# Patient Record
Sex: MALE | Race: WHITE
[De-identification: names, ages, dates, MRNs, and addresses within clinical notes are randomized per-mention and may not be internally consistent; named-entity substitution may affect disease eponyms.]

---

## 2017-08-12 NOTE — EKG
[facility]
                                    [address]
                                  [place]  [address]
_________________________________________________________________________________________
                                                                 Signed   
 
 
Atrial fibrillation
Right bundle branch block
Left anterior fascicular block
*** Bifascicular block ***
Inferior infarct (cited on or before 19-JAN-2017)
Abnormal ECG
When compared with ECG of 19-JAN-2017 15:13,
No significant change was found
Confirmed by BROOKS HOUSTON MD ([id number]) on 8/12/2017 5:13:23 PM
 
 
 
 
 
 
 
 
 
 
 
 
 
 
 
 
 
 
 
 
 
 
 
 
 
 
 
 
 
 
 
 
 
 
 
 
    Electronically Signed By: BROOKS HOUSTON MD  08/12/17 1713
_________________________________________________________________________________________
PATIENT NAME:     DARYL RAMIREZ                     
MEDICAL RECORD #: [id number]                     Electrocardiogram             
          ACCT #: [id number]  
DATE OF BIRTH:   [birth year]                                       
PHYSICIAN:   BROOKS HOUSTON MD                   REPORT #: [id number]
REPORT IS CONFIDENTIAL AND NOT TO BE RELEASED WITHOUT AUTHORIZATION

## 2018-08-09 NOTE — EKG
[facility]
                                    [address]
                                  [place]  [address]
_________________________________________________________________________________________
                                                                 Signed   
 
 
Atrial fibrillation
Right bundle branch block
Left anterior fascicular block
*** Bifascicular block ***
Abnormal ECG
When compared with ECG of 12-AUG-2017 10:34,
No significant change was found
Confirmed by KRISTA LANDAVERDE MD ([id number]) on 8/9/2018 12:40:13 PM
 
 
 
 
 
 
 
 
 
 
 
 
 
 
 
 
 
 
 
 
 
 
 
 
 
 
 
 
 
 
 
 
 
 
 
 
 
    Electronically Signed By: KRISTA LANDAVERDE MD  08/09/18 1240
_________________________________________________________________________________________
PATIENT NAME:     DARYL RAMIREZ                     
MEDICAL RECORD #: [id number]                     Electrocardiogram             
          ACCT #: [id number]  
DATE OF BIRTH:   [birth year]                                       
PHYSICIAN:   KRISTA LANDAVERDE MD           REPORT #: [id number]
REPORT IS CONFIDENTIAL AND NOT TO BE RELEASED WITHOUT AUTHORIZATION

## 2019-01-03 NOTE — XMS
PreManage Notification: DARYL RAMIREZ MRN:[id number]
 
Security Information
 
Security Events
No recent Security Events currently on file
 
 
 
CRITERIA MET
------------
- Group Notification
 
 
CARE PROVIDERS
-------------------------------------------------------------------------------------
SALVADOR MURGUIA     Hospitalist     07/03/2018-Current
 
PHONE: Unknown
-------------------------------------------------------------------------------------
Erwin Iniguez MD     Primary Care     Current
 
PHONE: [phone]
-------------------------------------------------------------------------------------
hector     Case or Care Manager     Current
 
PHONE: Unknown
-------------------------------------------------------------------------------------
 
Micheal has no Care Guidelines for this patient.
Care History
Medical/Surgical
07/03/2018    [facility]
 
      - Patient is currently established with [facility]. If patient is seen 
      in the ED during business hours. Please contact CHWs at [facility] at
      Ext [phone]. Care Recommendation: This patient has had 5 or more Emergency
      Department visits in the last 12 months.\T\nbsp; Patient requires education on
      the scope and purpose of the ED as an acute care provider not a Primary Care
      Provider and should not be utilized for chronic conditions.\T\nbsp; If patient
      returns to ED please contact Community Health Worker Ana Luisa at [phone].
      These are guidelines and the provider should exercise clinical judgment when
      providing care.
E.D. VISIT COUNT (12 MO.)
-------------------------------------------------------------------------------------
8 [facility]
-------------------------------------------------------------------------------------
TOTAL 8
-------------------------------------------------------------------------------------
NOTE: Visits indicate total known visits.
 
ED/UCC VISIT TRACKING (12 MO.)
-------------------------------------------------------------------------------------
01/03/2019 20:08
[facility] [place]
 
TYPE: Emergency
 
COMPLAINT:
 
- NOSEBLEED
-------------------------------------------------------------------------------------
11/20/2018 12:29
[facility] [place]
 
TYPE: Emergency
 
COMPLAINT:
- NOSE BLEED
 
DIAGNOSES:
- Allergy status to other drugs, medicaments and biological substances status
- Unspecified atrial fibrillation
- Hypothyroidism, unspecified
- Other long term (current) drug therapy
- Major depressive disorder, single episode, unspecified
- Age-related osteoporosis without current pathological fracture
- Long term (current) use of anticoagulants
- Epistaxis
- Radiographic dye allergy status
-------------------------------------------------------------------------------------
08/29/2018 05:32
[facility] [place]
 
TYPE: Emergency
 
COMPLAINT:
- NOSE BLEED,NON INJURY
 
DIAGNOSES:
- Chronic obstructive pulmonary disease, unspecified
- Other long term (current) drug therapy
- Hypothyroidism, unspecified
- Allergy status to other drugs, medicaments and biological substances status
- Epistaxis
- Unspecified atrial fibrillation
- Gastro-esophageal reflux disease without esophagitis
- Old myocardial infarction
- Chronic kidney disease, unspecified
- Long term (current) use of systemic steroids
-------------------------------------------------------------------------------------
08/13/2018 22:59
[facility] [place]
 
TYPE: Emergency
 
COMPLAINT:
- NOSE BLEED
 
DIAGNOSES:
- Unspecified atrial fibrillation
- Old myocardial infarction
- Long term (current) use of antibiotics
- Chronic kidney disease, unspecified
- Major depressive disorder, single episode, unspecified
- Allergy status to other drugs, medicaments and biological substances status
- Anemia, unspecified
- Adverse effect of anticoagulants, initial encounter
- Chronic obstructive pulmonary disease, unspecified
- Hypothyroidism, unspecified
 
- Other long term (current) drug therapy
- Gastro-esophageal reflux disease without esophagitis
- Personal history of nicotine dependence
- Epistaxis
-------------------------------------------------------------------------------------
08/08/2018 12:58
[facility] [place]
 
TYPE: Emergency
 
COMPLAINT:
- HEPATIC ENCEPHALOPATHY
 
DIAGNOSES:
- Unspecified atrial fibrillation
- Gastro-esophageal reflux disease without esophagitis
- Hypothyroidism, unspecified
- Chronic kidney disease, unspecified
- Hemoptysis
- Other long term (current) drug therapy
- Chronic obstructive pulmonary disease, unspecified
- Personal history of nicotine dependence
- Major depressive disorder, single episode, unspecified
- Old myocardial infarction
- Chronic sinusitis, unspecified
- Pneumonia, unspecified organism
- Acute ischemic heart disease, unspecified
- Long term (current) use of systemic steroids
- Headache
-------------------------------------------------------------------------------------
07/02/2018 13:58
[facility] [place] [place]
 
TYPE: Emergency
 
COMPLAINT:
- REMOVAL OF STITCHES
 
DIAGNOSES:
- Chronic obstructive pulmonary disease, unspecified
- Chronic kidney disease, unspecified
- Gastro-esophageal reflux disease without esophagitis
- Personal history of pneumonia (recurrent)
- Other long term (current) drug therapy
- Bitten by dog, subsequent encounter
- Major depressive disorder, single episode, unspecified
- Allergy status to other drugs, medicaments and biological substances status
- Open bite of right forearm, subsequent encounter
- Long term (current) use of antibiotics
- Personal history of nicotine dependence
-------------------------------------------------------------------------------------
06/25/2018 10:41
[facility] [place]
 
TYPE: Emergency
 
COMPLAINT:
- WOUND CHECK
 
DIAGNOSES:
 
- Personal history of nicotine dependence
- Other long term (current) drug therapy
- Allergy status to other drugs, medicaments and biological substances status
- Chronic obstructive pulmonary disease, unspecified
- Bitten by dog, subsequent encounter
- Open bite of right forearm, subsequent encounter
-------------------------------------------------------------------------------------
06/23/2018 16:24
[facility] [place] [place]
 
TYPE: Emergency
 
COMPLAINT:
- DOG BITE
 
DIAGNOSES:
- Personal history of nicotine dependence
- Open bite of right forearm, initial encounter
- Allergy status to other drugs, medicaments and biological substances status
- Major depressive disorder, single episode, unspecified
- Other long term (current) drug therapy
- Hypothyroidism, unspecified
- Bitten by dog, initial encounter
- Chronic obstructive pulmonary disease, unspecified
-------------------------------------------------------------------------------------
 
 
INPATIENT VISIT TRACKING (12 MO.)
-------------------------------------------------------------------------------------
08/08/2018 21:20
[facility] [place]
 
TYPE: General Medicine
 
DIAGNOSES:
- Precordial pain
- Thoracic aortic aneurysm, without rupture
- Dysphagia, unspecified
- Essential (primary) hypertension
- Atherosclerotic heart disease of native coronary artery without angina pectoris
- Acute ischemic heart disease
- Chronic atrial fibrillation
-------------------------------------------------------------------------------------
 
[email or web address]

## 2019-12-06 ENCOUNTER — HOSPITAL ENCOUNTER (EMERGENCY)
Dept: HOSPITAL 46 - ED | Age: 74
Discharge: HOME | End: 2019-12-06
Payer: MEDICARE

## 2019-12-06 VITALS — WEIGHT: 155.01 LBS | HEIGHT: 68 IN | BODY MASS INDEX: 23.49 KG/M2

## 2019-12-06 DIAGNOSIS — K21.9: ICD-10-CM

## 2019-12-06 DIAGNOSIS — D63.1: ICD-10-CM

## 2019-12-06 DIAGNOSIS — I48.91: ICD-10-CM

## 2019-12-06 DIAGNOSIS — Z79.01: ICD-10-CM

## 2019-12-06 DIAGNOSIS — F32.9: ICD-10-CM

## 2019-12-06 DIAGNOSIS — N18.9: ICD-10-CM

## 2019-12-06 DIAGNOSIS — J44.0: Primary | ICD-10-CM

## 2019-12-06 DIAGNOSIS — E78.2: ICD-10-CM

## 2019-12-06 DIAGNOSIS — Z79.51: ICD-10-CM

## 2019-12-06 DIAGNOSIS — J18.9: ICD-10-CM

## 2019-12-06 DIAGNOSIS — Z87.891: ICD-10-CM

## 2019-12-06 DIAGNOSIS — Z79.899: ICD-10-CM

## 2019-12-06 DIAGNOSIS — I25.10: ICD-10-CM

## 2019-12-06 DIAGNOSIS — G47.33: ICD-10-CM

## 2019-12-06 DIAGNOSIS — I25.2: ICD-10-CM

## 2019-12-06 DIAGNOSIS — E03.9: ICD-10-CM

## 2019-12-06 DIAGNOSIS — Z88.8: ICD-10-CM

## 2019-12-06 NOTE — XMS
Clinical Summary
  Created on: 2019
 
 Wyatt Shane
 External Reference #: [id number]
 : 43
 Sex: Male
 
 Demographics
 
 
+-----------------------+------------------------+
| Address               | [address]     |
|                       | [place]  [address]   |
+-----------------------+------------------------+
| Home Phone            | [phone]        |
+-----------------------+------------------------+
| Preferred Language    | Unknown                |
+-----------------------+------------------------+
| Marital Status        |                 |
+-----------------------+------------------------+
| Hindu Affiliation | LUT                    |
+-----------------------+------------------------+
| Race                  | White                  |
+-----------------------+------------------------+
| Ethnic Group          | Not  or  |
+-----------------------+------------------------+
 
 
 Author
 
 
+--------------+-------------------------+
| Author       | [facility] |
+--------------+-------------------------+
| Organization | [facility] |
+--------------+-------------------------+
| Address      | Unknown                 |
+--------------+-------------------------+
| Phone        | Unavailable             |
+--------------+-------------------------+
 
 
 
 Support
 
 
+---------------+--------------+---------+-----------------+
| Name          | Relationship | Address | Phone           |
+---------------+--------------+---------+-----------------+
| Opal Shane | ECON         | Unknown | [phone] |
+---------------+--------------+---------+-----------------+
 
 
 
 Care Team Providers
 
 
 
+-----------------------+------+-----------------+
| Care Team Member Name | Role | Phone           |
+-----------------------+------+-----------------+
| Erwin Iniguez MD   | PCP  | [phone] |
+-----------------------+------+-----------------+
 
 
 
 Source Comments
 [facility] is fully live on both [facility] and [facility]
 
 Allergies
 No Known Allergies
 
 Medications
 
 
+----------------------+----------------------+-----------+---------+------+------+-------+
| Medication           | Sig                  | Dispensed | Refills | Star | End  | Statu |
|                      |                      |           |         | t    | Date | s     |
|                      |                      |           |         | Date |      |       |
+----------------------+----------------------+-----------+---------+------+------+-------+
|   VICODIN ORAL       | None Entered         |           | 0       |      |      | Activ |
|                      |                      |           |         |      |      | e     |
+----------------------+----------------------+-----------+---------+------+------+-------+
|   LANSOPRAZOLE       | Take  by mouth.      |           | 0       |      |      | Activ |
| (PREVACID ORAL)      |                      |           |         |      |      | e     |
+----------------------+----------------------+-----------+---------+------+------+-------+
|   metoclopramide     | Take 5 mg by mouth   |           | 0       |      |      | Activ |
| (REGLAN) 5 mg Oral   | every six hours as   |           |         |      |      | e     |
| Tablet               | needed.              |           |         |      |      |       |
+----------------------+----------------------+-----------+---------+------+------+-------+
|   sertraline         | Take 50 mg by mouth  |           | 0       |      |      | Activ |
| (ZOLOFT) 50 mg Oral  | once daily.          |           |         |      |      | e     |
| Tablet               |                      |           |         |      |      |       |
+----------------------+----------------------+-----------+---------+------+------+-------+
|   tamsulosin         | Take 0.4 mg by mouth |           | 0       |      |      | Activ |
| (FLOMAX) 0.4 mg Oral |  once daily.         |           |         |      |      | e     |
|  Capsule, Sust.      |                      |           |         |      |      |       |
| Release 24 hr        |                      |           |         |      |      |       |
+----------------------+----------------------+-----------+---------+------+------+-------+
|   TESTOSTERONE IM    | Inject  into the     |           | 0       |      |      | Activ |
|                      | muscle (IM).         |           |         |      |      | e     |
+----------------------+----------------------+-----------+---------+------+------+-------+
|                      | Take  by mouth.      |           | 0       |      |      | Activ |
| Amlodipine-Atorvasta |                      |           |         |      |      | e     |
| tin (CADUET) 10-10   |                      |           |         |      |      |       |
| mg Oral Tablet       |                      |           |         |      |      |       |
+----------------------+----------------------+-----------+---------+------+------+-------+
|   ACYCLOVIR ORAL     | Take  by mouth as    |           | 0       | 06/3 |      | Activ |
|                      | needed.              |           |         | 0/20 |      | e     |
|                      |                      |           |         | 10   |      |       |
+----------------------+----------------------+-----------+---------+------+------+-------+
|   CYANOCOBALAMIN     | by Injection route   |           | 0       | 06/3 |      | Activ |
| (VITAMIN B-12 INJ)   | every thirty days.   |           |         | 0/20 |      | e     |
|                      |                      |           |         | 10   |      |       |
+----------------------+----------------------+-----------+---------+------+------+-------+
|   gabapentin 300 mg  | Take 300 mg by mouth |           | 0       |      |      | Activ |
| Oral Tablet          |  three times daily.  |           |         |      |      | e     |
 
+----------------------+----------------------+-----------+---------+------+------+-------+
|   LORATADINE         | Take  by mouth.      |           | 0       |      |      | Activ |
| (CLARITIN ORAL)      |                      |           |         |      |      | e     |
+----------------------+----------------------+-----------+---------+------+------+-------+
|   NAPROXEN           | Take  by mouth.      |           | 0       |      |      | Activ |
| SODIUM/P-EPHED HCL   |                      |           |         |      |      | e     |
| (SUDAFED 12 HR       |                      |           |         |      |      |       |
| SINUS-PAIN ORAL)     |                      |           |         |      |      |       |
+----------------------+----------------------+-----------+---------+------+------+-------+
|   atorvastatin       | Take 10 mg by mouth  |           | 0       |      |      | Activ |
| (LIPITOR) 10 mg Oral | once daily.          |           |         |      |      | e     |
|  Tablet              |                      |           |         |      |      |       |
+----------------------+----------------------+-----------+---------+------+------+-------+
|   omeprazole         | Take 40 mg by mouth  |           | 0       |      |      | Activ |
| (PRILOSEC) 40 mg     | once daily.          |           |         |      |      | e     |
| Oral Capsule,        |                      |           |         |      |      |       |
| Delayed              |                      |           |         |      |      |       |
| Release(E.C.)        |                      |           |         |      |      |       |
+----------------------+----------------------+-----------+---------+------+------+-------+
 
 
 
 Active Problems
 
 
+--------------------------------+------------+
| Problem                        | Noted Date |
+--------------------------------+------------+
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
+--------------------------------+------------+
| History of laryngeal cancer    | 2011 |
+--------------------------------+------------+
| Esophageal stenosis            | 2010 |
+--------------------------------+------------+
| Larynx edema                   | 2010 |
+--------------------------------+------------+
| Glottic stenosis               | 10/03/2007 |
+--------------------------------+------------+
| Pharyngeal dysphagia           | 10/03/2007 |
+--------------------------------+------------+
| Allergic rhinitis              | 10/03/2007 |
+--------------------------------+------------+
 
 
 
+---------------------+
|   Overview:   ICD10 |
+---------------------+
 
 
 
 Resolved Problems
 
 
+------------------+----------+-----------+
| Problem          | Noted    | Resolved  |
|                  | Date     | Date      |
+------------------+----------+-----------+
| Laryngeal cancer | 10/03/20 |  |
|                  | 07       |          |
 
+------------------+----------+-----------+
 
 
 
 Family History
 
 
+-------------------+----------+------+----------+
| Medical History   | Relation | Name | Comments |
+-------------------+----------+------+----------+
| Cancer            | Mother   |      | breast   |
+-------------------+----------+------+----------+
| Movement Disorder | Neg Hx   |      |          |
+-------------------+----------+------+----------+
| Thyroid           | Neg Hx   |      |          |
+-------------------+----------+------+----------+
| Tremor            | Neg Hx   |      |          |
+-------------------+----------+------+----------+
 
 
 
+----------+------+--------+----------+
| Relation | Name | Status | Comments |
+----------+------+--------+----------+
| Mother   |      |        |          |
+----------+------+--------+----------+
 
 
 
 Social History
 
 
+---------------+------------+-----------+--------+------------------+
| Tobacco Use   | Types      | Packs/Day | Years  | Date             |
|               |            |           | Used   |                  |
+---------------+------------+-----------+--------+------------------+
| Former Smoker | Cigarettes | 1         | 30     | Quit: 1996 |
+---------------+------------+-----------+--------+------------------+
 
 
 
+-------------+-------------+---------+----------+
| Alcohol Use | Drinks/Week | oz/Week | Comments |
+-------------+-------------+---------+----------+
| No          |             |         |          |
+-------------+-------------+---------+----------+
 
 
 
+------------------+---------------+
| Sex Assigned at  | Date Recorded |
| Birth            |               |
+------------------+---------------+
| Not on file      |               |
+------------------+---------------+
 
 
 
+----------------+-------------+-------------+
| Job Start Date | Occupation  | Industry    |
 
+----------------+-------------+-------------+
| Not on file    | Not on file | Not on file |
+----------------+-------------+-------------+
 
 
 
+----------------+--------------+------------+
| Travel History | Travel Start | Travel End |
+----------------+--------------+------------+
 
 
 
+-------------------------------------+
| No recent travel history available. |
+-------------------------------------+
 
 
 
 Last Filed Vital Signs
 
 
+-------------------+------------------+----------------------+----------+
| Vital Sign        | Reading          | Time Taken           | Comments |
+-------------------+------------------+----------------------+----------+
| Blood Pressure    | 135/77           | 2011 11:13 AM  |          |
|                   |                  | PDT                  |          |
+-------------------+------------------+----------------------+----------+
| Pulse             | 78               | 2011 11:13 AM  |          |
|                   |                  | PDT                  |          |
+-------------------+------------------+----------------------+----------+
| Temperature       | -                | -                    |          |
+-------------------+------------------+----------------------+----------+
| Respiratory Rate  | -                | -                    |          |
+-------------------+------------------+----------------------+----------+
| Oxygen Saturation | -                | -                    |          |
+-------------------+------------------+----------------------+----------+
| Inhaled Oxygen    | -                | -                    |          |
| Concentration     |                  |                      |          |
+-------------------+------------------+----------------------+----------+
| Weight            | 92.5 kg (204 lb) | 2011 11:13 AM  |          |
|                   |                  | PDT                  |          |
+-------------------+------------------+----------------------+----------+
| Height            | 172.7 cm (5' 8") | 2011 11:13 AM  |          |
|                   |                  | PDT                  |          |
+-------------------+------------------+----------------------+----------+
| Body Mass Index   | 31.02            | 2011 11:13 AM  |          |
|                   |                  | PDT                  |          |
+-------------------+------------------+----------------------+----------+
 
 
 
 Plan of Treatment
 
 
+----------------------+-----------+-----------+----------+
| Health Maintenance   | Due Date  | Last Done | Comments |
+----------------------+-----------+-----------+----------+
| Pneumococcal         |  |           |          |
| vaccination (1 of 2  | 9         |           |          |
| - PCV13)             |           |           |          |
 
+----------------------+-----------+-----------+----------+
| Influenza (Flu)      | 10/01/201 |           |          |
| vaccination (#1)     | 9         |           |          |
+----------------------+-----------+-----------+----------+
 
 
 
 Results
 Not on filefrom Last 3 Months
 
 Insurance
 
 
+------------------+--------+-------------+--------+-------------+------------+--------+
| Payer            | Benefi | Subscriber  | Effect | Phone       | Address    | Type   |
|                  | t Plan | ID          | daisy    |             |            |        |
|                  |  /     |             | Dates  |             |            |        |
|                  | Group  |             |        |             |            |        |
+------------------+--------+-------------+--------+-------------+------------+--------+
| MEDICARE         | MEDICA | xxxxxxxxxx  |  | [phone] |   [address]   | [address] |
|                  | [address] & |             | [id number] | [address]           | [id number]       | [address]     |
|                  |  [address]     |             | esent  |             | [place], [place]  |        |
|                  |        |             |        |             | [id number]      |        |
+------------------+--------+-------------+--------+-------------+------------+--------+
| COMMERCIAL GROUP | COMMER | xxxxxxxxx   | Effect |             |            | Indemn |
|                  | CIAL   |             | daisy    |             |            | ity    |
|                  | GROUP  |             | for    |             |            |        |
|                  |        |             | all    |             |            |        |
|                  |        |             | dates  |             |            |        |
+------------------+--------+-------------+--------+-------------+------------+--------+
 
 
 
+----------------+--------+-------------+--------+-------------+----------------------+
| Guarantor Name | Accoun | Relation to | Date   | Phone       | Billing Address      |
|                | t Type |  Patient    | of     |             |                      |
|                |        |             | Birth  |             |                      |
+----------------+--------+-------------+--------+-------------+----------------------+
| Wyatt Shane    | Person | Self        | / |             |   [address] |
|                | al/Fam |             | 1945   | [phone] |   [place]      |
|                | [place]    |             |        | [address] (Home)    | [id number]                |
+----------------+--------+-------------+--------+-------------+----------------------+
 
 
 
 Advance Directives
 
 
+-----------------+---------------+----------------+-------------+
| Type            | Date Recorded | Patient        | Explanation |
|                 |               | Representative |             |
+-----------------+---------------+----------------+-------------+
| Advance         |               |                |             |
| Directives and  |               |                |             |
| Living Will     |               |                |             |
+-----------------+---------------+----------------+-------------+
| Power of        |               |                |             |
|         |               |                |             |
+-----------------+---------------+----------------+-------------+

## 2019-12-06 NOTE — XMS
Encounter Summary
  Created on: 2019
 
 Shane Wyatttien BroussardJosue
 External Reference #: [id number]
 : 43
 Sex: Male
 
 Demographics
 
 
+-----------------------+---------------------------+
| Address               | [address]        |
|                       | [place]  [address] |
+-----------------------+---------------------------+
| Home Phone            | [phone]           |
+-----------------------+---------------------------+
| Preferred Language    | Unknown                   |
+-----------------------+---------------------------+
| Marital Status        |                    |
+-----------------------+---------------------------+
| Christianity Affiliation | [id number]                      |
+-----------------------+---------------------------+
| Race                  | Unknown                   |
+-----------------------+---------------------------+
| Ethnic Group          | Unknown                   |
+-----------------------+---------------------------+
 
 
 Author
 
 
+--------------+--------------------------------------------+
| Author       | [facility]  |
|              | and [place]                                |
+--------------+--------------------------------------------+
| Organization | [facility]  |
|              | and [place]                                |
+--------------+--------------------------------------------+
| Address      | Unknown                                    |
+--------------+--------------------------------------------+
| Phone        | Unavailable                                |
+--------------+--------------------------------------------+
 
 
 
 Support
 
 
+---------------+--------------+--------------------+-----------------+
| Name          | Relationship | Address            | Phone           |
+---------------+--------------+--------------------+-----------------+
| Opal Shane | ECON         | [address]     | [phone] |
|               |              | [place]   |                 |
|               |              | [id number]              |                 |
+---------------+--------------+--------------------+-----------------+
 
 
 
 
 Care Team Providers
 
 
+-----------------------+------+-----------------+
| Care Team Member Name | Role | Phone           |
+-----------------------+------+-----------------+
| Erwin Iniguez MD | PCP  | [phone] |
+-----------------------+------+-----------------+
 
 
 
 Reason for Visit
 
 
+---------+----------+
| Reason  | Comments |
+---------+----------+
| Consult |          |
+---------+----------+
 
 
 
 Encounter Details
 
 
+--------+---------+----------------------+----------------------+-------------------+
| Date   | Type    | Department           | Care Team            | Description       |
+--------+---------+----------------------+----------------------+-------------------+
| / | Office  |   [facility] [place]      |   Sohail Campbell PA  | JOANN (obstructive  |
| 2014   | Visit   | SLEEP DISORDER  [address]  |  [address]     | sleep apnea)      |
|        |         | [address]      | [place]      | (Primary Dx);     |
|        |         | [place] [address] | [address]  [phone]  | Cheyne-Gregory     |
|        |         |   [phone]       |  [phone] (Fax)  | respiration       |
+--------+---------+----------------------+----------------------+-------------------+
 
 
 
 Social History
 
 
+---------------+------------+-----------+--------+------------------+
| Tobacco Use   | Types      | Packs/Day | Years  | Date             |
|               |            |           | Used   |                  |
+---------------+------------+-----------+--------+------------------+
| Former Smoker | Cigarettes | 1.3       | 34     | Quit: 1996 |
+---------------+------------+-----------+--------+------------------+
 
 
 
+---------------------+---+---+---+
| Smokeless Tobacco:  |   |   |   |
| Never Used          |   |   |   |
+---------------------+---+---+---+
 
 
 
+-------------+-------------+---------+----------+
| Alcohol Use | Drinks/Week | oz/Week | Comments |
+-------------+-------------+---------+----------+
 
| No          |             |         |          |
+-------------+-------------+---------+----------+
 
 
 
+------------------+---------------+
| Sex Assigned at  | Date Recorded |
| Birth            |               |
+------------------+---------------+
| Not on file      |               |
+------------------+---------------+
 
 
 
+----------------+-------------+-------------+
| Job Start Date | Occupation  | Industry    |
+----------------+-------------+-------------+
| Not on file    | Not on file | Not on file |
+----------------+-------------+-------------+
 
 
 
+----------------+--------------+------------+
| Travel History | Travel Start | Travel End |
+----------------+--------------+------------+
 
 
 
+-------------------------------------+
| No recent travel history available. |
+-------------------------------------+
 documented as of this encounter
 
 Last Filed Vital Signs
 
 
+-------------------+----------------------+----------------------+----------+
| Vital Sign        | Reading              | Time Taken           | Comments |
+-------------------+----------------------+----------------------+----------+
| Blood Pressure    | 134/66               | 2014 10:00 AM  |          |
|                   |                      | PDT                  |          |
+-------------------+----------------------+----------------------+----------+
| Pulse             | 67                   | 2014 10:00 AM  |          |
|                   |                      | PDT                  |          |
+-------------------+----------------------+----------------------+----------+
| Temperature       | -                    | -                    |          |
+-------------------+----------------------+----------------------+----------+
| Respiratory Rate  | 16                   | 2014 10:00 AM  |          |
|                   |                      | PDT                  |          |
+-------------------+----------------------+----------------------+----------+
| Oxygen Saturation | 97%                  | 2014 10:00 AM  |          |
|                   |                      | PDT                  |          |
+-------------------+----------------------+----------------------+----------+
| Inhaled Oxygen    | -                    | -                    |          |
| Concentration     |                      |                      |          |
+-------------------+----------------------+----------------------+----------+
| Weight            | 82.2 kg (181 lb 3.2  | 2014 10:00 AM  |          |
|                   | oz)                  | PDT                  |          |
+-------------------+----------------------+----------------------+----------+
| Height            | 172.7 cm (5' 8")     | 2014 10:00 AM  |          |
 
|                   |                      | PDT                  |          |
+-------------------+----------------------+----------------------+----------+
| Body Mass Index   | 27.55                | 2014 10:00 AM  |          |
|                   |                      | PDT                  |          |
+-------------------+----------------------+----------------------+----------+
 documented in this encounter
 
 Progress Sohail Truong PA - 2014  9:55 AM PDTFormatting of this note might be different from 
the original.
 Subjective: 
  
 Patient ID: Wyatt Shane is a 68 y.o. male.
 
 HPI
 
 last office visit was:  2014
 date of polysomnography: 10/07/2013 
 AHI: 77.4
 RDI: 77.4
 O2%: 86% with 15.8 minutes below 88% 
 Machine type: Respironics ASV BiPAP with nasal mask (Aditi)
 obtained from:  [facility] in [place] 
 pressure: EPAP = 4cm;PSmin=0cm;PSmax=25cm;backup rate=auto
 Nights using BiPAP:    15/64
 % of nights >4 hours:  3.1% 
 average usage (all nights):  0:27  0:28
 average usage (nights used):  2:34  2:03
 AHI: 1.0
 
 Wyatt comes in for BiPAP compliance.  He continues to have problems with congestion.  He has
 tried increasing his humidity to the maximum of 5.0, but this has not been helpful.  At [facility]
s setting, he is only using a small amount of water, which often indicates that it is not wo
rking properly.  He continues to have dried blood in his nose in the morning when using his 
BiPAP.  He has not been using his BiPAP on most nights because of this.  He understands the 
severity of his apnea and the importance of treating it.  He did very well during the first 
few months with his BiPAP.  He and his wife noticed a significant improvement in his sleep q
uality and his daytime sleepiness and energy.  During this time, he was not having congestio
n problems and he was using more water each night.  He took his BiPAP to [place] to have the h
umidifier checked.  He says they made some adjustments to it, but did not replace it or seem
 to repair anything.  The function of the humidifier has not improved.  He and his wife are 
very frustrated.  He also said that his Respironics ASV was very quiet when he began using i
t, but it now makes much more noise.  He is not able to make any adjustments to his mask or 
the machine to improve the noise.
 
 I have discussed the download in detail.  This shows that his sleep apnea is controlled, wi
th an AHI of 1.0.  It also shows that his leaks are well controlled.  He has not met the com
pliance standard of wearing his BiPAP for >4 hours for 70% or more nights during at least a 
thirty day period.
 
 Review of Systems
 
 Objective: 
 Physical Exam
 
 Assessment: 
 
 Problem #1: OBSTRUCTIVE SLEEP APNEA (ICD 9-327.23)
 This is controlled with BiPAP, but he continues to struggle with wearing it for the duratio
n of the night because of problems with congestion.  His BiPAP is only using a small amount 
 
of water during the night, which is likely the cause of his dryness and congestion.  He has 
taken his current BiPAP to [place] to be repaired, but this has not improved.
 
 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 
 See above.
 
 Plan: 
 
 He is to continue with his BiPAP indefinitely.  I recommended that he take his BiPAP to [facility] in [place] to have it replaced with a ResMed S9 ASV BiPAP.  He may also benefit from 
seeing an ENT specialist.
 
 He will follow up with Dr. Perez.  Thirty minutes were spent face-to-face, with the majorit
y of time spent in counseling.
 
 Sohail Campbell PA-C
 
 cc: 
 Dr. Erwin Foote
 
 Electronically signed by ROWENA Greene at 2014  2:10 PM PDTdocumented in this enco
unter
 
 Plan of Treatment
 
 
+--------+---------+-------------+----------------------+-------------+
| Date   | Type    | Specialty   | Care Team            | Description |
+--------+---------+-------------+----------------------+-------------+
| / | Office  | Pulmonology |   [facility],          |             |
|    | Visit   |             | Jessica Cheung,   |             |
|        |         |             | MD [address] |             |
|        |         |             |   [place],   |             |
|        |         |             | [place] [address]             |             |
|        |         |             | [phone]         |             |
|        |         |             | [phone] (Fax)   |             |
+--------+---------+-------------+----------------------+-------------+
| 03/04/ | Office  | Cardiology  |   Eric Akins, |             |
|    | Visit   |             |  MD [address]   |             |
|        |         |             | [place]  |             |
|        |         |             | [address]  [phone]  |             |
|        |         |             |  [phone] (Fax)  |             |
+--------+---------+-------------+----------------------+-------------+
 documented as of this encounter
 
 Visit Diagnoses
 
 
+----------------------------------------------------------------------------------------+
| Diagnosis                                                                              |
+----------------------------------------------------------------------------------------+
|   JOANN (obstructive sleep apnea) - Primary  Obstructive sleep apnea (adult) (pediatric) |
+----------------------------------------------------------------------------------------+
|   Cheyne-Gregory respiration                                                            |
+----------------------------------------------------------------------------------------+
 documented in this encounter

## 2019-12-06 NOTE — XMS
Encounter Summary
  Created on: 2019
 
 Wyatt Shane
 External Reference #: [id number]
 : 43
 Sex: Male
 
 Demographics
 
 
+-----------------------+------------------------+
| Address               | [address]     |
|                       | [place]  [address]   |
+-----------------------+------------------------+
| Home Phone            | [phone]        |
+-----------------------+------------------------+
| Preferred Language    | Unknown                |
+-----------------------+------------------------+
| Marital Status        |                 |
+-----------------------+------------------------+
| [employer] Affiliation | LUT                    |
+-----------------------+------------------------+
| Race                  | White                  |
+-----------------------+------------------------+
| Ethnic Group          | Not  or  |
+-----------------------+------------------------+
 
 
 Author
 
 
+--------------+------------------------------+
| Author       | [facility] |
+--------------+------------------------------+
| Organization | [facility] |
+--------------+------------------------------+
| Address      | Unknown                      |
+--------------+------------------------------+
| Phone        | Unavailable                  |
+--------------+------------------------------+
 
 
 
 Support
 
 
+---------------+--------------+---------+-----------------+
| Name          | Relationship | Address | Phone           |
+---------------+--------------+---------+-----------------+
| Opal Shane | ECON         | Unknown | [phone] |
+---------------+--------------+---------+-----------------+
 
 
 
 Care Team Providers
 
 
 
+-----------------------+------+-----------------+
| Care Team Member Name | Role | Phone           |
+-----------------------+------+-----------------+
| Erwin Iniguez MD   | PCP  | [phone] |
+-----------------------+------+-----------------+
 
 
 
 Encounter Details
 
 
+--------+-------------+----------------------+----------------------+---------------------+
| Date   | Type        | Department           | Care Team            | Description         |
+--------+-------------+----------------------+----------------------+---------------------+
| 02/10/ | DELETED     |   Preoperative       |   Consult,           | ANESTHESIA/SEDATION |
|    | TRANSCRIPTI | Medicine Clinic at   | Anesthesia  [address] |                     |
|        | ON          | [facility] 4th Floor  [address]  |  [facility]    |                     |
|        |             | [address]          | Road  [place], [place]   |                     |
|        |             | Mailcode: [facility]       | [address]                |                     |
|        |             | [facility]    |                      |                     |
|        |             | and Healing,         |                      |                     |
|        |             | [address]th Floor |                      |                     |
|        |             |   [facility] [place]       |                      |                     |
|        |             | [address]           |                      |                     |
|        |             | [phone]         |                      |                     |
+--------+-------------+----------------------+----------------------+---------------------+
 
 
 
 Social History
 
 
+----------------+-------+-----------+--------+------+
| Tobacco Use    | Types | Packs/Day | Years  | Date |
|                |       |           | Used   |      |
+----------------+-------+-----------+--------+------+
| Never Assessed |       |           |        |      |
+----------------+-------+-----------+--------+------+
 
 
 
+------------------+---------------+
| Sex Assigned at  | Date Recorded |
| Birth            |               |
+------------------+---------------+
| Not on file      |               |
+------------------+---------------+
 
 
 
+----------------+-------------+-------------+
| Job Start Date | Occupation  | Industry    |
+----------------+-------------+-------------+
| Not on file    | Not on file | Not on file |
+----------------+-------------+-------------+
 
 
 
+----------------+--------------+------------+
| Travel History | Travel Start | Travel End |
 
+----------------+--------------+------------+
 
 
 
+-------------------------------------+
| No recent travel history available. |
+-------------------------------------+
 documented as of this encounter
 
 Plan of Treatment
 Not on filedocumented as of this encounter
 
 Procedures
 
 
+---------------------+--------+-------------+----------------------+----------------------+
| Procedure Name      | Priori | Date/Time   | Associated Diagnosis | Comments             |
|                     | ty     |             |                      |                      |
+---------------------+--------+-------------+----------------------+----------------------+
| ANESTHESIA/SEDATION |        | 02/10/2000  |                      |   Results for this   |
|                     |        | 11:24 AM    |                      | procedure are in the |
|                     |        | PST         |                      |  results section.    |
+---------------------+--------+-------------+----------------------+----------------------+
 documented in this encounter
 
 Visit Diagnoses
 Not on filedocumented in this encounter

## 2019-12-06 NOTE — XMS
Encounter Summary
  Created on: 2019
 
 Shane Wyatttien BroussardJosue
 External Reference #: [id number]
 : 43
 Sex: Male
 
 Demographics
 
 
+-----------------------+---------------------------+
| Address               | [address]        |
|                       | [place]  [address] |
+-----------------------+---------------------------+
| Home Phone            | [phone]           |
+-----------------------+---------------------------+
| Preferred Language    | Unknown                   |
+-----------------------+---------------------------+
| Marital Status        |                    |
+-----------------------+---------------------------+
| Synagogue Affiliation | 1028                      |
+-----------------------+---------------------------+
| Race                  | Unknown                   |
+-----------------------+---------------------------+
| Ethnic Group          | Unknown                   |
+-----------------------+---------------------------+
 
 
 Author
 
 
+--------------+--------------------------------------------+
| Author       | [facility]  |
|              | [facility] [place]                                |
+--------------+--------------------------------------------+
| Organization | [facility]  |
|              | [facility] [place]                                |
+--------------+--------------------------------------------+
| Address      | Unknown                                    |
+--------------+--------------------------------------------+
| Phone        | Unavailable                                |
+--------------+--------------------------------------------+
 
 
 
 Support
 
 
+---------------+--------------+--------------------+-----------------+
| Name          | Relationship | Address            | Phone           |
+---------------+--------------+--------------------+-----------------+
| Opal Shane | ECON         | [address]     | [phone] |
|               |              | [place]   |                 |
|               |              | [id number]              |                 |
+---------------+--------------+--------------------+-----------------+
 
 
 
 
 Care Team Providers
 
 
+------------------------+------+-----------------+
| Care Team Member Name  | Role | Phone           |
+------------------------+------+-----------------+
| Calvin Robertson MD | PCP  | [phone] |
+------------------------+------+-----------------+
 
 
 
 Reason for Referral
 Diagnostic/Screening (Routine)
 
+--------+--------+-----------+--------------+--------------+--------------+
| Status | Reason | Specialty | Diagnoses /  | Referred By  | Referred To  |
|        |        |           | Procedures   | Contact      | Contact      |
+--------+--------+-----------+--------------+--------------+--------------+
| Closed |        | Radiology |   Diagnoses  |   Steve Si,  |              |
|        |        |           |  Carotid     | DNP  [address]    |              |
|        |        |           | stenosis,    | [address]  |              |
|        |        |           | bilateral    |  [place] [address]       |              |
|        |        |           | Procedures   | [place] |              |
|        |        |           | VAS Carotid  |  [id number]       |              |
|        |        |           | Duplex       | Phone:       |              |
|        |        |           | Bilateral    | [phone] |              |
|        |        |           |              |   Fax:       |              |
|        |        |           |              | [phone] |              |
+--------+--------+-----------+--------------+--------------+--------------+
 
 
 Diagnostic/Screening (Routine)
 
+--------+--------+-----------+--------------+--------------+--------------+
| Status | Reason | Specialty | Diagnoses /  | Referred By  | Referred To  |
|        |        |           | Procedures   | Contact      | Contact      |
+--------+--------+-----------+--------------+--------------+--------------+
| Closed |        | Radiology |   Diagnoses  |   Neris Wilson,  |              |
|        |        |           |  Abdominal   | DNP  [address]    |              |
|        |        |           | aortic       | [address]  |              |
|        |        |           | aneurysm     |  [address]       |              |
|        |        |           | (AAA)        | [place] |              |
|        |        |           | without      |  [id number]       |              |
|        |        |           | rupture      | Phone:       |              |
|        |        |           | (HCC)        | [phone] |              |
|        |        |           | Procedures   |   Fax:       |              |
|        |        |           | VAS Aorta    | [phone] |              |
|        |        |           | Iliac Duplex |              |              |
|        |        |           |  Complete    |              |              |
+--------+--------+-----------+--------------+--------------+--------------+
 
 
 
 
 Reason for Visit
 
 
+-----------+----------+
| Reason    | Comments |
 
+-----------+----------+
| Follow-up |          |
+-----------+----------+
 
 
 
 Encounter Details
 
 
+--------+-----------+----------------------+----------------------+-------------+
| Date   | Type      | Department           | Care Team            | Description |
+--------+-----------+----------------------+----------------------+-------------+
| / | Telephone |   [facility]      |   Neris Wilson DNP      | Follow-up   |
| 2019   |           | VASCULAR SURGERY     | [address]     |             |
|        |           | [address] | [place] [facility] [place]  |             |
|        |           |  E  [place]     | [address]  [phone]  |             |
|        |           | [address]           |  [phone] (Fax)  |             |
|        |           | [phone]         |                      |             |
+--------+-----------+----------------------+----------------------+-------------+
 
 
 
 Social History
 
 
+---------------+------------+-----------+--------+------------------+
| Tobacco Use   | Types      | Packs/Day | Years  | Date             |
|               |            |           | Used   |                  |
+---------------+------------+-----------+--------+------------------+
| Former Smoker | Cigarettes | 1         | 35     | Quit: 1996 |
+---------------+------------+-----------+--------+------------------+
 
 
 
+---------------------+---+---+---+
| Smokeless Tobacco:  |   |   |   |
| Never Used          |   |   |   |
+---------------------+---+---+---+
 
 
 
+-------------+-------------+---------+----------+
| Alcohol Use | Drinks/Week | oz/Week | Comments |
+-------------+-------------+---------+----------+
| No          |             |         |          |
+-------------+-------------+---------+----------+
 
 
 
+------------------+---------------+
| Sex Assigned at  | Date Recorded |
| Birth            |               |
+------------------+---------------+
| Not on file      |               |
+------------------+---------------+
 
 
 
+----------------+-------------+-------------+
| Job Start Date | Occupation  | Industry    |
 
+----------------+-------------+-------------+
| Not on file    | Not on file | Not on file |
+----------------+-------------+-------------+
 
 
 
+----------------+--------------+------------+
| Travel History | Travel Start | Travel End |
+----------------+--------------+------------+
 
 
 
+-------------------------------------+
| No recent travel history available. |
+-------------------------------------+
 documented as of this encounter
 
 Plan of Treatment
 
 
+--------+---------+-------------+----------------------+-------------+
| Date   | Type    | Specialty   | Care Team            | Description |
+--------+---------+-------------+----------------------+-------------+
| / | Office  | Pulmonology |   Juan F,          |             |
|    | Visit   |             | Jessica Cheung,   |             |
|        |         |             | MD [address] |             |
|        |         |             |   [place]   |             |
|        |         |             | [place] [address]             |             |
|        |         |             | [phone]         |             |
|        |         |             | [phone] (Fax)   |             |
+--------+---------+-------------+----------------------+-------------+
| / | Office  | Cardiology  |   Eric Akins, |             |
|    | Visit   |             |  MD [address]   |             |
|        |         |             | [place]  |             |
|        |         |             | [address]  [phone]  |             |
|        |         |             |  [phone] (Fax)  |             |
+--------+---------+-------------+----------------------+-------------+
 documented as of this encounter
 
 Results
 VAS Carotid Duplex Bilateral (2019 11:15 AM PST)
 
+----------+
| Specimen |
+----------+
|          |
+----------+
 
 
 
+------------------------------------------------------------------------+---------------+
| Impressions                                                            | Performed At  |
+------------------------------------------------------------------------+---------------+
|   Extensive calcific plaque bilaterally without high-grade stenosis,   |   [facility] |
| similar to prior examination     In the left common carotid artery     |               |
| there is dense calcific plaque versus  an intimal flap which is not    |               |
| flow limiting.     The left vertebral artery is not visualized on      |               |
| today's examination.     Internal Carotid Artery Diagnostic Grades     |               |
|  Grade 1: Stenosis = 01-50% / PSV <125 cm/sec / EDV (cm/s)<40 cm/sec   |               |
| (mild plaque)  Grade 2: Stenosis = 01-50% / PSV <125 cm/sec / EDV      |               |
 
| (cm/s)<40 cm/sec  (moderate plaque)  Grade 3: Stenosis = 50-69% / PSV  |               |
| >125 cm/sec / EDV (cm/s)>40 cm/sec  Grade 4: Stenosis = 70-95% / PSV   |               |
| >230 cm/sec / EDV (cm/s)>100 cm/sec  Grade 5: Stenosis = 90-95% / PSV  |               |
| <125 cm/sec / EDV (cm/s)<40 cm/sec  Grade 6: Stenosis Occluded         |               |
| Society of Radiologists in Ultrasound (SRU) criteria applied for       |               |
| internal carotid stenosis determination.           Signed by: Sly  |               |
| Johnson PATEL  Sign Date/Time: 2019 1:36 PM                      |               |
+------------------------------------------------------------------------+---------------+
 
 
 
+------------------------------------------------------------------------+---------------+
| Narrative                                                              | Performed At  |
+------------------------------------------------------------------------+---------------+
|      CAROTID DUPLEX ULTRASOUND     CLINICAL INFORMATION:  Carotid      |   [facility] |
| artery stenosis.     COMPARISON:  US CAROTID DOPPLER BILATERAL         |               |
| (2018);     PROCEDURE:  Evaluation of the extracranial carotid    |               |
| and vertebral arteries with  production of real-time images            |               |
| integrating B-mode two-dimensional  vascular structure, Doppler        |               |
| spectral analysis and color-flow Doppler  imaging.     FINDINGS:  Peak |               |
|  systolic and diastolic velocities measured in the common and          |               |
| internal carotid arteries. All velocities recorded in cm/sec:          |               |
| Anterior Circulation:     Right  CCA: 65/10  ICA: 65/9  ECA: 78/9      |               |
| ICA/CCA: 1.0     Left  CCA: 86/15  ICA: 90 /7  ECA: 133/0  ICA/CCA:    |               |
| 1.0     Posterior Circulation:     Right:  Vertebral: 121/21 Antegrade |               |
|      Left:  Vertebral: Not visualized     Gray scale images: There is  |               |
| extensive calcified plaque with a possible  intimal flap of the left   |               |
| common carotid artery.                                                 |               |
+------------------------------------------------------------------------+---------------+
 
 
 
+-------------------------------------------------------------------------+
| Procedure Note                                                          |
+-------------------------------------------------------------------------+
|   Maurizio, Rad Results In - 2019  1:40 PM PST                         |
| CAROTID DUPLEX ULTRASOUND                                               |
|                                                                         |
| CLINICAL INFORMATION:                                                   |
| Carotid artery stenosis.                                                |
|                                                                         |
| COMPARISON:                                                             |
| US CAROTID DOPPLER BILATERAL (2018);                               |
|                                                                         |
| PROCEDURE:                                                              |
| Evaluation of the extracranial carotid and vertebral arteries with      |
| production of real-time images integrating B-mode two-dimensional       |
| vascular structure, Doppler spectral analysis and color-flow Doppler    |
| imaging.                                                                |
|                                                                         |
| FINDINGS:                                                               |
| Peak systolic and diastolic velocities measured in the common and       |
| internal carotid arteries. All velocities recorded in cm/sec:           |
|                                                                         |
| Anterior Circulation:                                                   |
|                                                                         |
| Right                                                                   |
| CCA: 65/10                                                              |
| ICA: 65/9                                                               |
| ECA: 78/9                                                               |
 
| ICA/CCA: 1.0                                                            |
|                                                                         |
| Left                                                                    |
| CCA: 86/15                                                              |
| ICA: 90 /7                                                              |
| ECA: 133/0                                                              |
| ICA/CCA: 1.0                                                            |
|                                                                         |
| Posterior Circulation:                                                  |
|                                                                         |
| Right:                                                                  |
| Vertebral: 121/21 Antegrade                                             |
|                                                                         |
| Left:                                                                   |
| Vertebral: Not visualized                                               |
|                                                                         |
| Gray scale images: There is extensive calcified plaque with a possible  |
| intimal flap of the left common carotid artery.                         |
|                                                                         |
| IMPRESSION:                                                             |
| Extensive calcific plaque bilaterally without high-grade stenosis,      |
| similar to prior examination                                            |
|                                                                         |
| In the left common carotid artery there is dense calcific plaque versus |
| an intimal flap which is not flow limiting.                             |
|                                                                         |
| The left vertebral artery is not visualized on today's examination.     |
|                                                                         |
| Internal Carotid Artery Diagnostic Grades                               |
|                                                                         |
| Grade 1: Stenosis = 01-50% / PSV <125 cm/sec / EDV (cm/s)<40 cm/sec     |
| (mild plaque)                                                           |
| Grade 2: Stenosis = 01-50% / PSV <125 cm/sec / EDV (cm/s)<40 cm/sec     |
| (moderate plaque)                                                       |
| Grade 3: Stenosis = 50-69% / PSV >125 cm/sec / EDV (cm/s)>40 cm/sec     |
| Grade 4: Stenosis = 70-95% / PSV >230 cm/sec / EDV (cm/s)>100 cm/sec    |
| Grade 5: Stenosis = 90-95% / PSV <125 cm/sec / EDV (cm/s)<40 cm/sec     |
| Grade 6: Stenosis Occluded                                              |
|                                                                         |
| Society of Radiologists in Ultrasound (SRU) criteria applied for        |
| internal carotid stenosis determination.                                |
|                                                                         |
| Signed by: JORGE Heart Matthew                                        |
| Sign Date/Time: 2019 1:36 PM                                      |
+-------------------------------------------------------------------------+
 
 
 
+---------------+---------+--------------------+--------------+
| Performing    | Address | City/State/Zipcode | Phone Number |
| Organization  |         |                    |              |
+---------------+---------+--------------------+--------------+
|   [facility] |         |                    |              |
+---------------+---------+--------------------+--------------+
 VAS Aorta Iliac Duplex Complete (2019 11:11 AM PST)
 
+----------+
| Specimen |
 
+----------+
|          |
+----------+
 
 
 
+------------------------------------------------------------------------+---------------+
| Impressions                                                            | Performed At  |
+------------------------------------------------------------------------+---------------+
|   4.9 cm infrarenal abdominal aortic aneurysm unchanged when compared  |   [facility] |
| to  the CT angiogram of 2018.           Signed by: JORGE Heart,  |               |
| Johnson  Sign Date/Time: 2019 1:07 PM                            |               |
+------------------------------------------------------------------------+---------------+
 
 
 
+------------------------------------------------------------------------+---------------+
| Narrative                                                              | Performed At  |
+------------------------------------------------------------------------+---------------+
|      ABDOMINAL AORTA DUPLEX SCAN     CLINICAL INFORMATION:  Abdominal  |   [facility] |
| aorta aneurysm surveillance.     COMPARISON:  CL US GUIDANCE VASCULAR  |               |
| ACCESS (2018); CL CORONARY ANGIO WITH  GRAFTS (2018); CTA      |               |
| ABDOMEN AND PELVIS W WO CONTRAST (2018);  ECHO CARDIAC ADULT      |               |
| COMPLETE (2018); XR SWALLOWING FUNCTION VIDEO  (2018); CT      |               |
| CHEST WO CONTRAST (2018); XR CHEST 2 VIEW  (2018); XR CHEST 2  |               |
| VIEW (2018); CT HEAD WO CONTRAST (2018);  NM MYOCARDIAL        |               |
| PERFUSION SPECT - STRESS ONLY (2018); US CAROTID  DOPPLER         |               |
| BILATERAL (2018); MRI CERVICAL SPINE WO CONTRAST  (2018);    |               |
|   PROCEDURE:  Evaluation of the aorta and iliac vessels.     FINDINGS: |               |
|   The study is technically difficult due to overlying bowel gas.       |               |
|   There  is an infrarenal abdominal aortic aneurysm  Proximal aorta:   |               |
| 2.8 cm AP x 2.7 cm transverse  Mid aorta: 3.4 cm AP x 3.3 cm           |               |
| transverse  Distal aorta: 4.9 cm AP x 4.7 cm transverse  Right common  |               |
| iliac artery: 1.3 cm AP x 1.4 cm transverse  Left common iliac artery: |               |
|  1.4 cm AP x 1.3 cm transverse                                         |               |
+------------------------------------------------------------------------+---------------+
 
 
 
+-------------------------------------------------------------------------+
| Procedure Note                                                          |
+-------------------------------------------------------------------------+
|   Maurizio, Rad Results In - 2019  1:10 PM PST                         |
| ABDOMINAL AORTA DUPLEX SCAN                                             |
|                                                                         |
| CLINICAL INFORMATION:                                                   |
| Abdominal aorta aneurysm surveillance.                                  |
|                                                                         |
| COMPARISON:                                                             |
| CL US GUIDANCE VASCULAR ACCESS (2018); CL CORONARY ANGIO WITH       |
| GRAFTS (2018); CTA ABDOMEN AND PELVIS W WO CONTRAST (2018);    |
| ECHO CARDIAC ADULT COMPLETE (2018); XR SWALLOWING FUNCTION VIDEO    |
| (2018); CT CHEST WO CONTRAST (2018); XR CHEST 2 VIEW            |
| (2018); XR CHEST 2 VIEW (2018); CT HEAD WO CONTRAST (2018); |
| NM MYOCARDIAL PERFUSION SPECT - STRESS ONLY (2018); US CAROTID     |
| DOPPLER BILATERAL (2018); MRI CERVICAL SPINE WO CONTRAST           |
| (2018);                                                            |
|                                                                         |
| PROCEDURE:                                                              |
| Evaluation of the aorta and iliac vessels.                              |
 
|                                                                         |
| FINDINGS:                                                               |
| The study is technically difficult due to overlying bowel gas.  There   |
| is an infrarenal abdominal aortic aneurysm                              |
| Proximal aorta: 2.8 cm AP x 2.7 cm transverse                           |
| Mid aorta: 3.4 cm AP x 3.3 cm transverse                                |
| Distal aorta: 4.9 cm AP x 4.7 cm transverse                             |
| Right common iliac artery: 1.3 cm AP x 1.4 cm transverse                |
| Left common iliac artery: 1.4 cm AP x 1.3 cm transverse                 |
|                                                                         |
|                                                                         |
| IMPRESSION:                                                             |
| 4.9 cm infrarenal abdominal aortic aneurysm unchanged when compared to  |
| the CT angiogram of 2018.                                          |
|                                                                         |
| Signed by: JORGE Heart Matthew                                        |
| Sign Date/Time: 2019 1:07 PM                                      |
+-------------------------------------------------------------------------+
 
 
 
+---------------+---------+--------------------+--------------+
| Performing    | Address | City/State[facility] | Phone Number |
| Organization  |         |                    |              |
+---------------+---------+--------------------+--------------+
|   [facility] |         |                    |              |
+---------------+---------+--------------------+--------------+
 documented in this encounter
 
 Visit Diagnoses
 
 
+----------------------------------------------------------------------------------+
| Diagnosis                                                                        |
+----------------------------------------------------------------------------------+
|   Abdominal aortic aneurysm (AAA) without rupture (HCC) - Primary                |
+----------------------------------------------------------------------------------+
|   Carotid stenosis, bilateral  Occlusion and stenosis of multiple and bilateral  |
| precerebral arteries without mention of cerebral infarction                      |
+----------------------------------------------------------------------------------+
 documented in this encounter

## 2019-12-06 NOTE — XMS
Encounter Summary
  Created on: 2019
 
 Wyatt Shane
 External Reference #: [id number]
 : 43
 Sex: Male
 
 Demographics
 
 
+-----------------------+------------------------+
| Address               | [address]     |
|                       | [place]  [address]   |
+-----------------------+------------------------+
| Home Phone            | [phone]        |
+-----------------------+------------------------+
| Preferred Language    | Unknown                |
+-----------------------+------------------------+
| Marital Status        |                 |
+-----------------------+------------------------+
| Bahai Affiliation | LUT                    |
+-----------------------+------------------------+
| Race                  | White                  |
+-----------------------+------------------------+
| Ethnic Group          | Not  or  |
+-----------------------+------------------------+
 
 
 Author
 
 
+--------------+------------------------------+
| Author       | [facility] |
+--------------+------------------------------+
| Organization | [facility] |
+--------------+------------------------------+
| Address      | Unknown                      |
+--------------+------------------------------+
| Phone        | Unavailable                  |
+--------------+------------------------------+
 
 
 
 Support
 
 
+---------------+--------------+---------+-----------------+
| Name          | Relationship | Address | Phone           |
+---------------+--------------+---------+-----------------+
| Opal Shane | ECON         | Unknown | [phone] |
+---------------+--------------+---------+-----------------+
 
 
 
 Care Team Providers
 
 
 
+-----------------------+------+-----------------+
| Care Team Member Name | Role | Phone           |
+-----------------------+------+-----------------+
| Erwin Iniguez MD   | PCP  | [phone] |
+-----------------------+------+-----------------+
 
 
 
 Encounter Details
 
 
+--------+-------------+----------------------+--------------+-------------+
| Date   | Type        | Department           | Care Team    | Description |
+--------+-------------+----------------------+--------------+-------------+
| 02/10/ | Documentati |   Anesthesiology     |   Unknown  . |             |
|    | on          | [address]  |              |             |
|        |             | [place]  [place],   |              |             |
|        |             | [place] [address]        |              |             |
+--------+-------------+----------------------+--------------+-------------+
 
 
 
 Social History
 
 
+----------------+-------+-----------+--------+------+
| Tobacco Use    | Types | Packs/Day | Years  | Date |
|                |       |           | Used   |      |
+----------------+-------+-----------+--------+------+
| Never Assessed |       |           |        |      |
+----------------+-------+-----------+--------+------+
 
 
 
+------------------+---------------+
| Sex Assigned at  | Date Recorded |
| Birth            |               |
+------------------+---------------+
| Not on file      |               |
+------------------+---------------+
 
 
 
+----------------+-------------+-------------+
| Job Start Date | Occupation  | Industry    |
+----------------+-------------+-------------+
| Not on file    | Not on file | Not on file |
+----------------+-------------+-------------+
 
 
 
+----------------+--------------+------------+
| Travel History | Travel Start | Travel End |
+----------------+--------------+------------+
 
 
 
+-------------------------------------+
| No recent travel history available. |
+-------------------------------------+
 
 documented as of this encounter
 
 Plan of Treatment
 Not on filedocumented as of this encounter
 
 Procedures
 
 
+---------------------+--------+-------------+----------------------+----------------------+
| Procedure Name      | Priori | Date/Time   | Associated Diagnosis | Comments             |
|                     | ty     |             |                      |                      |
+---------------------+--------+-------------+----------------------+----------------------+
| ANESTHESIA/SEDATION |        | 02/10/2000  |                      |   Results for this   |
|                     |        | 11:24 AM    |                      | procedure are in the |
|                     |        | PST         |                      |  results section.    |
+---------------------+--------+-------------+----------------------+----------------------+
 documented in this encounter
 
 Results
 ANESTHESIA/SEDATION (02/10/2000 11:24 AM PST)
 
+---------------------------------------+--------------+
| Narrative                             | Performed At |
+---------------------------------------+--------------+
|   Ordered by an unspecified provider. |              |
+---------------------------------------+--------------+
 
 
 
+------------------------------------------------------------------+
| Transcriptions                                                   |
+------------------------------------------------------------------+
|   02/10/2000 11:24 AM [facility]    Anesthesia PostOp Report            |
|                                                                  |
|  Patient: WYATT SHANE Med Rec: [id number] Sex M Bdate: 1945 |
|   Date/Time Data                                                 |
|   Entered Into [facility]                                               |
|  Anesth PostOp                                                   |
|   Surgery Date [id number] 02/10/00 11:24                           |
|   [id number] 10/28/99 11:31                                        |
|   Anesthesiologist FRED PENALOZA 02/10/00 11:24                 |
|   FRED MARIA 10/28/99 11:31                                  |
|   Resident Anesthesiolog AMANDA GAYTAN 10/28/99 11:31            |
|                                                                  |
+------------------------------------------------------------------+
 documented in this encounter
 
 Visit Diagnoses
 Not on filedocumented in this encounter

## 2019-12-06 NOTE — XMS
Encounter Summary
  Created on: 2019
 
 Wyatt Shane
 External Reference #: [id number]
 : 43
 Sex: Male
 
 Demographics
 
 
+-----------------------+------------------------+
| Address               | [address]     |
|                       | [place]  [address]   |
+-----------------------+------------------------+
| Home Phone            | [phone]        |
+-----------------------+------------------------+
| Preferred Language    | Unknown                |
+-----------------------+------------------------+
| Marital Status        |                 |
+-----------------------+------------------------+
| Holiness Affiliation | LUT                    |
+-----------------------+------------------------+
| Race                  | White                  |
+-----------------------+------------------------+
| Ethnic Group          | Not  or  |
+-----------------------+------------------------+
 
 
 Author
 
 
+--------------+------------------------------+
| Author       | [facility] |
+--------------+------------------------------+
| Organization | [facility] |
+--------------+------------------------------+
| Address      | Unknown                      |
+--------------+------------------------------+
| Phone        | Unavailable                  |
+--------------+------------------------------+
 
 
 
 Support
 
 
+---------------+--------------+---------+-----------------+
| Name          | Relationship | Address | Phone           |
+---------------+--------------+---------+-----------------+
| Opal Shane | ECON         | Unknown | [phone] |
+---------------+--------------+---------+-----------------+
 
 
 
 Care Team Providers
 
 
 
+-----------------------+------+-----------------+
| Care Team Member Name | Role | Phone           |
+-----------------------+------+-----------------+
| Erwin Iniguez MD   | PCP  | [phone] |
+-----------------------+------+-----------------+
 
 
 
 Encounter Details
 
 
+--------+-------------+-----------------+---------------------+---------------+
| Date   | Type        | Department      | Care Team           | Description   |
+--------+-------------+-----------------+---------------------+---------------+
| 07/15/ | Office      |   [facility]  |   Note, Outpatient  | Progress Note |
|    | Visit-Trans | MEDICINE        | Clinic              |               |
|        | cribed      |                 |                     |               |
+--------+-------------+-----------------+---------------------+---------------+
 
 
 
 Social History
 
 
+----------------+-------+-----------+--------+------+
| Tobacco Use    | Types | Packs/Day | Years  | Date |
|                |       |           | Used   |      |
+----------------+-------+-----------+--------+------+
| Never Assessed |       |           |        |      |
+----------------+-------+-----------+--------+------+
 
 
 
+------------------+---------------+
| Sex Assigned at  | Date Recorded |
| Birth            |               |
+------------------+---------------+
| Not on file      |               |
+------------------+---------------+
 
 
 
+----------------+-------------+-------------+
| Job Start Date | Occupation  | Industry    |
+----------------+-------------+-------------+
| Not on file    | Not on file | Not on file |
+----------------+-------------+-------------+
 
 
 
+----------------+--------------+------------+
| Travel History | Travel Start | Travel End |
+----------------+--------------+------------+
 
 
 
+-------------------------------------+
| No recent travel history available. |
+-------------------------------------+
 documented as of this encounter
 
 
 Progress Notes
 Interface, Transcription In - 2006  3:11 AM PSTCLINIC DATE:       07/15/1999
 
 [facility]
 
 Mr. Shane is seen back today for a preoperative discussion and repeat
 dilation.  He has a full understanding of the procedure from his previous
 experience, and we will proceed.  Consent was signed.
 
 Jimmy Esposito M.D.
 , Otolaryngology
 Head and Neck Surgery
 
 MARIA ALEJANDRA/odette
 D: 07/15/1999
 T: 1999Electronically signed by Interface, Transcription In at 2006  3:11 AM PS
Tdocumented in this encounter
 
 Plan of Treatment
 Not on filedocumented as of this encounter
 
 Visit Diagnoses
 Not on filedocumented in this encounter

## 2019-12-06 NOTE — XMS
Clinical Summary
  Created on: 2019
 
 Svitlana Wyatt Salas
 External Reference #: [id number]
 : 43
 Sex: Male
 
 Demographics
 
 
+-----------------------+---------------------------+
| Address               | [address]        |
|                       | [place]  [address] |
+-----------------------+---------------------------+
| Home Phone            | [phone]           |
+-----------------------+---------------------------+
| Preferred Language    | Unknown                   |
+-----------------------+---------------------------+
| Marital Status        |                    |
+-----------------------+---------------------------+
| Syn[employer] Affiliation | 1028                      |
+-----------------------+---------------------------+
| Race                  | Unknown                   |
+-----------------------+---------------------------+
| Ethnic Group          | Unknown                   |
+-----------------------+---------------------------+
 
 
 Author
 
 
+--------------+--------------------------------------------+
| Author       | [facility]  |
|              | [facility] [place]                                |
+--------------+--------------------------------------------+
| Organization | [facility]  |
|              | and [place]                                |
+--------------+--------------------------------------------+
| Address      | Unknown                                    |
+--------------+--------------------------------------------+
| Phone        | Unavailable                                |
+--------------+--------------------------------------------+
 
 
 
 Support
 
 
+---------------+--------------+--------------------+-----------------+
| Name          | Relationship | Address            | Phone           |
+---------------+--------------+--------------------+-----------------+
| Opal Shane | ECON         | [address]     | [phone] |
|               |              | [place]   |                 |
|               |              | [id number]              |                 |
+---------------+--------------+--------------------+-----------------+
 
 
 
 
 Care Team Providers
 
 
+------------------------+------+-----------------+
| Care Team Member Name  | Role | Phone           |
+------------------------+------+-----------------+
| Calvin Robertson MD | PCP  | [phone] |
+------------------------+------+-----------------+
 
 
 
 Allergies
 
 
+------------------+----------------------+----------+----------+----------------------+
| Active Allergy   | Reactions            | Severity | Noted    | Comments             |
|                  |                      |          | Date     |                      |
+------------------+----------------------+----------+----------+----------------------+
| Beta Adrenergic  | Anaphylaxis          | High     | 20 |                      |
| Blockers         |                      |          | 15       |                      |
+------------------+----------------------+----------+----------+----------------------+
| Diltiazem        | Other (See Comments) | Medium   | 20 |                      |
|                  |                      |          | 15       |                      |
+------------------+----------------------+----------+----------+----------------------+
| Diltiazem Hcl    |                      |          |          |                      |
+------------------+----------------------+----------+----------+----------------------+
| Gabapentin       | Other (See Comments) | Medium   | 20 |   CNS                |
|                  |                      |          | 15       |                      |
+------------------+----------------------+----------+----------+----------------------+
| Imipramine       | Other (See Comments) | Medium   | 20 |   Urinary retention  |
|                  |                      |          | 15       |                      |
+------------------+----------------------+----------+----------+----------------------+
| Lipitor          |                      |          |          |                      |
+------------------+----------------------+----------+----------+----------------------+
| Metoprolol       | Swelling             | Medium   | 20 |                      |
|                  |                      |          | 15       |                      |
+------------------+----------------------+----------+----------+----------------------+
| Propranolol      | Other (See Comments) | Medium   | 20 |   raynaud's          |
|                  |                      |          | 15       |                      |
+------------------+----------------------+----------+----------+----------------------+
| Propranolol Hcl  |                      |          |          |                      |
+------------------+----------------------+----------+----------+----------------------+
 
 
 
 Medications
 
 
+----------------------+----------------------+-----------+---------+------+------+-------+
| Medication           | Sig                  | Dispensed | Refills | Star | End  | Statu |
|                      |                      |           |         | t    | Date | s     |
|                      |                      |           |         | Date |      |       |
+----------------------+----------------------+-----------+---------+------+------+-------+
|   fluticasone        | one spray in each    |           | 0       | 09/1 |      | Activ |
| (FLONASE) 50         | nostril daily as     |           |         | 4/20 |      | e     |
| mcg/nasal spray      | needed               |           |         | 12   |      |       |
+----------------------+----------------------+-----------+---------+------+------+-------+
|   ferrous sulfate    | Take 325 mg by mouth |           | 0       | 09/1 |      | Activ |
| 325 mg tablet        |  3 times daily.      |           |         | 4/20 |      | e     |
 
|                      |                      |           |         | 12   |      |       |
+----------------------+----------------------+-----------+---------+------+------+-------+
|   levothyroxine      | Take 75 mcg by mouth |           | 0       | 09/1 |      | Activ |
| (LEVOTHROID) 75 MCG  |  Daily.              |           |         | 4/20 |      | e     |
| tablet               |                      |           |         | 12   |      |       |
+----------------------+----------------------+-----------+---------+------+------+-------+
|   omeprazole         | Take 20 mg by mouth  |           | 0       | 1 |      | Activ |
| (PRILOSEC) 20 mg     | 2 times daily.       |           |         |  |      | e     |
| capsule              |                      |           |         | 12   |      |       |
+----------------------+----------------------+-----------+---------+------+------+-------+
|   metoclopramide     | Take 10 mg by mouth  |           | 0       | /1 |      | Activ |
| (REGLAN) 10 mg       | 4 times daily as     |           |         | 420 |      | e     |
| tablet               | needed.              |           |         | 12   |      |       |
+----------------------+----------------------+-----------+---------+------+------+-------+
|   guaiFENesin        | Take 1,200 mg by     |           | 0       |      |      | Activ |
| (MUCINEX) 600 mg 12  | mouth 2 times daily. |           |         |      |      | e     |
| hr tablet            |                      |           |         |      |      |       |
+----------------------+----------------------+-----------+---------+------+------+-------+
|   furosemide (LASIX) | Take 40 mg by mouth  |           | 0       |      |      | Activ |
|  40 mg tablet        | Daily.               |           |         |      |      | e     |
+----------------------+----------------------+-----------+---------+------+------+-------+
|   folic acid 1 mg    | Take 1 mg by mouth   |           | 0       |      |      | Activ |
| tablet               | Daily.               |           |         |      |      | e     |
+----------------------+----------------------+-----------+---------+------+------+-------+
|   tamsulosin         | Take 0.4 mg by mouth |           | 0       | 09/1 |      | Activ |
| (FLOMAX) 0.4 mg CAPS |  2 times daily.      |           |         | 4/20 |      | e     |
|                      |                      |           |         | 12   |      |       |
+----------------------+----------------------+-----------+---------+------+------+-------+
|                      | Apply  topically 2   |           | 0       |      |      | Activ |
| nystatin-triamcinolo | times daily.         |           |         |      |      | e     |
| ne (MYCOLOG II)      |                      |           |         |      |      |       |
| cream                |                      |           |         |      |      |       |
+----------------------+----------------------+-----------+---------+------+------+-------+
|   potassium citrate  | Take 10 mEq by mouth |           | 0       |      |      | Activ |
| (UROCIT-K) 10 mEq SR |  2 times daily.      |           |         |      |      | e     |
|  tablet              |                      |           |         |      |      |       |
+----------------------+----------------------+-----------+---------+------+------+-------+
|   albuterol (PROAIR  | Inhale 2 puffs into  |           | 0       |      |      | Activ |
| HFA) 90 mcg/puff     | the lungs every 6    |           |         |      |      | e     |
| inhaler              | hours as needed for  |           |         |      |      |       |
|                      | Wheezing.            |           |         |      |      |       |
+----------------------+----------------------+-----------+---------+------+------+-------+
|   predniSONE         | Take 10 mg by mouth  |           | 0       |      |      | Activ |
| (DELTASONE) 5 mg     | Daily.               |           |         |      |      | e     |
| tablet               |                      |           |         |      |      |       |
+----------------------+----------------------+-----------+---------+------+------+-------+
|   acyclovir          | Apply  topically     |           | 0       |      |      | Activ |
| (ZOVIRAX) 5%         | every 3 hours.       |           |         |      |      | e     |
| ointment             |                      |           |         |      |      |       |
+----------------------+----------------------+-----------+---------+------+------+-------+
|                      | Inhale 1 puff into   |           | 0       |      |      | Activ |
| fluticasone-salmeter | the lungs Twice      |           |         |      |      | e     |
| ol (ADVAIR) 500-50   | Daily.               |           |         |      |      |       |
| mcg/puff diskus      |                      |           |         |      |      |       |
| inhaler              |                      |           |         |      |      |       |
+----------------------+----------------------+-----------+---------+------+------+-------+
|   digoxin (LANOXIN)  | Take 125 mcg by      |           | 0       |      |      | Activ |
| 250 mcg tablet       | mouth Daily. 1/2     |           |         |      |      | e     |
|                      | capsule daily        |           |         |      |      |       |
+----------------------+----------------------+-----------+---------+------+------+-------+
 
|   atorvaSTATin       | TAKE 1 TABLET BY     |           | 0       | 03/2 | 03/2 | Activ |
| (LIPITOR) 40 mg      | MOUTH NIGHTLY        |           |         |  |  | e     |
| tablet               |                      |           |         | 19   | 20   |       |
+----------------------+----------------------+-----------+---------+------+------+-------+
|   azaTHIOprine       | Take 150 mg by mouth |           | 0       |      |      | Activ |
| (IMURAN) 50 mg       |  daily.              |           |         |      |      | e     |
| tablet               |                      |           |         |      |      |       |
+----------------------+----------------------+-----------+---------+------+------+-------+
|                      | Apply  to eye as     |           | 0       |      |      | Activ |
| bacitracin-polymyxin | needed.              |           |         |      |      | e     |
|  b (POLYSPORIN)      |                      |           |         |      |      |       |
| ophthalmic ointment  |                      |           |         |      |      |       |
+----------------------+----------------------+-----------+---------+------+------+-------+
|   bismuth            | Take 262 mg by mouth |           | 0       |      |      | Activ |
| subsalicylate (PEPTO |  4 (four) times      |           |         |      |      | e     |
|  BISMOL) 262 mg      | daily before meals   |           |         |      |      |       |
| chewable tablet      | and nightly.         |           |         |      |      |       |
+----------------------+----------------------+-----------+---------+------+------+-------+
|   diclofenac         | Apply 2 g topically  |           | 0       |      |      | Activ |
| (VOLTAREN) 1% GEL    | 4 (four) times       |           |         |      |      | e     |
|                      | daily. PRN           |           |         |      |      |       |
+----------------------+----------------------+-----------+---------+------+------+-------+
|   famotidine         | Take 40 mg by mouth  |           | 0       |      |      | Activ |
| (PEPCID) 40 MG       | daily.               |           |         |      |      | e     |
| tablet               |                      |           |         |      |      |       |
+----------------------+----------------------+-----------+---------+------+------+-------+
|                      | Take 3 mLs by        |           | 0       | 08/1 |      | Activ |
| albuterol-ipratropiu | nebulization every 6 |           |         | 1/20 |      | e     |
| m 2.5-0.5 mg/3 mL    |  (six) hours as      |           |         | 18   |      |       |
| SOLN                 | needed.              |           |         |      |      |       |
+----------------------+----------------------+-----------+---------+------+------+-------+
|   levocetirizine     | Take 5 mg by mouth   |           | 0       |      |      | Activ |
| (XYZAL) 5 MG tablet  | as needed.           |           |         |      |      | e     |
+----------------------+----------------------+-----------+---------+------+------+-------+
|   mupirocin          | 1 g by Nasal route   |           | 0       |      |      | Activ |
| (BACTROBAN) 2%       | as needed. Use pea   |           |         |      |      | e     |
| ointment             | sized amount in each |           |         |      |      |       |
|                      |  nostril twice daily |           |         |      |      |       |
|                      |  for 5 days. After   |           |         |      |      |       |
|                      | applications, press  |           |         |      |      |       |
|                      | sides of nose        |           |         |      |      |       |
|                      | together, gently     |           |         |      |      |       |
|                      | massage for 1 min.   |           |         |      |      |       |
+----------------------+----------------------+-----------+---------+------+------+-------+
|   ofloxacin          |                      |           | 0       | 04/0 |      | Activ |
| (OCUFLOX) 0.3%       |                      |           |         | 9/20 |      | e     |
| ophthalmic solution  |                      |           |         | 18   |      |       |
+----------------------+----------------------+-----------+---------+------+------+-------+
|   trolamine          | Apply  topically as  |           | 0       |      |      | Activ |
| salicylate           | needed.              |           |         |      |      | e     |
| (ASPERCREME) 10%     |                      |           |         |      |      |       |
| cream                |                      |           |         |      |      |       |
+----------------------+----------------------+-----------+---------+------+------+-------+
|   acyclovir          | Take 400 mg by mouth |           | 0       |      |      | Activ |
| (ZOVIRAX) 400 MG     |  5 (five) times      |           |         |      |      | e     |
| tablet               | daily. PRN           |           |         |      |      |       |
+----------------------+----------------------+-----------+---------+------+------+-------+
|   albuterol (PROAIR  | Inhale  into the     |           | 0       |      |      | Activ |
| RESPICLICK) 90       | lungs.               |           |         |      |      | e     |
| mcg/puff inhaler     |                      |           |         |      |      |       |
 
+----------------------+----------------------+-----------+---------+------+------+-------+
|   sertraline         | Take 100 mg by mouth |           | 0       |      |      | Activ |
| (ZOLOFT) 100 mg      |  daily.              |           |         |      |      | e     |
| tablet               |                      |           |         |      |      |       |
+----------------------+----------------------+-----------+---------+------+------+-------+
|   losartan (COZAAR)  | Take 50 mg by mouth  |           | 0       |      |      | Activ |
| 50 mg tablet         | daily.               |           |         |      |      | e     |
+----------------------+----------------------+-----------+---------+------+------+-------+
|   cyanocobalamin     | Take 1,000 mcg by    |           | 0       |      |      | Activ |
| (VITAMIN B-12) 1000  | mouth daily.         |           |         |      |      | e     |
| MCG tablet           |                      |           |         |      |      |       |
+----------------------+----------------------+-----------+---------+------+------+-------+
|   cholecalciferol    | Take 1,000 Units by  |           | 0       |      |      | Activ |
| (CHOLECALCIFEROL)    | mouth daily.         |           |         |      |      | e     |
| 1000 units TABS      |                      |           |         |      |      |       |
+----------------------+----------------------+-----------+---------+------+------+-------+
|   XARELTO 10 MG      | TAKE ONE TABLET BY   |   90      | 2       | 10/ |      | Activ |
| tablet               | MOUTH EVERY DAY      | tablet    |         |  |      | e     |
|                      |                      |           |         | 19   |      |       |
+----------------------+----------------------+-----------+---------+------+------+-------+
 
 
 
 Active Problems
 
 
+---------------------------------------------------------------+------------+
| Problem                                                       | Noted Date |
+---------------------------------------------------------------+------------+
| Stable angina                                                 | 2018 |
+---------------------------------------------------------------+------------+
| COPD, moderate                                                | 2018 |
+---------------------------------------------------------------+------------+
| Personal history of tobacco use, presenting hazards to health | 2018 |
+---------------------------------------------------------------+------------+
| Rheumatoid arthritis                                          | 2018 |
+---------------------------------------------------------------+------------+
| Moderate protein-calorie malnutrition                         | 2018 |
+---------------------------------------------------------------+------------+
| Dysphagia                                                     | 2018 |
+---------------------------------------------------------------+------------+
| Severe protein-calorie malnutrition                           | 2018 |
+---------------------------------------------------------------+------------+
| Chronic bronchitis                                            | 2018 |
+---------------------------------------------------------------+------------+
| Chronic maxillary sinusitis                                   | 2018 |
+---------------------------------------------------------------+------------+
| Multifocal lung consolidation                                 | 2018 |
+---------------------------------------------------------------+------------+
| Peptic ulcer disease                                          | 2018 |
+---------------------------------------------------------------+------------+
| CARPAL TUNNEL SYNDROME                                        |            |
+---------------------------------------------------------------+------------+
| Essential hypertension                                        |            |
+---------------------------------------------------------------+------------+
| Chronic atrial fibrillation                                   |            |
+---------------------------------------------------------------+------------+
| Raynaud disease                                               |            |
+---------------------------------------------------------------+------------+
| Fibromyalgia                                                  |            |
 
+---------------------------------------------------------------+------------+
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
| stable angina pectoris                                        |            |
+---------------------------------------------------------------+------------+
| Aortic aneurysm                                               |            |
+---------------------------------------------------------------+------------+
| JOANN (obstructive sleep apnea)                                 |            |
+---------------------------------------------------------------+------------+
| Cancer of vocal cord                                          |            |
+---------------------------------------------------------------+------------+
 
 
 
+--------------------------------------------------+
|   Overview:   Treated with surgery and radiation |
+--------------------------------------------------+
 
 
 
+------------------------------------------------------+---+
| Parasomnia                                           |   |
+------------------------------------------------------+---+
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
+------------------------------------------------------+---+
 
 
 
 Resolved Problems
 
 
+---------------+--------+-----------+
| Problem       | Noted  | Resolved  |
|               | Date   | Date      |
+---------------+--------+-----------+
| Heart failure |        |  |
|               |        | 9         |
+---------------+--------+-----------+
 
 
 
 Encounters
 
 
+--------+-----------+------------------+----------------------+----------------------+
| Date   | Type      | Specialty        | Care Team            | Description          |
+--------+-----------+------------------+----------------------+----------------------+
| / | Office    | Vascular Surgery |   Neris Wilson DNP      | Abdominal aortic     |
|    | Visit     |                  |                      | aneurysm (AAA)       |
|        |           |                  |                      | without rupture      |
|        |           |                  |                      | (HCC) (Primary Dx);  |
|        |           |                  |                      | Carotid stenosis,    |
|        |           |                  |                      | bilateral            |
+--------+-----------+------------------+----------------------+----------------------+
| / | Hospital  | Radiology        |                      | Carotid stenosis,    |
|    | Encounter |                  |                      | bilateral            |
+--------+-----------+------------------+----------------------+----------------------+
| / | Hospital  | Radiology        |                      | Abdominal aortic     |
| 2019   | Encounter |                  |                      | aneurysm (AAA)       |
|        |           |                  |                      | without rupture      |
|        |           |                  |                      | (HCC)                |
 
+--------+-----------+------------------+----------------------+----------------------+
| / | Telephone | Vascular Surgery |   Neris Wilson DNP      | Follow-up            |
|    |           |                  |                      |                      |
+--------+-----------+------------------+----------------------+----------------------+
| 10/28/ | Telephone | Pulmonology      |   Juan F,          | Other (         |
|    |           |                  | Jessica Cheung,   | appointment)         |
|        |           |                  | MD                   |                      |
+--------+-----------+------------------+----------------------+----------------------+
| 10/16/ | Refill    | Cardiology       |   Eric Akins, | Medication Refill    |
|    |           |                  |  MD                  |                      |
+--------+-----------+------------------+----------------------+----------------------+
| / | Telephone | Vascular Surgery |   Neris Wilson DNP      | Follow-up            |
| 2019   |           |                  |                      |                      |
+--------+-----------+------------------+----------------------+----------------------+
 from Last 3 Months
 
 Family History
 
 
+---------------------+----------+------+----------------+
| Medical History     | Relation | Name | Comments       |
+---------------------+----------+------+----------------+
| High blood pressure | Father   |      |                |
+---------------------+----------+------+----------------+
| Hypertension        | Father   |      |                |
+---------------------+----------+------+----------------+
| Cancer              | Mother   |      | breast cancer  |
+---------------------+----------+------+----------------+
 
 
 
+----------+------+--------+----------+
| Relation | Name | Status | Comments |
+----------+------+--------+----------+
| Brother  |      | Alive  |          |
+----------+------+--------+----------+
| Daughter |      | Alive  |          |
+----------+------+--------+----------+
| Father   |      |        |          |
+----------+------+--------+----------+
| Mother   |      |        |          |
+----------+------+--------+----------+
| Son      |      | Alive  |          |
+----------+------+--------+----------+
 
 
 
 Social History
 
 
+---------------+------------+-----------+--------+------------------+
| Tobacco Use   | Types      | Packs/Day | Years  | Date             |
|               |            |           | Used   |                  |
+---------------+------------+-----------+--------+------------------+
| Former Smoker | Cigarettes | 1         | 35     | Quit: 1996 |
+---------------+------------+-----------+--------+------------------+
 
 
 
+---------------------+---+---+---+
 
| Smokeless Tobacco:  |   |   |   |
| Never Used          |   |   |   |
+---------------------+---+---+---+
 
 
 
+-------------+-------------+---------+----------+
| Alcohol Use | Drinks/Week | oz/Week | Comments |
+-------------+-------------+---------+----------+
| No          |             |         |          |
+-------------+-------------+---------+----------+
 
 
 
+------------------+---------------+
| Sex Assigned at  | Date Recorded |
| Birth            |               |
+------------------+---------------+
| Not on file      |               |
+------------------+---------------+
 
 
 
+----------------+-------------+-------------+
| Job Start Date | Occupation  | Industry    |
+----------------+-------------+-------------+
| Not on file    | Not on file | Not on file |
+----------------+-------------+-------------+
 
 
 
+----------------+--------------+------------+
| Travel History | Travel Start | Travel End |
+----------------+--------------+------------+
 
 
 
+-------------------------------------+
| No recent travel history available. |
+-------------------------------------+
 
 
 
 Last Filed Vital Signs
 
 
+-------------------+----------------------+----------------------+----------+
| Vital Sign        | Reading              | Time Taken           | Comments |
+-------------------+----------------------+----------------------+----------+
| Blood Pressure    | 171/73               | 2019 11:03 AM  |          |
|                   |                      | PST                  |          |
+-------------------+----------------------+----------------------+----------+
| Pulse             | 56                   | 2019 11:03 AM  |          |
|                   |                      | PST                  |          |
+-------------------+----------------------+----------------------+----------+
| Temperature       | 36.3   C (97.4   F)  | 2019  1:47 PM  |          |
|                   |                      | PDT                  |          |
+-------------------+----------------------+----------------------+----------+
| Respiratory Rate  | 14                   | 11/15/2018 10:48 AM  |          |
|                   |                      | PST                  |          |
 
+-------------------+----------------------+----------------------+----------+
| Oxygen Saturation | 98%                  | 2019 11:03 AM  |          |
|                   |                      | PST                  |          |
+-------------------+----------------------+----------------------+----------+
| Inhaled Oxygen    | -                    | -                    |          |
| Concentration     |                      |                      |          |
+-------------------+----------------------+----------------------+----------+
| Weight            | 72.9 kg (160 lb 11.2 | 2019  1:03 PM  |          |
|                   |  oz)                 | PDT                  |          |
+-------------------+----------------------+----------------------+----------+
| Height            | 172.7 cm (5' 8")     | 2019  1:03 PM  |          |
|                   |                      | PDT                  |          |
+-------------------+----------------------+----------------------+----------+
| Body Mass Index   | 24.43                | 2019  1:03 PM  |          |
|                   |                      | PDT                  |          |
+-------------------+----------------------+----------------------+----------+
 
 
 
 Plan of Treatment
 
 
+--------+---------+-------------+----------------------+-------------+
| Date   | Type    | Specialty   | Care Team            | Description |
+--------+---------+-------------+----------------------+-------------+
| / | Office  | Pulmonology |   Juan F,          |             |
|    | Visit   |             | Jessica Skye,   |             |
|        |         |             | MD  [address]  |             |
|        |         |             |   [place],   |             |
|        |         |             | [place] [address]             |             |
|        |         |             | [phone]         |             |
|        |         |             | [phone] (Fax)   |             |
+--------+---------+-------------+----------------------+-------------+
| / | Office  | Cardiology  |   Eric Akins, |             |
|    | Visit   |             |  MD  [address]   |             |
|        |         |             | [place]  |             |
|        |         |             | [id number]  [phone]  |             |
|        |         |             |  [phone] (Fax)  |             |
+--------+---------+-------------+----------------------+-------------+
 
 
 
+----------------------+-----------+---------------------------------+----------+
| Health Maintenance   | Due Date  | Last Done                       | Comments |
+----------------------+-----------+---------------------------------+----------+
| Vaccine:             |  |                                 |          |
| Pneumococcal 65+ (1  | 9         |                                 |          |
| of 2 - PCV13)        |           |                                 |          |
+----------------------+-----------+---------------------------------+----------+
| Colorectal Cancer    |  | 2008                      |          |
| Screening            | 8         |                                 |          |
| (Colonoscopy)        |           |                                 |          |
+----------------------+-----------+---------------------------------+----------+
| Vaccine: Zoster (2   |  | 2018                      |          |
| of 2)                | 8         |                                 |          |
+----------------------+-----------+---------------------------------+----------+
| Adult Annual         |  |                                 |          |
| Wellness Visit       | 9         |                                 |          |
+----------------------+-----------+---------------------------------+----------+
| Vaccine:             |  | 2019                      |          |
 
| Dtap/Tdap/Td (2 -    | 9         |                                 |          |
| Td)                  |           |                                 |          |
+----------------------+-----------+---------------------------------+----------+
| Vaccine: Influenza   | Completed | 2019, 2018,         |          |
|                      |           | 10/16/2017, Additional history  |          |
|                      |           | exists                          |          |
+----------------------+-----------+---------------------------------+----------+
| AAA Screening        | Completed | 2019, 11/15/2018,         |          |
|                      |           | 2018, Additional history  |          |
|                      |           | exists                          |          |
+----------------------+-----------+---------------------------------+----------+
 
 
 
 Procedures
 
 
+----------------------+--------+-------------+----------------------+----------------------
+
| Procedure Name       | Priori | Date/Time   | Associated Diagnosis | Comments             
|
|                      | ty     |             |                      |                      
|
+----------------------+--------+-------------+----------------------+----------------------
+
| VAS CAROTID DUPLEX   | Routin | 2019  |   Carotid stenosis,  |   Results for this   
|
| BILATERAL            | e      | 11:15 AM    | bilateral            | procedure are in the 
|
|                      |        | PST         |                      |  results section.    
|
+----------------------+--------+-------------+----------------------+----------------------
+
| VAS AORTA ILIAC      | Routin | 2019  |   Abdominal aortic   |   Results for this   
|
| DUPLEX COMPLETE      | e      | 11:11 AM    | aneurysm (AAA)       | procedure are in the 
|
|                      |        | PST         | without rupture      |  results section.    
|
|                      |        |             | (HCC)                |                      
|
+----------------------+--------+-------------+----------------------+----------------------
+
| LABS - EXTERNAL SCAN |        | 2019  |                      |   Results for this   
|
|                      |        | 12:00 AM    |                      | procedure are in the 
|
|                      |        | PDT         |                      |  results section.    
|
+----------------------+--------+-------------+----------------------+----------------------
+
 from Last 3 Months
 
 Results
 VAS Carotid Duplex Bilateral (2019 11:15 AM PST)
 
+----------+
| Specimen |
+----------+
|          |
 
+----------+
 
 
 
+------------------------------------------------------------------------+---------------+
| Impressions                                                            | Performed At  |
+------------------------------------------------------------------------+---------------+
|   Extensive calcific plaque bilaterally without high-grade stenosis,   |   [facility] |
| similar to prior examination     In the left common carotid artery     |               |
| there is dense calcific plaque versus  an intimal flap which is not    |               |
| flow limiting.     The left vertebral artery is not visualized on      |               |
| today's examination.     Internal Carotid Artery Diagnostic Grades     |               |
|  Grade 1: Stenosis = 01-50% / PSV <125 cm/sec / EDV (cm/s)<40 cm/sec   |               |
| (mild plaque)  Grade 2: Stenosis = 01-50% / PSV <125 cm/sec / EDV      |               |
| (cm/s)<40 cm/sec  (moderate plaque)  Grade 3: Stenosis = 50-69% / PSV  |               |
| >125 cm/sec / EDV (cm/s)>40 cm/sec  Grade 4: Stenosis = 70-95% / PSV   |               |
| >230 cm/sec / EDV (cm/s)>100 cm/sec  Grade 5: Stenosis = 90-95% / PSV  |               |
| <125 cm/sec / EDV (cm/s)<40 cm/sec  Grade 6: Stenosis Occluded         |               |
| Society of Radiologists in Ultrasound (SRU) criteria applied for       |               |
| internal carotid stenosis determination.           Signed by: Sly  |               |
| Johnson PATEL  Sign Date/Time: 2019 1:36 PM                      |               |
+------------------------------------------------------------------------+---------------+
 
 
 
+------------------------------------------------------------------------+---------------+
| Narrative                                                              | Performed At  |
+------------------------------------------------------------------------+---------------+
|      CAROTID DUPLEX ULTRASOUND     CLINICAL INFORMATION:  Carotid      |   [facility] |
| artery stenosis.     COMPARISON:  US CAROTID DOPPLER BILATERAL         |               |
| (2018);     PROCEDURE:  Evaluation of the extracranial carotid    |               |
| and vertebral arteries with  production of real-time images            |               |
| integrating B-mode two-dimensional  vascular structure, Doppler        |               |
| spectral analysis and color-flow Doppler  imaging.     FINDINGS:  Peak |               |
|  systolic and diastolic velocities measured in the common and          |               |
| internal carotid arteries. All velocities recorded in cm/sec:          |               |
| Anterior Circulation:     Right  CCA: 65/10  ICA: 65/9  ECA: 78/9      |               |
| ICA/CCA: 1.0     Left  CCA: 86/15  ICA: 90 /7  ECA: 133/0  ICA/CCA:    |               |
| 1.0     Posterior Circulation:     Right:  Vertebral: 121/21 Antegrade |               |
|      Left:  Vertebral: Not visualized     Gray scale images: There is  |               |
| extensive calcified plaque with a possible  intimal flap of the left   |               |
| common carotid artery.                                                 |               |
+------------------------------------------------------------------------+---------------+
 
 
 
+-------------------------------------------------------------------------+
| Procedure Note                                                          |
+-------------------------------------------------------------------------+
|   Maurizio, Rad Results In - 2019  1:40 PM PST                         |
| CAROTID DUPLEX ULTRASOUND                                               |
|                                                                         |
| CLINICAL INFORMATION:                                                   |
| Carotid artery stenosis.                                                |
|                                                                         |
| COMPARISON:                                                             |
| US CAROTID DOPPLER BILATERAL (2018);                               |
|                                                                         |
| PROCEDURE:                                                              |
| Evaluation of the extracranial carotid and vertebral arteries with      |
 
| production of real-time images integrating B-mode two-dimensional       |
| vascular structure, Doppler spectral analysis and color-flow Doppler    |
| imaging.                                                                |
|                                                                         |
| FINDINGS:                                                               |
| Peak systolic and diastolic velocities measured in the common and       |
| internal carotid arteries. All velocities recorded in cm/sec:           |
|                                                                         |
| Anterior Circulation:                                                   |
|                                                                         |
| Right                                                                   |
| CCA: 65/10                                                              |
| ICA: 65/9                                                               |
| ECA: 78/9                                                               |
| ICA/CCA: 1.0                                                            |
|                                                                         |
| Left                                                                    |
| CCA: 86/15                                                              |
| ICA: 90 /7                                                              |
| ECA: 133/0                                                              |
| ICA/CCA: 1.0                                                            |
|                                                                         |
| Posterior Circulation:                                                  |
|                                                                         |
| Right:                                                                  |
| Vertebral: 121/21 Antegrade                                             |
|                                                                         |
| Left:                                                                   |
| Vertebral: Not visualized                                               |
|                                                                         |
| Gray scale images: There is extensive calcified plaque with a possible  |
| intimal flap of the left common carotid artery.                         |
|                                                                         |
| IMPRESSION:                                                             |
| Extensive calcific plaque bilaterally without high-grade stenosis,      |
| similar to prior examination                                            |
|                                                                         |
| In the left common carotid artery there is dense calcific plaque versus |
| an intimal flap which is not flow limiting.                             |
|                                                                         |
| The left vertebral artery is not visualized on today's examination.     |
|                                                                         |
| Internal Carotid Artery Diagnostic Grades                               |
|                                                                         |
| Grade 1: Stenosis = 01-50% / PSV <125 cm/sec / EDV (cm/s)<40 cm/sec     |
| (mild plaque)                                                           |
| Grade 2: Stenosis = 01-50% / PSV <125 cm/sec / EDV (cm/s)<40 cm/sec     |
| (moderate plaque)                                                       |
| Grade 3: Stenosis = 50-69% / PSV >125 cm/sec / EDV (cm/s)>40 cm/sec     |
| Grade 4: Stenosis = 70-95% / PSV >230 cm/sec / EDV (cm/s)>100 cm/sec    |
| Grade 5: Stenosis = 90-95% / PSV <125 cm/sec / EDV (cm/s)<40 cm/sec     |
| Grade 6: Stenosis Occluded                                              |
|                                                                         |
| Society of Radiologists in Ultrasound (SRU) criteria applied for        |
| internal carotid stenosis determination.                                |
|                                                                         |
| Signed by: JORGE Heart Matthew                                        |
| Sign Date/Time: 2019 1:36 PM                                      |
 
+-------------------------------------------------------------------------+
 
 
 
+---------------+---------+--------------------+--------------+
| Performing    | Address | City/State/Zipcode | Phone Number |
| Organization  |         |                    |              |
+---------------+---------+--------------------+--------------+
|   [facility] |         |                    |              |
+---------------+---------+--------------------+--------------+
 VAS Aorta Iliac Duplex Complete (2019 11:11 AM PST)
 
+----------+
| Specimen |
+----------+
|          |
+----------+
 
 
 
+------------------------------------------------------------------------+---------------+
| Impressions                                                            | Performed At  |
+------------------------------------------------------------------------+---------------+
|   4.9 cm infrarenal abdominal aortic aneurysm unchanged when compared  |   [facility] |
| to  the CT angiogram of 2018.           Signed by: JORGE Heart,  |               |
| Johnson  Sign Date/Time: 2019 1:07 PM                            |               |
+------------------------------------------------------------------------+---------------+
 
 
 
+------------------------------------------------------------------------+---------------+
| Narrative                                                              | Performed At  |
+------------------------------------------------------------------------+---------------+
|      ABDOMINAL AORTA DUPLEX SCAN     CLINICAL INFORMATION:  Abdominal  |   [facility] |
| aorta aneurysm surveillance.     COMPARISON:  CL US GUIDANCE VASCULAR  |               |
| ACCESS (2018); CL CORONARY ANGIO WITH  GRAFTS (2018); CTA      |               |
| ABDOMEN AND PELVIS W WO CONTRAST (2018);  ECHO CARDIAC ADULT      |               |
| COMPLETE (2018); XR SWALLOWING FUNCTION VIDEO  (2018); CT      |               |
| CHEST WO CONTRAST (2018); XR CHEST 2 VIEW  (2018); XR CHEST 2  |               |
| VIEW (2018); CT HEAD WO CONTRAST (2018);  NM MYOCARDIAL        |               |
| PERFUSION SPECT - STRESS ONLY (2018); US CAROTID  DOPPLER         |               |
| BILATERAL (2018); MRI CERVICAL SPINE WO CONTRAST  (2018);    |               |
|   PROCEDURE:  Evaluation of the aorta and iliac vessels.     FINDINGS: |               |
|   The study is technically difficult due to overlying bowel gas.       |               |
|   There  is an infrarenal abdominal aortic aneurysm  Proximal aorta:   |               |
| 2.8 cm AP x 2.7 cm transverse  Mid aorta: 3.4 cm AP x 3.3 cm           |               |
| transverse  Distal aorta: 4.9 cm AP x 4.7 cm transverse  Right common  |               |
| iliac artery: 1.3 cm AP x 1.4 cm transverse  Left common iliac artery: |               |
|  1.4 cm AP x 1.3 cm transverse                                         |               |
+------------------------------------------------------------------------+---------------+
 
 
 
+-------------------------------------------------------------------------+
| Procedure Note                                                          |
+-------------------------------------------------------------------------+
|   Maurizio, Rad Results In - 2019  1:10 PM PST                         |
| ABDOMINAL AORTA DUPLEX SCAN                                             |
|                                                                         |
| CLINICAL INFORMATION:                                                   |
 
| Abdominal aorta aneurysm surveillance.                                  |
|                                                                         |
| COMPARISON:                                                             |
| CL US GUIDANCE VASCULAR ACCESS (2018); CL CORONARY ANGIO WITH       |
| GRAFTS (2018); CTA ABDOMEN AND PELVIS W WO CONTRAST (2018);    |
| ECHO CARDIAC ADULT COMPLETE (2018); XR SWALLOWING FUNCTION VIDEO    |
| (2018); CT CHEST WO CONTRAST (2018); XR CHEST 2 VIEW            |
| (2018); XR CHEST 2 VIEW (2018); CT HEAD WO CONTRAST (2018); |
| NM MYOCARDIAL PERFUSION SPECT - STRESS ONLY (2018); US CAROTID     |
| DOPPLER BILATERAL (2018); MRI CERVICAL SPINE WO CONTRAST           |
| (2018);                                                            |
|                                                                         |
| PROCEDURE:                                                              |
| Evaluation of the aorta and iliac vessels.                              |
|                                                                         |
| FINDINGS:                                                               |
| The study is technically difficult due to overlying bowel gas.  There   |
| is an infrarenal abdominal aortic aneurysm                              |
| Proximal aorta: 2.8 cm AP x 2.7 cm transverse                           |
| Mid aorta: 3.4 cm AP x 3.3 cm transverse                                |
| Distal aorta: 4.9 cm AP x 4.7 cm transverse                             |
| Right common iliac artery: 1.3 cm AP x 1.4 cm transverse                |
| Left common iliac artery: 1.4 cm AP x 1.3 cm transverse                 |
|                                                                         |
|                                                                         |
| IMPRESSION:                                                             |
| 4.9 cm infrarenal abdominal aortic aneurysm unchanged when compared to  |
| the CT angiogram of 2018.                                          |
|                                                                         |
| Signed by: JORGE Heart Matthew                                        |
| Sign Date/Time: 2019 1:07 PM                                      |
+-------------------------------------------------------------------------+
 
 
 
+---------------+---------+--------------------+--------------+
| Performing    | Address | City/State/Zipcode | Phone Number |
| Organization  |         |                    |              |
+---------------+---------+--------------------+--------------+
|   [facility] |         |                    |              |
+---------------+---------+--------------------+--------------+
 LABS - EXTERNAL SCAN (2019 12:00 AM PDT)
 
+------------------------------------------------------------------------+--------------+
| Narrative                                                              | Performed At |
+------------------------------------------------------------------------+--------------+
|   This result has an attachment that is not available.  Ordered by an  |              |
| unspecified provider.                                                  |              |
+------------------------------------------------------------------------+--------------+
 from Last 3 Months
 
 Insurance
 
 
+----------+--------+-------------+--------+-------------+---------+--------+
| Payer    | Benefi | Subscriber  | Effect | Phone       | Address | Type   |
|          | t Plan | ID          | daisy    |             |         |        |
|          |  /     |             | Dates  |             |         |        |
 
|          | Group  |             |        |             |         |        |
+----------+--------+-------------+--------+-------------+---------+--------+
| MEDICARE | MEDICA | [id number]  |  | [phone] |         | Medica |
|          | RE     |             | 009-Pr | 5           |         | re     |
|          | PART A |             | esent  |             |         |        |
|          |  AND B |             |        |             |         |        |
+----------+--------+-------------+--------+-------------+---------+--------+
| MEDICARE | MEDICA | [id number] |  | [phone] |         | Medica |
|          | RE     |             | 009-Pr | 5           |         | re     |
|          | PART A |             | esent  |             |         |        |
|          |  AND B |             |        |             |         |        |
+----------+--------+-------------+--------+-------------+---------+--------+
| AARP     | AARP   | [id number] | 20 | [phone] |         | Indemn |
|          | MDCR   |             | 12-Pre | 0           |         | ity    |
|          | SUPPL  |             | sent   |             |         |        |
+----------+--------+-------------+--------+-------------+---------+--------+
| AARP     | AARP   | [id number] | 20 | [phone] |         | Indemn |
|          | MDCR   |             | 19-Pre | 0           |         | ity    |
|          | SUPPL  |             | sent   |             |         |        |
+----------+--------+-------------+--------+-------------+---------+--------+
 
 
 
+-------------------+--------+-------------+--------+-------------+----------------------+
| Guarantor Name    | Accoun | Relation to | Date   | Phone       | Billing Address      |
|                   | t Type |  Patient    | of     |             |                      |
|                   |        |             | Birth  |             |                      |
+-------------------+--------+-------------+--------+-------------+----------------------+
| Wyatt Shane | Person | Self        | [birth year]             |   [address] |
|                   | al/Fam |             | 1945   | [phone] |   [place], [place]      |
|                   | [place]    |             |        | 5 (Home)    | [phone]           |
+-------------------+--------+-------------+--------+-------------+----------------------+
| Wyatt Shane | Person | Self        | [birth year]             |   [address] |
|                   | al/Fam |             | 1945   | [phone] |   [place]      |
|                   | [place]    |             |        | 5 (Home)    | [phone]           |
+-------------------+--------+-------------+--------+-------------+----------------------+
 
 
 
 Advance Directives
 
 
+-----------+-----------------+----------------+-------------+
| Type      | Date Recorded   | Patient        | Explanation |
|           |                 | Representative |             |
+-----------+-----------------+----------------+-------------+
| Power of  |                 |                |             |
|   |                 |                |             |
+-----------+-----------------+----------------+-------------+
| Advance   | 2015  8:35 |                |             |
| Directive |  AM             |                |             |
+-----------+-----------------+----------------+-------------+

## 2019-12-06 NOTE — XMS
Encounter Summary
  Created on: 2019
 
 Wyatt Shane
 External Reference #: [id number]
 : 43
 Sex: Male
 
 Demographics
 
 
+-----------------------+------------------------+
| Address               | [address]     |
|                       | [place]  [id number]   |
+-----------------------+------------------------+
| Home Phone            | [phone]        |
+-----------------------+------------------------+
| Preferred Language    | Unknown                |
+-----------------------+------------------------+
| Marital Status        |                 |
+-----------------------+------------------------+
| Mormonism Affiliation | LUT                    |
+-----------------------+------------------------+
| Race                  | White                  |
+-----------------------+------------------------+
| Ethnic Group          | Not  or  |
+-----------------------+------------------------+
 
 
 Author
 
 
+--------------+-------------+
| Organization | Unknown     |
+--------------+-------------+
| Address      | Unknown     |
+--------------+-------------+
| Phone        | Unavailable |
+--------------+-------------+
 
 
 
 Support
 
 
+---------------+--------------+---------+-----------------+
| Name          | Relationship | Address | Phone           |
+---------------+--------------+---------+-----------------+
| Opal Shane | ECON         | Unknown | [phone] |
+---------------+--------------+---------+-----------------+
 
 
 
 Care Team Providers
 
 
+-----------------------+------+-----------------+
| Care Team Member Name | Role | Phone           |
 
+-----------------------+------+-----------------+
| Erwin Iniguez MD   | PCP  | [phone] |
+-----------------------+------+-----------------+
 
 
 
 Encounter Details
 
 
+--------+-------------+------------+---------------------+------------------+
| Date   | Type        | Department | Care Team           | Description      |
+--------+-------------+------------+---------------------+------------------+
| / | Procedure - |            |   Record, Operation | Operative Report |
|    |             |            |                     |                  |
|        | Transcribed |            |                     |                  |
+--------+-------------+------------+---------------------+------------------+
 
 
 
 Social History
 
 
+----------------+-------+-----------+--------+------+
| Tobacco Use    | Types | Packs/Day | Years  | Date |
|                |       |           | Used   |      |
+----------------+-------+-----------+--------+------+
| Never Assessed |       |           |        |      |
+----------------+-------+-----------+--------+------+
 
 
 
+------------------+---------------+
| Sex Assigned at  | Date Recorded |
| Birth            |               |
+------------------+---------------+
| Not on file      |               |
+------------------+---------------+
 
 
 
+----------------+-------------+-------------+
| Job Start Date | Occupation  | Industry    |
+----------------+-------------+-------------+
| Not on file    | Not on file | Not on file |
+----------------+-------------+-------------+
 
 
 
+----------------+--------------+------------+
| Travel History | Travel Start | Travel End |
+----------------+--------------+------------+
 
 
 
+-------------------------------------+
| No recent travel history available. |
+-------------------------------------+
 documented as of this encounter
 
 Plan of Treatment
 
 Not on filedocumented as of this encounter
 
 Procedures
 
 
+------------------+--------+------------+----------------------+----------------------+
| Procedure Name   | Priori | Date/Time  | Associated Diagnosis | Comments             |
|                  | ty     |            |                      |                      |
+------------------+--------+------------+----------------------+----------------------+
| OPERATION RECORD |        | 1998 |                      |   Results for this   |
|                  |        |            |                      | procedure are in the |
|                  |        |            |                      |  results section.    |
+------------------+--------+------------+----------------------+----------------------+
 documented in this encounter
 
 Results
 OPERATION RECORD (1998)
 
+------------------------------------------------------------------------------------------+
| Transcriptions                                                                           |
+------------------------------------------------------------------------------------------+
|   Interface, Transcription In - 2007  5:11 AM PST                                  |
|    [facility] [facility]    |
| [place], [place] [address] [phone]                     [place]          |
| [facility] [place] [facility] HospitalOPERATION RECORDMed Rec No.:  [id number]           Date:           |
| 1998Name:   Darien Shane SURGEON:                 Jimmy Esposito M.D.      |
|                               , Otolaryngology                        |
|             Head and Neck SurgeryASSISTANTS:                        Tasia Christine,     |
| M.D.                                   Resident, Otolaryngology                          |
|           Head and Neck SurgeryPOSTOPERATIVE DIAGNOSIS(ES):        Carcinoma  in  situ   |
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
| fold.OPERATIONS PERFORMED:               1.    Direct laryngoscopy.                      |
|                2.    Biopsy of laryngeal mass.SPECIMEN(S) REMOVED:                       |
| Multiple  biopsies taken from the right                                    true vocal    |
| cord and false vocal fold.ANESTHESIA:                         General endotracheal       |
| anesthesia.COMPLICATIONS:                      None.ESTIMATED BLOOD LOSS:                |
| Minimal.INDICATIONS:                        This patient is a 52-year-old male witha     |
| history of hoarseness.  He has undergone  a  superficial  biopsy x two inthe past and    |
| laser treatment to the affected area.  The first biopsy showedsevere dysplasia.  The     |
| second biopsy showed carcinoma in situ.FINDINGS:                            There  is    |
| diffuse  erythema and edemaover the right true vocal cord and vocal  fold.   A small     |
| incision was madeusing the microlaryngeal scissors.  A  deeper  biopsy was then taken in |
|  thesubmucosal plane.PROCEDURE:                          The  patient  was  correctly    |
| identifiedand  brought to the operation where  general  anesthesia  was  induced         |
| viaendotracheal intubation without difficulty.   The  patient  was placed in asupine     |
| position.  The microlaryngoscope  was  inserted  and  the true vocalcords were exposed.  |
|  The patient was then placed in suspension.The area of concern was examined and showed   |
| diffuse erythema and edema overthe right true vocal cord and extending  out  to  the     |
| vocal fold.  There isalso a suspicious-looking area which appeared to be squamous cell   |
| carcinomaat  the lateral aspect of the cord.   Multiple  biopsies  were  taken.          |
| Anapproximately 2 cm incision was made  through  the  mucosa  to  obtain deeptissue      |
| specimens.   The  remainder   of   the   supraglottic   larynx  wasnormal-appearing.     |
| There were no lesions seen in the hypopharynx, vallecula,piriform sinuses.Next, the      |
| rigid esophagoscope was passed  to 30 cm.  Although the esophaguswas quite tight, there  |
| were no lesions  seen.   The  mucosa  was  normal inappearance.  The 0 degree rigid      |
| scope was used to examine the mucosa as theesophagoscope was removed.The patient         |
| tolerated the procedure well  .   There  were no intraoperativecomplications. Dr. Marquez  |
| Vaibhav, the attending  surgeon, was present for thekey portions of the procedure which    |
| were  direct laryngoscopy and biopsy ofthe lesion.Tasia Christine M.D.                  |
| Jimmy Esposito M.D.Resident, Otolaryngology           ,              |
 
| OtolaryngologyHead and Neck Surgery              Head and Neck SurgeryMM/maria antoniamD:           |
| 1998T:  1998 10:54 Acc:                                                      |
|using the microlaryngeal scissors.  A  deeper  biopsy was then taken in the               |
|submucosal plane.                                                                         |
|                                                                                          |
|PROCEDURE:                          The  patient  was  correctly identified               |
|and  brought to the operation where  general  anesthesia  was  induced  via               |
|endotracheal intubation without difficulty.   The  patient  was placed in a               |
|supine position.  The microlaryngoscope  was  inserted  and  the true vocal               |
|cords were exposed.  The patient was then placed in suspension.                           |
|                                                                                          |
|The area of concern was examined and showed diffuse erythema and edema over               |
|the right true vocal cord and extending  out  to  the vocal fold.  There is               |
|also a suspicious-looking area which appeared to be squamous cell carcinoma               |
|at  the lateral aspect of the cord.   Multiple  biopsies  were  taken.   An               |
|approximately 2 cm incision was made  through  the  mucosa  to  obtain deep               |
|tissue  specimens.   The  remainder   of   the   supraglottic   larynx  was               |
|normal-appearing. There were no lesions seen in the hypopharynx, vallecula,               |
|piriform sinuses.                                                                         |
|                                                                                          |
|Next, the rigid esophagoscope was passed  to 30 cm.  Although the esophagus               |
|was quite tight, there were no lesions  seen.   The  mucosa  was  normal in               |
|appearance.  The 0 degree rigid scope was used to examine the mucosa as the              |
|esophagoscope was removed.                                                                |
|                                                                                          |
|The patient tolerated the procedure well  .   There  were no intraoperative               |
|complications. Dr. Jimmy Esposito, the attending  surgeon, was present for the              |
|key portions of the procedure which were  direct laryngoscopy and biopsy of               |
|the lesion.                                                                               |
|                                                                                          |
|Tasia Christine M.D.              Jimmy Esposito M.D.                                   |
|Resident, Otolaryngology           , Otolaryngology                    |
|Head and Neck Surgery              Head and Neck Surgery                                  |
|                                                                                          |
|TERRIE/gloria                                                                                    |
|D: 1998                                                                             |
|T:  1998 10:54 A                                                                    |
|                                                                                          |
|cc:                                                                                      |
+------------------------------------------------------------------------------------------+
 documented in this encounter
 
 Visit Diagnoses
 Not on filedocumented in this encounter

## 2019-12-06 NOTE — XMS
Encounter Summary
  Created on: 2019
 
 Shane Wyatttien BroussardJosue
 External Reference #: [id number]
 : 43
 Sex: Male
 
 Demographics
 
 
+-----------------------+---------------------------+
| Address               | [address]        |
|                       | [place]  [address] |
+-----------------------+---------------------------+
| Home Phone            | [phone]           |
+-----------------------+---------------------------+
| Preferred Language    | Unknown                   |
+-----------------------+---------------------------+
| Marital Status        |                    |
+-----------------------+---------------------------+
| Jehovah's witness Affiliation | [id number]                      |
+-----------------------+---------------------------+
| Race                  | Unknown                   |
+-----------------------+---------------------------+
| Ethnic Group          | Unknown                   |
+-----------------------+---------------------------+
 
 
 Author
 
 
+--------------+--------------------------------------------+
| Author       | [facility]  |
|              | [facility] [place]                                |
+--------------+--------------------------------------------+
| Organization | [facility]  |
|              | [facility] [place]                                |
+--------------+--------------------------------------------+
| Address      | Unknown                                    |
+--------------+--------------------------------------------+
| Phone        | Unavailable                                |
+--------------+--------------------------------------------+
 
 
 
 Support
 
 
+---------------+--------------+--------------------+-----------------+
| Name          | Relationship | Address            | Phone           |
+---------------+--------------+--------------------+-----------------+
| Opal Shane | ECON         | [address]     | [phone] |
|               |              | [place]   |                 |
|               |              | [id number]              |                 |
+---------------+--------------+--------------------+-----------------+
 
 
 
 
 Care Team Providers
 
 
+-----------------------+------+-----------------+
| Care Team Member Name | Role | Phone           |
+-----------------------+------+-----------------+
| Erwin Iniguez MD | PCP  | [phone] |
+-----------------------+------+-----------------+
 
 
 
 Reason for Referral
 Diagnostic/Screening (Routine)
 
+--------+--------+-----------+--------------+--------------+---------------+
| Status | Reason | Specialty | Diagnoses /  | Referred By  | Referred To   |
|        |        |           | Procedures   | Contact      | Contact       |
+--------+--------+-----------+--------------+--------------+---------------+
| Closed |        | Radiology |   Diagnoses  |   Chris,     |   [facility]    |
|        |        |           |  Chronic     | Luis Carlos D    | [address]  |
|        |        |           | systolic     | MD Shanice  [address] |  [place], |
|        |        |           | heart        |  [facility] |  [place]           |
|        |        |           | failure      |  St  [place]   | [address]    |
|        |        |           | (HCC)        | [place]    | Phone:        |
|        |        |           | Unspecified  | [address]        | [phone]  |
|        |        |           | sleep apnea  | Phone:       |  Fax:         |
|        |        |           |  Procedures  | [phone] | [phone]  |
|        |        |           |  ECHO        |   Fax:       |               |
|        |        |           | Complete  RI | [phone] |               |
|        |        |           |  ECHO HEART  |              |               |
|        |        |           | XTHORACIC,CO |              |               |
|        |        |           | MPLETE W     |              |               |
|        |        |           | DOPPLER  RI  |              |               |
|        |        |           | ECHO HEART   |              |               |
|        |        |           | XTHORACIC,CO |              |               |
|        |        |           | MPLETE, W/O  |              |               |
|        |        |           | DOPPLER      |              |               |
+--------+--------+-----------+--------------+--------------+---------------+
 
 
 
 
 Reason for Visit
 Diagnostic/Screening (Routine)
 
+--------+--------+-----------+--------------+--------------+---------------+
| Status | Reason | Specialty | Diagnoses /  | Referred By  | Referred To   |
|        |        |           | Procedures   | Contact      | Contact       |
+--------+--------+-----------+--------------+--------------+---------------+
| Closed |        | Radiology |   Diagnoses  |   Chris,     |   [facility]    |
|        |        |           |  Chronic     | Luis Carlos D    | [address]  |
|        |        |           | systolic     | MD Shanice  [address] |  [place], |
|        |        |           | heart        |  [address] |  [place]           |
|        |        |           | failure      |  St  [place]   | [address]    |
|        |        |           | (HCC)        | [place]    | Phone:        |
|        |        |           | Unspecified  | [id number]        | [phone]  |
|        |        |           | sleep apnea  | Phone:       |  Fax:         |
|        |        |           |  Procedures  | [phone] | [phone]  |
|        |        |           |  ECHO        |   Fax:       |               |
 
|        |        |           | Complete  RI | [phone] |               |
|        |        |           |  ECHO HEART  |              |               |
|        |        |           | XTHORACIC,CO |              |               |
|        |        |           | MPLETE W     |              |               |
|        |        |           | DOPPLER  RI  |              |               |
|        |        |           | ECHO HEART   |              |               |
|        |        |           | XTHORACIC,CO |              |               |
|        |        |           | MPLETE, W/O  |              |               |
|        |        |           | DOPPLER      |              |               |
+--------+--------+-----------+--------------+--------------+---------------+
 
 
 
 
 Encounter Details
 
 
+--------+-----------+----------------------+----------------------+----------------------+
| Date   | Type      | Department           | Care Team            | Description          |
+--------+-----------+----------------------+----------------------+----------------------+
| / | Hospital  |   [facility] |   Luis Carlos Perez   | Central sleep apnea; |
| 2015   | Encounter |  [facility] E[facility]  [address] | MD [address]    |  Chronic systolic    |
|        |           |  [address]       | [address]  [place]     | heart failure (HCC)  |
|        |           | [place] [address] | [place] [address]      |                      |
|        |           |   [phone]       | [phone]         |                      |
|        |           |                      | [phone] (Fax)   |                      |
|        |           |                      | Meme Junior,  |                      |
|        |           |                      | Technologist         |                      |
+--------+-----------+----------------------+----------------------+----------------------+
 
 
 
 Social History
 
 
+---------------+------------+-----------+--------+------------------+
| Tobacco Use   | Types      | Packs/Day | Years  | Date             |
|               |            |           | Used   |                  |
+---------------+------------+-----------+--------+------------------+
| Former Smoker | Cigarettes | 1.3       | 35     | Quit: 1996 |
+---------------+------------+-----------+--------+------------------+
 
 
 
+---------------------+---+---+---+
| Smokeless Tobacco:  |   |   |   |
| Never Used          |   |   |   |
+---------------------+---+---+---+
 
 
 
+-------------+-------------+---------+----------+
| Alcohol Use | Drinks/Week | oz/Week | Comments |
+-------------+-------------+---------+----------+
| No          |             |         |          |
+-------------+-------------+---------+----------+
 
 
 
+------------------+---------------+
 
| Sex Assigned at  | Date Recorded |
| Birth            |               |
+------------------+---------------+
| Not on file      |               |
+------------------+---------------+
 
 
 
+----------------+-------------+-------------+
| Job Start Date | Occupation  | Industry    |
+----------------+-------------+-------------+
| Not on file    | Not on file | Not on file |
+----------------+-------------+-------------+
 
 
 
+----------------+--------------+------------+
| Travel History | Travel Start | Travel End |
+----------------+--------------+------------+
 
 
 
+-------------------------------------+
| No recent travel history available. |
+-------------------------------------+
 documented as of this encounter
 
 Medications at Time of Discharge
 
 
+----------------------+----------------------+-----------+---------+----------+-----------+
| Medication           | Sig                  | Dispensed | Refills | Start    | End Date  |
|                      |                      |           |         | Date     |           |
+----------------------+----------------------+-----------+---------+----------+-----------+
|   acyclovir          | Apply  topically     |           | 0       |          |           |
| (ZOVIRAX) 5%         | every 3 hours.       |           |         |          |           |
| ointment             |                      |           |         |          |           |
+----------------------+----------------------+-----------+---------+----------+-----------+
|   albuterol (PROAIR  | Inhale 2 puffs into  |           | 0       |          |           |
| HFA) 90 mcg/puff     | the lungs every 6    |           |         |          |           |
| inhaler              | hours as needed for  |           |         |          |           |
|                      | Wheezing.            |           |         |          |           |
+----------------------+----------------------+-----------+---------+----------+-----------+
|   ferrous sulfate    | Take 325 mg by mouth |           | 0       | 20 |           |
| 325 mg tablet        |  3 times daily.      |           |         | 12       |           |
+----------------------+----------------------+-----------+---------+----------+-----------+
|   fluticasone        | one spray in each    |           | 0       | 20 |           |
| (FLONASE) 50         | nostril daily as     |           |         | 12       |           |
| mcg/nasal spray      | needed               |           |         |          |           |
+----------------------+----------------------+-----------+---------+----------+-----------+
|   folic acid 1 mg    | Take 1 mg by mouth   |           | 0       |          |           |
| tablet               | Daily.               |           |         |          |           |
+----------------------+----------------------+-----------+---------+----------+-----------+
|   furosemide (LASIX) | Take 40 mg by mouth  |           | 0       |          |           |
|  40 mg tablet        | Daily.               |           |         |          |           |
+----------------------+----------------------+-----------+---------+----------+-----------+
|   guaiFENesin        | Take 1,200 mg by     |           | 0       |          |           |
| (MUCINEX) 600 mg 12  | mouth 2 times daily. |           |         |          |           |
| hr tablet            |                      |           |         |          |           |
+----------------------+----------------------+-----------+---------+----------+-----------+
 
|   levothyroxine      | Take 75 mcg by mouth |           | 0       | 20 |           |
| (LEVOTHROID) 75 MCG  |  Daily.              |           |         | 12       |           |
| tablet               |                      |           |         |          |           |
+----------------------+----------------------+-----------+---------+----------+-----------+
|   metoclopramide     | Take 10 mg by mouth  |           | 0       | 20 |           |
| (REGLAN) 10 mg       | 4 times daily as     |           |         | 12       |           |
| tablet               | needed.              |           |         |          |           |
+----------------------+----------------------+-----------+---------+----------+-----------+
|                      | Apply  topically 2   |           | 0       |          |           |
| nystatin-triamcinolo | times daily.         |           |         |          |           |
| ne (MYCOLOG II)      |                      |           |         |          |           |
| cream                |                      |           |         |          |           |
+----------------------+----------------------+-----------+---------+----------+-----------+
|   omeprazole         | Take 20 mg by mouth  |           | 0       | 20 |           |
| (PRILOSEC) 20 mg     | 2 times daily.       |           |         | 12       |           |
| capsule              |                      |           |         |          |           |
+----------------------+----------------------+-----------+---------+----------+-----------+
|   potassium citrate  | Take 10 mEq by mouth |           | 0       |          |           |
| (UROCIT-K) 10 mEq SR |  2 times daily.      |           |         |          |           |
|  tablet              |                      |           |         |          |           |
+----------------------+----------------------+-----------+---------+----------+-----------+
|   predniSONE         | Take 10 mg by mouth  |           | 0       |          |           |
| (DELTASONE) 5 mg     | Daily.               |           |         |          |           |
| tablet               |                      |           |         |          |           |
+----------------------+----------------------+-----------+---------+----------+-----------+
|   tamsulosin         | Take 0.4 mg by mouth |           | 0       | 20 |           |
| (FLOMAX) 0.4 mg CAPS |  2 times daily.      |           |         | 12       |           |
+----------------------+----------------------+-----------+---------+----------+-----------+
|   acyclovir          | Take 400 mg by mouth |           | 0       | 20 |  |
| (ZOVIRAX) 400 MG     |  3 times daily as    |           |         | 12       | 9         |
| tablet               | needed.              |           |         |          |           |
+----------------------+----------------------+-----------+---------+----------+-----------+
|   Cholecalciferol    | Take 2,000 Units by  |           | 0       | 20 |  |
| (VITAMIN D3) 2000    | mouth Daily.         |           |         | 12       | 9         |
| UNITS CAPS           |                      |           |         |          |           |
+----------------------+----------------------+-----------+---------+----------+-----------+
|   cyanocobalamin     | injected             |           | 0       | 20 |  |
| (VITAMIN B-12) 1,000 | intramuscularly once |           |         | 12       | 9         |
|  mcg/mL injection    |  a month             |           |         |          |           |
+----------------------+----------------------+-----------+---------+----------+-----------+
|   digoxin (LANOXIN)  | Take 250 mcg by      |           | 0       |          |  |
| 250 mcg tablet       | mouth Daily.      |           |         |          | 5         |
|                      | tablet daily         |           |         |          |           |
+----------------------+----------------------+-----------+---------+----------+-----------+
|   diltiazem          | Take 120 mg by mouth |           | 0       |          |  |
| (DILT-XR) 120 mg 24  |  Daily.              |           |         |          | 9         |
| hr capsule           |                      |           |         |          |           |
+----------------------+----------------------+-----------+---------+----------+-----------+
|   loratadine         | Take 10 mg by mouth  |           | 0       |          |  |
| (CLARITIN) 10 mg     | Daily.               |           |         |          | 9         |
| tablet               |                      |           |         |          |           |
+----------------------+----------------------+-----------+---------+----------+-----------+
|   losartan (COZAAR)  | Take 100 mg by mouth |           | 0       |          |  |
| 100 MG tablet        |  Daily. 1/2 daily    |           |         |          | 9         |
+----------------------+----------------------+-----------+---------+----------+-----------+
|   methotrexate 2.5   | Take 15 mg by mouth  |           | 0       |          |  |
| mg tablet            | Once a week.         |           |         |          | 9         |
+----------------------+----------------------+-----------+---------+----------+-----------+
|   rivaroxaban        | Take 20 mg by mouth  |           | 0       |          |  |
| (XARELTO) 20 mg      | Daily (with dinner). |           |         |          | 9         |
 
| tablet               |                      |           |         |          |           |
+----------------------+----------------------+-----------+---------+----------+-----------+
|   sertraline         | 2 tablets daily      |           | 0       | 20 |  |
| (ZOLOFT) 100 mg      |                      |           |         | 12       | 9         |
| tablet               |                      |           |         |          |           |
+----------------------+----------------------+-----------+---------+----------+-----------+
 documented as of this encounter
 
 Plan of Treatment
 
 
+--------+---------+-------------+----------------------+-------------+
| Date   | Type    | Specialty   | Care Team            | Description |
+--------+---------+-------------+----------------------+-------------+
| /  Office  | Pulmonology |   Juan F,          |             |
|    | Visit   |             | Jessicahan Cheung,   |             |
|        |         |             | MD  [address]  |             |
|        |         |             |   [place],   |             |
|        |         |             | [place] [address]             |             |
|        |         |             | [phone]         |             |
|        |         |             | [phone] (Fax)   |             |
+--------+---------+-------------+----------------------+-------------+
| / | Office  | Cardiology  |   Eric Akins, |             |
|    | Visit   |             |  MD  [address]   |             |
|        |         |             | [place]  |             |
|        |         |             | [address]  [phone]  |             |
|        |         |             |  [phone] (Fax)  |             |
+--------+---------+-------------+----------------------+-------------+
 documented as of this encounter
 
 Procedures
 
 
+----------------+--------+-------------+----------------------+----------------------+
| Procedure Name | Priori | Date/Time   | Associated Diagnosis | Comments             |
|                | ty     |             |                      |                      |
+----------------+--------+-------------+----------------------+----------------------+
| ECHO COMPLETE  | Routin | 2015  |   Central sleep      |   Results for this   |
|                | e      |  9:20 AM    | apnea  Chronic       | procedure are in the |
|                |        | PDT         | systolic heart       |  results section.    |
|                |        |             | failure (HCC)        |                      |
+----------------+--------+-------------+----------------------+----------------------+
| LVEF VALUE     | Routin | 2015  |                      |   Results for this   |
|                | e      |             |                      | procedure are in the |
|                |        |             |                      |  results section.    |
+----------------+--------+-------------+----------------------+----------------------+
 documented in this encounter
 
 Results
 ECHO Complete (2015  9:20 AM PDT)
 
+----------+
| Specimen |
+----------+
|          |
+----------+
 
 
 
+------------------------------------------------------------------------+-----------------+
 
| Narrative                                                              | Performed At    |
+------------------------------------------------------------------------+-----------------+
|   [facility]     ECHOCARDIOGRAM REPORT         |   [place]    |
| STUDY DATE:   2015        PATIENT NAME: Wyatt Shane  :   | [facility]        |
| 1945  MRN: [id number]  PCP: Erwin Iniguez MD               | [facility]  |
| CLINICAL HISTORY/DIAGNOSIS:   A-fib, heart failure, ef        A        | - IMAGING       |
| transthoracic echocardiogram with M-mode, pulsed-wave and color        |                 |
| Doppler   was performed with standard views obtained.   The technical  |                 |
| quality of this   examination is adequate.   The heart rhythm during   |                 |
| the echo is atrial   fibrillation.   The M-mode, two-dimensional,      |                 |
| color flow and spectral   Doppler data were reviewed and support the   |                 |
| following interpretation:     Interpretation:  Left Atrium: Left       |                 |
| atrial size is mildly dilated.  Left ventricle:   Left ventricular     |                 |
| size is normal with mild-to-moderate   concentric left ventricular     |                 |
| hypertrophy, and normal left ventricular   systolic function.   The    |                 |
| estimated ejection fraction is 60-65 %.     Undetermined diastolic     |                 |
| function.   Aortic root: Aortic root is normal.  Right Atrium:   Right |                 |
|  atrial sizes normal.  Right ventricle:   Right ventricular size is    |                 |
| normal with normal wall   thickness and normal right ventricular       |                 |
| systolic function.  Pericardium:   Pericardium is normal.  Pulmonary   |                 |
| artery:   Pulmonary artery is normal. mild pulmonary hypertension      |                 |
| with a peak systolic pressure of 35-40 mmHg.  Aortic valve: Aortic     |                 |
| valve is trileaflet with mild thickening and opens   adequately.       |                 |
|   There is a mild to moderate aortic valve insufficiency.  Mitral      |                 |
| valve:   Mitral valve is normal. mild mitral regurgitation.  Pulmonic  |                 |
| valve:   Pulmonic valve is normal.  Tricuspid valve:   Tricuspid valve |                 |
|  is normal. mild tricuspid valve   regurgitation.  Vena cava:   The    |                 |
| inferior vena cava is normal.   There is greater than 50%              |                 |
| inspiratory collapse of the IVC.        IMPRESSIONS:  1.   Mild left   |                 |
| atrial dilatation.  2.   Mild-to-moderate concentric left ventricular  |                 |
| hypertrophy.   No LVOT   obstruction noted.   Left ventricular         |                 |
| systolic dysfunction is preserved.     LVEF is 60-65%.  3.   Mildly    |                 |
| thickened trileaflet aortic valve with adequate opening.   There   is  |                 |
| a mild to moderate aortic valve insufficiency.  4.   Mild mitral valve |                 |
|  regurgitation.  5.   Mild tricuspid valve regurgitation.  6.   Mild   |                 |
| pulmonary hypertension with a peak systolic pressure of 35-40   mmHg.  |                 |
|  7.   Normal IVC with normal respiratory collapse.                     |                 |
| Measurements:  Height: 5'8''  Weight: 182lbs  Aortic root:   31 mm     |                 |
| Aortic cusp sep:   20 mm  LA:   2d 51 mm  IVS-diastole:   22 mm        |                 |
| IVS-systole:   19 mm  LVPW diastole:   14 mm  LVPW systole:   16 mm    |                 |
| LV diameter-diastole:   47 mm  LV diameter-systole:   29 mm            |                 |
| Fractional shortenin %  PFV aortic valve:    m/s  MPG mitral    |                 |
| valve:    mmHg  PFV TR jet:   2.84 m/s  RA/RV PP mmHg  LA       |                 |
| volume:   135 mL  LA index:   38 mL/m2  Mitral Inflow DT:    ms  IVRT: |                 |
|     ms  Valsalva:     PWDTI S wave:    cm/s  PWDTI E wave:    cm/s     |                 |
| PWDTI A wave:    cm/s  E/A Ratio:     E/E Ratio:                       |                 |
| Signed by:    Brittni Shoemaker MD [facility]      2015   9:20          |                 |
|   Sonographer:   Meme Junior, RDCS, RDMS, RT                       |                 |
+------------------------------------------------------------------------+-----------------+
 
 
 
+----------------------+---------------------+--------------------+----------------+
| Performing           | Address             | City/[facility] [place] [facility] | Phone Number   |
| Organization         |                     |                    |                |
+----------------------+---------------------+--------------------+----------------+
|   [facility]     |   [address] | [place]    |   [phone] |
| [facility]  |                     | [id number]              |                |
| - IMAGING            |                     |                    |                |
+----------------------+---------------------+--------------------+----------------+
 
 LVEF VALUE (2015)
 
+-------------+-------+-----------+------------+--------------+
| Component   | Value | Ref Range | Performed  | Pathologist  |
|             |       |           | At         | Signature    |
+-------------+-------+-----------+------------+--------------+
| LVEF-TTE    | 64    |           |            |              |
| TRANSTHORAC |       |           |            |              |
| IC ECHO     |       |           |            |              |
+-------------+-------+-----------+------------+--------------+
 documented in this encounter
 
 Visit Diagnoses
 
 
+------------------------------------------------------------------------+
| Diagnosis                                                              |
+------------------------------------------------------------------------+
|   Central sleep apnea  Unspecified sleep apnea                         |
+------------------------------------------------------------------------+
|   Chronic systolic heart failure (HCC)  Chronic systolic heart failure |
+------------------------------------------------------------------------+
 documented in this encounter

## 2019-12-06 NOTE — XMS
Encounter Summary
  Created on: 2019
 
 Shane Wyatttien BroussardJosue
 External Reference #: [id number]
 : 43
 Sex: Male
 
 Demographics
 
 
+-----------------------+---------------------------+
| Address               | [address]        |
|                       | [place]  [address] |
+-----------------------+---------------------------+
| Home Phone            | [phone]           |
+-----------------------+---------------------------+
| Preferred Language    | Unknown                   |
+-----------------------+---------------------------+
| Marital Status        |                    |
+-----------------------+---------------------------+
| Faith Affiliation | [id number]                      |
+-----------------------+---------------------------+
| Race                  | Unknown                   |
+-----------------------+---------------------------+
| Ethnic Group          | Unknown                   |
+-----------------------+---------------------------+
 
 
 Author
 
 
+--------------+--------------------------------------------+
| Author       | [facility]  |
|              | and [place]                                |
+--------------+--------------------------------------------+
| Organization | [facility]  |
|              | [facility] [place]                                |
+--------------+--------------------------------------------+
| Address      | Unknown                                    |
+--------------+--------------------------------------------+
| Phone        | Unavailable                                |
+--------------+--------------------------------------------+
 
 
 
 Support
 
 
+---------------+--------------+--------------------+-----------------+
| Name          | Relationship | Address            | Phone           |
+---------------+--------------+--------------------+-----------------+
| Opal Shane | ECON         | [address]     | [phone] |
|               |              | [place]   |                 |
|               |              | [address]              |                 |
+---------------+--------------+--------------------+-----------------+
 
 
 
 
 Care Team Providers
 
 
+-----------------------+------+-----------------+
| Care Team Member Name | Role | Phone           |
+-----------------------+------+-----------------+
| Erwin Iniguez MD | PCP  | [phone] |
+-----------------------+------+-----------------+
 
 
 
 Reason for Referral
 Evaluate & Treat (Routine)
 
+--------+--------------+-----------+--------------+--------------+---------------+
| Status | Reason       | Specialty | Diagnoses /  | Referred By  | Referred To   |
|        |              |           | Procedures   | Contact      | Contact       |
+--------+--------------+-----------+--------------+--------------+---------------+
| Closed |   Specialty  | Sleep     |   Diagnoses  |   Chris,     |   [facility]   |
|        | Services     | Medicine  |  [place]     | Luis Carlos [place]    | [place]  [address] |
|        | Required     |           | sleep apnea  | MD [address]ice  [address]  [address]       |
|        |              |           |  Procedures  |  [address] | [place],  |
|        |              |           |  [place] POLYSOM  |  St  [facility]   | [place] [address] |
|        |              |           | 6/>YRS SLEEP | [place], [place]    |   Phone:      |
|        |              |           |  4/> ADDL    | [address]        | [phone]  |
|        |              |           | [address]  | Phone:       |  Fax:         |
|        |              |           |  [address]       | [phone] | [phone]  |
|        |              |           |              |   Fax:       |               |
|        |              |           |              | [phone] |               |
+--------+--------------+-----------+--------------+--------------+---------------+
 
 
 Diagnostic/Screening (Routine)
 
+--------+--------+-----------+--------------+--------------+---------------+
| Status | Reason | Specialty | Diagnoses /  | Referred By  | Referred To   |
|        |        |           | Procedures   | Contact      | Contact       |
+--------+--------+-----------+--------------+--------------+---------------+
| Closed |        | Radiology |   Diagnoses  |   Chris,     |   [facility]    |
|        |        |           |  Chronic     | Luis Carlos D    | [address]  |
|        |        |           | systolic     | MD [address] |  [place], |
|        |        |           | heart        |  [address] |  [place]           |
|        |        |           | failure      |  St  [place]   | [address]    |
|        |        |           | (HCC)        | [place], [place]    | Phone:        |
|        |        |           | Unspecified  | [address]        | [phone]  |
|        |        |           | sleep apnea  | Phone:       |  Fax:         |
|        |        |           |  Procedures  | [phone] | [phone]  |
|        |        |           |  ECHO        |   Fax:       |               |
|        |        |           | Complete  [place] | [phone] |               |
|        |        |           |  ECHO HEART  |              |               |
|        |        |           | XTHORACIC,CO |              |               |
|        |        |           | MPLETE W     |              |               |
|        |        |           | DOPPLER  [place]  |              |               |
|        |        |           | ECHO HEART   |              |               |
|        |        |           | XTHORACIC,CO |              |               |
|        |        |           | MPLETE, W/O  |              |               |
|        |        |           | DOPPLER      |              |               |
+--------+--------+-----------+--------------+--------------+---------------+
 
 
 
 
 
 Reason for Visit
 
 
+--------+----------+
| Reason | Comments |
+--------+----------+
| Apnea  |          |
+--------+----------+
 
 
 
 Encounter Details
 
 
+--------+---------+----------------------+---------------------+----------------------+
| Date   | Type    | Department           | Care Team           | Description          |
+--------+---------+----------------------+---------------------+----------------------+
| / | Office  |   [facility]      |   Luis Carlos Perez  | Central sleep apnea  |
| 2015   | Visit   | SLEEP DISORDER  [address]  | MD [address]   | (Primary Dx);        |
|        |         | [address]  [place]      | [address]  [place]    | Chronic systolic     |
|        |         | [place] [address] | [place] [address]     | heart failure (HCC)  |
|        |         |   [phone]       | [phone]        |                      |
|        |         |                      | [phone] (Fax)  |                      |
+--------+---------+----------------------+---------------------+----------------------+
 
 
 
 Social History
 
 
+---------------+------------+-----------+--------+------------------+
| Tobacco Use   | Types      | Packs/Day | Years  | Date             |
|               |            |           | Used   |                  |
+---------------+------------+-----------+--------+------------------+
| Former Smoker | Cigarettes | 1.3       | 34     | Quit: 1996 |
+---------------+------------+-----------+--------+------------------+
 
 
 
+---------------------+---+---+---+
| Smokeless Tobacco:  |   |   |   |
| Never Used          |   |   |   |
+---------------------+---+---+---+
 
 
 
+-------------+-------------+---------+----------+
| Alcohol Use | Drinks/Week | oz/Week | Comments |
+-------------+-------------+---------+----------+
| No          |             |         |          |
+-------------+-------------+---------+----------+
 
 
 
+------------------+---------------+
| Sex Assigned at  | Date Recorded |
| Birth            |               |
 
+------------------+---------------+
| Not on file      |               |
+------------------+---------------+
 
 
 
+----------------+-------------+-------------+
| Job Start Date | Occupation  | Industry    |
+----------------+-------------+-------------+
| Not on file    | Not on file | Not on file |
+----------------+-------------+-------------+
 
 
 
+----------------+--------------+------------+
| Travel History | Travel Start | Travel End |
+----------------+--------------+------------+
 
 
 
+-------------------------------------+
| No recent travel history available. |
+-------------------------------------+
 documented as of this encounter
 
 Last Filed Vital Signs
 
 
+-------------------+----------------------+----------------------+----------+
| Vital Sign        | Reading              | Time Taken           | Comments |
+-------------------+----------------------+----------------------+----------+
| Blood Pressure    | 142/82               | 2015  8:17 AM  |          |
|                   |                      | PDT                  |          |
+-------------------+----------------------+----------------------+----------+
| Pulse             | 77                   | 2015  8:17 AM  |          |
|                   |                      | PDT                  |          |
+-------------------+----------------------+----------------------+----------+
| Temperature       | -                    | -                    |          |
+-------------------+----------------------+----------------------+----------+
| Respiratory Rate  | 16                   | 2015  8:17 AM  |          |
|                   |                      | PDT                  |          |
+-------------------+----------------------+----------------------+----------+
| Oxygen Saturation | 96%                  | 2015  8:17 AM  |          |
|                   |                      | PDT                  |          |
+-------------------+----------------------+----------------------+----------+
| Inhaled Oxygen    | -                    | -                    |          |
| Concentration     |                      |                      |          |
+-------------------+----------------------+----------------------+----------+
| Weight            | 82.6 kg (182 lb 3.2  | 2015  8:17 AM  |          |
|                   | oz)                  | PDT                  |          |
+-------------------+----------------------+----------------------+----------+
| Height            | -                    | -                    |          |
+-------------------+----------------------+----------------------+----------+
| Body Mass Index   | 27.7                 | 2014 10:00 AM  |          |
|                   |                      | PDT                  |          |
+-------------------+----------------------+----------------------+----------+
 documented in this encounter
 
 Progress Notes
 Luis Carlos Perez Jr., MD - 2015  9:36 AM PDTFormatting of this note might be differen
 
t from the original.
 I've called Wyatt back into the sleep center because of the recent preliminary SERVE-HF stud
y results. The SERVE-HF study compared Maximum Medical Treatment of Heart Failure vs Maximum
 Medical Treatment of Heart Failure + ASV-PAP for Central Sleep Apnea in patients with heart
 failure (EF <45%) and Central Sleep Apnea (more than 50% apneas were Central). Preliminary 
results: AHI  s were < 10 on ASV. No difference between control and treatment groups in All
-Cause Mortality. However, 7.5% of Control Group had sudden death and 10% of ASV group had s
udden death (33% difference    statistically significant). It is recommended that ASV-PAP n
ot be used in these patients. I've discussed this with him. 
 
 PSG performed on 10/7/2013 demonstrated an AHI of 56.2 (29 obstructive apneas, 10 mixed apn
eas, 380 central apneas (most Cheyne-Nichole) and 158 hypopneas with a miguel oxygen saturatio
n of 86%. PSG guided PAP titration was performed on 2013 using ASV-PAP where EPAP 4cm, 
PSmin0; PSmax25, backup rate auto was done resulting in an RDI of 1. PLMS were also present.
 He has a history of atrial fibrillation and at the time he also gave a past history of hear
t failure. He has been on ASV-PAP since then but recently hasn't been able to wear it for mo
re than about 2 hours a night because of sinus issues.
 
 Interestingly, his wife states that his breathing off of the ASV-PAP is much more "normal" 
than it was two years ago.
 
 He feels that he is sleeping well and doesn't feel too fatigued in the daytime. He has had 
sinus issues for the last couple of months but they are improving. He does have RA which is 
rather stable.
 
 Past Medical History:  has a past medical history of HBP (high blood pressure); Atrial fibr
illation (HCC); Heart failure (HCC); Hyperlipidemia; Raynaud disease; Fibromyalgia; ASCVD (a
rteriosclerotic cardiovascular disease); Aortic aneurysm (); JOANN (obstructive sleep apnea
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
es respiration.
  has past surgical history that includes Coronary artery bypass graft (); Esophagus mikhail
karolyn (); Knee arthroscopy (); hernia repair (); laryngectomy (); Kidney ston
e surgery; Tracheal surgery (); Carpal tunnel release (); and basal cell cancer rese
ction left year ().
 Allergies 
 Allergen Reactions 
   Diltiazem Hcl  
   Lipitor  
   Propranolol Hcl  
 
 Current Outpatient Prescriptions 
 Medication Sig Dispense Refill 
   acyclovir (ZOVIRAX) 400 MG tablet Take 400 mg by mouth 3 times daily as needed.   
   acyclovir (ZOVIRAX) 5% ointment Apply  topically every 3 hours.   
   albuterol (PROAIR HFA) 90 mcg/puff inhaler Inhale 2 puffs into the lungs every 6 hours 
as needed for Wheezing.   
   Cholecalciferol (VITAMIN D3) 2000 UNITS CAPS Take 2,000 Units by mouth Daily.   
   cyanocobalamin (VITAMIN B-12) 1,000 mcg/mL injection injected intramuscularly once a mo
nt   
   digoxin (LANOXIN) 250 mcg tablet Take 250 mcg by mouth Daily. 1/2 tablet daily   
   diltiazem (DILT-XR) 120 mg 24 hr capsule Take 120 mg by mouth Daily.   
   ferrous sulfate 325 mg tablet Take 325 mg by mouth 3 times daily.   
   fluticasone (FLONASE) 50 mcg/nasal spray one spray in each nostril daily as needed   
   folic acid 1 mg tablet Take 1 mg by mouth Daily.   
   furosemide (LASIX) 40 mg tablet Take 40 mg by mouth Daily.   
   guaiFENesin (MUCINEX) 600 mg 12 hr tablet Take 1,200 mg by mouth 2 times daily.   
   levothyroxine (LEVOTHROID) 75 MCG tablet Take 75 mcg by mouth Daily.   
   loratadine (CLARITIN) 10 mg tablet Take 10 mg by mouth Daily.   
   losartan (COZAAR) 100 MG tablet Take 100 mg by mouth Daily. 1/2 daily   
   methotrexate 2.5 mg tablet Take 15 mg by mouth Once a week.   
 
   metoclopramide (REGLAN) 10 mg tablet Take 10 mg by mouth 4 times daily as needed.   
   nystatin-triamcinolone (MYCOLOG II) cream Apply  topically 2 times daily.   
   omeprazole (PRILOSEC) 20 mg capsule Take 20 mg by mouth 2 times daily.   
   potassium citrate (UROCIT-K) 10 mEq SR tablet Take 10 mEq by mouth 2 times daily.   
   predniSONE (DELTASONE) 5 mg tablet Take 5 mg by mouth 2 times daily.   
   rivaroxaban (XARELTO) 20 mg tablet Take 20 mg by mouth Daily (with dinner).   
   sertraline (ZOLOFT) 100 mg tablet 2 tablets daily   
   tamsulosin (FLOMAX) 0.4 mg CAPS Take 0.4 mg by mouth 2 times daily.   
 
 No current facility-administered medications for this visit. 
 
 Past Surgical History 
 Procedure Laterality Date 
   Coronary artery bypass graft   
   4 vessel 
   Esophagus surgery   
   Emmett 
   Knee arthroscopy   
   right knee 
   Hernia repair  2006 
   Laryngectomy  1998 
   right vocal cord cancer 
   Kidney stone surgery   
   Tracheal surgery  2000 
   Reconstruction 
   Carpal tunnel release   
   bilateral 
   Basal cell cancer resection left year   
 
 PE: /82 | Pulse 77 | Resp 16 | Wt 82.645 kg (182 lb 3.2 oz) | SpO2 96%
  Gen: obese and  not in acute distress
  HEENT:Head: Normocephalic, no lesions, without obvious abnormality.
 Ears: Normal TM's bilaterally. Normal auditory canals and external ears. Non-tender.
 Nose: Normal external nose, mucus membranes and septum.
 Pharynx: Dental Hygiene adequate. Normal buccal mucosa. Mallampati 3.
 Neck / Thyroid: Supple, no masses, nodes, nodules or enlargement.
  Pulm:Rhonchi are heard bilaterally but clear with coughing.
  Card: Heart rate is irregularly irregular. S1, S2 were normal. I heard a 1/6 KIAN LLSB. I d
id not hear S3, S4 or diastolic murmurs
  GI: Normal BS
  : Not examined
  Rectal: Not Examined
  Ext: peripheral pulses normal, no pedal edema, no clubbing or cyanosis
  Skin:Ecchymosis over lower arms from trauma and sun damage to skin
  Neuro:Grossly normal
  Psych:age appropriate and casually dressedoriented to time, place and person, mood and aff
ect are within normal limits, pt is a good historian; no memory problems were noted
  Heme: No cervical LN
 
 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
 the SERVE-HF study with him. I am recommended that for the present that he stop using ASV-P
AP. We will repeat a diagnostic PSG off of PAP to reassess.
      History of Heart Failure: I can find no evidence of heart failure on exam today nor ca
n I find evidence of a recent quantitative determination of EF (no recent Echo) and he can't
 recall a recent Echo. I am suggesting that Echo be done. If the EF is less than 45% and he 
has on PSG CSA, then medical management is warranted for his heart but ASV-PAP is not curren
tly indicated.
 
 P: 2-D Echo
     PSG
 
     He will stop ASV-PAP
 
 Today, 30 minutes was spent face to face with the patient; the majority of time was spent c
ounseling regarding CSA and ASV-PAP.
 
 Electronically signed by Luis Carlos Perez Jr., MD at 2015  9:58 AM PDTdocumented in th
is encounter
 
 Plan of Treatment
 
 
+--------+---------+-------------+----------------------+-------------+
| Date   | Type    | Specialty   | Care Team            | Description |
+--------+---------+-------------+----------------------+-------------+
| / | Office  | Pulmonology |   Juan F,          |             |
|    | Visit   |             | Jessica Cheung,   |             |
|        |         |             | MD [address] |             |
|        |         |             |   [place]   |             |
|        |         |             | [place] [address]             |             |
|        |         |             | [phone]         |             |
|        |         |             | [phone] (Fax)   |             |
+--------+---------+-------------+----------------------+-------------+
| / | Office  | Cardiology  |   Eric Akins, |             |
|    | Visit   |             |  MD [address]   |             |
|        |         |             | [place]  |             |
|        |         |             | [address]  [phone]  |             |
|        |         |             |  [phone] (Fax)  |             |
+--------+---------+-------------+----------------------+-------------+
 
 
 
+----------------------+-------------+--------+----------------------+---------------------+
| Name                 | Type        | Priori | Associated Diagnoses | Order Schedule      |
|                      |             | ty     |                      |                     |
+----------------------+-------------+--------+----------------------+---------------------+
| Ambulatory Referral  | Outpatient  | Routin |   Central sleep      | Ordered: 2015 |
| to Sleep Studies     | Referral    | e      | apnea                |                     |
+----------------------+-------------+--------+----------------------+---------------------+
 documented as of this encounter
 
 Results
 ECHO Complete (2015  9:20 AM PDT)
 
+----------+
| Specimen |
+----------+
|          |
+----------+
 
 
 
+------------------------------------------------------------------------+-----------------+
| Narrative                                                              | Performed At    |
+------------------------------------------------------------------------+-----------------+
|   Located within [facility]     ECHOCARDIOGRAM REPORT         |   [place]    |
| STUDY DATE:   2015        PATIENT NAME: Wyatt Shane  :   | [facility]        |
| 1945  MRN: [id number]  PCP: Erwin Iniguez MD               | [facility]  |
| CLINICAL HISTORY/DIAGNOSIS:   A-fib, heart failure, ef        A        | - IMAGING       |
| transthoracic echocardiogram with M-mode, pulsed-wave and color        |                 |
| Doppler   was performed with standard views obtained.   The technical  |                 |
 
| quality of this   examination is adequate.   The heart rhythm during   |                 |
| the echo is atrial   fibrillation.   The M-mode, two-dimensional,      |                 |
| color flow and spectral   Doppler data were reviewed and support the   |                 |
| following interpretation:     Interpretation:  Left Atrium: Left       |                 |
| atrial size is mildly dilated.  Left ventricle:   Left ventricular     |                 |
| size is normal with mild-to-moderate   concentric left ventricular     |                 |
| hypertrophy, and normal left ventricular   systolic function.   The    |                 |
| estimated ejection fraction is 60-65 %.     Undetermined diastolic     |                 |
| function.   Aortic root: Aortic root is normal.  Right Atrium:   Right |                 |
|  atrial sizes normal.  Right ventricle:   Right ventricular size is    |                 |
| normal with normal wall   thickness and normal right ventricular       |                 |
| systolic function.  Pericardium:   Pericardium is normal.  Pulmonary   |                 |
| artery:   Pulmonary artery is normal. mild pulmonary hypertension      |                 |
| with a peak systolic pressure of 35-40 mmHg.  Aortic valve: Aortic     |                 |
| valve is trileaflet with mild thickening and opens   adequately.       |                 |
|   There is a mild to moderate aortic valve insufficiency.  Mitral      |                 |
| valve:   Mitral valve is normal. mild mitral regurgitation.  Pulmonic  |                 |
| valve:   Pulmonic valve is normal.  Tricuspid valve:   Tricuspid valve |                 |
|  is normal. mild tricuspid valve   regurgitation.  Vena cava:   The    |                 |
| inferior vena cava is normal.   There is greater than 50%              |                 |
| inspiratory collapse of the IVC.        IMPRESSIONS:  1.   Mild left   |                 |
| atrial dilatation.  2.   Mild-to-moderate concentric left ventricular  |                 |
| hypertrophy.   No LVOT   obstruction noted.   Left ventricular         |                 |
| systolic dysfunction is preserved.     LVEF is 60-65%.  3.   Mildly    |                 |
| thickened trileaflet aortic valve with adequate opening.   There   is  |                 |
| a mild to moderate aortic valve insufficiency.  4.   Mild mitral valve |                 |
|  regurgitation.  5.   Mild tricuspid valve regurgitation.  6.   Mild   |                 |
| pulmonary hypertension with a peak systolic pressure of 35-40   mmHg.  |                 |
|  7.   Normal IVC with normal respiratory collapse.                     |                 |
| Measurements:  Height: 5'8''  Weight: 182lbs  Aortic root:   31 mm     |                 |
| Aortic cusp sep:   20 mm  LA:   2d 51 mm  IVS-diastole:   22 mm        |                 |
| IVS-systole:   19 mm  LVPW diastole:   14 mm  LVPW systole:   16 mm    |                 |
| LV diameter-diastole:   47 mm  LV diameter-systole:   29 mm            |                 |
| Fractional shortenin %  PFV aortic valve:    m/s  MPG mitral    |                 |
| valve:    mmHg  PFV TR jet:   2.84 m/s  RA/RV PP mmHg  LA       |                 |
| volume:   135 mL  LA index:   38 mL/m2  Mitral Inflow DT:    ms  IVRT: |                 |
|     ms  Valsalva:     PWDTI S wave:    cm/s  PWDTI E wave:    cm/s     |                 |
| PWDTI A wave:    cm/s  E/A Ratio:     E/E Ratio:                       |                 |
| Signed by:    Brittni Shoemaker MD [facility]      2015   9:20          |                 |
|   Sonographer:   Meme Junior, ALMACS, RDMS, RT                       |                 |
+------------------------------------------------------------------------+-----------------+
 
 
 
+----------------------+---------------------+--------------------+----------------+
| Performing           | Address             | City/State/Zipcode | Phone Number   |
| Organization         |                     |                    |                |
+----------------------+---------------------+--------------------+----------------+
|   [facility]     |   [address] | [place], [place]    |   [phone] |
| [facility]  |                     | [address]              |                |
| - IMAGING            |                     |                    |                |
+----------------------+---------------------+--------------------+----------------+
 documented in this encounter
 
 Visit Diagnoses
 
 
+------------------------------------------------------------------------+
| Diagnosis                                                              |
+------------------------------------------------------------------------+
 
|   Central sleep apnea - Primary  Unspecified sleep apnea               |
+------------------------------------------------------------------------+
|   Chronic systolic heart failure (HCC)  Chronic systolic heart failure |
+------------------------------------------------------------------------+
 documented in this encounter

## 2019-12-06 NOTE — XMS
Encounter Summary
  Created on: 2019
 
 Wyatt Shane
 External Reference #: [id number]
 : 43
 Sex: Male
 
 Demographics
 
 
+-----------------------+------------------------+
| Address               | [address]     |
|                       | [place]  [address]   |
+-----------------------+------------------------+
| Home Phone            | [phone]        |
+-----------------------+------------------------+
| Preferred Language    | Unknown                |
+-----------------------+------------------------+
| Marital Status        |                 |
+-----------------------+------------------------+
| Church Affiliation | LUT                    |
+-----------------------+------------------------+
| Race                  | White                  |
+-----------------------+------------------------+
| Ethnic Group          | Not  or  |
+-----------------------+------------------------+
 
 
 Author
 
 
+--------------+-------------+
| Organization | Unknown     |
+--------------+-------------+
| Address      | Unknown     |
+--------------+-------------+
| Phone        | Unavailable |
+--------------+-------------+
 
 
 
 Support
 
 
+---------------+--------------+---------+-----------------+
| Name          | Relationship | Address | Phone           |
+---------------+--------------+---------+-----------------+
| Opal Shane | ECON         | Unknown | [phone] |
+---------------+--------------+---------+-----------------+
 
 
 
 Care Team Providers
 
 
+-----------------------+------+-----------------+
| Care Team Member Name | Role | Phone           |
 
+-----------------------+------+-----------------+
| rEwin Iniguez MD   | PCP  | [phone] |
+-----------------------+------+-----------------+
 
 
 
 Encounter Details
 
 
+--------+-------------+------------+------------------+-------------+
| Date   | Type        | Department | Care Team        | Description |
+--------+-------------+------------+------------------+-------------+
| / | Letter-[facility]org[facility] |            |   Letters, Other | Letters     |
| 2003   | scribed     |            |                  |             |
+--------+-------------+------------+------------------+-------------+
 
 
 
 Social History
 
 
+----------------+-------+-----------+--------+------+
| Tobacco Use    | Types | Packs/Day | Years  | Date |
|                |       |           | Used   |      |
+----------------+-------+-----------+--------+------+
| Never Assessed |       |           |        |      |
+----------------+-------+-----------+--------+------+
 
 
 
+------------------+---------------+
| Sex Assigned at  | Date Recorded |
| Birth            |               |
+------------------+---------------+
| Not on file      |               |
+------------------+---------------+
 
 
 
+----------------+-------------+-------------+
| Job Start Date | Occupation  | Industry    |
+----------------+-------------+-------------+
| Not on file    | Not on file | Not on file |
+----------------+-------------+-------------+
 
 
 
+----------------+--------------+------------+
| Travel History | Travel Start | Travel End |
+----------------+--------------+------------+
 
 
 
+-------------------------------------+
| No recent travel history available. |
+-------------------------------------+
 documented as of this encounter
 
 Progress Notes
 Interface, Transcription In - 2006  1:03 AM DANIA                                    [place]
 
[facility] [place] [address] [place], [place] [id number] [phone] or [phone]
 
 2003
 
 Erwin Iniguez M.D.
 [address]
 [place]  [address]
 
 RE:   WYATT SHANE
 MR #: [id number]
 
 Dear Dr. Iniguez:
 
 Just a brief note to let you know I saw  Mr. Shane  back in followup, now 4
 years out from hemilaryngectomy for recurrent  squamous  cell  carcinoma of
 the larynx.  The details of my interaction with the patient are outlined in
 my handwritten notes.  I am pleased to  report  that he is free of evidence
 of recurrent disease and is really functioning  at  a  very  high level.  I
 will see him back for 1 more year in  followup  and  then we would consider
 him cured.  I appreciate very much the  chance  to care for him.  Please do
 not hesitate to contact me if you have any questions.
 
 Yours sincerely,
 
 Jimmy Esposito M.D.
 
 [facility] / 
 [id number] / [id number] / [id number] /
 D: 2003
 T: 2003
 
 cc:
 
 Eddy Boone M.D.
 [address] [facility]
 [place], [place]  [id number] signed by Interface, Transcription In at 2006  1:0
3 AM PDTdocumented in this encounter
 
 Plan of Treatment
 Not on filedocumented as of this encounter
 
 Visit Diagnoses
 Not on filedocumented in this encounter

## 2019-12-06 NOTE — XMS
Encounter Summary
  Created on: 2019
 
 Shane Wyatttien BroussardJosue
 External Reference #: [id number]
 : 43
 Sex: Male
 
 Demographics
 
 
+-----------------------+---------------------------+
| Address               | [address]        |
|                       | [place]  [address] |
+-----------------------+---------------------------+
| Home Phone            | [phone]           |
+-----------------------+---------------------------+
| Preferred Language    | Unknown                   |
+-----------------------+---------------------------+
| Marital Status        |                    |
+-----------------------+---------------------------+
| Mormonism Affiliation | [id number]                      |
+-----------------------+---------------------------+
| Race                  | Unknown                   |
+-----------------------+---------------------------+
| Ethnic Group          | Unknown                   |
+-----------------------+---------------------------+
 
 
 Author
 
 
+--------------+--------------------------------------------+
| Author       | [facility]  |
|              | and [place]                                |
+--------------+--------------------------------------------+
| Organization | [facility]  |
|              | [facility] [place]                                |
+--------------+--------------------------------------------+
| Address      | Unknown                                    |
+--------------+--------------------------------------------+
| Phone        | Unavailable                                |
+--------------+--------------------------------------------+
 
 
 
 Support
 
 
+---------------+--------------+--------------------+-----------------+
| Name          | Relationship | Address            | Phone           |
+---------------+--------------+--------------------+-----------------+
| Opal Shane | ECON         | [address]     | [phone] |
|               |              | [place]   |                 |
|               |              | [address]              |                 |
+---------------+--------------+--------------------+-----------------+
 
 
 
 
 Care Team Providers
 
 
+-----------------------+------+-----------------+
| Care Team Member Name | Role | Phone           |
+-----------------------+------+-----------------+
| Erwin Iniguez MD | PCP  | [phone] |
+-----------------------+------+-----------------+
 
 
 
 Reason for Visit
 
 
+--------+----------+
| Reason | Comments |
+--------+----------+
| Other  |          |
+--------+----------+
 
 
 
 Encounter Details
 
 
+--------+-----------+----------------------+----------------------+-------------+
| Date   | Type      | Department           | Care Team            | Description |
+--------+-----------+----------------------+----------------------+-------------+
| / | Telephone |   [facility]          |   Jian Fabian MD   | Other       |
|    |           | NEUROSURGERY  [address]  | [address]       |             |
|        |           | [address]     | [place] [address]  |             |
|        |           | [place]      |  [phone]        |             |
|        |           | [address]           | [phone] (Fax)   |             |
|        |           | [phone]         |                      |             |
+--------+-----------+----------------------+----------------------+-------------+
 
 
 
 Social History
 
 
+---------------+------------+-----------+--------+------------------+
| Tobacco Use   | Types      | Packs/Day | Years  | Date             |
|               |            |           | Used   |                  |
+---------------+------------+-----------+--------+------------------+
| Former Smoker | Cigarettes | 1.3       | 34     | Quit: 1996 |
+---------------+------------+-----------+--------+------------------+
 
 
 
+---------------------+---+---+---+
| Smokeless Tobacco:  |   |   |   |
| Never Used          |   |   |   |
+---------------------+---+---+---+
 
 
 
+-------------+-------------+---------+----------+
| Alcohol Use | Drinks/Week | oz/Week | Comments |
 
+-------------+-------------+---------+----------+
| No          |             |         |          |
+-------------+-------------+---------+----------+
 
 
 
+------------------+---------------+
| Sex Assigned at  | Date Recorded |
| Birth            |               |
+------------------+---------------+
| Not on file      |               |
+------------------+---------------+
 
 
 
+----------------+-------------+-------------+
| Job Start Date | Occupation  | Industry    |
+----------------+-------------+-------------+
| Not on file    | Not on file | Not on file |
+----------------+-------------+-------------+
 
 
 
+----------------+--------------+------------+
| Travel History | Travel Start | Travel End |
+----------------+--------------+------------+
 
 
 
+-------------------------------------+
| No recent travel history available. |
+-------------------------------------+
 documented as of this encounter
 
 Plan of Treatment
 
 
+--------+---------+-------------+----------------------+-------------+
| Date   | Type    | Specialty   | Care Team            | Description |
+--------+---------+-------------+----------------------+-------------+
| / | Office  | Pulmonology |   Juan F,          |             |
| 2019   | Visit   |             | Jessica Cheung,   |             |
|        |         |             | MD [address] |             |
|        |         |             |   [place],   |             |
|        |         |             | [place] [address]             |             |
|        |         |             | [phone]         |             |
|        |         |             | [phone] (Fax)   |             |
+--------+---------+-------------+----------------------+-------------+
| / | Office  | Cardiology  |   Eric Akins, |             |
|    | Visit   |             |  MD [address]   |             |
|        |         |             | [place]  |             |
|        |         |             | [address]  [phone]  |             |
|        |         |             |  [phone] (Fax)  |             |
+--------+---------+-------------+----------------------+-------------+
 documented as of this encounter
 
 Visit Diagnoses
 Not on filedocumented in this encounter

## 2019-12-06 NOTE — XMS
Encounter Summary
  Created on: 2019
 
 Wyatt Shane
 External Reference #: [id number]
 : 43
 Sex: Male
 
 Demographics
 
 
+-----------------------+------------------------+
| Address               | [address]     |
|                       | [place]  [address]   |
+-----------------------+------------------------+
| Home Phone            | [phone]        |
+-----------------------+------------------------+
| Preferred Language    | Unknown                |
+-----------------------+------------------------+
| Marital Status        |                 |
+-----------------------+------------------------+
| Spiritism Affiliation | LUT                    |
+-----------------------+------------------------+
| Race                  | White                  |
+-----------------------+------------------------+
| Ethnic Group          | Not  or  |
+-----------------------+------------------------+
 
 
 Author
 
 
+--------------+------------------------------+
| Author       | [facility] |
+--------------+------------------------------+
| Organization | [facility] |
+--------------+------------------------------+
| Address      | Unknown                      |
+--------------+------------------------------+
| Phone        | Unavailable                  |
+--------------+------------------------------+
 
 
 
 Support
 
 
+---------------+--------------+---------+-----------------+
| Name          | Relationship | Address | Phone           |
+---------------+--------------+---------+-----------------+
| Opal Shane | ECON         | Unknown | [phone] |
+---------------+--------------+---------+-----------------+
 
 
 
 Care Team Providers
 
 
 
+-----------------------+------+-----------------+
| Care Team Member Name | Role | Phone           |
+-----------------------+------+-----------------+
| Erwin Iniguez MD   | PCP  | [phone] |
+-----------------------+------+-----------------+
 
 
 
 Encounter Details
 
 
+--------+-------------+-----------------+---------------------+---------------+
| Date   | Type        | Department      | Care Team           | Description   |
+--------+-------------+-----------------+---------------------+---------------+
| / | Office      |   [facility]  |   Note, Outpatient  | Progress Note |
| 2000   | Visit-Trans | MEDICINE        | Clinic              |               |
|        | cribed      |                 |                     |               |
+--------+-------------+-----------------+---------------------+---------------+
 
 
 
 Social History
 
 
+----------------+-------+-----------+--------+------+
| Tobacco Use    | Types | Packs/Day | Years  | Date |
|                |       |           | Used   |      |
+----------------+-------+-----------+--------+------+
| Never Assessed |       |           |        |      |
+----------------+-------+-----------+--------+------+
 
 
 
+------------------+---------------+
| Sex Assigned at  | Date Recorded |
| Birth            |               |
+------------------+---------------+
| Not on file      |               |
+------------------+---------------+
 
 
 
+----------------+-------------+-------------+
| Job Start Date | Occupation  | Industry    |
+----------------+-------------+-------------+
| Not on file    | Not on file | Not on file |
+----------------+-------------+-------------+
 
 
 
+----------------+--------------+------------+
| Travel History | Travel Start | Travel End |
+----------------+--------------+------------+
 
 
 
+-------------------------------------+
| No recent travel history available. |
+-------------------------------------+
 documented as of this encounter
 
 
 Progress Notes
 Interface, Transcription In - 2006  1:04 AM PSTCLINIC DATE:       2000
 
 OTOLARYNGOLOGY HEAD AND NECK SURGERY
 
 SUBJECTIVE:    Mr. Shane   is   seen    back    in   followup   from   his
 laryngotracheoplasty and true vocal  cord  carcinoma.   His  voice  remains
 hoarse but stable.  His airway is adequate  for  all but the most strenuous
 of activities.
 
 PHYSICAL EXAMINATION:  Today, there is no adenopathy in the neck.  Flexible
 fiberoptic laryngoscopy is done.  It  reveals  some  diminished mucosa over
 the area of previous granulation in  his  anterior  glottis,  but otherwise
 complete resolution of the edema of his  glottis and really very reasonable
 glottic  aperture  with  only mild restriction  of  his  right  vocal  cord
 mobility.
 
 ASSESSMENT:   Status  post  laryngotracheoplasty  and  laryngeal  carcinoma
 without evidence of disease.
 
 PLAN:  We will see him back in 2 months.
 
 Jimmy Esposito M.D.
 
 [facility] / 
 [id number] / [id number] / [id number] / [id number]
 D: 2000
 T: 2000
 
 [id number] signed by Interface, Transcription In at 2006  1:04 AM PSTdocume
nted in this encounter
 
 Plan of Treatment
 Not on filedocumented as of this encounter
 
 Visit Diagnoses
 Not on filedocumented in this encounter

## 2019-12-06 NOTE — XMS
Encounter Summary
  Created on: 2019
 
 Wyatt Shane
 External Reference #: [id number]
 : 43
 Sex: Male
 
 Demographics
 
 
+-----------------------+------------------------+
| Address               | [address]     |
|                       | [place]  [address]   |
+-----------------------+------------------------+
| Home Phone            | [phone]        |
+-----------------------+------------------------+
| Preferred Language    | Unknown                |
+-----------------------+------------------------+
| Marital Status        |                 |
+-----------------------+------------------------+
| Buddhist Affiliation | LUT                    |
+-----------------------+------------------------+
| Race                  | White                  |
+-----------------------+------------------------+
| Ethnic Group          | Not  or  |
+-----------------------+------------------------+
 
 
 Author
 
 
+--------------+-------------+
| Organization | Unknown     |
+--------------+-------------+
| Address      | Unknown     |
+--------------+-------------+
| Phone        | Unavailable |
+--------------+-------------+
 
 
 
 Support
 
 
+---------------+--------------+---------+-----------------+
| Name          | Relationship | Address | Phone           |
+---------------+--------------+---------+-----------------+
| Opal Shane | ECON         | Unknown | [phone] |
+---------------+--------------+---------+-----------------+
 
 
 
 Care Team Providers
 
 
+-----------------------+------+-----------------+
| Care Team Member Name | Role | Phone           |
 
+-----------------------+------+-----------------+
| Erwin Iniguez MD   | PCP  | [phone] |
+-----------------------+------+-----------------+
 
 
 
 Encounter Details
 
 
+--------+-------------+------------+---------------------+------------------+
| Date   | Type        | Department | Care Team           | Description      |
+--------+-------------+------------+---------------------+------------------+
| // | Procedure - |            |   Record, Operation | Operative Report |
|    |             |            |                     |                  |
|        | Transcribed |            |                     |                  |
+--------+-------------+------------+---------------------+------------------+
 
 
 
 Social History
 
 
+----------------+-------+-----------+--------+------+
| Tobacco Use    | Types | Packs/Day | Years  | Date |
|                |       |           | Used   |      |
+----------------+-------+-----------+--------+------+
| Never Assessed |       |           |        |      |
+----------------+-------+-----------+--------+------+
 
 
 
+------------------+---------------+
| Sex Assigned at  | Date Recorded |
| Birth            |               |
+------------------+---------------+
| Not on file      |               |
+------------------+---------------+
 
 
 
+----------------+-------------+-------------+
| Job Start Date | Occupation  | Industry    |
+----------------+-------------+-------------+
| Not on file    | Not on file | Not on file |
+----------------+-------------+-------------+
 
 
 
+----------------+--------------+------------+
| Travel History | Travel Start | Travel End |
+----------------+--------------+------------+
 
 
 
+-------------------------------------+
| No recent travel history available. |
+-------------------------------------+
 documented as of this encounter
 
 Plan of Treatment
 
 Not on filedocumented as of this encounter
 
 Procedures
 
 
+------------------+--------+------------+----------------------+----------------------+
| Procedure Name   | Priori | Date/Time  | Associated Diagnosis | Comments             |
|                  | ty     |            |                      |                      |
+------------------+--------+------------+----------------------+----------------------+
| OPERATION RECORD |        | 1999 |                      |   Results for this   |
|                  |        |            |                      | procedure are in the |
|                  |        |            |                      |  results section.    |
+------------------+--------+------------+----------------------+----------------------+
 documented in this encounter
 
 Results
 OPERATION RECORD (1999)
 
+------------------------------------------------------------------------------------------+
| Transcriptions                                                                           |
+------------------------------------------------------------------------------------------+
|   Interface, Transcription In - 2006  5:03 AM PST                                  |
|    [place] [address] [address]     |
| [place], [place] [address] [phone]  |
|                     [facility]ORDMed Rec No.:         |
| [id number]            Date: 2000Name: Darien Shane SURGEON:Jimmy Esposito, |
|  M.D.ASSISTANT(S):           Jian Pickard M.D.PREOPERATIVE DIAGNOSIS(ES):Laryngeal   |
| stenosis.POSTOPERATIVE DIAGNOSIS(ES):Laryngeal stenosis.OPERATIONS                       |
| PERFORMED:Laryngoscopy with laryngeal dilation.SPECIMEN(S)                               |
| REMOVED:None.ANESTHESIA:General.COMPLICATIONS:None.INDICATIONS:The patient is status     |
| post right vertical  hemilaryngectomy  for  laryngealcancer.  He underwent postoperative |
|   radiation  therapy  and  has developeddifficulty breathing secondary to laryngeal      |
| stenosis.FINDINGS:PROCEDURE:The patient was brought to the Operating  Room  and placed   |
| on the operatingroom table in a supine position.  After  adequate  IV  sedation was      |
| given adirect laryngoscopy was performed and the patient was placed in suspension.The    |
| patient  was then jet ventilated.   At  that  point  serial  laryngealdilation was       |
| performed.  The largest  dilator  was  32  Irish and this waseasily passed.  An         |
| endoscope was then passed subglottically and the patientwas seen to be widely patent.    |
| At this  point the patient was taken out ofsuspension and endotracheally intubated.      |
| The  patient  was then awakened,extubated, and brought to the Recovery Room in           |
| satisfactory condition.Dr. Jimmy Esposito was present for the entire procedure.Jian RODRIGUEZ.     |
| Adrian Pickard M.D.DMK:xt7D:  1999T:  1999#[id number]:        |
|Laryngeal stenosis.                                                                       |
|                                                                                          |
|OPERATIONS PERFORMED:                                                                     |
|Laryngoscopy with laryngeal dilation.                                                     |
|                                                                                          |
|SPECIMEN(S) REMOVED:                                                                      |
|None.                                                                                    |
|                                                                                          |
|ANESTHESIA:                                                                              |
|General.                                                                                 |
|                                                                                          |
|COMPLICATIONS:                                                                           |
|None.                                                                                    |
|                                                                                          |
|INDICATIONS:                                                                             |
|The patient is status post right vertical  hemilaryngectomy  for  laryngeal               |
|cancer.  He underwent postoperative  radiation  therapy  and  has developed              |
|difficulty breathing secondary to laryngeal stenosis.                                     |
 
|                                                                                          |
|FINDINGS:                                                                                |
|                                                                                          |
|PROCEDURE:                                                                               |
|The patient was brought to the Operating  Room  and placed on the operating               |
|room table in a supine position.  After  adequate  IV  sedation was given a               |
|direct laryngoscopy was performed and the patient was placed in suspension.               |
|The  patient  was then jet ventilated.   At  that  point  serial  laryngeal               |
|dilation was performed.  The largest  dilator  was  32  Irish and this was               |
|easily passed.  An endoscope was then passed subglottically and the patient               |
|was seen to be widely patent.   At this  point the patient was taken out of               |
|suspension and endotracheally intubated.   The  patient  was then awakened,               |
|extubated, and brought to the Recovery Room in satisfactory condition.                    |
|                                                                                          |
|Dr. Jimmy Esposito was present for the entire procedure.                                    |
|                                                                                          |
|Jian Pickard M.D.                                                                     |
|                                                                                          |
|Jimmy Esposito M.D.                                                                      |
|                                                                                          |
|[id number]                                                                                   |
|D:  1999                                                                            |
|T:  1999                                                                            |
|                                                                                          |
|#[id number]                                                                                    |
|                                                                                          |
|                                                                                          |
|[id number]                                                                                     |
|CC:                                                                                      |
+------------------------------------------------------------------------------------------+
 documented in this encounter
 
 Visit Diagnoses
 Not on filedocumented in this encounter

## 2019-12-06 NOTE — XMS
Encounter Summary
  Created on: 2019
 
 Shane Wyatttien BroussardJosue
 External Reference #: [id number]
 : 43
 Sex: Male
 
 Demographics
 
 
+-----------------------+---------------------------+
| Address               | [address]        |
|                       | [place]  [address] |
+-----------------------+---------------------------+
| Home Phone            | [phone]           |
+-----------------------+---------------------------+
| Preferred Language    | Unknown                   |
+-----------------------+---------------------------+
| Marital Status        |                    |
+-----------------------+---------------------------+
| [employer] Affiliation | [id number]                      |
+-----------------------+---------------------------+
| Race                  | Unknown                   |
+-----------------------+---------------------------+
| Ethnic Group          | Unknown                   |
+-----------------------+---------------------------+
 
 
 Author
 
 
+--------------+--------------------------------------------+
| Author       | [facility]  |
|              | and [place]                                |
+--------------+--------------------------------------------+
| Organization | [facility]  |
|              | and [place]                                |
+--------------+--------------------------------------------+
| Address      | Unknown                                    |
+--------------+--------------------------------------------+
| Phone        | Unavailable                                |
+--------------+--------------------------------------------+
 
 
 
 Support
 
 
+---------------+--------------+--------------------+-----------------+
| Name          | Relationship | Address            | Phone           |
+---------------+--------------+--------------------+-----------------+
| Opal Shane | ECON         | [address]     | [phone] |
|               |              | [place]   |                 |
|               |              | [id number]              |                 |
+---------------+--------------+--------------------+-----------------+
 
 
 
 
 Care Team Providers
 
 
+------------------------+------+-----------------+
| Care Team Member Name  | Role | Phone           |
+------------------------+------+-----------------+
| Calvin Robertson MD | PCP  | [phone] |
+------------------------+------+-----------------+
 
 
 
 Reason for Referral
 Diagnostic/Screening (Routine)
 
+--------+--------+-----------+--------------+--------------+--------------+
| Status | Reason | Specialty | Diagnoses /  | Referred By  | Referred To  |
|        |        |           | Procedures   | Contact      | Contact      |
+--------+--------+-----------+--------------+--------------+--------------+
| Closed |        | Radiology |   Diagnoses  |   Neris Wilson,  |              |
|        |        |           |  Carotid     | DNP  [address]    |              |
|        |        |           | stenosis,    | [facility]  |              |
|        |        |           | bilateral    |  [address]       |              |
|        |        |           | Procedures   | [place] |              |
|        |        |           | VAS Carotid  |  [id number]       |              |
|        |        |           | Duplex       | Phone:       |              |
|        |        |           | Bilateral    | [phone] |              |
|        |        |           |              |   Fax:       |              |
|        |        |           |              | [phone] |              |
+--------+--------+-----------+--------------+--------------+--------------+
 
 
 
 
 Encounter Details
 
 
+--------+-----------+---------------------+-----------+--------------------+
| Date   | Type      | Department          | Care Team | Description        |
+--------+-----------+---------------------+-----------+--------------------+
| / | Hospital  |   [facility]     |           | Carotid stenosis,  |
| 2019   | Encounter | VASCULAR SURGERY    |           | bilateral          |
|        |           | ULTRASOUND  [address]    |           |                    |
|        |           | [address]   |           |                    |
|        |           | [place]        |           |                    |
|        |           | [address]          |           |                    |
|        |           | [phone]        |           |                    |
+--------+-----------+---------------------+-----------+--------------------+
 
 
 
 Social History
 
 
+---------------+------------+-----------+--------+------------------+
| Tobacco Use   | Types      | Packs/Day | Years  | Date             |
|               |            |           | Used   |                  |
+---------------+------------+-----------+--------+------------------+
| Former Smoker | Cigarettes | 1         | 35     | Quit: 1996 |
+---------------+------------+-----------+--------+------------------+
 
 
 
 
+---------------------+---+---+---+
| Smokeless Tobacco:  |   |   |   |
| Never Used          |   |   |   |
+---------------------+---+---+---+
 
 
 
+-------------+-------------+---------+----------+
| Alcohol Use | Drinks/Week | oz/Week | Comments |
+-------------+-------------+---------+----------+
| No          |             |         |          |
+-------------+-------------+---------+----------+
 
 
 
+------------------+---------------+
| Sex Assigned at  | Date Recorded |
| Birth            |               |
+------------------+---------------+
| Not on file      |               |
+------------------+---------------+
 
 
 
+----------------+-------------+-------------+
| Job Start Date | Occupation  | Industry    |
+----------------+-------------+-------------+
| Not on file    | Not on file | Not on file |
+----------------+-------------+-------------+
 
 
 
+----------------+--------------+------------+
| Travel History | Travel Start | Travel End |
+----------------+--------------+------------+
 
 
 
+-------------------------------------+
| No recent travel history available. |
+-------------------------------------+
 documented as of this encounter
 
 Medications at Time of Discharge
 
 
+----------------------+----------------------+-----------+---------+----------+-----------+
| Medication           | Sig                  | Dispensed | Refills | Start    | End Date  |
|                      |                      |           |         | Date     |           |
+----------------------+----------------------+-----------+---------+----------+-----------+
|   acyclovir          | Take 400 mg by mouth |           | 0       |          |           |
| (ZOVIRAX) 400 MG     |  5 (five) times      |           |         |          |           |
| tablet               | daily. PRN           |           |         |          |           |
+----------------------+----------------------+-----------+---------+----------+-----------+
|   acyclovir          | Apply  topically     |           | 0       |          |           |
| (ZOVIRAX) 5%         | every 3 hours.       |           |         |          |           |
| ointment             |                      |           |         |          |           |
 
+----------------------+----------------------+-----------+---------+----------+-----------+
|   albuterol (PROAIR  | Inhale 2 puffs into  |           | 0       |          |           |
| HFA) 90 mcg/puff     | the lungs every 6    |           |         |          |           |
| inhaler              | hours as needed for  |           |         |          |           |
|                      | Wheezing.            |           |         |          |           |
+----------------------+----------------------+-----------+---------+----------+-----------+
|   albuterol (PROAIR  | Inhale  into the     |           | 0       |          |           |
| RESPICLICK) 90       | lungs.               |           |         |          |           |
| mcg/puff inhaler     |                      |           |         |          |           |
+----------------------+----------------------+-----------+---------+----------+-----------+
|                      | Take 3 mLs by        |           | 0       | 20 |           |
| albuterol-ipratropiu | nebulization every 6 |           |         | 18       |           |
| m 2.5-0.5 mg/3 mL    |  (six) hours as      |           |         |          |           |
| SOLN                 | needed.              |           |         |          |           |
+----------------------+----------------------+-----------+---------+----------+-----------+
|   atorvaSTATin       | TAKE 1 TABLET BY     |           | 0       | 20 |  |
| (LIPITOR) 40 mg      | MOUTH NIGHTLY        |           |         | 19       | 0         |
| tablet               |                      |           |         |          |           |
+----------------------+----------------------+-----------+---------+----------+-----------+
|   azaTHIOprine       | Take 150 mg by mouth |           | 0       |          |           |
| (IMURAN) 50 mg       |  daily.              |           |         |          |           |
| tablet               |                      |           |         |          |           |
+----------------------+----------------------+-----------+---------+----------+-----------+
|                      | Apply  to eye as     |           | 0       |          |           |
| bacitracin-polymyxin | needed.              |           |         |          |           |
|  b (POLYSPORIN)      |                      |           |         |          |           |
| ophthalmic ointment  |                      |           |         |          |           |
+----------------------+----------------------+-----------+---------+----------+-----------+
|   bismuth            | Take 262 mg by mouth |           | 0       |          |           |
| subsalicylate (PEPTO |  4 (four) times      |           |         |          |           |
|  BISMOL) 262 mg      | daily before meals   |           |         |          |           |
| chewable tablet      | and nightly.         |           |         |          |           |
+----------------------+----------------------+-----------+---------+----------+-----------+
|   cholecalciferol    | Take 1,000 Units by  |           | 0       |          |           |
| (CHOLECALCIFEROL)    | mouth daily.         |           |         |          |           |
| 1000 units TABS      |                      |           |         |          |           |
+----------------------+----------------------+-----------+---------+----------+-----------+
|   cyanocobalamin     | Take 1,000 mcg by    |           | 0       |          |           |
| (VITAMIN B-12) 1000  | mouth daily.         |           |         |          |           |
| MCG tablet           |                      |           |         |          |           |
+----------------------+----------------------+-----------+---------+----------+-----------+
|   diclofenac         | Apply 2 g topically  |           | 0       |          |           |
| (VOLTAREN) 1% GEL    | 4 (four) times       |           |         |          |           |
|                      | daily. PRN           |           |         |          |           |
+----------------------+----------------------+-----------+---------+----------+-----------+
|   digoxin (LANOXIN)  | Take 125 mcg by      |           | 0       |          |           |
| 250 mcg tablet       | mouth Daily. 1/2     |           |         |          |           |
|                      | capsule daily        |           |         |          |           |
+----------------------+----------------------+-----------+---------+----------+-----------+
|   famotidine         | Take 40 mg by mouth  |           | 0       |          |           |
| (PEPCID) 40 MG       | daily.               |           |         |          |           |
| tablet               |                      |           |         |          |           |
+----------------------+----------------------+-----------+---------+----------+-----------+
|   ferrous sulfate    | Take 325 mg by mouth |           | 0       | 20 |           |
| 325 mg tablet        |  3 times daily.      |           |         | 12       |           |
+----------------------+----------------------+-----------+---------+----------+-----------+
|   fluticasone        | one spray in each    |           | 0       | 20 |           |
| (FLONASE) 50         | nostril daily as     |           |         | 12       |           |
| mcg/nasal spray      | needed               |           |         |          |           |
+----------------------+----------------------+-----------+---------+----------+-----------+
 
|                      | Inhale 1 puff into   |           | 0       |          |           |
| fluticasone-salmeter | the lungs Twice      |           |         |          |           |
| ol (ADVAIR) 500-50   | Daily.               |           |         |          |           |
| mcg/puff diskus      |                      |           |         |          |           |
| inhaler              |                      |           |         |          |           |
+----------------------+----------------------+-----------+---------+----------+-----------+
|   folic acid 1 mg    | Take 1 mg by mouth   |           | 0       |          |           |
| tablet               | Daily.               |           |         |          |           |
+----------------------+----------------------+-----------+---------+----------+-----------+
|   furosemide (LASIX) | Take 40 mg by mouth  |           | 0       |          |           |
|  40 mg tablet        | Daily.               |           |         |          |           |
+----------------------+----------------------+-----------+---------+----------+-----------+
|   guaiFENesin        | Take 1,200 mg by     |           | 0       |          |           |
| (MUCINEX) 600 mg 12  | mouth 2 times daily. |           |         |          |           |
| hr tablet            |                      |           |         |          |           |
+----------------------+----------------------+-----------+---------+----------+-----------+
|   levocetirizine     | Take 5 mg by mouth   |           | 0       |          |           |
| (XYZAL) 5 MG tablet  | as needed.           |           |         |          |           |
+----------------------+----------------------+-----------+---------+----------+-----------+
|   levothyroxine      | Take 75 mcg by mouth |           | 0       | 20 |           |
| (LEVOTHROID) 75 MCG  |  Daily.              |           |         | 12       |           |
| tablet               |                      |           |         |          |           |
+----------------------+----------------------+-----------+---------+----------+-----------+
|   losartan (COZAAR)  | Take 50 mg by mouth  |           | 0       |          |           |
| 50 mg tablet         | daily.               |           |         |          |           |
+----------------------+----------------------+-----------+---------+----------+-----------+
|   metoclopramide     | Take 10 mg by mouth  |           | 0       | 20 |           |
| (REGLAN) 10 mg       | 4 times daily as     |           |         | 12       |           |
| tablet               | needed.              |           |         |          |           |
+----------------------+----------------------+-----------+---------+----------+-----------+
|   mupirocin          | 1 g by Nasal route   |           | 0       |          |           |
| (BACTROBAN) 2%       | as needed. Use pea   |           |         |          |           |
| ointment             | sized amount in each |           |         |          |           |
|                      |  nostril twice daily |           |         |          |           |
|                      |  for 5 days. After   |           |         |          |           |
|                      | applications, press  |           |         |          |           |
|                      | sides of nose        |           |         |          |           |
|                      | together, gently     |           |         |          |           |
|                      | massage for 1 min.   |           |         |          |           |
+----------------------+----------------------+-----------+---------+----------+-----------+
|                      | Apply  topically 2   |           | 0       |          |           |
| nystatin-triamcinolo | times daily.         |           |         |          |           |
| ne (MYCOLOG II)      |                      |           |         |          |           |
| cream                |                      |           |         |          |           |
+----------------------+----------------------+-----------+---------+----------+-----------+
|   ofloxacin          |                      |           | 0       | 20 |           |
| (OCUFLOX) 0.3%       |                      |           |         | 18       |           |
| ophthalmic solution  |                      |           |         |          |           |
+----------------------+----------------------+-----------+---------+----------+-----------+
|   omeprazole         | Take 20 mg by mouth  |           | 0       | 20 |           |
| (PRILOSEC) 20 mg     | 2 times daily.       |           |         | 12       |           |
| capsule              |                      |           |         |          |           |
+----------------------+----------------------+-----------+---------+----------+-----------+
|   potassium citrate  | Take 10 mEq by mouth |           | 0       |          |           |
| (UROCIT-K) 10 mEq SR |  2 times daily.      |           |         |          |           |
|  tablet              |                      |           |         |          |           |
+----------------------+----------------------+-----------+---------+----------+-----------+
|   predniSONE         | Take 10 mg by mouth  |           | 0       |          |           |
| (DELTASONE) 5 mg     | Daily.               |           |         |          |           |
| tablet               |                      |           |         |          |           |
 
+----------------------+----------------------+-----------+---------+----------+-----------+
|   sertraline         | Take 100 mg by mouth |           | 0       |          |           |
| (ZOLOFT) 100 mg      |  daily.              |           |         |          |           |
| tablet               |                      |           |         |          |           |
+----------------------+----------------------+-----------+---------+----------+-----------+
|   tamsulosin         | Take 0.4 mg by mouth |           | 0       | 20 |           |
| (FLOMAX) 0.4 mg CAPS |  2 times daily.      |           |         | 12       |           |
+----------------------+----------------------+-----------+---------+----------+-----------+
|   trolamine          | Apply  topically as  |           | 0       |          |           |
| salicylate           | needed.              |           |         |          |           |
| (ASPERCREME) 10%     |                      |           |         |          |           |
| cream                |                      |           |         |          |           |
+----------------------+----------------------+-----------+---------+----------+-----------+
|   XARELTO 10 MG      | TAKE ONE TABLET BY   |   90      | 2       | 10/17/20 |           |
| tablet               | MOUTH EVERY DAY      | tablet    |         | 19       |           |
+----------------------+----------------------+-----------+---------+----------+-----------+
 documented as of this encounter
 
 Plan of Treatment
 
 
+--------+---------+-------------+----------------------+-------------+
| Date   | Type    | Specialty   | Care Team            | Description |
+--------+---------+-------------+----------------------+-------------+
| / | Office  | Pulmonology |   Juan F,          |             |
|    | Visit   |             | Jessica Cheung,   |             |
|        |         |             | MD [address] |             |
|        |         |             |   [place]   |             |
|        |         |             | [place] [address]             |             |
|        |         |             | [phone]         |             |
|        |         |             | [phone] (Fax)   |             |
+--------+---------+-------------+----------------------+-------------+
| / | Office  | Cardiology  |   Eric Akins, |             |
|    | Visit   |             |  MD [address]   |             |
|        |         |             | [place]  |             |
|        |         |             | [address]  [phone]  |             |
|        |         |             |  [phone] (Fax)  |             |
+--------+---------+-------------+----------------------+-------------+
 documented as of this encounter
 
 Procedures
 
 
+---------------------+--------+-------------+----------------------+----------------------+
| Procedure Name      | Priori | Date/Time   | Associated Diagnosis | Comments             |
|                     | ty     |             |                      |                      |
+---------------------+--------+-------------+----------------------+----------------------+
| VAS CAROTID DUPLEX  | Routin | 2019  |   Carotid stenosis,  |   Results for this   |
| BILATERAL           | e      | 11:15 AM    | bilateral            | procedure are in the |
|                     |        | PST         |                      |  results section.    |
+---------------------+--------+-------------+----------------------+----------------------+
 documented in this encounter
 
 Results
 VAS Carotid Duplex Bilateral (2019 11:15 AM PST)
 
+----------+
| Specimen |
+----------+
|          |
 
+----------+
 
 
 
+------------------------------------------------------------------------+---------------+
| Impressions                                                            | Performed At  |
+------------------------------------------------------------------------+---------------+
|   Extensive calcific plaque bilaterally without high-grade stenosis,   |   [facility] |
| similar to prior examination     In the left common carotid artery     |               |
| there is dense calcific plaque versus  an intimal flap which is not    |               |
| flow limiting.     The left vertebral artery is not visualized on      |               |
| today's examination.     Internal Carotid Artery Diagnostic Grades     |               |
|  Grade 1: Stenosis = 01-50% / PSV <125 cm/sec / EDV (cm/s)<40 cm/sec   |               |
| (mild plaque)  Grade 2: Stenosis = 01-50% / PSV <125 cm/sec / EDV      |               |
| (cm/s)<40 cm/sec  (moderate plaque)  Grade 3: Stenosis = 50-69% / PSV  |               |
| >125 cm/sec / EDV (cm/s)>40 cm/sec  Grade 4: Stenosis = 70-95% / PSV   |               |
| >230 cm/sec / EDV (cm/s)>100 cm/sec  Grade 5: Stenosis = 90-95% / PSV  |               |
| <125 cm/sec / EDV (cm/s)<40 cm/sec  Grade 6: Stenosis Occluded         |               |
| Society of Radiologists in Ultrasound (SRU) criteria applied for       |               |
| internal carotid stenosis determination.           Signed by: Sly  |               |
| Johnson PATEL  Sign Date/Time: 2019 1:36 PM                      |               |
+------------------------------------------------------------------------+---------------+
 
 
 
+------------------------------------------------------------------------+---------------+
| Narrative                                                              | Performed At  |
+------------------------------------------------------------------------+---------------+
|      CAROTID DUPLEX ULTRASOUND     CLINICAL INFORMATION:  Carotid      |   [facility] |
| artery stenosis.     COMPARISON:  US CAROTID DOPPLER BILATERAL         |               |
| (2018);     PROCEDURE:  Evaluation of the extracranial carotid    |               |
| and vertebral arteries with  production of real-time images            |               |
| integrating B-mode two-dimensional  vascular structure, Doppler        |               |
| spectral analysis and color-flow Doppler  imaging.     FINDINGS:  Peak |               |
|  systolic and diastolic velocities measured in the common and          |               |
| internal carotid arteries. All velocities recorded in cm/sec:          |               |
| Anterior Circulation:     Right  CCA: 65/10  ICA: 65/9  ECA: 78/9      |               |
| ICA/CCA: 1.0     Left  CCA: 86/15  ICA: 90 /7  ECA: 133/0  ICA/CCA:    |               |
| 1.0     Posterior Circulation:     Right:  Vertebral: 121/21 Antegrade |               |
|      Left:  Vertebral: Not visualized     Gray scale images: There is  |               |
| extensive calcified plaque with a possible  intimal flap of the left   |               |
| common carotid artery.                                                 |               |
+------------------------------------------------------------------------+---------------+
 
 
 
+-------------------------------------------------------------------------+
| Procedure Note                                                          |
+-------------------------------------------------------------------------+
|   Maurizio, Rad Results In - 2019  1:40 PM PST                         |
| CAROTID DUPLEX ULTRASOUND                                               |
|                                                                         |
| CLINICAL INFORMATION:                                                   |
| Carotid artery stenosis.                                                |
|                                                                         |
| COMPARISON:                                                             |
| US CAROTID DOPPLER BILATERAL (2018);                               |
|                                                                         |
| PROCEDURE:                                                              |
| Evaluation of the extracranial carotid and vertebral arteries with      |
 
| production of real-time images integrating B-mode two-dimensional       |
| vascular structure, Doppler spectral analysis and color-flow Doppler    |
| imaging.                                                                |
|                                                                         |
| FINDINGS:                                                               |
| Peak systolic and diastolic velocities measured in the common and       |
| internal carotid arteries. All velocities recorded in cm/sec:           |
|                                                                         |
| Anterior Circulation:                                                   |
|                                                                         |
| Right                                                                   |
| CCA: 65/10                                                              |
| ICA: 65/9                                                               |
| ECA: 78/9                                                               |
| ICA/CCA: 1.0                                                            |
|                                                                         |
| Left                                                                    |
| CCA: 86/15                                                              |
| ICA: 90 /7                                                              |
| ECA: 133/0                                                              |
| ICA/CCA: 1.0                                                            |
|                                                                         |
| Posterior Circulation:                                                  |
|                                                                         |
| Right:                                                                  |
| Vertebral: 121/21 Antegrade                                             |
|                                                                         |
| Left:                                                                   |
| Vertebral: Not visualized                                               |
|                                                                         |
| Gray scale images: There is extensive calcified plaque with a possible  |
| intimal flap of the left common carotid artery.                         |
|                                                                         |
| IMPRESSION:                                                             |
| Extensive calcific plaque bilaterally without high-grade stenosis,      |
| similar to prior examination                                            |
|                                                                         |
| In the left common carotid artery there is dense calcific plaque versus |
| an intimal flap which is not flow limiting.                             |
|                                                                         |
| The left vertebral artery is not visualized on today's examination.     |
|                                                                         |
| Internal Carotid Artery Diagnostic Grades                               |
|                                                                         |
| Grade 1: Stenosis = 01-50% / PSV <125 cm/sec / EDV (cm/s)<40 cm/sec     |
| (mild plaque)                                                           |
| Grade 2: Stenosis = 01-50% / PSV <125 cm/sec / EDV (cm/s)<40 cm/sec     |
| (moderate plaque)                                                       |
| Grade 3: Stenosis = 50-69% / PSV >125 cm/sec / EDV (cm/s)>40 cm/sec     |
| Grade 4: Stenosis = 70-95% / PSV >230 cm/sec / EDV (cm/s)>100 cm/sec    |
| Grade 5: Stenosis = 90-95% / PSV <125 cm/sec / EDV (cm/s)<40 cm/sec     |
| Grade 6: Stenosis Occluded                                              |
|                                                                         |
| Society of Radiologists in Ultrasound (SRU) criteria applied for        |
| internal carotid stenosis determination.                                |
|                                                                         |
| Signed by: JORGE Heart Matthew                                        |
| Sign Date/Time: 2019 1:36 PM                                      |
 
+-------------------------------------------------------------------------+
 
 
 
+---------------+---------+--------------------+--------------+
| Performing    | Address | City/State/Zipcode | Phone Number |
| Organization  |         |                    |              |
+---------------+---------+--------------------+--------------+
|   [facility] |         |                    |              |
+---------------+---------+--------------------+--------------+
 documented in this encounter
 
 Visit Diagnoses
 
 
+----------------------------------------------------------------------------------+
| Diagnosis                                                                        |
+----------------------------------------------------------------------------------+
|   Carotid stenosis, bilateral  Occlusion and stenosis of multiple and bilateral  |
| precerebral arteries without mention of cerebral infarction                      |
+----------------------------------------------------------------------------------+
 documented in this encounter

## 2019-12-06 NOTE — XMS
Encounter Summary
  Created on: 2019
 
 Wyatt Shane
 External Reference #: [id number]
 : 43
 Sex: Male
 
 Demographics
 
 
+-----------------------+------------------------+
| Address               | [address]     |
|                       | [place]  [address]   |
+-----------------------+------------------------+
| Home Phone            | [phone]        |
+-----------------------+------------------------+
| Preferred Language    | Unknown                |
+-----------------------+------------------------+
| Marital Status        |                 |
+-----------------------+------------------------+
| Holiness Affiliation | LUT                    |
+-----------------------+------------------------+
| Race                  | White                  |
+-----------------------+------------------------+
| Ethnic Group          | Not  or  |
+-----------------------+------------------------+
 
 
 Author
 
 
+--------------+------------------------------+
| Author       | [facility] |
+--------------+------------------------------+
| Organization | [facility] |
+--------------+------------------------------+
| Address      | Unknown                      |
+--------------+------------------------------+
| Phone        | Unavailable                  |
+--------------+------------------------------+
 
 
 
 Support
 
 
+---------------+--------------+---------+-----------------+
| Name          | Relationship | Address | Phone           |
+---------------+--------------+---------+-----------------+
| Opal Shane | ECON         | Unknown | [phone] |
+---------------+--------------+---------+-----------------+
 
 
 
 Care Team Providers
 
 
 
+-----------------------+------+-----------------+
| Care Team Member Name | Role | Phone           |
+-----------------------+------+-----------------+
| Erwin Iniguez MD   | PCP  | [phone] |
+-----------------------+------+-----------------+
 
 
 
 Encounter Details
 
 
+--------+-------------+-----------------+---------------------+---------------+
| Date   | Type        | Department      | Care Team           | Description   |
+--------+-------------+-----------------+---------------------+---------------+
| / | Office      |   [facility]  |   Note, Outpatient  | Progress Note |
| 2000   | Visit-Trans | MEDICINE        | Clinic              |               |
|        | cribed      |                 |                     |               |
+--------+-------------+-----------------+---------------------+---------------+
 
 
 
 Social History
 
 
+----------------+-------+-----------+--------+------+
| Tobacco Use    | Types | Packs/Day | Years  | Date |
|                |       |           | Used   |      |
+----------------+-------+-----------+--------+------+
| Never Assessed |       |           |        |      |
+----------------+-------+-----------+--------+------+
 
 
 
+------------------+---------------+
| Sex Assigned at  | Date Recorded |
| Birth            |               |
+------------------+---------------+
| Not on file      |               |
+------------------+---------------+
 
 
 
+----------------+-------------+-------------+
| Job Start Date | Occupation  | Industry    |
+----------------+-------------+-------------+
| Not on file    | Not on file | Not on file |
+----------------+-------------+-------------+
 
 
 
+----------------+--------------+------------+
| Travel History | Travel Start | Travel End |
+----------------+--------------+------------+
 
 
 
+-------------------------------------+
| No recent travel history available. |
+-------------------------------------+
 documented as of this encounter
 
 
 Progress Notes
 Interface, Transcription In - 2006  1:06 AM PSTCLINIC DATE:       2000
 
 [facility] CLINIC
 
 SUBJECTIVE:  Mr. Shane is seen back today  because  of  concern  on his part
 that he has coughed out cartilage graft.   He had some significant coughing
 with chest pain and what sounds like a  little bit of hemoptysis associated
 with mild tracheitis.  Flexible fiberoptic  laryngoscopy  is done antegrade
 and retrograde through his tracheostomy.   He  has  mild  tracheitis.   His
 graft is intact, and his laryngeal airway  certainly  is much improved.  He
 is not wearing his tracheal plug most of the time.
 
 ASSESSMENT:  Status post laryngotracheal  cartilage  reconstruction,  doing
 well.
 
 PLAN:  We will see him back here as previously  scheduled.  In the interim,
 I have put him on two weeks of Augmentin.
 
 Jimmy Esposito M.D.
 
 MARIA ALEJANDRA / 
 [id number] / [id number] / [id number] / [id number]
 D: 2000
 T: 2000
 C: 2000 
 
 [id number] signed by Interface, Transcription In at 2006  1:06 AM PSTdocume
nted in this encounter
 
 Plan of Treatment
 Not on filedocumented as of this encounter
 
 Visit Diagnoses
 Not on filedocumented in this encounter

## 2019-12-06 NOTE — XMS
Encounter Summary
  Created on: 2019
 
 Shane Wyatttien BroussardJosue
 External Reference #: [id number]
 : 43
 Sex: Male
 
 Demographics
 
 
+-----------------------+---------------------------+
| Address               | [address]        |
|                       | [place]  [address] |
+-----------------------+---------------------------+
| Home Phone            | [phone]           |
+-----------------------+---------------------------+
| Preferred Language    | Unknown                   |
+-----------------------+---------------------------+
| Marital Status        |                    |
+-----------------------+---------------------------+
| Bahai Affiliation | [id number]                      |
+-----------------------+---------------------------+
| Race                  | Unknown                   |
+-----------------------+---------------------------+
| Ethnic Group          | Unknown                   |
+-----------------------+---------------------------+
 
 
 Author
 
 
+--------------+--------------------------------------------+
| Author       | [facility]  |
|              | [facility] [place]                                |
+--------------+--------------------------------------------+
| Organization | [facility]  |
|              | and [place]                                |
+--------------+--------------------------------------------+
| Address      | Unknown                                    |
+--------------+--------------------------------------------+
| Phone        | Unavailable                                |
+--------------+--------------------------------------------+
 
 
 
 Support
 
 
+---------------+--------------+--------------------+-----------------+
| Name          | Relationship | Address            | Phone           |
+---------------+--------------+--------------------+-----------------+
| Opal Shane | ECON         | [address]     | [phone] |
|               |              | [place]   |                 |
|               |              | [id number]              |                 |
+---------------+--------------+--------------------+-----------------+
 
 
 
 
 Care Team Providers
 
 
+-----------------------+------+-----------------+
| Care Team Member Name | Role | Phone           |
+-----------------------+------+-----------------+
| Erwin Iniguez MD | PCP  | [phone] |
+-----------------------+------+-----------------+
 
 
 
 Encounter Details
 
 
+--------+---------+----------------------+---------------------+----------------------+
| Date   | Type    | Department           | Care Team           | Description          |
+--------+---------+----------------------+---------------------+----------------------+
| 10/14/ | Office  |   [facility]      |   Luis Carlos Perez  | Central sleep apnea  |
|    | Visit   | SLEEP DISORDER  [address]  | MD [place]  [address]   | due to Cheyne-Nichole |
|        |         | [address]  [place]      | [address]  [place]    |  respiration         |
|        |         | [place] [address] | [place] [address]     | (Primary Dx); JOANN    |
|        |         |   [phone]       | [phone]        | (obstructive sleep   |
|        |         |                      | [phone] (Fax)  | apnea)               |
+--------+---------+----------------------+---------------------+----------------------+
 
 
 
 Social History
 
 
+---------------+------------+-----------+--------+------------------+
| Tobacco Use   | Types      | Packs/Day | Years  | Date             |
|               |            |           | Used   |                  |
+---------------+------------+-----------+--------+------------------+
| Former Smoker | Cigarettes | 1.3       | 34     | Quit: 1996 |
+---------------+------------+-----------+--------+------------------+
 
 
 
+---------------------+---+---+---+
| Smokeless Tobacco:  |   |   |   |
| Never Used          |   |   |   |
+---------------------+---+---+---+
 
 
 
+-------------+-------------+---------+----------+
| Alcohol Use | Drinks/Week | oz/Week | Comments |
+-------------+-------------+---------+----------+
| No          |             |         |          |
+-------------+-------------+---------+----------+
 
 
 
+------------------+---------------+
| Sex Assigned at  | Date Recorded |
| Birth            |               |
+------------------+---------------+
| Not on file      |               |
 
+------------------+---------------+
 
 
 
+----------------+-------------+-------------+
| Job Start Date | Occupation  | Industry    |
+----------------+-------------+-------------+
| Not on file    | Not on file | Not on file |
+----------------+-------------+-------------+
 
 
 
+----------------+--------------+------------+
| Travel History | Travel Start | Travel End |
+----------------+--------------+------------+
 
 
 
+-------------------------------------+
| No recent travel history available. |
+-------------------------------------+
 documented as of this encounter
 
 Last Filed Vital Signs
 
 
+-------------------+----------------------+----------------------+----------+
| Vital Sign        | Reading              | Time Taken           | Comments |
+-------------------+----------------------+----------------------+----------+
| Blood Pressure    | 132/62               | 10/14/2013  9:55 AM  |          |
|                   |                      | PDT                  |          |
+-------------------+----------------------+----------------------+----------+
| Pulse             | 52                   | 10/14/2013  9:55 AM  |          |
|                   |                      | PDT                  |          |
+-------------------+----------------------+----------------------+----------+
| Temperature       | -                    | -                    |          |
+-------------------+----------------------+----------------------+----------+
| Respiratory Rate  | 16                   | 10/14/2013  9:55 AM  |          |
|                   |                      | PDT                  |          |
+-------------------+----------------------+----------------------+----------+
| Oxygen Saturation | -                    | -                    |          |
+-------------------+----------------------+----------------------+----------+
| Inhaled Oxygen    | -                    | -                    |          |
| Concentration     |                      |                      |          |
+-------------------+----------------------+----------------------+----------+
| Weight            | 91.3 kg (201 lb 3.2  | 10/14/2013  9:55 AM  |          |
|                   | oz)                  | PDT                  |          |
+-------------------+----------------------+----------------------+----------+
| Height            | -                    | -                    |          |
+-------------------+----------------------+----------------------+----------+
| Body Mass Index   | 32.47                | 2013  1:28 PM  |          |
|                   |                      | PDT                  |          |
+-------------------+----------------------+----------------------+----------+
 documented in this encounter
 
 Patient Instructions
 Patient Instructions Luis Carlos Perez Jr., MD - 10/14/2013 11:11 AM PDTYou have a combinati
on of Obstructive Sleep Apnea and Central Sleep Apnea. We will bring you back into the sleep
 center for a BiPAP titration study.Electronically signed by Luis Carlos Perez Jr., MD at 10/1
2013 11:12 AM PDT
 
 documented in this encounter
 
 Progress Notes
 Luis Carlos Perez Jr., MD - 10/14/2013 11:03 AM James Shane underwent attended polysomnog
celeste on 10/7/2013 which revealed a latency to sleep onset of 5.5 minutes and a sleep effici
ency of 84.5%. The percentages of the various stages of sleep were Abnormal. There was no N3
 sleep and the amount of REM sleep was markedly reduced at 2.5%. The arousal index was 82.7 
which is high. The Respiratory Disturbance Index was 77.4; the Apnea-Hypopnea Index was 77.4
; and the Apnea-Index was 56.2. Most of the Apneas were Central Apneas of the Cheyne-Nichole 
type. The Respiratory Arousal Index was 65.5.  The miguel oxygen saturation recorded during s
leep was 86.0%. The patient spent 15.8 minutes with an oxygen saturation of less than 88%. T
here were 91 Periodic Limb Movements and the PLMS Arousal Index was 1.3 (which is normal). I
've reviewed these results with the patient and his wife.
 
 A: JOANN
     CSA of the Cheyne-Nichole type
 
 I've discussed this in detail with the patient and his wife - she has actually notice that 
his snoring has recently declined and the "quiet" apneas have dramatically increased. The on
ly thing that has changed has been the recent institution of prednisone (he is tapering off 
of this) and low dose weekly methotrexate. Usually when one sees CSA of the Cheyne-Nichole va
riety it is associated with heart disease. CSA of the "ataxic" type is often associated with
 significant to high doses of narcotics.
 
 I've discussed the use of BiPAP therapy (ASV-BiPAP in particular) for .
 
 P: PSG guided PAP titration - the titration will start with simple CPAP (which is sometimes
 effective even in ) but if centrals and Cheyne's Nichole persist, then we will switch to 
an ASV BiPAP titration protocol: EPAP will be raised only for clear cut obstructive apneas. 
PS will be set to 0-25cm. RR will be set to auto.
     F/u thereafter.
 
 Today, 15 minutes was spent face to face with the patient; the majority of time was spent harley dupree.
 
 CC: Dr. CARMINA Iniguez, Dr. Foote
 
 Electronically signed by Luis Carlos Perez Jr., MD at 10/14/2013 11:13 AM PDTdocumented in th
is encounter
 
 Plan of Treatment
 
 
+--------+---------+-------------+----------------------+-------------+
| Date   | Type    | Specialty   | Care Team            | Description |
+--------+---------+-------------+----------------------+-------------+
| / | Office  | Pulmonology |   Juan F,          |             |
|    | Visit   |             | Jessica Cheung,   |             |
|        |         |             | MD  [address]  |             |
|        |         |             |   [place],   |             |
|        |         |             | [place] [address]             |             |
|        |         |             | [phone]         |             |
|        |         |             | [phone] (Fax)   |             |
+--------+---------+-------------+----------------------+-------------+
| / | Office  | Cardiology  |   Eric Akins, |             |
|    | Visit   |             |  MD  [address]   |             |
|        |         |             | [place]  |             |
|        |         |             | [address]  [phone]  |             |
|        |         |             |  [phone] (Fax)  |             |
+--------+---------+-------------+----------------------+-------------+
 
 documented as of this encounter
 
 Visit Diagnoses
 
 
+----------------------------------------------------------------------------------+
| Diagnosis                                                                        |
+----------------------------------------------------------------------------------+
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
| respiration                                                                      |
+----------------------------------------------------------------------------------+
|   JOANN (obstructive sleep apnea)  Obstructive sleep apnea (adult) (pediatric)     |
+----------------------------------------------------------------------------------+
 documented in this encounter

## 2019-12-06 NOTE — XMS
Encounter Summary
  Created on: 2019
 
 Wyatt Shane
 External Reference #: [id number]
 : 43
 Sex: Male
 
 Demographics
 
 
+-----------------------+------------------------+
| Address               | [address]     |
|                       | [place]  [address]   |
+-----------------------+------------------------+
| Home Phone            | [phone]        |
+-----------------------+------------------------+
| Preferred Language    | Unknown                |
+-----------------------+------------------------+
| Marital Status        |                 |
+-----------------------+------------------------+
| Islam Affiliation | LUT                    |
+-----------------------+------------------------+
| Race                  | White                  |
+-----------------------+------------------------+
| Ethnic Group          | Not  or  |
+-----------------------+------------------------+
 
 
 Author
 
 
+--------------+------------------------------+
| Author       | [facility] |
+--------------+------------------------------+
| Organization | [facility] |
+--------------+------------------------------+
| Address      | Unknown                      |
+--------------+------------------------------+
| Phone        | Unavailable                  |
+--------------+------------------------------+
 
 
 
 Support
 
 
+---------------+--------------+---------+-----------------+
| Name          | Relationship | Address | Phone           |
+---------------+--------------+---------+-----------------+
| Opal Shane | ECON         | Unknown | [phone] |
+---------------+--------------+---------+-----------------+
 
 
 
 Care Team Providers
 
 
 
+-----------------------+------+-----------------+
| Care Team Member Name | Role | Phone           |
+-----------------------+------+-----------------+
| Erwin Iniguez MD   | PCP  | [phone] |
+-----------------------+------+-----------------+
 
 
 
 Encounter Details
 
 
+--------+-------------+-----------------+---------------------+---------------+
| Date   | Type        | Department      | Care Team           | Description   |
+--------+-------------+-----------------+---------------------+---------------+
| / | Office      |   [facility]  |   Note, Outpatient  | Progress Note |
|    | Visit-Trans | MEDICINE        | Clinic              |               |
|        | cribed      |                 |                     |               |
+--------+-------------+-----------------+---------------------+---------------+
 
 
 
 Social History
 
 
+----------------+-------+-----------+--------+------+
| Tobacco Use    | Types | Packs/Day | Years  | Date |
|                |       |           | Used   |      |
+----------------+-------+-----------+--------+------+
| Never Assessed |       |           |        |      |
+----------------+-------+-----------+--------+------+
 
 
 
+------------------+---------------+
| Sex Assigned at  | Date Recorded |
| Birth            |               |
+------------------+---------------+
| Not on file      |               |
+------------------+---------------+
 
 
 
+----------------+-------------+-------------+
| Job Start Date | Occupation  | Industry    |
+----------------+-------------+-------------+
| Not on file    | Not on file | Not on file |
+----------------+-------------+-------------+
 
 
 
+----------------+--------------+------------+
| Travel History | Travel Start | Travel End |
+----------------+--------------+------------+
 
 
 
+-------------------------------------+
| No recent travel history available. |
+-------------------------------------+
 documented as of this encounter
 
 
 Progress Notes
 Interface, Transcription In - 2006  5:02 AM PSTCLINIC DATE: 1999
 
 GENERAL SURGERY CLINIC
 
 PRIMARY PHYSICIAN: Dr. Erwin Iniguez.
 
 CHIEF COMPLAINT:  Gastroesophageal reflux disease.
 
 HISTORY OF PRESENT ILLNESS:  The staff physician for this patient's case
 was Dr. Ghassan Doll.  Mr. Wyatt Shane is a 54-year-old gentleman with a
 history of laryngeal stenosis, after undergoing a partial laryngectomy for
 squamous cell cancer in .  He had initial improvement of his laryngeal
 stenosis with dilation procedures, but he had recurrent stenosis.  During
 one of his follow-up visits with the Otolaryngology Clinic, the physician
 in that clinic noted that the patient was having significant reflux
 symptoms and possible aspirations.  A pH probe study was performed,
 revealing significant reflux distally in the esophagus.  He visits us now
 for an evaluation for possible surgical intervention and treatment of his
 gastroesophageal reflux disease.
 
 PAST MEDICAL HISTORY:  The patient's past medical history is significant
 for a myocardial infarction in 1996.  He underwent a quadruple
 bypass surgery in .  His past medical history is additionally
 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
 noted.  The patient also has the history of kidney stones, with a
 subsequently scarred ureter.
 
 PAST SURGICAL HISTORY:  The patient's previous surgical procedures include
 the coronary artery bypass graft procedure x four, the previously mentioned
 ear, nose, and throat surgeries, as well as radiation therapy for his vocal
 cord cancer.
 
 CURRENT MEDICATIONS:
 1.  Prilosec, 40 mg p.o.b.i.d.
 2.  Norvasc, 10 mg q. day.
 3.  Baby aspirin, one tablet q. day.
 4.  Lipitor, 10 mg p.o.q. day.
 
 ALLERGIES:  THE PATIENT'S CURRENT ALLERGIES INCLUDE BETA BLOCKERS, TO WHICH
 HE DEVELOPS RAYNAUD'S SYNDROME.  HE DENIES ANY PROBLEMS WITH BLEEDING.
 
 SOCIAL HISTORY/PERSONAL HEALTH HABITS:  The patient lives with his wife in
 [place], [place].
 
 REVIEW OF SYSTEMS:  On the review of systems, neurologically the patient
 denies any loss of consciousness or syncopal episodes.  With regard to the
 pulmonary system, the patient notes shortness of breath with dyspnea on
 exertion on approximately eight steps.  He denies any history of asthma.
 With regard to the cardiovascular review of systems, the patient denies
 chest pain or palpitations.  Regarding the genitourinary system, the
 patient denies dysuria.  With regard to the gastrointestinal system, the
 patient denies melena.
 
 OBJECTIVE:  VITAL SIGNS:  On the physical examination, the patient's blood
 pressure is 138/78, his heart rate is 88, and his respiratory rate is 18.
 GENERAL APPEARANCE:  In general, the patient is in no acute distress,
 although he does have raspy vocalization.  HEENT:  The head, eyes, ears,
 nose, and throat examination reveals that the pupils are equally round and
 
 reactive to light.  The extraocular movements are intact.  The teeth appear
 to be intact.  NECK:  The neck examination reveals that the patient has a
 healed neck incision.  LUNGS:  On examination of the lungs, the patient has
 a midline skin incision from his previous coronary artery bypass graft
 procedure.  This is well healed.  The lungs are otherwise clear to
 auscultation bilaterally.  CARDIOVASCULAR:  On the cardiovascular
 examination, the patient's heart has a regular rate and rhythm, with no
 murmurs, rubs, or gallops noted.  ABDOMEN:  The abdomen is obese,
 nondistended, and nontender.  It is soft, with no palpable masses.  The
 patient does have an umbilical hernia, which is easily reducible.
 BACK/EXTREMITIES:  The back and extremities examinations are within normal
 limits.  NEUROLOGICAL EXAMINATION:  MENTAL STATUS:  The patient appears to
 be alert and oriented x three, with a nonfocal neurological examination
 otherwise.
 
 ASSESSMENT:
 1.  Gastroesophageal reflux disease, unresponsive to medical therapy.
 2.  Laryngeal stenosis, status post partial laryngectomy.
 3.  Umbilical hernia.
 
 PLAN:  We will plan to have the patient undergo manometry studies with Dr. Dangleo Levi or one of his associates on July 15, 1999.  Depending upon
 the results of these studies, we will plan to proceed with a partial versus
 complete fundoplication procedure on 1999.  We will attempt to
 perform the fundoplication laparoscopically.  However, the patient is aware
 of the possibility of converting to an open procedure, if necessary.
 Additionally, we will plan to repair the umbilical hernia, possibly with
 mesh if we are able to do so.  Concurrently with these procedures, Dr. Jimmy Esposito of the Otolaryngology Clinic desires to evaluate the patient
 intraoperatively, with a possible laryngeal dilation procedure.  The
 patient understands the risks and benefits of the procedure, including but
 not exclusive to the risk of bleeding, infection, anesthesia including
 stroke, heart attack, or death, and the possibilities gas bloating, with
 esophageal or gastric perforation.  The patient and his wife signed the
 informed consent forms, and desire to proceed with surgery.
 
 Sohail Curiel M.D.
 
 Ghassan Doll M.D.
 
 VIRI/blane/marly
 D: 1999
 T: 1999
 
 cc:
 
 EDMUNDO Levi M.D., F.A.C.P., F.A.C.JORGE Whipple MD
 [address] [place] [address] [place]  [id number] signed by Interface, Transcription In at 2006  5:02
 AM PSTdocumented in this encounter
 
 Plan of Treatment
 Not on filedocumented as of this encounter
 
 Visit Diagnoses
 Not on filedocumented in this encounter

## 2019-12-06 NOTE — XMS
Encounter Summary
  Created on: 2019
 
 Shane Wyatttien BroussardJosue
 External Reference #: [id number]
 : 43
 Sex: Male
 
 Demographics
 
 
+-----------------------+---------------------------+
| Address               | [address]        |
|                       | [place]  [address] |
+-----------------------+---------------------------+
| Home Phone            | [phone]           |
+-----------------------+---------------------------+
| Preferred Language    | Unknown                   |
+-----------------------+---------------------------+
| Marital Status        |                    |
+-----------------------+---------------------------+
| Hoahaoism Affiliation | 1028                      |
+-----------------------+---------------------------+
| Race                  | Unknown                   |
+-----------------------+---------------------------+
| Ethnic Group          | Unknown                   |
+-----------------------+---------------------------+
 
 
 Author
 
 
+--------------+--------------------------------------------+
| Author       | [facility]  |
|              | and [place]                                |
+--------------+--------------------------------------------+
| Organization | [facility]  |
|              | and [place]                                |
+--------------+--------------------------------------------+
| Address      | Unknown                                    |
+--------------+--------------------------------------------+
| Phone        | Unavailable                                |
+--------------+--------------------------------------------+
 
 
 
 Support
 
 
+---------------+--------------+--------------------+-----------------+
| Name          | Relationship | Address            | Phone           |
+---------------+--------------+--------------------+-----------------+
| Opal Shane | ECON         | [address]     | [phone] |
|               |              | [place]   |                 |
|               |              | [address]              |                 |
+---------------+--------------+--------------------+-----------------+
 
 
 
 
 Care Team Providers
 
 
+------------------------+------+-----------------+
| Care Team Member Name  | Role | Phone           |
+------------------------+------+-----------------+
| Calvin Robertson MD | PCP  | [phone] |
+------------------------+------+-----------------+
 
 
 
 Reason for Visit
 
 
+-----------+----------+
| Reason    | Comments |
+-----------+----------+
| Follow-up |          |
+-----------+----------+
 
 
 
 Encounter Details
 
 
+--------+-----------+----------------------+----------------------+-------------+
| Date   | Type      | Department           | Care Team            | Description |
+--------+-----------+----------------------+----------------------+-------------+
| / | Telephone |   [facility]      |   Neris Wilson DNP      | Follow-up   |
| 2019   |           | VASCULAR SURGERY     | [address]     |             |
|        |           | [address] | [address]  [place], [place]  |             |
|        |           |  E  [place], [place]     | [address]  [phone]  |             |
|        |           | [address]           |  [phone] (Fax)  |             |
|        |           | [phone]         |                      |             |
+--------+-----------+----------------------+----------------------+-------------+
 
 
 
 Social History
 
 
+---------------+------------+-----------+--------+------------------+
| Tobacco Use   | Types      | Packs/Day | Years  | Date             |
|               |            |           | Used   |                  |
+---------------+------------+-----------+--------+------------------+
| Former Smoker | Cigarettes | 1         | 34     | Quit: 1996 |
+---------------+------------+-----------+--------+------------------+
 
 
 
+---------------------+---+---+---+
| Smokeless Tobacco:  |   |   |   |
| Never Used          |   |   |   |
+---------------------+---+---+---+
 
 
 
+-------------+-------------+---------+----------+
| Alcohol Use | Drinks/Week | oz/Week | Comments |
 
+-------------+-------------+---------+----------+
| No          |             |         |          |
+-------------+-------------+---------+----------+
 
 
 
+------------------+---------------+
| Sex Assigned at  | Date Recorded |
| Birth            |               |
+------------------+---------------+
| Not on file      |               |
+------------------+---------------+
 
 
 
+----------------+-------------+-------------+
| Job Start Date | Occupation  | Industry    |
+----------------+-------------+-------------+
| Not on file    | Not on file | Not on file |
+----------------+-------------+-------------+
 
 
 
+----------------+--------------+------------+
| Travel History | Travel Start | Travel End |
+----------------+--------------+------------+
 
 
 
+-------------------------------------+
| No recent travel history available. |
+-------------------------------------+
 documented as of this encounter
 
 Plan of Treatment
 
 
+--------+---------+-------------+----------------------+-------------+
| Date   | Type    | Specialty   | Care Team            | Description |
+--------+---------+-------------+----------------------+-------------+
| / | Office  | Pulmonology |   Juan F,          |             |
| 2019   | Visit   |             | Jessica Cheung,   |             |
|        |         |             | MD [address] |             |
|        |         |             |   [place],   |             |
|        |         |             | [place] [address]             |             |
|        |         |             | [phone]         |             |
|        |         |             | [phone] (Fax)   |             |
+--------+---------+-------------+----------------------+-------------+
| / | Office  | Cardiology  |   Eric Akins, |             |
| 2020   | Visit   |             |  MD [address]   |             |
|        |         |             | [place]  |             |
|        |         |             | [address]  [phone]  |             |
|        |         |             |  [phone] (Fax)  |             |
+--------+---------+-------------+----------------------+-------------+
 documented as of this encounter
 
 Visit Diagnoses
 Not on filedocumented in this encounter

## 2019-12-06 NOTE — XMS
Encounter Summary
  Created on: 2019
 
 Shane Wyatttien BroussardJosue
 External Reference #: [id number]
 : 43
 Sex: Male
 
 Demographics
 
 
+-----------------------+---------------------------+
| Address               | [address]        |
|                       | [place]  [address] |
+-----------------------+---------------------------+
| Home Phone            | [phone]           |
+-----------------------+---------------------------+
| Preferred Language    | Unknown                   |
+-----------------------+---------------------------+
| Marital Status        |                    |
+-----------------------+---------------------------+
| Yazidi Affiliation | 1028                      |
+-----------------------+---------------------------+
| Race                  | Unknown                   |
+-----------------------+---------------------------+
| Ethnic Group          | Unknown                   |
+-----------------------+---------------------------+
 
 
 Author
 
 
+--------------+--------------------------------------------+
| Author       | [facility]  |
|              | and [place]                                |
+--------------+--------------------------------------------+
| Organization | [facility]  |
|              | and [place]                                |
+--------------+--------------------------------------------+
| Address      | Unknown                                    |
+--------------+--------------------------------------------+
| Phone        | Unavailable                                |
+--------------+--------------------------------------------+
 
 
 
 Support
 
 
+---------------+--------------+--------------------+-----------------+
| Name          | Relationship | Address            | Phone           |
+---------------+--------------+--------------------+-----------------+
| Opal Shane | ECON         | [address]     | [phone] |
|               |              | [place]   |                 |
|               |              | [address]              |                 |
+---------------+--------------+--------------------+-----------------+
 
 
 
 
 Care Team Providers
 
 
+-----------------------+------+-----------------+
| Care Team Member Name | Role | Phone           |
+-----------------------+------+-----------------+
| Erwin Iniguez MD | PCP  | [phone] |
+-----------------------+------+-----------------+
 
 
 
 Encounter Details
 
 
+--------+-----------+----------------------+---------------------+-------------+
| Date   | Type      | Department           | Care Team           | Description |
+--------+-----------+----------------------+---------------------+-------------+
| 10/07/ | Hospital  |   [facility] |   Luis Carlos Perez  |             |
|    | Encounter |  MED CTR SLEEP       | MD Shanice  [address]   |             |
|        |           | [place]  [address] | [facility]    |             |
|        |           |   [place], [place]    | [place] [address]     |             |
|        |           | [address]           | [phone]        |             |
|        |           | [phone]         | [phone] (Fax)  |             |
+--------+-----------+----------------------+---------------------+-------------+
 
 
 
 Social History
 
 
+---------------+------------+-----------+--------+------------------+
| Tobacco Use   | Types      | Packs/Day | Years  | Date             |
|               |            |           | Used   |                  |
+---------------+------------+-----------+--------+------------------+
| Former Smoker | Cigarettes | 1.3       | 34     | Quit: 1996 |
+---------------+------------+-----------+--------+------------------+
 
 
 
+---------------------+---+---+---+
| Smokeless Tobacco:  |   |   |   |
| Never Used          |   |   |   |
+---------------------+---+---+---+
 
 
 
+-------------+-------------+---------+----------+
| Alcohol Use | Drinks/Week | oz/Week | Comments |
+-------------+-------------+---------+----------+
| No          |             |         |          |
+-------------+-------------+---------+----------+
 
 
 
+------------------+---------------+
| Sex Assigned at  | Date Recorded |
| Birth            |               |
+------------------+---------------+
| Not on file      |               |
 
+------------------+---------------+
 
 
 
+----------------+-------------+-------------+
| Job Start Date | Occupation  | Industry    |
+----------------+-------------+-------------+
| Not on file    | Not on file | Not on file |
+----------------+-------------+-------------+
 
 
 
+----------------+--------------+------------+
| Travel History | Travel Start | Travel End |
+----------------+--------------+------------+
 
 
 
+-------------------------------------+
| No recent travel history available. |
+-------------------------------------+
 documented as of this encounter
 
 Medications at Time of Discharge
 
 
+----------------------+----------------------+-----------+---------+----------+-----------+
| Medication           | Sig                  | Dispensed | Refills | Start    | End Date  |
|                      |                      |           |         | Date     |           |
+----------------------+----------------------+-----------+---------+----------+-----------+
|   ferrous sulfate    | Take 325 mg by mouth |           | 0       | 20 |           |
| 325 mg tablet        |  3 times daily.      |           |         | 12       |           |
+----------------------+----------------------+-----------+---------+----------+-----------+
|   fluticasone        | one spray in each    |           | 0       | 20 |           |
| (FLONASE) 50         | nostril daily as     |           |         | 12       |           |
| mcg/nasal spray      | needed               |           |         |          |           |
+----------------------+----------------------+-----------+---------+----------+-----------+
|   guaiFENesin        | Take 1,200 mg by     |           | 0       |          |           |
| (MUCINEX) 600 mg 12  | mouth 2 times daily. |           |         |          |           |
| hr tablet            |                      |           |         |          |           |
+----------------------+----------------------+-----------+---------+----------+-----------+
|   levothyroxine      | Take 75 mcg by mouth |           | 0       | 20 |           |
| (LEVOTHROID) 75 MCG  |  Daily.              |           |         | 12       |           |
| tablet               |                      |           |         |          |           |
+----------------------+----------------------+-----------+---------+----------+-----------+
|   metoclopramide     | Take 10 mg by mouth  |           | 0       | 20 |           |
| (REGLAN) 10 mg       | 4 times daily as     |           |         | 12       |           |
| tablet               | needed.              |           |         |          |           |
+----------------------+----------------------+-----------+---------+----------+-----------+
|   omeprazole         | Take 20 mg by mouth  |           | 0       | 20 |           |
| (PRILOSEC) 20 mg     | 2 times daily.       |           |         | 12       |           |
| capsule              |                      |           |         |          |           |
+----------------------+----------------------+-----------+---------+----------+-----------+
|   tamsulosin         | Take 0.4 mg by mouth |           | 0       | 20 |           |
| (FLOMAX) 0.4 mg CAPS |  2 times daily.      |           |         | 12       |           |
+----------------------+----------------------+-----------+---------+----------+-----------+
|   acyclovir          | Take 400 mg by mouth |           | 0       | 20 |  |
| (ZOVIRAX) 400 MG     |  3 times daily as    |           |         | 12       | 9         |
| tablet               | needed.              |           |         |          |           |
+----------------------+----------------------+-----------+---------+----------+-----------+
 
|   Cholecalciferol    | Take 2,000 Units by  |           | 0       | 20 |  |
| (VITAMIN D3) 2000    | mouth Daily.         |           |         | 12       | 9         |
| UNITS CAPS           |                      |           |         |          |           |
+----------------------+----------------------+-----------+---------+----------+-----------+
|   cyanocobalamin     | injected             |           | 0       | 20 |  |
| (VITAMIN B-12) 1,000 | intramuscularly once |           |         | 12       | 9         |
|  mcg/mL injection    |  a month             |           |         |          |           |
+----------------------+----------------------+-----------+---------+----------+-----------+
|   digoxin (LANOXIN)  | Take 250 mcg by      |           | 0       |          |  |
| 250 mcg tablet       | mouth Daily.      |           |         |          | 5         |
|                      | tablet daily         |           |         |          |           |
+----------------------+----------------------+-----------+---------+----------+-----------+
|   diltiazem          | Take 120 mg by mouth |           | 0       |          |  |
| (DILT-XR) 120 mg 24  |  Daily.              |           |         |          | 9         |
| hr capsule           |                      |           |         |          |           |
+----------------------+----------------------+-----------+---------+----------+-----------+
|                      | 2 to 1 tablet four |           | 0       | 20 | 10/07/201 |
| hydrocodone-acetamin |  times daily         |           |         | 12       | 4         |
| ophen (VICODIN ES)   |                      |           |         |          |           |
| 7.5-750 MG per       |                      |           |         |          |           |
| tablet               |                      |           |         |          |           |
+----------------------+----------------------+-----------+---------+----------+-----------+
|   losartan (COZAAR)  | Take 100 mg by mouth |           | 0       |          |  |
| 100 MG tablet        |  Daily.  daily    |           |         |          | 9         |
+----------------------+----------------------+-----------+---------+----------+-----------+
|   methotrexate 2.5   | Take 15 mg by mouth  |           | 0       |          |  |
| mg tablet            | Once a week.         |           |         |          | 9         |
+----------------------+----------------------+-----------+---------+----------+-----------+
|   potassium chloride |                      |           | 0       | 20 |  |
|  (MICRO-K) 10 mEq CR |                      |           |         | 13       | 3         |
|  capsule             |                      |           |         |          |           |
+----------------------+----------------------+-----------+---------+----------+-----------+
|   predniSONE         | Take 5 mg by mouth   |           | 0       |          |  |
| (DELTASONE) 5 mg     | Daily.               |           |         |          | 4         |
| tablet               |                      |           |         |          |           |
+----------------------+----------------------+-----------+---------+----------+-----------+
|   pregabalin         | Take 50 mg by mouth  |           | 0       |          |  |
| (LYRICA) 50 MG       | 3 times daily.       |           |         |          | 4         |
| capsule              |                      |           |         |          |           |
+----------------------+----------------------+-----------+---------+----------+-----------+
|   rivaroxaban        | Take 20 mg by mouth  |           | 0       |          |  |
| (XARELTO) 20 mg      | Daily (with dinner). |           |         |          | 9         |
| tablet               |                      |           |         |          |           |
+----------------------+----------------------+-----------+---------+----------+-----------+
|   sertraline         | 2 tablets daily      |           | 0       | 20 |  |
| (ZOLOFT) 100 mg      |                      |           |         | 12       | 9         |
| tablet               |                      |           |         |          |           |
+----------------------+----------------------+-----------+---------+----------+-----------+
|   tamsulosin         | Take 0.4 mg by mouth |           | 0       | 20 |  |
| (FLOMAX) 0.4 mg CAPS |  Daily.              |           |         | 12       | 3         |
+----------------------+----------------------+-----------+---------+----------+-----------+
|   testosterone       | 200 mg injected      |           | 0       | 20 | 10/07/201 |
| cypionate            | intramuscularly      |           |         | 12       | 4         |
| (DEPO-TESTOSTERONE)  | every 3 weeks        |           |         |          |           |
| 200 mg/mL injection  |                      |           |         |          |           |
+----------------------+----------------------+-----------+---------+----------+-----------+
 documented as of this encounter
 
 Plan of Treatment
 
 
 
+--------+---------+-------------+----------------------+-------------+
| Date   | Type    | Specialty   | Care Team            | Description |
+--------+---------+-------------+----------------------+-------------+
| / | Office  | Pulmonology |   Juan F,          |             |
|    | Visit   |             | Jessica Cheung,   |             |
|        |         |             | MD [address] |             |
|        |         |             |   [place]   |             |
|        |         |             | [place] [address]             |             |
|        |         |             | [phone]         |             |
|        |         |             | [phone] (Fax)   |             |
+--------+---------+-------------+----------------------+-------------+
| / | Office  | Cardiology  |   Eric Akins, |             |
|    | Visit   |             |  MD [address]   |             |
|        |         |             | [place]  |             |
|        |         |             | [address] [phone]  |             |
|        |         |             |  [phone] (Fax)  |             |
+--------+---------+-------------+----------------------+-------------+
 documented as of this encounter
 
 Procedures
 
 
+----------------------+--------+-------------+----------------------+----------+
| Procedure Name       | Priori | Date/Time   | Associated Diagnosis | Comments |
|                      | ty     |             |                      |          |
+----------------------+--------+-------------+----------------------+----------+
| DIAGNOSTIC REPORT -  |        | 10/07/2013  |                      |          |
| EXTERNAL SCAN        |        | 12:00 AM    |                      |          |
|                      |        | PDT         |                      |          |
+----------------------+--------+-------------+----------------------+----------+
 documented in this encounter
 
 Visit Diagnoses
 Not on filedocumented in this encounter

## 2019-12-06 NOTE — XMS
Encounter Summary
  Created on: 2019
 
 Shane Wyatttien BroussardJosue
 External Reference #: [id number]
 : 43
 Sex: Male
 
 Demographics
 
 
+-----------------------+---------------------------+
| Address               | [address]        |
|                       | [place]  [address] |
+-----------------------+---------------------------+
| Home Phone            | [phone]           |
+-----------------------+---------------------------+
| Preferred Language    | Unknown                   |
+-----------------------+---------------------------+
| Marital Status        |                    |
+-----------------------+---------------------------+
| Rastafarian Affiliation | [id number]                      |
+-----------------------+---------------------------+
| Race                  | Unknown                   |
+-----------------------+---------------------------+
| Ethnic Group          | Unknown                   |
+-----------------------+---------------------------+
 
 
 Author
 
 
+--------------+--------------------------------------------+
| Author       | [facility]  |
|              | and [place]                                |
+--------------+--------------------------------------------+
| Organization | [facility]  |
|              | and [place]                                |
+--------------+--------------------------------------------+
| Address      | Unknown                                    |
+--------------+--------------------------------------------+
| Phone        | Unavailable                                |
+--------------+--------------------------------------------+
 
 
 
 Support
 
 
+---------------+--------------+--------------------+-----------------+
| Name          | Relationship | Address            | Phone           |
+---------------+--------------+--------------------+-----------------+
| Opal Shane | ECON         | [address]     | [phone] |
|               |              | [place]   |                 |
|               |              | [id number]              |                 |
+---------------+--------------+--------------------+-----------------+
 
 
 
 
 Care Team Providers
 
 
+-----------------------+------+-----------------+
| Care Team Member Name | Role | Phone           |
+-----------------------+------+-----------------+
| Erwin Iniguez MD | PCP  | [phone] |
+-----------------------+------+-----------------+
 
 
 
 Encounter Details
 
 
+--------+-----------+----------------------+--------------------+-------------+
| Date   | Type      | Department           | Care Team          | Description |
+--------+-----------+----------------------+--------------------+-------------+
| 02/15/ | Hospital  |   [facility]     |   Conversion       |             |
| 2016   | Encounter | [facility] [address]  | Transaction,       |             |
|        |           | [address]  | Provider Unknown   |             |
|        |           |  [place], [place]        | [phone]       |             |
|        |           | [address]           | [phone] (Fax) |             |
|        |           | [phone]         |                    |             |
+--------+-----------+----------------------+--------------------+-------------+
 
 
 
 Social History
 
 
+---------------+------------+-----------+--------+------------------+
| Tobacco Use   | Types      | Packs/Day | Years  | Date             |
|               |            |           | Used   |                  |
+---------------+------------+-----------+--------+------------------+
| Former Smoker | Cigarettes | 1.3       | 34     | Quit: 1996 |
+---------------+------------+-----------+--------+------------------+
 
 
 
+---------------------+---+---+---+
| Smokeless Tobacco:  |   |   |   |
| Never Used          |   |   |   |
+---------------------+---+---+---+
 
 
 
+-------------+-------------+---------+----------+
| Alcohol Use | Drinks/Week | oz/Week | Comments |
+-------------+-------------+---------+----------+
| No          |             |         |          |
+-------------+-------------+---------+----------+
 
 
 
+------------------+---------------+
| Sex Assigned at  | Date Recorded |
| Birth            |               |
+------------------+---------------+
| Not on file      |               |
 
+------------------+---------------+
 
 
 
+----------------+-------------+-------------+
| Job Start Date | Occupation  | Industry    |
+----------------+-------------+-------------+
| Not on file    | Not on file | Not on file |
+----------------+-------------+-------------+
 
 
 
+----------------+--------------+------------+
| Travel History | Travel Start | Travel End |
+----------------+--------------+------------+
 
 
 
+-------------------------------------+
| No recent travel history available. |
+-------------------------------------+
 documented as of this encounter
 
 Medications at Time of Discharge
 
 
+----------------------+----------------------+-----------+---------+----------+-----------+
| Medication           | Sig                  | Dispensed | Refills | Start    | End Date  |
|                      |                      |           |         | Date     |           |
+----------------------+----------------------+-----------+---------+----------+-----------+
|   acyclovir          | Apply  topically     |           | 0       |          |           |
| (ZOVIRAX) 5%         | every 3 hours.       |           |         |          |           |
| ointment             |                      |           |         |          |           |
+----------------------+----------------------+-----------+---------+----------+-----------+
|   albuterol (PROAIR  | Inhale 2 puffs into  |           | 0       |          |           |
| HFA) 90 mcg/puff     | the lungs every 6    |           |         |          |           |
| inhaler              | hours as needed for  |           |         |          |           |
|                      | Wheezing.            |           |         |          |           |
+----------------------+----------------------+-----------+---------+----------+-----------+
|   digoxin (LANOXIN)  | Take 125 mcg by      |           | 0       |          |           |
| 250 mcg tablet       | mouth Daily.      |           |         |          |           |
|                      | capsule daily        |           |         |          |           |
+----------------------+----------------------+-----------+---------+----------+-----------+
|   ferrous sulfate    | Take 325 mg by mouth |           | 0       | 20 |           |
| 325 mg tablet        |  3 times daily.      |           |         | 12       |           |
+----------------------+----------------------+-----------+---------+----------+-----------+
|   fluticasone        | one spray in each    |           | 0       | 20 |           |
| (FLONASE) 50         | nostril daily as     |           |         | 12       |           |
| mcg/nasal spray      | needed               |           |         |          |           |
+----------------------+----------------------+-----------+---------+----------+-----------+
|                      | Inhale 1 puff into   |           | 0       |          |           |
| fluticasone-salmeter | the lungs Twice      |           |         |          |           |
| ol (ADVAIR) 500-50   | Daily.               |           |         |          |           |
| mcg/puff diskus      |                      |           |         |          |           |
| inhaler              |                      |           |         |          |           |
+----------------------+----------------------+-----------+---------+----------+-----------+
|   folic acid 1 mg    | Take 1 mg by mouth   |           | 0       |          |           |
| tablet               | Daily.               |           |         |          |           |
+----------------------+----------------------+-----------+---------+----------+-----------+
|   furosemide (LASIX) | Take 40 mg by mouth  |           | 0       |          |           |
 
|  40 mg tablet        | Daily.               |           |         |          |           |
+----------------------+----------------------+-----------+---------+----------+-----------+
|   guaiFENesin        | Take 1,200 mg by     |           | 0       |          |           |
| (MUCINEX) 600 mg 12  | mouth 2 times daily. |           |         |          |           |
| hr tablet            |                      |           |         |          |           |
+----------------------+----------------------+-----------+---------+----------+-----------+
|   levothyroxine      | Take 75 mcg by mouth |           | 0       | 20 |           |
| (LEVOTHROID) 75 MCG  |  Daily.              |           |         | 12       |           |
| tablet               |                      |           |         |          |           |
+----------------------+----------------------+-----------+---------+----------+-----------+
|   metoclopramide     | Take 10 mg by mouth  |           | 0       | 20 |           |
| (REGLAN) 10 mg       | 4 times daily as     |           |         | 12       |           |
| tablet               | needed.              |           |         |          |           |
+----------------------+----------------------+-----------+---------+----------+-----------+
|                      | Apply  topically 2   |           | 0       |          |           |
| nystatin-triamcinolo | times daily.         |           |         |          |           |
| ne (MYCOLOG II)      |                      |           |         |          |           |
| cream                |                      |           |         |          |           |
+----------------------+----------------------+-----------+---------+----------+-----------+
|   omeprazole         | Take 20 mg by mouth  |           | 0       | 20 |           |
| (PRILOSEC) 20 mg     | 2 times daily.       |           |         | 12       |           |
| capsule              |                      |           |         |          |           |
+----------------------+----------------------+-----------+---------+----------+-----------+
|   potassium citrate  | Take 10 mEq by mouth |           | 0       |          |           |
| (UROCIT-K) 10 mEq SR |  2 times daily.      |           |         |          |           |
|  tablet              |                      |           |         |          |           |
+----------------------+----------------------+-----------+---------+----------+-----------+
|   predniSONE         | Take 10 mg by mouth  |           | 0       |          |           |
| (DELTASONE) 5 mg     | Daily.               |           |         |          |           |
| tablet               |                      |           |         |          |           |
+----------------------+----------------------+-----------+---------+----------+-----------+
|   tamsulosin         | Take 0.4 mg by mouth |           | 0       | 20 |           |
| (FLOMAX) 0.4 mg CAPS |  2 times daily.      |           |         | 12       |           |
+----------------------+----------------------+-----------+---------+----------+-----------+
|   acyclovir          | Take 400 mg by mouth |           | 0       | 20 |  |
| (ZOVIRAX) 400 MG     |  3 times daily as    |           |         | 12       | 9         |
| tablet               | needed.              |           |         |          |           |
+----------------------+----------------------+-----------+---------+----------+-----------+
|   Cholecalciferol    | Take 2,000 Units by  |           | 0       | 20 |  |
| (VITAMIN D3) 2000    | mouth Daily.         |           |         | 12       | 9         |
| UNITS CAPS           |                      |           |         |          |           |
+----------------------+----------------------+-----------+---------+----------+-----------+
|   cyanocobalamin     | injected             |           | 0       | 20 |  |
| (VITAMIN B-12) 1,000 | intramuscularly once |           |         | 12       | 9         |
|  mcg/mL injection    |  a month             |           |         |          |           |
+----------------------+----------------------+-----------+---------+----------+-----------+
|   diltiazem          | Take 120 mg by mouth |           | 0       |          |  |
| (DILT-XR) 120 mg 24  |  Daily.              |           |         |          | 9         |
| hr capsule           |                      |           |         |          |           |
+----------------------+----------------------+-----------+---------+----------+-----------+
|   loratadine         | Take 10 mg by mouth  |           | 0       |          |  |
| (CLARITIN) 10 mg     | Daily.               |           |         |          | 9         |
| tablet               |                      |           |         |          |           |
+----------------------+----------------------+-----------+---------+----------+-----------+
|   losartan (COZAAR)  | Take 100 mg by mouth |           | 0       |          |  |
| 100 MG tablet        |  Daily. 1/2 daily    |           |         |          | 9         |
+----------------------+----------------------+-----------+---------+----------+-----------+
|   methotrexate 2.5   | Take 15 mg by mouth  |           | 0       |          |  |
| mg tablet            | Once a week.         |           |         |          | 9         |
+----------------------+----------------------+-----------+---------+----------+-----------+
 
|                      | Take 1 tablet by     |           | 0       |          |  |
| oxyCODONE-acetaminop | mouth every 4 hours  |           |         |          | 9         |
| hen (PERCOCET) 5-325 | as needed for Pain.  |           |         |          |           |
|  mg per tablet       |                      |           |         |          |           |
+----------------------+----------------------+-----------+---------+----------+-----------+
|   pseudoePHEDrine    | Take 30 mg by mouth  |           | 0       |          |  |
| (SUDOGEST) 30 mg     | every 4 hours as     |           |         |          | 9         |
| tablet               | needed for           |           |         |          |           |
|                      | Congestion.          |           |         |          |           |
+----------------------+----------------------+-----------+---------+----------+-----------+
|   rivaroxaban        | Take 20 mg by mouth  |           | 0       |          |  |
| (XARELTO) 20 mg      | Daily (with dinner). |           |         |          | 9         |
| tablet               |                      |           |         |          |           |
+----------------------+----------------------+-----------+---------+----------+-----------+
|   sertraline         | 2 tablets daily      |           | 0       | 20 |  |
| (ZOLOFT) 100 mg      |                      |           |         | 12       | 9         |
| tablet               |                      |           |         |          |           |
+----------------------+----------------------+-----------+---------+----------+-----------+
 documented as of this encounter
 
 Plan of Treatment
 
 
+--------+---------+-------------+----------------------+-------------+
| Date   | Type    | Specialty   | Care Team            | Description |
+--------+---------+-------------+----------------------+-------------+
| / | Office  | Pulmonology |   Juan F,          |             |
| 2019   | Visit   |             | Jessica Cheung,   |             |
|        |         |             | MD [address] |             |
|        |         |             |   [place],   |             |
|        |         |             | [place] [address]             |             |
|        |         |             | [phone]         |             |
|        |         |             | [phone] (Fax)   |             |
+--------+---------+-------------+----------------------+-------------+
| / | Office  | Cardiology  |   Eric Akins, |             |
| 2020   | Visit   |             |  MD [address]   |             |
|        |         |             | [place]  |             |
|        |         |             | [address]  [phone]  |             |
|        |         |             |  [phone] (Fax)  |             |
+--------+---------+-------------+----------------------+-------------+
 documented as of this encounter
 
 Visit Diagnoses
 Not on filedocumented in this encounter

## 2019-12-06 NOTE — XMS
Encounter Summary
  Created on: 2019
 
 Shane Wyatttien BroussardJosue
 External Reference #: [id number]
 : 43
 Sex: Male
 
 Demographics
 
 
+-----------------------+---------------------------+
| Address               | [address]        |
|                       | [place]  [address] |
+-----------------------+---------------------------+
| Home Phone            | [phone]           |
+-----------------------+---------------------------+
| Preferred Language    | Unknown                   |
+-----------------------+---------------------------+
| Marital Status        |                    |
+-----------------------+---------------------------+
| [employer] Affiliation | 1028                      |
+-----------------------+---------------------------+
| Race                  | Unknown                   |
+-----------------------+---------------------------+
| Ethnic Group          | Unknown                   |
+-----------------------+---------------------------+
 
 
 Author
 
 
+--------------+--------------------------------------------+
| Author       | [facility]  |
|              | [facility] [place]                                |
+--------------+--------------------------------------------+
| Organization | [facility]  |
|              | [facility] [place]                                |
+--------------+--------------------------------------------+
| Address      | Unknown                                    |
+--------------+--------------------------------------------+
| Phone        | Unavailable                                |
+--------------+--------------------------------------------+
 
 
 
 Support
 
 
+---------------+--------------+--------------------+-----------------+
| Name          | Relationship | Address            | Phone           |
+---------------+--------------+--------------------+-----------------+
| Opal Shane | ECON         | [address]     | [phone] |
|               |              | [place]   |                 |
|               |              | [address]              |                 |
+---------------+--------------+--------------------+-----------------+
 
 
 
 
 Care Team Providers
 
 
+-----------------------+------+-----------------+
| Care Team Member Name | Role | Phone           |
+-----------------------+------+-----------------+
| Erwin Iniguez MD | PCP  | [phone] |
+-----------------------+------+-----------------+
 
 
 
 Reason for Visit
 Evaluate & Treat (Routine)
 
+--------+--------------+-----------+--------------+--------------+---------------+
| Status | Reason       | Specialty | Diagnoses /  | Referred By  | Referred To   |
|        |              |           | Procedures   | Contact      | Contact       |
+--------+--------------+-----------+--------------+--------------+---------------+
| Closed |   Specialty  | Sleep     |   Diagnoses  |   Chris,     |   [facility]   |
|        | Services     | Medicine  |  [facility]     | Luis Carlos WALLACE    | [place]  [address] |
|        | Required     |           | sleep apnea  | MD [address]  [address]       |
|        |              |           |  Procedures  |  [address] [place] | [place],  |
|        |              |           |  [facility]  |  St  [facility]   | [place] [address] |
|        |              |           | 6/>YRS SLEEP | [place], [place]    |   Phone:      |
|        |              |           |  4/> ADDL    | [address]        | [phone]  |
|        |              |           | [address]  | Phone:       |  Fax:         |
|        |              |           |  [address]       | [phone] | [phone]  |
|        |              |           |              |   Fax:       |               |
|        |              |           |              | [phone] |               |
+--------+--------------+-----------+--------------+--------------+---------------+
 
 
 
 
 Encounter Details
 
 
+--------+-----------+----------------------+---------------------+----------------------+
| Date   | Type      | Department           | Care Team           | Description          |
+--------+-----------+----------------------+---------------------+----------------------+
| 06/15/ | Hospital  |   [facility] |   Luis Carlos Perez  | Central sleep apnea  |
| 2015   | Encounter |  [facility]       | MD [address]   | (Primary Dx);        |
|        |           | CENTER  [address] | [facility]    | Central sleep apnea  |
|        |           |   [place], [place]    | [place] [place] [address]     | due to Cheyne-Nichole |
|        |           | [address]           | [phone]        |  respiration; JOANN    |
|        |           | [phone]         | [phone] (Fax)  | (obstructive sleep   |
|        |           |                      |                     | apnea)               |
+--------+-----------+----------------------+---------------------+----------------------+
 
 
 
 Social History
 
 
+---------------+------------+-----------+--------+------------------+
| Tobacco Use   | Types      | Packs/Day | Years  | Date             |
|               |            |           | Used   |                  |
+---------------+------------+-----------+--------+------------------+
| Former Smoker | Cigarettes | 1.3       | 34     | Quit: 1996 |
 
+---------------+------------+-----------+--------+------------------+
 
 
 
+---------------------+---+---+---+
| Smokeless Tobacco:  |   |   |   |
| Never Used          |   |   |   |
+---------------------+---+---+---+
 
 
 
+-------------+-------------+---------+----------+
| Alcohol Use | Drinks/Week | oz/Week | Comments |
+-------------+-------------+---------+----------+
| No          |             |         |          |
+-------------+-------------+---------+----------+
 
 
 
+------------------+---------------+
| Sex Assigned at  | Date Recorded |
| Birth            |               |
+------------------+---------------+
| Not on file      |               |
+------------------+---------------+
 
 
 
+----------------+-------------+-------------+
| Job Start Date | Occupation  | Industry    |
+----------------+-------------+-------------+
| Not on file    | Not on file | Not on file |
+----------------+-------------+-------------+
 
 
 
+----------------+--------------+------------+
| Travel History | Travel Start | Travel End |
+----------------+--------------+------------+
 
 
 
+-------------------------------------+
| No recent travel history available. |
+-------------------------------------+
 documented as of this encounter
 
 Medications at Time of Discharge
 
 
+----------------------+----------------------+-----------+---------+----------+-----------+
| Medication           | Sig                  | Dispensed | Refills | Start    | End Date  |
|                      |                      |           |         | Date     |           |
+----------------------+----------------------+-----------+---------+----------+-----------+
|   acyclovir          | Apply  topically     |           | 0       |          |           |
| (ZOVIRAX) 5%         | every 3 hours.       |           |         |          |           |
| ointment             |                      |           |         |          |           |
+----------------------+----------------------+-----------+---------+----------+-----------+
|   albuterol (PROAIR  | Inhale 2 puffs into  |           | 0       |          |           |
| HFA) 90 mcg/puff     | the lungs every 6    |           |         |          |           |
 
| inhaler              | hours as needed for  |           |         |          |           |
|                      | Wheezing.            |           |         |          |           |
+----------------------+----------------------+-----------+---------+----------+-----------+
|   ferrous sulfate    | Take 325 mg by mouth |           | 0       | 20 |           |
| 325 mg tablet        |  3 times daily.      |           |         | 12       |           |
+----------------------+----------------------+-----------+---------+----------+-----------+
|   fluticasone        | one spray in each    |           | 0       | 20 |           |
| (FLONASE) 50         | nostril daily as     |           |         | 12       |           |
| mcg/nasal spray      | needed               |           |         |          |           |
+----------------------+----------------------+-----------+---------+----------+-----------+
|   folic acid 1 mg    | Take 1 mg by mouth   |           | 0       |          |           |
| tablet               | Daily.               |           |         |          |           |
+----------------------+----------------------+-----------+---------+----------+-----------+
|   furosemide (LASIX) | Take 40 mg by mouth  |           | 0       |          |           |
|  40 mg tablet        | Daily.               |           |         |          |           |
+----------------------+----------------------+-----------+---------+----------+-----------+
|   guaiFENesin        | Take 1,200 mg by     |           | 0       |          |           |
| (MUCINEX) 600 mg 12  | mouth 2 times daily. |           |         |          |           |
| hr tablet            |                      |           |         |          |           |
+----------------------+----------------------+-----------+---------+----------+-----------+
|   levothyroxine      | Take 75 mcg by mouth |           | 0       | 20 |           |
| (LEVOTHROID) 75 MCG  |  Daily.              |           |         | 12       |           |
| tablet               |                      |           |         |          |           |
+----------------------+----------------------+-----------+---------+----------+-----------+
|   metoclopramide     | Take 10 mg by mouth  |           | 0       | 20 |           |
| (REGLAN) 10 mg       | 4 times daily as     |           |         | 12       |           |
| tablet               | needed.              |           |         |          |           |
+----------------------+----------------------+-----------+---------+----------+-----------+
|                      | Apply  topically 2   |           | 0       |          |           |
| nystatin-triamcinolo | times daily.         |           |         |          |           |
| ne (MYCOLOG II)      |                      |           |         |          |           |
| cream                |                      |           |         |          |           |
+----------------------+----------------------+-----------+---------+----------+-----------+
|   omeprazole         | Take 20 mg by mouth  |           | 0       | 20 |           |
| (PRILOSEC) 20 mg     | 2 times daily.       |           |         | 12       |           |
| capsule              |                      |           |         |          |           |
+----------------------+----------------------+-----------+---------+----------+-----------+
|   potassium citrate  | Take 10 mEq by mouth |           | 0       |          |           |
| (UROCIT-K) 10 mEq SR |  2 times daily.      |           |         |          |           |
|  tablet              |                      |           |         |          |           |
+----------------------+----------------------+-----------+---------+----------+-----------+
|   predniSONE         | Take 10 mg by mouth  |           | 0       |          |           |
| (DELTASONE) 5 mg     | Daily.               |           |         |          |           |
| tablet               |                      |           |         |          |           |
+----------------------+----------------------+-----------+---------+----------+-----------+
|   tamsulosin         | Take 0.4 mg by mouth |           | 0       | 20 |           |
| (FLOMAX) 0.4 mg CAPS |  2 times daily.      |           |         | 12       |           |
+----------------------+----------------------+-----------+---------+----------+-----------+
|   acyclovir          | Take 400 mg by mouth |           | 0       | 20 |  |
| (ZOVIRAX) 400 MG     |  3 times daily as    |           |         | 12       | 9         |
| tablet               | needed.              |           |         |          |           |
+----------------------+----------------------+-----------+---------+----------+-----------+
|   Cholecalciferol    | Take 2,000 Units by  |           | 0       | 20 |  |
| (VITAMIN D3) 2000    | mouth Daily.         |           |         | 12       | 9         |
| UNITS CAPS           |                      |           |         |          |           |
+----------------------+----------------------+-----------+---------+----------+-----------+
|   cyanocobalamin     | injected             |           | 0       | 20 |  |
| (VITAMIN B-12) 1,000 | intramuscularly once |           |         | 12       | 9         |
|  mcg/mL injection    |  a month             |           |         |          |           |
+----------------------+----------------------+-----------+---------+----------+-----------+
 
|   digoxin (LANOXIN)  | Take 250 mcg by      |           | 0       |          |  |
| 250 mcg tablet       | mouth Daily.      |           |         |          | 5         |
|                      | tablet daily         |           |         |          |           |
+----------------------+----------------------+-----------+---------+----------+-----------+
|   diltiazem          | Take 120 mg by mouth |           | 0       |          |  |
| (DILT-XR) 120 mg 24  |  Daily.              |           |         |          | 9         |
| hr capsule           |                      |           |         |          |           |
+----------------------+----------------------+-----------+---------+----------+-----------+
|   loratadine         | Take 10 mg by mouth  |           | 0       |          |  |
| (CLARITIN) 10 mg     | Daily.               |           |         |          | 9         |
| tablet               |                      |           |         |          |           |
+----------------------+----------------------+-----------+---------+----------+-----------+
|   losartan (COZAAR)  | Take 100 mg by mouth |           | 0       |          |  |
| 100 MG tablet        |  Daily. 1/ daily    |           |         |          | 9         |
+----------------------+----------------------+-----------+---------+----------+-----------+
|   methotrexate 2.5   | Take 15 mg by mouth  |           | 0       |          |  |
| mg tablet            | Once a week.         |           |         |          | 9         |
+----------------------+----------------------+-----------+---------+----------+-----------+
|   rivaroxaban        | Take 20 mg by mouth  |           | 0       |          |  |
| (XARELTO) 20 mg      | Daily (with dinner). |           |         |          | 9         |
| tablet               |                      |           |         |          |           |
+----------------------+----------------------+-----------+---------+----------+-----------+
|   sertraline         | 2 tablets daily      |           | 0       | 20 |  |
| (ZOLOFT) 100 mg      |                      |           |         | 12       | 9         |
| tablet               |                      |           |         |          |           |
+----------------------+----------------------+-----------+---------+----------+-----------+
 documented as of this encounter
 
 Plan of Treatment
 
 
+--------+---------+-------------+----------------------+-------------+
| Date   | Type    | Specialty   | Care Team            | Description |
+--------+---------+-------------+----------------------+-------------+
| / | Office  | Pulmonology |   Juan F,          |             |
| 2019   | Visit   |             | Jessica Cheung,   |             |
|        |         |             | MD [address] |             |
|        |         |             |   [place],   |             |
|        |         |             | [place] [address]             |             |
|        |         |             | [phone]         |             |
|        |         |             | [phone] (Fax)   |             |
+--------+---------+-------------+----------------------+-------------+
| / | Office  | Cardiology  |   Eric Akins, |             |
|    | Visit   |             |  MD  [address]   |             |
|        |         |             | [place]  [place]  |             |
|        |         |             | [address]  [phone]  |             |
|        |         |             |  [phone] (Fax)  |             |
+--------+---------+-------------+----------------------+-------------+
 documented as of this encounter
 
 Procedures
 
 
+----------------------+--------+-------------+----------------------+----------+
| Procedure Name       | Priori | Date/Time   | Associated Diagnosis | Comments |
|                      | ty     |             |                      |          |
+----------------------+--------+-------------+----------------------+----------+
| DIAGNOSTIC REPORT -  |        | 06/15/2015  |                      |          |
| EXTERNAL SCAN        |        | 12:00 AM    |                      |          |
|                      |        | PDT         |                      |          |
 
+----------------------+--------+-------------+----------------------+----------+
 documented in this encounter
 
 Visit Diagnoses
 
 
+-----------------------------------------------------------------------------------+
| Diagnosis                                                                         |
+-----------------------------------------------------------------------------------+
|   Central sleep apnea - Primary  Unspecified sleep apnea                          |
+-----------------------------------------------------------------------------------+
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
+-----------------------------------------------------------------------------------+
|   JOANN (obstructive sleep apnea)  Obstructive sleep apnea (adult) (pediatric)      |
+-----------------------------------------------------------------------------------+
 documented in this encounter

## 2019-12-06 NOTE — XMS
Encounter Summary
  Created on: 2019
 
 Shane Wyatttien BroussardJosue
 External Reference #: [id number]
 : 43
 Sex: Male
 
 Demographics
 
 
+-----------------------+---------------------------+
| Address               | [address]        |
|                       | [place]  [address] |
+-----------------------+---------------------------+
| Home Phone            | [phone]           |
+-----------------------+---------------------------+
| Preferred Language    | Unknown                   |
+-----------------------+---------------------------+
| Marital Status        |                    |
+-----------------------+---------------------------+
| Holiness Affiliation | 1028                      |
+-----------------------+---------------------------+
| Race                  | Unknown                   |
+-----------------------+---------------------------+
| Ethnic Group          | Unknown                   |
+-----------------------+---------------------------+
 
 
 Author
 
 
+--------------+--------------------------------------------+
| Author       | [facility]  |
|              | and [place]                                |
+--------------+--------------------------------------------+
| Organization | [facility]  |
|              | and [place]                                |
+--------------+--------------------------------------------+
| Address      | Unknown                                    |
+--------------+--------------------------------------------+
| Phone        | Unavailable                                |
+--------------+--------------------------------------------+
 
 
 
 Support
 
 
+---------------+--------------+--------------------+-----------------+
| Name          | Relationship | Address            | Phone           |
+---------------+--------------+--------------------+-----------------+
| Opal Shane | ECON         | [address]     | [phone] |
|               |              | [place]   |                 |
|               |              | [address]              |                 |
+---------------+--------------+--------------------+-----------------+
 
 
 
 
 Care Team Providers
 
 
+-----------------------+------+-----------------+
| Care Team Member Name | Role | Phone           |
+-----------------------+------+-----------------+
| Erwin Iniguez MD | PCP  | [phone] |
+-----------------------+------+-----------------+
 
 
 
 Encounter Details
 
 
+--------+-----------+----------------------+----------------------+----------------------+
| Date   | Type      | Department           | Care Team            | Description          |
+--------+-----------+----------------------+----------------------+----------------------+
| / | Hospital  |   [facility]     |   Conversion         | Multifocal lung      |
| 2019   | Encounter | [facility] [address]  | Transaction,         | consolidation (HCC); |
|        |           | [address]  | Provider Unknown     |  Incidental          |
|        |           |  [place], [place]        | [phone]         | pulmonary nodule, >  |
|        |           | [address]           | [phone] (Fax)   | 3mm and < 8mm        |
|        |           | [phone]         | Jessica Rogel  |                      |
|        |           |                      | MD [place]  [address]    |                      |
|        |           |                      | [address]   |                      |
|        |           |                      | [place] [address]   |                      |
|        |           |                      | [phone]         |                      |
|        |           |                      | [phone] (Fax)   |                      |
+--------+-----------+----------------------+----------------------+----------------------+
 
 
 
 Social History
 
 
+---------------+------------+-----------+--------+------------------+
| Tobacco Use   | Types      | Packs/Day | Years  | Date             |
|               |            |           | Used   |                  |
+---------------+------------+-----------+--------+------------------+
| Former Smoker | Cigarettes | 1         | 35     | Quit: 1996 |
+---------------+------------+-----------+--------+------------------+
 
 
 
+---------------------+---+---+---+
| Smokeless Tobacco:  |   |   |   |
| Never Used          |   |   |   |
+---------------------+---+---+---+
 
 
 
+-------------+-------------+---------+----------+
| Alcohol Use | Drinks/Week | oz/Week | Comments |
+-------------+-------------+---------+----------+
| No          |             |         |          |
+-------------+-------------+---------+----------+
 
 
 
 
+------------------+---------------+
| Sex Assigned at  | Date Recorded |
| Birth            |               |
+------------------+---------------+
| Not on file      |               |
+------------------+---------------+
 
 
 
+----------------+-------------+-------------+
| Job Start Date | Occupation  | Industry    |
+----------------+-------------+-------------+
| Not on file    | Not on file | Not on file |
+----------------+-------------+-------------+
 
 
 
+----------------+--------------+------------+
| Travel History | Travel Start | Travel End |
+----------------+--------------+------------+
 
 
 
+-------------------------------------+
| No recent travel history available. |
+-------------------------------------+
 documented as of this encounter
 
 Medications at Time of Discharge
 
 
+----------------------+----------------------+-----------+---------+----------+-----------+
| Medication           | Sig                  | Dispensed | Refills | Start    | End Date  |
|                      |                      |           |         | Date     |           |
+----------------------+----------------------+-----------+---------+----------+-----------+
|   acyclovir          | Apply  topically     |           | 0       |          |           |
| (ZOVIRAX) 5%         | every 3 hours.       |           |         |          |           |
| ointment             |                      |           |         |          |           |
+----------------------+----------------------+-----------+---------+----------+-----------+
|   albuterol (PROAIR  | Inhale 2 puffs into  |           | 0       |          |           |
| HFA) 90 mcg/puff     | the lungs every 6    |           |         |          |           |
| inhaler              | hours as needed for  |           |         |          |           |
|                      | Wheezing.            |           |         |          |           |
+----------------------+----------------------+-----------+---------+----------+-----------+
|                      | Take 3 mLs by        |           | 0       | 20 |           |
| albuterol-ipratropiu | nebulization every 6 |           |         | 18       |           |
| m 2.5-0.5 mg/3 mL    |  (six) hours as      |           |         |          |           |
| SOLN                 | needed.              |           |         |          |           |
+----------------------+----------------------+-----------+---------+----------+-----------+
|   digoxin (LANOXIN)  | Take 125 mcg by      |           | 0       |          |           |
| 250 mcg tablet       | mouth Daily. 1/2     |           |         |          |           |
|                      | capsule daily        |           |         |          |           |
+----------------------+----------------------+-----------+---------+----------+-----------+
|   ferrous sulfate    | Take 325 mg by mouth |           | 0       | 20 |           |
| 325 mg tablet        |  3 times daily.      |           |         | 12       |           |
+----------------------+----------------------+-----------+---------+----------+-----------+
|   fluticasone        | one spray in each    |           | 0       | 20 |           |
| (FLONASE) 50         | nostril daily as     |           |         | 12       |           |
| mcg/nasal spray      | needed               |           |         |          |           |
+----------------------+----------------------+-----------+---------+----------+-----------+
 
|                      | Inhale 1 puff into   |           | 0       |          |           |
| fluticasone-salmeter | the lungs Twice      |           |         |          |           |
| ol (ADVAIR) 500-50   | Daily.               |           |         |          |           |
| mcg/puff diskus      |                      |           |         |          |           |
| inhaler              |                      |           |         |          |           |
+----------------------+----------------------+-----------+---------+----------+-----------+
|   folic acid 1 mg    | Take 1 mg by mouth   |           | 0       |          |           |
| tablet               | Daily.               |           |         |          |           |
+----------------------+----------------------+-----------+---------+----------+-----------+
|   furosemide (LASIX) | Take 40 mg by mouth  |           | 0       |          |           |
|  40 mg tablet        | Daily.               |           |         |          |           |
+----------------------+----------------------+-----------+---------+----------+-----------+
|   guaiFENesin        | Take 1,200 mg by     |           | 0       |          |           |
| (MUCINEX) 600 mg 12  | mouth 2 times daily. |           |         |          |           |
| hr tablet            |                      |           |         |          |           |
+----------------------+----------------------+-----------+---------+----------+-----------+
|   levothyroxine      | Take 75 mcg by mouth |           | 0       | 20 |           |
| (LEVOTHROID) 75 MCG  |  Daily.              |           |         | 12       |           |
| tablet               |                      |           |         |          |           |
+----------------------+----------------------+-----------+---------+----------+-----------+
|   metoclopramide     | Take 10 mg by mouth  |           | 0       | 20 |           |
| (REGLAN) 10 mg       | 4 times daily as     |           |         | 12       |           |
| tablet               | needed.              |           |         |          |           |
+----------------------+----------------------+-----------+---------+----------+-----------+
|                      | Apply  topically 2   |           | 0       |          |           |
| nystatin-triamcinolo | times daily.         |           |         |          |           |
| ne (MYCOLOG II)      |                      |           |         |          |           |
| cream                |                      |           |         |          |           |
+----------------------+----------------------+-----------+---------+----------+-----------+
|   ofloxacin          |                      |           | 0       | 20 |           |
| (OCUFLOX) 0.3%       |                      |           |         | 18       |           |
| ophthalmic solution  |                      |           |         |          |           |
+----------------------+----------------------+-----------+---------+----------+-----------+
|   omeprazole         | Take 20 mg by mouth  |           | 0       | 20 |           |
| (PRILOSEC) 20 mg     | 2 times daily.       |           |         | 12       |           |
| capsule              |                      |           |         |          |           |
+----------------------+----------------------+-----------+---------+----------+-----------+
|   potassium citrate  | Take 10 mEq by mouth |           | 0       |          |           |
| (UROCIT-K) 10 mEq SR |  2 times daily.      |           |         |          |           |
|  tablet              |                      |           |         |          |           |
+----------------------+----------------------+-----------+---------+----------+-----------+
|   predniSONE         | Take 10 mg by mouth  |           | 0       |          |           |
| (DELTASONE) 5 mg     | Daily.               |           |         |          |           |
| tablet               |                      |           |         |          |           |
+----------------------+----------------------+-----------+---------+----------+-----------+
|   tamsulosin         | Take 0.4 mg by mouth |           | 0       | 20 |           |
| (FLOMAX) 0.4 mg CAPS |  2 times daily.      |           |         | 12       |           |
+----------------------+----------------------+-----------+---------+----------+-----------+
|   acyclovir          | Take 400 mg by mouth |           | 0       | 20 |  |
| (ZOVIRAX) 400 MG     |  3 times daily as    |           |         | 12       | 9         |
| tablet               | needed.              |           |         |          |           |
+----------------------+----------------------+-----------+---------+----------+-----------+
|   Cholecalciferol    | Take 2,000 Units by  |           | 0       | 20 |  |
| (VITAMIN D3) 2000    | mouth Daily.         |           |         | 12       | 9         |
| UNITS CAPS           |                      |           |         |          |           |
+----------------------+----------------------+-----------+---------+----------+-----------+
|   cyanocobalamin     | injected             |           | 0       | 20 |  |
| (VITAMIN B-12) 1,000 | intramuscularly once |           |         | 12       | 9         |
|  mcg/mL injection    |  a month             |           |         |          |           |
+----------------------+----------------------+-----------+---------+----------+-----------+
 
|   diltiazem          | Take 120 mg by mouth |           | 0       |          |  |
| (DILT-XR) 120 mg 24  |  Daily.              |           |         |          | 9         |
| hr capsule           |                      |           |         |          |           |
+----------------------+----------------------+-----------+---------+----------+-----------+
|   loratadine         | Take 10 mg by mouth  |           | 0       |          |  |
| (CLARITIN) 10 mg     | Daily.               |           |         |          | 9         |
| tablet               |                      |           |         |          |           |
+----------------------+----------------------+-----------+---------+----------+-----------+
|   losartan (COZAAR)  | Take 100 mg by mouth |           | 0       |          |  |
| 100 MG tablet        |  Daily. 1/2 daily    |           |         |          | 9         |
+----------------------+----------------------+-----------+---------+----------+-----------+
|   methotrexate 2.5   | Take 15 mg by mouth  |           | 0       |          |  |
| mg tablet            | Once a week.         |           |         |          | 9         |
+----------------------+----------------------+-----------+---------+----------+-----------+
|                      | Take 1 tablet by     |           | 0       |          |  |
| oxyCODONE-acetaminop | mouth every 4 hours  |           |         |          | 9         |
| hen (PERCOCET) 5-325 | as needed for Pain.  |           |         |          |           |
|  mg per tablet       |                      |           |         |          |           |
+----------------------+----------------------+-----------+---------+----------+-----------+
|   predniSONE         | Take 4 tablets by    |           | 0       | 20 |  |
| (DELTASONE) 2.5 MG   | mouth daily. Takes   |           |         | 18       | 9         |
| tablet               | 7.5 mg daily         |           |         |          |           |
+----------------------+----------------------+-----------+---------+----------+-----------+
|   pseudoePHEDrine    | Take 30 mg by mouth  |           | 0       |          |  |
| (SUDOGEST) 30 mg     | every 4 hours as     |           |         |          | 9         |
| tablet               | needed for           |           |         |          |           |
|                      | Congestion.          |           |         |          |           |
+----------------------+----------------------+-----------+---------+----------+-----------+
|   rivaroxaban        | Take 1 tablet by     |           | 0       | 20 | 10/16/201 |
| (XARELTO) 10 mg      | mouth daily.         |           |         | 19       | 9         |
| tablet               |                      |           |         |          |           |
+----------------------+----------------------+-----------+---------+----------+-----------+
|   rivaroxaban        | Take 20 mg by mouth  |           | 0       |          |  |
| (XARELTO) 20 mg      | Daily (with dinner). |           |         |          | 9         |
| tablet               |                      |           |         |          |           |
+----------------------+----------------------+-----------+---------+----------+-----------+
|   sertraline         | 2 tablets daily      |           | 0       | 20 |  |
| (ZOLOFT) 100 mg      |                      |           |         | 12       | 9         |
| tablet               |                      |           |         |          |           |
+----------------------+----------------------+-----------+---------+----------+-----------+
 documented as of this encounter
 
 Plan of Treatment
 
 
+--------+---------+-------------+----------------------+-------------+
| Date   | Type    | Specialty   | Care Team            | Description |
+--------+---------+-------------+----------------------+-------------+
| / | Office  | Pulmonology |   Juan F,          |             |
|    | Visit   |             | Jessica Rodrigezse,   |             |
|        |         |             | MD  [address]  |             |
|        |         |             |   [place],   |             |
|        |         |             | [place] [address]             |             |
|        |         |             | [phone]         |             |
|        |         |             | [phone] (Fax)   |             |
+--------+---------+-------------+----------------------+-------------+
| / | Office  | Cardiology  |   Eric Akins, |             |
|    | Visit   |             |  MD  [address]   |             |
|        |         |             | [place]  |             |
|        |         |             | [address]  [phone]  |             |
 
|        |         |             |  [phone] (Fax)  |             |
+--------+---------+-------------+----------------------+-------------+
 documented as of this encounter
 
 Procedures
 
 
+----------------------+--------+-------------+----------------------+----------------------
+
| Procedure Name       | Priori | Date/Time   | Associated Diagnosis | Comments             
|
|                      | ty     |             |                      |                      
|
+----------------------+--------+-------------+----------------------+----------------------
+
| CT CHEST WO CONTRAST | Routin | 2019  |                      |   Results for this   
|
|                      | e      | 10:27 AM    |                      | procedure are in the 
|
|                      |        | PDT         |                      |  results section.    
|
+----------------------+--------+-------------+----------------------+----------------------
+
 documented in this encounter
 
 Results
 CT Chest wo Contrast (2019 10:27 AM PDT)
 
+----------+
| Specimen |
+----------+
|          |
+----------+
 
 
 
+------------------------------------------------------------------------+--------------+
| Impressions                                                            | Performed At |
+------------------------------------------------------------------------+--------------+
|   1. Stable right middle lobe nodule, as above.   This document        |              |
| stability  for 6 months.   Six-month follow-up chest CT recommended.   |              |
| 2. Other chronic findings, as above.  Signed by: Lee Esparza  Sign  |              |
| Date/Time: 2019 2:39 PM                                          |              |
+------------------------------------------------------------------------+--------------+
 
 
 
+------------------------------------------------------------------------+--------------+
| Narrative                                                              | Performed At |
+------------------------------------------------------------------------+--------------+
|   CT CHEST WITHOUT CONTRAST  CLINICAL INFORMATION:  Pulmonary nodule   |              |
| in the right middle lobe L in a patient who has high  risk for lung    |              |
| cancer.   Please are no comparison  COMPARISON:  CT CHEST WO CONTRAST  |              |
| (2018); CT CHEST WO CONTRAST (2018);  PROCEDURE:  Axial       |              |
| images through the chest. Multiplanar reconstructions.  At least one   |              |
| of the following CT dose optimization techniques were  used: Automated |              |
|  exposure control; Adjustment of mA and/or kV according  to patient    |              |
| size; Use of iterative reconstruction technique.  FINDINGS:  Lungs,    |              |
| Pleura and Airways: A right middle lobe parenchymal lung nodule  is    |              |
| seen measuring 13 x 6 mm, on image 90, series 3, previously and        |              |
 
| retrospectively measured at 13 x 5 mm, relatively unchanged.           |              |
| Centrilobular emphysematous disease is seen, somewhat diffuse greatest |              |
|   in the upper lobes, moderate in degree.  Mediastinum: Moderate       |              |
| cardiac enlargement.   Moderate to severe coronary  artery             |              |
| calcifications, with median sternotomy wires.   No thoracic  aortic    |              |
| aneurysm, with moderate thoracic aortic calcification and  proximal    |              |
| great branch vessel calcification.   Mild pulmonary arterial  trunk    |              |
| dilatation to 33 mm, suggesting underlying pulmonary arterial          |              |
| hypertension.  Lymph Nodes: No adenopathy.  Upper Abdomen: A small     |              |
| hiatal hernia is seen.  BODY WALL  Soft Tissues: The soft tissues of   |              |
| the chest wall are unremarkable.  Bones: No acute fracture or          |              |
| vertebral end plate destruction. No lytic  or blastic lesion.          |              |
+------------------------------------------------------------------------+--------------+
 
 
 
+----------------------------------------------------------------------------+
| Procedure Note                                                             |
+----------------------------------------------------------------------------+
|   Maurizio, Rad Conversion - 2019  4:22 PM PDT  CT CHEST WITHOUT CONTRAST |
| CLINICAL INFORMATION:                                                      |
| Pulmonary nodule in the right middle lobe L in a patient who has high      |
| risk for lung cancer.  Please are no comparison                            |
| COMPARISON:                                                                |
| CT CHEST WO CONTRAST (2018); CT CHEST WO CONTRAST (2018);         |
| PROCEDURE:                                                                 |
| Axial images through the chest. Multiplanar reconstructions.               |
| At least one of the following CT dose optimization techniques were         |
| used: Automated exposure control; Adjustment of mA and/or kV according     |
| to patient size; Use of iterative reconstruction technique.                |
| FINDINGS:                                                                  |
| Lungs, Pleura and Airways: A right middle lobe parenchymal lung nodule     |
| is seen measuring 13 x 6 mm, on image 90, series 3, previously and         |
| retrospectively measured at 13 x 5 mm, relatively unchanged.               |
| Centrilobular emphysematous disease is seen, somewhat diffuse greatest     |
| in the upper lobes, moderate in degree.                                    |
| Mediastinum: Moderate cardiac enlargement.  Moderate to severe coronary    |
| artery calcifications, with median sternotomy wires.  No thoracic          |
| aortic aneurysm, with moderate thoracic aortic calcification and           |
| proximal great branch vessel calcification.  Mild pulmonary arterial       |
| trunk dilatation to 33 mm, suggesting underlying pulmonary arterial        |
| hypertension.                                                              |
| Lymph Nodes: No adenopathy.                                                |
| Upper Abdomen: A small hiatal hernia is seen.                              |
| BODY WALL                                                                  |
| Soft Tissues: The soft tissues of the chest wall are unremarkable.         |
| Bones: No acute fracture or vertebral end plate destruction. No lytic      |
| or blastic lesion.                                                         |
| IMPRESSION:                                                                |
| 1. Stable right middle lobe nodule, as above.  This document stability     |
| for 6 months.  Six-month follow-up chest CT recommended.                   |
| 2. Other chronic findings, as above.                                       |
| Signed by: Lee Espazra                                                  |
| Sign Date/Time: 2019 2:39 PM                                         |
+----------------------------------------------------------------------------+
 documented in this encounter
 
 Visit Diagnoses
 
 
 
+------------------------------------------------------------------------------------------+
| Diagnosis                                                                                |
+------------------------------------------------------------------------------------------+
|   Multifocal lung consolidation (HCC)  Pneumococcal pneumonia (streptococcus pneumoniae  |
| pneumonia)                                                                               |
+------------------------------------------------------------------------------------------+
|   Incidental pulmonary nodule, > 3mm and < 8mm  Solitary pulmonary nodule                |
+------------------------------------------------------------------------------------------+
 documented in this encounter

## 2019-12-06 NOTE — XMS
Encounter Summary
  Created on: 2019
 
 Wyatt Shane
 External Reference #: [id number]
 : 43
 Sex: Male
 
 Demographics
 
 
+-----------------------+------------------------+
| Address               | [address]     |
|                       | [place]  [address]   |
+-----------------------+------------------------+
| Home Phone            | [phone]        |
+-----------------------+------------------------+
| Preferred Language    | Unknown                |
+-----------------------+------------------------+
| Marital Status        |                 |
+-----------------------+------------------------+
| Hoahaoism Affiliation | LUT                    |
+-----------------------+------------------------+
| Race                  | White                  |
+-----------------------+------------------------+
| Ethnic Group          | Not  or  |
+-----------------------+------------------------+
 
 
 Author
 
 
+--------------+-------------+
| Organization | Unknown     |
+--------------+-------------+
| Address      | Unknown     |
+--------------+-------------+
| Phone        | Unavailable |
+--------------+-------------+
 
 
 
 Support
 
 
+---------------+--------------+---------+-----------------+
| Name          | Relationship | Address | Phone           |
+---------------+--------------+---------+-----------------+
| Opal Shane | ECON         | Unknown | [phone] |
+---------------+--------------+---------+-----------------+
 
 
 
 Care Team Providers
 
 
+-----------------------+------+-----------------+
| Care Team Member Name | Role | Phone           |
 
+-----------------------+------+-----------------+
| Erwin Iniguez MD   | PCP  | [phone] |
+-----------------------+------+-----------------+
 
 
 
 Encounter Details
 
 
+--------+-------------+------------+--------------------+--------------------+
| Date   | Type        | Department | Care Team          | Description        |
+--------+-------------+------------+--------------------+--------------------+
| / | Procedure - |            |   Documentation,   | OP REPORT-TEACHING |
|    |             |            | Teaching Physician |                    |
|        | Transcribed |            |                    |                    |
+--------+-------------+------------+--------------------+--------------------+
 
 
 
 Social History
 
 
+----------------+-------+-----------+--------+------+
| Tobacco Use    | Types | Packs/Day | Years  | Date |
|                |       |           | Used   |      |
+----------------+-------+-----------+--------+------+
| Never Assessed |       |           |        |      |
+----------------+-------+-----------+--------+------+
 
 
 
+------------------+---------------+
| Sex Assigned at  | Date Recorded |
| Birth            |               |
+------------------+---------------+
| Not on file      |               |
+------------------+---------------+
 
 
 
+----------------+-------------+-------------+
| Job Start Date | Occupation  | Industry    |
+----------------+-------------+-------------+
| Not on file    | Not on file | Not on file |
+----------------+-------------+-------------+
 
 
 
+----------------+--------------+------------+
| Travel History | Travel Start | Travel End |
+----------------+--------------+------------+
 
 
 
+-------------------------------------+
| No recent travel history available. |
+-------------------------------------+
 documented as of this encounter
 
 Plan of Treatment
 
 Not on filedocumented as of this encounter
 
 Procedures
 
 
+--------------------+--------+------------+----------------------+----------+
| Procedure Name     | Priori | Date/Time  | Associated Diagnosis | Comments |
|                    | ty     |            |                      |          |
+--------------------+--------+------------+----------------------+----------+
| TEACHING PHYSICIAN |        | 1998 |                      |          |
+--------------------+--------+------------+----------------------+----------+
 documented in this encounter
 
 Visit Diagnoses
 Not on filedocumented in this encounter

## 2019-12-06 NOTE — XMS
Encounter Summary
  Created on: 2019
 
 Wyatt Shane
 External Reference #: [id number]
 : 43
 Sex: Male
 
 Demographics
 
 
+-----------------------+------------------------+
| Address               | [address]     |
|                       | [place]  [address]   |
+-----------------------+------------------------+
| Home Phone            | [phone]        |
+-----------------------+------------------------+
| Preferred Language    | Unknown                |
+-----------------------+------------------------+
| Marital Status        |                 |
+-----------------------+------------------------+
| Caodaism Affiliation | LUT                    |
+-----------------------+------------------------+
| Race                  | White                  |
+-----------------------+------------------------+
| Ethnic Group          | Not  or  |
+-----------------------+------------------------+
 
 
 Author
 
 
+--------------+------------------------------+
| Author       | [facility] |
+--------------+------------------------------+
| Organization | [facility] |
+--------------+------------------------------+
| Address      | Unknown                      |
+--------------+------------------------------+
| Phone        | Unavailable                  |
+--------------+------------------------------+
 
 
 
 Support
 
 
+---------------+--------------+---------+-----------------+
| Name          | Relationship | Address | Phone           |
+---------------+--------------+---------+-----------------+
| Opal Shane | ECON         | Unknown | [phone] |
+---------------+--------------+---------+-----------------+
 
 
 
 Care Team Providers
 
 
 
+-----------------------+------+-----------------+
| Care Team Member Name | Role | Phone           |
+-----------------------+------+-----------------+
| Erwin Iniguez MD   | PCP  | [phone] |
+-----------------------+------+-----------------+
 
 
 
 Encounter Details
 
 
+--------+----------+----------------------+--------------------+-------------+
| Date   | Type     | Department           | Care Team          | Description |
+--------+----------+----------------------+--------------------+-------------+
| / | Results  |   [facility]  [address]  |   [facility]   |             |
|    | Only     | [address]  | [phone]       |             |
|        |          |  [place], [place]        |                    |             |
|        |          | [address]           |                    |             |
|        |          | [phone]         |                    |             |
+--------+----------+----------------------+--------------------+-------------+
 
 
 
 Social History
 
 
+----------------+-------+-----------+--------+------+
| Tobacco Use    | Types | Packs/Day | Years  | Date |
|                |       |           | Used   |      |
+----------------+-------+-----------+--------+------+
| Never Assessed |       |           |        |      |
+----------------+-------+-----------+--------+------+
 
 
 
+------------------+---------------+
| Sex Assigned at  | Date Recorded |
| Birth            |               |
+------------------+---------------+
| Not on file      |               |
+------------------+---------------+
 
 
 
+----------------+-------------+-------------+
| Job Start Date | Occupation  | Industry    |
+----------------+-------------+-------------+
| Not on file    | Not on file | Not on file |
+----------------+-------------+-------------+
 
 
 
+----------------+--------------+------------+
| Travel History | Travel Start | Travel End |
+----------------+--------------+------------+
 
 
 
+-------------------------------------+
| No recent travel history available. |
 
+-------------------------------------+
 documented as of this encounter
 
 Plan of Treatment
 Not on filedocumented as of this encounter
 
 Procedures
 
 
+--------------------+--------+------------+----------------------+----------------------+
| Procedure Name     | Priori | Date/Time  | Associated Diagnosis | Comments             |
|                    | ty     |            |                      |                      |
+--------------------+--------+------------+----------------------+----------------------+
| SURGICAL PATHOLOGY | Routin | 1998 |                      |   Results for this   |
|                    | e      |            |                      | procedure are in the |
|                    |        |            |                      |  results section.    |
+--------------------+--------+------------+----------------------+----------------------+
 documented in this encounter
 
 Results
 SURGICAL PATHOLOGY (1998)
 
+-----------+--------------------------+-----------+-------------+--------------+
| Component | Value                    | Ref Range | Performed   | Pathologist  |
|           |                          |           | At          | Signature    |
+-----------+--------------------------+-----------+-------------+--------------+
| SURGICAL  | SOURCE OF SPECIMEN: SEE  |           | [facility]        |              |
| PATHOLOGY | RESULTS                  |           | DEPARTMENT  |              |
|           | Preliminary              |           | OF          |              |
|           | History:CLINICAL HISTORY |           | PATHOLOGY   |              |
|           |        Patient Age:   52 |           |             |              |
|           |  year old male.          |           |             |              |
|           | Patient History: Prior   |           |             |              |
|           | biopsy showed squamous   |           |             |              |
|           | cell carcinoma in        |           |             |              |
|           | situ,now for deeper      |           |             |              |
|           | biopsy.       GROSS      |           |             |              |
|           | DESCRIPTION              |           |             |              |
|           | Specimens received:   1  |           |             |              |
|           | in formalin.       #1    |           |             |              |
|           | RIGHT TRUE VOCAL CORD    |           |             |              |
|           | BIOPSY: The specimen     |           |             |              |
|           | consists of multiple     |           |             |              |
|           | softpale pink to dark    |           |             |              |
|           | purple tissues,          |           |             |              |
|           | measuring 0.8 x 0.7 x    |           |             |              |
|           | 0.5 cm inaggregate. The  |           |             |              |
|           | specimen is filtered.    |           |             |              |
|           |     CASSETTE INDEX:#1    |           |             |              |
|           |   RIGHT TRUE VOCAL CORD  |           |             |              |
|           | BIOPSY:one cassette, TS. |           |             |              |
|           |        All tissue        |           |             |              |
|           | sections taken are       |           |             |              |
|           | submitted for            |           |             |              |
|           | microscopic              |           |             |              |
|           | evaluation.JAVIK:KB:CM:tm   |           |             |              |
|           |       FINAL DIAGNOSIS#1  |           |             |              |
|           | RIGHT TRUE VOCAL CORD    |           |             |              |
|           | BIOPSY:   INVASIVE,      |           |             |              |
|           | MODERATELY               |           |             |              |
 
|           | DIFFERENTIATED SQUAMOUS  |           |             |              |
|           | CELL CARCINOMA           |           |             |              |
|           | Case reviewed by:   Enedina |           |             |              |
|           |  Brianna, Student         |           |             |              |
|           | FellowCase staffed by:   |           |             |              |
|           |   Bayron Pavon,        |           |             |              |
|           | MTABT:98:tg        |           |             |              |
|           | My electronic signature  |           |             |              |
|           | indicates that I have    |           |             |              |
|           | personally reviewed      |           |             |              |
|           | alldiagnostic slides,    |           |             |              |
|           | the gross and/or         |           |             |              |
|           | microscopic portion of   |           |             |              |
|           | thisreport and           |           |             |              |
|           | formulated the final     |           |             |              |
|           | diagnosis.               |           |             |              |
+-----------+--------------------------+-----------+-------------+--------------+
 
 
 
+----------+
| Specimen |
+----------+
| Other    |
+----------+
 
 
 
+----------------------+------------------------+--------------------+--------------+
| Performing           | Address                | City/State/Zipcode | Phone Number |
| Organization         |                        |                    |              |
+----------------------+------------------------+--------------------+--------------+
|   [facility] |   [address]  | [place] [address] |              |
|  PATHOLOGY           | [place]                |                    |              |
+----------------------+------------------------+--------------------+--------------+
 documented in this encounter
 
 Visit Diagnoses
 Not on filedocumented in this encounter

## 2019-12-06 NOTE — XMS
Encounter Summary
  Created on: 2019
 
 Shane Wyatttien BroussardJosue
 External Reference #: [id number]
 : 43
 Sex: Male
 
 Demographics
 
 
+-----------------------+---------------------------+
| Address               | [address]        |
|                       | [place]  [address] |
+-----------------------+---------------------------+
| Home Phone            | [phone]           |
+-----------------------+---------------------------+
| Preferred Language    | Unknown                   |
+-----------------------+---------------------------+
| Marital Status        |                    |
+-----------------------+---------------------------+
| Jain Affiliation | 1028                      |
+-----------------------+---------------------------+
| Race                  | Unknown                   |
+-----------------------+---------------------------+
| Ethnic Group          | Unknown                   |
+-----------------------+---------------------------+
 
 
 Author
 
 
+--------------+--------------------------------------------+
| Author       | [facility]  |
|              | and [place]                                |
+--------------+--------------------------------------------+
| Organization | [facility]  |
|              | and [place]                                |
+--------------+--------------------------------------------+
| Address      | Unknown                                    |
+--------------+--------------------------------------------+
| Phone        | Unavailable                                |
+--------------+--------------------------------------------+
 
 
 
 Support
 
 
+---------------+--------------+--------------------+-----------------+
| Name          | Relationship | Address            | Phone           |
+---------------+--------------+--------------------+-----------------+
| Opal Shane | ECON         | [address]     | [phone] |
|               |              | [place]   |                 |
|               |              | [address]              |                 |
+---------------+--------------+--------------------+-----------------+
 
 
 
 
 Care Team Providers
 
 
+------------------------+------+-----------------+
| Care Team Member Name  | Role | Phone           |
+------------------------+------+-----------------+
| Calvin Robertson MD | PCP  | [phone] |
+------------------------+------+-----------------+
 
 
 
 Reason for Visit
 
 
+--------+-------------------+
| Reason | Comments          |
+--------+-------------------+
| Other  |  appointment |
+--------+-------------------+
 
 
 
 Encounter Details
 
 
+--------+-----------+---------------------+----------------------+---------------+
| Date   | Type      | Department          | Care Team            | Description   |
+--------+-----------+---------------------+----------------------+---------------+
| 10/28/ | Telephone |   [facility]     |   Juan F,          | Other (  |
| 2019   |           | PULMONOLOGY  [address]   | Jessica Cheung,   | appointment)  |
|        |           | [address]   | MD  [address] |               |
|        |           | [place], [place]        |   [address]  [place],   |               |
|        |           | [address]          | [place] [address]             |               |
|        |           | [phone]        | [phone]         |               |
|        |           |                     | [phone] (Fax)   |               |
+--------+-----------+---------------------+----------------------+---------------+
 
 
 
 Social History
 
 
+---------------+------------+-----------+--------+------------------+
| Tobacco Use   | Types      | Packs/Day | Years  | Date             |
|               |            |           | Used   |                  |
+---------------+------------+-----------+--------+------------------+
| Former Smoker | Cigarettes | 1         | 35     | Quit: 1996 |
+---------------+------------+-----------+--------+------------------+
 
 
 
+---------------------+---+---+---+
| Smokeless Tobacco:  |   |   |   |
| Never Used          |   |   |   |
+---------------------+---+---+---+
 
 
 
+-------------+-------------+---------+----------+
 
| Alcohol Use | Drinks/Week | oz/Week | Comments |
+-------------+-------------+---------+----------+
| No          |             |         |          |
+-------------+-------------+---------+----------+
 
 
 
+------------------+---------------+
| Sex Assigned at  | Date Recorded |
| Birth            |               |
+------------------+---------------+
| Not on file      |               |
+------------------+---------------+
 
 
 
+----------------+-------------+-------------+
| Job Start Date | Occupation  | Industry    |
+----------------+-------------+-------------+
| Not on file    | Not on file | Not on file |
+----------------+-------------+-------------+
 
 
 
+----------------+--------------+------------+
| Travel History | Travel Start | Travel End |
+----------------+--------------+------------+
 
 
 
+-------------------------------------+
| No recent travel history available. |
+-------------------------------------+
 documented as of this encounter
 
 Plan of Treatment
 
 
+--------+---------+-------------+----------------------+-------------+
| Date   | Type    | Specialty   | Care Team            | Description |
+--------+---------+-------------+----------------------+-------------+
| / | Office  | Pulmonology |   Juan F,          |             |
| 2019   | Visit   |             | Jessica Cheung,   |             |
|        |         |             | MD  [address]  |             |
|        |         |             |   [place]   |             |
|        |         |             | [place] [address]             |             |
|        |         |             | [phone]         |             |
|        |         |             | [phone] (Fax)   |             |
+--------+---------+-------------+----------------------+-------------+
| / | Office  | Cardiology  |   Eric Akins, |             |
| 2020   | Visit   |             |  MD  [address]   |             |
|        |         |             | [place]  |             |
|        |         |             | [address]  [phone]  |             |
|        |         |             |  [phone] (Fax)  |             |
+--------+---------+-------------+----------------------+-------------+
 documented as of this encounter
 
 Visit Diagnoses
 Not on filedocumented in this encounter

## 2019-12-06 NOTE — XMS
Encounter Summary
  Created on: 2019
 
 Shane Wyatttien BroussardJosue
 External Reference #: [id number]
 : 43
 Sex: Male
 
 Demographics
 
 
+-----------------------+---------------------------+
| Address               | [address]        |
|                       | [place]  [address] |
+-----------------------+---------------------------+
| Home Phone            | [phone]           |
+-----------------------+---------------------------+
| Preferred Language    | Unknown                   |
+-----------------------+---------------------------+
| Marital Status        |                    |
+-----------------------+---------------------------+
| Congregation Affiliation | 1028                      |
+-----------------------+---------------------------+
| Race                  | Unknown                   |
+-----------------------+---------------------------+
| Ethnic Group          | Unknown                   |
+-----------------------+---------------------------+
 
 
 Author
 
 
+--------------+--------------------------------------------+
| Author       | Located within [facility]  |
|              | and [place]                                |
+--------------+--------------------------------------------+
| Organization | Located within [facility]  |
|              | and [place]                                |
+--------------+--------------------------------------------+
| Address      | Unknown                                    |
+--------------+--------------------------------------------+
| Phone        | Unavailable                                |
+--------------+--------------------------------------------+
 
 
 
 Support
 
 
+---------------+--------------+--------------------+-----------------+
| Name          | Relationship | Address            | Phone           |
+---------------+--------------+--------------------+-----------------+
| Opal Shane | ECON         | [address]     | [phone] |
|               |              | [place]   |                 |
|               |              | [id number]              |                 |
+---------------+--------------+--------------------+-----------------+
 
 
 
 
 Care Team Providers
 
 
+-----------------------+------+-----------------+
| Care Team Member Name | Role | Phone           |
+-----------------------+------+-----------------+
| Erwin Iniguez MD | PCP  | [phone] |
+-----------------------+------+-----------------+
 
 
 
 Encounter Details
 
 
+--------+-----------+---------------------+--------------------+-------------+
| Date   | Type      | Department          | Care Team          | Description |
+--------+-----------+---------------------+--------------------+-------------+
| / | Hospital  |   [facility]    |   Conversion       |             |
| 2015   | Encounter | [facility]     | Transaction,       |             |
|        |           | CLINIC  [address]   | Provider Unknown   |             |
|        |           | [place]  [place], [place]  |        |             |
|        |           | [address]          |  (Fax) |             |
|        |           | [phone]        |                    |             |
+--------+-----------+---------------------+--------------------+-------------+
 
 
 
 Social History
 
 
+---------------+------------+-----------+--------+------------------+
| Tobacco Use   | Types      | Packs/Day | Years  | Date             |
|               |            |           | Used   |                  |
+---------------+------------+-----------+--------+------------------+
| Former Smoker | Cigarettes | 1.3       | 34     | Quit: 1996 |
+---------------+------------+-----------+--------+------------------+
 
 
 
+---------------------+---+---+---+
| Smokeless Tobacco:  |   |   |   |
| Never Used          |   |   |   |
+---------------------+---+---+---+
 
 
 
+-------------+-------------+---------+----------+
| Alcohol Use | Drinks/Week | oz/Week | Comments |
+-------------+-------------+---------+----------+
| No          |             |         |          |
+-------------+-------------+---------+----------+
 
 
 
+------------------+---------------+
| Sex Assigned at  | Date Recorded |
| Birth            |               |
+------------------+---------------+
| Not on file      |               |
 
+------------------+---------------+
 
 
 
+----------------+-------------+-------------+
| Job Start Date | Occupation  | Industry    |
+----------------+-------------+-------------+
| Not on file    | Not on file | Not on file |
+----------------+-------------+-------------+
 
 
 
+----------------+--------------+------------+
| Travel History | Travel Start | Travel End |
+----------------+--------------+------------+
 
 
 
+-------------------------------------+
| No recent travel history available. |
+-------------------------------------+
 documented as of this encounter
 
 Medications at Time of Discharge
 
 
+----------------------+----------------------+-----------+---------+----------+-----------+
| Medication           | Sig                  | Dispensed | Refills | Start    | End Date  |
|                      |                      |           |         | Date     |           |
+----------------------+----------------------+-----------+---------+----------+-----------+
|   acyclovir          | Apply  topically     |           | 0       |          |           |
| (ZOVIRAX) 5%         | every 3 hours.       |           |         |          |           |
| ointment             |                      |           |         |          |           |
+----------------------+----------------------+-----------+---------+----------+-----------+
|   albuterol (PROAIR  | Inhale 2 puffs into  |           | 0       |          |           |
| HFA) 90 mcg/puff     | the lungs every 6    |           |         |          |           |
| inhaler              | hours as needed for  |           |         |          |           |
|                      | Wheezing.            |           |         |          |           |
+----------------------+----------------------+-----------+---------+----------+-----------+
|   ferrous sulfate    | Take 325 mg by mouth |           | 0       | 20 |           |
| 325 mg tablet        |  3 times daily.      |           |         | 12       |           |
+----------------------+----------------------+-----------+---------+----------+-----------+
|   fluticasone        | one spray in each    |           | 0       | 20 |           |
| (FLONASE) 50         | nostril daily as     |           |         | 12       |           |
| mcg/nasal spray      | needed               |           |         |          |           |
+----------------------+----------------------+-----------+---------+----------+-----------+
|                      | Inhale 1 puff into   |           | 0       |          |           |
| fluticasone-salmeter | the lungs Twice      |           |         |          |           |
| ol (ADVAIR) 500-50   | Daily.               |           |         |          |           |
| mcg/puff diskus      |                      |           |         |          |           |
| inhaler              |                      |           |         |          |           |
+----------------------+----------------------+-----------+---------+----------+-----------+
|   folic acid 1 mg    | Take 1 mg by mouth   |           | 0       |          |           |
| tablet               | Daily.               |           |         |          |           |
+----------------------+----------------------+-----------+---------+----------+-----------+
|   furosemide (LASIX) | Take 40 mg by mouth  |           | 0       |          |           |
|  40 mg tablet        | Daily.               |           |         |          |           |
+----------------------+----------------------+-----------+---------+----------+-----------+
|   guaiFENesin        | Take 1,200 mg by     |           | 0       |          |           |
| (MUCINEX) 600 mg 12  | mouth 2 times daily. |           |         |          |           |
 
| hr tablet            |                      |           |         |          |           |
+----------------------+----------------------+-----------+---------+----------+-----------+
|   levothyroxine      | Take 75 mcg by mouth |           | 0       | 20 |           |
| (LEVOTHROID) 75 MCG  |  Daily.              |           |         | 12       |           |
| tablet               |                      |           |         |          |           |
+----------------------+----------------------+-----------+---------+----------+-----------+
|   metoclopramide     | Take 10 mg by mouth  |           | 0       | 20 |           |
| (REGLAN) 10 mg       | 4 times daily as     |           |         | 12       |           |
| tablet               | needed.              |           |         |          |           |
+----------------------+----------------------+-----------+---------+----------+-----------+
|                      | Apply  topically 2   |           | 0       |          |           |
| nystatin-triamcinolo | times daily.         |           |         |          |           |
| ne (MYCOLOG II)      |                      |           |         |          |           |
| cream                |                      |           |         |          |           |
+----------------------+----------------------+-----------+---------+----------+-----------+
|   omeprazole         | Take 20 mg by mouth  |           | 0       | 20 |           |
| (PRILOSEC) 20 mg     | 2 times daily.       |           |         | 12       |           |
| capsule              |                      |           |         |          |           |
+----------------------+----------------------+-----------+---------+----------+-----------+
|   potassium citrate  | Take 10 mEq by mouth |           | 0       |          |           |
| (UROCIT-K) 10 mEq SR |  2 times daily.      |           |         |          |           |
|  tablet              |                      |           |         |          |           |
+----------------------+----------------------+-----------+---------+----------+-----------+
|   predniSONE         | Take 10 mg by mouth  |           | 0       |          |           |
| (DELTASONE) 5 mg     | Daily.               |           |         |          |           |
| tablet               |                      |           |         |          |           |
+----------------------+----------------------+-----------+---------+----------+-----------+
|   tamsulosin         | Take 0.4 mg by mouth |           | 0       | 20 |           |
| (FLOMAX) 0.4 mg CAPS |  2 times daily.      |           |         | 12       |           |
+----------------------+----------------------+-----------+---------+----------+-----------+
|   acyclovir          | Take 400 mg by mouth |           | 0       | 20 |  |
| (ZOVIRAX) 400 MG     |  3 times daily as    |           |         | 12       | 9         |
| tablet               | needed.              |           |         |          |           |
+----------------------+----------------------+-----------+---------+----------+-----------+
|   Cholecalciferol    | Take 2,000 Units by  |           | 0       | 20 |  |
| (VITAMIN D3) 2000    | mouth Daily.         |           |         | 12       | 9         |
| UNITS CAPS           |                      |           |         |          |           |
+----------------------+----------------------+-----------+---------+----------+-----------+
|   cyanocobalamin     | injected             |           | 0       | 20 |  |
| (VITAMIN B-12) 1,000 | intramuscularly once |           |         | 12       | 9         |
|  mcg/mL injection    |  a month             |           |         |          |           |
+----------------------+----------------------+-----------+---------+----------+-----------+
|   digoxin (LANOXIN)  | Take 250 mcg by      |           | 0       |          |  |
| 250 mcg tablet       | mouth Daily.      |           |         |          | 5         |
|                      | tablet daily         |           |         |          |           |
+----------------------+----------------------+-----------+---------+----------+-----------+
|   diltiazem          | Take 120 mg by mouth |           | 0       |          |  |
| (DILT-XR) 120 mg 24  |  Daily.              |           |         |          | 9         |
| hr capsule           |                      |           |         |          |           |
+----------------------+----------------------+-----------+---------+----------+-----------+
|   loratadine         | Take 10 mg by mouth  |           | 0       |          |  |
| (CLARITIN) 10 mg     | Daily.               |           |         |          | 9         |
| tablet               |                      |           |         |          |           |
+----------------------+----------------------+-----------+---------+----------+-----------+
|   losartan (COZAAR)  | Take 100 mg by mouth |           | 0       |          |  |
| 100 MG tablet        |  Daily. 1/2 daily    |           |         |          | 9         |
+----------------------+----------------------+-----------+---------+----------+-----------+
|   methotrexate 2.5   | Take 15 mg by mouth  |           | 0       |          |  |
| mg tablet            | Once a week.         |           |         |          | 9         |
+----------------------+----------------------+-----------+---------+----------+-----------+
 
|                      | Take 1 tablet by     |           | 0       |          |  |
| oxyCODONE-acetaminop | mouth every 4 hours  |           |         |          | 9         |
| hen (PERCOCET) 5-325 | as needed for Pain.  |           |         |          |           |
|  mg per tablet       |                      |           |         |          |           |
+----------------------+----------------------+-----------+---------+----------+-----------+
|   pseudoePHEDrine    | Take 30 mg by mouth  |           | 0       |          |  |
| (SUDOGEST) 30 mg     | every 4 hours as     |           |         |          | 9         |
| tablet               | needed for           |           |         |          |           |
|                      | Congestion.          |           |         |          |           |
+----------------------+----------------------+-----------+---------+----------+-----------+
|   rivaroxaban        | Take 20 mg by mouth  |           | 0       |          |  |
| (XARELTO) 20 mg      | Daily (with dinner). |           |         |          | 9         |
| tablet               |                      |           |         |          |           |
+----------------------+----------------------+-----------+---------+----------+-----------+
|   sertraline         | 2 tablets daily      |           | 0       | 20 |  |
| (ZOLOFT) 100 mg      |                      |           |         | 12       | 9         |
| tablet               |                      |           |         |          |           |
+----------------------+----------------------+-----------+---------+----------+-----------+
 documented as of this encounter
 
 Plan of Treatment
 
 
+--------+---------+-------------+----------------------+-------------+
| Date   | Type    | Specialty   | Care Team            | Description |
+--------+---------+-------------+----------------------+-------------+
| / | Office  | Pulmonology |   Juan F,          |             |
| 2019   | Visit   |             | Jessica Cheung,   |             |
|        |         |             | MD [address] |             |
|        |         |             |   [place],   |             |
|        |         |             | [place] [address]             |             |
|        |         |             | [phone]         |             |
|        |         |             | [phone] (Fax)   |             |
+--------+---------+-------------+----------------------+-------------+
| / | Office  | Cardiology  |   Eric Akins, |             |
| 2020   | Visit   |             |  MD  [address]   |             |
|        |         |             | [place], [place]  |             |
|        |         |             | [address]  [phone]  |             |
|        |         |             |  [phone] (Fax)  |             |
+--------+---------+-------------+----------------------+-------------+
 documented as of this encounter
 
 Procedures
 
 
+-------------------+--------+-------------+----------------------+----------------------+
| Procedure Name    | Priori | Date/Time   | Associated Diagnosis | Comments             |
|                   | ty     |             |                      |                      |
+-------------------+--------+-------------+----------------------+----------------------+
| EXTERNAL LAB: [facility] | Routin | 2015  |                      |   Results for this   |
|                   | e      | 12:02 PM    |                      | procedure are in the |
|                   |        | PDT         |                      |  results section.    |
+-------------------+--------+-------------+----------------------+----------------------+
 documented in this encounter
 
 Results
 External Lab: [facility] (2015 12:02 PM PDT)
 
+-------------+--------------------------+-----------------+------------+--------------+
| Component   | Value                    | Ref Range       | Performed  | Pathologist  |
 
|             |                          |                 | At         | Signature    |
+-------------+--------------------------+-----------------+------------+--------------+
| WBC         | 9.22Comment: Testing     | 3.80 - 11.00    | EXTERNAL   |              |
|             | performed at [facility], [address] | K/uL            | LAB        |              |
|             |  [address],        |                 |            |              |
|             | [place]  [address]     |                 |            |              |
+-------------+--------------------------+-----------------+------------+--------------+
| RED CELL    | 4.02 (L)Comment: Testing | 4.20 - 5.70     | EXTERNAL   |              |
| COUNT       |  performed at [facility], [address]  | M/uL            | LAB        |              |
|             | [address],       |                 |            |              |
|             | [place]  [address]     |                 |            |              |
+-------------+--------------------------+-----------------+------------+--------------+
| Hgb         | 12.2 (L)Comment: Testing | 13.2 - 17.0     | EXTERNAL   |              |
|             |  performed at [facility], [address]  | g/dL            | LAB        |              |
|             | [address],       |                 |            |              |
|             | [place]  [address]     |                 |            |              |
+-------------+--------------------------+-----------------+------------+--------------+
| Hematocrit, | 36.8 (L)Comment: Testing | 39.0 - 50.0 %   | EXTERNAL   |              |
|  POC        |  performed at [facility], [address]  |                 | LAB        |              |
|             | [address],       |                 |            |              |
|             | [place]  [address]     |                 |            |              |
+-------------+--------------------------+-----------------+------------+--------------+
| MCV         | 91.7Comment: Testing     | 80.0 - 100.0 fl | EXTERNAL   |              |
|             | performed at [facility], [address] |                 | LAB        |              |
|             |  [address],        |                 |            |              |
|             | [place]  [address]     |                 |            |              |
+-------------+--------------------------+-----------------+------------+--------------+
| MCH         | 30.4Comment: Testing     | 27.0 - 34.0 pg  | EXTERNAL   |              |
|             | performed at [facility], [address] |                 | LAB        |              |
|             |  [address],        |                 |            |              |
|             | [place]  [address]     |                 |            |              |
+-------------+--------------------------+-----------------+------------+--------------+
| MCHC        | 33.2Comment: Testing     | 32.0 - 35.5     | EXTERNAL   |              |
|             | performed at [facility], [address] | g/dL            | LAB        |              |
|             |  [address],        |                 |            |              |
|             | [place]  [address]     |                 |            |              |
+-------------+--------------------------+-----------------+------------+--------------+
| RDW-CV      | 58.6 (H)Comment: Testing | 37 - 53 fl      | EXTERNAL   |              |
|             |  performed at [facility], [address]  |                 | LAB        |              |
|             | [address],       |                 |            |              |
|             | [place]  [address]     |                 |            |              |
+-------------+--------------------------+-----------------+------------+--------------+
| Platelet    | 166Comment: Testing      | 150 - 400 K/uL  | EXTERNAL   |              |
| Count       | performed at [facility], [address] |                 | LAB        |              |
| Plasma      |  [address],        |                 |            |              |
|             | [place]  [address]     |                 |            |              |
+-------------+--------------------------+-----------------+------------+--------------+
| MPV         | 8.7Comment: Testing      | fl              | EXTERNAL   |              |
|             | performed at [facility], [address] |                 | LAB        |              |
|             |  [address],        |                 |            |              |
|             | [place]  [address]     |                 |            |              |
+-------------+--------------------------+-----------------+------------+--------------+
| Differentia | MANUALComment: Testing   |                 | EXTERNAL   |              |
| l Type      | performed at [facility], [address] |                 | LAB        |              |
|             |  [address],        |                 |            |              |
|             | [place]  [address]     |                 |            |              |
+-------------+--------------------------+-----------------+------------+--------------+
| Segmented   | 81Comment: Testing       | %               | EXTERNAL   |              |
| Neutrophils | performed at [facility], [address] |                 | LAB        |              |
|  Manual     |  [place],        |                 |            |              |
 
|             | [place]  [address]     |                 |            |              |
+-------------+--------------------------+-----------------+------------+--------------+
| % Bands     | 4Comment: Testing        | %               | EXTERNAL   |              |
|             | performed at [facility], [address] |                 | LAB        |              |
|             |  [address],        |                 |            |              |
|             | [place]  [address]     |                 |            |              |
+-------------+--------------------------+-----------------+------------+--------------+
| Lymphocytes | 8Comment: Testing        | %               | EXTERNAL   |              |
|  Manual     | performed at [facility], [address] |                 | LAB        |              |
|             |  [address],        |                 |            |              |
|             | [place]  [id number]     |                 |            |              |
+-------------+--------------------------+-----------------+------------+--------------+
| Monocytes   | 6Comment: Testing        | %               | EXTERNAL   |              |
| Manual      | performed at [facility] [address] |                 | LAB        |              |
|             |  [address],        |                 |            |              |
|             | [place]  [id number]     |                 |            |              |
+-------------+--------------------------+-----------------+------------+--------------+
| Eosinophils | 1Comment: Testing        | %               | EXTERNAL   |              |
|  Manual     | performed at [facility], [address] |                 | LAB        |              |
|             |  [address],        |                 |            |              |
|             | [place]  [id number]     |                 |            |              |
+-------------+--------------------------+-----------------+------------+--------------+
| Absolute    | 7.47 (H)Comment: Testing | 1.90 - 7.40     | EXTERNAL   |              |
| Neutrophils |  performed at [facility], [address]  | [address]/uL            | LAB        |              |
|             | [address],       |                 |            |              |
|             | [place]  [id number]     |                 |            |              |
+-------------+--------------------------+-----------------+------------+--------------+
| Bands       | 0.37 (H)Comment: Testing | 0.00 - 0.20     | EXTERNAL   |              |
| Manual      |  performed at [facility], [address]  | K/uL            | LAB        |              |
|             | [address],       |                 |            |              |
|             | [place]  [id number]     |                 |            |              |
+-------------+--------------------------+-----------------+------------+--------------+
| Absolute    | 0.74 (L)Comment: Testing | 1.00 - 3.90     | EXTERNAL   |              |
| Lymphocytes |  performed at [facility], [address]  | K/u[address]            | LAB        |              |
|             | [address],       |                 |            |              |
|             | [place]  [id number]     |                 |            |              |
+-------------+--------------------------+-----------------+------------+--------------+
| Absolute    | 0.55Comment: Testing     | 0.00 - 0.80     | EXTERNAL   |              |
| Monocytes   | performed at [facility], [address] | K/uL            | LAB        |              |
|             |  [address],        |                 |            |              |
|             | [place]  [address]     |                 |            |              |
+-------------+--------------------------+-----------------+------------+--------------+
| Absolute    | 0.09Comment: Testing     | 0.00 - 0.50     | EXTERNAL   |              |
| Eosinophils | performed at [facility], [address] | K/uL            | LAB        |              |
|             |  [address],        |                 |            |              |
|             | [place]  [id number]     |                 |            |              |
+-------------+--------------------------+-----------------+------------+--------------+
| RBC         | 2+Comment: ANISOTesting  |                 | EXTERNAL   |              |
| Morphology  | performed at [facility], [address] |                 | LAB        |              |
|             |  [address],        |                 |            |              |
|             | [place]   [id number]    |                 |            |              |
|             |                          |                 |            |              |
+-------------+--------------------------+-----------------+------------+--------------+
 
 
 
+-----------------+
| Specimen        |
+-----------------+
| Blood specimen  |
 
| (specimen)      |
+-----------------+
 
 
 
+----------------+---------+--------------------+--------------+
| Performing     | Address | City/State/Zipcode | Phone Number |
| Organization   |         |                    |              |
+----------------+---------+--------------------+--------------+
|   EXTERNAL LAB |         |                    |              |
+----------------+---------+--------------------+--------------+
 documented in this encounter
 
 Visit Diagnoses
 Not on filedocumented in this encounter

## 2019-12-06 NOTE — XMS
Encounter Summary
  Created on: 2019
 
 Wyatt Shane
 External Reference #: [id number]
 : 43
 Sex: Male
 
 Demographics
 
 
+-----------------------+------------------------+
| Address               | [address]     |
|                       | [place]  [address]   |
+-----------------------+------------------------+
| Home Phone            | [phone]        |
+-----------------------+------------------------+
| Preferred Language    | Unknown                |
+-----------------------+------------------------+
| Marital Status        |                 |
+-----------------------+------------------------+
| Catholic Affiliation | LUT                    |
+-----------------------+------------------------+
| Race                  | White                  |
+-----------------------+------------------------+
| Ethnic Group          | Not  or  |
+-----------------------+------------------------+
 
 
 Author
 
 
+--------------+-------------+
| Organization | Unknown     |
+--------------+-------------+
| Address      | Unknown     |
+--------------+-------------+
| Phone        | Unavailable |
+--------------+-------------+
 
 
 
 Support
 
 
+---------------+--------------+---------+-----------------+
| Name          | Relationship | Address | Phone           |
+---------------+--------------+---------+-----------------+
| Opal Shane | ECON         | Unknown | [phone] |
+---------------+--------------+---------+-----------------+
 
 
 
 Care Team Providers
 
 
+-----------------------+------+-----------------+
| Care Team Member Name | Role | Phone           |
 
+-----------------------+------+-----------------+
| Erwin Steel MD   | PCP  | [phone] |
+-----------------------+------+-----------------+
 
 
 
 Encounter Details
 
 
+--------+-------------+------------+---------------------+------------------+
| Date   | Type        | Department | Care Team           | Description      |
+--------+-------------+------------+---------------------+------------------+
| / | Procedure - |            |   Record, Operation | Operative Report |
|    |             |            |                     |                  |
|        | Transcribed |            |                     |                  |
+--------+-------------+------------+---------------------+------------------+
 
 
 
 Social History
 
 
+----------------+-------+-----------+--------+------+
| Tobacco Use    | Types | Packs/Day | Years  | Date |
|                |       |           | Used   |      |
+----------------+-------+-----------+--------+------+
| Never Assessed |       |           |        |      |
+----------------+-------+-----------+--------+------+
 
 
 
+------------------+---------------+
| Sex Assigned at  | Date Recorded |
| Birth            |               |
+------------------+---------------+
| Not on file      |               |
+------------------+---------------+
 
 
 
+----------------+-------------+-------------+
| Job Start Date | Occupation  | Industry    |
+----------------+-------------+-------------+
| Not on file    | Not on file | Not on file |
+----------------+-------------+-------------+
 
 
 
+----------------+--------------+------------+
| Travel History | Travel Start | Travel End |
+----------------+--------------+------------+
 
 
 
+-------------------------------------+
| No recent travel history available. |
+-------------------------------------+
 documented as of this encounter
 
 Plan of Treatment
 
 Not on filedocumented as of this encounter
 
 Procedures
 
 
+------------------+--------+------------+----------------------+----------------------+
| Procedure Name   | Priori | Date/Time  | Associated Diagnosis | Comments             |
|                  | ty     |            |                      |                      |
+------------------+--------+------------+----------------------+----------------------+
| OPERATION RECORD |        | 1999 |                      |   Results for this   |
|                  |        |            |                      | procedure are in the |
|                  |        |            |                      |  results section.    |
+------------------+--------+------------+----------------------+----------------------+
 documented in this encounter
 
 Results
 OPERATION RECORD (1999)
 
+------------------------------------------------------------------------------------------+
| Transcriptions                                                                           |
+------------------------------------------------------------------------------------------+
|   Interface, Transcription In - 2006  3:11 AM PST                                  |
|    [facility] [facility]    |
| [place], [place] [address] [phone]                     [place]          |
| [facility] CenterOPERATION RECORDMed Rec No.:  [id number]           Date:           |
| 1999Name:   Neris ShaneRONAN SURGEON:                 Ghassan Doll M.D.   |
|                                  Sohail Curiel M.D.ASSISTANTS:                          |
| Diana Givens M.D.POSTOPERATIVE DIAGNOSIS(ES):        1.  Gastroesophageal reflux    |
| disease.                                    2.  Umbilical hernia.OPERATIONS PERFORMED:   |
|              1.  Laparoscopic Nissen fundoplication.                                     |
|   2.    Open  umbilical  herniorrhaphy.SPECIMEN(S) REMOVED:                              |
| None.ANESTHESIA:                         General endotracheal tube anesthesia byDr.      |
| Hebert.ESTIMATED BLOOD LOSS:               Minimal.FLUIDS:                              |
| 2700 cc of Crystalloid.COMPLICATIONS:                      None.DRAINS:                  |
|             None.COUNTS:                             Correct.INDICATIONS:                |
|          Mr. Shane  is  a 53-year-old gentlemanwith a history of left true vocal        |
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  He has also had  |
| symptoms of gastroesophagealreflux  disease  for  several years.   Recently  he  has     |
| had  evidence  ofesophagitis and aspirations per his ENT  physician,   Dr. Esposito, and he |
|  wasreferred for possible laparoscopic Nissen fundoplication.After assessment by .     |
| Dangelo Levi   in the Gastroenterology laboratoryand assessment by us, we also         |
| recommend laparoscopic Nissen fundoplication.He understands the risks benefits of the    |
| procedures and agreed to proceed.Informed consent was obtained prior to the              |
| procedure.PROCEDURE:                          Prior  to  initiation to our procedure,the |
|  patient was seen and evaluated by  Dr. Esposito for laryngeal dilation andplacement of an  |
| endotracheal tube.   Please  see  his  dictation  for thosedetails.  After initiation of |
|  general  endotracheal  tube  anesthesia,  thepatient's abdomen was prepped and draped   |
| in the usual sterile fashion.A skin incision was made just to the left  of the patient's |
|  superior aspectof the umbilicus, after it was infiltrated  with  0.25% Marcaine.  A     |
| Veressneedle was inserted through the incision and the abdomen was insufflated to15 mmHg |
|  pressure without difficulty.   A  10  mm  trocar  was then insertedthrough the skin     |
| incision. The 50 degree  laparoscope was introduced.  Nexta second 10 mm trocar was      |
| introduced  on  the  right  side of the patient'sabdomen after infiltration with 0.25%   |
| Marcaine.   Three  5 mm trocars werethen introduced, one in the epigastric  area  and    |
| the  two  others  on thepatient's right side of the abdomen after injection with 0.25%   |
| Marcaine.The areolar tissue overlying the esophagus  between  the  liver and stomachwas  |
| then divided using Bovie electrocautery.   This  was continued over theesophagus itself. |
|   This allowed exposure to the right tato of the diaphragmas well as to the esophagus.   |
| A window  between  the  right  tato  and  theposterior esophagus was then developed      |
| using blunt dissection.Next, the short gastrics were taken down  along  the  superior    |
 
| third of thegreater curvature of the stomach using the Harmonic device.  Once the        |
| shortgastric vessels were completely taken  down,  we  then proceeded to dissectout  the |
|   left crura of the diaphragm  and  the  posterior  aspect  of  theesophagus, allowing a |
|  window retroesophageally to be formed.  Further bluntdissection retroesophageal was     |
| performed  so  that  the  two  crura  of thediaphragm were easily opposable.  The        |
| endostitch  device was then insertedand used to suture the two right and  left  crura of |
|  the diaphragm togetherusing two simple stitches.  In this fashion,  the  diaphragmatic  |
| hiatus wasreapproximated.Next, a #56 Bengali bougie was passed  by  the  anesthesiologist |
|  through theesophagus and into the stomach with direct  visualization  performed by      |
| us.The  gastroesophageal junction was straightened  in  order  to  allow  easypassage    |
| of   the  bougie.   Next,   the   gastric   fundus   was   pulledretroesophageally in    |
| order to perform  the  wrap.  The wrap was noted to beappropriately loose.  The          |
| endostitch   device  was then used and two simplestitches   were   used   for  the       |
| fundoplication.    The   stitches   werefull-thickness  through  the  stomach  and       |
| partial-thickness  through  theesophagus  and  then  again  full-thickness   through     |
| the  stomach.   Uponcompletion of the fundoplication, the wrap was noted to be           |
| adequately looseagain.  The wrap encompassed the 2 cm  above the gastroesophageal        |
| junction.The abdomen was then re-evaluated and  adequate  hemostasis  was noted.         |
| Thebougie was removed without difficulty.   Additionally  the nasogastric tubewas also   |
| removed without difficulty simultaneously.   The trocars were thenremoved individually   |
| under direct visualization  and  the  pneumoperitoneumwas completely emptied from the    |
| abdomen.We   then  focused  our  attention  on   the   umbilical   hernia.               |
| Aninfraumbilical semicircular incision  was  made  and  carried  down  to thefascial     |
| layer. The umbilicus was then   from the fascia, revealingan approximately 1 cm |
|  umbilical defect. This was reapproximated using threeinterrupted sutures with #0        |
| Ethibond suture.  The umbilicus was then tackeddown  to  the  fascia  with  #3-0 Vicryl  |
|  suture.  The  skin  incision  wasreapproximated in a running subcuticular  fashion      |
| using #4-0 Vicryl suture.The two 10 mm trocar sites had the fascia  reapproximated  with |
|  #0 Ethibondsuture.   The skin incisions were all  closed  in  a  running                |
| subcuticularfashion using #4-0 Vicryl suture.  Steri-Strips  and sterile dressings       |
| wereapplied.The patient tolerated the procedure well  ,  was extubated in the            |
| OperatingRoom  and  was  transported  to  the Postanesthesia  Care  Unit  in             |
| stablecondition.                                   Ghassan Doll M.D.               |
|                      Sohail Curiel M.D.LOREE/Radha: 2000T:  1999 12:21          |
| [id number]:   ERWIN STEEL MD      [address] [place] [address] [address]      [place] [id number]          |
| JORGE Sadler M.D., F.A.C.P., F.A.C.G.                    |
|greater curvature of the stomach using the Harmonic device.  Once the short               |
|gastric vessels were completely taken  down,  we  then proceeded to dissect               |
|out  the  left crura of the diaphragm  and  the  posterior  aspect  of  the               |
|esophagus, allowing a window retroesophageally to be formed.  Further blunt               |
|dissection retroesophageal was performed  so  that  the  two  crura  of the               |
|diaphragm were easily opposable.  The  endostitch  device was then inserted               |
|and used to suture the two right and  left  crura of the diaphragm together               |
|using two simple stitches.  In this fashion,  the  diaphragmatic hiatus was               |
|reapproximated.                                                                          |
|                                                                                          |
|Next, a #56 Bengali bougie was passed  by  the  anesthesiologist through the               |
|esophagus and into the stomach with direct  visualization  performed by us.               |
|The  gastroesophageal junction was straightened  in  order  to  allow  easy               |
|passage   of   the  bougie.   Next,   the   gastric   fundus   was   pulled               |
|retroesophageally in order to perform  the  wrap.  The wrap was noted to be               |
|appropriately loose.  The endostitch   device  was then used and two simple               |
|stitches   were   used   for  the  fundoplication.    The   stitches   were               |
|full-thickness  through  the  stomach  and  partial-thickness  through  the               |
|esophagus  and  then  again  full-thickness   through  the  stomach.   Upon               |
|completion of the fundoplication, the wrap was noted to be adequately loose               |
|again.  The wrap encompassed the 2 cm  above the gastroesophageal junction.              |
|                                                                                          |
|                                                                                          |
|The abdomen was then re-evaluated and  adequate  hemostasis  was noted. The               |
 
|bougie was removed without difficulty.   Additionally  the nasogastric tube               |
|was also removed without difficulty simultaneously.   The trocars were then               |
|removed individually under direct visualization  and  the  pneumoperitoneum               |
|was completely emptied from the abdomen.                                                  |
|                                                                                          |
|We   then  focused  our  attention  on   the   umbilical   hernia.       An               |
|infraumbilical semicircular incision  was  made  and  carried  down  to the               |
|fascial layer. The umbilicus was then   from the fascia, revealing               |
|an approximately 1 cm umbilical defect. This was reapproximated using three               |
|interrupted sutures with #0 Ethibond suture.  The umbilicus was then tacked               |
|down  to  the  fascia  with  #3-0 Vicryl  suture.  The  skin  incision  was               |
|reapproximated in a running subcuticular  fashion using #4-0 Vicryl suture.               |
|The two 10 mm trocar sites had the fascia  reapproximated  with #0 Ethibond               |
|suture.   The skin incisions were all  closed  in  a  running  subcuticular              |
|fashion using #4-0 Vicryl suture.  Steri-Strips  and sterile dressings were               |
|applied.                                                                                 |
|                                                                                          |
|The patient tolerated the procedure well  ,  was extubated in the Operating               |
|Room  and  was  transported  to  the Postanesthesia  Care  Unit  in  stable               |
|condition.                                                                               |
|                                                                                          |
|                                                                                          |
|                                   Ghassan Doll M.D.                                |
|                                                                                          |
|                                                                                          |
|                                   Sohail Curiel M.D.                                    |
|                                                                                          |
|DN/ljm                                                                                    |
|D: 1999                                                                             |
|T:  1999 12:21 P                                                                    |
|[id number]                                                                                    |
|                                                                                          |
|cc:   ERWIN STEEL MD                                                                 |
|      [address]                                                                   |
|      [place] [id number]                                                                  |
|                                                                                          |
|                                                                                          |
|      Jimmy Esposito M.D.                                                                |
|                                                                                          |
|                                                                                          |
|      M. Dangelo Levi M.D., F.A.C.P., F.A.C.G.                                         |
+------------------------------------------------------------------------------------------+
 documented in this encounter
 
 Visit Diagnoses
 Not on filedocumented in this encounter

## 2019-12-06 NOTE — XMS
Encounter Summary
  Created on: 2019
 
 Wyatt Shane
 External Reference #: [id number]
 : 43
 Sex: Male
 
 Demographics
 
 
+-----------------------+------------------------+
| Address               | [address]     |
|                       | [place]  [address]   |
+-----------------------+------------------------+
| Home Phone            | [phone]        |
+-----------------------+------------------------+
| Preferred Language    | Unknown                |
+-----------------------+------------------------+
| Marital Status        |                 |
+-----------------------+------------------------+
| Druze Affiliation | LUT                    |
+-----------------------+------------------------+
| Race                  | White                  |
+-----------------------+------------------------+
| Ethnic Group          | Not  or  |
+-----------------------+------------------------+
 
 
 Author
 
 
+--------------+------------------------------+
| Author       | [facility] |
+--------------+------------------------------+
| Organization | [facility] |
+--------------+------------------------------+
| Address      | Unknown                      |
+--------------+------------------------------+
| Phone        | Unavailable                  |
+--------------+------------------------------+
 
 
 
 Support
 
 
+---------------+--------------+---------+-----------------+
| Name          | Relationship | Address | Phone           |
+---------------+--------------+---------+-----------------+
| Opal Shane | ECON         | Unknown | [phone] |
+---------------+--------------+---------+-----------------+
 
 
 
 Care Team Providers
 
 
 
+-----------------------+------+-----------------+
| Care Team Member Name | Role | Phone           |
+-----------------------+------+-----------------+
| Erwin Iniguez MD   | PCP  | [phone] |
+-----------------------+------+-----------------+
 
 
 
 Encounter Details
 
 
+--------+-------------+-----------------+---------------------+---------------+
| Date   | Type        | Department      | Care Team           | Description   |
+--------+-------------+-----------------+---------------------+---------------+
| / | Office      |   [facility]  |   Note, Outpatient  | Progress Note |
|    | Visit-Trans | MEDICINE        | Clinic              |               |
|        | cribed      |                 |                     |               |
+--------+-------------+-----------------+---------------------+---------------+
 
 
 
 Social History
 
 
+----------------+-------+-----------+--------+------+
| Tobacco Use    | Types | Packs/Day | Years  | Date |
|                |       |           | Used   |      |
+----------------+-------+-----------+--------+------+
| Never Assessed |       |           |        |      |
+----------------+-------+-----------+--------+------+
 
 
 
+------------------+---------------+
| Sex Assigned at  | Date Recorded |
| Birth            |               |
+------------------+---------------+
| Not on file      |               |
+------------------+---------------+
 
 
 
+----------------+-------------+-------------+
| Job Start Date | Occupation  | Industry    |
+----------------+-------------+-------------+
| Not on file    | Not on file | Not on file |
+----------------+-------------+-------------+
 
 
 
+----------------+--------------+------------+
| Travel History | Travel Start | Travel End |
+----------------+--------------+------------+
 
 
 
+-------------------------------------+
| No recent travel history available. |
+-------------------------------------+
 documented as of this encounter
 
 
 Progress Notes
 Interface, Transcription In - 10/15/2006  1:06 AM PDTCLINIC DATE:       2001
 
 OTOLARYNGOLOGY CLINIC
 
 SUBJECTIVE:  Mr. Shane seen back today in followup.  He is really doing very
 well.   He  has had a little hoarseness  recently.   He  has  been  working
 fighting fires, and he has inhaled a  lot  of  smoke; but otherwise, has no
 specific complaints.  The rest of his questionnaire is negative.
 
 OBJECTIVE:  HEENT: Ears: normal bilaterally.   Nose:   Normal  to  anterior
 rhinoscopy.  Mouth:  No lesions are seen.  Flexible fibreoptic laryngoscopy
 of the nasopharynx, oropharynx, hypopharynx,  and  larynx  is done.  He has
 the  expected  scarring  and  his  anterior  commissure  from  his  partial
 laryngectomy, but a patent airway and  no  evidence  of  recurrence.  NECK:
 Negative to palpation.
 
 ASSESSMENT:   Squamous  cell carcinoma  of  the  larynx.   No  evidence  of
 disease.
 
 PLAN:  We will see him back in 6 months.  He will have a chest x-ray by his
 primary care physician in the interim.
 
 Jimmy Esposito M.D.
 
 [facility] / 
 [id number] / [id number] / [id number] /
 D: 2001
 T: 2001
 
 cc:
 
 Erwin Iniguez M.D.
 [address]
 [place]  [address].
 
 Eddy Boone M.D.
 [address] [facility]
 [place]  [address].
 
 [id number] signed by Interface, Transcription In at 10/15/2006  1:06 AM PDTdocume
nted in this encounter
 
 Plan of Treatment
 Not on filedocumented as of this encounter
 
 Visit Diagnoses
 Not on filedocumented in this encounter

## 2019-12-06 NOTE — XMS
Encounter Summary
  Created on: 2019
 
 Wyatt Shane
 External Reference #: [id number]
 : 43
 Sex: Male
 
 Demographics
 
 
+-----------------------+------------------------+
| Address               | [address]     |
|                       | [place]  [address]   |
+-----------------------+------------------------+
| Home Phone            | [phone]        |
+-----------------------+------------------------+
| Preferred Language    | Unknown                |
+-----------------------+------------------------+
| Marital Status        |                 |
+-----------------------+------------------------+
| Religion Affiliation | LUT                    |
+-----------------------+------------------------+
| Race                  | White                  |
+-----------------------+------------------------+
| Ethnic Group          | Not  or  |
+-----------------------+------------------------+
 
 
 Author
 
 
+--------------+-------------+
| Organization | Unknown     |
+--------------+-------------+
| Address      | Unknown     |
+--------------+-------------+
| Phone        | Unavailable |
+--------------+-------------+
 
 
 
 Support
 
 
+---------------+--------------+---------+-----------------+
| Name          | Relationship | Address | Phone           |
+---------------+--------------+---------+-----------------+
| Opal Shane | ECON         | Unknown | [phone] |
+---------------+--------------+---------+-----------------+
 
 
 
 Care Team Providers
 
 
+-----------------------+------+-----------------+
| Care Team Member Name | Role | Phone           |
 
+-----------------------+------+-----------------+
| Erwin Iniguez MD   | PCP  | [phone] |
+-----------------------+------+-----------------+
 
 
 
 Encounter Details
 
 
+--------+-------------+------------+--------------------+-------------+
| Date   | Type        | Department | Care Team          | Description |
+--------+-------------+------------+--------------------+-------------+
| / | Transcribed |            |   Dictation, Other | Transcribed |
| 1998   |             |            |                    |             |
+--------+-------------+------------+--------------------+-------------+
 
 
 
 Social History
 
 
+----------------+-------+-----------+--------+------+
| Tobacco Use    | Types | Packs/Day | Years  | Date |
|                |       |           | Used   |      |
+----------------+-------+-----------+--------+------+
| Never Assessed |       |           |        |      |
+----------------+-------+-----------+--------+------+
 
 
 
+------------------+---------------+
| Sex Assigned at  | Date Recorded |
| Birth            |               |
+------------------+---------------+
| Not on file      |               |
+------------------+---------------+
 
 
 
+----------------+-------------+-------------+
| Job Start Date | Occupation  | Industry    |
+----------------+-------------+-------------+
| Not on file    | Not on file | Not on file |
+----------------+-------------+-------------+
 
 
 
+----------------+--------------+------------+
| Travel History | Travel Start | Travel End |
+----------------+--------------+------------+
 
 
 
+-------------------------------------+
| No recent travel history available. |
+-------------------------------------+
 documented as of this encounter
 
 Progress Notes
 Interface, Transcription In - 2006  3:04 AM [facility]                                    [place]
 
[facility] and [place] [address] [place], [place] [address] [phone] or [phone]
 
 1998
 
 BE PAULA MD
 [address]  [place]  [address]
 
 RE:       Wyatt Shane
 MR#       [id number]
 
 Dear Doctor Paolo:
 
 As we discussed on the phone, I did see Wyatt Shane in consultation.  I am in
 the process of obtaining a CT scan and reviewing his slides to determine
 the degree of dysplasia carcinoma in situ that is present.  I believe that
 this patient has significant reflux in addition which is contributing and I
 placed him on Prilosec on a b.i.d. during the healing phase to see if this
 will help us understand the epithelial process.
 
 I will be in touch with you.  I appreciate the chance to see him.
 
 Sincerely,
 
 Jimmy Esposito M.D.
 ,
 Otolaryngology
 Head and Neck Surgery
 
 Nimesh
 D: 98
 T: 98Electronically signed by Interface, Transcription In at 2006  3:04 AM PSTd
ocumented in this encounter
 
 Plan of Treatment
 Not on filedocumented as of this encounter
 
 Visit Diagnoses
 Not on filedocumented in this encounter

## 2019-12-06 NOTE — XMS
Encounter Summary
  Created on: 2019
 
 Wyatt Shane
 External Reference #: [id number]
 : 43
 Sex: Male
 
 Demographics
 
 
+-----------------------+------------------------+
| Address               | [address]     |
|                       | [place]  [address]   |
+-----------------------+------------------------+
| Home Phone            | [phone]        |
+-----------------------+------------------------+
| Preferred Language    | Unknown                |
+-----------------------+------------------------+
| Marital Status        |                 |
+-----------------------+------------------------+
| Holiness Affiliation | LUT                    |
+-----------------------+------------------------+
| Race                  | White                  |
+-----------------------+------------------------+
| Ethnic Group          | Not  or  |
+-----------------------+------------------------+
 
 
 Author
 
 
+--------------+-------------+
| Organization | Unknown     |
+--------------+-------------+
| Address      | Unknown     |
+--------------+-------------+
| Phone        | Unavailable |
+--------------+-------------+
 
 
 
 Support
 
 
+---------------+--------------+---------+-----------------+
| Name          | Relationship | Address | Phone           |
+---------------+--------------+---------+-----------------+
| Opal Shane | ECON         | Unknown | [phone] |
+---------------+--------------+---------+-----------------+
 
 
 
 Care Team Providers
 
 
+-----------------------+------+-----------------+
| Care Team Member Name | Role | Phone           |
 
+-----------------------+------+-----------------+
| Erwin Iniguez MD   | PCP  | [phone] |
+-----------------------+------+-----------------+
 
 
 
 Encounter Details
 
 
+--------+-------------+------------+--------------------+-------------+
| Date   | Type        | Department | Care Team          | Description |
+--------+-------------+------------+--------------------+-------------+
| / | Transcribed |            |   Dictation, Other | Transcribed |
| 1999   |             |            |                    |             |
+--------+-------------+------------+--------------------+-------------+
 
 
 
 Social History
 
 
+----------------+-------+-----------+--------+------+
| Tobacco Use    | Types | Packs/Day | Years  | Date |
|                |       |           | Used   |      |
+----------------+-------+-----------+--------+------+
| Never Assessed |       |           |        |      |
+----------------+-------+-----------+--------+------+
 
 
 
+------------------+---------------+
| Sex Assigned at  | Date Recorded |
| Birth            |               |
+------------------+---------------+
| Not on file      |               |
+------------------+---------------+
 
 
 
+----------------+-------------+-------------+
| Job Start Date | Occupation  | Industry    |
+----------------+-------------+-------------+
| Not on file    | Not on file | Not on file |
+----------------+-------------+-------------+
 
 
 
+----------------+--------------+------------+
| Travel History | Travel Start | Travel End |
+----------------+--------------+------------+
 
 
 
+-------------------------------------+
| No recent travel history available. |
+-------------------------------------+
 documented as of this encounter
 
 Progress Notes
 Interface, Transcription In - 2006  3:11 AM [facility]                                    [place]
 
[facility] and [place] [address] [place], [place] [address] [phone] or [phone]
 
 1999
 
 FRED MORIN MD
 [facility] DEPT OTOLARYNGOLOGY
 [address] [facility]   [address]
 
 RE:   Wyatt Shane
 MR#:  [id number]
 
 Dear Vadim:
 
 As you know, Mr. Shane underwent a laparoscopic Nissen fundoplication on the
 .   His procedure was uneventful.   He  had  a  2  cm  floppy
 fundoplication calibrated over a 60-Fr bougie. Postoperatively, he did well
 in the Post Anesthesia Care Unit, had  no  problem  with his airway.  I was
 able  do discharge him , the ,  after  he  tolerated  a  standard
 Nissen diet.  I will see Wyatt back in  approximately  two weeks and let you
 know how he is doing at that time.  By  the  way, I also owe some thanks to
 Dr. Levi, who really bent over backwards  to  get  Mr. Shane's motility
 done so that we could continue as scheduled.   Thank  you,  again,  for the
 opportunity to help care for Mr. Shane.   I wish you the best on your August
 sojourn.
 
 Yours sincerely,
 
 Ghassan Doll M.D.
 
 BCS:x17
 D:  1999
 T:  1999
 
 cc:
 
 EDMUNDO Levi M.D., F.A.C.P., F.A.CRAJENDRA
 , Medicine
 [facility]
 
 [id number] signed by Interface, Transcription In at 2006  3:11 AM PSTdocume
nted in this encounter
 
 Plan of Treatment
 Not on filedocumented as of this encounter
 
 Visit Diagnoses
 Not on filedocumented in this encounter

## 2019-12-06 NOTE — XMS
Encounter Summary
  Created on: 2019
 
 Wyatt Shane
 External Reference #: [id number]
 : 43
 Sex: Male
 
 Demographics
 
 
+-----------------------+------------------------+
| Address               | [address]     |
|                       | [place]  [address]   |
+-----------------------+------------------------+
| Home Phone            | [phone]        |
+-----------------------+------------------------+
| Preferred Language    | Unknown                |
+-----------------------+------------------------+
| Marital Status        |                 |
+-----------------------+------------------------+
| Worship Affiliation | LUT                    |
+-----------------------+------------------------+
| Race                  | White                  |
+-----------------------+------------------------+
| Ethnic Group          | Not  or  |
+-----------------------+------------------------+
 
 
 Author
 
 
+--------------+------------------------------+
| Author       | [facility] |
+--------------+------------------------------+
| Organization | [facility] |
+--------------+------------------------------+
| Address      | Unknown                      |
+--------------+------------------------------+
| Phone        | Unavailable                  |
+--------------+------------------------------+
 
 
 
 Support
 
 
+---------------+--------------+---------+-----------------+
| Name          | Relationship | Address | Phone           |
+---------------+--------------+---------+-----------------+
| Opal Shane | ECON         | Unknown | [phone] |
+---------------+--------------+---------+-----------------+
 
 
 
 Care Team Providers
 
 
 
+-----------------------+------+-----------------+
| Care Team Member Name | Role | Phone           |
+-----------------------+------+-----------------+
| Erwin Iniguez MD   | PCP  | [phone] |
+-----------------------+------+-----------------+
 
 
 
 Encounter Details
 
 
+--------+-------------+-----------------+---------------------+---------------+
| Date   | Type        | Department      | Care Team           | Description   |
+--------+-------------+-----------------+---------------------+---------------+
| 10/20/ | Office      |   [facility]  |   Note, Outpatient  | Progress Note |
| 2000   | Visit-Trans | MEDICINE        | Clinic              |               |
|        | cribed      |                 |                     |               |
+--------+-------------+-----------------+---------------------+---------------+
 
 
 
 Social History
 
 
+----------------+-------+-----------+--------+------+
| Tobacco Use    | Types | Packs/Day | Years  | Date |
|                |       |           | Used   |      |
+----------------+-------+-----------+--------+------+
| Never Assessed |       |           |        |      |
+----------------+-------+-----------+--------+------+
 
 
 
+------------------+---------------+
| Sex Assigned at  | Date Recorded |
| Birth            |               |
+------------------+---------------+
| Not on file      |               |
+------------------+---------------+
 
 
 
+----------------+-------------+-------------+
| Job Start Date | Occupation  | Industry    |
+----------------+-------------+-------------+
| Not on file    | Not on file | Not on file |
+----------------+-------------+-------------+
 
 
 
+----------------+--------------+------------+
| Travel History | Travel Start | Travel End |
+----------------+--------------+------------+
 
 
 
+-------------------------------------+
| No recent travel history available. |
+-------------------------------------+
 documented as of this encounter
 
 
 Progress Notes
 Interface, Transcription In - 2006  1:09 AM PSTCLINIC DATE:       10/20/2000
 
 OTOLARYNGOLOGY HEAD AND NECK SURGERY
 
 SUBJECTIVE:   Mr. Shane  is seen back  in  followup  with  respect  to  his
 laryngotracheal reconstruction as well  as  his laryngeal carcinoma.  He is
 basically asymptomatic from a voice standpoint right now and has completely
 normal exercise tolerance.  His voice is a little lower in pitch today, but
 otherwise, he has no complaints.
 
 PHYSICAL  EXAMINATION:   Flexible fiberoptic  laryngoscopy  is  done.   His
 subglottic and glottic airways look  good.   He  still  has a little bit of
 residual scar tissue anteriorly which  is  maturing;  otherwise,  he  has a
 fairly normal configuration.
 
 ASSESSMENT:  Status post normal tracheoplasty, doing well.
 
 PLAN:  I will see him back here in approximately  2  months  for his 2-year
 checkup.  He will be seen at the same time for videostroboscopy.
 
 Jimmy Esposito M.D.
 
 [facility] / 
 [id number] / [id number] / [id number] /
 D: 10/22/2000
 T: 10/21/2000
 
 cc:
 
 Eddy Boone M.D.
 [address] [facility] [place]
 [place]  [address]
 
 Erwin Iniguez M.D.
 [address] [facility]
 [place]  [address]
 
 [id number] signed by Interface, Transcription In at 2006  1:09 AM PSTdocume
nted in this encounter
 
 Plan of Treatment
 Not on filedocumented as of this encounter
 
 Visit Diagnoses
 Not on filedocumented in this encounter

## 2019-12-06 NOTE — XMS
Encounter Summary
  Created on: 2019
 
 Wyatt Shane
 External Reference #: [id number]
 : 43
 Sex: Male
 
 Demographics
 
 
+-----------------------+------------------------+
| Address               | [address]     |
|                       | [place]  [address]   |
+-----------------------+------------------------+
| Home Phone            | [phone]        |
+-----------------------+------------------------+
| Preferred Language    | Unknown                |
+-----------------------+------------------------+
| Marital Status        |                 |
+-----------------------+------------------------+
| Baptism Affiliation | LUT                    |
+-----------------------+------------------------+
| Race                  | White                  |
+-----------------------+------------------------+
| Ethnic Group          | Not  or  |
+-----------------------+------------------------+
 
 
 Author
 
 
+--------------+-------------+
| Organization | Unknown     |
+--------------+-------------+
| Address      | Unknown     |
+--------------+-------------+
| Phone        | Unavailable |
+--------------+-------------+
 
 
 
 Support
 
 
+---------------+--------------+---------+-----------------+
| Name          | Relationship | Address | Phone           |
+---------------+--------------+---------+-----------------+
| Opal Shane | ECON         | Unknown | [phone] |
+---------------+--------------+---------+-----------------+
 
 
 
 Care Team Providers
 
 
+-----------------------+------+-----------------+
| Care Team Member Name | Role | Phone           |
 
+-----------------------+------+-----------------+
| Erwin Iniguez MD   | PCP  | [phone] |
+-----------------------+------+-----------------+
 
 
 
 Encounter Details
 
 
+--------+-------------+------------+---------------------+------------------+
| Date   | Type        | Department | Care Team           | Description      |
+--------+-------------+------------+---------------------+------------------+
| / | Procedure - |            |   Record, Operation | Operative Report |
| 2000   |             |            |                     |                  |
|        | Transcribed |            |                     |                  |
+--------+-------------+------------+---------------------+------------------+
 
 
 
 Social History
 
 
+----------------+-------+-----------+--------+------+
| Tobacco Use    | Types | Packs/Day | Years  | Date |
|                |       |           | Used   |      |
+----------------+-------+-----------+--------+------+
| Never Assessed |       |           |        |      |
+----------------+-------+-----------+--------+------+
 
 
 
+------------------+---------------+
| Sex Assigned at  | Date Recorded |
| Birth            |               |
+------------------+---------------+
| Not on file      |               |
+------------------+---------------+
 
 
 
+----------------+-------------+-------------+
| Job Start Date | Occupation  | Industry    |
+----------------+-------------+-------------+
| Not on file    | Not on file | Not on file |
+----------------+-------------+-------------+
 
 
 
+----------------+--------------+------------+
| Travel History | Travel Start | Travel End |
+----------------+--------------+------------+
 
 
 
+-------------------------------------+
| No recent travel history available. |
+-------------------------------------+
 documented as of this encounter
 
 Plan of Treatment
 
 Not on filedocumented as of this encounter
 
 Procedures
 
 
+------------------+--------+------------+----------------------+----------------------+
| Procedure Name   | Priori | Date/Time  | Associated Diagnosis | Comments             |
|                  | ty     |            |                      |                      |
+------------------+--------+------------+----------------------+----------------------+
| OPERATION RECORD |        | 2000 |                      |   Results for this   |
|                  |        |            |                      | procedure are in the |
|                  |        |            |                      |  results section.    |
+------------------+--------+------------+----------------------+----------------------+
 documented in this encounter
 
 Results
 OPERATION RECORD (2000)
 
+------------------------------------------------------------------------------------------+
| Transcriptions                                                                           |
+------------------------------------------------------------------------------------------+
|   Interface, Transcription In - 2006  1:10 AM PST                                  |
|    [place] [address]     |
| [place], [place] [address] [phone]  |
|                     [facility]         |
| [id number]            Date: 2000Name: Darien Shane SURGEON:                |
|                Jimmy Esposito M.D.ASSISTANTS:                                     Kadie |
|  JORGE Clark,                 |
| M.D.PREOPERATIVE DIAGNOSIS(ES):Laryngeal stenosis.POSTOPERATIVE DIAGNOSIS(ES):Laryngeal  |
| stenosis.OPERATIONS PERFORMED:1.    Laryngotracheoplasty with costal cartilage graft.2.  |
|    Tracheotomy.SPECIMEN(S) REMOVED:None dictated.INDICATIONS:The patient is a            |
| 54-year-old gentleman  who  has a history of squamous cellcarcinoma of the larynx.  He   |
| has had a  vertical hemilaryngectomy.  He alsohas bad reflux laryngitis.  He developed   |
| a  stenosis  of  his  larynx as aresult.  He has had multiple dilations  with no         |
| resolution of his symptoms.He is taken to the Operating Room for the above               |
| procedures.FINDINGS:Autologous rib graft was harvested.  He had a posterior as well as   |
| anteriorgraft placed.  Tracheostomy tube 14 size was used for the                        |
| tracheostomy.PROCEDURE:After the patient was correctly identified  he  was  taken to the |
|  OperatingRoom  and  placed  in  the  supine  position.    General   anesthesia          |
| wasadministered and the patient intubated.   The  table  was then turned.  Theprior neck |
|  incision was injected with  1  percent  lidocaine with 1:100,000epinephrine.  The       |
| patient was then prepped  and draped in the usual sterilefashion.The  skin  incision     |
| was  made  and the  subplatysmal  skin  flap  elevatedsuperiorly and inferiorly.  The    |
| strap muscles were identified and they weredivided in the midline.  The midline  thyroid |
|   nodule  was identified.  Thestrap muscles were fairly scarred.   These  were  taken    |
| off in the midlinelaterally on both sides.  The cricoid  cartilage  was identified and   |
| split.The Metzenbaum scissors were used to enter  the larynx.  The cartilage cutsand     |
| mucosa cuts were then made under  direct  vision.   This  was made in amidline fashion.  |
|  The thyroid cartilage  was  then  retracted  laterally onboth sides.  The posterior     |
| wall of the  larynx was then well-visualized andinjected  with  1  percent  lidocaine    |
| with  1:100,000  epinephrine.   Theposterior larynx was sectioned sharply  through  the  |
| mucosa in the midline.The cricoid cartilage was cut in the midline posteriorly.The       |
| tracheotomy  was then performed.    The  midline  strap  incision  wascontinued          |
| inferiorly.   The  anterior   face   of  the  trachea  was  wellidentified. This was     |
| retracted superiorly.   Tracheotomy was performed justunder approximately the third      |
| trachea ring.  The 2-0 silk stitch was placedthrough this inferior ring.  The            |
| endotracheal  tube  was then placed in thetrachea and sewed down.The rib cartilage was   |
| then harvested.   An incision was made over the thirdrib.  The electrocautery was used   |
| to  dissect  the  soft  tissue and muscleuntil the rib was identified.  The periosteal   |
| elevator was used to elevatein a supraperiosteal plane over the rib.   The rib           |
 
| guillotine was then usedto section the rib proximally and distally and the rib removed.  |
|  Hemostasiswas assured.  This wound was then closed  in  layers using a 3-0 Vicryl       |
| forthe deep tissue and 4-0 subcuticular stitch for the skin.The posterior graft was then |
|  carved  out  of  the  rib  cartilage.  A smallledge was used on both sides.  The        |
| perichondrium  was  placed  to  face  intoward the mucosal edge.  It was placed  between |
|  the posterior edges of thecricoid.  It fit well.  The anterior rib graft was then       |
| carved in a taperedfashion.  Again the mucosal edge was  faced  inward and ledges were   |
| made onboth sides.  It was seated into the larynx  and  three  2-0 Prolene on eachside   |
| were placed and the graft parachuted  down  a  tied.  Before the graftwas tied down, a   |
| tracheostomy tube was  inserted  through  the  tracheotomysite and the T-portion brought |
|  out through a separate skin incision.The strap muscles were then closed along  the      |
| midline.  The platysmal layerwas then closed.  The skin was closed  with  5-0 nylon.     |
| The wound was thendressed with a fluff and a Marlon wrap.   The  patient was taken to the |
|  PostAnesthesia Care Unit after he was awake and in good condition.COUNTS:The final      |
| count was correct.ATTENDING SURGEON'S ATTESTATION:Pursuant to Federal Medicare           |
| guidelines,  Dr. Jimmy Esposito was present forall key portions of the case.Kadie      |
| JORGE Clark M.D.CY:xt4D:  2000T:  [id number]:  |
|The Metzenbaum scissors were used to enter  the larynx.  The cartilage cuts               |
|and mucosa cuts were then made under  direct  vision.   This  was made in a               |
|midline fashion.  The thyroid cartilage  was  then  retracted  laterally on               |
|both sides.  The posterior wall of the  larynx was then well-visualized and               |
|injected  with  1  percent  lidocaine   with  1:100,000  epinephrine.   The               |
|posterior larynx was sectioned sharply  through  the mucosa in the midline.               |
|The cricoid cartilage was cut in the midline posteriorly.                                 |
|                                                                                          |
|The  tracheotomy  was then performed.    The  midline  strap  incision  was               |
|continued  inferiorly.   The  anterior   face   of  the  trachea  was  well               |
|identified. This was retracted superiorly.   Tracheotomy was performed just              |
|under approximately the third trachea ring.  The 2-0 silk stitch was placed               |
|through this inferior ring.  The endotracheal  tube  was then placed in the               |
|trachea and sewed down.                                                                   |
|                                                                                          |
|The rib cartilage was then harvested.   An incision was made over the third               |
|rib.  The electrocautery was used to  dissect  the  soft  tissue and muscle              |
|until the rib was identified.  The periosteal  elevator was used to elevate               |
|in a supraperiosteal plane over the rib.   The rib guillotine was then used               |
|to section the rib proximally and distally and the rib removed.  Hemostasis               |
|was assured.  This wound was then closed  in  layers using a 3-0 Vicryl for               |
|the deep tissue and 4-0 subcuticular stitch for the skin.                                 |
|                                                                                          |
|The posterior graft was then carved  out  of  the  rib  cartilage.  A small               |
|ledge was used on both sides.  The perichondrium  was  placed  to  face  in               |
|toward the mucosal edge.  It was placed  between the posterior edges of the               |
|cricoid.  It fit well.  The anterior rib graft was then carved in a tapered              |
|fashion.  Again the mucosal edge was  faced  inward and ledges were made on              |
|both sides.  It was seated into the larynx  and  three  2-0 Prolene on each               |
|side were placed and the graft parachuted  down  a  tied.  Before the graft               |
|was tied down, a tracheostomy tube was  inserted  through  the  tracheotomy               |
|site and the T-portion brought out through a separate skin incision.                      |
|                                                                                          |
|The strap muscles were then closed along  the midline.  The platysmal layer               |
|was then closed.  The skin was closed  with  5-0 nylon.  The wound was then               |
|dressed with a fluff and a Marlon wrap.   The  patient was taken to the Post               |
|Anesthesia Care Unit after he was awake and in good condition.                            |
|                                                                                          |
|COUNTS:                                                                                  |
|The final count was correct.                                                              |
|                                                                                          |
|ATTENDING SURGEON'S ATTESTATION:                                                          |
|Pursuant to Federal Medicare guidelines,  Dr. Jimmy Esposito was present for               |
 
|all key portions of the case.                                                             |
|                                                                                          |
|Kadie Clark M.D.                 Jimmy Esposito M.D.                                     |
|                                                                                          |
|CY:xt4                                                                                    |
|D:  2000                                                                            |
|T:  2000                                                                            |
|                                                                                          |
|[id number]                                                                                    |
|                                                                                          |
|CC:                                                                                      |
+------------------------------------------------------------------------------------------+
 documented in this encounter
 
 Visit Diagnoses
 Not on filedocumented in this encounter

## 2019-12-06 NOTE — XMS
Encounter Summary
  Created on: 2019
 
 Wyatt Shane
 External Reference #: [id number]
 : 43
 Sex: Male
 
 Demographics
 
 
+-----------------------+------------------------+
| Address               | [address]     |
|                       | [place]  [address]   |
+-----------------------+------------------------+
| Home Phone            | [phone]        |
+-----------------------+------------------------+
| Preferred Language    | Unknown                |
+-----------------------+------------------------+
| Marital Status        |                 |
+-----------------------+------------------------+
| Holiness Affiliation | LUT                    |
+-----------------------+------------------------+
| Race                  | White                  |
+-----------------------+------------------------+
| Ethnic Group          | Not  or  |
+-----------------------+------------------------+
 
 
 Author
 
 
+--------------+------------------------------+
| Author       | [facility] |
+--------------+------------------------------+
| Organization | [facility] |
+--------------+------------------------------+
| Address      | Unknown                      |
+--------------+------------------------------+
| Phone        | Unavailable                  |
+--------------+------------------------------+
 
 
 
 Support
 
 
+---------------+--------------+---------+-----------------+
| Name          | Relationship | Address | Phone           |
+---------------+--------------+---------+-----------------+
| Opal Shane | ECON         | Unknown | [phone] |
+---------------+--------------+---------+-----------------+
 
 
 
 Care Team Providers
 
 
 
+-----------------------+------+-----------------+
| Care Team Member Name | Role | Phone           |
+-----------------------+------+-----------------+
| Erwin Iniguez MD   | PCP  | [phone] |
+-----------------------+------+-----------------+
 
 
 
 Encounter Details
 
 
+--------+----------+----------------------+-----------+-------------+
| Date   | Type     | Department           | Care Team | Description |
+--------+----------+----------------------+-----------+-------------+
| / | Results  |   Registration  [id number] |           |             |
|    | Only     |  [address] |           |             |
|        |          |  [place]  Mailcode: [id number] |           |             |
|        |          |   [place], [place]       |           |             |
|        |          | [address]           |           |             |
|        |          | [phone]         |           |             |
+--------+----------+----------------------+-----------+-------------+
 
 
 
 Social History
 
 
+----------------+-------+-----------+--------+------+
| Tobacco Use    | Types | Packs/Day | Years  | Date |
|                |       |           | Used   |      |
+----------------+-------+-----------+--------+------+
| Never Assessed |       |           |        |      |
+----------------+-------+-----------+--------+------+
 
 
 
+------------------+---------------+
| Sex Assigned at  | Date Recorded |
| Birth            |               |
+------------------+---------------+
| Not on file      |               |
+------------------+---------------+
 
 
 
+----------------+-------------+-------------+
| Job Start Date | Occupation  | Industry    |
+----------------+-------------+-------------+
| Not on file    | Not on file | Not on file |
+----------------+-------------+-------------+
 
 
 
+----------------+--------------+------------+
| Travel History | Travel Start | Travel End |
+----------------+--------------+------------+
 
 
 
+-------------------------------------+
 
| No recent travel history available. |
+-------------------------------------+
 documented as of this encounter
 
 Plan of Treatment
 Not on filedocumented as of this encounter
 
 Procedures
 
 
+----------------+--------+-------------+----------------------+----------------------+
| Procedure Name | Priori | Date/Time   | Associated Diagnosis | Comments             |
|                | ty     |             |                      |                      |
+----------------+--------+-------------+----------------------+----------------------+
| CBC TESTS 2    | Routin | 1999  |                      |   Results for this   |
|                | e      |  3:15 PM    |                      | procedure are in the |
|                |        | PDT         |                      |  results section.    |
+----------------+--------+-------------+----------------------+----------------------+
 documented in this encounter
 
 Results
 CBC TESTS 2 (1999  3:15 PM PDT)
 
+-------------+------------------+-----------+------------+--------------+
| Component   | Value            | Ref Range | Performed  | Pathologist  |
|             |                  |           | At         | Signature    |
+-------------+------------------+-----------+------------+--------------+
| WHITE CELL  |         6.2      | K/[facility] MM   |            |              |
| COUNT       |                  |           |            |              |
+-------------+------------------+-----------+------------+--------------+
| RED CELL    |         4.22 (L) | M/[facility] MM   |            |              |
| COUNT       |                  |           |            |              |
+-------------+------------------+-----------+------------+--------------+
| HEMOGLOBIN  |        13.1 (L)  | GM/DL     |            |              |
+-------------+------------------+-----------+------------+--------------+
| HEMATOCRIT  |        38.2 (L)  | %         |            |              |
+-------------+------------------+-----------+------------+--------------+
| MCV         |        90.6      | FL        |            |              |
+-------------+------------------+-----------+------------+--------------+
| MCH         |        31.       | PG        |            |              |
+-------------+------------------+-----------+------------+--------------+
| MCHC        |        34.2      | GM/DL     |            |              |
+-------------+------------------+-----------+------------+--------------+
| RDW         |        12.1      | %         |            |              |
+-------------+------------------+-----------+------------+--------------+
| PLATELET    |       247.       | K/CU MM   |            |              |
| COUNT       |                  |           |            |              |
+-------------+------------------+-----------+------------+--------------+
| MPV         |         9.       | FL        |            |              |
+-------------+------------------+-----------+------------+--------------+
 
 
 
+----------+
| Specimen |
+----------+
|          |
+----------+
 
 
 
 
+----------------------+------------------------+--------------------+--------------+
| Performing           | Address                | City/State/Zipcode | Phone Number |
| Organization         |                        |                    |              |
+----------------------+------------------------+--------------------+--------------+
|   [facility] |   [address]  | [place] [address] |              |
|  PATHOLOGY           | [address]                |                    |              |
+----------------------+------------------------+--------------------+--------------+
 documented in this encounter
 
 Visit Diagnoses
 Not on filedocumented in this encounter

## 2019-12-06 NOTE — XMS
Encounter Summary
  Created on: 2019
 
 Wyatt Shane
 External Reference #: [id number]
 : 43
 Sex: Male
 
 Demographics
 
 
+-----------------------+------------------------+
| Address               | [address]     |
|                       | [place]  [address]   |
+-----------------------+------------------------+
| Home Phone            | [phone]        |
+-----------------------+------------------------+
| Preferred Language    | Unknown                |
+-----------------------+------------------------+
| Marital Status        |                 |
+-----------------------+------------------------+
| Tenriism Affiliation | LUT                    |
+-----------------------+------------------------+
| Race                  | White                  |
+-----------------------+------------------------+
| Ethnic Group          | Not  or  |
+-----------------------+------------------------+
 
 
 Author
 
 
+--------------+------------------------------+
| Author       | [facility] |
+--------------+------------------------------+
| Organization | [facility] |
+--------------+------------------------------+
| Address      | Unknown                      |
+--------------+------------------------------+
| Phone        | Unavailable                  |
+--------------+------------------------------+
 
 
 
 Support
 
 
+---------------+--------------+---------+-----------------+
| Name          | Relationship | Address | Phone           |
+---------------+--------------+---------+-----------------+
| Opal Shane | ECON         | Unknown | [phone] |
+---------------+--------------+---------+-----------------+
 
 
 
 Care Team Providers
 
 
 
+-----------------------+------+-----------------+
| Care Team Member Name | Role | Phone           |
+-----------------------+------+-----------------+
| Erwin Iniguez MD   | PCP  | [phone] |
+-----------------------+------+-----------------+
 
 
 
 Encounter Details
 
 
+--------+-------------+-----------------+---------------------+---------------+
| Date   | Type        | Department      | Care Team           | Description   |
+--------+-------------+-----------------+---------------------+---------------+
| / | Office      |   [facility]  |   Note, Outpatient  | Progress Note |
|    | Visit-Trans | MEDICINE        | Clinic              |               |
|        | cribed      |                 |                     |               |
+--------+-------------+-----------------+---------------------+---------------+
 
 
 
 Social History
 
 
+----------------+-------+-----------+--------+------+
| Tobacco Use    | Types | Packs/Day | Years  | Date |
|                |       |           | Used   |      |
+----------------+-------+-----------+--------+------+
| Never Assessed |       |           |        |      |
+----------------+-------+-----------+--------+------+
 
 
 
+------------------+---------------+
| Sex Assigned at  | Date Recorded |
| Birth            |               |
+------------------+---------------+
| Not on file      |               |
+------------------+---------------+
 
 
 
+----------------+-------------+-------------+
| Job Start Date | Occupation  | Industry    |
+----------------+-------------+-------------+
| Not on file    | Not on file | Not on file |
+----------------+-------------+-------------+
 
 
 
+----------------+--------------+------------+
| Travel History | Travel Start | Travel End |
+----------------+--------------+------------+
 
 
 
+-------------------------------------+
| No recent travel history available. |
+-------------------------------------+
 documented as of this encounter
 
 
 Progress Notes
 Interface, Transcription In - 12/15/2006  3:03 AM PSTCLINIC DATE:       1999
 
 [facility]
 
 SUBJECTIVE:   Mr. Shane  is  a 53-year-old  gentleman,  status  post  right
 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
 status  post  radiation  and  has  presented  recently  for  dilatation  of
 subglottic stenosis.  He is two weeks  status  post  dilatation, up to a 32
 [place]amese dilator two weeks ago. Concomitantly,  he  underwent  laparotomy and
 Nissen fundoplication for severe reflux  disease.  He was seen today by Dr. Gonzalez in the clinic and was noted to have persistent difficulty clearing
 his throat with the sensation of the need to cough.  He has had no problems
 with dyspnea but does describe some nighttime episodes of laryngospasm.  He
 is seen here today for evaluation of these symptoms.
 
 He is scheduled for a modified barium  swallow  tomorrow  to  evaluate  the
 patency of Nissen fundoplication.  He is no longer on Prilosec.
 
 OBJECTIVE:  On exam, he is a well-nourished-appearing  gentleman, nontoxic,
 and in no obvious distress.  Eye exam:  Extraocular  movements  are intact.
 Pupils  are  equal,  round  and  reactive   to   light  and  accommodation.
 Examination  of  the nose:  Septum is  midline,  no  nasal  lesions  noted.
 Examination of the mouth:  Benign.   Posterior  oropharynx  is  clear.   On
 palpation  of  the  neck,  there is no  adenopathy,  well  healed  incision
 Examination of the ears bilaterally  is  normal  and  no  fluid.   Tympanic
 membranes are clear.  Neurologic exam  is  within  normal  limits.  Cranial
 nerves II through XII intact.
 
 PROCEDURE:  Nasopharyngoscopy is performed  after  the administration of 4%
 lidocaine  with phenylephrine.  The nasopharynx,  oropharynx,  hypopharynx,
 and larynx were visualized.  There was  scarring  at  the right side of the
 larynx   consistent   with  hemilaryngectomy.    There   were   significant
 supraglottic swelling.  The vocal cords were visualized and were patent and
 appeared to move normally.  We are unable  to  visualize  beyond  the vocal
 cords.   There  were significant erythema  at  the  supraglottis  as  well.
 Remainder of the exam was unremarkable.
 
 IMPRESSION:   Status post dilatation of subglottic stenosis with persistent
 symptoms of need to clear his throat.
 
 PLAN:  He is scheduled to get a barium  study tomorrow as per Dr. Gonzalez.
 At this point, we plan to start him on  a  Medrol Dosepak.  He is scheduled
 to return to clinic to be evaluated by  Dr. Esposito on 1999, at
 which time we will reevaluate the erythema and edema in his glottis.
 
 Guido Marlow M.D.
 
 GERARDO / [id number] / [id number] / [id number]
 
 [id number] signed by Interface, Transcription In at 12/15/2006  3:03 AM PSTInterf
ace, Transcription In - 2006  5:02 AM PSTCLINIC DATE:       1999
 
                               SURGERY CLINIC
 
 Mr. Shane is a 55-year-old male, who comes  in two weeks status post Nissen,
 with complaints of continued phlegm in  the  back of his throat.  He has no
 nausea and vomiting and no symptoms  of  reflux,  per  se, but he does have
 continued symptoms of phlegm in the  back  of  his  throat.  These were the
 
 same symptoms that he had prior to his  Nissen.   He  states that they were
 slightly better immediately postoperatively,  but  they have been gradually
 getting worse since then.
 
 PHYSICAL EXAM:  His heart is regular.  His lungs are clear.  His abdomen is
 soft and nontender.  The incisions are well healed.
 
 ASSESSMENT/PLAN:  Patient with laparoscopic  Nissen  and laryngeal dilation
 by Dr. Esposito, who comes in with continued  complaints  of  phlegm  and thin
 clear-to-white material building up in  the back of his throat.  There is a
 question of whether this may be due  to  his  Nissen  that  has  failed  or
 pulmonary secretions.  We will have  him  see  the  ear,  nose,  and throat
 doctors today, and the phone call was  made.   He  was  sent  down to their
 office  to  be seen.  We will also order  a  barium  swallow  for  tomorrow
 morning to evaluate his Nissen wrap  and  make  sure  it has not disrupted,
 although his symptoms do not necessarily indicate that it has.
 
 The patient was seen and examined with Dr. Doll.
 
 Portillo Smith M.D.
 
 Ghassan Doll M.D.
 
 TAM/bernie
 D: 1999
 T: 1999
 D:  1999
 T:  1999 11:13 P
 C: 1999  reese
 [id number]:
 
 Jimmy Esposito MD
 ENT
 PV 01Electronically signed by Interface, Transcription In at 2006  5:02 AM PSTdocumen
rosario in this encounter
 
 Plan of Treatment
 Not on filedocumented as of this encounter
 
 Visit Diagnoses
 Not on filedocumented in this encounter

## 2019-12-06 NOTE — XMS
Encounter Summary
  Created on: 2019
 
 Wyatt Shane
 External Reference #: [id number]
 : 43
 Sex: Male
 
 Demographics
 
 
+-----------------------+------------------------+
| Address               | [address]     |
|                       | [place]  [address]   |
+-----------------------+------------------------+
| Home Phone            | [phone]        |
+-----------------------+------------------------+
| Preferred Language    | Unknown                |
+-----------------------+------------------------+
| Marital Status        |                 |
+-----------------------+------------------------+
| Scientology Affiliation | LUT                    |
+-----------------------+------------------------+
| Race                  | White                  |
+-----------------------+------------------------+
| Ethnic Group          | Not  or  |
+-----------------------+------------------------+
 
 
 Author
 
 
+--------------+------------------------------+
| Author       | [facility] |
+--------------+------------------------------+
| Organization | [facility] |
+--------------+------------------------------+
| Address      | Unknown                      |
+--------------+------------------------------+
| Phone        | Unavailable                  |
+--------------+------------------------------+
 
 
 
 Support
 
 
+---------------+--------------+---------+-----------------+
| Name          | Relationship | Address | Phone           |
+---------------+--------------+---------+-----------------+
| Opal Shane | ECON         | Unknown | [phone] |
+---------------+--------------+---------+-----------------+
 
 
 
 Care Team Providers
 
 
 
+-----------------------+------+-----------------+
| Care Team Member Name | Role | Phone           |
+-----------------------+------+-----------------+
| Erwin Iniguez MD   | PCP  | [phone] |
+-----------------------+------+-----------------+
 
 
 
 Encounter Details
 
 
+--------+-------------+-----------------+---------------------+---------------+
| Date   | Type        | Department      | Care Team           | Description   |
+--------+-------------+-----------------+---------------------+---------------+
| / | Office      |   [facility]  |   Note, Outpatient  | Progress Note |
|    | Visit-Trans | MEDICINE        | Clinic              |               |
|        | cribed      |                 |                     |               |
+--------+-------------+-----------------+---------------------+---------------+
 
 
 
 Social History
 
 
+----------------+-------+-----------+--------+------+
| Tobacco Use    | Types | Packs/Day | Years  | Date |
|                |       |           | Used   |      |
+----------------+-------+-----------+--------+------+
| Never Assessed |       |           |        |      |
+----------------+-------+-----------+--------+------+
 
 
 
+------------------+---------------+
| Sex Assigned at  | Date Recorded |
| Birth            |               |
+------------------+---------------+
| Not on file      |               |
+------------------+---------------+
 
 
 
+----------------+-------------+-------------+
| Job Start Date | Occupation  | Industry    |
+----------------+-------------+-------------+
| Not on file    | Not on file | Not on file |
+----------------+-------------+-------------+
 
 
 
+----------------+--------------+------------+
| Travel History | Travel Start | Travel End |
+----------------+--------------+------------+
 
 
 
+-------------------------------------+
| No recent travel history available. |
+-------------------------------------+
 documented as of this encounter
 
 
 Progress Notes
 Interface, Transcription In - 12/15/2006  3:03 AM PSTCLINIC DATE:       1999
 
 [facility]
 
 SUBJECTIVE:   Mr. Shane  is  a 53-year-old  gentleman,  status  post  right
 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
 status  post  radiation  and  has  presented  recently  for  dilatation  of
 subglottic stenosis.  He is two weeks  status  post  dilatation, up to a 32
 Algerian dilator two weeks ago. Concomitantly,  he  underwent  laparotomy and
 Nissen fundoplication for severe reflux  disease.  He was seen today by Dr. Gonzalez in the clinic and was noted to have persistent difficulty clearing
 his throat with the sensation of the need to cough.  He has had no problems
 with dyspnea but does describe some nighttime episodes of laryngospasm.  He
 is seen here today for evaluation of these symptoms.
 
 He is scheduled for a modified barium  swallow  tomorrow  to  evaluate  the
 patency of Nissen fundoplication.  He is no longer on Prilosec.
 
 OBJECTIVE:  On exam, he is a well-nourished-appearing  gentleman, nontoxic,
 and in no obvious distress.  Eye exam:  Extraocular  movements  are intact.
 Pupils  are  equal,  round  and  reactive   to   light  and  accommodation.
 Examination  of  the nose:  Septum is  midline,  no  nasal  lesions  noted.
 Examination of the mouth:  Benign.   Posterior  oropharynx  is  clear.   On
 palpation  of  the  neck,  there is no  adenopathy,  well  healed  incision
 Examination of the ears bilaterally  is  normal  and  no  fluid.   Tympanic
 membranes are clear.  Neurologic exam  is  within  normal  limits.  Cranial
 nerves II through XII intact.
 
 PROCEDURE:  Nasopharyngoscopy is performed  after  the administration of 4%
 lidocaine  with phenylephrine.  The nasopharynx,  oropharynx,  hypopharynx,
 and larynx were visualized.  There was  scarring  at  the right side of the
 larynx   consistent   with  hemilaryngectomy.    There   were   significant
 supraglottic swelling.  The vocal cords were visualized and were patent and
 appeared to move normally.  We are unable  to  visualize  beyond  the vocal
 cords.   There  were significant erythema  at  the  supraglottis  as  well.
 Remainder of the exam was unremarkable.
 
 IMPRESSION:   Status post dilatation of subglottic stenosis with persistent
 symptoms of need to clear his throat.
 
 PLAN:  He is scheduled to get a barium  study tomorrow as per Dr. Gonzalez.
 At this point, we plan to start him on  a  Medrol Dosepak.  He is scheduled
 to return to clinic to be evaluated by  Dr. Esposito on 1999, at
 which time we will reevaluate the erythema and edema in his glottis.
 
 Guido Marlow M.D.
 
 GERARDO / [id number] / [id number] / [id number]
 
 [id number] signed by Interface, Transcription In at 12/15/2006  3:03 AM PSTInterf
ace, Transcription In - 2006  5:02 AM PSTCLINIC DATE:       1999
 
                               SURGERY CLINIC
 
 Mr. Shane is a 55-year-old male, who comes  in two weeks status post Nissen,
 with complaints of continued phlegm in  the  back of his throat.  He has no
 nausea and vomiting and no symptoms  of  reflux,  per  se, but he does have
 continued symptoms of phlegm in the  back  of  his  throat.  These were the
 
 same symptoms that he had prior to his  Nissen.   He  states that they were
 slightly better immediately postoperatively,  but  they have been gradually
 getting worse since then.
 
 PHYSICAL EXAM:  His heart is regular.  His lungs are clear.  His abdomen is
 soft and nontender.  The incisions are well healed.
 
 ASSESSMENT/PLAN:  Patient with laparoscopic  Nissen  and laryngeal dilation
 by Dr. Esposito, who comes in with continued  complaints  of  phlegm  and thin
 clear-to-white material building up in  the back of his throat.  There is a
 question of whether this may be due  to  his  Nissen  that  has  failed  or
 pulmonary secretions.  We will have  him  see  the  ear,  nose,  and throat
 doctors today, and the phone call was  made.   He  was  sent  down to their
 office  to  be seen.  We will also order  a  barium  swallow  for  tomorrow
 morning to evaluate his Nissen wrap  and  make  sure  it has not disrupted,
 although his symptoms do not necessarily indicate that it has.
 
 The patient was seen and examined with Dr. Doll.
 
 Portillo Smith M.D.
 
 Ghassan Doll M.D.
 
 TAM/bernie
 D: 1999
 T: 1999
 D:  1999
 T:  1999 11:13 P
 C: 1999  reese
 [id number]:
 
 Jimmy Esposito MD
 ENT
 PV 01Electronically signed by Interface, Transcription In at 2006  5:02 AM PSTdocumen
rosario in this encounter
 
 Plan of Treatment
 Not on filedocumented as of this encounter
 
 Visit Diagnoses
 Not on filedocumented in this encounter

## 2019-12-06 NOTE — XMS
Encounter Summary
  Created on: 2019
 
 Shane Wyatttien BroussardJosue
 External Reference #: [id number]
 : 43
 Sex: Male
 
 Demographics
 
 
+-----------------------+---------------------------+
| Address               | [address]        |
|                       | [place]  [address] |
+-----------------------+---------------------------+
| Home Phone            | [phone]           |
+-----------------------+---------------------------+
| Preferred Language    | Unknown                   |
+-----------------------+---------------------------+
| Marital Status        |                    |
+-----------------------+---------------------------+
| Jewish Affiliation | [id number]                      |
+-----------------------+---------------------------+
| Race                  | Unknown                   |
+-----------------------+---------------------------+
| Ethnic Group          | Unknown                   |
+-----------------------+---------------------------+
 
 
 Author
 
 
+--------------+--------------------------------------------+
| Author       | [facility]  |
|              | [facility] [place]                                |
+--------------+--------------------------------------------+
| Organization | [facility]  |
|              | [facility] [place]                                |
+--------------+--------------------------------------------+
| Address      | Unknown                                    |
+--------------+--------------------------------------------+
| Phone        | Unavailable                                |
+--------------+--------------------------------------------+
 
 
 
 Support
 
 
+---------------+--------------+--------------------+-----------------+
| Name          | Relationship | Address            | Phone           |
+---------------+--------------+--------------------+-----------------+
| Opal Shane | ECON         | [address]     | [phone] |
|               |              | [place]   |                 |
|               |              | [id number]              |                 |
+---------------+--------------+--------------------+-----------------+
 
 
 
 
 Care Team Providers
 
 
+-----------------------+------+-------------+
| Care Team Member Name | Role | Phone       |
+-----------------------+------+-------------+
 PCP  | Unavailable |
+-----------------------+------+-------------+
 
 
 
 Encounter Details
 
 
+--------+-----------+----------------------+-----------+-------------+
| Date   | Type      | Department           | Care Team | Description |
+--------+-----------+----------------------+-----------+-------------+
| / | Hospital  |   [facility] |           |             |
|  - | Encounter |  [facility]      |           |             |
|        |           | [address] [address] |           |             |
| / |           |   [place]    |           |             |
|    |           | [address]           |           |             |
|        |           | [phone]         |           |             |
+--------+-----------+----------------------+-----------+-------------+
 
 
 
 Social History
 
 
+----------------+-------+-----------+--------+------+
| Tobacco Use    | Types | Packs/Day | Years  | Date |
|                |       |           | Used   |      |
+----------------+-------+-----------+--------+------+
| Never Assessed |       |           |        |      |
+----------------+-------+-----------+--------+------+
 
 
 
+------------------+---------------+
| Sex Assigned at  | Date Recorded |
| Birth            |               |
+------------------+---------------+
| Not on file      |               |
+------------------+---------------+
 
 
 
+----------------+-------------+-------------+
| Job Start Date | Occupation  | Industry    |
+----------------+-------------+-------------+
| Not on file    | Not on file | Not on file |
+----------------+-------------+-------------+
 
 
 
+----------------+--------------+------------+
| Travel History | Travel Start | Travel End |
+----------------+--------------+------------+
 
 
 
 
+-------------------------------------+
| No recent travel history available. |
+-------------------------------------+
 documented as of this encounter
 
 Plan of Treatment
 
 
+--------+---------+-------------+----------------------+-------------+
| Date   | Type    | Specialty   | Care Team            | Description |
+--------+---------+-------------+----------------------+-------------+
| / | Office  | Pulmonology |   Juan F,          |             |
| 2019   | Visit   |             | Jessica Cheung,   |             |
|        |         |             | MD [address] |             |
|        |         |             |   [place],   |             |
|        |         |             | [place] [address]             |             |
|        |         |             | [phone]         |             |
|        |         |             | [phone] (Fax)   |             |
+--------+---------+-------------+----------------------+-------------+
| / | Office  | Cardiology  |   Eric Akins, |             |
| 2020   | Visit   |             |  MD [address]   |             |
|        |         |             | [place]  |             |
|        |         |             | [id number] [phone]  |             |
|        |         |             |  [phone] (Fax)  |             |
+--------+---------+-------------+----------------------+-------------+
 documented as of this encounter
 
 Visit Diagnoses
 Not on filedocumented in this encounter

## 2019-12-06 NOTE — XMS
Encounter Summary
  Created on: 2019
 
 Wyatt Shane
 External Reference #: [id number]
 : 43
 Sex: Male
 
 Demographics
 
 
+-----------------------+------------------------+
| Address               | [address]     |
|                       | [place]  [address]   |
+-----------------------+------------------------+
| Home Phone            | [phone]        |
+-----------------------+------------------------+
| Preferred Language    | Unknown                |
+-----------------------+------------------------+
| Marital Status        |                 |
+-----------------------+------------------------+
| Christian Affiliation | LUT                    |
+-----------------------+------------------------+
| Race                  | White                  |
+-----------------------+------------------------+
| Ethnic Group          | Not  or  |
+-----------------------+------------------------+
 
 
 Author
 
 
+--------------+------------------------------+
| Author       | [facility] |
+--------------+------------------------------+
| Organization | [facility] |
+--------------+------------------------------+
| Address      | Unknown                      |
+--------------+------------------------------+
| Phone        | Unavailable                  |
+--------------+------------------------------+
 
 
 
 Support
 
 
+---------------+--------------+---------+-----------------+
| Name          | Relationship | Address | Phone           |
+---------------+--------------+---------+-----------------+
| Opal Shane | ECON         | Unknown | [phone] |
+---------------+--------------+---------+-----------------+
 
 
 
 Care Team Providers
 
 
 
+-----------------------+------+-------------+
| Care Team Member Name | Role | Phone       |
+-----------------------+------+-------------+
 PCP  | Unavailable |
+-----------------------+------+-------------+
 
 
 
 Encounter Details
 
 
+--------+----------+----------------------+---------------------+-------------+
| Date   | Type     | Department           | Care Team           | Description |
+--------+----------+----------------------+---------------------+-------------+
| / | Results  |   General Surgery    |   Ghassan Doll,  |             |
|    | Only     | [address]  | MD  [address]     |             |
|        |          | [place]  Mailcode:   | [address]     |             |
|        |          | [id number]  [address]   | [place], [place]        |             |
|        |          | [address]     | [address]          |             |
|        |          | [place], [place]         | [phone]        |             |
|        |          | [address]           | [phone] (Fax)  |             |
|        |          | [phone]         |                     |             |
+--------+----------+----------------------+---------------------+-------------+
 
 
 
 Social History
 
 
+----------------+-------+-----------+--------+------+
| Tobacco Use    | Types | Packs/Day | Years  | Date |
|                |       |           | Used   |      |
+----------------+-------+-----------+--------+------+
| Never Assessed |       |           |        |      |
+----------------+-------+-----------+--------+------+
 
 
 
+------------------+---------------+
| Sex Assigned at  | Date Recorded |
| Birth            |               |
+------------------+---------------+
| Not on file      |               |
+------------------+---------------+
 
 
 
+----------------+-------------+-------------+
| Job Start Date | Occupation  | Industry    |
+----------------+-------------+-------------+
| Not on file    | Not on file | Not on file |
+----------------+-------------+-------------+
 
 
 
+----------------+--------------+------------+
| Travel History | Travel Start | Travel End |
+----------------+--------------+------------+
 
 
 
 
+-------------------------------------+
| No recent travel history available. |
+-------------------------------------+
 documented as of this encounter
 
 Plan of Treatment
 Not on filedocumented as of this encounter
 
 Procedures
 
 
+----------------+--------+-------------+----------------------+----------------------+
| Procedure Name | Priori | Date/Time   | Associated Diagnosis | Comments             |
|                | ty     |             |                      |                      |
+----------------+--------+-------------+----------------------+----------------------+
| ESOPHAGRAM     | Urgent | 1999  |                      |   Results for this   |
|                |        | 10:33 AM    |                      | procedure are in the |
|                |        | PDT         |                      |  results section.    |
+----------------+--------+-------------+----------------------+----------------------+
 documented in this encounter
 
 Results
 ESOPHAGRAM (1999 10:33 AM PDT)
 
+-------------+--------------------------+-----------+------------+--------------+
| Component   | Value                    | Ref Range | Performed  | Pathologist  |
|             |                          |           | At         | Signature    |
+-------------+--------------------------+-----------+------------+--------------+
| ESOPHAGUS,  | Radiologist 1: NONI,   |           |            |              |
| W/BARIUM    | CARMINA HARDY              |           |            |              |
|             | M.D.ESOPHAGRAM:          |           |            |              |
|             |   99.    Dictated  |           |            |              |
|             | 99 FINDINGS:   The |           |            |              |
|             |  esophageal motility was |           |            |              |
|             |  normal.   The distal    |           |            |              |
|             | most 5 cmof the          |           |            |              |
|             | esophagus is             |           |            |              |
|             | persistently narrowed.   |           |            |              |
|             |   The narrowing is       |           |            |              |
|             | smoothwith no mucosal    |           |            |              |
|             | abnormality.   The       |           |            |              |
|             | maximum diameter of the  |           |            |              |
|             | narrowingis              |           |            |              |
|             | approximately 5 mm.   No |           |            |              |
|             |  other esophageal        |           |            |              |
|             | abnormality was seen.    |           |            |              |
|             |   Theright vocal cord    |           |            |              |
|             | appeared to be           |           |            |              |
|             | paralyzed. IMPRESSION:   |           |            |              |
|             | The persistent narrowing |           |            |              |
|             |  of the distal esophagus |           |            |              |
|             |  is thought to be dueto  |           |            |              |
|             | the recent Nissen        |           |            |              |
|             | fundoplication.   It is  |           |            |              |
|             | thought to be            |           |            |              |
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
|             |  paralysis of the right  |           |            |              |
|             | vocal cord.   The        |           |            |              |
|             | examinationis otherwise  |           |            |              |
 
|             | negative.   END OF       |           |            |              |
|             | IMPRESSION:              |           |            |              |
+-------------+--------------------------+-----------+------------+--------------+
 
 
 
+----------+
| Specimen |
+----------+
|          |
+----------+
 
 
 
+----------------------+---------+--------------------+--------------+
| Performing           | Address | City/State/Zipcode | Phone Number |
| Organization         |         |                    |              |
+----------------------+---------+--------------------+--------------+
|   [facility] DEPARTMENT OF |         |                    |              |
|  RADIOLOGY           |         |                    |              |
+----------------------+---------+--------------------+--------------+
 documented in this encounter
 
 Visit Diagnoses
 Not on filedocumented in this encounter

## 2019-12-06 NOTE — XMS
Encounter Summary
  Created on: 2019
 
 Wyatt Shane
 External Reference #: [id number]
 : 43
 Sex: Male
 
 Demographics
 
 
+-----------------------+------------------------+
| Address               | [address]     |
|                       | [place]  [address]   |
+-----------------------+------------------------+
| Home Phone            | [phone]        |
+-----------------------+------------------------+
| Preferred Language    | Unknown                |
+-----------------------+------------------------+
| Marital Status        |                 |
+-----------------------+------------------------+
| Episcopalian Affiliation | LUT                    |
+-----------------------+------------------------+
| Race                  | White                  |
+-----------------------+------------------------+
| Ethnic Group          | Not  or  |
+-----------------------+------------------------+
 
 
 Author
 
 
+--------------+------------------------------+
| Author       | [facility] |
+--------------+------------------------------+
| Organization | [facility] |
+--------------+------------------------------+
| Address      | Unknown                      |
+--------------+------------------------------+
| Phone        | Unavailable                  |
+--------------+------------------------------+
 
 
 
 Support
 
 
+---------------+--------------+---------+-----------------+
| Name          | Relationship | Address | Phone           |
+---------------+--------------+---------+-----------------+
| Opal Shane | ECON         | Unknown | [phone] |
+---------------+--------------+---------+-----------------+
 
 
 
 Care Team Providers
 
 
 
+-----------------------+------+-----------------+
| Care Team Member Name | Role | Phone           |
+-----------------------+------+-----------------+
| Erwin Steel MD   | PCP  | [phone] |
+-----------------------+------+-----------------+
 
 
 
 Encounter Details
 
 
+--------+-------------+-----------------+---------------------+---------------+
| Date   | Type        | Department      | Care Team           | Description   |
+--------+-------------+-----------------+---------------------+---------------+
| / | Office      |   [facility]  |   Note, Outpatient  | Progress Note |
|    | Visit-Trans | MEDICINE        | Clinic              |               |
|        | cribed      |                 |                     |               |
+--------+-------------+-----------------+---------------------+---------------+
 
 
 
 Social History
 
 
+----------------+-------+-----------+--------+------+
| Tobacco Use    | Types | Packs/Day | Years  | Date |
|                |       |           | Used   |      |
+----------------+-------+-----------+--------+------+
| Never Assessed |       |           |        |      |
+----------------+-------+-----------+--------+------+
 
 
 
+------------------+---------------+
| Sex Assigned at  | Date Recorded |
| Birth            |               |
+------------------+---------------+
| Not on file      |               |
+------------------+---------------+
 
 
 
+----------------+-------------+-------------+
| Job Start Date | Occupation  | Industry    |
+----------------+-------------+-------------+
| Not on file    | Not on file | Not on file |
+----------------+-------------+-------------+
 
 
 
+----------------+--------------+------------+
| Travel History | Travel Start | Travel End |
+----------------+--------------+------------+
 
 
 
+-------------------------------------+
| No recent travel history available. |
+-------------------------------------+
 documented as of this encounter
 
 
 Progress Notes
 Interface, Transcription In - 2006  1:03 AM PSTCLINIC DATE:       1999
 
 OTOLARYNGOLOGY CLINIC
 
 SUBJECTIVE:  Mr. Shane is seen back today  in  follow up with respect to his
 glottic  carcinoma  and  subglottic stenosis.   He  has  not  yet  made  an
 appointment with Dr. Doll and that is going to be facilitated today.  I
 saw the patient with my senior Resident,  Dr. Larry Means, and details of my
 interaction with the patient are outlined  in his note.  In brief, however,
 although  better  than he was prior  to  his  dilation,  he  has  had  some
 regression again and is once again having  mild  difficulty  with breathing
 with exertion.
 
 OBJECTIVE:  Flexible fiberoptic laryngoscopy  shows a significant amount of
 glottic   edema  and  subglottic  edema   consistent   with   his   ongoing
 symptomatology of reflux.  I have told  that although we probably will need
 to re-dilate this, we are going to have  to  deal  with  the  reflux before
 anything improves and he understands that.
 
 ASSESSMENT AND PLAN:  I will see him  back  in  conjunction  with his visit
 with Dr. Doll.  I will dilate him before that if he has trouble.
 
 Jimmy Esposito M.D.
 
 MARIA ALEJANDRA/kalli
 D: 1999
 T: 1999
 
 cc:
 
 Ghassan Doll M.D.
 [facility]
 
 BE PAULA MD
 [address]
 [place]  [id number]
 
 ERWIN STEEL MD
 [address] [address]
 [place]  [id number] signed by Interface, Transcription In at 2006  1:03
 AM PSTInterface, Transcription In - 2006  1:03 AM PSTCLINIC DATE:       1999
 
 OTOLARYNGOLOGY CLINIC
 
 SUBJECTIVE:   The  patient  returns  to  the  clinic  today  regarding  his
 laryngeal  stenosis  and reflux issues.   He  is  status  post  a  vertical
 hemilaryngectomy several months ago.   He has had difficulty with laryngeal
 stenosis and stridor on exertion.  He underwent dilation one month ago.  He
 reports  that  he  initially had fairly  good  improvement  but  has  since
 returned to almost baseline.  He also  underwent  a  pH  probe  study  that
 showed significant gastroesophageal reflux including reflux at the proximal
 probe.  He has been unable to see Dr. Doll  because of work conflicts.
 In the meantime, he continues on his Prilosec 20 mg po twice daily.
 
 OBJECTIVE:  Flexible nasopharyngoscopy reveals prominent inflammation about
 the supraglottics and glottis with collapse  of the glottis and scarring on
 the right consistent with his hemilaryngectomy.   Overall,  the stenosis is
 slightly better than prior to dilation but still significantly narrowed.
 
 
 ASSESSMENT:   Transient  improvement   in   laryngeal  airway  status  post
 dilation, now returned almost to baseline.   The patient will be seeing Dr. Doll regarding a Nissen procedure  some time soon.  We would repeat his
 dilation at the time of the Nissen.
 
 PLAN:   The patient will telephone and  set  up  an  appointment  with  Dr. Doll.  We will coordinate the dilation at the same time as the Nissen.
 
 The patient was interviewed and examined  by  Dr. Jimmy Esposito  in  clinic
 today.
 
 Larry Means M.D.
 
 Jimmy Esposito M.D.
 
 JOHNNY/kalli
 D: 1999
 T: 1999Electronically signed by Interface, Transcription In at 2006  1:03 AM PS
Tdocumented in this encounter
 
 Plan of Treatment
 Not on filedocumented as of this encounter
 
 Visit Diagnoses
 Not on filedocumented in this encounter

## 2019-12-06 NOTE — XMS
Encounter Summary
  Created on: 2019
 
 Shane Wyatttien BroussardJosue
 External Reference #: [id number]
 : 43
 Sex: Male
 
 Demographics
 
 
+-----------------------+---------------------------+
| Address               | [address]        |
|                       | [place]  [address] |
+-----------------------+---------------------------+
| Home Phone            | [phone]           |
+-----------------------+---------------------------+
| Preferred Language    | Unknown                   |
+-----------------------+---------------------------+
| Marital Status        |                    |
+-----------------------+---------------------------+
| Hoahaoism Affiliation | [id number]                      |
+-----------------------+---------------------------+
| Race                  | Unknown                   |
+-----------------------+---------------------------+
| Ethnic Group          | Unknown                   |
+-----------------------+---------------------------+
 
 
 Author
 
 
+--------------+--------------------------------------------+
| Author       | [facility]  |
|              | and [place]                                |
+--------------+--------------------------------------------+
| Organization | [facility]  |
|              | and [place]                                |
+--------------+--------------------------------------------+
| Address      | Unknown                                    |
+--------------+--------------------------------------------+
| Phone        | Unavailable                                |
+--------------+--------------------------------------------+
 
 
 
 Support
 
 
+---------------+--------------+--------------------+-----------------+
| Name          | Relationship | Address            | Phone           |
+---------------+--------------+--------------------+-----------------+
| Opal Shane | ECON         | [address]     | [phone] |
|               |              | [place]   |                 |
|               |              | [address]              |                 |
+---------------+--------------+--------------------+-----------------+
 
 
 
 
 Care Team Providers
 
 
+-----------------------+------+-----------------+
| Care Team Member Name | Role | Phone           |
+-----------------------+------+-----------------+
| Erwin Iniguez MD | PCP  | [phone] |
+-----------------------+------+-----------------+
 
 
 
 Reason for Visit
 
 
+--------+----------+
| Reason | Comments |
+--------+----------+
| Apnea  |          |
+--------+----------+
 
 
 
 Encounter Details
 
 
+--------+---------+----------------------+----------------------+-------------------+
| Date   | Type    | Department           | Care Team            | Description       |
+--------+---------+----------------------+----------------------+-------------------+
| / | Office  |   [facility]      |   Sohail Campbell PA  | JOANN (obstructive  |
|    | Visit   | SLEEP DISORDER  [address]  |  [address]     | sleep apnea)      |
|        |         | [address]      | [facility] [place]      | (Primary Dx);     |
|        |         | [place] [address] | [address]  [phone]  | Cheyne-Gregory     |
|        |         |   [phone]       |  [phone] (Fax)  | respiration       |
+--------+---------+----------------------+----------------------+-------------------+
 
 
 
 Social History
 
 
+---------------+------------+-----------+--------+------------------+
| Tobacco Use   | Types      | Packs/Day | Years  | Date             |
|               |            |           | Used   |                  |
+---------------+------------+-----------+--------+------------------+
| Former Smoker | Cigarettes | 1.3       | 34     | Quit: 1996 |
+---------------+------------+-----------+--------+------------------+
 
 
 
+---------------------+---+---+---+
| Smokeless Tobacco:  |   |   |   |
| Never Used          |   |   |   |
+---------------------+---+---+---+
 
 
 
+-------------+-------------+---------+----------+
| Alcohol Use | Drinks/Week | oz/Week | Comments |
+-------------+-------------+---------+----------+
 
| No          |             |         |          |
+-------------+-------------+---------+----------+
 
 
 
+------------------+---------------+
| Sex Assigned at  | Date Recorded |
| Birth            |               |
+------------------+---------------+
| Not on file      |               |
+------------------+---------------+
 
 
 
+----------------+-------------+-------------+
| Job Start Date | Occupation  | Industry    |
+----------------+-------------+-------------+
| Not on file    | Not on file | Not on file |
+----------------+-------------+-------------+
 
 
 
+----------------+--------------+------------+
| Travel History | Travel Start | Travel End |
+----------------+--------------+------------+
 
 
 
+-------------------------------------+
| No recent travel history available. |
+-------------------------------------+
 documented as of this encounter
 
 Last Filed Vital Signs
 
 
+-------------------+----------------------+----------------------+----------+
| Vital Sign        | Reading              | Time Taken           | Comments |
+-------------------+----------------------+----------------------+----------+
| Blood Pressure    | 110/62               | 2013 10:04 AM  |          |
|                   |                      | PST                  |          |
+-------------------+----------------------+----------------------+----------+
| Pulse             | 70                   | 2013 10:04 AM  |          |
|                   |                      | PST                  |          |
+-------------------+----------------------+----------------------+----------+
| Temperature       | -                    | -                    |          |
+-------------------+----------------------+----------------------+----------+
| Respiratory Rate  | 16                   | 2013 10:04 AM  |          |
|                   |                      | PST                  |          |
+-------------------+----------------------+----------------------+----------+
| Oxygen Saturation | -                    | -                    |          |
+-------------------+----------------------+----------------------+----------+
| Inhaled Oxygen    | -                    | -                    |          |
| Concentration     |                      |                      |          |
+-------------------+----------------------+----------------------+----------+
| Weight            | 87.1 kg (192 lb 1.6  | 2013 10:04 AM  |          |
|                   | oz)                  | PST                  |          |
+-------------------+----------------------+----------------------+----------+
| Height            | -                    | -                    |          |
+-------------------+----------------------+----------------------+----------+
 
| Body Mass Index   | 31.01                | 2013  1:28 PM  |          |
|                   |                      | PDT                  |          |
+-------------------+----------------------+----------------------+----------+
 documented in this encounter
 
 Progress Notes
 Sohail Campbell PA - 2013 10:08 AM PSTFormatting of this note might be different from 
the original.
 Subjective: 
  
 Patient ID: Wyatt Shane is a 67 y.o. male.
 
 HPI
 
 last office visit was:  2013
 date of polysomnography: 10/07/2013 
 AHI: 77.4
 RDI: 77.4
 O2%: 86% with 15.8 minutes below 88% 
 Machine type: Respironics ASV BiPAP with nasal mask (Aditi)
 obtained from:  [facility]
 pressure: EPAP = 4cm;PSmin=0cm;PSmax=25cm;backup rate=auto
 Nights using BiPAP: 
 average usage (all nights): 4:57
 average usage (nights used): 4:57
 AHI: 10.6
 
 Wyatt comes in for BiPAP compliance.  He has used his BiPAP each of the first eight nights. 
 He did very well during the first four nights, but has struggled with a few of the nights s
ramon with getting into a comfortable rhythm with the BiPAP.  He has started the night doing 
fine and is able to fall asleep without difficult, but is waking in the early morning and st
ruggling with his breathing.  He says that he feels like he is working against the BiPAP and
 that it is trying to make him breathe too fast.  This is a common problem for many people t
hat are trying to adjust to the IPAP and EPAP.  It is often difficult to get into a rhythm w
ith the change of the pressures, especially when someone is focusing on their breathing.  As
 he continues using his BiPAP, he will get more comfortable with it.
 
 I have discussed the download in detail.  This shows that his sleep apnea is controlled, wi
th an AHI of 10.1.  This will likely improve as he continues to use the BiPAP.  It also show
s that his leaks are well controlled. 
 
 Review of Systems
 
 Objective: 
 Physical Exam
 
 Assessment: 
 
 Problem #1: OBSTRUCTIVE SLEEP APNEA (ICD 9-327.23)
 This is controlled with BiPAP.  He is doing well with his compliance, but has struggled a l
ittle with getting into a breathing rhythm with the BiPAP pressures.  He is also not sure if
 his current mask is the correct mask for him.
 
 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 
 This is controlled with ASV BiPAP.
 
 Plan: 
 
 He is to continue with his BiPAP indefinitely.  I recommended that he wear his BiPAP while 
watching television to help desensitize him to the pressures.  This should allow him to dereck
 
the more naturally and possibly help him forget about the BiPAP.  I also recommended that he
 go to [facility] to try a different mask.
 
 I will follow up again in 1 month, sooner prn.  Thirty minutes were spent face-to-face, wit
h the majority of time spent in counseling.
 
 Sohail Campbell PA-C
 
 cc: 
 Dr. Erwin Foote
 
 Electronically signed by ROWENA Greene at 2013  1:01 PM PSTdocumented in this enco
unter
 
 Plan of Treatment
 
 
+--------+---------+-------------+----------------------+-------------+
| Date   | Type    | Specialty   | Care Team            | Description |
+--------+---------+-------------+----------------------+-------------+
| / | Office  | Pulmonology |   Juan F,          |             |
|    | Visit   |             | Jessica Cheung,   |             |
|        |         |             | MD [address] |             |
|        |         |             |   [place]   |             |
|        |         |             | [place] [address]             |             |
|        |         |             | [phone]         |             |
|        |         |             | [phone] (Fax)   |             |
+--------+---------+-------------+----------------------+-------------+
| / | Office  | Cardiology  |   Eric Akins, |             |
|    | Visit   |             |  MD [address]   |             |
|        |         |             | [place]  |             |
|        |         |             | [id number] [phone]  |             |
|        |         |             |  [phone] (Fax)  |             |
+--------+---------+-------------+----------------------+-------------+
 documented as of this encounter
 
 Visit Diagnoses
 
 
+----------------------------------------------------------------------------------------+
| Diagnosis                                                                              |
+----------------------------------------------------------------------------------------+
|   JOANN (obstructive sleep apnea) - Primary  Obstructive sleep apnea (adult) (pediatric) |
+----------------------------------------------------------------------------------------+
|   Cheyne-Gregory respiration                                                            |
+----------------------------------------------------------------------------------------+
 documented in this encounter

## 2019-12-06 NOTE — XMS
Encounter Summary
  Created on: 2019
 
 Wyatt Shane
 External Reference #: [id number]
 : 43
 Sex: Male
 
 Demographics
 
 
+-----------------------+------------------------+
| Address               | [address]     |
|                       | [place]  [address]   |
+-----------------------+------------------------+
| Home Phone            | [phone]        |
+-----------------------+------------------------+
| Preferred Language    | Unknown                |
+-----------------------+------------------------+
| Marital Status        |                 |
+-----------------------+------------------------+
| Latter-day Affiliation | LUT                    |
+-----------------------+------------------------+
| Race                  | White                  |
+-----------------------+------------------------+
| Ethnic Group          | Not  or  |
+-----------------------+------------------------+
 
 
 Author
 
 
+--------------+------------------------------+
| Author       | [facility] |
+--------------+------------------------------+
| Organization | [facility] |
+--------------+------------------------------+
| Address      | Unknown                      |
+--------------+------------------------------+
| Phone        | Unavailable                  |
+--------------+------------------------------+
 
 
 
 Support
 
 
+---------------+--------------+---------+-----------------+
| Name          | Relationship | Address | Phone           |
+---------------+--------------+---------+-----------------+
| Opal Shane | ECON         | Unknown | [phone] |
+---------------+--------------+---------+-----------------+
 
 
 
 Care Team Providers
 
 
 
+-----------------------+------+-----------------+
| Care Team Member Name | Role | Phone           |
+-----------------------+------+-----------------+
| Erwin Iniguez MD   | PCP  | [phone] |
+-----------------------+------+-----------------+
 
 
 
 Encounter Details
 
 
+--------+-------------+----------------------+--------------------+-------------+
| Date   | Type        | Department           | Care Team          | Description |
+--------+-------------+----------------------+--------------------+-------------+
| / | Documentati |   Health Information |   Other, Faculty   |             |
| 2018   | on          |  Services  [address]   | [phone]       |             |
|        |             | [address]  |                    |             |
|        |             |  Mailcode: [id number]     |                    |             |
|        |             | [facility]  |                    |             |
|        |             | [place], [place]  |                    |             |
|        |             | [address]           |                    |             |
|        |             | [phone]         |                    |             |
+--------+-------------+----------------------+--------------------+-------------+
 
 
 
 Social History
 
 
+---------------+------------+-----------+--------+------------------+
| Tobacco Use   | Types      | Packs/Day | Years  | Date             |
|               |            |           | Used   |                  |
+---------------+------------+-----------+--------+------------------+
| Former Smoker | Cigarettes | 1         | 30     | Quit: 1996 |
+---------------+------------+-----------+--------+------------------+
 
 
 
+-------------+-------------+---------+----------+
| Alcohol Use | Drinks/Week | oz/Week | Comments |
+-------------+-------------+---------+----------+
| No          |             |         |          |
+-------------+-------------+---------+----------+
 
 
 
+------------------+---------------+
| Sex Assigned at  | Date Recorded |
| Birth            |               |
+------------------+---------------+
| Not on file      |               |
+------------------+---------------+
 
 
 
+----------------+-------------+-------------+
| Job Start Date | Occupation  | Industry    |
+----------------+-------------+-------------+
| Not on file    | Not on file | Not on file |
+----------------+-------------+-------------+
 
 
 
 
+----------------+--------------+------------+
| Travel History | Travel Start | Travel End |
+----------------+--------------+------------+
 
 
 
+-------------------------------------+
| No recent travel history available. |
+-------------------------------------+
 documented as of this encounter
 
 Plan of Treatment
 Not on filedocumented as of this encounter
 
 Visit Diagnoses
 Not on filedocumented in this encounter

## 2019-12-06 NOTE — XMS
Encounter Summary
  Created on: 2019
 
 Shane Wyatttien BroussardJosue
 External Reference #: [id number]
 : 43
 Sex: Male
 
 Demographics
 
 
+-----------------------+---------------------------+
| Address               | [address]        |
|                       | [place]  [address] |
+-----------------------+---------------------------+
| Home Phone            | [phone]           |
+-----------------------+---------------------------+
| Preferred Language    | Unknown                   |
+-----------------------+---------------------------+
| Marital Status        |                    |
+-----------------------+---------------------------+
| Gnosticist Affiliation | 1028                      |
+-----------------------+---------------------------+
| Race                  | Unknown                   |
+-----------------------+---------------------------+
| Ethnic Group          | Unknown                   |
+-----------------------+---------------------------+
 
 
 Author
 
 
+--------------+--------------------------------------------+
| Author       | [facility]  |
|              | and [place]                                |
+--------------+--------------------------------------------+
| Organization | [facility]  |
|              | and [place]                                |
+--------------+--------------------------------------------+
| Address      | Unknown                                    |
+--------------+--------------------------------------------+
| Phone        | Unavailable                                |
+--------------+--------------------------------------------+
 
 
 
 Support
 
 
+---------------+--------------+--------------------+-----------------+
| Name          | Relationship | Address            | Phone           |
+---------------+--------------+--------------------+-----------------+
| Opal Shane | ECON         | [address]     | [phone] |
|               |              | [place]   |                 |
|               |              | [address]              |                 |
+---------------+--------------+--------------------+-----------------+
 
 
 
 
 Care Team Providers
 
 
+------------------------+------+-----------------+
| Care Team Member Name  | Role | Phone           |
+------------------------+------+-----------------+
| Calvin Robertson MD | PCP  | [phone] |
+------------------------+------+-----------------+
 
 
 
 Reason for Visit
 
 
+--------+-------------------+
| Reason | Comments          |
+--------+-------------------+
| Other  |  appointment |
+--------+-------------------+
 
 
 
 Encounter Details
 
 
+--------+-----------+---------------------+----------------------+---------------+
| Date   | Type      | Department          | Care Team            | Description   |
+--------+-----------+---------------------+----------------------+---------------+
| 10/28/ | Telephone |   [facility]     |   Juan F,          | Other (  |
| 2019   |           | PULMONOLOGY  [address]   | Jessica Cheung,   | appointment)  |
|        |           | [address] [place] [address]   | MD  [address] |               |
|        |           | [place], [place]        |   [place]  [place],   |               |
|        |           | [address]          | [place] [address]             |               |
|        |           | [phone]        | [phone]         |               |
|        |           |                     | [phone] (Fax)   |               |
+--------+-----------+---------------------+----------------------+---------------+
 
 
 
 Social History
 
 
+---------------+------------+-----------+--------+------------------+
| Tobacco Use   | Types      | Packs/Day | Years  | Date             |
|               |            |           | Used   |                  |
+---------------+------------+-----------+--------+------------------+
| Former Smoker | Cigarettes | 1         | 34     | Quit: 1996 |
+---------------+------------+-----------+--------+------------------+
 
 
 
+---------------------+---+---+---+
| Smokeless Tobacco:  |   |   |   |
| Never Used          |   |   |   |
+---------------------+---+---+---+
 
 
 
+-------------+-------------+---------+----------+
 
| Alcohol Use | Drinks/Week | oz/Week | Comments |
+-------------+-------------+---------+----------+
| No          |             |         |          |
+-------------+-------------+---------+----------+
 
 
 
+------------------+---------------+
| Sex Assigned at  | Date Recorded |
| Birth            |               |
+------------------+---------------+
| Not on file      |               |
+------------------+---------------+
 
 
 
+----------------+-------------+-------------+
| Job Start Date | Occupation  | Industry    |
+----------------+-------------+-------------+
| Not on file    | Not on file | Not on file |
+----------------+-------------+-------------+
 
 
 
+----------------+--------------+------------+
| Travel History | Travel Start | Travel End |
+----------------+--------------+------------+
 
 
 
+-------------------------------------+
| No recent travel history available. |
+-------------------------------------+
 documented as of this encounter
 
 Plan of Treatment
 
 
+--------+---------+-------------+----------------------+-------------+
| Date   | Type    | Specialty   | Care Team            | Description |
+--------+---------+-------------+----------------------+-------------+
| / | Office  | Pulmonology |   Juan F,          |             |
| 2019   | Visit   |             | Jessica Cheung,   |             |
|        |         |             | MD  [address]  |             |
|        |         |             |   [place]   |             |
|        |         |             | [place] [address]             |             |
|        |         |             | [phone]         |             |
|        |         |             | [phone] (Fax)   |             |
+--------+---------+-------------+----------------------+-------------+
| / | Office  | Cardiology  |   Eric Akins, |             |
| 2020   | Visit   |             |  MD  [address]   |             |
|        |         |             | [place]  |             |
|        |         |             | [address]  [phone]  |             |
|        |         |             |  [phone] (Fax)  |             |
+--------+---------+-------------+----------------------+-------------+
 documented as of this encounter
 
 Visit Diagnoses
 Not on filedocumented in this encounter

## 2019-12-06 NOTE — XMS
Encounter Summary
  Created on: 2019
 
 Shane Wyatttien BroussardJosue
 External Reference #: [id number]
 : 43
 Sex: Male
 
 Demographics
 
 
+-----------------------+---------------------------+
| Address               | [address]        |
|                       | [place]  [address] |
+-----------------------+---------------------------+
| Home Phone            | [phone]           |
+-----------------------+---------------------------+
| Preferred Language    | Unknown                   |
+-----------------------+---------------------------+
| Marital Status        |                    |
+-----------------------+---------------------------+
| Congregational Affiliation | [id number]                      |
+-----------------------+---------------------------+
| Race                  | Unknown                   |
+-----------------------+---------------------------+
| Ethnic Group          | Unknown                   |
+-----------------------+---------------------------+
 
 
 Author
 
 
+--------------+--------------------------------------------+
| Author       | [facility]  |
|              | [facility] [place]                                |
+--------------+--------------------------------------------+
| Organization | [facility]  |
|              | [facility] [place]                                |
+--------------+--------------------------------------------+
| Address      | Unknown                                    |
+--------------+--------------------------------------------+
| Phone        | Unavailable                                |
+--------------+--------------------------------------------+
 
 
 
 Support
 
 
+---------------+--------------+--------------------+-----------------+
| Name          | Relationship | Address            | Phone           |
+---------------+--------------+--------------------+-----------------+
| Opal Shane | ECON         | [address]     | [phone] |
|               |              | [place]   |                 |
|               |              | [id number]              |                 |
+---------------+--------------+--------------------+-----------------+
 
 
 
 
 Care Team Providers
 
 
+-----------------------+------+-----------------+
| Care Team Member Name | Role | Phone           |
+-----------------------+------+-----------------+
| Erwin Iniguez MD | PCP  | [phone] |
+-----------------------+------+-----------------+
 
 
 
 Reason for Visit
 
 
+---------+----------+
| Reason  | Comments |
+---------+----------+
| Consult |          |
+---------+----------+
| Snoring |          |
+---------+----------+
 
 
 
 Encounter Details
 
 
+--------+---------+----------------------+---------------------+----------------------+
| Date   | Type    | Department           | Care Team           | Description          |
+--------+---------+----------------------+---------------------+----------------------+
| / | Office  |   [facility]      |   Luis Carlos Perez  | JOANN (obstructive     |
|    | Visit   | SLEEP DISORDER  [address]  | MD [address]   | sleep apnea)         |
|        |         | [address]  [place]      | [address]  [place]    | (Primary Dx);        |
|        |         | [place] [address] | [place] [address]     | Parasomnia; Heart    |
|        |         |   [phone]       | [phone]        | failure (HCC);       |
|        |         |                      | [phone] (Fax)  | Atrial fibrillation  |
|        |         |                      |                     | (HCC); HBP (high     |
|        |         |                      |                     | blood pressure);     |
|        |         |                      |                     | ASCVD                |
|        |         |                      |                     | (arteriosclerotic    |
|        |         |                      |                     | cardiovascular       |
|        |         |                      |                     | disease); Raynaud    |
|        |         |                      |                     | disease;             |
|        |         |                      |                     | Fibromyalgia; Aortic |
|        |         |                      |                     |  aneurysm (HCC);     |
|        |         |                      |                     | Cancer of vocal cord |
|        |         |                      |                     |  (HCC)               |
+--------+---------+----------------------+---------------------+----------------------+
 
 
 
 Social History
 
 
+---------------+------------+-----------+--------+------------------+
| Tobacco Use   | Types      | Packs/Day | Years  | Date             |
|               |            |           | Used   |                  |
+---------------+------------+-----------+--------+------------------+
| Former Smoker | Cigarettes | 1.3       | 34     | Quit: 1996 |
 
+---------------+------------+-----------+--------+------------------+
 
 
 
+---------------------+---+---+---+
| Smokeless Tobacco:  |   |   |   |
| Never Used          |   |   |   |
+---------------------+---+---+---+
 
 
 
+-------------+-------------+---------+----------+
| Alcohol Use | Drinks/Week | oz/Week | Comments |
+-------------+-------------+---------+----------+
| No          |             |         |          |
+-------------+-------------+---------+----------+
 
 
 
+------------------+---------------+
| Sex Assigned at  | Date Recorded |
| Birth            |               |
+------------------+---------------+
| Not on file      |               |
+------------------+---------------+
 
 
 
+----------------+-------------+-------------+
| Job Start Date | Occupation  | Industry    |
+----------------+-------------+-------------+
| Not on file    | Not on file | Not on file |
+----------------+-------------+-------------+
 
 
 
+----------------+--------------+------------+
| Travel History | Travel Start | Travel End |
+----------------+--------------+------------+
 
 
 
+-------------------------------------+
| No recent travel history available. |
+-------------------------------------+
 documented as of this encounter
 
 Last Filed Vital Signs
 
 
+-------------------+----------------------+----------------------+----------+
| Vital Sign        | Reading              | Time Taken           | Comments |
+-------------------+----------------------+----------------------+----------+
| Blood Pressure    | 106/66               | 2013  1:28 PM  |          |
|                   |                      | PDT                  |          |
+-------------------+----------------------+----------------------+----------+
| Pulse             | 78                   | 2013  1:28 PM  |          |
|                   |                      | PDT                  |          |
+-------------------+----------------------+----------------------+----------+
| Temperature       | -                    | -                    |          |
 
+-------------------+----------------------+----------------------+----------+
| Respiratory Rate  | 16                   | 2013  1:28 PM  |          |
|                   |                      | PDT                  |          |
+-------------------+----------------------+----------------------+----------+
| Oxygen Saturation | -                    | -                    |          |
+-------------------+----------------------+----------------------+----------+
| Inhaled Oxygen    | -                    | -                    |          |
| Concentration     |                      |                      |          |
+-------------------+----------------------+----------------------+----------+
| Weight            | 91.9 kg (202 lb 11.2 | 2013  1:28 PM  |          |
|                   |  oz)                 | PDT                  |          |
+-------------------+----------------------+----------------------+----------+
| Height            | 167.6 cm (5' 6")     | 2013  1:28 PM  |          |
|                   |                      | PDT                  |          |
+-------------------+----------------------+----------------------+----------+
| Body Mass Index   | 32.72                | 2013  1:28 PM  |          |
|                   |                      | PDT                  |          |
+-------------------+----------------------+----------------------+----------+
 documented in this encounter
 
 Patient Instructions
 Patient Instructions Luis Carlos Perez Jr., MD - 2013  2:27 PM PDTI think you have Obs
tructive Sleep Apnea for which we need to do a sleep study.Electronically signed by Luis Carlos Perez Jr., MD at 2013  2:27 PM PDT
 documented in this encounter
 
 Progress Notes
 Elizabeth Houston - 2013  1:42 PM PDTFormatting of this note might be different from the
 original.
  13 1300 
 Beck Depression Inventory-II 
 Depression Score 4 - Minimal depression 
 Insomnia Severity Index 
 Insomnia Severity Index 17  
 Anthon Sleepiness Scale 
 Sitting and reading 2 
 Watching TV 3 
 Sitting, inactive in a public place (e.g. a theatre or a meeting) 2 
 As a passenger in a car for an hour without a break 3 
 Lying down to rest in the afternoon when circumstances permit 3 
 Sitting and talking to someone 1 
 Sitting quietly after a lunch without alcohol 2 
 In a car, while stopped for a few minutes in traffic 1 
 Total score 17  
 [id number]-36v2  Score 
 PF 40.2  
 RP 37.26  
 BP 32.96  
 GH 43.4  
 VT 33.36  
 SF 24.13  
 RE 24.78  
 MH 33.11  
 PCS 42.6  
 MCS 25.57  
  Electronically signed by Luis Carlos Perez Jr., MD at 2013  2:48 PM PDTLuis Carlos Perez Jr., MD - 2013  1:41 PM PDTFormatting of this note might be different from the origi
nal.
 [facility]
 [place] [address]
 
 Ref: Erwin Iniguez
 
 CC: 
 Chief Complaint 
 Patient presents with 
   Consult 
   Snoring 
 
 History of the Present Illness:This is a 67 year old male who is referred for sleep medicin
e consultation by Dr. CARMINA Iniguez because of possible JOANN. Other significant medical issues in
clude Atrial Fibrillation and heart failure, HBP, hypothyroidism, aortic aneurysm (3.3cm in 
2013). The patient's records (Dr. Iniguez's note of 9/3/2013) are reviewed. The patient
 is interviewed and examined. Dr. Foote has advised a sleep study be performed. He is a ret
ired . Bedtime is usually 10pm and rise time is usually about 9am-10am. He estim
ates a latency to sleep onset of under a minute. He has nocturia 3-4 times a night and he ge
ts back to sleep easily afterwards. He has night sweats but he denies nocturnal heartburn. DES rodriguez awakens every morning with a dry mouth and nasal/sinus congestion.
 
 He dreams occasionally. He isn't a sleep walker. He did have some apparent dream enactment 
during sleep but he was taking gabapentin at the time. This was last winter. The gabapentin 
has been discontinued and his wife says that the dream enactment has improved. Occasionally 
he will lift his arms at night during his sleep. He might be talking then too. She has never
 awakened him to see if he dreams. He denies sleep paralysis. She doesn't think the activiti
es are seizures. Usually this is the right arm. His wife doesn't know what time of night thi
s occurs.
 
 He denies restlessness in his legs or arms at night. His wife has not noted that his legs o
r arms kick or twitch rhythmically at night after he falls asleep although she has noted clifford
t his right arm will occasionally shake at night after he falls asleep.
 
 His wife says that he snores every night. She has noted that he frequently stops breathing 
at night. She says that sometimes the breathing will get more and more shallow and then he w
ill stop and then start up again. He also can snore and stop breathing and then snort to sta
rt up again.
 
 In the daytime he feels fatigued and tired. "I just don't have a lot of energy." He naps ab
out 3 days a week usually from 2-3:3pm. He doesn't fall asleep driving but his wife says clifford
t his eyes will close when he drives - "but it seems like I'm not asleep, in my mind." He dr
inks about 4 - 5 cups of coffee a day. Occasionally he will also take a "5-Hour Energy Drink
." He does eat a lot of chocolate. He denies cataplexy.
 
 Past Medical History:  has a past medical history of HBP (high blood pressure); Atrial fibr
illation; Heart failure; Hyperlipidemia; Raynaud disease; Fibromyalgia; ASCVD (arteriosclero
tic cardiovascular disease); Aortic aneurysm; JOANN (obstructive sleep apnea); and Cancer of v
ocal cord ().
  has past surgical history that includes Coronary artery bypass graft (); Esophagus mikhail
karolyn (); Knee arthroscopy (); hernia repair (); laryngectomy (); Kidney ston
e surgery; Tracheal surgery (); Carpal tunnel release (); and basal cell cancer rese
ction left year ().
 Allergies 
 Allergen Reactions 
   Diltiazem Hcl  
   Lipitor  
   Propranolol Hcl  
 
 Current Outpatient Prescriptions 
 Medication Status Sig Dispense Refill 
   acyclovir (ZOVIRAX) 400 MG tablet Active Take 400 mg by mouth 3 times daily as needed. 
 
    
   Cholecalciferol (VITAMIN D3) 2000 UNITS CAPS Active Take 2,000 Units by mouth Daily.   
  
   cyanocobalamin (VITAMIN B-12) 1,000 mcg/mL injection Active injected intramuscularly on
ce a month     
   digoxin (LANOXIN) 250 mcg tablet Active Take 250 mcg by mouth Daily.     
   diltiazem (DILT-XR) 120 mg 24 hr capsule Active Take 120 mg by mouth Daily.     
   ferrous sulfate 325 mg tablet Active Take 325 mg by mouth 3 times daily.     
   fluticasone (FLONASE) 50 mcg/nasal spray Active one spray in each nostril daily as need
ed     
   guaiFENesin (MUCINEX) 600 mg 12 hr tablet Active Take 1,200 mg by mouth 2 times daily. 
    
   hydrocodone-acetaminophen (VICODIN ES) 7.5-750 MG per tablet Active 1/2 to 1 tablet fou
r times daily     
   levothyroxine (LEVOTHROID) 75 MCG tablet Active Take 75 mcg by mouth Daily.     
   losartan (COZAAR) 100 MG tablet Active Take 100 mg by mouth Daily.     
   methotrexate 2.5 mg tablet Active Take 7.5 mg by mouth Once a week.     
   metoclopramide (REGLAN) 10 mg tablet Active Take 10 mg by mouth 4 times daily as needed
.     
   omeprazole (PRILOSEC) 20 mg capsule Active Take 20 mg by mouth 2 times daily.     
   potassium chloride (MICRO-K) 10 mEq CR capsule Active      
   predniSONE (DELTASONE) 5 mg tablet Active Take 2.5 mg by mouth Daily.     
   pregabalin (LYRICA) 50 MG capsule Active Take 50 mg by mouth 3 times daily.     
   rivaroxaban (XARELTO) 20 mg tablet Active Take 20 mg by mouth Daily (with dinner).     
   sertraline (ZOLOFT) 100 mg tablet Active 2 tablets daily     
   tamsulosin (FLOMAX) 0.4 mg CAPS Active Take 0.4 mg by mouth Daily.     
   testosterone cypionate (DEPO-TESTOSTERONE) 200 mg/mL injection Active 200 mg injected i
ntramuscularly every 3 weeks     
 
 Family Medical History: family history includes High blood pressure in his father.
 indicated that his mother is . He indicated that his father is . He indicat
ed that his brother is alive. He indicated that his daughter is alive. He indicated that his
 son is alive. 
 
 Social History: 
 History 
 
 Social History 
   Marital Status:  
   Spouse Name: N/A 
   Number of Children: N/A 
   Years of Education: 12 
 
 Occupational History 
     
   Retired 
 
 Social History Main Topics 
   Smoking status: Former Smoker -- 1.3 packs/day for 35 years 
   Types: Cigarettes 
   Quit date: 1996 
   Smokeless tobacco: Never Used 
   Alcohol Use: No 
   Drug Use: No 
   Sexually Active: None 
 
 Other Topics Concern 
   None 
 
 Social History Narrative 
 
  Lives in [place] in house with wife. 
 
 Review of Systems:
   Constitutional: Denies unexplained fevers, chills, sweats, significant recent weight richardson
ge.
   Eyes:Denies sudden loss of vision, diplopia, blurred vision.
   ENT: Denies loss of hearing, vertigo,bleeding gums or poor dental repair. Mild tinnitus
   Card:Has exertional dyspnea and chest heaviness.
   Resp: PRAJAPATI: less than 1 flight
   GI: Denies nausea, vomiting, abdominal pain, constipation, hematochezia. Occasional diarr
hea.
   : Denies dysuria, pyuria, hematuria, frequency, incontinence
   MS: Diffuse myalgias and arthralgias
   Neuro: Denies seizures, strokes, loss of consciousness, dysesthesias or paresthesias, syn
cope, concussions.
   Psych: Mild depression.
   Endocrine: Denies heat or cold intolerance
   Heme: Denies easy bruising or prolonged bleeding.
   Allergic/Immunologic: Denies seasonal allergies
  
 
 PE: /66 | Pulse 78 | Resp 16 | Ht 1.676 m (5' 6") | Wt 91.944 kg (202 lb 11.2 oz) | B
MI 32.72 kg/m2
  Gen: obese,  alert and  not in acute distress
  HEENT:Head: Normocephalic, no lesions, without obvious abnormality.
 Eye: Normal external eye, conjunctiva, lids cornea, DIANNA.
 Nose: Normal external nose, mucus membranes and septum.
 Pharynx: Dental Hygiene adequate. Normal buccal mucosa. Tonsillar grade 0; Mallampati 2-3.
 Neck / Thyroid: Supple, no masses, nodes, nodules or enlargement. Scars from previous trach
eal surgery noted.
  Pulm: Decreased excursion and expansion bilaterally. Breath sounds are distant. Rhonchi cl
ear with coughing.
  Card: Irregular rate. S1, S2 wnl. 2/6 KIAN LLSB. I did not hear diastolic murmurs nor did I
 hear S3 or S4.
  GI: soft and normal bowel sounds
  : Not examined
  Rectal: Not Examined
  Ext: peripheral pulses normal, no pedal edema, no clubbing or cyanosis
  Skin:Not examined
  Neuro:Grossly normal
  Psych:age appropriate and casually dressedoriented to time, place and person, mood and aff
ect are within normal limits, pt is a good historian; no memory problems were noted
  Heme: No cervical LN
 
 Assessment: JOANN: I suspect that the patient has clinically significant JOANN and that this is
 likely making his cardiac issues more difficult to manage. I have discussed in detail the p
athophysiology of Obstructive Sleep Apnea with the patient. I've discussed that during NREM 
sleep the skeletal muscles relax and in REM sleep the skeletal muscles are paralyzed. The mu
scles that support the back of the throat (the tongue in particular) also relax during NREM 
sleep and are paralyzed in REM sleep and when this occurs, the back of the throat collapses 
some. In some patients with a smaller back of the throat, this can result in obstruction to 
the flow of air. This is fundamentally what occurs in JOANN. This can cause repetitive obstruc
tion to the flow of air all night long cause a person with JOANN to awaken repeatedly at night
 to "open" the back of the throat. If airflow is significantly restricted, blood oxygen leve
ls can fall. The combination of the repetitive awakenings at night and low oxygen levels red
d to numerous other physiologic abnormalities which can result in nocturia, nocturnal heartb
urn, night sweats, morning dry mouth, morning headache, and daytime fatigue/sleepiness. Antonio
tionally, JOANN can cause hypertension and it dramatically increases the risk of heart disease
, heart attack, and stroke. It may play a causative role in obesity and AODM. Untreated JOANN 
also dramatically increases the risk of fall asleep car accidents. Treatment can help with a
 
ll of these issues. The various forms of treatment of JOANN were discussed with the patient in
cluding 1) Conservative therapy which typically includes weight loss, avoidance of sleep dep
rivation, avoidance of alcohol, avoidance of sedative medications, avoidance of smoking, and
 positional therapy (non-supine sleeping); 2) Positive Airway Pressure therapy (which is eff
ective in the vast majority of patients but compliance can be an issue); 3) Dental Appliance
 Therapy (which is effective for some patients, typically with mild JOANN, but compliance is t
ypically good); 4) Expiratory Positive Airway Pressure - which involves passively increasing
 EPAP pressures applying a "one-way" valve type device (that looks like a "bandaid") over th
e nares at night which can be effective for very mild JOANN; and 5) Surgical intervention - in
cluding Phase I surgery (which typically involves T&A, UPPP, Genioglossus Advancement, Hyoid
 Suspension) and Phase II surgery (Bimandibular-Maxillary Facial Advancement). The surgical 
solution to JOANN is complicated and typically involves several operations.
   Central Sleep Apnea: It is also possible that the patient might have Central Sleep Apnea 
(of the Cheyne-Nichole variety) which can be caused by heart failure but at the same time can
 make heart failure more difficult to manage. If present, treatment is usually with an "auto
-servo" BiPAP device.
   Atrial Fibrillation: Treating JOANN/CSA, if present, can help.
   CHF: Treating JOANN/CSA, if present can help.
   ASCVD: Treating JOANN, if present, can reduce the risk of MI.
   HBP: Treating JOANN, if present can help.
   Obesity: Weight loss could be beneficial for most of his medical issues.
   Parasomnia: The patient does have mild arm movement at night. I suspect his may be second
ramón to an JOANN induced partial arousal. We will observe for abnormal movements during the sle
ep study.
 
 Plan: PSG as soon as we can arrange it. We will attempt a split-night protocol (unless cent
ral apnea is found - in which case a diagnostic PSG will be done).
           F/u afterwards.
 
 Today, 60 minutes was spent face to face with the patient; the majority of time was spent c
ounseling.
 
 CC: Dr. Iniguez, Dr. Foote
 
 Electronically signed by Luis Carlos Perez Jr., MD at 2013  2:48 PM PDTdocumented in 
is encounter
 
 Plan of Treatment
 
 
+--------+---------+-------------+----------------------+-------------+
| Date   | Type    | Specialty   | Care Team            | Description |
+--------+---------+-------------+----------------------+-------------+
| / | Office  | Pulmonology |   Juan F,          |             |
|    | Visit   |             | Jessica Cheung,   |             |
|        |         |             | MD [address] |             |
|        |         |             |   [place]   |             |
|        |         |             | [place] [address]             |             |
|        |         |             | [phone]         |             |
|        |         |             | [phone] (Fax)   |             |
+--------+---------+-------------+----------------------+-------------+
| / | Office  | Cardiology  |   Eric Akins, |             |
|    | Visit   |             |  MD [address]   |             |
|        |         |             | [place]  |             |
|        |         |             | [address]  [phone]  |             |
|        |         |             |  [phone] (Fax)  |             |
+--------+---------+-------------+----------------------+-------------+
 documented as of this encounter
 
 Visit Diagnoses
 
 
 
+-----------------------------------------------------------------------------------------+
| Diagnosis                                                                               |
+-----------------------------------------------------------------------------------------+
|   JOANN (obstructive sleep apnea) - Primary  Obstructive sleep apnea (adult) (pediatric)  |
+-----------------------------------------------------------------------------------------+
|   Parasomnia  Other dysfunctions of sleep stages or arousal from sleep                  |
+-----------------------------------------------------------------------------------------+
|   Heart failure (HCC)  Heart failure, unspecified                                       |
+-----------------------------------------------------------------------------------------+
|   Atrial fibrillation (HCC)  Atrial fibrillation                                        |
+-----------------------------------------------------------------------------------------+
|   HBP (high blood pressure)  Unspecified essential hypertension                         |
+-----------------------------------------------------------------------------------------+
|   ASCVD (arteriosclerotic cardiovascular disease)  Unspecified cardiovascular disease   |
+-----------------------------------------------------------------------------------------+
|   Raynaud disease  Raynaud's syndrome                                                   |
+-----------------------------------------------------------------------------------------+
|   Fibromyalgia  Mylagia and myositis, unspecified                                       |
+-----------------------------------------------------------------------------------------+
|   Aortic aneurysm (HCC)  Aortic aneurysm of unspecified site without mention of rupture |
+-----------------------------------------------------------------------------------------+
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
+-----------------------------------------------------------------------------------------+
 documented in this encounter

## 2019-12-06 NOTE — XMS
Encounter Summary
  Created on: 2019
 
 Wyatt Shane
 External Reference #: [id number]
 : 43
 Sex: Male
 
 Demographics
 
 
+-----------------------+------------------------+
| Address               | [address]     |
|                       | [place]  [address]   |
+-----------------------+------------------------+
| Home Phone            | [phone]        |
+-----------------------+------------------------+
| Preferred Language    | Unknown                |
+-----------------------+------------------------+
| Marital Status        |                 |
+-----------------------+------------------------+
| Jew Affiliation | LUT                    |
+-----------------------+------------------------+
| Race                  | White                  |
+-----------------------+------------------------+
| Ethnic Group          | Not  or  |
+-----------------------+------------------------+
 
 
 Author
 
 
+--------------+-------------+
| Organization | Unknown     |
+--------------+-------------+
| Address      | Unknown     |
+--------------+-------------+
| Phone        | Unavailable |
+--------------+-------------+
 
 
 
 Support
 
 
+---------------+--------------+---------+-----------------+
| Name          | Relationship | Address | Phone           |
+---------------+--------------+---------+-----------------+
| Opal Shane | ECON         | Unknown | [phone] |
+---------------+--------------+---------+-----------------+
 
 
 
 Care Team Providers
 
 
+-----------------------+------+-----------------+
| Care Team Member Name | Role | Phone           |
 
+-----------------------+------+-----------------+
| Erwin Iniguez MD   | PCP  | [phone] |
+-----------------------+------+-----------------+
 
 
 
 Encounter Details
 
 
+--------+-------------+------------+---------------------+------------------+
| Date   | Type        | Department | Care Team           | Description      |
+--------+-------------+------------+---------------------+------------------+
| // | Procedure - |            |   Record, Operation | Operative Report |
|    |             |            |                     |                  |
|        | Transcribed |            |                     |                  |
+--------+-------------+------------+---------------------+------------------+
 
 
 
 Social History
 
 
+----------------+-------+-----------+--------+------+
| Tobacco Use    | Types | Packs/Day | Years  | Date |
|                |       |           | Used   |      |
+----------------+-------+-----------+--------+------+
| Never Assessed |       |           |        |      |
+----------------+-------+-----------+--------+------+
 
 
 
+------------------+---------------+
| Sex Assigned at  | Date Recorded |
| Birth            |               |
+------------------+---------------+
| Not on file      |               |
+------------------+---------------+
 
 
 
+----------------+-------------+-------------+
| Job Start Date | Occupation  | Industry    |
+----------------+-------------+-------------+
| Not on file    | Not on file | Not on file |
+----------------+-------------+-------------+
 
 
 
+----------------+--------------+------------+
| Travel History | Travel Start | Travel End |
+----------------+--------------+------------+
 
 
 
+-------------------------------------+
| No recent travel history available. |
+-------------------------------------+
 documented as of this encounter
 
 Plan of Treatment
 
 Not on filedocumented as of this encounter
 
 Procedures
 
 
+------------------+--------+------------+----------------------+----------------------+
| Procedure Name   | Priori | Date/Time  | Associated Diagnosis | Comments             |
|                  | ty     |            |                      |                      |
+------------------+--------+------------+----------------------+----------------------+
| OPERATION RECORD |        | 1999 |                      |   Results for this   |
|                  |        |            |                      | procedure are in the |
|                  |        |            |                      |  results section.    |
+------------------+--------+------------+----------------------+----------------------+
 documented in this encounter
 
 Results
 OPERATION RECORD (1999)
 
+------------------------------------------------------------------------------------------+
| Transcriptions                                                                           |
+------------------------------------------------------------------------------------------+
|   Interface, Transcription In - 2006  5:03 AM PST                                  |
|    [place] [address] [address]     |
| [place], [place] [address] [phone]  |
|                     [facility]ORDMed Rec No.:         |
| [id number]            Date: 1999Name: Darien Shane SURGEON:Jimmy Esposito, |
|  M.D.ASSISTANT(S):           Jian Pickard M.D.PREOPERATIVE DIAGNOSIS(ES):Laryngeal   |
| stenosis.POSTOPERATIVE DIAGNOSIS(ES):Laryngeal stenosis.OPERATIONS                       |
| PERFORMED:Laryngoscopy with laryngeal dilation.SPECIMEN(S)                               |
| REMOVED:None.ANESTHESIA:General.COMPLICATIONS:None.INDICATIONS:The patient is status     |
| post right vertical  hemilaryngectomy  for  laryngealcancer.  He underwent postoperative |
|   radiation  therapy  and  has developeddifficulty breathing secondary to laryngeal      |
| stenosis.FINDINGS:PROCEDURE:The patient was brought to the Operating  Room  and placed   |
| on the operatingroom table in a supine position.  After  adequate  IV  sedation was      |
| given adirect laryngoscopy was performed and the patient was placed in suspension.The    |
| patient  was then jet ventilated.   At  that  point  serial  laryngealdilation was       |
| performed.  The largest  dilator  was  32  Romanian and this waseasily passed.  An         |
| endoscope was then passed subglottically and the patientwas seen to be widely patent.    |
| At this  point the patient was taken out ofsuspension and endotracheally intubated.      |
| The  patient  was then awakened,extubated, and brought to the Recovery Room in           |
| satisfactory condition.Dr. Jimmy Esposito was present for the entire procedure.Jian RODRIGUEZ.     |
| Adrian Pickard M.D.DMK:[id number]:  1999T:  1999#[id number]:        |
|Laryngeal stenosis.                                                                       |
|                                                                                          |
|OPERATIONS PERFORMED:                                                                     |
|Laryngoscopy with laryngeal dilation.                                                     |
|                                                                                          |
|SPECIMEN(S) REMOVED:                                                                      |
|None.                                                                                    |
|                                                                                          |
|ANESTHESIA:                                                                              |
|General.                                                                                 |
|                                                                                          |
|COMPLICATIONS:                                                                           |
|None.                                                                                    |
|                                                                                          |
|INDICATIONS:                                                                             |
|The patient is status post right vertical  hemilaryngectomy  for  laryngeal               |
|cancer.  He underwent postoperative  radiation  therapy  and  has developed              |
|difficulty breathing secondary to laryngeal stenosis.                                     |
 
|                                                                                          |
|FINDINGS:                                                                                |
|                                                                                          |
|PROCEDURE:                                                                               |
|The patient was brought to the Operating  Room  and placed on the operating               |
|room table in a supine position.  After  adequate  IV  sedation was given a               |
|direct laryngoscopy was performed and the patient was placed in suspension.               |
|The  patient  was then jet ventilated.   At  that  point  serial  laryngeal               |
|dilation was performed.  The largest  dilator  was  32  Romanian and this was               |
|easily passed.  An endoscope was then passed subglottically and the patient               |
|was seen to be widely patent.   At this  point the patient was taken out of               |
|suspension and endotracheally intubated.   The  patient  was then awakened,               |
|extubated, and brought to the Recovery Room in satisfactory condition.                    |
|                                                                                          |
|Dr. Jimmy Esposito was present for the entire procedure.                                    |
|                                                                                          |
|Jian Pickard M.D.                                                                     |
|                                                                                          |
|Jimmy Esposito M.D.                                                                      |
|                                                                                          |
|[id number]                                                                                   |
|D:  1999                                                                            |
|T:  1999                                                                            |
|                                                                                          |
|#[id number]                                                                                    |
|                                                                                          |
|                                                                                          |
|[id number]                                                                                     |
|CC:                                                                                      |
+------------------------------------------------------------------------------------------+
 documented in this encounter
 
 Visit Diagnoses
 Not on filedocumented in this encounter

## 2019-12-06 NOTE — XMS
Encounter Summary
  Created on: 2019
 
 Shane Wyatttien BroussardJosue
 External Reference #: [id number]
 : 43
 Sex: Male
 
 Demographics
 
 
+-----------------------+---------------------------+
| Address               | [address]        |
|                       | [place]  [address] |
+-----------------------+---------------------------+
| Home Phone            | [phone]           |
+-----------------------+---------------------------+
| Preferred Language    | Unknown                   |
+-----------------------+---------------------------+
| Marital Status        |                    |
+-----------------------+---------------------------+
| Jehovah's witness Affiliation | 1028                      |
+-----------------------+---------------------------+
| Race                  | Unknown                   |
+-----------------------+---------------------------+
| Ethnic Group          | Unknown                   |
+-----------------------+---------------------------+
 
 
 Author
 
 
+--------------+--------------------------------------------+
| Author       | [facility]  |
|              | and [place]                                |
+--------------+--------------------------------------------+
| Organization | [facility]  |
|              | and [place]                                |
+--------------+--------------------------------------------+
| Address      | Unknown                                    |
+--------------+--------------------------------------------+
| Phone        | Unavailable                                |
+--------------+--------------------------------------------+
 
 
 
 Support
 
 
+---------------+--------------+--------------------+-----------------+
| Name          | Relationship | Address            | Phone           |
+---------------+--------------+--------------------+-----------------+
| Opal Shane | ECON         | [address]     | [phone] |
|               |              | [place]   |                 |
|               |              | [id number]              |                 |
+---------------+--------------+--------------------+-----------------+
 
 
 
 
 Care Team Providers
 
 
+-----------------------+------+-----------------+
| Care Team Member Name | Role | Phone           |
+-----------------------+------+-----------------+
| Erwin Iniguez MD | PCP  | [phone] |
+-----------------------+------+-----------------+
 
 
 
 Encounter Details
 
 
+--------+-----------+----------------------+--------------------+-------------+
| Date   | Type      | Department           | Care Team          | Description |
+--------+-----------+----------------------+--------------------+-------------+
| 04/03/ | Hospital  |   [facility] GENERIC IP     |   Conversion       | Pain        |
| 2018   | Encounter | CONVERSION DEP  [address]  | Transaction,       |             |
|        |           | [address]           | Provider Unknown   |             |
|        |           | [place], [place]         | [phone]       |             |
|        |           | [address]           | [phone] (Fax) |             |
|        |           | [phone]         |                    |             |
+--------+-----------+----------------------+--------------------+-------------+
 
 
 
 Social History
 
 
+---------------+------------+-----------+--------+------------------+
| Tobacco Use   | Types      | Packs/Day | Years  | Date             |
|               |            |           | Used   |                  |
+---------------+------------+-----------+--------+------------------+
| Former Smoker | Cigarettes | 1.3       | 34     | Quit: 1996 |
+---------------+------------+-----------+--------+------------------+
 
 
 
+---------------------+---+---+---+
| Smokeless Tobacco:  |   |   |   |
| Never Used          |   |   |   |
+---------------------+---+---+---+
 
 
 
+-------------+-------------+---------+----------+
| Alcohol Use | Drinks/Week | oz/Week | Comments |
+-------------+-------------+---------+----------+
| No          |             |         |          |
+-------------+-------------+---------+----------+
 
 
 
+------------------+---------------+
| Sex Assigned at  | Date Recorded |
| Birth            |               |
+------------------+---------------+
| Not on file      |               |
 
+------------------+---------------+
 
 
 
+----------------+-------------+-------------+
| Job Start Date | Occupation  | Industry    |
+----------------+-------------+-------------+
| Not on file    | Not on file | Not on file |
+----------------+-------------+-------------+
 
 
 
+----------------+--------------+------------+
| Travel History | Travel Start | Travel End |
+----------------+--------------+------------+
 
 
 
+-------------------------------------+
| No recent travel history available. |
+-------------------------------------+
 documented as of this encounter
 
 Medications at Time of Discharge
 
 
+----------------------+----------------------+-----------+---------+----------+-----------+
| Medication           | Sig                  | Dispensed | Refills | Start    | End Date  |
|                      |                      |           |         | Date     |           |
+----------------------+----------------------+-----------+---------+----------+-----------+
|   acyclovir          | Apply  topically     |           | 0       |          |           |
| (ZOVIRAX) 5%         | every 3 hours.       |           |         |          |           |
| ointment             |                      |           |         |          |           |
+----------------------+----------------------+-----------+---------+----------+-----------+
|   albuterol (PROAIR  | Inhale 2 puffs into  |           | 0       |          |           |
| HFA) 90 mcg/puff     | the lungs every 6    |           |         |          |           |
| inhaler              | hours as needed for  |           |         |          |           |
|                      | Wheezing.            |           |         |          |           |
+----------------------+----------------------+-----------+---------+----------+-----------+
|   digoxin (LANOXIN)  | Take 125 mcg by      |           | 0       |          |           |
| 250 mcg tablet       | mouth Daily.      |           |         |          |           |
|                      | capsule daily        |           |         |          |           |
+----------------------+----------------------+-----------+---------+----------+-----------+
|   ferrous sulfate    | Take 325 mg by mouth |           | 0       | 20 |           |
| 325 mg tablet        |  3 times daily.      |           |         | 12       |           |
+----------------------+----------------------+-----------+---------+----------+-----------+
|   fluticasone        | one spray in each    |           | 0       | 20 |           |
| (FLONASE) 50         | nostril daily as     |           |         | 12       |           |
| mcg/nasal spray      | needed               |           |         |          |           |
+----------------------+----------------------+-----------+---------+----------+-----------+
|                      | Inhale 1 puff into   |           | 0       |          |           |
| fluticasone-salmeter | the lungs Twice      |           |         |          |           |
| ol (ADVAIR) 500-50   | Daily.               |           |         |          |           |
| mcg/puff diskus      |                      |           |         |          |           |
| inhaler              |                      |           |         |          |           |
+----------------------+----------------------+-----------+---------+----------+-----------+
|   folic acid 1 mg    | Take 1 mg by mouth   |           | 0       |          |           |
| tablet               | Daily.               |           |         |          |           |
+----------------------+----------------------+-----------+---------+----------+-----------+
|   furosemide (LASIX) | Take 40 mg by mouth  |           | 0       |          |           |
 
|  40 mg tablet        | Daily.               |           |         |          |           |
+----------------------+----------------------+-----------+---------+----------+-----------+
|   guaiFENesin        | Take 1,200 mg by     |           | 0       |          |           |
| (MUCINEX) 600 mg 12  | mouth 2 times daily. |           |         |          |           |
| hr tablet            |                      |           |         |          |           |
+----------------------+----------------------+-----------+---------+----------+-----------+
|   levothyroxine      | Take 75 mcg by mouth |           | 0       | 20 |           |
| (LEVOTHROID) 75 MCG  |  Daily.              |           |         | 12       |           |
| tablet               |                      |           |         |          |           |
+----------------------+----------------------+-----------+---------+----------+-----------+
|   metoclopramide     | Take 10 mg by mouth  |           | 0       | 20 |           |
| (REGLAN) 10 mg       | 4 times daily as     |           |         | 12       |           |
| tablet               | needed.              |           |         |          |           |
+----------------------+----------------------+-----------+---------+----------+-----------+
|                      | Apply  topically 2   |           | 0       |          |           |
| nystatin-triamcinolo | times daily.         |           |         |          |           |
| ne (MYCOLOG II)      |                      |           |         |          |           |
| cream                |                      |           |         |          |           |
+----------------------+----------------------+-----------+---------+----------+-----------+
|   omeprazole         | Take 20 mg by mouth  |           | 0       | 20 |           |
| (PRILOSEC) 20 mg     | 2 times daily.       |           |         | 12       |           |
| capsule              |                      |           |         |          |           |
+----------------------+----------------------+-----------+---------+----------+-----------+
|   potassium citrate  | Take 10 mEq by mouth |           | 0       |          |           |
| (UROCIT-K) 10 mEq SR |  2 times daily.      |           |         |          |           |
|  tablet              |                      |           |         |          |           |
+----------------------+----------------------+-----------+---------+----------+-----------+
|   predniSONE         | Take 10 mg by mouth  |           | 0       |          |           |
| (DELTASONE) 5 mg     | Daily.               |           |         |          |           |
| tablet               |                      |           |         |          |           |
+----------------------+----------------------+-----------+---------+----------+-----------+
|   tamsulosin         | Take 0.4 mg by mouth |           | 0       | 20 |           |
| (FLOMAX) 0.4 mg CAPS |  2 times daily.      |           |         | 12       |           |
+----------------------+----------------------+-----------+---------+----------+-----------+
|   acyclovir          | Take 400 mg by mouth |           | 0       | 20 |  |
| (ZOVIRAX) 400 MG     |  3 times daily as    |           |         | 12       | 9         |
| tablet               | needed.              |           |         |          |           |
+----------------------+----------------------+-----------+---------+----------+-----------+
|   Cholecalciferol    | Take 2,000 Units by  |           | 0       | 20 |  |
| (VITAMIN D3) 2000    | mouth Daily.         |           |         | 12       | 9         |
| UNITS CAPS           |                      |           |         |          |           |
+----------------------+----------------------+-----------+---------+----------+-----------+
|   cyanocobalamin     | injected             |           | 0       | 20 |  |
| (VITAMIN B-12) 1,000 | intramuscularly once |           |         | 12       | 9         |
|  mcg/mL injection    |  a month             |           |         |          |           |
+----------------------+----------------------+-----------+---------+----------+-----------+
|   diltiazem          | Take 120 mg by mouth |           | 0       |          |  |
| (DILT-XR) 120 mg 24  |  Daily.              |           |         |          | 9         |
| hr capsule           |                      |           |         |          |           |
+----------------------+----------------------+-----------+---------+----------+-----------+
|   loratadine         | Take 10 mg by mouth  |           | 0       |          |  |
| (CLARITIN) 10 mg     | Daily.               |           |         |          | 9         |
| tablet               |                      |           |         |          |           |
+----------------------+----------------------+-----------+---------+----------+-----------+
|   losartan (COZAAR)  | Take 100 mg by mouth |           | 0       |          |  |
| 100 MG tablet        |  Daily. 1/2 daily    |           |         |          | 9         |
+----------------------+----------------------+-----------+---------+----------+-----------+
|   methotrexate 2.5   | Take 15 mg by mouth  |           | 0       |          |  |
| mg tablet            | Once a week.         |           |         |          | 9         |
+----------------------+----------------------+-----------+---------+----------+-----------+
 
|                      | Take 1 tablet by     |           | 0       |          |  |
| oxyCODONE-acetaminop | mouth every 4 hours  |           |         |          | 9         |
| hen (PERCOCET) 5-325 | as needed for Pain.  |           |         |          |           |
|  mg per tablet       |                      |           |         |          |           |
+----------------------+----------------------+-----------+---------+----------+-----------+
|   pseudoePHEDrine    | Take 30 mg by mouth  |           | 0       |          |  |
| (SUDOGEST) 30 mg     | every 4 hours as     |           |         |          | 9         |
| tablet               | needed for           |           |         |          |           |
|                      | Congestion.          |           |         |          |           |
+----------------------+----------------------+-----------+---------+----------+-----------+
|   rivaroxaban        | Take 20 mg by mouth  |           | 0       |          |  |
| (XARELTO) 20 mg      | Daily (with dinner). |           |         |          | 9         |
| tablet               |                      |           |         |          |           |
+----------------------+----------------------+-----------+---------+----------+-----------+
|   sertraline         | 2 tablets daily      |           | 0       | 20 |  |
| (ZOLOFT) 100 mg      |                      |           |         | 12       | 9         |
| tablet               |                      |           |         |          |           |
+----------------------+----------------------+-----------+---------+----------+-----------+
 documented as of this encounter
 
 Plan of Treatment
 
 
+--------+---------+-------------+----------------------+-------------+
| Date   | Type    | Specialty   | Care Team            | Description |
+--------+---------+-------------+----------------------+-------------+
| / | Office  | Pulmonology |   [facility],          |             |
|    | Visit   |             | Jessica Cheung,   |             |
|        |         |             | MD [address] |             |
|        |         |             |   [place],   |             |
|        |         |             | [place] [address]             |             |
|        |         |             | [phone]         |             |
|        |         |             | [phone] (Fax)   |             |
+--------+---------+-------------+----------------------+-------------+
| / | Office  | Cardiology  |   [place], [place], |             |
| 2020   | Visit   |             |  MD  [address]   |             |
|        |         |             | [place]  [place], [place]  |             |
|        |         |             | [address]  [phone]  |             |
|        |         |             |  [phone] (Fax)  |             |
+--------+---------+-------------+----------------------+-------------+
 documented as of this encounter
 
 Procedures
 
 
+---------------------+--------+-------------+----------------------+----------------------+
| Procedure Name      | Priori | Date/Time   | Associated Diagnosis | Comments             |
|                     | ty     |             |                      |                      |
+---------------------+--------+-------------+----------------------+----------------------+
| MRI CERVICAL SPINE  | Routin | 2018  |                      |   Results for this   |
| WO CONTRAST         | e      |  4:45 AM    |                      | procedure are in the |
|                     |        | PDT         |                      |  results section.    |
+---------------------+--------+-------------+----------------------+----------------------+
 documented in this encounter
 
 Results
 MRI Cervical Spine wo Contrast (2018  4:45 AM PDT)
 
+----------+
| Specimen |
 
+----------+
|          |
+----------+
 
 
 
+------------------------------------------------------------------------+--------------+
| Narrative                                                              | Performed At |
+------------------------------------------------------------------------+--------------+
|   This is a non-reportable procedure without a radiologist report and  |              |
| is  used for image storage only                                        |              |
+------------------------------------------------------------------------+--------------+
 
 
 
+--------------------------------------------------------------------------------------+
| Procedure Note                                                                       |
+--------------------------------------------------------------------------------------+
|   Andrzej Steven Irvin - 2019  4:21 AM PDT  This is a non-reportable procedure  |
| without a radiologist report and isused for image storage only                       |
+--------------------------------------------------------------------------------------+
 documented in this encounter
 
 Visit Diagnoses
 
 
+--------------------------+
| Diagnosis                |
+--------------------------+
|   Pain  Generalized pain |
+--------------------------+
 documented in this encounter details…

## 2019-12-06 NOTE — XMS
Encounter Summary
  Created on: 2019
 
 Shane Wyatttien BroussardJosue
 External Reference #: [id number]
 : 43
 Sex: Male
 
 Demographics
 
 
+-----------------------+---------------------------+
| Address               | [address]        |
|                       | [place]  [address] |
+-----------------------+---------------------------+
| Home Phone            | [phone]           |
+-----------------------+---------------------------+
| Preferred Language    | Unknown                   |
+-----------------------+---------------------------+
| Marital Status        |                    |
+-----------------------+---------------------------+
| Rastafari Affiliation | [id number]                      |
+-----------------------+---------------------------+
| Race                  | Unknown                   |
+-----------------------+---------------------------+
| Ethnic Group          | Unknown                   |
+-----------------------+---------------------------+
 
 
 Author
 
 
+--------------+--------------------------------------------+
| Author       | [facility]  |
|              | [facility] [place]                                |
+--------------+--------------------------------------------+
| Organization | [facility]  |
|              | and [place]                                |
+--------------+--------------------------------------------+
| Address      | Unknown                                    |
+--------------+--------------------------------------------+
| Phone        | Unavailable                                |
+--------------+--------------------------------------------+
 
 
 
 Support
 
 
+---------------+--------------+--------------------+-----------------+
| Name          | Relationship | Address            | Phone           |
+---------------+--------------+--------------------+-----------------+
| Opal Shane | ECON         | [address]     | [phone] |
|               |              | [place]   |                 |
|               |              | [address]              |                 |
+---------------+--------------+--------------------+-----------------+
 
 
 
 
 Care Team Providers
 
 
+------------------------+------+-----------------+
| Care Team Member Name  | Role | Phone           |
+------------------------+------+-----------------+
| Calvin Robertson MD | PCP  | [phone] |
+------------------------+------+-----------------+
 
 
 
 Reason for Referral
 Diagnostic/Screening (Routine)
 
+--------+--------+-----------+--------------+--------------+---------------+
| Status | Reason | Specialty | Diagnoses /  | Referred By  | Referred To   |
|        |        |           | Procedures   | Contact      | Contact       |
+--------+--------+-----------+--------------+--------------+---------------+
| Closed |        | Radiology |   Diagnoses  |   Juan F,  |   [facility] |
|        |        |           |  Multifocal  | Jessica    |   [address]         |
|        |        |           | lung         | MD [address]  | [address]   |
|        |        |           | consolidatio |  [address]        | [address]       |
|        |        |           | n (HCC)      | [address]  | [place], [place]  |
|        |        |           | Procedures   |  [address]       | [address]    |
|        |        |           | CT Chest wo  | [place], [place] | Phone:        |
|        |        |           | Contrast     |  [address]       | [phone]  |
|        |        |           |              | Phone:       |  Fax:         |
|        |        |           |              | [phone] | [phone]  |
|        |        |           |              |   Fax:       |               |
|        |        |           |              | [phone] |               |
+--------+--------+-----------+--------------+--------------+---------------+
 
 
 
 
 Reason for Visit
 Diagnostic/Screening (Routine)
 
+--------+--------+-----------+--------------+--------------+---------------+
| Status | Reason | Specialty | Diagnoses /  | Referred By  | Referred To   |
|        |        |           | Procedures   | Contact      | Contact       |
+--------+--------+-----------+--------------+--------------+---------------+
| Closed |        | Radiology |   Diagnoses  |   Juan F,  |   [facility] |
|        |        |           |  Multifocal  | Jessica    |   [address]         |
|        |        |           | lung         | MD [address]  | [address]   |
|        |        |           | consolidatio |  [address]        | [address]       |
|        |        |           | n (HCC)      | [address]  | [place]  |
|        |        |           | Procedures   |  [address]       | [address]    |
|        |        |           | CT Chest wo  | [place], [place] | Phone:        |
|        |        |           | Contrast     |  [address]       | [phone]  |
|        |        |           |              | Phone:       |  Fax:         |
|        |        |           |              | [phone] | [phone]  |
|        |        |           |              |   Fax:       |               |
|        |        |           |              | [phone] |               |
+--------+--------+-----------+--------------+--------------+---------------+
 
 
 
 
 
 Encounter Details
 
 
+--------+-----------+----------------------+----------------------+---------------------+
| Date   | Type      | Department           | Care Team            | Description         |
+--------+-----------+----------------------+----------------------+---------------------+
| / | Hospital  |   [facility]     |   Juan F,          | Multifocal lung     |
| 2019   | Encounter | [facility] [address]  [address]  | Jessica Cheung,   | consolidation (HCC) |
|        |           | [address]  | MD  [address] |                     |
|        |           |  [place]        |   [address]  [facility]   |                     |
|        |           | [address]           | [place] [address]             |                     |
|        |           | [phone]         | [phone]         |                     |
|        |           |                      | [phone] (Fax)   |                     |
+--------+-----------+----------------------+----------------------+---------------------+
 
 
 
 Social History
 
 
+---------------+------------+-----------+--------+------------------+
| Tobacco Use   | Types      | Packs/Day | Years  | Date             |
|               |            |           | Used   |                  |
+---------------+------------+-----------+--------+------------------+
| Former Smoker | Cigarettes | 1         | 35     | Quit: 1996 |
+---------------+------------+-----------+--------+------------------+
 
 
 
+---------------------+---+---+---+
| Smokeless Tobacco:  |   |   |   |
| Never Used          |   |   |   |
+---------------------+---+---+---+
 
 
 
+-------------+-------------+---------+----------+
| Alcohol Use | Drinks/Week | oz/Week | Comments |
+-------------+-------------+---------+----------+
| No          |             |         |          |
+-------------+-------------+---------+----------+
 
 
 
+------------------+---------------+
| Sex Assigned at  | Date Recorded |
| Birth            |               |
+------------------+---------------+
| Not on file      |               |
+------------------+---------------+
 
 
 
+----------------+-------------+-------------+
| Job Start Date | Occupation  | Industry    |
+----------------+-------------+-------------+
| Not on file    | Not on file | Not on file |
+----------------+-------------+-------------+
 
 
 
 
+----------------+--------------+------------+
| Travel History | Travel Start | Travel End |
+----------------+--------------+------------+
 
 
 
+-------------------------------------+
| No recent travel history available. |
+-------------------------------------+
 documented as of this encounter
 
 Medications at Time of Discharge
 
 
+----------------------+----------------------+-----------+---------+----------+-----------+
| Medication           | Sig                  | Dispensed | Refills | Start    | End Date  |
|                      |                      |           |         | Date     |           |
+----------------------+----------------------+-----------+---------+----------+-----------+
|   acyclovir          | Take 400 mg by mouth |           | 0       |          |           |
| (ZOVIRAX) 400 MG     |  5 (five) times      |           |         |          |           |
| tablet               | daily. PRN           |           |         |          |           |
+----------------------+----------------------+-----------+---------+----------+-----------+
|   acyclovir          | Apply  topically     |           | 0       |          |           |
| (ZOVIRAX) 5%         | every 3 hours.       |           |         |          |           |
| ointment             |                      |           |         |          |           |
+----------------------+----------------------+-----------+---------+----------+-----------+
|   albuterol (PROAIR  | Inhale 2 puffs into  |           | 0       |          |           |
| HFA) 90 mcg/puff     | the lungs every 6    |           |         |          |           |
| inhaler              | hours as needed for  |           |         |          |           |
|                      | Wheezing.            |           |         |          |           |
+----------------------+----------------------+-----------+---------+----------+-----------+
|   albuterol (PROAIR  | Inhale  into the     |           | 0       |          |           |
| RESPICLICK) 90       | lungs.               |           |         |          |           |
| mcg/puff inhaler     |                      |           |         |          |           |
+----------------------+----------------------+-----------+---------+----------+-----------+
|                      | Take 3 mLs by        |           | 0       | 08// |           |
| albuterol-ipratropiu | nebulization every 6 |           |         | 18       |           |
| m 2.5-0.5 mg/3 mL    |  (six) hours as      |           |         |          |           |
| SOLN                 | needed.              |           |         |          |           |
+----------------------+----------------------+-----------+---------+----------+-----------+
|   atorvaSTATin       | TAKE 1 TABLET BY     |           | 0       | 20 |  |
| (LIPITOR) 40 mg      | MOUTH NIGHTLY        |           |         | 19       | 0         |
| tablet               |                      |           |         |          |           |
+----------------------+----------------------+-----------+---------+----------+-----------+
|   azaTHIOprine       | Take 150 mg by mouth |           | 0       |          |           |
| (IMURAN) 50 mg       |  daily.              |           |         |          |           |
| tablet               |                      |           |         |          |           |
+----------------------+----------------------+-----------+---------+----------+-----------+
|                      | Apply  to eye as     |           | 0       |          |           |
| bacitracin-polymyxin | needed.              |           |         |          |           |
|  b (POLYSPORIN)      |                      |           |         |          |           |
| ophthalmic ointment  |                      |           |         |          |           |
+----------------------+----------------------+-----------+---------+----------+-----------+
|   bismuth            | Take 262 mg by mouth |           | 0       |          |           |
| subsalicylate (PEPTO |  4 (four) times      |           |         |          |           |
|  BISMOL) 262 mg      | daily before meals   |           |         |          |           |
| chewable tablet      | and nightly.         |           |         |          |           |
+----------------------+----------------------+-----------+---------+----------+-----------+
|   cholecalciferol    | Take 1,000 Units by  |           | 0       |          |           |
 
| (CHOLECALCIFEROL)    | mouth daily.         |           |         |          |           |
| 1000 units TABS      |                      |           |         |          |           |
+----------------------+----------------------+-----------+---------+----------+-----------+
|   cyanocobalamin     | Take 1,000 mcg by    |           | 0       |          |           |
| (VITAMIN B-12) 1000  | mouth daily.         |           |         |          |           |
| MCG tablet           |                      |           |         |          |           |
+----------------------+----------------------+-----------+---------+----------+-----------+
|   diclofenac         | Apply 2 g topically  |           | 0       |          |           |
| (VOLTAREN) 1% GEL    | 4 (four) times       |           |         |          |           |
|                      | daily. PRN           |           |         |          |           |
+----------------------+----------------------+-----------+---------+----------+-----------+
|   digoxin (LANOXIN)  | Take 125 mcg by      |           | 0       |          |           |
| 250 mcg tablet       | mouth Daily.      |           |         |          |           |
|                      | capsule daily        |           |         |          |           |
+----------------------+----------------------+-----------+---------+----------+-----------+
|   famotidine         | Take 40 mg by mouth  |           | 0       |          |           |
| (PEPCID) 40 MG       | daily.               |           |         |          |           |
| tablet               |                      |           |         |          |           |
+----------------------+----------------------+-----------+---------+----------+-----------+
|   ferrous sulfate    | Take 325 mg by mouth |           | 0       | 20 |           |
| 325 mg tablet        |  3 times daily.      |           |         | 12       |           |
+----------------------+----------------------+-----------+---------+----------+-----------+
|   fluticasone        | one spray in each    |           | 0       | 20 |           |
| (FLONASE) 50         | nostril daily as     |           |         | 12       |           |
| mcg/nasal spray      | needed               |           |         |          |           |
+----------------------+----------------------+-----------+---------+----------+-----------+
|                      | Inhale 1 puff into   |           | 0       |          |           |
| fluticasone-salmeter | the lungs Twice      |           |         |          |           |
| ol (ADVAIR) 500-50   | Daily.               |           |         |          |           |
| mcg/puff diskus      |                      |           |         |          |           |
| inhaler              |                      |           |         |          |           |
+----------------------+----------------------+-----------+---------+----------+-----------+
|   folic acid 1 mg    | Take 1 mg by mouth   |           | 0       |          |           |
| tablet               | Daily.               |           |         |          |           |
+----------------------+----------------------+-----------+---------+----------+-----------+
|   furosemide (LASIX) | Take 40 mg by mouth  |           | 0       |          |           |
|  40 mg tablet        | Daily.               |           |         |          |           |
+----------------------+----------------------+-----------+---------+----------+-----------+
|   guaiFENesin        | Take 1,200 mg by     |           | 0       |          |           |
| (MUCINEX) 600 mg 12  | mouth 2 times daily. |           |         |          |           |
| hr tablet            |                      |           |         |          |           |
+----------------------+----------------------+-----------+---------+----------+-----------+
|   levocetirizine     | Take 5 mg by mouth   |           | 0       |          |           |
| (XYZAL) 5 MG tablet  | as needed.           |           |         |          |           |
+----------------------+----------------------+-----------+---------+----------+-----------+
|   levothyroxine      | Take 75 mcg by mouth |           | 0       | 20 |           |
| (LEVOTHROID) 75 MCG  |  Daily.              |           |         | 12       |           |
| tablet               |                      |           |         |          |           |
+----------------------+----------------------+-----------+---------+----------+-----------+
|   losartan (COZAAR)  | Take 50 mg by mouth  |           | 0       |          |           |
| 50 mg tablet         | daily.               |           |         |          |           |
+----------------------+----------------------+-----------+---------+----------+-----------+
|   metoclopramide     | Take 10 mg by mouth  |           | 0       | 20 |           |
| (REGLAN) 10 mg       | 4 times daily as     |           |         | 12       |           |
| tablet               | needed.              |           |         |          |           |
+----------------------+----------------------+-----------+---------+----------+-----------+
|   mupirocin          | 1 g by Nasal route   |           | 0       |          |           |
| (BACTROBAN) 2%       | as needed. Use pea   |           |         |          |           |
| ointment             | sized amount in each |           |         |          |           |
|                      |  nostril twice daily |           |         |          |           |
 
|                      |  for 5 days. After   |           |         |          |           |
|                      | applications, press  |           |         |          |           |
|                      | sides of nose        |           |         |          |           |
|                      | together, gently     |           |         |          |           |
|                      | massage for 1 min.   |           |         |          |           |
+----------------------+----------------------+-----------+---------+----------+-----------+
|                      | Apply  topically 2   |           | 0       |          |           |
| nystatin-triamcinolo | times daily.         |           |         |          |           |
| ne (MYCOLOG II)      |                      |           |         |          |           |
| cream                |                      |           |         |          |           |
+----------------------+----------------------+-----------+---------+----------+-----------+
|   ofloxacin          |                      |           | 0       | 20 |           |
| (OCUFLOX) 0.3%       |                      |           |         | 18       |           |
| ophthalmic solution  |                      |           |         |          |           |
+----------------------+----------------------+-----------+---------+----------+-----------+
|   omeprazole         | Take 20 mg by mouth  |           | 0       | 20 |           |
| (PRILOSEC) 20 mg     | 2 times daily.       |           |         | 12       |           |
| capsule              |                      |           |         |          |           |
+----------------------+----------------------+-----------+---------+----------+-----------+
|   potassium citrate  | Take 10 mEq by mouth |           | 0       |          |           |
| (UROCIT-K) 10 mEq SR |  2 times daily.      |           |         |          |           |
|  tablet              |                      |           |         |          |           |
+----------------------+----------------------+-----------+---------+----------+-----------+
|   predniSONE         | Take 10 mg by mouth  |           | 0       |          |           |
| (DELTASONE) 5 mg     | Daily.               |           |         |          |           |
| tablet               |                      |           |         |          |           |
+----------------------+----------------------+-----------+---------+----------+-----------+
|   sertraline         | Take 100 mg by mouth |           | 0       |          |           |
| (ZOLOFT) 100 mg      |  daily.              |           |         |          |           |
| tablet               |                      |           |         |          |           |
+----------------------+----------------------+-----------+---------+----------+-----------+
|   tamsulosin         | Take 0.4 mg by mouth |           | 0       | 20 |           |
| (FLOMAX) 0.4 mg CAPS |  2 times daily.      |           |         | 12       |           |
+----------------------+----------------------+-----------+---------+----------+-----------+
|   trolamine          | Apply  topically as  |           | 0       |          |           |
| salicylate           | needed.              |           |         |          |           |
| (ASPERCREME) 10%     |                      |           |         |          |           |
| cream                |                      |           |         |          |           |
+----------------------+----------------------+-----------+---------+----------+-----------+
|   rivaroxaban        | Take 1 tablet by     |           | 0       | 20 | 10/16/201 |
| (XARELTO) 10 mg      | mouth daily.         |           |         | 19       | 9         |
| tablet               |                      |           |         |          |           |
+----------------------+----------------------+-----------+---------+----------+-----------+
 documented as of this encounter
 
 Plan of Treatment
 
 
+--------+---------+-------------+----------------------+-------------+
| Date   | Type    | Specialty   | Care Team            | Description |
+--------+---------+-------------+----------------------+-------------+
| / | Office  | Pulmonology |   Juan F,          |             |
|    | Visit   |             | Jessica Cheung,   |             |
|        |         |             | MD [address] |             |
|        |         |             |   [place]   |             |
|        |         |             | [place] [address]             |             |
|        |         |             | [phone]         |             |
|        |         |             | [phone] (Fax)   |             |
+--------+---------+-------------+----------------------+-------------+
| / | Office  | Cardiology  |   Eric Akins, |             |
 
|    | Visit   |             |  MD [address]   |             |
|        |         |             | [place]  |             |
|        |         |             | [address]  [phone]  |             |
|        |         |             |  [phone] (Fax)  |             |
+--------+---------+-------------+----------------------+-------------+
 documented as of this encounter
 
 Procedures
 
 
+----------------------+--------+-------------+----------------------+----------------------
+
| Procedure Name       | Priori | Date/Time   | Associated Diagnosis | Comments             
|
|                      | ty     |             |                      |                      
|
+----------------------+--------+-------------+----------------------+----------------------
+
| CT CHEST WO CONTRAST | Routin | 2019  |   Multifocal lung    |   Results for this   
|
|                      | e      | 12:28 PM    | consolidation (HCC)  | procedure are in the 
|
|                      |        | PDT         |                      |  results section.    
|
+----------------------+--------+-------------+----------------------+----------------------
+
 documented in this encounter
 
 Results
 CT Chest wo Contrast (2019 12:28 PM PDT)
 
+----------+
| Specimen |
+----------+
|          |
+----------+
 
 
 
+------------------------------------------------------------------------+---------------+
| Narrative                                                              | Performed At  |
+------------------------------------------------------------------------+---------------+
|      CT CHEST WITHOUT CONTRAST     CLINICAL INFORMATION:  Right middle |   [facility] |
|  lobe pulmonary nodule and multiple areas of consolidation  in a       |               |
| patient with a history of aspiration and Rheumatoid arthritis.         |               |
| COMPARISON:  CT CHEST WO CONTRAST (3/25/2019);     PROCEDURE:  Axial   |               |
| images through the chest. Multiplanar reconstructions.     At least    |               |
| one of the following CT dose optimization techniques were  used:       |               |
| Automated exposure control; Adjustment of mA and/or kV according  to   |               |
| patient size; Use of iterative reconstruction technique.     FINDINGS: |               |
|   Lungs, Pleura and Airways:  -Right middle lobe nodule on series 4,   |               |
| image 161 measuring 13 mm x 6  mm, unchanged.  -Mild emphysematous     |               |
| changes.  -Combination of small amount of ground-glass and airspace    |               |
| opacity noted  involving the right upper lobe on series 4, image 66    |               |
| through 59. this  appears to be new since the previous examination.    |               |
| -Trace right pleural effusion.   Small amount of calcified pleural     |               |
| plaques.  Mediastinum: Mild cardiomegaly.   No pericardial effusion.   |               |
|   Aorta does  not appear aneurysmal.   Mild prominence of the          |               |
| pulmonary arteries,  potentially pulmonary arterial hypertension.      |               |
|   Probable surgical changes  seen at the GE junction region.  Lymph    |               |
 
| Nodes: No enlarged lymph nodes seen.  Upper Abdomen: No significant    |               |
| abnormality appreciated within limits of  unenhanced technique.        |               |
| BODY WALL  Soft Tissues: No significant abnormality appreciated.       |               |
| Bones: No acute or destructive osseous process seen.     IMPRESSION:   |               |
| 1. Combination of small amount of ground-glass and airspace opacity    |               |
| noted involving the right upper lobe.   This appears to be new since   |               |
| the  previous examination. Part solid lung nodule greater than 5 mm    |               |
| new or  of indeterminate stability. Such nodules are common and have a |               |
|  low  incidence of developing into a clinically significant cancer.    |               |
| Recommendation: Follow up chest CT in 6 months and then at 2 and 4     |               |
| years if nodule remains stable with solid component less than 6 mm.    |               |
| (Fleischner society recommendation).  2. Unchanged right middle lobe   |               |
| nodule measuring 13 mm x 6 mm.  3. Mild emphysematous changes.  4.     |               |
|   Please refer to the findings section for additional details.         |               |
|    Signed by: JORGE Vincent Amit  Sign Date/Time: 2019 12:54 PM   |               |
|                                                                        |               |
+------------------------------------------------------------------------+---------------+
 
 
 
+-------------------------------------------------------------------------+
| Procedure Note                                                          |
+-------------------------------------------------------------------------+
|   Maurizio, Rad Results In - 2019 12:58 PM PDT                         |
| CT CHEST WITHOUT CONTRAST                                               |
|                                                                         |
| CLINICAL INFORMATION:                                                   |
| Right middle lobe pulmonary nodule and multiple areas of consolidation  |
| in a patient with a history of aspiration and Rheumatoid arthritis.     |
|                                                                         |
| COMPARISON:                                                             |
| CT CHEST WO CONTRAST (3/25/2019);                                       |
|                                                                         |
| PROCEDURE:                                                              |
| Axial images through the chest. Multiplanar reconstructions.            |
|                                                                         |
| At least one of the following CT dose optimization techniques were      |
| used: Automated exposure control; Adjustment of mA and/or kV according  |
| to patient size; Use of iterative reconstruction technique.             |
|                                                                         |
| FINDINGS:                                                               |
| Lungs, Pleura and Airways:                                              |
| -Right middle lobe nodule on series 4, image 161 measuring 13 mm x 6    |
| mm, unchanged.                                                          |
| -Mild emphysematous changes.                                            |
| -Combination of small amount of ground-glass and airspace opacity noted |
| involving the right upper lobe on series 4, image 66 through 59. this   |
| appears to be new since the previous examination.                       |
| -Trace right pleural effusion.  Small amount of calcified pleural       |
| plaques.                                                                |
| Mediastinum: Mild cardiomegaly.  No pericardial effusion.  Aorta does   |
| not appear aneurysmal.  Mild prominence of the pulmonary arteries,      |
| potentially pulmonary arterial hypertension.  Probable surgical changes |
| seen at the GE junction region.                                         |
| Lymph Nodes: No enlarged lymph nodes seen.                              |
| Upper Abdomen: No significant abnormality appreciated within limits of  |
| unenhanced technique.                                                   |
|                                                                         |
| BODY WALL                                                               |
| Soft Tissues: No significant abnormality appreciated.                   |
 
| Bones: No acute or destructive osseous process seen.                    |
|                                                                         |
| IMPRESSION:                                                             |
| 1. Combination of small amount of ground-glass and airspace opacity     |
| noted involving the right upper lobe.  This appears to be new since the |
| previous examination. Part solid lung nodule greater than 5 mm new or   |
| of indeterminate stability. Such nodules are common and have a low      |
| incidence of developing into a clinically significant cancer.           |
| Recommendation: Follow up chest CT in 6 months and then at 2 and 4      |
| years if nodule remains stable with solid component less than 6 mm.     |
| (Fleischner society recommendation).                                    |
| 2. Unchanged right middle lobe nodule measuring 13 mm x 6 mm.           |
| 3. Mild emphysematous changes.                                          |
| 4.  Please refer to the findings section for additional details.        |
|                                                                         |
| Signed by: JORGE Vincent, [place]                                            |
| Sign Date/Time: 2019 12:54 PM                                     |
+-------------------------------------------------------------------------+
 
 
 
+---------------+---------+--------------------+--------------+
| Performing    | Address | City/State/Zipcode | Phone Number |
| Organization  |         |                    |              |
+---------------+---------+--------------------+--------------+
|   [facility] |         |                    |              |
+---------------+---------+--------------------+--------------+
 documented in this encounter
 
 Visit Diagnoses
 
 
+------------------------------------------------------------------------------------------+
| Diagnosis                                                                                |
+------------------------------------------------------------------------------------------+
|   Multifocal lung consolidation (HCC)  Pneumococcal pneumonia (streptococcus pneumoniae  |
| pneumonia)                                                                               |
+------------------------------------------------------------------------------------------+
 documented in this encounter

## 2019-12-06 NOTE — XMS
Encounter Summary
  Created on: 2019
 
 Wyatt Shane
 External Reference #: [id number]
 : 43
 Sex: Male
 
 Demographics
 
 
+-----------------------+------------------------+
| Address               | [address]     |
|                       | [place]  [address]   |
+-----------------------+------------------------+
| Home Phone            | [phone]        |
+-----------------------+------------------------+
| Preferred Language    | Unknown                |
+-----------------------+------------------------+
| Marital Status        |                 |
+-----------------------+------------------------+
| Druze Affiliation | LUT                    |
+-----------------------+------------------------+
| Race                  | White                  |
+-----------------------+------------------------+
| Ethnic Group          | Not  or  |
+-----------------------+------------------------+
 
 
 Author
 
 
+--------------+------------------------------+
| Author       | [facility] |
+--------------+------------------------------+
| Organization | [facility] |
+--------------+------------------------------+
| Address      | Unknown                      |
+--------------+------------------------------+
| Phone        | Unavailable                  |
+--------------+------------------------------+
 
 
 
 Support
 
 
+---------------+--------------+---------+-----------------+
| Name          | Relationship | Address | Phone           |
+---------------+--------------+---------+-----------------+
| Opal Shane | ECON         | Unknown | [phone] |
+---------------+--------------+---------+-----------------+
 
 
 
 Care Team Providers
 
 
 
+-----------------------+------+-----------------+
| Care Team Member Name | Role | Phone           |
+-----------------------+------+-----------------+
| Erwin Iniguez MD   | PCP  | [phone] |
+-----------------------+------+-----------------+
 
 
 
 Encounter Details
 
 
+--------+-------------+-----------------+---------------------+---------------+
| Date   | Type        | Department      | Care Team           | Description   |
+--------+-------------+-----------------+---------------------+---------------+
| / | Office      |   [facility]  |   Note, Outpatient  | Progress Note |
|    | Visit-Trans | MEDICINE        | Clinic              |               |
|        | cribed      |                 |                     |               |
+--------+-------------+-----------------+---------------------+---------------+
 
 
 
 Social History
 
 
+----------------+-------+-----------+--------+------+
| Tobacco Use    | Types | Packs/Day | Years  | Date |
|                |       |           | Used   |      |
+----------------+-------+-----------+--------+------+
| Never Assessed |       |           |        |      |
+----------------+-------+-----------+--------+------+
 
 
 
+------------------+---------------+
| Sex Assigned at  | Date Recorded |
| Birth            |               |
+------------------+---------------+
| Not on file      |               |
+------------------+---------------+
 
 
 
+----------------+-------------+-------------+
| Job Start Date | Occupation  | Industry    |
+----------------+-------------+-------------+
| Not on file    | Not on file | Not on file |
+----------------+-------------+-------------+
 
 
 
+----------------+--------------+------------+
| Travel History | Travel Start | Travel End |
+----------------+--------------+------------+
 
 
 
+-------------------------------------+
| No recent travel history available. |
+-------------------------------------+
 documented as of this encounter
 
 
 Progress Notes
 Interface, Transcription In - 2006  5:13 AM PSTCLINIC DATE:       2001
 
 OTOLARYNGOLOGY HEAD AND NECK SURGERY
 
 SUBJECTIVE:  Mr. Shane is seen back today  in  followup  with respect to his
 laryngeal carcinoma and subglottic stenosis.   He  is  doing very well, and
 from a breathing standpoint, can do all  but  the most extremes of exertion
 without difficulty.
 
 PHYSICAL EXAMINATION:  NECK: Today, his  neck was without masses.  Flexible
 fiberoptic laryngoscopy of the nasopharynx,  oropharynx,  hypopharynx,  and
 larynx was done.  There were no abnormalities.   He had still mild residual
 irregularity.  The anterior commissure  as  was  seen  before  and somewhat
 limited abduction, but otherwise, no recurrence.
 
 ASSESSMENT:  Laryngeal carcinoma, no evidence of disease.
 
 PLAN:  I will see him back in 6 months.
 
 Jimmy Esposito M.D.
 
 RINA / 
 [id number] / [id number] / [id number] /
 D: 2001
 T: 2001
 
 [id number] signed by Interface, Transcription In at 2006  5:13 AM PSTdocume
nted in this encounter
 
 Plan of Treatment
 Not on filedocumented as of this encounter
 
 Visit Diagnoses
 Not on filedocumented in this encounter

## 2019-12-06 NOTE — XMS
Encounter Summary
  Created on: 2019
 
 Shane Wyatttien BroussardJosue
 External Reference #: [id number]
 : 43
 Sex: Male
 
 Demographics
 
 
+-----------------------+---------------------------+
| Address               | [address]        |
|                       | [place]  [address] |
+-----------------------+---------------------------+
| Home Phone            | [phone]           |
+-----------------------+---------------------------+
| Preferred Language    | Unknown                   |
+-----------------------+---------------------------+
| Marital Status        |                    |
+-----------------------+---------------------------+
| Protestant Affiliation | 1028                      |
+-----------------------+---------------------------+
| Race                  | Unknown                   |
+-----------------------+---------------------------+
| Ethnic Group          | Unknown                   |
+-----------------------+---------------------------+
 
 
 Author
 
 
+--------------+--------------------------------------------+
| Author       | [facility]  |
|              | [facility] [place]                                |
+--------------+--------------------------------------------+
| Organization | [facility]  |
|              | [facility] [place]                                |
+--------------+--------------------------------------------+
| Address      | Unknown                                    |
+--------------+--------------------------------------------+
| Phone        | Unavailable                                |
+--------------+--------------------------------------------+
 
 
 
 Support
 
 
+---------------+--------------+--------------------+-----------------+
| Name          | Relationship | Address            | Phone           |
+---------------+--------------+--------------------+-----------------+
| Opal Shane | ECON         | [address]     | [phone] |
|               |              | [place]   |                 |
|               |              | [id number]              |                 |
+---------------+--------------+--------------------+-----------------+
 
 
 
 
 Care Team Providers
 
 
+-----------------------+------+-----------------+
| Care Team Member Name | Role | Phone           |
+-----------------------+------+-----------------+
| Erwin Iniguez MD | PCP  | [phone] |
+-----------------------+------+-----------------+
 
 
 
 Encounter Details
 
 
+--------+---------+----------------------+---------------------+----------------------+
| Date   | Type    | Department           | Care Team           | Description          |
+--------+---------+----------------------+---------------------+----------------------+
| 10/14/ | Office  |   [facility]      |   Luis Carlos Perez  | Central sleep apnea  |
|    | Visit   | SLEEP DISORDER  [address]  | MD [place]  [address]   | due to Cheyne-Nichole |
|        |         | [address]  [place]      | [address]  [place]    |  respiration         |
|        |         | [place] [address] | [place] [address]     | (Primary Dx); JOANN    |
|        |         |   [phone]       | [phone]        | (obstructive sleep   |
|        |         |                      | [phone] (Fax)  | apnea)               |
+--------+---------+----------------------+---------------------+----------------------+
 
 
 
 Social History
 
 
+---------------+------------+-----------+--------+------------------+
| Tobacco Use   | Types      | Packs/Day | Years  | Date             |
|               |            |           | Used   |                  |
+---------------+------------+-----------+--------+------------------+
| Former Smoker | Cigarettes | 1.3       | 34     | Quit: 1996 |
+---------------+------------+-----------+--------+------------------+
 
 
 
+---------------------+---+---+---+
| Smokeless Tobacco:  |   |   |   |
| Never Used          |   |   |   |
+---------------------+---+---+---+
 
 
 
+-------------+-------------+---------+----------+
| Alcohol Use | Drinks/Week | oz/Week | Comments |
+-------------+-------------+---------+----------+
| No          |             |         |          |
+-------------+-------------+---------+----------+
 
 
 
+------------------+---------------+
| Sex Assigned at  | Date Recorded |
| Birth            |               |
+------------------+---------------+
| Not on file      |               |
 
+------------------+---------------+
 
 
 
+----------------+-------------+-------------+
| Job Start Date | Occupation  | Industry    |
+----------------+-------------+-------------+
| Not on file    | Not on file | Not on file |
+----------------+-------------+-------------+
 
 
 
+----------------+--------------+------------+
| Travel History | Travel Start | Travel End |
+----------------+--------------+------------+
 
 
 
+-------------------------------------+
| No recent travel history available. |
+-------------------------------------+
 documented as of this encounter
 
 Last Filed Vital Signs
 
 
+-------------------+----------------------+----------------------+----------+
| Vital Sign        | Reading              | Time Taken           | Comments |
+-------------------+----------------------+----------------------+----------+
| Blood Pressure    | 132/62               | 10/14/2013  9:55 AM  |          |
|                   |                      | PDT                  |          |
+-------------------+----------------------+----------------------+----------+
| Pulse             | 52                   | 10/14/2013  9:55 AM  |          |
|                   |                      | PDT                  |          |
+-------------------+----------------------+----------------------+----------+
| Temperature       | -                    | -                    |          |
+-------------------+----------------------+----------------------+----------+
| Respiratory Rate  | 16                   | 10/14/2013  9:55 AM  |          |
|                   |                      | PDT                  |          |
+-------------------+----------------------+----------------------+----------+
| Oxygen Saturation | -                    | -                    |          |
+-------------------+----------------------+----------------------+----------+
| Inhaled Oxygen    | -                    | -                    |          |
| Concentration     |                      |                      |          |
+-------------------+----------------------+----------------------+----------+
| Weight            | 91.3 kg (201 lb 3.2  | 10/14/2013  9:55 AM  |          |
|                   | oz)                  | PDT                  |          |
+-------------------+----------------------+----------------------+----------+
| Height            | -                    | -                    |          |
+-------------------+----------------------+----------------------+----------+
| Body Mass Index   | 32.47                | 2013  1:28 PM  |          |
|                   |                      | PDT                  |          |
+-------------------+----------------------+----------------------+----------+
 documented in this encounter
 
 Patient Instructions
 Patient Instructions Luis Carlos Perez Jr., MD - 10/14/2013 11:11 AM PDTYou have a combinati
on of Obstructive Sleep Apnea and Central Sleep Apnea. We will bring you back into the sleep
 center for a BiPAP titration study.Electronically signed by Luis Carlos Perez Jr., MD at 10/1
2013 11:12 AM PDT
 
 documented in this encounter
 
 Progress Notes
 Luis Carlos Perez Jr., MD - 10/14/2013 11:03 AM James Shane underwent attended polysomnog
celeste on 10/7/2013 which revealed a latency to sleep onset of 5.5 minutes and a sleep effici
ency of 84.5%. The percentages of the various stages of sleep were Abnormal. There was no N3
 sleep and the amount of REM sleep was markedly reduced at 2.5%. The arousal index was 82.7 
which is high. The Respiratory Disturbance Index was 77.4; the Apnea-Hypopnea Index was 77.4
; and the Apnea-Index was 56.2. Most of the Apneas were Central Apneas of the Cheyne-Nichole 
type. The Respiratory Arousal Index was 65.5.  The miguel oxygen saturation recorded during s
leep was 86.0%. The patient spent 15.8 minutes with an oxygen saturation of less than 88%. T
here were 91 Periodic Limb Movements and the PLMS Arousal Index was 1.3 (which is normal). I
've reviewed these results with the patient and his wife.
 
 A: JOANN
     CSA of the Cheyne-Nichole type
 
 I've discussed this in detail with the patient and his wife - she has actually notice that 
his snoring has recently declined and the "quiet" apneas have dramatically increased. The on
ly thing that has changed has been the recent institution of prednisone (he is tapering off 
of this) and low dose weekly methotrexate. Usually when one sees CSA of the Cheyne-Nichole va
riety it is associated with heart disease. CSA of the "ataxic" type is often associated with
 significant to high doses of narcotics.
 
 I've discussed the use of BiPAP therapy (ASV-BiPAP in particular) for .
 
 P: PSG guided PAP titration - the titration will start with simple CPAP (which is sometimes
 effective even in ) but if centrals and Cheyne's Nichole persist, then we will switch to 
an ASV BiPAP titration protocol: EPAP will be raised only for clear cut obstructive apneas. 
PS will be set to 0-25cm. RR will be set to auto.
     F/u thereafter.
 
 Today, 15 minutes was spent face to face with the patient; the majority of time was spent harley dupree.
 
 CC: Dr. CARMINA Iniguez, Dr. Foote
 
 Electronically signed by Luis Carlos Perze Jr., MD at 10/14/2013 11:13 AM PDTdocumented in th
is encounter
 
 Plan of Treatment
 
 
+--------+---------+-------------+----------------------+-------------+
| Date   | Type    | Specialty   | Care Team            | Description |
+--------+---------+-------------+----------------------+-------------+
| / | Office  | Pulmonology |   Juan F,          |             |
|    | Visit   |             | Jessica Cheung,   |             |
|        |         |             | MD  [address]  |             |
|        |         |             |   [place],   |             |
|        |         |             | [place] [address]             |             |
|        |         |             | [phone]         |             |
|        |         |             | [phone] (Fax)   |             |
+--------+---------+-------------+----------------------+-------------+
| / | Office  | Cardiology  |   Eric Akins, |             |
|    | Visit   |             |  MD  [address]   |             |
|        |         |             | [place]  |             |
|        |         |             | [address]  [phone]  |             |
|        |         |             |  [phone] (Fax)  |             |
+--------+---------+-------------+----------------------+-------------+
 
 documented as of this encounter
 
 Visit Diagnoses
 
 
+----------------------------------------------------------------------------------+
| Diagnosis                                                                        |
+----------------------------------------------------------------------------------+
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
| respiration                                                                      |
+----------------------------------------------------------------------------------+
|   JOANN (obstructive sleep apnea)  Obstructive sleep apnea (adult) (pediatric)     |
+----------------------------------------------------------------------------------+
 documented in this encounter

## 2019-12-06 NOTE — XMS
Encounter Summary
  Created on: 2019
 
 Wyatt Shane
 External Reference #: [id number]
 : 43
 Sex: Male
 
 Demographics
 
 
+-----------------------+------------------------+
| Address               | [address]     |
|                       | [place]  [address]   |
+-----------------------+------------------------+
| Home Phone            | [phone]        |
+-----------------------+------------------------+
| Preferred Language    | Unknown                |
+-----------------------+------------------------+
| Marital Status        |                 |
+-----------------------+------------------------+
| Jain Affiliation | LUT                    |
+-----------------------+------------------------+
| Race                  | White                  |
+-----------------------+------------------------+
| Ethnic Group          | Not  or  |
+-----------------------+------------------------+
 
 
 Author
 
 
+--------------+-------------+
| Organization | Unknown     |
+--------------+-------------+
| Address      | Unknown     |
+--------------+-------------+
| Phone        | Unavailable |
+--------------+-------------+
 
 
 
 Support
 
 
+---------------+--------------+---------+-----------------+
| Name          | Relationship | Address | Phone           |
+---------------+--------------+---------+-----------------+
| Opal Shane | ECON         | Unknown | [phone] |
+---------------+--------------+---------+-----------------+
 
 
 
 Care Team Providers
 
 
+-----------------------+------+-----------------+
| Care Team Member Name | Role | Phone           |
 
+-----------------------+------+-----------------+
| Erwin Iniguez MD   | PCP  | [phone] |
+-----------------------+------+-----------------+
 
 
 
 Encounter Details
 
 
+--------+-------------+------------+---------------------+------------------+
| Date   | Type        | Department | Care Team           | Description      |
+--------+-------------+------------+---------------------+------------------+
| / | Procedure - |            |   Record, Operation | Operative Report |
|    |             |            |                     |                  |
|        | Transcribed |            |                     |                  |
+--------+-------------+------------+---------------------+------------------+
 
 
 
 Social History
 
 
+----------------+-------+-----------+--------+------+
| Tobacco Use    | Types | Packs/Day | Years  | Date |
|                |       |           | Used   |      |
+----------------+-------+-----------+--------+------+
| Never Assessed |       |           |        |      |
+----------------+-------+-----------+--------+------+
 
 
 
+------------------+---------------+
| Sex Assigned at  | Date Recorded |
| Birth            |               |
+------------------+---------------+
| Not on file      |               |
+------------------+---------------+
 
 
 
+----------------+-------------+-------------+
| Job Start Date | Occupation  | Industry    |
+----------------+-------------+-------------+
| Not on file    | Not on file | Not on file |
+----------------+-------------+-------------+
 
 
 
+----------------+--------------+------------+
| Travel History | Travel Start | Travel End |
+----------------+--------------+------------+
 
 
 
+-------------------------------------+
| No recent travel history available. |
+-------------------------------------+
 documented as of this encounter
 
 Plan of Treatment
 
 Not on filedocumented as of this encounter
 
 Procedures
 
 
+------------------+--------+------------+----------------------+----------------------+
| Procedure Name   | Priori | Date/Time  | Associated Diagnosis | Comments             |
|                  | ty     |            |                      |                      |
+------------------+--------+------------+----------------------+----------------------+
| OPERATION RECORD |        | 1998 |                      |   Results for this   |
|                  |        |            |                      | procedure are in the |
|                  |        |            |                      |  results section.    |
+------------------+--------+------------+----------------------+----------------------+
 documented in this encounter
 
 Results
 OPERATION RECORD (1998)
 
+------------------------------------------------------------------------------------------+
| Transcriptions                                                                           |
+------------------------------------------------------------------------------------------+
|   Interface, Transcription In - 2007  5:11 AM PST                                  |
|    [facility] [facility]    |
| [place], [place] [address] [phone]                     [place]          |
| [facility] HospitalOPERATION RECORDMed Rec No.:  [id number]           Date:           |
| 1998Name:   Darien Shane SURGEON:                 Jimmy Esposito M.D.      |
|                               , Otolaryngology                        |
|             Head and Neck SurgeryASSISTANTS:                        Kadie Clark,    |
| M.D.                                   Resident, Otolaryngology                          |
|           Head and Neck Surgery                                   Alphonse Chen M.D.      |
|                               Resident, Otolaryngology,                                  |
|   Head and Neck SurgeryPOSTOPERATIVE DIAGNOSIS(ES):Squamous cell carcinoma of the        |
| larynx.OPERATIONS PERFORMED:1.  Right vertical hemilaryngectomy.2.                       |
| Tracheostomy.SPECIMEN(S) REMOVED:1.  Right hemilaryngectomy specimen for permanent       |
| sectioning.2.  Frozen section of left anterior portion of vocal cord.3.  Full section    |
| thickness of left cord.ESTIMATED BLOOD LOSS:Less than 50 cc.INDICATIONS:The patient is a |
|  52-year-old gentleman  who has had panendoscopy and workupfor hoarseness.  He was noted |
|  to have  a  right-sided  T2N0M0 squamous cellcarcinoma of the larynx.  He was taken  to |
|  the Operating Room for the aboveprocedure.FINDINGS:The patient had a right-sided        |
| laryngeal tumor which seemed to center in thefalse vocal cord.  It extended slightly     |
| onto the left cord anteriorly.  Thefrozen section on the left cord was positive  for     |
| squamous cell carcinoma,and deep resection was taken and sent  for permanent sectioning. |
|   The rightperichondrium was brought into the larynx as a neocord.PROCEDURE:The patient  |
| was identified in the preoperative  area  and  brought  to  theOperating  Room and       |
| placed in the supine  position.   General  endotrachealanesthesia was administered.  The |
|  table  was  turned.  The neck was preppedby marking the incision and injecting  it      |
| with 1% lidocaine with 1:100,000epinephrine.  The patient was then prepped  and draped   |
| in the usual sterilefashion.An  incision  was made in the skin and  subplatysmal  skin   |
| flaps  elevatedsuperiorly and inferiorly.  An incision  was made midline between the     |
| strapmuscles,  the sternohyoid and  sternothyroid  from  midline.  Thethyroid  |
| isthmus was also split down  the  midline.   The  perichondrium wasincised superiorly    |
| along the border of  the  thyroid  cartilage, as well asinferiorly along the cartilage   |
| border.   The  planned  cartilage  cuts werealso  incised  after  injecting              |
| perichondrially  with  1%  lidocaine  with1:100,000 epinephrine.  The perichondrium  was |
|   then peeled posteriorly offthe right side.  The cuts in the cartilage  were  then      |
| planned, keeping inmind the location of the tumor.A tracheostomy was performed by        |
| incising  onto the second tracheal ring andinserting the Fairbanks tube.  The cartilage     |
| cuts were then made just left ofthe midline and right in front of the  oblique line on   |
| the right side.  TheStryker oscillating saw was used to create  these  cuts, and care    |
| was takennot to penetrate the perichondrium.   The  inferior margin of resection wasjust |
 
|  above the cricoid cartilage.  The  mucosal  cut  on  the left side wasthen created, the |
|  larynx opened, and  the  tumor  visualized.  The superiorcuts  of  the mucosa were made |
|  along  the  superior  edge  of  the  thyroidcartilage.  Posteriorly, dissection         |
| continued  using  Metzenbaum  scissorsacross the mucosa just through the arytenoid       |
| cartilage.   The specimen wasremoved and sent to Pathology for sectioning.   The         |
| perichondrium that hadbeen previously elevated was then tacked  down  to  the posterior  |
| laryngealmucosa  using  interrupted 4-0 chromic.   The  wound  was  irrigated.           |
| Thetracheotomy tube was changed over to a #8 cuffed Shiley tube.  The Dobbhofffeeding    |
| tube was then placed.The wound was closed in layers using  3-0  Vicryl  for  the         |
| platysma,  4-0Vicryl for the subcutaneous layers,  and  5-0  nylon  running for the      |
| skin.The wound was then dressed with fluffs  and  a  Marlon.  Bacitracin had              |
| beenapplied.The patient was awakened and taken to  the  Post  Anesthesia  Care  Unit     |
| instable condition.NOTE: Dr. Jimmy Esposito was present for all key portions of the         |
| case.Kadie Clark M.D.             Jimmy Esposito M.D.Resident, Otolaryngology     |
|        , OtolaryngologyHead and Neck Surgery              Head and    |
| Neck SurgeryCYY/bwD: 1998T:  1998 10:50 Acc:                                 |
|epinephrine.  The patient was then prepped  and draped in the usual sterile              |
|fashion.                                                                                 |
|                                                                                          |
|An  incision  was made in the skin and  subplatysmal  skin  flaps  elevated               |
|superiorly and inferiorly.  An incision  was made midline between the strap               |
|muscles,  the sternohyoid and  sternothyroid  from  midline.  The               |
|thyroid isthmus was also split down  the  midline.   The  perichondrium was               |
|incised superiorly along the border of  the  thyroid  cartilage, as well as               |
|inferiorly along the cartilage border.   The  planned  cartilage  cuts were               |
|also  incised  after  injecting  perichondrially  with  1%  lidocaine  with               |
|1:100,000 epinephrine.  The perichondrium  was  then peeled posteriorly off               |
|the right side.  The cuts in the cartilage  were  then  planned, keeping in               |
|mind the location of the tumor.                                                           |
|                                                                                          |
|A tracheostomy was performed by incising  onto the second tracheal ring and               |
|inserting the Fairbanks tube.  The cartilage  cuts were then made just left of               |
|the midline and right in front of the  oblique line on the right side.  The               |
|[facility] oscillating saw was used to create  these  cuts, and care was taken               |
|not to penetrate the perichondrium.   The  inferior margin of resection was               |
|just above the cricoid cartilage.  The  mucosal  cut  on  the left side was               |
|then created, the larynx opened, and  the  tumor  visualized.  The superior               |
|cuts  of  the mucosa were made along  the  superior  edge  of  the  thyroid               |
|cartilage.  Posteriorly, dissection  continued  using  Metzenbaum  scissors              |
|across the mucosa just through the arytenoid  cartilage.   The specimen was               |
|removed and sent to Pathology for sectioning.   The  perichondrium that had               |
|been previously elevated was then tacked  down  to  the posterior laryngeal               |
|mucosa  using  interrupted 4-0 chromic.   The  wound  was  irrigated.   The               |
|tracheotomy tube was changed over to a #8 cuffed Shiley tube.  The Dobbhoff               |
|feeding tube was then placed.                                                             |
|                                                                                          |
|The wound was closed in layers using  3-0  Vicryl  for  the  platysma,  4-0               |
|Vicryl for the subcutaneous layers,  and  5-0  nylon  running for the skin.               |
|The wound was then dressed with fluffs  and  a  Marlon.  Bacitracin had been               |
|applied.                                                                                 |
|                                                                                          |
|The patient was awakened and taken to  the  Post  Anesthesia  Care  Unit in               |
|stable condition.                                                                         |
|                                                                                          |
|NOTE: Dr. Jimmy Esposito was present for all key portions of the case.                       |
|                                                                                          |
|Kadie Clark M.D.             Jimmy Esposito M.D.                                   |
|Resident, Otolaryngology           , Otolaryngology                    |
 
|Head and Neck Surgery              Head and Neck Surgery                                  |
|                                                                                          |
|CHAYA/santiago                                                                                    |
|D: 1998                                                                             |
|T:  1998 10:50 A                                                                    |
|                                                                                          |
|cc:                                                                                      |
+------------------------------------------------------------------------------------------+
 documented in this encounter
 
 Visit Diagnoses
 Not on filedocumented in this encounter

## 2019-12-06 NOTE — XMS
Encounter Summary
  Created on: 2019
 
 Wyatt Shane
 External Reference #: [id number]
 : 43
 Sex: Male
 
 Demographics
 
 
+-----------------------+------------------------+
| Address               | [address]     |
|                       | [place]  [address]   |
+-----------------------+------------------------+
| Home Phone            | [phone]        |
+-----------------------+------------------------+
| Preferred Language    | Unknown                |
+-----------------------+------------------------+
| Marital Status        |                 |
+-----------------------+------------------------+
| Oriental orthodox Affiliation | LUT                    |
+-----------------------+------------------------+
| Race                  | White                  |
+-----------------------+------------------------+
| Ethnic Group          | Not  or  |
+-----------------------+------------------------+
 
 
 Author
 
 
+--------------+------------------------------+
| Author       | [facility] |
+--------------+------------------------------+
| Organization | [facility] |
+--------------+------------------------------+
| Address      | Unknown                      |
+--------------+------------------------------+
| Phone        | Unavailable                  |
+--------------+------------------------------+
 
 
 
 Support
 
 
+---------------+--------------+---------+-----------------+
| Name          | Relationship | Address | Phone           |
+---------------+--------------+---------+-----------------+
| Opal Shane | ECON         | Unknown | [phone] |
+---------------+--------------+---------+-----------------+
 
 
 
 Care Team Providers
 
 
 
+-----------------------+------+-----------------+
| Care Team Member Name | Role | Phone           |
+-----------------------+------+-----------------+
| Erwin Iniguez MD   | PCP  | [phone] |
+-----------------------+------+-----------------+
 
 
 
 Encounter Details
 
 
+--------+-------------+-----------------+---------------------+---------------+
| Date   | Type        | Department      | Care Team           | Description   |
+--------+-------------+-----------------+---------------------+---------------+
| / | Office      |   [facility]  |   Note, Outpatient  | Progress Note |
| 2000   | Visit-Trans | MEDICINE        | Clinic              |               |
|        | cribed      |                 |                     |               |
+--------+-------------+-----------------+---------------------+---------------+
 
 
 
 Social History
 
 
+----------------+-------+-----------+--------+------+
| Tobacco Use    | Types | Packs/Day | Years  | Date |
|                |       |           | Used   |      |
+----------------+-------+-----------+--------+------+
| Never Assessed |       |           |        |      |
+----------------+-------+-----------+--------+------+
 
 
 
+------------------+---------------+
| Sex Assigned at  | Date Recorded |
| Birth            |               |
+------------------+---------------+
| Not on file      |               |
+------------------+---------------+
 
 
 
+----------------+-------------+-------------+
| Job Start Date | Occupation  | Industry    |
+----------------+-------------+-------------+
| Not on file    | Not on file | Not on file |
+----------------+-------------+-------------+
 
 
 
+----------------+--------------+------------+
| Travel History | Travel Start | Travel End |
+----------------+--------------+------------+
 
 
 
+-------------------------------------+
| No recent travel history available. |
+-------------------------------------+
 documented as of this encounter
 
 
 Progress Notes
 Interface, Transcription In - 2006  3:18 AM PSTCLINIC DATE:       2000
 
 OTOLARYNGOLOGY - HEAD AND NECK SURGERY
 
 Mr. Shane is seen back in followup today after largyngotracheoplasty.  He is
 really  doing  pretty  well.  He has  some  stitches  which  have  not  yet
 dissolved and I removed these from his neck as well from his upper chest at
 the  cartilage  graft  site.   Flexible  fiberoptic  laryngoscopy  is  done
 revealing  that the cartilage graft is  still  not  completing  mucosalized
 anteriorly but that the stent is in good  position  and is patent.  It does
 not  appear  to  be  significantly  interfering   with  his  airway  either
 supraglottically or subglottically,  although,  he  does  have  a  moderate
 amount of supraglottic swelling.  I  think  it  part  related  to  both the
 operation  of  the  stent  as well his  radiation.   He  will  continue  on
 antibiotics, and I have refilled a prescription  for  clindamycin.   I will
 see him back here in approximately a month's time.
 
 Jimmy Esposito M.D.
 
 MARIA ALEJANDRA / 
 [id number] / [id number] / [id number] / [id number]
 D: 2000
 T: 2000Electronically signed by Interface, Transcription In at 2006  3:18 AM PS
Tdocumented in this encounter
 
 Plan of Treatment
 Not on filedocumented as of this encounter
 
 Visit Diagnoses
 Not on filedocumented in this encounter

## 2019-12-06 NOTE — XMS
Encounter Summary
  Created on: 2019
 
 Wyatt Shane
 External Reference #: [id number]
 : 43
 Sex: Male
 
 Demographics
 
 
+-----------------------+------------------------+
| Address               | [address]     |
|                       | [place]  [address]   |
+-----------------------+------------------------+
| Home Phone            | [phone]        |
+-----------------------+------------------------+
| Preferred Language    | Unknown                |
+-----------------------+------------------------+
| Marital Status        |                 |
+-----------------------+------------------------+
| Hoahaoism Affiliation | LUT                    |
+-----------------------+------------------------+
| Race                  | White                  |
+-----------------------+------------------------+
| Ethnic Group          | Not  or  |
+-----------------------+------------------------+
 
 
 Author
 
 
+--------------+------------------------------+
| Author       | [facility] |
+--------------+------------------------------+
| Organization | [facility] |
+--------------+------------------------------+
| Address      | Unknown                      |
+--------------+------------------------------+
| Phone        | Unavailable                  |
+--------------+------------------------------+
 
 
 
 Support
 
 
+---------------+--------------+---------+-----------------+
| Name          | Relationship | Address | Phone           |
+---------------+--------------+---------+-----------------+
| Opal Shane | ECON         | Unknown | [phone] |
+---------------+--------------+---------+-----------------+
 
 
 
 Care Team Providers
 
 
 
+-----------------------+------+-----------------+
| Care Team Member Name | Role | Phone           |
+-----------------------+------+-----------------+
| Erwin Iniguez MD   | PCP  | [phone] |
+-----------------------+------+-----------------+
 
 
 
 Encounter Details
 
 
+--------+-------------+-----------------+---------------------+---------------+
| Date   | Type        | Department      | Care Team           | Description   |
+--------+-------------+-----------------+---------------------+---------------+
| / | Office      |   [facility]  |   Note, Outpatient  | Progress Note |
|    | Visit-Trans | MEDICINE        | Clinic              |               |
|        | cribed      |                 |                     |               |
+--------+-------------+-----------------+---------------------+---------------+
 
 
 
 Social History
 
 
+----------------+-------+-----------+--------+------+
| Tobacco Use    | Types | Packs/Day | Years  | Date |
|                |       |           | Used   |      |
+----------------+-------+-----------+--------+------+
| Never Assessed |       |           |        |      |
+----------------+-------+-----------+--------+------+
 
 
 
+------------------+---------------+
| Sex Assigned at  | Date Recorded |
| Birth            |               |
+------------------+---------------+
| Not on file      |               |
+------------------+---------------+
 
 
 
+----------------+-------------+-------------+
| Job Start Date | Occupation  | Industry    |
+----------------+-------------+-------------+
| Not on file    | Not on file | Not on file |
+----------------+-------------+-------------+
 
 
 
+----------------+--------------+------------+
| Travel History | Travel Start | Travel End |
+----------------+--------------+------------+
 
 
 
+-------------------------------------+
| No recent travel history available. |
+-------------------------------------+
 documented as of this encounter
 
 
 Progress Notes
 Interface, Transcription In - 2006  5:03 AM PSTINIC DATE:       1999
 
 OTOLARYNGOLOGY CLINIC
 
 SUBJECTIVE:  The patient returns to clinic today for placement of a pH
 probe.  He has a history of laryngeal stenosis status post a partial
 laryngectomy.  There is some question of gastroesophageal reflux disease
 being a factor in the stenosis.
 
 PROCEDURE:  The patient's nose was anesthetized and decongested with 1%
 lidocaine with phenylephrine spray.  The throat was likewise anesthetized.
 The probe was inserted through the right naris into the oropharynx.  A
 nasopharyngoscope was then inserted through the left naris to visualize the
 probe being placed.  The probe was advanced until the upper probe was just
 into the postcricoid region.  The probe was secured in place at the nose,
 and its position was rechecked with the flexible nasopharyngoscope.
 
 ASSESSMENT:  Placement of pH probe in clinic today.
 
 PLAN:  The patient will return to the GI lab in 24 hours for removal of the
 probe.  He will then follow up with Dr. Esposito in the ENT Clinic to review
 the results and to undergo preoperative workup for the planned dilatation
 of the laryngeal stenosis.
 
 Larry Means M.D.
 Resident, Otolaryngology
 Head and Neck Surgery
 
 JOHNNY/odette
 D: 1999
 T: 1999Electronically signed by Interface, Transcription In at 2006  5:03 AM MIKE Paez, Transcription In - 2006  5:03 AM PSTINIC DATE:       1999
 
                 OTOLARYNGOLOGY HEAD AND NECK SURGERY CLINIC
 
 Mr. Shane is seen back preoperatively in follow-up from his pH probe which
 shows significant supine, proximal and distal reflux.  I have discussed
 this with him.  We had a PAR regarding the proposed procedure including the
 possibility of trachea.  He seems to understand very well what is involved.
 Consent was signed.
 
 Jimmy Esposito M.D.
 
 CIRILO/ulices
 D: 1999
 T: 1999Electronically signed by Interface, Transcription In at 2006  5:03 AM PS
Tdocumented in this encounter
 
 Plan of Treatment
 Not on filedocumented as of this encounter
 
 Visit Diagnoses
 Not on filedocumented in this encounter

## 2019-12-06 NOTE — XMS
Encounter Summary
  Created on: 2019
 
 Wyatt Shane
 External Reference #: [id number]
 : 43
 Sex: Male
 
 Demographics
 
 
+-----------------------+------------------------+
| Address               | [address]     |
|                       | [place]  [address]   |
+-----------------------+------------------------+
| Home Phone            | [phone]        |
+-----------------------+------------------------+
| Preferred Language    | Unknown                |
+-----------------------+------------------------+
| Marital Status        |                 |
+-----------------------+------------------------+
| Church Affiliation | LUT                    |
+-----------------------+------------------------+
| Race                  | White                  |
+-----------------------+------------------------+
| Ethnic Group          | Not  or  |
+-----------------------+------------------------+
 
 
 Author
 
 
+--------------+------------------------------+
| Author       | [facility] |
+--------------+------------------------------+
| Organization | [facility] |
+--------------+------------------------------+
| Address      | Unknown                      |
+--------------+------------------------------+
| Phone        | Unavailable                  |
+--------------+------------------------------+
 
 
 
 Support
 
 
+---------------+--------------+---------+-----------------+
| Name          | Relationship | Address | Phone           |
+---------------+--------------+---------+-----------------+
| Opal Shane | ECON         | Unknown | [phone] |
+---------------+--------------+---------+-----------------+
 
 
 
 Care Team Providers
 
 
 
+-----------------------+------+-----------------+
| Care Team Member Name | Role | Phone           |
+-----------------------+------+-----------------+
| Erwin Iniguez MD   | PCP  | [phone] |
+-----------------------+------+-----------------+
 
 
 
 Encounter Details
 
 
+--------+----------+----------------------+-----------+-------------+
| Date   | Type     | Department           | Care Team | Description |
+--------+----------+----------------------+-----------+-------------+
| / | Results  |   Registration  [address] |           |             |
|    | Only     |  [address] |           |             |
|        |          |  [place]  Mailcode: [address] |           |             |
|        |          |   [place], [place]       |           |             |
|        |          | [address]           |           |             |
|        |          | [phone]         |           |             |
+--------+----------+----------------------+-----------+-------------+
 
 
 
 Social History
 
 
+----------------+-------+-----------+--------+------+
| Tobacco Use    | Types | Packs/Day | Years  | Date |
|                |       |           | Used   |      |
+----------------+-------+-----------+--------+------+
| Never Assessed |       |           |        |      |
+----------------+-------+-----------+--------+------+
 
 
 
+------------------+---------------+
| Sex Assigned at  | Date Recorded |
| Birth            |               |
+------------------+---------------+
| Not on file      |               |
+------------------+---------------+
 
 
 
+----------------+-------------+-------------+
| Job Start Date | Occupation  | Industry    |
+----------------+-------------+-------------+
| Not on file    | Not on file | Not on file |
+----------------+-------------+-------------+
 
 
 
+----------------+--------------+------------+
| Travel History | Travel Start | Travel End |
+----------------+--------------+------------+
 
 
 
+-------------------------------------+
 
| No recent travel history available. |
+-------------------------------------+
 documented as of this encounter
 
 Plan of Treatment
 Not on filedocumented as of this encounter
 
 Procedures
 
 
+----------------+--------+-------------+----------------------+----------------------+
| Procedure Name | Priori | Date/Time   | Associated Diagnosis | Comments             |
|                | ty     |             |                      |                      |
+----------------+--------+-------------+----------------------+----------------------+
| CBC TESTS 2    | Routin | 1999  |                      |   Results for this   |
|                | e      |  3:15 PM    |                      | procedure are in the |
|                |        | PDT         |                      |  results section.    |
+----------------+--------+-------------+----------------------+----------------------+
 documented in this encounter
 
 Results
 CBC TESTS 2 (1999  3:15 PM PDT)
 
+-------------+------------------+-----------+------------+--------------+
| Component   | Value            | Ref Range | Performed  | Pathologist  |
|             |                  |           | At         | Signature    |
+-------------+------------------+-----------+------------+--------------+
| WHITE CELL  |         6.2      | K/CU MM   |            |              |
| COUNT       |                  |           |            |              |
+-------------+------------------+-----------+------------+--------------+
| RED CELL    |         4.22 (L) | M/CU MM   |            |              |
| COUNT       |                  |           |            |              |
+-------------+------------------+-----------+------------+--------------+
| HEMOGLOBIN  |        13.1 (L)  | GM/DL     |            |              |
+-------------+------------------+-----------+------------+--------------+
| HEMATOCRIT  |        38.2 (L)  | %         |            |              |
+-------------+------------------+-----------+------------+--------------+
| MCV         |        90.6      | FL        |            |              |
+-------------+------------------+-----------+------------+--------------+
| MCH         |        31.       | PG        |            |              |
+-------------+------------------+-----------+------------+--------------+
| MCHC        |        34.2      | GM/DL     |            |              |
+-------------+------------------+-----------+------------+--------------+
| RDW         |        12.1      | %         |            |              |
+-------------+------------------+-----------+------------+--------------+
| PLATELET    |       247.       | K/CU MM   |            |              |
| COUNT       |                  |           |            |              |
+-------------+------------------+-----------+------------+--------------+
| MPV         |         9.       | FL        |            |              |
+-------------+------------------+-----------+------------+--------------+
 
 
 
+----------+
| Specimen |
+----------+
|          |
+----------+
 
 
 
 
+----------------------+------------------------+--------------------+--------------+
| Performing           | Address                | City/State/Zipcode | Phone Number |
| Organization         |                        |                    |              |
+----------------------+------------------------+--------------------+--------------+
|   [facility] |   [address]  | [place] [address] |              |
|  PATHOLOGY           | [address]                |                    |              |
+----------------------+------------------------+--------------------+--------------+
 documented in this encounter
 
 Visit Diagnoses
 Not on filedocumented in this encounter

## 2019-12-06 NOTE — XMS
Encounter Summary
  Created on: 2019
 
 Wyatt Shane
 External Reference #: [id number]
 : 43
 Sex: Male
 
 Demographics
 
 
+-----------------------+------------------------+
| Address               | [address]     |
|                       | [place]  [address]   |
+-----------------------+------------------------+
| Home Phone            | [phone]        |
+-----------------------+------------------------+
| Preferred Language    | Unknown                |
+-----------------------+------------------------+
| Marital Status        |                 |
+-----------------------+------------------------+
| Jewish Affiliation | LUT                    |
+-----------------------+------------------------+
| Race                  | White                  |
+-----------------------+------------------------+
| Ethnic Group          | Not  or  |
+-----------------------+------------------------+
 
 
 Author
 
 
+--------------+-------------+
| Organization | Unknown     |
+--------------+-------------+
| Address      | Unknown     |
+--------------+-------------+
| Phone        | Unavailable |
+--------------+-------------+
 
 
 
 Support
 
 
+---------------+--------------+---------+-----------------+
| Name          | Relationship | Address | Phone           |
+---------------+--------------+---------+-----------------+
| Opal Shane | ECON         | Unknown | [phone] |
+---------------+--------------+---------+-----------------+
 
 
 
 Care Team Providers
 
 
+-----------------------+------+-------------+
| Care Team Member Name | Role | Phone       |
 
+-----------------------+------+-------------+
 PCP  | Unavailable |
+-----------------------+------+-------------+
 
 
 
 Encounter Details
 
 
+--------+-------------+------------+------------------+-------------+
| Date   | Type        | Department | Care Team        | Description |
+--------+-------------+------------+------------------+-------------+
| / | [facility] |            |   Letter, Clinic | Letters     |
| 2004   | scribed     |            |                  |             |
+--------+-------------+------------+------------------+-------------+
 
 
 
 Social History
 
 
+----------------+-------+-----------+--------+------+
| Tobacco Use    | Types | Packs/Day | Years  | Date |
|                |       |           | Used   |      |
+----------------+-------+-----------+--------+------+
| Never Assessed |       |           |        |      |
+----------------+-------+-----------+--------+------+
 
 
 
+------------------+---------------+
| Sex Assigned at  | Date Recorded |
| Birth            |               |
+------------------+---------------+
| Not on file      |               |
+------------------+---------------+
 
 
 
+----------------+-------------+-------------+
| Job Start Date | Occupation  | Industry    |
+----------------+-------------+-------------+
| Not on file    | Not on file | Not on file |
+----------------+-------------+-------------+
 
 
 
+----------------+--------------+------------+
| Travel History | Travel Start | Travel End |
+----------------+--------------+------------+
 
 
 
+-------------------------------------+
| No recent travel history available. |
+-------------------------------------+
 documented as of this encounter
 
 Progress Notes
 Interface, Transcription In - 2005  6:15 PM PDT                                  [facility]
 
ON
                         [address], [place]  [address]
                       [phone] or [phone]
            Department of Otolaryngology - [address] Fax: [phone]
 
 2004
 
 Eddy Boone M.D.
 [address]
 [place]  [address]
 
 RE:  WYATT SHANE
 MR #: [id number]
 
 Dear Dr. Boone:
 
 Just a brief note to let you know I saw Wyatt Shane back for his 5-year
 followup visit.  After a hemilaryngectomy for recurrent laryngeal
 carcinoma, I am pleased to report that he is without evidence of recurrent
 disease, both by exam, flexible fiberoptic laryngoscopy, and chest x-ray.
 From this point, I would consider him cured, and we will not see him back
 here in regular followup.
 
 Obviously, I am gratified by this result and pleased by how well he has
 done.  I will certainly see him back here if any problems arise.
 
 Thanks again for letting me care for him.
 
 Yours sincerely,
 
 Jimmy Esposito M.D.
 
 [facility] / 
 [id number] / [id number] / [id number] /
 D: 2004
 T: 2004
 
 cc:
 
 Erwin Beach M.D.
 [address]
 [place], [place]  [address]Relwvirlzbaorv signed by Interface, Transcription In at 2005  6:1
5 PM PDTdocumented in this encounter
 
 Plan of Treatment
 Not on filedocumented as of this encounter
 
 Visit Diagnoses
 Not on filedocumented in this encounter

## 2019-12-06 NOTE — XMS
Encounter Summary
  Created on: 2019
 
 Wyatt Shane
 External Reference #: [id number]
 : 43
 Sex: Male
 
 Demographics
 
 
+-----------------------+------------------------+
| Address               | [address]     |
|                       | [place]  [address]   |
+-----------------------+------------------------+
| Home Phone            | [phone]        |
+-----------------------+------------------------+
| Preferred Language    | Unknown                |
+-----------------------+------------------------+
| Marital Status        |                 |
+-----------------------+------------------------+
| Mormon Affiliation | LUT                    |
+-----------------------+------------------------+
| Race                  | White                  |
+-----------------------+------------------------+
| Ethnic Group          | Not  or  |
+-----------------------+------------------------+
 
 
 Author
 
 
+--------------+-------------+
| Organization | Unknown     |
+--------------+-------------+
| Address      | Unknown     |
+--------------+-------------+
| Phone        | Unavailable |
+--------------+-------------+
 
 
 
 Support
 
 
+---------------+--------------+---------+-----------------+
| Name          | Relationship | Address | Phone           |
+---------------+--------------+---------+-----------------+
| Opal Shane | ECON         | Unknown | [phone] |
+---------------+--------------+---------+-----------------+
 
 
 
 Care Team Providers
 
 
+-----------------------+------+-----------------+
| Care Team Member Name | Role | Phone           |
 
+-----------------------+------+-----------------+
| Erwin Iniguez MD   | PCP  | [phone] |
+-----------------------+------+-----------------+
 
 
 
 Encounter Details
 
 
+--------+-------------+------------+--------------------+--------------------+
| Date   | Type        | Department | Care Team          | Description        |
+--------+-------------+------------+--------------------+--------------------+
| / | Procedure - |            |   Documentation,   | OP REPORT-TEACHING |
|    |             |            | Teaching Physician |                    |
|        | Transcribed |            |                    |                    |
+--------+-------------+------------+--------------------+--------------------+
 
 
 
 Social History
 
 
+----------------+-------+-----------+--------+------+
| Tobacco Use    | Types | Packs/Day | Years  | Date |
|                |       |           | Used   |      |
+----------------+-------+-----------+--------+------+
| Never Assessed |       |           |        |      |
+----------------+-------+-----------+--------+------+
 
 
 
+------------------+---------------+
| Sex Assigned at  | Date Recorded |
| Birth            |               |
+------------------+---------------+
| Not on file      |               |
+------------------+---------------+
 
 
 
+----------------+-------------+-------------+
| Job Start Date | Occupation  | Industry    |
+----------------+-------------+-------------+
| Not on file    | Not on file | Not on file |
+----------------+-------------+-------------+
 
 
 
+----------------+--------------+------------+
| Travel History | Travel Start | Travel End |
+----------------+--------------+------------+
 
 
 
+-------------------------------------+
| No recent travel history available. |
+-------------------------------------+
 documented as of this encounter
 
 Plan of Treatment
 
 Not on filedocumented as of this encounter
 
 Procedures
 
 
+--------------------+--------+------------+----------------------+----------+
| Procedure Name     | Priori | Date/Time  | Associated Diagnosis | Comments |
|                    | ty     |            |                      |          |
+--------------------+--------+------------+----------------------+----------+
| TEACHING PHYSICIAN |        | 1998 |                      |          |
+--------------------+--------+------------+----------------------+----------+
 documented in this encounter
 
 Visit Diagnoses
 Not on filedocumented in this encounter

## 2019-12-06 NOTE — XMS
Encounter Summary
  Created on: 2019
 
 Shane Wyatttien BroussardJosue
 External Reference #: [id number]
 : 43
 Sex: Male
 
 Demographics
 
 
+-----------------------+---------------------------+
| Address               | [address]        |
|                       | [place]  [address] |
+-----------------------+---------------------------+
| Home Phone            | [phone]           |
+-----------------------+---------------------------+
| Preferred Language    | Unknown                   |
+-----------------------+---------------------------+
| Marital Status        |                    |
+-----------------------+---------------------------+
| Scientologist Affiliation | [id number]                      |
+-----------------------+---------------------------+
| Race                  | Unknown                   |
+-----------------------+---------------------------+
| Ethnic Group          | Unknown                   |
+-----------------------+---------------------------+
 
 
 Author
 
 
+--------------+--------------------------------------------+
| Author       | [facility]  |
|              | and [place]                                |
+--------------+--------------------------------------------+
| Organization | [facility]  |
|              | and [place]                                |
+--------------+--------------------------------------------+
| Address      | Unknown                                    |
+--------------+--------------------------------------------+
| Phone        | Unavailable                                |
+--------------+--------------------------------------------+
 
 
 
 Support
 
 
+---------------+--------------+--------------------+-----------------+
| Name          | Relationship | Address            | Phone           |
+---------------+--------------+--------------------+-----------------+
| Opal Shane | ECON         | [address]     | [phone] |
|               |              | [place]   |                 |
|               |              | [address]              |                 |
+---------------+--------------+--------------------+-----------------+
 
 
 
 
 Care Team Providers
 
 
+-----------------------+------+-------------+
| Care Team Member Name | Role | Phone       |
+-----------------------+------+-------------+
 PCP  | Unavailable |
+-----------------------+------+-------------+
 
 
 
 Encounter Details
 
 
+--------+----------+----------------------+----------------------+-------------+
| Date   | Type     | Department           | Care Team            | Description |
+--------+----------+----------------------+----------------------+-------------+
| / | Abstract |   [facility]  |   [facility] |             |
|    |          | Conversion Location  |  SR                  |             |
|        |          |  [phone]        |                      |             |
+--------+----------+----------------------+----------------------+-------------+
 
 
 
 Social History
 
 
+----------------+-------+-----------+--------+------+
| Tobacco Use    | Types | Packs/Day | Years  | Date |
|                |       |           | Used   |      |
+----------------+-------+-----------+--------+------+
| Never Assessed |       |           |        |      |
+----------------+-------+-----------+--------+------+
 
 
 
+------------------+---------------+
| Sex Assigned at  | Date Recorded |
| Birth            |               |
+------------------+---------------+
| Not on file      |               |
+------------------+---------------+
 
 
 
+----------------+-------------+-------------+
| Job Start Date | Occupation  | Industry    |
+----------------+-------------+-------------+
| Not on file    | Not on file | Not on file |
+----------------+-------------+-------------+
 
 
 
+----------------+--------------+------------+
| Travel History | Travel Start | Travel End |
+----------------+--------------+------------+
 
 
 
 
+-------------------------------------+
| No recent travel history available. |
+-------------------------------------+
 documented as of this encounter
 
 Last Filed Vital Signs
 
 
+-------------------+------------------+----------------------+----------+
| Vital Sign        | Reading          | Time Taken           | Comments |
+-------------------+------------------+----------------------+----------+
| Blood Pressure    | 122/68           | 2012 12:00 AM  |          |
|                   |                  | PST                  |          |
+-------------------+------------------+----------------------+----------+
| Pulse             | -                | -                    |          |
+-------------------+------------------+----------------------+----------+
| Temperature       | -                | -                    |          |
+-------------------+------------------+----------------------+----------+
| Respiratory Rate  | -                | -                    |          |
+-------------------+------------------+----------------------+----------+
| Oxygen Saturation | -                | -                    |          |
+-------------------+------------------+----------------------+----------+
| Inhaled Oxygen    | -                | -                    |          |
| Concentration     |                  |                      |          |
+-------------------+------------------+----------------------+----------+
| Weight            | 94.8 kg (209 lb) | 2012 12:00 AM  |          |
|                   |                  | PST                  |          |
+-------------------+------------------+----------------------+----------+
| Height            | -                | -                    |          |
+-------------------+------------------+----------------------+----------+
| Body Mass Index   | -                | -                    |          |
+-------------------+------------------+----------------------+----------+
 documented in this encounter
 
 Plan of Treatment
 
 
+--------+---------+-------------+----------------------+-------------+
| Date   | Type    | Specialty   | Care Team            | Description |
+--------+---------+-------------+----------------------+-------------+
| / | Office  | Pulmonology |   Juan F,          |             |
| 2019   | Visit   |             | Jessica Cheung,   |             |
|        |         |             | MD [address] |             |
|        |         |             |   [place],   |             |
|        |         |             | [place] [address]             |             |
|        |         |             | [phone]         |             |
|        |         |             | [phone] (Fax)   |             |
+--------+---------+-------------+----------------------+-------------+
| / | Office  | Cardiology  |   Eric Akins, |             |
|    | Visit   |             |  MD  [address]   |             |
|        |         |             | [place]  [place]  |             |
|        |         |             | [address]  [phone]  |             |
|        |         |             |  [phone] (Fax)  |             |
+--------+---------+-------------+----------------------+-------------+
 documented as of this encounter
 
 Procedures
 
 
+--------------------+--------+-------------+----------------------+----------------------+
 
| Procedure Name     | Priori | Date/Time   | Associated Diagnosis | Comments             |
|                    | ty     |             |                      |                      |
+--------------------+--------+-------------+----------------------+----------------------+
| ENDOSCOPY, COLON,  | Routin | 2008  |                      |   Results for this   |
| DIAGNOSTIC         | e      | 12:00 AM    |                      | procedure are in the |
|                    |        | PST         |                      |  results section.    |
+--------------------+--------+-------------+----------------------+----------------------+
 documented in this encounter
 
 Results
 ENDOSCOPY, COLON, DIAGNOSTIC (2008 12:00 AM PST)
 
+----------+
| Specimen |
+----------+
|          |
+----------+
 
 
 
+-----------+--------------+
| Narrative | Performed At |
+-----------+--------------+
|           |              |
+-----------+--------------+
 documented in this encounter
 
 Visit Diagnoses
 Not on filedocumented in this encounter

## 2019-12-06 NOTE — XMS
Encounter Summary
  Created on: 2019
 
 Shane Wyatttien BroussardJosue
 External Reference #: [id number]
 : 43
 Sex: Male
 
 Demographics
 
 
+-----------------------+---------------------------+
| Address               | [address]        |
|                       | [place]  [address] |
+-----------------------+---------------------------+
| Home Phone            | [phone]           |
+-----------------------+---------------------------+
| Preferred Language    | Unknown                   |
+-----------------------+---------------------------+
| Marital Status        |                    |
+-----------------------+---------------------------+
| Jainism Affiliation | [id number]                      |
+-----------------------+---------------------------+
| Race                  | Unknown                   |
+-----------------------+---------------------------+
| Ethnic Group          | Unknown                   |
+-----------------------+---------------------------+
 
 
 Author
 
 
+--------------+--------------------------------------------+
| Author       | [facility]  |
|              | and [place]                                |
+--------------+--------------------------------------------+
| Organization | [facility]  |
|              | and [place]                                |
+--------------+--------------------------------------------+
| Address      | Unknown                                    |
+--------------+--------------------------------------------+
| Phone        | Unavailable                                |
+--------------+--------------------------------------------+
 
 
 
 Support
 
 
+---------------+--------------+--------------------+-----------------+
| Name          | Relationship | Address            | Phone           |
+---------------+--------------+--------------------+-----------------+
| Opal Shane | ECON         | [address]     | [phone] |
|               |              | [place]   |                 |
|               |              | [address]              |                 |
+---------------+--------------+--------------------+-----------------+
 
 
 
 
 Care Team Providers
 
 
+-----------------------+------+-----------------+
| Care Team Member Name | Role | Phone           |
+-----------------------+------+-----------------+
| Erwin Iniguez MD | PCP  | [phone] |
+-----------------------+------+-----------------+
 
 
 
 Reason for Visit
 
 
+--------+----------+
| Reason | Comments |
+--------+----------+
| Apnea  |          |
+--------+----------+
 
 
 
 Encounter Details
 
 
+--------+---------+----------------------+----------------------+----------------------+
| Date   | Type    | Department           | Care Team            | Description          |
+--------+---------+----------------------+----------------------+----------------------+
| / | Office  |   [facility]      |   Sohail Campbell PA  | JOANN treated with     |
| 2015   | Visit   | SLEEP DISORDER  [address]  |  [address]     | BiPAP (Primary Dx);  |
|        |         | [address]      | [place]      | [place]        |
|        |         | [place] [address] | [address]  [phone]  | respiration          |
|        |         |   [phone]       |  [phone] (Fax)  |                      |
+--------+---------+----------------------+----------------------+----------------------+
 
 
 
 Social History
 
 
+---------------+------------+-----------+--------+------------------+
| Tobacco Use   | Types      | Packs/Day | Years  | Date             |
|               |            |           | Used   |                  |
+---------------+------------+-----------+--------+------------------+
| Former Smoker | Cigarettes | 1.3       | 34     | Quit: 1996 |
+---------------+------------+-----------+--------+------------------+
 
 
 
+---------------------+---+---+---+
| Smokeless Tobacco:  |   |   |   |
| Never Used          |   |   |   |
+---------------------+---+---+---+
 
 
 
+-------------+-------------+---------+----------+
| Alcohol Use | Drinks/Week | oz/Week | Comments |
+-------------+-------------+---------+----------+
 
| No          |             |         |          |
+-------------+-------------+---------+----------+
 
 
 
+------------------+---------------+
| Sex Assigned at  | Date Recorded |
| Birth            |               |
+------------------+---------------+
| Not on file      |               |
+------------------+---------------+
 
 
 
+----------------+-------------+-------------+
| Job Start Date | Occupation  | Industry    |
+----------------+-------------+-------------+
| Not on file    | Not on file | Not on file |
+----------------+-------------+-------------+
 
 
 
+----------------+--------------+------------+
| Travel History | Travel Start | Travel End |
+----------------+--------------+------------+
 
 
 
+-------------------------------------+
| No recent travel history available. |
+-------------------------------------+
 documented as of this encounter
 
 Last Filed Vital Signs
 
 
+-------------------+----------------------+----------------------+----------+
| Vital Sign        | Reading              | Time Taken           | Comments |
+-------------------+----------------------+----------------------+----------+
| Blood Pressure    | 120/68               | 2015 10:07 AM  |          |
|                   |                      | PDT                  |          |
+-------------------+----------------------+----------------------+----------+
| Pulse             | 68                   | 2015 10:07 AM  |          |
|                   |                      | PDT                  |          |
+-------------------+----------------------+----------------------+----------+
| Temperature       | -                    | -                    |          |
+-------------------+----------------------+----------------------+----------+
| Respiratory Rate  | 16                   | 2015 10:07 AM  |          |
|                   |                      | PDT                  |          |
+-------------------+----------------------+----------------------+----------+
| Oxygen Saturation | 98%                  | 2015 10:07 AM  |          |
|                   |                      | PDT                  |          |
+-------------------+----------------------+----------------------+----------+
| Inhaled Oxygen    | -                    | -                    |          |
| Concentration     |                      |                      |          |
+-------------------+----------------------+----------------------+----------+
| Weight            | 80.8 kg (178 lb 3.2  | 2015 10:07 AM  |          |
|                   | oz)                  | PDT                  |          |
+-------------------+----------------------+----------------------+----------+
| Height            | -                    | -                    |          |
 
+-------------------+----------------------+----------------------+----------+
| Body Mass Index   | 27.1                 | 2014 10:00 AM  |          |
|                   |                      | PDT                  |          |
+-------------------+----------------------+----------------------+----------+
 documented in this encounter
 
 Progress Notes
 Sohail Campbell PA - 2015 10:08 AM PDTFormatting of this note might be different from 
the original.
 Subjective: 
  
 Patient ID: Wyatt Shane is a 69 y.o. male.
 
 HPI
 
 last office visit was:  10/07/2014
 date of polysomnography: 10/07/2013 
 AHI: 77.4
 RDI: 77.4
 O2%: 86% with 15.8 minutes below 88% 
 Machine type: Respironics ASV BiPAP with nasal mask ([employer])
 obtained from:  [facility] in [place] 
 pressure: EPAP = 4cm;PSmin=0cm;PSmax=20 cm;backup rate=auto
 Nights using BiPAP:    1564  163/191
 % of nights >4 hours:  3.1%   43%
 average usage (all nights):  0:27  0:28  3:26
 average usage (nights used):  2:34  2:03  4:02
 AHI: 0.0
 
 Wyatt comes in for BiPAP compliance.  He continues to have problems with congestion, which h
as always caused problems with his compliance.  This has improved since he started Sudafed. 
 He has been able to wear it for longer durations, but he is still working to develop it int
o a regular part of his sleep routine.  He is sleeping much better when using his BiPAP and 
has noticed that he feels better during the day.  When he takes his Sudafed, he does well wi
th his compliance.  He has forgotten on many nights.  He has also had significant shoulder p
ain, which has distracted him on many nights from using his BiPAP.
 
 I have discussed the download in detail.  This shows that his sleep apnea is controlled, wi
th an AHI of 0.0.  It also shows that his leaks are well controlled.  He has not met the com
pliance standard of wearing his BiPAP for >4 hours for 70% or more nights during at least a 
thirty day period.
 
 Review of Systems
 
 Objective: 
 Physical Exam
 
 Assessment: 
 
 Problem #1: OBSTRUCTIVE SLEEP APNEA (ICD 9-327.23)
 This is controlled with BiPAP.  His compliance has improved since he began taking Sudafed f
or his sinus congestion, but he still struggles with wearing it consistently.
 
 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 
 See above.
 
 Plan: 
 
 He is to continue with his BiPAP indefinitely.  I have recommended that he work toward wear
ing his BiPAP 100% of the time he is asleep. 
 
 
 I will follow up with him in 2 months, sooner prn.  Fifteen minutes were spent face-to-face
, with the majority of time spent in counseling.
 
 Sohail Campbell PA-C
 
 cc: 
 Dr. Erwin Foote
 
 Electronically signed by ROWENA Greene at 2015 10:33 AM PDTdocumented in this enco
unter
 
 Plan of Treatment
 
 
+--------+---------+-------------+----------------------+-------------+
| Date   | Type    | Specialty   | Care Team            | Description |
+--------+---------+-------------+----------------------+-------------+
| / | Office  | Pulmonology |   Juan F,          |             |
|    | Visit   |             | Jessica Cheung,   |             |
|        |         |             | MD [address] |             |
|        |         |             |   [place]   |             |
|        |         |             | [place] [address]             |             |
|        |         |             | [phone]         |             |
|        |         |             | [phone] (Fax)   |             |
+--------+---------+-------------+----------------------+-------------+
| / | Office  | Cardiology  |   Eric Akins, |             |
|    | Visit   |             |  MD [address]   |             |
|        |         |             | [place]  |             |
|        |         |             | [address]  [phone]  |             |
|        |         |             |  [phone] (Fax)  |             |
+--------+---------+-------------+----------------------+-------------+
 documented as of this encounter
 
 Visit Diagnoses
 
 
+------------------------------------+
| Diagnosis                          |
+------------------------------------+
|   JOANN treated with BiPAP - Primary |
+------------------------------------+
|   [place] respiration        |
+------------------------------------+
 documented in this encounter

## 2019-12-06 NOTE — XMS
Encounter Summary
  Created on: 2019
 
 Shane Wyatttien BroussardJosue
 External Reference #: [id number]
 : 43
 Sex: Male
 
 Demographics
 
 
+-----------------------+---------------------------+
| Address               | [address]        |
|                       | [place]  [address] |
+-----------------------+---------------------------+
| Home Phone            | [phone]           |
+-----------------------+---------------------------+
| Preferred Language    | Unknown                   |
+-----------------------+---------------------------+
| Marital Status        |                    |
+-----------------------+---------------------------+
| Religion Affiliation | [id number]                      |
+-----------------------+---------------------------+
| Race                  | Unknown                   |
+-----------------------+---------------------------+
| Ethnic Group          | Unknown                   |
+-----------------------+---------------------------+
 
 
 Author
 
 
+--------------+--------------------------------------------+
| Author       | [facility]  |
|              | [facility] [place]                                |
+--------------+--------------------------------------------+
| Organization | [facility]  |
|              | [facility] [place]                                |
+--------------+--------------------------------------------+
| Address      | Unknown                                    |
+--------------+--------------------------------------------+
| Phone        | Unavailable                                |
+--------------+--------------------------------------------+
 
 
 
 Support
 
 
+---------------+--------------+--------------------+-----------------+
| Name          | Relationship | Address            | Phone           |
+---------------+--------------+--------------------+-----------------+
| Opal Shane | ECON         | [address]     | [phone] |
|               |              | [place]   |                 |
|               |              | [id number]              |                 |
+---------------+--------------+--------------------+-----------------+
 
 
 
 
 Care Team Providers
 
 
+-----------------------+------+-----------------+
| Care Team Member Name | Role | Phone           |
+-----------------------+------+-----------------+
| Erwin Iniguez MD | PCP  | [phone] |
+-----------------------+------+-----------------+
 
 
 
 Reason for Referral
 Evaluate & Treat (Routine)
 
+--------+--------------+-----------+--------------+--------------+---------------+
| Status | Reason       | Specialty | Diagnoses /  | Referred By  | Referred To   |
|        |              |           | Procedures   | Contact      | Contact       |
+--------+--------------+-----------+--------------+--------------+---------------+
| Closed |   Specialty  | Sleep     |   Diagnoses  |   Chris,     |   [facility]   |
|        | Services     | Medicine  |  [place]     | Luis Carlos [place]    | [place]  [address] |
|        | Required     |           | sleep apnea  | MD [address] |  [address]       |
|        |              |           | due to       |  [address] | [place],  |
|        |              |           | [address] |    [place]   | [place] [address] |
|        |              |           | s            | [place]    |   Phone:      |
|        |              |           | respiration  | [address]        | [phone]  |
|        |              |           |  JOANN         | Phone:       |  Fax:         |
|        |              |           | (obstructive | [phone] | [phone]  |
|        |              |           |  sleep       |   Fax:       |               |
|        |              |           | apnea)       | [phone] |               |
|        |              |           | Procedures   |              |               |
|        |              |           | NH POLYSOM   |              |               |
|        |              |           | 6/>YRS SLEEP |              |               |
|        |              |           |  4/> ADDL    |              |               |
|        |              |           | MALIK ATTND  |              |               |
|        |              |           |  NPSG        |              |               |
+--------+--------------+-----------+--------------+--------------+---------------+
 
 
 
 
 Reason for Visit
 
 
+--------+----------+
| Reason | Comments |
+--------+----------+
| Apnea  |          |
+--------+----------+
 
 
 
 Encounter Details
 
 
+--------+---------+----------------------+---------------------+----------------------+
| Date   | Type    | Department           | Care Team           | Description          |
+--------+---------+----------------------+---------------------+----------------------+
| / | Office  |   [facility]      |   Luis Carlos Perez  | Central sleep apnea  |
|    | Visit   | SLEEP DISORDER  [address]  | [address]   | due to [place] |
 
|        |         | [address]  [place]      | [address]  [place]    |  respiration         |
|        |         | [place] [address] | [place] [address]     | (Primary Dx); JOANN    |
|        |         |   [phone]       | [phone]        | (obstructive sleep   |
|        |         |                      | [phone] (Fax)  | apnea)               |
+--------+---------+----------------------+---------------------+----------------------+
 
 
 
 Social History
 
 
+---------------+------------+-----------+--------+------------------+
| Tobacco Use   | Types      | Packs/Day | Years  | Date             |
|               |            |           | Used   |                  |
+---------------+------------+-----------+--------+------------------+
| Former Smoker | Cigarettes | 1.3       | 34     | Quit: 1996 |
+---------------+------------+-----------+--------+------------------+
 
 
 
+---------------------+---+---+---+
| Smokeless Tobacco:  |   |   |   |
| Never Used          |   |   |   |
+---------------------+---+---+---+
 
 
 
+-------------+-------------+---------+----------+
| Alcohol Use | Drinks/Week | oz/Week | Comments |
+-------------+-------------+---------+----------+
| No          |             |         |          |
+-------------+-------------+---------+----------+
 
 
 
+------------------+---------------+
| Sex Assigned at  | Date Recorded |
| Birth            |               |
+------------------+---------------+
| Not on file      |               |
+------------------+---------------+
 
 
 
+----------------+-------------+-------------+
| Job Start Date | Occupation  | Industry    |
+----------------+-------------+-------------+
| Not on file    | Not on file | Not on file |
+----------------+-------------+-------------+
 
 
 
+----------------+--------------+------------+
| Travel History | Travel Start | Travel End |
+----------------+--------------+------------+
 
 
 
+-------------------------------------+
| No recent travel history available. |
 
+-------------------------------------+
 documented as of this encounter
 
 Last Filed Vital Signs
 
 
+-------------------+--------------------+----------------------+----------+
| Vital Sign        | Reading            | Time Taken           | Comments |
+-------------------+--------------------+----------------------+----------+
| Blood Pressure    | 132/64             | 2015  8:09 AM  |          |
|                   |                    | PDT                  |          |
+-------------------+--------------------+----------------------+----------+
| Pulse             | 68                 | 2015  8:09 AM  |          |
|                   |                    | PDT                  |          |
+-------------------+--------------------+----------------------+----------+
| Temperature       | -                  | -                    |          |
+-------------------+--------------------+----------------------+----------+
| Respiratory Rate  | 16                 | 2015  8:09 AM  |          |
|                   |                    | PDT                  |          |
+-------------------+--------------------+----------------------+----------+
| Oxygen Saturation | 97%                | 2015  8:09 AM  |          |
|                   |                    | PDT                  |          |
+-------------------+--------------------+----------------------+----------+
| Inhaled Oxygen    | -                  | -                    |          |
| Concentration     |                    |                      |          |
+-------------------+--------------------+----------------------+----------+
| Weight            | 81.9 kg (180 lb 8  | 2015  8:09 AM  |          |
|                   | oz)                | PDT                  |          |
+-------------------+--------------------+----------------------+----------+
| Height            | -                  | -                    |          |
+-------------------+--------------------+----------------------+----------+
| Body Mass Index   | 27.44              | 2014 10:00 AM  |          |
|                   |                    | PDT                  |          |
+-------------------+--------------------+----------------------+----------+
 documented in this encounter
 
 Progress Notes
 Luis Carlos Perez Jr., MD - 2015  8:17 AM PDTFormatting of this note might be differen
t from the original.
 He had his sinus surgery done on . He isn't wearing his ASVBPAP. He has noted that
 he feels more alert when he wears his ASVPAP though.
 
 Since his surgery he has noted significant improvement in his nasal stuffiness although it 
hasn't resolved completely. His wife has noted that his breathing during sleep has improved 
but she still notices some snoring (not very loud though) and apneas at night. He is sleepin
g in the right lateral decubitus position with his head elevated.
 
 He states that he still gets some nasal stuffiness when he wears the ASV-PAP which isn't mu
ch (only 3 times since the surgery).
 
 Past Medical History:  has a past medical history of HBP (high blood pressure); Atrial fibr
illation (HCC); Heart failure (HCC); Hyperlipidemia; Raynaud disease; Fibromyalgia; ASCVD (a
rteriosclerotic cardiovascular disease); Aortic aneurysm (); JOANN (obstructive sleep apnea
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
es respiration.
  has past surgical history that includes Coronary artery bypass graft (); Esophagus mikhail
karolyn (); Knee arthroscopy (); hernia repair (); laryngectomy (); Kidney ston
e surgery; Tracheal surgery (); Carpal tunnel release (); basal cell cancer resectio
n left year (); and Nasal sinus surgery ().
 Allergies 
 
 Allergen Reactions 
   Diltiazem Hcl  
   Lipitor  
   Propranolol Hcl  
 
 Current Outpatient Prescriptions 
 Medication Sig Dispense Refill 
   acyclovir (ZOVIRAX) 400 MG tablet Take 400 mg by mouth 3 times daily as needed.   
   acyclovir (ZOVIRAX) 5% ointment Apply  topically every 3 hours.   
   albuterol (PROAIR HFA) 90 mcg/puff inhaler Inhale 2 puffs into the lungs every 6 hours 
as needed for Wheezing.   
   Cholecalciferol (VITAMIN D3) 2000 UNITS CAPS Take 2,000 Units by mouth Daily.   
   cyanocobalamin (VITAMIN B-12) 1,000 mcg/mL injection injected intramuscularly once a mo
[facility]   
   digoxin (LANOXIN) 250 mcg tablet Take 250 mcg by mouth Daily. 1/2 capsule daily   
   diltiazem (DILT-XR) 120 mg 24 hr capsule Take 120 mg by mouth Daily.   
   ferrous sulfate 325 mg tablet Take 325 mg by mouth 3 times daily.   
   fluticasone (FLONASE) 50 mcg/nasal spray one spray in each nostril daily as needed   
   fluticasone-salmeterol (ADVAIR) 500-50 mcg/puff diskus inhaler Inhale 1 puff into the l
ungs Twice Daily.   
   folic acid 1 mg tablet Take 1 mg by mouth Daily.   
   furosemide (LASIX) 40 mg tablet Take 40 mg by mouth Daily.   
   guaiFENesin (MUCINEX) 600 mg 12 hr tablet Take 1,200 mg by mouth 2 times daily.   
   levothyroxine (LEVOTHROID) 75 MCG tablet Take 75 mcg by mouth Daily.   
   loratadine (CLARITIN) 10 mg tablet Take 10 mg by mouth Daily.   
   losartan (COZAAR) 100 MG tablet Take 100 mg by mouth Daily. 1/2 daily   
   methotrexate 2.5 mg tablet Take 15 mg by mouth Once a week.   
   metoclopramide (REGLAN) 10 mg tablet Take 10 mg by mouth 4 times daily as needed.   
   nystatin-triamcinolone (MYCOLOG II) cream Apply  topically 2 times daily.   
   omeprazole (PRILOSEC) 20 mg capsule Take 20 mg by mouth 2 times daily.   
   oxyCODONE-acetaminophen (PERCOCET) 5-325 mg per tablet Take 1 tablet by mouth every 4 h
ours as needed for Pain.   
   potassium citrate (UROCIT-K) 10 mEq SR tablet Take 10 mEq by mouth 2 times daily.   
   predniSONE (DELTASONE) 5 mg tablet Take 5 mg by mouth Daily.   
   pseudoePHEDrine (SUDOGEST) 30 mg tablet Take 30 mg by mouth every 4 hours as needed for
 Congestion.   
   rivaroxaban (XARELTO) 20 mg tablet Take 20 mg by mouth Daily (with dinner).   
   sertraline (ZOLOFT) 100 mg tablet 2 tablets daily   
   tamsulosin (FLOMAX) 0.4 mg CAPS Take 0.4 mg by mouth 2 times daily.   
 
 No current facility-administered medications for this visit. 
 
 Past Surgical History 
 Procedure Laterality Date 
   Coronary artery bypass graft   
   4 vessel 
   Esophageal surgery   
   [place] 
   Knee arthroscopy  1977 
   right knee 
   Hernia repair  2006 
   Laryngectomy  1998 
   right vocal cord cancer 
   Kidney stone surgery   
   Tracheal surgery  2000 
   Reconstruction 
   Carpal tunnel release   
   bilateral 
   Basal cell cancer resection left year  2012 
   Nasal sinus surgery   
 
 
 /64 mmHg | Pulse 68 | Resp 16 | Wt 81.874 kg (180 lb 8 oz) | SpO2 97%
 
 A: Central Sleep Apnea + JOANN: I suspect apnea is still present, but I suspect it may have i
mproved since surgery. I am suggesting that his PSG be repeated in 4-5 weeks. By then he geno
uld have recovered nearly fully from surgery. If the apnea is mild, we may well decide not t
o treat or perhaps to see if low pressures of CPAP might be helpful rather than ASV-BPAP. He
 is on Norco for pain and I have encouraged him to minimize the use of this as much as possi
ble because this can exacerbate his complex apnea.
 
 P: PSG in 4-5 weeks with f/u in 6 weeks.
 
 Patient Active Problem List 
 Diagnosis 
   CARPAL TUNNEL SYNDROME 
   HBP (high blood pressure) 
   Atrial fibrillation 
   Heart failure 
   Raynaud disease 
   Fibromyalgia 
   ASCVD (arteriosclerotic cardiovascular disease) 
   Aortic aneurysm 
   JOANN (obstructive sleep apnea) 
   Cancer of vocal cord 
   Parasomnia 
   Central sleep apnea due to Cheyne-Nichole respiration 
 
 Today, 15 minutes was spent face to face with the patient; the majority of time was spent c
joon regarding Complex Apnea.
 Electronically signed by Luis Carlos Perez Jr., MD at 2015  8:49 AM PDTdocumented in th
is encounter
 
 Plan of Treatment
 
 
+--------+---------+-------------+----------------------+-------------+
| Date   | Type    | Specialty   | Care Team            | Description |
+--------+---------+-------------+----------------------+-------------+
| / | Office  | Pulmonology |   Juan F,          |             |
|    | Visit   |             | Jessica hCeung,   |             |
|        |         |             | MD [address] |             |
|        |         |             |   [place],   |             |
|        |         |             | [place] [address]             |             |
|        |         |             | [phone]         |             |
|        |         |             | [phone] (Fax)   |             |
+--------+---------+-------------+----------------------+-------------+
| / | Office  | Cardiology  |   Eric Akins, |             |
|    | Visit   |             |  MD [address]   |             |
|        |         |             | [place]  |             |
|        |         |             | [address]  [phone]  |             |
|        |         |             |  [phone] (Fax)  |             |
+--------+---------+-------------+----------------------+-------------+
 
 
 
+----------------------+-------------+--------+----------------------+---------------------+
| Name                 | Type        | Priori | Associated Diagnoses | Order Schedule      |
|                      |             | ty     |                      |                     |
+----------------------+-------------+--------+----------------------+---------------------+
| Ambulatory Referral  | Outpatient  | Routin |   Central sleep      | Ordered: 2015 |
 
| to Sleep Studies     | Referral    | e      | apnea due to         |                     |
|                      |             |        | Cheyne-Nichole        |                     |
|                      |             |        | respiration  JOANN     |                     |
|                      |             |        | (obstructive sleep   |                     |
|                      |             |        | apnea)               |                     |
+----------------------+-------------+--------+----------------------+---------------------+
 documented as of this encounter
 
 Visit Diagnoses
 
 
+----------------------------------------------------------------------------------+
| Diagnosis                                                                        |
+----------------------------------------------------------------------------------+
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
| respiration                                                                      |
+----------------------------------------------------------------------------------+
|   JOANN (obstructive sleep apnea)  Obstructive sleep apnea (adult) (pediatric)     |
+----------------------------------------------------------------------------------+
 documented in this encounter

## 2019-12-06 NOTE — XMS
Encounter Summary
  Created on: 2019
 
 Shane Wyatttien BroussardJosue
 External Reference #: [id number]
 : 43
 Sex: Male
 
 Demographics
 
 
+-----------------------+---------------------------+
| Address               | [address]        |
|                       | [place]  [address] |
+-----------------------+---------------------------+
| Home Phone            | [phone]           |
+-----------------------+---------------------------+
| Preferred Language    | Unknown                   |
+-----------------------+---------------------------+
| Marital Status        |                    |
+-----------------------+---------------------------+
| Taoism Affiliation | 1028                      |
+-----------------------+---------------------------+
| Race                  | Unknown                   |
+-----------------------+---------------------------+
| Ethnic Group          | Unknown                   |
+-----------------------+---------------------------+
 
 
 Author
 
 
+--------------+--------------------------------------------+
| Author       | [facility]  |
|              | and [place]                                |
+--------------+--------------------------------------------+
| Organization | [facility]  |
|              | and [place]                                |
+--------------+--------------------------------------------+
| Address      | Unknown                                    |
+--------------+--------------------------------------------+
| Phone        | Unavailable                                |
+--------------+--------------------------------------------+
 
 
 
 Support
 
 
+---------------+--------------+--------------------+-----------------+
| Name          | Relationship | Address            | Phone           |
+---------------+--------------+--------------------+-----------------+
| Opal Shane | ECON         | [address]     | [phone] |
|               |              | [place]   |                 |
|               |              | [id number]              |                 |
+---------------+--------------+--------------------+-----------------+
 
 
 
 
 Care Team Providers
 
 
+-----------------------+------+-----------------+
| Care Team Member Name | Role | Phone           |
+-----------------------+------+-----------------+
| Erwin Iniguez MD | PCP  | [phone] |
+-----------------------+------+-----------------+
 
 
 
 Encounter Details
 
 
+--------+-----------+----------------------+--------------------+-------------+
| Date   | Type      | Department           | Care Team          | Description |
+--------+-----------+----------------------+--------------------+-------------+
| 05/16/ | Hospital  |   [facility] GENERIC IP     |   Conversion       | Pain        |
| 2018   | Encounter | CONVERSION [address]  | Transaction,       |             |
|        |           | [address]           | Provider Unknown   |             |
|        |           | [place], [place]         | [phone]       |             |
|        |           | [address]           | [phone] (Fax) |             |
|        |           | [phone]         |                    |             |
+--------+-----------+----------------------+--------------------+-------------+
 
 
 
 Social History
 
 
+---------------+------------+-----------+--------+------------------+
| Tobacco Use   | Types      | Packs/Day | Years  | Date             |
|               |            |           | Used   |                  |
+---------------+------------+-----------+--------+------------------+
| Former Smoker | Cigarettes | 1.3       | 34     | Quit: 1996 |
+---------------+------------+-----------+--------+------------------+
 
 
 
+---------------------+---+---+---+
| Smokeless Tobacco:  |   |   |   |
| Never Used          |   |   |   |
+---------------------+---+---+---+
 
 
 
+-------------+-------------+---------+----------+
| Alcohol Use | Drinks/Week | oz/Week | Comments |
+-------------+-------------+---------+----------+
| No          |             |         |          |
+-------------+-------------+---------+----------+
 
 
 
+------------------+---------------+
| Sex Assigned at  | Date Recorded |
| Birth            |               |
+------------------+---------------+
| Not on file      |               |
 
+------------------+---------------+
 
 
 
+----------------+-------------+-------------+
| Job Start Date | Occupation  | Industry    |
+----------------+-------------+-------------+
| Not on file    | Not on file | Not on file |
+----------------+-------------+-------------+
 
 
 
+----------------+--------------+------------+
| Travel History | Travel Start | Travel End |
+----------------+--------------+------------+
 
 
 
+-------------------------------------+
| No recent travel history available. |
+-------------------------------------+
 documented as of this encounter
 
 Medications at Time of Discharge
 
 
+----------------------+----------------------+-----------+---------+----------+-----------+
| Medication           | Sig                  | Dispensed | Refills | Start    | End Date  |
|                      |                      |           |         | Date     |           |
+----------------------+----------------------+-----------+---------+----------+-----------+
|   acyclovir          | Apply  topically     |           | 0       |          |           |
| (ZOVIRAX) 5%         | every 3 hours.       |           |         |          |           |
| ointment             |                      |           |         |          |           |
+----------------------+----------------------+-----------+---------+----------+-----------+
|   albuterol (PROAIR  | Inhale 2 puffs into  |           | 0       |          |           |
| HFA) 90 mcg/puff     | the lungs every 6    |           |         |          |           |
| inhaler              | hours as needed for  |           |         |          |           |
|                      | Wheezing.            |           |         |          |           |
+----------------------+----------------------+-----------+---------+----------+-----------+
|   digoxin (LANOXIN)  | Take 125 mcg by      |           | 0       |          |           |
| 250 mcg tablet       | mouth Daily.      |           |         |          |           |
|                      | capsule daily        |           |         |          |           |
+----------------------+----------------------+-----------+---------+----------+-----------+
|   ferrous sulfate    | Take 325 mg by mouth |           | 0       | 20 |           |
| 325 mg tablet        |  3 times daily.      |           |         | 12       |           |
+----------------------+----------------------+-----------+---------+----------+-----------+
|   fluticasone        | one spray in each    |           | 0       | 20 |           |
| (FLONASE) 50         | nostril daily as     |           |         | 12       |           |
| mcg/nasal spray      | needed               |           |         |          |           |
+----------------------+----------------------+-----------+---------+----------+-----------+
|                      | Inhale 1 puff into   |           | 0       |          |           |
| fluticasone-salmeter | the lungs Twice      |           |         |          |           |
| ol (ADVAIR) 500-50   | Daily.               |           |         |          |           |
| mcg/puff diskus      |                      |           |         |          |           |
| inhaler              |                      |           |         |          |           |
+----------------------+----------------------+-----------+---------+----------+-----------+
|   folic acid 1 mg    | Take 1 mg by mouth   |           | 0       |          |           |
| tablet               | Daily.               |           |         |          |           |
+----------------------+----------------------+-----------+---------+----------+-----------+
|   furosemide (LASIX) | Take 40 mg by mouth  |           | 0       |          |           |
 
|  40 mg tablet        | Daily.               |           |         |          |           |
+----------------------+----------------------+-----------+---------+----------+-----------+
|   guaiFENesin        | Take 1,200 mg by     |           | 0       |          |           |
| (MUCINEX) 600 mg 12  | mouth 2 times daily. |           |         |          |           |
| hr tablet            |                      |           |         |          |           |
+----------------------+----------------------+-----------+---------+----------+-----------+
|   levothyroxine      | Take 75 mcg by mouth |           | 0       | 20 |           |
| (LEVOTHROID) 75 MCG  |  Daily.              |           |         | 12       |           |
| tablet               |                      |           |         |          |           |
+----------------------+----------------------+-----------+---------+----------+-----------+
|   metoclopramide     | Take 10 mg by mouth  |           | 0       | 20 |           |
| (REGLAN) 10 mg       | 4 times daily as     |           |         | 12       |           |
| tablet               | needed.              |           |         |          |           |
+----------------------+----------------------+-----------+---------+----------+-----------+
|                      | Apply  topically 2   |           | 0       |          |           |
| nystatin-triamcinolo | times daily.         |           |         |          |           |
| ne (MYCOLOG II)      |                      |           |         |          |           |
| cream                |                      |           |         |          |           |
+----------------------+----------------------+-----------+---------+----------+-----------+
|   ofloxacin          |                      |           | 0       | 20 |           |
| (OCUFLOX) 0.3%       |                      |           |         | 18       |           |
| ophthalmic solution  |                      |           |         |          |           |
+----------------------+----------------------+-----------+---------+----------+-----------+
|   omeprazole         | Take 20 mg by mouth  |           | 0       | 20 |           |
| (PRILOSEC) 20 mg     | 2 times daily.       |           |         | 12       |           |
| capsule              |                      |           |         |          |           |
+----------------------+----------------------+-----------+---------+----------+-----------+
|   potassium citrate  | Take 10 mEq by mouth |           | 0       |          |           |
| (UROCIT-K) 10 mEq SR |  2 times daily.      |           |         |          |           |
|  tablet              |                      |           |         |          |           |
+----------------------+----------------------+-----------+---------+----------+-----------+
|   predniSONE         | Take 10 mg by mouth  |           | 0       |          |           |
| (DELTASONE) 5 mg     | Daily.               |           |         |          |           |
| tablet               |                      |           |         |          |           |
+----------------------+----------------------+-----------+---------+----------+-----------+
|   tamsulosin         | Take 0.4 mg by mouth |           | 0       | 20 |           |
| (FLOMAX) 0.4 mg CAPS |  2 times daily.      |           |         | 12       |           |
+----------------------+----------------------+-----------+---------+----------+-----------+
|   acyclovir          | Take 400 mg by mouth |           | 0       | 20 |  |
| (ZOVIRAX) 400 MG     |  3 times daily as    |           |         | 12       | 9         |
| tablet               | needed.              |           |         |          |           |
+----------------------+----------------------+-----------+---------+----------+-----------+
|   Cholecalciferol    | Take 2,000 Units by  |           | 0       | 20 |  |
| (VITAMIN D3) 2000    | mouth Daily.         |           |         | 12       | 9         |
| UNITS CAPS           |                      |           |         |          |           |
+----------------------+----------------------+-----------+---------+----------+-----------+
|   cyanocobalamin     | injected             |           | 0       | 20 |  |
| (VITAMIN B-12) 1,000 | intramuscularly once |           |         | 12       | 9         |
|  mcg/mL injection    |  a month             |           |         |          |           |
+----------------------+----------------------+-----------+---------+----------+-----------+
|   diltiazem          | Take 120 mg by mouth |           | 0       |          |  |
| (DILT-XR) 120 mg 24  |  Daily.              |           |         |          | 9         |
| hr capsule           |                      |           |         |          |           |
+----------------------+----------------------+-----------+---------+----------+-----------+
|   loratadine         | Take 10 mg by mouth  |           | 0       |          |  |
| (CLARITIN) 10 mg     | Daily.               |           |         |          | 9         |
| tablet               |                      |           |         |          |           |
+----------------------+----------------------+-----------+---------+----------+-----------+
|   losartan (COZAAR)  | Take 100 mg by mouth |           | 0       |          |  |
| 100 MG tablet        |  Daily. 1/2 daily    |           |         |          | 9         |
 
+----------------------+----------------------+-----------+---------+----------+-----------+
|   methotrexate 2.5   | Take 15 mg by mouth  |           | 0       |          |  |
| mg tablet            | Once a week.         |           |         |          | 9         |
+----------------------+----------------------+-----------+---------+----------+-----------+
|                      | Take 1 tablet by     |           | 0       |          |  |
| oxyCODONE-acetaminop | mouth every 4 hours  |           |         |          | 9         |
| hen (PERCOCET) 5-325 | as needed for Pain.  |           |         |          |           |
|  mg per tablet       |                      |           |         |          |           |
+----------------------+----------------------+-----------+---------+----------+-----------+
|   pseudoePHEDrine    | Take 30 mg by mouth  |           | 0       |          |  |
| (SUDOGEST) 30 mg     | every 4 hours as     |           |         |          | 9         |
| tablet               | needed for           |           |         |          |           |
|                      | Congestion.          |           |         |          |           |
+----------------------+----------------------+-----------+---------+----------+-----------+
|   rivaroxaban        | Take 20 mg by mouth  |           | 0       |          |  |
| (XARELTO) 20 mg      | Daily (with dinner). |           |         |          | 9         |
| tablet               |                      |           |         |          |           |
+----------------------+----------------------+-----------+---------+----------+-----------+
|   sertraline         | 2 tablets daily      |           | 0       | 20 |  |
| (ZOLOFT) 100 mg      |                      |           |         | 12       | 9         |
| tablet               |                      |           |         |          |           |
+----------------------+----------------------+-----------+---------+----------+-----------+
 documented as of this encounter
 
 Plan of Treatment
 
 
+--------+---------+-------------+----------------------+-------------+
| Date   | Type    | Specialty   | Care Team            | Description |
+--------+---------+-------------+----------------------+-------------+
| / | Office  | Pulmonology |   Juan F,          |             |
| 2019   | Visit   |             | Jessica Cheung,   |             |
|        |         |             | MD [address] |             |
|        |         |             |   [place],   |             |
|        |         |             | [place] [address]             |             |
|        |         |             | [phone]         |             |
|        |         |             | [phone] (Fax)   |             |
+--------+---------+-------------+----------------------+-------------+
| / | Office  | Cardiology  |   Eric Akins, |             |
|    | Visit   |             |  MD [address]   |             |
|        |         |             | [place]  |             |
|        |         |             | [address]  [phone]  |             |
|        |         |             |  [phone] (Fax)  |             |
+--------+---------+-------------+----------------------+-------------+
 documented as of this encounter
 
 Procedures
 
 
+----------------------+--------+-------------+----------------------+----------------------
+
| Procedure Name       | Priori | Date/Time   | Associated Diagnosis | Comments             
|
|                      | ty     |             |                      |                      
|
+----------------------+--------+-------------+----------------------+----------------------
+
| CT ANGIOGRAM ABDOMEN | Routin | 2018  |                      |   Results for this   
|
|  PELVIS W CONTRAST   | e      |  9:20 AM    |                      | procedure are in the 
 
|
|                      |        | PDT         |                      |  results section.    
|
+----------------------+--------+-------------+----------------------+----------------------
+
 documented in this encounter
 
 Results
 CT Angiogram Abdomen Pelvis w Contrast (2018  9:20 AM PDT)
 
+----------+
| Specimen |
+----------+
|          |
+----------+
 
 
 
+------------------------------------------------------------------------+--------------+
| Narrative                                                              | Performed At |
+------------------------------------------------------------------------+--------------+
|   This is a non-reportable procedure without a radiologist report and  |              |
| is  used for image storage only                                        |              |
+------------------------------------------------------------------------+--------------+
 
 
 
+--------------------------------------------------------------------------------------+
| Procedure Note                                                                       |
+--------------------------------------------------------------------------------------+
|   Maurizio, Rad Conversion - 2019  4:21 AM PDT  This is a non-reportable procedure  |
| without a radiologist report and isused for image storage only                       |
+--------------------------------------------------------------------------------------+
 documented in this encounter
 
 Visit Diagnoses
 
 
+--------------------------+
| Diagnosis                |
+--------------------------+
|   Pain  Generalized pain |
+--------------------------+
 documented in this encounter

## 2019-12-06 NOTE — XMS
Encounter Summary
  Created on: 2019
 
 Wyatt Shane
 External Reference #: [id number]
 : 43
 Sex: Male
 
 Demographics
 
 
+-----------------------+------------------------+
| Address               | [address]     |
|                       | [place]  [address]   |
+-----------------------+------------------------+
| Home Phone            | [phone]        |
+-----------------------+------------------------+
| Preferred Language    | Unknown                |
+-----------------------+------------------------+
| Marital Status        |                 |
+-----------------------+------------------------+
| Episcopal Affiliation | LUT                    |
+-----------------------+------------------------+
| Race                  | White                  |
+-----------------------+------------------------+
| Ethnic Group          | Not  or  |
+-----------------------+------------------------+
 
 
 Author
 
 
+--------------+------------------------------+
| Author       | [facility] |
+--------------+------------------------------+
| Organization | [facility] |
+--------------+------------------------------+
| Address      | Unknown                      |
+--------------+------------------------------+
| Phone        | Unavailable                  |
+--------------+------------------------------+
 
 
 
 Support
 
 
+---------------+--------------+---------+-----------------+
| Name          | Relationship | Address | Phone           |
+---------------+--------------+---------+-----------------+
| Opal Shane | ECON         | Unknown | [phone] |
+---------------+--------------+---------+-----------------+
 
 
 
 Care Team Providers
 
 
 
+-----------------------+------+-----------------+
| Care Team Member Name | Role | Phone           |
+-----------------------+------+-----------------+
| Erwin Iniguez MD   | PCP  | [phone] |
+-----------------------+------+-----------------+
 
 
 
 Encounter Details
 
 
+--------+-------------+-----------------+---------------------+---------------+
| Date   | Type        | Department      | Care Team           | Description   |
+--------+-------------+-----------------+---------------------+---------------+
| / | Office      |   [facility]  |   Note, Outpatient  | Progress Note |
| 2000   | Visit-Trans | MEDICINE        | Clinic              |               |
|        | cribed      |                 |                     |               |
+--------+-------------+-----------------+---------------------+---------------+
 
 
 
 Social History
 
 
+----------------+-------+-----------+--------+------+
| Tobacco Use    | Types | Packs/Day | Years  | Date |
|                |       |           | Used   |      |
+----------------+-------+-----------+--------+------+
| Never Assessed |       |           |        |      |
+----------------+-------+-----------+--------+------+
 
 
 
+------------------+---------------+
| Sex Assigned at  | Date Recorded |
| Birth            |               |
+------------------+---------------+
| Not on file      |               |
+------------------+---------------+
 
 
 
+----------------+-------------+-------------+
| Job Start Date | Occupation  | Industry    |
+----------------+-------------+-------------+
| Not on file    | Not on file | Not on file |
+----------------+-------------+-------------+
 
 
 
+----------------+--------------+------------+
| Travel History | Travel Start | Travel End |
+----------------+--------------+------------+
 
 
 
+-------------------------------------+
| No recent travel history available. |
+-------------------------------------+
 documented as of this encounter
 
 
 Progress Notes
 Interface, Transcription In - 2006  1:04 AM PSTCLINIC DATE:       2000
 
 OTOLARYNGOLOGY/HEAD AND NECK SURGERY
 
 SUBJECTIVE:  Mr. Shane is seen back today  in  followup  with respect to his
 laryngotracheoplasty following hemilaryngectomy.   He feels that his airway
 is back to normal.  He is back working  and  is  quite  pleased with things
 overall.  He has no pain.  He still occasional  thick  drainage  which gets
 caught.
 
 OBJECTIVE:  Today, his ears are normal bilaterally. Nose normal to anterior
 rhinoscopy.   Flexible  fiberoptic   laryngoscopy   to   the   nasopharynx,
 oropharynx,  hypopharynx is done.  He  has  good  glottic  airway  and  the
 expected post-radiation supraglottic  mild  edema.  One  can still see some
 area of cartilaginous sequestra just below the anterior commissure, but the
 total area measures less than 5 mm by 3 mm.
 
 ASSESSMENT:  Status post laryngotracheoplasty, doing well.
 
 PLAN:  I will see him back in three months.
 
 Jimmy Esposito M.D.
 
 MARIA ALEJANDRA / 
 [id number] / [id number] / [id number] / [id number]
 D: 2000
 T: 2000
 C: 2000 luis antonio
 
 cc:
 
 ERWIN DOWNS MD
 [address] [place] [address] [address]
 [place] [id number] signed by Interface, Transcription In at 2006  1:04 
AM PSTdocumented in this encounter
 
 Plan of Treatment
 Not on filedocumented as of this encounter
 
 Visit Diagnoses
 Not on filedocumented in this encounter

## 2019-12-06 NOTE — XMS
Encounter Summary
  Created on: 2019
 
 Wyatt Shane
 External Reference #: [id number]
 : 43
 Sex: Male
 
 Demographics
 
 
+-----------------------+------------------------+
| Address               | [address]     |
|                       | [place]  [address]   |
+-----------------------+------------------------+
| Home Phone            | [phone]        |
+-----------------------+------------------------+
| Preferred Language    | Unknown                |
+-----------------------+------------------------+
| Marital Status        |                 |
+-----------------------+------------------------+
| Mandaen Affiliation | LUT                    |
+-----------------------+------------------------+
| Race                  | White                  |
+-----------------------+------------------------+
| Ethnic Group          | Not  or  |
+-----------------------+------------------------+
 
 
 Author
 
 
+--------------+-------------+
| Organization | Unknown     |
+--------------+-------------+
| Address      | Unknown     |
+--------------+-------------+
| Phone        | Unavailable |
+--------------+-------------+
 
 
 
 Support
 
 
+---------------+--------------+---------+-----------------+
| Name          | Relationship | Address | Phone           |
+---------------+--------------+---------+-----------------+
| Opal Shane | ECON         | Unknown | [phone] |
+---------------+--------------+---------+-----------------+
 
 
 
 Care Team Providers
 
 
+-----------------------+------+-----------------+
| Care Team Member Name | Role | Phone           |
 
+-----------------------+------+-----------------+
| Erwin Iniguez MD   | PCP  | [phone] |
+-----------------------+------+-----------------+
 
 
 
 Encounter Details
 
 
+--------+-------------+------------+---------------------+------------------+
| Date   | Type        | Department | Care Team           | Description      |
+--------+-------------+------------+---------------------+------------------+
| 10/27/ | Procedure - |            |   Record, Operation | Operative Report |
|    |             |            |                     |                  |
|        | Transcribed |            |                     |                  |
+--------+-------------+------------+---------------------+------------------+
 
 
 
 Social History
 
 
+----------------+-------+-----------+--------+------+
| Tobacco Use    | Types | Packs/Day | Years  | Date |
|                |       |           | Used   |      |
+----------------+-------+-----------+--------+------+
| Never Assessed |       |           |        |      |
+----------------+-------+-----------+--------+------+
 
 
 
+------------------+---------------+
| Sex Assigned at  | Date Recorded |
| Birth            |               |
+------------------+---------------+
| Not on file      |               |
+------------------+---------------+
 
 
 
+----------------+-------------+-------------+
| Job Start Date | Occupation  | Industry    |
+----------------+-------------+-------------+
| Not on file    | Not on file | Not on file |
+----------------+-------------+-------------+
 
 
 
+----------------+--------------+------------+
| Travel History | Travel Start | Travel End |
+----------------+--------------+------------+
 
 
 
+-------------------------------------+
| No recent travel history available. |
+-------------------------------------+
 documented as of this encounter
 
 Plan of Treatment
 
 Not on filedocumented as of this encounter
 
 Procedures
 
 
+------------------+--------+------------+----------------------+----------------------+
| Procedure Name   | Priori | Date/Time  | Associated Diagnosis | Comments             |
|                  | ty     |            |                      |                      |
+------------------+--------+------------+----------------------+----------------------+
| OPERATION RECORD |        | 10/27/1999 |                      |   Results for this   |
|                  |        |            |                      | procedure are in the |
|                  |        |            |                      |  results section.    |
+------------------+--------+------------+----------------------+----------------------+
 documented in this encounter
 
 Results
 OPERATION RECORD (10/27/1999)
 
+------------------------------------------------------------------------------------------+
| Transcriptions                                                                           |
+------------------------------------------------------------------------------------------+
|   Interface, Transcription In - 2006  3:03 AM PST                                  |
|    [place] [address] [address]     |
| [place], [place] [address] [phone]  |
|                     [facility]OPER[facility] RECORDMed Rec No.:         |
| [id number]            Date: 10/27/1999Name: Darien Shane SURGEON:Jimmy Esposito, |
|  M.D.ASSISTANTS:             JORGE Lemon, |
|  M.D.PREOPERATIVE DIAGNOSIS(ES):Laryngeal stenosis.POSTOPERATIVE DIAGNOSIS(ES):Laryngeal |
|  stenosis.OPERATIONS PERFORMED:Direct laryngoscopy with dilation and application of      |
| Mitomycin-C.SPECIMEN(S) REMOVED:Posterior glottic lesion.ANESTHESIA:General with jet     |
| ventilation.ESTIMATED BLOOD                                                              |
| LOSS:Negligible.DRAINS:None.COMPLICATIONS:None.INDICATIONS:Patient   is   a  54-year-old |
|   male  who   underwent   a   right   verticalhemilaryngectomy in 1998.  He has |
|  subsequently developed laryngealstenosis and presented today for dilation.FINDINGS:1.   |
| Glottic stenosis with dimensions equivalent to a #16 laryngeal dilator.2.  Subglottis    |
| without stenosis.3.  Posterior glottic mucosal irregularity, biopsied.PROCEDURE:After    |
| consent was obtained, the patient  was  identified and brought to theoperating room.     |
| Patient was placed  in  the  supine  position  and generalanesthesia administered        |
| without difficulty.   The laryngoscope was insertedand  the glottis visualized. The      |
| patient  was  placed  in  suspension.  Thestylette  was  inserted  into the laryngoscope |
|   and  attached  to  the  jetventilator.   Jet  ventilation  was   established   and     |
| good  ventilationconfirmed. Epinephrine saturated pledgets  were  placed  on  the        |
| laryngealmucosa.  The glottis and subglottis were  examined  with  a telescope.          |
| Theglottis was then serially dilated beginning  with  the  smallest and endingwith the   |
| largest (#32) dilator.  5 mg  of  Mitomycin-C was reconstituted in12.5 cc of normal      |
| saline.  This mixture  was  placed on pledgets which wereserially applied to the glottic |
|  mucosa.  Using the abutting biopsy forceps,the posterior glottic region was biopsied.   |
|  Epinephrine-saturated pledgetswere again placed on the mucosa.The patient was taken out |
|  of suspension  and  the laryngoscope was removed.Tooth  guard  was  used throughout the |
|   procedure.   Patient  emerged  fromanesthesia without difficulty and was  brought  to  |
| the Post Anesthesia CareUnit  in good condition.ATTENDING SURGEON'S ATTESTATION:Pursuant |
|  to Federal Medicare billing regulations,  I certify that Dr. Antonio was present and |
|  participating for the entire procedure.Larry Means M.D.                              |
| Jimmy Esposito M.D.JRA:[id number]:  10/27/1999T:  10/27/[id number]:                         |
|ESTIMATED BLOOD LOSS:                                                                     |
|Negligible.                                                                              |
|                                                                                          |
|DRAINS:                                                                                  |
|None.                                                                                    |
|                                                                                          |
 
|COMPLICATIONS:                                                                           |
|None.                                                                                    |
|                                                                                          |
|INDICATIONS:                                                                             |
|Patient   is   a  54-year-old  male  who   underwent   a   right   vertical               |
|hemilaryngectomy in 1998.  He has subsequently developed laryngeal               |
|stenosis and presented today for dilation.                                                |
|                                                                                          |
|FINDINGS:                                                                                |
|1.  Glottic stenosis with dimensions equivalent to a #16 laryngeal dilator.               |
|2.  Subglottis without stenosis.                                                          |
|3.  Posterior glottic mucosal irregularity, biopsied.                                     |
|                                                                                          |
|PROCEDURE:                                                                               |
|After consent was obtained, the patient  was  identified and brought to the               |
|operating room.  Patient was placed  in  the  supine  position  and general               |
|anesthesia administered without difficulty.   The laryngoscope was inserted               |
|and  the glottis visualized. The patient  was  placed  in  suspension.  The               |
|stylette  was  inserted  into the laryngoscope  and  attached  to  the  jet               |
|ventilator.   Jet  ventilation  was   established   and   good  ventilation              |
|confirmed. Epinephrine saturated pledgets  were  placed  on  the  laryngeal              |
|mucosa.  The glottis and subglottis were  examined  with  a telescope.  The              |
|glottis was then serially dilated beginning  with  the  smallest and ending               |
|with the largest (#32) dilator.  5 mg  of  Mitomycin-C was reconstituted in               |
|12.5 cc of normal saline.  This mixture  was  placed on pledgets which were               |
|serially applied to the glottic mucosa.  Using the abutting biopsy forceps,               |
|the posterior glottic region was biopsied.   Epinephrine-saturated pledgets               |
|were again placed on the mucosa.                                                          |
|                                                                                          |
|The patient was taken out of suspension  and  the laryngoscope was removed.               |
|Tooth  guard  was  used throughout the  procedure.   Patient  emerged  from               |
|anesthesia without difficulty and was  brought  to the Post Anesthesia Care               |
|Unit  in good condition.                                                                  |
|                                                                                          |
|ATTENDING SURGEON'S ATTESTATION:                                                          |
|Pursuant to Federal Medicare billing regulations,  I certify that Dr. Marquez               |
|Vaibhav was present and participating for the entire procedure.                             |
|                                                                                          |
|Larry Means M.D.                             Jimmy Esposito M.D.                      |
|                                                                                          |
|JRA:[id number]                                                                                   |
|D:  10/27/1999                                                                            |
|T:  10/27/1999                                                                            |
|                                                                                          |
|                                                                                          |
|[id number]                                                                                     
|
|                                                                                          |
|CC:                                                                                      |
+------------------------------------------------------------------------------------------+
 documented in this encounter
 
 Visit Diagnoses
 Not on filedocumented in this encounter

## 2019-12-06 NOTE — XMS
Encounter Summary
  Created on: 2019
 
 Shane Wyatttien BroussardJosue
 External Reference #: [id number]
 : 43
 Sex: Male
 
 Demographics
 
 
+-----------------------+---------------------------+
| Address               | [address]        |
|                       | [place]  [address] |
+-----------------------+---------------------------+
| Home Phone            | [phone]           |
+-----------------------+---------------------------+
| Preferred Language    | Unknown                   |
+-----------------------+---------------------------+
| Marital Status        |                    |
+-----------------------+---------------------------+
| Pentec[employer]alism Affiliation | [id number]                      |
+-----------------------+---------------------------+
| Race                  | Unknown                   |
+-----------------------+---------------------------+
| Ethnic Group          | Unknown                   |
+-----------------------+---------------------------+
 
 
 Author
 
 
+--------------+--------------------------------------------+
| Author       | [facility]  |
|              | [facility] [place]                                |
+--------------+--------------------------------------------+
| Organization | [facility]  |
|              | [facility] [place]                                |
+--------------+--------------------------------------------+
| Address      | Unknown                                    |
+--------------+--------------------------------------------+
| Phone        | Unavailable                                |
+--------------+--------------------------------------------+
 
 
 
 Support
 
 
+---------------+--------------+--------------------+-----------------+
| Name          | Relationship | Address            | Phone           |
+---------------+--------------+--------------------+-----------------+
| Opal Shane | ECON         | [address]     | [phone] |
|               |              | [place]   |                 |
|               |              | [id number]              |                 |
+---------------+--------------+--------------------+-----------------+
 
 
 
 
 Care Team Providers
 
 
+------------------------+------+-----------------+
| Care Team Member Name  | Role | Phone           |
+------------------------+------+-----------------+
| Calvin Robertson MD | PCP  | [phone] |
+------------------------+------+-----------------+
 
 
 
 Encounter Details
 
 
+--------+-------------+---------------------+----------------------+-------------+
| Date   | Type        | Department          | Care Team            | Description |
+--------+-------------+---------------------+----------------------+-------------+
| 11/15/ | Orders Only |   [facility]     |   Neris Wilson, [place]      |             |
|    |             | VASCULAR SURGERY    | [address]     |             |
|        |             | [address]  [address]    | [address]  [place], [place]  |             |
|        |             | [address] [place] [address]   | [address]  [phone]  |             |
|        |             | [place], [place]        |  [phone] (Fax)  |             |
|        |             | [address]          |                      |             |
|        |             | [phone]        |                      |             |
+--------+-------------+---------------------+----------------------+-------------+
 
 
 
 Social History
 
 
+---------------+------------+-----------+--------+------------------+
| Tobacco Use   | Types      | Packs/Day | Years  | Date             |
|               |            |           | Used   |                  |
+---------------+------------+-----------+--------+------------------+
| Former Smoker | Cigarettes | 1.3       | 34     | Quit: 1996 |
+---------------+------------+-----------+--------+------------------+
 
 
 
+---------------------+---+---+---+
| Smokeless Tobacco:  |   |   |   |
| Never Used          |   |   |   |
+---------------------+---+---+---+
 
 
 
+-------------+-------------+---------+----------+
| Alcohol Use | Drinks/Week | oz/Week | Comments |
+-------------+-------------+---------+----------+
| No          |             |         |          |
+-------------+-------------+---------+----------+
 
 
 
+------------------+---------------+
| Sex Assigned at  | Date Recorded |
| Birth            |               |
+------------------+---------------+
 
| Not on file      |               |
+------------------+---------------+
 
 
 
+----------------+-------------+-------------+
| Job Start Date | Occupation  | Industry    |
+----------------+-------------+-------------+
| Not on file    | Not on file | Not on file |
+----------------+-------------+-------------+
 
 
 
+----------------+--------------+------------+
| Travel History | Travel Start | Travel End |
+----------------+--------------+------------+
 
 
 
+-------------------------------------+
| No recent travel history available. |
+-------------------------------------+
 documented as of this encounter
 
 Plan of Treatment
 
 
+--------+---------+-------------+----------------------+-------------+
| Date   | Type    | Specialty   | Care Team            | Description |
+--------+---------+-------------+----------------------+-------------+
| / | Office  | Pulmonology |   Juan F,          |             |
| 2019   | Visit   |             | Jessica Cheung,   |             |
|        |         |             | MD [address] |             |
|        |         |             |   [place],   |             |
|        |         |             | [place] [address]             |             |
|        |         |             | [phone]         |             |
|        |         |             | [phone] (Fax)   |             |
+--------+---------+-------------+----------------------+-------------+
| / | Office  | Cardiology  |   Eric Akins, |             |
|    | Visit   |             |  MD [address]   |             |
|        |         |             | [address]  [place], [place]  |             |
|        |         |             | [address]  [phone]  |             |
|        |         |             |  [phone] (Fax)  |             |
+--------+---------+-------------+----------------------+-------------+
 documented as of this encounter
 
 Procedures
 
 
+---------------------+--------+-------------+----------------------+----------------------+
| Procedure Name      | Priori | Date/Time   | Associated Diagnosis | Comments             |
|                     | ty     |             |                      |                      |
+---------------------+--------+-------------+----------------------+----------------------+
| VAS CAROTID DUPLEX  | Routin | 11/15/2018  |                      |   Results for this   |
| BILATERAL           | e      | 10:55 AM    |                      | procedure are in the |
|                     |        | PST         |                      |  results section.    |
+---------------------+--------+-------------+----------------------+----------------------+
 documented in this encounter
 
 Results
 
 VAS Carotid Duplex Bilateral (11/15/2018 10:55 AM PST)
 
+----------+
| Specimen |
+----------+
|          |
+----------+
 
 
 
+------------------------------------------------------------------------+--------------+
| Impressions                                                            | Performed At |
+------------------------------------------------------------------------+--------------+
|   1.   No hemodynamically significant stenosis of the internal carotid |              |
|  arteries.  2.   There is greater than 50% narrowing of the left mid   |              |
| common carotid artery. There is ulcerated plaque at this region with   |              |
| possible intimal flap.  3.   Antegrade flow of the bilateral vertebral |              |
|  arteries.     Electronically signed by Ananth Verduzco MD on 11/15/2018   |              |
| 11:28 AM                                                               |              |
+------------------------------------------------------------------------+--------------+
 
 
 
+------------------------------------------------------------------------+--------------+
| Narrative                                                              | Performed At |
+------------------------------------------------------------------------+--------------+
|   HISTORY:  Carotid stenosis.     TECHNIQUE:  Imaging was performed    |              |
| with a linear array transducer.    true duplex exam with gray scale,   |              |
| color flow and Doppler spectral analysis.     COMPARISON:  ,   |              |
|      FINDINGS:  Right side:  Peak systolic and end-diastolic       |              |
| velocities of the common carotid, internal carotid and external        |              |
| carotid arteries are normal.     Left side:  Peak systolic and         |              |
| end-diastolic velocities of the internal carotid and external carotid  |              |
| arteries are normal.     Left common carotid and elevated velocity     |              |
| from 64 cm/sec to 162 cm/s     There are normal peak systolic          |              |
| velocities within the bilateral vertebral arteries with antegrade      |              |
| flow.     Grades:  1       Stenosis   =01-50%    PSV <125 cm/sec       |              |
|  EDV (cm/s)<40    cm/sec (mild plaque)  2       Stenosis   =01-50%     |              |
| PSV <125 cm/sec       EDV (cm/s)<40    cm/sec (moderate plaque)  3     |              |
|    Stenosis   =50-69%    PSV >125 cm/sec       EDV (cm/s)>40    cm/sec |              |
|   4       Stenosis   =70-95%    PSV >230 cm/sec       EDV (cm/s)>100   |              |
| cm/sec  5       Stenosis   =90-95%    PSV <125 cm/sec       EDV        |              |
| (cm/s)<40    cm/sec  6       Stenosis   Occluded     Validated         |              |
| velocity measurements with angiographic measurements, velocity         |              |
| criteria are extrapolated from diameter data as defined by the Society |              |
|  of Radiologists in Ultrasound Consensus Conference Radiology 2003;    |              |
| 229; 340-346.                                                          |              |
+------------------------------------------------------------------------+--------------+
 
 
 
+-------------------------------------------------------------------------------------------
--------------------------------------------------------------------------------------------
------------------------------------------+
| Procedure Note                                                                            
                                                                                            
                                          |
+-------------------------------------------------------------------------------------------
--------------------------------------------------------------------------------------------
------------------------------------------+
 
|   Andrzej Steven Conversion - 2019  3:16 PM PDT  HISTORY:Carotid stenosis.                
                                                                                            
                                          |
| TECHNIQUE:Imaging was performed with a linear array transducer.   true duplex exam with   
                                                                                            
                                          |
| gray scale, color flow and Doppler spectral analysis. COMPARISON:2018           
                                                                                            
                                          |
| FINDINGS:Right side:Peak systolic and end-diastolic velocities of the common carotid,     
                                                                                            
                                          |
| internal carotid and external carotid arteries are normal. Left side:Peak systolic and    
                                                                                            
                                          |
| end-diastolic velocities of the internal carotid and external carotid arteries are        
                                                                                            
                                          |
| normal. Left common carotid and elevated velocity from 64 cm/sec to 162 cm/s There are    
                                                                                            
                                          |
| normal peak systolic velocities within the bilateral vertebral arteries with antegrade    
                                                                                            
                                          |
| flow. Grades:1     Stenosis  =01-50%   PSV <125 cm/sec     EDV (cm/s)<40   cm/sec (mild   
                                                                                            
                                          |
| plaque)2     Stenosis  =01-50%   PSV <125 cm/sec     EDV (cm/s)<40   cm/sec (moderate     
                                                                                            
                                          |
| plaque)3     Stenosis  =50-69%   PSV >125 cm/sec     EDV (cm/s)>40   cm/sec4              
                                                                                            
                                          |
| Stenosis  =70-95%   PSV >230 cm/sec     EDV (cm/s)>100 cm/sec5     Stenosis  =90-95%      
                                                                                            
                                          |
| PSV <125 cm/sec     EDV (cm/s)<40   cm/sec6     Stenosis  Occluded Validated velocity     
                                                                                            
                                          |
| measurements with angiographic measurements, velocity criteria are extrapolated from      
                                                                                            
                                          |
| diameter data as defined by the Society of Radiologists in Ultrasound Consensus           
                                                                                            
                                          |
| Conference Radiology 2003; 229; 340-346. IMPRESSION: 1.  No hemodynamically significant   
                                                                                            
                                          |
| stenosis of the internal carotid arteries.2.  There is greater than 50% narrowing of the  
                                                                                            
                                          |
|  left mid common carotid artery. There is ulcerated plaque at this region with possible   
                                                                                            
                                          |
| intimal flap.3.  Antegrade flow of the bilateral vertebral arteries. Electronically       
                                                                                            
                                          |
| signed by Ananth Verduzco MD on 11/15/2018 11:28 AM                                           
                                                                                            
                                          |
 
|Grades:                                                                                    
                                                                                            
                                         |
|1     Stenosis  =01-50%   PSV <125 cm/sec     EDV (cm/s)<40   cm/sec (mild plaque)         
                                                                                            
                                          |
|2     Stenosis  =01-50%   PSV <125 cm/sec     EDV (cm/s)<40   cm/sec (moderate plaque)     
                                                                                            
                                          |
|3     Stenosis  =50-69%   PSV >125 cm/sec     EDV (cm/s)>40   cm/sec                       
                                                                                            
                                          |
|4     Stenosis  =70-95%   PSV >230 cm/sec     EDV (cm/s)>100 cm/sec                        
                                                                                            
                                          |
|5     Stenosis  =90-95%   PSV <125 cm/sec     EDV (cm/s)<40   cm/sec                       
                                                                                            
                                          |
|6     Stenosis  Occluded                                                                   
                                                                                            
                                          |
|                                                                                           
                                                                                            
                                          |
|Validated velocity measurements with angiographic measurements, velocity criteria are extra
polated from diameter data as defined by the Society of Radiologists in Ultrasound Consensus
 Conference Radiology 2003; 229; 340-346. |
|                                                                                           
                                                                                            
                                          |
|IMPRESSION:                                                                                
                                                                                            
                                          |
|1.  No hemodynamically significant stenosis of the internal carotid arteries.              
                                                                                            
                                          |
|2.  There is greater than 50% narrowing of the left mid common carotid artery. There is ulc
erated plaque at this region with possible intimal flap.                                    
                                          |
|3.  Antegrade flow of the bilateral vertebral arteries.                                    
                                                                                            
                                          |
|                                                                                           
                                                                                            
                                          |
|Electronically signed by Ananth Verduzco MD on 11/15/2018 11:28 AM                             
                                                                                            
                                          |
+-------------------------------------------------------------------------------------------
--------------------------------------------------------------------------------------------
------------------------------------------+
 documented in this encounter
 
 Visit Diagnoses
 Not on filedocumented in this encounter

## 2019-12-06 NOTE — XMS
Encounter Summary
  Created on: 2019
 
 Wyatt Shane
 External Reference #: [id number]
 : 43
 Sex: Male
 
 Demographics
 
 
+-----------------------+------------------------+
| Address               | [address]     |
|                       | [place]  [address]   |
+-----------------------+------------------------+
| Home Phone            | [phone]        |
+-----------------------+------------------------+
| Preferred Language    | Unknown                |
+-----------------------+------------------------+
| Marital Status        |                 |
+-----------------------+------------------------+
| Alevism Affiliation | LUT                    |
+-----------------------+------------------------+
| Race                  | White                  |
+-----------------------+------------------------+
| Ethnic Group          | Not  or  |
+-----------------------+------------------------+
 
 
 Author
 
 
+--------------+-------------+
| Organization | Unknown     |
+--------------+-------------+
| Address      | Unknown     |
+--------------+-------------+
| Phone        | Unavailable |
+--------------+-------------+
 
 
 
 Support
 
 
+---------------+--------------+---------+-----------------+
| Name          | Relationship | Address | Phone           |
+---------------+--------------+---------+-----------------+
| Opal Shane | ECON         | Unknown | [phone] |
+---------------+--------------+---------+-----------------+
 
 
 
 Care Team Providers
 
 
+-----------------------+------+-----------------+
| Care Team Member Name | Role | Phone           |
 
+-----------------------+------+-----------------+
| Erwin Iniguez MD   | PCP  | [phone] |
+-----------------------+------+-----------------+
 
 
 
 Encounter Details
 
 
+--------+-------------+------------+---------------------+------------------+
| Date   | Type        | Department | Care Team           | Description      |
+--------+-------------+------------+---------------------+------------------+
| / | Procedure - |            |   Record, Operation | Operative Report |
|    |             |            |                     |                  |
|        | Transcribed |            |                     |                  |
+--------+-------------+------------+---------------------+------------------+
 
 
 
 Social History
 
 
+----------------+-------+-----------+--------+------+
| Tobacco Use    | Types | Packs/Day | Years  | Date |
|                |       |           | Used   |      |
+----------------+-------+-----------+--------+------+
| Never Assessed |       |           |        |      |
+----------------+-------+-----------+--------+------+
 
 
 
+------------------+---------------+
| Sex Assigned at  | Date Recorded |
| Birth            |               |
+------------------+---------------+
| Not on file      |               |
+------------------+---------------+
 
 
 
+----------------+-------------+-------------+
| Job Start Date | Occupation  | Industry    |
+----------------+-------------+-------------+
| Not on file    | Not on file | Not on file |
+----------------+-------------+-------------+
 
 
 
+----------------+--------------+------------+
| Travel History | Travel Start | Travel End |
+----------------+--------------+------------+
 
 
 
+-------------------------------------+
| No recent travel history available. |
+-------------------------------------+
 documented as of this encounter
 
 Plan of Treatment
 
 Not on filedocumented as of this encounter
 
 Procedures
 
 
+------------------+--------+------------+----------------------+----------------------+
| Procedure Name   | Priori | Date/Time  | Associated Diagnosis | Comments             |
|                  | ty     |            |                      |                      |
+------------------+--------+------------+----------------------+----------------------+
| OPERATION RECORD |        | 1998 |                      |   Results for this   |
|                  |        |            |                      | procedure are in the |
|                  |        |            |                      |  results section.    |
+------------------+--------+------------+----------------------+----------------------+
 documented in this encounter
 
 Results
 OPERATION RECORD (1998)
 
+------------------------------------------------------------------------------------------+
| Transcriptions                                                                           |
+------------------------------------------------------------------------------------------+
|   Interface, Transcription In - 2007  5:11 AM PST                                  |
|    [facility] [facility]    |
| [place], [place] [address] [phone]                     [place]          |
| [facility] HospitalOPERATION RECORDMed Rec No.:  [id number]           Date:           |
| 1998Name:   Darien Shane SURGEON:                 Jimmy Esposito M.D.      |
|                               , Otolaryngology                        |
|             Head and Neck SurgeryASSISTANTS:                        Tasia Christine,     |
| M.D.                                   Resident, Otolaryngology                          |
|           Head and Neck SurgeryPOSTOPERATIVE DIAGNOSIS(ES):        Carcinoma  in  situ   |
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
| fold.OPERATIONS PERFORMED:               1.    Direct laryngoscopy.                      |
|                2.    Biopsy of laryngeal mass.SPECIMEN(S) REMOVED:                       |
| Multiple  biopsies taken from the right                                    true vocal    |
| cord and false vocal fold.ANESTHESIA:                         General endotracheal       |
| anesthesia.COMPLICATIONS:                      None.ESTIMATED BLOOD LOSS:                |
| Minimal.INDICATIONS:                        This patient is a 52-year-old male witha     |
| history of hoarseness.  He has undergone  a  superficial  biopsy x two inthe past and    |
| laser treatment to the affected area.  The first biopsy showedsevere dysplasia.  The     |
| second biopsy showed carcinoma in situ.FINDINGS:                            There  is    |
| diffuse  erythema and edemaover the right true vocal cord and vocal  fold.   A small     |
| incision was madeusing the microlaryngeal scissors.  A  deeper  biopsy was then taken in |
|  thesubmucosal plane.PROCEDURE:                          The  patient  was  correctly    |
| identifiedand  brought to the operation where  general  anesthesia  was  induced         |
| viaendotracheal intubation without difficulty.   The  patient  was placed in asupine     |
| position.  The microlaryngoscope  was  inserted  and  the true vocalcords were exposed.  |
|  The patient was then placed in suspension.The area of concern was examined and showed   |
| diffuse erythema and edema overthe right true vocal cord and extending  out  to  the     |
| vocal fold.  There isalso a suspicious-looking area which appeared to be squamous cell   |
| carcinomaat  the lateral aspect of the cord.   Multiple  biopsies  were  taken.          |
| Anapproximately 2 cm incision was made  through  the  mucosa  to  obtain deeptissue      |
| specimens.   The  remainder   of   the   supraglottic   larynx  wasnormal-appearing.     |
| There were no lesions seen in the hypopharynx, vallecula,piriform sinuses.Next, the      |
| rigid esophagoscope was passed  to 30 cm.  Although the esophaguswas quite tight, there  |
| were no lesions  seen.   The  mucosa  was  normal inappearance.  The 0 degree rigid      |
| scope was used to examine the mucosa as theesophagoscope was removed.The patient         |
| tolerated the procedure well  .   There  were no intraoperativecomplications. Dr. Marquez  |
| Vaibhav, the attending  surgeon, was present for thekey portions of the procedure which    |
| were  direct laryngoscopy and biopsy ofthe lesion.Tasia Christine M.D.                  |
| Jimmy Esposito M.D.Resident, Otolaryngology           ,              |
 
| OtolaryngologyHead and Neck Surgery              Head and Neck SurgeryMM/maria antoniamD:           |
| 1998T:  1998 10:54 Acc:                                                      |
|using the microlaryngeal scissors.  A  deeper  biopsy was then taken in the               |
|submucosal plane.                                                                         |
|                                                                                          |
|PROCEDURE:                          The  patient  was  correctly identified               |
|and  brought to the operation where  general  anesthesia  was  induced  via               |
|endotracheal intubation without difficulty.   The  patient  was placed in a               |
|supine position.  The microlaryngoscope  was  inserted  and  the true vocal               |
|cords were exposed.  The patient was then placed in suspension.                           |
|                                                                                          |
|The area of concern was examined and showed diffuse erythema and edema over               |
|the right true vocal cord and extending  out  to  the vocal fold.  There is               |
|also a suspicious-looking area which appeared to be squamous cell carcinoma               |
|at  the lateral aspect of the cord.   Multiple  biopsies  were  taken.   An               |
|approximately 2 cm incision was made  through  the  mucosa  to  obtain deep               |
|tissue  specimens.   The  remainder   of   the   supraglottic   larynx  was               |
|normal-appearing. There were no lesions seen in the hypopharynx, vallecula,               |
|piriform sinuses.                                                                         |
|                                                                                          |
|Next, the rigid esophagoscope was passed  to 30 cm.  Although the esophagus               |
|was quite tight, there were no lesions  seen.   The  mucosa  was  normal in               |
|appearance.  The 0 degree rigid scope was used to examine the mucosa as the              |
|esophagoscope was removed.                                                                |
|                                                                                          |
|The patient tolerated the procedure well  .   There  were no intraoperative               |
|complications. Dr. Jimmy Esposito, the attending  surgeon, was present for the              |
|key portions of the procedure which were  direct laryngoscopy and biopsy of               |
|the lesion.                                                                               |
|                                                                                          |
|Tasia Christine M.D.              Jimmy Esposito M.D.                                   |
|Resident, Otolaryngology           , Otolaryngology                    |
|Head and Neck Surgery              Head and Neck Surgery                                  |
|                                                                                          |
|TERRIE/gloria                                                                                    |
|D: 1998                                                                             |
|T:  1998 10:54 A                                                                    |
|                                                                                          |
|cc:                                                                                      |
+------------------------------------------------------------------------------------------+
 documented in this encounter
 
 Visit Diagnoses
 Not on filedocumented in this encounter

## 2019-12-06 NOTE — XMS
Encounter Summary
  Created on: 2019
 
 Shane Wyatttien BroussardJosue
 External Reference #: [id number]
 : 43
 Sex: Male
 
 Demographics
 
 
+-----------------------+---------------------------+
| Address               | [address]        |
|                       | [place]  [address] |
+-----------------------+---------------------------+
| Home Phone            | [phone]           |
+-----------------------+---------------------------+
| Preferred Language    | Unknown                   |
+-----------------------+---------------------------+
| Marital Status        |                    |
+-----------------------+---------------------------+
| Religion Affiliation | [id number]                      |
+-----------------------+---------------------------+
| Race                  | Unknown                   |
+-----------------------+---------------------------+
| Ethnic Group          | Unknown                   |
+-----------------------+---------------------------+
 
 
 Author
 
 
+--------------+--------------------------------------------+
| Author       | [facility]  |
|              | [facility] [place]                                |
+--------------+--------------------------------------------+
| Organization | [facility]  |
|              | and [place]                                |
+--------------+--------------------------------------------+
| Address      | Unknown                                    |
+--------------+--------------------------------------------+
| Phone        | Unavailable                                |
+--------------+--------------------------------------------+
 
 
 
 Support
 
 
+---------------+--------------+--------------------+-----------------+
| Name          | Relationship | Address            | Phone           |
+---------------+--------------+--------------------+-----------------+
| Opal Shane | ECON         | [address]     | [phone] |
|               |              | [place]   |                 |
|               |              | [id number]              |                 |
+---------------+--------------+--------------------+-----------------+
 
 
 
 
 Care Team Providers
 
 
+------------------------+------+-----------------+
| Care Team Member Name  | Role | Phone           |
+------------------------+------+-----------------+
| Calvin Robertson MD | PCP  | [phone] |
+------------------------+------+-----------------+
 
 
 
 Reason for Visit
 
 
+-----------+-----------+
| Reason    | Comments  |
+-----------+-----------+
| Follow-up | 5 mo f/u  |
+-----------+-----------+
 
 
 
 Encounter Details
 
 
+--------+---------+----------------------+----------------------+----------------------+
| Date   | Type    | Department           | Care Team            | Description          |
+--------+---------+----------------------+----------------------+----------------------+
| / | Office  |   [facility]      |   Mable Eric, | Coronary artery      |
| 2019   | Visit   | CARDIOLOGY DEANDRE |  MD  [address]   | disease of bypass    |
|        |         |   [address]    | [place]  | graft of native      |
|        |         | [address] [place] [address]          | [address]  [phone]  | heart with stable    |
|        |         | [place]        |  [phone] (Fax)  | angina pectoris      |
|        |         | [address]           |                      | (HCC); Essential     |
|        |         | [phone]         |                      | hypertension; Stable |
|        |         |                      |                      |  angina ([facility]);       |
|        |         |                      |                      | Chronic atrial       |
|        |         |                      |                      | fibrillation ([facility])   |
+--------+---------+----------------------+----------------------+----------------------+
 
 
 
 Social History
 
 
+---------------+------------+-----------+--------+------------------+
| Tobacco Use   | Types      | Packs/Day | Years  | Date             |
|               |            |           | Used   |                  |
+---------------+------------+-----------+--------+------------------+
| Former Smoker | Cigarettes | 1         | 35     | Quit: 1996 |
+---------------+------------+-----------+--------+------------------+
 
 
 
+---------------------+---+---+---+
| Smokeless Tobacco:  |   |   |   |
| Never Used          |   |   |   |
+---------------------+---+---+---+
 
 
 
 
+-------------+-------------+---------+----------+
| Alcohol Use | Drinks/Week | oz/Week | Comments |
+-------------+-------------+---------+----------+
| No          |             |         |          |
+-------------+-------------+---------+----------+
 
 
 
+------------------+---------------+
| Sex Assigned at  | Date Recorded |
| Birth            |               |
+------------------+---------------+
| Not on file      |               |
+------------------+---------------+
 
 
 
+----------------+-------------+-------------+
| Job Start Date | Occupation  | Industry    |
+----------------+-------------+-------------+
| Not on file    | Not on file | Not on file |
+----------------+-------------+-------------+
 
 
 
+----------------+--------------+------------+
| Travel History | Travel Start | Travel End |
+----------------+--------------+------------+
 
 
 
+-------------------------------------+
| No recent travel history available. |
+-------------------------------------+
 documented as of this encounter
 
 Last Filed Vital Signs
 
 
+-------------------+----------------------+----------------------+----------+
| Vital Sign        | Reading              | Time Taken           | Comments |
+-------------------+----------------------+----------------------+----------+
| Blood Pressure    | 108/48               | 2019  1:03 PM  |          |
|                   |                      | PDT                  |          |
+-------------------+----------------------+----------------------+----------+
| Pulse             | 61                   | 2019  1:03 PM  |          |
|                   |                      | PDT                  |          |
+-------------------+----------------------+----------------------+----------+
| Temperature       | -                    | -                    |          |
+-------------------+----------------------+----------------------+----------+
| Respiratory Rate  | -                    | -                    |          |
+-------------------+----------------------+----------------------+----------+
| Oxygen Saturation | 95%                  | 2019  1:03 PM  |          |
|                   |                      | PDT                  |          |
+-------------------+----------------------+----------------------+----------+
| Inhaled Oxygen    | -                    | -                    |          |
| Concentration     |                      |                      |          |
+-------------------+----------------------+----------------------+----------+
 
| Weight            | 72.9 kg (160 lb 11.2 | 2019  1:03 PM  |          |
|                   |  oz)                 | PDT                  |          |
+-------------------+----------------------+----------------------+----------+
| Height            | 172.7 cm (5' 8")     | 2019  1:03 PM  |          |
|                   |                      | PDT                  |          |
+-------------------+----------------------+----------------------+----------+
| Body Mass Index   | 24.43                | 2019  1:03 PM  |          |
|                   |                      | PDT                  |          |
+-------------------+----------------------+----------------------+----------+
 documented in this encounter
 
 Progress Eric Slade MD - 2019  1:00 PM PDTFormatting of this note might be different f
rom the original.
  
 Date of visit: 2019
 Primary Care Physician: Calvin Robertson MD
 CHIEF COMPLAINT:  
 Chief Complaint 
 Patient presents with 
   Follow-up 
   5 mo f/u  
 
 HISTORY OF PRESENT ILLNESS:
 
 Wyatt is 73 y.o. here for follow up visit. 
 Since prior evaluation no cardiac events.
 Chronic shortness of breath due to chronic obstructive pulmonary disease.
 Weight has been stable.
 Left lower extremity has increased swelling no swelling in the leg.
 
 No significant episodes of epistaxis after decreasing the dose of Rivaroxaban to 10 mg danielle
y.
 Previously had multiple episodes where he had to go to emergency room with epistaxis.
 Continues to be on Rivaroxaban, couldn't tolerate Aspirin or clopidogrel due to recurrent e
pistaxis. 
 Was hospitalized in 2018 secondary to non-ST elevation MI in the setting of COPD exa
cerbation.
 Coronary angiogram was performed and was found to have occluded saphenous vein graft to lef
t circumflex system however left circumflex is small and not amenable to PCI. 
 
 Denies any chest pain, continued to have shortness of breath which is chronic due to chroni
c obstructive pulmonary disease.
 
 S/P CABG x 4 in . Had a vocal cord cancer in . Diagnosed with atrial fibrillation m
any years ago.
 Had peptic ulcer disease and aspirin had to be stopped, patient had to have blood transfusi
on.
 [facility] last time seen was in , after that he was e
valuated by Dr. Foote in .
 
 Past medical history, SH, FH, and medications were reviewed in the chart.
 
 Medications:
 Outpatient Encounter Medications as of 2019 
 Medication Sig Dispense Refill 
   acyclovir (ZOVIRAX) 400 MG tablet Take 400 mg by mouth 5 (five) times daily. PRN   
   acyclovir (ZOVIRAX) 5% ointment Apply  topically every 3 hours.   
   albuterol (PROAIR HFA) 90 mcg/puff inhaler Inhale 2 puffs into the lungs every 6 hours 
as needed for Wheezing.   
 
   albuterol (PROAIR RESPICLICK) 90 mcg/puff inhaler Inhale  into the lungs.   
   albuterol-ipratropium 2.5-0.5 mg/3 mL SOLN Take 3 mLs by nebulization every 6 (six) nic
rs as needed.   
   atorvaSTATin (LIPITOR) 40 mg tablet TAKE 1 TABLET BY MOUTH NIGHTLY   
   azaTHIOprine (IMURAN) 50 mg tablet Take 150 mg by mouth daily.   
   bacitracin-polymyxin b (POLYSPORIN) ophthalmic ointment Apply  to eye as needed.   
   bismuth subsalicylate (PEPTO BISMOL) 262 mg chewable tablet Take 262 mg by mouth 4 (fou
r) times daily before meals and nightly.   
   cholecalciferol (CHOLECALCIFEROL) 1000 units TABS Take 1,000 Units by mouth daily.   
   Cholecalciferol (VITAMIN D3) 2000 UNITS CAPS Take 2,000 Units by mouth Daily.   
   cyanocobalamin (VITAMIN B-12) 1000 MCG tablet Take 1,000 mcg by mouth daily.   
   diclofenac (VOLTAREN) 1% GEL Apply 2 g topically 4 (four) times daily. PRN   
   digoxin (LANOXIN) 250 mcg tablet Take 250 mcg by mouth Daily. 1/2 capsule daily   
   diltiazem (DILT-XR) 120 mg 24 hr capsule Take 120 mg by mouth Daily.   
   famotidine (PEPCID) 40 MG tablet Take 40 mg by mouth daily.   
   ferrous sulfate 325 mg tablet Take 325 mg by mouth 3 times daily.   
   fluticasone (FLONASE) 50 mcg/nasal spray one spray in each nostril daily as needed   
   fluticasone-salmeterol (ADVAIR) 500-50 mcg/puff diskus inhaler Inhale 1 puff into the l
ungs Twice Daily.   
   folic acid 1 mg tablet Take 1 mg by mouth Daily.   
   furosemide (LASIX) 40 mg tablet Take 40 mg by mouth Daily.   
   guaiFENesin (MUCINEX) 600 mg 12 hr tablet Take 1,200 mg by mouth 2 times daily.   
   HYDROcodone-acetaminophen (NORCO) 7.5-325 mg per tablet Take 1 tablet by mouth as neede
d for Pain.   
   levocetirizine (XYZAL) 5 MG tablet Take 5 mg by mouth as needed.   
   levothyroxine (LEVOTHROID) 75 MCG tablet Take 75 mcg by mouth Daily.   
   levothyroxine (SYNTHROID) 75 MCG tablet Take 75 mcg by mouth every morning before break
fast.   
   losartan (COZAAR) 50 mg tablet Take 50 mg by mouth daily.   
   methotrexate 2.5 mg tablet Take 15 mg by mouth Once a week.   
   metoclopramide (REGLAN) 10 mg tablet Take 10 mg by mouth 4 times daily as needed.   
   mupirocin (BACTROBAN) 2% ointment 1 g by Nasal route as needed. Use pea sized amount in
 each nostril twice daily for 5 days. After applications, press sides of nose together, gent
ly massage for 1 min.   
   nystatin-triamcinolone (MYCOLOG II) cream Apply  topically 2 times daily.   
   ofloxacin (OCUFLOX) 0.3% ophthalmic solution    
   omeprazole (PRILOSEC) 20 mg capsule Take 20 mg by mouth 2 times daily.   
   potassium chloride (KLOR-CON) 10 MEQ ER tablet Take 10 mEq by mouth 2 (two) times daily
 with meals.   
   potassium citrate (UROCIT-K) 10 mEq SR tablet Take 10 mEq by mouth 2 times daily.   
   predniSONE (DELTASONE) 5 mg tablet Take 10 mg by mouth Daily.   
   pseudoePHEDrine (SUDOGEST) 30 mg tablet Take 30 mg by mouth every 4 hours as needed for
 Congestion.   
   rivaroxaban (XARELTO) 10 mg tablet Take 1 tablet by mouth daily.   
   sertraline (ZOLOFT) 100 mg tablet Take 100 mg by mouth daily.   
   tamsulosin (FLOMAX) 0.4 mg CAPS Take 0.4 mg by mouth 2 times daily.   
   trolamine salicylate (ASPERCREME) 10% cream Apply  topically as needed.   
 
 No facility-administered encounter medications on file as of 2019.  
 
 Allergies
 Allergies 
 Allergen Reactions 
   Beta Adrenergic Blockers Anaphylaxis 
   Diltiazem Other (See Comments) 
   Gabapentin Other (See Comments) 
   CNS 
   Imipramine Other (See Comments) 
   Urinary retention  
   Metoprolol Swelling 
 
   Propranolol Other (See Comments) 
   raynaud's  
   Diltiazem Hcl  
   Lipitor  
   Propranolol Hcl  
 
 REVIEW OF SYSTEMS:
 Constitutional: Positive for fatigue. No fever, chills, and rigors.  Weight has been stable
. 
 HEENT: Negative for nosebleeds, ear discharge, nasal congestion or soar throat.  
 Eyes: Negative for visual disturbance, redness, or secretion. 
 Respiratory: Negative for cough, sputum production, hemoptysis, wheezing. 
 Cardiovascular: Negative for orthopnea.  No chest pain or tightness. No LE edema.
 Gastrointestinal: Negative for nausea, vomiting, diarrhea, abdominal pain and blood in stoo
l. 
 Genitourinary: Negative for dysuria or hematuria. 
 Musculoskeletal: Arthritic pain. 
 Skin: Negative for rash. 
 Neurological: Negative for dizziness. No numbness. No recent falls. No slurred speech.
 Hematological: No significant bruising. 
 Psychiatric/Behavioral: No depression or anxiety.  
 
 PHYSICAL EXAM
 Vital Signs:
 /48  | Pulse 61  | Ht 1.727 m (5' 8")  | Wt 72.9 kg (160 lb 11.2 oz)  | SpO2 95%  | B
MI 24.43 kg/m 
   
 GENERAL APPEARANCE: Alert, oriented, cooperative, no distress, appears stated age.
 HEENT: Bleeding of the right eye.  Extraocular movements were intact.  No jaundice. Pupiles
 round and reactive. 
 NECK: No JVD, lymphadenopathy. Carotid upstrokes normal. No carotid bruit heard.
 CARDIAC:  Regular rhythm and rate. There is normal S1 and S2. No galop. No murmur.
 CHEST: Diminished air exchange.  No wheezing.  
 ABDOMEN: Soft.No tenderness or guarding. No palpable organs. Active bowel sounds.
 EXTREMITIES: No lower extremities edema, cyanosis or clubbing.
 NEURO: Alert and oriented times three with no focal deficit.  Cranial nerves are grossly no
rmal. 
 SKIN: Warm and dry. No rash.  Ecchymosis noted in bilateral arms.
 Psych: Normal affect and mood.  
 
 DATA 2019
 WBC 7.1, hemoglobin 10.0, platelets 188, MCV 88.
 Sodium 139, potassium 3.8, chloride 105, bicarb 24, BUN 22, creatinine 1.25, GFR 56.
 AST 9, AST 5, alk phos 73.  Troponin 1.6 7.  Total iron 47.4, saturation 12.6%.  TSH 3.2.  
TIBC 377.
 
 Lab Results 
 Component Value Date/Time 
   2018 05:35 
   08/10/2018 10:00 
   2018 01:57 
  K 3.3 (L) 2018 05:35 
  K 3.8 08/10/2018 10:00 
  K 4.3 2018 01:57 
  CO2 26 2018 05:35 
  CO2 24 08/10/2018 10:00 
  CO2 24 2018 01:57 
  BUN 12 2018 05:35 
  BUN 15 08/10/2018 10:00 
  BUN 14 2018 01:57 
 
  LABCREA 1.1 2018 05:35 
  LABCREA 1.2 08/10/2018 10:00 
  LABCREA 1.3 2018 01:57 
  CALCIUM 8.5 2018 05:35 
  CALCIUM 8.4 (L) 08/10/2018 10:00 
  CALCIUM 8.6 2018 01:57 
  MG 1.8 2018 01:57 
 
 Lab Results 
 Component Value Date/Time 
  WBC 5.20 2018 05:35 
  WBC 7.51 08/10/2018 10:00 
  WBC 11.05 (H) 2018 01:57 
  HGB 9.6 (L) 2018 05:35 
  HGB 9.4 (L) 08/10/2018 10:00 
  HGB 11.5 (L) 2018 01:57 
  MCV 76.7 (L) 2018 05:35 
  MCV 77.0 (L) 08/10/2018 10:00 
  MCV 77.5 (L) 2018 01:57 
 
 Lab Results 
 Component Value Date 
  CHOL 103 2018 
  TRIG 116 2018 
  HDL 24 (L) 2018 
  GLUF 101 (H) 2018 
  GLUF 137 (H) 08/10/2018 
 
 [id number]
 Ordered and reviewed that showed atrial fibrillation with controlled ventricular rate and r
ight bundle branch block.
 2018
 Reviewed by myself showed atrial fibrillation with RBBB. 
 Last Echo: 2018
 Normal LV size and function EF 55%. Inferior and inferolateral hypokinetic segments.
 04/15/2013
 Reported with normal LV size and function EF 56%. RV is normal in size and function. 
 Last Stress test: 2018
 Lexiscan Cardiolite stress test with evidence of ischemia inferior segment and small revers
ible defect as well as in the anterior segment.
 
 Last Cath: 2018
 LM mild disease, % occluded. LCX small caliber calcified with severe disease proxima
l and distal not amenable to PCI. % occluded.
 SVG to distal RCA patent, LIMA to LAD patent.
 SVG to LCX system is occluded at the insertion site. 
 Last US carotid:
 
 ASSESSMENT: 
 Patient is 73 y.o.with the following medical problems:
 
 1. Coronary artery disease, occluded SVG to LCX system, LCX is not amenable to PCI.  No ang
inal symptoms.
 2. Recurrent epistaxis, couldn't tolerate aspirin neither clopidogrel. Currently on low-dos
e Rivaroxaban.
 3. History of Non-ST elevation MI.
 4. Dysphagia.
 5. Chronic atrial fibrillation. On rate control strategy with digoxin and anticoagulation. 
CHADSVASc score of 4.
 6. AAA.
 
 7. History of CABG  4 in .
 8. Chronic right bundle branch block.
 9. Hypertension blood pressures controlled.
 10. Chronic obstructive pulmonary disease.
 11. History of upper GI bleed due to peptic ulcer was off any antiplatelet therapy.
 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
 13. Normocytic anemia likely of chronic disease.
 
 Recommendations:
 Finished cardiac rehabilitation soon.  No cardiac events since prior evaluation.
 Could not tolerate any dual antiplatelet therapy secondary to significant epistaxis that re
quired ER evaluation. Could not tolerate even aspirin.
 Continue with anticoagulation with Rivaroxaban 10 mg po q day. 
 If epistaxis becomes again of significance will discuss possible left atrial appendage occl
usion, Watchman device placement. 
 Continue to monitor H and H and any sign of increased hemoptysis. 
 Continue with Losartan 50 mg p.o. daily, statin.
 Digoxin 0.125 mg daily.
 Not a candidate for BB. HR is controlled ratewith atrial fibrillation.
 We will continue to follow-up with pulmonary.
 We will call with any change in status.
 
 *This report has been prepared using a voice recognition system. The report was reviewed fo
r accuracy, however, sound-alike word errors, addition and/or deletions may occur. If there 
is any question about this report please contact me.
 
 Eric London MD, MPH
 Electronically signed by Eric London MD at 2019  1:58 PM PDTdocumented in this 
encounter
 
 Plan of Treatment
 
 
+--------+---------+-------------+----------------------+-------------+
| Date   | Type    | Specialty   | Care Team            | Description |
+--------+---------+-------------+----------------------+-------------+
| / | Office  | Pulmonology |   Juan F,          |             |
| 2019   | Visit   |             | Jessica Cheung,   |             |
|        |         |             | MD  [address] |             |
|        |         |             |   [place],   |             |
|        |         |             | [place] [address]             |             |
|        |         |             | [phone]         |             |
|        |         |             | [phone] (Fax)   |             |
+--------+---------+-------------+----------------------+-------------+
| / | Office  | Cardiology  |   Eric London, |             |
| 2020   | Visit   |             |  MD  [address]   |             |
|        |         |             | [place]  |             |
|        |         |             | [address]  [phone]  |             |
|        |         |             |  [phone] (Fax)  |             |
+--------+---------+-------------+----------------------+-------------+
 documented as of this encounter
 
 Procedures
 
 
+----------------------+--------+-------------+----------------------+----------------------
+
| Procedure Name       | Priori | Date/Time   | Associated Diagnosis | Comments             
|
|                      | ty     |             |                      |                      
 
|
+----------------------+--------+-------------+----------------------+----------------------
+
| LABS - EXTERNAL SCAN |        | 2019  |                      |   Results for this   
|
|                      |        | 12:00 AM    |                      | procedure are in the 
|
|                      |        | PDT         |                      |  results section.    
|
+----------------------+--------+-------------+----------------------+----------------------
+
| ECG 12 LEAD          | Routin | 2019  |   Coronary artery    |   Results for this   
|
|                      | e      |  1:31 PM    | disease of bypass    | procedure are in the 
|
|                      |        | PDT         | graft of native      |  results section.    
|
|                      |        |             | heart with stable    |                      
|
|                      |        |             | angina pectoris      |                      
|
|                      |        |             | (HCC)  Essential     |                      
|
|                      |        |             | hypertension  Stable |                      
|
|                      |        |             |  angina (HCC)        |                      
|
|                      |        |             | Chronic atrial       |                      
|
|                      |        |             | fibrillation (HCC)   |                      
|
+----------------------+--------+-------------+----------------------+----------------------
+
 documented in this encounter
 
 Results
 LABS - EXTERNAL SCAN (2019 12:00 AM PDT)
 
+------------------------------------------------------------------------+--------------+
| Narrative                                                              | Performed At |
+------------------------------------------------------------------------+--------------+
|   This result has an attachment that is not available.  Ordered by an  |              |
| unspecified provider.                                                  |              |
+------------------------------------------------------------------------+--------------+
 ECG 12 lead (2019  1:31 PM PDT)
 
+-------------+--------------------------+-----------+------------+--------------+
| Component   | Value                    | Ref Range | Performed  | Pathologist  |
|             |                          |           | At         | Signature    |
+-------------+--------------------------+-----------+------------+--------------+
| VENTRICULAR | 50                       | BPM       | [facility]  |              |
|  RATE EKG   |                          |           |            |              |
+-------------+--------------------------+-----------+------------+--------------+
| ATRIAL RATE | 178                      | BPM       | [facility]  |              |
+-------------+--------------------------+-----------+------------+--------------+
| QRS         | 132                      | ms        | [facility]  |              |
| DURATION    |                          |           |            |              |
+-------------+--------------------------+-----------+------------+--------------+
| Q-T         | 436                      | ms        | [facility]  |              |
| INTERVAL    |                          |           |            |              |
 
+-------------+--------------------------+-----------+------------+--------------+
| Q-T         | 397                      | ms        | [facility]  |              |
| INTERVAL    |                          |           |            |              |
| (CORRECTED) |                          |           |            |              |
+-------------+--------------------------+-----------+------------+--------------+
| QRS AXIS    | -68                      | degrees   | [facility]  |              |
+-------------+--------------------------+-----------+------------+--------------+
| T AXIS      | 55                       | degrees   | [facility]  |              |
+-------------+--------------------------+-----------+------------+--------------+
| INTERPRETAT | Atrial fibrillation with |           | [facility]  |              |
| ION TEXT    |  slow ventricular        |           |            |              |
|             | responseRight bundle     |           |            |              |
|             | branch blockLeft         |           |            |              |
|             | anterior fascicular      |           |            |              |
|             | block*** Bifascicular    |           |            |              |
|             | block ***Abnormal        |           |            |              |
|             | ECGWhen compared with    |           |            |              |
|             | ECG of 09-AUG-2018       |           |            |              |
|             | 06:28,QT has             |           |            |              |
|             | shortenedPlease refer to |           |            |              |
|             |  Providers office visit  |           |            |              |
|             | note for Providers       |           |            |              |
|             | Interpretation.Confirmed |           |            |              |
|             |  by [facility] Read Only,  |           |            |              |
|             | [facility] (502),      |           |            |              |
|             |  Arjun Cramer    |           |            |              |
|             | (253) on 2019       |           |            |              |
|             | 5:21:34 PM               |           |            |              |
+-------------+--------------------------+-----------+------------+--------------+
 
 
 
+----------+
| Specimen |
+----------+
|          |
+----------+
 
 
 
+----------------------------------------------------------+--------------+
| Narrative                                                | Performed At |
+----------------------------------------------------------+--------------+
|   This result has an attachment that is not available.   |              |
+----------------------------------------------------------+--------------+
 
 
 
+--------------+---------+--------------------+--------------+
| Performing   | Address | City/State/Zipcode | Phone Number |
| Organization |         |                    |              |
+--------------+---------+--------------------+--------------+
|   [facility]  |         |                    |              |
+--------------+---------+--------------------+--------------+
 documented in this encounter
 
 Visit Diagnoses
 
 
+----------------------------------------------------------------------------------------+
 
| Diagnosis                                                                              |
+----------------------------------------------------------------------------------------+
|   Coronary artery disease of bypass graft of native heart with stable angina pectoris  |
| (HCC)                                                                                  |
+----------------------------------------------------------------------------------------+
|   Essential hypertension  Unspecified essential hypertension                           |
+----------------------------------------------------------------------------------------+
|   Stable angina (HCC)  Other and unspecified angina pectoris                           |
+----------------------------------------------------------------------------------------+
|   Chronic atrial fibrillation  Atrial fibrillation                                     |
+----------------------------------------------------------------------------------------+
 documented in this encounter

## 2019-12-06 NOTE — XMS
Encounter Summary
  Created on: 2019
 
 Wyatt Shane
 External Reference #: [id number]
 : 43
 Sex: Male
 
 Demographics
 
 
+-----------------------+------------------------+
| Address               | [address]     |
|                       | [place]  [address]   |
+-----------------------+------------------------+
| Home Phone            | [phone]        |
+-----------------------+------------------------+
| Preferred Language    | Unknown                |
+-----------------------+------------------------+
| Marital Status        |                 |
+-----------------------+------------------------+
| [employer] Affiliation | LUT                    |
+-----------------------+------------------------+
| Race                  | White                  |
+-----------------------+------------------------+
| Ethnic Group          | Not  or  |
+-----------------------+------------------------+
 
 
 Author
 
 
+--------------+------------------------------+
| Author       | [facility] |
+--------------+------------------------------+
| Organization | [facility] |
+--------------+------------------------------+
| Address      | Unknown                      |
+--------------+------------------------------+
| Phone        | Unavailable                  |
+--------------+------------------------------+
 
 
 
 Support
 
 
+---------------+--------------+---------+-----------------+
| Name          | Relationship | Address | Phone           |
+---------------+--------------+---------+-----------------+
| Opal Shane | ECON         | Unknown | [phone] |
+---------------+--------------+---------+-----------------+
 
 
 
 Care Team Providers
 
 
 
+-----------------------+------+-----------------+
| Care Team Member Name | Role | Phone           |
+-----------------------+------+-----------------+
| Erwin Iniguez MD   | PCP  | [phone] |
+-----------------------+------+-----------------+
 
 
 
 Encounter Details
 
 
+--------+-------------+-----------------+---------------------+---------------+
| Date   | Type        | Department      | Care Team           | Description   |
+--------+-------------+-----------------+---------------------+---------------+
| / | Office      |   [facility]  |   Note, Outpatient  | Progress Note |
|    | Visit-Trans | MEDICINE        | Clinic              |               |
|        | cribed      |                 |                     |               |
+--------+-------------+-----------------+---------------------+---------------+
 
 
 
 Social History
 
 
+----------------+-------+-----------+--------+------+
| Tobacco Use    | Types | Packs/Day | Years  | Date |
|                |       |           | Used   |      |
+----------------+-------+-----------+--------+------+
| Never Assessed |       |           |        |      |
+----------------+-------+-----------+--------+------+
 
 
 
+------------------+---------------+
| Sex Assigned at  | Date Recorded |
| Birth            |               |
+------------------+---------------+
| Not on file      |               |
+------------------+---------------+
 
 
 
+----------------+-------------+-------------+
| Job Start Date | Occupation  | Industry    |
+----------------+-------------+-------------+
| Not on file    | Not on file | Not on file |
+----------------+-------------+-------------+
 
 
 
+----------------+--------------+------------+
| Travel History | Travel Start | Travel End |
+----------------+--------------+------------+
 
 
 
+-------------------------------------+
| No recent travel history available. |
+-------------------------------------+
 documented as of this encounter
 
 
 Progress Notes
 Interface, Transcription In - 2006  3:03 AM PSTCLINIC DATE:       1999
 
 OTOLARYNGOLOGY CLINIC
 
 SUBJECTIVE:  Mr. Shane is seen back  in  followup  from  his  dilation.   He
 really feels no subjective change in  his  symptoms in terms of his glottic
 and subglottic stenosis.
 
 PHYSICAL EXAMINATION:  A flexible fiberoptic  laryngoscopy  is  done.   The
 erythema  of  his larynx remains absent.   There  is  no  evidence  of  any
 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
 of his airway.
 
 ASSESSMENT:  Subglottic stenosis.
 
 PLAN:  He and I now agree that he needs  an altered reconstruction of this.
 We will arrange to do this in four to six weeks.
 
 Jimmy Esposito M.D.
 
 RINA / 
 [id number] / [id number] / [id number] / [id number]
 D: 1999
 T: 1999Electronically signed by Interface, Transcription In at 2006  3:03 AM PS
Tdocumented in this encounter
 
 Plan of Treatment
 Not on filedocumented as of this encounter
 
 Visit Diagnoses
 Not on filedocumented in this encounter

## 2019-12-06 NOTE — XMS
Encounter Summary
  Created on: 2019
 
 Wyatt Shane
 External Reference #: [id number]
 : 43
 Sex: Male
 
 Demographics
 
 
+-----------------------+------------------------+
| Address               | [address]     |
|                       | [place]  [address]   |
+-----------------------+------------------------+
| Home Phone            | [phone]        |
+-----------------------+------------------------+
| Preferred Language    | Unknown                |
+-----------------------+------------------------+
| Marital Status        |                 |
+-----------------------+------------------------+
| [employer] Affiliation | LUT                    |
+-----------------------+------------------------+
| Race                  | White                  |
+-----------------------+------------------------+
| Ethnic Group          | Not  or  |
+-----------------------+------------------------+
 
 
 Author
 
 
+--------------+------------------------------+
| Author       | [facility] |
+--------------+------------------------------+
| Organization | [facility] |
+--------------+------------------------------+
| Address      | Unknown                      |
+--------------+------------------------------+
| Phone        | Unavailable                  |
+--------------+------------------------------+
 
 
 
 Support
 
 
+---------------+--------------+---------+-----------------+
| Name          | Relationship | Address | Phone           |
+---------------+--------------+---------+-----------------+
| Opal Shane | ECON         | Unknown | [phone] |
+---------------+--------------+---------+-----------------+
 
 
 
 Care Team Providers
 
 
 
+-----------------------+------+-----------------+
| Care Team Member Name | Role | Phone           |
+-----------------------+------+-----------------+
| Erwin Steel MD   | PCP  | [phone] |
+-----------------------+------+-----------------+
 
 
 
 Encounter Details
 
 
+--------+-------------+-----------------+---------------------+---------------+
| Date   | Type        | Department      | Care Team           | Description   |
+--------+-------------+-----------------+---------------------+---------------+
| / | Office      |   [facility]  |   Note, Outpatient  | Progress Note |
|    | Visit-Trans | MEDICINE        | Clinic              |               |
|        | cribed      |                 |                     |               |
+--------+-------------+-----------------+---------------------+---------------+
 
 
 
 Social History
 
 
+----------------+-------+-----------+--------+------+
| Tobacco Use    | Types | Packs/Day | Years  | Date |
|                |       |           | Used   |      |
+----------------+-------+-----------+--------+------+
| Never Assessed |       |           |        |      |
+----------------+-------+-----------+--------+------+
 
 
 
+------------------+---------------+
| Sex Assigned at  | Date Recorded |
| Birth            |               |
+------------------+---------------+
| Not on file      |               |
+------------------+---------------+
 
 
 
+----------------+-------------+-------------+
| Job Start Date | Occupation  | Industry    |
+----------------+-------------+-------------+
| Not on file    | Not on file | Not on file |
+----------------+-------------+-------------+
 
 
 
+----------------+--------------+------------+
| Travel History | Travel Start | Travel End |
+----------------+--------------+------------+
 
 
 
+-------------------------------------+
| No recent travel history available. |
+-------------------------------------+
 documented as of this encounter
 
 
 Progress Notes
 Interface, Transcription In - 2006  3:04 AM PSTCLINIC DATE:       1999
 
 OTOLARYNGOLOGY HEAD AND NECK CLINIC
 
 SUBJECTIVE: Mr. Shane is seen back in follow up with respect to his right
 true vocal cord carcinoma. He is ready to start his radiation.  I talked
 with Dr. Boone today about the specifics of this.  He still has mild
 postoperative dyspnea, but it is not as pronounced as it has been in the
 past.
 
 OBJECTIVE: Flexible fiberoptic laryngoscopy is done. The neocord is
 gradually healing in although there is still some granulation tissue in the
 bed. The cords support themselves well and his voice is stronger.  His
 tracheotomy site is well healed and there are no masses in the neck.
 
 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
 
 PLAN:  He will proceed with radiation and I will see him back at the
 completion of this.  If there are any problems at all with his breathing
 during radiation, he will return here for assessment.
 
 Jimmy Esposito M.D.
 
 MARIA ALEJANDRA/tadeo
 D: 99
 T: 99
 
 cc:
 
 BE BOONE MD
 [address] [facility] [address]
 
 ERWIN STEEL MD
 [address] [id number] signed by Interface, Transcription In at 2006  3
:04 AM PSTdocumented in this encounter
 
 Plan of Treatment
 Not on filedocumented as of this encounter
 
 Visit Diagnoses
 Not on filedocumented in this encounter

## 2019-12-06 NOTE — XMS
Encounter Summary
  Created on: 2019
 
 Shane Wyatttien BroussardJosue
 External Reference #: [id number]
 : 43
 Sex: Male
 
 Demographics
 
 
+-----------------------+---------------------------+
| Address               | [address]        |
|                       | [place]  [address] |
+-----------------------+---------------------------+
| Home Phone            | [phone]           |
+-----------------------+---------------------------+
| Preferred Language    | Unknown                   |
+-----------------------+---------------------------+
| Marital Status        |                    |
+-----------------------+---------------------------+
| Lutheran Affiliation | 1028                      |
+-----------------------+---------------------------+
| Race                  | Unknown                   |
+-----------------------+---------------------------+
| Ethnic Group          | Unknown                   |
+-----------------------+---------------------------+
 
 
 Author
 
 
+--------------+--------------------------------------------+
| Author       | [facility]  |
|              | and [place]                                |
+--------------+--------------------------------------------+
| Organization | [facility]  |
|              | and [place]                                |
+--------------+--------------------------------------------+
| Address      | Unknown                                    |
+--------------+--------------------------------------------+
| Phone        | Unavailable                                |
+--------------+--------------------------------------------+
 
 
 
 Support
 
 
+---------------+--------------+--------------------+-----------------+
| Name          | Relationship | Address            | Phone           |
+---------------+--------------+--------------------+-----------------+
| Opal Shane | ECON         | [address]     | [phone] |
|               |              | [place]   |                 |
|               |              | [address]              |                 |
+---------------+--------------+--------------------+-----------------+
 
 
 
 
 Care Team Providers
 
 
+------------------------+------+-----------------+
| Care Team Member Name  | Role | Phone           |
+------------------------+------+-----------------+
| Calvin Robertson MD | PCP  | [phone] |
+------------------------+------+-----------------+
 
 
 
 Reason for Visit
 
 
+-------------------+----------+
| Reason            | Comments |
+-------------------+----------+
| Medication Refill |          |
+-------------------+----------+
 
 
 
 Encounter Details
 
 
+--------+--------+----------------------+----------------------+-------------------+
| Date   | Type   | Department           | Care Team            | Description       |
+--------+--------+----------------------+----------------------+-------------------+
| 10/16/ | Refill |   [facility]      |   Eric Akins, | Medication Refill |
| 2019   |        | CARDIOLOGY DEANDRE |  MD  [address]   |                   |
|        |        |   [address]    | [address]  [place], [place]  |                   |
|        |        | [address]          | [address]  [phone]  |                   |
|        |        | [place]        |  [phone] (Fax)  |                   |
|        |        | [address]           |                      |                   |
|        |        | [phone]         |                      |                   |
+--------+--------+----------------------+----------------------+-------------------+
 
 
 
 Social History
 
 
+---------------+------------+-----------+--------+------------------+
| Tobacco Use   | Types      | Packs/Day | Years  | Date             |
|               |            |           | Used   |                  |
+---------------+------------+-----------+--------+------------------+
| Former Smoker | Cigarettes | 1         | 35     | Quit: 1996 |
+---------------+------------+-----------+--------+------------------+
 
 
 
+---------------------+---+---+---+
| Smokeless Tobacco:  |   |   |   |
| Never Used          |   |   |   |
+---------------------+---+---+---+
 
 
 
+-------------+-------------+---------+----------+
 
| Alcohol Use | Drinks/Week | oz/Week | Comments |
+-------------+-------------+---------+----------+
| No          |             |         |          |
+-------------+-------------+---------+----------+
 
 
 
+------------------+---------------+
| Sex Assigned at  | Date Recorded |
| Birth            |               |
+------------------+---------------+
| Not on file      |               |
+------------------+---------------+
 
 
 
+----------------+-------------+-------------+
| Job Start Date | Occupation  | Industry    |
+----------------+-------------+-------------+
| Not on file    | Not on file | Not on file |
+----------------+-------------+-------------+
 
 
 
+----------------+--------------+------------+
| Travel History | Travel Start | Travel End |
+----------------+--------------+------------+
 
 
 
+-------------------------------------+
| No recent travel history available. |
+-------------------------------------+
 documented as of this encounter
 
 Plan of Treatment
 
 
+--------+---------+-------------+----------------------+-------------+
| Date   | Type    | Specialty   | Care Team            | Description |
+--------+---------+-------------+----------------------+-------------+
| / | Office  | Pulmonology |   Juan F,          |             |
| 2019   | Visit   |             | Jessica Cheung,   |             |
|        |         |             | MD  [address]  |             |
|        |         |             |   [place],   |             |
|        |         |             | [place] [address]             |             |
|        |         |             | [phone]         |             |
|        |         |             | [phone] (Fax)   |             |
+--------+---------+-------------+----------------------+-------------+
| / | Office  | Cardiology  |   Eric Akins, |             |
|    | Visit   |             |  MD  [address]   |             |
|        |         |             | [place]  |             |
|        |         |             | [address]  [phone]  |             |
|        |         |             |  [phone] (Fax)  |             |
+--------+---------+-------------+----------------------+-------------+
 documented as of this encounter
 
 Visit Diagnoses
 Not on filedocumented in this encounter

## 2019-12-06 NOTE — XMS
Encounter Summary
  Created on: 2019
 
 Wyatt Shane
 External Reference #: [id number]
 : 43
 Sex: Male
 
 Demographics
 
 
+-----------------------+------------------------+
| Address               | [address]     |
|                       | [place]  [address]   |
+-----------------------+------------------------+
| Home Phone            | [phone]        |
+-----------------------+------------------------+
| Preferred Language    | Unknown                |
+-----------------------+------------------------+
| Marital Status        |                 |
+-----------------------+------------------------+
| Nondenominational Affiliation | LUT                    |
+-----------------------+------------------------+
| Race                  | White                  |
+-----------------------+------------------------+
| Ethnic Group          | Not  or  |
+-----------------------+------------------------+
 
 
 Author
 
 
+--------------+------------------------------+
| Author       | [facility] |
+--------------+------------------------------+
| Organization | [facility] |
+--------------+------------------------------+
| Address      | Unknown                      |
+--------------+------------------------------+
| Phone        | Unavailable                  |
+--------------+------------------------------+
 
 
 
 Support
 
 
+---------------+--------------+---------+-----------------+
| Name          | Relationship | Address | Phone           |
+---------------+--------------+---------+-----------------+
| Opal Shane | ECON         | Unknown | [phone] |
+---------------+--------------+---------+-----------------+
 
 
 
 Care Team Providers
 
 
 
+-----------------------+------+-----------------+
| Care Team Member Name | Role | Phone           |
+-----------------------+------+-----------------+
| Erwin Iniguez MD   | PCP  | [phone] |
+-----------------------+------+-----------------+
 
 
 
 Encounter Details
 
 
+--------+-------------+-----------------+---------------------+---------------+
| Date   | Type        | Department      | Care Team           | Description   |
+--------+-------------+-----------------+---------------------+---------------+
| / | Office      |   [facility]  |   Note, Outpatient  | Progress Note |
|    | Visit-Trans | MEDICINE        | Clinic              |               |
|        | cribed      |                 |                     |               |
+--------+-------------+-----------------+---------------------+---------------+
 
 
 
 Social History
 
 
+----------------+-------+-----------+--------+------+
| Tobacco Use    | Types | Packs/Day | Years  | Date |
|                |       |           | Used   |      |
+----------------+-------+-----------+--------+------+
| Never Assessed |       |           |        |      |
+----------------+-------+-----------+--------+------+
 
 
 
+------------------+---------------+
| Sex Assigned at  | Date Recorded |
| Birth            |               |
+------------------+---------------+
| Not on file      |               |
+------------------+---------------+
 
 
 
+----------------+-------------+-------------+
| Job Start Date | Occupation  | Industry    |
+----------------+-------------+-------------+
| Not on file    | Not on file | Not on file |
+----------------+-------------+-------------+
 
 
 
+----------------+--------------+------------+
| Travel History | Travel Start | Travel End |
+----------------+--------------+------------+
 
 
 
+-------------------------------------+
| No recent travel history available. |
+-------------------------------------+
 documented as of this encounter
 
 
 Progress Notes
 Interface, Transcription In - 10/15/2006  1:06 AM PDTCLINIC DATE:       2001
 
 [facility] CLINIC
 
 SUBJECTIVE:  Mr. Shane seen back today in followup.  He is really doing very
 well.   He  has had a little hoarseness  recently.   He  has  been  working
 fighting fires, and he has inhaled a  lot  of  smoke; but otherwise, has no
 specific complaints.  The rest of his questionnaire is negative.
 
 OBJECTIVE:  HEENT: Ears: normal bilaterally.   Nose:   Normal  to  anterior
 rhinoscopy.  Mouth:  No lesions are seen.  Flexible fibreoptic laryngoscopy
 of the nasopharynx, oropharynx, hypopharynx,  and  larynx  is done.  He has
 the  expected  scarring  and  his  anterior  commissure  from  his  partial
 laryngectomy, but a patent airway and  no  evidence  of  recurrence.  NECK:
 Negative to palpation.
 
 ASSESSMENT:   Squamous  cell carcinoma  of  the  larynx.   No  evidence  of
 disease.
 
 PLAN:  We will see him back in 6 months.  He will have a chest x-ray by his
 primary care physician in the interim.
 
 Jimmy Esposito M.D.
 
 [facility] / 
 [id number] / [id number] / [id number] /
 D: 2001
 T: 2001
 
 cc:
 
 Erwin Iniguez M.D.
 [address]
 [place]  [address].
 
 Eddy Boone M.D.
 [address] [facility]
 [place]  [address].
 
 [id number] signed by Interface, Transcription In at 10/15/2006  1:06 AM PDTdocume
nted in this encounter
 
 Plan of Treatment
 Not on filedocumented as of this encounter
 
 Visit Diagnoses
 Not on filedocumented in this encounter

## 2019-12-06 NOTE — XMS
Encounter Summary
  Created on: 2019
 
 Shane Wyatttien BroussardJosue
 External Reference #: [id number]
 : 43
 Sex: Male
 
 Demographics
 
 
+-----------------------+---------------------------+
| Address               | [address]        |
|                       | [place]  [address] |
+-----------------------+---------------------------+
| Home Phone            | [phone]           |
+-----------------------+---------------------------+
| Preferred Language    | Unknown                   |
+-----------------------+---------------------------+
| Marital Status        |                    |
+-----------------------+---------------------------+
| Congregation Affiliation | 1028                      |
+-----------------------+---------------------------+
| Race                  | Unknown                   |
+-----------------------+---------------------------+
| Ethnic Group          | Unknown                   |
+-----------------------+---------------------------+
 
 
 Author
 
 
+--------------+--------------------------------------------+
| Author       | [facility]  |
|              | [facility] [place]                                |
+--------------+--------------------------------------------+
| Organization | [facility]  |
|              | [facility] [place]                                |
+--------------+--------------------------------------------+
| Address      | Unknown                                    |
+--------------+--------------------------------------------+
| Phone        | Unavailable                                |
+--------------+--------------------------------------------+
 
 
 
 Support
 
 
+---------------+--------------+--------------------+-----------------+
| Name          | Relationship | Address            | Phone           |
+---------------+--------------+--------------------+-----------------+
| Opal Shane | ECON         | [address]     | [phone] |
|               |              | [place]   |                 |
|               |              | [address]              |                 |
+---------------+--------------+--------------------+-----------------+
 
 
 
 
 Care Team Providers
 
 
+-----------------------+------+-----------------+
| Care Team Member Name | Role | Phone           |
+-----------------------+------+-----------------+
| Erwin Iniguez MD | PCP  | [phone] |
+-----------------------+------+-----------------+
 
 
 
 Reason for Visit
 
 
+--------+----------+
| Reason | Comments |
+--------+----------+
| Apnea  |          |
+--------+----------+
 
 
 
 Encounter Details
 
 
+--------+---------+----------------------+---------------------+----------------------+
| Date   | Type    | Department           | Care Team           | Description          |
+--------+---------+----------------------+---------------------+----------------------+
| / | Office  |   [facility]      |   Luis Carlos Perez  | Central sleep apnea  |
|    | Visit   | SLEEP DISORDER  [address]  | MD [address]   | due to Cheyne-Nichole |
|        |         | [address]      | [address]  [place]    |  respiration         |
|        |         | [place] [address] | [place] [address]     | (Primary Dx)         |
|        |         |   [phone]       | [phone]        |                      |
|        |         |                      | [phone] (Fax)  |                      |
+--------+---------+----------------------+---------------------+----------------------+
 
 
 
 Social History
 
 
+---------------+------------+-----------+--------+------------------+
| Tobacco Use   | Types      | Packs/Day | Years  | Date             |
|               |            |           | Used   |                  |
+---------------+------------+-----------+--------+------------------+
| Former Smoker | Cigarettes | 1.3       | 34     | Quit: 1996 |
+---------------+------------+-----------+--------+------------------+
 
 
 
+---------------------+---+---+---+
| Smokeless Tobacco:  |   |   |   |
| Never Used          |   |   |   |
+---------------------+---+---+---+
 
 
 
+-------------+-------------+---------+----------+
| Alcohol Use | Drinks/Week | oz/Week | Comments |
 
+-------------+-------------+---------+----------+
| No          |             |         |          |
+-------------+-------------+---------+----------+
 
 
 
+------------------+---------------+
| Sex Assigned at  | Date Recorded |
| Birth            |               |
+------------------+---------------+
| Not on file      |               |
+------------------+---------------+
 
 
 
+----------------+-------------+-------------+
| Job Start Date | Occupation  | Industry    |
+----------------+-------------+-------------+
| Not on file    | Not on file | Not on file |
+----------------+-------------+-------------+
 
 
 
+----------------+--------------+------------+
| Travel History | Travel Start | Travel End |
+----------------+--------------+------------+
 
 
 
+-------------------------------------+
| No recent travel history available. |
+-------------------------------------+
 documented as of this encounter
 
 Last Filed Vital Signs
 
 
+-------------------+------------------+----------------------+----------+
| Vital Sign        | Reading          | Time Taken           | Comments |
+-------------------+------------------+----------------------+----------+
| Blood Pressure    | 116/60           | 2013  8:49 AM  |          |
|                   |                  | PST                  |          |
+-------------------+------------------+----------------------+----------+
| Pulse             | 82               | 2013  8:49 AM  |          |
|                   |                  | PST                  |          |
+-------------------+------------------+----------------------+----------+
| Temperature       | -                | -                    |          |
+-------------------+------------------+----------------------+----------+
| Respiratory Rate  | 16               | 2013  8:49 AM  |          |
|                   |                  | PST                  |          |
+-------------------+------------------+----------------------+----------+
| Oxygen Saturation | 97%              | 2013  8:49 AM  |          |
|                   |                  | PST                  |          |
+-------------------+------------------+----------------------+----------+
| Inhaled Oxygen    | -                | -                    |          |
| Concentration     |                  |                      |          |
+-------------------+------------------+----------------------+----------+
| Weight            | 86.6 kg (191 lb) | 2013  8:49 AM  |          |
|                   |                  | PST                  |          |
+-------------------+------------------+----------------------+----------+
 
| Height            | -                | -                    |          |
+-------------------+------------------+----------------------+----------+
| Body Mass Index   | 30.83            | 2013  1:28 PM  |          |
|                   |                  | PDT                  |          |
+-------------------+------------------+----------------------+----------+
 documented in this encounter
 
 Progress Notes
 Luis Carlos Perez Jr., MD - 2013  9:40 AM PSTThe patient underwent successful ASV-BiPA
P titration last night.
 
 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 I've discussed this again wi
 him and I've discussed ASV-BiPAP.
 
 P: ASV- BiPAP prescribed (EPAP = 4cm;PSmin=0cm;PSmax=25cm;backup rate=auto)
     F/u early next week in [facility]
 
 Today, 15 minutes was spent face to face with the patient; the majority of time was spent c
joon.
 
 CC: Dr. CARMINA Iniguez
 Electronically signed by Luis Carlos Perez Jr., MD at 2013  9:41 AM PSTdocumented in 
is encounter
 
 Plan of Treatment
 
 
+--------+---------+-------------+----------------------+-------------+
| Date   | Type    | Specialty   | Care Team            | Description |
+--------+---------+-------------+----------------------+-------------+
| / | Office  | Pulmonology |   Juan F,          |             |
|    | Visit   |             | Jessica Cheung,   |             |
|        |         |             | MD [address] |             |
|        |         |             |   [place]   |             |
|        |         |             | [place] [address]             |             |
|        |         |             | [phone]         |             |
|        |         |             | [phone] (Fax)   |             |
+--------+---------+-------------+----------------------+-------------+
| / | Office  | Cardiology  |   Mable Eric, |             |
|    | Visit   |             |  MD [address]   |             |
|        |         |             | [place]  |             |
|        |         |             | [id number] [phone]  |             |
|        |         |             |  [phone] (Fax)  |             |
+--------+---------+-------------+----------------------+-------------+
 documented as of this encounter
 
 Visit Diagnoses
 
 
+----------------------------------------------------------------------------------+
| Diagnosis                                                                        |
+----------------------------------------------------------------------------------+
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
| respiration                                                                      |
+----------------------------------------------------------------------------------+
 documented in this encounter

## 2019-12-06 NOTE — XMS
Encounter Summary
  Created on: 2019
 
 Wyatt Shane
 External Reference #: [id number]
 : 43
 Sex: Male
 
 Demographics
 
 
+-----------------------+------------------------+
| Address               | [address]     |
|                       | [place]  [address]   |
+-----------------------+------------------------+
| Home Phone            | [phone]        |
+-----------------------+------------------------+
| Preferred Language    | Unknown                |
+-----------------------+------------------------+
| Marital Status        |                 |
+-----------------------+------------------------+
| Church Affiliation | LUT                    |
+-----------------------+------------------------+
| Race                  | White                  |
+-----------------------+------------------------+
| Ethnic Group          | Not  or  |
+-----------------------+------------------------+
 
 
 Author
 
 
+--------------+------------------------------+
| Author       | [facility] |
+--------------+------------------------------+
| Organization | [facility] |
+--------------+------------------------------+
| Address      | Unknown                      |
+--------------+------------------------------+
| Phone        | Unavailable                  |
+--------------+------------------------------+
 
 
 
 Support
 
 
+---------------+--------------+---------+-----------------+
| Name          | Relationship | Address | Phone           |
+---------------+--------------+---------+-----------------+
| Opal Shane | ECON         | Unknown | [phone] |
+---------------+--------------+---------+-----------------+
 
 
 
 Care Team Providers
 
 
 
+-----------------------+------+-----------------+
| Care Team Member Name | Role | Phone           |
+-----------------------+------+-----------------+
| Erwin Iniguez MD   | PCP  | [phone] |
+-----------------------+------+-----------------+
 
 
 
 Reason for Referral
 Diagnostic Testing (Routine)
 
+--------+--------+-----------+--------------+--------------+---------------+
| Status | Reason | Specialty | Diagnoses /  | Referred By  | Referred To   |
|        |        |           | Procedures   | Contact      | Contact       |
+--------+--------+-----------+--------------+--------------+---------------+
| Closed |        | Radiology |   Diagnoses  |   Tay, |   Rad General |
|        |        |           |  Esophageal  |  Jonathan S,   |  [address] |
|        |        |           | stenosis     | MD  [address]  |  [address]  |
|        |        |           | Procedures   | [address]  |  Mailcode:    |
|        |        |           | X-RAY        | [address]      | [facility]  |
|        |        |           | ESOPHAGRAM   | [facility] [place] | for Health    |
|        |        |           |              |  [address]  | and Healing,  |
|        |        |           |              |  Phone:      | [facility],   |
|        |        |           |              | [phone] | [address]rd Floor     |
|        |        |           |              |   Fax:       | [place], [place]  |
|        |        |           |              | [phone] | [address]    |
|        |        |           |              |              | Phone:        |
|        |        |           |              |              | [phone]  |
|        |        |           |              |              |  Fax:         |
|        |        |           |              |              | [phone]  |
+--------+--------+-----------+--------------+--------------+---------------+
 
 
 
 
 Encounter Details
 
 
+--------+-----------+----------------------+-----------+-------------+
| Date   | Type      | Department           | Care Team | Description |
+--------+-----------+----------------------+-----------+-------------+
| 09/15/ | Hospital  |   Diagnostic Imaging |           |             |
|    | Encounter |  Services at [facility]     |           |             |
|        |           | [address]  |           |             |
|        |           | [place]  Mailcode:   |           |             |
|        |           | [address]  [facility]  |           |             |
|        |           |  [place], [place]        |           |             |
|        |           | [address]           |           |             |
|        |           | [phone]         |           |             |
+--------+-----------+----------------------+-----------+-------------+
 
 
 
 Social History
 
 
+---------------+------------+-----------+--------+------------------+
| Tobacco Use   | Types      | Packs/Day | Years  | Date             |
|               |            |           | Used   |                  |
+---------------+------------+-----------+--------+------------------+
 
| Former Smoker | Cigarettes | 1         | 30     | Quit: 1996 |
+---------------+------------+-----------+--------+------------------+
 
 
 
+-------------+-------------+---------+----------+
| Alcohol Use | Drinks/Week | oz/Week | Comments |
+-------------+-------------+---------+----------+
| No          |             |         |          |
+-------------+-------------+---------+----------+
 
 
 
+------------------+---------------+
| Sex Assigned at  | Date Recorded |
| Birth            |               |
+------------------+---------------+
| Not on file      |               |
+------------------+---------------+
 
 
 
+----------------+-------------+-------------+
| Job Start Date | Occupation  | Industry    |
+----------------+-------------+-------------+
| Not on file    | Not on file | Not on file |
+----------------+-------------+-------------+
 
 
 
+----------------+--------------+------------+
| Travel History | Travel Start | Travel End |
+----------------+--------------+------------+
 
 
 
+-------------------------------------+
| No recent travel history available. |
+-------------------------------------+
 documented as of this encounter
 
 Medications at Time of Discharge
 
 
+----------------------+----------------------+-----------+---------+----------+----------+
| Medication           | Sig                  | Dispensed | Refills | Start    | End Date |
|                      |                      |           |         | Date     |          |
+----------------------+----------------------+-----------+---------+----------+----------+
|   ACYCLOVIR ORAL     | Take  by mouth as    |           | 0       | 20 |          |
|                      | needed.              |           |         | 10       |          |
+----------------------+----------------------+-----------+---------+----------+----------+
|                      | Take  by mouth.      |           | 0       |          |          |
| Amlodipine-Atorvasta |                      |           |         |          |          |
| tin (CADUET) 10-10   |                      |           |         |          |          |
| mg Oral Tablet       |                      |           |         |          |          |
+----------------------+----------------------+-----------+---------+----------+----------+
|   CYANOCOBALAMIN     | by Injection route   |           | 0       | 20 |          |
| (VITAMIN B-12 INJ)   | every thirty days.   |           |         | 10       |          |
+----------------------+----------------------+-----------+---------+----------+----------+
|   gabapentin 300 mg  | Take 300 mg by mouth |           | 0       |          |          |
 
| Oral Tablet          |  three times daily.  |           |         |          |          |
+----------------------+----------------------+-----------+---------+----------+----------+
|   LANSOPRAZOLE       | Take  by mouth.      |           | 0       |          |          |
| (PREVACID ORAL)      |                      |           |         |          |          |
+----------------------+----------------------+-----------+---------+----------+----------+
|   LORATADINE         | Take  by mouth.      |           | 0       |          |          |
| (CLARITIN ORAL)      |                      |           |         |          |          |
+----------------------+----------------------+-----------+---------+----------+----------+
|   metoclopramide     | Take 5 mg by mouth   |           | 0       |          |          |
| (REGLAN) 5 mg Oral   | every six hours as   |           |         |          |          |
| Tablet               | needed.              |           |         |          |          |
+----------------------+----------------------+-----------+---------+----------+----------+
|   NAPROXEN           | Take  by mouth.      |           | 0       |          |          |
| SODIUM/P-EPHED HCL   |                      |           |         |          |          |
| (SUDAFED 12 HR       |                      |           |         |          |          |
| SINUS-PAIN ORAL)     |                      |           |         |          |          |
+----------------------+----------------------+-----------+---------+----------+----------+
|   sertraline         | Take 50 mg by mouth  |           | 0       |          |          |
| (ZOLOFT) 50 mg Oral  | once daily.          |           |         |          |          |
| Tablet               |                      |           |         |          |          |
+----------------------+----------------------+-----------+---------+----------+----------+
|   tamsulosin         | Take 0.4 mg by mouth |           | 0       |          |          |
| (FLOMAX) 0.4 mg Oral |  once daily.         |           |         |          |          |
|  Capsule, Sust.      |                      |           |         |          |          |
| Release 24 hr        |                      |           |         |          |          |
+----------------------+----------------------+-----------+---------+----------+----------+
|   TESTOSTERONE IM    | Inject  into the     |           | 0       |          |          |
|                      | muscle (IM).         |           |         |          |          |
+----------------------+----------------------+-----------+---------+----------+----------+
|   VICODIN ORAL       | None Entered         |           | 0       |          |          |
+----------------------+----------------------+-----------+---------+----------+----------+
 documented as of this encounter
 
 Plan of Treatment
 Not on filedocumented as of this encounter
 
 Procedures
 
 
+------------------+--------+-------------+----------------------+----------------------+
| Procedure Name   | Priori | Date/Time   | Associated Diagnosis | Comments             |
|                  | ty     |             |                      |                      |
+------------------+--------+-------------+----------------------+----------------------+
| X-RAY ESOPHAGRAM | Routin | 09/15/2010  |   Esophageal         |   Results for this   |
|                  | e      | 10:14 AM    | stenosis             | procedure are in the |
|                  |        | PDT         |                      |  results section.    |
+------------------+--------+-------------+----------------------+----------------------+
 documented in this encounter
 
 Results
 X-RAY ESOPHAGRAM (09/15/2010 10:14 AM PDT)
 
+-----------+--------------------------+-----------+------------+--------------+
| Component | Value                    | Ref Range | Performed  | Pathologist  |
|           |                          |           | At         | Signature    |
+-----------+--------------------------+-----------+------------+--------------+
| ESOPHAGUS | Exam: Single contrast    |           |            |              |
|           | barium esophagram        |           |            |              |
|           | Findings: The cervical   |           |            |              |
|           | segment is discussed in  |           |            |              |
 
|           | the modified             |           |            |              |
|           | bariumswallowing study.  |           |            |              |
|           |   No mucosal             |           |            |              |
|           | abnormalities are seen.  |           |            |              |
|           |   Multipletertiary       |           |            |              |
|           | contractions are seen in |           |            |              |
|           |  the distal esophagus    |           |            |              |
|           | with evidencefor         |           |            |              |
|           | previous fundoplication. |           |            |              |
|           |    There is evidence for |           |            |              |
|           |  small axial andsmall    |           |            |              |
|           | paraesophageal hernia    |           |            |              |
|           | but no evidence for      |           |            |              |
|           | reflux.   Survey ofthe   |           |            |              |
|           | stomach and proximal     |           |            |              |
|           | duodenum is              |           |            |              |
|           | unremarkable.            |           |            |              |
|           | Impression: Distal       |           |            |              |
|           | esophageal dysmotility   |           |            |              |
|           | characterized by         |           |            |              |
|           | multipletertiary         |           |            |              |
|           | contractions.   Small    |           |            |              |
|           | and presumably recurrent |           |            |              |
|           |  axial andparaesophageal |           |            |              |
|           |  hernia with no          |           |            |              |
|           | demonstrable reflux. I   |           |            |              |
|           | have personally viewed   |           |            |              |
|           | this procedure/exam and  |           |            |              |
|           | reviewed this report.    |           |            |              |
|           | Author: JUSTIN BAIRD       |           |            |              |
|           | JORGE ELIZALDEReviewer:   |           |            |              |
|           | JUSTIN ELIZALDE M.D. |           |            |              |
|           |   STATUS FINAL /      |           |            |              |
|           | JUSTIN ELIZALDE       |           |            |              |
+-----------+--------------------------+-----------+------------+--------------+
 
 
 
+----------+
| Specimen |
+----------+
|          |
+----------+
 
 
 
+----------------------+---------+--------------------+--------------+
| Performing           | Address | City/State/Zipcode | Phone Number |
| Organization         |         |                    |              |
+----------------------+---------+--------------------+--------------+
|   [facility] DEPARTMENT OF |         |                    |              |
|  RADIOLOGY           |         |                    |              |
+----------------------+---------+--------------------+--------------+
 documented in this encounter
 
 Visit Diagnoses
 
 
+------------------------------------------------------------+
| Diagnosis                                                  |
 
+------------------------------------------------------------+
|   Esophageal stenosis  Stricture and stenosis of esophagus |
+------------------------------------------------------------+
 documented in this encounter

## 2019-12-06 NOTE — XMS
Encounter Summary
  Created on: 2019
 
 Shane Wyatttien BroussardJosue
 External Reference #: [id number]
 : 43
 Sex: Male
 
 Demographics
 
 
+-----------------------+---------------------------+
| Address               | [address]        |
|                       | [place]  [address] |
+-----------------------+---------------------------+
| Home Phone            | [phone]           |
+-----------------------+---------------------------+
| Preferred Language    | Unknown                   |
+-----------------------+---------------------------+
| Marital Status        |                    |
+-----------------------+---------------------------+
| Mormon Affiliation | 1028                      |
+-----------------------+---------------------------+
| Race                  | Unknown                   |
+-----------------------+---------------------------+
| Ethnic Group          | Unknown                   |
+-----------------------+---------------------------+
 
 
 Author
 
 
+--------------+--------------------------------------------+
| Author       | [facility]  |
|              | [facility] [place]                                |
+--------------+--------------------------------------------+
| Organization | [facility]  |
|              | [facility] [place]                                |
+--------------+--------------------------------------------+
| Address      | Unknown                                    |
+--------------+--------------------------------------------+
| Phone        | Unavailable                                |
+--------------+--------------------------------------------+
 
 
 
 Support
 
 
+---------------+--------------+--------------------+-----------------+
| Name          | Relationship | Address            | Phone           |
+---------------+--------------+--------------------+-----------------+
| Opal Shane | ECON         | [address]     | [phone] |
|               |              | [place]   |                 |
|               |              | [address]              |                 |
+---------------+--------------+--------------------+-----------------+
 
 
 
 
 Care Team Providers
 
 
+-----------------------+------+-----------------+
| Care Team Member Name | Role | Phone           |
+-----------------------+------+-----------------+
| Erwin Iniguez MD | PCP  | [phone] |
+-----------------------+------+-----------------+
 
 
 
 Reason for Visit
 
 
+--------+----------+
| Reason | Comments |
+--------+----------+
| Apnea  |          |
+--------+----------+
 
 
 
 Encounter Details
 
 
+--------+---------+----------------------+---------------------+----------------------+
| Date   | Type    | Department           | Care Team           | Description          |
+--------+---------+----------------------+---------------------+----------------------+
| 10/07/ | Office  |   [facility]      |   Luis Carlos Perez  | Central sleep apnea  |
|    | Visit   | SLEEP DISORDER  [address]  | MD [address]   | due to Cheyne-Nichole |
|        |         | [address]      | [address]  [place]    |  respiration         |
|        |         | [place] [address] | [place] [address]     | (Primary Dx)         |
|        |         |   [phone]       | [phone]        |                      |
|        |         |                      | [phone] (Fax)  |                      |
+--------+---------+----------------------+---------------------+----------------------+
 
 
 
 Social History
 
 
+---------------+------------+-----------+--------+------------------+
| Tobacco Use   | Types      | Packs/Day | Years  | Date             |
|               |            |           | Used   |                  |
+---------------+------------+-----------+--------+------------------+
| Former Smoker | Cigarettes | 1.3       | 34     | Quit: 1996 |
+---------------+------------+-----------+--------+------------------+
 
 
 
+---------------------+---+---+---+
| Smokeless Tobacco:  |   |   |   |
| Never Used          |   |   |   |
+---------------------+---+---+---+
 
 
 
+-------------+-------------+---------+----------+
| Alcohol Use | Drinks/Week | oz/Week | Comments |
 
+-------------+-------------+---------+----------+
| No          |             |         |          |
+-------------+-------------+---------+----------+
 
 
 
+------------------+---------------+
| Sex Assigned at  | Date Recorded |
| Birth            |               |
+------------------+---------------+
| Not on file      |               |
+------------------+---------------+
 
 
 
+----------------+-------------+-------------+
| Job Start Date | Occupation  | Industry    |
+----------------+-------------+-------------+
| Not on file    | Not on file | Not on file |
+----------------+-------------+-------------+
 
 
 
+----------------+--------------+------------+
| Travel History | Travel Start | Travel End |
+----------------+--------------+------------+
 
 
 
+-------------------------------------+
| No recent travel history available. |
+-------------------------------------+
 documented as of this encounter
 
 Last Filed Vital Signs
 
 
+-------------------+----------------------+----------------------+----------+
| Vital Sign        | Reading              | Time Taken           | Comments |
+-------------------+----------------------+----------------------+----------+
| Blood Pressure    | 138/76               | 10/07/2014 10:12 AM  |          |
|                   |                      | PDT                  |          |
+-------------------+----------------------+----------------------+----------+
| Pulse             | 81                   | 10/07/2014 10:12 AM  |          |
|                   |                      | PDT                  |          |
+-------------------+----------------------+----------------------+----------+
| Temperature       | -                    | -                    |          |
+-------------------+----------------------+----------------------+----------+
| Respiratory Rate  | 16                   | 10/07/2014 10:12 AM  |          |
|                   |                      | PDT                  |          |
+-------------------+----------------------+----------------------+----------+
| Oxygen Saturation | 98%                  | 10/07/2014 10:12 AM  |          |
|                   |                      | PDT                  |          |
+-------------------+----------------------+----------------------+----------+
| Inhaled Oxygen    | -                    | -                    |          |
| Concentration     |                      |                      |          |
+-------------------+----------------------+----------------------+----------+
| Weight            | 81.1 kg (178 lb 14.4 | 10/07/2014 10:12 AM  |          |
|                   |  oz)                 | PDT                  |          |
+-------------------+----------------------+----------------------+----------+
 
| Height            | -                    | -                    |          |
+-------------------+----------------------+----------------------+----------+
| Body Mass Index   | 27.2                 | 2014 10:00 AM  |          |
|                   |                      | PDT                  |          |
+-------------------+----------------------+----------------------+----------+
 documented in this encounter
 
 Progress Notes
 Luis Carlos Perez Jr., MD - 10/07/2014 10:38 AM PDTThe patient comes in for f/u on his Centr
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 
3 (AHI 77.4, AI 56.2 - with the majority of apneas being CSA-). He recently, because of n
iam/sinus dryness, has received a new ASV unit and he is doing much better than when he las
t saw Sohail GUAMAN in our [facility] (2014). He is on a Resmed ASV BiPAP: E
PAP 4cm; PSmin 0cm; PS max 20cm; Backup auto. The mean Vt has been 400 ml. He has worn the A
SV-BiPAP for 34 out of the last 34 days with an AHI of 0!. He averages 5 hours 19 minutes of
 use per night (100% of the time he sleeps). He and bedpartner both note that he is sleeping
 well and is alert in the daytime. He does sometimes still not some mild nasal/sinus dryness
.
 
 /76 | Pulse 81 | Resp 16 | Wt 81.149 kg (178 lb 14.4 oz) | SpO2 98%
 
 A: CSA-: Doing well on ASV-BiPAP. I have increased the heat from 4.5 to 5 and the hose t
emperature from 75 to 80 degrees today.
 
 P: RTC ([facility]) in 6 months, sooner prn.
 
 Today, 15 minutes was spent face to face with the patient; the majority of time was spent c
joon regarding ASV BiPAP treatment of CSA-SCR..
 Electronically signed by Luis Carlos Perez Jr., MD at 10/07/2014 10:47 AM PDTdocumented in th
is encounter
 
 Plan of Treatment
 
 
+--------+---------+-------------+----------------------+-------------+
| Date   | Type    | Specialty   | Care Team            | Description |
+--------+---------+-------------+----------------------+-------------+
| / | Office  | Pulmonology |   Juan F,          |             |
|    | Visit   |             | Jessica Cheung,   |             |
|        |         |             | MD [address] |             |
|        |         |             |   [place],   |             |
|        |         |             | [place] [address]             |             |
|        |         |             | [phone]         |             |
|        |         |             | [phone] (Fax)   |             |
+--------+---------+-------------+----------------------+-------------+
| / | Office  | Cardiology  |   Eric Akins, |             |
|    | Visit   |             |  MD [address]   |             |
|        |         |             | [place]  [place]  |             |
|        |         |             | [address]  [phone]  |             |
|        |         |             |  [phone] (Fax)  |             |
+--------+---------+-------------+----------------------+-------------+
 documented as of this encounter
 
 Visit Diagnoses
 
 
+----------------------------------------------------------------------------------+
| Diagnosis                                                                        |
+----------------------------------------------------------------------------------+
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
 
| respiration                                                                      |
+----------------------------------------------------------------------------------+
 documented in this encounter

## 2019-12-06 NOTE — XMS
Encounter Summary
  Created on: 2019
 
 Wyatt Shane
 External Reference #: [id number]
 : 43
 Sex: Male
 
 Demographics
 
 
+-----------------------+------------------------+
| Address               | [address]     |
|                       | [place]  [address]   |
+-----------------------+------------------------+
| Home Phone            | [phone]        |
+-----------------------+------------------------+
| Preferred Language    | Unknown                |
+-----------------------+------------------------+
| Marital Status        |                 |
+-----------------------+------------------------+
| Jain Affiliation | LUT                    |
+-----------------------+------------------------+
| Race                  | White                  |
+-----------------------+------------------------+
| Ethnic Group          | Not  or  |
+-----------------------+------------------------+
 
 
 Author
 
 
+--------------+------------------------------+
| Author       | [facility] |
+--------------+------------------------------+
| Organization | [facility] |
+--------------+------------------------------+
| Address      | Unknown                      |
+--------------+------------------------------+
| Phone        | Unavailable                  |
+--------------+------------------------------+
 
 
 
 Support
 
 
+---------------+--------------+---------+-----------------+
| Name          | Relationship | Address | Phone           |
+---------------+--------------+---------+-----------------+
| Opal Shane | ECON         | Unknown | [phone] |
+---------------+--------------+---------+-----------------+
 
 
 
 Care Team Providers
 
 
 
+-----------------------+------+-----------------+
| Care Team Member Name | Role | Phone           |
+-----------------------+------+-----------------+
| Erwin Iniguez MD   | PCP  | [phone] |
+-----------------------+------+-----------------+
 
 
 
 Encounter Details
 
 
+--------+-------------+-----------------+---------------------+---------------+
| Date   | Type        | Department      | Care Team           | Description   |
+--------+-------------+-----------------+---------------------+---------------+
| / | Office      |   [facility]  |   Note, Outpatient  | Progress Note |
|    | Visit-Trans | MEDICINE        | Clinic              |               |
|        | cribed      |                 |                     |               |
+--------+-------------+-----------------+---------------------+---------------+
 
 
 
 Social History
 
 
+----------------+-------+-----------+--------+------+
| Tobacco Use    | Types | Packs/Day | Years  | Date |
|                |       |           | Used   |      |
+----------------+-------+-----------+--------+------+
| Never Assessed |       |           |        |      |
+----------------+-------+-----------+--------+------+
 
 
 
+------------------+---------------+
| Sex Assigned at  | Date Recorded |
| Birth            |               |
+------------------+---------------+
| Not on file      |               |
+------------------+---------------+
 
 
 
+----------------+-------------+-------------+
| Job Start Date | Occupation  | Industry    |
+----------------+-------------+-------------+
| Not on file    | Not on file | Not on file |
+----------------+-------------+-------------+
 
 
 
+----------------+--------------+------------+
| Travel History | Travel Start | Travel End |
+----------------+--------------+------------+
 
 
 
+-------------------------------------+
| No recent travel history available. |
+-------------------------------------+
 documented as of this encounter
 
 
 Progress Notes
 Interface, Transcription In - 09/15/2006  6:11 AM PDTCLINIC DATE:       2002
 
 OTOLARYNGOLOGY HEAD AND NECK SURGERY
 
 SUBJECTIVE:  Mr. Shane is seen back in followup for his recurrent vocal cord
 carcinoma.  He is doing very well and  has  no  new  symptoms  to suggest a
 problem.  He does have some cramping in  his neck muscles, and we discussed
 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
 
 PHYSICAL EXAMINATION:  HEAD AND NECK:  On  examination  today,  his neck is
 without  evidence of new masses.  Ears,  nose,  and  mouth  are  normal  to
 anterior inspection.
 
 Flexible fiberoptic laryngoscopy under  topical  anesthesia is done through
 the  right  nostril.   Nasopharynx, oropharynx,  hypopharynx,  larynx,  and
 subglottic areas are well seen.  They  are  all well mucosalized.  There is
 no evidence of any recurrent disease.
 
 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
 
 PLAN:  Follow up with me in 6 months.   He  had a recent chest x-ray by his
 primary care physician and it is normal.
 
 Jimmy Esposito M.D.
 
 [facility] / 
 [id number] / [id number] / [id number] /
 D: 2002
 T: 2002
 
 cc:
 
 Eddy Boone M.D.
 [address]
 [place]  [address]
 
 Erwin Iniguez M.D.
 [address]
 [place]  [address]
 
 [id number] signed by Interface, Transcription In at 09/15/2006  6:11 AM PDTdoc
umented in this encounter
 
 Plan of Treatment
 Not on filedocumented as of this encounter
 
 Visit Diagnoses
 Not on filedocumented in this encounter

## 2019-12-06 NOTE — XMS
Encounter Summary
  Created on: 2019
 
 Wyatt Shane
 External Reference #: [id number]
 : 43
 Sex: Male
 
 Demographics
 
 
+-----------------------+------------------------+
| Address               | [address]     |
|                       | [place]  [address]   |
+-----------------------+------------------------+
| Home Phone            | [phone]        |
+-----------------------+------------------------+
| Preferred Language    | Unknown                |
+-----------------------+------------------------+
| Marital Status        |                 |
+-----------------------+------------------------+
| Sikhism Affiliation | LUT                    |
+-----------------------+------------------------+
| Race                  | White                  |
+-----------------------+------------------------+
| Ethnic Group          | Not  or  |
+-----------------------+------------------------+
 
 
 Author
 
 
+--------------+------------------------------+
| Author       | [facility] |
+--------------+------------------------------+
| Organization | [facility] |
+--------------+------------------------------+
| Address      | Unknown                      |
+--------------+------------------------------+
| Phone        | Unavailable                  |
+--------------+------------------------------+
 
 
 
 Support
 
 
+---------------+--------------+---------+-----------------+
| Name          | Relationship | Address | Phone           |
+---------------+--------------+---------+-----------------+
| Opal Shane | ECON         | Unknown | [phone] |
+---------------+--------------+---------+-----------------+
 
 
 
 Care Team Providers
 
 
 
+-----------------------+------+-----------------+
| Care Team Member Name | Role | Phone           |
+-----------------------+------+-----------------+
| Erwin Steel MD   | PCP  | [phone] |
+-----------------------+------+-----------------+
 
 
 
 Encounter Details
 
 
+--------+-------------+-----------------+---------------------+---------------+
| Date   | Type        | Department      | Care Team           | Description   |
+--------+-------------+-----------------+---------------------+---------------+
| / | Office      |   [facility]  |   Note, Outpatient  | Progress Note |
|    | Visit-Trans | MEDICINE        | Clinic              |               |
|        | cribed      |                 |                     |               |
+--------+-------------+-----------------+---------------------+---------------+
 
 
 
 Social History
 
 
+----------------+-------+-----------+--------+------+
| Tobacco Use    | Types | Packs/Day | Years  | Date |
|                |       |           | Used   |      |
+----------------+-------+-----------+--------+------+
| Never Assessed |       |           |        |      |
+----------------+-------+-----------+--------+------+
 
 
 
+------------------+---------------+
| Sex Assigned at  | Date Recorded |
| Birth            |               |
+------------------+---------------+
| Not on file      |               |
+------------------+---------------+
 
 
 
+----------------+-------------+-------------+
| Job Start Date | Occupation  | Industry    |
+----------------+-------------+-------------+
| Not on file    | Not on file | Not on file |
+----------------+-------------+-------------+
 
 
 
+----------------+--------------+------------+
| Travel History | Travel Start | Travel End |
+----------------+--------------+------------+
 
 
 
+-------------------------------------+
| No recent travel history available. |
+-------------------------------------+
 documented as of this encounter
 
 
 Progress Notes
 Interface, Transcription In - 2006  3:04 AM PSTCLINIC DATE:       1999
 
 OTOLARYNGOLOGY HEAD AND NECK CLINIC
 
 SUBJECTIVE: Mr. Shane is seen back in follow up with respect to his right
 true vocal cord carcinoma. He is ready to start his radiation.  I talked
 with Dr. Boone today about the specifics of this.  He still has mild
 postoperative dyspnea, but it is not as pronounced as it has been in the
 past.
 
 OBJECTIVE: Flexible fiberoptic laryngoscopy is done. The neocord is
 gradually healing in although there is still some granulation tissue in the
 bed. The cords support themselves well and his voice is stronger.  His
 tracheotomy site is well healed and there are no masses in the neck.
 
 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
 
 PLAN:  He will proceed with radiation and I will see him back at the
 completion of this.  If there are any problems at all with his breathing
 during radiation, he will return here for assessment.
 
 Jimmy Esposito M.D.
 
 [facility]/tadeo
 D: 99
 T: 99
 
 cc:
 
 BE BOONE MD
 [address] [facility] [address]
 
 ERWIN STEEL MD
 [address] [id number] signed by Interface, Transcription In at 2006  3
:04 AM PSTdocumented in this encounter
 
 Plan of Treatment
 Not on filedocumented as of this encounter
 
 Visit Diagnoses
 Not on filedocumented in this encounter

## 2019-12-06 NOTE — XMS
Encounter Summary
  Created on: 2019
 
 Shane Wyatttien BroussardJosue
 External Reference #: [id number]
 : 43
 Sex: Male
 
 Demographics
 
 
+-----------------------+---------------------------+
| Address               | [address]        |
|                       | [place]  [address] |
+-----------------------+---------------------------+
| Home Phone            | [phone]           |
+-----------------------+---------------------------+
| Preferred Language    | Unknown                   |
+-----------------------+---------------------------+
| Marital Status        |                    |
+-----------------------+---------------------------+
| Pentecostal Affiliation | [id number]                      |
+-----------------------+---------------------------+
| Race                  | Unknown                   |
+-----------------------+---------------------------+
| Ethnic Group          | Unknown                   |
+-----------------------+---------------------------+
 
 
 Author
 
 
+--------------+--------------------------------------------+
| Author       | [facility]  |
|              | [facility] [place]                                |
+--------------+--------------------------------------------+
| Organization | [facility]  |
|              | [facility] [place]                                |
+--------------+--------------------------------------------+
| Address      | Unknown                                    |
+--------------+--------------------------------------------+
| Phone        | Unavailable                                |
+--------------+--------------------------------------------+
 
 
 
 Support
 
 
+---------------+--------------+--------------------+-----------------+
| Name          | Relationship | Address            | Phone           |
+---------------+--------------+--------------------+-----------------+
| Opal Shane | ECON         | [address]     | [phone] |
|               |              | [place]   |                 |
|               |              | [address]              |                 |
+---------------+--------------+--------------------+-----------------+
 
 
 
 
 Care Team Providers
 
 
+------------------------+------+-----------------+
| Care Team Member Name  | Role | Phone           |
+------------------------+------+-----------------+
| Calvin Robertson MD | PCP  | [phone] |
+------------------------+------+-----------------+
 
 
 
 Reason for Visit
 
 
+-----------+----------+
| Reason    | Comments |
+-----------+----------+
| Follow-up |          |
+-----------+----------+
 
 
 
 Encounter Details
 
 
+--------+-----------+----------------------+----------------------+-------------+
| Date   | Type      | Department           | Care Team            | Description |
+--------+-----------+----------------------+----------------------+-------------+
| / | Telephone |   [facility]      |   Neris Wilson DNP      | Follow-up   |
| 2019   |           | VASCULAR SURGERY     | [address]     |             |
|        |           | [address] [place] | [address]  [place], [place]  |             |
|        |           |  [place]  [place], [place]     | [address]  [phone]  |             |
|        |           | [address]           |  [phone] (Fax)  |             |
|        |           | [phone]         |                      |             |
+--------+-----------+----------------------+----------------------+-------------+
 
 
 
 Social History
 
 
+---------------+------------+-----------+--------+------------------+
| Tobacco Use   | Types      | Packs/Day | Years  | Date             |
|               |            |           | Used   |                  |
+---------------+------------+-----------+--------+------------------+
| Former Smoker | Cigarettes | 1         | 35     | Quit: 1996 |
+---------------+------------+-----------+--------+------------------+
 
 
 
+---------------------+---+---+---+
| Smokeless Tobacco:  |   |   |   |
| Never Used          |   |   |   |
+---------------------+---+---+---+
 
 
 
+-------------+-------------+---------+----------+
| Alcohol Use | Drinks/Week | oz/Week | Comments |
 
+-------------+-------------+---------+----------+
| No          |             |         |          |
+-------------+-------------+---------+----------+
 
 
 
+------------------+---------------+
| Sex Assigned at  | Date Recorded |
| Birth            |               |
+------------------+---------------+
| Not on file      |               |
+------------------+---------------+
 
 
 
+----------------+-------------+-------------+
| Job Start Date | Occupation  | Industry    |
+----------------+-------------+-------------+
| Not on file    | Not on file | Not on file |
+----------------+-------------+-------------+
 
 
 
+----------------+--------------+------------+
| Travel History | Travel Start | Travel End |
+----------------+--------------+------------+
 
 
 
+-------------------------------------+
| No recent travel history available. |
+-------------------------------------+
 documented as of this encounter
 
 Plan of Treatment
 
 
+--------+---------+-------------+----------------------+-------------+
| Date   | Type    | Specialty   | Care Team            | Description |
+--------+---------+-------------+----------------------+-------------+
| / | Office  | Pulmonology |   Juan F,          |             |
| 2019   | Visit   |             | Jessica Cheung,   |             |
|        |         |             | MD [address] |             |
|        |         |             |   [place],   |             |
|        |         |             | [place] [address]             |             |
|        |         |             | [phone]         |             |
|        |         |             | [phone] (Fax)   |             |
+--------+---------+-------------+----------------------+-------------+
| / | Office  | Cardiology  |   Eric Akins, |             |
| 2020   | Visit   |             |  MD [address]   |             |
|        |         |             | [place]  |             |
|        |         |             | [address]  [phone]  |             |
|        |         |             |  [phone] (Fax)  |             |
+--------+---------+-------------+----------------------+-------------+
 documented as of this encounter
 
 Visit Diagnoses
 Not on filedocumented in this encounter

## 2019-12-06 NOTE — XMS
Encounter Summary
  Created on: 2019
 
 Wyatt Shane
 External Reference #: [id number]
 : 43
 Sex: Male
 
 Demographics
 
 
+-----------------------+------------------------+
| Address               | [address]     |
|                       | [place]  [address]   |
+-----------------------+------------------------+
| Home Phone            | [phone]        |
+-----------------------+------------------------+
| Preferred Language    | Unknown                |
+-----------------------+------------------------+
| Marital Status        |                 |
+-----------------------+------------------------+
| Sikh Affiliation | LUT                    |
+-----------------------+------------------------+
| Race                  | White                  |
+-----------------------+------------------------+
| Ethnic Group          | Not  or  |
+-----------------------+------------------------+
 
 
 Author
 
 
+--------------+------------------------------+
| Author       | [facility] |
+--------------+------------------------------+
| Organization | [facility] |
+--------------+------------------------------+
| Address      | Unknown                      |
+--------------+------------------------------+
| Phone        | Unavailable                  |
+--------------+------------------------------+
 
 
 
 Support
 
 
+---------------+--------------+---------+-----------------+
| Name          | Relationship | Address | Phone           |
+---------------+--------------+---------+-----------------+
| Opal Shane | ECON         | Unknown | [phone] |
+---------------+--------------+---------+-----------------+
 
 
 
 Care Team Providers
 
 
 
+-----------------------+------+-----------------+
| Care Team Member Name | Role | Phone           |
+-----------------------+------+-----------------+
| Erwin Iniguez MD   | PCP  | [phone] |
+-----------------------+------+-----------------+
 
 
 
 Encounter Details
 
 
+--------+-------------+-----------------+---------------------+---------------+
| Date   | Type        | Department      | Care Team           | Description   |
+--------+-------------+-----------------+---------------------+---------------+
| / | Office      |   [facility]  |   Note, Outpatient  | Progress Note |
| 2000   | Visit-Trans | MEDICINE        | Clinic              |               |
|        | cribed      |                 |                     |               |
+--------+-------------+-----------------+---------------------+---------------+
 
 
 
 Social History
 
 
+----------------+-------+-----------+--------+------+
| Tobacco Use    | Types | Packs/Day | Years  | Date |
|                |       |           | Used   |      |
+----------------+-------+-----------+--------+------+
| Never Assessed |       |           |        |      |
+----------------+-------+-----------+--------+------+
 
 
 
+------------------+---------------+
| Sex Assigned at  | Date Recorded |
| Birth            |               |
+------------------+---------------+
| Not on file      |               |
+------------------+---------------+
 
 
 
+----------------+-------------+-------------+
| Job Start Date | Occupation  | Industry    |
+----------------+-------------+-------------+
| Not on file    | Not on file | Not on file |
+----------------+-------------+-------------+
 
 
 
+----------------+--------------+------------+
| Travel History | Travel Start | Travel End |
+----------------+--------------+------------+
 
 
 
+-------------------------------------+
| No recent travel history available. |
+-------------------------------------+
 documented as of this encounter
 
 
 Progress Notes
 Interface, Transcription In - 2006  5:08 AM PSTCLINIC DATE:       2000
 
 [facility]IC
 
 SUBJECTIVE:   Mr. Shane  is seen back  in  followup  with  respect  to  his
 laryngotracheoplasty.  He is feeling  pretty  well.   He  continues to have
 intermittent mucous plugs in his T-tube  but is working well with that.  He
 breathes pretty well with his T-tube plug but not yet at the point where he
 can plug in on a continuous basis.
 
 OBJECTIVE:  Flexible fiberoptic laryngoscopy  is  done,  both antegrade and
 retrograde through the tube.  I can  still  see  some  eschar overlying the
 anterior cartilaginous graft, and therefore,  do not feel we are at a point
 that we can remove the stent; however,  the  rest of the airway looks good.
 His voice is also strong.
 
 ASSESSMENT:  Status post laryngotracheoplasty, doing reasonably well.
 
 PLAN:  I will see him back here in one  month, at which time we will change
 him to a Stoll canula.  He was given  a refill of the prescription for
 Tylenol with codeine as he has intermittent  pain  in  his  throat, which I
 think is reasonable, and also he will  continue  on  antibiotics as well as
 Prevacid, which were refilled.
 
 Jimmy Esposito M.D.
 
 MARIA ALEJANDRA / 
 [id number] / [id number] / [id number] / [id number]
 D: 2000
 T: 2000
 
 [id number] signed by Interface, Transcription In at 2006  5:08 AM PSTdocume
nted in this encounter
 
 Plan of Treatment
 Not on filedocumented as of this encounter
 
 Visit Diagnoses
 Not on filedocumented in this encounter

## 2019-12-06 NOTE — XMS
Encounter Summary
  Created on: 2019
 
 Shane Wyatttien BroussardJosue
 External Reference #: [id number]
 : 43
 Sex: Male
 
 Demographics
 
 
+-----------------------+---------------------------+
| Address               | [address]        |
|                       | [place]  [address] |
+-----------------------+---------------------------+
| Home Phone            | [phone]           |
+-----------------------+---------------------------+
| Preferred Language    | Unknown                   |
+-----------------------+---------------------------+
| Marital Status        |                    |
+-----------------------+---------------------------+
| Scientologist Affiliation | [id number]                      |
+-----------------------+---------------------------+
| Race                  | Unknown                   |
+-----------------------+---------------------------+
| Ethnic Group          | Unknown                   |
+-----------------------+---------------------------+
 
 
 Author
 
 
+--------------+--------------------------------------------+
| Author       | [facility]  |
|              | and [place]                                |
+--------------+--------------------------------------------+
| Organization | [facility]  |
|              | and [place]                                |
+--------------+--------------------------------------------+
| Address      | Unknown                                    |
+--------------+--------------------------------------------+
| Phone        | Unavailable                                |
+--------------+--------------------------------------------+
 
 
 
 Support
 
 
+---------------+--------------+--------------------+-----------------+
| Name          | Relationship | Address            | Phone           |
+---------------+--------------+--------------------+-----------------+
| Opal Shane | ECON         | [address]     | [phone] |
|               |              | [place]   |                 |
|               |              | [id number]              |                 |
+---------------+--------------+--------------------+-----------------+
 
 
 
 
 Care Team Providers
 
 
+-----------------------+------+-----------------+
| Care Team Member Name | Role | Phone           |
+-----------------------+------+-----------------+
| Erwin Iniguez MD | PCP  | [phone] |
+-----------------------+------+-----------------+
 
 
 
 Reason for Visit
 
 
+---------+----------+
| Reason  | Comments |
+---------+----------+
| Consult |          |
+---------+----------+
 
 
 
 Encounter Details
 
 
+--------+---------+----------------------+----------------------+-------------------+
| Date   | Type    | Department           | Care Team            | Description       |
+--------+---------+----------------------+----------------------+-------------------+
| / | Office  |   [facility] [place]      |   Sohail Campbell PA  | JOANN (obstructive  |
| 2014   | Visit   | SLEEP DISORDER  [address]  |  [address]     | sleep apnea)      |
|        |         | [address]      | [place]      | (Primary Dx);     |
|        |         | [place] [address] | [address]  [phone]  | Cheyne-Gregory     |
|        |         |   [phone]       |  [phone] (Fax)  | respiration       |
+--------+---------+----------------------+----------------------+-------------------+
 
 
 
 Social History
 
 
+---------------+------------+-----------+--------+------------------+
| Tobacco Use   | Types      | Packs/Day | Years  | Date             |
|               |            |           | Used   |                  |
+---------------+------------+-----------+--------+------------------+
| Former Smoker | Cigarettes | 1.3       | 34     | Quit: 1996 |
+---------------+------------+-----------+--------+------------------+
 
 
 
+---------------------+---+---+---+
| Smokeless Tobacco:  |   |   |   |
| Never Used          |   |   |   |
+---------------------+---+---+---+
 
 
 
+-------------+-------------+---------+----------+
| Alcohol Use | Drinks/Week | oz/Week | Comments |
+-------------+-------------+---------+----------+
 
| No          |             |         |          |
+-------------+-------------+---------+----------+
 
 
 
+------------------+---------------+
| Sex Assigned at  | Date Recorded |
| Birth            |               |
+------------------+---------------+
| Not on file      |               |
+------------------+---------------+
 
 
 
+----------------+-------------+-------------+
| Job Start Date | Occupation  | Industry    |
+----------------+-------------+-------------+
| Not on file    | Not on file | Not on file |
+----------------+-------------+-------------+
 
 
 
+----------------+--------------+------------+
| Travel History | Travel Start | Travel End |
+----------------+--------------+------------+
 
 
 
+-------------------------------------+
| No recent travel history available. |
+-------------------------------------+
 documented as of this encounter
 
 Last Filed Vital Signs
 
 
+-------------------+----------------------+----------------------+----------+
| Vital Sign        | Reading              | Time Taken           | Comments |
+-------------------+----------------------+----------------------+----------+
| Blood Pressure    | 134/66               | 2014 10:00 AM  |          |
|                   |                      | PDT                  |          |
+-------------------+----------------------+----------------------+----------+
| Pulse             | 67                   | 2014 10:00 AM  |          |
|                   |                      | PDT                  |          |
+-------------------+----------------------+----------------------+----------+
| Temperature       | -                    | -                    |          |
+-------------------+----------------------+----------------------+----------+
| Respiratory Rate  | 16                   | 2014 10:00 AM  |          |
|                   |                      | PDT                  |          |
+-------------------+----------------------+----------------------+----------+
| Oxygen Saturation | 97%                  | 2014 10:00 AM  |          |
|                   |                      | PDT                  |          |
+-------------------+----------------------+----------------------+----------+
| Inhaled Oxygen    | -                    | -                    |          |
| Concentration     |                      |                      |          |
+-------------------+----------------------+----------------------+----------+
| Weight            | 82.2 kg (181 lb 3.2  | 2014 10:00 AM  |          |
|                   | oz)                  | PDT                  |          |
+-------------------+----------------------+----------------------+----------+
| Height            | 172.7 cm (5' 8")     | 2014 10:00 AM  |          |
 
|                   |                      | PDT                  |          |
+-------------------+----------------------+----------------------+----------+
| Body Mass Index   | 27.55                | 2014 10:00 AM  |          |
|                   |                      | PDT                  |          |
+-------------------+----------------------+----------------------+----------+
 documented in this encounter
 
 Progress Sohail Truong PA - 2014  9:55 AM PDTFormatting of this note might be different from 
the original.
 Subjective: 
  
 Patient ID: Wyatt Shane is a 68 y.o. male.
 
 HPI
 
 last office visit was:  2014
 date of polysomnography: 10/07/2013 
 AHI: 77.4
 RDI: 77.4
 O2%: 86% with 15.8 minutes below 88% 
 Machine type: Respironics ASV BiPAP with nasal mask ([employer]iti)
 obtained from:  [facility] [facility] in [place] 
 pressure: EPAP = 4cm;PSmin=0cm;PSmax=25cm;backup rate=auto
 Nights using BiPAP:    15/64
 % of nights >4 hours:  3.1% 
 average usage (all nights):  0:27  0:28
 average usage (nights used):  2:34  2:03
 AHI: 1.0
 
 Wyatt comes in for BiPAP compliance.  He continues to have problems with congestion.  He has
 tried increasing his humidity to the maximum of 5.0, but this has not been helpful.  At [facility]
s setting, he is only using a small amount of water, which often indicates that it is not wo
rking properly.  He continues to have dried blood in his nose in the morning when using his 
BiPAP.  He has not been using his BiPAP on most nights because of this.  He understands the 
severity of his apnea and the importance of treating it.  He did very well during the first 
few months with his BiPAP.  He and his wife noticed a significant improvement in his sleep q
uality and his daytime sleepiness and energy.  During this time, he was not having congestio
n problems and he was using more water each night.  He took his BiPAP to [facility] to have the h
umidifier checked.  He says they made some adjustments to it, but did not replace it or seem
 to repair anything.  The function of the humidifier has not improved.  He and his wife are 
very frustrated.  He also said that his Respironics ASV was very quiet when he began using i
t, but it now makes much more noise.  He is not able to make any adjustments to his mask or 
the machine to improve the noise.
 
 I have discussed the download in detail.  This shows that his sleep apnea is controlled, wi
th an AHI of 1.0.  It also shows that his leaks are well controlled.  He has not met the com
pliance standard of wearing his BiPAP for >4 hours for 70% or more nights during at least a 
thirty day period.
 
 Review of Systems
 
 Objective: 
 Physical Exam
 
 Assessment: 
 
 Problem #1: OBSTRUCTIVE SLEEP APNEA (ICD 9-327.23)
 This is controlled with BiPAP, but he continues to struggle with wearing it for the duratio
n of the night because of problems with congestion.  His BiPAP is only using a small amount 
 
of water during the night, which is likely the cause of his dryness and congestion.  He has 
taken his current BiPAP to [facility] to be repaired, but this has not improved.
 
 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 
 See above.
 
 Plan: 
 
 He is to continue with his BiPAP indefinitely.  I recommended that he take his BiPAP to [facility] in [place] to have it replaced with a ResMed S9 ASV BiPAP.  He may also benefit from 
seeing an ENT specialist.
 
 He will follow up with Dr. Perez.  Thirty minutes were spent face-to-face, with the majorit
y of time spent in counseling.
 
 Sohail Campbell PA-C
 
 cc: 
 Dr. Erwin Foote
 
 Electronically signed by ROWENA Greene at 2014  2:10 PM PDTdocumented in this enco
unter
 
 Plan of Treatment
 
 
+--------+---------+-------------+----------------------+-------------+
| Date   | Type    | Specialty   | Care Team            | Description |
+--------+---------+-------------+----------------------+-------------+
| / | Office  | Pulmonology |   [facility],          |             |
|    | Visit   |             | Jessica Cheung,   |             |
|        |         |             | MD [address] |             |
|        |         |             |   [place],   |             |
|        |         |             | [place] [address]             |             |
|        |         |             | [phone]         |             |
|        |         |             | [phone] (Fax)   |             |
+--------+---------+-------------+----------------------+-------------+
| 03/04/ | Office  | Cardiology  |   Eric Akins, |             |
|    | Visit   |             |  MD [address]   |             |
|        |         |             | [place]  |             |
|        |         |             | [address]  [phone]  |             |
|        |         |             |  [phone] (Fax)  |             |
+--------+---------+-------------+----------------------+-------------+
 documented as of this encounter
 
 Visit Diagnoses
 
 
+----------------------------------------------------------------------------------------+
| Diagnosis                                                                              |
+----------------------------------------------------------------------------------------+
|   JOANN (obstructive sleep apnea) - Primary  Obstructive sleep apnea (adult) (pediatric) |
+----------------------------------------------------------------------------------------+
|   Cheyne-Gregory respiration                                                            |
+----------------------------------------------------------------------------------------+
 documented in this encounter

## 2019-12-06 NOTE — XMS
Encounter Summary
  Created on: 2019
 
 Shane Wyatttien BroussardJosue
 External Reference #: [id number]
 : 43
 Sex: Male
 
 Demographics
 
 
+-----------------------+---------------------------+
| Address               | [address]        |
|                       | [place]  [address] |
+-----------------------+---------------------------+
| Home Phone            | [phone]           |
+-----------------------+---------------------------+
| Preferred Language    | Unknown                   |
+-----------------------+---------------------------+
| Marital Status        |                    |
+-----------------------+---------------------------+
| Bahai Affiliation | [id number]                      |
+-----------------------+---------------------------+
| Race                  | Unknown                   |
+-----------------------+---------------------------+
| Ethnic Group          | Unknown                   |
+-----------------------+---------------------------+
 
 
 Author
 
 
+--------------+--------------------------------------------+
| Author       | [facility]  |
|              | [facility] [place]                                |
+--------------+--------------------------------------------+
| Organization | [facility]  |
|              | [facility] [place]                                |
+--------------+--------------------------------------------+
| Address      | Unknown                                    |
+--------------+--------------------------------------------+
| Phone        | Unavailable                                |
+--------------+--------------------------------------------+
 
 
 
 Support
 
 
+---------------+--------------+--------------------+-----------------+
| Name          | Relationship | Address            | Phone           |
+---------------+--------------+--------------------+-----------------+
| Opal Shane | ECON         | [address]     | [phone] |
|               |              | [place]   |                 |
|               |              | [id number]              |                 |
+---------------+--------------+--------------------+-----------------+
 
 
 
 
 Care Team Providers
 
 
+------------------------+------+-----------------+
| Care Team Member Name  | Role | Phone           |
+------------------------+------+-----------------+
| Calvin Robertson MD | PCP  | [phone] |
+------------------------+------+-----------------+
 
 
 
 Reason for Referral
 Diagnostic/Screening (Routine)
 
+--------+--------+-----------+--------------+--------------+---------------+
| Status | Reason | Specialty | Diagnoses /  | Referred By  | Referred To   |
|        |        |           | Procedures   | Contact      | Contact       |
+--------+--------+-----------+--------------+--------------+---------------+
| Closed |        | Radiology |   Diagnoses  |   Juan F,  |   [facility] |
|        |        |           |  Multifocal  | Jessica    |   [address]         |
|        |        |           | lung         | MD [address]  | [address]   |
|        |        |           | consolidatio |  [address]        | [address]       |
|        |        |           | n (HCC)      | [address]  | [place], [place]  |
|        |        |           | Procedures   |  [address]       | [address]    |
|        |        |           | CT Chest wo  | [place], [place] | Phone:        |
|        |        |           | Contrast     |  [address]       | [phone]  |
|        |        |           |              | Phone:       |  Fax:         |
|        |        |           |              | [phone] | [phone]  |
|        |        |           |              |   Fax:       |               |
|        |        |           |              | [phone] |               |
+--------+--------+-----------+--------------+--------------+---------------+
 
 
 
 
 Reason for Visit
 
 
+--------+----------+
| Reason | Comments |
+--------+----------+
| COPD   |          |
+--------+----------+
 
 
 
 Encounter Details
 
 
+--------+---------+---------------------+----------------------+----------------------+
| Date   | Type    | Department          | Care Team            | Description          |
+--------+---------+---------------------+----------------------+----------------------+
| / | Office  |   [facility]     |   Juan F,          | COPD, moderate (HCC) |
| 2019   | Visit   | PULMONOLOGY  [address]   | Jessica Cheung,   |  (Primary Dx);       |
|        |         | [address]   | MD  [address] | Multifocal lung      |
|        |         | [place], [place]        |   [address]  [place],   | consolidation (HCC); |
|        |         | [address]          | [place] [address]             |  Aspiration          |
|        |         | [phone]        | [phone]         | pneumonia,           |
|        |         |                     | [phone] (Fax)   | unspecified          |
 
|        |         |                     |                      | aspiration pneumonia |
|        |         |                     |                      |  type, unspecified   |
|        |         |                     |                      | laterality,          |
|        |         |                     |                      | unspecified part of  |
|        |         |                     |                      | lung (HCC);          |
|        |         |                     |                      | Rheumatoid           |
|        |         |                     |                      | arthritis, involving |
|        |         |                     |                      |  unspecified site,   |
|        |         |                     |                      | unspecified          |
|        |         |                     |                      | rheumatoid factor    |
|        |         |                     |                      | presence (HCC);      |
|        |         |                     |                      | Personal history of  |
|        |         |                     |                      | tobacco use,         |
|        |         |                     |                      | presenting hazards   |
|        |         |                     |                      | to health; Cancer of |
|        |         |                     |                      |  vocal cord (HCC)    |
+--------+---------+---------------------+----------------------+----------------------+
 
 
 
 Social History
 
 
+---------------+------------+-----------+--------+------------------+
| Tobacco Use   | Types      | Packs/Day | Years  | Date             |
|               |            |           | Used   |                  |
+---------------+------------+-----------+--------+------------------+
| Former Smoker | Cigarettes | 1         | 35     | Quit: 1996 |
+---------------+------------+-----------+--------+------------------+
 
 
 
+---------------------+---+---+---+
| Smokeless Tobacco:  |   |   |   |
| Never Used          |   |   |   |
+---------------------+---+---+---+
 
 
 
+-------------+-------------+---------+----------+
| Alcohol Use | Drinks/Week | oz/Week | Comments |
+-------------+-------------+---------+----------+
| No          |             |         |          |
+-------------+-------------+---------+----------+
 
 
 
+------------------+---------------+
| Sex Assigned at  | Date Recorded |
| Birth            |               |
+------------------+---------------+
| Not on file      |               |
+------------------+---------------+
 
 
 
+----------------+-------------+-------------+
| Job Start Date | Occupation  | Industry    |
+----------------+-------------+-------------+
| Not on file    | Not on file | Not on file |
 
+----------------+-------------+-------------+
 
 
 
+----------------+--------------+------------+
| Travel History | Travel Start | Travel End |
+----------------+--------------+------------+
 
 
 
+-------------------------------------+
| No recent travel history available. |
+-------------------------------------+
 documented as of this encounter
 
 Last Filed Vital Signs
 
 
+-------------------+---------------------+----------------------+----------+
| Vital Sign        | Reading             | Time Taken           | Comments |
+-------------------+---------------------+----------------------+----------+
| Blood Pressure    | 127/60              | 2019  1:47 PM  |          |
|                   |                     | PDT                  |          |
+-------------------+---------------------+----------------------+----------+
| Pulse             | 49                  | 2019  1:47 PM  |          |
|                   |                     | PDT                  |          |
+-------------------+---------------------+----------------------+----------+
| Temperature       | 36.3   C (97.4   F) | 2019  1:47 PM  |          |
|                   |                     | PDT                  |          |
+-------------------+---------------------+----------------------+----------+
| Respiratory Rate  | -                   | -                    |          |
+-------------------+---------------------+----------------------+----------+
| Oxygen Saturation | 98%                 | 2019  1:47 PM  |          |
|                   |                     | PDT                  |          |
+-------------------+---------------------+----------------------+----------+
| Inhaled Oxygen    | -                   | -                    |          |
| Concentration     |                     |                      |          |
+-------------------+---------------------+----------------------+----------+
| Weight            | 71.7 kg (158 lb)    | 2019  1:47 PM  |          |
|                   |                     | PDT                  |          |
+-------------------+---------------------+----------------------+----------+
| Height            | -                   | -                    |          |
+-------------------+---------------------+----------------------+----------+
| Body Mass Index   | 24.75               | 2019  2:47 PM  |          |
|                   |                     | PDT                  |          |
+-------------------+---------------------+----------------------+----------+
 documented in this encounter
 
 Progress Notes
 Jessica Rogel MD - 2019  2:00 PM PDTFormatting of this note might be d
ifferent from the original.
 
 Subjective: 
 
 Patient ID: Wyatt Shane is a 73 y.o. male with CAD post CABG in , ischemic CMP, A
F on Xarelto, RA on azathioprine and prednisone, history of vocal cord cancer post surgery a
nd radiation, COPD, untreated JOANN, referred to us due to pulmonary nodules and aspiration pn
eum[place]. 
 
 HPI 
 
 
 Mr Shane is a 72 yr old man with a complex medical history including ischemic CMP, AF on Xar
elto, a history of vocal cord cancer post surgery and radiation txt (had a trach for 6 weeks
, and then had a reconstruction of his VC after radiation in [facility]). He has been found to hav
e dysphagia and aspiration, and was admitted to [facility] in August for worsening cough associate
d with hemoptysis. He was treated with IV abx, and was discharged on a course of Augmentin a
nd then Clindamycin. He now complains of persistent loose stools, that are foul smelling, an
d also of low grade fever. He is fatigued and energy is low since having persistent diarrhea
. 
 
 He has been diagnosed with COPD many years ago. He is not on oxygen supplementation. He is 
maintained on Advair 250-50 mcg BID, and prn albuterol HFA. He has been using his Duoneb neb
ulization about every 4 days. He does have a chronic cough with yellow sputum, and now denie
s hemoptysis. He is short of breath, usually with strenuous exertion. He does exercises he l
earned in [place] (has finished participating in this) at home has not found any trouble with thi
s. He denies being short of breath with usual activities at home, and with showering.He does
 get winded with walking an incline. He says that his pedal edema is better. He denies chest
 pains, or PNDs. He does sleep at a semi incline, and he wakes up intermittently at night. DES rodriguez has been diagnosed with JOANN but he was ordered by his Sleep MD to stop his BiPAP for some 
reason. He has not been using any PAP txt currently. He is not on oxygen supplementation. He
 does have baseline dysphagia and aspiration events which his wife thinks has been better si
nce getting seen by Speech txt. He reports of having persistent diarrhea as above, and havin
g fatigue and low grade fevers. He has RA, and is on prednisone at 7.5 mg daily, along with 
Azathioprine at 100 mg daily. 
 
 Interval History
 
 Narrative/Comments:  Has done relatively well without acute exacerbations. However, has bee
n noting increasing aspiration events, especially with smaller and more granular food consis
tency. Arthralgias are better. Remains on 5 mg of prednisone daily, and has come off methotr
exate. He remains on azathioprine. 
 
 The patient reports the following:
 
 DYSPNEA: On moderate exertion. Able to walk more than 200 feet without getting winded. Has 
been out more with friends and doing more activities. 
 
 COUGH:   Has denied significant cough. However, has been coughing with eating lately. 
 
 ACUTE EXACERBATION: None recently. 
 
 EXPOSURES:  Former smoker. 
 
 EXERCISE:  No formal exercise but has been more active. 
 
 ACTIVITIES OF DAILY LIVING:  Independent, without usual difficulty apart from the more stre
nuous chores. Able to get dressed and shower without difficulty. 
 
 CONSTITUTIONAL SYMPTOMS: Energy is fair. Denies fevers, chills or night sweats. 
 
 SINO-NASAL SYMPTOMS:  Denies. 
 
 REFLUX or REGURGITATION:   Has had occasional coughing and likely aspiration with drinking 
fluids and with certain consistency of foods. Denies uncontrolled reflux. . 
 
 SLEEP:  Has had no isses with this. 
 
 CHEST PAINS:  None
 
 ORTHOPNEA OR PND (Paroxysmal Nocturnal Dyspnea):  None
 
 
 PEDAL EDEMA:  Chronic edema on the L ankle. 
 
 OTHER SYMPTOMS: Arthralgias. Feet pain more recently. 
 
 Inhaler regimen includes:   Advair 500-50 mcg BID, prn albuterol HFA
 
 Oxygen Use:  None
 
 PAP therapy:   None
 
 Other relevant medications: Xarelto for AF, azathioprine 150 mg daily, Prednisone 5 mg danielle
y for RA. 
 
 SOCIAL HISTORY
 
 He is a past smoker, about 30 pack years, stopped in . He drove trucks for a living whe
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  He was in the , [email or web address], for 4 years and reportedly exposed to A
[employer] [place]. He has no animals at home. 
 
 The following portions of the patient's history were reviewed and updated as appropriate an
d is available elsewhere in the record: allergies, current medications, past family history,
 past medical history, past social history, past surgical history and problem list.
 
 Review of Systems 
 Constitutional: Positive for fatigue and fever. Negative for activity change, appetite richardson
ge, chills, diaphoresis and unexpected weight change. 
 HENT: Positive for nosebleeds and postnasal drip. Negative for congestion, rhinorrhea and s
ore throat.  
 Eyes: Negative for visual disturbance. 
 Respiratory: Positive for apnea, cough and shortness of breath. Negative for choking, chest
 tightness, wheezing and stridor.  
 Cardiovascular: Positive for leg swelling. Negative for chest pain and palpitations. 
 Gastrointestinal: Negative for constipation, diarrhea and nausea. 
 Genitourinary: Negative for dysuria and frequency. 
 Musculoskeletal: Positive for arthralgias and joint swelling. Negative for myalgias and nec
k pain. 
 Skin: Negative for rash. 
 Neurological: Negative for dizziness, weakness and light-headedness. 
 Hematological: Negative for adenopathy. 
 Psychiatric/Behavioral: Positive for sleep disturbance. 
 
 Active Ambulatory Problems 
   Diagnosis Date Noted 
   Aortic aneurysm (HCC) 2018 
   Coronary artery disease of bypass graft of native heart with stable angina pectoris (HC
C) 2018 
   Chronic atrial fibrillation (HCC) 2018 
   Cancer of vocal cord (HCC) 2018 
   Essential hypertension 2018 
   Multifocal lung consolidation (HCC) 2018 
   Chronic bronchitis (HCC) 2018 
   Chronic maxillary sinusitis 2018 
   Obstructive sleep apnea 2018 
   Peptic ulcer disease 2018 
   Moderate protein-calorie malnutrition (HCC) 2018 
   Severe protein-calorie malnutrition (HCC) 2018 
   Dysphagia 2018 
   COPD, moderate (HCC) 2018 
   Personal history of tobacco use, presenting hazards to health 2018 
 
   Rheumatoid arthritis (HCC) 2018 
   Stable angina (HCC) 2018 
 
 Resolved Ambulatory Problems 
   Diagnosis Date Noted 
   Precordial pain 2018 
   Hemoptysis 2018 
   NSTEMI (non-ST elevated myocardial infarction) (HCC) 2018 
   Aspiration pneumonia (HCC) 2018 
 
 Past Medical History: 
 Diagnosis Date 
   Atrial fibrillation (HCC)  
   Cancer (HCC)  
   Coronary artery disease  
   Hemorrhage of gastrointestinal tract, unspecified  
   Hyperlipidemia  
   Hypertension  
   Joint pain  
   Old myocardial infarction  
   Other chronic pain  
   Sleep apnea  
 
 Past Surgical History 
 Procedure Laterality Date 
   ABDOMINAL SURGERY   
  mehdi fundoplasty 
   CARDIAC CATHETERIZATION   
   carpoltunnel Bilateral  
   COLONOSCOPY   
   heart bypass  1986 
   HERNIA REPAIR  2006 
   left ear surgery   
  BCC removed from left ear  
   MAXILLARY ANTROSTOMY Bilateral 2015 
  Procedure: MAXILLARY ANTROSTOMY;  Surgeon: Celso Dumont DO;  Location: [facility] MAIN OR;  Ser
vice: ENT;  Laterality: Bilateral; 
   SINUS SURGERY N/A 2015 
  Procedure: ENDOSCOPIC SINUS SURGERY;  Surgeon: Celso Dumont DO;  Location: [facility] MAIN OR; 
 Service: ENT;  Laterality: N/A;  Functional  &  left frontal 
   THROAT SURGERY   
  pt had vocal cord Sx due to cancer  
 
  Objective   
 Objective: 
 Physical Exam
 /60  | Pulse (!) 49  | Temp 36.3 C (97.4 F) (Oral)  | Wt 71.7 kg (158 lb)  | SpO2
 98%  | BMI 24.75 kg/m 
 
 Vital signs reviewed.
 Oxygen saturation noted at 98% on ambient air
 GENERAL: pleasant, cooperative, oriented, not in distress
 HEENT: pink conjunctiva, injected L sclerae, anicteric sclerae, moist oral mucosae and with
out any lesions, normal appearing nasal mucosae; no JVD; MALAMPATTI 4; no thyromegaly; no ce
rvicolymphadenopathies; surgical scars present on neck, with old trach scar; hoarse voice. 
 CVS: PMI laterally displaced, IRRR, S1 and S2, no murmurs/gallops/rubs
 CHEST: Examination of the chest showed a mild kyphosis.
 LUNGS: Normal effort, Equal in expansion, resonant to percussion, clear breath sounds today
 but with prolonged expiratory phase
 ABDOMEN: Slightly protuberant abdomen, NABS, non-tender on palpation, no masses palpated
 
 EXTREMITIES: good distal pulses, no cyanosis,  no clubbing, no nail abnormalities, trace ed
ema
 NEURO: awake and oriented, gait normal, no focal neurologic deficits
 
 LABORATORY AND IMAGING
 Pulmonary Function Test:
   3/2018
 FEV1  2.05 (70%)
 FVC  3.32 (86%)
 FEV1/FVC 62
 TLC  3.19 (82%)
 RV/TLC 39
 DLCO  11.4 (81%)
 
 CT of the chest done on 18 reviewed
 Impression 
  
 1. At the very least there is pulmonary vascular congestion, edema-and centrilobular emphys
ema is superimposed 
  
 2. Pulmonary infiltrates are asymmetric and throughout the left hemithorax. This could be d
ue to superimposed infection of the left upper lung and there is a small effusion also 
  
 3. Lastly, there are inflammatory appearing airspace or infiltrative nodules superimposed t
hroughout 
  
 Will be important to repeat this examination when the patient's acute issues are resolved t
o evaluate for underlying lesion/mass 
 
 Echo done on 18 reviewed
 Impression 
 1. The left ventricle cavity is normal in size, mild concentric hypertrophy and normal sy
stolic function EF 55%. Mild inferior and inferolateral hypokinetic segments.  
 2. Mild degenerative changes in the aortic and mitral valves.  
 3. There is no pericardial effusion. 
 
 L heart cath on 18 reviewed
 CONCLUSION:
 1. Severe 3-vessel coronary artery disease.
 2. Patent SVG to RCA and LIMA to LAD.
 3. Occluded SVG to the left circumflex system, which is a jump graft.
 4. Severely calcified left circumflex, which is small caliber severely calcified.
 However, not amenable to any PCI.
 
 RECOMMENDATIONS:  Given the above findings, the patient will continue
 medical therapy for coronary artery disease.  Will be restarted on
 antiplatelet therapy and will be on optimization of blood pressure control,
 statin therapy, and will be re-started on anticoagulation for atrial
 fibrillation.  Further recommendations to follow.
 
 CT of the chest done on 18 reviewed
 Impression 
 1. Scattered residual nodularity of the lungs. There is tree in bud opacity of the left l
shelby base which may represent inflammatory process. The largest nodule of the right middle lo
be measuring 0.6 x 0.4 cm. Follow-up CT scan could be obtained in 3-6 months. 
 2. There is significant improvement of the consolidation from the prior exam dated 2018 particularly at the left upper lobe and right lung. 
 
  Assessment/Plan  
 Assessment and Plan: 
 
 
 1. COPD, moderate (HCC)
 Mr Shane has moderate obstruction on PFT along with reduced DLCO. He does not have any restr
iction. His disease is characterized primarily by chronic bronchitis with some dyspnea. He h
as been maintained on Advair 500-50 mcg BID and prn albuterol. 
 
 I have requested for a repeat PFT this year as part of surveillance of his lung function. DES rodriguez has agreed to do this in [facility]. 
 
 - Pulmonary function test; Future
 
 2. Multifocal lung consolidation (HCC)
 This is from aspiration. Will repeat CT this year to document full resolution. He also has 
multiple pulm  Nodules noted on previous CT, largest is at 6 mm in size. Continue with yearl
y CT lung surveillance. 
 
 - CT Chest wo Contrast; Future
 
 3. Aspiration pneumonia, unspecified aspiration pneumonia type, unspecified laterality, uns
pecified part of lung (HCC)
 He has had increasing episodes of coughing while eating. This may indicate recurrence of as
piration. Advised that he talks to his PCP regarding this so that he can be sent to swallow/
speech evaluation once again. 
 
 4. Rheumatoid arthritis, involving unspecified site, unspecified rheumatoid factor presence
 (HCC)
 He is immunocompromised given chronic steroid txt and azathioprine. Continue watching out f
or infection. At the same time, he is at risk for pulmonary and pleural complications of RA,
 along with adverse effects of medications. Continue being vigilant about this. 
 
 5. Personal history of tobacco use, presenting hazards to health
 He stopped in  and has had vocal cord cancer. 
 
 6. Cancer of vocal cord (HCC)
 This is in remission. He sees a team in [facility].
 
 Thank you for allowing us to participate in this patient's care. A return visit has been re
quested/scheduled in 4 months for close clinical follow up. The patient was instructed to ca
ll our clinic for any questions, and for any concerns regarding worsening dyspnea, cough or 
change in sputum production. We will see the patient sooner than the recommended follow up d
ate,  if with any worsening of symptoms. 
 
 Jessica Rogel MD
 Pulmonary and Critical Care Medicine
 [facility]
 [address] , [address]
 [place] [address]
  
 Electronically signed by Jessica Rogel MD at 2019  3:56 PM PDTdocumente
d in this encounter
 
 Plan of Treatment
 
 
+--------+---------+-------------+----------------------+-------------+
| Date   | Type    | Specialty   | Care Team            | Description |
+--------+---------+-------------+----------------------+-------------+
| / | Office  | Pulmonology |   Juan F,          |             |
|    | Visit   |             | Jessica Cheung,   |             |
|        |         |             | MD  [address] [facility]  |             |
 
|        |         |             |   [place],   |             |
|        |         |             | [place] [address]             |             |
|        |         |             | [phone]         |             |
|        |         |             | [phone] (Fax)   |             |
+--------+---------+-------------+----------------------+-------------+
| / | Office  | Cardiology  |   Eric Akins, |             |
|    | Visit   |             |  MD  [address]   |             |
|        |         |             | [place]  |             |
|        |         |             | [address]  [phone]  |             |
|        |         |             |  [phone] (Fax)  |             |
+--------+---------+-------------+----------------------+-------------+
 documented as of this encounter
 
 Results
 Pulmonary function test full PFT (2019  2:04 PM PDT)
 
+------------------------------------------------------------------------+--------------+
| Narrative                                                              | Performed At |
+------------------------------------------------------------------------+--------------+
|   Jessica Rogel MD       2019 14:07  PULMONARY       |              |
| FUNCTION TEST     NAME: Wyatt Shane  : 1945  MRN:       |              |
| [id number]        REASON FOR TESTING:  COPD     FINDINGS              |              |
| SPIROMETRY:   1. FEV-1/FVC is 64   2. FEV-1 is 1.97 L or 75%  3. FVC   |              |
| is 3.06L or 84%.  4.There is no significant bronchodilator response.   |              |
|     LUNG VOLUMES:   1. TLC is 5.54L or 89%.   2. RV/TLC is 44.         |              |
| DIFFUSING CAPACITY:  1. Diffusing capacity is 10.7mL/mmHg/min or 46%   |              |
|           INTERPRETATION:   There is a moderate obstruction without    |              |
| significant   bronchodilator change. Lung volumes are within normal.   |              |
| Diffusion   capacity is severely reduced. There has been some          |              |
| improvement in   his TLC but all the other parameters have shown       |              |
| decline compared   to a study done in 2018.      Jessica       |              |
| Skye Rogel MD  Pulmonary and Critical Care Medicine  [facility]  |              |
| [facility]  [address] , [address]      |              |
| [place] [address]                                                     |              |
+------------------------------------------------------------------------+--------------+
 CT Chest wo Contrast (2019 12:28 PM PDT)
 
+----------+
| Specimen |
+----------+
|          |
+----------+
 
 
 
+------------------------------------------------------------------------+---------------+
| Narrative                                                              | Performed At  |
+------------------------------------------------------------------------+---------------+
|      CT CHEST WITHOUT CONTRAST     CLINICAL INFORMATION:  Right middle |   [facility] |
|  lobe pulmonary nodule and multiple areas of consolidation  in a       |               |
| patient with a history of aspiration and Rheumatoid arthritis.         |               |
| COMPARISON:  CT CHEST WO CONTRAST (3/25/2019);     PROCEDURE:  Axial   |               |
| images through the chest. Multiplanar reconstructions.     At least    |               |
| one of the following CT dose optimization techniques were  used:       |               |
| Automated exposure control; Adjustment of mA and/or kV according  to   |               |
| patient size; Use of iterative reconstruction technique.     FINDINGS: |               |
|   Lungs, Pleura and Airways:  -Right middle lobe nodule on series 4,   |               |
| image 161 measuring 13 mm x 6  mm, unchanged.  -Mild emphysematous     |               |
| changes.  -Combination of small amount of ground-glass and airspace    |               |
| opacity noted  involving the right upper lobe on series 4, image 66    |               |
 
| through 59. this  appears to be new since the previous examination.    |               |
| -Trace right pleural effusion.   Small amount of calcified pleural     |               |
| plaques.  Mediastinum: Mild cardiomegaly.   No pericardial effusion.   |               |
|   Aorta does  not appear aneurysmal.   Mild prominence of the          |               |
| pulmonary arteries,  potentially pulmonary arterial hypertension.      |               |
|   Probable surgical changes  seen at the GE junction region.  Lymph    |               |
| Nodes: No enlarged lymph nodes seen.  Upper Abdomen: No significant    |               |
| abnormality appreciated within limits of  unenhanced technique.        |               |
| BODY WALL  Soft Tissues: No significant abnormality appreciated.       |               |
| Bones: No acute or destructive osseous process seen.     IMPRESSION:   |               |
| 1. Combination of small amount of ground-glass and airspace opacity    |               |
| noted involving the right upper lobe.   This appears to be new since   |               |
| the  previous examination. Part solid lung nodule greater than 5 mm    |               |
| new or  of indeterminate stability. Such nodules are common and have a |               |
|  low  incidence of developing into a clinically significant cancer.    |               |
| Recommendation: Follow up chest CT in 6 months and then at 2 and 4     |               |
| years if nodule remains stable with solid component less than 6 mm.    |               |
| (Fleischner society recommendation).  2. Unchanged right middle lobe   |               |
| nodule measuring 13 mm x 6 mm.  3. Mild emphysematous changes.  4.     |               |
|   Please refer to the findings section for additional details.         |               |
|    Signed by: JORGE Vincent, [place]  Sign Date/Time: 2019 12:54 PM   |               |
|                                                                        |               |
+------------------------------------------------------------------------+---------------+
 
 
 
+-------------------------------------------------------------------------+
| Procedure Note                                                          |
+-------------------------------------------------------------------------+
|   Maurizio, Rad Results In - 2019 12:58 PM PDT                         |
| CT CHEST WITHOUT CONTRAST                                               |
|                                                                         |
| CLINICAL INFORMATION:                                                   |
| Right middle lobe pulmonary nodule and multiple areas of consolidation  |
| in a patient with a history of aspiration and Rheumatoid arthritis.     |
|                                                                         |
| COMPARISON:                                                             |
| CT CHEST WO CONTRAST (3/25/2019);                                       |
|                                                                         |
| PROCEDURE:                                                              |
| Axial images through the chest. Multiplanar reconstructions.            |
|                                                                         |
| At least one of the following CT dose optimization techniques were      |
| used: Automated exposure control; Adjustment of mA and/or kV according  |
| to patient size; Use of iterative reconstruction technique.             |
|                                                                         |
| FINDINGS:                                                               |
| Lungs, Pleura and Airways:                                              |
| -Right middle lobe nodule on series 4, image 161 measuring 13 mm x 6    |
| mm, unchanged.                                                          |
| -Mild emphysematous changes.                                            |
| -Combination of small amount of ground-glass and airspace opacity noted |
| involving the right upper lobe on series 4, image 66 through 59. this   |
| appears to be new since the previous examination.                       |
| -Trace right pleural effusion.  Small amount of calcified pleural       |
| plaques.                                                                |
| Mediastinum: Mild cardiomegaly.  No pericardial effusion.  Aorta does   |
| not appear aneurysmal.  Mild prominence of the pulmonary arteries,      |
| potentially pulmonary arterial hypertension.  Probable surgical changes |
| seen at the GE junction region.                                         |
 
| Lymph Nodes: No enlarged lymph nodes seen.                              |
| Upper Abdomen: No significant abnormality appreciated within limits of  |
| unenhanced technique.                                                   |
|                                                                         |
| BODY WALL                                                               |
| Soft Tissues: No significant abnormality appreciated.                   |
| Bones: No acute or destructive osseous process seen.                    |
|                                                                         |
| IMPRESSION:                                                             |
| 1. Combination of small amount of ground-glass and airspace opacity     |
| noted involving the right upper lobe.  This appears to be new since the |
| previous examination. Part solid lung nodule greater than 5 mm new or   |
| of indeterminate stability. Such nodules are common and have a low      |
| incidence of developing into a clinically significant cancer.           |
| Recommendation: Follow up chest CT in 6 months and then at 2 and 4      |
| years if nodule remains stable with solid component less than 6 mm.     |
| (Fleischner society recommendation).                                    |
| 2. Unchanged right middle lobe nodule measuring 13 mm x 6 mm.           |
| 3. Mild emphysematous changes.                                          |
| 4.  Please refer to the findings section for additional details.        |
|                                                                         |
| Signed by: JORGE Vincent, [place]                                            |
| Sign Date/Time: 2019 12:54 PM                                     |
+-------------------------------------------------------------------------+
 
 
 
+---------------+---------+--------------------+--------------+
| Performing    | Address | City/State/Zipcode | Phone Number |
| Organization  |         |                    |              |
+---------------+---------+--------------------+--------------+
|   [facility] |         |                    |              |
+---------------+---------+--------------------+--------------+
 documented in this encounter
 
 Visit Diagnoses
 
 
+------------------------------------------------------------------------------------------+
| Diagnosis                                                                                |
+------------------------------------------------------------------------------------------+
|   COPD, moderate (HCC) - Primary  Chronic airway obstruction, not elsewhere classified   |
+------------------------------------------------------------------------------------------+
|   Multifocal lung consolidation (HCC)  Pneumococcal pneumonia (streptococcus pneumoniae  |
| pneumonia)                                                                               |
+------------------------------------------------------------------------------------------+
|   Aspiration pneumonia, unspecified aspiration pneumonia type, unspecified laterality,   |
| unspecified part of lung (HCC)                                                           |
+------------------------------------------------------------------------------------------+
|   Rheumatoid arthritis, involving unspecified site, unspecified rheumatoid factor        |
| presence (HCC)                                                                           |
+------------------------------------------------------------------------------------------+
|   Personal history of tobacco use, presenting hazards to health                          |
+------------------------------------------------------------------------------------------+
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
+------------------------------------------------------------------------------------------+
 documented in this encounter

## 2019-12-06 NOTE — XMS
Encounter Summary
  Created on: 2019
 
 Wyatt Shane
 External Reference #: [id number]
 : 43
 Sex: Male
 
 Demographics
 
 
+-----------------------+------------------------+
| Address               | [address]     |
|                       | [place]  [address]   |
+-----------------------+------------------------+
| Home Phone            | [phone]        |
+-----------------------+------------------------+
| Preferred Language    | Unknown                |
+-----------------------+------------------------+
| Marital Status        |                 |
+-----------------------+------------------------+
| [employer] Affiliation | LUT                    |
+-----------------------+------------------------+
| Race                  | White                  |
+-----------------------+------------------------+
| Ethnic Group          | Not  or  |
+-----------------------+------------------------+
 
 
 Author
 
 
+--------------+------------------------------+
| Author       | [facility] |
+--------------+------------------------------+
| Organization | [facility] |
+--------------+------------------------------+
| Address      | Unknown                      |
+--------------+------------------------------+
| Phone        | Unavailable                  |
+--------------+------------------------------+
 
 
 
 Support
 
 
+---------------+--------------+---------+-----------------+
| Name          | Relationship | Address | Phone           |
+---------------+--------------+---------+-----------------+
| Opal Shane | ECON         | Unknown | [phone] |
+---------------+--------------+---------+-----------------+
 
 
 
 Care Team Providers
 
 
 
+-----------------------+------+-----------------+
| Care Team Member Name | Role | Phone           |
+-----------------------+------+-----------------+
| Erwin Iniguez MD   | PCP  | [phone] |
+-----------------------+------+-----------------+
 
 
 
 Encounter Details
 
 
+--------+-------------+-----------------+---------------------+---------------+
| Date   | Type        | Department      | Care Team           | Description   |
+--------+-------------+-----------------+---------------------+---------------+
| / | Office      |   [facility]  |   Note, Outpatient  | Progress Note |
| 1998   | Visit-Trans | MEDICINE        | Clinic              |               |
|        | cribed      |                 |                     |               |
+--------+-------------+-----------------+---------------------+---------------+
 
 
 
 Social History
 
 
+----------------+-------+-----------+--------+------+
| Tobacco Use    | Types | Packs/Day | Years  | Date |
|                |       |           | Used   |      |
+----------------+-------+-----------+--------+------+
| Never Assessed |       |           |        |      |
+----------------+-------+-----------+--------+------+
 
 
 
+------------------+---------------+
| Sex Assigned at  | Date Recorded |
| Birth            |               |
+------------------+---------------+
| Not on file      |               |
+------------------+---------------+
 
 
 
+----------------+-------------+-------------+
| Job Start Date | Occupation  | Industry    |
+----------------+-------------+-------------+
| Not on file    | Not on file | Not on file |
+----------------+-------------+-------------+
 
 
 
+----------------+--------------+------------+
| Travel History | Travel Start | Travel End |
+----------------+--------------+------------+
 
 
 
+-------------------------------------+
| No recent travel history available. |
+-------------------------------------+
 documented as of this encounter
 
 
 Progress Notes
 Interface, Transcription In - 2007  3:15 AM PSTCLINIC DATE:       1998
 
 OTOLARYNGOLOGY CLINIC
 
 SUMMARY:  Mr. Shane's slides were reviewed with Dr. Pavon.  There was not
 sufficient tissue to establish a diagnosis of malignancy.  This is in
 contradistinction of the CT scan which suggests a rather deep mass.  While
 it is possible this is inflammatory postbiopsy, I am highly suspicious that
 there may be further tissue underneath the surface and we will need to
 re-biopsy unless all the changes resolve clinically.  I will discuss this
 with Mr. Shane.
 
 Jimmy Esposito M.D.
 , Otolaryngology
 Head and Neck Surgery
 
 [facility]
 D:  98
 T:   98Electronically signed by Interface, Transcription In at 2007  3:15 AM MIKE Paez, Transcription In - 2006  3:04 AM PSTCLINIC DATE:       1998
 
                            OTOLARYNGOLOGY CLINIC
 
 I have reviewed Mr. Shane's films with Dr. Figueroa, and there does appear to
 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
 could be due to edema, I am very worried about the possibility of
 carcinoma.  A final review of his pathology is still pending here, and if
 there is any question of invasion, then I think he will need to come back
 for re-biopsy after he has had the chance to heal from the edema of this
 one.
 
 Jimmy Esposito M.D.
 , Otolaryngology
 Head and Neck Surgery
 
 [facility]
 D:98
 T:98Electronically signed by Interface, Transcription In at 2006  3:04 AM PSTdo
cumented in this encounter
 
 Plan of Treatment
 Not on filedocumented as of this encounter
 
 Visit Diagnoses
 Not on filedocumented in this encounter

## 2019-12-06 NOTE — XMS
Encounter Summary
  Created on: 2019
 
 Shane Wyatttien BroussardJosue
 External Reference #: [id number]
 : 43
 Sex: Male
 
 Demographics
 
 
+-----------------------+---------------------------+
| Address               | [address]        |
|                       | [place]  [address] |
+-----------------------+---------------------------+
| Home Phone            | [phone]           |
+-----------------------+---------------------------+
| Preferred Language    | Unknown                   |
+-----------------------+---------------------------+
| Marital Status        |                    |
+-----------------------+---------------------------+
| [employer] Affiliation | 1028                      |
+-----------------------+---------------------------+
| Race                  | Unknown                   |
+-----------------------+---------------------------+
| Ethnic Group          | Unknown                   |
+-----------------------+---------------------------+
 
 
 Author
 
 
+--------------+--------------------------------------------+
| Author       | [facility]  |
|              | [facility] [place]                                |
+--------------+--------------------------------------------+
| Organization | [facility]  |
|              | [facility] [place]                                |
+--------------+--------------------------------------------+
| Address      | Unknown                                    |
+--------------+--------------------------------------------+
| Phone        | Unavailable                                |
+--------------+--------------------------------------------+
 
 
 
 Support
 
 
+---------------+--------------+--------------------+-----------------+
| Name          | Relationship | Address            | Phone           |
+---------------+--------------+--------------------+-----------------+
| Opal Shane | ECON         | [address]     | [phone] |
|               |              | [place]   |                 |
|               |              | [address]              |                 |
+---------------+--------------+--------------------+-----------------+
 
 
 
 
 Care Team Providers
 
 
+-----------------------+------+-------------+
| Care Team Member Name | Role | Phone       |
+-----------------------+------+-------------+
 PCP  | Unavailable |
+-----------------------+------+-------------+
 
 
 
 Encounter Details
 
 
+--------+-----------+----------------------+-----------+-------------+
| Date   | Type      | Department           | Care Team | Description |
+--------+-----------+----------------------+-----------+-------------+
| / | Hospital  |   [facility] |           |             |
|  - | Encounter |  [facility]      |           |             |
|        |           | [address] |           |             |
| / |           |   [place]    |           |             |
|    |           | [address]           |           |             |
|        |           | [phone]         |           |             |
+--------+-----------+----------------------+-----------+-------------+
 
 
 
 Social History
 
 
+----------------+-------+-----------+--------+------+
| Tobacco Use    | Types | Packs/Day | Years  | Date |
|                |       |           | Used   |      |
+----------------+-------+-----------+--------+------+
| Never Assessed |       |           |        |      |
+----------------+-------+-----------+--------+------+
 
 
 
+------------------+---------------+
| Sex Assigned at  | Date Recorded |
| Birth            |               |
+------------------+---------------+
| Not on file      |               |
+------------------+---------------+
 
 
 
+----------------+-------------+-------------+
| Job Start Date | Occupation  | Industry    |
+----------------+-------------+-------------+
| Not on file    | Not on file | Not on file |
+----------------+-------------+-------------+
 
 
 
+----------------+--------------+------------+
| Travel History | Travel Start | Travel End |
+----------------+--------------+------------+
 
 
 
 
+-------------------------------------+
| No recent travel history available. |
+-------------------------------------+
 documented as of this encounter
 
 Plan of Treatment
 
 
+--------+---------+-------------+----------------------+-------------+
| Date   | Type    | Specialty   | Care Team            | Description |
+--------+---------+-------------+----------------------+-------------+
| / | Office  | Pulmonology |   Juan F,          |             |
| 2019   | Visit   |             | Jessica Cheung,   |             |
|        |         |             | MD [address] |             |
|        |         |             |   [place],   |             |
|        |         |             | [place] [address]             |             |
|        |         |             | [phone]         |             |
|        |         |             | [phone] (Fax)   |             |
+--------+---------+-------------+----------------------+-------------+
| / | Office  | Cardiology  |   Eric Akins, |             |
| 2020   | Visit   |             |  MD [address]   |             |
|        |         |             | [place]  |             |
|        |         |             | [id number] [phone]  |             |
|        |         |             |  [phone] (Fax)  |             |
+--------+---------+-------------+----------------------+-------------+
 documented as of this encounter
 
 Visit Diagnoses
 Not on filedocumented in this encounter

## 2019-12-06 NOTE — XMS
Encounter Summary
  Created on: 2019
 
 Shane Wyatttien BroussardJosue
 External Reference #: [id number]
 : 43
 Sex: Male
 
 Demographics
 
 
+-----------------------+---------------------------+
| Address               | [address]        |
|                       | [place]  [address] |
+-----------------------+---------------------------+
| Home Phone            | [phone]           |
+-----------------------+---------------------------+
| Preferred Language    | Unknown                   |
+-----------------------+---------------------------+
| Marital Status        |                    |
+-----------------------+---------------------------+
| Tenriism Affiliation | [id number]                      |
+-----------------------+---------------------------+
| Race                  | Unknown                   |
+-----------------------+---------------------------+
| Ethnic Group          | Unknown                   |
+-----------------------+---------------------------+
 
 
 Author
 
 
+--------------+--------------------------------------------+
| Author       | [facility]  |
|              | [facility] [place]                                |
+--------------+--------------------------------------------+
| Organization | [facility]  |
|              | [facility] [place]                                |
+--------------+--------------------------------------------+
| Address      | Unknown                                    |
+--------------+--------------------------------------------+
| Phone        | Unavailable                                |
+--------------+--------------------------------------------+
 
 
 
 Support
 
 
+---------------+--------------+--------------------+-----------------+
| Name          | Relationship | Address            | Phone           |
+---------------+--------------+--------------------+-----------------+
| Opal Shane | ECON         | [address]     | [phone] |
|               |              | [place]   |                 |
|               |              | [id number]              |                 |
+---------------+--------------+--------------------+-----------------+
 
 
 
 
 Care Team Providers
 
 
+-----------------------+------+-----------------+
| Care Team Member Name | Role | Phone           |
+-----------------------+------+-----------------+
| Erwin Iniguez MD | PCP  | [phone] |
+-----------------------+------+-----------------+
 
 
 
 Reason for Visit
 
 
+--------+----------+
| Reason | Comments |
+--------+----------+
| Apnea  |          |
+--------+----------+
 
 
 
 Encounter Details
 
 
+--------+---------+----------------------+----------------------+-------------------+
| Date   | Type    | Department           | Care Team            | Description       |
+--------+---------+----------------------+----------------------+-------------------+
| / | Office  |   [facility]      |   Sohail Campbell PA  | JOANN (obstructive  |
|    | Visit   | SLEEP DISORDER  [address]  |  [address]     | sleep apnea)      |
|        |         | [address]      | [place]      | (Primary Dx);     |
|        |         | [place] [address] | [address]  [phone]  | Cheyne-Gregory     |
|        |         |   [phone]       |  [phone] (Fax)  | breathing         |
+--------+---------+----------------------+----------------------+-------------------+
 
 
 
 Social History
 
 
+---------------+------------+-----------+--------+------------------+
| Tobacco Use   | Types      | Packs/Day | Years  | Date             |
|               |            |           | Used   |                  |
+---------------+------------+-----------+--------+------------------+
| Former Smoker | Cigarettes | 1.3       | 34     | Quit: 1996 |
+---------------+------------+-----------+--------+------------------+
 
 
 
+---------------------+---+---+---+
| Smokeless Tobacco:  |   |   |   |
| Never Used          |   |   |   |
+---------------------+---+---+---+
 
 
 
+-------------+-------------+---------+----------+
| Alcohol Use | Drinks/Week | oz/Week | Comments |
+-------------+-------------+---------+----------+
 
| No          |             |         |          |
+-------------+-------------+---------+----------+
 
 
 
+------------------+---------------+
| Sex Assigned at  | Date Recorded |
| Birth            |               |
+------------------+---------------+
| Not on file      |               |
+------------------+---------------+
 
 
 
+----------------+-------------+-------------+
| Job Start Date | Occupation  | Industry    |
+----------------+-------------+-------------+
| Not on file    | Not on file | Not on file |
+----------------+-------------+-------------+
 
 
 
+----------------+--------------+------------+
| Travel History | Travel Start | Travel End |
+----------------+--------------+------------+
 
 
 
+-------------------------------------+
| No recent travel history available. |
+-------------------------------------+
 documented as of this encounter
 
 Last Filed Vital Signs
 
 
+-------------------+----------------------+----------------------+----------+
| Vital Sign        | Reading              | Time Taken           | Comments |
+-------------------+----------------------+----------------------+----------+
| Blood Pressure    | 122/68               | 2013  9:51 AM  |          |
|                   |                      | PST                  |          |
+-------------------+----------------------+----------------------+----------+
| Pulse             | 74                   | 2013  9:51 AM  |          |
|                   |                      | PST                  |          |
+-------------------+----------------------+----------------------+----------+
| Temperature       | -                    | -                    |          |
+-------------------+----------------------+----------------------+----------+
| Respiratory Rate  | 16                   | 2013  9:51 AM  |          |
|                   |                      | PST                  |          |
+-------------------+----------------------+----------------------+----------+
| Oxygen Saturation | -                    | -                    |          |
+-------------------+----------------------+----------------------+----------+
| Inhaled Oxygen    | -                    | -                    |          |
| Concentration     |                      |                      |          |
+-------------------+----------------------+----------------------+----------+
| Weight            | 87.9 kg (193 lb 12.8 | 2013  9:51 AM  |          |
|                   |  oz)                 | PST                  |          |
+-------------------+----------------------+----------------------+----------+
| Height            | -                    | -                    |          |
+-------------------+----------------------+----------------------+----------+
 
| Body Mass Index   | 31.28                | 2013  1:28 PM  |          |
|                   |                      | PDT                  |          |
+-------------------+----------------------+----------------------+----------+
 documented in this encounter
 
 Progress Notes
 Sohail Campbell PA - 2013 10:03 AM PSTFormatting of this note might be different from 
the original.
 Subjective: 
  
 Patient ID: Wyatt Shane is a 67 y.o. male.
 
 HPI
 
 last office visit was:  2013
 date of polysomnography: 10/07/2013 
 AHI: 77.4
 RDI: 77.4
 O2%: 86% with 15.8 minutes below 88% 
 Machine type: Respironics ASV BiPAP with nasal mask (Aditi)
 obtained from:  [facility]
 pressure: EPAP = 4cm;PSmin=0cm;PSmax=25cm;backup rate=auto
 Nights using BiPAP: 0/0
 average usage (all nights): 
 average usage (nights used): 
 AHI: 
 
 Wyatt comes in for BiPAP compliance.  He has not used the BiPAP yet because he didn't feel t
hat he was properly trained on how to use it.  On the first night that he had it, he tried w
earing it briefly, but had problems with leaks and he chose not to wear it any longer.  As w
jennifer were reviewing how to use his BiPAP, I noticed that he has an AVAPS BiPAP, which is not us
ed for treating Cheyne-Gregory breathing.  The prescription was written for ASV BiPAP.  This 
was some sort of confusion with [facility] in [place].  Wyatt and his wife were not in
formed of any delay in getting the ASV and assumed they were getting the correct machine.  T
hey were very frustrated by this.  [facility] did not answer their phones before Wyatt perla
nd his wife left our office, so we were not able to get an answer from them.  Wyatt decided t
hat it would be best for him to go to [facility].
 
 Wyatt was also concerned about his mask fit because of noise from the machine and leaks.  He
 had a very good fit while sitting.  There were no leaks, but the BiPAP was making noise in 
the rhythm of his breathing, which is common with Respironics machines.  We also checked his
 mask fit while lying in bed.  He again had no leak problems.
 
 Review of Systems
 
 Objective: 
 Physical Exam
 
 Assessment: 
 
 Problem #1: OBSTRUCTIVE SLEEP APNEA (ICD 9-327.23)
 This is well controlled with BiPAP.  He has not started using his BIPAP and was given an AV
APS BiPAP instead of ASV BiPAP, as prescribed.
 
 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 
 This is controlled with ASV BiPAP.
 
 Plan: 
 
 He and his wife have decided to change from [facility] in [place] to [facility].  We have 
 
faxed [facility] a copy of his polysomnogram with the Rx for the ASV BiPAP.
 
 I will follow up again in 1 week, sooner prn.  Thirty minutes were spent face-to-face, with
 the majority of time spent in counseling.
 
 Sohail Campbell PA-C
 
 cc: 
 Dr. Erwin Foote
 
 Electronically signed by ROWENA Greene at 2013 10:04 AM PSTdocumented in this enco
unter
 
 Plan of Treatment
 
 
+--------+---------+-------------+----------------------+-------------+
| Date   | Type    | Specialty   | Care Team            | Description |
+--------+---------+-------------+----------------------+-------------+
| / | Office  | Pulmonology |   Juan F,          |             |
|    | Visit   |             | Jessica Cheung,   |             |
|        |         |             | MD [address] |             |
|        |         |             |   [place]   |             |
|        |         |             | [place] [address]             |             |
|        |         |             | [phone]         |             |
|        |         |             | [phone] (Fax)   |             |
+--------+---------+-------------+----------------------+-------------+
| / | Office  | Cardiology  |   Eric Akins, |             |
|    | Visit   |             |  MD [address]   |             |
|        |         |             | [place]  |             |
|        |         |             | [id number] [phone]  |             |
|        |         |             |  [phone] (Fax)  |             |
+--------+---------+-------------+----------------------+-------------+
 documented as of this encounter
 
 Visit Diagnoses
 
 
+----------------------------------------------------------------------------------------+
| Diagnosis                                                                              |
+----------------------------------------------------------------------------------------+
|   JOANN (obstructive sleep apnea) - Primary  Obstructive sleep apnea (adult) (pediatric) |
+----------------------------------------------------------------------------------------+
|   Cheyne-Gregory breathing  Cheyne-Gregory respiration                                   |
+----------------------------------------------------------------------------------------+
 documented in this encounter

## 2019-12-06 NOTE — XMS
Encounter Summary
  Created on: 2019
 
 Shane Wyatttien BroussardJosue
 External Reference #: [id number]
 : 43
 Sex: Male
 
 Demographics
 
 
+-----------------------+---------------------------+
| Address               | [address]        |
|                       | [place]  [address] |
+-----------------------+---------------------------+
| Home Phone            | [phone]           |
+-----------------------+---------------------------+
| Preferred Language    | Unknown                   |
+-----------------------+---------------------------+
| Marital Status        |                    |
+-----------------------+---------------------------+
| Taoism Affiliation | [id number]                      |
+-----------------------+---------------------------+
| Race                  | Unknown                   |
+-----------------------+---------------------------+
| Ethnic Group          | Unknown                   |
+-----------------------+---------------------------+
 
 
 Author
 
 
+--------------+--------------------------------------------+
| Author       | [facility]  |
|              | [facility] [place]                                |
+--------------+--------------------------------------------+
| Organization | [facility]  |
|              | and [place]                                |
+--------------+--------------------------------------------+
| Address      | Unknown                                    |
+--------------+--------------------------------------------+
| Phone        | Unavailable                                |
+--------------+--------------------------------------------+
 
 
 
 Support
 
 
+---------------+--------------+--------------------+-----------------+
| Name          | Relationship | Address            | Phone           |
+---------------+--------------+--------------------+-----------------+
| Opal Shane | ECON         | [address]     | [phone] |
|               |              | [place]   |                 |
|               |              | [address]              |                 |
+---------------+--------------+--------------------+-----------------+
 
 
 
 
 Care Team Providers
 
 
+-----------------------+------+-----------------+
| Care Team Member Name | Role | Phone           |
+-----------------------+------+-----------------+
| Erwin Iniguez MD | PCP  | [phone] |
+-----------------------+------+-----------------+
 
 
 
 Reason for Referral
 Evaluate & Treat (Routine)
 
+--------+--------------+-----------+--------------+--------------+---------------+
| Status | Reason       | Specialty | Diagnoses /  | Referred By  | Referred To   |
|        |              |           | Procedures   | Contact      | Contact       |
+--------+--------------+-----------+--------------+--------------+---------------+
| Closed |   Specialty  | Sleep     |   Diagnoses  |   Chris,     |   [facility]   |
|        | Services     | Medicine  |  [facility]     | Luis Carlos [place]    | [address] |
|        | Required     |           | sleep apnea  | MD [address]  [address]       |
|        |              |           |  Procedures  |  [address] | [place],  |
|        |              |           |  [place] POLYSOM  |  St  [facility]   | [place] [address] |
|        |              |           | 6/>YRS SLEEP | [place], [place]    |   Phone:      |
|        |              |           |  4/> ADDL    | [address]        | [phone]  |
|        |              |           | [address]  | Phone:       |  Fax:         |
|        |              |           |  [address]       | [phone] | [phone]  |
|        |              |           |              |   Fax:       |               |
|        |              |           |              | [phone] |               |
+--------+--------------+-----------+--------------+--------------+---------------+
 
 
 Diagnostic/Screening (Routine)
 
+--------+--------+-----------+--------------+--------------+---------------+
| Status | Reason | Specialty | Diagnoses /  | Referred By  | Referred To   |
|        |        |           | Procedures   | Contact      | Contact       |
+--------+--------+-----------+--------------+--------------+---------------+
| Closed |        | Radiology |   Diagnoses  |   Chris,     |   [facility]    |
|        |        |           |  Chronic     | Luis Carlos D    | [address]  |
|        |        |           | systolic     | MD [address] |  [place], |
|        |        |           | heart        |  [address] |  [place]           |
|        |        |           | failure      |  St  [place]   | [address]    |
|        |        |           | (HCC)        | [place], [place]    | Phone:        |
|        |        |           | Unspecified  | [address]        | [phone]  |
|        |        |           | sleep apnea  | Phone:       |  Fax:         |
|        |        |           |  Procedures  | [phone] | [phone]  |
|        |        |           |  ECHO        |   Fax:       |               |
|        |        |           | Complete  MT | [phone] |               |
|        |        |           |  ECHO HEART  |              |               |
|        |        |           | XTHORACIC,CO |              |               |
|        |        |           | MPLETE W     |              |               |
|        |        |           | DOPPLER  MT  |              |               |
|        |        |           | ECHO HEART   |              |               |
|        |        |           | XTHORACIC,CO |              |               |
|        |        |           | MPLETE, W/O  |              |               |
|        |        |           | DOPPLER      |              |               |
+--------+--------+-----------+--------------+--------------+---------------+
 
 
 
 
 
 Reason for Visit
 
 
+--------+----------+
| Reason | Comments |
+--------+----------+
| Apnea  |          |
+--------+----------+
 
 
 
 Encounter Details
 
 
+--------+---------+----------------------+---------------------+----------------------+
| Date   | Type    | Department           | Care Team           | Description          |
+--------+---------+----------------------+---------------------+----------------------+
| / | Office  |   [facility]      |   Luis Carlos Perez  | Central sleep apnea  |
| 2015   | Visit   | SLEEP DISORDER  [address]  | [address]   | (Primary Dx);        |
|        |         | [address]  [place]      | [address]  [place]    | Chronic systolic     |
|        |         | [place] [address] | [place] [address]     | heart failure (HCC)  |
|        |         |   [phone]       | [phone]        |                      |
|        |         |                      | [phone] (Fax)  |                      |
+--------+---------+----------------------+---------------------+----------------------+
 
 
 
 Social History
 
 
+---------------+------------+-----------+--------+------------------+
| Tobacco Use   | Types      | Packs/Day | Years  | Date             |
|               |            |           | Used   |                  |
+---------------+------------+-----------+--------+------------------+
| Former Smoker | Cigarettes | 1.3       | 34     | Quit: 1996 |
+---------------+------------+-----------+--------+------------------+
 
 
 
+---------------------+---+---+---+
| Smokeless Tobacco:  |   |   |   |
| Never Used          |   |   |   |
+---------------------+---+---+---+
 
 
 
+-------------+-------------+---------+----------+
| Alcohol Use | Drinks/Week | oz/Week | Comments |
+-------------+-------------+---------+----------+
| No          |             |         |          |
+-------------+-------------+---------+----------+
 
 
 
+------------------+---------------+
| Sex Assigned at  | Date Recorded |
| Birth            |               |
 
+------------------+---------------+
| Not on file      |               |
+------------------+---------------+
 
 
 
+----------------+-------------+-------------+
| Job Start Date | Occupation  | Industry    |
+----------------+-------------+-------------+
| Not on file    | Not on file | Not on file |
+----------------+-------------+-------------+
 
 
 
+----------------+--------------+------------+
| Travel History | Travel Start | Travel End |
+----------------+--------------+------------+
 
 
 
+-------------------------------------+
| No recent travel history available. |
+-------------------------------------+
 documented as of this encounter
 
 Last Filed Vital Signs
 
 
+-------------------+----------------------+----------------------+----------+
| Vital Sign        | Reading              | Time Taken           | Comments |
+-------------------+----------------------+----------------------+----------+
| Blood Pressure    | 142/82               | 2015  8:17 AM  |          |
|                   |                      | PDT                  |          |
+-------------------+----------------------+----------------------+----------+
| Pulse             | 77                   | 2015  8:17 AM  |          |
|                   |                      | PDT                  |          |
+-------------------+----------------------+----------------------+----------+
| Temperature       | -                    | -                    |          |
+-------------------+----------------------+----------------------+----------+
| Respiratory Rate  | 16                   | 2015  8:17 AM  |          |
|                   |                      | PDT                  |          |
+-------------------+----------------------+----------------------+----------+
| Oxygen Saturation | 96%                  | 2015  8:17 AM  |          |
|                   |                      | PDT                  |          |
+-------------------+----------------------+----------------------+----------+
| Inhaled Oxygen    | -                    | -                    |          |
| Concentration     |                      |                      |          |
+-------------------+----------------------+----------------------+----------+
| Weight            | 82.6 kg (182 lb 3.2  | 2015  8:17 AM  |          |
|                   | oz)                  | PDT                  |          |
+-------------------+----------------------+----------------------+----------+
| Height            | -                    | -                    |          |
+-------------------+----------------------+----------------------+----------+
| Body Mass Index   | 27.7                 | 2014 10:00 AM  |          |
|                   |                      | PDT                  |          |
+-------------------+----------------------+----------------------+----------+
 documented in this encounter
 
 Progress Notes
 Luis Carlos Perez Jr., MD - 2015  9:36 AM PDTFormatting of this note might be differen
 
t from the original.
 I've called Wyatt back into the sleep center because of the recent preliminary SERVE-HF stud
y results. The SERVE-HF study compared Maximum Medical Treatment of Heart Failure vs Maximum
 Medical Treatment of Heart Failure + ASV-PAP for Central Sleep Apnea in patients with heart
 failure (EF <45%) and Central Sleep Apnea (more than 50% apneas were Central). Preliminary 
results: AHI  s were < 10 on ASV. No difference between control and treatment groups in All
-Cause Mortality. However, 7.5% of Control Group had sudden death and 10% of ASV group had s
udden death (33% difference    statistically significant). It is recommended that ASV-PAP n
ot be used in these patients. I've discussed this with him. 
 
 PSG performed on 10/7/2013 demonstrated an AHI of 56.2 (29 obstructive apneas, 10 mixed apn
eas, 380 central apneas (most Cheyne-Nichole) and 158 hypopneas with a miguel oxygen saturatio
n of 86%. PSG guided PAP titration was performed on 2013 using ASV-PAP where EPAP 4cm, 
PSmin0; PSmax25, backup rate auto was done resulting in an RDI of 1. PLMS were also present.
 He has a history of atrial fibrillation and at the time he also gave a past history of hear
t failure. He has been on ASV-PAP since then but recently hasn't been able to wear it for mo
re than about 2 hours a night because of sinus issues.
 
 Interestingly, his wife states that his breathing off of the ASV-PAP is much more "normal" 
than it was two years ago.
 
 He feels that he is sleeping well and doesn't feel too fatigued in the daytime. He has had 
sinus issues for the last couple of months but they are improving. He does have RA which is 
rather stable.
 
 Past Medical History:  has a past medical history of HBP (high blood pressure); Atrial fibr
illation (HCC); Heart failure (HCC); Hyperlipidemia; Raynaud disease; Fibromyalgia; ASCVD (a
rteriosclerotic cardiovascular disease); Aortic aneurysm (); JOANN (obstructive sleep apnea
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
es respiration.
  has past surgical history that includes Coronary artery bypass graft (); Esophagus mikhail
karolyn (); Knee arthroscopy (); hernia repair (); laryngectomy (); Kidney ston
e surgery; Tracheal surgery (); Carpal tunnel release (); and basal cell cancer rese
ction left year ().
 Allergies 
 Allergen Reactions 
   Diltiazem Hcl  
   Lipitor  
   Propranolol Hcl  
 
 Current Outpatient Prescriptions 
 Medication Sig Dispense Refill 
   acyclovir (ZOVIRAX) 400 MG tablet Take 400 mg by mouth 3 times daily as needed.   
   acyclovir (ZOVIRAX) 5% ointment Apply  topically every 3 hours.   
   albuterol (PROAIR HFA) 90 mcg/puff inhaler Inhale 2 puffs into the lungs every 6 hours 
as needed for Wheezing.   
   Cholecalciferol (VITAMIN D3) 2000 UNITS CAPS Take 2,000 Units by mouth Daily.   
   cyanocobalamin (VITAMIN B-12) 1,000 mcg/mL injection injected intramuscularly once a mo
nt   
   digoxin (LANOXIN) 250 mcg tablet Take 250 mcg by mouth Daily. 1/2 tablet daily   
   diltiazem (DILT-XR) 120 mg 24 hr capsule Take 120 mg by mouth Daily.   
   ferrous sulfate 325 mg tablet Take 325 mg by mouth 3 times daily.   
   fluticasone (FLONASE) 50 mcg/nasal spray one spray in each nostril daily as needed   
   folic acid 1 mg tablet Take 1 mg by mouth Daily.   
   furosemide (LASIX) 40 mg tablet Take 40 mg by mouth Daily.   
   guaiFENesin (MUCINEX) 600 mg 12 hr tablet Take 1,200 mg by mouth 2 times daily.   
   levothyroxine (LEVOTHROID) 75 MCG tablet Take 75 mcg by mouth Daily.   
   loratadine (CLARITIN) 10 mg tablet Take 10 mg by mouth Daily.   
   losartan (COZAAR) 100 MG tablet Take 100 mg by mouth Daily. 1/2 daily   
   methotrexate 2.5 mg tablet Take 15 mg by mouth Once a week.   
 
   metoclopramide (REGLAN) 10 mg tablet Take 10 mg by mouth 4 times daily as needed.   
   nystatin-triamcinolone (MYCOLOG II) cream Apply  topically 2 times daily.   
   omeprazole (PRILOSEC) 20 mg capsule Take 20 mg by mouth 2 times daily.   
   potassium citrate (UROCIT-K) 10 mEq SR tablet Take 10 mEq by mouth 2 times daily.   
   predniSONE (DELTASONE) 5 mg tablet Take 5 mg by mouth 2 times daily.   
   rivaroxaban (XARELTO) 20 mg tablet Take 20 mg by mouth Daily (with dinner).   
   sertraline (ZOLOFT) 100 mg tablet 2 tablets daily   
   tamsulosin (FLOMAX) 0.4 mg CAPS Take 0.4 mg by mouth 2 times daily.   
 
 No current facility-administered medications for this visit. 
 
 Past Surgical History 
 Procedure Laterality Date 
   Coronary artery bypass graft   
   4 vessel 
   Esophagus surgery   
   Emmett 
   Knee arthroscopy   
   right knee 
   Hernia repair  2006 
   Laryngectomy  1998 
   right vocal cord cancer 
   Kidney stone surgery   
   Tracheal surgery  2000 
   Reconstruction 
   Carpal tunnel release   
   bilateral 
   Basal cell cancer resection left year   
 
 PE: /82 | Pulse 77 | Resp 16 | Wt 82.645 kg (182 lb 3.2 oz) | SpO2 96%
  Gen: obese and  not in acute distress
  HEENT:Head: Normocephalic, no lesions, without obvious abnormality.
 Ears: Normal TM's bilaterally. Normal auditory canals and external ears. Non-tender.
 Nose: Normal external nose, mucus membranes and septum.
 Pharynx: Dental Hygiene adequate. Normal buccal mucosa. Mallampati 3.
 Neck / Thyroid: Supple, no masses, nodes, nodules or enlargement.
  Pulm:Rhonchi are heard bilaterally but clear with coughing.
  Card: Heart rate is irregularly irregular. S1, S2 were normal. I heard a 1/6 KIAN LLSB. I d
id not hear S3, S4 or diastolic murmurs
  GI: Normal BS
  : Not examined
  Rectal: Not Examined
  Ext: peripheral pulses normal, no pedal edema, no clubbing or cyanosis
  Skin:Ecchymosis over lower arms from trauma and sun damage to skin
  Neuro:Grossly normal
  Psych:age appropriate and casually dressedoriented to time, place and person, mood and aff
ect are within normal limits, pt is a good historian; no memory problems were noted
  Heme: No cervical LN
 
 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
 the SERVE-HF study with him. I am recommended that for the present that he stop using ASV-P
AP. We will repeat a diagnostic PSG off of PAP to reassess.
      History of Heart Failure: I can find no evidence of heart failure on exam today nor ca
n I find evidence of a recent quantitative determination of EF (no recent Echo) and he can't
 recall a recent Echo. I am suggesting that Echo be done. If the EF is less than 45% and he 
has on PSG CSA, then medical management is warranted for his heart but ASV-PAP is not curren
tly indicated.
 
 P: 2-D Echo
     PSG
 
     He will stop ASV-PAP
 
 Today, 30 minutes was spent face to face with the patient; the majority of time was spent c
ounseling regarding CSA and ASV-PAP.
 
 Electronically signed by Luis Carlos Perez Jr., MD at 2015  9:58 AM PDTdocumented in th
is encounter
 
 Plan of Treatment
 
 
+--------+---------+-------------+----------------------+-------------+
| Date   | Type    | Specialty   | Care Team            | Description |
+--------+---------+-------------+----------------------+-------------+
| / | Office  | Pulmonology |   Juan F,          |             |
|    | Visit   |             | Jessica Cheung,   |             |
|        |         |             | MD [address] |             |
|        |         |             |   [place]   |             |
|        |         |             | [place] [address]             |             |
|        |         |             | [phone]         |             |
|        |         |             | [phone] (Fax)   |             |
+--------+---------+-------------+----------------------+-------------+
| / | Office  | Cardiology  |   Eric Akins, |             |
|    | Visit   |             |  MD [address]   |             |
|        |         |             | [place]  |             |
|        |         |             | [address]  [phone]  |             |
|        |         |             |  [phone] (Fax)  |             |
+--------+---------+-------------+----------------------+-------------+
 
 
 
+----------------------+-------------+--------+----------------------+---------------------+
| Name                 | Type        | Priori | Associated Diagnoses | Order Schedule      |
|                      |             | ty     |                      |                     |
+----------------------+-------------+--------+----------------------+---------------------+
| Ambulatory Referral  | Outpatient  | Routin |   Central sleep      | Ordered: 2015 |
| to Sleep Studies     | Referral    | e      | apnea                |                     |
+----------------------+-------------+--------+----------------------+---------------------+
 documented as of this encounter
 
 Results
 ECHO Complete (2015  9:20 AM PDT)
 
+----------+
| Specimen |
+----------+
|          |
+----------+
 
 
 
+------------------------------------------------------------------------+-----------------+
| Narrative                                                              | Performed At    |
+------------------------------------------------------------------------+-----------------+
|   [facility]     ECHOCARDIOGRAM REPORT         |   [place]    |
| STUDY DATE:   2015        PATIENT NAME: Wyatt Shane  :   | [facility]        |
| 1945  MRN: [id number]  PCP: Erwin Iniguez MD               | [facility]  |
| CLINICAL HISTORY/DIAGNOSIS:   A-fib, heart failure, ef        A        | - IMAGING       |
| transthoracic echocardiogram with M-mode, pulsed-wave and color        |                 |
| Doppler   was performed with standard views obtained.   The technical  |                 |
 
| quality of this   examination is adequate.   The heart rhythm during   |                 |
| the echo is atrial   fibrillation.   The M-mode, two-dimensional,      |                 |
| color flow and spectral   Doppler data were reviewed and support the   |                 |
| following interpretation:     Interpretation:  Left Atrium: Left       |                 |
| atrial size is mildly dilated.  Left ventricle:   Left ventricular     |                 |
| size is normal with mild-to-moderate   concentric left ventricular     |                 |
| hypertrophy, and normal left ventricular   systolic function.   The    |                 |
| estimated ejection fraction is 60-65 %.     Undetermined diastolic     |                 |
| function.   Aortic root: Aortic root is normal.  Right Atrium:   Right |                 |
|  atrial sizes normal.  Right ventricle:   Right ventricular size is    |                 |
| normal with normal wall   thickness and normal right ventricular       |                 |
| systolic function.  Pericardium:   Pericardium is normal.  Pulmonary   |                 |
| artery:   Pulmonary artery is normal. mild pulmonary hypertension      |                 |
| with a peak systolic pressure of 35-40 mmHg.  Aortic valve: Aortic     |                 |
| valve is trileaflet with mild thickening and opens   adequately.       |                 |
|   There is a mild to moderate aortic valve insufficiency.  Mitral      |                 |
| valve:   Mitral valve is normal. mild mitral regurgitation.  Pulmonic  |                 |
| valve:   Pulmonic valve is normal.  Tricuspid valve:   Tricuspid valve |                 |
|  is normal. mild tricuspid valve   regurgitation.  Vena cava:   The    |                 |
| inferior vena cava is normal.   There is greater than 50%              |                 |
| inspiratory collapse of the IVC.        IMPRESSIONS:  1.   Mild left   |                 |
| atrial dilatation.  2.   Mild-to-moderate concentric left ventricular  |                 |
| hypertrophy.   No LVOT   obstruction noted.   Left ventricular         |                 |
| systolic dysfunction is preserved.     LVEF is 60-65%.  3.   Mildly    |                 |
| thickened trileaflet aortic valve with adequate opening.   There   is  |                 |
| a mild to moderate aortic valve insufficiency.  4.   Mild mitral valve |                 |
|  regurgitation.  5.   Mild tricuspid valve regurgitation.  6.   Mild   |                 |
| pulmonary hypertension with a peak systolic pressure of 35-40   mmHg.  |                 |
|  7.   Normal IVC with normal respiratory collapse.                     |                 |
| Measurements:  Height: 5'8''  Weight: 182lbs  Aortic root:   31 mm     |                 |
| Aortic cusp sep:   20 mm  LA:   2d 51 mm  IVS-diastole:   22 mm        |                 |
| IVS-systole:   19 mm  LVPW diastole:   14 mm  LVPW systole:   16 mm    |                 |
| LV diameter-diastole:   47 mm  LV diameter-systole:   29 mm            |                 |
| Fractional shortenin %  PFV aortic valve:    m/s  MPG mitral    |                 |
| valve:    mmHg  PFV TR jet:   2.84 m/s  RA/RV PP mmHg  LA       |                 |
| volume:   135 mL  LA index:   38 mL/m2  Mitral Inflow DT:    ms  IVRT: |                 |
|     ms  Valsalva:     PWDTI S wave:    cm/s  PWDTI E wave:    cm/s     |                 |
| PWDTI A wave:    cm/s  E/A Ratio:     E/E Ratio:                       |                 |
| Signed by:    Brittni Shoemaker MD [facility]      2015   9:20          |                 |
|   Sonographer:   Meme Junior, ALMACS, RDMS, RT                       |                 |
+------------------------------------------------------------------------+-----------------+
 
 
 
+----------------------+---------------------+--------------------+----------------+
| Performing           | Address             | City/State/Zipcode | Phone Number   |
| Organization         |                     |                    |                |
+----------------------+---------------------+--------------------+----------------+
|   [facility]     |   [address] | [place], [place]    |   [phone] |
| [facility]  |                     | [address]              |                |
| - IMAGING            |                     |                    |                |
+----------------------+---------------------+--------------------+----------------+
 documented in this encounter
 
 Visit Diagnoses
 
 
+------------------------------------------------------------------------+
| Diagnosis                                                              |
+------------------------------------------------------------------------+
 
|   Central sleep apnea - Primary  Unspecified sleep apnea               |
+------------------------------------------------------------------------+
|   Chronic systolic heart failure (HCC)  Chronic systolic heart failure |
+------------------------------------------------------------------------+
 documented in this encounter

## 2019-12-06 NOTE — XMS
Encounter Summary
  Created on: 2019
 
 Wyatt Shane
 External Reference #: [id number]
 : 43
 Sex: Male
 
 Demographics
 
 
+-----------------------+------------------------+
| Address               | [address]     |
|                       | [place]  [address]   |
+-----------------------+------------------------+
| Home Phone            | [phone]        |
+-----------------------+------------------------+
| Preferred Language    | Unknown                |
+-----------------------+------------------------+
| Marital Status        |                 |
+-----------------------+------------------------+
| Moravian Affiliation | LUT                    |
+-----------------------+------------------------+
| Race                  | White                  |
+-----------------------+------------------------+
| Ethnic Group          | Not  or  |
+-----------------------+------------------------+
 
 
 Author
 
 
+--------------+-------------+
| Organization | Unknown     |
+--------------+-------------+
| Address      | Unknown     |
+--------------+-------------+
| Phone        | Unavailable |
+--------------+-------------+
 
 
 
 Support
 
 
+---------------+--------------+---------+-----------------+
| Name          | Relationship | Address | Phone           |
+---------------+--------------+---------+-----------------+
| Opal Shane | ECON         | Unknown | [phone] |
+---------------+--------------+---------+-----------------+
 
 
 
 Care Team Providers
 
 
+-----------------------+------+-----------------+
| Care Team Member Name | Role | Phone           |
 
+-----------------------+------+-----------------+
| Erwin Steel MD   | PCP  | [phone] |
+-----------------------+------+-----------------+
 
 
 
 Encounter Details
 
 
+--------+-------------+------------+----------------------+------------------+
| Date   | Type        | Department | Care Team            | Description      |
+--------+-------------+------------+----------------------+------------------+
| /18/ | Discharge   |            |   Summary, Discharge | D/C Summary ODDS |
| 1999   | Summary-Tra |            |                      |                  |
|        | nscribed    |            |                      |                  |
+--------+-------------+------------+----------------------+------------------+
 
 
 
 Social History
 
 
+----------------+-------+-----------+--------+------+
| Tobacco Use    | Types | Packs/Day | Years  | Date |
|                |       |           | Used   |      |
+----------------+-------+-----------+--------+------+
| Never Assessed |       |           |        |      |
+----------------+-------+-----------+--------+------+
 
 
 
+------------------+---------------+
| Sex Assigned at  | Date Recorded |
| Birth            |               |
+------------------+---------------+
| Not on file      |               |
+------------------+---------------+
 
 
 
+----------------+-------------+-------------+
| Job Start Date | Occupation  | Industry    |
+----------------+-------------+-------------+
| Not on file    | Not on file | Not on file |
+----------------+-------------+-------------+
 
 
 
+----------------+--------------+------------+
| Travel History | Travel Start | Travel End |
+----------------+--------------+------------+
 
 
 
+-------------------------------------+
| No recent travel history available. |
+-------------------------------------+
 documented as of this encounter
 
 Discharge Summaries
 
 Interface, Transcription In - 2006  3:11 AM [address] [address] [phone]
                      [facility]
 
 MEDICAL SUMMARY OF HOSPITALIZATION
 
 Med Rec No: [id number]             Admission Date: 1999
 
 Name: Wyatt Shane                    Discharge Date:  1999
 
 STAFF PHYSICIAN:
                                    Ghassan Doll M.D.
 
                                    Jimmy Esposito M.D.
 
 PRINCIPAL FINAL DIAGNOSIS:          Gastroesophageal reflux disease (GERD).
 
 ADDITIONAL DIAGNOSES:               1.     Vocal  cord  cancer, status post
 resection.
                                     2.  Persistent vocal cord injury.
                                     3.  Umbilical hernia.
 
 PRINCIPAL PROCEDURE:                Laparoscopic Nissen fundoplication.
 
 ADDITIONAL PROCEDURES:              1.    Umbilical hernia repair.
                                     2.  Laryngeal dilatation.
                                     3.  Nutrition consultation.
 
 REASON FOR ADMISSION:               Mr. Shane  is  a 53-year-old white male
 with history of vocal cord carcinoma that  was resected late in  by Dr. Esposito here at [facility].  The patient presents with
 gastroesophageal reflux disease and recurrent  vocal  cord injury secondary
 to this condition.  The patient was referred  to  Dr. Ghassan Doll for a
 laparoscopic Nissen fundoplication or  Toupet fundoplication to relieve his
 symptoms and his vocal cord injury.   The  patient  decided to proceed with
 the operation and presented for the above  procedure.   Given  that he does
 have persistent vocal cord injury, Dr. Esposito is electing to do a laryngeal
 dilatation at the same time as the [facility] procedure.  The patient was
 admitted on 99 for above planned procedure.
 
 HOSPITAL COURSE:                    The  patient  was admitted and taken to
 the Operating Room on 99.  The Otolaryngology Service first performed
 a  laryngeal dilatation, which the patient  tolerated  well.   The  patient
 received Decadron 10 mg I.V. perioperatively  to help with inflammation and
 edema.  After this, the [facility]  Team  performed a laparoscopic Nissen
 fundoplication with no apparent complications.   Under the same anesthetic,
 the patient's umbilical hernia repair  was also completed.  The patient was
 extubated easily and did not require  reintubation.   He  was  taken to the
 Post Anesthesia Care Unit, and after a  brief time there was transferred to
 [address] [place] [address], his torres of discharge.  He tolerated the operation well.
 
 On postoperative day #1 the patient felt  well  with no complaints.  He had
 been started on clear liquids the following  night  and had tolerated these
 well.  His umbilical hernia repair was  causing  him  no  problems.  He was
 advanced to a post Nissen diet, and Nutrition  came  to  educate him on his
 diet.  He tolerated this well.  The following  morning,  postoperative  day
 #2, the patient was in great spirits  and  had  no  complaints.   He had no
 
 nausea or  vomiting, expressed a good  understanding  of  his  post  Nissen
 diet, and was anxious to go home.  He was seen by Dr. Esposito, who prescribed
 Augmentin x 10 days and who felt comfortable  with the patient's discharge.
 At  the  time  of  discharge the patient's  pain  was  controlled,  he  was
 tolerating a post Nissen diet, he was voiding, and he was anxious to leave.
 He was discharged to home later that day.
 
 CONDITION ON DISCHARGE:             Good.
 
 DISCHARGE MEDICATION(S):
 All medications to be taken in elixir form or crushed pill form.
 1.  Augmentin elixir 875 mg p.o. b.i.d. x 10 days.
 2.  Oxycodone elixir 5-15 mg p.o. q. 3 hours p.r.n. pain.
 3.  Colace elixir 100 mg p.o. b.i.d.
 4.  Norvasc.
 5.  Lipitor.
 Note:  His Norvasc and Lipitor pills must  be crushed; the patient was told
 this and expressed understanding.
 
 DISCHARGE INSTRUCTION(S):
 1.  Activity:  No heavy lifting x six weeks.
 2.  Diet:  Post Nissen fundoplication diet.
 3.  Follow-up:  (1) Follow-up with Dr. Doll  in  [facility]  in two
    weeks.   (2) Follow-up with Dr. Esposito  in  [facility]  in
    September.
 
 Diana Givens M.D.                 Ghassan Doll M.D.
 
                                        Jimmy Esposito M.D.
 
 [facility]/doug
 D:  1999
 T:  1999  2:22 P
 
 cc:
       ERWIN STEEL MD
       [address]
       [place] [id number] signed by Interface, Transcription In at 2006 
 3:11 AM PSTdocumented in this encounter
 
 Plan of Treatment
 Not on filedocumented as of this encounter
 
 Visit Diagnoses
 Not on filedocumented in this encounter

## 2019-12-06 NOTE — XMS
Encounter Summary
  Created on: 2019
 
 Wyatt Shane
 External Reference #: [id number]
 : 43
 Sex: Male
 
 Demographics
 
 
+-----------------------+------------------------+
| Address               | [address]     |
|                       | [place]  [address]   |
+-----------------------+------------------------+
| Home Phone            | [phone]        |
+-----------------------+------------------------+
| Preferred Language    | Unknown                |
+-----------------------+------------------------+
| Marital Status        |                 |
+-----------------------+------------------------+
| Mosque Affiliation | LUT                    |
+-----------------------+------------------------+
| Race                  | White                  |
+-----------------------+------------------------+
| Ethnic Group          | Not  or  |
+-----------------------+------------------------+
 
 
 Author
 
 
+--------------+-------------+
| Organization | Unknown     |
+--------------+-------------+
| Address      | Unknown     |
+--------------+-------------+
| Phone        | Unavailable |
+--------------+-------------+
 
 
 
 Support
 
 
+---------------+--------------+---------+-----------------+
| Name          | Relationship | Address | Phone           |
+---------------+--------------+---------+-----------------+
| Opal Shane | ECON         | Unknown | [phone] |
+---------------+--------------+---------+-----------------+
 
 
 
 Care Team Providers
 
 
+-----------------------+------+-----------------+
| Care Team Member Name | Role | Phone           |
 
+-----------------------+------+-----------------+
| Erwin Iniguez MD   | PCP  | [phone] |
+-----------------------+------+-----------------+
 
 
 
 Encounter Details
 
 
+--------+-------------+------------+--------------------+--------------------+
| Date   | Type        | Department | Care Team          | Description        |
+--------+-------------+------------+--------------------+--------------------+
| / | Procedure - |            |   Documentation,   | OP REPORT-TEACHING |
| 2000   |             |            | Teaching Physician |                    |
|        | Transcribed |            |                    |                    |
+--------+-------------+------------+--------------------+--------------------+
 
 
 
 Social History
 
 
+----------------+-------+-----------+--------+------+
| Tobacco Use    | Types | Packs/Day | Years  | Date |
|                |       |           | Used   |      |
+----------------+-------+-----------+--------+------+
| Never Assessed |       |           |        |      |
+----------------+-------+-----------+--------+------+
 
 
 
+------------------+---------------+
| Sex Assigned at  | Date Recorded |
| Birth            |               |
+------------------+---------------+
| Not on file      |               |
+------------------+---------------+
 
 
 
+----------------+-------------+-------------+
| Job Start Date | Occupation  | Industry    |
+----------------+-------------+-------------+
| Not on file    | Not on file | Not on file |
+----------------+-------------+-------------+
 
 
 
+----------------+--------------+------------+
| Travel History | Travel Start | Travel End |
+----------------+--------------+------------+
 
 
 
+-------------------------------------+
| No recent travel history available. |
+-------------------------------------+
 documented as of this encounter
 
 Plan of Treatment
 
 Not on filedocumented as of this encounter
 
 Procedures
 
 
+--------------------+--------+------------+----------------------+----------+
| Procedure Name     | Priori | Date/Time  | Associated Diagnosis | Comments |
|                    | ty     |            |                      |          |
+--------------------+--------+------------+----------------------+----------+
| TEACHING PHYSICIAN |        | 2000 |                      |          |
+--------------------+--------+------------+----------------------+----------+
 documented in this encounter
 
 Visit Diagnoses
 Not on filedocumented in this encounter

## 2019-12-06 NOTE — XMS
Encounter Summary
  Created on: 2019
 
 Wyatt Shane
 External Reference #: [id number]
 : 43
 Sex: Male
 
 Demographics
 
 
+-----------------------+------------------------+
| Address               | [address]     |
|                       | [place]  [address]   |
+-----------------------+------------------------+
| Home Phone            | [phone]        |
+-----------------------+------------------------+
| Preferred Language    | Unknown                |
+-----------------------+------------------------+
| Marital Status        |                 |
+-----------------------+------------------------+
| Sikhism Affiliation | LUT                    |
+-----------------------+------------------------+
| Race                  | White                  |
+-----------------------+------------------------+
| Ethnic Group          | Not  or  |
+-----------------------+------------------------+
 
 
 Author
 
 
+--------------+-------------+
| Organization | Unknown     |
+--------------+-------------+
| Address      | Unknown     |
+--------------+-------------+
| Phone        | Unavailable |
+--------------+-------------+
 
 
 
 Support
 
 
+---------------+--------------+---------+-----------------+
| Name          | Relationship | Address | Phone           |
+---------------+--------------+---------+-----------------+
| Opal Shane | ECON         | Unknown | [phone] |
+---------------+--------------+---------+-----------------+
 
 
 
 Care Team Providers
 
 
+-----------------------+------+-----------------+
| Care Team Member Name | Role | Phone           |
 
+-----------------------+------+-----------------+
| Erwin Iniguez MD   | PCP  | [phone] |
+-----------------------+------+-----------------+
 
 
 
 Encounter Details
 
 
+--------+-------------+------------+----------------------+------------------+
| Date   | Type        | Department | Care Team            | Description      |
+--------+-------------+------------+----------------------+------------------+
| / | Discharge   |            |   Summary, Discharge | D/C Summary ODDS |
| 2000   | Summary-Tra |            |                      |                  |
|        | nscribed    |            |                      |                  |
+--------+-------------+------------+----------------------+------------------+
 
 
 
 Social History
 
 
+----------------+-------+-----------+--------+------+
| Tobacco Use    | Types | Packs/Day | Years  | Date |
|                |       |           | Used   |      |
+----------------+-------+-----------+--------+------+
| Never Assessed |       |           |        |      |
+----------------+-------+-----------+--------+------+
 
 
 
+------------------+---------------+
| Sex Assigned at  | Date Recorded |
| Birth            |               |
+------------------+---------------+
| Not on file      |               |
+------------------+---------------+
 
 
 
+----------------+-------------+-------------+
| Job Start Date | Occupation  | Industry    |
+----------------+-------------+-------------+
| Not on file    | Not on file | Not on file |
+----------------+-------------+-------------+
 
 
 
+----------------+--------------+------------+
| Travel History | Travel Start | Travel End |
+----------------+--------------+------------+
 
 
 
+-------------------------------------+
| No recent travel history available. |
+-------------------------------------+
 documented as of this encounter
 
 Discharge Summaries
 
 Interface, Transcription In - 2006  1:10 AM [facility]
ON
                         [address] [address] [phone]
                      [facility]
 
 MEDICAL SUMMARY OF HOSPITALIZATION
 
 Med Rec No:  [id number]          Admission Date: 2000
 
 Name:   Wyatt Shane                Discharge Date:  2000
 
 ATTENDING PHYSICIAN: Jimmy Esposito M.D.
 
 PRINCIPAL FINAL DIAGNOSIS:
 Recurrent subglottic stenosis.
 
 ADDITIONAL DIAGNOSES:
 1)  Hypertension.
 2)  Arthritis.
 3)  Nephrolithiasis.
 4)  Hyperlipemia.
 5)  Coronary artery disease status post  coronary  artery  bypass  graft in
    1996.
 6)  Status post chemotherapy and radiation  therapy  for  right  true vocal
    cord cancer.
 
 PRINCIPAL PROCEDURE:
 Laryngeal tracheoplasty with rib graft.
 
 ADDITIONAL PROCEDURES:
 1)  Speech therapy.
 2)  Nutrition consult.
 
 REASON FOR ADMISSION:
 The patient is a 54-year-old gentleman  who  has  been  followed in the ENT
 clinic  for  the  past  two years and  is  status  post  a  right  vertical
 hemilaryngectomy with tracheostomy tube  placement for a Stage T2N0M0 right
 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  It was
 performed   without   complication   initially,    however,    subsequently
 postoperatively, he developed recurrent  laryngeal  stenosis  with repeated
 dilations that had been unsuccessful.   After  his  most  recent  visit  on
 1999, fiberoptic laryngoscopy  was  performed  and  showed  a
 significant change of the subglottic  stenosis,  which  continued to remain
 about 50 to 60 percent of his airway.   He  has  currently  failed multiple
 dilations and is currently indicated  for  tracheostomy  tube  as  well  as
 laryngeal tracheoplasty with rib graft.
 
 PAST MEDICAL HISTORY:
 As above.
 
 PAST SURGICAL HISTORY:
 1)  Laparoscopic Nissen fundoplication 1999.
 2)  Right hemilaryngectomy in 1998.
 
 MEDICATIONS ON ADMISSION:
 1)  Norvasc, 10 mg po qd.
 2)  Aspirin, 1 tablet po qd.
 3)  Lipitor, 10 mg po qd.
 
 
 ALLERGIES:
 ALLERGIC TO BETA-BLOCKERS.
 
 PHYSICAL EXAMINATION:
 GENERAL:  Alert and oriented, in no acute distress. CARDIOVASCULAR: Regular
 rate  and  rhythm.   LUNGS:  Clear to auscultation  bilaterally.   ABDOMEN:
 Soft, non-tender, and non-distended. OROPHARYNX: Clear.  Pupils were equal,
 round and reactive to light and accommodation.   Extraocular movements were
 intact.  No palpable masses.
 
 ASSESSMENT:
 This  is  a  54-year-old  gentleman  with   recurrent  stenoses,  currently
 indicated for laryngeal tracheoplasty with rib graft.
 
 HOSPITAL COURSE:
 The patient was admitted on 2000  and  taken to the operating
 room  for  laryngeal tracheoplasty with  rib  graft,  which  was  performed
 without complication.  The patient was  transferred  to the Post Anesthesia
 Care Unit in stable condition, and subsequently  transferred to the general
 ENT torres on 9C.  Speech pathology was consulted initially and evaluated his
 swallowing.   He  was  started  on pureed  foods  and  liquids,  __________
 postoperatively.  He was doing well.  He  had  minimal pain.  Nutrition was
 also   evaluated   him  to  help  regulate   his   caloric   intake.    The
 recommendations included milkshakes on his trays.
 
 By postoperative day #2, it was noted  that his trach tube was slightly low
 and not setting quite properly and a small two centimeter incision was made
 at the bedside to open up the trach site and allow it to freely move into a
 more comfortable position for the patient.   After  this was performed, the
 patient had a small amount of bleeding  that  was  packed  and pressure was
 held.  Hemostasis was subsequently achieved.   The  patient  felt  able and
 ready to be discharged on postoperative day #3, which was done.  There were
 no complications in his hospital course.
 
 DISCHARGE INSTRUCTIONS:
 DISCHARGE MEDICATIONS:
 1)  Augmentin, 875 mg po t.i.d. for four weeks.
 2)  Vicodin, 1 to 2 tabs po 4 hours prn pain.
 DIET:
 None dictated.
 ACTIVITY:
 None dictated.
 FOLLOW UP CARE:
 He is to follow-up in the ENT clinic  in  three to four weeks with JORGE Sadler M.D.                  Jimmy Esposito M.D.
 
 DT:[id number]
 D:  2000
 T:  2000
 C:  2000 jrgElectronically signed by Interface, Transcription In at 2006  1:10 
AM PSTdocumented in this encounter
 
 Plan of Treatment
 Not on filedocumented as of this encounter
 
 Visit Diagnoses
 Not on filedocumented in this encounter

## 2019-12-06 NOTE — XMS
Encounter Summary
  Created on: 2019
 
 Shane Wyatttien BroussardJosue
 External Reference #: [id number]
 : 43
 Sex: Male
 
 Demographics
 
 
+-----------------------+---------------------------+
| Address               | [address]        |
|                       | [place]  [address] |
+-----------------------+---------------------------+
| Home Phone            | [phone]           |
+-----------------------+---------------------------+
| Preferred Language    | Unknown                   |
+-----------------------+---------------------------+
| Marital Status        |                    |
+-----------------------+---------------------------+
| [employer] Affiliation | [id number]                      |
+-----------------------+---------------------------+
| Race                  | Unknown                   |
+-----------------------+---------------------------+
| Ethnic Group          | Unknown                   |
+-----------------------+---------------------------+
 
 
 Author
 
 
+--------------+--------------------------------------------+
| Author       | formerly [facility]  |
|              | and [place]                                |
+--------------+--------------------------------------------+
| Organization | formerly [facility]  |
|              | and [place]                                |
+--------------+--------------------------------------------+
| Address      | Unknown                                    |
+--------------+--------------------------------------------+
| Phone        | Unavailable                                |
+--------------+--------------------------------------------+
 
 
 
 Support
 
 
+---------------+--------------+--------------------+-----------------+
| Name          | Relationship | Address            | Phone           |
+---------------+--------------+--------------------+-----------------+
| Opal Shane | ECON         | [address]     | [phone] |
|               |              | [place]   |                 |
|               |              | [id number]              |                 |
+---------------+--------------+--------------------+-----------------+
 
 
 
 
 Care Team Providers
 
 
+-----------------------+------+-------------+
| Care Team Member Name | Role | Phone       |
+-----------------------+------+-------------+
 PCP  | Unavailable |
+-----------------------+------+-------------+
 
 
 
 Encounter Details
 
 
+--------+-----------+----------------------+----------------------+----------------------+
| Date   | Type      | Department           | Care Team            | Description          |
+--------+-----------+----------------------+----------------------+----------------------+
| / | Hospital  |   [facility]    |   Yang, Hoyeol, MD   | GASTROINTEST HEMORR  |
|  - | Encounter | [facility]       | [address] | NOS                  |
|        |           | CLINICAL DECISION    |   [place] [address] |                      |
| 07/15/ |           | [address] |   [phone]       |                      |
|    |           |   [place], [place]       | [phone] (Fax)   |                      |
|        |           | [address]           |                      |                      |
|        |           | [phone]         |                      |                      |
+--------+-----------+----------------------+----------------------+----------------------+
 
 
 
 Social History
 
 
+----------------+-------+-----------+--------+------+
| Tobacco Use    | Types | Packs/Day | Years  | Date |
|                |       |           | Used   |      |
+----------------+-------+-----------+--------+------+
| Never Assessed |       |           |        |      |
+----------------+-------+-----------+--------+------+
 
 
 
+------------------+---------------+
| Sex Assigned at  | Date Recorded |
| Birth            |               |
+------------------+---------------+
| Not on file      |               |
+------------------+---------------+
 
 
 
+----------------+-------------+-------------+
| Job Start Date | Occupation  | Industry    |
+----------------+-------------+-------------+
| Not on file    | Not on file | Not on file |
+----------------+-------------+-------------+
 
 
 
+----------------+--------------+------------+
| Travel History | Travel Start | Travel End |
 
+----------------+--------------+------------+
 
 
 
+-------------------------------------+
| No recent travel history available. |
+-------------------------------------+
 documented as of this encounter
 
 Plan of Treatment
 
 
+--------+---------+-------------+----------------------+-------------+
| Date   | Type    | Specialty   | Care Team            | Description |
+--------+---------+-------------+----------------------+-------------+
| / | Office  | Pulmonology |   Juan F,          |             |
|    | Visit   |             | Jessicaefrain Cheung,   |             |
|        |         |             | MD  [address]  |             |
|        |         |             |   [place],   |             |
|        |         |             | [place] [address]             |             |
|        |         |             | [phone]         |             |
|        |         |             | [phone] (Fax)   |             |
+--------+---------+-------------+----------------------+-------------+
| / | Office  | Cardiology  |   Eric Akins, |             |
|    | Visit   |             |  MD  [address]   |             |
|        |         |             | [place]  |             |
|        |         |             | [address]  [phone]  |             |
|        |         |             |  [phone] (Fax)  |             |
+--------+---------+-------------+----------------------+-------------+
 documented as of this encounter
 
 Visit Diagnoses
 
 
+-----------------------------------------------------+
| Diagnosis                                           |
+-----------------------------------------------------+
|   Hemorrhage of gastrointestinal tract, unspecified |
+-----------------------------------------------------+
 documented in this encounter

## 2019-12-06 NOTE — XMS
Encounter Summary
  Created on: 2019
 
 Shane Wyatttien BroussardJosue
 External Reference #: [id number]
 : 43
 Sex: Male
 
 Demographics
 
 
+-----------------------+---------------------------+
| Address               | [address]        |
|                       | [place]  [address] |
+-----------------------+---------------------------+
| Home Phone            | [phone]           |
+-----------------------+---------------------------+
| Preferred Language    | Unknown                   |
+-----------------------+---------------------------+
| Marital Status        |                    |
+-----------------------+---------------------------+
| [employer] Affiliation | 1028                      |
+-----------------------+---------------------------+
| Race                  | Unknown                   |
+-----------------------+---------------------------+
| Ethnic Group          | Unknown                   |
+-----------------------+---------------------------+
 
 
 Author
 
 
+--------------+--------------------------------------------+
| Author       | [facility]  |
|              | and [place]                                |
+--------------+--------------------------------------------+
| Organization | [facility]  |
|              | and [place]                                |
+--------------+--------------------------------------------+
| Address      | Unknown                                    |
+--------------+--------------------------------------------+
| Phone        | Unavailable                                |
+--------------+--------------------------------------------+
 
 
 
 Support
 
 
+---------------+--------------+--------------------+-----------------+
| Name          | Relationship | Address            | Phone           |
+---------------+--------------+--------------------+-----------------+
| Opal Shane | ECON         | [address]     | [phone] |
|               |              | [place]   |                 |
|               |              | [address]              |                 |
+---------------+--------------+--------------------+-----------------+
 
 
 
 
 Care Team Providers
 
 
+-----------------------+------+-----------------+
| Care Team Member Name | Role | Phone           |
+-----------------------+------+-----------------+
| Erwin Iniguez MD | PCP  | [phone] |
+-----------------------+------+-----------------+
 
 
 
 Reason for Visit
 
 
+--------+----------+
| Reason | Comments |
+--------+----------+
| Apnea  |          |
+--------+----------+
 
 
 
 Encounter Details
 
 
+--------+---------+----------------------+----------------------+-------------------+
| Date   | Type    | Department           | Care Team            | Description       |
+--------+---------+----------------------+----------------------+-------------------+
| / | Office  |   [facility]      |   Sohail Campbell PA  | JOANN (obstructive  |
|    | Visit   | SLEEP DISORDER  [address]  |  [address]     | sleep apnea)      |
|        |         | [address]      | [place]      | (Primary Dx);     |
|        |         | [place] [address] | [address]  [phone]  | Cheyne-Gregory     |
|        |         |   [phone]       |  [phone] (Fax)  | breathing         |
+--------+---------+----------------------+----------------------+-------------------+
 
 
 
 Social History
 
 
+---------------+------------+-----------+--------+------------------+
| Tobacco Use   | Types      | Packs/Day | Years  | Date             |
|               |            |           | Used   |                  |
+---------------+------------+-----------+--------+------------------+
| Former Smoker | Cigarettes | 1.3       | 34     | Quit: 1996 |
+---------------+------------+-----------+--------+------------------+
 
 
 
+---------------------+---+---+---+
| Smokeless Tobacco:  |   |   |   |
| Never Used          |   |   |   |
+---------------------+---+---+---+
 
 
 
+-------------+-------------+---------+----------+
| Alcohol Use | Drinks/Week | oz/Week | Comments |
+-------------+-------------+---------+----------+
 
| No          |             |         |          |
+-------------+-------------+---------+----------+
 
 
 
+------------------+---------------+
| Sex Assigned at  | Date Recorded |
| Birth            |               |
+------------------+---------------+
| Not on file      |               |
+------------------+---------------+
 
 
 
+----------------+-------------+-------------+
| Job Start Date | Occupation  | Industry    |
+----------------+-------------+-------------+
| Not on file    | Not on file | Not on file |
+----------------+-------------+-------------+
 
 
 
+----------------+--------------+------------+
| Travel History | Travel Start | Travel End |
+----------------+--------------+------------+
 
 
 
+-------------------------------------+
| No recent travel history available. |
+-------------------------------------+
 documented as of this encounter
 
 Last Filed Vital Signs
 
 
+-------------------+----------------------+----------------------+----------+
| Vital Sign        | Reading              | Time Taken           | Comments |
+-------------------+----------------------+----------------------+----------+
| Blood Pressure    | 122/68               | 2013  9:51 AM  |          |
|                   |                      | PST                  |          |
+-------------------+----------------------+----------------------+----------+
| Pulse             | 74                   | 2013  9:51 AM  |          |
|                   |                      | PST                  |          |
+-------------------+----------------------+----------------------+----------+
| Temperature       | -                    | -                    |          |
+-------------------+----------------------+----------------------+----------+
| Respiratory Rate  | 16                   | 2013  9:51 AM  |          |
|                   |                      | PST                  |          |
+-------------------+----------------------+----------------------+----------+
| Oxygen Saturation | -                    | -                    |          |
+-------------------+----------------------+----------------------+----------+
| Inhaled Oxygen    | -                    | -                    |          |
| Concentration     |                      |                      |          |
+-------------------+----------------------+----------------------+----------+
| Weight            | 87.9 kg (193 lb 12.8 | 2013  9:51 AM  |          |
|                   |  oz)                 | PST                  |          |
+-------------------+----------------------+----------------------+----------+
| Height            | -                    | -                    |          |
+-------------------+----------------------+----------------------+----------+
 
| Body Mass Index   | 31.28                | 2013  1:28 PM  |          |
|                   |                      | PDT                  |          |
+-------------------+----------------------+----------------------+----------+
 documented in this encounter
 
 Progress Notes
 Sohail Campbell PA - 2013 10:03 AM PSTFormatting of this note might be different from 
the original.
 Subjective: 
  
 Patient ID: Wyatt Shane is a 67 y.o. male.
 
 HPI
 
 last office visit was:  2013
 date of polysomnography: 10/07/2013 
 AHI: 77.4
 RDI: 77.4
 O2%: 86% with 15.8 minutes below 88% 
 Machine type: Respironics ASV BiPAP with nasal mask ([employer]iti)
 obtained from:  [facility]
 pressure: EPAP = 4cm;PSmin=0cm;PSmax=25cm;backup rate=auto
 Nights using BiPAP: 0/0
 average usage (all nights): 
 average usage (nights used): 
 AHI: 
 
 Wyatt comes in for BiPAP compliance.  He has not used the BiPAP yet because he didn't feel t
hat he was properly trained on how to use it.  On the first night that he had it, he tried w
earing it briefly, but had problems with leaks and he chose not to wear it any longer.  As w
jennifer were reviewing how to use his BiPAP, I noticed that he has an AVAPS BiPAP, which is not us
ed for treating Cheyne-Gregory breathing.  The prescription was written for ASV BiPAP.  This 
was some sort of confusion with [facility] in [place].  Wyatt and his wife were not in
formed of any delay in getting the ASV and assumed they were getting the correct machine.  T
hey were very frustrated by this.  [facility] did not answer their phones before Wyatt perla
nd his wife left our office, so we were not able to get an answer from them.  Wyatt decided t
hat it would be best for him to go to [facility].
 
 Wytat was also concerned about his mask fit because of noise from the machine and leaks.  He
 had a very good fit while sitting.  There were no leaks, but the BiPAP was making noise in 
the rhythm of his breathing, which is common with Respironics machines.  We also checked his
 mask fit while lying in bed.  He again had no leak problems.
 
 Review of Systems
 
 Objective: 
 Physical Exam
 
 Assessment: 
 
 Problem #1: OBSTRUCTIVE SLEEP APNEA (ICD 9-327.23)
 This is well controlled with BiPAP.  He has not started using his BIPAP and was given an AV
APS BiPAP instead of ASV BiPAP, as prescribed.
 
 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 
 This is controlled with ASV BiPAP.
 
 Plan: 
 
 He and his wife have decided to change from [facility] in [place] to [facility].  We have 
 
faxed [facility] a copy of his polysomnogram with the Rx for the ASV BiPAP.
 
 I will follow up again in 1 week, sooner prn.  Thirty minutes were spent face-to-face, with
 the majority of time spent in counseling.
 
 Sohail Campbell PA-C
 
 cc: 
 Dr. Erwin Foote
 
 Electronically signed by ROWENA Greene at 2013 10:04 AM PSTdocumented in this enco
unter
 
 Plan of Treatment
 
 
+--------+---------+-------------+----------------------+-------------+
| Date   | Type    | Specialty   | Care Team            | Description |
+--------+---------+-------------+----------------------+-------------+
| / | Office  | Pulmonology |   Juan F,          |             |
|    | Visit   |             | Jessica Cheung,   |             |
|        |         |             | MD [address] |             |
|        |         |             |   [place]   |             |
|        |         |             | [place] [address]             |             |
|        |         |             | [phone]         |             |
|        |         |             | [phone] (Fax)   |             |
+--------+---------+-------------+----------------------+-------------+
| / | Office  | Cardiology  |   Eric Akins, |             |
|    | Visit   |             |  MD [address]   |             |
|        |         |             | [place]  |             |
|        |         |             | [id number] [phone]  |             |
|        |         |             |  [phone] (Fax)  |             |
+--------+---------+-------------+----------------------+-------------+
 documented as of this encounter
 
 Visit Diagnoses
 
 
+----------------------------------------------------------------------------------------+
| Diagnosis                                                                              |
+----------------------------------------------------------------------------------------+
|   JOANN (obstructive sleep apnea) - Primary  Obstructive sleep apnea (adult) (pediatric) |
+----------------------------------------------------------------------------------------+
|   Cheyne-Gregory breathing  Cheyne-Gregory respiration                                   |
+----------------------------------------------------------------------------------------+
 documented in this encounter

## 2019-12-06 NOTE — XMS
Encounter Summary
  Created on: 2019
 
 Shane Wyatttien BroussardJosue
 External Reference #: [id number]
 : 43
 Sex: Male
 
 Demographics
 
 
+-----------------------+---------------------------+
| Address               | [address]        |
|                       | [place]  [address] |
+-----------------------+---------------------------+
| Home Phone            | [phone]           |
+-----------------------+---------------------------+
| Preferred Language    | Unknown                   |
+-----------------------+---------------------------+
| Marital Status        |                    |
+-----------------------+---------------------------+
| Anabaptist Affiliation | [id number]                      |
+-----------------------+---------------------------+
| Race                  | Unknown                   |
+-----------------------+---------------------------+
| Ethnic Group          | Unknown                   |
+-----------------------+---------------------------+
 
 
 Author
 
 
+--------------+--------------------------------------------+
| Author       | [facility]  |
|              | [facility] [place]                                |
+--------------+--------------------------------------------+
| Organization | [facility]  |
|              | [facility] [place]                                |
+--------------+--------------------------------------------+
| Address      | Unknown                                    |
+--------------+--------------------------------------------+
| Phone        | Unavailable                                |
+--------------+--------------------------------------------+
 
 
 
 Support
 
 
+---------------+--------------+--------------------+-----------------+
| Name          | Relationship | Address            | Phone           |
+---------------+--------------+--------------------+-----------------+
| Opal Shane | ECON         | [address]     | [phone] |
|               |              | [place]   |                 |
|               |              | [address]              |                 |
+---------------+--------------+--------------------+-----------------+
 
 
 
 
 Care Team Providers
 
 
+-----------------------+------+-----------------+
| Care Team Member Name | Role | Phone           |
+-----------------------+------+-----------------+
| Erwin Iniguez MD | PCP  | [phone] |
+-----------------------+------+-----------------+
 
 
 
 Encounter Details
 
 
+--------+-------------+----------------------+----------------------+---------------------+
| Date   | Type        | Department           | Care Team            | Description         |
+--------+-------------+----------------------+----------------------+---------------------+
| 05/15/ | Orders Only |   [facility]          |   Jian Fabian MD   | Back pain (Primary  |
| 2014   |             | NEUROSURGERY  [address]  | [address]       | Dx)                 |
|        |             | [address]     | [place] [address]  |                     |
|        |             | [place], [place]      |  [phone]        |                     |
|        |             | [address]           | [phone] (Fax)   |                     |
|        |             | [phone]         |                      |                     |
+--------+-------------+----------------------+----------------------+---------------------+
 
 
 
 Social History
 
 
+---------------+------------+-----------+--------+------------------+
| Tobacco Use   | Types      | Packs/Day | Years  | Date             |
|               |            |           | Used   |                  |
+---------------+------------+-----------+--------+------------------+
| Former Smoker | Cigarettes | 1.3       | 34     | Quit: 1996 |
+---------------+------------+-----------+--------+------------------+
 
 
 
+---------------------+---+---+---+
| Smokeless Tobacco:  |   |   |   |
| Never Used          |   |   |   |
+---------------------+---+---+---+
 
 
 
+-------------+-------------+---------+----------+
| Alcohol Use | Drinks/Week | oz/Week | Comments |
+-------------+-------------+---------+----------+
| No          |             |         |          |
+-------------+-------------+---------+----------+
 
 
 
+------------------+---------------+
| Sex Assigned at  | Date Recorded |
| Birth            |               |
+------------------+---------------+
| Not on file      |               |
 
+------------------+---------------+
 
 
 
+----------------+-------------+-------------+
| Job Start Date | Occupation  | Industry    |
+----------------+-------------+-------------+
| Not on file    | Not on file | Not on file |
+----------------+-------------+-------------+
 
 
 
+----------------+--------------+------------+
| Travel History | Travel Start | Travel End |
+----------------+--------------+------------+
 
 
 
+-------------------------------------+
| No recent travel history available. |
+-------------------------------------+
 documented as of this encounter
 
 Plan of Treatment
 
 
+--------+---------+-------------+----------------------+-------------+
| Date   | Type    | Specialty   | Care Team            | Description |
+--------+---------+-------------+----------------------+-------------+
| / | Office  | Pulmonology |   Juan F,          |             |
| 2019   | Visit   |             | Jessica Cheung,   |             |
|        |         |             | MD [address] |             |
|        |         |             |   [place],   |             |
|        |         |             | [place] [address]             |             |
|        |         |             | [phone]         |             |
|        |         |             | [phone] (Fax)   |             |
+--------+---------+-------------+----------------------+-------------+
| / | Office  | Cardiology  |   Eric Akins, |             |
|    | Visit   |             |  MD  [address]   |             |
|        |         |             | [place]  [facility] [place]  |             |
|        |         |             | [address]  [phone]  |             |
|        |         |             |  [phone] (Fax)  |             |
+--------+---------+-------------+----------------------+-------------+
 
 
 
+----------------------+---------+--------+----------------------+----------------------+
| Name                 | Type    | Priori | Associated Diagnoses | Order Schedule       |
|                      |         | ty     |                      |                      |
+----------------------+---------+--------+----------------------+----------------------+
| XR Lumbar Spine 4 +  | Imaging | Routin |   Back pain          | Expected: 2014 |
| Vw                   |         | e      |                      |  (Approximate),      |
|                      |         |        |                      | Expires: 2015  |
+----------------------+---------+--------+----------------------+----------------------+
 documented as of this encounter
 
 Visit Diagnoses
 
 
+----------------------------------------------+
 
| Diagnosis                                    |
+----------------------------------------------+
|   Back pain - Primary  Backache, unspecified |
+----------------------------------------------+
 documented in this encounter

## 2019-12-06 NOTE — XMS
Encounter Summary
  Created on: 2019
 
 Wyatt Shane
 External Reference #: [id number]
 : 43
 Sex: Male
 
 Demographics
 
 
+-----------------------+------------------------+
| Address               | [address]     |
|                       | [place]  [address]   |
+-----------------------+------------------------+
| Home Phone            | [phone]        |
+-----------------------+------------------------+
| Preferred Language    | Unknown                |
+-----------------------+------------------------+
| Marital Status        |                 |
+-----------------------+------------------------+
| Rastafari Affiliation | LUT                    |
+-----------------------+------------------------+
| Race                  | White                  |
+-----------------------+------------------------+
| Ethnic Group          | Not  or  |
+-----------------------+------------------------+
 
 
 Author
 
 
+--------------+------------------------------+
| Author       | [facility] |
+--------------+------------------------------+
| Organization | [facility] |
+--------------+------------------------------+
| Address      | Unknown                      |
+--------------+------------------------------+
| Phone        | Unavailable                  |
+--------------+------------------------------+
 
 
 
 Support
 
 
+---------------+--------------+---------+-----------------+
| Name          | Relationship | Address | Phone           |
+---------------+--------------+---------+-----------------+
| Opal Shane | ECON         | Unknown | [phone] |
+---------------+--------------+---------+-----------------+
 
 
 
 Care Team Providers
 
 
 
+-----------------------+------+-----------------+
| Care Team Member Name | Role | Phone           |
+-----------------------+------+-----------------+
| Erwin Iniguez MD   | PCP  | [phone] |
+-----------------------+------+-----------------+
 
 
 
 Encounter Details
 
 
+--------+-------------+-----------------+---------------------+---------------+
| Date   | Type        | Department      | Care Team           | Description   |
+--------+-------------+-----------------+---------------------+---------------+
| 10/26/ | Office      |   [facility]  |   Note, Outpatient  | Progress Note |
|    | Visit-Trans | MEDICINE        | Clinic              |               |
|        | cribed      |                 |                     |               |
+--------+-------------+-----------------+---------------------+---------------+
 
 
 
 Social History
 
 
+----------------+-------+-----------+--------+------+
| Tobacco Use    | Types | Packs/Day | Years  | Date |
|                |       |           | Used   |      |
+----------------+-------+-----------+--------+------+
| Never Assessed |       |           |        |      |
+----------------+-------+-----------+--------+------+
 
 
 
+------------------+---------------+
| Sex Assigned at  | Date Recorded |
| Birth            |               |
+------------------+---------------+
| Not on file      |               |
+------------------+---------------+
 
 
 
+----------------+-------------+-------------+
| Job Start Date | Occupation  | Industry    |
+----------------+-------------+-------------+
| Not on file    | Not on file | Not on file |
+----------------+-------------+-------------+
 
 
 
+----------------+--------------+------------+
| Travel History | Travel Start | Travel End |
+----------------+--------------+------------+
 
 
 
+-------------------------------------+
| No recent travel history available. |
+-------------------------------------+
 documented as of this encounter
 
 
 Progress Notes
 Interface, Transcription In - 2006  5:11 AM PSTCLINIC DATE:       10/26/1999
 
 OTOLARYNGOLOGY HEAD NECK SURGERY
 
 SUBJECTIVE:  Mr. Shane is seen back today  in  follow  up for a preoperative
 discussion  regarding  a proposed dilation/excisional  subglottic  stenosis
 endoscopically.   He seems to understand  very  well  the  issues  involved
 nasoendoscopically, when sees now a complete  absence  of  the inflammation
 and erythema that was previously present  since  his Nissen fundoplication,
 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
 airway.  I had a full thorough discussion.   He  seems  to  understand very
 well the strategy of the operation, and the fact that if it is unsuccessful
 we would proceed with a more definitive  procedure  on  an  open basis.  He
 will spend the night there if there are  issues  related  to his surgery in
 terms of the potential for swelling.
 
 Jimmy Esposito M.D.
 
 MARIA ALEJANDRA / TIESHA
 [id number] / [id number] / [id number] /
 D: 10/26/1999
 T: 10/30/1999Electronically signed by Interface, Transcription In at 2006  5:11 AM PS
Tdocumented in this encounter
 
 Plan of Treatment
 Not on filedocumented as of this encounter
 
 Visit Diagnoses
 Not on filedocumented in this encounter

## 2019-12-06 NOTE — XMS
Encounter Summary
  Created on: 2019
 
 Shane Wyatttien BroussardJosue
 External Reference #: [id number]
 : 43
 Sex: Male
 
 Demographics
 
 
+-----------------------+---------------------------+
| Address               | [address]        |
|                       | [place]  [address] |
+-----------------------+---------------------------+
| Home Phone            | [phone]           |
+-----------------------+---------------------------+
| Preferred Language    | Unknown                   |
+-----------------------+---------------------------+
| Marital Status        |                    |
+-----------------------+---------------------------+
| Scientology Affiliation | [id number]                      |
+-----------------------+---------------------------+
| Race                  | Unknown                   |
+-----------------------+---------------------------+
| Ethnic Group          | Unknown                   |
+-----------------------+---------------------------+
 
 
 Author
 
 
+--------------+--------------------------------------------+
| Author       | [facility]  |
|              | and [place]                                |
+--------------+--------------------------------------------+
| Organization | [facility]  |
|              | and [place]                                |
+--------------+--------------------------------------------+
| Address      | Unknown                                    |
+--------------+--------------------------------------------+
| Phone        | Unavailable                                |
+--------------+--------------------------------------------+
 
 
 
 Support
 
 
+---------------+--------------+--------------------+-----------------+
| Name          | Relationship | Address            | Phone           |
+---------------+--------------+--------------------+-----------------+
| Opal Shane | ECON         | [address]     | [phone] |
|               |              | [place]   |                 |
|               |              | [address]              |                 |
+---------------+--------------+--------------------+-----------------+
 
 
 
 
 Care Team Providers
 
 
+------------------------+------+-----------------+
| Care Team Member Name  | Role | Phone           |
+------------------------+------+-----------------+
| Calvin Robertson MD | PCP  | [phone] |
+------------------------+------+-----------------+
 
 
 
 Encounter Details
 
 
+--------+-------------+---------------------+----------------------+-------------+
| Date   | Type        | Department          | Care Team            | Description |
+--------+-------------+---------------------+----------------------+-------------+
| / | Orders Only |   [facility]     |   Braden Stephens MD     |             |
| 2018   |             | VASCULAR SURGERY    | [address]     |             |
|        |             | [address]  [address]    | [address]  [place], [place]  |             |
|        |             | [address]   | [address]           |             |
|        |             | [place], [place]        | [phone]         |             |
|        |             | [address]          | [phone] (Fax)   |             |
|        |             | [phone]        |                      |             |
+--------+-------------+---------------------+----------------------+-------------+
 
 
 
 Social History
 
 
+---------------+------------+-----------+--------+------------------+
| Tobacco Use   | Types      | Packs/Day | Years  | Date             |
|               |            |           | Used   |                  |
+---------------+------------+-----------+--------+------------------+
| Former Smoker | Cigarettes | 1.3       | 34     | Quit: 1996 |
+---------------+------------+-----------+--------+------------------+
 
 
 
+---------------------+---+---+---+
| Smokeless Tobacco:  |   |   |   |
| Never Used          |   |   |   |
+---------------------+---+---+---+
 
 
 
+-------------+-------------+---------+----------+
| Alcohol Use | Drinks/Week | oz/Week | Comments |
+-------------+-------------+---------+----------+
| No          |             |         |          |
+-------------+-------------+---------+----------+
 
 
 
+------------------+---------------+
| Sex Assigned at  | Date Recorded |
| Birth            |               |
+------------------+---------------+
 
| Not on file      |               |
+------------------+---------------+
 
 
 
+----------------+-------------+-------------+
| Job Start Date | Occupation  | Industry    |
+----------------+-------------+-------------+
| Not on file    | Not on file | Not on file |
+----------------+-------------+-------------+
 
 
 
+----------------+--------------+------------+
| Travel History | Travel Start | Travel End |
+----------------+--------------+------------+
 
 
 
+-------------------------------------+
| No recent travel history available. |
+-------------------------------------+
 documented as of this encounter
 
 Plan of Treatment
 
 
+--------+---------+-------------+----------------------+-------------+
| Date   | Type    | Specialty   | Care Team            | Description |
+--------+---------+-------------+----------------------+-------------+
| / | Office  | Pulmonology |   Juan F,          |             |
| 2019   | Visit   |             | Jessica Cheung,   |             |
|        |         |             | MD [address] |             |
|        |         |             |   [place],   |             |
|        |         |             | [place] [address]             |             |
|        |         |             | [phone]         |             |
|        |         |             | [phone] (Fax)   |             |
+--------+---------+-------------+----------------------+-------------+
| / | Office  | Cardiology  |   Eric Akins, |             |
|    | Visit   |             |  MD [address]   |             |
|        |         |             | [address]  [place], [place]  |             |
|        |         |             | [address]  [phone]  |             |
|        |         |             |  [phone] (Fax)  |             |
+--------+---------+-------------+----------------------+-------------+
 documented as of this encounter
 
 Procedures
 
 
+---------------------+--------+-------------+----------------------+----------------------+
| Procedure Name      | Priori | Date/Time   | Associated Diagnosis | Comments             |
|                     | ty     |             |                      |                      |
+---------------------+--------+-------------+----------------------+----------------------+
| VAS CAROTID DUPLEX  | Routin | 2018  |                      |   Results for this   |
| BILATERAL           | e      |  4:21 PM    |                      | procedure are in the |
|                     |        | PDT         |                      |  results section.    |
+---------------------+--------+-------------+----------------------+----------------------+
 documented in this encounter
 
 Results
 
 VAS Carotid Duplex Bilateral (2018  4:21 PM PDT)
 
+----------+
| Specimen |
+----------+
|          |
+----------+
 
 
 
+------------------------------------------------------------------------+--------------+
| Impressions                                                            | Performed At |
+------------------------------------------------------------------------+--------------+
|      1. Tortuous vessels with atherosclerotic vascular disease, dense  |              |
| plaque particular the right carotid bulb.     However, no high-grade   |              |
| stenosis by this modality     Grades:  1       Stenosis   =01-30%      |              |
| PSV <125 cm/sec       EDV (cm/s)<40    cm/sec (mild plaque)  2         |              |
| Stenosis   =31-50%    PSV <125 cm/sec       EDV (cm/s)<40    cm/sec    |              |
| (moderate plaque)  3       Stenosis   =50-69%    PSV >125 cm/sec       |              |
|  EDV (cm/s)>40    cm/sec  4       Stenosis   =70-95%    PSV >230       |              |
| cm/sec       EDV (cm/s)>100 cm/sec  5       Stenosis   =90-95%    PSV  |              |
| <125 cm/sec       EDV (cm/s)<40    cm/sec  6       Stenosis   Occluded |              |
|      If IC/CC ratio <2 then Grades 1 or 2.  If   IC/CC ratio > 2 and   |              |
| <4 then Grade 3.  If IC/CC ratio > 4 then 4 or above.                  |              |
| Electronically signed by Juan Francisco Conn MD on 2018 4:36 PM    |              |
+------------------------------------------------------------------------+--------------+
 
 
 
+------------------------------------------------------------------------+--------------+
| Narrative                                                              | Performed At |
+------------------------------------------------------------------------+--------------+
|   HISTORY:72 years-year-old male with a history of vasculopathy,       |              |
| screen for stenosis     TECHNIQUE: Ultrasound of the carotid and       |              |
| vertebral artery systems. Duplex examination with interrogation with   |              |
| color flow and waveforms with Doppler technique  Prior study for       |              |
| comparison, 10 December 2010.     Stenosis was estimated on the basis  |              |
| validated velocity measurements, standards verified by angiographic    |              |
| measurements. Extrapolated from diameter data as defined be the        |              |
| Society of Radiologists in Ultrasound Consensus Conference in          |              |
| Radiology 2003.   229:340-346.     FINDINGS:     Analysis of the       |              |
| right.     Proximal CCA -- 108 cm/s  Distal CCA -- 85 cm/s  Proximal   |              |
| ICA -- 63 cm/s  Mid ICA -- 60 cm/s  Distal ICA -- 126 cm/s  Proximal   |              |
| ECA -- 98 cm/s     Antegrade vertebral artery flow is identified -- 63 |              |
|  cm/s  IC/CC ratio : 0.7     Analysis of the left.     Proximal CCA -- |              |
|  108 cm/s  Distal CCA -- 219 cm/s  Proximal ICA -- 103 cm/s  Mid ICA   |              |
| -- 61 cm/s  Distal ICA -- 53 cm/s  Proximal ECA -- 171 cm/s            |              |
| Antegrade vertebral artery flow is identified -- 85 cm/s  IC/CC ratio  |              |
| : 0.5     Diastolic velocities are normal  Wave forms are normal in    |              |
| rate and rhythm and appearance. Vessels are tortuous     On visual     |              |
| inspection, vessels demonstrate dense plaque at the right carotid      |              |
| bulb.. True duplex examination documenting normal spectral analysis as |              |
|  well as color flow as expected for the arterial structures of the     |              |
| neck.                                                                  |              |
+------------------------------------------------------------------------+--------------+
 
 
 
+-------------------------------------------------------------------------------------------
--------------------------------------------------------------------------------------------
 
------------------------------------+
| Procedure Note                                                                            
                                                                                            
                                    |
+-------------------------------------------------------------------------------------------
--------------------------------------------------------------------------------------------
------------------------------------+
|   Maurizio, Rad Conversion - 2019  3:16 PM PDT  HISTORY:72 years-year-old male with a    
                                                                                            
                                    |
| history of vasculopathy, screen for stenosis TECHNIQUE: Ultrasound of the carotid and     
                                                                                            
                                    |
| vertebral artery systems. Duplex examination with interrogation with color flow and       
                                                                                            
                                    |
| waveforms with Doppler techniquePrior study for comparison, 10 December 2010. Stenosis    
                                                                                            
                                    |
| was estimated on the basis validated velocity measurements, standards verified by         
                                                                                            
                                    |
| angiographic measurements. Extrapolated from diameter data as defined be the Society of   
                                                                                            
                                    |
| Radiologists in Ultrasound Consensus Conference in Radiology 2003. 229:340-346.           
                                                                                            
                                    |
| FINDINGS: Analysis of the right. Proximal CCA -- 108 cm/sDistal CCA -- 85 cm/sProximal    
                                                                                            
                                    |
| ICA -- 63 cm/sMid ICA -- 60 cm/sDistal ICA -- 126 cm/sProximal ECA -- 98 cm/s Antegrade   
                                                                                            
                                    |
| vertebral artery flow is identified -- 63 cm/sIC/CC ratio : 0.7 Analysis of the left.     
                                                                                            
                                    |
| Proximal CCA -- 108 cm/sDistal CCA -- 219 cm/sProximal ICA -- 103 cm/sMid ICA -- 61       
                                                                                            
                                    |
| cm/sDistal ICA -- 53 cm/sProximal ECA -- 171 cm/s Antegrade vertebral artery flow is      
                                                                                            
                                    |
| identified -- 85 cm/sIC/CC ratio : 0.5 Diastolic velocities are normalWave forms are      
                                                                                            
                                    |
| normal in rate and rhythm and appearance. Vessels are tortuous On visual inspection,      
                                                                                            
                                    |
| vessels demonstrate dense plaque at the right carotid bulb.. True duplex examination      
                                                                                            
                                    |
| documenting normal spectral analysis as well as color flow as expected for the arterial   
                                                                                            
                                    |
| structures of the neck. IMPRESSION:  1. Tortuous vessels with atherosclerotic vascular    
                                                                                            
                                    |
| disease, dense plaque particular the right carotid bulb. However, no high-grade stenosis  
                                                                                            
 
                                    |
|  by this modality Grades:1     Stenosis  =01-30%   PSV <125 cm/sec     EDV (cm/s)<40      
                                                                                            
                                    |
| cm/sec (mild plaque)2     Stenosis  =31-50%   PSV <125 cm/sec     EDV (cm/s)<40   cm/sec  
                                                                                            
                                    |
|  (moderate plaque)3     Stenosis  =50-69%   PSV >125 cm/sec     EDV (cm/s)>40   cm/sec4   
                                                                                            
                                    |
|     Stenosis  =70-95%   PSV >230 cm/sec     EDV (cm/s)>100 cm/sec5     Stenosis  =90-95%  
                                                                                            
                                    |
|    PSV <125 cm/sec     EDV (cm/s)<40   cm/sec6     Stenosis  Occluded If IC/CC ratio <2   
                                                                                            
                                    |
| then Grades 1 or 2.If  IC/CC ratio > 2 and <4 then Grade 3.If IC/CC ratio > 4 then 4 or   
                                                                                            
                                    |
| above.   Electronically signed by Juan Francisco Conn MD on 2018 4:36 PM              
                                                                                            
                                    |
|Distal CCA -- 219 cm/s                                                                     
                                                                                            
                                    |
|Proximal ICA -- 103 cm/s                                                                   
                                                                                            
                                    |
|Mid ICA -- 61 cm/s                                                                         
                                                                                            
                                    |
|Distal ICA -- 53 cm/s                                                                      
                                                                                            
                                    |
|Proximal ECA -- 171 cm/s                                                                   
                                                                                            
                                    |
|                                                                                           
                                                                                            
                                    |
|Antegrade vertebral artery flow is identified -- 85 cm/s                                   
                                                                                            
                                    |
|IC/CC ratio : 0.5                                                                          
                                                                                            
                                    |
|                                                                                           
                                                                                            
                                    |
|Diastolic velocities are normal                                                            
                                                                                            
                                    |
|Wave forms are normal in rate and rhythm and appearance. Vessels are tortuous              
                                                                                            
                                    |
|                                                                                           
                                                                                            
                                    |
|On visual inspection, vessels demonstrate dense plaque at the right carotid bulb.. True dup
soledad examination documenting normal spectral analysis as well as color flow as expected for t
 
he arterial structures of the neck. |
|                                                                                           
                                                                                            
                                    |
|IMPRESSION:                                                                                
                                                                                            
                                    |
|                                                                                           
                                                                                            
                                    |
|1. Tortuous vessels with atherosclerotic vascular disease, dense plaque particular the righ
t carotid bulb.                                                                             
                                    |
|                                                                                           
                                                                                            
                                    |
|However, no high-grade stenosis by this modality                                           
                                                                                            
                                    |
|                                                                                           
                                                                                            
                                    |
|Grades:                                                                                    
                                                                                            
                                   |
|1     Stenosis  =01-30%   PSV <125 cm/sec     EDV (cm/s)<40   cm/sec (mild plaque)         
                                                                                            
                                    |
|2     Stenosis  =31-50%   PSV <125 cm/sec     EDV (cm/s)<40   cm/sec (moderate plaque)     
                                                                                            
                                    |
|3     Stenosis  =50-69%   PSV >125 cm/sec     EDV (cm/s)>40   cm/sec                       
                                                                                            
                                    |
|4     Stenosis  =70-95%   PSV >230 cm/sec     EDV (cm/s)>100 cm/sec                        
                                                                                            
                                    |
|5     Stenosis  =90-95%   PSV <125 cm/sec     EDV (cm/s)<40   cm/sec                       
                                                                                            
                                    |
|6     Stenosis  Occluded                                                                   
                                                                                            
                                    |
|                                                                                           
                                                                                            
                                    |
|If IC/CC ratio <2 then Grades 1 or 2.                                                      
                                                                                            
                                    |
|If  IC/CC ratio > 2 and <4 then Grade 3.                                                   
                                                                                            
                                    |
|If IC/CC ratio > 4 then 4 or above.                                                        
                                                                                            
                                    |
|                                                                                           
                                                                                            
                                    |
|                                                                                           
                                                                                            
 
                                    |
|                                                                                           
                                                                                            
                                    |
|Electronically signed by Juan Francisco Conn MD on 2018 4:36 PM                        
                                                                                            
                                    |
+-------------------------------------------------------------------------------------------
--------------------------------------------------------------------------------------------
------------------------------------+
 documented in this encounter
 
 Visit Diagnoses
 Not on filedocumented in this encounter

## 2019-12-06 NOTE — XMS
Encounter Summary
  Created on: 2019
 
 Shane Wyatttien BroussardJosue
 External Reference #: [id number]
 : 43
 Sex: Male
 
 Demographics
 
 
+-----------------------+---------------------------+
| Address               | [address]        |
|                       | [place]  [address] |
+-----------------------+---------------------------+
| Home Phone            | [phone]           |
+-----------------------+---------------------------+
| Preferred Language    | Unknown                   |
+-----------------------+---------------------------+
| Marital Status        |                    |
+-----------------------+---------------------------+
| Anglican Affiliation | 1028                      |
+-----------------------+---------------------------+
| Race                  | Unknown                   |
+-----------------------+---------------------------+
| Ethnic Group          | Unknown                   |
+-----------------------+---------------------------+
 
 
 Author
 
 
+--------------+--------------------------------------------+
| Author       | [facility]  |
|              | [facility] [place]                                |
+--------------+--------------------------------------------+
| Organization | [facility]  |
|              | [facility] [place]                                |
+--------------+--------------------------------------------+
| Address      | Unknown                                    |
+--------------+--------------------------------------------+
| Phone        | Unavailable                                |
+--------------+--------------------------------------------+
 
 
 
 Support
 
 
+---------------+--------------+--------------------+-----------------+
| Name          | Relationship | Address            | Phone           |
+---------------+--------------+--------------------+-----------------+
| Opal Shane | ECON         | [address]     | [phone] |
|               |              | [place]   |                 |
|               |              | [id number]              |                 |
+---------------+--------------+--------------------+-----------------+
 
 
 
 
 Care Team Providers
 
 
+-----------------------+------+-----------------+
| Care Team Member Name | Role | Phone           |
+-----------------------+------+-----------------+
| Erwin Iniguez MD | PCP  | [phone] |
+-----------------------+------+-----------------+
 
 
 
 Reason for Visit
 
 
+--------+----------+
| Reason | Comments |
+--------+----------+
| Apnea  |          |
+--------+----------+
 
 
 
 Encounter Details
 
 
+--------+---------+----------------------+---------------------+----------------------+
| Date   | Type    | Department           | Care Team           | Description          |
+--------+---------+----------------------+---------------------+----------------------+
| / | Office  |   [facility]      |   Luis Carlos Perez  | JOANN (obstructive     |
| 2015   | Visit   | SLEEP DISORDER  [address]  | MD [address]   | sleep apnea)         |
|        |         | [address]      | [address]  [place]    | (Primary Dx);        |
|        |         | [place] [address] | [place] [address]     | Central sleep apnea  |
|        |         |   [phone]       | [phone]        | due to Cheyne-Nichole |
|        |         |                      | [phone] (Fax)  |  respiration         |
+--------+---------+----------------------+---------------------+----------------------+
 
 
 
 Social History
 
 
+---------------+------------+-----------+--------+------------------+
| Tobacco Use   | Types      | Packs/Day | Years  | Date             |
|               |            |           | Used   |                  |
+---------------+------------+-----------+--------+------------------+
| Former Smoker | Cigarettes | 1.3       | 34     | Quit: 1996 |
+---------------+------------+-----------+--------+------------------+
 
 
 
+---------------------+---+---+---+
| Smokeless Tobacco:  |   |   |   |
| Never Used          |   |   |   |
+---------------------+---+---+---+
 
 
 
+-------------+-------------+---------+----------+
| Alcohol Use | Drinks/Week | oz/Week | Comments |
 
+-------------+-------------+---------+----------+
| No          |             |         |          |
+-------------+-------------+---------+----------+
 
 
 
+------------------+---------------+
| Sex Assigned at  | Date Recorded |
| Birth            |               |
+------------------+---------------+
| Not on file      |               |
+------------------+---------------+
 
 
 
+----------------+-------------+-------------+
| Job Start Date | Occupation  | Industry    |
+----------------+-------------+-------------+
| Not on file    | Not on file | Not on file |
+----------------+-------------+-------------+
 
 
 
+----------------+--------------+------------+
| Travel History | Travel Start | Travel End |
+----------------+--------------+------------+
 
 
 
+-------------------------------------+
| No recent travel history available. |
+-------------------------------------+
 documented as of this encounter
 
 Last Filed Vital Signs
 
 
+-------------------+----------------------+----------------------+----------+
| Vital Sign        | Reading              | Time Taken           | Comments |
+-------------------+----------------------+----------------------+----------+
| Blood Pressure    | 142/78               | 2015  8:46 AM  |          |
|                   |                      | PDT                  |          |
+-------------------+----------------------+----------------------+----------+
| Pulse             | 66                   | 2015  8:46 AM  |          |
|                   |                      | PDT                  |          |
+-------------------+----------------------+----------------------+----------+
| Temperature       | -                    | -                    |          |
+-------------------+----------------------+----------------------+----------+
| Respiratory Rate  | 16                   | 2015  8:46 AM  |          |
|                   |                      | PDT                  |          |
+-------------------+----------------------+----------------------+----------+
| Oxygen Saturation | 98%                  | 2015  8:46 AM  |          |
|                   |                      | PDT                  |          |
+-------------------+----------------------+----------------------+----------+
| Inhaled Oxygen    | -                    | -                    |          |
| Concentration     |                      |                      |          |
+-------------------+----------------------+----------------------+----------+
| Weight            | 81.9 kg (180 lb 9.6  | 2015  8:46 AM  |          |
|                   | oz)                  | PDT                  |          |
+-------------------+----------------------+----------------------+----------+
 
| Height            | -                    | -                    |          |
+-------------------+----------------------+----------------------+----------+
| Body Mass Index   | 27.46                | 2014 10:00 AM  |          |
|                   |                      | PDT                  |          |
+-------------------+----------------------+----------------------+----------+
 documented in this encounter
 
 Progress Notes
 Luis Carlos Perez Jr., MD - 2015  8:51 AM PDTThe patient comes in for f/u on combined 
JOANN and CSA (Cheyne-Nichole) on ASV-BPAP (normal EF on 2-D Echo (65%) on 2015). He is on
 a [employer]med [employer] (S9ASV) set at EPAP 4cm, PSmin 0cm, PSmax 20cm, backup auto. He has worn thi
s for 29 out of 33 days but is averaging only 2 hours 58 minutes of use per night. The calcu
lated AHI was 0. 
 
 He was seen by ENT Dr. Dumont in the [facility] for his nasal/sinus issues. He will see the
m again next month to have some sort of nasal/septal surgery which they hope will help open 
up the nose. He gets up to go to the bathroom at night and his nose is plugged so he doesn't
 use the BPAP after this. He is using much more water each evening. 
 
 /78 mmHg | Pulse 66 | Resp 16 | Wt 81.92 kg (180 lb 9.6 oz) | SpO2 98%
 
 A: Complex Sleep Apnea (JOANN+CSA-): The AHI is 0 when he wears his ASV-BPAP. I've encoura
ged him to put the ASV-BPAP back on and turn it on when he gets up at night. I think that it
 might actually help with the nasal stuffiness. He will try this. I am also hopeful that the
re might be a surgical solution in August.
 
 P: He will try to put the ASV-BPAP back on every night.
      F/u in 2 months after surgery is done.
 
 Today, 15 minutes was spent face to face with the patient; the majority of time was spent harley dupree regarding Complex Apnea.
 Electronically signed by Luis Carlos Perez Jr., MD at 2015  9:44 AM PDTdocumented in th
is encounter
 
 Plan of Treatment
 
 
+--------+---------+-------------+----------------------+-------------+
| Date   | Type    | Specialty   | Care Team            | Description |
+--------+---------+-------------+----------------------+-------------+
| / | Office  | Pulmonology |   Juan F,          |             |
|    | Visit   |             | Jessica Cheung,   |             |
|        |         |             | MD [address] |             |
|        |         |             |   [place],   |             |
|        |         |             | [place] [address]             |             |
|        |         |             | [phone]         |             |
|        |         |             | [phone] (Fax)   |             |
+--------+---------+-------------+----------------------+-------------+
| / | Office  | Cardiology  |   Eric Akins, |             |
|    | Visit   |             |  MD [address]   |             |
|        |         |             | [place]  |             |
|        |         |             | [address]  [phone]  |             |
|        |         |             |  [phone] (Fax)  |             |
+--------+---------+-------------+----------------------+-------------+
 documented as of this encounter
 
 Visit Diagnoses
 
 
+----------------------------------------------------------------------------------------+
 
| Diagnosis                                                                              |
+----------------------------------------------------------------------------------------+
|   JOANN (obstructive sleep apnea) - Primary  Obstructive sleep apnea (adult) (pediatric) |
+----------------------------------------------------------------------------------------+
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
+----------------------------------------------------------------------------------------+
 documented in this encounter

## 2019-12-06 NOTE — XMS
Encounter Summary
  Created on: 2019
 
 Wyatt Shane
 External Reference #: [id number]
 : 43
 Sex: Male
 
 Demographics
 
 
+-----------------------+------------------------+
| Address               | [address]     |
|                       | [place]  [address]   |
+-----------------------+------------------------+
| Home Phone            | [phone]        |
+-----------------------+------------------------+
| Preferred Language    | Unknown                |
+-----------------------+------------------------+
| Marital Status        |                 |
+-----------------------+------------------------+
| Restoration Affiliation | LUT                    |
+-----------------------+------------------------+
| Race                  | White                  |
+-----------------------+------------------------+
| Ethnic Group          | Not  or  |
+-----------------------+------------------------+
 
 
 Author
 
 
+--------------+------------------------------+
| Author       | [facility] |
+--------------+------------------------------+
| Organization | [facility] |
+--------------+------------------------------+
| Address      | Unknown                      |
+--------------+------------------------------+
| Phone        | Unavailable                  |
+--------------+------------------------------+
 
 
 
 Support
 
 
+---------------+--------------+---------+-----------------+
| Name          | Relationship | Address | Phone           |
+---------------+--------------+---------+-----------------+
| Opal Shane | ECON         | Unknown | [phone] |
+---------------+--------------+---------+-----------------+
 
 
 
 Care Team Providers
 
 
 
+-----------------------+------+-----------------+
| Care Team Member Name | Role | Phone           |
+-----------------------+------+-----------------+
| Erwin Iniguez MD   | PCP  | [phone] |
+-----------------------+------+-----------------+
 
 
 
 Encounter Details
 
 
+--------+-------------+-----------------+---------------------+---------------+
| Date   | Type        | Department      | Care Team           | Description   |
+--------+-------------+-----------------+---------------------+---------------+
| / | Office      |   [facility]  |   Note, Outpatient  | Progress Note |
| 2000   | Visit-Trans | MEDICINE        | Clinic              |               |
|        | cribed      |                 |                     |               |
+--------+-------------+-----------------+---------------------+---------------+
 
 
 
 Social History
 
 
+----------------+-------+-----------+--------+------+
| Tobacco Use    | Types | Packs/Day | Years  | Date |
|                |       |           | Used   |      |
+----------------+-------+-----------+--------+------+
| Never Assessed |       |           |        |      |
+----------------+-------+-----------+--------+------+
 
 
 
+------------------+---------------+
| Sex Assigned at  | Date Recorded |
| Birth            |               |
+------------------+---------------+
| Not on file      |               |
+------------------+---------------+
 
 
 
+----------------+-------------+-------------+
| Job Start Date | Occupation  | Industry    |
+----------------+-------------+-------------+
| Not on file    | Not on file | Not on file |
+----------------+-------------+-------------+
 
 
 
+----------------+--------------+------------+
| Travel History | Travel Start | Travel End |
+----------------+--------------+------------+
 
 
 
+-------------------------------------+
| No recent travel history available. |
+-------------------------------------+
 documented as of this encounter
 
 
 Progress Notes
 Interface, Transcription In - 2006  5:09 AM PSTCLINIC DATE:       2000
 
 OTOLARYNGOLOGY HEAD AND NECK SURGERY
 
 SUBJECTIVE:  Mr. Shane is seen back for  followup  of  pH  probe  monitoring
 prior to  otolaryngotracheoplasty.   I  placed  a  pH  probe using flexible
 fiberoptic  laryngoscopic  control,   securing   the  upper  probe  at  the
 cricopharyngeus and answered a series  of  questions  relating  to proposed
 surgery.  They will call me next week for the results of the pH probe.
 
 Jimmy Esposito M.D.
 
 [place] / 
 [id number] / [id number] / [id number] / [id number]
 D: 2000
 T: 2000Electronically signed by Interface, Transcription In at 2006  5:09 AM PS
Tdocumented in this encounter
 
 Plan of Treatment
 Not on filedocumented as of this encounter
 
 Visit Diagnoses
 Not on filedocumented in this encounter

## 2019-12-06 NOTE — XMS
Encounter Summary
  Created on: 2019
 
 Shane Wyatttien BroussardJosue
 External Reference #: [id number]
 : 43
 Sex: Male
 
 Demographics
 
 
+-----------------------+---------------------------+
| Address               | [address]        |
|                       | [place]  [address] |
+-----------------------+---------------------------+
| Home Phone            | [phone]           |
+-----------------------+---------------------------+
| Preferred Language    | Unknown                   |
+-----------------------+---------------------------+
| Marital Status        |                    |
+-----------------------+---------------------------+
| Ep[employer]opalian Affiliation | 1028                      |
+-----------------------+---------------------------+
| Race                  | Unknown                   |
+-----------------------+---------------------------+
| Ethnic Group          | Unknown                   |
+-----------------------+---------------------------+
 
 
 Author
 
 
+--------------+--------------------------------------------+
| Author       | [facility]  |
|              | and [place]                                |
+--------------+--------------------------------------------+
| Organization | [facility]  |
|              | and [place]                                |
+--------------+--------------------------------------------+
| Address      | Unknown                                    |
+--------------+--------------------------------------------+
| Phone        | Unavailable                                |
+--------------+--------------------------------------------+
 
 
 
 Support
 
 
+---------------+--------------+--------------------+-----------------+
| Name          | Relationship | Address            | Phone           |
+---------------+--------------+--------------------+-----------------+
| Opal Shane | ECON         | [address]     | [phone] |
|               |              | [place]   |                 |
|               |              | [id number]              |                 |
+---------------+--------------+--------------------+-----------------+
 
 
 
 
 Care Team Providers
 
 
+-----------------------+------+-----------------+
| Care Team Member Name | Role | Phone           |
+-----------------------+------+-----------------+
| Erwin Iniguez MD | PCP  | [phone] |
+-----------------------+------+-----------------+
 
 
 
 Encounter Details
 
 
+--------+-----------+----------------------+--------------------+-------------------+
| Date   | Type      | Department           | Care Team          | Description       |
+--------+-----------+----------------------+--------------------+-------------------+
| / | Hospital  |   [facility] GENERIC IP     |   Conversion       | Diagnosis unknown |
| 2018   | Encounter | CONVERSION DEP  [address]  | Transaction,       |                   |
|        |           | [address]           | Provider Unknown   |                   |
|        |           | [place], [place]         |        |                   |
|        |           | [phone]           |  (Fax) |                   |
|        |           | [phone]         |                    |                   |
+--------+-----------+----------------------+--------------------+-------------------+
 
 
 
 Social History
 
 
+---------------+------------+-----------+--------+------------------+
| Tobacco Use   | Types      | Packs/Day | Years  | Date             |
|               |            |           | Used   |                  |
+---------------+------------+-----------+--------+------------------+
| Former Smoker | Cigarettes | 1.3       | 34     | Quit: 1996 |
+---------------+------------+-----------+--------+------------------+
 
 
 
+---------------------+---+---+---+
| Smokeless Tobacco:  |   |   |   |
| Never Used          |   |   |   |
+---------------------+---+---+---+
 
 
 
+-------------+-------------+---------+----------+
| Alcohol Use | Drinks/Week | oz/Week | Comments |
+-------------+-------------+---------+----------+
| No          |             |         |          |
+-------------+-------------+---------+----------+
 
 
 
+------------------+---------------+
| Sex Assigned at  | Date Recorded |
| Birth            |               |
+------------------+---------------+
| Not on file      |               |
 
+------------------+---------------+
 
 
 
+----------------+-------------+-------------+
| Job Start Date | Occupation  | Industry    |
+----------------+-------------+-------------+
| Not on file    | Not on file | Not on file |
+----------------+-------------+-------------+
 
 
 
+----------------+--------------+------------+
| Travel History | Travel Start | Travel End |
+----------------+--------------+------------+
 
 
 
+-------------------------------------+
| No recent travel history available. |
+-------------------------------------+
 documented as of this encounter
 
 Medications at Time of Discharge
 
 
+----------------------+----------------------+-----------+---------+----------+-----------+
| Medication           | Sig                  | Dispensed | Refills | Start    | End Date  |
|                      |                      |           |         | Date     |           |
+----------------------+----------------------+-----------+---------+----------+-----------+
|   acyclovir          | Apply  topically     |           | 0       |          |           |
| (ZOVIRAX) 5%         | every 3 hours.       |           |         |          |           |
| ointment             |                      |           |         |          |           |
+----------------------+----------------------+-----------+---------+----------+-----------+
|   albuterol (PROAIR  | Inhale 2 puffs into  |           | 0       |          |           |
| HFA) 90 mcg/puff     | the lungs every 6    |           |         |          |           |
| inhaler              | hours as needed for  |           |         |          |           |
|                      | Wheezing.            |           |         |          |           |
+----------------------+----------------------+-----------+---------+----------+-----------+
|   digoxin (LANOXIN)  | Take 125 mcg by      |           | 0       |          |           |
| 250 mcg tablet       | mouth Daily.      |           |         |          |           |
|                      | capsule daily        |           |         |          |           |
+----------------------+----------------------+-----------+---------+----------+-----------+
|   ferrous sulfate    | Take 325 mg by mouth |           | 0       | 20 |           |
| 325 mg tablet        |  3 times daily.      |           |         | 12       |           |
+----------------------+----------------------+-----------+---------+----------+-----------+
|   fluticasone        | one spray in each    |           | 0       | 20 |           |
| (FLONASE) 50         | nostril daily as     |           |         | 12       |           |
| mcg/nasal spray      | needed               |           |         |          |           |
+----------------------+----------------------+-----------+---------+----------+-----------+
|                      | Inhale 1 puff into   |           | 0       |          |           |
| fluticasone-salmeter | the lungs Twice      |           |         |          |           |
| ol (ADVAIR) 500-50   | Daily.               |           |         |          |           |
| mcg/puff diskus      |                      |           |         |          |           |
| inhaler              |                      |           |         |          |           |
+----------------------+----------------------+-----------+---------+----------+-----------+
|   folic acid 1 mg    | Take 1 mg by mouth   |           | 0       |          |           |
| tablet               | Daily.               |           |         |          |           |
+----------------------+----------------------+-----------+---------+----------+-----------+
|   furosemide (LASIX) | Take 40 mg by mouth  |           | 0       |          |           |
 
|  40 mg tablet        | Daily.               |           |         |          |           |
+----------------------+----------------------+-----------+---------+----------+-----------+
|   guaiFENesin        | Take 1,200 mg by     |           | 0       |          |           |
| (MUCINEX) 600 mg 12  | mouth 2 times daily. |           |         |          |           |
| hr tablet            |                      |           |         |          |           |
+----------------------+----------------------+-----------+---------+----------+-----------+
|   levothyroxine      | Take 75 mcg by mouth |           | 0       | 20 |           |
| (LEVOTHROID) 75 MCG  |  Daily.              |           |         | 12       |           |
| tablet               |                      |           |         |          |           |
+----------------------+----------------------+-----------+---------+----------+-----------+
|   metoclopramide     | Take 10 mg by mouth  |           | 0       | 20 |           |
| (REGLAN) 10 mg       | 4 times daily as     |           |         | 12       |           |
| tablet               | needed.              |           |         |          |           |
+----------------------+----------------------+-----------+---------+----------+-----------+
|                      | Apply  topically 2   |           | 0       |          |           |
| nystatin-triamcinolo | times daily.         |           |         |          |           |
| ne (MYCOLOG II)      |                      |           |         |          |           |
| cream                |                      |           |         |          |           |
+----------------------+----------------------+-----------+---------+----------+-----------+
|   ofloxacin          |                      |           | 0       | 20 |           |
| (OCUFLOX) 0.3%       |                      |           |         | 18       |           |
| ophthalmic solution  |                      |           |         |          |           |
+----------------------+----------------------+-----------+---------+----------+-----------+
|   omeprazole         | Take 20 mg by mouth  |           | 0       | 20 |           |
| (PRILOSEC) 20 mg     | 2 times daily.       |           |         | 12       |           |
| capsule              |                      |           |         |          |           |
+----------------------+----------------------+-----------+---------+----------+-----------+
|   potassium citrate  | Take 10 mEq by mouth |           | 0       |          |           |
| (UROCIT-K) 10 mEq SR |  2 times daily.      |           |         |          |           |
|  tablet              |                      |           |         |          |           |
+----------------------+----------------------+-----------+---------+----------+-----------+
|   predniSONE         | Take 10 mg by mouth  |           | 0       |          |           |
| (DELTASONE) 5 mg     | Daily.               |           |         |          |           |
| tablet               |                      |           |         |          |           |
+----------------------+----------------------+-----------+---------+----------+-----------+
|   tamsulosin         | Take 0.4 mg by mouth |           | 0       | 20 |           |
| (FLOMAX) 0.4 mg CAPS |  2 times daily.      |           |         | 12       |           |
+----------------------+----------------------+-----------+---------+----------+-----------+
|   acyclovir          | Take 400 mg by mouth |           | 0       | 20 |  |
| (ZOVIRAX) 400 MG     |  3 times daily as    |           |         | 12       | 9         |
| tablet               | needed.              |           |         |          |           |
+----------------------+----------------------+-----------+---------+----------+-----------+
|   Cholecalciferol    | Take 2,000 Units by  |           | 0       | 20 |  |
| (VITAMIN D3) 2000    | mouth Daily.         |           |         | 12       | 9         |
| UNITS CAPS           |                      |           |         |          |           |
+----------------------+----------------------+-----------+---------+----------+-----------+
|   cyanocobalamin     | injected             |           | 0       | 20 |  |
| (VITAMIN B-12) 1,000 | intramuscularly once |           |         | 12       | 9         |
|  mcg/mL injection    |  a month             |           |         |          |           |
+----------------------+----------------------+-----------+---------+----------+-----------+
|   diltiazem          | Take 120 mg by mouth |           | 0       |          |  |
| (DILT-XR) 120 mg 24  |  Daily.              |           |         |          | 9         |
| hr capsule           |                      |           |         |          |           |
+----------------------+----------------------+-----------+---------+----------+-----------+
|   loratadine         | Take 10 mg by mouth  |           | 0       |          |  |
| (CLARITIN) 10 mg     | Daily.               |           |         |          | 9         |
| tablet               |                      |           |         |          |           |
+----------------------+----------------------+-----------+---------+----------+-----------+
|   losartan (COZAAR)  | Take 100 mg by mouth |           | 0       |          |  |
| 100 MG tablet        |  Daily. 1/2 daily    |           |         |          | 9         |
 
+----------------------+----------------------+-----------+---------+----------+-----------+
|   methotrexate 2.5   | Take 15 mg by mouth  |           | 0       |          |  |
| mg tablet            | Once a week.         |           |         |          | 9         |
+----------------------+----------------------+-----------+---------+----------+-----------+
|                      | Take 1 tablet by     |           | 0       |          |  |
| oxyCODONE-acetaminop | mouth every 4 hours  |           |         |          | 9         |
| hen (PERCOCET) 5-325 | as needed for Pain.  |           |         |          |           |
|  mg per tablet       |                      |           |         |          |           |
+----------------------+----------------------+-----------+---------+----------+-----------+
|   pseudoePHEDrine    | Take 30 mg by mouth  |           | 0       |          |  |
| (SUDOGEST) 30 mg     | every 4 hours as     |           |         |          | 9         |
| tablet               | needed for           |           |         |          |           |
|                      | Congestion.          |           |         |          |           |
+----------------------+----------------------+-----------+---------+----------+-----------+
|   rivaroxaban        | Take 20 mg by mouth  |           | 0       |          |  |
| (XARELTO) 20 mg      | Daily (with dinner). |           |         |          | 9         |
| tablet               |                      |           |         |          |           |
+----------------------+----------------------+-----------+---------+----------+-----------+
|   sertraline         | 2 tablets daily      |           | 0       | 20 |  |
| (ZOLOFT) 100 mg      |                      |           |         | 12       | 9         |
| tablet               |                      |           |         |          |           |
+----------------------+----------------------+-----------+---------+----------+-----------+
 documented as of this encounter
 
 Plan of Treatment
 
 
+--------+---------+-------------+----------------------+-------------+
| Date   | Type    | Specialty   | Care Team            | Description |
+--------+---------+-------------+----------------------+-------------+
| /  Office  | Pulmonology |   Juan F,          |             |
| 2019   | Visit   |             | Jessica Cheung,   |             |
|        |         |             | MD  [address] |             |
|        |         |             |   [place],   |             |
|        |         |             | [place] [address]             |             |
|        |         |             | [phone]         |             |
|        |         |             | [phone] (Fax)   |             |
+--------+---------+-------------+----------------------+-------------+
| / | Office  | Cardiology  |   MableEric, |             |
| 2020   | Visit   |             |  MD  [address]   |             |
|        |         |             | [place]  |             |
|        |         |             | [address]  [phone]  |             |
|        |         |             |  [phone] (Fax)  |             |
+--------+---------+-------------+----------------------+-------------+
 documented as of this encounter
 
 Procedures
 
 
+----------------------+--------+-------------+----------------------+----------------------
+
| Procedure Name       | Priori | Date/Time   | Associated Diagnosis | Comments             
|
|                      | ty     |             |                      |                      
|
+----------------------+--------+-------------+----------------------+----------------------
+
| CT ANGIOGRAM ABDOMEN | Routin | 2016  |                      |   Results for this   
|
|  W CONTRAST          | e      |  8:26 PM    |                      | procedure are in the 
 
|
|                      |        | PDT         |                      |  results section.    
|
+----------------------+--------+-------------+----------------------+----------------------
+
 documented in this encounter
 
 Results
 CT Angiogram Abdomen w Contrast (2016  8:26 PM PDT)
 
+----------+
| Specimen |
+----------+
|          |
+----------+
 
 
 
+------------------------------------------------------------------------+--------------+
| Narrative                                                              | Performed At |
+------------------------------------------------------------------------+--------------+
|   This is a non-reportable procedure without a radiologist report and  |              |
| is  used for image storage only                                        |              |
+------------------------------------------------------------------------+--------------+
 
 
 
+--------------------------------------------------------------------------------------+
| Procedure Note                                                                       |
+--------------------------------------------------------------------------------------+
|   Andrzej Steven Irvin - 2019  4:21 AM PDT  This is a non-reportable procedure  |
| without a radiologist report and isused for image storage only                       |
+--------------------------------------------------------------------------------------+
 documented in this encounter
 
 Visit Diagnoses
 
 
+------------------------------------------------------------------------------------+
| Diagnosis                                                                          |
+------------------------------------------------------------------------------------+
|   Diagnosis unknown  Other unknown and unspecified cause of morbidity or mortality |
+------------------------------------------------------------------------------------+
 documented in this encounter

## 2019-12-06 NOTE — XMS
Encounter Summary
  Created on: 2019
 
 Shane Wyatttien BroussardJosue
 External Reference #: [id number]
 : 43
 Sex: Male
 
 Demographics
 
 
+-----------------------+---------------------------+
| Address               | [address]        |
|                       | [place]  [address] |
+-----------------------+---------------------------+
| Home Phone            | [phone]           |
+-----------------------+---------------------------+
| Preferred Language    | Unknown                   |
+-----------------------+---------------------------+
| Marital Status        |                    |
+-----------------------+---------------------------+
| Denominational Affiliation | [id number]                      |
+-----------------------+---------------------------+
| Race                  | Unknown                   |
+-----------------------+---------------------------+
| Ethnic Group          | Unknown                   |
+-----------------------+---------------------------+
 
 
 Author
 
 
+--------------+--------------------------------------------+
| Author       | [facility]  |
|              | and [place]                                |
+--------------+--------------------------------------------+
| Organization | [facility]  |
|              | and [place]                                |
+--------------+--------------------------------------------+
| Address      | Unknown                                    |
+--------------+--------------------------------------------+
| Phone        | Unavailable                                |
+--------------+--------------------------------------------+
 
 
 
 Support
 
 
+---------------+--------------+--------------------+-----------------+
| Name          | Relationship | Address            | Phone           |
+---------------+--------------+--------------------+-----------------+
| Opal Shane | ECON         | [address]     | [phone] |
|               |              | [place]   |                 |
|               |              | [address]              |                 |
+---------------+--------------+--------------------+-----------------+
 
 
 
 
 Care Team Providers
 
 
+-----------------------+------+-------------+
| Care Team Member Name | Role | Phone       |
+-----------------------+------+-------------+
 PCP  | Unavailable |
+-----------------------+------+-------------+
 
 
 
 Encounter Details
 
 
+--------+-----------+----------------------+-----------+-------------+
| Date   | Type      | Department           | Care Team | Description |
+--------+-----------+----------------------+-----------+-------------+
| / | Hospital  |   [facility] |           |             |
|    | Encounter |  MED CTR GENERIC OP  |           |             |
|        |           | CONV DEPT  [address]     |           |             |
|        |           | [place]  [place], |           |             |
|        |           |  [place] [address]       |           |             |
|        |           | [phone]         |           |             |
+--------+-----------+----------------------+-----------+-------------+
 
 
 
 Social History
 
 
+----------------+-------+-----------+--------+------+
| Tobacco Use    | Types | Packs/Day | Years  | Date |
|                |       |           | Used   |      |
+----------------+-------+-----------+--------+------+
| Never Assessed |       |           |        |      |
+----------------+-------+-----------+--------+------+
 
 
 
+------------------+---------------+
| Sex Assigned at  | Date Recorded |
| Birth            |               |
+------------------+---------------+
| Not on file      |               |
+------------------+---------------+
 
 
 
+----------------+-------------+-------------+
| Job Start Date | Occupation  | Industry    |
+----------------+-------------+-------------+
| Not on file    | Not on file | Not on file |
+----------------+-------------+-------------+
 
 
 
+----------------+--------------+------------+
| Travel History | Travel Start | Travel End |
+----------------+--------------+------------+
 
 
 
 
+-------------------------------------+
| No recent travel history available. |
+-------------------------------------+
 documented as of this encounter
 
 Plan of Treatment
 
 
+--------+---------+-------------+----------------------+-------------+
| Date   | Type    | Specialty   | Care Team            | Description |
+--------+---------+-------------+----------------------+-------------+
| / | Office  | Pulmonology |   Juan F,          |             |
| 2019   | Visit   |             | Jessica Cheung,   |             |
|        |         |             | MD  [address] |             |
|        |         |             |   [place],   |             |
|        |         |             | [place] [address]             |             |
|        |         |             | [phone]         |             |
|        |         |             | [phone] (Fax)   |             |
+--------+---------+-------------+----------------------+-------------+
| / | Office  | Cardiology  |   Eric Akins, |             |
| 2020   | Visit   |             |  MD [address]   |             |
|        |         |             | [place]  |             |
|        |         |             | [address]  [phone]  |             |
|        |         |             |  [phone] (Fax)  |             |
+--------+---------+-------------+----------------------+-------------+
 documented as of this encounter
 
 Visit Diagnoses
 Not on filedocumented in this encounter

## 2019-12-06 NOTE — XMS
Encounter Summary
  Created on: 2019
 
 Shane Wyatttien BroussardJosue
 External Reference #: [id number]
 : 43
 Sex: Male
 
 Demographics
 
 
+-----------------------+---------------------------+
| Address               | [address]        |
|                       | [place]  [address] |
+-----------------------+---------------------------+
| Home Phone            | [phone]           |
+-----------------------+---------------------------+
| Preferred Language    | Unknown                   |
+-----------------------+---------------------------+
| Marital Status        |                    |
+-----------------------+---------------------------+
| Nondenominational Affiliation | 1028                      |
+-----------------------+---------------------------+
| Race                  | Unknown                   |
+-----------------------+---------------------------+
| Ethnic Group          | Unknown                   |
+-----------------------+---------------------------+
 
 
 Author
 
 
+--------------+--------------------------------------------+
| Author       | [facility]  |
|              | and [place]                                |
+--------------+--------------------------------------------+
| Organization | [facility]  |
|              | and [place]                                |
+--------------+--------------------------------------------+
| Address      | Unknown                                    |
+--------------+--------------------------------------------+
| Phone        | Unavailable                                |
+--------------+--------------------------------------------+
 
 
 
 Support
 
 
+---------------+--------------+--------------------+-----------------+
| Name          | Relationship | Address            | Phone           |
+---------------+--------------+--------------------+-----------------+
| Opal Shane | ECON         | [address]     | [phone] |
|               |              | [place]   |                 |
|               |              | [address]              |                 |
+---------------+--------------+--------------------+-----------------+
 
 
 
 
 Care Team Providers
 
 
+-----------------------+------+-----------------+
| Care Team Member Name | Role | Phone           |
+-----------------------+------+-----------------+
| Erwin Iniguez MD | PCP  | [phone] |
+-----------------------+------+-----------------+
 
 
 
 Reason for Visit
 
 
+--------+----------+
| Reason | Comments |
+--------+----------+
| Apnea  |          |
+--------+----------+
 
 
 
 Encounter Details
 
 
+--------+---------+----------------------+----------------------+----------------------+
| Date   | Type    | Department           | Care Team            | Description          |
+--------+---------+----------------------+----------------------+----------------------+
| / | Office  |   [facility]      |   Sohail Campbell PA  | JOANN on CPAP (Primary |
| 2014   | Visit   | SLEEP DISORDER  [address]  |  [address]     |  Dx); [address]  |
|        |         | [address]      | [place]      | respiration          |
|        |         | [place] [address] | [address]  [phone]  |                      |
|        |         |   [phone]       |  [phone] (Fax)  |                      |
+--------+---------+----------------------+----------------------+----------------------+
 
 
 
 Social History
 
 
+---------------+------------+-----------+--------+------------------+
| Tobacco Use   | Types      | Packs/Day | Years  | Date             |
|               |            |           | Used   |                  |
+---------------+------------+-----------+--------+------------------+
| Former Smoker | Cigarettes | 1.3       | 34     | Quit: 1996 |
+---------------+------------+-----------+--------+------------------+
 
 
 
+---------------------+---+---+---+
| Smokeless Tobacco:  |   |   |   |
| Never Used          |   |   |   |
+---------------------+---+---+---+
 
 
 
+-------------+-------------+---------+----------+
| Alcohol Use | Drinks/Week | oz/Week | Comments |
+-------------+-------------+---------+----------+
 
| No          |             |         |          |
+-------------+-------------+---------+----------+
 
 
 
+------------------+---------------+
| Sex Assigned at  | Date Recorded |
| Birth            |               |
+------------------+---------------+
| Not on file      |               |
+------------------+---------------+
 
 
 
+----------------+-------------+-------------+
| Job Start Date | Occupation  | Industry    |
+----------------+-------------+-------------+
| Not on file    | Not on file | Not on file |
+----------------+-------------+-------------+
 
 
 
+----------------+--------------+------------+
| Travel History | Travel Start | Travel End |
+----------------+--------------+------------+
 
 
 
+-------------------------------------+
| No recent travel history available. |
+-------------------------------------+
 documented as of this encounter
 
 Last Filed Vital Signs
 
 
+-------------------+----------------------+----------------------+----------+
| Vital Sign        | Reading              | Time Taken           | Comments |
+-------------------+----------------------+----------------------+----------+
| Blood Pressure    | 122/58               | 2014 11:23 AM  |          |
|                   |                      | PST                  |          |
+-------------------+----------------------+----------------------+----------+
| Pulse             | 64                   | 2014 11:23 AM  |          |
|                   |                      | PST                  |          |
+-------------------+----------------------+----------------------+----------+
| Temperature       | -                    | -                    |          |
+-------------------+----------------------+----------------------+----------+
| Respiratory Rate  | 16                   | 2014 11:23 AM  |          |
|                   |                      | PST                  |          |
+-------------------+----------------------+----------------------+----------+
| Oxygen Saturation | 96%                  | 2014 11:23 AM  |          |
|                   |                      | PST                  |          |
+-------------------+----------------------+----------------------+----------+
| Inhaled Oxygen    | -                    | -                    |          |
| Concentration     |                      |                      |          |
+-------------------+----------------------+----------------------+----------+
| Weight            | 85.7 kg (188 lb 14.4 | 2014 11:23 AM  |          |
|                   |  oz)                 | PST                  |          |
+-------------------+----------------------+----------------------+----------+
| Height            | -                    | -                    |          |
 
+-------------------+----------------------+----------------------+----------+
| Body Mass Index   | 30.49                | 2013  1:28 PM  |          |
|                   |                      | PDT                  |          |
+-------------------+----------------------+----------------------+----------+
 documented in this encounter
 
 Progress Notes
 Sohail Campbell PA - 2014 11:22 AM PSTFormatting of this note might be different from 
the original.
 Subjective: 
  
 Patient ID: Wyatt Shane is a 68 y.o. male.
 
 HPI
 
 last office visit was:  2013
 date of polysomnography: 10/07/2013 
 AHI: 77.4
 RDI: 77.4
 O2%: 86% with 15.8 minutes below 88% 
 Machine type: Respironics ASV BiPAP with nasal mask (Aditi)
 obtained from:  [facility]
 pressure: EPAP = 4cm;PSmin=0cm;PSmax=25cm;backup rate=auto
 Nights using BiPAP: 32/36
 average usage (all nights):  2:59
 average usage (nights used):  3:21
 AHI: 2.1
 
 Wyatt comes in for BiPAP compliance.  He continues to wear his BiPAP on a regular basis, but
 he has struggled with wearing it for the duration of the night.  He has had problems with c
ongestion, which makes it very difficult to breathe through his nose.  The nasal mask does n
ot allow him to breathe through his mouth.  He has had to lower his humidity from 4.0 to 2.0
 because he has had condensation buildup in his hose.  It appears that lowering his humidity
 has made the congestion problem worse.
 
 I have discussed the download in detail.  This shows that his sleep apnea is controlled, wi
th an AHI of 2.1.  It also shows that his leaks are well controlled. 
 
 Review of Systems
 
 Objective: 
 Physical Exam
 
 Assessment: 
 
 Problem #1: OBSTRUCTIVE SLEEP APNEA (ICD 9-327.23)
 This is controlled with BiPAP.  He is wearing his BiPAP regularly, but continues to struggl
e with wearing it for the duration of the night.  He has had problems with congestion and co
ndensation in his hose.
 
 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 
 This is controlled with ASV BiPAP.
 
 Plan: 
 
 He is to continue with his BiPAP indefinitely.  I recommended that he go to [facility] to have hi
s heated hose checked/replaced, which should stop his problems with condensation.  If his co
ngestion continues, he should consider using a full face mask.  He is to work toward wearing
 his BiPAP 100% of the time he is asleep.
 
 
 I will follow up again in 1 month, sooner prn.  Fifteen minutes were spent face-to-face, wi
th the majority of time spent in counseling.
 
 Sohail Campbell PA-C
 
 cc: 
 Dr. Erwin Foote
 
 Electronically signed by ROWENA Greene at 2014 12:26 PM PSTdocumented in this enco
unter
 
 Plan of Treatment
 
 
+--------+---------+-------------+----------------------+-------------+
| Date   | Type    | Specialty   | Care Team            | Description |
+--------+---------+-------------+----------------------+-------------+
| / | Office  | Pulmonology |   Juan F,          |             |
|    | Visit   |             | Jessica Cheung,   |             |
|        |         |             | MD [address] |             |
|        |         |             |   [place]   |             |
|        |         |             | [place] [address]             |             |
|        |         |             | [phone]         |             |
|        |         |             | [phone] (Fax)   |             |
+--------+---------+-------------+----------------------+-------------+
| / | Office  | Cardiology  |   Eric Akins, |             |
|    | Visit   |             |  MD [address]   |             |
|        |         |             | [place]  |             |
|        |         |             | [address]  [phone]  |             |
|        |         |             |  [phone] (Fax)  |             |
+--------+---------+-------------+----------------------+-------------+
 documented as of this encounter
 
 Visit Diagnoses
 
 
+----------------------------------------------------------------------+
| Diagnosis                                                            |
+----------------------------------------------------------------------+
|   JOANN on CPAP - Primary  Obstructive sleep apnea (adult) (pediatric) |
+----------------------------------------------------------------------+
|   [place] respiration                                          |
+----------------------------------------------------------------------+
 documented in this encounter

## 2019-12-06 NOTE — XMS
Encounter Summary
  Created on: 2019
 
 Wyatt Shane
 External Reference #: [id number]
 : 43
 Sex: Male
 
 Demographics
 
 
+-----------------------+------------------------+
| Address               | [address]     |
|                       | [place]  [address]   |
+-----------------------+------------------------+
| Home Phone            | [phone]        |
+-----------------------+------------------------+
| Preferred Language    | Unknown                |
+-----------------------+------------------------+
| Marital Status        |                 |
+-----------------------+------------------------+
| Orthodox Affiliation | LUT                    |
+-----------------------+------------------------+
| Race                  | White                  |
+-----------------------+------------------------+
| Ethnic Group          | Not  or  |
+-----------------------+------------------------+
 
 
 Author
 
 
+--------------+-------------+
| Organization | Unknown     |
+--------------+-------------+
| Address      | Unknown     |
+--------------+-------------+
| Phone        | Unavailable |
+--------------+-------------+
 
 
 
 Support
 
 
+---------------+--------------+---------+-----------------+
| Name          | Relationship | Address | Phone           |
+---------------+--------------+---------+-----------------+
| Opal Shane | ECON         | Unknown | [phone] |
+---------------+--------------+---------+-----------------+
 
 
 
 Care Team Providers
 
 
+-----------------------+------+-----------------+
| Care Team Member Name | Role | Phone           |
 
+-----------------------+------+-----------------+
| Erwin Iniguez MD   | PCP  | [phone] |
+-----------------------+------+-----------------+
 
 
 
 Encounter Details
 
 
+--------+-------------+------------+--------------------+-------------+
| Date   | Type        | Department | Care Team          | Description |
+--------+-------------+------------+--------------------+-------------+
| / | Transcribed |            |   Dictation, Other | Transcribed |
| 1998   |             |            |                    |             |
+--------+-------------+------------+--------------------+-------------+
 
 
 
 Social History
 
 
+----------------+-------+-----------+--------+------+
| Tobacco Use    | Types | Packs/Day | Years  | Date |
|                |       |           | Used   |      |
+----------------+-------+-----------+--------+------+
| Never Assessed |       |           |        |      |
+----------------+-------+-----------+--------+------+
 
 
 
+------------------+---------------+
| Sex Assigned at  | Date Recorded |
| Birth            |               |
+------------------+---------------+
| Not on file      |               |
+------------------+---------------+
 
 
 
+----------------+-------------+-------------+
| Job Start Date | Occupation  | Industry    |
+----------------+-------------+-------------+
| Not on file    | Not on file | Not on file |
+----------------+-------------+-------------+
 
 
 
+----------------+--------------+------------+
| Travel History | Travel Start | Travel End |
+----------------+--------------+------------+
 
 
 
+-------------------------------------+
| No recent travel history available. |
+-------------------------------------+
 documented as of this encounter
 
 Progress Notes
 Interface, Transcription In - 2006  3:04 AM [facility]                                    [place]
 
[facility] [place] [address] [place], [place] [address] [phone] or [phone]
 
 1998
 
 BE PAULA MD
 [address]  [address]
 
 RE:       Wyatt Shane
 MR#       [id number]
 
 Dear Doctor Paolo:
 
 As we discussed on the phone, I did see Wyatt Shane in consultation.  I am in
 the process of obtaining a CT scan and reviewing his slides to determine
 the degree of dysplasia carcinoma in situ that is present.  I believe that
 this patient has significant reflux in addition which is contributing and I
 placed him on Prilosec on a b.i.d. during the healing phase to see if this
 will help us understand the epithelial process.
 
 I will be in touch with you.  I appreciate the chance to see him.
 
 Sincerely,
 
 Jimmy Esposito M.D.
 ,
 Otolaryngology
 Head and Neck Surgery
 
 Nimesh
 D: [birth year]
 T: 98Electronically signed by Interface, Transcription In at 2006  3:04 AM PSTd
ocumented in this encounter
 
 Plan of Treatment
 Not on filedocumented as of this encounter
 
 Visit Diagnoses
 Not on filedocumented in this encounter

## 2019-12-06 NOTE — XMS
Encounter Summary
  Created on: 2019
 
 Wyatt Shane
 External Reference #: [id number]
 : 43
 Sex: Male
 
 Demographics
 
 
+-----------------------+------------------------+
| Address               | [address]     |
|                       | [place]  [address]   |
+-----------------------+------------------------+
| Home Phone            | [phone]        |
+-----------------------+------------------------+
| Preferred Language    | Unknown                |
+-----------------------+------------------------+
| Marital Status        |                 |
+-----------------------+------------------------+
| Yazidism Affiliation | LUT                    |
+-----------------------+------------------------+
| Race                  | White                  |
+-----------------------+------------------------+
| Ethnic Group          | Not  or  |
+-----------------------+------------------------+
 
 
 Author
 
 
+--------------+------------------------------+
| Author       | [facility] |
+--------------+------------------------------+
| Organization | [facility] |
+--------------+------------------------------+
| Address      | Unknown                      |
+--------------+------------------------------+
| Phone        | Unavailable                  |
+--------------+------------------------------+
 
 
 
 Support
 
 
+---------------+--------------+---------+-----------------+
| Name          | Relationship | Address | Phone           |
+---------------+--------------+---------+-----------------+
| Opal Shane | ECON         | Unknown | [phone] |
+---------------+--------------+---------+-----------------+
 
 
 
 Care Team Providers
 
 
 
+-----------------------+------+-----------------+
| Care Team Member Name | Role | Phone           |
+-----------------------+------+-----------------+
| Erwin Steel MD   | PCP  | [phone] |
+-----------------------+------+-----------------+
 
 
 
 Encounter Details
 
 
+--------+-------------+-----------------+---------------------+---------------+
| Date   | Type        | Department      | Care Team           | Description   |
+--------+-------------+-----------------+---------------------+---------------+
| / | Office      |   [facility]  |   Note, Outpatient  | Progress Note |
|    | Visit-Trans | MEDICINE        | Clinic              |               |
|        | cribed      |                 |                     |               |
+--------+-------------+-----------------+---------------------+---------------+
 
 
 
 Social History
 
 
+----------------+-------+-----------+--------+------+
| Tobacco Use    | Types | Packs/Day | Years  | Date |
|                |       |           | Used   |      |
+----------------+-------+-----------+--------+------+
| Never Assessed |       |           |        |      |
+----------------+-------+-----------+--------+------+
 
 
 
+------------------+---------------+
| Sex Assigned at  | Date Recorded |
| Birth            |               |
+------------------+---------------+
| Not on file      |               |
+------------------+---------------+
 
 
 
+----------------+-------------+-------------+
| Job Start Date | Occupation  | Industry    |
+----------------+-------------+-------------+
| Not on file    | Not on file | Not on file |
+----------------+-------------+-------------+
 
 
 
+----------------+--------------+------------+
| Travel History | Travel Start | Travel End |
+----------------+--------------+------------+
 
 
 
+-------------------------------------+
| No recent travel history available. |
+-------------------------------------+
 documented as of this encounter
 
 
 Progress Notes
 Interface, Transcription In - 2006  2:31 AM PSTCLINIC DATE:       1999
 
                 OTOLARYNGOLOGY/HEAD AND NECK SURGERY CLINIC
 
 SUBJECTIVE:  Mr. Shane is seen back in follow-up today with regard to his
 laryngeal carcinoma.  It has been a month since he completed his radiation.
 He continues to be troubled by difficulty breathing if he does not take
 prednisone.  He is also significantly troubled by gastroesophageal (GE)
 symptomatology.
 
 PHYSICAL EXAMINATION:  On examination today flexible fiberoptic
 laryngoscopy is done and reveals that his larynx is quite red today over
 its posterior half, and subglottically he is narrowed approximately 60% in
 terms of his airway with a band just at the inferior aspect of the cricoid.
 There is no evidence of recurrent disease.
 
 ASSESSMENT:  Subglottic stenosis, status post hemilaryngectomy.
 
 PLAN:  We need to get his reflux under control and do a pH probe test to
 ensure that this is not an ongoing factor.  I have encouraged him to get
 back on his Prilosec, as he is only taking Zantac, and hopefully he will do
 this.  We will have him come down for a pH probe test and do laryngeal
 subglottic dilation at that time. He understands the potential need for a
 tracheotomy at the time of surgery.  He also understands that if his
 symptomatology worsens in the interim, he is to come down and we will
 perform this more electively.
 
 ADDENDUM:  A pH probe test will be arranged as soon as possible.  The
 patient will call my  and we will coordinate the surgery around
 this event.
 
 Jimmy Esposito M.D.
 , Otolaryngology
 Head and Neck Surgery
 
 MARIA ALEJANDRA/dandy
 D: 1999
 T: 1999
 
 cc:
 
 BE PAULA MD
 [address] [facility]  [address]
 
 ERWIN STEEL MD
 [address] [facility]
 [facility]  [address]
 
 VIBHA BRASWELL MD
 [address]  [id number] signed by Interface, Transcription In at   2:31 AM PSTdocumented in this encounter
 
 Plan of Treatment
 Not on filedocumented as of this encounter
 
 
 Visit Diagnoses
 Not on filedocumented in this encounter

## 2019-12-06 NOTE — XMS
Encounter Summary
  Created on: 2019
 
 Wyatt Shane
 External Reference #: [id number]
 : 43
 Sex: Male
 
 Demographics
 
 
+-----------------------+------------------------+
| Address               | [address]     |
|                       | [place]  [address]   |
+-----------------------+------------------------+
| Home Phone            | [phone]        |
+-----------------------+------------------------+
| Preferred Language    | Unknown                |
+-----------------------+------------------------+
| Marital Status        |                 |
+-----------------------+------------------------+
| Gnosticist Affiliation | LUT                    |
+-----------------------+------------------------+
| Race                  | White                  |
+-----------------------+------------------------+
| Ethnic Group          | Not  or  |
+-----------------------+------------------------+
 
 
 Author
 
 
+--------------+------------------------------+
| Author       | [facility] |
+--------------+------------------------------+
| Organization | [facility] |
+--------------+------------------------------+
| Address      | Unknown                      |
+--------------+------------------------------+
| Phone        | Unavailable                  |
+--------------+------------------------------+
 
 
 
 Support
 
 
+---------------+--------------+---------+-----------------+
| Name          | Relationship | Address | Phone           |
+---------------+--------------+---------+-----------------+
| Opal Shane | ECON         | Unknown | [phone] |
+---------------+--------------+---------+-----------------+
 
 
 
 Care Team Providers
 
 
 
+-----------------------+------+-----------------+
| Care Team Member Name | Role | Phone           |
+-----------------------+------+-----------------+
| Erwin Iniguez MD   | PCP  | [phone] |
+-----------------------+------+-----------------+
 
 
 
 Encounter Details
 
 
+--------+-------------+-----------------+---------------------+---------------+
| Date   | Type        | Department      | Care Team           | Description   |
+--------+-------------+-----------------+---------------------+---------------+
| / | Office      |   [facility]  |   Note, Outpatient  | Progress Note |
| 2000   | Visit-Trans | MEDICINE        | Clinic              |               |
|        | cribed      |                 |                     |               |
+--------+-------------+-----------------+---------------------+---------------+
 
 
 
 Social History
 
 
+----------------+-------+-----------+--------+------+
| Tobacco Use    | Types | Packs/Day | Years  | Date |
|                |       |           | Used   |      |
+----------------+-------+-----------+--------+------+
| Never Assessed |       |           |        |      |
+----------------+-------+-----------+--------+------+
 
 
 
+------------------+---------------+
| Sex Assigned at  | Date Recorded |
| Birth            |               |
+------------------+---------------+
| Not on file      |               |
+------------------+---------------+
 
 
 
+----------------+-------------+-------------+
| Job Start Date | Occupation  | Industry    |
+----------------+-------------+-------------+
| Not on file    | Not on file | Not on file |
+----------------+-------------+-------------+
 
 
 
+----------------+--------------+------------+
| Travel History | Travel Start | Travel End |
+----------------+--------------+------------+
 
 
 
+-------------------------------------+
| No recent travel history available. |
+-------------------------------------+
 documented as of this encounter
 
 
 Progress Notes
 Interface, Transcription In - 2006  5:09 AM PSTCLINIC DATE:       2000
 
 OTOLARYNGOLOGY HEAD AND NECK SURGERY
 
 SUBJECTIVE:  Mr. Shane is seen back for  followup  of  pH  probe  monitoring
 prior to  otolaryngotracheoplasty.   I  placed  a  pH  probe using flexible
 fiberoptic  laryngoscopic  control,   securing   the  upper  probe  at  the
 cricopharyngeus and answered a series  of  questions  relating  to proposed
 surgery.  They will call me next week for the results of the pH probe.
 
 Jimmy Esposito M.D.
 
 MARIA ALEJANDRA / 
 [id number] / [id number] / [id number] / [id number]
 D: 2000
 T: 2000Electronically signed by Interface, Transcription In at 2006  5:09 AM PS
Tdocumented in this encounter
 
 Plan of Treatment
 Not on filedocumented as of this encounter
 
 Visit Diagnoses
 Not on filedocumented in this encounter

## 2019-12-06 NOTE — XMS
Encounter Summary
  Created on: 2019
 
 Wyatt Shane
 External Reference #: [id number]
 : 43
 Sex: Male
 
 Demographics
 
 
+-----------------------+------------------------+
| Address               | [address]     |
|                       | [place]  [address]   |
+-----------------------+------------------------+
| Home Phone            | [phone]        |
+-----------------------+------------------------+
| Preferred Language    | Unknown                |
+-----------------------+------------------------+
| Marital Status        |                 |
+-----------------------+------------------------+
| Protestant Affiliation | LUT                    |
+-----------------------+------------------------+
| Race                  | White                  |
+-----------------------+------------------------+
| Ethnic Group          | Not  or  |
+-----------------------+------------------------+
 
 
 Author
 
 
+--------------+------------------------------+
| Author       | [facility] |
+--------------+------------------------------+
| Organization | [facility] |
+--------------+------------------------------+
| Address      | Unknown                      |
+--------------+------------------------------+
| Phone        | Unavailable                  |
+--------------+------------------------------+
 
 
 
 Support
 
 
+---------------+--------------+---------+-----------------+
| Name          | Relationship | Address | Phone           |
+---------------+--------------+---------+-----------------+
| Opal Shane | ECON         | Unknown | [phone] |
+---------------+--------------+---------+-----------------+
 
 
 
 Care Team Providers
 
 
 
+-----------------------+------+-----------------+
| Care Team Member Name | Role | Phone           |
+-----------------------+------+-----------------+
| Erwin Iniguez MD   | PCP  | [phone] |
+-----------------------+------+-----------------+
 
 
 
 Encounter Details
 
 
+--------+-------------+-----------------+---------------------+---------------+
| Date   | Type        | Department      | Care Team           | Description   |
+--------+-------------+-----------------+---------------------+---------------+
| / | Office      |   [facility]  |   Note, Outpatient  | Progress Note |
| 2000   | Visit-Trans | MEDICINE        | Clinic              |               |
|        | cribed      |                 |                     |               |
+--------+-------------+-----------------+---------------------+---------------+
 
 
 
 Social History
 
 
+----------------+-------+-----------+--------+------+
| Tobacco Use    | Types | Packs/Day | Years  | Date |
|                |       |           | Used   |      |
+----------------+-------+-----------+--------+------+
| Never Assessed |       |           |        |      |
+----------------+-------+-----------+--------+------+
 
 
 
+------------------+---------------+
| Sex Assigned at  | Date Recorded |
| Birth            |               |
+------------------+---------------+
| Not on file      |               |
+------------------+---------------+
 
 
 
+----------------+-------------+-------------+
| Job Start Date | Occupation  | Industry    |
+----------------+-------------+-------------+
| Not on file    | Not on file | Not on file |
+----------------+-------------+-------------+
 
 
 
+----------------+--------------+------------+
| Travel History | Travel Start | Travel End |
+----------------+--------------+------------+
 
 
 
+-------------------------------------+
| No recent travel history available. |
+-------------------------------------+
 documented as of this encounter
 
 
 Progress Notes
 Interface, Transcription In - 2006  1:10 AM PSTCLINIC DATE:       2000
 
 SPEECH PATHOLOGY CLINIC
 
 Mr. Shane is a 54-year-old male who comes  in for preoperative evaluation as
 he is to undergo a laryngotracheoplasty  with  rib  graft, laryngeal stent,
 and tracheostomy.  He is the patient of Dr. Jimmy Esposito.  He is accompanied
 by his wife today.
 
 PREOPERATIVE VOICE:  The patient's vocal quality is persistently hoarse and
 strained.  He is, however, 100% intelligible.
 
 PREOPERATIVE SWALLOW:  The patient does  not  complain  of  any  swallowing
 difficulty.   He  does  note  that  he   has   an  occasional  reaction  to
 vinegar-like substances.  He states  that  he  feels  like  his  throat  is
 closing off in response to certain acidity.   Otherwise,  he  has a regular
 diet with thin liquids.  He has also been maintaining his weight.
 
 The  patient  seems  to  have  a good understanding  of  what  the  surgery
 involves, discussed with him and his  wife  the  effects  that  the surgery
 might  have  on  his  voice quality, and  swallowing.   The  patient  asked
 appropriate questions, and these were addressed at this time.
 
 PLAN:  We will follow up with Mr. Shane  for postoperative rehabilitation of
 voice and swallowing as medically appropriate.
 
 Janet Anthony M.A.
 Speech Pathology Fellow
 
 EBENEZER / TIESHA
 [id number] / [id number] / [id number] / [id number]
 D: 2000
 T: 2000
 C: 2000 dsElectronically signed by Interface, Transcription In at 2006  1:10 AM
 PSTInterface, Transcription In - 2006  1:10 AM PSTCLINIC DATE:       2000
 
 OTOLARYNGOLOGY HEAD NECK SURGERY CLINIC
 
 Mr. Shane  is  seen  back  today  for  preoperative   discussion  regarding
 laryngotracheoplasty.  He seems to have  good  understanding  of the issues
 involved.  Full PAR was held, and consent was signed.
 
 Jimmy Esposito M.D.
 
 [facility] / 
 [id number] / [id number] / [id number] / [id number]
 D: 2000
 T: 2000Electronically signed by Interface, Transcription In at 2006  1:10 AM PS
Tdocumented in this encounter
 
 Plan of Treatment
 Not on filedocumented as of this encounter
 
 Visit Diagnoses
 Not on filedocumented in this encounter

## 2019-12-06 NOTE — XMS
Encounter Summary
  Created on: 2019
 
 Wyatt Shane
 External Reference #: [id number]
 : 43
 Sex: Male
 
 Demographics
 
 
+-----------------------+------------------------+
| Address               | [address]     |
|                       | [place]  [address]   |
+-----------------------+------------------------+
| Home Phone            | [phone]        |
+-----------------------+------------------------+
| Preferred Language    | Unknown                |
+-----------------------+------------------------+
| Marital Status        |                 |
+-----------------------+------------------------+
| Protestant Affiliation | LUT                    |
+-----------------------+------------------------+
| Race                  | White                  |
+-----------------------+------------------------+
| Ethnic Group          | Not  or  |
+-----------------------+------------------------+
 
 
 Author
 
 
+--------------+------------------------------+
| Author       | [facility] |
+--------------+------------------------------+
| Organization | [facility] |
+--------------+------------------------------+
| Address      | Unknown                      |
+--------------+------------------------------+
| Phone        | Unavailable                  |
+--------------+------------------------------+
 
 
 
 Support
 
 
+---------------+--------------+---------+-----------------+
| Name          | Relationship | Address | Phone           |
+---------------+--------------+---------+-----------------+
| Opal Shane | ECON         | Unknown | [phone] |
+---------------+--------------+---------+-----------------+
 
 
 
 Care Team Providers
 
 
 
+-----------------------+------+-------------+
| Care Team Member Name | Role | Phone       |
+-----------------------+------+-------------+
 PCP  | Unavailable |
+-----------------------+------+-------------+
 
 
 
 Encounter Details
 
 
+--------+----------+----------------------+--------------------+-------------+
| Date   | Type     | Department           | Care Team          | Description |
+--------+----------+----------------------+--------------------+-------------+
| / | Results  |   Otolaryngology     |   Jimmy Esposito MD |             |
|    | Only     | Head and Neck        |                    |             |
|        |          | Surgery Services at  |                    |             |
|        |          | PPV  [address] [facility]     |                    |             |
|        |          | [address]       |                    |             |
|        |          | Mailcode: [address]       |                    |             |
|        |          | [address] [address] |                    |             |
|        |          |   [place], [place]       |                    |             |
|        |          | [address]           |                    |             |
|        |          | [phone]         |                    |             |
+--------+----------+----------------------+--------------------+-------------+
 
 
 
 Social History
 
 
+----------------+-------+-----------+--------+------+
| Tobacco Use    | Types | Packs/Day | Years  | Date |
|                |       |           | Used   |      |
+----------------+-------+-----------+--------+------+
| Never Assessed |       |           |        |      |
+----------------+-------+-----------+--------+------+
 
 
 
+------------------+---------------+
| Sex Assigned at  | Date Recorded |
| Birth            |               |
+------------------+---------------+
| Not on file      |               |
+------------------+---------------+
 
 
 
+----------------+-------------+-------------+
| Job Start Date | Occupation  | Industry    |
+----------------+-------------+-------------+
| Not on file    | Not on file | Not on file |
+----------------+-------------+-------------+
 
 
 
+----------------+--------------+------------+
| Travel History | Travel Start | Travel End |
+----------------+--------------+------------+
 
 
 
 
+-------------------------------------+
| No recent travel history available. |
+-------------------------------------+
 documented as of this encounter
 
 Plan of Treatment
 Not on filedocumented as of this encounter
 
 Procedures
 
 
+--------------------+--------+-------------+----------------------+----------------------+
| Procedure Name     | Priori | Date/Time   | Associated Diagnosis | Comments             |
|                    | ty     |             |                      |                      |
+--------------------+--------+-------------+----------------------+----------------------+
| X-RAY CHEST 2 VIEW | Priori | 1999  |                      |   Results for this   |
|                    | ty     |  2:43 PM    |                      | procedure are in the |
|                    |        | PDT         |                      |  results section.    |
+--------------------+--------+-------------+----------------------+----------------------+
 documented in this encounter
 
 Results
 CHEST 2 VIEW (1999  2:43 PM PDT)
 
+-----------+--------------------------+-----------+------------+--------------+
| Component | Value                    | Ref Range | Performed  | Pathologist  |
|           |                          |           | At         | Signature    |
+-----------+--------------------------+-----------+------------+--------------+
| CHEST, 2  | Radiologist 1: [facility], |           |            |              |
| VIEWS OR  |  SARINA ALVA M.D. TWO    |           |            |              |
| STEREO    | VIEWS OF THE CHEST:      |           |            |              |
|           | 99 at 1443 hours.  |           |            |              |
|           |    Dictated 99     |           |            |              |
|           | COMPARISON: Comparison   |           |            |              |
|           | is made with prior two   |           |            |              |
|           | view exam from 98. |           |            |              |
|           |  FINDINGS: Median        |           |            |              |
|           | sternotomy wires are in  |           |            |              |
|           | place. The configuration |           |            |              |
|           |  ofthe cardiomediastinal |           |            |              |
|           |  silhouette is           |           |            |              |
|           | unchanged. The           |           |            |              |
|           | descending thoracicaorta |           |            |              |
|           |  is mildly tortuous. The |           |            |              |
|           |  costophrenic angles are |           |            |              |
|           |  sharp and thepleural    |           |            |              |
|           | margins are smooth. No   |           |            |              |
|           | obvious developing       |           |            |              |
|           | pulmonary nodule         |           |            |              |
|           | orconsolidative process  |           |            |              |
|           | is identified.           |           |            |              |
|           | Degenerative changes are |           |            |              |
|           |  noted inthe thoracic    |           |            |              |
|           | spine.  IMPRESSION: No   |           |            |              |
|           | significant radiographic |           |            |              |
|           |  change since 98;  |           |            |              |
|           | no evidence ofactive     |           |            |              |
 
|           | cardiopulmonary disease. |           |            |              |
|           |    END OF IMPRESSION:    |           |            |              |
+-----------+--------------------------+-----------+------------+--------------+
 
 
 
+----------+
| Specimen |
+----------+
|          |
+----------+
 
 
 
+----------------------+---------+--------------------+--------------+
| Performing           | Address | City/State/Zipcode | Phone Number |
| Organization         |         |                    |              |
+----------------------+---------+--------------------+--------------+
|   [facility] DEPARTMENT OF |         |                    |              |
|  RADIOLOGY           |         |                    |              |
+----------------------+---------+--------------------+--------------+
 documented in this encounter
 
 Visit Diagnoses
 Not on filedocumented in this encounter

## 2019-12-06 NOTE — XMS
Encounter Summary
  Created on: 2019
 
 Wyatt Shane
 External Reference #: [id number]
 : 43
 Sex: Male
 
 Demographics
 
 
+-----------------------+------------------------+
| Address               | [address]     |
|                       | [place]  [address]   |
+-----------------------+------------------------+
| Home Phone            | [phone]        |
+-----------------------+------------------------+
| Preferred Language    | Unknown                |
+-----------------------+------------------------+
| Marital Status        |                 |
+-----------------------+------------------------+
| Caodaism Affiliation | LUT                    |
+-----------------------+------------------------+
| Race                  | White                  |
+-----------------------+------------------------+
| Ethnic Group          | Not  or  |
+-----------------------+------------------------+
 
 
 Author
 
 
+--------------+-------------+
| Organization | Unknown     |
+--------------+-------------+
| Address      | Unknown     |
+--------------+-------------+
| Phone        | Unavailable |
+--------------+-------------+
 
 
 
 Support
 
 
+---------------+--------------+---------+-----------------+
| Name          | Relationship | Address | Phone           |
+---------------+--------------+---------+-----------------+
| Opal Shane | ECON         | Unknown | [phone] |
+---------------+--------------+---------+-----------------+
 
 
 
 Care Team Providers
 
 
+-----------------------+------+-----------------+
| Care Team Member Name | Role | Phone           |
 
+-----------------------+------+-----------------+
| Erwin Iniguez MD   | PCP  | [phone] |
+-----------------------+------+-----------------+
 
 
 
 Encounter Details
 
 
+--------+-------------+------------+---------------------+------------------+
| Date   | Type        | Department | Care Team           | Description      |
+--------+-------------+------------+---------------------+------------------+
| 10/27/ | Procedure - |            |   Record, Operation | Operative Report |
|    |             |            |                     |                  |
|        | Transcribed |            |                     |                  |
+--------+-------------+------------+---------------------+------------------+
 
 
 
 Social History
 
 
+----------------+-------+-----------+--------+------+
| Tobacco Use    | Types | Packs/Day | Years  | Date |
|                |       |           | Used   |      |
+----------------+-------+-----------+--------+------+
| Never Assessed |       |           |        |      |
+----------------+-------+-----------+--------+------+
 
 
 
+------------------+---------------+
| Sex Assigned at  | Date Recorded |
| Birth            |               |
+------------------+---------------+
| Not on file      |               |
+------------------+---------------+
 
 
 
+----------------+-------------+-------------+
| Job Start Date | Occupation  | Industry    |
+----------------+-------------+-------------+
| Not on file    | Not on file | Not on file |
+----------------+-------------+-------------+
 
 
 
+----------------+--------------+------------+
| Travel History | Travel Start | Travel End |
+----------------+--------------+------------+
 
 
 
+-------------------------------------+
| No recent travel history available. |
+-------------------------------------+
 documented as of this encounter
 
 Plan of Treatment
 
 Not on filedocumented as of this encounter
 
 Procedures
 
 
+------------------+--------+------------+----------------------+----------------------+
| Procedure Name   | Priori | Date/Time  | Associated Diagnosis | Comments             |
|                  | ty     |            |                      |                      |
+------------------+--------+------------+----------------------+----------------------+
| OPERATION RECORD |        | 10/27/1999 |                      |   Results for this   |
|                  |        |            |                      | procedure are in the |
|                  |        |            |                      |  results section.    |
+------------------+--------+------------+----------------------+----------------------+
 documented in this encounter
 
 Results
 OPERATION RECORD (10/27/1999)
 
+------------------------------------------------------------------------------------------+
| Transcriptions                                                                           |
+------------------------------------------------------------------------------------------+
|   Interface, Transcription In - 2006  3:03 AM PST                                  |
|    [place] [address]     |
| [place], [place] [address] [phone]  |
|                     [facility] RECORDMed Rec No.:         |
| [id number]            Date: 10/27/1999Name: Darien Shane SURGEON:Jimmy Esposito, |
|  M.D.ASSISTANTS:             JORGE Lemon, |
|  M.D.PREOPERATIVE DIAGNOSIS(ES):Laryngeal stenosis.POSTOPERATIVE DIAGNOSIS(ES):Laryngeal |
|  stenosis.OPERATIONS PERFORMED:Direct laryngoscopy with dilation and application of      |
| Mitomycin-C.SPECIMEN(S) REMOVED:Posterior glottic lesion.ANESTHESIA:General with jet     |
| ventilation.ESTIMATED BLOOD                                                              |
| LOSS:Negligible.DRAINS:None.COMPLICATIONS:None.INDICATIONS:Patient   is   a  54-year-old |
|   male  who   underwent   a   right   verticalhemilaryngectomy in 1998.  He has |
|  subsequently developed laryngealstenosis and presented today for dilation.FINDINGS:1.   |
| Glottic stenosis with dimensions equivalent to a #16 laryngeal dilator.2.  Subglottis    |
| without stenosis.3.  Posterior glottic mucosal irregularity, biopsied.PROCEDURE:After    |
| consent was obtained, the patient  was  identified and brought to theoperating room.     |
| Patient was placed  in  the  supine  position  and generalanesthesia administered        |
| without difficulty.   The laryngoscope was insertedand  the glottis visualized. The      |
| patient  was  placed  in  suspension.  Thestylette  was  inserted  into the laryngoscope |
|   and  attached  to  the  jetventilator.   Jet  ventilation  was   established   and     |
| good  ventilationconfirmed. Epinephrine saturated pledgets  were  placed  on  the        |
| laryngealmucosa.  The glottis and subglottis were  examined  with  a telescope.          |
| Theglottis was then serially dilated beginning  with  the  smallest and endingwith the   |
| largest (#32) dilator.  5 mg  of  Mitomycin-C was reconstituted in12.5 cc of normal      |
| saline.  This mixture  was  placed on pledgets which wereserially applied to the glottic |
|  mucosa.  Using the abutting biopsy forceps,the posterior glottic region was biopsied.   |
|  Epinephrine-saturated pledgetswere again placed on the mucosa.The patient was taken out |
|  of suspension  and  the laryngoscope was removed.Tooth  guard  was  used throughout the |
|   procedure.   Patient  emerged  fromanesthesia without difficulty and was  brought  to  |
| the Post Anesthesia CareUnit  in good condition.ATTENDING SURGEON'S ATTESTATION:Pursuant |
|  to Federal Medicare billing regulations,  I certify that Dr. Antonio was present and |
|  participating for the entire procedure.Larry Means M.D.                              |
| Jimmy Esposito M.D.JRA:[id number]:  10/27/1999T:  [id number]:                         |
|ESTIMATED BLOOD LOSS:                                                                     |
|Negligible.                                                                              |
|                                                                                          |
|DRAINS:                                                                                  |
|None.                                                                                    |
|                                                                                          |
 
|COMPLICATIONS:                                                                           |
|None.                                                                                    |
|                                                                                          |
|INDICATIONS:                                                                             |
|Patient   is   a  54-year-old  male  who   underwent   a   right   vertical               |
|hemilaryngectomy in 1998.  He has subsequently developed laryngeal               |
|stenosis and presented today for dilation.                                                |
|                                                                                          |
|FINDINGS:                                                                                |
|1.  Glottic stenosis with dimensions equivalent to a #16 laryngeal dilator.               |
|2.  Subglottis without stenosis.                                                          |
|3.  Posterior glottic mucosal irregularity, biopsied.                                     |
|                                                                                          |
|PROCEDURE:                                                                               |
|After consent was obtained, the patient  was  identified and brought to the               |
|operating room.  Patient was placed  in  the  supine  position  and general               |
|anesthesia administered without difficulty.   The laryngoscope was inserted               |
|and  the glottis visualized. The patient  was  placed  in  suspension.  The               |
|stylette  was  inserted  into the laryngoscope  and  attached  to  the  jet               |
|ventilator.   Jet  ventilation  was   established   and   good  ventilation              |
|confirmed. Epinephrine saturated pledgets  were  placed  on  the  laryngeal              |
|mucosa.  The glottis and subglottis were  examined  with  a telescope.  The              |
|glottis was then serially dilated beginning  with  the  smallest and ending               |
|with the largest (#32) dilator.  5 mg  of  Mitomycin-C was reconstituted in               |
|12.5 cc of normal saline.  This mixture  was  placed on pledgets which were               |
|serially applied to the glottic mucosa.  Using the abutting biopsy forceps,               |
|the posterior glottic region was biopsied.   Epinephrine-saturated pledgets               |
|were again placed on the mucosa.                                                          |
|                                                                                          |
|The patient was taken out of suspension  and  the laryngoscope was removed.               |
|Tooth  guard  was  used throughout the  procedure.   Patient  emerged  from               |
|anesthesia without difficulty and was  brought  to the Post Anesthesia Care               |
|Unit  in good condition.                                                                  |
|                                                                                          |
|ATTENDING SURGEON'S ATTESTATION:                                                          |
|Pursuant to Federal Medicare billing regulations,  I certify that Dr. Marquez               |
|Vaibhav was present and participating for the entire procedure.                             |
|                                                                                          |
|Larry Means M.D.                             Jimmy Esposito M.D.                      |
|                                                                                          |
|JRA:[id number]                                                                                   |
|D:  10/27/1999                                                                            |
|T:  10/27/1999                                                                            |
|                                                                                          |
|                                                                                          |
|[id number]                                                                                     
|
|                                                                                          |
|CC:                                                                                      |
+------------------------------------------------------------------------------------------+
 documented in this encounter
 
 Visit Diagnoses
 Not on filedocumented in this encounter

## 2019-12-06 NOTE — XMS
Encounter Summary
  Created on: 2019
 
 Wyatt Shane
 External Reference #: [id number]
 : 43
 Sex: Male
 
 Demographics
 
 
+-----------------------+------------------------+
| Address               | [address]     |
|                       | [place]  [address]   |
+-----------------------+------------------------+
| Home Phone            | [phone]        |
+-----------------------+------------------------+
| Preferred Language    | Unknown                |
+-----------------------+------------------------+
| Marital Status        |                 |
+-----------------------+------------------------+
| Protestant Affiliation | LUT                    |
+-----------------------+------------------------+
| Race                  | White                  |
+-----------------------+------------------------+
| Ethnic Group          | Not  or  |
+-----------------------+------------------------+
 
 
 Author
 
 
+--------------+------------------------------+
| Author       | [facility] |
+--------------+------------------------------+
| Organization | [facility] |
+--------------+------------------------------+
| Address      | Unknown                      |
+--------------+------------------------------+
| Phone        | Unavailable                  |
+--------------+------------------------------+
 
 
 
 Support
 
 
+---------------+--------------+---------+-----------------+
| Name          | Relationship | Address | Phone           |
+---------------+--------------+---------+-----------------+
| Opal Shane | ECON         | Unknown | [phone] |
+---------------+--------------+---------+-----------------+
 
 
 
 Care Team Providers
 
 
 
+-----------------------+------+-----------------+
| Care Team Member Name | Role | Phone           |
+-----------------------+------+-----------------+
| Erwin Iniguez MD   | PCP  | [phone] |
+-----------------------+------+-----------------+
 
 
 
 Encounter Details
 
 
+--------+-------------+----------------------+----------------------+--------------------+
| Date   | Type        | Department           | Care Team            | Description        |
+--------+-------------+----------------------+----------------------+--------------------+
| / |  |   Otolaryngology     |   Jonathan Cross  | Laryngeal cancer   |
|    |             | Laryngology Services | MD RUSS  [address]   | ([facility]); Pharyngeal  |
|        |             |  at [facility]  [address] | [address]      | dysphagia          |
|        |             |  [address]     | [place], [place]         |                    |
|        |             | Mailcode: PV01       | [id number]           |                    |
|        |             | [facility] | [phone]         |                    |
|        |             |   [place], [place]       | [phone] (Fax)   |                    |
|        |             | [address]           |                      |                    |
|        |             | [phone]         |                      |                    |
+--------+-------------+----------------------+----------------------+--------------------+
 
 
 
 Social History
 
 
+---------------+------------+-----------+--------+------------------+
| Tobacco Use   | Types      | Packs/Day | Years  | Date             |
|               |            |           | Used   |                  |
+---------------+------------+-----------+--------+------------------+
| Former Smoker | Cigarettes | 1         | 30     | Quit: 1996 |
+---------------+------------+-----------+--------+------------------+
 
 
 
+-------------+-------------+---------+----------+
| Alcohol Use | Drinks/Week | oz/Week | Comments |
+-------------+-------------+---------+----------+
| No          |             |         |          |
+-------------+-------------+---------+----------+
 
 
 
+------------------+---------------+
| Sex Assigned at  | Date Recorded |
| Birth            |               |
+------------------+---------------+
| Not on file      |               |
+------------------+---------------+
 
 
 
+----------------+-------------+-------------+
| Job Start Date | Occupation  | Industry    |
+----------------+-------------+-------------+
| Not on file    | Not on file | Not on file |
 
+----------------+-------------+-------------+
 
 
 
+----------------+--------------+------------+
| Travel History | Travel Start | Travel End |
+----------------+--------------+------------+
 
 
 
+-------------------------------------+
| No recent travel history available. |
+-------------------------------------+
 documented as of this encounter
 
 Plan of Treatment
 Not on filedocumented as of this encounter
 
 Results
 MODIFIED BARIUM SWALLOWING (09/15/2010 10:14 AM PDT)
 
+-------------+--------------------------+-----------+------------+--------------+
| Component   | Value                    | Ref Range | Performed  | Pathologist  |
|             |                          |           | At         | Signature    |
+-------------+--------------------------+-----------+------------+--------------+
| ESOPHAGUS   | Examination: Modified    |           |            |              |
| W/BARIUM/FO | barium swallowing study. |           |            |              |
| OD/PHONAT   |    Technique             |           |            |              |
|             | andfindings: The study   |           |            |              |
|             | was performed in         |           |            |              |
|             | conjunction with the     |           |            |              |
|             | [facility] speechpathologist.  |           |            |              |
|             |   The patient drank a    |           |            |              |
|             | variety of barium        |           |            |              |
|             | impregnatedliquids and   |           |            |              |
|             | solids and swallowed a   |           |            |              |
|             | barium tablet using      |           |            |              |
|             | fluoroscopicobservation. |           |            |              |
|             |    There was no delay in |           |            |              |
|             |  initiating the primary  |           |            |              |
|             | propulsivewave on        |           |            |              |
|             | swallowing.   The        |           |            |              |
|             | patient swallowed thin   |           |            |              |
|             | barium withoutdifficulty |           |            |              |
|             |  with no obstruction of  |           |            |              |
|             | flow.   With thicker     |           |            |              |
|             | texture bariumliquids,   |           |            |              |
|             | there was mild partial   |           |            |              |
|             | delay and about the C6   |           |            |              |
|             | levelassociated with a   |           |            |              |
|             | mild short segment of    |           |            |              |
|             | circumferential          |           |            |              |
|             | narrowing ofthe proximal |           |            |              |
|             |  esophagus at this level |           |            |              |
|             |  but no sign of          |           |            |              |
|             | extravasation.There is   |           |            |              |
|             | no evidence for          |           |            |              |
|             | obstruction of flow of   |           |            |              |
|             | solid materials.         |           |            |              |
|             |   Theingested barium     |           |            |              |
 
|             | tablet passed through    |           |            |              |
|             | the oropharynx,          |           |            |              |
|             | hypopharynx andthoracic  |           |            |              |
|             | esophagus but there was  |           |            |              |
|             | delay in passage at      |           |            |              |
|             | thegastroesophageal      |           |            |              |
|             | junction.   See formal   |           |            |              |
|             | esophagram report.       |           |            |              |
|             | Impression: No sign of   |           |            |              |
|             | extravasation or         |           |            |              |
|             | aspiration.   Short      |           |            |              |
|             | segment ofnarrowing of   |           |            |              |
|             | the proximal esophagus   |           |            |              |
|             | at the C6 level          |           |            |              |
|             | perhapsaccounting for    |           |            |              |
|             | some retropulsion of     |           |            |              |
|             | thicker barium liquids   |           |            |              |
|             | throughthis segment.     |           |            |              |
|             |   Delay in passage of    |           |            |              |
|             | barium tablet at         |           |            |              |
|             | thegastroesophageal      |           |            |              |
|             | junction. I have         |           |            |              |
|             | personally viewed this   |           |            |              |
|             | procedure/exam and       |           |            |              |
|             | reviewed this report.    |           |            |              |
|             | Author: JUSTIN BAIRD       |           |            |              |
|             | JORGE ELIZALDEReviewer:   |           |            |              |
|             | JUSTIN ELIZALDE M.D. |           |            |              |
|             |   STATUS FINAL / [facility]     |           |            |              |
|             | JUSTIN ELIZALDE       |           |            |              |
+-------------+--------------------------+-----------+------------+--------------+
 
 
 
+----------+
| Specimen |
+----------+
|          |
+----------+
 
 
 
+----------------------+---------+--------------------+--------------+
| Performing           | Address | City/State/Zipcode | Phone Number |
| Organization         |         |                    |              |
+----------------------+---------+--------------------+--------------+
|   [place] [facility] |         |                    |              |
|  RADIOLOGY           |         |                    |              |
+----------------------+---------+--------------------+--------------+
 documented in this encounter
 
 Visit Diagnoses
 
 
+--------------------------------------------------------------------------+
| Diagnosis                                                                |
+--------------------------------------------------------------------------+
|   Laryngeal cancer (HCC)  Malignant neoplasm of larynx, unspecified site |
+--------------------------------------------------------------------------+
|   Pharyngeal dysphagia  Dysphagia, pharyngeal phase                      |
 
+--------------------------------------------------------------------------+
 documented in this encounter

## 2019-12-06 NOTE — XMS
Encounter Summary
  Created on: 2019
 
 Wyatt Shane
 External Reference #: [id number]
 : 43
 Sex: Male
 
 Demographics
 
 
+-----------------------+------------------------+
| Address               | [address]     |
|                       | [place]  [address]   |
+-----------------------+------------------------+
| Home Phone            | [phone]        |
+-----------------------+------------------------+
| Preferred Language    | Unknown                |
+-----------------------+------------------------+
| Marital Status        |                 |
+-----------------------+------------------------+
| Amish Affiliation | LUT                    |
+-----------------------+------------------------+
| Race                  | White                  |
+-----------------------+------------------------+
| Ethnic Group          | Not  or  |
+-----------------------+------------------------+
 
 
 Author
 
 
+--------------+-------------+
| Organization | Unknown     |
+--------------+-------------+
| Address      | Unknown     |
+--------------+-------------+
| Phone        | Unavailable |
+--------------+-------------+
 
 
 
 Support
 
 
+---------------+--------------+---------+-----------------+
| Name          | Relationship | Address | Phone           |
+---------------+--------------+---------+-----------------+
| Opal Shane | ECON         | Unknown | [phone] |
+---------------+--------------+---------+-----------------+
 
 
 
 Care Team Providers
 
 
+-----------------------+------+-----------------+
| Care Team Member Name | Role | Phone           |
 
+-----------------------+------+-----------------+
| Erwin Iniguez MD   | PCP  | [phone] |
+-----------------------+------+-----------------+
 
 
 
 Encounter Details
 
 
+--------+-------------+------------+--------------------+--------------------+
| Date   | Type        | Department | Care Team          | Description        |
+--------+-------------+------------+--------------------+--------------------+
| / | Procedure - |            |   Documentation,   | OP REPORT-TEACHING |
|    |             |            | Teaching Physician |                    |
|        | Transcribed |            |                    |                    |
+--------+-------------+------------+--------------------+--------------------+
 
 
 
 Social History
 
 
+----------------+-------+-----------+--------+------+
| Tobacco Use    | Types | Packs/Day | Years  | Date |
|                |       |           | Used   |      |
+----------------+-------+-----------+--------+------+
| Never Assessed |       |           |        |      |
+----------------+-------+-----------+--------+------+
 
 
 
+------------------+---------------+
| Sex Assigned at  | Date Recorded |
| Birth            |               |
+------------------+---------------+
| Not on file      |               |
+------------------+---------------+
 
 
 
+----------------+-------------+-------------+
| Job Start Date | Occupation  | Industry    |
+----------------+-------------+-------------+
| Not on file    | Not on file | Not on file |
+----------------+-------------+-------------+
 
 
 
+----------------+--------------+------------+
| Travel History | Travel Start | Travel End |
+----------------+--------------+------------+
 
 
 
+-------------------------------------+
| No recent travel history available. |
+-------------------------------------+
 documented as of this encounter
 
 Plan of Treatment
 
 Not on filedocumented as of this encounter
 
 Procedures
 
 
+--------------------+--------+------------+----------------------+----------+
| Procedure Name     | Priori | Date/Time  | Associated Diagnosis | Comments |
|                    | ty     |            |                      |          |
+--------------------+--------+------------+----------------------+----------+
| TEACHING PHYSICIAN |        | 1999 |                      |          |
+--------------------+--------+------------+----------------------+----------+
 documented in this encounter
 
 Visit Diagnoses
 Not on filedocumented in this encounter

## 2019-12-06 NOTE — XMS
Encounter Summary
  Created on: 2019
 
 Wyatt Shane
 External Reference #: [id number]
 : 43
 Sex: Male
 
 Demographics
 
 
+-----------------------+------------------------+
| Address               | [address]     |
|                       | [place]  [address]   |
+-----------------------+------------------------+
| Home Phone            | [phone]        |
+-----------------------+------------------------+
| Preferred Language    | Unknown                |
+-----------------------+------------------------+
| Marital Status        |                 |
+-----------------------+------------------------+
| Christian Affiliation | LUT                    |
+-----------------------+------------------------+
| Race                  | White                  |
+-----------------------+------------------------+
| Ethnic Group          | Not  or  |
+-----------------------+------------------------+
 
 
 Author
 
 
+--------------+------------------------------+
| Author       | [facility] |
+--------------+------------------------------+
| Organization | [facility] |
+--------------+------------------------------+
| Address      | Unknown                      |
+--------------+------------------------------+
| Phone        | Unavailable                  |
+--------------+------------------------------+
 
 
 
 Support
 
 
+---------------+--------------+---------+-----------------+
| Name          | Relationship | Address | Phone           |
+---------------+--------------+---------+-----------------+
| Opal Shane | ECON         | Unknown | [phone] |
+---------------+--------------+---------+-----------------+
 
 
 
 Care Team Providers
 
 
 
+-----------------------+------+-----------------+
| Care Team Member Name | Role | Phone           |
+-----------------------+------+-----------------+
| Erwin Steel MD   | PCP  | [phone] |
+-----------------------+------+-----------------+
 
 
 
 Encounter Details
 
 
+--------+-------------+-----------------+---------------------+---------------+
| Date   | Type        | Department      | Care Team           | Description   |
+--------+-------------+-----------------+---------------------+---------------+
| 01/15/ | Office      |   [facility]  |   Note, Outpatient  | Progress Note |
|    | Visit-Trans | MEDICINE        | Clinic              |               |
|        | cribed      |                 |                     |               |
+--------+-------------+-----------------+---------------------+---------------+
 
 
 
 Social History
 
 
+----------------+-------+-----------+--------+------+
| Tobacco Use    | Types | Packs/Day | Years  | Date |
|                |       |           | Used   |      |
+----------------+-------+-----------+--------+------+
| Never Assessed |       |           |        |      |
+----------------+-------+-----------+--------+------+
 
 
 
+------------------+---------------+
| Sex Assigned at  | Date Recorded |
| Birth            |               |
+------------------+---------------+
| Not on file      |               |
+------------------+---------------+
 
 
 
+----------------+-------------+-------------+
| Job Start Date | Occupation  | Industry    |
+----------------+-------------+-------------+
| Not on file    | Not on file | Not on file |
+----------------+-------------+-------------+
 
 
 
+----------------+--------------+------------+
| Travel History | Travel Start | Travel End |
+----------------+--------------+------------+
 
 
 
+-------------------------------------+
| No recent travel history available. |
+-------------------------------------+
 documented as of this encounter
 
 
 Progress Notes
 Interface, Transcription In - 2007  5:06 AM PSTCLINIC DATE:       01/15/1999
 
 OTOLARYNGOLOGY CLINIC
 
 SUBJECTIVE:    Mr. Shane is seen back in follow-up from his
 hemilaryngectomy.  He and I have talked at some length and I have reviewed
 his slides here at [facility].  It is clear that
 there is extension along small nerves in the periglottic space that make
 the issue of the margins questionable.  I am recommending postoperative
 radiation.
 
 OBJECTIVE:   From a healing standpoint, the patient is doing well.  He is
 breathing without difficulty and is on a prednisone taper.  His external
 incision looks good.  Flexible fiberoptic laryngoscopy reveals that the
 area of eschar over his larynx on the hemilaryngectomy site is gradually
 epithelializing very well.
 
 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
 post surgery, doing well.
 
 PLAN:    I will see the patient back here in approximately three weeks.  In
 the interim, I have given him his x-ray and notes and he will make an
 appointment for radiation therapy in [place].  I will call the
 radiation therapist once he calls with the name to go over the situation.
 
 Jimmy Esposito M.D.
 
 MARIA ALEJANDRA/anni
 D:  01/15/99
 T:  01/15/99
 
 CC:
 
 BE PAULA MD
 [address] [place] [address] [place]   [id number]
 
 ERWIN STEEL MD
 [address] [place] [address] [place]   [id number] signed by Interface, Transcription In at 2007  5:0
6 AM PSTdocumented in this encounter
 
 Plan of Treatment
 Not on filedocumented as of this encounter
 
 Visit Diagnoses
 Not on filedocumented in this encounter

## 2019-12-06 NOTE — XMS
Encounter Summary
  Created on: 2019
 
 Wyatt Shane
 External Reference #: [id number]
 : 43
 Sex: Male
 
 Demographics
 
 
+-----------------------+------------------------+
| Address               | [address]     |
|                       | [place]  [address]   |
+-----------------------+------------------------+
| Home Phone            | [phone]        |
+-----------------------+------------------------+
| Preferred Language    | Unknown                |
+-----------------------+------------------------+
| Marital Status        |                 |
+-----------------------+------------------------+
| Faith Affiliation | LUT                    |
+-----------------------+------------------------+
| Race                  | White                  |
+-----------------------+------------------------+
| Ethnic Group          | Not  or  |
+-----------------------+------------------------+
 
 
 Author
 
 
+--------------+-------------+
| Organization | Unknown     |
+--------------+-------------+
| Address      | Unknown     |
+--------------+-------------+
| Phone        | Unavailable |
+--------------+-------------+
 
 
 
 Support
 
 
+---------------+--------------+---------+-----------------+
| Name          | Relationship | Address | Phone           |
+---------------+--------------+---------+-----------------+
| Opal Shane | ECON         | Unknown | [phone] |
+---------------+--------------+---------+-----------------+
 
 
 
 Care Team Providers
 
 
+-----------------------+------+-------------+
| Care Team Member Name | Role | Phone       |
 
+-----------------------+------+-------------+
 PCP  | Unavailable |
+-----------------------+------+-------------+
 
 
 
 Encounter Details
 
 
+--------+-------------+------------+------------------+-------------+
| Date   | Type        | Department | Care Team        | Description |
+--------+-------------+------------+------------------+-------------+
| / | [facility] |            |   Letter, Clinic | Letters     |
| 2004   | scribed     |            |                  |             |
+--------+-------------+------------+------------------+-------------+
 
 
 
 Social History
 
 
+----------------+-------+-----------+--------+------+
| Tobacco Use    | Types | Packs/Day | Years  | Date |
|                |       |           | Used   |      |
+----------------+-------+-----------+--------+------+
| Never Assessed |       |           |        |      |
+----------------+-------+-----------+--------+------+
 
 
 
+------------------+---------------+
| Sex Assigned at  | Date Recorded |
| Birth            |               |
+------------------+---------------+
| Not on file      |               |
+------------------+---------------+
 
 
 
+----------------+-------------+-------------+
| Job Start Date | Occupation  | Industry    |
+----------------+-------------+-------------+
| Not on file    | Not on file | Not on file |
+----------------+-------------+-------------+
 
 
 
+----------------+--------------+------------+
| Travel History | Travel Start | Travel End |
+----------------+--------------+------------+
 
 
 
+-------------------------------------+
| No recent travel history available. |
+-------------------------------------+
 documented as of this encounter
 
 Progress Notes
 Interface, Transcription In - 2005  6:15 PM PDT                                  [facility]
 
ON
                         [address], [place]  [address]
                       [phone] or [phone]
            Department of Otolaryngology - [facility] Fax: [phone]
 
 2004
 
 Eddy Boone M.D.
 [address]
 [place]  [address]
 
 RE:  WYATT SHANE
 MR #: [id number]
 
 Dear Dr. Boone:
 
 Just a brief note to let you know I saw Wyatt Shane back for his 5-year
 followup visit.  After a hemilaryngectomy for recurrent laryngeal
 carcinoma, I am pleased to report that he is without evidence of recurrent
 disease, both by exam, flexible fiberoptic laryngoscopy, and chest x-ray.
 From this point, I would consider him cured, and we will not see him back
 here in regular followup.
 
 Obviously, I am gratified by this result and pleased by how well he has
 done.  I will certainly see him back here if any problems arise.
 
 Thanks again for letting me care for him.
 
 Yours sincerely,
 
 Jimmy Esposito M.D.
 
 [facility] / 
 [id number] / [id number] / [id number] /
 D: 2004
 T: 2004
 
 cc:
 
 Erwin Beach M.D.
 [address]
 [place], [place]  [address]Ophlcmirypxzda signed by Interface, Transcription In at 2005  6:1
5 PM PDTdocumented in this encounter
 
 Plan of Treatment
 Not on filedocumented as of this encounter
 
 Visit Diagnoses
 Not on filedocumented in this encounter

## 2019-12-06 NOTE — XMS
Encounter Summary
  Created on: 2019
 
 Shane Wyatttien BroussardJosue
 External Reference #: [id number]
 : 43
 Sex: Male
 
 Demographics
 
 
+-----------------------+---------------------------+
| Address               | [address]        |
|                       | [place]  [address] |
+-----------------------+---------------------------+
| Home Phone            | [phone]           |
+-----------------------+---------------------------+
| Preferred Language    | Unknown                   |
+-----------------------+---------------------------+
| Marital Status        |                    |
+-----------------------+---------------------------+
| Episcopal Affiliation | 1028                      |
+-----------------------+---------------------------+
| Race                  | Unknown                   |
+-----------------------+---------------------------+
| Ethnic Group          | Unknown                   |
+-----------------------+---------------------------+
 
 
 Author
 
 
+--------------+--------------------------------------------+
| Author       | [facility]  |
|              | and [place]                                |
+--------------+--------------------------------------------+
| Organization | [facility]  |
|              | and [place]                                |
+--------------+--------------------------------------------+
| Address      | Unknown                                    |
+--------------+--------------------------------------------+
| Phone        | Unavailable                                |
+--------------+--------------------------------------------+
 
 
 
 Support
 
 
+---------------+--------------+--------------------+-----------------+
| Name          | Relationship | Address            | Phone           |
+---------------+--------------+--------------------+-----------------+
| Opal Shane | ECON         | [address]     | [phone] |
|               |              | [place]   |                 |
|               |              | [address]              |                 |
+---------------+--------------+--------------------+-----------------+
 
 
 
 
 Care Team Providers
 
 
+-----------------------+------+-----------------+
| Care Team Member Name | Role | Phone           |
+-----------------------+------+-----------------+
| Erwin Iniguez MD | PCP  | [phone] |
+-----------------------+------+-----------------+
 
 
 
 Encounter Details
 
 
+--------+-----------+----------------------+---------------------+---------------------+
| Date   | Type      | Department           | Care Team           | Description         |
+--------+-----------+----------------------+---------------------+---------------------+
| / | Hospital  |   [facility]    |   Celso Dumont,  | Chronic maxillary   |
| 2015   | Encounter [facility]  | DO                  | sinusitis; Chronic  |
|        |           |  [address]      |                     | frontal sinusitis   |
|        |           | [place], [place]         |                     |                     |
|        |           | [address]           |                     |                     |
|        |           | [phone]         |                     |                     |
+--------+-----------+----------------------+---------------------+---------------------+
 
 
 
 Social History
 
 
+---------------+------------+-----------+--------+------------------+
| Tobacco Use   | Types      | Packs/Day | Years  | Date             |
|               |            |           | Used   |                  |
+---------------+------------+-----------+--------+------------------+
| Former Smoker | Cigarettes | 1.3       | 34     | Quit: 1996 |
+---------------+------------+-----------+--------+------------------+
 
 
 
+---------------------+---+---+---+
| Smokeless Tobacco:  |   |   |   |
| Never Used          |   |   |   |
+---------------------+---+---+---+
 
 
 
+-------------+-------------+---------+----------+
| Alcohol Use | Drinks/Week | oz/Week | Comments |
+-------------+-------------+---------+----------+
| No          |             |         |          |
+-------------+-------------+---------+----------+
 
 
 
+------------------+---------------+
| Sex Assigned at  | Date Recorded |
| Birth            |               |
+------------------+---------------+
| Not on file      |               |
 
+------------------+---------------+
 
 
 
+----------------+-------------+-------------+
| Job Start Date | Occupation  | Industry    |
+----------------+-------------+-------------+
| Not on file    | Not on file | Not on file |
+----------------+-------------+-------------+
 
 
 
+----------------+--------------+------------+
| Travel History | Travel Start | Travel End |
+----------------+--------------+------------+
 
 
 
+-------------------------------------+
| No recent travel history available. |
+-------------------------------------+
 documented as of this encounter
 
 Medications at Time of Discharge
 
 
+----------------------+----------------------+-----------+---------+----------+-----------+
| Medication           | Sig                  | Dispensed | Refills | Start    | End Date  |
|                      |                      |           |         | Date     |           |
+----------------------+----------------------+-----------+---------+----------+-----------+
|   acyclovir          | Apply  topically     |           | 0       |          |           |
| (ZOVIRAX) 5%         | every 3 hours.       |           |         |          |           |
| ointment             |                      |           |         |          |           |
+----------------------+----------------------+-----------+---------+----------+-----------+
|   albuterol (PROAIR  | Inhale 2 puffs into  |           | 0       |          |           |
| HFA) 90 mcg/puff     | the lungs every 6    |           |         |          |           |
| inhaler              | hours as needed for  |           |         |          |           |
|                      | Wheezing.            |           |         |          |           |
+----------------------+----------------------+-----------+---------+----------+-----------+
|   ferrous sulfate    | Take 325 mg by mouth |           | 0       | 20 |           |
| 325 mg tablet        |  3 times daily.      |           |         | 12       |           |
+----------------------+----------------------+-----------+---------+----------+-----------+
|   fluticasone        | one spray in each    |           | 0       | 20 |           |
| (FLONASE) 50         | nostril daily as     |           |         | 12       |           |
| mcg/nasal spray      | needed               |           |         |          |           |
+----------------------+----------------------+-----------+---------+----------+-----------+
|                      | Inhale 1 puff into   |           | 0       |          |           |
| fluticasone-salmeter | the lungs Twice      |           |         |          |           |
| ol (ADVAIR) 500-50   | Daily.               |           |         |          |           |
| mcg/puff diskus      |                      |           |         |          |           |
| inhaler              |                      |           |         |          |           |
+----------------------+----------------------+-----------+---------+----------+-----------+
|   folic acid 1 mg    | Take 1 mg by mouth   |           | 0       |          |           |
| tablet               | Daily.               |           |         |          |           |
+----------------------+----------------------+-----------+---------+----------+-----------+
|   furosemide (LASIX) | Take 40 mg by mouth  |           | 0       |          |           |
|  40 mg tablet        | Daily.               |           |         |          |           |
+----------------------+----------------------+-----------+---------+----------+-----------+
|   guaiFENesin        | Take 1,200 mg by     |           | 0       |          |           |
| (MUCINEX) 600 mg 12  | mouth 2 times daily. |           |         |          |           |
 
| hr tablet            |                      |           |         |          |           |
+----------------------+----------------------+-----------+---------+----------+-----------+
|   levothyroxine      | Take 75 mcg by mouth |           | 0       | 20 |           |
| (LEVOTHROID) 75 MCG  |  Daily.              |           |         | 12       |           |
| tablet               |                      |           |         |          |           |
+----------------------+----------------------+-----------+---------+----------+-----------+
|   metoclopramide     | Take 10 mg by mouth  |           | 0       | 20 |           |
| (REGLAN) 10 mg       | 4 times daily as     |           |         | 12       |           |
| tablet               | needed.              |           |         |          |           |
+----------------------+----------------------+-----------+---------+----------+-----------+
|                      | Apply  topically 2   |           | 0       |          |           |
| nystatin-triamcinolo | times daily.         |           |         |          |           |
| ne (MYCOLOG II)      |                      |           |         |          |           |
| cream                |                      |           |         |          |           |
+----------------------+----------------------+-----------+---------+----------+-----------+
|   omeprazole         | Take 20 mg by mouth  |           | 0       | 20 |           |
| (PRILOSEC) 20 mg     | 2 times daily.       |           |         | 12       |           |
| capsule              |                      |           |         |          |           |
+----------------------+----------------------+-----------+---------+----------+-----------+
|   potassium citrate  | Take 10 mEq by mouth |           | 0       |          |           |
| (UROCIT-K) 10 mEq SR |  2 times daily.      |           |         |          |           |
|  tablet              |                      |           |         |          |           |
+----------------------+----------------------+-----------+---------+----------+-----------+
|   predniSONE         | Take 10 mg by mouth  |           | 0       |          |           |
| (DELTASONE) 5 mg     | Daily.               |           |         |          |           |
| tablet               |                      |           |         |          |           |
+----------------------+----------------------+-----------+---------+----------+-----------+
|   tamsulosin         | Take 0.4 mg by mouth |           | 0       | 20 |           |
| (FLOMAX) 0.4 mg CAPS |  2 times daily.      |           |         | 12       |           |
+----------------------+----------------------+-----------+---------+----------+-----------+
|   acyclovir          | Take 400 mg by mouth |           | 0       | 20 |  |
| (ZOVIRAX) 400 MG     |  3 times daily as    |           |         | 12       | 9         |
| tablet               | needed.              |           |         |          |           |
+----------------------+----------------------+-----------+---------+----------+-----------+
|   Cholecalciferol    | Take 2,000 Units by  |           | 0       | 20 |  |
| (VITAMIN D3) 2000    | mouth Daily.         |           |         | 12       | 9         |
| UNITS CAPS           |                      |           |         |          |           |
+----------------------+----------------------+-----------+---------+----------+-----------+
|   cyanocobalamin     | injected             |           | 0       | 20 |  |
| (VITAMIN B-12) 1,000 | intramuscularly once |           |         | 12       | 9         |
|  mcg/mL injection    |  a month             |           |         |          |           |
+----------------------+----------------------+-----------+---------+----------+-----------+
|   digoxin (LANOXIN)  | Take 250 mcg by      |           | 0       |          |  |
| 250 mcg tablet       | mouth Daily.      |           |         |          | 5         |
|                      | tablet daily         |           |         |          |           |
+----------------------+----------------------+-----------+---------+----------+-----------+
|   diltiazem          | Take 120 mg by mouth |           | 0       |          |  |
| (DILT-XR) 120 mg 24  |  Daily.              |           |         |          | 9         |
| hr capsule           |                      |           |         |          |           |
+----------------------+----------------------+-----------+---------+----------+-----------+
|   loratadine         | Take 10 mg by mouth  |           | 0       |          |  |
| (CLARITIN) 10 mg     | Daily.               |           |         |          | 9         |
| tablet               |                      |           |         |          |           |
+----------------------+----------------------+-----------+---------+----------+-----------+
|   losartan (COZAAR)  | Take 100 mg by mouth |           | 0       |          |  |
| 100 MG tablet        |  Daily. 1/2 daily    |           |         |          | 9         |
+----------------------+----------------------+-----------+---------+----------+-----------+
|   methotrexate 2.5   | Take 15 mg by mouth  |           | 0       |          |  |
| mg tablet            | Once a week.         |           |         |          | 9         |
+----------------------+----------------------+-----------+---------+----------+-----------+
 
|                      | Take 1 tablet by     |           | 0       |          |  |
| oxyCODONE-acetaminop | mouth every 4 hours  |           |         |          | 9         |
| hen (PERCOCET) 5-325 | as needed for Pain.  |           |         |          |           |
|  mg per tablet       |                      |           |         |          |           |
+----------------------+----------------------+-----------+---------+----------+-----------+
|   pseudoePHEDrine    | Take 30 mg by mouth  |           | 0       |          |  |
| (SUDOGEST) 30 mg     | every 4 hours as     |           |         |          | 9         |
| tablet               | needed for           |           |         |          |           |
|                      | Congestion.          |           |         |          |           |
+----------------------+----------------------+-----------+---------+----------+-----------+
|   rivaroxaban        | Take 20 mg by mouth  |           | 0       |          |  |
| (XARELTO) 20 mg      | Daily (with dinner). |           |         |          | 9         |
| tablet               |                      |           |         |          |           |
+----------------------+----------------------+-----------+---------+----------+-----------+
|   sertraline         | 2 tablets daily      |           | 0       | 20 |  |
| (ZOLOFT) 100 mg      |                      |           |         | 12       | 9         |
| tablet               |                      |           |         |          |           |
+----------------------+----------------------+-----------+---------+----------+-----------+
 documented as of this encounter
 
 Progress Notes
 Irvin Bashir, Provider Unknown - 2015  1:55 PM PDTFormatting of this note m
ight be different from the original.
 Progress Notes by Kristy Midkiff, RN at 08/20/15 6339  
  Author:  Kristy Midkiff, RN Service:  (none) Author Type:  Registered Nurse 
  Filed:  08/20/15 4731 Date of Service:  08/20/15 5730 Status:  Signed 
  :  Kristy Midkiff, RN (Registered Nurse)   
   
 Noted 3 small skin tears on patient's right upper arm. I put an eye patch over it per patie
nts request. Asked if he wanted to have it looked at and he declined, "stating it happens ev
rodney day."
  
  Electronically signed by Meir Hooks at 2019  3:48 PM PDTdocume
nted in this encounter
 
 Plan of Treatment
 
 
+--------+---------+-------------+----------------------+-------------+
| Date   | Type    | Specialty   | Care Team            | Description |
+--------+---------+-------------+----------------------+-------------+
| / | Office  | Pulmonology |   Juan F,          |             |
|    | Visit   |             | Jessica Rodrigezse,   |             |
|        |         |             | MD  [address] |             |
|        |         |             |   [place],   |             |
|        |         |             | [place] [address]             |             |
|        |         |             | [phone]         |             |
|        |         |             | [phone] (Fax)   |             |
+--------+---------+-------------+----------------------+-------------+
| / | Office  | Cardiology  |   Eric Akins, |             |
|    | Visit   |             |  MD  [address]   |             |
|        |         |             | [place]  |             |
|        |         |             | [address]  [phone]  |             |
|        |         |             |  [phone] (Fax)  |             |
+--------+---------+-------------+----------------------+-------------+
 documented as of this encounter
 
 Visit Diagnoses
 
 
 
+-------------------------------+
| Diagnosis                     |
+-------------------------------+
|   Chronic maxillary sinusitis |
+-------------------------------+
|   Chronic frontal sinusitis   |
+-------------------------------+
 documented in this encounter

## 2019-12-06 NOTE — XMS
Encounter Summary
  Created on: 2019
 
 Shane Wyatttien BroussardJosue
 External Reference #: [id number]
 : 43
 Sex: Male
 
 Demographics
 
 
+-----------------------+---------------------------+
| Address               | [address]        |
|                       | [place]  [address] |
+-----------------------+---------------------------+
| Home Phone            | [phone]           |
+-----------------------+---------------------------+
| Preferred Language    | Unknown                   |
+-----------------------+---------------------------+
| Marital Status        |                    |
+-----------------------+---------------------------+
| Oriental orthodox Affiliation | 1028                      |
+-----------------------+---------------------------+
| Race                  | Unknown                   |
+-----------------------+---------------------------+
| Ethnic Group          | Unknown                   |
+-----------------------+---------------------------+
 
 
 Author
 
 
+--------------+--------------------------------------------+
| Author       | [facility]  |
|              | [facility] [place]                                |
+--------------+--------------------------------------------+
| Organization | [facility]  |
|              | [facility] [place]                                |
+--------------+--------------------------------------------+
| Address      | Unknown                                    |
+--------------+--------------------------------------------+
| Phone        | Unavailable                                |
+--------------+--------------------------------------------+
 
 
 
 Support
 
 
+---------------+--------------+--------------------+-----------------+
| Name          | Relationship | Address            | Phone           |
+---------------+--------------+--------------------+-----------------+
| Opal Shnae | ECON         | [address]     | [phone] |
|               |              | [place]   |                 |
|               |              | [id number]              |                 |
+---------------+--------------+--------------------+-----------------+
 
 
 
 
 Care Team Providers
 
 
+-----------------------+------+-----------------+
| Care Team Member Name | Role | Phone           |
+-----------------------+------+-----------------+
| Erwin Iniguez MD | PCP  | [phone] |
+-----------------------+------+-----------------+
 
 
 
 Reason for Visit
 
 
+--------+----------+
| Reason | Comments |
+--------+----------+
| Apnea  |          |
+--------+----------+
 
 
 
 Encounter Details
 
 
+--------+---------+----------------------+----------------------+-------------------+
| Date   | Type    | Department           | Care Team            | Description       |
+--------+---------+----------------------+----------------------+-------------------+
| / | Office  |   [facility]      |   Sohail Campbell PA  | JOANN (obstructive  |
|    | Visit   | SLEEP DISORDER  [address]  |  [address]     | sleep apnea)      |
|        |         | [address]  [place]      | [facility] [place]      | (Primary Dx);     |
|        |         | [place] [address] | [address]  [phone]  | Cheyne-Gregory     |
|        |         |   [phone]       |  [phone] (Fax)  | breathing         |
+--------+---------+----------------------+----------------------+-------------------+
 
 
 
 Social History
 
 
+---------------+------------+-----------+--------+------------------+
| Tobacco Use   | Types      | Packs/Day | Years  | Date             |
|               |            |           | Used   |                  |
+---------------+------------+-----------+--------+------------------+
| Former Smoker | Cigarettes | 1.3       | 34     | Quit: 1996 |
+---------------+------------+-----------+--------+------------------+
 
 
 
+---------------------+---+---+---+
| Smokeless Tobacco:  |   |   |   |
| Never Used          |   |   |   |
+---------------------+---+---+---+
 
 
 
+-------------+-------------+---------+----------+
| Alcohol Use | Drinks/Week | oz/Week | Comments |
+-------------+-------------+---------+----------+
 
| No          |             |         |          |
+-------------+-------------+---------+----------+
 
 
 
+------------------+---------------+
| Sex Assigned at  | Date Recorded |
| Birth            |               |
+------------------+---------------+
| Not on file      |               |
+------------------+---------------+
 
 
 
+----------------+-------------+-------------+
| Job Start Date | Occupation  | Industry    |
+----------------+-------------+-------------+
| Not on file    | Not on file | Not on file |
+----------------+-------------+-------------+
 
 
 
+----------------+--------------+------------+
| Travel History | Travel Start | Travel End |
+----------------+--------------+------------+
 
 
 
+-------------------------------------+
| No recent travel history available. |
+-------------------------------------+
 documented as of this encounter
 
 Last Filed Vital Signs
 
 
+-------------------+----------------------+----------------------+----------+
| Vital Sign        | Reading              | Time Taken           | Comments |
+-------------------+----------------------+----------------------+----------+
| Blood Pressure    | 122/66               | 2014 11:02 AM  |          |
|                   |                      | PDT                  |          |
+-------------------+----------------------+----------------------+----------+
| Pulse             | 68                   | 2014 11:02 AM  |          |
|                   |                      | PDT                  |          |
+-------------------+----------------------+----------------------+----------+
| Temperature       | -                    | -                    |          |
+-------------------+----------------------+----------------------+----------+
| Respiratory Rate  | 16                   | 2014 11:02 AM  |          |
|                   |                      | PDT                  |          |
+-------------------+----------------------+----------------------+----------+
| Oxygen Saturation | 97%                  | 2014 11:02 AM  |          |
|                   |                      | PDT                  |          |
+-------------------+----------------------+----------------------+----------+
| Inhaled Oxygen    | -                    | -                    |          |
| Concentration     |                      |                      |          |
+-------------------+----------------------+----------------------+----------+
| Weight            | 87.5 kg (192 lb 14.4 | 2014 11:02 AM  |          |
|                   |  oz)                 | PDT                  |          |
+-------------------+----------------------+----------------------+----------+
| Height            | -                    | -                    |          |
 
+-------------------+----------------------+----------------------+----------+
| Body Mass Index   | 31.13                | 2013  1:28 PM  |          |
|                   |                      | PDT                  |          |
+-------------------+----------------------+----------------------+----------+
 documented in this encounter
 
 Progress Notes
 Sohail Campbell PA - 2014 11:01 AM PDTFormatting of this note might be different from 
the original.
 Subjective: 
  
 Patient ID: Wyatt Shane is a 68 y.o. male.
 
 HPI
 
 last office visit was:  2014
 date of polysomnography: 10/07/2013 
 AHI: 77.4
 RDI: 77.4
 O2%: 86% with 15.8 minutes below 88% 
 Machine type: Respironics ASV BiPAP with nasal mask (Aditi)
 obtained from:  [facility]
 pressure: EPAP = 4cm;PSmin=0cm;PSmax=25cm;backup rate=auto
 Nights using BiPAP:  30
 average usage (all nights):  3:37
 average usage (nights used):   4:20
 AHI: 1.5
 
 Wyatt comes in for BiPAP compliance.  He continues to wear his BiPAP on a regular basis, but
 is still struggling with wearing it for the duration of the night.  His congestion problems
 continue, but have improved some.  He says that he also catches himself falling asleep befo
re he remembers to put on his mask.  He and his wife talk when they go to bed and he falls a
sleep.  He has lowered his humidity because of condensation problems.  He replaced his hose 
after his last appointment and condensation is no longer a problem, but he hasn't increased 
his humidity.  He says he is waking in the morning with congestion and blowing out dried blo
od.
 
 I have discussed the download in detail.  This shows that his sleep apnea is controlled, wi
th an AHI of 2.1.  It also shows that his leaks are well controlled.  His percentage of nigh
ts wearing his BiPAP >4 hours during the last 36 nights is 44.4%.
 
 Review of Systems
 
 Objective: 
 Physical Exam
 
 Assessment: 
 
 Problem #1: OBSTRUCTIVE SLEEP APNEA (ICD 9-327.23)
 This is controlled with BiPAP.  He is wearing his BiPAP regularly, but continues to struggl
e with wearing it for the duration of the night.  He continues to have problems with congest
ion.
 
 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 
 This is controlled with ASV BiPAP.
 
 Plan: 
 
 He is to continue with his BiPAP indefinitely.  I recommended that he increase his humidity
 from 2.0 to 3.0 to help with his congestion.  He is to work toward wearing his BiPAP 100% o
 
f the time he is asleep.
 
 I will follow up again in 1 month, sooner prn.  Fifteen minutes were spent face-to-face, wi
th the majority of time spent in counseling.
 
 Sohail Campbell PA-C
 
 cc: 
 Dr. Erwin Foote
 
 Electronically signed by ROWENA Greene at 2014 11:28 AM PDTdocumented in this enco
unter
 
 Plan of Treatment
 
 
+--------+---------+-------------+----------------------+-------------+
| Date   | Type    | Specialty   | Care Team            | Description |
+--------+---------+-------------+----------------------+-------------+
| / | Office  | Pulmonology |   Juan F,          |             |
|    | Visit   |             | Jessica Cheung,   |             |
|        |         |             | MD [address] |             |
|        |         |             |   [place]   |             |
|        |         |             | [place] [address]             |             |
|        |         |             | [phone]         |             |
|        |         |             | [phone] (Fax)   |             |
+--------+---------+-------------+----------------------+-------------+
| / | Office  | Cardiology  |   Eric Akins, |             |
|    | Visit   |             |  MD [address]   |             |
|        |         |             | [place]  |             |
|        |         |             | [id number] [phone]  |             |
|        |         |             |  [phone] (Fax)  |             |
+--------+---------+-------------+----------------------+-------------+
 documented as of this encounter
 
 Visit Diagnoses
 
 
+----------------------------------------------------------------------------------------+
| Diagnosis                                                                              |
+----------------------------------------------------------------------------------------+
|   JOANN (obstructive sleep apnea) - Primary  Obstructive sleep apnea (adult) (pediatric) |
+----------------------------------------------------------------------------------------+
|   Cheyne-Gregory breathing  Cheyne-Gregory respiration                                   |
+----------------------------------------------------------------------------------------+
 documented in this encounter

## 2019-12-06 NOTE — XMS
Encounter Summary
  Created on: 2019
 
 Wyatt Shane
 External Reference #: [id number]
 : 43
 Sex: Male
 
 Demographics
 
 
+-----------------------+------------------------+
| Address               | [address]     |
|                       | [place]  [address]   |
+-----------------------+------------------------+
| Home Phone            | [phone]        |
+-----------------------+------------------------+
| Preferred Language    | Unknown                |
+-----------------------+------------------------+
| Marital Status        |                 |
+-----------------------+------------------------+
| Buddhist Affiliation | LUT                    |
+-----------------------+------------------------+
| Race                  | White                  |
+-----------------------+------------------------+
| Ethnic Group          | Not  or  |
+-----------------------+------------------------+
 
 
 Author
 
 
+--------------+-------------+
| Organization | Unknown     |
+--------------+-------------+
| Address      | Unknown     |
+--------------+-------------+
| Phone        | Unavailable |
+--------------+-------------+
 
 
 
 Support
 
 
+---------------+--------------+---------+-----------------+
| Name          | Relationship | Address | Phone           |
+---------------+--------------+---------+-----------------+
| Opal Shane | ECON         | Unknown | [phone] |
+---------------+--------------+---------+-----------------+
 
 
 
 Care Team Providers
 
 
+-----------------------+------+-----------------+
| Care Team Member Name | Role | Phone           |
 
+-----------------------+------+-----------------+
| Erwin Iniguez MD   | PCP  | [phone] |
+-----------------------+------+-----------------+
 
 
 
 Encounter Details
 
 
+--------+-------------+------------+--------------------+--------------------+
| Date   | Type        | Department | Care Team          | Description        |
+--------+-------------+------------+--------------------+--------------------+
| / | Procedure - |            |   Documentation,   | OP REPORT-TEACHING |
|    |             |            | Teaching Physician |                    |
|        | Transcribed |            |                    |                    |
+--------+-------------+------------+--------------------+--------------------+
 
 
 
 Social History
 
 
+----------------+-------+-----------+--------+------+
| Tobacco Use    | Types | Packs/Day | Years  | Date |
|                |       |           | Used   |      |
+----------------+-------+-----------+--------+------+
| Never Assessed |       |           |        |      |
+----------------+-------+-----------+--------+------+
 
 
 
+------------------+---------------+
| Sex Assigned at  | Date Recorded |
| Birth            |               |
+------------------+---------------+
| Not on file      |               |
+------------------+---------------+
 
 
 
+----------------+-------------+-------------+
| Job Start Date | Occupation  | Industry    |
+----------------+-------------+-------------+
| Not on file    | Not on file | Not on file |
+----------------+-------------+-------------+
 
 
 
+----------------+--------------+------------+
| Travel History | Travel Start | Travel End |
+----------------+--------------+------------+
 
 
 
+-------------------------------------+
| No recent travel history available. |
+-------------------------------------+
 documented as of this encounter
 
 Plan of Treatment
 
 Not on filedocumented as of this encounter
 
 Procedures
 
 
+--------------------+--------+------------+----------------------+----------+
| Procedure Name     | Priori | Date/Time  | Associated Diagnosis | Comments |
|                    | ty     |            |                      |          |
+--------------------+--------+------------+----------------------+----------+
| TEACHING PHYSICIAN |        | 1998 |                      |          |
+--------------------+--------+------------+----------------------+----------+
 documented in this encounter
 
 Visit Diagnoses
 Not on filedocumented in this encounter

## 2019-12-06 NOTE — XMS
Encounter Summary
  Created on: 2019
 
 Shane Wyatttien BroussardJosue
 External Reference #: [id number]
 : 43
 Sex: Male
 
 Demographics
 
 
+-----------------------+---------------------------+
| Address               | [address]        |
|                       | [place]  [address] |
+-----------------------+---------------------------+
| Home Phone            | [phone]           |
+-----------------------+---------------------------+
| Preferred Language    | Unknown                   |
+-----------------------+---------------------------+
| Marital Status        |                    |
+-----------------------+---------------------------+
| Sikhism Affiliation | [id number]                      |
+-----------------------+---------------------------+
| Race                  | Unknown                   |
+-----------------------+---------------------------+
| Ethnic Group          | Unknown                   |
+-----------------------+---------------------------+
 
 
 Author
 
 
+--------------+--------------------------------------------+
| Author       | [facility]  |
|              | [facility] [place]                                |
+--------------+--------------------------------------------+
| Organization | [facility]  |
|              | [facility] [place]                                |
+--------------+--------------------------------------------+
| Address      | Unknown                                    |
+--------------+--------------------------------------------+
| Phone        | Unavailable                                |
+--------------+--------------------------------------------+
 
 
 
 Support
 
 
+---------------+--------------+--------------------+-----------------+
| Name          | Relationship | Address            | Phone           |
+---------------+--------------+--------------------+-----------------+
| Opal Shane | ECON         | [address]     | [phone] |
|               |              | [place]   |                 |
|               |              | [address]              |                 |
+---------------+--------------+--------------------+-----------------+
 
 
 
 
 Care Team Providers
 
 
+-----------------------+------+-----------------+
| Care Team Member Name | Role | Phone           |
+-----------------------+------+-----------------+
| Erwin Iniguez MD | PCP  | [phone] |
+-----------------------+------+-----------------+
 
 
 
 Encounter Details
 
 
+--------+-----------+----------------------+--------------------+-------------+
| Date   | Type      | Department           | Care Team          | Description |
+--------+-----------+----------------------+--------------------+-------------+
| 04/03/ | Hospital  |   [facility] GENERIC IP     |   Conversion       | Pain        |
| 2018   | Encounter | CONVERSION [address]  | Transaction,       |             |
|        |           | [address]           | Provider Unknown   |             |
|        |           | [place], [place]         | [phone]       |             |
|        |           | [address]           | [phone] (Fax) |             |
|        |           | [phone]         |                    |             |
+--------+-----------+----------------------+--------------------+-------------+
 
 
 
 Social History
 
 
+---------------+------------+-----------+--------+------------------+
| Tobacco Use   | Types      | Packs/Day | Years  | Date             |
|               |            |           | Used   |                  |
+---------------+------------+-----------+--------+------------------+
| Former Smoker | Cigarettes | 1.3       | 34     | Quit: 1996 |
+---------------+------------+-----------+--------+------------------+
 
 
 
+---------------------+---+---+---+
| Smokeless Tobacco:  |   |   |   |
| Never Used          |   |   |   |
+---------------------+---+---+---+
 
 
 
+-------------+-------------+---------+----------+
| Alcohol Use | Drinks/Week | oz/Week | Comments |
+-------------+-------------+---------+----------+
| No          |             |         |          |
+-------------+-------------+---------+----------+
 
 
 
+------------------+---------------+
| Sex Assigned at  | Date Recorded |
| Birth            |               |
+------------------+---------------+
| Not on file      |               |
 
+------------------+---------------+
 
 
 
+----------------+-------------+-------------+
| Job Start Date | Occupation  | Industry    |
+----------------+-------------+-------------+
| Not on file    | Not on file | Not on file |
+----------------+-------------+-------------+
 
 
 
+----------------+--------------+------------+
| Travel History | Travel Start | Travel End |
+----------------+--------------+------------+
 
 
 
+-------------------------------------+
| No recent travel history available. |
+-------------------------------------+
 documented as of this encounter
 
 Medications at Time of Discharge
 
 
+----------------------+----------------------+-----------+---------+----------+-----------+
| Medication           | Sig                  | Dispensed | Refills | Start    | End Date  |
|                      |                      |           |         | Date     |           |
+----------------------+----------------------+-----------+---------+----------+-----------+
|   acyclovir          | Apply  topically     |           | 0       |          |           |
| (ZOVIRAX) 5%         | every 3 hours.       |           |         |          |           |
| ointment             |                      |           |         |          |           |
+----------------------+----------------------+-----------+---------+----------+-----------+
|   albuterol (PROAIR  | Inhale 2 puffs into  |           | 0       |          |           |
| HFA) 90 mcg/puff     | the lungs every 6    |           |         |          |           |
| inhaler              | hours as needed for  |           |         |          |           |
|                      | Wheezing.            |           |         |          |           |
+----------------------+----------------------+-----------+---------+----------+-----------+
|   digoxin (LANOXIN)  | Take 125 mcg by      |           | 0       |          |           |
| 250 mcg tablet       | mouth Daily.      |           |         |          |           |
|                      | capsule daily        |           |         |          |           |
+----------------------+----------------------+-----------+---------+----------+-----------+
|   ferrous sulfate    | Take 325 mg by mouth |           | 0       | 20 |           |
| 325 mg tablet        |  3 times daily.      |           |         | 12       |           |
+----------------------+----------------------+-----------+---------+----------+-----------+
|   fluticasone        | one spray in each    |           | 0       | 20 |           |
| (FLONASE) 50         | nostril daily as     |           |         | 12       |           |
| mcg/nasal spray      | needed               |           |         |          |           |
+----------------------+----------------------+-----------+---------+----------+-----------+
|                      | Inhale 1 puff into   |           | 0       |          |           |
| fluticasone-salmeter | the lungs Twice      |           |         |          |           |
| ol (ADVAIR) 500-50   | Daily.               |           |         |          |           |
| mcg/puff diskus      |                      |           |         |          |           |
| inhaler              |                      |           |         |          |           |
+----------------------+----------------------+-----------+---------+----------+-----------+
|   folic acid 1 mg    | Take 1 mg by mouth   |           | 0       |          |           |
| tablet               | Daily.               |           |         |          |           |
+----------------------+----------------------+-----------+---------+----------+-----------+
|   furosemide (LASIX) | Take 40 mg by mouth  |           | 0       |          |           |
 
|  40 mg tablet        | Daily.               |           |         |          |           |
+----------------------+----------------------+-----------+---------+----------+-----------+
|   guaiFENesin        | Take 1,200 mg by     |           | 0       |          |           |
| (MUCINEX) 600 mg 12  | mouth 2 times daily. |           |         |          |           |
| hr tablet            |                      |           |         |          |           |
+----------------------+----------------------+-----------+---------+----------+-----------+
|   levothyroxine      | Take 75 mcg by mouth |           | 0       | 20 |           |
| (LEVOTHROID) 75 MCG  |  Daily.              |           |         | 12       |           |
| tablet               |                      |           |         |          |           |
+----------------------+----------------------+-----------+---------+----------+-----------+
|   metoclopramide     | Take 10 mg by mouth  |           | 0       | 20 |           |
| (REGLAN) 10 mg       | 4 times daily as     |           |         | 12       |           |
| tablet               | needed.              |           |         |          |           |
+----------------------+----------------------+-----------+---------+----------+-----------+
|                      | Apply  topically 2   |           | 0       |          |           |
| nystatin-triamcinolo | times daily.         |           |         |          |           |
| ne (MYCOLOG II)      |                      |           |         |          |           |
| cream                |                      |           |         |          |           |
+----------------------+----------------------+-----------+---------+----------+-----------+
|   omeprazole         | Take 20 mg by mouth  |           | 0       | 20 |           |
| (PRILOSEC) 20 mg     | 2 times daily.       |           |         | 12       |           |
| capsule              |                      |           |         |          |           |
+----------------------+----------------------+-----------+---------+----------+-----------+
|   potassium citrate  | Take 10 mEq by mouth |           | 0       |          |           |
| (UROCIT-K) 10 mEq SR |  2 times daily.      |           |         |          |           |
|  tablet              |                      |           |         |          |           |
+----------------------+----------------------+-----------+---------+----------+-----------+
|   predniSONE         | Take 10 mg by mouth  |           | 0       |          |           |
| (DELTASONE) 5 mg     | Daily.               |           |         |          |           |
| tablet               |                      |           |         |          |           |
+----------------------+----------------------+-----------+---------+----------+-----------+
|   tamsulosin         | Take 0.4 mg by mouth |           | 0       | 20 |           |
| (FLOMAX) 0.4 mg CAPS |  2 times daily.      |           |         | 12       |           |
+----------------------+----------------------+-----------+---------+----------+-----------+
|   acyclovir          | Take 400 mg by mouth |           | 0       | 20 |  |
| (ZOVIRAX) 400 MG     |  3 times daily as    |           |         | 12       | 9         |
| tablet               | needed.              |           |         |          |           |
+----------------------+----------------------+-----------+---------+----------+-----------+
|   Cholecalciferol    | Take 2,000 Units by  |           | 0       | 20 |  |
| (VITAMIN D3) 2000    | mouth Daily.         |           |         | 12       | 9         |
| UNITS CAPS           |                      |           |         |          |           |
+----------------------+----------------------+-----------+---------+----------+-----------+
|   cyanocobalamin     | injected             |           | 0       | 20 |  |
| (VITAMIN B-12) 1,000 | intramuscularly once |           |         | 12       | 9         |
|  mcg/mL injection    |  a month             |           |         |          |           |
+----------------------+----------------------+-----------+---------+----------+-----------+
|   diltiazem          | Take 120 mg by mouth |           | 0       |          |  |
| (DILT-XR) 120 mg 24  |  Daily.              |           |         |          | 9         |
| hr capsule           |                      |           |         |          |           |
+----------------------+----------------------+-----------+---------+----------+-----------+
|   loratadine         | Take 10 mg by mouth  |           | 0       |          |  |
| (CLARITIN) 10 mg     | Daily.               |           |         |          | 9         |
| tablet               |                      |           |         |          |           |
+----------------------+----------------------+-----------+---------+----------+-----------+
|   losartan (COZAAR)  | Take 100 mg by mouth |           | 0       |          |  |
| 100 MG tablet        |  Daily. 1/2 daily    |           |         |          | 9         |
+----------------------+----------------------+-----------+---------+----------+-----------+
|   methotrexate 2.5   | Take 15 mg by mouth  |           | 0       |          |  |
| mg tablet            | Once a week.         |           |         |          | 9         |
+----------------------+----------------------+-----------+---------+----------+-----------+
 
|                      | Take 1 tablet by     |           | 0       |          |  |
| oxyCODONE-acetaminop | mouth every 4 hours  |           |         |          | 9         |
| hen (PERCOCET) 5-325 | as needed for Pain.  |           |         |          |           |
|  mg per tablet       |                      |           |         |          |           |
+----------------------+----------------------+-----------+---------+----------+-----------+
|   pseudoePHEDrine    | Take 30 mg by mouth  |           | 0       |          |  |
| (SUDOGEST) 30 mg     | every 4 hours as     |           |         |          | 9         |
| tablet               | needed for           |           |         |          |           |
|                      | Congestion.          |           |         |          |           |
+----------------------+----------------------+-----------+---------+----------+-----------+
|   rivaroxaban        | Take 20 mg by mouth  |           | 0       |          |  |
| (XARELTO) 20 mg      | Daily (with dinner). |           |         |          | 9         |
| tablet               |                      |           |         |          |           |
+----------------------+----------------------+-----------+---------+----------+-----------+
|   sertraline         | 2 tablets daily      |           | 0       | 20 |  |
| (ZOLOFT) 100 mg      |                      |           |         | 12       | 9         |
| tablet               |                      |           |         |          |           |
+----------------------+----------------------+-----------+---------+----------+-----------+
 documented as of this encounter
 
 Plan of Treatment
 
 
+--------+---------+-------------+----------------------+-------------+
| Date   | Type    | Specialty   | Care Team            | Description |
+--------+---------+-------------+----------------------+-------------+
| / | Office  | Pulmonology |   [facility],          |             |
|    | Visit   |             | Jessica Cheung,   |             |
|        |         |             | MD [address] |             |
|        |         |             |   [place],   |             |
|        |         |             | [place] [address]             |             |
|        |         |             | [phone]         |             |
|        |         |             | [phone] (Fax)   |             |
+--------+---------+-------------+----------------------+-------------+
| / | Office  | Cardiology  |   [place], M[place], |             |
| 2020   | Visit   |             |  MD  [address]   |             |
|        |         |             | [place]  [place]  |             |
|        |         |             | [address]  [phone]  |             |
|        |         |             |  [phone] (Fax)  |             |
+--------+---------+-------------+----------------------+-------------+
 documented as of this encounter
 
 Procedures
 
 
+--------------------+--------+-------------+----------------------+----------------------+
| Procedure Name     | Priori | Date/Time   | Associated Diagnosis | Comments             |
|                    | ty     |             |                      |                      |
+--------------------+--------+-------------+----------------------+----------------------+
| US HEAD NECK SOFT  | Routin | 12/10/2010  |                      |   Results for this   |
| TISSUE             | e      |  5:05 AM    |                      | procedure are in the |
|                    |        | PST         |                      |  results section.    |
+--------------------+--------+-------------+----------------------+----------------------+
 documented in this encounter
 
 Results
 US Head Neck Soft Tissue (12/10/2010  5:05 AM PST)
 
+----------+
| Specimen |
 
+----------+
|          |
+----------+
 
 
 
+------------------------------------------------------------------------+--------------+
| Narrative                                                              | Performed At |
+------------------------------------------------------------------------+--------------+
|   This is a non-reportable procedure without a radiologist report and  |              |
| is  used for image storage only                                        |              |
+------------------------------------------------------------------------+--------------+
 
 
 
+--------------------------------------------------------------------------------------+
| Procedure Note                                                                       |
+--------------------------------------------------------------------------------------+
|   Andrzej Steven Irvin - 2019  4:21 AM PDT  This is a non-reportable procedure  |
| without a radiologist report and isused for image storage only                       |
+--------------------------------------------------------------------------------------+
 documented in this encounter
 
 Visit Diagnoses
 
 
+--------------------------+
| Diagnosis                |
+--------------------------+
|   Pain  Generalized pain |
+--------------------------+
 documented in this encounter

## 2019-12-06 NOTE — XMS
Encounter Summary
  Created on: 2019
 
 Shane Wyatttien BroussardJosue
 External Reference #: [id number]
 : 43
 Sex: Male
 
 Demographics
 
 
+-----------------------+---------------------------+
| Address               | [address]        |
|                       | [place]  [address] |
+-----------------------+---------------------------+
| Home Phone            | [phone]           |
+-----------------------+---------------------------+
| Preferred Language    | Unknown                   |
+-----------------------+---------------------------+
| Marital Status        |                    |
+-----------------------+---------------------------+
| Anabaptist Affiliation | [id number]                      |
+-----------------------+---------------------------+
| Race                  | Unknown                   |
+-----------------------+---------------------------+
| Ethnic Group          | Unknown                   |
+-----------------------+---------------------------+
 
 
 Author
 
 
+--------------+--------------------------------------------+
| Author       | [facility]  |
|              | [facility] [place]                                |
+--------------+--------------------------------------------+
| Organization | [facility]  |
|              | [facility] [place]                                |
+--------------+--------------------------------------------+
| Address      | Unknown                                    |
+--------------+--------------------------------------------+
| Phone        | Unavailable                                |
+--------------+--------------------------------------------+
 
 
 
 Support
 
 
+---------------+--------------+--------------------+-----------------+
| Name          | Relationship | Address            | Phone           |
+---------------+--------------+--------------------+-----------------+
| Opal Shane | ECON         | [address]     | [phone] |
|               |              | [place]   |                 |
|               |              | [address]              |                 |
+---------------+--------------+--------------------+-----------------+
 
 
 
 
 Care Team Providers
 
 
+-----------------------+------+-----------------+
| Care Team Member Name | Role | Phone           |
+-----------------------+------+-----------------+
| Erwin Iniguez MD | PCP  | [phone] |
+-----------------------+------+-----------------+
 
 
 
 Reason for Visit
 
 
+--------+----------+
| Reason | Comments |
+--------+----------+
| Apnea  |          |
+--------+----------+
 
 
 
 Encounter Details
 
 
+--------+---------+----------------------+----------------------+----------------------+
| Date   | Type    | Department           | Care Team            | Description          |
+--------+---------+----------------------+----------------------+----------------------+
| / | Office  |   [facility]      |   Sohail Campbell PA  | JOANN on CPAP (Primary |
| 2014   | Visit   | SLEEP DISORDER  [address]  |  [address]     |  Dx); [address]  |
|        |         | [address]      | [place]      | respiration          |
|        |         | [place] [address] | [address]  [phone]  |                      |
|        |         |   [phone]       |  [phone] (Fax)  |                      |
+--------+---------+----------------------+----------------------+----------------------+
 
 
 
 Social History
 
 
+---------------+------------+-----------+--------+------------------+
| Tobacco Use   | Types      | Packs/Day | Years  | Date             |
|               |            |           | Used   |                  |
+---------------+------------+-----------+--------+------------------+
| Former Smoker | Cigarettes | 1.3       | 34     | Quit: 1996 |
+---------------+------------+-----------+--------+------------------+
 
 
 
+---------------------+---+---+---+
| Smokeless Tobacco:  |   |   |   |
| Never Used          |   |   |   |
+---------------------+---+---+---+
 
 
 
+-------------+-------------+---------+----------+
| Alcohol Use | Drinks/Week | oz/Week | Comments |
+-------------+-------------+---------+----------+
 
| No          |             |         |          |
+-------------+-------------+---------+----------+
 
 
 
+------------------+---------------+
| Sex Assigned at  | Date Recorded |
| Birth            |               |
+------------------+---------------+
| Not on file      |               |
+------------------+---------------+
 
 
 
+----------------+-------------+-------------+
| Job Start Date | Occupation  | Industry    |
+----------------+-------------+-------------+
| Not on file    | Not on file | Not on file |
+----------------+-------------+-------------+
 
 
 
+----------------+--------------+------------+
| Travel History | Travel Start | Travel End |
+----------------+--------------+------------+
 
 
 
+-------------------------------------+
| No recent travel history available. |
+-------------------------------------+
 documented as of this encounter
 
 Last Filed Vital Signs
 
 
+-------------------+----------------------+----------------------+----------+
| Vital Sign        | Reading              | Time Taken           | Comments |
+-------------------+----------------------+----------------------+----------+
| Blood Pressure    | 122/58               | 2014 11:23 AM  |          |
|                   |                      | PST                  |          |
+-------------------+----------------------+----------------------+----------+
| Pulse             | 64                   | 2014 11:23 AM  |          |
|                   |                      | PST                  |          |
+-------------------+----------------------+----------------------+----------+
| Temperature       | -                    | -                    |          |
+-------------------+----------------------+----------------------+----------+
| Respiratory Rate  | 16                   | 2014 11:23 AM  |          |
|                   |                      | PST                  |          |
+-------------------+----------------------+----------------------+----------+
| Oxygen Saturation | 96%                  | 2014 11:23 AM  |          |
|                   |                      | PST                  |          |
+-------------------+----------------------+----------------------+----------+
| Inhaled Oxygen    | -                    | -                    |          |
| Concentration     |                      |                      |          |
+-------------------+----------------------+----------------------+----------+
| Weight            | 85.7 kg (188 lb 14.4 | 2014 11:23 AM  |          |
|                   |  oz)                 | PST                  |          |
+-------------------+----------------------+----------------------+----------+
| Height            | -                    | -                    |          |
 
+-------------------+----------------------+----------------------+----------+
| Body Mass Index   | 30.49                | 2013  1:28 PM  |          |
|                   |                      | PDT                  |          |
+-------------------+----------------------+----------------------+----------+
 documented in this encounter
 
 Progress Notes
 Sohail Campbell PA - 2014 11:22 AM PSTFormatting of this note might be different from 
the original.
 Subjective: 
  
 Patient ID: Wyatt Shane is a 68 y.o. male.
 
 HPI
 
 last office visit was:  2013
 date of polysomnography: 10/07/2013 
 AHI: 77.4
 RDI: 77.4
 O2%: 86% with 15.8 minutes below 88% 
 Machine type: Respironics ASV BiPAP with nasal mask (Aditi)
 obtained from:  [facility]
 pressure: EPAP = 4cm;PSmin=0cm;PSmax=25cm;backup rate=auto
 Nights using BiPAP: 32/36
 average usage (all nights):  2:59
 average usage (nights used):  3:21
 AHI: 2.1
 
 Wyatt comes in for BiPAP compliance.  He continues to wear his BiPAP on a regular basis, but
 he has struggled with wearing it for the duration of the night.  He has had problems with c
ongestion, which makes it very difficult to breathe through his nose.  The nasal mask does n
ot allow him to breathe through his mouth.  He has had to lower his humidity from 4.0 to 2.0
 because he has had condensation buildup in his hose.  It appears that lowering his humidity
 has made the congestion problem worse.
 
 I have discussed the download in detail.  This shows that his sleep apnea is controlled, wi
th an AHI of 2.1.  It also shows that his leaks are well controlled. 
 
 Review of Systems
 
 Objective: 
 Physical Exam
 
 Assessment: 
 
 Problem #1: OBSTRUCTIVE SLEEP APNEA (ICD 9-327.23)
 This is controlled with BiPAP.  He is wearing his BiPAP regularly, but continues to struggl
e with wearing it for the duration of the night.  He has had problems with congestion and co
ndensation in his hose.
 
 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 
 This is controlled with ASV BiPAP.
 
 Plan: 
 
 He is to continue with his BiPAP indefinitely.  I recommended that he go to [facility] to have hi
s heated hose checked/replaced, which should stop his problems with condensation.  If his co
ngestion continues, he should consider using a full face mask.  He is to work toward wearing
 his BiPAP 100% of the time he is asleep.
 
 
 I will follow up again in 1 month, sooner prn.  Fifteen minutes were spent face-to-face, wi
th the majority of time spent in counseling.
 
 Sohail Campbell PA-C
 
 cc: 
 Dr. Erwin Foote
 
 Electronically signed by ROWENA Greene at 2014 12:26 PM PSTdocumented in this enco
unter
 
 Plan of Treatment
 
 
+--------+---------+-------------+----------------------+-------------+
| Date   | Type    | Specialty   | Care Team            | Description |
+--------+---------+-------------+----------------------+-------------+
| / | Office  | Pulmonology |   Juan F,          |             |
|    | Visit   |             | Jessica Cheung,   |             |
|        |         |             | MD [address] |             |
|        |         |             |   [place]   |             |
|        |         |             | [place] [address]             |             |
|        |         |             | [phone]         |             |
|        |         |             | [phone] (Fax)   |             |
+--------+---------+-------------+----------------------+-------------+
| / | Office  | Cardiology  |   Eric Akins, |             |
|    | Visit   |             |  MD [address]   |             |
|        |         |             | [place]  |             |
|        |         |             | [address]  [phone]  |             |
|        |         |             |  [phone] (Fax)  |             |
+--------+---------+-------------+----------------------+-------------+
 documented as of this encounter
 
 Visit Diagnoses
 
 
+----------------------------------------------------------------------+
| Diagnosis                                                            |
+----------------------------------------------------------------------+
|   JOANN on CPAP - Primary  Obstructive sleep apnea (adult) (pediatric) |
+----------------------------------------------------------------------+
|   [facility] respiration                                          |
+----------------------------------------------------------------------+
 documented in this encounter

## 2019-12-06 NOTE — XMS
Encounter Summary
  Created on: 2019
 
 Wyatt Shane
 External Reference #: [id number]
 : 43
 Sex: Male
 
 Demographics
 
 
+-----------------------+------------------------+
| Address               | [address]     |
|                       | [place]  [address]   |
+-----------------------+------------------------+
| Home Phone            | [phone]        |
+-----------------------+------------------------+
| Preferred Language    | Unknown                |
+-----------------------+------------------------+
| Marital Status        |                 |
+-----------------------+------------------------+
| Pentecostal Affiliation | LUT                    |
+-----------------------+------------------------+
| Race                  | White                  |
+-----------------------+------------------------+
| Ethnic Group          | Not  or  |
+-----------------------+------------------------+
 
 
 Author
 
 
+--------------+------------------------------+
| Author       | [facility] |
+--------------+------------------------------+
| Organization | [facility] |
+--------------+------------------------------+
| Address      | Unknown                      |
+--------------+------------------------------+
| Phone        | Unavailable                  |
+--------------+------------------------------+
 
 
 
 Support
 
 
+---------------+--------------+---------+-----------------+
| Name          | Relationship | Address | Phone           |
+---------------+--------------+---------+-----------------+
| Opal Shane | ECON         | Unknown | [phone] |
+---------------+--------------+---------+-----------------+
 
 
 
 Care Team Providers
 
 
 
+-----------------------+------+-----------------+
| Care Team Member Name | Role | Phone           |
+-----------------------+------+-----------------+
| Erwin Steel MD   | PCP  | [phone] |
+-----------------------+------+-----------------+
 
 
 
 Encounter Details
 
 
+--------+-------------+-----------------+---------------------+---------------+
| Date   | Type        | Department      | Care Team           | Description   |
+--------+-------------+-----------------+---------------------+---------------+
| / | Office      |   [facility]  |   Note, Outpatient  | Progress Note |
|    | Visit-Trans | MEDICINE        | Clinic              |               |
|        | cribed      |                 |                     |               |
+--------+-------------+-----------------+---------------------+---------------+
 
 
 
 Social History
 
 
+----------------+-------+-----------+--------+------+
| Tobacco Use    | Types | Packs/Day | Years  | Date |
|                |       |           | Used   |      |
+----------------+-------+-----------+--------+------+
| Never Assessed |       |           |        |      |
+----------------+-------+-----------+--------+------+
 
 
 
+------------------+---------------+
| Sex Assigned at  | Date Recorded |
| Birth            |               |
+------------------+---------------+
| Not on file      |               |
+------------------+---------------+
 
 
 
+----------------+-------------+-------------+
| Job Start Date | Occupation  | Industry    |
+----------------+-------------+-------------+
| Not on file    | Not on file | Not on file |
+----------------+-------------+-------------+
 
 
 
+----------------+--------------+------------+
| Travel History | Travel Start | Travel End |
+----------------+--------------+------------+
 
 
 
+-------------------------------------+
| No recent travel history available. |
+-------------------------------------+
 documented as of this encounter
 
 
 Progress Notes
 Interface, Transcription In - 2006  2:31 AM PSTCLINIC DATE:       1999
 
                 OTOLARYNGOLOGY/HEAD AND NECK SURGERY CLINIC
 
 SUBJECTIVE:  Mr. Shane is seen back in follow-up today with regard to his
 laryngeal carcinoma.  It has been a month since he completed his radiation.
 He continues to be troubled by difficulty breathing if he does not take
 prednisone.  He is also significantly troubled by gastroesophageal (GE)
 symptomatology.
 
 PHYSICAL EXAMINATION:  On examination today flexible fiberoptic
 laryngoscopy is done and reveals that his larynx is quite red today over
 its posterior half, and subglottically he is narrowed approximately 60% in
 terms of his airway with a band just at the inferior aspect of the cricoid.
 There is no evidence of recurrent disease.
 
 ASSESSMENT:  Subglottic stenosis, status post hemilaryngectomy.
 
 PLAN:  We need to get his reflux under control and do a pH probe test to
 ensure that this is not an ongoing factor.  I have encouraged him to get
 back on his Prilosec, as he is only taking Zantac, and hopefully he will do
 this.  We will have him come down for a pH probe test and do laryngeal
 subglottic dilation at that time. He understands the potential need for a
 tracheotomy at the time of surgery.  He also understands that if his
 symptomatology worsens in the interim, he is to come down and we will
 perform this more electively.
 
 ADDENDUM:  A pH probe test will be arranged as soon as possible.  The
 patient will call my  and we will coordinate the surgery around
 this event.
 
 Jimmy Esposito M.D.
 , Otolaryngology
 Head and Neck Surgery
 
 MARIA ALEJANDRA/dandy
 D: 1999
 T: 1999
 
 cc:
 
 BE PAULA MD
 [address] [facility]  [address]
 
 ERWIN STEEL MD
 [address] [facility]
 [place]  [address]
 
 VIBHA BRASWELL MD
 [address]  [id number] signed by Interface, Transcription In at   2:31 AM PSTdocumented in this encounter
 
 Plan of Treatment
 Not on filedocumented as of this encounter
 
 
 Visit Diagnoses
 Not on filedocumented in this encounter

## 2019-12-06 NOTE — XMS
Encounter Summary
  Created on: 2019
 
 Wyatt Shane
 External Reference #: [id number]
 : 43
 Sex: Male
 
 Demographics
 
 
+-----------------------+------------------------+
| Address               | [address]     |
|                       | [place]  [address]   |
+-----------------------+------------------------+
| Home Phone            | [phone]        |
+-----------------------+------------------------+
| Preferred Language    | Unknown                |
+-----------------------+------------------------+
| Marital Status        |                 |
+-----------------------+------------------------+
| Anabaptism Affiliation | LUT                    |
+-----------------------+------------------------+
| Race                  | White                  |
+-----------------------+------------------------+
| Ethnic Group          | Not  or  |
+-----------------------+------------------------+
 
 
 Author
 
 
+--------------+------------------------------+
| Author       | [facility] |
+--------------+------------------------------+
| Organization | [facility] |
+--------------+------------------------------+
| Address      | Unknown                      |
+--------------+------------------------------+
| Phone        | Unavailable                  |
+--------------+------------------------------+
 
 
 
 Support
 
 
+---------------+--------------+---------+-----------------+
| Name          | Relationship | Address | Phone           |
+---------------+--------------+---------+-----------------+
| Opal Shane | ECON         | Unknown | [phone] |
+---------------+--------------+---------+-----------------+
 
 
 
 Care Team Providers
 
 
 
+-----------------------+------+-----------------+
| Care Team Member Name | Role | Phone           |
+-----------------------+------+-----------------+
| Erwin Iniguez MD   | PCP  | [phone] |
+-----------------------+------+-----------------+
 
 
 
 Encounter Details
 
 
+--------+-------------+-----------------+---------------------+---------------+
| Date   | Type        | Department      | Care Team           | Description   |
+--------+-------------+-----------------+---------------------+---------------+
| 12/10/ | Office      |   [facility]  |   Note, Outpatient  | Progress Note |
| 1998   | Visit-Trans | MEDICINE        | Clinic              |               |
|        | cribed      |                 |                     |               |
+--------+-------------+-----------------+---------------------+---------------+
 
 
 
 Social History
 
 
+----------------+-------+-----------+--------+------+
| Tobacco Use    | Types | Packs/Day | Years  | Date |
|                |       |           | Used   |      |
+----------------+-------+-----------+--------+------+
| Never Assessed |       |           |        |      |
+----------------+-------+-----------+--------+------+
 
 
 
+------------------+---------------+
| Sex Assigned at  | Date Recorded |
| Birth            |               |
+------------------+---------------+
| Not on file      |               |
+------------------+---------------+
 
 
 
+----------------+-------------+-------------+
| Job Start Date | Occupation  | Industry    |
+----------------+-------------+-------------+
| Not on file    | Not on file | Not on file |
+----------------+-------------+-------------+
 
 
 
+----------------+--------------+------------+
| Travel History | Travel Start | Travel End |
+----------------+--------------+------------+
 
 
 
+-------------------------------------+
| No recent travel history available. |
+-------------------------------------+
 documented as of this encounter
 
 
 Progress Notes
 Interface, Transcription In - 2007  5:11 AM PSTCLINIC DATE:       12/10/1998
 
 OTOLARYNGOLOGY CLINIC
 
 SUBJECTIVE:  I talked with  and Mrs. Shane at some length about the
 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  They seem to understand what is
 involved.  I told them I feel the lesion is deep enough that I believe that
 radiation does not carry the usual high incidence of cure and that a
 vertical hemilaryngectomy would be preferable.  I have discussed this with
 my colleagues in radiation oncology and the other head and neck surgeons
 here at the head and neck tumor board.  We will proceed in the near future
 if they decide to go ahead.
 
 Jimmy Esposito M.D.
 , Otolaryngology
 Head and Neck Surgery
 
 CIRILO/sandra
 D: 12/10/98
 T: 12/10/98Electronically signed by Interface, Transcription In at 2007  5:11 AM PSTd
ocumented in this encounter
 
 Plan of Treatment
 Not on filedocumented as of this encounter
 
 Visit Diagnoses
 Not on filedocumented in this encounter

## 2019-12-06 NOTE — XMS
Encounter Summary
  Created on: 2019
 
 Shane Wyatttien BroussardJosue
 External Reference #: [id number]
 : 43
 Sex: Male
 
 Demographics
 
 
+-----------------------+---------------------------+
| Address               | [address]        |
|                       | [place]  [address] |
+-----------------------+---------------------------+
| Home Phone            | [phone]           |
+-----------------------+---------------------------+
| Preferred Language    | Unknown                   |
+-----------------------+---------------------------+
| Marital Status        |                    |
+-----------------------+---------------------------+
| Cheondoism Affiliation | [id number]                      |
+-----------------------+---------------------------+
| Race                  | Unknown                   |
+-----------------------+---------------------------+
| Ethnic Group          | Unknown                   |
+-----------------------+---------------------------+
 
 
 Author
 
 
+--------------+--------------------------------------------+
| Author       | Merged with [facility]  |
|              | and [place]                                |
+--------------+--------------------------------------------+
| Organization | Merged with [facility]  |
|              | and [place]                                |
+--------------+--------------------------------------------+
| Address      | Unknown                                    |
+--------------+--------------------------------------------+
| Phone        | Unavailable                                |
+--------------+--------------------------------------------+
 
 
 
 Support
 
 
+---------------+--------------+--------------------+-----------------+
| Name          | Relationship | Address            | Phone           |
+---------------+--------------+--------------------+-----------------+
| Opal Shane | ECON         | [address]     | [phone] |
|               |              | [place]   |                 |
|               |              | [id number]              |                 |
+---------------+--------------+--------------------+-----------------+
 
 
 
 
 Care Team Providers
 
 
+------------------------+------+-----------------+
| Care Team Member Name  | Role | Phone           |
+------------------------+------+-----------------+
| Calvin Robertson MD | PCP  | [phone] |
+------------------------+------+-----------------+
 
 
 
 Encounter Details
 
 
+--------+-------------+---------------------+----------------------+-------------+
| Date   | Type        | Department          | Care Team            | Description |
+--------+-------------+---------------------+----------------------+-------------+
| 11/15/ | Orders Only |   [facility]     |   Neris Wilson, SHELLEY      |             |
|    |             | VASCULAR SURGERY    | [address]     |             |
|        |             | [address]  [address]    | [address]  [place], [place]  |             |
|        |             | [address]   | [address]  [phone]  |             |
|        |             | [place], [place]        |  [phone] (Fax)  |             |
|        |             | [address]          |                      |             |
|        |             | [phone]        |                      |             |
+--------+-------------+---------------------+----------------------+-------------+
 
 
 
 Social History
 
 
+---------------+------------+-----------+--------+------------------+
| Tobacco Use   | Types      | Packs/Day | Years  | Date             |
|               |            |           | Used   |                  |
+---------------+------------+-----------+--------+------------------+
| Former Smoker | Cigarettes | 1.3       | 34     | Quit: 1996 |
+---------------+------------+-----------+--------+------------------+
 
 
 
+---------------------+---+---+---+
| Smokeless Tobacco:  |   |   |   |
| Never Used          |   |   |   |
+---------------------+---+---+---+
 
 
 
+-------------+-------------+---------+----------+
| Alcohol Use | Drinks/Week | oz/Week | Comments |
+-------------+-------------+---------+----------+
| No          |             |         |          |
+-------------+-------------+---------+----------+
 
 
 
+------------------+---------------+
| Sex Assigned at  | Date Recorded |
| Birth            |               |
+------------------+---------------+
 
| Not on file      |               |
+------------------+---------------+
 
 
 
+----------------+-------------+-------------+
| Job Start Date | Occupation  | Industry    |
+----------------+-------------+-------------+
| Not on file    | Not on file | Not on file |
+----------------+-------------+-------------+
 
 
 
+----------------+--------------+------------+
| Travel History | Travel Start | Travel End |
+----------------+--------------+------------+
 
 
 
+-------------------------------------+
| No recent travel history available. |
+-------------------------------------+
 documented as of this encounter
 
 Plan of Treatment
 
 
+--------+---------+-------------+----------------------+-------------+
| Date   | Type    | Specialty   | Care Team            | Description |
+--------+---------+-------------+----------------------+-------------+
| / | Office  | Pulmonology |   Juan F,          |             |
| 2019   | Visit   |             | Jessica Cheung,   |             |
|        |         |             | MD [address] |             |
|        |         |             |   [place],   |             |
|        |         |             | [place] [address]             |             |
|        |         |             | [phone]         |             |
|        |         |             | [phone] (Fax)   |             |
+--------+---------+-------------+----------------------+-------------+
| / | Office  | Cardiology  |   Eric Akins, |             |
|    | Visit   |             |  MD [address]   |             |
|        |         |             | [address]  [place], [place]  |             |
|        |         |             | [address]  [phone]  |             |
|        |         |             |  [phone] (Fax)  |             |
+--------+---------+-------------+----------------------+-------------+
 documented as of this encounter
 
 Procedures
 
 
+---------------------+--------+-------------+----------------------+----------------------+
| Procedure Name      | Priori | Date/Time   | Associated Diagnosis | Comments             |
|                     | ty     |             |                      |                      |
+---------------------+--------+-------------+----------------------+----------------------+
| VAS CAROTID DUPLEX  | Routin | 11/15/2018  |                      |   Results for this   |
| BILATERAL           | e      | 10:55 AM    |                      | procedure are in the |
|                     |        | PST         |                      |  results section.    |
+---------------------+--------+-------------+----------------------+----------------------+
 documented in this encounter
 
 Results
 
 VAS Carotid Duplex Bilateral (11/15/2018 10:55 AM PST)
 
+----------+
| Specimen |
+----------+
|          |
+----------+
 
 
 
+------------------------------------------------------------------------+--------------+
| Impressions                                                            | Performed At |
+------------------------------------------------------------------------+--------------+
|   1.   No hemodynamically significant stenosis of the internal carotid |              |
|  arteries.  2.   There is greater than 50% narrowing of the left mid   |              |
| common carotid artery. There is ulcerated plaque at this region with   |              |
| possible intimal flap.  3.   Antegrade flow of the bilateral vertebral |              |
|  arteries.     Electronically signed by Ananth Verduzco MD on 11/15/2018   |              |
| 11:28 AM                                                               |              |
+------------------------------------------------------------------------+--------------+
 
 
 
+------------------------------------------------------------------------+--------------+
| Narrative                                                              | Performed At |
+------------------------------------------------------------------------+--------------+
|   HISTORY:  Carotid stenosis.     TECHNIQUE:  Imaging was performed    |              |
| with a linear array transducer.    true duplex exam with gray scale,   |              |
| color flow and Doppler spectral analysis.     COMPARISON:  ,   |              |
|      FINDINGS:  Right side:  Peak systolic and end-diastolic       |              |
| velocities of the common carotid, internal carotid and external        |              |
| carotid arteries are normal.     Left side:  Peak systolic and         |              |
| end-diastolic velocities of the internal carotid and external carotid  |              |
| arteries are normal.     Left common carotid and elevated velocity     |              |
| from 64 cm/sec to 162 cm/s     There are normal peak systolic          |              |
| velocities within the bilateral vertebral arteries with antegrade      |              |
| flow.     Grades:  1       Stenosis   =01-50%    PSV <125 cm/sec       |              |
|  EDV (cm/s)<40    cm/sec (mild plaque)  2       Stenosis   =01-50%     |              |
| PSV <125 cm/sec       EDV (cm/s)<40    cm/sec (moderate plaque)  3     |              |
|    Stenosis   =50-69%    PSV >125 cm/sec       EDV (cm/s)>40    cm/sec |              |
|   4       Stenosis   =70-95%    PSV >230 cm/sec       EDV (cm/s)>100   |              |
| cm/sec  5       Stenosis   =90-95%    PSV <125 cm/sec       EDV        |              |
| (cm/s)<40    cm/sec  6       Stenosis   Occluded     Validated         |              |
| velocity measurements with angiographic measurements, velocity         |              |
| criteria are extrapolated from diameter data as defined by the Society |              |
|  of Radiologists in Ultrasound Consensus Conference Radiology 2003;    |              |
| 229; 340-346.                                                          |              |
+------------------------------------------------------------------------+--------------+
 
 
 
+-------------------------------------------------------------------------------------------
--------------------------------------------------------------------------------------------
------------------------------------------+
| Procedure Note                                                                            
                                                                                            
                                          |
+-------------------------------------------------------------------------------------------
--------------------------------------------------------------------------------------------
------------------------------------------+
 
|   Andrzej tSeven Conversion - 2019  3:16 PM PDT  HISTORY:Carotid stenosis.                
                                                                                            
                                          |
| TECHNIQUE:Imaging was performed with a linear array transducer.   true duplex exam with   
                                                                                            
                                          |
| gray scale, color flow and Doppler spectral analysis. COMPARISON:2018           
                                                                                            
                                          |
| FINDINGS:Right side:Peak systolic and end-diastolic velocities of the common carotid,     
                                                                                            
                                          |
| internal carotid and external carotid arteries are normal. Left side:Peak systolic and    
                                                                                            
                                          |
| end-diastolic velocities of the internal carotid and external carotid arteries are        
                                                                                            
                                          |
| normal. Left common carotid and elevated velocity from 64 cm/sec to 162 cm/s There are    
                                                                                            
                                          |
| normal peak systolic velocities within the bilateral vertebral arteries with antegrade    
                                                                                            
                                          |
| flow. Grades:1     Stenosis  =01-50%   PSV <125 cm/sec     EDV (cm/s)<40   cm/sec (mild   
                                                                                            
                                          |
| plaque)2     Stenosis  =01-50%   PSV <125 cm/sec     EDV (cm/s)<40   cm/sec (moderate     
                                                                                            
                                          |
| plaque)3     Stenosis  =50-69%   PSV >125 cm/sec     EDV (cm/s)>40   cm/sec4              
                                                                                            
                                          |
| Stenosis  =70-95%   PSV >230 cm/sec     EDV (cm/s)>100 cm/sec5     Stenosis  =90-95%      
                                                                                            
                                          |
| PSV <125 cm/sec     EDV (cm/s)<40   cm/sec6     Stenosis  Occluded Validated velocity     
                                                                                            
                                          |
| measurements with angiographic measurements, velocity criteria are extrapolated from      
                                                                                            
                                          |
| diameter data as defined by the Society of Radiologists in Ultrasound Consensus           
                                                                                            
                                          |
| Conference Radiology 2003; 229; 340-346. IMPRESSION: 1.  No hemodynamically significant   
                                                                                            
                                          |
| stenosis of the internal carotid arteries.2.  There is greater than 50% narrowing of the  
                                                                                            
                                          |
|  left mid common carotid artery. There is ulcerated plaque at this region with possible   
                                                                                            
                                          |
| intimal flap.3.  Antegrade flow of the bilateral vertebral arteries. Electronically       
                                                                                            
                                          |
| signed by Ananth Verduzco MD on 11/15/2018 11:28 AM                                           
                                                                                            
                                          |
 
|Grades:                                                                                    
                                                                                            
                                         |
|1     Stenosis  =01-50%   PSV <125 cm/sec     EDV (cm/s)<40   cm/sec (mild plaque)         
                                                                                            
                                          |
|2     Stenosis  =01-50%   PSV <125 cm/sec     EDV (cm/s)<40   cm/sec (moderate plaque)     
                                                                                            
                                          |
|3     Stenosis  =50-69%   PSV >125 cm/sec     EDV (cm/s)>40   cm/sec                       
                                                                                            
                                          |
|4     Stenosis  =70-95%   PSV >230 cm/sec     EDV (cm/s)>100 cm/sec                        
                                                                                            
                                          |
|5     Stenosis  =90-95%   PSV <125 cm/sec     EDV (cm/s)<40   cm/sec                       
                                                                                            
                                          |
|6     Stenosis  Occluded                                                                   
                                                                                            
                                          |
|                                                                                           
                                                                                            
                                          |
|Validated velocity measurements with angiographic measurements, velocity criteria are extra
polated from diameter data as defined by the Society of Radiologists in Ultrasound Consensus
 Conference Radiology 2003; 229; 340-346. |
|                                                                                           
                                                                                            
                                          |
|IMPRESSION:                                                                                
                                                                                            
                                          |
|1.  No hemodynamically significant stenosis of the internal carotid arteries.              
                                                                                            
                                          |
|2.  There is greater than 50% narrowing of the left mid common carotid artery. There is ulc
erated plaque at this region with possible intimal flap.                                    
                                          |
|3.  Antegrade flow of the bilateral vertebral arteries.                                    
                                                                                            
                                          |
|                                                                                           
                                                                                            
                                          |
|Electronically signed by Ananth Verduzco MD on 11/15/2018 11:28 AM                             
                                                                                            
                                          |
+-------------------------------------------------------------------------------------------
--------------------------------------------------------------------------------------------
------------------------------------------+
 documented in this encounter
 
 Visit Diagnoses
 Not on filedocumented in this encounter

## 2019-12-06 NOTE — XMS
Encounter Summary
  Created on: 2019
 
 Wyatt Shane
 External Reference #: [id number]
 : 43
 Sex: Male
 
 Demographics
 
 
+-----------------------+------------------------+
| Address               | [address]     |
|                       | [place]  [address]   |
+-----------------------+------------------------+
| Home Phone            | [phone]        |
+-----------------------+------------------------+
| Preferred Language    | Unknown                |
+-----------------------+------------------------+
| Marital Status        |                 |
+-----------------------+------------------------+
| Pentecostal Affiliation | LUT                    |
+-----------------------+------------------------+
| Race                  | White                  |
+-----------------------+------------------------+
| Ethnic Group          | Not  or  |
+-----------------------+------------------------+
 
 
 Author
 
 
+--------------+-------------+
| Organization | Unknown     |
+--------------+-------------+
| Address      | Unknown     |
+--------------+-------------+
| Phone        | Unavailable |
+--------------+-------------+
 
 
 
 Support
 
 
+---------------+--------------+---------+-----------------+
| Name          | Relationship | Address | Phone           |
+---------------+--------------+---------+-----------------+
| Opal Shane | ECON         | Unknown | [phone] |
+---------------+--------------+---------+-----------------+
 
 
 
 Care Team Providers
 
 
+-----------------------+------+-----------------+
| Care Team Member Name | Role | Phone           |
 
+-----------------------+------+-----------------+
| Erwin Iniguez MD   | PCP  | [phone] |
+-----------------------+------+-----------------+
 
 
 
 Encounter Details
 
 
+--------+-------------+------------+--------------------+-------------+
| Date   | Type        | Department | Care Team          | Description |
+--------+-------------+------------+--------------------+-------------+
| / | Transcribed |            |   Dictation, Other | Transcribed |
| 1998   |             |            |                    |             |
+--------+-------------+------------+--------------------+-------------+
 
 
 
 Social History
 
 
+----------------+-------+-----------+--------+------+
| Tobacco Use    | Types | Packs/Day | Years  | Date |
|                |       |           | Used   |      |
+----------------+-------+-----------+--------+------+
| Never Assessed |       |           |        |      |
+----------------+-------+-----------+--------+------+
 
 
 
+------------------+---------------+
| Sex Assigned at  | Date Recorded |
| Birth            |               |
+------------------+---------------+
| Not on file      |               |
+------------------+---------------+
 
 
 
+----------------+-------------+-------------+
| Job Start Date | Occupation  | Industry    |
+----------------+-------------+-------------+
| Not on file    | Not on file | Not on file |
+----------------+-------------+-------------+
 
 
 
+----------------+--------------+------------+
| Travel History | Travel Start | Travel End |
+----------------+--------------+------------+
 
 
 
+-------------------------------------+
| No recent travel history available. |
+-------------------------------------+
 documented as of this encounter
 
 Progress Notes
 Interface, Transcription In - 2007  5:11 AM [facility]                                    [place]
 
[facility] [address] [place], [place] [address] [phone] or [phone]
 
                     Department of Otolaryngology - [facility]01
                              Fax: [phone]
 
 1998
 
 BE PAULA MD
 [address]
 [place]  [address]
 
 RE:       Wyatt SHANE
 MR#       [id number]
 
 Dear Tony:
 
 Just a brief note to give you some follow-up on Wyatt Shane.  As you know, we
 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
 Therefore, we took him to the operating room for vertical hemilaryngectomy
 premised upon the fact that there seemed to be substantial disease present
 and in our experience, these lesions do not do well with radiation alone.
 Our findings at the time of surgery were consistent with this.  There was
 approximately a 1 cm mass deep within the thyroarytenoid muscle on that
 right side.  However, it was still surrounded by a cuff of thyroarytenoid
 muscle on its deep margin between it in the piriform sinus and therefore we
 were able to extirpate it without difficulty, preserving the majority of
 the posterior arytenoid.  There were some superficial changes in the
 contralateral cord with microinvasion, which were dealt with by excision.
 
 I would anticipate that he will have a good result in terms of voice.
 Unless we see evidence of perivascular invasion, I will not be recommending
 radiation therapy.  He left the hospital on the 4th postoperative day
 decannulated and with his feeding tube out and I think he should recover
 fairly quickly.
 As always, I appreciate your support in sending patients such as this to
 me.  I will continue to follow him along until he is stabilized and then
 return him to you for care.  Thank you again.
 
 Yours sincerely,
 
 Jimmy Esposito M.D.
 , Otolaryngology
 Head and Neck Surgery
 
 MARIA ALEJANDRA/zeus  D: 98  T: 98  C: 1998 dsElectronically signed by Tierra Kevin In at 2007  5:11 AM PSTdocumented in this encounter
 
 Plan of Treatment
 Not on filedocumented as of this encounter
 
 Visit Diagnoses
 Not on filedocumented in this encounter

## 2019-12-06 NOTE — XMS
Encounter Summary
  Created on: 2019
 
 Wyatt Shane
 External Reference #: [id number]
 : 43
 Sex: Male
 
 Demographics
 
 
+-----------------------+------------------------+
| Address               | [address]     |
|                       | [place]  [address]   |
+-----------------------+------------------------+
| Home Phone            | [phone]        |
+-----------------------+------------------------+
| Preferred Language    | Unknown                |
+-----------------------+------------------------+
| Marital Status        |                 |
+-----------------------+------------------------+
| Restorationism Affiliation | LUT                    |
+-----------------------+------------------------+
| Race                  | White                  |
+-----------------------+------------------------+
| Ethnic Group          | Not  or  |
+-----------------------+------------------------+
 
 
 Author
 
 
+--------------+------------------------------+
| Author       | [facility] |
+--------------+------------------------------+
| Organization | [facility] |
+--------------+------------------------------+
| Address      | Unknown                      |
+--------------+------------------------------+
| Phone        | Unavailable                  |
+--------------+------------------------------+
 
 
 
 Support
 
 
+---------------+--------------+---------+-----------------+
| Name          | Relationship | Address | Phone           |
+---------------+--------------+---------+-----------------+
| Opal Shane | ECON         | Unknown | [phone] |
+---------------+--------------+---------+-----------------+
 
 
 
 Care Team Providers
 
 
 
+-----------------------+------+-----------------+
| Care Team Member Name | Role | Phone           |
+-----------------------+------+-----------------+
| Erwin Iniguez MD   | PCP  | [phone] |
+-----------------------+------+-----------------+
 
 
 
 Encounter Details
 
 
+--------+-------------+-----------------+---------------------+---------------+
| Date   | Type        | Department      | Care Team           | Description   |
+--------+-------------+-----------------+---------------------+---------------+
| / | Office      |   [facility]  |   Note, Outpatient  | Progress Note |
| 2000   | Visit-Trans | MEDICINE        | Clinic              |               |
|        | cribed      |                 |                     |               |
+--------+-------------+-----------------+---------------------+---------------+
 
 
 
 Social History
 
 
+----------------+-------+-----------+--------+------+
| Tobacco Use    | Types | Packs/Day | Years  | Date |
|                |       |           | Used   |      |
+----------------+-------+-----------+--------+------+
| Never Assessed |       |           |        |      |
+----------------+-------+-----------+--------+------+
 
 
 
+------------------+---------------+
| Sex Assigned at  | Date Recorded |
| Birth            |               |
+------------------+---------------+
| Not on file      |               |
+------------------+---------------+
 
 
 
+----------------+-------------+-------------+
| Job Start Date | Occupation  | Industry    |
+----------------+-------------+-------------+
| Not on file    | Not on file | Not on file |
+----------------+-------------+-------------+
 
 
 
+----------------+--------------+------------+
| Travel History | Travel Start | Travel End |
+----------------+--------------+------------+
 
 
 
+-------------------------------------+
| No recent travel history available. |
+-------------------------------------+
 documented as of this encounter
 
 
 Progress Notes
 Interface, Transcription In - 2006  1:06 AM PSTCLINIC DATE:       2000
 
 [facility]OLOGY CL[facility]IC
 
 SUBJECTIVE:  Mr. Shane is seen back today  because  of  concern  on his part
 that he has coughed out cartilage graft.   He had some significant coughing
 with chest pain and what sounds like a  little bit of hemoptysis associated
 with mild tracheitis.  Flexible fiberoptic  laryngoscopy  is done antegrade
 and retrograde through his tracheostomy.   He  has  mild  tracheitis.   His
 graft is intact, and his laryngeal airway  certainly  is much improved.  He
 is not wearing his tracheal plug most of the time.
 
 ASSESSMENT:  Status post laryngotracheal  cartilage  reconstruction,  doing
 well.
 
 PLAN:  We will see him back here as previously  scheduled.  In the interim,
 I have put him on two weeks of Augmentin.
 
 Jimmy Esposito M.D.
 
 MARIA ALEJANDRA / 
 [id number] / [id number] / [id number] / [id number]
 D: 2000
 T: 2000
 C: 2000 
 
 [id number] signed by Interface, Transcription In at 2006  1:06 AM PSTdocume
nted in this encounter
 
 Plan of Treatment
 Not on filedocumented as of this encounter
 
 Visit Diagnoses
 Not on filedocumented in this encounter

## 2019-12-06 NOTE — XMS
Encounter Summary
  Created on: 2019
 
 Wyatt Shane
 External Reference #: [id number]
 : 43
 Sex: Male
 
 Demographics
 
 
+-----------------------+------------------------+
| Address               | [address]     |
|                       | [place]  [address]   |
+-----------------------+------------------------+
| Home Phone            | [phone]        |
+-----------------------+------------------------+
| Preferred Language    | Unknown                |
+-----------------------+------------------------+
| Marital Status        |                 |
+-----------------------+------------------------+
| Zoroastrian Affiliation | LUT                    |
+-----------------------+------------------------+
| Race                  | White                  |
+-----------------------+------------------------+
| Ethnic Group          | Not  or  |
+-----------------------+------------------------+
 
 
 Author
 
 
+--------------+------------------------------+
| Author       | [facility] |
+--------------+------------------------------+
| Organization | [facility] |
+--------------+------------------------------+
| Address      | Unknown                      |
+--------------+------------------------------+
| Phone        | Unavailable                  |
+--------------+------------------------------+
 
 
 
 Support
 
 
+---------------+--------------+---------+-----------------+
| Name          | Relationship | Address | Phone           |
+---------------+--------------+---------+-----------------+
| Opal Shane | ECON         | Unknown | [phone] |
+---------------+--------------+---------+-----------------+
 
 
 
 Care Team Providers
 
 
 
+-----------------------+------+-----------------+
| Care Team Member Name | Role | Phone           |
+-----------------------+------+-----------------+
| Erwin Steel MD   | PCP  | [phone] |
+-----------------------+------+-----------------+
 
 
 
 Reason for Visit
 
 
+--------------+----------+
| Reason       | Comments |
+--------------+----------+
| New patient  |          |
| consultation |          |
+--------------+----------+
| Dysphagia    |          |
+--------------+----------+
| Hoarseness   |          |
+--------------+----------+
| Cancer       | larynx   |
+--------------+----------+
 Consultation (Routine)
 
+--------+--------------+---------------+--------------+--------------+---------------+
| Status | Reason       | Specialty     | Diagnoses /  | Referred By  | Referred To   |
|        |              |               | Procedures   | Contact      | Contact       |
+--------+--------------+---------------+--------------+--------------+---------------+
| Closed |   Specialty  | Otolaryngolog |   Diagnoses  |   Vaibhav,     |   Tay,  |
|        | Services     | y             |  Malignant   | MD Fred    | Jonathan SOLANO MD  |
|        | Required     |               | neoplasm of  | [address]  |  [address]  |
|        |              |               | glottis      | [facility] | [facility]  |
|        |              |               | (HCC)        |  Rd          | Rd  [place], |
|        |              |               | Procedures   | [place], [place] |  [place]           |
|        |              |               | NEW PATIENT  |  [id number]  | [id number]    |
|        |              |               | LEVEL 5  IA  |              | Phone:        |
|        |              |               | LARYNGOSCOPY |              | [phone]  |
|        |              |               | ,FLEX        |              |  Fax:         |
|        |              |               | FIBER,DIAGNO |              | [phone]  |
|        |              |               | STIC         |              |               |
+--------+--------------+---------------+--------------+--------------+---------------+
 
 
 
 
 Encounter Details
 
 
+--------+---------+----------------------+----------------------+----------------------+
| Date   | Type    | Department           | Care Team            | Description          |
+--------+---------+----------------------+----------------------+----------------------+
| 10/03/ | Office  |   Otolaryngology     |   Jonathan Cross  | Pharyngeal Dysphagia |
|    | Visit   | Laryngology Services | MD RUSS  [address]   |  (Primary Dx);       |
|        |         |  at [facility]  [address] | [facility] [address]      | Allergic Rhinitis,   |
|        |         |  [facility] [address]     | [place], [place]         | Cause Unspecified;   |
|        |         | Mailcode: PV01       | [address]           | Laryngeal Cancer     |
|        |         | [facility] | [phone]         | ([facility]); Glottic       |
|        |         |   [place], [place]       | [phone] (Fax)   | Stenosis             |
|        |         | [address]           |                      |                      |
 
|        |         | [phone]         |                      |                      |
+--------+---------+----------------------+----------------------+----------------------+
 
 
 
 Social History
 
 
+---------------+------------+-----------+--------+------------------+
| Tobacco Use   | Types      | Packs/Day | Years  | Date             |
|               |            |           | Used   |                  |
+---------------+------------+-----------+--------+------------------+
| Former Smoker | Cigarettes | 1         | 30     | Quit: 1996 |
+---------------+------------+-----------+--------+------------------+
 
 
 
+-------------+-------------+---------+----------+
| Alcohol Use | Drinks/Week | oz/Week | Comments |
+-------------+-------------+---------+----------+
| No          |             |         |          |
+-------------+-------------+---------+----------+
 
 
 
+------------------+---------------+
| Sex Assigned at  | Date Recorded |
| Birth            |               |
+------------------+---------------+
| Not on file      |               |
+------------------+---------------+
 
 
 
+----------------+-------------+-------------+
| Job Start Date | Occupation  | Industry    |
+----------------+-------------+-------------+
| Not on file    | Not on file | Not on file |
+----------------+-------------+-------------+
 
 
 
+----------------+--------------+------------+
| Travel History | Travel Start | Travel End |
+----------------+--------------+------------+
 
 
 
+-------------------------------------+
| No recent travel history available. |
+-------------------------------------+
 documented as of this encounter
 
 Last Filed Vital Signs
 
 
+-------------------+------------------+----------------------+----------+
| Vital Sign        | Reading          | Time Taken           | Comments |
+-------------------+------------------+----------------------+----------+
| Blood Pressure    | -                | -                    |          |
 
+-------------------+------------------+----------------------+----------+
| Pulse             | -                | -                    |          |
+-------------------+------------------+----------------------+----------+
| Temperature       | -                | -                    |          |
+-------------------+------------------+----------------------+----------+
| Respiratory Rate  | -                | -                    |          |
+-------------------+------------------+----------------------+----------+
| Oxygen Saturation | -                | -                    |          |
+-------------------+------------------+----------------------+----------+
| Inhaled Oxygen    | -                | -                    |          |
| Concentration     |                  |                      |          |
+-------------------+------------------+----------------------+----------+
| Weight            | 92.5 kg (204 lb) | 10/03/2007  2:28 PM  |          |
|                   |                  | PDT                  |          |
+-------------------+------------------+----------------------+----------+
| Height            | -                | -                    |          |
+-------------------+------------------+----------------------+----------+
| Body Mass Index   | -                | -                    |          |
+-------------------+------------------+----------------------+----------+
 documented in this encounter
 
 Progress Notes
 Jonathan Cross S - 10/04/2007  3:11 PM PDTFormatting of this note might be different fro
m the original.
 PATIENT NAME: Wyatt Shane MR#: [id number]
 : 1945
 
 REFERRING PROVIDER:
 FRED ESPOSITO MD
 [address] [place] [address]
 
 PRIMARY CARE PROVIDER:
 ERWIN STEEL MD
 
 CLINIC: [facility]
 
 PROBLEM LIST:
 Otolaryngologic Problems
 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
 Laryngeal Cancer [161.9E]
 Pharyngeal Dysphagia [787.23A]
 Allergic Rhinitis, Cause Unspecified [477.9]
 
 REASON FOR FOLLOW-UP:Chief Complaint 
 Patient presents with 
   New patient consultation 
   Dysphagia 
   Hoarseness 
   Cancer 
   larynx 
 
 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 
 
 TNM/STAGE:  CIS--> T2N0M0/II
 
 IDENTIFICATION DATE:  1998
 
 SURGICAL EXCISION DATE:  1998
 
 
 TYPE OF SURGERY:  extended vertical hemilaryngectomy
 
 LAST SURGERY DATE:  laryngotracheal reconstruction2000
 
 LAST WEIGHTS:  Last 3 Encounter Wt Readings: 
 Date Wt 
 10/03/2007 92.534 kg (204 lbs) 
 
 LAST CXR:  CHEST, 2 VIEWS OR STEREO (no units)
  Date             Value        Low      High  Status
  2004 
 .
 EXAM:  PA and lateral chest.
 
 INDICATION: Preop, and head and neck malignancy
 
 COMPARISON: None.
 
 FINDINGS:  The cardiac and mediastinal contours are normal.  The
 patient has had a prior CABG procedure.  The lungs are clear
 without evidence for suspicious pulmonary nodules.  There is no
 pleural effusion. Osseous structures are unremarkable.
 
 IMPRESSION:
 
 Prior CABG procedure.  No evidence for acute pulmonary disease.
 
 ----------
 
 LAST LFTs:   No results found for this basename: tbili,ap,tp,dirbili,alb,ast,alt 
 
 LAST TSH: No results found for this basename: tsh 
 
 RADIATION THERAPY COMPLETED:  1998
 
 RADIATION THERAPIST:  Unknown
 
 RADIATION DOSE:  Unknown
 
 HPI:  Wyatt Shane is a 61 y.o. male who returns today for routine follow-up examination not
ing that he has had recent trouble with throat discomfort.  this came on acutely about 3 wee
ks ago and improved since then.  He has not noted any otalgia. He denies dyspnea or noisy br
eathing. He has not noted worsening of his voice.  He feels that his swallowing is back to b
aseline.  He notes no consistency restrictions or diet modifications.   He is not smoking.  
He has been followed by Dr. Esposito for many years, but is transferring his care to the [facility] because Dr. Esposito will be moving to the [facility]
.
 
 PMHx: Past Medical History 
 Diagnosis Date 
   Larynx Cancer  
   T2N0 right glottis 
   Radiation Fibrosis  
   larynx 
   Glottic Stenosis  
   MI (Myocardial Infarction)  
   PUD (Peptic Ulcer Disease)  
   Fibromyalgia  
   Depression  
 
   Nephrolithiasis  
 
 SURGHx:Past Surgical History 
 Procedure Date 
   Hx extended vertical hemilaryngectomy  
   right posterior arytenoid retained. 
   Hx stenosis dilation  
   Hx nissen fundoplication  
   Hx dml bx 1998 
   cCIS--pT2N0 SCCa right vocal fold 
   Hx laryngotracheoplasty  
 
  
 ALLERGIES: Allergies 
 Allergen Reactions 
   Penicillins  
 
 MEDICATIONS:Current outpatient prescriptions 
 Medication Sig 
   NEXIUM ORAL None Entered 
   BABY ASPIRIN ORAL None Entered 
   VICODIN ORAL None Entered 
   GABAPENTIN ORAL None Entered 
   Fexofenadine HCl (ALLEGRA) 180 mg Oral Tablet take 1 tablet (180 mg) by oral route once
 daily 
   Fluticasone Propionate (FLONASE) 50 mcg/Actuation Nasal Aerosol, Spray 2 sprays in each
 nostril by nasal route twice daily 
 
 EXAMINATION:  Wt 92.534 kg (204 lbs)
 Gen:  He is a 61 y.o. male.  He is awake, alert and comfortable with the examination.  His 
weight is appropriate.
 Ears:  The pinnae are normal.  External auditory canals show minimal cerumen bilaterally.  
The tympanic membranes are clear with normal anatomic landmarks and an aerated middle ear sp
ace.
 Nose: The nasal dorsum is straight and the nares are widely patent.  The mucosa is pink and
 there are no lesions or masses noted.  There is no significant nasal obstruction noted.
 Face: There are no worrisome lesions of the face or head noted.
 Salivary Glands:  The salivary glands are soft and show no lesions or masses within the par
otid or submandibular glands bilaterally.  There is no obvious obstruction of Stensen's or W
joel's ducts bilaterally.
 Oropharynx:  Normal lips and oral competence are noted.  The dentition is fair.  The mucosa
 is dry and pale, but shows no lesions or masses.  The tonsils are small or absent and the f
ossae show no lesions.  Bimanual palpation of the gigivolabial sulcus, lingual sulcus, tonsi
llar pillars, palate and base of tongue did not demonstrate any concerning lesions or masses
.
 Neck: The neck has well healed anterior transverse neck incisions and tracheotomy tube scar
.  There is no lymphadenopathy noted in the anterior, posterior, digastric or submental tria
ngles.  The thyroid is palpable, but not enlarged or tender.  I cannot appreciate any nodule
s.  The larynx is no longer anatomic and has cartilage loss along the right side.  
 Chest:  Chest rise is symmetric and there is no audible wheezing, stridor or wet vocal qual
ity.  There is very mild stridor with deep inspiration.
 Neuro:  Extraoccular movements are grossly intact.  There is symmetric sensation and moveme
nt noted of the upper and midface.  Marginal mandibular nerve function is normal.  Hearing i
s grossly intact.  Palatal elevation is symmetric and full.  Trapezius function is normal.  
Tongue protrusion is midline.
 
 VOICE EVALUATION:  Perceptual voice evaluation demonstrates moderate-severe dysphonia.  The
 pitch is low for a male and shows limited range. Vocal intensity is acceptable and shows li
mited upper range.  There is 2-3+ roughness,   1+ breathiness, and  2+ tightness appreciated
.  There is no tremor noted with sustained vowel phonation.  I am unable to detect voice jing
 
aks.  Glottal orellana is not detected and there is no diplophonia noted.  Articulation is normal
. 
 
 LARYNGOSCOPY:  Indirect laryngoscopy was performed using a 90 degree Jean-Baptiste sahil telescope.
  This demonstrates a clear vallecula and crisp epiglottis. The right .arytenoid is partiall
y present and mobile.  There is soft tissue where the right hemilarynx would be.  This is be
nign and mucosalized.  The true vocal folds are absent.  The left false vocal fold is at red
st partially present.  The left arytenoid is present, but fixed.  Both aryepiglottic folds a
re shortened.  The base of tongue is clear.  The subglottic airway is slightly narrowed, but
 adequate. 
 
 ASSESSMENT:  Mr. Shane appears to be doing well and should be cured of his previous squamous
 cell carcinoma.   He may have had a upper respiratory tract infection, but seems better now
.  He may be having some progressive changes from radiation, but these are slow at this time
.
 
 PLAN:  I have recommended that he return immediately should his symptoms return.  I have re
commended that I see him annually or if he should have any new problems. 
 
 Jonathan Cross M.D.
 
 Laryngology and Head & Neck Surgery
 Tel:[phone]
 Fax:[phone]
 Electronically signed by Jonathan Cross at 10/04/2007  3:11 PM PDTdocumented in this en
counter
 
 Plan of Treatment
 
 
+----------------------+------------+--------+----------------------+---------------------+
| Name                 | Type       | Priori | Associated Diagnoses | Order Schedule      |
|                      |            | ty     |                      |                     |
+----------------------+------------+--------+----------------------+---------------------+
| IA LARYNGOSCOPY,FLEX | Procedures | Routin |   Laryngeal Cancer   | Ordered: 10/04/2007 |
|  FIBER,DIAGNOSTIC    |            | e      | ([facility])  Glottic       |                     |
|                      |            |        | Stenosis  Pharyngeal |                     |
|                      |            |        |  Dysphagia           |                     |
+----------------------+------------+--------+----------------------+---------------------+
 documented as of this encounter
 
 Visit Diagnoses
 
 
+--------------------------------------------------------------------------+
| Diagnosis                                                                |
+--------------------------------------------------------------------------+
|   Pharyngeal dysphagia - Primary  Dysphagia, pharyngeal phase            |
+--------------------------------------------------------------------------+
|   Allergic rhinitis, cause unspecified                                   |
+--------------------------------------------------------------------------+
|   Laryngeal cancer (HCC)  Malignant neoplasm of larynx, unspecified site |
+--------------------------------------------------------------------------+
|   Glottic stenosis  Stenosis of larynx                                   |
+--------------------------------------------------------------------------+
 documented in this encounter

## 2019-12-06 NOTE — XMS
Encounter Summary
  Created on: 2019
 
 Wyatt Shane
 External Reference #: [id number]
 : 43
 Sex: Male
 
 Demographics
 
 
+-----------------------+------------------------+
| Address               | [address]     |
|                       | [place]  [address]   |
+-----------------------+------------------------+
| Home Phone            | [phone]        |
+-----------------------+------------------------+
| Preferred Language    | Unknown                |
+-----------------------+------------------------+
| Marital Status        |                 |
+-----------------------+------------------------+
| Sabianism Affiliation | LUT                    |
+-----------------------+------------------------+
| Race                  | White                  |
+-----------------------+------------------------+
| Ethnic Group          | Not  or  |
+-----------------------+------------------------+
 
 
 Author
 
 
+--------------+------------------------------+
| Author       | [facility] |
+--------------+------------------------------+
| Organization | [facility] |
+--------------+------------------------------+
| Address      | Unknown                      |
+--------------+------------------------------+
| Phone        | Unavailable                  |
+--------------+------------------------------+
 
 
 
 Support
 
 
+---------------+--------------+---------+-----------------+
| Name          | Relationship | Address | Phone           |
+---------------+--------------+---------+-----------------+
| Opal Shane | ECON         | Unknown | [phone] |
+---------------+--------------+---------+-----------------+
 
 
 
 Care Team Providers
 
 
 
+-----------------------+------+-----------------+
| Care Team Member Name | Role | Phone           |
+-----------------------+------+-----------------+
| Erwin Iniguez MD   | PCP  | [phone] |
+-----------------------+------+-----------------+
 
 
 
 Encounter Details
 
 
+--------+-------------+-----------------+---------------------+---------------+
| Date   | Type        | Department      | Care Team           | Description   |
+--------+-------------+-----------------+---------------------+---------------+
| / | Office      |   [facility]  |   Note, Outpatient  | Progress Note |
| 2000   | Visit-Trans | MEDICINE        | Clinic              |               |
|        | cribed      |                 |                     |               |
+--------+-------------+-----------------+---------------------+---------------+
 
 
 
 Social History
 
 
+----------------+-------+-----------+--------+------+
| Tobacco Use    | Types | Packs/Day | Years  | Date |
|                |       |           | Used   |      |
+----------------+-------+-----------+--------+------+
| Never Assessed |       |           |        |      |
+----------------+-------+-----------+--------+------+
 
 
 
+------------------+---------------+
| Sex Assigned at  | Date Recorded |
| Birth            |               |
+------------------+---------------+
| Not on file      |               |
+------------------+---------------+
 
 
 
+----------------+-------------+-------------+
| Job Start Date | Occupation  | Industry    |
+----------------+-------------+-------------+
| Not on file    | Not on file | Not on file |
+----------------+-------------+-------------+
 
 
 
+----------------+--------------+------------+
| Travel History | Travel Start | Travel End |
+----------------+--------------+------------+
 
 
 
+-------------------------------------+
| No recent travel history available. |
+-------------------------------------+
 documented as of this encounter
 
 
 Progress Notes
 Interface, Transcription In - 2006  1:06 AM PSTCLINIC DATE:       2000
 
 OTOLARYNGOLOGY, HEAD AND NECK SURGERY
 
 The  patient  is  seen  back  today  in   followup   with  respect  to  his
 laryngotracheal reconstruction for glottic stenosis.  He is doing very well
 and has not had any specific problems.  Flexible fiberoptic laryngoscopy is
 done  today  and reveals that his T-tube  is  in  good  position.   At  the
 anterior commissure, I can still see  a  little bit of exposed and probably
 sequestered  cartilage,  but  certainly   there   is   minimal  inflamation
 surrounding this.  He and I discussed, therefore, removal of his T-tube and
 placement of a Stoll cannula.  I  anesthetized the stomal site with 1%
 Xylocaine with 1:100,000 epinephrine for hemostasis removing the T-tube and
 placing a #8 Stoll cannula.  I  trimmed  it  to  length,  and  we will
 observe him back in a month to see if  there  is any restenosis or collapse
 as a result of removal of his tube.
 
 Jimmy Esposito M.D.
 
 MARIA ALEJANDRA / 
 [id number] / [id number] / [id number] / [id number]
 D: 2000
 T: 2000
 
 [id number] signed by Interface, Transcription In at 2006  1:06 AM PSTdocume
nted in this encounter
 
 Plan of Treatment
 Not on filedocumented as of this encounter
 
 Visit Diagnoses
 Not on filedocumented in this encounter

## 2019-12-06 NOTE — XMS
Encounter Summary
  Created on: 2019
 
 Shane Wyatttien BroussardOjsue
 External Reference #: [id number]
 : 43
 Sex: Male
 
 Demographics
 
 
+-----------------------+---------------------------+
| Address               | [address]        |
|                       | [place]  [address] |
+-----------------------+---------------------------+
| Home Phone            | [phone]           |
+-----------------------+---------------------------+
| Preferred Language    | Unknown                   |
+-----------------------+---------------------------+
| Marital Status        |                    |
+-----------------------+---------------------------+
| Christian Affiliation | [id number]                      |
+-----------------------+---------------------------+
| Race                  | Unknown                   |
+-----------------------+---------------------------+
| Ethnic Group          | Unknown                   |
+-----------------------+---------------------------+
 
 
 Author
 
 
+--------------+--------------------------------------------+
| Author       | [facility]  |
|              | [facility] [place]                                |
+--------------+--------------------------------------------+
| Organization | [facility]  |
|              | [facility] [place]                                |
+--------------+--------------------------------------------+
| Address      | Unknown                                    |
+--------------+--------------------------------------------+
| Phone        | Unavailable                                |
+--------------+--------------------------------------------+
 
 
 
 Support
 
 
+---------------+--------------+--------------------+-----------------+
| Name          | Relationship | Address            | Phone           |
+---------------+--------------+--------------------+-----------------+
| Opal Shane | ECON         | [address]     | [phone] |
|               |              | [place]   |                 |
|               |              | [id number]              |                 |
+---------------+--------------+--------------------+-----------------+
 
 
 
 
 Care Team Providers
 
 
+-----------------------+------+-----------------+
| Care Team Member Name | Role | Phone           |
+-----------------------+------+-----------------+
| Erwin Iniguez MD | PCP  | [phone] |
+-----------------------+------+-----------------+
 
 
 
 Reason for Visit
 
 
+---------+----------+
| Reason  | Comments |
+---------+----------+
| Consult |          |
+---------+----------+
| Snoring |          |
+---------+----------+
 
 
 
 Encounter Details
 
 
+--------+---------+----------------------+---------------------+----------------------+
| Date   | Type    | Department           | Care Team           | Description          |
+--------+---------+----------------------+---------------------+----------------------+
| / | Office  |   [facility]      |   Luis Carlos Perez  | JOANN (obstructive     |
|    | Visit   | SLEEP DISORDER  [address]  | MD [address]   | sleep apnea)         |
|        |         | [address]  [place]      | [address]  [place]    | (Primary Dx);        |
|        |         | [place] [address] | [place] [address]     | Parasomnia; Heart    |
|        |         |   [phone]       | [phone]        | failure (HCC);       |
|        |         |                      | [phone] (Fax)  | Atrial fibrillation  |
|        |         |                      |                     | (HCC); HBP (high     |
|        |         |                      |                     | blood pressure);     |
|        |         |                      |                     | ASCVD                |
|        |         |                      |                     | (arteriosclerotic    |
|        |         |                      |                     | cardiovascular       |
|        |         |                      |                     | disease); Raynaud    |
|        |         |                      |                     | disease;             |
|        |         |                      |                     | Fibromyalgia; Aortic |
|        |         |                      |                     |  aneurysm (HCC);     |
|        |         |                      |                     | Cancer of vocal cord |
|        |         |                      |                     |  (HCC)               |
+--------+---------+----------------------+---------------------+----------------------+
 
 
 
 Social History
 
 
+---------------+------------+-----------+--------+------------------+
| Tobacco Use   | Types      | Packs/Day | Years  | Date             |
|               |            |           | Used   |                  |
+---------------+------------+-----------+--------+------------------+
| Former Smoker | Cigarettes | 1.3       | 34     | Quit: 1996 |
 
+---------------+------------+-----------+--------+------------------+
 
 
 
+---------------------+---+---+---+
| Smokeless Tobacco:  |   |   |   |
| Never Used          |   |   |   |
+---------------------+---+---+---+
 
 
 
+-------------+-------------+---------+----------+
| Alcohol Use | Drinks/Week | oz/Week | Comments |
+-------------+-------------+---------+----------+
| No          |             |         |          |
+-------------+-------------+---------+----------+
 
 
 
+------------------+---------------+
| Sex Assigned at  | Date Recorded |
| Birth            |               |
+------------------+---------------+
| Not on file      |               |
+------------------+---------------+
 
 
 
+----------------+-------------+-------------+
| Job Start Date | Occupation  | Industry    |
+----------------+-------------+-------------+
| Not on file    | Not on file | Not on file |
+----------------+-------------+-------------+
 
 
 
+----------------+--------------+------------+
| Travel History | Travel Start | Travel End |
+----------------+--------------+------------+
 
 
 
+-------------------------------------+
| No recent travel history available. |
+-------------------------------------+
 documented as of this encounter
 
 Last Filed Vital Signs
 
 
+-------------------+----------------------+----------------------+----------+
| Vital Sign        | Reading              | Time Taken           | Comments |
+-------------------+----------------------+----------------------+----------+
| Blood Pressure    | 106/66               | 2013  1:28 PM  |          |
|                   |                      | PDT                  |          |
+-------------------+----------------------+----------------------+----------+
| Pulse             | 78                   | 2013  1:28 PM  |          |
|                   |                      | PDT                  |          |
+-------------------+----------------------+----------------------+----------+
| Temperature       | -                    | -                    |          |
 
+-------------------+----------------------+----------------------+----------+
| Respiratory Rate  | 16                   | 2013  1:28 PM  |          |
|                   |                      | PDT                  |          |
+-------------------+----------------------+----------------------+----------+
| Oxygen Saturation | -                    | -                    |          |
+-------------------+----------------------+----------------------+----------+
| Inhaled Oxygen    | -                    | -                    |          |
| Concentration     |                      |                      |          |
+-------------------+----------------------+----------------------+----------+
| Weight            | 91.9 kg (202 lb 11.2 | 2013  1:28 PM  |          |
|                   |  oz)                 | PDT                  |          |
+-------------------+----------------------+----------------------+----------+
| Height            | 167.6 cm (5' 6")     | 2013  1:28 PM  |          |
|                   |                      | PDT                  |          |
+-------------------+----------------------+----------------------+----------+
| Body Mass Index   | 32.72                | 2013  1:28 PM  |          |
|                   |                      | PDT                  |          |
+-------------------+----------------------+----------------------+----------+
 documented in this encounter
 
 Patient Instructions
 Patient Instructions Luis Carlos Perez Jr., MD - 2013  2:27 PM PDTI think you have Obs
tructive Sleep Apnea for which we need to do a sleep study.Electronically signed by Luis Carlos Perez Jr., MD at 2013  2:27 PM PDT
 documented in this encounter
 
 Progress Notes
 Elizabeth Houston - 2013  1:42 PM PDTFormatting of this note might be different from the
 original.
  13 1300 
 Beck Depression Inventory-II 
 Depression Score 4 - Minimal depression 
 Insomnia Severity Index 
 Insomnia Severity Index 17  
 [place] Sleepiness Scale 
 Sitting and reading 2 
 Watching TV 3 
 Sitting, inactive in a public place (e.g. a theatre or a meeting) 2 
 As a passenger in a car for an hour without a break 3 
 Lying down to rest in the afternoon when circumstances permit 3 
 Sitting and talking to someone 1 
 Sitting quietly after a lunch without alcohol 2 
 In a car, while stopped for a few minutes in traffic 1 
 Total score 17  
 [id number]  Score 
 PF 40.2  
 RP 37.26  
 BP 32.96  
 GH 43.4  
 VT 33.36  
 SF 24.13  
 RE 24.78  
 MH 33.11  
 PCS 42.6  
 MCS 25.57  
  Electronically signed by Luis Carlos Perez Jr., MD at 2013  2:48 PM PDTLuis Carlos Perez Jr., MD - 2013  1:41 PM PDTFormatting of this note might be different from the origi
nal.
 [facility]
 [place] [address]
 
 Ref: Erwin Iniguez
 
 CC: 
 Chief Complaint 
 Patient presents with 
   Consult 
   Snoring 
 
 History of the Present Illness:This is a 67 year old male who is referred for sleep medicin
e consultation by Dr. CARMINA Iniguez because of possible JOANN. Other significant medical issues in
clude Atrial Fibrillation and heart failure, HBP, hypothyroidism, aortic aneurysm (3.3cm in 
2013). The patient's records (Dr. Iniguez's note of 9/3/2013) are reviewed. The patient
 is interviewed and examined. Dr. Foote has advised a sleep study be performed. He is a ret
ired . Bedtime is usually 10pm and rise time is usually about 9am-10am. He estim
ates a latency to sleep onset of under a minute. He has nocturia 3-4 times a night and he ge
ts back to sleep easily afterwards. He has night sweats but he denies nocturnal heartburn. DES rodriguez awakens every morning with a dry mouth and nasal/sinus congestion.
 
 He dreams occasionally. He isn't a sleep walker. He did have some apparent dream enactment 
during sleep but he was taking gabapentin at the time. This was last winter. The gabapentin 
has been discontinued and his wife says that the dream enactment has improved. Occasionally 
he will lift his arms at night during his sleep. He might be talking then too. She has never
 awakened him to see if he dreams. He denies sleep paralysis. She doesn't think the activiti
es are seizures. Usually this is the right arm. His wife doesn't know what time of night thi
s occurs.
 
 He denies restlessness in his legs or arms at night. His wife has not noted that his legs o
r arms kick or twitch rhythmically at night after he falls asleep although she has noted clfiford
t his right arm will occasionally shake at night after he falls asleep.
 
 His wife says that he snores every night. She has noted that he frequently stops breathing 
at night. She says that sometimes the breathing will get more and more shallow and then he w
ill stop and then start up again. He also can snore and stop breathing and then snort to sta
rt up again.
 
 In the daytime he feels fatigued and tired. "I just don't have a lot of energy." He naps ab
out 3 days a week usually from 2-3:3pm. He doesn't fall asleep driving but his wife says clifford
t his eyes will close when he drives - "but it seems like I'm not asleep, in my mind." He dr
inks about 4 - 5 cups of coffee a day. Occasionally he will also take a "5-Hour Energy Drink
." He does eat a lot of chocolate. He denies cataplexy.
 
 Past Medical History:  has a past medical history of HBP (high blood pressure); Atrial fibr
illation; Heart failure; Hyperlipidemia; Raynaud disease; Fibromyalgia; ASCVD (arteriosclero
tic cardiovascular disease); Aortic aneurysm; JOANN (obstructive sleep apnea); and Cancer of v
ocal cord ().
  has past surgical history that includes Coronary artery bypass graft (); Esophagus mikhail
karolyn (); Knee arthroscopy (); hernia repair (); laryngectomy (); Kidney ston
e surgery; Tracheal surgery (); Carpal tunnel release (); and basal cell cancer rese
ction left year ().
 Allergies 
 Allergen Reactions 
   Diltiazem Hcl  
   Lipitor  
   Propranolol Hcl  
 
 Current Outpatient Prescriptions 
 Medication Status Sig Dispense Refill 
   acyclovir (ZOVIRAX) 400 MG tablet Active Take 400 mg by mouth 3 times daily as needed. 
 
    
   Cholecalciferol (VITAMIN D3) 2000 UNITS CAPS Active Take 2,000 Units by mouth Daily.   
  
   cyanocobalamin (VITAMIN B-12) 1,000 mcg/mL injection Active injected intramuscularly on
ce a month     
   digoxin (LANOXIN) 250 mcg tablet Active Take 250 mcg by mouth Daily.     
   diltiazem (DILT-XR) 120 mg 24 hr capsule Active Take 120 mg by mouth Daily.     
   ferrous sulfate 325 mg tablet Active Take 325 mg by mouth 3 times daily.     
   fluticasone (FLONASE) 50 mcg/nasal spray Active one spray in each nostril daily as need
ed     
   guaiFENesin (MUCINEX) 600 mg 12 hr tablet Active Take 1,200 mg by mouth 2 times daily. 
    
   hydrocodone-acetaminophen (VICODIN ES) 7.5-750 MG per tablet Active 1/2 to 1 tablet fou
r times daily     
   levothyroxine (LEVOTHROID) 75 MCG tablet Active Take 75 mcg by mouth Daily.     
   losartan (COZAAR) 100 MG tablet Active Take 100 mg by mouth Daily.     
   methotrexate 2.5 mg tablet Active Take 7.5 mg by mouth Once a week.     
   metoclopramide (REGLAN) 10 mg tablet Active Take 10 mg by mouth 4 times daily as needed
.     
   omeprazole (PRILOSEC) 20 mg capsule Active Take 20 mg by mouth 2 times daily.     
   potassium chloride (MICRO-K) 10 mEq CR capsule Active      
   predniSONE (DELTASONE) 5 mg tablet Active Take 2.5 mg by mouth Daily.     
   pregabalin (LYRICA) 50 MG capsule Active Take 50 mg by mouth 3 times daily.     
   rivaroxaban (XARELTO) 20 mg tablet Active Take 20 mg by mouth Daily (with dinner).     
   sertraline (ZOLOFT) 100 mg tablet Active 2 tablets daily     
   tamsulosin (FLOMAX) 0.4 mg CAPS Active Take 0.4 mg by mouth Daily.     
   testosterone cypionate (DEPO-TESTOSTERONE) 200 mg/mL injection Active 200 mg injected i
ntramuscularly every 3 weeks     
 
 Family Medical History: family history includes High blood pressure in his father.
 indicated that his mother is . He indicated that his father is . He indicat
ed that his brother is alive. He indicated that his daughter is alive. He indicated that his
 son is alive. 
 
 Social History: 
 History 
 
 Social History 
   Marital Status:  
   Spouse Name: N/A 
   Number of Children: N/A 
   Years of Education: 12 
 
 Occupational History 
     
   Retired 
 
 Social History Main Topics 
   Smoking status: Former Smoker -- 1.3 packs/day for 35 years 
   Types: Cigarettes 
   Quit date: 1996 
   Smokeless tobacco: Never Used 
   Alcohol Use: No 
   Drug Use: No 
   Sexually Active: None 
 
 Other Topics Concern 
   None 
 
 Social History Narrative 
 
  Lives in [place] in house with wife. 
 
 Review of Systems:
   Constitutional: Denies unexplained fevers, chills, sweats, significant recent weight richardson
ge.
   Eyes:Denies sudden loss of vision, diplopia, blurred vision.
   ENT: Denies loss of hearing, vertigo,bleeding gums or poor dental repair. Mild tinnitus
   Card:Has exertional dyspnea and chest heaviness.
   Resp: PRAJAPATI: less than 1 flight
   GI: Denies nausea, vomiting, abdominal pain, constipation, hematochezia. Occasional diarr
hea.
   : Denies dysuria, pyuria, hematuria, frequency, incontinence
   MS: Diffuse myalgias and arthralgias
   Neuro: Denies seizures, strokes, loss of consciousness, dysesthesias or paresthesias, syn
cope, concussions.
   Psych: Mild depression.
   Endocrine: Denies heat or cold intolerance
   Heme: Denies easy bruising or prolonged bleeding.
   Allergic/Immunologic: Denies seasonal allergies
  
 
 PE: /66 | Pulse 78 | Resp 16 | Ht 1.676 m (5' 6") | Wt 91.944 kg (202 lb 11.2 oz) | B
MI 32.72 kg/m2
  Gen: obese,  alert and  not in acute distress
  HEENT:Head: Normocephalic, no lesions, without obvious abnormality.
 Eye: Normal external eye, conjunctiva, lids cornea, DIANNA.
 Nose: Normal external nose, mucus membranes and septum.
 Pharynx: Dental Hygiene adequate. Normal buccal mucosa. Tonsillar grade 0; Mallampati 2-3.
 Neck / Thyroid: Supple, no masses, nodes, nodules or enlargement. Scars from previous trach
eal surgery noted.
  Pulm: Decreased excursion and expansion bilaterally. Breath sounds are distant. Rhonchi cl
ear with coughing.
  Card: Irregular rate. S1, S2 wnl. 2/6 KIAN LLSB. I did not hear diastolic murmurs nor did I
 hear S3 or S4.
  GI: soft and normal bowel sounds
  : Not examined
  Rectal: Not Examined
  Ext: peripheral pulses normal, no pedal edema, no clubbing or cyanosis
  Skin:Not examined
  Neuro:Grossly normal
  Psych:age appropriate and casually dressedoriented to time, place and person, mood and aff
ect are within normal limits, pt is a good historian; no memory problems were noted
  Heme: No cervical LN
 
 Assessment: JOANN: I suspect that the patient has clinically significant JOANN and that this is
 likely making his cardiac issues more difficult to manage. I have discussed in detail the p
athophysiology of Obstructive Sleep Apnea with the patient. I've discussed that during NREM 
sleep the skeletal muscles relax and in REM sleep the skeletal muscles are paralyzed. The mu
scles that support the back of the throat (the tongue in particular) also relax during NREM 
sleep and are paralyzed in REM sleep and when this occurs, the back of the throat collapses 
some. In some patients with a smaller back of the throat, this can result in obstruction to 
the flow of air. This is fundamentally what occurs in JOANN. This can cause repetitive obstruc
tion to the flow of air all night long cause a person with JOANN to awaken repeatedly at night
 to "open" the back of the throat. If airflow is significantly restricted, blood oxygen leve
ls can fall. The combination of the repetitive awakenings at night and low oxygen levels red
d to numerous other physiologic abnormalities which can result in nocturia, nocturnal heartb
urn, night sweats, morning dry mouth, morning headache, and daytime fatigue/sleepiness. Antonio
tionally, JOANN can cause hypertension and it dramatically increases the risk of heart disease
, heart attack, and stroke. It may play a causative role in obesity and AODM. Untreated JOANN 
also dramatically increases the risk of fall asleep car accidents. Treatment can help with a
 
ll of these issues. The various forms of treatment of JOANN were discussed with the patient in
cluding 1) Conservative therapy which typically includes weight loss, avoidance of sleep dep
rivation, avoidance of alcohol, avoidance of sedative medications, avoidance of smoking, and
 positional therapy (non-supine sleeping); 2) Positive Airway Pressure therapy (which is eff
ective in the vast majority of patients but compliance can be an issue); 3) Dental Appliance
 Therapy (which is effective for some patients, typically with mild JOANN, but compliance is t
ypically good); 4) Expiratory Positive Airway Pressure - which involves passively increasing
 EPAP pressures applying a "one-way" valve type device (that looks like a "bandaid") over th
e nares at night which can be effective for very mild JOANN; and 5) Surgical intervention - in
cluding Phase I surgery (which typically involves T&A, UPPP, Genioglossus Advancement, Hyoid
 Suspension) and Phase II surgery (Bimandibular-Maxillary Facial Advancement). The surgical 
solution to JOANN is complicated and typically involves several operations.
   Central Sleep Apnea: It is also possible that the patient might have Central Sleep Apnea 
(of the Cheyne-Nichole variety) which can be caused by heart failure but at the same time can
 make heart failure more difficult to manage. If present, treatment is usually with an "auto
-servo" BiPAP device.
   Atrial Fibrillation: Treating JOANN/CSA, if present, can help.
   CHF: Treating JOANN/CSA, if present can help.
   ASCVD: Treating JOANN, if present, can reduce the risk of MI.
   HBP: Treating JOANN, if present can help.
   Obesity: Weight loss could be beneficial for most of his medical issues.
   Parasomnia: The patient does have mild arm movement at night. I suspect his may be second
ramón to an JOANN induced partial arousal. We will observe for abnormal movements during the sle
ep study.
 
 Plan: PSG as soon as we can arrange it. We will attempt a split-night protocol (unless cent
ral apnea is found - in which case a diagnostic PSG will be done).
           F/u afterwards.
 
 Today, 60 minutes was spent face to face with the patient; the majority of time was spent c
ounseling.
 
 CC: Dr. Iniguez, Dr. Foote
 
 Electronically signed by Luis Carlos Perez Jr., MD at 2013  2:48 PM PDTdocumented in 
is encounter
 
 Plan of Treatment
 
 
+--------+---------+-------------+----------------------+-------------+
| Date   | Type    | Specialty   | Care Team            | Description |
+--------+---------+-------------+----------------------+-------------+
| / | Office  | Pulmonology |   Juan F,          |             |
|    | Visit   |             | Jessica Cheung,   |             |
|        |         |             | MD [address] |             |
|        |         |             |   [place]   |             |
|        |         |             | [place] [address]             |             |
|        |         |             | [phone]         |             |
|        |         |             | [phone] (Fax)   |             |
+--------+---------+-------------+----------------------+-------------+
| / | Office  | Cardiology  |   Eric Akins, |             |
|    | Visit   |             |  MD [address]   |             |
|        |         |             | [place]  |             |
|        |         |             | [address]  [phone]  |             |
|        |         |             |  [phone] (Fax)  |             |
+--------+---------+-------------+----------------------+-------------+
 documented as of this encounter
 
 Visit Diagnoses
 
 
 
+-----------------------------------------------------------------------------------------+
| Diagnosis                                                                               |
+-----------------------------------------------------------------------------------------+
|   JOANN (obstructive sleep apnea) - Primary  Obstructive sleep apnea (adult) (pediatric)  |
+-----------------------------------------------------------------------------------------+
|   Parasomnia  Other dysfunctions of sleep stages or arousal from sleep                  |
+-----------------------------------------------------------------------------------------+
|   Heart failure (HCC)  Heart failure, unspecified                                       |
+-----------------------------------------------------------------------------------------+
|   Atrial fibrillation (HCC)  Atrial fibrillation                                        |
+-----------------------------------------------------------------------------------------+
|   HBP (high blood pressure)  Unspecified essential hypertension                         |
+-----------------------------------------------------------------------------------------+
|   ASCVD (arteriosclerotic cardiovascular disease)  Unspecified cardiovascular disease   |
+-----------------------------------------------------------------------------------------+
|   Raynaud disease  Raynaud's syndrome                                                   |
+-----------------------------------------------------------------------------------------+
|   Fibromyalgia  Mylagia and myositis, unspecified                                       |
+-----------------------------------------------------------------------------------------+
|   Aortic aneurysm (HCC)  Aortic aneurysm of unspecified site without mention of rupture |
+-----------------------------------------------------------------------------------------+
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
+-----------------------------------------------------------------------------------------+
 documented in this encounter

## 2019-12-06 NOTE — XMS
Encounter Summary
  Created on: 2019
 
 Shane Wyatttien BroussardJosue
 External Reference #: [id number]
 : 43
 Sex: Male
 
 Demographics
 
 
+-----------------------+---------------------------+
| Address               | [address]        |
|                       | [place]  [address] |
+-----------------------+---------------------------+
| Home Phone            | [phone]           |
+-----------------------+---------------------------+
| Preferred Language    | Unknown                   |
+-----------------------+---------------------------+
| Marital Status        |                    |
+-----------------------+---------------------------+
| Orthodox Affiliation | 1028                      |
+-----------------------+---------------------------+
| Race                  | Unknown                   |
+-----------------------+---------------------------+
| Ethnic Group          | Unknown                   |
+-----------------------+---------------------------+
 
 
 Author
 
 
+--------------+--------------------------------------------+
| Author       | [facility]  |
|              | and [place]                                |
+--------------+--------------------------------------------+
| Organization | [facility]  |
|              | and [place]                                |
+--------------+--------------------------------------------+
| Address      | Unknown                                    |
+--------------+--------------------------------------------+
| Phone        | Unavailable                                |
+--------------+--------------------------------------------+
 
 
 
 Support
 
 
+---------------+--------------+--------------------+-----------------+
| Name          | Relationship | Address            | Phone           |
+---------------+--------------+--------------------+-----------------+
| Opal Shane | ECON         | [address]     | [phone] |
|               |              | [place]   |                 |
|               |              | [address]              |                 |
+---------------+--------------+--------------------+-----------------+
 
 
 
 
 Care Team Providers
 
 
+-----------------------+------+-----------------+
| Care Team Member Name | Role | Phone           |
+-----------------------+------+-----------------+
| Erwin Iniguez MD | PCP  | [phone] |
+-----------------------+------+-----------------+
 
 
 
 Reason for Visit
 
 
+--------+----------+
| Reason | Comments |
+--------+----------+
| Apnea  |          |
+--------+----------+
 
 
 
 Encounter Details
 
 
+--------+---------+----------------------+---------------------+----------------------+
| Date   | Type    | Department           | Care Team           | Description          |
+--------+---------+----------------------+---------------------+----------------------+
| / | Office  |   [facility] [place]      |   Luis [facility] Perez  | JOANN (obstructive     |
| 2015   | Visit   | SLEEP DISORDER  [address]  | MD [address]   | sleep apnea)         |
|        |         | [address]  [place]      | [address]  [place]    | (Primary Dx);        |
|        |         | [place] [address] | [place] [address]     | Central sleep apnea  |
|        |         |   [phone]       | [phone]        | due to Cheyne-Nichole |
|        |         |                      | [phone] (Fax)  |  respiration         |
+--------+---------+----------------------+---------------------+----------------------+
 
 
 
 Social History
 
 
+---------------+------------+-----------+--------+------------------+
| Tobacco Use   | Types      | Packs/Day | Years  | Date             |
|               |            |           | Used   |                  |
+---------------+------------+-----------+--------+------------------+
| Former Smoker | Cigarettes | 1.3       | 34     | Quit: 1996 |
+---------------+------------+-----------+--------+------------------+
 
 
 
+---------------------+---+---+---+
| Smokeless Tobacco:  |   |   |   |
| Never Used          |   |   |   |
+---------------------+---+---+---+
 
 
 
+-------------+-------------+---------+----------+
| Alcohol Use | Drinks/Week | oz/Week | Comments |
 
+-------------+-------------+---------+----------+
| No          |             |         |          |
+-------------+-------------+---------+----------+
 
 
 
+------------------+---------------+
| Sex Assigned at  | Date Recorded |
| Birth            |               |
+------------------+---------------+
| Not on file      |               |
+------------------+---------------+
 
 
 
+----------------+-------------+-------------+
| Job Start Date | Occupation  | Industry    |
+----------------+-------------+-------------+
| Not on file    | Not on file | Not on file |
+----------------+-------------+-------------+
 
 
 
+----------------+--------------+------------+
| Travel History | Travel Start | Travel End |
+----------------+--------------+------------+
 
 
 
+-------------------------------------+
| No recent travel history available. |
+-------------------------------------+
 documented as of this encounter
 
 Last Filed Vital Signs
 
 
+-------------------+----------------------+----------------------+----------+
| Vital Sign        | Reading              | Time Taken           | Comments |
+-------------------+----------------------+----------------------+----------+
| Blood Pressure    | 142/78               | 2015  8:46 AM  |          |
|                   |                      | PDT                  |          |
+-------------------+----------------------+----------------------+----------+
| Pulse             | 66                   | 2015  8:46 AM  |          |
|                   |                      | PDT                  |          |
+-------------------+----------------------+----------------------+----------+
| Temperature       | -                    | -                    |          |
+-------------------+----------------------+----------------------+----------+
| Respiratory Rate  | 16                   | 2015  8:46 AM  |          |
|                   |                      | PDT                  |          |
+-------------------+----------------------+----------------------+----------+
| Oxygen Saturation | 98%                  | 2015  8:46 AM  |          |
|                   |                      | PDT                  |          |
+-------------------+----------------------+----------------------+----------+
| Inhaled Oxygen    | -                    | -                    |          |
| Concentration     |                      |                      |          |
+-------------------+----------------------+----------------------+----------+
| Weight            | 81.9 kg (180 lb 9.6  | 2015  8:46 AM  |          |
|                   | oz)                  | PDT                  |          |
+-------------------+----------------------+----------------------+----------+
 
| Height            | -                    | -                    |          |
+-------------------+----------------------+----------------------+----------+
| Body Mass Index   | 27.46                | 2014 10:00 AM  |          |
|                   |                      | PDT                  |          |
+-------------------+----------------------+----------------------+----------+
 documented in this encounter
 
 Progress Notes
 Luis Carlos Perez Jr., MD - 2015  8:51 AM PDTThe patient comes in for f/u on combined 
JOANN and CSA (Cheyne-Nichole) on ASV-BPAP (normal EF on 2-D Echo (65%) on 2015). He is on
 a Resmed [employer] (S9ASV) set at EPAP 4cm, PSmin 0cm, PSmax 20cm, backup auto. He has worn thi
s for 29 out of 33 days but is averaging only 2 hours 58 minutes of use per night. The calcu
lated AHI was 0. 
 
 He was seen by ENT Dr. Dumont in the [facility] for his nasal/sinus issues. He will see the
m again next month to have some sort of nasal/septal surgery which they hope will help open 
up the nose. He gets up to go to the bathroom at night and his nose is plugged so he doesn't
 use the BPAP after this. He is using much more water each evening. 
 
 /78 mmHg | Pulse 66 | Resp 16 | Wt 81.92 kg (180 lb 9.6 oz) | SpO2 98%
 
 A: Complex Sleep Apnea (JOANN+CSA-): The AHI is 0 when he wears his ASV-BPAP. I've encoura
ged him to put the ASV-BPAP back on and turn it on when he gets up at night. I think that it
 might actually help with the nasal stuffiness. He will try this. I am also hopeful that the
re might be a surgical solution in August.
 
 P: He will try to put the ASV-BPAP back on every night.
      F/u in 2 months after surgery is done.
 
 Today, 15 minutes was spent face to face with the patient; the majority of time was spent harley dupree regarding Complex Apnea.
 Electronically signed by Luis Carlos Perez Jr., MD at 2015  9:44 AM PDTdocumented in th
is encounter
 
 Plan of Treatment
 
 
+--------+---------+-------------+----------------------+-------------+
| Date   | Type    | Specialty   | Care Team            | Description |
+--------+---------+-------------+----------------------+-------------+
| / | Office  | Pulmonology |   Juan F,          |             |
|    | Visit   |             | Jessica Cheung,   |             |
|        |         |             | MD [address] |             |
|        |         |             |   [place],   |             |
|        |         |             | [place] [address]             |             |
|        |         |             | [phone]         |             |
|        |         |             | [phone] (Fax)   |             |
+--------+---------+-------------+----------------------+-------------+
| / | Office  | Cardiology  |   Eric Akins, |             |
|    | Visit   |             |  MD [address]   |             |
|        |         |             | [place]  |             |
|        |         |             | [address]  [phone]  |             |
|        |         |             |  [phone] (Fax)  |             |
+--------+---------+-------------+----------------------+-------------+
 documented as of this encounter
 
 Visit Diagnoses
 
 
+----------------------------------------------------------------------------------------+
 
| Diagnosis                                                                              |
+----------------------------------------------------------------------------------------+
|   JOANN (obstructive sleep apnea) - Primary  Obstructive sleep apnea (adult) (pediatric) |
+----------------------------------------------------------------------------------------+
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
+----------------------------------------------------------------------------------------+
 documented in this encounter

## 2019-12-06 NOTE — XMS
Encounter Summary
  Created on: 2019
 
 Wyatt Shane
 External Reference #: [id number]
 : 43
 Sex: Male
 
 Demographics
 
 
+-----------------------+------------------------+
| Address               | [address]     |
|                       | [place]  [address]   |
+-----------------------+------------------------+
| Home Phone            | [phone]        |
+-----------------------+------------------------+
| Preferred Language    | Unknown                |
+-----------------------+------------------------+
| Marital Status        |                 |
+-----------------------+------------------------+
| Baptism Affiliation | LUT                    |
+-----------------------+------------------------+
| Race                  | White                  |
+-----------------------+------------------------+
| Ethnic Group          | Not  or  |
+-----------------------+------------------------+
 
 
 Author
 
 
+--------------+------------------------------+
| Author       | [facility] |
+--------------+------------------------------+
| Organization | [facility] |
+--------------+------------------------------+
| Address      | Unknown                      |
+--------------+------------------------------+
| Phone        | Unavailable                  |
+--------------+------------------------------+
 
 
 
 Support
 
 
+---------------+--------------+---------+-----------------+
| Name          | Relationship | Address | Phone           |
+---------------+--------------+---------+-----------------+
| Opal Shane | ECON         | Unknown | [phone] |
+---------------+--------------+---------+-----------------+
 
 
 
 Care Team Providers
 
 
 
+-----------------------+------+-----------------+
| Care Team Member Name | Role | Phone           |
+-----------------------+------+-----------------+
| Erwin Iniguez MD   | PCP  | [phone] |
+-----------------------+------+-----------------+
 
 
 
 Encounter Details
 
 
+--------+-------------+-----------------+---------------------+---------------+
| Date   | Type        | Department      | Care Team           | Description   |
+--------+-------------+-----------------+---------------------+---------------+
| / | Office      |   [facility]  |   Note, Outpatient  | Progress Note |
|    | Visit-Trans | MEDICINE        | Clinic              |               |
|        | cribed      |                 |                     |               |
+--------+-------------+-----------------+---------------------+---------------+
 
 
 
 Social History
 
 
+----------------+-------+-----------+--------+------+
| Tobacco Use    | Types | Packs/Day | Years  | Date |
|                |       |           | Used   |      |
+----------------+-------+-----------+--------+------+
| Never Assessed |       |           |        |      |
+----------------+-------+-----------+--------+------+
 
 
 
+------------------+---------------+
| Sex Assigned at  | Date Recorded |
| Birth            |               |
+------------------+---------------+
| Not on file      |               |
+------------------+---------------+
 
 
 
+----------------+-------------+-------------+
| Job Start Date | Occupation  | Industry    |
+----------------+-------------+-------------+
| Not on file    | Not on file | Not on file |
+----------------+-------------+-------------+
 
 
 
+----------------+--------------+------------+
| Travel History | Travel Start | Travel End |
+----------------+--------------+------------+
 
 
 
+-------------------------------------+
| No recent travel history available. |
+-------------------------------------+
 documented as of this encounter
 
 
 Progress Notes
 Interface, Transcription In - 2007  3:15 AM PSTCLINIC DATE:       1998
 
 OTOLARYNGOLOGY CLINIC
 
 CHIEF COMPLAINT:  Hoarseness.
 
 HISTORY OF PRESENT ILLNESS:  The patient is a 52-year-old gentleman who has
 noted the onset of hoarseness since December.  He states that he was
 treated with some antibiotics but this did not improve the situation.  He
 denies any weight loss.  He does complain of occasional soreness in the
 mornings but this usually resolves on its own, especially on the right
 side.  He denies any problems swallowing.  He denies any ear pain.  He does
 describe some occasional difficulty with breathing because of some thick
 secretions.  He has had a vocal cord scraping on 98 which showed
 dysplasia.  He was seen by Dr. Paolo mackey and two months later underwent a
 laser treatment of this area.  He was sent here for further evaluation and
 treatment.  The second laser biopsy showed carcinoma in situ on the right
 side.  The patient denies any acid reflux symptoms though he has a history
 of ulcers and has been on Zantac for approximately five years.
 
 REVIEW OF SYSTEMS:  Review of systems is positive for occasional shortness
 of breath; otherwise as per history of illness.
 
 PAST MEDICAL HISTORY:  Past medical history is significant for:
 1.  Hypertension.
 2.  Arthritis.
 3.  Renal stones.
 4.  Hyperlipidemia.
 
 Surgeries include a quadruple bypass in 1996.
 
 CURRENT MEDICATIONS:
 1.  Zantac 150 twice a day.
 2.  Norvasc 10 mg once a day.
 3.  Lipitor 10 mg one a day.
 4.  Baby aspirin one a day.
 5.  Vitamins one q.d.
 6.  He is currently on a prescription for Zithromax as well as prednisone.
 
 ALLERGIES:  Beta-blockers.
 
 SOCIAL HISTORY:  The patient is  and lives in [place], [place].  He
 has three children with one still living at home.  He works as a truck
 .  He has a history of smoking, approximately a 30 pack year though
 he quit after his bypass surgery.  He has occasional alcohol.
 
 FAMILY HISTORY:  Significant for breast cancer in his mother.
 
 OBJECTIVE:  On exam, in general, he is a well-developed, well-nourished
 white male in no acute distress.  VITALS:  Height 5 feet 8 inches, weight
 134.  EYES:  Pupils equal, round, and reactive to light.  Extraocular
 motions are intact.  NOSE:  Mucosa is pink.  No mucal pus.  ORAL
 CAVITY/OROPHARYNX:  He has his own teeth.  No oral mucosa lesions are
 noted.  NECK:  No lymphadenopathy.  EARS:  External canals are normal.
 Tympanic membranes are visible and no fluid behind them.
 
 Nasal cavity was sprayed with Phenylephrine and Lidocaine and a
 nasopharyngoscopy was performed.  The cords are mobile bilaterally.  The
 
 right side has an erythematous mucosa just over the false cord.  There is
 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
 false cord appear edematous and erythematous.  Pathology reports were
 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
 some carcinoma in situ on his last biopsy and dysplasia noted and also with
 some carcinoma in situ noted in the August biopsy.
 
 ASSESSMENT AND PLAN:  The patient has hoarseness with biopsy proving
 squamous cell carcinoma in situ.  He currently has a very edematous and
 erythematous right true and false cord.  There is a question whether there
 is some submucosal process under this.  He also has evidence of some
 reflux.  We will start him on Prilosec 20 mg twice a day.  We will get a CT
 scan of his neck and larynx.  This will be done today.  We will have him
 call back next week for the CT results and have him return in three to four
 weeks to re-examine the cords.
 
 Dr. Jimmy Esposito, attending, was present for the history and physical.
 
 Kadie Clark M.D.
 Resident, Otolaryngology
 Head and Neck Surgery
 
 Jimmy Esposito M.D.
 , Otolaryngology
 Head and Neck Surgery
 
 CY/rel
 D: 98
 T: 98
 C: 98 dsElectronically signed by Interface, Transcription In at 2007  3:15 AM P
STIntrandy, Transcription In - 2007  3:15 AM [facility] DATE:       1998
 
 OTOLARYNGOLOGY CLINIC:
 
 SUBJECTIVE:  Mr. Shane is seen in consultation from Dr. Eddy Boone.  I
 first saw this patient with my senior resident, Dr. Kadie Clark and the
 details of my interaction with the patient are outlined in her note as well
 as my conclusions and plans, history of present illness, past medical
 history, personal and social history, family history, review of systems,
 and complete examination of the head and neck including flexible fiberoptic
 laryngoscopy.  I have his slides here, which I will review with the
 pathologist personally so that we can determine the depth of invasion.
 
 SUMMARY:  A summary of my findings:  This is a healthy 53-year-old
 gentleman with a lesion on his right true vocal cord which is recurrent and
 now, questionably, shows squamous cell carcinoma with in situ disease.  He
 has a chronic history of hoarseness and probably has gastroesophageal
 reflux in association with this.  He used to smoke, but quit almost two
 years ago before this lesion really became problematic.  There really are
 no other contributing factors except, perhaps some vocal abuse.  He is
 otherwise in reasonable health except for a history of coronary artery
 disease.
 
 PHYSICAL EXAMINATION:  The significant findings are limited to the larynx
 on flexible fiberoptic laryngoscopy.  There is significant swelling of the
 right true vocal cord with overlying erythema and some leukoplakia
 anteriorly.  This is effacing the right ventricle but does not appear to
 significantly interfere in any way with the movement of the vocal cord
 itself.  He, however, is hoarse because the mass effect of the cord
 prevents the contralateral cord from opposing it along its full extent.
 
 
 PLAN:  Get a CT scan to determine whether or not there could be a lesion
 deeper within the cord, to review the slides here to determine whether or
 not there is a true risk of squamous cell carcinoma here, and to recheck
 him in three to four weeks, after he is healed, on a regimen of Prilosec.
 He does have significant hyperkeratosis in his posterior commissure,
 suggesting that this may an etiologic factor and probably is contributing
 to his symptoms of postnasal discharge as well.  I will see him back in
 three weeks.
 
 Jimmy Esposito M.D.
 , Otolaryngology
 Head  and Neck Surgery
 
 MARIA ALEJANDRA/lizett
 D: 98
 T: 98Electronically signed by Interface, Transcription In at 2007  3:15 AM PSTd
ocumented in this encounter
 
 Plan of Treatment
 Not on filedocumented as of this encounter
 
 Visit Diagnoses
 Not on filedocumented in this encounter

## 2019-12-06 NOTE — XMS
Encounter Summary
  Created on: 2019
 
 Shane Wyatttien BroussardJosue
 External Reference #: [id number]
 : 43
 Sex: Male
 
 Demographics
 
 
+-----------------------+---------------------------+
| Address               | [address]        |
|                       | [place]  [address] |
+-----------------------+---------------------------+
| Home Phone            | [phone]           |
+-----------------------+---------------------------+
| Preferred Language    | Unknown                   |
+-----------------------+---------------------------+
| Marital Status        |                    |
+-----------------------+---------------------------+
| [employer] Affiliation | [id number]                      |
+-----------------------+---------------------------+
| Race                  | Unknown                   |
+-----------------------+---------------------------+
| Ethnic Group          | Unknown                   |
+-----------------------+---------------------------+
 
 
 Author
 
 
+--------------+--------------------------------------------+
| Author       | [facility]  |
|              | and [place]                                |
+--------------+--------------------------------------------+
| Organization | [facility]  |
|              | and [place]                                |
+--------------+--------------------------------------------+
| Address      | Unknown                                    |
+--------------+--------------------------------------------+
| Phone        | Unavailable                                |
+--------------+--------------------------------------------+
 
 
 
 Support
 
 
+---------------+--------------+--------------------+-----------------+
| Name          | Relationship | Address            | Phone           |
+---------------+--------------+--------------------+-----------------+
| Opal Shane | ECON         | [address]     | [phone] |
|               |              | [place]   |                 |
|               |              | [id number]              |                 |
+---------------+--------------+--------------------+-----------------+
 
 
 
 
 Care Team Providers
 
 
+-----------------------+------+-----------------+
| Care Team Member Name | Role | Phone           |
+-----------------------+------+-----------------+
| Erwin Iniguez MD | PCP  | [phone] |
+-----------------------+------+-----------------+
 
 
 
 Encounter Details
 
 
+--------+-----------+----------------------+--------------------+-------------+
| Date   | Type      | Department           | Care Team          | Description |
+--------+-----------+----------------------+--------------------+-------------+
| 08/08/ | Hospital  |   [facility] IP     |   Conversion       | Pain        |
| 2018   | Encounter | CONVERSION DEP  [address]  | Transaction,       |             |
|        |           | [address]           | Provider Unknown   |             |
|        |           | [place], [place]         | [phone]       |             |
|        |           | [address]           | [phone] (Fax) |             |
|        |           | [phone]         |                    |             |
+--------+-----------+----------------------+--------------------+-------------+
 
 
 
 Social History
 
 
+---------------+------------+-----------+--------+------------------+
| Tobacco Use   | Types      | Packs/Day | Years  | Date             |
|               |            |           | Used   |                  |
+---------------+------------+-----------+--------+------------------+
| Former Smoker | Cigarettes | 1.3       | 34     | Quit: 1996 |
+---------------+------------+-----------+--------+------------------+
 
 
 
+---------------------+---+---+---+
| Smokeless Tobacco:  |   |   |   |
| Never Used          |   |   |   |
+---------------------+---+---+---+
 
 
 
+-------------+-------------+---------+----------+
| Alcohol Use | Drinks/Week | oz/Week | Comments |
+-------------+-------------+---------+----------+
| No          |             |         |          |
+-------------+-------------+---------+----------+
 
 
 
+------------------+---------------+
| Sex Assigned at  | Date Recorded |
| Birth            |               |
+------------------+---------------+
| Not on file      |               |
 
+------------------+---------------+
 
 
 
+----------------+-------------+-------------+
| Job Start Date | Occupation  | Industry    |
+----------------+-------------+-------------+
| Not on file    | Not on file | Not on file |
+----------------+-------------+-------------+
 
 
 
+----------------+--------------+------------+
| Travel History | Travel Start | Travel End |
+----------------+--------------+------------+
 
 
 
+-------------------------------------+
| No recent travel history available. |
+-------------------------------------+
 documented as of this encounter
 
 Medications at Time of Discharge
 
 
+----------------------+----------------------+-----------+---------+----------+-----------+
| Medication           | Sig                  | Dispensed | Refills | Start    | End Date  |
|                      |                      |           |         | Date     |           |
+----------------------+----------------------+-----------+---------+----------+-----------+
|   acyclovir          | Apply  topically     |           | 0       |          |           |
| (ZOVIRAX) 5%         | every 3 hours.       |           |         |          |           |
| ointment             |                      |           |         |          |           |
+----------------------+----------------------+-----------+---------+----------+-----------+
|   albuterol (PROAIR  | Inhale 2 puffs into  |           | 0       |          |           |
| HFA) 90 mcg/puff     | the lungs every 6    |           |         |          |           |
| inhaler              | hours as needed for  |           |         |          |           |
|                      | Wheezing.            |           |         |          |           |
+----------------------+----------------------+-----------+---------+----------+-----------+
|   digoxin (LANOXIN)  | Take 125 mcg by      |           | 0       |          |           |
| 250 mcg tablet       | mouth Daily.      |           |         |          |           |
|                      | capsule daily        |           |         |          |           |
+----------------------+----------------------+-----------+---------+----------+-----------+
|   ferrous sulfate    | Take 325 mg by mouth |           | 0       | 20 |           |
| 325 mg tablet        |  3 times daily.      |           |         | 12       |           |
+----------------------+----------------------+-----------+---------+----------+-----------+
|   fluticasone        | one spray in each    |           | 0       | 20 |           |
| (FLONASE) 50         | nostril daily as     |           |         | 12       |           |
| mcg/nasal spray      | needed               |           |         |          |           |
+----------------------+----------------------+-----------+---------+----------+-----------+
|                      | Inhale 1 puff into   |           | 0       |          |           |
| fluticasone-salmeter | the lungs Twice      |           |         |          |           |
| ol (ADVAIR) 500-50   | Daily.               |           |         |          |           |
| mcg/puff diskus      |                      |           |         |          |           |
| inhaler              |                      |           |         |          |           |
+----------------------+----------------------+-----------+---------+----------+-----------+
|   folic acid 1 mg    | Take 1 mg by mouth   |           | 0       |          |           |
| tablet               | Daily.               |           |         |          |           |
+----------------------+----------------------+-----------+---------+----------+-----------+
|   furosemide (LASIX) | Take 40 mg by mouth  |           | 0       |          |           |
 
|  40 mg tablet        | Daily.               |           |         |          |           |
+----------------------+----------------------+-----------+---------+----------+-----------+
|   guaiFENesin        | Take 1,200 mg by     |           | 0       |          |           |
| (MUCINEX) 600 mg 12  | mouth 2 times daily. |           |         |          |           |
| hr tablet            |                      |           |         |          |           |
+----------------------+----------------------+-----------+---------+----------+-----------+
|   levothyroxine      | Take 75 mcg by mouth |           | 0       | 20 |           |
| (LEVOTHROID) 75 MCG  |  Daily.              |           |         | 12       |           |
| tablet               |                      |           |         |          |           |
+----------------------+----------------------+-----------+---------+----------+-----------+
|   metoclopramide     | Take 10 mg by mouth  |           | 0       | 20 |           |
| (REGLAN) 10 mg       | 4 times daily as     |           |         | 12       |           |
| tablet               | needed.              |           |         |          |           |
+----------------------+----------------------+-----------+---------+----------+-----------+
|                      | Apply  topically 2   |           | 0       |          |           |
| nystatin-triamcinolo | times daily.         |           |         |          |           |
| ne (MYCOLOG II)      |                      |           |         |          |           |
| cream                |                      |           |         |          |           |
+----------------------+----------------------+-----------+---------+----------+-----------+
|   ofloxacin          |                      |           | 0       | 20 |           |
| (OCUFLOX) 0.3%       |                      |           |         | 18       |           |
| ophthalmic solution  |                      |           |         |          |           |
+----------------------+----------------------+-----------+---------+----------+-----------+
|   omeprazole         | Take 20 mg by mouth  |           | 0       | 20 |           |
| (PRILOSEC) 20 mg     | 2 times daily.       |           |         | 12       |           |
| capsule              |                      |           |         |          |           |
+----------------------+----------------------+-----------+---------+----------+-----------+
|   potassium citrate  | Take 10 mEq by mouth |           | 0       |          |           |
| (UROCIT-K) 10 mEq SR |  2 times daily.      |           |         |          |           |
|  tablet              |                      |           |         |          |           |
+----------------------+----------------------+-----------+---------+----------+-----------+
|   predniSONE         | Take 10 mg by mouth  |           | 0       |          |           |
| (DELTASONE) 5 mg     | Daily.               |           |         |          |           |
| tablet               |                      |           |         |          |           |
+----------------------+----------------------+-----------+---------+----------+-----------+
|   tamsulosin         | Take 0.4 mg by mouth |           | 0       | 20 |           |
| (FLOMAX) 0.4 mg CAPS |  2 times daily.      |           |         | 12       |           |
+----------------------+----------------------+-----------+---------+----------+-----------+
|   acyclovir          | Take 400 mg by mouth |           | 0       | 20 |  |
| (ZOVIRAX) 400 MG     |  3 times daily as    |           |         | 12       | 9         |
| tablet               | needed.              |           |         |          |           |
+----------------------+----------------------+-----------+---------+----------+-----------+
|   Cholecalciferol    | Take 2,000 Units by  |           | 0       | 20 |  |
| (VITAMIN D3) 2000    | mouth Daily.         |           |         | 12       | 9         |
| UNITS CAPS           |                      |           |         |          |           |
+----------------------+----------------------+-----------+---------+----------+-----------+
|   cyanocobalamin     | injected             |           | 0       | 20 |  |
| (VITAMIN B-12) 1,000 | intramuscularly once |           |         | 12       | 9         |
|  mcg/mL injection    |  a month             |           |         |          |           |
+----------------------+----------------------+-----------+---------+----------+-----------+
|   diltiazem          | Take 120 mg by mouth |           | 0       |          |  |
| (DILT-XR) 120 mg 24  |  Daily.              |           |         |          | 9         |
| hr capsule           |                      |           |         |          |           |
+----------------------+----------------------+-----------+---------+----------+-----------+
|   loratadine         | Take 10 mg by mouth  |           | 0       |          |  |
| (CLARITIN) 10 mg     | Daily.               |           |         |          | 9         |
| tablet               |                      |           |         |          |           |
+----------------------+----------------------+-----------+---------+----------+-----------+
|   losartan (COZAAR)  | Take 100 mg by mouth |           | 0       |          |  |
| 100 MG tablet        |  Daily. 1/2 daily    |           |         |          | 9         |
 
+----------------------+----------------------+-----------+---------+----------+-----------+
|   methotrexate 2.5   | Take 15 mg by mouth  |           | 0       |          |  |
| mg tablet            | Once a week.         |           |         |          | 9         |
+----------------------+----------------------+-----------+---------+----------+-----------+
|                      | Take 1 tablet by     |           | 0       |          |  |
| oxyCODONE-acetaminop | mouth every 4 hours  |           |         |          | 9         |
| hen (PERCOCET) 5-325 | as needed for Pain.  |           |         |          |           |
|  mg per tablet       |                      |           |         |          |           |
+----------------------+----------------------+-----------+---------+----------+-----------+
|   pseudoePHEDrine    | Take 30 mg by mouth  |           | 0       |          |  |
| (SUDOGEST) 30 mg     | every 4 hours as     |           |         |          | 9         |
| tablet               | needed for           |           |         |          |           |
|                      | Congestion.          |           |         |          |           |
+----------------------+----------------------+-----------+---------+----------+-----------+
|   rivaroxaban        | Take 20 mg by mouth  |           | 0       |          |  |
| (XARELTO) 20 mg      | Daily (with dinner). |           |         |          | 9         |
| tablet               |                      |           |         |          |           |
+----------------------+----------------------+-----------+---------+----------+-----------+
|   sertraline         | 2 tablets daily      |           | 0       | 20 |  |
| (ZOLOFT) 100 mg      |                      |           |         | 12       | 9         |
| tablet               |                      |           |         |          |           |
+----------------------+----------------------+-----------+---------+----------+-----------+
 documented as of this encounter
 
 Plan of Treatment
 
 
+--------+---------+-------------+----------------------+-------------+
| Date   | Type    | Specialty   | Care Team            | Description |
+--------+---------+-------------+----------------------+-------------+
| / | Office  | Pulmonology |   Juan F,          |             |
| 2019   | Visit   |             | Jessica Cheung,   |             |
|        |         |             | MD [address] |             |
|        |         |             |   [place],   |             |
|        |         |             | [place] [address]             |             |
|        |         |             | [phone]         |             |
|        |         |             | [phone] (Fax)   |             |
+--------+---------+-------------+----------------------+-------------+
| / | Office  | Cardiology  |   Mable Jose Miguelargenis, |             |
|    | Visit   |             |  MD  [address]   |             |
|        |         |             | [place]  |             |
|        |         |             | [address]  [phone]  |             |
|        |         |             |  [phone] (Fax)  |             |
+--------+---------+-------------+----------------------+-------------+
 documented as of this encounter
 
 Procedures
 
 
+---------------------+--------+-------------+----------------------+----------------------+
| Procedure Name      | Priori | Date/Time   | Associated Diagnosis | Comments             |
|                     | ty     |             |                      |                      |
+---------------------+--------+-------------+----------------------+----------------------+
| CT HEAD WO CONTRAST | Routin | 2018  |                      |   Results for this   |
|                     | e      |  5:05 PM    |                      | procedure are in the |
|                     |        | PDT         |                      |  results section.    |
+---------------------+--------+-------------+----------------------+----------------------+
 documented in this encounter
 
 Results
 
 CT Head wo Contrast (2018  5:05 PM PDT)
 
+----------+
| Specimen |
+----------+
|          |
+----------+
 
 
 
+------------------------------------------------------------------------+--------------+
| Narrative                                                              | Performed At |
+------------------------------------------------------------------------+--------------+
|   This is a non-reportable procedure without a radiologist report and  |              |
| is  used for image storage only                                        |              |
+------------------------------------------------------------------------+--------------+
 
 
 
+--------------------------------------------------------------------------------------+
| Procedure Note                                                                       |
+--------------------------------------------------------------------------------------+
|   Andrzej Steven Irvin - 2019  4:21 AM PDT  This is a non-reportable procedure  |
| without a radiologist report and isused for image storage only                       |
+--------------------------------------------------------------------------------------+
 documented in this encounter
 
 Visit Diagnoses
 
 
+--------------------------+
| Diagnosis                |
+--------------------------+
|   Pain  Generalized pain |
+--------------------------+
 documented in this encounter

## 2019-12-06 NOTE — XMS
Encounter Summary
  Created on: 2019
 
 Shane Wyatttien BroussardJosue
 External Reference #: [id number]
 : 43
 Sex: Male
 
 Demographics
 
 
+-----------------------+---------------------------+
| Address               | [address]        |
|                       | [place]  [address] |
+-----------------------+---------------------------+
| Home Phone            | [phone]           |
+-----------------------+---------------------------+
| Preferred Language    | Unknown                   |
+-----------------------+---------------------------+
| Marital Status        |                    |
+-----------------------+---------------------------+
| Holiness Affiliation | [id number]                      |
+-----------------------+---------------------------+
| Race                  | Unknown                   |
+-----------------------+---------------------------+
| Ethnic Group          | Unknown                   |
+-----------------------+---------------------------+
 
 
 Author
 
 
+--------------+--------------------------------------------+
| Author       | [facility]  |
|              | [facility] [place]                                |
+--------------+--------------------------------------------+
| Organization | [facility]  |
|              | [facility] [place]                                |
+--------------+--------------------------------------------+
| Address      | Unknown                                    |
+--------------+--------------------------------------------+
| Phone        | Unavailable                                |
+--------------+--------------------------------------------+
 
 
 
 Support
 
 
+---------------+--------------+--------------------+-----------------+
| Name          | Relationship | Address            | Phone           |
+---------------+--------------+--------------------+-----------------+
| Opal Shane | ECON         | [address]     | [phone] |
|               |              | [place]   |                 |
|               |              | [address]              |                 |
+---------------+--------------+--------------------+-----------------+
 
 
 
 
 Care Team Providers
 
 
+-----------------------+------+-----------------+
| Care Team Member Name | Role | Phone           |
+-----------------------+------+-----------------+
| Erwin Iniguez MD | PCP  | [phone] |
+-----------------------+------+-----------------+
 
 
 
 Encounter Details
 
 
+--------+-----------+----------------------+---------------------+-------------+
| Date   | Type      | Department           | Care Team           | Description |
+--------+-----------+----------------------+---------------------+-------------+
| 10/07/ | Hospital  |   [facility] |   Luis Carlos Perez  |             |
|    | Encounter |  MED CTR SLEEP       | MD Shanice  [address]   |             |
|        |           | [place]  [address] | [facility]    |             |
|        |           |   [place], [place]    | [place] [address]     |             |
|        |           | [address]           | [phone]        |             |
|        |           | [phone]         | [phone] (Fax)  |             |
+--------+-----------+----------------------+---------------------+-------------+
 
 
 
 Social History
 
 
+---------------+------------+-----------+--------+------------------+
| Tobacco Use   | Types      | Packs/Day | Years  | Date             |
|               |            |           | Used   |                  |
+---------------+------------+-----------+--------+------------------+
| Former Smoker | Cigarettes | 1.3       | 34     | Quit: 1996 |
+---------------+------------+-----------+--------+------------------+
 
 
 
+---------------------+---+---+---+
| Smokeless Tobacco:  |   |   |   |
| Never Used          |   |   |   |
+---------------------+---+---+---+
 
 
 
+-------------+-------------+---------+----------+
| Alcohol Use | Drinks/Week | oz/Week | Comments |
+-------------+-------------+---------+----------+
| No          |             |         |          |
+-------------+-------------+---------+----------+
 
 
 
+------------------+---------------+
| Sex Assigned at  | Date Recorded |
| Birth            |               |
+------------------+---------------+
| Not on file      |               |
 
+------------------+---------------+
 
 
 
+----------------+-------------+-------------+
| Job Start Date | Occupation  | Industry    |
+----------------+-------------+-------------+
| Not on file    | Not on file | Not on file |
+----------------+-------------+-------------+
 
 
 
+----------------+--------------+------------+
| Travel History | Travel Start | Travel End |
+----------------+--------------+------------+
 
 
 
+-------------------------------------+
| No recent travel history available. |
+-------------------------------------+
 documented as of this encounter
 
 Medications at Time of Discharge
 
 
+----------------------+----------------------+-----------+---------+----------+-----------+
| Medication           | Sig                  | Dispensed | Refills | Start    | End Date  |
|                      |                      |           |         | Date     |           |
+----------------------+----------------------+-----------+---------+----------+-----------+
|   ferrous sulfate    | Take 325 mg by mouth |           | 0       | 20 |           |
| 325 mg tablet        |  3 times daily.      |           |         | 12       |           |
+----------------------+----------------------+-----------+---------+----------+-----------+
|   fluticasone        | one spray in each    |           | 0       | 20 |           |
| (FLONASE) 50         | nostril daily as     |           |         | 12       |           |
| mcg/nasal spray      | needed               |           |         |          |           |
+----------------------+----------------------+-----------+---------+----------+-----------+
|   guaiFENesin        | Take 1,200 mg by     |           | 0       |          |           |
| (MUCINEX) 600 mg 12  | mouth 2 times daily. |           |         |          |           |
| hr tablet            |                      |           |         |          |           |
+----------------------+----------------------+-----------+---------+----------+-----------+
|   levothyroxine      | Take 75 mcg by mouth |           | 0       | 20 |           |
| (LEVOTHROID) 75 MCG  |  Daily.              |           |         | 12       |           |
| tablet               |                      |           |         |          |           |
+----------------------+----------------------+-----------+---------+----------+-----------+
|   metoclopramide     | Take 10 mg by mouth  |           | 0       | 20 |           |
| (REGLAN) 10 mg       | 4 times daily as     |           |         | 12       |           |
| tablet               | needed.              |           |         |          |           |
+----------------------+----------------------+-----------+---------+----------+-----------+
|   omeprazole         | Take 20 mg by mouth  |           | 0       | 20 |           |
| (PRILOSEC) 20 mg     | 2 times daily.       |           |         | 12       |           |
| capsule              |                      |           |         |          |           |
+----------------------+----------------------+-----------+---------+----------+-----------+
|   tamsulosin         | Take 0.4 mg by mouth |           | 0       | 20 |           |
| (FLOMAX) 0.4 mg CAPS |  2 times daily.      |           |         | 12       |           |
+----------------------+----------------------+-----------+---------+----------+-----------+
|   acyclovir          | Take 400 mg by mouth |           | 0       | 20 |  |
| (ZOVIRAX) 400 MG     |  3 times daily as    |           |         | 12       | 9         |
| tablet               | needed.              |           |         |          |           |
+----------------------+----------------------+-----------+---------+----------+-----------+
 
|   Cholecalciferol    | Take 2,000 Units by  |           | 0       | 20 |  |
| (VITAMIN D3) 2000    | mouth Daily.         |           |         | 12       | 9         |
| UNITS CAPS           |                      |           |         |          |           |
+----------------------+----------------------+-----------+---------+----------+-----------+
|   cyanocobalamin     | injected             |           | 0       | 20 |  |
| (VITAMIN B-12) 1,000 | intramuscularly once |           |         | 12       | 9         |
|  mcg/mL injection    |  a month             |           |         |          |           |
+----------------------+----------------------+-----------+---------+----------+-----------+
|   digoxin (LANOXIN)  | Take 250 mcg by      |           | 0       |          |  |
| 250 mcg tablet       | mouth Daily.      |           |         |          | 5         |
|                      | tablet daily         |           |         |          |           |
+----------------------+----------------------+-----------+---------+----------+-----------+
|   diltiazem          | Take 120 mg by mouth |           | 0       |          |  |
| (DILT-XR) 120 mg 24  |  Daily.              |           |         |          | 9         |
| hr capsule           |                      |           |         |          |           |
+----------------------+----------------------+-----------+---------+----------+-----------+
|                      | 2 to 1 tablet four |           | 0       | 20 | 10/07/201 |
| hydrocodone-acetamin |  times daily         |           |         | 12       | 4         |
| ophen (VICODIN ES)   |                      |           |         |          |           |
| 7.5-750 MG per       |                      |           |         |          |           |
| tablet               |                      |           |         |          |           |
+----------------------+----------------------+-----------+---------+----------+-----------+
|   losartan (COZAAR)  | Take 100 mg by mouth |           | 0       |          |  |
| 100 MG tablet        |  Daily.  daily    |           |         |          | 9         |
+----------------------+----------------------+-----------+---------+----------+-----------+
|   methotrexate 2.5   | Take 15 mg by mouth  |           | 0       |          |  |
| mg tablet            | Once a week.         |           |         |          | 9         |
+----------------------+----------------------+-----------+---------+----------+-----------+
|   potassium chloride |                      |           | 0       | 20 |  |
|  (MICRO-K) 10 mEq CR |                      |           |         | 13       | 3         |
|  capsule             |                      |           |         |          |           |
+----------------------+----------------------+-----------+---------+----------+-----------+
|   predniSONE         | Take 5 mg by mouth   |           | 0       |          |  |
| (DELTASONE) 5 mg     | Daily.               |           |         |          | 4         |
| tablet               |                      |           |         |          |           |
+----------------------+----------------------+-----------+---------+----------+-----------+
|   pregabalin         | Take 50 mg by mouth  |           | 0       |          |  |
| (LYRICA) 50 MG       | 3 times daily.       |           |         |          | 4         |
| capsule              |                      |           |         |          |           |
+----------------------+----------------------+-----------+---------+----------+-----------+
|   rivaroxaban        | Take 20 mg by mouth  |           | 0       |          |  |
| (XARELTO) 20 mg      | Daily (with dinner). |           |         |          | 9         |
| tablet               |                      |           |         |          |           |
+----------------------+----------------------+-----------+---------+----------+-----------+
|   sertraline         | 2 tablets daily      |           | 0       | 20 |  |
| (ZOLOFT) 100 mg      |                      |           |         | 12       | 9         |
| tablet               |                      |           |         |          |           |
+----------------------+----------------------+-----------+---------+----------+-----------+
|   tamsulosin         | Take 0.4 mg by mouth |           | 0       | 20 |  |
| (FLOMAX) 0.4 mg CAPS |  Daily.              |           |         | 12       | 3         |
+----------------------+----------------------+-----------+---------+----------+-----------+
|   testosterone       | 200 mg injected      |           | 0       | 20 | 10/07/201 |
| cypionate            | intramuscularly      |           |         | 12       | 4         |
| (DEPO-TESTOSTERONE)  | every 3 weeks        |           |         |          |           |
| 200 mg/mL injection  |                      |           |         |          |           |
+----------------------+----------------------+-----------+---------+----------+-----------+
 documented as of this encounter
 
 Plan of Treatment
 
 
 
+--------+---------+-------------+----------------------+-------------+
| Date   | Type    | Specialty   | Care Team            | Description |
+--------+---------+-------------+----------------------+-------------+
| / | Office  | Pulmonology |   Juan F,          |             |
|    | Visit   |             | Jessica Cheung,   |             |
|        |         |             | MD [address] |             |
|        |         |             |   [place]   |             |
|        |         |             | [place] [address]             |             |
|        |         |             | [phone]         |             |
|        |         |             | [phone] (Fax)   |             |
+--------+---------+-------------+----------------------+-------------+
| / | Office  | Cardiology  |   Eric Akins, |             |
|    | Visit   |             |  MD [address]   |             |
|        |         |             | [place]  |             |
|        |         |             | [id number] [phone]  |             |
|        |         |             |  [phone] (Fax)  |             |
+--------+---------+-------------+----------------------+-------------+
 documented as of this encounter
 
 Procedures
 
 
+----------------------+--------+-------------+----------------------+----------+
| Procedure Name       | Priori | Date/Time   | Associated Diagnosis | Comments |
|                      | ty     |             |                      |          |
+----------------------+--------+-------------+----------------------+----------+
| DIAGNOSTIC REPORT -  |        | 10/07/2013  |                      |          |
| EXTERNAL SCAN        |        | 12:00 AM    |                      |          |
|                      |        | PDT         |                      |          |
+----------------------+--------+-------------+----------------------+----------+
 documented in this encounter
 
 Visit Diagnoses
 Not on filedocumented in this encounter

## 2019-12-06 NOTE — XMS
Encounter Summary
  Created on: 2019
 
 Wyatt Shane
 External Reference #: [id number]
 : 43
 Sex: Male
 
 Demographics
 
 
+-----------------------+------------------------+
| Address               | [address]     |
|                       | [place]  [address]   |
+-----------------------+------------------------+
| Home Phone            | [phone]        |
+-----------------------+------------------------+
| Preferred Language    | Unknown                |
+-----------------------+------------------------+
| Marital Status        |                 |
+-----------------------+------------------------+
| Jainism Affiliation | LUT                    |
+-----------------------+------------------------+
| Race                  | White                  |
+-----------------------+------------------------+
| Ethnic Group          | Not  or  |
+-----------------------+------------------------+
 
 
 Author
 
 
+--------------+------------------------------+
| Author       | [facility] |
+--------------+------------------------------+
| Organization | [facility] |
+--------------+------------------------------+
| Address      | Unknown                      |
+--------------+------------------------------+
| Phone        | Unavailable                  |
+--------------+------------------------------+
 
 
 
 Support
 
 
+---------------+--------------+---------+-----------------+
| Name          | Relationship | Address | Phone           |
+---------------+--------------+---------+-----------------+
| Opal Shane | ECON         | Unknown | [phone] |
+---------------+--------------+---------+-----------------+
 
 
 
 Care Team Providers
 
 
 
+-----------------------+------+-----------------+
| Care Team Member Name | Role | Phone           |
+-----------------------+------+-----------------+
| Erwin Steel MD   | PCP  | [phone] |
+-----------------------+------+-----------------+
 
 
 
 Encounter Details
 
 
+--------+-------------+-----------------+---------------------+---------------+
| Date   | Type        | Department      | Care Team           | Description   |
+--------+-------------+-----------------+---------------------+---------------+
| 01/15/ | Office      |   [facility]  |   Note, Outpatient  | Progress Note |
|    | Visit-Trans | MEDICINE        | Clinic              |               |
|        | cribed      |                 |                     |               |
+--------+-------------+-----------------+---------------------+---------------+
 
 
 
 Social History
 
 
+----------------+-------+-----------+--------+------+
| Tobacco Use    | Types | Packs/Day | Years  | Date |
|                |       |           | Used   |      |
+----------------+-------+-----------+--------+------+
| Never Assessed |       |           |        |      |
+----------------+-------+-----------+--------+------+
 
 
 
+------------------+---------------+
| Sex Assigned at  | Date Recorded |
| Birth            |               |
+------------------+---------------+
| Not on file      |               |
+------------------+---------------+
 
 
 
+----------------+-------------+-------------+
| Job Start Date | Occupation  | Industry    |
+----------------+-------------+-------------+
| Not on file    | Not on file | Not on file |
+----------------+-------------+-------------+
 
 
 
+----------------+--------------+------------+
| Travel History | Travel Start | Travel End |
+----------------+--------------+------------+
 
 
 
+-------------------------------------+
| No recent travel history available. |
+-------------------------------------+
 documented as of this encounter
 
 
 Progress Notes
 Interface, Transcription In - 2007  5:06 AM PSTCLINIC DATE:       01/15/1999
 
 OTOLARYNGOLOGY CLINIC
 
 SUBJECTIVE:    Mr. Shane is seen back in follow-up from his
 hemilaryngectomy.  He and I have talked at some length and I have reviewed
 his slides here at [facility].  It is clear that
 there is extension along small nerves in the periglottic space that make
 the issue of the margins questionable.  I am recommending postoperative
 radiation.
 
 OBJECTIVE:   From a healing standpoint, the patient is doing well.  He is
 breathing without difficulty and is on a prednisone taper.  His external
 incision looks good.  Flexible fiberoptic laryngoscopy reveals that the
 area of eschar over his larynx on the hemilaryngectomy site is gradually
 epithelializing very well.
 
 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
 post surgery, doing well.
 
 PLAN:    I will see the patient back here in approximately three weeks.  In
 the interim, I have given him his x-ray and notes and he will make an
 appointment for radiation therapy in [place].  I will call the
 radiation therapist once he calls with the name to go over the situation.
 
 Jimmy Esposito M.D.
 
 MARIA ALEJANDRA/anni
 D:  01/15/99
 T:  01/15/99
 
 CC:
 
 BE PAULA MD
 [address] [place] [address] [place]   [id number]
 
 ERWIN STEEL MD
 [address] [place] [address] [place]   [id number] signed by Interface, Transcription In at 2007  5:0
6 AM PSTdocumented in this encounter
 
 Plan of Treatment
 Not on filedocumented as of this encounter
 
 Visit Diagnoses
 Not on filedocumented in this encounter

## 2019-12-06 NOTE — XMS
Encounter Summary
  Created on: 2019
 
 Wyatt Shane
 External Reference #: [id number]
 : 43
 Sex: Male
 
 Demographics
 
 
+-----------------------+------------------------+
| Address               | [address]     |
|                       | [place]  [address]   |
+-----------------------+------------------------+
| Home Phone            | [phone]        |
+-----------------------+------------------------+
| Preferred Language    | Unknown                |
+-----------------------+------------------------+
| Marital Status        |                 |
+-----------------------+------------------------+
| Jain Affiliation | LUT                    |
+-----------------------+------------------------+
| Race                  | White                  |
+-----------------------+------------------------+
| Ethnic Group          | Not  or  |
+-----------------------+------------------------+
 
 
 Author
 
 
+--------------+------------------------------+
| Author       | [facility] |
+--------------+------------------------------+
| Organization | [facility] |
+--------------+------------------------------+
| Address      | Unknown                      |
+--------------+------------------------------+
| Phone        | Unavailable                  |
+--------------+------------------------------+
 
 
 
 Support
 
 
+---------------+--------------+---------+-----------------+
| Name          | Relationship | Address | Phone           |
+---------------+--------------+---------+-----------------+
| Opal Shane | ECON         | Unknown | [phone] |
+---------------+--------------+---------+-----------------+
 
 
 
 Care Team Providers
 
 
 
+-----------------------+------+-----------------+
| Care Team Member Name | Role | Phone           |
+-----------------------+------+-----------------+
| Erwin Iniguez MD   | PCP  | [phone] |
+-----------------------+------+-----------------+
 
 
 
 Reason for Referral
 Diagnostic Testing (Routine)
 
+--------+--------+-----------+--------------+--------------+---------------+
| Status | Reason | Specialty | Diagnoses /  | Referred By  | Referred To   |
|        |        |           | Procedures   | Contact      | Contact       |
+--------+--------+-----------+--------------+--------------+---------------+
| Closed |        | Radiology |   Diagnoses  |   Tay, |   Rad General |
|        |        |           |  Esophageal  |  Jonathan S,   |  [address] |
|        |        |           | stenosis     | MD  [address]  |  [address]  |
|        |        |           | Procedures   | [address]  |  Mailcode:    |
|        |        |           | X-RAY        | [address]      | [facility]  |
|        |        |           | ESOPHAGRAM   | [facility] [place] | for Health    |
|        |        |           |              |  [address]  | and Healing,  |
|        |        |           |              |  Phone:      | [facility] [address],   |
|        |        |           |              | [phone] | 3rd Floor     |
|        |        |           |              |   Fax:       | [place], [place]  |
|        |        |           |              | [phone] | [address]    |
|        |        |           |              |              | Phone:        |
|        |        |           |              |              | [phone]  |
|        |        |           |              |              |  Fax:         |
|        |        |           |              |              | [phone]  |
+--------+--------+-----------+--------------+--------------+---------------+
 
 
 
 
 Encounter Details
 
 
+--------+-----------+----------------------+-----------+-------------+
| Date   | Type      | Department           | Care Team | Description |
+--------+-----------+----------------------+-----------+-------------+
| 09/15/ | Hospital  |   Diagnostic Imaging |           |             |
|    | Encounter |  Services at [facility]     |           |             |
|        |           | [address]  |           |             |
|        |           | [place] [address]  Mailcode:   |           |             |
|        |           | [address]  [facility]  |           |             |
|        |           |  [place], [place]        |           |             |
|        |           | [address]           |           |             |
|        |           | [phone]         |           |             |
+--------+-----------+----------------------+-----------+-------------+
 
 
 
 Social History
 
 
+---------------+------------+-----------+--------+------------------+
| Tobacco Use   | Types      | Packs/Day | Years  | Date             |
|               |            |           | Used   |                  |
+---------------+------------+-----------+--------+------------------+
 
| Former Smoker | Cigarettes | 1         | 30     | Quit: 1996 |
+---------------+------------+-----------+--------+------------------+
 
 
 
+-------------+-------------+---------+----------+
| Alcohol Use | Drinks/Week | oz/Week | Comments |
+-------------+-------------+---------+----------+
| No          |             |         |          |
+-------------+-------------+---------+----------+
 
 
 
+------------------+---------------+
| Sex Assigned at  | Date Recorded |
| Birth            |               |
+------------------+---------------+
| Not on file      |               |
+------------------+---------------+
 
 
 
+----------------+-------------+-------------+
| Job Start Date | Occupation  | Industry    |
+----------------+-------------+-------------+
| Not on file    | Not on file | Not on file |
+----------------+-------------+-------------+
 
 
 
+----------------+--------------+------------+
| Travel History | Travel Start | Travel End |
+----------------+--------------+------------+
 
 
 
+-------------------------------------+
| No recent travel history available. |
+-------------------------------------+
 documented as of this encounter
 
 Medications at Time of Discharge
 
 
+----------------------+----------------------+-----------+---------+----------+----------+
| Medication           | Sig                  | Dispensed | Refills | Start    | End Date |
|                      |                      |           |         | Date     |          |
+----------------------+----------------------+-----------+---------+----------+----------+
|   ACYCLOVIR ORAL     | Take  by mouth as    |           | 0       | 20 |          |
|                      | needed.              |           |         | 10       |          |
+----------------------+----------------------+-----------+---------+----------+----------+
|                      | Take  by mouth.      |           | 0       |          |          |
| Amlodipine-Atorvasta |                      |           |         |          |          |
| tin (CADUET) 10-10   |                      |           |         |          |          |
| mg Oral Tablet       |                      |           |         |          |          |
+----------------------+----------------------+-----------+---------+----------+----------+
|   CYANOCOBALAMIN     | by Injection route   |           | 0       | 20 |          |
| (VITAMIN B-12 INJ)   | every thirty days.   |           |         | 10       |          |
+----------------------+----------------------+-----------+---------+----------+----------+
|   gabapentin 300 mg  | Take 300 mg by mouth |           | 0       |          |          |
 
| Oral Tablet          |  three times daily.  |           |         |          |          |
+----------------------+----------------------+-----------+---------+----------+----------+
|   LANSOPRAZOLE       | Take  by mouth.      |           | 0       |          |          |
| (PREVACID ORAL)      |                      |           |         |          |          |
+----------------------+----------------------+-----------+---------+----------+----------+
|   LORATADINE         | Take  by mouth.      |           | 0       |          |          |
| (CLARITIN ORAL)      |                      |           |         |          |          |
+----------------------+----------------------+-----------+---------+----------+----------+
|   metoclopramide     | Take 5 mg by mouth   |           | 0       |          |          |
| (REGLAN) 5 mg Oral   | every six hours as   |           |         |          |          |
| Tablet               | needed.              |           |         |          |          |
+----------------------+----------------------+-----------+---------+----------+----------+
|   NAPROXEN           | Take  by mouth.      |           | 0       |          |          |
| SODIUM/P-EPHED HCL   |                      |           |         |          |          |
| (SUDAFED 12 HR       |                      |           |         |          |          |
| SINUS-PAIN ORAL)     |                      |           |         |          |          |
+----------------------+----------------------+-----------+---------+----------+----------+
|   sertraline         | Take 50 mg by mouth  |           | 0       |          |          |
| (ZOLOFT) 50 mg Oral  | once daily.          |           |         |          |          |
| Tablet               |                      |           |         |          |          |
+----------------------+----------------------+-----------+---------+----------+----------+
|   tamsulosin         | Take 0.4 mg by mouth |           | 0       |          |          |
| (FLOMAX) 0.4 mg Oral |  once daily.         |           |         |          |          |
|  Capsule, Sust.      |                      |           |         |          |          |
| Release 24 hr        |                      |           |         |          |          |
+----------------------+----------------------+-----------+---------+----------+----------+
|   TESTOSTERONE IM    | Inject  into the     |           | 0       |          |          |
|                      | muscle (IM).         |           |         |          |          |
+----------------------+----------------------+-----------+---------+----------+----------+
|   VICODIN ORAL       | None Entered         |           | 0       |          |          |
+----------------------+----------------------+-----------+---------+----------+----------+
 documented as of this encounter
 
 Plan of Treatment
 Not on filedocumented as of this encounter
 
 Procedures
 
 
+------------------+--------+-------------+----------------------+----------------------+
| Procedure Name   | Priori | Date/Time   | Associated Diagnosis | Comments             |
|                  | ty     |             |                      |                      |
+------------------+--------+-------------+----------------------+----------------------+
| X-RAY ESOPHAGRAM | Routin | 09/15/2010  |   Esophageal         |   Results for this   |
|                  | e      | 10:14 AM    | stenosis             | procedure are in the |
|                  |        | PDT         |                      |  results section.    |
+------------------+--------+-------------+----------------------+----------------------+
 documented in this encounter
 
 Results
 X-RAY ESOPHAGRAM (09/15/2010 10:14 AM PDT)
 
+-----------+--------------------------+-----------+------------+--------------+
| Component | Value                    | Ref Range | Performed  | Pathologist  |
|           |                          |           | At         | Signature    |
+-----------+--------------------------+-----------+------------+--------------+
| ESOPHAGUS | Exam: Single contrast    |           |            |              |
|           | barium esophagram        |           |            |              |
|           | Findings: The cervical   |           |            |              |
|           | segment is discussed in  |           |            |              |
 
|           | the modified             |           |            |              |
|           | bariumswallowing study.  |           |            |              |
|           |   No mucosal             |           |            |              |
|           | abnormalities are seen.  |           |            |              |
|           |   Multipletertiary       |           |            |              |
|           | contractions are seen in |           |            |              |
|           |  the distal esophagus    |           |            |              |
|           | with evidencefor         |           |            |              |
|           | previous fundoplication. |           |            |              |
|           |    There is evidence for |           |            |              |
|           |  small axial andsmall    |           |            |              |
|           | paraesophageal hernia    |           |            |              |
|           | but no evidence for      |           |            |              |
|           | reflux.   Survey ofthe   |           |            |              |
|           | stomach and proximal     |           |            |              |
|           | duodenum is              |           |            |              |
|           | unremarkable.            |           |            |              |
|           | Impression: Distal       |           |            |              |
|           | esophageal dysmotility   |           |            |              |
|           | characterized by         |           |            |              |
|           | multipletertiary         |           |            |              |
|           | contractions.   Small    |           |            |              |
|           | and presumably recurrent |           |            |              |
|           |  axial andparaesophageal |           |            |              |
|           |  hernia with no          |           |            |              |
|           | demonstrable reflux. I   |           |            |              |
|           | have personally viewed   |           |            |              |
|           | this procedure/exam and  |           |            |              |
|           | reviewed this report.    |           |            |              |
|           | Author: JUSTIN BAIRD       |           |            |              |
|           | JORGE ELIZALDEReviewer:   |           |            |              |
|           | JUSTIN ELIZALDE M.D. |           |            |              |
|           |   STATUS FINAL /      |           |            |              |
|           | JUSTIN ELIZALDE       |           |            |              |
+-----------+--------------------------+-----------+------------+--------------+
 
 
 
+----------+
| Specimen |
+----------+
|          |
+----------+
 
 
 
+----------------------+---------+--------------------+--------------+
| Performing           | Address | City/State/Zipcode | Phone Number |
| Organization         |         |                    |              |
+----------------------+---------+--------------------+--------------+
|   [facility] DEPARTMENT OF |         |                    |              |
|  RADIOLOGY           |         |                    |              |
+----------------------+---------+--------------------+--------------+
 documented in this encounter
 
 Visit Diagnoses
 
 
+------------------------------------------------------------+
| Diagnosis                                                  |
 
+------------------------------------------------------------+
|   Esophageal stenosis  Stricture and stenosis of esophagus |
+------------------------------------------------------------+
 documented in this encounter

## 2019-12-06 NOTE — XMS
Encounter Summary
  Created on: 2019
 
 Shane Wyatttien BroussardJosue
 External Reference #: [id number]
 : 43
 Sex: Male
 
 Demographics
 
 
+-----------------------+---------------------------+
| Address               | [address]        |
|                       | [place]  [address] |
+-----------------------+---------------------------+
| Home Phone            | [phone]           |
+-----------------------+---------------------------+
| Preferred Language    | Unknown                   |
+-----------------------+---------------------------+
| Marital Status        |                    |
+-----------------------+---------------------------+
| Sikhism Affiliation | [id number]                      |
+-----------------------+---------------------------+
| Race                  | Unknown                   |
+-----------------------+---------------------------+
| Ethnic Group          | Unknown                   |
+-----------------------+---------------------------+
 
 
 Author
 
 
+--------------+--------------------------------------------+
| Author       | [facility]  |
|              | [facility] [place]                                |
+--------------+--------------------------------------------+
| Organization | [facility]  |
|              | [facility] [place]                                |
+--------------+--------------------------------------------+
| Address      | Unknown                                    |
+--------------+--------------------------------------------+
| Phone        | Unavailable                                |
+--------------+--------------------------------------------+
 
 
 
 Support
 
 
+---------------+--------------+--------------------+-----------------+
| Name          | Relationship | Address            | Phone           |
+---------------+--------------+--------------------+-----------------+
| Opal Shane | ECON         | [address]     | [phone] |
|               |              | [place]   |                 |
|               |              | [id number]              |                 |
+---------------+--------------+--------------------+-----------------+
 
 
 
 
 Care Team Providers
 
 
+-----------------------+------+-----------------+
| Care Team Member Name | Role | Phone           |
+-----------------------+------+-----------------+
| Erwin Iniguez MD | PCP  | [phone] |
+-----------------------+------+-----------------+
 
 
 
 Reason for Visit
 
 
+--------+---------------+
| Reason | Comments      |
+--------+---------------+
| Other  | sleep results |
+--------+---------------+
 
 
 
 Encounter Details
 
 
+--------+---------+----------------------+---------------------+----------------------+
| Date   | Type    | Department           | Care Team           | Description          |
+--------+---------+----------------------+---------------------+----------------------+
| / | Office  |   [facility]      |   Luis Carlos Perez  | JOANN (obstructive     |
|    | Visit   | SLEEP DISORDER  [address]  | [address]   | sleep apnea)         |
|        |         | [address]  [place]      | [address]  [place]    | (Primary Dx);        |
|        |         | [place] [address] | [place] [address]     | Central sleep apnea  |
|        |         |   [phone]       | [phone]        | due to Cheyne-Nichole |
|        |         |                      | [phone] (Fax)  |  respiration         |
+--------+---------+----------------------+---------------------+----------------------+
 
 
 
 Social History
 
 
+---------------+------------+-----------+--------+------------------+
| Tobacco Use   | Types      | Packs/Day | Years  | Date             |
|               |            |           | Used   |                  |
+---------------+------------+-----------+--------+------------------+
| Former Smoker | Cigarettes | 1.3       | 35     | Quit: 1996 |
+---------------+------------+-----------+--------+------------------+
 
 
 
+---------------------+---+---+---+
| Smokeless Tobacco:  |   |   |   |
| Never Used          |   |   |   |
+---------------------+---+---+---+
 
 
 
+-------------+-------------+---------+----------+
| Alcohol Use | Drinks/Week | oz/Week | Comments |
 
+-------------+-------------+---------+----------+
| No          |             |         |          |
+-------------+-------------+---------+----------+
 
 
 
+------------------+---------------+
| Sex Assigned at  | Date Recorded |
| Birth            |               |
+------------------+---------------+
| Not on file      |               |
+------------------+---------------+
 
 
 
+----------------+-------------+-------------+
| Job Start Date | Occupation  | Industry    |
+----------------+-------------+-------------+
| Not on file    | Not on file | Not on file |
+----------------+-------------+-------------+
 
 
 
+----------------+--------------+------------+
| Travel History | Travel Start | Travel End |
+----------------+--------------+------------+
 
 
 
+-------------------------------------+
| No recent travel history available. |
+-------------------------------------+
 documented as of this encounter
 
 Last Filed Vital Signs
 
 
+-------------------+----------------------+----------------------+----------+
| Vital Sign        | Reading              | Time Taken           | Comments |
+-------------------+----------------------+----------------------+----------+
| Blood Pressure    | 128/72               | 2015  2:28 PM  |          |
|                   |                      | PDT                  |          |
+-------------------+----------------------+----------------------+----------+
| Pulse             | 95                   | 2015  2:28 PM  |          |
|                   |                      | PDT                  |          |
+-------------------+----------------------+----------------------+----------+
| Temperature       | -                    | -                    |          |
+-------------------+----------------------+----------------------+----------+
| Respiratory Rate  | 16                   | 2015  2:28 PM  |          |
|                   |                      | PDT                  |          |
+-------------------+----------------------+----------------------+----------+
| Oxygen Saturation | 97%                  | 2015  2:28 PM  |          |
|                   |                      | PDT                  |          |
+-------------------+----------------------+----------------------+----------+
| Inhaled Oxygen    | -                    | -                    |          |
| Concentration     |                      |                      |          |
+-------------------+----------------------+----------------------+----------+
| Weight            | 81.3 kg (179 lb 4.8  | 2015  2:28 PM  |          |
|                   | oz)                  | PDT                  |          |
+-------------------+----------------------+----------------------+----------+
 
| Height            | -                    | -                    |          |
+-------------------+----------------------+----------------------+----------+
| Body Mass Index   | 27.26                | 2014 10:00 AM  |          |
|                   |                      | PDT                  |          |
+-------------------+----------------------+----------------------+----------+
 documented in this encounter
 
 Progress Notes
 Luis Carlos Perez Jr., MD - 2015 10:25 AM PDTThe patient comes in for follow-up after 
undergoing diagnostic polysomnography. My interpretation of the patient's sleep study, which
 I have reviewed with the patient, is as follows:
 
 Polysomnogram Report on Wyatt Shane performed on 6/15/2015
 
 Clinical Information: Wyatt Shane is a 69 y.o. male who underwent diagnostic nocturna
l polysomnography on 6/15/2015. The patient has a history of atrial fibrillation and heart f
ailure. PSG performed on 10/7/2013 demonstrated an AHI of 77.4. Of the 419 apneas, 380 were 
central apneas in a Cheyne-Nichole pattern. He underwent ASV-BPAP titration on 2013 and 
has been on ASV-BPAP (EPAP 4cm, PSmin0, PSmax 21cm) since then. Because of the recently repo
rted SERVE-HF study indicating that ASV-BPAP may actually increase cardiac mortality in eduard
ents with heart failure (EF < 45%), ASV-BPAP was discontinued and the current study is now d
one off of therapy. A 2-D Echocardiogram was obtained on 2015 which demonstrates an EF 
currently of 60-65%.
 
 Technical Information: Please see technical data stored as Polysomnography under "Media" se
ction in the EPIC EMR.
 
 Definitions (The AASM Manual for the Scoring of Sleep and Associated Events, Version 2.0; 2
012):
 Apnea: There is a drop in the peak signal excursion by 90% or greater of pre-event baseline
 using an oronasal thermal sensor (diagnostic study), PAP device flow (titration study), or 
an alternative apnea sensor (diagnostic study); the duration of the 90% or greater drop in s
ensor signal is 10 seconds or longer.
 Obstructive Apnea: Event associated with continued or increased inspiratory effort througho
ut the entire period of absent airflow.
 Central Apnea: Event associated with absent inspiratory effort throughout the entire period
 of absent airflow.
 Mixed Apnea: Event associated with absent inspiratory effort in the initial portion of the 
event followed by resumption of inspiratory effort during the second portion of the event.
 Hypopnea: Nasal pressure excursion drop by 30% or more from baseline, lasting at lease 10 s
econds and 90% of the event's duration meets this amplitude criteria. This is associated wit
h a 4% or greater desaturation from pre-baseline.
 Respiratory Event Related Arousal: A sequence of breaths lasting 10 seconds or longer kendall
cterized by increasing respiratory effort or by flattening of the inspiratory portion of the
 nasal pressure (diagnostic study) or PAP device flow (titration study) waveform leading to 
arousal from sleep when the sequence of breaths does not meet criteria for an apnea or hypop
octaviano.
 
 Sleep Architecture:  Lights out was recorded at 2215 hundred hours on 6/15/2015 and lights 
on was recorded at 0606 hundred hours on 2015. The latency to sleep onset was short at 
2.5 minutes. The patient slept for 395 minutes out of 470 minutes of study time resulting an
 a sleep efficiency that was mildly decreased at 84 %. The amount of N1 sleep was normal occ
upying 3.8% of the Total Sleep Time; the amount of N2 sleep was elevated occupying 85.3% of 
the Total Sleep Time; the amount of N3 sleep was low occupying 2.1% of the Total Sleep Time;
 the amount of REM sleep was low occupying 7.1% of the Total Sleep Time and the latency to R
EM sleep was shoirt at 43.5 minutes.
 
 Sleep in the following positions was recorded: left lateral decubitus 0%, right lateral dec
ubitus 92.3%, supine 7.7%, prone 0%.
 
 
 Sleep was severely fragmented; the Arousal Index was 70.9.
 
 The patient reported this to be a better than "usual" night's sleep.
 
 Cardiopulmonary Monitoring: The heart rate averaged in the 80's beats per minute. Mild rate
 variability was noted. The rhythm was atrial fibrillation.
 
 In the course of the evening there were 15 obstructive apneas, 9 mixed apneas, 35 central a
pneas, 272 hypopneas, and 78 Respiratory Effort Related Arousals (RERA's). The Respiratory D
isturbance Index (RDI) was elevated at 62.1; the Apnea-Hypopnea Index was elevated at 50.3; 
the Apnea Index (AI) was elevated at 9. The respiratory events were not clinically significa
ntly sleep stage dependent. The respiratory events were not clinically significantly positio
nal. The number of central apneas was much less than in 2013 although they still appeared in
 a Cheyne-Nichole pattern. 
 
 The respiratory events occasioned severe sleep fragmentation; the Respiratory Arousal Index
 was 50.7.
 
 The miguel oxygen saturation was 80% and the patient spent 30 minutes with an oxygen saturat
ion of less than 88%.
 
 ETCO2 was normal.
 
 Limb Movement Monitoring: There were 0 Periodic Limb Movements (PLMS Index of 0) of which 0
 were associated with arousals; the PLMS Arousal Index was normal at 0.
 
 Interpretation: This polysomnogram is abnormal secondary to:
 
 Obstructive Sleep Apnea is diagnosed and is associated with severe sleep fragmentation and 
significant oxygen desaturation.
 Mild Central Sleep Apnea secondary to Cheyne-Nichole Respiration is present.
 
 Suggestions:
 1. The principles of sleep hygiene should be reviewed with the patient.
 2. PSG guided CPAP titration is advised. If central apneas persist, ASV-BPAP could be consi
dered in view of the normal Ejection Fraction noted on recent 2-D Echocardiogram done in May
 2015.
 
 He also had a 2-D Echocardiogram performed which demonstrated the following:
 
 ECHOCARDIOGRAM REPORT
 
 STUDY DATE: 2015
 
 PATIENT NAME: Wyatt Shane
 : 1945
 MRN: [id number]
 PCP: Erwin Iniguez MD
 
 CLINICAL HISTORY/DIAGNOSIS: A-fib, heart failure, ef
 
 A transthoracic echocardiogram with M-mode, pulsed-wave and color Doppler 
 was performed with standard views obtained. The technical quality of this 
 examination is adequate. The heart rhythm during the echo is atrial 
 fibrillation. The M-mode, two-dimensional, color flow and spectral 
 Doppler data were reviewed and support the following interpretation:
 
 Interpretation:
 Left Atrium: Left atrial size is mildly dilated.
 Left ventricle: Left ventricular size is normal with mild-to-moderate 
 
 concentric left ventricular hypertrophy, and normal left ventricular 
 systolic function. The estimated ejection fraction is 60-65 %. 
 Undetermined diastolic function. 
 Aortic root: Aortic root is normal.
 Right Atrium: Right atrial sizes normal.
 Right ventricle: Right ventricular size is normal with normal wall 
 thickness and normal right ventricular systolic function.
 Pericardium: Pericardium is normal.
 Pulmonary artery: Pulmonary artery is normal. mild pulmonary hypertension 
 with a peak systolic pressure of 35-40 mmHg.
 Aortic valve: Aortic valve is trileaflet with mild thickening and opens 
 adequately. There is a mild to moderate aortic valve insufficiency.
 Mitral valve: Mitral valve is normal. mild mitral regurgitation.
 Pulmonic valve: Pulmonic valve is normal.
 Tricuspid valve: Tricuspid valve is normal. mild tricuspid valve 
 regurgitation.
 Vena cava: The inferior vena cava is normal. There is greater than 50% 
 inspiratory collapse of the IVC.
 
 IMPRESSIONS:
 1. Mild left atrial dilatation.
 2. Mild-to-moderate concentric left ventricular hypertrophy. No LVOT 
 obstruction noted. Left ventricular systolic dysfunction is preserved. 
 LVEF is 60-65%.
 3. Mildly thickened trileaflet aortic valve with adequate opening. There 
 is a mild to moderate aortic valve insufficiency.
 4. Mild mitral valve regurgitation.
 5. Mild tricuspid valve regurgitation.
 6. Mild pulmonary hypertension with a peak systolic pressure of 35-40 
 mmHg.
 7. Normal IVC with normal respiratory collapse.
 
 Measurements:
 Height: 5'8''
 Weight: 182lbs
 Aortic root: 31 mm
 Aortic cusp sep: 20 mm
 LA: 2d 51 mm
 IVS-diastole: 22 mm
 IVS-systole: 19 mm
 LVPW diastole: 14 mm
 LVPW systole: 16 mm
 LV diameter-diastole: 47 mm
 LV diameter-systole: 29 mm
 Fractional shortenin %
 PFV aortic valve: m/s
 MPG mitral valve: mmHg
 PFV TR jet: 2.84 m/s
 RA/RV PP mmHg
 LA volume: 135 mL
 LA index: 38 mL/m2
 Mitral Inflow DT: ms
 IVRT: ms
 Valsalva: 
 PWDTI S wave: cm/s
 PWDTI E wave: cm/s
 PWDTI A wave: cm/s
 E/A Ratio: 
 E/E Ratio: 
 
 
 Signed by: Brittni Shoemaker MD [facility] 
 2015 9:20 
 
 He and his wife both note that he is feeling more fatigued and tired since stopping the ASV
-BPAP.
 
 /72 | Pulse 95 | Resp 16 | Wt 81.33 kg (179 lb 4.8 oz) | SpO2 97%
 
 A: JOANN + CSA-: Although less in severity than when first seen, he still has significant 
complex apnea. Note that his ejection fraction was 60-65% and thus the SERVE-HF study does n
ot apply to him. I am advising that he continue on his current ASV-BPAP. He states that it d
oes cause significant nasal/sinus dryness and he states that he has turned his heat and humi
dity up as high as it will go. Even so, he uses virtually no water in the course of an eveni
ng wearing the BPAP. I have suggested today that he take his unit by [place] to have them chec
k it to assure that the heated humidity is properly functioning. I suspect that his nasal/si
nus issues are likely secondary to lack of humidification from his ASV-BPAP. He does have an
 ENT appointment in the [facility] in 2 weeks - we will find out with whom he has an appointm
ent and make sure that his sleep records are forwarded to that office.
 
 P: Restart ASV-BPAP at his previous settings.
      [place] to check his ASV-BPAP to assure that the heated humidification system is properl
y functioning.
      All sleep records will be sent to his ENT MD's - they will find the names and addresse
s.
      F[facility] 4 weeks.
 
 Today, 15minutes was spent face to face with the patient; the majority of time was spent co
unseling regarding sleep issues.
 
      Electronically signed by Luis Carlos Perez Jr., MD at 2015  2:54 PM PDTdocumented 
in this encounter
 
 Plan of Treatment
 
 
+--------+---------+-------------+----------------------+-------------+
| Date   | Type    | Specialty   | Care Team            | Description |
+--------+---------+-------------+----------------------+-------------+
| / | Office  | Pulmonology |   Juan F,          |             |
|    | Visit   |             | Jessica Cheung,   |             |
|        |         |             | MD [address] |             |
|        |         |             |   [place],   |             |
|        |         |             | [place] [address]             |             |
|        |         |             | [phone]         |             |
|        |         |             | [phone] (Fax)   |             |
+--------+---------+-------------+----------------------+-------------+
| / | Office  | Cardiology  |   Eric Akins, |             |
|    | Visit   |             |  MD [address]   |             |
|        |         |             | [place]  |             |
|        |         |             | [id number] [phone]  |             |
|        |         |             |  [phone] (Fax)  |             |
+--------+---------+-------------+----------------------+-------------+
 documented as of this encounter
 
 Visit Diagnoses
 
 
+----------------------------------------------------------------------------------------+
| Diagnosis                                                                              |
+----------------------------------------------------------------------------------------+
 
|   JOANN (obstructive sleep apnea) - Primary  Obstructive sleep apnea (adult) (pediatric) |
+----------------------------------------------------------------------------------------+
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
+----------------------------------------------------------------------------------------+
 documented in this encounter

## 2019-12-06 NOTE — XMS
Encounter Summary
  Created on: 2019
 
 Shane Wyatttien BroussardJosue
 External Reference #: [id number]
 : 43
 Sex: Male
 
 Demographics
 
 
+-----------------------+---------------------------+
| Address               | [address]        |
|                       | [place]  [address] |
+-----------------------+---------------------------+
| Home Phone            | [phone]           |
+-----------------------+---------------------------+
| Preferred Language    | Unknown                   |
+-----------------------+---------------------------+
| Marital Status        |                    |
+-----------------------+---------------------------+
| Tenriism Affiliation | [id number]                      |
+-----------------------+---------------------------+
| Race                  | Unknown                   |
+-----------------------+---------------------------+
| Ethnic Group          | Unknown                   |
+-----------------------+---------------------------+
 
 
 Author
 
 
+--------------+--------------------------------------------+
| Author       | [facility]  |
|              | [facility] [place]                                |
+--------------+--------------------------------------------+
| Organization | [facility]  |
|              | [facility] [place]                                |
+--------------+--------------------------------------------+
| Address      | Unknown                                    |
+--------------+--------------------------------------------+
| Phone        | Unavailable                                |
+--------------+--------------------------------------------+
 
 
 
 Support
 
 
+---------------+--------------+--------------------+-----------------+
| Name          | Relationship | Address            | Phone           |
+---------------+--------------+--------------------+-----------------+
| Opal Shane | ECON         | [address]     | [phone] |
|               |              | [place]   |                 |
|               |              | [id number]              |                 |
+---------------+--------------+--------------------+-----------------+
 
 
 
 
 Care Team Providers
 
 
+-----------------------+------+-----------------+
| Care Team Member Name | Role | Phone           |
+-----------------------+------+-----------------+
| Erwin Iniguez MD | PCP  | [phone] |
+-----------------------+------+-----------------+
 
 
 
 Reason for Referral
 Diagnostic/Screening (Routine)
 
+--------+--------+-----------+--------------+--------------+---------------+
| Status | Reason | Specialty | Diagnoses /  | Referred By  | Referred To   |
|        |        |           | Procedures   | Contact      | Contact       |
+--------+--------+-----------+--------------+--------------+---------------+
| Closed |        | Radiology |   Diagnoses  |   Chris,     |   [facility]    |
|        |        |           |  Chronic     | Luis Carlos D    | [address]  |
|        |        |           | systolic     | MD Shanice  [address] |  [place], |
|        |        |           | heart        |  [facility] |  [place]           |
|        |        |           | failure      |  St  [place]   | [address]    |
|        |        |           | (HCC)        | [place]    | Phone:        |
|        |        |           | Unspecified  | [id number]        | [phone]  |
|        |        |           | sleep apnea  | Phone:       |  Fax:         |
|        |        |           |  Procedures  | [phone] | [phone]  |
|        |        |           |  ECHO        |   Fax:       |               |
|        |        |           | Complete  AL | [phone] |               |
|        |        |           |  ECHO HEART  |              |               |
|        |        |           | XTHORACIC,CO |              |               |
|        |        |           | MPLETE W     |              |               |
|        |        |           | DOPPLER  AL  |              |               |
|        |        |           | ECHO HEART   |              |               |
|        |        |           | XTHORACIC,CO |              |               |
|        |        |           | MPLETE, W/O  |              |               |
|        |        |           | DOPPLER      |              |               |
+--------+--------+-----------+--------------+--------------+---------------+
 
 
 
 
 Reason for Visit
 Diagnostic/Screening (Routine)
 
+--------+--------+-----------+--------------+--------------+---------------+
| Status | Reason | Specialty | Diagnoses /  | Referred By  | Referred To   |
|        |        |           | Procedures   | Contact      | Contact       |
+--------+--------+-----------+--------------+--------------+---------------+
| Closed |        | Radiology |   Diagnoses  |   Chris,     |   [facility]    |
|        |        |           |  Chronic     | Luis Carlos D    | [address]  |
|        |        |           | systolic     | MD Shanice  [address] |  [place], |
|        |        |           | heart        |  [place] |  [place]           |
|        |        |           | failure      |  St  [place]   | [address]    |
|        |        |           | (HCC)        | [place]    | Phone:        |
|        |        |           | Unspecified  | [id number]        | [phone]  |
|        |        |           | sleep apnea  | Phone:       |  Fax:         |
|        |        |           |  Procedures  | [phone] | [phone]  |
|        |        |           |  ECHO        |   Fax:       |               |
 
|        |        |           | Complete  AL | [phone] |               |
|        |        |           |  ECHO HEART  |              |               |
|        |        |           | XTHORACIC,CO |              |               |
|        |        |           | MPLETE W     |              |               |
|        |        |           | DOPPLER  AL  |              |               |
|        |        |           | ECHO HEART   |              |               |
|        |        |           | XTHORACIC,CO |              |               |
|        |        |           | MPLETE, W/O  |              |               |
|        |        |           | DOPPLER      |              |               |
+--------+--------+-----------+--------------+--------------+---------------+
 
 
 
 
 Encounter Details
 
 
+--------+-----------+----------------------+----------------------+----------------------+
| Date   | Type      | Department           | Care Team            | Description          |
+--------+-----------+----------------------+----------------------+----------------------+
| / | Hospital  |   [facility] |   Luis Carlos Perez   | Central sleep apnea; |
| 2015   | Encounter |  [facility]  [address] | MD Shanice  [address]    |  Chronic systolic    |
|        |           |  [place]       | [address]  [place]     | heart failure (HCC)  |
|        |           | [place] [address] | [place] [address]      |                      |
|        |           |   [phone]       | [phone]         |                      |
|        |           |                      | [phone] (Fax)   |                      |
|        |           |                      | Meme Junior,  |                      |
|        |           |                      | Technologist         |                      |
+--------+-----------+----------------------+----------------------+----------------------+
 
 
 
 Social History
 
 
+---------------+------------+-----------+--------+------------------+
| Tobacco Use   | Types      | Packs/Day | Years  | Date             |
|               |            |           | Used   |                  |
+---------------+------------+-----------+--------+------------------+
| Former Smoker | Cigarettes | 1.3       | 35     | Quit: 1996 |
+---------------+------------+-----------+--------+------------------+
 
 
 
+---------------------+---+---+---+
| Smokeless Tobacco:  |   |   |   |
| Never Used          |   |   |   |
+---------------------+---+---+---+
 
 
 
+-------------+-------------+---------+----------+
| Alcohol Use | Drinks/Week | oz/Week | Comments |
+-------------+-------------+---------+----------+
| No          |             |         |          |
+-------------+-------------+---------+----------+
 
 
 
+------------------+---------------+
 
| Sex Assigned at  | Date Recorded |
| Birth            |               |
+------------------+---------------+
| Not on file      |               |
+------------------+---------------+
 
 
 
+----------------+-------------+-------------+
| Job Start Date | Occupation  | Industry    |
+----------------+-------------+-------------+
| Not on file    | Not on file | Not on file |
+----------------+-------------+-------------+
 
 
 
+----------------+--------------+------------+
| Travel History | Travel Start | Travel End |
+----------------+--------------+------------+
 
 
 
+-------------------------------------+
| No recent travel history available. |
+-------------------------------------+
 documented as of this encounter
 
 Medications at Time of Discharge
 
 
+----------------------+----------------------+-----------+---------+----------+-----------+
| Medication           | Sig                  | Dispensed | Refills | Start    | End Date  |
|                      |                      |           |         | Date     |           |
+----------------------+----------------------+-----------+---------+----------+-----------+
|   acyclovir          | Apply  topically     |           | 0       |          |           |
| (ZOVIRAX) 5%         | every 3 hours.       |           |         |          |           |
| ointment             |                      |           |         |          |           |
+----------------------+----------------------+-----------+---------+----------+-----------+
|   albuterol (PROAIR  | Inhale 2 puffs into  |           | 0       |          |           |
| HFA) 90 mcg/puff     | the lungs every 6    |           |         |          |           |
| inhaler              | hours as needed for  |           |         |          |           |
|                      | Wheezing.            |           |         |          |           |
+----------------------+----------------------+-----------+---------+----------+-----------+
|   ferrous sulfate    | Take 325 mg by mouth |           | 0       | 20 |           |
| 325 mg tablet        |  3 times daily.      |           |         | 12       |           |
+----------------------+----------------------+-----------+---------+----------+-----------+
|   fluticasone        | one spray in each    |           | 0       | 20 |           |
| (FLONASE) 50         | nostril daily as     |           |         | 12       |           |
| mcg/nasal spray      | needed               |           |         |          |           |
+----------------------+----------------------+-----------+---------+----------+-----------+
|   folic acid 1 mg    | Take 1 mg by mouth   |           | 0       |          |           |
| tablet               | Daily.               |           |         |          |           |
+----------------------+----------------------+-----------+---------+----------+-----------+
|   furosemide (LASIX) | Take 40 mg by mouth  |           | 0       |          |           |
|  40 mg tablet        | Daily.               |           |         |          |           |
+----------------------+----------------------+-----------+---------+----------+-----------+
|   guaiFENesin        | Take 1,200 mg by     |           | 0       |          |           |
| (MUCINEX) 600 mg 12  | mouth 2 times daily. |           |         |          |           |
| hr tablet            |                      |           |         |          |           |
+----------------------+----------------------+-----------+---------+----------+-----------+
 
|   levothyroxine      | Take 75 mcg by mouth |           | 0       | 20 |           |
| (LEVOTHROID) 75 MCG  |  Daily.              |           |         | 12       |           |
| tablet               |                      |           |         |          |           |
+----------------------+----------------------+-----------+---------+----------+-----------+
|   metoclopramide     | Take 10 mg by mouth  |           | 0       | 20 |           |
| (REGLAN) 10 mg       | 4 times daily as     |           |         | 12       |           |
| tablet               | needed.              |           |         |          |           |
+----------------------+----------------------+-----------+---------+----------+-----------+
|                      | Apply  topically 2   |           | 0       |          |           |
| nystatin-triamcinolo | times daily.         |           |         |          |           |
| ne (MYCOLOG II)      |                      |           |         |          |           |
| cream                |                      |           |         |          |           |
+----------------------+----------------------+-----------+---------+----------+-----------+
|   omeprazole         | Take 20 mg by mouth  |           | 0       | 20 |           |
| (PRILOSEC) 20 mg     | 2 times daily.       |           |         | 12       |           |
| capsule              |                      |           |         |          |           |
+----------------------+----------------------+-----------+---------+----------+-----------+
|   potassium citrate  | Take 10 mEq by mouth |           | 0       |          |           |
| (UROCIT-K) 10 mEq SR |  2 times daily.      |           |         |          |           |
|  tablet              |                      |           |         |          |           |
+----------------------+----------------------+-----------+---------+----------+-----------+
|   predniSONE         | Take 10 mg by mouth  |           | 0       |          |           |
| (DELTASONE) 5 mg     | Daily.               |           |         |          |           |
| tablet               |                      |           |         |          |           |
+----------------------+----------------------+-----------+---------+----------+-----------+
|   tamsulosin         | Take 0.4 mg by mouth |           | 0       | 20 |           |
| (FLOMAX) 0.4 mg CAPS |  2 times daily.      |           |         | 12       |           |
+----------------------+----------------------+-----------+---------+----------+-----------+
|   acyclovir          | Take 400 mg by mouth |           | 0       | 20 |  |
| (ZOVIRAX) 400 MG     |  3 times daily as    |           |         | 12       | 9         |
| tablet               | needed.              |           |         |          |           |
+----------------------+----------------------+-----------+---------+----------+-----------+
|   Cholecalciferol    | Take 2,000 Units by  |           | 0       | 20 |  |
| (VITAMIN D3) 2000    | mouth Daily.         |           |         | 12       | 9         |
| UNITS CAPS           |                      |           |         |          |           |
+----------------------+----------------------+-----------+---------+----------+-----------+
|   cyanocobalamin     | injected             |           | 0       | 20 |  |
| (VITAMIN B-12) 1,000 | intramuscularly once |           |         | 12       | 9         |
|  mcg/mL injection    |  a month             |           |         |          |           |
+----------------------+----------------------+-----------+---------+----------+-----------+
|   digoxin (LANOXIN)  | Take 250 mcg by      |           | 0       |          |  |
| 250 mcg tablet       | mouth Daily.      |           |         |          | 5         |
|                      | tablet daily         |           |         |          |           |
+----------------------+----------------------+-----------+---------+----------+-----------+
|   diltiazem          | Take 120 mg by mouth |           | 0       |          |  |
| (DILT-XR) 120 mg 24  |  Daily.              |           |         |          | 9         |
| hr capsule           |                      |           |         |          |           |
+----------------------+----------------------+-----------+---------+----------+-----------+
|   loratadine         | Take 10 mg by mouth  |           | 0       |          |  |
| (CLARITIN) 10 mg     | Daily.               |           |         |          | 9         |
| tablet               |                      |           |         |          |           |
+----------------------+----------------------+-----------+---------+----------+-----------+
|   losartan (COZAAR)  | Take 100 mg by mouth |           | 0       |          |  |
| 100 MG tablet        |  Daily. 1/2 daily    |           |         |          | 9         |
+----------------------+----------------------+-----------+---------+----------+-----------+
|   methotrexate 2.5   | Take 15 mg by mouth  |           | 0       |          |  |
| mg tablet            | Once a week.         |           |         |          | 9         |
+----------------------+----------------------+-----------+---------+----------+-----------+
|   rivaroxaban        | Take 20 mg by mouth  |           | 0       |          |  |
| (XARELTO) 20 mg      | Daily (with dinner). |           |         |          | 9         |
 
| tablet               |                      |           |         |          |           |
+----------------------+----------------------+-----------+---------+----------+-----------+
|   sertraline         | 2 tablets daily      |           | 0       | 20 |  |
| (ZOLOFT) 100 mg      |                      |           |         | 12       | 9         |
| tablet               |                      |           |         |          |           |
+----------------------+----------------------+-----------+---------+----------+-----------+
 documented as of this encounter
 
 Plan of Treatment
 
 
+--------+---------+-------------+----------------------+-------------+
| Date   | Type    | Specialty   | Care Team            | Description |
+--------+---------+-------------+----------------------+-------------+
| /  Office  | Pulmonology |   Juan F,          |             |
|    | Visit   |             | Jessicahan Cheung,   |             |
|        |         |             | MD  [address]  |             |
|        |         |             |   [place],   |             |
|        |         |             | [place] [address]             |             |
|        |         |             | [phone]         |             |
|        |         |             | [phone] (Fax)   |             |
+--------+---------+-------------+----------------------+-------------+
| / | Office  | Cardiology  |   Eric Akins, |             |
|    | Visit   |             |  MD  [address]   |             |
|        |         |             | [place]  |             |
|        |         |             | [id number]  [phone]  |             |
|        |         |             |  [phone] (Fax)  |             |
+--------+---------+-------------+----------------------+-------------+
 documented as of this encounter
 
 Procedures
 
 
+----------------+--------+-------------+----------------------+----------------------+
| Procedure Name | Priori | Date/Time   | Associated Diagnosis | Comments             |
|                | ty     |             |                      |                      |
+----------------+--------+-------------+----------------------+----------------------+
| ECHO COMPLETE  | Routin | 2015  |   Central sleep      |   Results for this   |
|                | e      |  9:20 AM    | apnea  Chronic       | procedure are in the |
|                |        | PDT         | systolic heart       |  results section.    |
|                |        |             | failure (HCC)        |                      |
+----------------+--------+-------------+----------------------+----------------------+
| LVEF VALUE     | Routin | 2015  |                      |   Results for this   |
|                | e      |             |                      | procedure are in the |
|                |        |             |                      |  results section.    |
+----------------+--------+-------------+----------------------+----------------------+
 documented in this encounter
 
 Results
 ECHO Complete (2015  9:20 AM PDT)
 
+----------+
| Specimen |
+----------+
|          |
+----------+
 
 
 
+------------------------------------------------------------------------+-----------------+
 
| Narrative                                                              | Performed At    |
+------------------------------------------------------------------------+-----------------+
|   [facility]     ECHOCARDIOGRAM REPORT         |   [place]    |
| STUDY DATE:   2015        PATIENT NAME: Wyatt Shane  :   | [facility]        |
| 1945  MRN: [id number]  PCP: Erwin Iniguez MD               | [facility]  |
| CLINICAL HISTORY/DIAGNOSIS:   A-fib, heart failure, ef        A        | - IMAGING       |
| transthoracic echocardiogram with M-mode, pulsed-wave and color        |                 |
| Doppler   was performed with standard views obtained.   The technical  |                 |
| quality of this   examination is adequate.   The heart rhythm during   |                 |
| the echo is atrial   fibrillation.   The M-mode, two-dimensional,      |                 |
| color flow and spectral   Doppler data were reviewed and support the   |                 |
| following interpretation:     Interpretation:  Left Atrium: Left       |                 |
| atrial size is mildly dilated.  Left ventricle:   Left ventricular     |                 |
| size is normal with mild-to-moderate   concentric left ventricular     |                 |
| hypertrophy, and normal left ventricular   systolic function.   The    |                 |
| estimated ejection fraction is 60-65 %.     Undetermined diastolic     |                 |
| function.   Aortic root: Aortic root is normal.  Right Atrium:   Right |                 |
|  atrial sizes normal.  Right ventricle:   Right ventricular size is    |                 |
| normal with normal wall   thickness and normal right ventricular       |                 |
| systolic function.  Pericardium:   Pericardium is normal.  Pulmonary   |                 |
| artery:   Pulmonary artery is normal. mild pulmonary hypertension      |                 |
| with a peak systolic pressure of 35-40 mmHg.  Aortic valve: Aortic     |                 |
| valve is trileaflet with mild thickening and opens   adequately.       |                 |
|   There is a mild to moderate aortic valve insufficiency.  Mitral      |                 |
| valve:   Mitral valve is normal. mild mitral regurgitation.  Pulmonic  |                 |
| valve:   Pulmonic valve is normal.  Tricuspid valve:   Tricuspid valve |                 |
|  is normal. mild tricuspid valve   regurgitation.  Vena cava:   The    |                 |
| inferior vena cava is normal.   There is greater than 50%              |                 |
| inspiratory collapse of the IVC.        IMPRESSIONS:  1.   Mild left   |                 |
| atrial dilatation.  2.   Mild-to-moderate concentric left ventricular  |                 |
| hypertrophy.   No LVOT   obstruction noted.   Left ventricular         |                 |
| systolic dysfunction is preserved.     LVEF is 60-65%.  3.   Mildly    |                 |
| thickened trileaflet aortic valve with adequate opening.   There   is  |                 |
| a mild to moderate aortic valve insufficiency.  4.   Mild mitral valve |                 |
|  regurgitation.  5.   Mild tricuspid valve regurgitation.  6.   Mild   |                 |
| pulmonary hypertension with a peak systolic pressure of 35-40   mmHg.  |                 |
|  7.   Normal IVC with normal respiratory collapse.                     |                 |
| Measurements:  Height: 5'8''  Weight: 182lbs  Aortic root:   31 mm     |                 |
| Aortic cusp sep:   20 mm  LA:   2d 51 mm  IVS-diastole:   22 mm        |                 |
| IVS-systole:   19 mm  LVPW diastole:   14 mm  LVPW systole:   16 mm    |                 |
| LV diameter-diastole:   47 mm  LV diameter-systole:   29 mm            |                 |
| Fractional shortenin %  PFV aortic valve:    m/s  MPG mitral    |                 |
| valve:    mmHg  PFV TR jet:   2.84 m/s  RA/RV PP mmHg  LA       |                 |
| volume:   135 mL  LA index:   38 mL/m2  Mitral Inflow DT:    ms  IVRT: |                 |
|     ms  Valsalva:     PWDTI S wave:    cm/s  PWDTI E wave:    cm/s     |                 |
| PWDTI A wave:    cm/s  E/A Ratio:     E/E Ratio:                       |                 |
| Signed by:    Brittni Shoemaker MD [facility]      2015   9:20          |                 |
|   Sonographer:   Meme Junior, RDCS, RDMS, RT                       |                 |
+------------------------------------------------------------------------+-----------------+
 
 
 
+----------------------+---------------------+--------------------+----------------+
| Performing           | Address             | [place] Phone Number   |
| Organization         |                     |                    |                |
+----------------------+---------------------+--------------------+----------------+
|   [facility]     |   [address] | [place]    |   [phone] |
| [facility]  |                     | [address]              |                |
| - IMAGING            |                     |                    |                |
+----------------------+---------------------+--------------------+----------------+
 
 LVEF VALUE (2015)
 
+-------------+-------+-----------+------------+--------------+
| Component   | Value | Ref Range | Performed  | Pathologist  |
|             |       |           | At         | Signature    |
+-------------+-------+-----------+------------+--------------+
| LVEF-TTE    | 65    |           |            |              |
| TRANSTHORAC |       |           |            |              |
| IC ECHO     |       |           |            |              |
+-------------+-------+-----------+------------+--------------+
 documented in this encounter
 
 Visit Diagnoses
 
 
+------------------------------------------------------------------------+
| Diagnosis                                                              |
+------------------------------------------------------------------------+
|   Central sleep apnea  Unspecified sleep apnea                         |
+------------------------------------------------------------------------+
|   Chronic systolic heart failure (HCC)  Chronic systolic heart failure |
+------------------------------------------------------------------------+
 documented in this encounter

## 2019-12-06 NOTE — XMS
Encounter Summary
  Created on: 2019
 
 Wyatt Shane
 External Reference #: [id number]
 : 43
 Sex: Male
 
 Demographics
 
 
+-----------------------+------------------------+
| Address               | [address]     |
|                       | [place]  [address]   |
+-----------------------+------------------------+
| Home Phone            | [phone]        |
+-----------------------+------------------------+
| Preferred Language    | Unknown                |
+-----------------------+------------------------+
| Marital Status        |                 |
+-----------------------+------------------------+
| Latter-day Affiliation | LUT                    |
+-----------------------+------------------------+
| Race                  | White                  |
+-----------------------+------------------------+
| Ethnic Group          | Not  or  |
+-----------------------+------------------------+
 
 
 Author
 
 
+--------------+------------------------------+
| Author       | [facility] |
+--------------+------------------------------+
| Organization | [facility] |
+--------------+------------------------------+
| Address      | Unknown                      |
+--------------+------------------------------+
| Phone        | Unavailable                  |
+--------------+------------------------------+
 
 
 
 Support
 
 
+---------------+--------------+---------+-----------------+
| Name          | Relationship | Address | Phone           |
+---------------+--------------+---------+-----------------+
| Opal Shane | ECON         | Unknown | [phone] |
+---------------+--------------+---------+-----------------+
 
 
 
 Care Team Providers
 
 
 
+-----------------------+------+-----------------+
| Care Team Member Name | Role | Phone           |
+-----------------------+------+-----------------+
| Erwin Iniguez MD   | PCP  | [phone] |
+-----------------------+------+-----------------+
 
 
 
 Encounter Details
 
 
+--------+-------------+-----------------+---------------------+---------------+
| Date   | Type        | Department      | Care Team           | Description   |
+--------+-------------+-----------------+---------------------+---------------+
| 12/10/ | Office      |   [facility]  |   Note, Outpatient  | Progress Note |
| 1998   | Visit-Trans | MEDICINE        | Clinic              |               |
|        | cribed      |                 |                     |               |
+--------+-------------+-----------------+---------------------+---------------+
 
 
 
 Social History
 
 
+----------------+-------+-----------+--------+------+
| Tobacco Use    | Types | Packs/Day | Years  | Date |
|                |       |           | Used   |      |
+----------------+-------+-----------+--------+------+
| Never Assessed |       |           |        |      |
+----------------+-------+-----------+--------+------+
 
 
 
+------------------+---------------+
| Sex Assigned at  | Date Recorded |
| Birth            |               |
+------------------+---------------+
| Not on file      |               |
+------------------+---------------+
 
 
 
+----------------+-------------+-------------+
| Job Start Date | Occupation  | Industry    |
+----------------+-------------+-------------+
| Not on file    | Not on file | Not on file |
+----------------+-------------+-------------+
 
 
 
+----------------+--------------+------------+
| Travel History | Travel Start | Travel End |
+----------------+--------------+------------+
 
 
 
+-------------------------------------+
| No recent travel history available. |
+-------------------------------------+
 documented as of this encounter
 
 
 Progress Notes
 Interface, Transcription In - 2007  5:11 AM PSTCLINIC DATE:       12/10/1998
 
 OTOLARYNGOLOGY CLINIC
 
 SUBJECTIVE:  I talked with  and Mrs. Shane at some length about the
 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  They seem to understand what is
 involved.  I told them I feel the lesion is deep enough that I believe that
 radiation does not carry the usual high incidence of cure and that a
 vertical hemilaryngectomy would be preferable.  I have discussed this with
 my colleagues in radiation oncology and the other head and neck surgeons
 here at the head and neck tumor board.  We will proceed in the near future
 if they decide to go ahead.
 
 Jimmy Esposito M.D.
 , Otolaryngology
 Head and Neck Surgery
 
 CIRILO/sandra
 D: 12/10/98
 T: 12/10/98Electronically signed by Interface, Transcription In at 2007  5:11 AM PSTd
ocumented in this encounter
 
 Plan of Treatment
 Not on filedocumented as of this encounter
 
 Visit Diagnoses
 Not on filedocumented in this encounter

## 2019-12-06 NOTE — XMS
Encounter Summary
  Created on: 2019
 
 Shane Wyatttien BroussardJosue
 External Reference #: [id number]
 : 43
 Sex: Male
 
 Demographics
 
 
+-----------------------+---------------------------+
| Address               | [address]        |
|                       | [place]  [address] |
+-----------------------+---------------------------+
| Home Phone            | [phone]           |
+-----------------------+---------------------------+
| Preferred Language    | Unknown                   |
+-----------------------+---------------------------+
| Marital Status        |                    |
+-----------------------+---------------------------+
| Restoration Affiliation | [id number]                      |
+-----------------------+---------------------------+
| Race                  | Unknown                   |
+-----------------------+---------------------------+
| Ethnic Group          | Unknown                   |
+-----------------------+---------------------------+
 
 
 Author
 
 
+--------------+--------------------------------------------+
| Author       | [facility]  |
|              | and [place]                                |
+--------------+--------------------------------------------+
| Organization | [facility]  |
|              | [facility] [place]                                |
+--------------+--------------------------------------------+
| Address      | Unknown                                    |
+--------------+--------------------------------------------+
| Phone        | Unavailable                                |
+--------------+--------------------------------------------+
 
 
 
 Support
 
 
+---------------+--------------+--------------------+-----------------+
| Name          | Relationship | Address            | Phone           |
+---------------+--------------+--------------------+-----------------+
| Opal Shane | ECON         | [address]     | [phone] |
|               |              | [place]   |                 |
|               |              | [id number]              |                 |
+---------------+--------------+--------------------+-----------------+
 
 
 
 
 Care Team Providers
 
 
+-----------------------+------+-----------------+
| Care Team Member Name | Role | Phone           |
+-----------------------+------+-----------------+
| Erwin Iniguez MD | PCP  | [phone] |
+-----------------------+------+-----------------+
 
 
 
 Encounter Details
 
 
+--------+-------------+---------------------+----------------------+-------------+
| Date   | Type        | Department          | Care Team            | Description |
+--------+-------------+---------------------+----------------------+-------------+
| / | Orders Only |   [facility]    |   Provider,          |             |
| 2019   |             | [facility] GENER[facility] OP   | MD Zack   |             |
|        |             | CONVERSION  [address]  |  [address]        |             |
|        |             | [address]  [place], [place]  | [place] [address]     |             |
|        |             | [address]          |                      |             |
|        |             | [phone]        |                      |             |
+--------+-------------+---------------------+----------------------+-------------+
 
 
 
 Social History
 
 
+---------------+------------+-----------+--------+------------------+
| Tobacco Use   | Types      | Packs/Day | Years  | Date             |
|               |            |           | Used   |                  |
+---------------+------------+-----------+--------+------------------+
| Former Smoker | Cigarettes | 1         | 34     | Quit: 1996 |
+---------------+------------+-----------+--------+------------------+
 
 
 
+---------------------+---+---+---+
| Smokeless Tobacco:  |   |   |   |
| Never Used          |   |   |   |
+---------------------+---+---+---+
 
 
 
+-------------+-------------+---------+----------+
| Alcohol Use | Drinks/Week | oz/Week | Comments |
+-------------+-------------+---------+----------+
| No          |             |         |          |
+-------------+-------------+---------+----------+
 
 
 
+------------------+---------------+
| Sex Assigned at  | Date Recorded |
| Birth            |               |
+------------------+---------------+
| Not on file      |               |
 
+------------------+---------------+
 
 
 
+----------------+-------------+-------------+
| Job Start Date | Occupation  | Industry    |
+----------------+-------------+-------------+
| Not on file    | Not on file | Not on file |
+----------------+-------------+-------------+
 
 
 
+----------------+--------------+------------+
| Travel History | Travel Start | Travel End |
+----------------+--------------+------------+
 
 
 
+-------------------------------------+
| No recent travel history available. |
+-------------------------------------+
 documented as of this encounter
 
 Plan of Treatment
 
 
+--------+---------+-------------+----------------------+-------------+
| Date   | Type    | Specialty   | Care Team            | Description |
+--------+---------+-------------+----------------------+-------------+
| / | Office  | Pulmonology |   Juan F,          |             |
| 2019   | Visit   |             | Jessica Cheung,   |             |
|        |         |             | MD [address] |             |
|        |         |             |   [place],   |             |
|        |         |             | [place] [address]             |             |
|        |         |             | [phone]         |             |
|        |         |             | [phone] (Fax)   |             |
+--------+---------+-------------+----------------------+-------------+
| / | Office  | Cardiology  |   Eric Akins, |             |
| 2020   | Visit   |             |  MD [address]   |             |
|        |         |             | [place]  |             |
|        |         |             | [address]  [phone]  |             |
|        |         |             |  [phone] (Fax)  |             |
+--------+---------+-------------+----------------------+-------------+
 documented as of this encounter
 
 Visit Diagnoses
 Not on filedocumented in this encounter

## 2019-12-06 NOTE — XMS
Clinical Summary
  Created on: 2019
 
 Svitlana Wyatttien BroussardJosue
 External Reference #: [id number]
 : 43
 Sex: Male
 
 Demographics
 
 
+-----------------------+---------------------------+
| Address               | [address]        |
|                       | [place]  [address] |
+-----------------------+---------------------------+
| Home Phone            | [phone]           |
+-----------------------+---------------------------+
| Preferred Language    | Unknown                   |
+-----------------------+---------------------------+
| Marital Status        |                    |
+-----------------------+---------------------------+
| Buddhist Affiliation | 1028                      |
+-----------------------+---------------------------+
| Race                  | Unknown                   |
+-----------------------+---------------------------+
| Ethnic Group          | Unknown                   |
+-----------------------+---------------------------+
 
 
 Author
 
 
+--------------+------------------------------------------+
| Author       | [facility] ([facility]  |
|              | 19)                                 |
+--------------+------------------------------------------+
| Organization | [facility] ([facility]  |
|              | 19)                                 |
+--------------+------------------------------------------+
| Address      | Unknown                                  |
+--------------+------------------------------------------+
| Phone        | Unavailable                              |
+--------------+------------------------------------------+
 
 
 
 Support
 
 
+---------------+--------------+--------------------+-----------------+
| Name          | Relationship | Address            | Phone           |
+---------------+--------------+--------------------+-----------------+
| Opal Shane | ECON         | [address]     | [phone] |
|               |              | [place]   |                 |
|               |              | [id number]              |                 |
+---------------+--------------+--------------------+-----------------+
 
 
 
 
 Care Team Providers
 
 
+-----------------------+------+-----------------+
| Care Team Member Name | Role | Phone           |
+-----------------------+------+-----------------+
| Calvin Robertson MD  | PP   | [phone] |
+-----------------------+------+-----------------+
 
 
 
 Allergies
 
 
+------------------+----------------------+----------+----------+----------------------+
| Active Allergy   | Reactions            | Severity | Noted    | Comments             |
|                  |                      |          | Date     |                      |
+------------------+----------------------+----------+----------+----------------------+
| Beta Adrenergic  | Anaphylaxis          | High     | 20 |                      |
| Blockers         |                      |          | 15       |                      |
+------------------+----------------------+----------+----------+----------------------+
| Diltiazem        | Edema                | Medium   | 20 |                      |
|                  |                      |          | 15       |                      |
+------------------+----------------------+----------+----------+----------------------+
| Gabapentin       | Other (See Comments) | Medium   | 20 |   CNS                |
|                  |                      |          | 15       |                      |
+------------------+----------------------+----------+----------+----------------------+
| Imipramine       | Other (See Comments) | Medium   | 20 |   Urinary retention  |
|                  |                      |          | 15       |                      |
+------------------+----------------------+----------+----------+----------------------+
| Metoprolol       | Swelling             | Medium   | 20 |                      |
|                  |                      |          | 15       |                      |
+------------------+----------------------+----------+----------+----------------------+
| Propranolol      | Other (See Comments) | Medium   | 20 |   raynaud's          |
|                  |                      |          | 15       |                      |
+------------------+----------------------+----------+----------+----------------------+
 
 
 
 Current Medications
 
 
+----------------------+----------------------+----------+---------+------+------+-------+
| Prescription         | Sig.                 | Disp.    | Refills | Star | End  | Statu |
|                      |                      |          |         | t    | Date | s     |
|                      |                      |          |         | Date |      |       |
+----------------------+----------------------+----------+---------+------+------+-------+
|   acyclovir          | Take 400 mg by mouth |          |         |      |      | Activ |
| (ZOVIRAX) 400 MG     |  5 (five) times      |          |         |      |      | e     |
| tablet               | daily. PRN           |          |         |      |      |       |
+----------------------+----------------------+----------+---------+------+------+-------+
|                      | Inhale 1 puff into   |          |         |      |      | Activ |
| fluticasone-salmeter | the lungs 2 (two)    |          |         |      |      | e     |
| ol (ADVAIR) 500-50   | times daily.         |          |         |      |      |       |
| MCG/DOSE diskus      |                      |          |         |      |      |       |
| inhaler              |                      |          |         |      |      |       |
+----------------------+----------------------+----------+---------+------+------+-------+
|   digoxin (LANOXIN)  | Take 125 mcg by      |          |         |      |      | Activ |
| 0.25 MG tablet       | mouth daily.         |          |         |      |      | e     |
 
+----------------------+----------------------+----------+---------+------+------+-------+
|   ferrous sulfate,   | Take 65 mg of iron   |          |         |      |      | Activ |
| 65 FE, 325 (65 FE)   | by mouth daily with  |          |         |      |      | e     |
| MG tablet            | breakfast.           |          |         |      |      |       |
+----------------------+----------------------+----------+---------+------+------+-------+
|   tamsulosin         | Take 0.4 mg by mouth |          |         |      |      | Activ |
| (FLOMAX) 0.4 MG      |   After dinner.      |          |         |      |      | e     |
| capsule              |                      |          |         |      |      |       |
+----------------------+----------------------+----------+---------+------+------+-------+
|   folic acid         | Take 1 mg by mouth   |          |         |      |      | Activ |
| (FOLVITE) 1 MG       | daily.               |          |         |      |      | e     |
| tablet               |                      |          |         |      |      |       |
+----------------------+----------------------+----------+---------+------+------+-------+
|   furosemide (LASIX) | Take 20 mg by mouth  |          |         |      |      | Activ |
|  40 MG tablet        | daily.               |          |         |      |      | e     |
+----------------------+----------------------+----------+---------+------+------+-------+
|   cyanocobalamin     | Take 1,000 mcg by    |          |         |      |      | Activ |
| (VITAMIN B-12) 1000  | mouth daily.         |          |         |      |      | e     |
| MCG tablet           |                      |          |         |      |      |       |
+----------------------+----------------------+----------+---------+------+------+-------+
|   levothyroxine      | Take 75 mcg by mouth |          |         |      |      | Activ |
| (SYNTHROID) 75 MCG   |  every morning       |          |         |      |      | e     |
| tablet               | before breakfast.    |          |         |      |      |       |
+----------------------+----------------------+----------+---------+------+------+-------+
|   loratadine         | Take 10 mg by mouth  |          |         |      |      | Activ |
| (CLARITIN) 10 MG     | daily.               |          |         |      |      | e     |
| tablet               |                      |          |         |      |      |       |
+----------------------+----------------------+----------+---------+------+------+-------+
|                      | Take 1 tablet by     |          |         |      |      | Activ |
| oxyCODONE-acetaminop | mouth every 4 (four) |          |         |      |      | e     |
| hen (PERCOCET) 5-325 |  hours as needed for |          |         |      |      |       |
|  MG per tablet       |  Pain.               |          |         |      |      |       |
+----------------------+----------------------+----------+---------+------+------+-------+
|   potassium chloride | Take 10 mEq by mouth |          |         |      |      | Activ |
|  (K-DUR) 10 MEQ      |  2 (two) times daily |          |         |      |      | e     |
| tablet               |  with meals.         |          |         |      |      |       |
+----------------------+----------------------+----------+---------+------+------+-------+
|   Albuterol Sulfate  | Inhale  into the     |          |         |      |      | Activ |
| 108 (90 BASE)        | lungs.               |          |         |      |      | e     |
| MCG/ACT AEPB         |                      |          |         |      |      |       |
+----------------------+----------------------+----------+---------+------+------+-------+
|   cholecalciferol    | Take 1,000 Units by  |          |         |      |      | Activ |
| (VITAMIN D-3) 1000   | mouth daily.         |          |         |      |      | e     |
| UNITS tablet         |                      |          |         |      |      |       |
+----------------------+----------------------+----------+---------+------+------+-------+
|   sertraline         | Take 100 mg by mouth |          |         |      |      | Activ |
| (ZOLOFT) 100 MG      |  daily.              |          |         |      |      | e     |
| tablet               |                      |          |         |      |      |       |
+----------------------+----------------------+----------+---------+------+------+-------+
|   guaiFENesin        | Take 600 mg by mouth |          |         |      |      | Activ |
| (MUCINEX) 600 MG 12  |  2 (two) times       |          |         |      |      | e     |
| hr tablet            | daily. PRN           |          |         |      |      |       |
+----------------------+----------------------+----------+---------+------+------+-------+
|                      | Take 1 tablet by     |          |         |      |      | Activ |
| HYDROcodone-acetamin | mouth as needed for  |          |         |      |      | e     |
| ophen (NORCO)        | Pain.                |          |         |      |      |       |
| 7.5-325 MG per       |                      |          |         |      |      |       |
| tablet               |                      |          |         |      |      |       |
+----------------------+----------------------+----------+---------+------+------+-------+
|   diclofenac         | Apply 2 g topically  |          |         |      |      | Activ |
 
| (VOLTAREN) 1 %       | 4 (four) times       |          |         |      |      | e     |
|                      | daily. PRN           |          |         |      |      |       |
+----------------------+----------------------+----------+---------+------+------+-------+
|   mupirocin          | Apply  topically 3   |   22 g   | 2       | 04/2 |      | Activ |
| (BACTROBAN) 2 %      | (three) times daily. |          |         | 6/20 |      | e     |
| ointment             |                      |          |         | 16   |      |       |
+----------------------+----------------------+----------+---------+------+------+-------+
|   losartan (COZAAR)  | Take 50 mg by mouth  |          |         |      |      | Activ |
| 50 MG tablet         | daily.               |          |         |      |      | e     |
+----------------------+----------------------+----------+---------+------+------+-------+
|   fluticasone        | one spray in each    |          |         | /1 |      | Activ |
| (FLONASE) 50 MCG/ACT | nostril daily as     |          |         | 4/20 |      | e     |
|  nasal               | needed               |          |         | 12   |      |       |
+----------------------+----------------------+----------+---------+------+------+-------+
|   ofloxacin          |                      |          |         | 04/0 |      | Activ |
| (OCUFLOX) 0.3 %      |                      |          |         | 20 |      | e     |
| ophthalmic solution  |                      |          |         | 18   |      |       |
+----------------------+----------------------+----------+---------+------+------+-------+
|   trolamine          | Apply  topically as  |          |         |      |      | Activ |
| salicylate           | needed.              |          |         |      |      | e     |
| (ASPERCREME) 10 %    |                      |          |         |      |      |       |
| cream                |                      |          |         |      |      |       |
+----------------------+----------------------+----------+---------+------+------+-------+
|   Krill Oil 500 MG   | Take 1 tablet by     |          |         |      |      | Activ |
| CAPS                 | mouth daily.         |          |         |      |      | e     |
+----------------------+----------------------+----------+---------+------+------+-------+
|   bismuth            | Take 262 mg by mouth |          |         |      |      | Activ |
| subsalicylate (PEPTO |  4 (four) times      |          |         |      |      | e     |
|  BISMOL) 262 MG      | daily before meals   |          |         |      |      |       |
| chewable tablet      | and nightly.         |          |         |      |      |       |
+----------------------+----------------------+----------+---------+------+------+-------+
|                      | Apply  to eye as     |          |         |      |      | Activ |
| bacitracin-polymyxin | needed.              |          |         |      |      | e     |
|  b (POLYSPORIN)      |                      |          |         |      |      |       |
| ophthalmic ointment  |                      |          |         |      |      |       |
+----------------------+----------------------+----------+---------+------+------+-------+
|                      | Take 1 tablet by     |          |         |      |      | Activ |
| CALCIUM-MAGNESIUM-ZI | mouth daily.         |          |         |      |      | e     |
| NC PO                |                      |          |         |      |      |       |
+----------------------+----------------------+----------+---------+------+------+-------+
|   alendronate        | Take 70 mg by mouth  |          |         |      |      | Activ |
| (FOSAMAX) 70 MG      | every 7 days. Take   |          |         |      |      | e     |
| tablet               | in the morning with  |          |         |      |      |       |
|                      | a full glass of      |          |         |      |      |       |
|                      | water, on an empty   |          |         |      |      |       |
|                      | stomach, and do not  |          |         |      |      |       |
|                      | take anything else   |          |         |      |      |       |
|                      | by mouth or lie down |          |         |      |      |       |
|                      |  for the next 30     |          |         |      |      |       |
|                      | min.                 |          |         |      |      |       |
+----------------------+----------------------+----------+---------+------+------+-------+
|   azaTHIOprine       | Take 150 mg by mouth |          |         |      |      | Activ |
| (IMURAN) 50 MG       |  daily.              |          |         |      |      | e     |
| tablet               |                      |          |         |      |      |       |
+----------------------+----------------------+----------+---------+------+------+-------+
|   famotidine         | Take 40 mg by mouth  |          |         |      |      | Activ |
| (PEPCID) 40 MG       | daily.               |          |         |      |      | e     |
| tablet               |                      |          |         |      |      |       |
+----------------------+----------------------+----------+---------+------+------+-------+
|   mupirocin          | 1 g by Nasal route   |          |         |      |      | Activ |
 
| (BACTROBAN) 2 %      | as needed. Use pea   |          |         |      |      | e     |
| nasal ointment       | sized amount in each |          |         |      |      |       |
|                      |  nostril twice daily |          |         |      |      |       |
|                      |  for 5 days. After   |          |         |      |      |       |
|                      | applications, press  |          |         |      |      |       |
|                      | sides of nose        |          |         |      |      |       |
|                      | together, gently     |          |         |      |      |       |
|                      | massage for 1 min.   |          |         |      |      |       |
+----------------------+----------------------+----------+---------+------+------+-------+
|   predniSONE         | Take 4 tablets by    |   30     |         | 08/1 |      | Activ |
| (DELTASONE) 2.5 MG   | mouth daily. Takes   | tablet   |         | 20 |      | e     |
| tabletIndications:   | 7.5 mg daily         |          |         | 18   |      |       |
| Patient takes 10mg   |                      |          |         |      |      |       |
| daily                |                      |          |         |      |      |       |
+----------------------+----------------------+----------+---------+------+------+-------+
|                      | Take 3 mLs by        |   360 mL | 0       | 08/1 |      | Activ |
| ipratropium-albutero | nebulization every 6 |          |         | 1/20 |      | e     |
| l (DUO-NEB) 0.5-2.5  |  (six) hours as      |          |         | 18   |      |       |
| mg/3mL               | needed.              |          |         |      |      |       |
+----------------------+----------------------+----------+---------+------+------+-------+
|   levocetirizine     | Take 5 mg by mouth   |          |         |      |      | Activ |
| (XYZAL) 5 MG tablet  | as needed.           |          |         |      |      | e     |
+----------------------+----------------------+----------+---------+------+------+-------+
|   rivaroxaban        | Take 1 tablet by     |   90     | 2       | 01/0 |      | Activ |
| (XARELTO) 10 mg      | mouth daily.         | tablet   |         | / |      | e     |
| tablet               |                      |          |         | 19   |      |       |
+----------------------+----------------------+----------+---------+------+------+-------+
|   atorvastatin       | TAKE 1 TABLET BY     |   90     | 3       | 08/0 | 08/0 | Activ |
| (LIPITOR) 40 MG      | MOUTH NIGHTLY        | tablet   |         | 8/20 | 7/20 | e     |
| tablet               |                      |          |         | 19   | 20   |       |
+----------------------+----------------------+----------+---------+------+------+-------+
 
 
 
 Active Problems
 
 
+---------------------------------------------------------------+------------+
| Problem                                                       | Noted Date |
+---------------------------------------------------------------+------------+
| Stable angina (HCC)                                           | 2018 |
+---------------------------------------------------------------+------------+
| COPD, moderate (HCC)                                          | 2018 |
+---------------------------------------------------------------+------------+
| Personal history of tobacco use, presenting hazards to health | 2018 |
+---------------------------------------------------------------+------------+
| Rheumatoid arthritis (HCC)                                    | 2018 |
+---------------------------------------------------------------+------------+
| Moderate protein-calorie malnutrition (HCC)                   | 2018 |
+---------------------------------------------------------------+------------+
| Severe protein-calorie malnutrition (HCC)                     | 2018 |
+---------------------------------------------------------------+------------+
| Dysphagia                                                     | 2018 |
+---------------------------------------------------------------+------------+
| Multifocal lung consolidation (HCC)                           | 2018 |
+---------------------------------------------------------------+------------+
| Chronic bronchitis (HCC)                                      | 2018 |
+---------------------------------------------------------------+------------+
| Chronic maxillary sinusitis                                   | 2018 |
+---------------------------------------------------------------+------------+
 
| Obstructive sleep apnea                                       | 2018 |
+---------------------------------------------------------------+------------+
| Peptic ulcer disease                                          | 2018 |
+---------------------------------------------------------------+------------+
| Aortic aneurysm (HCC)                                         | 2018 |
+---------------------------------------------------------------+------------+
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
| stable angina pectoris (HCC)                                  |            |
+---------------------------------------------------------------+------------+
| Chronic atrial fibrillation (HCC)                             | 2018 |
+---------------------------------------------------------------+------------+
| Cancer of vocal cord (HCC)                                    | 2018 |
+---------------------------------------------------------------+------------+
 
 
 
+------------------------------------+
|   Overview:   Overview:            |
| Treated with surgery and radiation |
+------------------------------------+
 
 
 
+------------------------+------------+
| Essential hypertension | 2018 |
+------------------------+------------+
 
 
 
 Resolved Problems
 
 
+------------------------------------------------------+----------+-----------+
| Problem                                              | Noted    | Resolved  |
|                                                      | Date     | Date      |
+------------------------------------------------------+----------+-----------+
| Aspiration pneumonia (HCC)                           | 20 |  |
|                                                      | 18       | 8         |
+------------------------------------------------------+----------+-----------+
| Precordial pain                                      | 20 |  |
|                                                      | 18       | 8         |
+------------------------------------------------------+----------+-----------+
| Hemoptysis                                           | 20 |  |
|                                                      | 18       | 8         |
+------------------------------------------------------+----------+-----------+
| NSTEMI (non-ST elevated myocardial infarction) (HCC) | 20 |  |
|                                                      | 18       | 8         |
+------------------------------------------------------+----------+-----------+
 
 
 
 Family History
 
 
+-----------------+----------+------+----------------+
| Medical History | Relation | Name | Comments       |
+-----------------+----------+------+----------------+
| Hypertension    | Father   |      |                |
+-----------------+----------+------+----------------+
| Cancer          | Mother   |      | breast cancer  |
 
+-----------------+----------+------+----------------+
 
 
 
+----------+------+--------+----------+
| Relation | Name | Status | Comments |
+----------+------+--------+----------+
| Father   |      |        |          |
+----------+------+--------+----------+
| Mother   |      |        |          |
+----------+------+--------+----------+
 
 
 
 Social History
 
 
+---------------+-------+-----------+--------+------------------+
| Tobacco Use   | Types | Packs/Day | Years  | Date             |
|               |       |           | Used   |                  |
+---------------+-------+-----------+--------+------------------+
| Former Smoker |       | 1         |        | Quit: 1996 |
+---------------+-------+-----------+--------+------------------+
 
 
 
+---------------------+---+---+---+
| Smokeless Tobacco:  |   |   |   |
| Never Used          |   |   |   |
+---------------------+---+---+---+
 
 
 
+-------------+-----------+---------+----------+
| Alcohol Use | Drinks/We | oz/Week | Comments |
|             | ek        |         |          |
+-------------+-----------+---------+----------+
| No          |           |         |          |
+-------------+-----------+---------+----------+
 
 
 
+------------------+---------------+
| Sex Assigned at  | Date Recorded |
| Birth            |               |
+------------------+---------------+
| Not on file      |               |
+------------------+---------------+
 
 
 
 Last Filed Vital Signs
 
 
+-------------------+----------------------+-------------------------+
| Vital Sign        | Reading              | Time Taken              |
+-------------------+----------------------+-------------------------+
| Blood Pressure    | 114/56               | 2019  2:43 PM PDT |
+-------------------+----------------------+-------------------------+
| Pulse             | 58                   | 2019  2:43 PM PDT |
 
+-------------------+----------------------+-------------------------+
| Temperature       | 36.7   C (98   F)    | 2019  1:57 PM PDT |
+-------------------+----------------------+-------------------------+
| Respiratory Rate  | 14                   | 11/15/2018 10:46 AM PST |
+-------------------+----------------------+-------------------------+
| Oxygen Saturation | 99%                  | 2019  2:43 PM PDT |
+-------------------+----------------------+-------------------------+
| Inhaled Oxygen    | -                    | -                       |
| Concentration     |                      |                         |
+-------------------+----------------------+-------------------------+
| Weight            | 72.9 kg (160 lb 12.8 | 2019  2:43 PM PDT |
|                   |  oz)                 |                         |
+-------------------+----------------------+-------------------------+
| Height            | 170.2 cm (5' 7")     | 2019  2:43 PM PDT |
+-------------------+----------------------+-------------------------+
| Body Mass Index   | 25.18                | 2019  2:43 PM PDT |
+-------------------+----------------------+-------------------------+
 
 
 
 Plan of Treatment
 
 
+----------------------+-----------+-----------+----------+
| Health Maintenance   | Due Date  | Last Done | Comments |
+----------------------+-----------+-----------+----------+
| Vaccine:             |  |           |          |
| Dtap/Tdap/Td (1 -    | 3         |           |          |
| Tdap)                |           |           |          |
+----------------------+-----------+-----------+----------+
| Colon Cancer         |  |           |          |
| Screening            | 4         |           |          |
| (Colonoscopy)        |           |           |          |
+----------------------+-----------+-----------+----------+
| Vaccine: Zoster (1   |  |           |          |
| of 2)                | 4         |           |          |
+----------------------+-----------+-----------+----------+
| Vaccine:             |  |           |          |
| Pneumococcal 65+     | 9         |           |          |
| High/Highest Risk (1 |           |           |          |
|  of 2 - PCV13)       |           |           |          |
+----------------------+-----------+-----------+----------+
| Vaccine: Influenza   |  |           |          |
| (#1)                 | 9         |           |          |
+----------------------+-----------+-----------+----------+
 
 
 
 Results
 Not on filefrom Last 3 Months
 
 Insurance
 
 
+-------------------+--------+-------------+------+-------+----------------------+
| Payer             | Benefi | Subscriber  | Type | Phone | Address              |
|                   | t Plan | ID          |      |       |                      |
|                   |  /     |             |      |       |                      |
|                   | Group  |             |      |       |                      |
+-------------------+--------+-------------+------+-------+----------------------+
 
| MEDICARE          | [employer] | [id number] |      |       |   [address]        |
|                   | RE     |             |      |       | [place] [id number] |
|                   | IP-OP  |             |      |       |                      |
+-------------------+--------+-------------+------+-------+----------------------+
| [facility] | [place] | [phone] |      |       |                      |
|                   |        |             |      |       |                      |
|                   | HEALTH |             |      |       |                      |
|                   | CARE - |             |      |       |                      |
|                   |  [employer]  |             |      |       |                      |
+-------------------+--------+-------------+------+-------+----------------------+
 
 
 
+-------------------+--------+-------------+--------+-------------+----------------------+
| Guarantor Name    | [employer] | Relation to | Date   | Phone       | Billing Address      |
|                   | t Type |  Patient    | of     |             |                      |
|                   |        |             | Birth  |             |                      |
+-------------------+--------+-------------+--------+-------------+----------------------+
| [place] | Person | Self        | / |   Home:     |   [address] |
|                   | [address] |             | 1945   | [phone] |   [place]      |
|                   | [place]    |             |        | [id number]        | [phone]           |
+-------------------+--------+-------------+--------+-------------+----------------------+

## 2019-12-06 NOTE — XMS
Encounter Summary
  Created on: 2019
 
 Shane Wyatttien BroussardJosue
 External Reference #: [id number]
 : 43
 Sex: Male
 
 Demographics
 
 
+-----------------------+---------------------------+
| Address               | [address]        |
|                       | [place]  [address] |
+-----------------------+---------------------------+
| Home Phone            | [phone]           |
+-----------------------+---------------------------+
| Preferred Language    | Unknown                   |
+-----------------------+---------------------------+
| Marital Status        |                    |
+-----------------------+---------------------------+
| [employer] Affiliation | [id number]                      |
+-----------------------+---------------------------+
| Race                  | Unknown                   |
+-----------------------+---------------------------+
| Ethnic Group          | Unknown                   |
+-----------------------+---------------------------+
 
 
 Author
 
 
+--------------+--------------------------------------------+
| Author       | [facility]  |
|              | and [place]                                |
+--------------+--------------------------------------------+
| Organization | [facility]  |
|              | [facility] [place]                                |
+--------------+--------------------------------------------+
| Address      | Unknown                                    |
+--------------+--------------------------------------------+
| Phone        | Unavailable                                |
+--------------+--------------------------------------------+
 
 
 
 Support
 
 
+---------------+--------------+--------------------+-----------------+
| Name          | Relationship | Address            | Phone           |
+---------------+--------------+--------------------+-----------------+
| Opal Shane | ECON         | [address]     | [phone] |
|               |              | [place]   |                 |
|               |              | [id number]              |                 |
+---------------+--------------+--------------------+-----------------+
 
 
 
 
 Care Team Providers
 
 
+-----------------------+------+-----------------+
| Care Team Member Name | Role | Phone           |
+-----------------------+------+-----------------+
| Erwin Iniguez MD | PCP  | [phone] |
+-----------------------+------+-----------------+
 
 
 
 Encounter Details
 
 
+--------+-----------+----------------------+--------------------+-------------+
| Date   | Type      | Department           | Care Team          | Description |
+--------+-----------+----------------------+--------------------+-------------+
| 08/08/ | Hospital  |   [facility] GENERIC IP     |   Conversion       | Pain        |
| 2018   | Encounter | CONVERSION DEP  [address]  | Transaction,       |             |
|        |           | [address]           | Provider Unknown   |             |
|        |           | [place], [place]         | [phone]       |             |
|        |           | [address]           | [phone] (Fax) |             |
|        |           | [phone]         |                    |             |
+--------+-----------+----------------------+--------------------+-------------+
 
 
 
 Social History
 
 
+---------------+------------+-----------+--------+------------------+
| Tobacco Use   | Types      | Packs/Day | Years  | Date             |
|               |            |           | Used   |                  |
+---------------+------------+-----------+--------+------------------+
| Former Smoker | Cigarettes | 1.3       | 34     | Quit: 1996 |
+---------------+------------+-----------+--------+------------------+
 
 
 
+---------------------+---+---+---+
| Smokeless Tobacco:  |   |   |   |
| Never Used          |   |   |   |
+---------------------+---+---+---+
 
 
 
+-------------+-------------+---------+----------+
| Alcohol Use | Drinks/Week | oz/Week | Comments |
+-------------+-------------+---------+----------+
| No          |             |         |          |
+-------------+-------------+---------+----------+
 
 
 
+------------------+---------------+
| Sex Assigned at  | Date Recorded |
| Birth            |               |
+------------------+---------------+
| Not on file      |               |
 
+------------------+---------------+
 
 
 
+----------------+-------------+-------------+
| Job Start Date | Occupation  | Industry    |
+----------------+-------------+-------------+
| Not on file    | Not on file | Not on file |
+----------------+-------------+-------------+
 
 
 
+----------------+--------------+------------+
| Travel History | Travel Start | Travel End |
+----------------+--------------+------------+
 
 
 
+-------------------------------------+
| No recent travel history available. |
+-------------------------------------+
 documented as of this encounter
 
 Medications at Time of Discharge
 
 
+----------------------+----------------------+-----------+---------+----------+-----------+
| Medication           | Sig                  | Dispensed | Refills | Start    | End Date  |
|                      |                      |           |         | Date     |           |
+----------------------+----------------------+-----------+---------+----------+-----------+
|   acyclovir          | Apply  topically     |           | 0       |          |           |
| (ZOVIRAX) 5%         | every 3 hours.       |           |         |          |           |
| ointment             |                      |           |         |          |           |
+----------------------+----------------------+-----------+---------+----------+-----------+
|   albuterol (PROAIR  | Inhale 2 puffs into  |           | 0       |          |           |
| HFA) 90 mcg/puff     | the lungs every 6    |           |         |          |           |
| inhaler              | hours as needed for  |           |         |          |           |
|                      | Wheezing.            |           |         |          |           |
+----------------------+----------------------+-----------+---------+----------+-----------+
|   digoxin (LANOXIN)  | Take 125 mcg by      |           | 0       |          |           |
| 250 mcg tablet       | mouth Daily.      |           |         |          |           |
|                      | capsule daily        |           |         |          |           |
+----------------------+----------------------+-----------+---------+----------+-----------+
|   ferrous sulfate    | Take 325 mg by mouth |           | 0       | 20 |           |
| 325 mg tablet        |  3 times daily.      |           |         | 12       |           |
+----------------------+----------------------+-----------+---------+----------+-----------+
|   fluticasone        | one spray in each    |           | 0       | 20 |           |
| (FLONASE) 50         | nostril daily as     |           |         | 12       |           |
| mcg/nasal spray      | needed               |           |         |          |           |
+----------------------+----------------------+-----------+---------+----------+-----------+
|                      | Inhale 1 puff into   |           | 0       |          |           |
| fluticasone-salmeter | the lungs Twice      |           |         |          |           |
| ol (ADVAIR) 500-50   | Daily.               |           |         |          |           |
| mcg/puff diskus      |                      |           |         |          |           |
| inhaler              |                      |           |         |          |           |
+----------------------+----------------------+-----------+---------+----------+-----------+
|   folic acid 1 mg    | Take 1 mg by mouth   |           | 0       |          |           |
| tablet               | Daily.               |           |         |          |           |
+----------------------+----------------------+-----------+---------+----------+-----------+
|   furosemide (LASIX) | Take 40 mg by mouth  |           | 0       |          |           |
 
|  40 mg tablet        | Daily.               |           |         |          |           |
+----------------------+----------------------+-----------+---------+----------+-----------+
|   guaiFENesin        | Take 1,200 mg by     |           | 0       |          |           |
| (MUCINEX) 600 mg 12  | mouth 2 times daily. |           |         |          |           |
| hr tablet            |                      |           |         |          |           |
+----------------------+----------------------+-----------+---------+----------+-----------+
|   levothyroxine      | Take 75 mcg by mouth |           | 0       | 20 |           |
| (LEVOTHROID) 75 MCG  |  Daily.              |           |         | 12       |           |
| tablet               |                      |           |         |          |           |
+----------------------+----------------------+-----------+---------+----------+-----------+
|   metoclopramide     | Take 10 mg by mouth  |           | 0       | 20 |           |
| (REGLAN) 10 mg       | 4 times daily as     |           |         | 12       |           |
| tablet               | needed.              |           |         |          |           |
+----------------------+----------------------+-----------+---------+----------+-----------+
|                      | Apply  topically 2   |           | 0       |          |           |
| nystatin-triamcinolo | times daily.         |           |         |          |           |
| ne (MYCOLOG II)      |                      |           |         |          |           |
| cream                |                      |           |         |          |           |
+----------------------+----------------------+-----------+---------+----------+-----------+
|   ofloxacin          |                      |           | 0       | 20 |           |
| (OCUFLOX) 0.3%       |                      |           |         | 18       |           |
| ophthalmic solution  |                      |           |         |          |           |
+----------------------+----------------------+-----------+---------+----------+-----------+
|   omeprazole         | Take 20 mg by mouth  |           | 0       | 20 |           |
| (PRILOSEC) 20 mg     | 2 times daily.       |           |         | 12       |           |
| capsule              |                      |           |         |          |           |
+----------------------+----------------------+-----------+---------+----------+-----------+
|   potassium citrate  | Take 10 mEq by mouth |           | 0       |          |           |
| (UROCIT-K) 10 mEq SR |  2 times daily.      |           |         |          |           |
|  tablet              |                      |           |         |          |           |
+----------------------+----------------------+-----------+---------+----------+-----------+
|   predniSONE         | Take 10 mg by mouth  |           | 0       |          |           |
| (DELTASONE) 5 mg     | Daily.               |           |         |          |           |
| tablet               |                      |           |         |          |           |
+----------------------+----------------------+-----------+---------+----------+-----------+
|   tamsulosin         | Take 0.4 mg by mouth |           | 0       | 20 |           |
| (FLOMAX) 0.4 mg CAPS |  2 times daily.      |           |         | 12       |           |
+----------------------+----------------------+-----------+---------+----------+-----------+
|   acyclovir          | Take 400 mg by mouth |           | 0       | 20 |  |
| (ZOVIRAX) 400 MG     |  3 times daily as    |           |         | 12       | 9         |
| tablet               | needed.              |           |         |          |           |
+----------------------+----------------------+-----------+---------+----------+-----------+
|   Cholecalciferol    | Take 2,000 Units by  |           | 0       | 20 |  |
| (VITAMIN D3) 2000    | mouth Daily.         |           |         | 12       | 9         |
| UNITS CAPS           |                      |           |         |          |           |
+----------------------+----------------------+-----------+---------+----------+-----------+
|   cyanocobalamin     | injected             |           | 0       | 20 |  |
| (VITAMIN B-12) 1,000 | intramuscularly once |           |         | 12       | 9         |
|  mcg/mL injection    |  a month             |           |         |          |           |
+----------------------+----------------------+-----------+---------+----------+-----------+
|   diltiazem          | Take 120 mg by mouth |           | 0       |          |  |
| (DILT-XR) 120 mg 24  |  Daily.              |           |         |          | 9         |
| hr capsule           |                      |           |         |          |           |
+----------------------+----------------------+-----------+---------+----------+-----------+
|   loratadine         | Take 10 mg by mouth  |           | 0       |          |  |
| (CLARITIN) 10 mg     | Daily.               |           |         |          | 9         |
| tablet               |                      |           |         |          |           |
+----------------------+----------------------+-----------+---------+----------+-----------+
|   losartan (COZAAR)  | Take 100 mg by mouth |           | 0       |          |  |
| 100 MG tablet        |  Daily. 1/2 daily    |           |         |          | 9         |
 
+----------------------+----------------------+-----------+---------+----------+-----------+
|   methotrexate 2.5   | Take 15 mg by mouth  |           | 0       |          |  |
| mg tablet            | Once a week.         |           |         |          | 9         |
+----------------------+----------------------+-----------+---------+----------+-----------+
|                      | Take 1 tablet by     |           | 0       |          |  |
| oxyCODONE-acetaminop | mouth every 4 hours  |           |         |          | 9         |
| hen (PERCOCET) 5-325 | as needed for Pain.  |           |         |          |           |
|  mg per tablet       |                      |           |         |          |           |
+----------------------+----------------------+-----------+---------+----------+-----------+
|   pseudoePHEDrine    | Take 30 mg by mouth  |           | 0       |          |  |
| (SUDOGEST) 30 mg     | every 4 hours as     |           |         |          | 9         |
| tablet               | needed for           |           |         |          |           |
|                      | Congestion.          |           |         |          |           |
+----------------------+----------------------+-----------+---------+----------+-----------+
|   rivaroxaban        | Take 20 mg by mouth  |           | 0       |          |  |
| (XARELTO) 20 mg      | Daily (with dinner). |           |         |          | 9         |
| tablet               |                      |           |         |          |           |
+----------------------+----------------------+-----------+---------+----------+-----------+
|   sertraline         | 2 tablets daily      |           | 0       | 20 |  |
| (ZOLOFT) 100 mg      |                      |           |         | 12       | 9         |
| tablet               |                      |           |         |          |           |
+----------------------+----------------------+-----------+---------+----------+-----------+
 documented as of this encounter
 
 Plan of Treatment
 
 
+--------+---------+-------------+----------------------+-------------+
| Date   | Type    | Specialty   | Care Team            | Description |
+--------+---------+-------------+----------------------+-------------+
| / | Office  | Pulmonology |   Juan F,          |             |
| 2019   | Visit   |             | Jessica Cheung,   |             |
|        |         |             | MD [address] |             |
|        |         |             |   [place],   |             |
|        |         |             | [place] [address]             |             |
|        |         |             | [phone]         |             |
|        |         |             | [phone] (Fax)   |             |
+--------+---------+-------------+----------------------+-------------+
| / | Office  | Cardiology  |   Eric Akins, |             |
|    | Visit   |             |  MD  [address]   |             |
|        |         |             | [place]  |             |
|        |         |             | [address]  [phone]  |             |
|        |         |             |  [phone] (Fax)  |             |
+--------+---------+-------------+----------------------+-------------+
 documented as of this encounter
 
 Procedures
 
 
+------------------+--------+-------------+----------------------+----------------------+
| Procedure Name   | Priori | Date/Time   | Associated Diagnosis | Comments             |
|                  | ty     |             |                      |                      |
+------------------+--------+-------------+----------------------+----------------------+
| XR CHEST 2 VIEWS | Routin | 2018  |                      |   Results for this   |
|                  | e      |  5:05 PM    |                      | procedure are in the |
|                  |        | PDT         |                      |  results section.    |
+------------------+--------+-------------+----------------------+----------------------+
 documented in this encounter
 
 Results
 
 XR Chest 2 Vws (2018  5:05 PM PDT)
 
+----------+
| Specimen |
+----------+
|          |
+----------+
 
 
 
+------------------------------------------------------------------------+--------------+
| Narrative                                                              | Performed At |
+------------------------------------------------------------------------+--------------+
|   This is a non-reportable procedure without a radiologist report and  |              |
| is  used for image storage only                                        |              |
+------------------------------------------------------------------------+--------------+
 
 
 
+--------------------------------------------------------------------------------------+
| Procedure Note                                                                       |
+--------------------------------------------------------------------------------------+
|   Andrzej Steven Irvin - 2019  4:21 AM PDT  This is a non-reportable procedure  |
| without a radiologist report and isused for image storage only                       |
+--------------------------------------------------------------------------------------+
 documented in this encounter
 
 Visit Diagnoses
 
 
+--------------------------+
| Diagnosis                |
+--------------------------+
|   Pain  Generalized pain |
+--------------------------+
 documented in this encounter

## 2019-12-06 NOTE — XMS
Encounter Summary
  Created on: 2019
 
 Shane Wyatttien BroussardJosue
 External Reference #: [id number]
 : 43
 Sex: Male
 
 Demographics
 
 
+-----------------------+---------------------------+
| Address               | [address]        |
|                       | [place]  [address] |
+-----------------------+---------------------------+
| Home Phone            | [phone]           |
+-----------------------+---------------------------+
| Preferred Language    | Unknown                   |
+-----------------------+---------------------------+
| Marital Status        |                    |
+-----------------------+---------------------------+
| Roman Catholic Affiliation | [id number]                      |
+-----------------------+---------------------------+
| Race                  | Unknown                   |
+-----------------------+---------------------------+
| Ethnic Group          | Unknown                   |
+-----------------------+---------------------------+
 
 
 Author
 
 
+--------------+--------------------------------------------+
| Author       | [facility]  |
|              | [facility] [place]                                |
+--------------+--------------------------------------------+
| Organization | [facility]  |
|              | [facility] [place]                                |
+--------------+--------------------------------------------+
| Address      | Unknown                                    |
+--------------+--------------------------------------------+
| Phone        | Unavailable                                |
+--------------+--------------------------------------------+
 
 
 
 Support
 
 
+---------------+--------------+--------------------+-----------------+
| Name          | Relationship | Address            | Phone           |
+---------------+--------------+--------------------+-----------------+
| Opal Shane | ECON         | [address]     | [phone] |
|               |              | [place]   |                 |
|               |              | [address]              |                 |
+---------------+--------------+--------------------+-----------------+
 
 
 
 
 Care Team Providers
 
 
+-----------------------+------+-------------+
| Care Team Member Name | Role | Phone       |
+-----------------------+------+-------------+
 PCP  | Unavailable |
+-----------------------+------+-------------+
 
 
 
 Encounter Details
 
 
+--------+----------+----------------------+----------------------+-------------+
| Date   | Type     | Department           | Care Team            | Description |
+--------+----------+----------------------+----------------------+-------------+
| / | Abstract |   [facility]  |   [facility] |             |
|    |          | Conversion Location  |  [place]                  |             |
|        |          |  [phone]        |                      |             |
+--------+----------+----------------------+----------------------+-------------+
 
 
 
 Social History
 
 
+----------------+-------+-----------+--------+------+
| Tobacco Use    | Types | Packs/Day | Years  | Date |
|                |       |           | Used   |      |
+----------------+-------+-----------+--------+------+
| Never Assessed |       |           |        |      |
+----------------+-------+-----------+--------+------+
 
 
 
+------------------+---------------+
| Sex Assigned at  | Date Recorded |
| Birth            |               |
+------------------+---------------+
| Not on file      |               |
+------------------+---------------+
 
 
 
+----------------+-------------+-------------+
| Job Start Date | Occupation  | Industry    |
+----------------+-------------+-------------+
| Not on file    | Not on file | Not on file |
+----------------+-------------+-------------+
 
 
 
+----------------+--------------+------------+
| Travel History | Travel Start | Travel End |
+----------------+--------------+------------+
 
 
 
 
+-------------------------------------+
| No recent travel history available. |
+-------------------------------------+
 documented as of this encounter
 
 Last Filed Vital Signs
 
 
+-------------------+------------------+----------------------+----------+
| Vital Sign        | Reading          | Time Taken           | Comments |
+-------------------+------------------+----------------------+----------+
| Blood Pressure    | 122/68           | 2012 12:00 AM  |          |
|                   |                  | PST                  |          |
+-------------------+------------------+----------------------+----------+
| Pulse             | -                | -                    |          |
+-------------------+------------------+----------------------+----------+
| Temperature       | -                | -                    |          |
+-------------------+------------------+----------------------+----------+
| Respiratory Rate  | -                | -                    |          |
+-------------------+------------------+----------------------+----------+
| Oxygen Saturation | -                | -                    |          |
+-------------------+------------------+----------------------+----------+
| Inhaled Oxygen    | -                | -                    |          |
| Concentration     |                  |                      |          |
+-------------------+------------------+----------------------+----------+
| Weight            | 94.8 kg (209 lb) | 2012 12:00 AM  |          |
|                   |                  | PST                  |          |
+-------------------+------------------+----------------------+----------+
| Height            | -                | -                    |          |
+-------------------+------------------+----------------------+----------+
| Body Mass Index   | -                | -                    |          |
+-------------------+------------------+----------------------+----------+
 documented in this encounter
 
 Plan of Treatment
 
 
+--------+---------+-------------+----------------------+-------------+
| Date   | Type    | Specialty   | Care Team            | Description |
+--------+---------+-------------+----------------------+-------------+
| / | Office  | Pulmonology |   Juan F,          |             |
| 2019   | Visit   |             | Jessica Cheung,   |             |
|        |         |             | MD [address] |             |
|        |         |             |   [place],   |             |
|        |         |             | [place] [address]             |             |
|        |         |             | [phone]         |             |
|        |         |             | [phone] (Fax)   |             |
+--------+---------+-------------+----------------------+-------------+
| / | Office  | Cardiology  |   Eric Akins, |             |
|    | Visit   |             |  MD  [address]   |             |
|        |         |             | [place]  [place]  |             |
|        |         |             | [address]  [phone]  |             |
|        |         |             |  [phone] (Fax)  |             |
+--------+---------+-------------+----------------------+-------------+
 documented as of this encounter
 
 Procedures
 
 
+--------------------+--------+-------------+----------------------+----------------------+
 
| Procedure Name     | Priori | Date/Time   | Associated Diagnosis | Comments             |
|                    | ty     |             |                      |                      |
+--------------------+--------+-------------+----------------------+----------------------+
| ENDOSCOPY, COLON,  | Routin | 2008  |                      |   Results for this   |
| DIAGNOSTIC         | e      | 12:00 AM    |                      | procedure are in the |
|                    |        | PST         |                      |  results section.    |
+--------------------+--------+-------------+----------------------+----------------------+
 documented in this encounter
 
 Results
 ENDOSCOPY, COLON, DIAGNOSTIC (2008 12:00 AM PST)
 
+----------+
| Specimen |
+----------+
|          |
+----------+
 
 
 
+-----------+--------------+
| Narrative | Performed At |
+-----------+--------------+
|           |              |
+-----------+--------------+
 documented in this encounter
 
 Visit Diagnoses
 Not on filedocumented in this encounter

## 2019-12-06 NOTE — XMS
Encounter Summary
  Created on: 2019
 
 Shane Wyatttien BroussardJosue
 External Reference #: [id number]
 : 43
 Sex: Male
 
 Demographics
 
 
+-----------------------+---------------------------+
| Address               | [address]        |
|                       | [place]  [address] |
+-----------------------+---------------------------+
| Home Phone            | [phone]           |
+-----------------------+---------------------------+
| Preferred Language    | Unknown                   |
+-----------------------+---------------------------+
| Marital Status        |                    |
+-----------------------+---------------------------+
| Alevism Affiliation | [id number]                      |
+-----------------------+---------------------------+
| Race                  | Unknown                   |
+-----------------------+---------------------------+
| Ethnic Group          | Unknown                   |
+-----------------------+---------------------------+
 
 
 Author
 
 
+--------------+--------------------------------------------+
| Author       | [facility]  |
|              | and [place]                                |
+--------------+--------------------------------------------+
| Organization | [facility]  |
|              | and [place]                                |
+--------------+--------------------------------------------+
| Address      | Unknown                                    |
+--------------+--------------------------------------------+
| Phone        | Unavailable                                |
+--------------+--------------------------------------------+
 
 
 
 Support
 
 
+---------------+--------------+--------------------+-----------------+
| Name          | Relationship | Address            | Phone           |
+---------------+--------------+--------------------+-----------------+
| Opal Shane | ECON         | [address]     | [phone] |
|               |              | [place]   |                 |
|               |              | [id number]              |                 |
+---------------+--------------+--------------------+-----------------+
 
 
 
 
 Care Team Providers
 
 
+------------------------+------+-----------------+
| Care Team Member Name  | Role | Phone           |
+------------------------+------+-----------------+
| Calvin Robertson MD | PCP  | [phone] |
+------------------------+------+-----------------+
 
 
 
 Reason for Referral
 Diagnostic/Screening (Routine)
 
+--------+--------+-----------+--------------+--------------+--------------+
| Status | Reason | Specialty | Diagnoses /  | Referred By  | Referred To  |
|        |        |           | Procedures   | Contact      | Contact      |
+--------+--------+-----------+--------------+--------------+--------------+
| Closed |        | Radiology |   Diagnoses  |   Steve, Si,  |              |
|        |        |           |  Abdominal   | DNP  [address]    |              |
|        |        |           | aortic       | [address]  |              |
|        |        |           | aneurysm     |  [address]       |              |
|        |        |           | (AAA)        | [place] |              |
|        |        |           | without      |  [id number]       |              |
|        |        |           | rupture      | Phone:       |              |
|        |        |           | ([facility])        | [phone] |              |
|        |        |           | Procedures   |   Fax:       |              |
|        |        |           | VAS Aorta    | [phone] |              |
|        |        |           | Iliac Duplex |              |              |
|        |        |           |  Complete    |              |              |
+--------+--------+-----------+--------------+--------------+--------------+
 
 
 
 
 Encounter Details
 
 
+--------+-----------+---------------------+-----------+-------------------+
| Date   | Type      | Department          | Care Team | Description       |
+--------+-----------+---------------------+-----------+-------------------+
| / | Hospital  |   [facility]     |           | Abdominal aortic  |
| 2019   | Encounter | VASCULAR SURGERY    |           | aneurysm (AAA)    |
|        |           | ULTRASOUND  [address]    |           | without rupture   |
|        |           | [address]   |           | ([facility])             |
|        |           | [facility] [place]        |           |                   |
|        |           | [address]          |           |                   |
|        |           | [phone]        |           |                   |
+--------+-----------+---------------------+-----------+-------------------+
 
 
 
 Social History
 
 
+---------------+------------+-----------+--------+------------------+
| Tobacco Use   | Types      | Packs/Day | Years  | Date             |
|               |            |           | Used   |                  |
+---------------+------------+-----------+--------+------------------+
 
| Former Smoker | Cigarettes | 1         | 35     | Quit: 1996 |
+---------------+------------+-----------+--------+------------------+
 
 
 
+---------------------+---+---+---+
| Smokeless Tobacco:  |   |   |   |
| Never Used          |   |   |   |
+---------------------+---+---+---+
 
 
 
+-------------+-------------+---------+----------+
| Alcohol Use | Drinks/Week | oz/Week | Comments |
+-------------+-------------+---------+----------+
| No          |             |         |          |
+-------------+-------------+---------+----------+
 
 
 
+------------------+---------------+
| Sex Assigned at  | Date Recorded |
| Birth            |               |
+------------------+---------------+
| Not on file      |               |
+------------------+---------------+
 
 
 
+----------------+-------------+-------------+
| Job Start Date | Occupation  | Industry    |
+----------------+-------------+-------------+
| Not on file    | Not on file | Not on file |
+----------------+-------------+-------------+
 
 
 
+----------------+--------------+------------+
| Travel History | Travel Start | Travel End |
+----------------+--------------+------------+
 
 
 
+-------------------------------------+
| No recent travel history available. |
+-------------------------------------+
 documented as of this encounter
 
 Medications at Time of Discharge
 
 
+----------------------+----------------------+-----------+---------+----------+-----------+
| Medication           | Sig                  | Dispensed | Refills | Start    | End Date  |
|                      |                      |           |         | Date     |           |
+----------------------+----------------------+-----------+---------+----------+-----------+
|   acyclovir          | Take 400 mg by mouth |           | 0       |          |           |
| (ZOVIRAX) 400 MG     |  5 (five) times      |           |         |          |           |
| tablet               | daily. PRN           |           |         |          |           |
+----------------------+----------------------+-----------+---------+----------+-----------+
|   acyclovir          | Apply  topically     |           | 0       |          |           |
 
| (ZOVIRAX) 5%         | every 3 hours.       |           |         |          |           |
| ointment             |                      |           |         |          |           |
+----------------------+----------------------+-----------+---------+----------+-----------+
|   albuterol (PROAIR  | Inhale 2 puffs into  |           | 0       |          |           |
| HFA) 90 mcg/puff     | the lungs every 6    |           |         |          |           |
| inhaler              | hours as needed for  |           |         |          |           |
|                      | Wheezing.            |           |         |          |           |
+----------------------+----------------------+-----------+---------+----------+-----------+
|   albuterol (PROAIR  | Inhale  into the     |           | 0       |          |           |
| RESPICLICK) 90       | lungs.               |           |         |          |           |
| mcg/puff inhaler     |                      |           |         |          |           |
+----------------------+----------------------+-----------+---------+----------+-----------+
|                      | Take 3 mLs by        |           | 0       | 20 |           |
| albuterol-ipratropiu | nebulization every 6 |           |         | 18       |           |
| m 2.5-0.5 mg/3 mL    |  (six) hours as      |           |         |          |           |
| SOLN                 | needed.              |           |         |          |           |
+----------------------+----------------------+-----------+---------+----------+-----------+
|   atorvaSTATin       | TAKE 1 TABLET BY     |           | 0       | 20 |  |
| (LIPITOR) 40 mg      | MOUTH NIGHTLY        |           |         | 19       | 0         |
| tablet               |                      |           |         |          |           |
+----------------------+----------------------+-----------+---------+----------+-----------+
|   azaTHIOprine       | Take 150 mg by mouth |           | 0       |          |           |
| (IMURAN) 50 mg       |  daily.              |           |         |          |           |
| tablet               |                      |           |         |          |           |
+----------------------+----------------------+-----------+---------+----------+-----------+
|                      | Apply  to eye as     |           | 0       |          |           |
| bacitracin-polymyxin | needed.              |           |         |          |           |
|  b (POLYSPORIN)      |                      |           |         |          |           |
| ophthalmic ointment  |                      |           |         |          |           |
+----------------------+----------------------+-----------+---------+----------+-----------+
|   bismuth            | Take 262 mg by mouth |           | 0       |          |           |
| subsalicylate (PEPTO |  4 (four) times      |           |         |          |           |
|  BISMOL) 262 mg      | daily before meals   |           |         |          |           |
| chewable tablet      | and nightly.         |           |         |          |           |
+----------------------+----------------------+-----------+---------+----------+-----------+
|   cholecalciferol    | Take 1,000 Units by  |           | 0       |          |           |
| (CHOLECALCIFEROL)    | mouth daily.         |           |         |          |           |
| 1000 units TABS      |                      |           |         |          |           |
+----------------------+----------------------+-----------+---------+----------+-----------+
|   cyanocobalamin     | Take 1,000 mcg by    |           | 0       |          |           |
| (VITAMIN B-12) 1000  | mouth daily.         |           |         |          |           |
| MCG tablet           |                      |           |         |          |           |
+----------------------+----------------------+-----------+---------+----------+-----------+
|   diclofenac         | Apply 2 g topically  |           | 0       |          |           |
| (VOLTAREN) 1% GEL    | 4 (four) times       |           |         |          |           |
|                      | daily. PRN           |           |         |          |           |
+----------------------+----------------------+-----------+---------+----------+-----------+
|   digoxin (LANOXIN)  | Take 125 mcg by      |           | 0       |          |           |
| 250 mcg tablet       | mouth Daily.      |           |         |          |           |
|                      | capsule daily        |           |         |          |           |
+----------------------+----------------------+-----------+---------+----------+-----------+
|   famotidine         | Take 40 mg by mouth  |           | 0       |          |           |
| (PEPCID) 40 MG       | daily.               |           |         |          |           |
| tablet               |                      |           |         |          |           |
+----------------------+----------------------+-----------+---------+----------+-----------+
|   ferrous sulfate    | Take 325 mg by mouth |           | 0       | 20 |           |
| 325 mg tablet        |  3 times daily.      |           |         | 12       |           |
+----------------------+----------------------+-----------+---------+----------+-----------+
|   fluticasone        | one spray in each    |           | 0       | 20 |           |
| (FLONASE) 50         | nostril daily as     |           |         | 12       |           |
 
| mcg/nasal spray      | needed               |           |         |          |           |
+----------------------+----------------------+-----------+---------+----------+-----------+
|                      | Inhale 1 puff into   |           | 0       |          |           |
| fluticasone-salmeter | the lungs Twice      |           |         |          |           |
| ol (ADVAIR) 500-50   | Daily.               |           |         |          |           |
| mcg/puff diskus      |                      |           |         |          |           |
| inhaler              |                      |           |         |          |           |
+----------------------+----------------------+-----------+---------+----------+-----------+
|   folic acid 1 mg    | Take 1 mg by mouth   |           | 0       |          |           |
| tablet               | Daily.               |           |         |          |           |
+----------------------+----------------------+-----------+---------+----------+-----------+
|   furosemide (LASIX) | Take 40 mg by mouth  |           | 0       |          |           |
|  40 mg tablet        | Daily.               |           |         |          |           |
+----------------------+----------------------+-----------+---------+----------+-----------+
|   guaiFENesin        | Take 1,200 mg by     |           | 0       |          |           |
| (MUCINEX) 600 mg 12  | mouth 2 times daily. |           |         |          |           |
| hr tablet            |                      |           |         |          |           |
+----------------------+----------------------+-----------+---------+----------+-----------+
|   levocetirizine     | Take 5 mg by mouth   |           | 0       |          |           |
| (XYZAL) 5 MG tablet  | as needed.           |           |         |          |           |
+----------------------+----------------------+-----------+---------+----------+-----------+
|   levothyroxine      | Take 75 mcg by mouth |           | 0       | 20 |           |
| (LEVOTHROID) 75 MCG  |  Daily.              |           |         | 12       |           |
| tablet               |                      |           |         |          |           |
+----------------------+----------------------+-----------+---------+----------+-----------+
|   losartan (COZAAR)  | Take 50 mg by mouth  |           | 0       |          |           |
| 50 mg tablet         | daily.               |           |         |          |           |
+----------------------+----------------------+-----------+---------+----------+-----------+
|   metoclopramide     | Take 10 mg by mouth  |           | 0       | 20 |           |
| (REGLAN) 10 mg       | 4 times daily as     |           |         | 12       |           |
| tablet               | needed.              |           |         |          |           |
+----------------------+----------------------+-----------+---------+----------+-----------+
|   mupirocin          | 1 g by Nasal route   |           | 0       |          |           |
| (BACTROBAN) 2%       | as needed. Use pea   |           |         |          |           |
| ointment             | sized amount in each |           |         |          |           |
|                      |  nostril twice daily |           |         |          |           |
|                      |  for 5 days. After   |           |         |          |           |
|                      | applications, press  |           |         |          |           |
|                      | sides of nose        |           |         |          |           |
|                      | together, gently     |           |         |          |           |
|                      | massage for 1 min.   |           |         |          |           |
+----------------------+----------------------+-----------+---------+----------+-----------+
|                      | Apply  topically 2   |           | 0       |          |           |
| nystatin-triamcinolo | times daily.         |           |         |          |           |
| ne (MYCOLOG II)      |                      |           |         |          |           |
| cream                |                      |           |         |          |           |
+----------------------+----------------------+-----------+---------+----------+-----------+
|   ofloxacin          |                      |           | 0       | 20 |           |
| (OCUFLOX) 0.3%       |                      |           |         | 18       |           |
| ophthalmic solution  |                      |           |         |          |           |
+----------------------+----------------------+-----------+---------+----------+-----------+
|   omeprazole         | Take 20 mg by mouth  |           | 0       | 20 |           |
| (PRILOSEC) 20 mg     | 2 times daily.       |           |         | 12       |           |
| capsule              |                      |           |         |          |           |
+----------------------+----------------------+-----------+---------+----------+-----------+
|   potassium citrate  | Take 10 mEq by mouth |           | 0       |          |           |
| (UROCIT-K) 10 mEq SR |  2 times daily.      |           |         |          |           |
|  tablet              |                      |           |         |          |           |
+----------------------+----------------------+-----------+---------+----------+-----------+
|   predniSONE         | Take 10 mg by mouth  |           | 0       |          |           |
 
| (DELTASONE) 5 mg     | Daily.               |           |         |          |           |
| tablet               |                      |           |         |          |           |
+----------------------+----------------------+-----------+---------+----------+-----------+
|   sertraline         | Take 100 mg by mouth |           | 0       |          |           |
| (ZOLOFT) 100 mg      |  daily.              |           |         |          |           |
| tablet               |                      |           |         |          |           |
+----------------------+----------------------+-----------+---------+----------+-----------+
|   tamsulosin         | Take 0.4 mg by mouth |           | 0       | 20 |           |
| (FLOMAX) 0.4 mg CAPS |  2 times daily.      |           |         | 12       |           |
+----------------------+----------------------+-----------+---------+----------+-----------+
|   trolamine          | Apply  topically as  |           | 0       |          |           |
| salicylate           | needed.              |           |         |          |           |
| (ASPERCREME) 10%     |                      |           |         |          |           |
| cream                |                      |           |         |          |           |
+----------------------+----------------------+-----------+---------+----------+-----------+
|   XARELTO 10 MG      | TAKE ONE TABLET BY   |   90      | 2       | 10/17/20 |           |
| tablet               | MOUTH EVERY DAY      | tablet    |         | 19       |           |
+----------------------+----------------------+-----------+---------+----------+-----------+
 documented as of this encounter
 
 Plan of Treatment
 
 
+--------+---------+-------------+----------------------+-------------+
| Date   | Type    | Specialty   | Care Team            | Description |
+--------+---------+-------------+----------------------+-------------+
| / | Office  | Pulmonology |   Juan F,          |             |
|    | Visit   |             | Jessica Cheung,   |             |
|        |         |             | MD [address] |             |
|        |         |             |   [place]   |             |
|        |         |             | [place] [address]             |             |
|        |         |             | [phone]         |             |
|        |         |             | [phone] (Fax)   |             |
+--------+---------+-------------+----------------------+-------------+
| / | Office  | Cardiology  |   Eric Akins, |             |
|    | Visit   |             |  MD [address]   |             |
|        |         |             | [place]  |             |
|        |         |             | [id number] [phone]  |             |
|        |         |             |  [phone] (Fax)  |             |
+--------+---------+-------------+----------------------+-------------+
 documented as of this encounter
 
 Procedures
 
 
+------------------+--------+-------------+----------------------+----------------------+
| Procedure Name   | Priori | Date/Time   | Associated Diagnosis | Comments             |
|                  | ty     |             |                      |                      |
+------------------+--------+-------------+----------------------+----------------------+
| VAS AORTA ILIAC  | Routin | 2019  |   Abdominal aortic   |   Results for this   |
| DUPLEX COMPLETE  | e      | 11:11 AM    | aneurysm (AAA)       | procedure are in the |
|                  |        | PST         | without rupture      |  results section.    |
|                  |        |             | (HCC)                |                      |
+------------------+--------+-------------+----------------------+----------------------+
 documented in this encounter
 
 Results
 VAS Aorta Iliac Duplex Complete (2019 11:11 AM PST)
 
+----------+
 
| Specimen |
+----------+
|          |
+----------+
 
 
 
+------------------------------------------------------------------------+---------------+
| Impressions                                                            | Performed At  |
+------------------------------------------------------------------------+---------------+
|   4.9 cm infrarenal abdominal aortic aneurysm unchanged when compared  |   [facility] |
| to  the CT angiogram of 2018.           Signed by: JORGE Heart,  |               |
| Johnson  Sign Date/Time: 2019 1:07 PM                            |               |
+------------------------------------------------------------------------+---------------+
 
 
 
+------------------------------------------------------------------------+---------------+
| Narrative                                                              | Performed At  |
+------------------------------------------------------------------------+---------------+
|      ABDOMINAL AORTA DUPLEX SCAN     CLINICAL INFORMATION:  Abdominal  |   [facility] |
| aorta aneurysm surveillance.     COMPARISON:  CL US GUIDANCE VASCULAR  |               |
| ACCESS (2018); CL CORONARY ANGIO WITH  GRAFTS (2018); CTA      |               |
| ABDOMEN AND PELVIS W WO CONTRAST (2018);  ECHO CARDIAC ADULT      |               |
| COMPLETE (2018); XR SWALLOWING FUNCTION VIDEO  (2018); CT      |               |
| CHEST WO CONTRAST (2018); XR CHEST 2 VIEW  (2018); XR CHEST 2  |               |
| VIEW (2018); CT HEAD WO CONTRAST (2018);  NM MYOCARDIAL        |               |
| PERFUSION SPECT - STRESS ONLY (2018); US CAROTID  DOPPLER         |               |
| BILATERAL (2018); MRI CERVICAL SPINE WO CONTRAST  (2018);    |               |
|   PROCEDURE:  Evaluation of the aorta and iliac vessels.     FINDINGS: |               |
|   The study is technically difficult due to overlying bowel gas.       |               |
|   There  is an infrarenal abdominal aortic aneurysm  Proximal aorta:   |               |
| 2.8 cm AP x 2.7 cm transverse  Mid aorta: 3.4 cm AP x 3.3 cm           |               |
| transverse  Distal aorta: 4.9 cm AP x 4.7 cm transverse  Right common  |               |
| iliac artery: 1.3 cm AP x 1.4 cm transverse  Left common iliac artery: |               |
|  1.4 cm AP x 1.3 cm transverse                                         |               |
+------------------------------------------------------------------------+---------------+
 
 
 
+-------------------------------------------------------------------------+
| Procedure Note                                                          |
+-------------------------------------------------------------------------+
|   Maurizio, Rad Results In - 2019  1:10 PM PST                         |
| ABDOMINAL AORTA DUPLEX SCAN                                             |
|                                                                         |
| CLINICAL INFORMATION:                                                   |
| Abdominal aorta aneurysm surveillance.                                  |
|                                                                         |
| COMPARISON:                                                             |
| CL US GUIDANCE VASCULAR ACCESS (2018); CL CORONARY ANGIO WITH       |
| GRAFTS (2018); CTA ABDOMEN AND PELVIS W WO CONTRAST (2018);    |
| ECHO CARDIAC ADULT COMPLETE (2018); XR SWALLOWING FUNCTION VIDEO    |
| (2018); CT CHEST WO CONTRAST (2018); XR CHEST 2 VIEW            |
| (2018); XR CHEST 2 VIEW (2018); CT HEAD WO CONTRAST (2018); |
| NM MYOCARDIAL PERFUSION SPECT - STRESS ONLY (2018); US CAROTID     |
| DOPPLER BILATERAL (2018); MRI CERVICAL SPINE WO CONTRAST           |
| (2018);                                                            |
|                                                                         |
| PROCEDURE:                                                              |
 
| Evaluation of the aorta and iliac vessels.                              |
|                                                                         |
| FINDINGS:                                                               |
| The study is technically difficult due to overlying bowel gas.  There   |
| is an infrarenal abdominal aortic aneurysm                              |
| Proximal aorta: 2.8 cm AP x 2.7 cm transverse                           |
| Mid aorta: 3.4 cm AP x 3.3 cm transverse                                |
| Distal aorta: 4.9 cm AP x 4.7 cm transverse                             |
| Right common iliac artery: 1.3 cm AP x 1.4 cm transverse                |
| Left common iliac artery: 1.4 cm AP x 1.3 cm transverse                 |
|                                                                         |
|                                                                         |
| IMPRESSION:                                                             |
| 4.9 cm infrarenal abdominal aortic aneurysm unchanged when compared to  |
| the CT angiogram of 2018.                                          |
|                                                                         |
| Signed by: JORGE Heart Matthew                                        |
| Sign Date/Time: 2019 1:07 PM                                      |
+-------------------------------------------------------------------------+
 
 
 
+---------------+---------+--------------------+--------------+
| Performing    | Address | City/State/Zipcode | Phone Number |
| Organization  |         |                    |              |
+---------------+---------+--------------------+--------------+
|   [facility] |         |                    |              |
+---------------+---------+--------------------+--------------+
 documented in this encounter
 
 Visit Diagnoses
 
 
+---------------------------------------------------------+
| Diagnosis                                               |
+---------------------------------------------------------+
|   Abdominal aortic aneurysm (AAA) without rupture (HCC) |
+---------------------------------------------------------+
 documented in this encounter

## 2019-12-06 NOTE — XMS
Encounter Summary
  Created on: 2019
 
 Wyatt Shane
 External Reference #: [id number]
 : 43
 Sex: Male
 
 Demographics
 
 
+-----------------------+------------------------+
| Address               | [address]     |
|                       | [place]  [address]   |
+-----------------------+------------------------+
| Home Phone            | [phone]        |
+-----------------------+------------------------+
| Preferred Language    | Unknown                |
+-----------------------+------------------------+
| Marital Status        |                 |
+-----------------------+------------------------+
| Taoist Affiliation | LUT                    |
+-----------------------+------------------------+
| Race                  | White                  |
+-----------------------+------------------------+
| Ethnic Group          | Not  or  |
+-----------------------+------------------------+
 
 
 Author
 
 
+--------------+------------------------------+
| Author       | [facility] |
+--------------+------------------------------+
| Organization | [facility] |
+--------------+------------------------------+
| Address      | Unknown                      |
+--------------+------------------------------+
| Phone        | Unavailable                  |
+--------------+------------------------------+
 
 
 
 Support
 
 
+---------------+--------------+---------+-----------------+
| Name          | Relationship | Address | Phone           |
+---------------+--------------+---------+-----------------+
| Opal Shane | ECON         | Unknown | [phone] |
+---------------+--------------+---------+-----------------+
 
 
 
 Care Team Providers
 
 
 
+-----------------------+------+-----------------+
| Care Team Member Name | Role | Phone           |
+-----------------------+------+-----------------+
| Erwin Iniguez MD   | PCP  | [phone] |
+-----------------------+------+-----------------+
 
 
 
 Reason for Visit
 
 
+------------+----------+
| Reason     | Comments |
+------------+----------+
| Discussion |          |
+------------+----------+
 
 
 
 Encounter Details
 
 
+--------+-----------+----------------------+----------------------+-------------+
| Date   | Type      | Department           | Care Team            | Description |
+--------+-----------+----------------------+----------------------+-------------+
| / | Telephone |   Otolaryngology     |   Jonathan Cross  | Discussion  |
|    |           | Laryngology Services | MD [facility]  [address]   |             |
|        |           |  at [facility]  [address] | [address]      |             |
|        |           |  [address]     | [place], [place]         |             |
|        |           | Mailcode: [address]       | [id number]           |             |
|        |           | [facility] | [phone]         |             |
|        |           |   [place], [place]       | [phone] (Fax)   |             |
|        |           | [phone]           |                      |             |
|        |           | [phone]         |                      |             |
+--------+-----------+----------------------+----------------------+-------------+
 
 
 
 Social History
 
 
+---------------+------------+-----------+--------+------------------+
| Tobacco Use   | Types      | Packs/Day | Years  | Date             |
|               |            |           | Used   |                  |
+---------------+------------+-----------+--------+------------------+
| Former Smoker | Cigarettes | 1         | 30     | Quit: 1996 |
+---------------+------------+-----------+--------+------------------+
 
 
 
+-------------+-------------+---------+----------+
| Alcohol Use | Drinks/Week | oz/Week | Comments |
+-------------+-------------+---------+----------+
| No          |             |         |          |
+-------------+-------------+---------+----------+
 
 
 
+------------------+---------------+
| Sex Assigned at  | Date Recorded |
 
| Birth            |               |
+------------------+---------------+
| Not on file      |               |
+------------------+---------------+
 
 
 
+----------------+-------------+-------------+
| Job Start Date | Occupation  | Industry    |
+----------------+-------------+-------------+
| Not on file    | Not on file | Not on file |
+----------------+-------------+-------------+
 
 
 
+----------------+--------------+------------+
| Travel History | Travel Start | Travel End |
+----------------+--------------+------------+
 
 
 
+-------------------------------------+
| No recent travel history available. |
+-------------------------------------+
 documented as of this encounter
 
 Plan of Treatment
 Not on filedocumented as of this encounter
 
 Visit Diagnoses
 Not on filedocumented in this encounter

## 2019-12-06 NOTE — XMS
Encounter Summary
  Created on: 2019
 
 Shane Wyatttien BroussardJosue
 External Reference #: [id number]
 : 43
 Sex: Male
 
 Demographics
 
 
+-----------------------+---------------------------+
| Address               | [address]        |
|                       | [place]  [address] |
+-----------------------+---------------------------+
| Home Phone            | [phone]           |
+-----------------------+---------------------------+
| Preferred Language    | Unknown                   |
+-----------------------+---------------------------+
| Marital Status        |                    |
+-----------------------+---------------------------+
| Mu-ism Affiliation | [id number]                      |
+-----------------------+---------------------------+
| Race                  | Unknown                   |
+-----------------------+---------------------------+
| Ethnic Group          | Unknown                   |
+-----------------------+---------------------------+
 
 
 Author
 
 
+--------------+--------------------------------------------+
| Author       | [facility]  |
|              | [facility] [place]                                |
+--------------+--------------------------------------------+
| Organization | [facility]  |
|              | [facility] [place]                                |
+--------------+--------------------------------------------+
| Address      | Unknown                                    |
+--------------+--------------------------------------------+
| Phone        | Unavailable                                |
+--------------+--------------------------------------------+
 
 
 
 Support
 
 
+---------------+--------------+--------------------+-----------------+
| Name          | Relationship | Address            | Phone           |
+---------------+--------------+--------------------+-----------------+
| Opal Shane | ECON         | [address]     | [phone] |
|               |              | [place]   |                 |
|               |              | [id number]              |                 |
+---------------+--------------+--------------------+-----------------+
 
 
 
 
 Care Team Providers
 
 
+------------------------+------+-----------------+
| Care Team Member Name  | Role | Phone           |
+------------------------+------+-----------------+
| Calvin Robertson MD | PCP  | [phone] |
+------------------------+------+-----------------+
 
 
 
 Encounter Details
 
 
+--------+-------------+---------------------+----------------------+-------------+
| Date   | Type        | Department          | Care Team            | Description |
+--------+-------------+---------------------+----------------------+-------------+
| / | Orders Only |   [facility]     |   Braden Stephens MD     |             |
| 2018   |             | VASCULAR SURGERY    | [address]     |             |
|        |             | [address]TRASOUND  [address]    | [address]  [place], [place]  |             |
|        |             | [address]   | [address]           |             |
|        |             | [place], [place]        | [phone]         |             |
|        |             | [address]          | [phone] (Fax)   |             |
|        |             | [phone]        |                      |             |
+--------+-------------+---------------------+----------------------+-------------+
 
 
 
 Social History
 
 
+---------------+------------+-----------+--------+------------------+
| Tobacco Use   | Types      | Packs/Day | Years  | Date             |
|               |            |           | Used   |                  |
+---------------+------------+-----------+--------+------------------+
| Former Smoker | Cigarettes | 1.3       | 34     | Quit: 1996 |
+---------------+------------+-----------+--------+------------------+
 
 
 
+---------------------+---+---+---+
| Smokeless Tobacco:  |   |   |   |
| Never Used          |   |   |   |
+---------------------+---+---+---+
 
 
 
+-------------+-------------+---------+----------+
| Alcohol Use | Drinks/Week | oz/Week | Comments |
+-------------+-------------+---------+----------+
| No          |             |         |          |
+-------------+-------------+---------+----------+
 
 
 
+------------------+---------------+
| Sex Assigned at  | Date Recorded |
| Birth            |               |
+------------------+---------------+
 
| Not on file      |               |
+------------------+---------------+
 
 
 
+----------------+-------------+-------------+
| Job Start Date | Occupation  | Industry    |
+----------------+-------------+-------------+
| Not on file    | Not on file | Not on file |
+----------------+-------------+-------------+
 
 
 
+----------------+--------------+------------+
| Travel History | Travel Start | Travel End |
+----------------+--------------+------------+
 
 
 
+-------------------------------------+
| No recent travel history available. |
+-------------------------------------+
 documented as of this encounter
 
 Plan of Treatment
 
 
+--------+---------+-------------+----------------------+-------------+
| Date   | Type    | Specialty   | Care Team            | Description |
+--------+---------+-------------+----------------------+-------------+
| / | Office  | Pulmonology |   Juan F,          |             |
| 2019   | Visit   |             | Jessica Cheung,   |             |
|        |         |             | MD [address] |             |
|        |         |             |   [place],   |             |
|        |         |             | [place] [address]             |             |
|        |         |             | [phone]         |             |
|        |         |             | [phone] (Fax)   |             |
+--------+---------+-------------+----------------------+-------------+
| / | Office  | Cardiology  |   Eric Akins, |             |
|    | Visit   |             |  MD [address]   |             |
|        |         |             | [address]  [place], [place]  |             |
|        |         |             | [address]  [phone]  |             |
|        |         |             |  [phone] (Fax)  |             |
+--------+---------+-------------+----------------------+-------------+
 documented as of this encounter
 
 Procedures
 
 
+---------------------+--------+-------------+----------------------+----------------------+
| Procedure Name      | Priori | Date/Time   | Associated Diagnosis | Comments             |
|                     | ty     |             |                      |                      |
+---------------------+--------+-------------+----------------------+----------------------+
| VAS CAROTID DUPLEX  | Routin | 2018  |                      |   Results for this   |
| BILATERAL           | e      |  4:21 PM    |                      | procedure are in the |
|                     |        | PDT         |                      |  results section.    |
+---------------------+--------+-------------+----------------------+----------------------+
 documented in this encounter
 
 Results
 
 VAS Carotid Duplex Bilateral (2018  4:21 PM PDT)
 
+----------+
| Specimen |
+----------+
|          |
+----------+
 
 
 
+------------------------------------------------------------------------+--------------+
| Impressions                                                            | Performed At |
+------------------------------------------------------------------------+--------------+
|      1. Tortuous vessels with atherosclerotic vascular disease, dense  |              |
| plaque particular the right carotid bulb.     However, no high-grade   |              |
| stenosis by this modality     Grades:  1       Stenosis   =01-30%      |              |
| PSV <125 cm/sec       EDV (cm/s)<40    cm/sec (mild plaque)  2         |              |
| Stenosis   =31-50%    PSV <125 cm/sec       EDV (cm/s)<40    cm/sec    |              |
| (moderate plaque)  3       Stenosis   =50-69%    PSV >125 cm/sec       |              |
|  EDV (cm/s)>40    cm/sec  4       Stenosis   =70-95%    PSV >230       |              |
| cm/sec       EDV (cm/s)>100 cm/sec  5       Stenosis   =90-95%    PSV  |              |
| <125 cm/sec       EDV (cm/s)<40    cm/sec  6       Stenosis   Occluded |              |
|      If IC/CC ratio <2 then Grades 1 or 2.  If   IC/CC ratio > 2 and   |              |
| <4 then Grade 3.  If IC/CC ratio > 4 then 4 or above.                  |              |
| Electronically signed by Juan Francisco Conn MD on 2018 4:36 PM    |              |
+------------------------------------------------------------------------+--------------+
 
 
 
+------------------------------------------------------------------------+--------------+
| Narrative                                                              | Performed At |
+------------------------------------------------------------------------+--------------+
|   HISTORY:72 years-year-old male with a history of vasculopathy,       |              |
| screen for stenosis     TECHNIQUE: Ultrasound of the carotid and       |              |
| vertebral artery systems. Duplex examination with interrogation with   |              |
| color flow and waveforms with Doppler technique  Prior study for       |              |
| comparison, 10 December 2010.     Stenosis was estimated on the basis  |              |
| validated velocity measurements, standards verified by angiographic    |              |
| measurements. Extrapolated from diameter data as defined be the        |              |
| Society of Radiologists in Ultrasound Consensus Conference in          |              |
| Radiology 2003.   229:340-346.     FINDINGS:     Analysis of the       |              |
| right.     Proximal CCA -- 108 cm/s  Distal CCA -- 85 cm/s  Proximal   |              |
| ICA -- 63 cm/s  Mid ICA -- 60 cm/s  Distal ICA -- 126 cm/s  Proximal   |              |
| ECA -- 98 cm/s     Antegrade vertebral artery flow is identified -- 63 |              |
|  cm/s  IC/CC ratio : 0.7     Analysis of the left.     Proximal CCA -- |              |
|  108 cm/s  Distal CCA -- 219 cm/s  Proximal ICA -- 103 cm/s  Mid ICA   |              |
| -- 61 cm/s  Distal ICA -- 53 cm/s  Proximal ECA -- 171 cm/s            |              |
| Antegrade vertebral artery flow is identified -- 85 cm/s  IC/CC ratio  |              |
| : 0.5     Diastolic velocities are normal  Wave forms are normal in    |              |
| rate and rhythm and appearance. Vessels are tortuous     On visual     |              |
| inspection, vessels demonstrate dense plaque at the right carotid      |              |
| bulb.. True duplex examination documenting normal spectral analysis as |              |
|  well as color flow as expected for the arterial structures of the     |              |
| neck.                                                                  |              |
+------------------------------------------------------------------------+--------------+
 
 
 
+-------------------------------------------------------------------------------------------
--------------------------------------------------------------------------------------------
 
------------------------------------+
| Procedure Note                                                                            
                                                                                            
                                    |
+-------------------------------------------------------------------------------------------
--------------------------------------------------------------------------------------------
------------------------------------+
|   Maurizio, Rad Conversion - 2019  3:16 PM PDT  HISTORY:72 years-year-old male with a    
                                                                                            
                                    |
| history of vasculopathy, screen for stenosis TECHNIQUE: Ultrasound of the carotid and     
                                                                                            
                                    |
| vertebral artery systems. Duplex examination with interrogation with color flow and       
                                                                                            
                                    |
| waveforms with Doppler techniquePrior study for comparison, 10 December 2010. Stenosis    
                                                                                            
                                    |
| was estimated on the basis validated velocity measurements, standards verified by         
                                                                                            
                                    |
| angiographic measurements. Extrapolated from diameter data as defined be the Society of   
                                                                                            
                                    |
| Radiologists in Ultrasound Consensus Conference in Radiology 2003. 229:340-346.           
                                                                                            
                                    |
| FINDINGS: Analysis of the right. Proximal CCA -- 108 cm/sDistal CCA -- 85 cm/sProximal    
                                                                                            
                                    |
| ICA -- 63 cm/sMid ICA -- 60 cm/sDistal ICA -- 126 cm/sProximal ECA -- 98 cm/s Antegrade   
                                                                                            
                                    |
| vertebral artery flow is identified -- 63 cm/sIC/CC ratio : 0.7 Analysis of the left.     
                                                                                            
                                    |
| Proximal CCA -- 108 cm/sDistal CCA -- 219 cm/sProximal ICA -- 103 cm/sMid ICA -- 61       
                                                                                            
                                    |
| cm/sDistal ICA -- 53 cm/sProximal ECA -- 171 cm/s Antegrade vertebral artery flow is      
                                                                                            
                                    |
| identified -- 85 cm/sIC/CC ratio : 0.5 Diastolic velocities are normalWave forms are      
                                                                                            
                                    |
| normal in rate and rhythm and appearance. Vessels are tortuous On visual inspection,      
                                                                                            
                                    |
| vessels demonstrate dense plaque at the right carotid bulb.. True duplex examination      
                                                                                            
                                    |
| documenting normal spectral analysis as well as color flow as expected for the arterial   
                                                                                            
                                    |
| structures of the neck. IMPRESSION:  1. Tortuous vessels with atherosclerotic vascular    
                                                                                            
                                    |
| disease, dense plaque particular the right carotid bulb. However, no high-grade stenosis  
                                                                                            
 
                                    |
|  by this modality Grades:1     Stenosis  =01-30%   PSV <125 cm/sec     EDV (cm/s)<40      
                                                                                            
                                    |
| cm/sec (mild plaque)2     Stenosis  =31-50%   PSV <125 cm/sec     EDV (cm/s)<40   cm/sec  
                                                                                            
                                    |
|  (moderate plaque)3     Stenosis  =50-69%   PSV >125 cm/sec     EDV (cm/s)>40   cm/sec4   
                                                                                            
                                    |
|     Stenosis  =70-95%   PSV >230 cm/sec     EDV (cm/s)>100 cm/sec5     Stenosis  =90-95%  
                                                                                            
                                    |
|    PSV <125 cm/sec     EDV (cm/s)<40   cm/sec6     Stenosis  Occluded If IC/CC ratio <2   
                                                                                            
                                    |
| then Grades 1 or 2.If  IC/CC ratio > 2 and <4 then Grade 3.If IC/CC ratio > 4 then 4 or   
                                                                                            
                                    |
| above.   Electronically signed by Juan Francisco Conn MD on 2018 4:36 PM              
                                                                                            
                                    |
|Distal CCA -- 219 cm/s                                                                     
                                                                                            
                                    |
|Proximal ICA -- 103 cm/s                                                                   
                                                                                            
                                    |
|Mid ICA -- 61 cm/s                                                                         
                                                                                            
                                    |
|Distal ICA -- 53 cm/s                                                                      
                                                                                            
                                    |
|Proximal ECA -- 171 cm/s                                                                   
                                                                                            
                                    |
|                                                                                           
                                                                                            
                                    |
|Antegrade vertebral artery flow is identified -- 85 cm/s                                   
                                                                                            
                                    |
|IC/CC ratio : 0.5                                                                          
                                                                                            
                                    |
|                                                                                           
                                                                                            
                                    |
|Diastolic velocities are normal                                                            
                                                                                            
                                    |
|Wave forms are normal in rate and rhythm and appearance. Vessels are tortuous              
                                                                                            
                                    |
|                                                                                           
                                                                                            
                                    |
|On visual inspection, vessels demonstrate dense plaque at the right carotid bulb.. True dup
soledad examination documenting normal spectral analysis as well as color flow as expected for t
 
he arterial structures of the neck. |
|                                                                                           
                                                                                            
                                    |
|IMPRESSION:                                                                                
                                                                                            
                                    |
|                                                                                           
                                                                                            
                                    |
|1. Tortuous vessels with atherosclerotic vascular disease, dense plaque particular the righ
t carotid bulb.                                                                             
                                    |
|                                                                                           
                                                                                            
                                    |
|However, no high-grade stenosis by this modality                                           
                                                                                            
                                    |
|                                                                                           
                                                                                            
                                    |
|Grades:                                                                                    
                                                                                            
                                   |
|1     Stenosis  =01-30%   PSV <125 cm/sec     EDV (cm/s)<40   cm/sec (mild plaque)         
                                                                                            
                                    |
|2     Stenosis  =31-50%   PSV <125 cm/sec     EDV (cm/s)<40   cm/sec (moderate plaque)     
                                                                                            
                                    |
|3     Stenosis  =50-69%   PSV >125 cm/sec     EDV (cm/s)>40   cm/sec                       
                                                                                            
                                    |
|4     Stenosis  =70-95%   PSV >230 cm/sec     EDV (cm/s)>100 cm/sec                        
                                                                                            
                                    |
|5     Stenosis  =90-95%   PSV <125 cm/sec     EDV (cm/s)<40   cm/sec                       
                                                                                            
                                    |
|6     Stenosis  Occluded                                                                   
                                                                                            
                                    |
|                                                                                           
                                                                                            
                                    |
|If IC/CC ratio <2 then Grades 1 or 2.                                                      
                                                                                            
                                    |
|If  IC/CC ratio > 2 and <4 then Grade 3.                                                   
                                                                                            
                                    |
|If IC/CC ratio > 4 then 4 or above.                                                        
                                                                                            
                                    |
|                                                                                           
                                                                                            
                                    |
|                                                                                           
                                                                                            
 
                                    |
|                                                                                           
                                                                                            
                                    |
|Electronically signed by Juan Francisco Conn MD on 2018 4:36 PM                        
                                                                                            
                                    |
+-------------------------------------------------------------------------------------------
--------------------------------------------------------------------------------------------
------------------------------------+
 documented in this encounter
 
 Visit Diagnoses
 Not on filedocumented in this encounter

## 2019-12-06 NOTE — XMS
Encounter Summary
  Created on: 2019
 
 Shane Wyatttien BroussardJosue
 External Reference #: [id number]
 : 43
 Sex: Male
 
 Demographics
 
 
+-----------------------+---------------------------+
| Address               | [address]        |
|                       | [place]  [address] |
+-----------------------+---------------------------+
| Home Phone            | [phone]           |
+-----------------------+---------------------------+
| Preferred Language    | Unknown                   |
+-----------------------+---------------------------+
| Marital Status        |                    |
+-----------------------+---------------------------+
| Restorationism Affiliation | [id number]                      |
+-----------------------+---------------------------+
| Race                  | Unknown                   |
+-----------------------+---------------------------+
| Ethnic Group          | Unknown                   |
+-----------------------+---------------------------+
 
 
 Author
 
 
+--------------+--------------------------------------------+
| Author       | [facility]  |
|              | [facility] [place]                                |
+--------------+--------------------------------------------+
| Organization | [facility]  |
|              | [facility] [place]                                |
+--------------+--------------------------------------------+
| Address      | Unknown                                    |
+--------------+--------------------------------------------+
| Phone        | Unavailable                                |
+--------------+--------------------------------------------+
 
 
 
 Support
 
 
+---------------+--------------+--------------------+-----------------+
| Name          | Relationship | Address            | Phone           |
+---------------+--------------+--------------------+-----------------+
| Opal Shane | ECON         | [address]     | [phone] |
|               |              | [place]   |                 |
|               |              | [address]              |                 |
+---------------+--------------+--------------------+-----------------+
 
 
 
 
 Care Team Providers
 
 
+-----------------------+------+-----------------+
| Care Team Member Name | Role | Phone           |
+-----------------------+------+-----------------+
| Erwin Iniguez MD | PCP  | [phone] |
+-----------------------+------+-----------------+
 
 
 
 Encounter Details
 
 
+--------+-----------+----------------------+---------------------+-------------+
| Date   | Type      | Department           | Care Team           | Description |
+--------+-----------+----------------------+---------------------+-------------+
| / | Hospital  |   [facility] |   Luis Carlos Perez  |             |
|    | Encounter |  MED CTR SLEEP       | MD Shanice  [address]   |             |
|        |           | [place]  [address] | [place]    |             |
|        |           |   [place], [place]    | [place] [address]     |             |
|        |           | [address]           | [phone]        |             |
|        |           | [phone]         | [phone] (Fax)  |             |
+--------+-----------+----------------------+---------------------+-------------+
 
 
 
 Social History
 
 
+---------------+------------+-----------+--------+------------------+
| Tobacco Use   | Types      | Packs/Day | Years  | Date             |
|               |            |           | Used   |                  |
+---------------+------------+-----------+--------+------------------+
| Former Smoker | Cigarettes | 1.3       | 34     | Quit: 1996 |
+---------------+------------+-----------+--------+------------------+
 
 
 
+---------------------+---+---+---+
| Smokeless Tobacco:  |   |   |   |
| Never Used          |   |   |   |
+---------------------+---+---+---+
 
 
 
+-------------+-------------+---------+----------+
| Alcohol Use | Drinks/Week | oz/Week | Comments |
+-------------+-------------+---------+----------+
| No          |             |         |          |
+-------------+-------------+---------+----------+
 
 
 
+------------------+---------------+
| Sex Assigned at  | Date Recorded |
| Birth            |               |
+------------------+---------------+
| Not on file      |               |
 
+------------------+---------------+
 
 
 
+----------------+-------------+-------------+
| Job Start Date | Occupation  | Industry    |
+----------------+-------------+-------------+
| Not on file    | Not on file | Not on file |
+----------------+-------------+-------------+
 
 
 
+----------------+--------------+------------+
| Travel History | Travel Start | Travel End |
+----------------+--------------+------------+
 
 
 
+-------------------------------------+
| No recent travel history available. |
+-------------------------------------+
 documented as of this encounter
 
 Medications at Time of Discharge
 
 
+----------------------+----------------------+-----------+---------+----------+-----------+
| Medication           | Sig                  | Dispensed | Refills | Start    | End Date  |
|                      |                      |           |         | Date     |           |
+----------------------+----------------------+-----------+---------+----------+-----------+
|   ferrous sulfate    | Take 325 mg by mouth |           | 0       | 20 |           |
| 325 mg tablet        |  3 times daily.      |           |         | 12       |           |
+----------------------+----------------------+-----------+---------+----------+-----------+
|   fluticasone        | one spray in each    |           | 0       | 20 |           |
| (FLONASE) 50         | nostril daily as     |           |         | 12       |           |
| mcg/nasal spray      | needed               |           |         |          |           |
+----------------------+----------------------+-----------+---------+----------+-----------+
|   guaiFENesin        | Take 1,200 mg by     |           | 0       |          |           |
| (MUCINEX) 600 mg 12  | mouth 2 times daily. |           |         |          |           |
| hr tablet            |                      |           |         |          |           |
+----------------------+----------------------+-----------+---------+----------+-----------+
|   levothyroxine      | Take 75 mcg by mouth |           | 0       | 20 |           |
| (LEVOTHROID) 75 MCG  |  Daily.              |           |         | 12       |           |
| tablet               |                      |           |         |          |           |
+----------------------+----------------------+-----------+---------+----------+-----------+
|   metoclopramide     | Take 10 mg by mouth  |           | 0       | 20 |           |
| (REGLAN) 10 mg       | 4 times daily as     |           |         | 12       |           |
| tablet               | needed.              |           |         |          |           |
+----------------------+----------------------+-----------+---------+----------+-----------+
|   omeprazole         | Take 20 mg by mouth  |           | 0       | 20 |           |
| (PRILOSEC) 20 mg     | 2 times daily.       |           |         | 12       |           |
| capsule              |                      |           |         |          |           |
+----------------------+----------------------+-----------+---------+----------+-----------+
|   tamsulosin         | Take 0.4 mg by mouth |           | 0       | 20 |           |
| (FLOMAX) 0.4 mg CAPS |  2 times daily.      |           |         | 12       |           |
+----------------------+----------------------+-----------+---------+----------+-----------+
|   acyclovir          | Take 400 mg by mouth |           | 0       | 20 |  |
| (ZOVIRAX) 400 MG     |  3 times daily as    |           |         | 12       | 9         |
| tablet               | needed.              |           |         |          |           |
+----------------------+----------------------+-----------+---------+----------+-----------+
 
|   Cholecalciferol    | Take 2,000 Units by  |           | 0       | 20 |  |
| (VITAMIN D3) 2000    | mouth Daily.         |           |         | 12       | 9         |
| UNITS CAPS           |                      |           |         |          |           |
+----------------------+----------------------+-----------+---------+----------+-----------+
|   cyanocobalamin     | injected             |           | 0       | 20 |  |
| (VITAMIN B-12) 1,000 | intramuscularly once |           |         | 12       | 9         |
|  mcg/mL injection    |  a month             |           |         |          |           |
+----------------------+----------------------+-----------+---------+----------+-----------+
|   digoxin (LANOXIN)  | Take 250 mcg by      |           | 0       |          |  |
| 250 mcg tablet       | mouth Daily.      |           |         |          | 5         |
|                      | tablet daily         |           |         |          |           |
+----------------------+----------------------+-----------+---------+----------+-----------+
|   diltiazem          | Take 120 mg by mouth |           | 0       |          |  |
| (DILT-XR) 120 mg 24  |  Daily.              |           |         |          | 9         |
| hr capsule           |                      |           |         |          |           |
+----------------------+----------------------+-----------+---------+----------+-----------+
|                      | 2 to 1 tablet four |           | 0       | 20 | 10/07/201 |
| hydrocodone-acetamin |  times daily         |           |         | 12       | 4         |
| ophen (VICODIN ES)   |                      |           |         |          |           |
| 7.5-750 MG per       |                      |           |         |          |           |
| tablet               |                      |           |         |          |           |
+----------------------+----------------------+-----------+---------+----------+-----------+
|   losartan (COZAAR)  | Take 100 mg by mouth |           | 0       |          |  |
| 100 MG tablet        |  Daily.  daily    |           |         |          | 9         |
+----------------------+----------------------+-----------+---------+----------+-----------+
|   methotrexate 2.5   | Take 15 mg by mouth  |           | 0       |          |  |
| mg tablet            | Once a week.         |           |         |          | 9         |
+----------------------+----------------------+-----------+---------+----------+-----------+
|   potassium chloride |                      |           | 0       | 20 |  |
|  (MICRO-K) 10 mEq CR |                      |           |         | 13       | 3         |
|  capsule             |                      |           |         |          |           |
+----------------------+----------------------+-----------+---------+----------+-----------+
|   predniSONE         | Take 5 mg by mouth   |           | 0       |          |  |
| (DELTASONE) 5 mg     | Daily.               |           |         |          | 4         |
| tablet               |                      |           |         |          |           |
+----------------------+----------------------+-----------+---------+----------+-----------+
|   pregabalin         | Take 50 mg by mouth  |           | 0       |          |  |
| (LYRICA) 50 MG       | 3 times daily.       |           |         |          | 4         |
| capsule              |                      |           |         |          |           |
+----------------------+----------------------+-----------+---------+----------+-----------+
|   rivaroxaban        | Take 20 mg by mouth  |           | 0       |          |  |
| (XARELTO) 20 mg      | Daily (with dinner). |           |         |          | 9         |
| tablet               |                      |           |         |          |           |
+----------------------+----------------------+-----------+---------+----------+-----------+
|   sertraline         | 2 tablets daily      |           | 0       | 20 |  |
| (ZOLOFT) 100 mg      |                      |           |         | 12       | 9         |
| tablet               |                      |           |         |          |           |
+----------------------+----------------------+-----------+---------+----------+-----------+
|   tamsulosin         | Take 0.4 mg by mouth |           | 0       | 20 |  |
| (FLOMAX) 0.4 mg CAPS |  Daily.              |           |         | 12       | 3         |
+----------------------+----------------------+-----------+---------+----------+-----------+
|   testosterone       | 200 mg injected      |           | 0       | 20 | 10/07/201 |
| cypionate            | intramuscularly      |           |         | 12       | 4         |
| (DEPO-TESTOSTERONE)  | every 3 weeks        |           |         |          |           |
| 200 mg/mL injection  |                      |           |         |          |           |
+----------------------+----------------------+-----------+---------+----------+-----------+
 documented as of this encounter
 
 Plan of Treatment
 
 
 
+--------+---------+-------------+----------------------+-------------+
| Date   | Type    | Specialty   | Care Team            | Description |
+--------+---------+-------------+----------------------+-------------+
| / | Office  | Pulmonology |   Juan F,          |             |
|    | Visit   |             | Jessica Cheung,   |             |
|        |         |             | MD [address] |             |
|        |         |             |   [place]   |             |
|        |         |             | [place] [address]             |             |
|        |         |             | [phone]         |             |
|        |         |             | [phone] (Fax)   |             |
+--------+---------+-------------+----------------------+-------------+
| / | Office  | Cardiology  |   Eric Akins, |             |
|    | Visit   |             |  MD [address]   |             |
|        |         |             | [place]  |             |
|        |         |             | [id number] [phone]  |             |
|        |         |             |  [phone] (Fax)  |             |
+--------+---------+-------------+----------------------+-------------+
 documented as of this encounter
 
 Visit Diagnoses
 Not on filedocumented in this encounter

## 2019-12-06 NOTE — XMS
Encounter Summary
  Created on: 2019
 
 Wyatt Shane
 External Reference #: [id number]
 : 43
 Sex: Male
 
 Demographics
 
 
+-----------------------+------------------------+
| Address               | [address]     |
|                       | [place]  [address]   |
+-----------------------+------------------------+
| Home Phone            | [phone]        |
+-----------------------+------------------------+
| Preferred Language    | Unknown                |
+-----------------------+------------------------+
| Marital Status        |                 |
+-----------------------+------------------------+
| Anglican Affiliation | LUT                    |
+-----------------------+------------------------+
| Race                  | White                  |
+-----------------------+------------------------+
| Ethnic Group          | Not  or  |
+-----------------------+------------------------+
 
 
 Author
 
 
+--------------+------------------------------+
| Author       | [facility] |
+--------------+------------------------------+
| Organization | [facility] |
+--------------+------------------------------+
| Address      | Unknown                      |
+--------------+------------------------------+
| Phone        | Unavailable                  |
+--------------+------------------------------+
 
 
 
 Support
 
 
+---------------+--------------+---------+-----------------+
| Name          | Relationship | Address | Phone           |
+---------------+--------------+---------+-----------------+
| Opal Shane | ECON         | Unknown | [phone] |
+---------------+--------------+---------+-----------------+
 
 
 
 Care Team Providers
 
 
 
+-----------------------+------+-----------------+
| Care Team Member Name | Role | Phone           |
+-----------------------+------+-----------------+
| Erwin Iniguez MD   | PCP  | [phone] |
+-----------------------+------+-----------------+
 
 
 
 Reason for Referral
 Diagnostic Testing (Routine)
 
+--------+--------+-----------+--------------+--------------+---------------+
| Status | Reason | Specialty | Diagnoses /  | Referred By  | Referred To   |
|        |        |           | Procedures   | Contact      | Contact       |
+--------+--------+-----------+--------------+--------------+---------------+
| Closed |        | Radiology |   Diagnoses  |   Tay, |   Rad General |
|        |        |           |  Esophageal  |  Jonathan S,   |  [address] |
|        |        |           | stenosis     | MD  [address]  |  [address]  |
|        |        |           | Procedures   | [address]  |  Mailcode:    |
|        |        |           | X-RAY        | [facility]      | [facility]  |
|        |        |           | ESOPHAGRAM   | [facility] [place] | for Health    |
|        |        |           |              |  [address]  | and Healing,  |
|        |        |           |              |  Phone:      | [facility],   |
|        |        |           |              | [phone] | 3rd Floor     |
|        |        |           |              |   Fax:       | [place], [place]  |
|        |        |           |              | [phone] | [address]    |
|        |        |           |              |              | Phone:        |
|        |        |           |              |              | [phone]  |
|        |        |           |              |              |  Fax:         |
|        |        |           |              |              | [phone]  |
+--------+--------+-----------+--------------+--------------+---------------+
 
 
 Speech Therapy (Routine)
 
+--------+--------+-----------+--------------+--------------+---------------+
| Status | Reason | Specialty | Diagnoses /  | Referred By  | Referred To   |
|        |        |           | Procedures   | Contact      | Contact       |
+--------+--------+-----------+--------------+--------------+---------------+
| Closed |        | Speech    |   Diagnoses  |   Tay, |   Ent Speech  |
|        |        | Therapy   |  Pharyngeal  |  Jonathan S,   | [address]  [address]  |
|        |        |           | dysphagia    | MD  [address]  | [address]   |
|        |        |           | Procedures   | [address]  | [facility]       |
|        |        |           | CONSULT TO   | [facility]      | Mailcode:     |
|        |        |           | ENT SPEECH   | [place], [place] | [address]          |
|        |        |           | THERAPY      |  [address]  | Physician's   |
|        |        |           |              |  Phone:      | Pavilion      |
|        |        |           |              | [phone] | [place], [place]  |
|        |        |           |              |   Fax:       | [address]    |
|        |        |           |              | [phone] | Phone:        |
|        |        |           |              |              | [phone]  |
|        |        |           |              |              |  Fax:         |
|        |        |           |              |              | [phone]  |
+--------+--------+-----------+--------------+--------------+---------------+
 
 
 
 
 Reason for Visit
 
 
 
+--------------+----------+
| Reason       | Comments |
+--------------+----------+
| New patient  |          |
| consultation |          |
+--------------+----------+
 Office Visit - E/M Services (Routine)
 
+--------+--------+---------------+--------------+--------------+---------------+
| Status | Reason | Specialty     | Diagnoses /  | Referred By  | Referred To   |
|        |        |               | Procedures   | Contact      | Contact       |
+--------+--------+---------------+--------------+--------------+---------------+
| Closed |        | Otolaryngolog |              |   Vaibhav,     |   Ent         |
|        |        | y             |              | MD Fred    | Laryngology   |
|        |        |               |              | [address]  | [address]  |
|        |        |               |              | [address] | [address]   |
|        |        |               |              |  [place]          | [address]       |
|        |        |               |              | [place], [place] | Mailcode:     |
|        |        |               |              |  [address]  | [address]          |
|        |        |               |              |              | Physician's   |
|        |        |               |              |              | Pavilion      |
|        |        |               |              |              | [place], [place]  |
|        |        |               |              |              | [address]    |
|        |        |               |              |              | Phone:        |
|        |        |               |              |              | [phone]  |
|        |        |               |              |              |  Fax:         |
|        |        |               |              |              | [phone]  |
+--------+--------+---------------+--------------+--------------+---------------+
 
 
 
 
 Encounter Details
 
 
+--------+---------+----------------------+----------------------+----------------------+
| Date   | Type    | Department           | Care Team            | Description          |
+--------+---------+----------------------+----------------------+----------------------+
| / | Office  |   Otolaryngology     |   Jonathan Cross  | Glottic stenosis     |
|    | Visit   | Laryngology Services | MD RUSS  [address]   | (Primary Dx);        |
|        |         |  at [facility]  [address] | [address]      | Pharyngeal           |
|        |         |  [address]     | [facility] [place]         | dysphagia;           |
|        |         | Mailcode: [address]       | [address]           | Esophageal stenosis; |
|        |         | [facility] | [phone]         |  Larynx edema        |
|        |         |   [place], [place]       | [phone] (Fax)   |                      |
|        |         | [address]           |                      |                      |
|        |         | [phone]         |                      |                      |
+--------+---------+----------------------+----------------------+----------------------+
 
 
 
 Social History
 
 
+---------------+------------+-----------+--------+------------------+
| Tobacco Use   | Types      | Packs/Day | Years  | Date             |
|               |            |           | Used   |                  |
+---------------+------------+-----------+--------+------------------+
| Former Smoker | Cigarettes | 1         | 30     | Quit: 1996 |
 
+---------------+------------+-----------+--------+------------------+
 
 
 
+-------------+-------------+---------+----------+
| Alcohol Use | Drinks/Week | oz/Week | Comments |
+-------------+-------------+---------+----------+
| No          |             |         |          |
+-------------+-------------+---------+----------+
 
 
 
+------------------+---------------+
| Sex Assigned at  | Date Recorded |
| Birth            |               |
+------------------+---------------+
| Not on file      |               |
+------------------+---------------+
 
 
 
+----------------+-------------+-------------+
| Job Start Date | Occupation  | Industry    |
+----------------+-------------+-------------+
| Not on file    | Not on file | Not on file |
+----------------+-------------+-------------+
 
 
 
+----------------+--------------+------------+
| Travel History | Travel Start | Travel End |
+----------------+--------------+------------+
 
 
 
+-------------------------------------+
| No recent travel history available. |
+-------------------------------------+
 documented as of this encounter
 
 Last Filed Vital Signs
 
 
+-------------------+--------------------+----------------------+----------+
| Vital Sign        | Reading            | Time Taken           | Comments |
+-------------------+--------------------+----------------------+----------+
| Blood Pressure    | -                  | -                    |          |
+-------------------+--------------------+----------------------+----------+
| Pulse             | -                  | -                    |          |
+-------------------+--------------------+----------------------+----------+
| Temperature       | -                  | -                    |          |
+-------------------+--------------------+----------------------+----------+
| Respiratory Rate  | -                  | -                    |          |
+-------------------+--------------------+----------------------+----------+
| Oxygen Saturation | -                  | -                    |          |
+-------------------+--------------------+----------------------+----------+
| Inhaled Oxygen    | -                  | -                    |          |
| Concentration     |                    |                      |          |
+-------------------+--------------------+----------------------+----------+
| Weight            | 85.5 kg (188 lb 8  | 2010 12:53 PM  |          |
 
|                   | oz)                | PDT                  |          |
+-------------------+--------------------+----------------------+----------+
| Height            | -                  | -                    |          |
+-------------------+--------------------+----------------------+----------+
| Body Mass Index   | -                  | -                    |          |
+-------------------+--------------------+----------------------+----------+
 documented in this encounter
 
 Progress Notes
 Jonathan Cross MD - 2010  2:05 PM PDTFormatting of this note might be different
 from the original.
 PATIENT NAME: Wyatt Shane
 OHIRENA MR#: [id number]
 : 1945
 
 REFERRING PROVIDER:
 FRED MORIN
 Otolaryngology
 [address] [place] [address]
 
 PRIMARY CARE PROVIDER:
 Erwin Iniguez MD
 
 CLINIC: [facility]
 
 REASON FOR FOLLOW-UP:Chief Complaint 
 Patient presents with 
   New patient consultation 
 
 HPI:  Wyatt Shane is a 64 y.o. male who was last seen at the [facility] for acute throat discomfort and found to have a resolving upper respiratory
 tract infection.  It was recommended that he return as needed. 
  
 He returns today noting that he feels that his voice is worse and his swallowing is giving 
him more trouble.  He thinks that this has occurred over the past few months and he associat
es it with tightness in the bilateral anterior neck.  He does cough on liquids occasionally 
and particulates.  He is very careful with both.  He thinks that swallowing is more effortfu
l recently.  He has not noted any otalgia.  He denies odynophagia.  He has not noted any hem
optysis.  He does have some dyspnea on exertion.  He has noted noisy breathing on deep inspi
ration.  He does note problems with nasal allergy symptoms, including itchy eyes, rhinorrhea
 and sneezing. He uses Claritin and Sudafed for this.  He is not smoking.  
 
 He is maintained on a proton-pump inhibitor.  He notes no symptoms of reflux, including hea
rtburn, indigestion, acidic taste or belching while on proton pump inhibitor therapy. 
 
 VOCAL DEMANDS:  Unchanged from previous visit.
 
 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 
 
 TNM/STAGE: CIS--> T2N0M0/II
 
 IDENTIFICATION DATE: 1998
 
 SURGICAL EXCISION DATE: 1998
 
 TYPE OF SURGERY: extended vertical hemilaryngectomy
 
 LAST SURGERY DATE: laryngotracheal reconstruction2000
 
 
 RADIATION THERAPY COMPLETED: 1998
 
 RADIATION THERAPIST: Unknown
 
 RADIATION DOSE: Unknown
 
 PMHx: Past Medical History 
 Diagnosis Date 
   Larynx cancer  
   T2N0 right glottis 
   Radiation fibrosis  
   larynx 
   Glottic stenosis  
   MI (myocardial infarction)  
   PUD (peptic ulcer disease)  
   Fibromyalgia  
   Depression  
   Nephrolithiasis  
 
 SURGHx:Past Surgical History 
 Procedure Date 
   Hx extended vertical hemilaryngectomy  
   right posterior arytenoid retained. 
   Hx stenosis dilation  
   Hx nissen fundoplication  
   Hx dml bx 1998 
   cCIS--pT2N0 SCCa right vocal fold 
   Hx laryngotracheoplasty  
 
  
 
 ALLERGIES: No Active Allergies
 
 MEDICATIONS:Current outpatient prescriptions 
 Medication Sig 
   ACYCLOVIR ORAL Take  by mouth as needed. 
   Amlodipine-Atorvastatin (CADUET) 10-10 mg Oral Tablet Take  by mouth. 
   CYANOCOBALAMIN (VITAMIN B-12 INJ) by Injection route every thirty days. 
   LANSOPRAZOLE (PREVACID ORAL) Take  by mouth. 
   metoclopramide (REGLAN) 5 mg Oral Tablet Take 5 mg by mouth every six hours as needed. 
   pregabalin (LYRICA) 50 mg Oral Capsule Take  by mouth three times daily. Max: 600 mg/da
y  
   sertraline (ZOLOFT) 50 mg Oral Tablet Take 50 mg by mouth once daily. 
   tamsulosin (FLOMAX) 0.4 mg Oral Capsule, Sust. Release 24 hr Take 0.4 mg by mouth once 
daily. 
   TESTOSTERONE IM Inject  into the muscle (IM). 
   VICODIN ORAL None Entered 
 
 LAST WEIGHTS:  Wt Readings from Last 3 Encounters: 
 2010 85.503 kg (188 lb 8 oz) 
 10/03/2007 92.534 kg (204 lb) 
 
 EXAMINATION:  Wt 85.503 kg (188 lb 8 oz)
 Gen: He is a 64 y.o. male. He is awake, alert and comfortable with the examination. H
is weight is appropriate.
 Ears: The pinnae are normal. External auditory canals show minimal cerumen bilaterally.
 The tympanic membranes are clear with normal anatomic landmarks and an aerated middle ear
 space.
 Nose: The nasal dorsum is straight and the nares are widely patent. The mucosa is pink an
d there are no lesions or masses noted. There is no significant nasal obstruction noted.
 
 Face: There are no worrisome lesions of the face or head noted.
 Salivary Glands: The salivary glands are soft and show no lesions or masses within the pa
rotid or submandibular glands bilaterally. There is no obvious obstruction of Stensen's or
 Blakeslee's ducts bilaterally.
 Oropharynx: Normal lips and oral competence are noted. The dentition is fair. The muc
archana is dry and pale, but shows no lesions or masses. The tonsils are small or absent and t
he fossae show no lesions. Bimanual palpation of the gigivolabial sulcus, lingual sulcus, 
tonsillar pillars, palate and base of tongue did not demonstrate any concerning lesions or m
asses.
 Neck: The neck has well healed anterior transverse neck incision and tracheotomy tube scar.
 There is no lymphadenopathy noted in the anterior, posterior, digastric or submental tria
ngles. The thyroid is palpable, but not enlarged or tender. I cannot appreciate any nodu
les. The larynx is no longer anatomic and has cartilage loss along the right side. He 
has tenderness along the right anterior strap muscles in the region of the omohyoid muscles 
bilaterally.
 Chest: Chest rise is symmetric and there is no audible wheezing, stridor or wet vocal romina
lity. There is very mild stridor with deep inspiration.
 Neuro: Extraoccular movements are grossly intact. There is symmetric sensation and move
ment noted of the upper and midface. Marginal mandibular nerve function is normal. Heari
ng is grossly intact. Palatal elevation is symmetric and full. Trapezius function is nor
mal. Tongue protrusion is midline.
 
 VOICE EVALUATION: Perceptual voice evaluation demonstrates severe dysphonia. The pitch 
is low for a male and shows limited range. Vocal intensity is acceptable and shows limited u
pper range. There is 3+ roughness, 1+ breathiness, and 2+ tightness appreciated. T
here is no tremor noted with sustained vowel phonation. I am unable to detect voice breaks.
 Glottal orellana is not detected and there is no diplophonia noted. Articulation is normal.  
 
 LARYNGOSCOPY: Indirect laryngoscopy was performed using a 90 degree Jean-Baptiste sahil telescope
 and distal-chip Olympus flexible videolaryngoscope following the topical nasal application 
of oxymetazoline and pontocaine.  This was performed because the patient's gag reflex preclu
ded adequate transoral indirect laryngoscopy. This demonstrates a clear vallecula and rajiv
p epiglottis. The right arytenoid is partially present and mobile with mucosal redundancy.
 There is soft tissue where the right hemilarynx would be. This is benign and mucosalized.
 The true vocal folds are absent. The left false vocal fold is at least partially present.
 The left arytenoid is present, but fixed. Both aryepiglottic folds are shortened. The
 base of tongue is clear. The subglottic airway is slightly narrowed, but adequate.  It is
 scarred anteriorly without mass.
 
 VIDEOSTROBOSCOPY:  Laryngovideostroboscopy was performed today.  Review of laryngovideostro
boscopy demonstrates laryngeal anatomy and motion as noted above.  There laryngeal hyperfunc
tion is severe and most notable in the anterior-posterior and lateral dimension. There is no
t noted to be increased thick laryngeal mucous.  The mucosal wave comes from the right aryte
noid mound pressed into the epiglottis and left false vocal fold. The tissue appears very pl
iable and has no mucosal irregularity.
 
 ASSESSMENT:  Mr. Shane appears to suffer from dysphonia associated with prior partial laryng
ectomy with reconstruction which has not changed significantly since I last saw him.  He has
 more laryngeal hyperfunction and increased effort of voicing compared to that time, but I d
o not have a laryngovideostroboscopy from then to compare to.  I certainly don't see any wor
risome lesions and his voice is quite good for not having any true vocal folds.
  Mr. Shane's pharyngeal dysphagia may be due to radiation fibrosis and esophageal/pharyngeal
 stenosis.  I am concerned about the increased reports of aspiration and avoidance of most p
articulates.
 
 PLAN:  Today I have recommended obtaining a modified barium swallow with barium esophagram.
  I don't think that this is emergent, but would be helpful.  I recommended observation of h
is voice.  I don't think that there is much I can do to facilitate this at this time.
  I will see him back following the completion of the modified barium swallow, or sooner if 
symptoms worsen. 
 
 
 Jonathan Cross M.D.
 
 Laryngology and Head & Neck Surgery
 Tel:[phone]
 Fax:[phone] Electronically signed by Jonathan Cross MD at 2010  2:05 PM PDT
documented in this encounter
 
 Plan of Treatment
 
 
+----------------------+------------+--------+----------------------+----------------------+
| Name                 | Type       | Priori | Associated Diagnoses | Order Schedule       |
|                      |            | ty     |                      |                      |
+----------------------+------------+--------+----------------------+----------------------+
| ENT MODIFIED BARIUM  | Procedures | Routin |   Pharyngeal         | Expected: 2010 |
| SWALLOW              |            | e      | dysphagia            |                      |
+----------------------+------------+--------+----------------------+----------------------+
| AR                   | Procedures | Routin |   Glottic stenosis   | Ordered: 2010  |
| LARYNGOSCOPY,FLEX/RI |            | e      | Larynx edema         |                      |
| GID+STROBOSCOPY      |            |        |                      |                      |
+----------------------+------------+--------+----------------------+----------------------+
 documented as of this encounter
 
 Results
 X-RAY ESOPHAGRAM (09/15/2010 10:14 AM PDT)
 
+-----------+--------------------------+-----------+------------+--------------+
| Component | Value                    | Ref Range | Performed  | Pathologist  |
|           |                          |           | At         | Signature    |
+-----------+--------------------------+-----------+------------+--------------+
| ESOPHAGUS | Exam: Single contrast    |           |            |              |
|           | barium esophagram        |           |            |              |
|           | Findings: The cervical   |           |            |              |
|           | segment is discussed in  |           |            |              |
|           | the modified             |           |            |              |
|           | bariumswallowing study.  |           |            |              |
|           |   No mucosal             |           |            |              |
|           | abnormalities are seen.  |           |            |              |
|           |   Multipletertiary       |           |            |              |
|           | contractions are seen in |           |            |              |
|           |  the distal esophagus    |           |            |              |
|           | with evidencefor         |           |            |              |
|           | previous fundoplication. |           |            |              |
|           |    There is evidence for |           |            |              |
|           |  small axial andsmall    |           |            |              |
|           | paraesophageal hernia    |           |            |              |
|           | but no evidence for      |           |            |              |
|           | reflux.   Survey ofthe   |           |            |              |
|           | stomach and proximal     |           |            |              |
|           | duodenum is              |           |            |              |
|           | unremarkable.            |           |            |              |
|           | Impression: Distal       |           |            |              |
|           | esophageal dysmotility   |           |            |              |
|           | characterized by         |           |            |              |
|           | multipletertiary         |           |            |              |
|           | contractions.   Small    |           |            |              |
|           | and presumably recurrent |           |            |              |
|           |  axial andparaesophageal |           |            |              |
|           |  hernia with no          |           |            |              |
 
|           | demonstrable reflux. I   |           |            |              |
|           | have personally viewed   |           |            |              |
|           | this procedure/exam and  |           |            |              |
|           | reviewed this report.    |           |            |              |
|           | Author: JUSTIN BAIRD       |           |            |              |
|           | JORGE ELIZALDEReviewer:   |           |            |              |
|           | JUSTIN ELIZALDE M.D. |           |            |              |
|           |   STATUS FINAL / Dr.     |           |            |              |
|           | JUSTIN ELIZALDE       |           |            |              |
+-----------+--------------------------+-----------+------------+--------------+
 
 
 
+----------+
| Specimen |
+----------+
|          |
+----------+
 
 
 
+----------------------+---------+--------------------+--------------+
| Performing           | Address | City/State/Zipcode | Phone Number |
| Organization         |         |                    |              |
+----------------------+---------+--------------------+--------------+
|   [facility] |         |                    |              |
|  RADIOLOGY           |         |                    |              |
+----------------------+---------+--------------------+--------------+
 documented in this encounter
 
 Visit Diagnoses
 
 
+------------------------------------------------------------+
| Diagnosis                                                  |
+------------------------------------------------------------+
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
+------------------------------------------------------------+
|   Pharyngeal dysphagia  Dysphagia, pharyngeal phase        |
+------------------------------------------------------------+
|   Esophageal stenosis  Stricture and stenosis of esophagus |
+------------------------------------------------------------+
|   Larynx edema  Edema of larynx                            |
+------------------------------------------------------------+
 documented in this encounter

## 2019-12-06 NOTE — XMS
Encounter Summary
  Created on: 2019
 
 Shane Wyatttien BroussardJosue
 External Reference #: [id number]
 : 43
 Sex: Male
 
 Demographics
 
 
+-----------------------+---------------------------+
| Address               | [address]        |
|                       | [place]  [address] |
+-----------------------+---------------------------+
| Home Phone            | [phone]           |
+-----------------------+---------------------------+
| Preferred Language    | Unknown                   |
+-----------------------+---------------------------+
| Marital Status        |                    |
+-----------------------+---------------------------+
| Caodaism Affiliation | [id number]                      |
+-----------------------+---------------------------+
| Race                  | Unknown                   |
+-----------------------+---------------------------+
| Ethnic Group          | Unknown                   |
+-----------------------+---------------------------+
 
 
 Author
 
 
+--------------+--------------------------------------------+
| Author       | [facility]  |
|              | and [place]                                |
+--------------+--------------------------------------------+
| Organization | [facility]  |
|              | and [place]                                |
+--------------+--------------------------------------------+
| Address      | Unknown                                    |
+--------------+--------------------------------------------+
| Phone        | Unavailable                                |
+--------------+--------------------------------------------+
 
 
 
 Support
 
 
+---------------+--------------+--------------------+-----------------+
| Name          | Relationship | Address            | Phone           |
+---------------+--------------+--------------------+-----------------+
| Opal Shane | ECON         | [address]     | [phone] |
|               |              | [place]   |                 |
|               |              | [address]              |                 |
+---------------+--------------+--------------------+-----------------+
 
 
 
 
 Care Team Providers
 
 
+-----------------------+------+-----------------+
| Care Team Member Name | Role | Phone           |
+-----------------------+------+-----------------+
| Erwin Iniguez MD | PCP  | [phone] |
+-----------------------+------+-----------------+
 
 
 
 Encounter Details
 
 
+--------+-----------+----------------------+--------------------+-------------+
| Date   | Type      | Department           | Care Team          | Description |
+--------+-----------+----------------------+--------------------+-------------+
| 04/03/ | Hospital  |   [facility] GENERIC IP     |   Conversion       | Pain        |
| 2018   | Encounter | CONVERSION [address]P  [address]  | Transaction,       |             |
|        |           | [address]           | Provider Unknown   |             |
|        |           | [place], [place]         | [phone]       |             |
|        |           | [address]           | [phone] (Fax) |             |
|        |           | [phone]         |                    |             |
+--------+-----------+----------------------+--------------------+-------------+
 
 
 
 Social History
 
 
+---------------+------------+-----------+--------+------------------+
| Tobacco Use   | Types      | Packs/Day | Years  | Date             |
|               |            |           | Used   |                  |
+---------------+------------+-----------+--------+------------------+
| Former Smoker | Cigarettes | 1.3       | 34     | Quit: 1996 |
+---------------+------------+-----------+--------+------------------+
 
 
 
+---------------------+---+---+---+
| Smokeless Tobacco:  |   |   |   |
| Never Used          |   |   |   |
+---------------------+---+---+---+
 
 
 
+-------------+-------------+---------+----------+
| Alcohol Use | Drinks/Week | oz/Week | Comments |
+-------------+-------------+---------+----------+
| No          |             |         |          |
+-------------+-------------+---------+----------+
 
 
 
+------------------+---------------+
| Sex Assigned at  | Date Recorded |
| Birth            |               |
+------------------+---------------+
| Not on file      |               |
 
+------------------+---------------+
 
 
 
+----------------+-------------+-------------+
| Job Start Date | Occupation  | Industry    |
+----------------+-------------+-------------+
| Not on file    | Not on file | Not on file |
+----------------+-------------+-------------+
 
 
 
+----------------+--------------+------------+
| Travel History | Travel Start | Travel End |
+----------------+--------------+------------+
 
 
 
+-------------------------------------+
| No recent travel history available. |
+-------------------------------------+
 documented as of this encounter
 
 Medications at Time of Discharge
 
 
+----------------------+----------------------+-----------+---------+----------+-----------+
| Medication           | Sig                  | Dispensed | Refills | Start    | End Date  |
|                      |                      |           |         | Date     |           |
+----------------------+----------------------+-----------+---------+----------+-----------+
|   acyclovir          | Apply  topically     |           | 0       |          |           |
| (ZOVIRAX) 5%         | every 3 hours.       |           |         |          |           |
| ointment             |                      |           |         |          |           |
+----------------------+----------------------+-----------+---------+----------+-----------+
|   albuterol (PROAIR  | Inhale 2 puffs into  |           | 0       |          |           |
| HFA) 90 mcg/puff     | the lungs every 6    |           |         |          |           |
| inhaler              | hours as needed for  |           |         |          |           |
|                      | Wheezing.            |           |         |          |           |
+----------------------+----------------------+-----------+---------+----------+-----------+
|   digoxin (LANOXIN)  | Take 125 mcg by      |           | 0       |          |           |
| 250 mcg tablet       | mouth Daily.      |           |         |          |           |
|                      | capsule daily        |           |         |          |           |
+----------------------+----------------------+-----------+---------+----------+-----------+
|   ferrous sulfate    | Take 325 mg by mouth |           | 0       | 20 |           |
| 325 mg tablet        |  3 times daily.      |           |         | 12       |           |
+----------------------+----------------------+-----------+---------+----------+-----------+
|   fluticasone        | one spray in each    |           | 0       | 20 |           |
| (FLONASE) 50         | nostril daily as     |           |         | 12       |           |
| mcg/nasal spray      | needed               |           |         |          |           |
+----------------------+----------------------+-----------+---------+----------+-----------+
|                      | Inhale 1 puff into   |           | 0       |          |           |
| fluticasone-salmeter | the lungs Twice      |           |         |          |           |
| ol (ADVAIR) 500-50   | Daily.               |           |         |          |           |
| mcg/puff diskus      |                      |           |         |          |           |
| inhaler              |                      |           |         |          |           |
+----------------------+----------------------+-----------+---------+----------+-----------+
|   folic acid 1 mg    | Take 1 mg by mouth   |           | 0       |          |           |
| tablet               | Daily.               |           |         |          |           |
+----------------------+----------------------+-----------+---------+----------+-----------+
|   furosemide (LASIX) | Take 40 mg by mouth  |           | 0       |          |           |
 
|  40 mg tablet        | Daily.               |           |         |          |           |
+----------------------+----------------------+-----------+---------+----------+-----------+
|   guaiFENesin        | Take 1,200 mg by     |           | 0       |          |           |
| (MUCINEX) 600 mg 12  | mouth 2 times daily. |           |         |          |           |
| hr tablet            |                      |           |         |          |           |
+----------------------+----------------------+-----------+---------+----------+-----------+
|   levothyroxine      | Take 75 mcg by mouth |           | 0       | 20 |           |
| (LEVOTHROID) 75 MCG  |  Daily.              |           |         | 12       |           |
| tablet               |                      |           |         |          |           |
+----------------------+----------------------+-----------+---------+----------+-----------+
|   metoclopramide     | Take 10 mg by mouth  |           | 0       | 20 |           |
| (REGLAN) 10 mg       | 4 times daily as     |           |         | 12       |           |
| tablet               | needed.              |           |         |          |           |
+----------------------+----------------------+-----------+---------+----------+-----------+
|                      | Apply  topically 2   |           | 0       |          |           |
| nystatin-triamcinolo | times daily.         |           |         |          |           |
| ne (MYCOLOG II)      |                      |           |         |          |           |
| cream                |                      |           |         |          |           |
+----------------------+----------------------+-----------+---------+----------+-----------+
|   omeprazole         | Take 20 mg by mouth  |           | 0       | 20 |           |
| (PRILOSEC) 20 mg     | 2 times daily.       |           |         | 12       |           |
| capsule              |                      |           |         |          |           |
+----------------------+----------------------+-----------+---------+----------+-----------+
|   potassium citrate  | Take 10 mEq by mouth |           | 0       |          |           |
| (UROCIT-K) 10 mEq SR |  2 times daily.      |           |         |          |           |
|  tablet              |                      |           |         |          |           |
+----------------------+----------------------+-----------+---------+----------+-----------+
|   predniSONE         | Take 10 mg by mouth  |           | 0       |          |           |
| (DELTASONE) 5 mg     | Daily.               |           |         |          |           |
| tablet               |                      |           |         |          |           |
+----------------------+----------------------+-----------+---------+----------+-----------+
|   tamsulosin         | Take 0.4 mg by mouth |           | 0       | 20 |           |
| (FLOMAX) 0.4 mg CAPS |  2 times daily.      |           |         | 12       |           |
+----------------------+----------------------+-----------+---------+----------+-----------+
|   acyclovir          | Take 400 mg by mouth |           | 0       | 20 |  |
| (ZOVIRAX) 400 MG     |  3 times daily as    |           |         | 12       | 9         |
| tablet               | needed.              |           |         |          |           |
+----------------------+----------------------+-----------+---------+----------+-----------+
|   Cholecalciferol    | Take 2,000 Units by  |           | 0       | 20 |  |
| (VITAMIN D3) 2000    | mouth Daily.         |           |         | 12       | 9         |
| UNITS CAPS           |                      |           |         |          |           |
+----------------------+----------------------+-----------+---------+----------+-----------+
|   cyanocobalamin     | injected             |           | 0       | 20 |  |
| (VITAMIN B-12) 1,000 | intramuscularly once |           |         | 12       | 9         |
|  mcg/mL injection    |  a month             |           |         |          |           |
+----------------------+----------------------+-----------+---------+----------+-----------+
|   diltiazem          | Take 120 mg by mouth |           | 0       |          |  |
| (DILT-XR) 120 mg 24  |  Daily.              |           |         |          | 9         |
| hr capsule           |                      |           |         |          |           |
+----------------------+----------------------+-----------+---------+----------+-----------+
|   loratadine         | Take 10 mg by mouth  |           | 0       |          |  |
| (CLARITIN) 10 mg     | Daily.               |           |         |          | 9         |
| tablet               |                      |           |         |          |           |
+----------------------+----------------------+-----------+---------+----------+-----------+
|   losartan (COZAAR)  | Take 100 mg by mouth |           | 0       |          |  |
| 100 MG tablet        |  Daily. 1/2 daily    |           |         |          | 9         |
+----------------------+----------------------+-----------+---------+----------+-----------+
|   methotrexate 2.5   | Take 15 mg by mouth  |           | 0       |          |  |
| mg tablet            | Once a week.         |           |         |          | 9         |
+----------------------+----------------------+-----------+---------+----------+-----------+
 
|                      | Take 1 tablet by     |           | 0       |          |  |
| oxyCODONE-acetaminop | mouth every 4 hours  |           |         |          | 9         |
| hen (PERCOCET) 5-325 | as needed for Pain.  |           |         |          |           |
|  mg per tablet       |                      |           |         |          |           |
+----------------------+----------------------+-----------+---------+----------+-----------+
|   pseudoePHEDrine    | Take 30 mg by mouth  |           | 0       |          |  |
| (SUDOGEST) 30 mg     | every 4 hours as     |           |         |          | 9         |
| tablet               | needed for           |           |         |          |           |
|                      | Congestion.          |           |         |          |           |
+----------------------+----------------------+-----------+---------+----------+-----------+
|   rivaroxaban        | Take 20 mg by mouth  |           | 0       |          |  |
| (XARELTO) 20 mg      | Daily (with dinner). |           |         |          | 9         |
| tablet               |                      |           |         |          |           |
+----------------------+----------------------+-----------+---------+----------+-----------+
|   sertraline         | 2 tablets daily      |           | 0       | 20 |  |
| (ZOLOFT) 100 mg      |                      |           |         | 12       | 9         |
| tablet               |                      |           |         |          |           |
+----------------------+----------------------+-----------+---------+----------+-----------+
 documented as of this encounter
 
 Plan of Treatment
 
 
+--------+---------+-------------+----------------------+-------------+
| Date   | Type    | Specialty   | Care Team            | Description |
+--------+---------+-------------+----------------------+-------------+
| / | Office  | Pulmonology |   [facility],          |             |
|    | Visit   |             | Jessica Cheung,   |             |
|        |         |             | MD [address] |             |
|        |         |             |   [place],   |             |
|        |         |             | [place] [address]             |             |
|        |         |             | [phone]         |             |
|        |         |             | [phone] (Fax)   |             |
+--------+---------+-------------+----------------------+-------------+
| / | Office  | Cardiology  |   [place], Mershed, |             |
| 2020   | Visit   |             |  MD  [address]   |             |
|        |         |             | [place]  [place]  |             |
|        |         |             | [address]  [phone]  |             |
|        |         |             |  [phone] (Fax)  |             |
+--------+---------+-------------+----------------------+-------------+
 documented as of this encounter
 
 Procedures
 
 
+---------------------+--------+-------------+----------------------+----------------------+
| Procedure Name      | Priori | Date/Time   | Associated Diagnosis | Comments             |
|                     | ty     |             |                      |                      |
+---------------------+--------+-------------+----------------------+----------------------+
| MRI CERVICAL SPINE  | Routin | 2018  |                      |   Results for this   |
| WO CONTRAST         | e      |  5:06 AM    |                      | procedure are in the |
|                     |        | PST         |                      |  results section.    |
+---------------------+--------+-------------+----------------------+----------------------+
 documented in this encounter
 
 Results
 MRI Cervical Spine wo Contrast (2018  5:06 AM PST)
 
+----------+
| Specimen |
 
+----------+
|          |
+----------+
 
 
 
+------------------------------------------------------------------------+--------------+
| Narrative                                                              | Performed At |
+------------------------------------------------------------------------+--------------+
|   This is a non-reportable procedure without a radiologist report and  |              |
| is  used for image storage only                                        |              |
+------------------------------------------------------------------------+--------------+
 
 
 
+--------------------------------------------------------------------------------------+
| Procedure Note                                                                       |
+--------------------------------------------------------------------------------------+
|   Andrzej Steven Irvin - 2019  4:21 AM PDT  This is a non-reportable procedure  |
| without a radiologist report and isused for image storage only                       |
+--------------------------------------------------------------------------------------+
 documented in this encounter
 
 Visit Diagnoses
 
 
+--------------------------+
| Diagnosis                |
+--------------------------+
|   Pain  Generalized pain |
+--------------------------+
 documented in this encounter

## 2019-12-06 NOTE — XMS
Encounter Summary
  Created on: 2019
 
 Wyatt Shane
 External Reference #: [id number]
 : 43
 Sex: Male
 
 Demographics
 
 
+-----------------------+------------------------+
| Address               | [address]     |
|                       | [place]  [address]   |
+-----------------------+------------------------+
| Home Phone            | [phone]        |
+-----------------------+------------------------+
| Preferred Language    | Unknown                |
+-----------------------+------------------------+
| Marital Status        |                 |
+-----------------------+------------------------+
| Episcopal Affiliation | LUT                    |
+-----------------------+------------------------+
| Race                  | White                  |
+-----------------------+------------------------+
| Ethnic Group          | Not  or  |
+-----------------------+------------------------+
 
 
 Author
 
 
+--------------+------------------------------+
| Author       | [facility] |
+--------------+------------------------------+
| Organization | [facility] |
+--------------+------------------------------+
| Address      | Unknown                      |
+--------------+------------------------------+
| Phone        | Unavailable                  |
+--------------+------------------------------+
 
 
 
 Support
 
 
+---------------+--------------+---------+-----------------+
| Name          | Relationship | Address | Phone           |
+---------------+--------------+---------+-----------------+
| Opal Shane | ECON         | Unknown | [phone] |
+---------------+--------------+---------+-----------------+
 
 
 
 Care Team Providers
 
 
 
+-----------------------+------+-----------------+
| Care Team Member Name | Role | Phone           |
+-----------------------+------+-----------------+
| Erwin Iniguez MD   | PCP  | [phone] |
+-----------------------+------+-----------------+
 
 
 
 Encounter Details
 
 
+--------+----------+----------------------+--------------------+-------------+
| Date   | Type     | Department           | Care Team          | Description |
+--------+----------+----------------------+--------------------+-------------+
| / | Results  |   [facility]  [address]  |   [facility]   |             |
|    | Only     | [address]  | [phone]       |             |
|        |          |  [place], [place]        |                    |             |
|        |          | [address]           |                    |             |
|        |          | [phone]         |                    |             |
+--------+----------+----------------------+--------------------+-------------+
 
 
 
 Social History
 
 
+----------------+-------+-----------+--------+------+
| Tobacco Use    | Types | Packs/Day | Years  | Date |
|                |       |           | Used   |      |
+----------------+-------+-----------+--------+------+
| Never Assessed |       |           |        |      |
+----------------+-------+-----------+--------+------+
 
 
 
+------------------+---------------+
| Sex Assigned at  | Date Recorded |
| Birth            |               |
+------------------+---------------+
| Not on file      |               |
+------------------+---------------+
 
 
 
+----------------+-------------+-------------+
| Job Start Date | Occupation  | Industry    |
+----------------+-------------+-------------+
| Not on file    | Not on file | Not on file |
+----------------+-------------+-------------+
 
 
 
+----------------+--------------+------------+
| Travel History | Travel Start | Travel End |
+----------------+--------------+------------+
 
 
 
+-------------------------------------+
| No recent travel history available. |
 
+-------------------------------------+
 documented as of this encounter
 
 Plan of Treatment
 Not on filedocumented as of this encounter
 
 Procedures
 
 
+--------------------+--------+------------+----------------------+----------------------+
| Procedure Name     | Priori | Date/Time  | Associated Diagnosis | Comments             |
|                    | ty     |            |                      |                      |
+--------------------+--------+------------+----------------------+----------------------+
| SURGICAL PATHOLOGY | Routin | 1998 |                      |   Results for this   |
|                    | e      |            |                      | procedure are in the |
|                    |        |            |                      |  results section.    |
+--------------------+--------+------------+----------------------+----------------------+
 documented in this encounter
 
 Results
 SURGICAL PATHOLOGY (1998)
 
+-----------+--------------------------+-----------+-------------+--------------+
| Component | Value                    | Ref Range | Performed   | Pathologist  |
|           |                          |           | At          | Signature    |
+-----------+--------------------------+-----------+-------------+--------------+
| SURGICAL  | SOURCE OF SPECIMEN: SEE  |           | [facility]        |              |
| PATHOLOGY | RESULTS                  |           | DEPARTMENT  |              |
|           | Preliminary              |           | OF          |              |
|           | History:FROZEN SECTION   |           | PATHOLOGY   |              |
|           | DIAGNOSISMUCOSA LEFT     |           |             |              |
|           | TRUE VOCAL CORD          |           |             |              |
|           | (SPECIMEN #2) - INVASIVE |           |             |              |
|           |  SQUAMOUS CELL           |           |             |              |
|           |   CARCINOMARIGHT         |           |             |              |
|           | HEMILARYNGECTOMY         |           |             |              |
|           | VERTICAL (SPECIMEN #3) - |           |             |              |
|           |  RIGHT HEMILARYNGECTOMY  |           |             |              |
|           |   MARGINS NO TUMORLEFT   |           |             |              |
|           | TRUE VOCAL CORD          |           |             |              |
|           | (SPECIMEN #4) -   NO     |           |             |              |
|           | TUMOR       Confirmed    |           |             |              |
|           | by: Elgin Harper M.D.  |           |             |              |
|           |       CLINICAL HISTORY   |           |             |              |
|           |       Patient age: 53    |           |             |              |
|           | year old male.           |           |             |              |
|           | Patient History: NO      |           |             |              |
|           | HISTORY PROVIDED         |           |             |              |
|           | GROSS DESCRIPTION        |           |             |              |
|           | Specimens Received:      |           |             |              |
|           | Three fresh, one in      |           |             |              |
|           | formalin       #1        |           |             |              |
|           | DELPHIAN LYMPH NODE: The |           |             |              |
|           |  specimen is received in |           |             |              |
|           |  formalin andconsists of |           |             |              |
|           |  a 3.5 x 1.8 x 0.9 cm    |           |             |              |
|           | yellow, lobular, fatty   |           |             |              |
|           | tissue which             |           |             |              |
|           | onsectioning reveals     |           |             |              |
|           | three possible lymph     |           |             |              |
 
|           | nodes ranging from 0.6 x |           |             |              |
|           |  0.5 x0.4 cm to 1.5 x    |           |             |              |
|           | 0.6 x 0.5 cm.       #2   |           |             |              |
|           | MUCOSA LEFT TRUE VOCAL   |           |             |              |
|           | CORD-FS: The specimen is |           |             |              |
|           |  received fresh          |           |             |              |
|           | andconsists of two       |           |             |              |
|           | irregular, red-brown     |           |             |              |
|           | tissues measuring 0.5 x  |           |             |              |
|           | 0.3 x 0.2cm and 0.8 x    |           |             |              |
|           | 0.2 x 0.1 cm. The frozen |           |             |              |
|           |  section residue which   |           |             |              |
|           | consists ofthe entire    |           |             |              |
|           | specimen is wrapped.     |           |             |              |
|           |    #3 RIGHT              |           |             |              |
|           | HEMILARYNGECTOMY         |           |             |              |
|           | VERTICAL-FS: The         |           |             |              |
|           | specimen is received     |           |             |              |
|           | freshand consists of a   |           |             |              |
|           | 4.2 x 2.5 x 2.2 cm       |           |             |              |
|           | product of a right       |           |             |              |
|           | hemilaryngectomyconsisti |           |             |              |
|           | ng of the anterior half  |           |             |              |
|           | of the thyroid cartilage |           |             |              |
|           |  with remnantsof the     |           |             |              |
|           | superior thyroid notch.  |           |             |              |
|           | Along the posterior      |           |             |              |
|           | aspect of thecartilage   |           |             |              |
|           | is an attached remnant   |           |             |              |
|           | of mucosa measuring 2.5  |           |             |              |
|           | x 1.8 cm with araised,   |           |             |              |
|           | focally granular, tan to |           |             |              |
|           |  red-purple lesion       |           |             |              |
|           | involving the truecord   |           |             |              |
|           | and intraglottic mucosa  |           |             |              |
|           | measuring 2.2 x 1.3 cm   |           |             |              |
|           | and extending 0.3        |           |             |              |
|           | cmabove the mucosal      |           |             |              |
|           | surface. The tumor does  |           |             |              |
|           | not grossly appear to    |           |             |              |
|           | involvethe false cord.   |           |             |              |
|           |              A frozen    |           |             |              |
|           | section from the         |           |             |              |
|           | medioanterior margin is  |           |             |              |
|           | prepared by thesurgeon.  |           |             |              |
|           | The area of this frozen  |           |             |              |
|           | is inked yellow and the  |           |             |              |
|           | remainingmargin is inked |           |             |              |
|           |  black. Remnant of       |           |             |              |
|           | thyroid cartilage is     |           |             |              |
|           | calcified. Onsectioning  |           |             |              |
|           | the mass grossly extends |           |             |              |
|           |  up to the deep          |           |             |              |
|           | anteroinferior           |           |             |              |
|           | inkedmargin and within   |           |             |              |
|           | less than 0.1 cm of the  |           |             |              |
|           | lateral inked margin.    |           |             |              |
|           | Thetumor grossly extends |           |             |              |
|           |  up to but not through   |           |             |              |
|           | the thyroid cartilage    |           |             |              |
 
|           | andexamination of the    |           |             |              |
|           | medial margin reveals    |           |             |              |
|           | tumor to grossly extend  |           |             |              |
|           | up tofocal mucosal       |           |             |              |
|           | margins. The tumor has a |           |             |              |
|           |  well circumscribed,     |           |             |              |
|           | granular tanappearance   |           |             |              |
|           | and averages 1.2 cm      |           |             |              |
|           | thick. The tumor is      |           |             |              |
|           | grossly free of          |           |             |              |
|           | theinferior margin of    |           |             |              |
|           | resection and is within  |           |             |              |
|           | 0.3 cm of the superior   |           |             |              |
|           | inkedmargin.       #4    |           |             |              |
|           | LEFT TRUE VOCAL CORD-FS: |           |             |              |
|           |  The specimen is         |           |             |              |
|           | received fresh and       |           |             |              |
|           | consistsof a 0.9 x 0.7 x |           |             |              |
|           |  0.3 cm rubbery,         |           |             |              |
|           | predominantly            |           |             |              |
|           | hemorrhagic purple.      |           |             |              |
|           | Thefrozen section        |           |             |              |
|           | residue consists of the  |           |             |              |
|           | entire specimen.         |           |             |              |
|           | CASSETTE INDEX:#1        |           |             |              |
|           |   DELPHIAN LYMPH NODE:1, |           |             |              |
|           |  three nodes, TS#2       |           |             |              |
|           |   MUCOSA LEFT TRUE VOCAL |           |             |              |
|           |  CORD-FS:2, wrapped,     |           |             |              |
|           | TS#3   RIGHT             |           |             |              |
|           | HEMILARYNGECTOMY         |           |             |              |
|           | VERTICAL-FS:3A, frozen   |           |             |              |
|           | section residue of       |           |             |              |
|           | medioanterior margin and |           |             |              |
|           |  section of tumor   for  |           |             |              |
|           | comparison, TS3B,        |           |             |              |
|           | sections of lateral      |           |             |              |
|           | margin taken             |           |             |              |
|           | transversely, following  |           |             |              |
|           |   decalcification,       |           |             |              |
|           | RS3C-D, longitudinal     |           |             |              |
|           | sections of entire       |           |             |              |
|           | specimen following       |           |             |              |
|           |   decalcification, RS3E, |           |             |              |
|           |  sections of most medial |           |             |              |
|           |  margin taken            |           |             |              |
|           | transversely, following  |           |             |              |
|           |   decalcification, RS#4  |           |             |              |
|           |   LEFT TRUE VOCAL        |           |             |              |
|           | CORD-FS:4, TS       All  |           |             |              |
|           | tissue sections taken    |           |             |              |
|           | are submitted for        |           |             |              |
|           | microscopic              |           |             |              |
|           | evaluation.T:FATOUMATA/ELAINE/LUCILA:dj |           |             |              |
|           |               FINAL      |           |             |              |
|           | DIAGNOSIS#1 DELPHIAN     |           |             |              |
|           | LYMPH NODE:   ONE        |           |             |              |
|           | REGIONAL LYMPH NODE WITH |           |             |              |
|           |  NO EVIDENCE OF          |           |             |              |
|           | MALIGNANCY (0/1)#2       |           |             |              |
 
|           | MUCOSA LEFT TRUE VOCAL   |           |             |              |
|           | CORD:   INVASIVE         |           |             |              |
|           | SQUAMOUS CELL            |           |             |              |
|           | CARCINOMA#3 RIGHT        |           |             |              |
|           | HEMILARYNGECTOMY         |           |             |              |
|           | VERTICAL:   INVASIVE     |           |             |              |
|           | SQUAMOUS CELL CARCINOMA, |           |             |              |
|           |  WELL DIFFERENTIATED,    |           |             |              |
|           | 1.5 CM IN      GREATEST  |           |             |              |
|           | DIMENSION, INVADING INTO |           |             |              |
|           |  BUT NOT THROUGH THE     |           |             |              |
|           | THYROID      CARTILAGE.  |           |             |              |
|           | ANTEROINFERIOR SURGICAL  |           |             |              |
|           | MARGIN POSITIVE FOR      |           |             |              |
|           | TUMOR.#4 LEFT TRUE VOCAL |           |             |              |
|           |  CORD:   NO EVIDENCE OF  |           |             |              |
|           | MALIGNANCY       Note:   |           |             |              |
|           | In the absence of        |           |             |              |
|           | clinical history, the    |           |             |              |
|           | diagnosis is based       |           |             |              |
|           | ongross and/or           |           |             |              |
|           | histologic findings      |           |             |              |
|           | only.       Case         |           |             |              |
|           | reviewed by:   Jian MARTÍNEZ  |           |             |              |
|           | JORGE MaherCase staffed  |           |             |              |
|           | by:   Ernestine Webb           |             |              |
|           | M.D.T:98:tg        |           |             |              |
|           | My electronic signature  |           |             |              |
|           | indicates that I have    |           |             |              |
|           | personally reviewed      |           |             |              |
|           | alldiagnostic slides,    |           |             |              |
|           | the gross and/or         |           |             |              |
|           | microscopic portion of   |           |             |              |
|           | thisreport and           |           |             |              |
|           | formulated the final     |           |             |              |
|           | diagnosis.               |           |             |              |
+-----------+--------------------------+-----------+-------------+--------------+
 
 
 
+----------+
| Specimen |
+----------+
| Other    |
+----------+
 
 
 
+----------------------+------------------------+--------------------+--------------+
| Performing           | Address                | City/State/Zipcode | Phone Number |
| Organization         |                        |                    |              |
+----------------------+------------------------+--------------------+--------------+
|   [facility] |   [address]  | [place] [address] |              |
|  PATHOLOGY           | [address]                |                    |              |
+----------------------+------------------------+--------------------+--------------+
 documented in this encounter
 
 Visit Diagnoses
 Not on filedocumented in this encounter

## 2019-12-06 NOTE — XMS
Encounter Summary
  Created on: 2019
 
 Wyatt Shane
 External Reference #: [id number]
 : 43
 Sex: Male
 
 Demographics
 
 
+-----------------------+------------------------+
| Address               | [address]     |
|                       | [place]  [address]   |
+-----------------------+------------------------+
| Home Phone            | [phone]        |
+-----------------------+------------------------+
| Preferred Language    | Unknown                |
+-----------------------+------------------------+
| Marital Status        |                 |
+-----------------------+------------------------+
| Latter day Affiliation | LUT                    |
+-----------------------+------------------------+
| Race                  | White                  |
+-----------------------+------------------------+
| Ethnic Group          | Not  or  |
+-----------------------+------------------------+
 
 
 Author
 
 
+--------------+-------------+
| Organization | Unknown     |
+--------------+-------------+
| Address      | Unknown     |
+--------------+-------------+
| Phone        | Unavailable |
+--------------+-------------+
 
 
 
 Support
 
 
+---------------+--------------+---------+-----------------+
| Name          | Relationship | Address | Phone           |
+---------------+--------------+---------+-----------------+
| Opal Shane | ECON         | Unknown | [phone] |
+---------------+--------------+---------+-----------------+
 
 
 
 Care Team Providers
 
 
+-----------------------+------+-----------------+
| Care Team Member Name | Role | Phone           |
 
+-----------------------+------+-----------------+
| Erwin Iniguez MD   | PCP  | [phone] |
+-----------------------+------+-----------------+
 
 
 
 Encounter Details
 
 
+--------+-------------+------------+---------------------+---------------+
| Date   | Type        | Department | Care Team           | Description   |
+--------+-------------+------------+---------------------+---------------+
| / | Office      |            |   Note, Outpatient  | Progress Note |
| 2002   | Visit-Trans |            | Clinic              |               |
|        | cribed      |            |                     |               |
+--------+-------------+------------+---------------------+---------------+
 
 
 
 Social History
 
 
+----------------+-------+-----------+--------+------+
| Tobacco Use    | Types | Packs/Day | Years  | Date |
|                |       |           | Used   |      |
+----------------+-------+-----------+--------+------+
| Never Assessed |       |           |        |      |
+----------------+-------+-----------+--------+------+
 
 
 
+------------------+---------------+
| Sex Assigned at  | Date Recorded |
| Birth            |               |
+------------------+---------------+
| Not on file      |               |
+------------------+---------------+
 
 
 
+----------------+-------------+-------------+
| Job Start Date | Occupation  | Industry    |
+----------------+-------------+-------------+
| Not on file    | Not on file | Not on file |
+----------------+-------------+-------------+
 
 
 
+----------------+--------------+------------+
| Travel History | Travel Start | Travel End |
+----------------+--------------+------------+
 
 
 
+-------------------------------------+
| No recent travel history available. |
+-------------------------------------+
 documented as of this encounter
 
 Progress Notes
 
 Interface, Transcription In - 2006  3:07 AM PDTCLINIC DATE:       2002
 
 Mr. Shane is seen back today.  Details  of  interaction with the patient are
 outlined in my handwritten notes.  The  summary  is  that  he is doing well
 without evidence of recurrent disease  on  full  exam  including a flexible
 fiberoptic laryngoscopy.  Chest x-ray  is  being  done  by his primary care
 physician.  I will see him back here in approximately 6 months.
 
 Jimmy Esposito M.D.
 
 [facility] / 
 [id number] / [id number] / [id number] / [id number]
 D: 2002
 T: 2002
 
 cc:
 
 Erwin Ngo M.D.
 [address]
 [place]  [address]
 
 Eddy Boone M.D.
 [address] [facility]
 [place], [place]  [id number] signed by Interface, Transcription In at 2006  3:0
7 AM PDTdocumented in this encounter
 
 Plan of Treatment
 Not on filedocumented as of this encounter
 
 Visit Diagnoses
 Not on filedocumented in this encounter

## 2019-12-06 NOTE — XMS
Encounter Summary
  Created on: 2019
 
 Wyatt Shane
 External Reference #: [id number]
 : 43
 Sex: Male
 
 Demographics
 
 
+-----------------------+------------------------+
| Address               | [address]     |
|                       | [place]  [address]   |
+-----------------------+------------------------+
| Home Phone            | [phone]        |
+-----------------------+------------------------+
| Preferred Language    | Unknown                |
+-----------------------+------------------------+
| Marital Status        |                 |
+-----------------------+------------------------+
| [employer] Affiliation | LUT                    |
+-----------------------+------------------------+
| Race                  | White                  |
+-----------------------+------------------------+
| Ethnic Group          | Not  or  |
+-----------------------+------------------------+
 
 
 Author
 
 
+--------------+-------------+
| Organization | Unknown     |
+--------------+-------------+
| Address      | Unknown     |
+--------------+-------------+
| Phone        | Unavailable |
+--------------+-------------+
 
 
 
 Support
 
 
+---------------+--------------+---------+-----------------+
| Name          | Relationship | Address | Phone           |
+---------------+--------------+---------+-----------------+
| Opal Shane | ECON         | Unknown | [phone] |
+---------------+--------------+---------+-----------------+
 
 
 
 Care Team Providers
 
 
+-----------------------+------+-----------------+
| Care Team Member Name | Role | Phone           |
 
+-----------------------+------+-----------------+
| Erwin Iniguez MD   | PCP  | [phone] |
+-----------------------+------+-----------------+
 
 
 
 Encounter Details
 
 
+--------+-------------+------------+--------------------+-------------+
| Date   | Type        | Department | Care Team          | Description |
+--------+-------------+------------+--------------------+-------------+
| / | Transcribed |            |   Dictation, Other | Transcribed |
| 1998   |             |            |                    |             |
+--------+-------------+------------+--------------------+-------------+
 
 
 
 Social History
 
 
+----------------+-------+-----------+--------+------+
| Tobacco Use    | Types | Packs/Day | Years  | Date |
|                |       |           | Used   |      |
+----------------+-------+-----------+--------+------+
| Never Assessed |       |           |        |      |
+----------------+-------+-----------+--------+------+
 
 
 
+------------------+---------------+
| Sex Assigned at  | Date Recorded |
| Birth            |               |
+------------------+---------------+
| Not on file      |               |
+------------------+---------------+
 
 
 
+----------------+-------------+-------------+
| Job Start Date | Occupation  | Industry    |
+----------------+-------------+-------------+
| Not on file    | Not on file | Not on file |
+----------------+-------------+-------------+
 
 
 
+----------------+--------------+------------+
| Travel History | Travel Start | Travel End |
+----------------+--------------+------------+
 
 
 
+-------------------------------------+
| No recent travel history available. |
+-------------------------------------+
 documented as of this encounter
 
 Progress Notes
 Interface, Transcription In - 2007  5:11 AM [facility]                                    [place]
 
[facility] [place] [address] [place], [place] [address] [phone] or [phone]
 
                     Department of Otolaryngology - [address]01
                              Fax: [phone]
 
 1998
 
 BE PAULA MD
 [address]
 [place]  [address]
 
 RE:       Wyatt SHANE
 MR#       [id number]
 
 Dear Tony:
 
 Just a brief note to give you some follow-up on Wyatt Shane.  As you know, we
 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
 Therefore, we took him to the operating room for vertical hemilaryngectomy
 premised upon the fact that there seemed to be substantial disease present
 and in our experience, these lesions do not do well with radiation alone.
 Our findings at the time of surgery were consistent with this.  There was
 approximately a 1 cm mass deep within the thyroarytenoid muscle on that
 right side.  However, it was still surrounded by a cuff of thyroarytenoid
 muscle on its deep margin between it in the piriform sinus and therefore we
 were able to extirpate it without difficulty, preserving the majority of
 the posterior arytenoid.  There were some superficial changes in the
 contralateral cord with microinvasion, which were dealt with by excision.
 
 I would anticipate that he will have a good result in terms of voice.
 Unless we see evidence of perivascular invasion, I will not be recommending
 radiation therapy.  He left the hospital on the 4th postoperative day
 decannulated and with his feeding tube out and I think he should recover
 fairly quickly.
 As always, I appreciate your support in sending patients such as this to
 me.  I will continue to follow him along until he is stabilized and then
 return him to you for care.  Thank you again.
 
 Yours sincerely,
 
 Jimmy Esposito M.D.
 , Otolaryngology
 Head and Neck Surgery
 
 MARIA ALEJANDRA/zeus  D: 98  T: 98  C: 1998 dsElectronically signed by Tierra Kevin In at 2007  5:11 AM PSTdocumented in this encounter
 
 Plan of Treatment
 Not on filedocumented as of this encounter
 
 Visit Diagnoses
 Not on filedocumented in this encounter

## 2019-12-06 NOTE — XMS
Encounter Summary
  Created on: 2019
 
 Shane Wyatttien BroussardJosue
 External Reference #: [id number]
 : 43
 Sex: Male
 
 Demographics
 
 
+-----------------------+---------------------------+
| Address               | [address]        |
|                       | [place]  [address] |
+-----------------------+---------------------------+
| Home Phone            | [phone]           |
+-----------------------+---------------------------+
| Preferred Language    | Unknown                   |
+-----------------------+---------------------------+
| Marital Status        |                    |
+-----------------------+---------------------------+
| [employer] Affiliation | 1028                      |
+-----------------------+---------------------------+
| Race                  | Unknown                   |
+-----------------------+---------------------------+
| Ethnic Group          | Unknown                   |
+-----------------------+---------------------------+
 
 
 Author
 
 
+--------------+--------------------------------------------+
| Author       | [facility]  |
|              | and [place]                                |
+--------------+--------------------------------------------+
| Organization | [facility]  |
|              | and [place]                                |
+--------------+--------------------------------------------+
| Address      | Unknown                                    |
+--------------+--------------------------------------------+
| Phone        | Unavailable                                |
+--------------+--------------------------------------------+
 
 
 
 Support
 
 
+---------------+--------------+--------------------+-----------------+
| Name          | Relationship | Address            | Phone           |
+---------------+--------------+--------------------+-----------------+
| Opal Shane | ECON         | [address]     | [phone] |
|               |              | [place]   |                 |
|               |              | [address]              |                 |
+---------------+--------------+--------------------+-----------------+
 
 
 
 
 Care Team Providers
 
 
+-----------------------+------+-----------------+
| Care Team Member Name | Role | Phone           |
+-----------------------+------+-----------------+
| Erwin Iniguez MD | PCP  | [phone] |
+-----------------------+------+-----------------+
 
 
 
 Reason for Visit
 
 
+--------+----------+
| Reason | Comments |
+--------+----------+
| Apnea  |          |
+--------+----------+
 
 
 
 Encounter Details
 
 
+--------+---------+----------------------+----------------------+-------------------+
| Date   | Type    | Department           | Care Team            | Description       |
+--------+---------+----------------------+----------------------+-------------------+
| / | Office  |   [facility]      |   Sohail Campbell PA  | JOANN (obstructive  |
|    | Visit   | SLEEP DISORDER  [address]  |  [address]     | sleep apnea)      |
|        |         | [address]      | [facility] [place]      | (Primary Dx);     |
|        |         | [place] [address] | [address]  [phone]  | Cheyne-Gregory     |
|        |         |   [phone]       |  [phone] (Fax)  | breathing         |
+--------+---------+----------------------+----------------------+-------------------+
 
 
 
 Social History
 
 
+---------------+------------+-----------+--------+------------------+
| Tobacco Use   | Types      | Packs/Day | Years  | Date             |
|               |            |           | Used   |                  |
+---------------+------------+-----------+--------+------------------+
| Former Smoker | Cigarettes | 1.3       | 34     | Quit: 1996 |
+---------------+------------+-----------+--------+------------------+
 
 
 
+---------------------+---+---+---+
| Smokeless Tobacco:  |   |   |   |
| Never Used          |   |   |   |
+---------------------+---+---+---+
 
 
 
+-------------+-------------+---------+----------+
| Alcohol Use | Drinks/Week | oz/Week | Comments |
+-------------+-------------+---------+----------+
 
| No          |             |         |          |
+-------------+-------------+---------+----------+
 
 
 
+------------------+---------------+
| Sex Assigned at  | Date Recorded |
| Birth            |               |
+------------------+---------------+
| Not on file      |               |
+------------------+---------------+
 
 
 
+----------------+-------------+-------------+
| Job Start Date | Occupation  | Industry    |
+----------------+-------------+-------------+
| Not on file    | Not on file | Not on file |
+----------------+-------------+-------------+
 
 
 
+----------------+--------------+------------+
| Travel History | Travel Start | Travel End |
+----------------+--------------+------------+
 
 
 
+-------------------------------------+
| No recent travel history available. |
+-------------------------------------+
 documented as of this encounter
 
 Last Filed Vital Signs
 
 
+-------------------+----------------------+----------------------+----------+
| Vital Sign        | Reading              | Time Taken           | Comments |
+-------------------+----------------------+----------------------+----------+
| Blood Pressure    | 122/66               | 2014 11:02 AM  |          |
|                   |                      | PDT                  |          |
+-------------------+----------------------+----------------------+----------+
| Pulse             | 68                   | 2014 11:02 AM  |          |
|                   |                      | PDT                  |          |
+-------------------+----------------------+----------------------+----------+
| Temperature       | -                    | -                    |          |
+-------------------+----------------------+----------------------+----------+
| Respiratory Rate  | 16                   | 2014 11:02 AM  |          |
|                   |                      | PDT                  |          |
+-------------------+----------------------+----------------------+----------+
| Oxygen Saturation | 97%                  | 2014 11:02 AM  |          |
|                   |                      | PDT                  |          |
+-------------------+----------------------+----------------------+----------+
| Inhaled Oxygen    | -                    | -                    |          |
| Concentration     |                      |                      |          |
+-------------------+----------------------+----------------------+----------+
| Weight            | 87.5 kg (192 lb 14.4 | 2014 11:02 AM  |          |
|                   |  oz)                 | PDT                  |          |
+-------------------+----------------------+----------------------+----------+
| Height            | -                    | -                    |          |
 
+-------------------+----------------------+----------------------+----------+
| Body Mass Index   | 31.13                | 2013  1:28 PM  |          |
|                   |                      | PDT                  |          |
+-------------------+----------------------+----------------------+----------+
 documented in this encounter
 
 Progress Notes
 Sohail Campbell PA - 2014 11:01 AM PDTFormatting of this note might be different from 
the original.
 Subjective: 
  
 Patient ID: Wyatt Shane is a 68 y.o. male.
 
 HPI
 
 last office visit was:  2014
 date of polysomnography: 10/07/2013 
 AHI: 77.4
 RDI: 77.4
 O2%: 86% with 15.8 minutes below 88% 
 Machine type: Respironics ASV BiPAP with nasal mask (Aditi)
 obtained from:  [facility]
 pressure: EPAP = 4cm;PSmin=0cm;PSmax=25cm;backup rate=auto
 Nights using BiPAP:  30
 average usage (all nights):  3:37
 average usage (nights used):   4:20
 AHI: 1.5
 
 Wyatt comes in for BiPAP compliance.  He continues to wear his BiPAP on a regular basis, but
 is still struggling with wearing it for the duration of the night.  His congestion problems
 continue, but have improved some.  He says that he also catches himself falling asleep befo
re he remembers to put on his mask.  He and his wife talk when they go to bed and he falls a
sleep.  He has lowered his humidity because of condensation problems.  He replaced his hose 
after his last appointment and condensation is no longer a problem, but he hasn't increased 
his humidity.  He says he is waking in the morning with congestion and blowing out dried blo
od.
 
 I have discussed the download in detail.  This shows that his sleep apnea is controlled, wi
th an AHI of 2.1.  It also shows that his leaks are well controlled.  His percentage of nigh
ts wearing his BiPAP >4 hours during the last 36 nights is 44.4%.
 
 Review of Systems
 
 Objective: 
 Physical Exam
 
 Assessment: 
 
 Problem #1: OBSTRUCTIVE SLEEP APNEA (ICD 9-327.23)
 This is controlled with BiPAP.  He is wearing his BiPAP regularly, but continues to struggl
e with wearing it for the duration of the night.  He continues to have problems with congest
ion.
 
 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 
 This is controlled with ASV BiPAP.
 
 Plan: 
 
 He is to continue with his BiPAP indefinitely.  I recommended that he increase his humidity
 from 2.0 to 3.0 to help with his congestion.  He is to work toward wearing his BiPAP 100% o
 
f the time he is asleep.
 
 I will follow up again in 1 month, sooner prn.  Fifteen minutes were spent face-to-face, wi
th the majority of time spent in counseling.
 
 Sohail Campbell PA-C
 
 cc: 
 Dr. Erwin Foote
 
 Electronically signed by ROWENA Greene at 2014 11:28 AM PDTdocumented in this enco
unter
 
 Plan of Treatment
 
 
+--------+---------+-------------+----------------------+-------------+
| Date   | Type    | Specialty   | Care Team            | Description |
+--------+---------+-------------+----------------------+-------------+
| / | Office  | Pulmonology |   Juan F,          |             |
|    | Visit   |             | Jessica Cheung,   |             |
|        |         |             | MD [address] |             |
|        |         |             |   [place]   |             |
|        |         |             | [place] [address]             |             |
|        |         |             | [phone]         |             |
|        |         |             | [phone] (Fax)   |             |
+--------+---------+-------------+----------------------+-------------+
| / | Office  | Cardiology  |   Eric Akins, |             |
|    | Visit   |             |  MD [address]   |             |
|        |         |             | [place]  |             |
|        |         |             | [id number] [phone]  |             |
|        |         |             |  [phone] (Fax)  |             |
+--------+---------+-------------+----------------------+-------------+
 documented as of this encounter
 
 Visit Diagnoses
 
 
+----------------------------------------------------------------------------------------+
| Diagnosis                                                                              |
+----------------------------------------------------------------------------------------+
|   JOANN (obstructive sleep apnea) - Primary  Obstructive sleep apnea (adult) (pediatric) |
+----------------------------------------------------------------------------------------+
|   Cheyne-Gregory breathing  Cheyne-Gregory respiration                                   |
+----------------------------------------------------------------------------------------+
 documented in this encounter

## 2019-12-06 NOTE — XMS
Encounter Summary
  Created on: 2019
 
 Wyatt Shane
 External Reference #: [id number]
 : 43
 Sex: Male
 
 Demographics
 
 
+-----------------------+------------------------+
| Address               | [address]     |
|                       | [place]  [address]   |
+-----------------------+------------------------+
| Home Phone            | [phone]        |
+-----------------------+------------------------+
| Preferred Language    | Unknown                |
+-----------------------+------------------------+
| Marital Status        |                 |
+-----------------------+------------------------+
| Scientologist Affiliation | LUT                    |
+-----------------------+------------------------+
| Race                  | White                  |
+-----------------------+------------------------+
| Ethnic Group          | Not  or  |
+-----------------------+------------------------+
 
 
 Author
 
 
+--------------+-------------+
| Organization | Unknown     |
+--------------+-------------+
| Address      | Unknown     |
+--------------+-------------+
| Phone        | Unavailable |
+--------------+-------------+
 
 
 
 Support
 
 
+---------------+--------------+---------+-----------------+
| Name          | Relationship | Address | Phone           |
+---------------+--------------+---------+-----------------+
| Opal Shane | ECON         | Unknown | [phone] |
+---------------+--------------+---------+-----------------+
 
 
 
 Care Team Providers
 
 
+-----------------------+------+-----------------+
| Care Team Member Name | Role | Phone           |
 
+-----------------------+------+-----------------+
| Erwin Iniguez MD   | PCP  | [phone] |
+-----------------------+------+-----------------+
 
 
 
 Encounter Details
 
 
+--------+-------------+------------+----------------------+------------------+
| Date   | Type        | Department | Care Team            | Description      |
+--------+-------------+------------+----------------------+------------------+
| / | Discharge   |            |   Summary, Discharge | D/C Summary ODDS |
| 2000   | Summary-Tra |            |                      |                  |
|        | nscribed    |            |                      |                  |
+--------+-------------+------------+----------------------+------------------+
 
 
 
 Social History
 
 
+----------------+-------+-----------+--------+------+
| Tobacco Use    | Types | Packs/Day | Years  | Date |
|                |       |           | Used   |      |
+----------------+-------+-----------+--------+------+
| Never Assessed |       |           |        |      |
+----------------+-------+-----------+--------+------+
 
 
 
+------------------+---------------+
| Sex Assigned at  | Date Recorded |
| Birth            |               |
+------------------+---------------+
| Not on file      |               |
+------------------+---------------+
 
 
 
+----------------+-------------+-------------+
| Job Start Date | Occupation  | Industry    |
+----------------+-------------+-------------+
| Not on file    | Not on file | Not on file |
+----------------+-------------+-------------+
 
 
 
+----------------+--------------+------------+
| Travel History | Travel Start | Travel End |
+----------------+--------------+------------+
 
 
 
+-------------------------------------+
| No recent travel history available. |
+-------------------------------------+
 documented as of this encounter
 
 Discharge Summaries
 
 Interface, Transcription In - 2006  1:10 AM [facility]
ON
                         [address] [address] [phone]
                      [facility]
 
 MEDICAL SUMMARY OF HOSPITALIZATION
 
 Med Rec No:  [id number]          Admission Date: 2000
 
 Name:   Wyatt Shane                Discharge Date:  2000
 
 ATTENDING PHYSICIAN: Jimmy Esposito M.D.
 
 PRINCIPAL FINAL DIAGNOSIS:
 Recurrent subglottic stenosis.
 
 ADDITIONAL DIAGNOSES:
 1)  Hypertension.
 2)  Arthritis.
 3)  Nephrolithiasis.
 4)  Hyperlipemia.
 5)  Coronary artery disease status post  coronary  artery  bypass  graft in
    1996.
 6)  Status post chemotherapy and radiation  therapy  for  right  true vocal
    cord cancer.
 
 PRINCIPAL PROCEDURE:
 Laryngeal tracheoplasty with rib graft.
 
 ADDITIONAL PROCEDURES:
 1)  Speech therapy.
 2)  Nutrition consult.
 
 REASON FOR ADMISSION:
 The patient is a 54-year-old gentleman  who  has  been  followed in the ENT
 clinic  for  the  past  two years and  is  status  post  a  right  vertical
 hemilaryngectomy with tracheostomy tube  placement for a Stage T2N0M0 right
 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  It was
 performed   without   complication   initially,    however,    subsequently
 postoperatively, he developed recurrent  laryngeal  stenosis  with repeated
 dilations that had been unsuccessful.   After  his  most  recent  visit  on
 1999, fiberoptic laryngoscopy  was  performed  and  showed  a
 significant change of the subglottic  stenosis,  which  continued to remain
 about 50 to 60 percent of his airway.   He  has  currently  failed multiple
 dilations and is currently indicated  for  tracheostomy  tube  as  well  as
 laryngeal tracheoplasty with rib graft.
 
 PAST MEDICAL HISTORY:
 As above.
 
 PAST SURGICAL HISTORY:
 1)  Laparoscopic Nissen fundoplication 1999.
 2)  Right hemilaryngectomy in 1998.
 
 MEDICATIONS ON ADMISSION:
 1)  Norvasc, 10 mg po qd.
 2)  Aspirin, 1 tablet po qd.
 3)  Lipitor, 10 mg po qd.
 
 
 ALLERGIES:
 ALLERGIC TO BETA-BLOCKERS.
 
 PHYSICAL EXAMINATION:
 GENERAL:  Alert and oriented, in no acute distress. CARDIOVASCULAR: Regular
 rate  and  rhythm.   LUNGS:  Clear to auscultation  bilaterally.   ABDOMEN:
 Soft, non-tender, and non-distended. OROPHARYNX: Clear.  Pupils were equal,
 round and reactive to light and accommodation.   Extraocular movements were
 intact.  No palpable masses.
 
 ASSESSMENT:
 This  is  a  54-year-old  gentleman  with   recurrent  stenoses,  currently
 indicated for laryngeal tracheoplasty with rib graft.
 
 HOSPITAL COURSE:
 The patient was admitted on 2000  and  taken to the operating
 room  for  laryngeal tracheoplasty with  rib  graft,  which  was  performed
 without complication.  The patient was  transferred  to the Post Anesthesia
 Care Unit in stable condition, and subsequently  transferred to the general
 ENT torres on 9C.  Speech pathology was consulted initially and evaluated his
 swallowing.   He  was  started  on pureed  foods  and  liquids,  __________
 postoperatively.  He was doing well.  He  had  minimal pain.  Nutrition was
 also   evaluated   him  to  help  regulate   his   caloric   intake.    The
 recommendations included milkshakes on his trays.
 
 By postoperative day #2, it was noted  that his trach tube was slightly low
 and not setting quite properly and a small two centimeter incision was made
 at the bedside to open up the trach site and allow it to freely move into a
 more comfortable position for the patient.   After  this was performed, the
 patient had a small amount of bleeding  that  was  packed  and pressure was
 held.  Hemostasis was subsequently achieved.   The  patient  felt  able and
 ready to be discharged on postoperative day #3, which was done.  There were
 no complications in his hospital course.
 
 DISCHARGE INSTRUCTIONS:
 DISCHARGE MEDICATIONS:
 1)  Augmentin, 875 mg po t.i.d. for four weeks.
 2)  Vicodin, 1 to 2 tabs po 4 hours prn pain.
 DIET:
 None dictated.
 ACTIVITY:
 None dictated.
 FOLLOW UP CARE:
 He is to follow-up in the ENT clinic  in  three to four weeks with JORGE Sadler M.D.                  Jimmy Esposito M.D.
 
 DT:[id number]
 D:  2000
 T:  2000
 C:  2000 jrgElectronically signed by Interface, Transcription In at 2006  1:10 
AM PSTdocumented in this encounter
 
 Plan of Treatment
 Not on filedocumented as of this encounter
 
 Visit Diagnoses
 Not on filedocumented in this encounter

## 2019-12-06 NOTE — XMS
Encounter Summary
  Created on: 2019
 
 Wyatt Shane
 External Reference #: [id number]
 : 43
 Sex: Male
 
 Demographics
 
 
+-----------------------+------------------------+
| Address               | [address]     |
|                       | [place]  [address]   |
+-----------------------+------------------------+
| Home Phone            | [phone]        |
+-----------------------+------------------------+
| Preferred Language    | Unknown                |
+-----------------------+------------------------+
| Marital Status        |                 |
+-----------------------+------------------------+
| Catholic Affiliation | LUT                    |
+-----------------------+------------------------+
| Race                  | White                  |
+-----------------------+------------------------+
| Ethnic Group          | Not  or  |
+-----------------------+------------------------+
 
 
 Author
 
 
+--------------+------------------------------+
| Author       | [facility] |
+--------------+------------------------------+
| Organization | [facility] |
+--------------+------------------------------+
| Address      | Unknown                      |
+--------------+------------------------------+
| Phone        | Unavailable                  |
+--------------+------------------------------+
 
 
 
 Support
 
 
+---------------+--------------+---------+-----------------+
| Name          | Relationship | Address | Phone           |
+---------------+--------------+---------+-----------------+
| Opal Shane | ECON         | Unknown | [phone] |
+---------------+--------------+---------+-----------------+
 
 
 
 Care Team Providers
 
 
 
+-----------------------+------+-----------------+
| Care Team Member Name | Role | Phone           |
+-----------------------+------+-----------------+
| Erwin Iniguez MD   | PCP  | [phone] |
+-----------------------+------+-----------------+
 
 
 
 Reason for Visit
 
 
+-----------------+----------+
| Reason          | Comments |
+-----------------+----------+
| Follow-up visit |          |
+-----------------+----------+
 
 
 
 Encounter Details
 
 
+--------+---------+----------------------+--------------------+---------------------+
| Date   | Type    | Department           | Care Team          | Description         |
+--------+---------+----------------------+--------------------+---------------------+
| / | Office  |   Otolaryngology     |   Jimmy Esposito MD | MALIGNANT NEOPLASM  |
|    | Visit   | Head and Neck        |                    | OF GLOTTIS (HCC)    |
|        |         | Surgery Services at  |                    | (Primary Dx)        |
|        |         | [facility]  [address] [facility]     |                    |                     |
|        |         | [place]       |                    |                     |
|        |         | Mailcode: [address]       |                    |                     |
|        |         | [facility] |                    |                     |
|        |         |   [place], [place]       |                    |                     |
|        |         | [id number]           |                    |                     |
|        |         | [phone]         |                    |                     |
+--------+---------+----------------------+--------------------+---------------------+
 
 
 
 Social History
 
 
+----------------+-------+-----------+--------+------+
| Tobacco Use    | Types | Packs/Day | Years  | Date |
|                |       |           | Used   |      |
+----------------+-------+-----------+--------+------+
| Never Assessed |       |           |        |      |
+----------------+-------+-----------+--------+------+
 
 
 
+------------------+---------------+
| Sex Assigned at  | Date Recorded |
| Birth            |               |
+------------------+---------------+
| Not on file      |               |
+------------------+---------------+
 
 
 
 
+----------------+-------------+-------------+
| Job Start Date | Occupation  | Industry    |
+----------------+-------------+-------------+
| Not on file    | Not on file | Not on file |
+----------------+-------------+-------------+
 
 
 
+----------------+--------------+------------+
| Travel History | Travel Start | Travel End |
+----------------+--------------+------------+
 
 
 
+-------------------------------------+
| No recent travel history available. |
+-------------------------------------+
 documented as of this encounter
 
 Progress Notes
 Vaibhav Guerrero Phd, Jimmy - 2006  1:09 PM PSTHere for general check up- with his history of
 cancer wants to be sure nothing serious following recent episode of bronchitis.  Rx with an
tibiotics- now improving
 
 ROS: 
  
 No new skin lesions.
 No otalgia or otorrhea. 
 No dysphagia/odynophagia. 
 No change in voice. 
 No mouth or throat pain
 No epistaxix or nasal obstruction. 
 No new masses or pain 
 No chest pain on exertion.  
 No abdominal pain, change in bowel habits.  
 No urinary symptoms.  
 No new or unusual musculoskeletal symptoms. 
 No weight loss or fatigue.
 
 Skin- no evidence of lesions
 Ears- pinnae, ear canals, typmanic membranes- normal.
 Nose-normal to anterior rhinoscopy, mucosa normal, septum midline
 Oral cavity/Orophaynx- Mucosa of the oral cavity, tongue, pharynx and palate has no inflamm
ation or suspicious lesions.  Dentition normal. 
 Salivary Glands- no evidence of masses, normal salivary flow from Pedro's/Hutchinson's ducts
 Thyroid- normal size, no nodules
 Neck- No adenopathy or masses Levels I-V.
 Cranial Nerves- II-XII normal 
 
 Transnasal flexible fiberoptic laryngoscopy was performed under topical anesthesia (psuedoe
phedrine/lidocaine).  The nasal cavity, nasopharynx, oropharynx, hypopharynx and larynx were
 all well seen.  All mucosal surfaces are without any visible abnormality.  Laryngeal config
uration unchanged from before. Mild erythema only. There is no evidence of any pooled secret
ions.
 
 Electronically signed by Jimmy Esposito Md Phd at 2006  1:09 PM PSTdocumented in this en
counter
 
 Plan of Treatment
 
 
 
+----------------------+------------+--------+----------------------+---------------------+
| Name                 | Type       | Priori | Associated Diagnoses | Order Schedule      |
|                      |            | ty     |                      |                     |
+----------------------+------------+--------+----------------------+---------------------+
| SD LARYNGOSCOPY,FLEX | Procedures | Routin |   Malignant Neoplasm | Ordered: 2006 |
|  FIBER,DIAGNOSTIC    |            | e      |  Of Glottis (Hcc)    |                     |
+----------------------+------------+--------+----------------------+---------------------+
 documented as of this encounter
 
 Visit Diagnoses
 
 
+--------------------------------------------------------------------------------+
| Diagnosis                                                                      |
+--------------------------------------------------------------------------------+
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
+--------------------------------------------------------------------------------+
 documented in this encounter

## 2019-12-06 NOTE — XMS
Clinical Summary
  Created on: 2019
 
 Wyatt Shane
 External Reference #: [id number]
 : 43
 Sex: Male
 
 Demographics
 
 
+-----------------------+------------------------+
| Address               | [address]     |
|                       | [place]  [address]   |
+-----------------------+------------------------+
| Home Phone            | [phone]        |
+-----------------------+------------------------+
| Preferred Language    | Unknown                |
+-----------------------+------------------------+
| Marital Status        |                 |
+-----------------------+------------------------+
| Buddhism Affiliation | LUT                    |
+-----------------------+------------------------+
| Race                  | White                  |
+-----------------------+------------------------+
| Ethnic Group          | Not  or  |
+-----------------------+------------------------+
 
 
 Author
 
 
+--------------+-------------------------+
| Author       | [facility] |
+--------------+-------------------------+
| Organization | [facility] |
+--------------+-------------------------+
| Address      | Unknown                 |
+--------------+-------------------------+
| Phone        | Unavailable             |
+--------------+-------------------------+
 
 
 
 Support
 
 
+---------------+--------------+---------+-----------------+
| Name          | Relationship | Address | Phone           |
+---------------+--------------+---------+-----------------+
| Opal Shane | ECON         | Unknown | [phone] |
+---------------+--------------+---------+-----------------+
 
 
 
 Care Team Providers
 
 
 
+-----------------------+------+-----------------+
| Care Team Member Name | Role | Phone           |
+-----------------------+------+-----------------+
| Erwin Iniguez MD   | PCP  | [phone] |
+-----------------------+------+-----------------+
 
 
 
 Source Comments
 [facility] is fully live on both [facility] and [facility]
 
 Allergies
 No Known Allergies
 
 Medications
 
 
+----------------------+----------------------+-----------+---------+------+------+-------+
| Medication           | Sig                  | Dispensed | Refills | Star | End  | Statu |
|                      |                      |           |         | t    | Date | s     |
|                      |                      |           |         | Date |      |       |
+----------------------+----------------------+-----------+---------+------+------+-------+
|   VICODIN ORAL       | None Entered         |           | 0       |      |      | Activ |
|                      |                      |           |         |      |      | e     |
+----------------------+----------------------+-----------+---------+------+------+-------+
|   LANSOPRAZOLE       | Take  by mouth.      |           | 0       |      |      | Activ |
| (PREVACID ORAL)      |                      |           |         |      |      | e     |
+----------------------+----------------------+-----------+---------+------+------+-------+
|   metoclopramide     | Take 5 mg by mouth   |           | 0       |      |      | Activ |
| (REGLAN) 5 mg Oral   | every six hours as   |           |         |      |      | e     |
| Tablet               | needed.              |           |         |      |      |       |
+----------------------+----------------------+-----------+---------+------+------+-------+
|   sertraline         | Take 50 mg by mouth  |           | 0       |      |      | Activ |
| (ZOLOFT) 50 mg Oral  | once daily.          |           |         |      |      | e     |
| Tablet               |                      |           |         |      |      |       |
+----------------------+----------------------+-----------+---------+------+------+-------+
|   tamsulosin         | Take 0.4 mg by mouth |           | 0       |      |      | Activ |
| (FLOMAX) 0.4 mg Oral |  once daily.         |           |         |      |      | e     |
|  Capsule, Sust.      |                      |           |         |      |      |       |
| Release 24 hr        |                      |           |         |      |      |       |
+----------------------+----------------------+-----------+---------+------+------+-------+
|   TESTOSTERONE IM    | Inject  into the     |           | 0       |      |      | Activ |
|                      | muscle (IM).         |           |         |      |      | e     |
+----------------------+----------------------+-----------+---------+------+------+-------+
|                      | Take  by mouth.      |           | 0       |      |      | Activ |
| Amlodipine-Atorvasta |                      |           |         |      |      | e     |
| tin (CADUET) 10-10   |                      |           |         |      |      |       |
| mg Oral Tablet       |                      |           |         |      |      |       |
+----------------------+----------------------+-----------+---------+------+------+-------+
|   ACYCLOVIR ORAL     | Take  by mouth as    |           | 0       | 06/3 |      | Activ |
|                      | needed.              |           |         | 0/20 |      | e     |
|                      |                      |           |         | 10   |      |       |
+----------------------+----------------------+-----------+---------+------+------+-------+
|   CYANOCOBALAMIN     | by Injection route   |           | 0       | 06/3 |      | Activ |
| (VITAMIN B-12 INJ)   | every thirty days.   |           |         | 0/20 |      | e     |
|                      |                      |           |         | 10   |      |       |
+----------------------+----------------------+-----------+---------+------+------+-------+
|   gabapentin 300 mg  | Take 300 mg by mouth |           | 0       |      |      | Activ |
| Oral Tablet          |  three times daily.  |           |         |      |      | e     |
 
+----------------------+----------------------+-----------+---------+------+------+-------+
|   LORATADINE         | Take  by mouth.      |           | 0       |      |      | Activ |
| (CLARITIN ORAL)      |                      |           |         |      |      | e     |
+----------------------+----------------------+-----------+---------+------+------+-------+
|   NAPROXEN           | Take  by mouth.      |           | 0       |      |      | Activ |
| SODIUM/P-EPHED HCL   |                      |           |         |      |      | e     |
| (SUDAFED 12 HR       |                      |           |         |      |      |       |
| SINUS-PAIN ORAL)     |                      |           |         |      |      |       |
+----------------------+----------------------+-----------+---------+------+------+-------+
|   atorvastatin       | Take 10 mg by mouth  |           | 0       |      |      | Activ |
| (LIPITOR) 10 mg Oral | once daily.          |           |         |      |      | e     |
|  Tablet              |                      |           |         |      |      |       |
+----------------------+----------------------+-----------+---------+------+------+-------+
|   omeprazole         | Take 40 mg by mouth  |           | 0       |      |      | Activ |
| (PRILOSEC) 40 mg     | once daily.          |           |         |      |      | e     |
| Oral Capsule,        |                      |           |         |      |      |       |
| Delayed              |                      |           |         |      |      |       |
| Release(E.C.)        |                      |           |         |      |      |       |
+----------------------+----------------------+-----------+---------+------+------+-------+
 
 
 
 Active Problems
 
 
+--------------------------------+------------+
| Problem                        | Noted Date |
+--------------------------------+------------+
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
+--------------------------------+------------+
| History of laryngeal cancer    | 2011 |
+--------------------------------+------------+
| Esophageal stenosis            | 2010 |
+--------------------------------+------------+
| Larynx edema                   | 2010 |
+--------------------------------+------------+
| Glottic stenosis               | 10/03/2007 |
+--------------------------------+------------+
| Pharyngeal dysphagia           | 10/03/2007 |
+--------------------------------+------------+
| Allergic rhinitis              | 10/03/2007 |
+--------------------------------+------------+
 
 
 
+---------------------+
|   Overview:   ICD10 |
+---------------------+
 
 
 
 Resolved Problems
 
 
+------------------+----------+-----------+
| Problem          | Noted    | Resolved  |
|                  | Date     | Date      |
+------------------+----------+-----------+
| Laryngeal cancer | 10/03/20 |  |
|                  | 07       |          |
 
+------------------+----------+-----------+
 
 
 
 Family History
 
 
+-------------------+----------+------+----------+
| Medical History   | Relation | Name | Comments |
+-------------------+----------+------+----------+
| Cancer            | Mother   |      | breast   |
+-------------------+----------+------+----------+
| Movement Disorder | Neg Hx   |      |          |
+-------------------+----------+------+----------+
| Thyroid           | Neg Hx   |      |          |
+-------------------+----------+------+----------+
| Tremor            | Neg Hx   |      |          |
+-------------------+----------+------+----------+
 
 
 
+----------+------+--------+----------+
| Relation | Name | Status | Comments |
+----------+------+--------+----------+
| Mother   |      |        |          |
+----------+------+--------+----------+
 
 
 
 Social History
 
 
+---------------+------------+-----------+--------+------------------+
| Tobacco Use   | Types      | Packs/Day | Years  | Date             |
|               |            |           | Used   |                  |
+---------------+------------+-----------+--------+------------------+
| Former Smoker | Cigarettes | 1         | 30     | Quit: 1996 |
+---------------+------------+-----------+--------+------------------+
 
 
 
+-------------+-------------+---------+----------+
| Alcohol Use | Drinks/Week | oz/Week | Comments |
+-------------+-------------+---------+----------+
| No          |             |         |          |
+-------------+-------------+---------+----------+
 
 
 
+------------------+---------------+
| Sex Assigned at  | Date Recorded |
| Birth            |               |
+------------------+---------------+
| Not on file      |               |
+------------------+---------------+
 
 
 
+----------------+-------------+-------------+
| Job Start Date | Occupation  | Industry    |
 
+----------------+-------------+-------------+
| Not on file    | Not on file | Not on file |
+----------------+-------------+-------------+
 
 
 
+----------------+--------------+------------+
| Travel History | Travel Start | Travel End |
+----------------+--------------+------------+
 
 
 
+-------------------------------------+
| No recent travel history available. |
+-------------------------------------+
 
 
 
 Last Filed Vital Signs
 
 
+-------------------+------------------+----------------------+----------+
| Vital Sign        | Reading          | Time Taken           | Comments |
+-------------------+------------------+----------------------+----------+
| Blood Pressure    | 135/77           | 2011 11:13 AM  |          |
|                   |                  | PDT                  |          |
+-------------------+------------------+----------------------+----------+
| Pulse             | 78               | 2011 11:13 AM  |          |
|                   |                  | PDT                  |          |
+-------------------+------------------+----------------------+----------+
| Temperature       | -                | -                    |          |
+-------------------+------------------+----------------------+----------+
| Respiratory Rate  | -                | -                    |          |
+-------------------+------------------+----------------------+----------+
| Oxygen Saturation | -                | -                    |          |
+-------------------+------------------+----------------------+----------+
| Inhaled Oxygen    | -                | -                    |          |
| Concentration     |                  |                      |          |
+-------------------+------------------+----------------------+----------+
| Weight            | 92.5 kg (204 lb) | 2011 11:13 AM  |          |
|                   |                  | PDT                  |          |
+-------------------+------------------+----------------------+----------+
| Height            | 172.7 cm (5' 8") | 2011 11:13 AM  |          |
|                   |                  | PDT                  |          |
+-------------------+------------------+----------------------+----------+
| Body Mass Index   | 31.02            | 2011 11:13 AM  |          |
|                   |                  | PDT                  |          |
+-------------------+------------------+----------------------+----------+
 
 
 
 Plan of Treatment
 
 
+----------------------+-----------+-----------+----------+
| Health Maintenance   | Due Date  | Last Done | Comments |
+----------------------+-----------+-----------+----------+
| Pneumococcal         |  |           |          |
| vaccination (1 of 2  | 9         |           |          |
| - PCV13)             |           |           |          |
 
+----------------------+-----------+-----------+----------+
| Influenza (Flu)      | 10/01/201 |           |          |
| vaccination (#1)     | 9         |           |          |
+----------------------+-----------+-----------+----------+
 
 
 
 Results
 Not on filefrom Last 3 Months
 
 Insurance
 
 
+------------------+--------+-------------+--------+-------------+------------+--------+
| Payer            | Benefi | Subscriber  | Effect | Phone       | Address    | Type   |
|                  | t Plan | ID          | daisy    |             |            |        |
|                  |  /     |             | Dates  |             |            |        |
|                  | Group  |             |        |             |            |        |
+------------------+--------+-------------+--------+-------------+------------+--------+
| MEDICARE         | MEDICA | xxxxxxxxxx  |  | [phone] |   [address]   | [address] |
|                  | [address] & |             | [id number] | [address]           | [id number]       | re     |
|                  |  [address]     |             | esent  |             | [place], [place]  |        |
|                  |        |             |        |             | [id number]      |        |
+------------------+--------+-------------+--------+-------------+------------+--------+
| COMMERCIAL GROUP | COMMER | xxxxxxxxx   | Effect |             |            | Indemn |
|                  | CIAL   |             | daisy    |             |            | ity    |
|                  | GROUP  |             | for    |             |            |        |
|                  |        |             | all    |             |            |        |
|                  |        |             | dates  |             |            |        |
+------------------+--------+-------------+--------+-------------+------------+--------+
 
 
 
+----------------+--------+-------------+--------+-------------+----------------------+
| Guarantor Name | Accoun | Relation to | Date   | Phone       | Billing Address      |
|                | t Type |  Patient    | of     |             |                      |
|                |        |             | Birth  |             |                      |
+----------------+--------+-------------+--------+-------------+----------------------+
| Wyatt Shane    | Person | Self        | / |             |   [address] |
|                | al/Fam |             | 1945   | [phone] |   [place], [place]      |
|                | [place]    |             |        | [address] (Home)    | [id number]                |
+----------------+--------+-------------+--------+-------------+----------------------+
 
 
 
 Advance Directives
 
 
+-----------------+---------------+----------------+-------------+
| Type            | Date Recorded | Patient        | Explanation |
|                 |               | Representative |             |
+-----------------+---------------+----------------+-------------+
| Advance         |               |                |             |
| Directives and  |               |                |             |
| Living Will     |               |                |             |
+-----------------+---------------+----------------+-------------+
| Power of        |               |                |             |
|         |               |                |             |
+-----------------+---------------+----------------+-------------+

## 2019-12-06 NOTE — XMS
Encounter Summary
  Created on: 2019
 
 Wyatt Shane
 External Reference #: [id number]
 : 43
 Sex: Male
 
 Demographics
 
 
+-----------------------+------------------------+
| Address               | [address]     |
|                       | [place]  [address]   |
+-----------------------+------------------------+
| Home Phone            | [phone]        |
+-----------------------+------------------------+
| Preferred Language    | Unknown                |
+-----------------------+------------------------+
| Marital Status        |                 |
+-----------------------+------------------------+
| Holiness Affiliation | LUT                    |
+-----------------------+------------------------+
| Race                  | White                  |
+-----------------------+------------------------+
| Ethnic Group          | Not  or  |
+-----------------------+------------------------+
 
 
 Author
 
 
+--------------+------------------------------+
| Author       | [facility] |
+--------------+------------------------------+
| Organization | [facility] |
+--------------+------------------------------+
| Address      | Unknown                      |
+--------------+------------------------------+
| Phone        | Unavailable                  |
+--------------+------------------------------+
 
 
 
 Support
 
 
+---------------+--------------+---------+-----------------+
| Name          | Relationship | Address | Phone           |
+---------------+--------------+---------+-----------------+
| Opal Shane | ECON         | Unknown | [phone] |
+---------------+--------------+---------+-----------------+
 
 
 
 Care Team Providers
 
 
 
+-----------------------+------+-----------------+
| Care Team Member Name | Role | Phone           |
+-----------------------+------+-----------------+
| Erwin Iniguez MD   | PCP  | [phone] |
+-----------------------+------+-----------------+
 
 
 
 Encounter Details
 
 
+--------+-------------+-----------------+---------------------+---------------+
| Date   | Type        | Department      | Care Team           | Description   |
+--------+-------------+-----------------+---------------------+---------------+
| / | Office      |   [facility]  |   Note, Outpatient  | Progress Note |
|    | Visit-Trans | MEDICINE        | Clinic              |               |
|        | cribed      |                 |                     |               |
+--------+-------------+-----------------+---------------------+---------------+
 
 
 
 Social History
 
 
+----------------+-------+-----------+--------+------+
| Tobacco Use    | Types | Packs/Day | Years  | Date |
|                |       |           | Used   |      |
+----------------+-------+-----------+--------+------+
| Never Assessed |       |           |        |      |
+----------------+-------+-----------+--------+------+
 
 
 
+------------------+---------------+
| Sex Assigned at  | Date Recorded |
| Birth            |               |
+------------------+---------------+
| Not on file      |               |
+------------------+---------------+
 
 
 
+----------------+-------------+-------------+
| Job Start Date | Occupation  | Industry    |
+----------------+-------------+-------------+
| Not on file    | Not on file | Not on file |
+----------------+-------------+-------------+
 
 
 
+----------------+--------------+------------+
| Travel History | Travel Start | Travel End |
+----------------+--------------+------------+
 
 
 
+-------------------------------------+
| No recent travel history available. |
+-------------------------------------+
 documented as of this encounter
 
 
 Progress Notes
 Interface, Transcription In - 09/15/2006  6:11 AM PDTCLINIC DATE:       2002
 
 [facility] HEAD AND NECK SURGERY
 
 SUBJECTIVE:  Mr. Shane is seen back in followup for his recurrent vocal cord
 carcinoma.  He is doing very well and  has  no  new  symptoms  to suggest a
 problem.  He does have some cramping in  his neck muscles, and we discussed
 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
 
 PHYSICAL EXAMINATION:  HEAD AND NECK:  On  examination  today,  his neck is
 without  evidence of new masses.  Ears,  nose,  and  mouth  are  normal  to
 anterior inspection.
 
 Flexible fiberoptic laryngoscopy under  topical  anesthesia is done through
 the  right  nostril.   Nasopharynx, oropharynx,  hypopharynx,  larynx,  and
 subglottic areas are well seen.  They  are  all well mucosalized.  There is
 no evidence of any recurrent disease.
 
 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
 
 PLAN:  Follow up with me in 6 months.   He  had a recent chest x-ray by his
 primary care physician and it is normal.
 
 Jimmy Esposito M.D.
 
 [facility] / 
 [id number] / [id number] / [id number] /
 D: 2002
 T: 2002
 
 cc:
 
 Eddy Boone M.D.
 [address] [place]
 [place]  [address]
 
 Erwin Iniguez M.D.
 [address]
 [place]  [address]
 
 [id number] signed by Interface, Transcription In at 09/15/2006  6:11 AM PDTdoc
umented in this encounter
 
 Plan of Treatment
 Not on filedocumented as of this encounter
 
 Visit Diagnoses
 Not on filedocumented in this encounter

## 2019-12-06 NOTE — XMS
Encounter Summary
  Created on: 2019
 
 Wyatt Shane
 External Reference #: [id number]
 : 43
 Sex: Male
 
 Demographics
 
 
+-----------------------+------------------------+
| Address               | [address]     |
|                       | [place]  [address]   |
+-----------------------+------------------------+
| Home Phone            | [phone]        |
+-----------------------+------------------------+
| Preferred Language    | Unknown                |
+-----------------------+------------------------+
| Marital Status        |                 |
+-----------------------+------------------------+
| Episcopalian Affiliation | LUT                    |
+-----------------------+------------------------+
| Race                  | White                  |
+-----------------------+------------------------+
| Ethnic Group          | Not  or  |
+-----------------------+------------------------+
 
 
 Author
 
 
+--------------+------------------------------+
| Author       | [facility] |
+--------------+------------------------------+
| Organization | [facility] |
+--------------+------------------------------+
| Address      | Unknown                      |
+--------------+------------------------------+
| Phone        | Unavailable                  |
+--------------+------------------------------+
 
 
 
 Support
 
 
+---------------+--------------+---------+-----------------+
| Name          | Relationship | Address | Phone           |
+---------------+--------------+---------+-----------------+
| Opal Shane | ECON         | Unknown | [phone] |
+---------------+--------------+---------+-----------------+
 
 
 
 Care Team Providers
 
 
 
+-----------------------+------+-----------------+
| Care Team Member Name | Role | Phone           |
+-----------------------+------+-----------------+
| Erwin Iniguez MD   | PCP  | [phone] |
+-----------------------+------+-----------------+
 
 
 
 Encounter Details
 
 
+--------+-------------+-----------------+---------------------+---------------+
| Date   | Type        | Department      | Care Team           | Description   |
+--------+-------------+-----------------+---------------------+---------------+
| / | Office      |   [facility]  |   Note, Outpatient  | Progress Note |
| 2000   | Visit-Trans | MEDICINE        | Clinic              |               |
|        | cribed      |                 |                     |               |
+--------+-------------+-----------------+---------------------+---------------+
 
 
 
 Social History
 
 
+----------------+-------+-----------+--------+------+
| Tobacco Use    | Types | Packs/Day | Years  | Date |
|                |       |           | Used   |      |
+----------------+-------+-----------+--------+------+
| Never Assessed |       |           |        |      |
+----------------+-------+-----------+--------+------+
 
 
 
+------------------+---------------+
| Sex Assigned at  | Date Recorded |
| Birth            |               |
+------------------+---------------+
| Not on file      |               |
+------------------+---------------+
 
 
 
+----------------+-------------+-------------+
| Job Start Date | Occupation  | Industry    |
+----------------+-------------+-------------+
| Not on file    | Not on file | Not on file |
+----------------+-------------+-------------+
 
 
 
+----------------+--------------+------------+
| Travel History | Travel Start | Travel End |
+----------------+--------------+------------+
 
 
 
+-------------------------------------+
| No recent travel history available. |
+-------------------------------------+
 documented as of this encounter
 
 
 Progress Notes
 Interface, Transcription In - 2006  1:06 AM PSTCLINIC DATE:       2000
 
 OTOLARYNGOLOGY HEAD AND NECK SURGERY
 
 SUBJECTIVE:   The  patient  was  seen   back  today  with  respect  to  the
 possibility of decannulation.  He is  doing  very well with his tracheotomy
 plug and had no issues.  He is able to  keep  it plugged day and night, and
 has been participating in pretty vigorous sports without difficulty.
 
 PHYSICAL EXAMINATION:  Flexible fiberoptic  laryngoscopy  is done antegrade
 and  retrograde  through his tracheotomy.   There  is  a  small  amount  of
 granulation tissue in his posterior  pharyngeal  wall,  but  his airway is,
 otherwise, intact and appears to be adequate  for  phonation  and exercise.
 I, therefore, removed his tracheotomy  and  counseled  him  in the care.  I
 will see him back here in four weeks for final check.
 
 Jimmy Esposito M.D.
 
 MARIA ALEJANDRA / 
 [id number] / [id number] / [id number] / [id number]
 D: 2000
 T: 2000
 
 [id number] signed by Interface, Transcription In at 2006  1:06 AM PSTdocume
nted in this encounter
 
 Plan of Treatment
 Not on filedocumented as of this encounter
 
 Visit Diagnoses
 Not on filedocumented in this encounter

## 2019-12-06 NOTE — XMS
Encounter Summary
  Created on: 2019
 
 Shane Wyatttien BroussardJosue
 External Reference #: [id number]
 : 43
 Sex: Male
 
 Demographics
 
 
+-----------------------+---------------------------+
| Address               | [address]        |
|                       | [place]  [address] |
+-----------------------+---------------------------+
| Home Phone            | [phone]           |
+-----------------------+---------------------------+
| Preferred Language    | Unknown                   |
+-----------------------+---------------------------+
| Marital Status        |                    |
+-----------------------+---------------------------+
| Confucianism Affiliation | [id number]                      |
+-----------------------+---------------------------+
| Race                  | Unknown                   |
+-----------------------+---------------------------+
| Ethnic Group          | Unknown                   |
+-----------------------+---------------------------+
 
 
 Author
 
 
+--------------+--------------------------------------------+
| Author       | [facility]  |
|              | [facility] [place]                                |
+--------------+--------------------------------------------+
| Organization | [facility]  |
|              | [facility] [place]                                |
+--------------+--------------------------------------------+
| Address      | Unknown                                    |
+--------------+--------------------------------------------+
| Phone        | Unavailable                                |
+--------------+--------------------------------------------+
 
 
 
 Support
 
 
+---------------+--------------+--------------------+-----------------+
| Name          | Relationship | Address            | Phone           |
+---------------+--------------+--------------------+-----------------+
| Opal Shane | ECON         | [address]     | [phone] |
|               |              | [place]   |                 |
|               |              | [address]              |                 |
+---------------+--------------+--------------------+-----------------+
 
 
 
 
 Care Team Providers
 
 
+-----------------------+------+-----------------+
| Care Team Member Name | Role | Phone           |
+-----------------------+------+-----------------+
| Erwin Iniguez MD | PCP  | [phone] |
+-----------------------+------+-----------------+
 
 
 
 Reason for Visit
 
 
+--------+----------+
| Reason | Comments |
+--------+----------+
| Apnea  |          |
+--------+----------+
 
 
 
 Encounter Details
 
 
+--------+---------+----------------------+----------------------+----------------------+
| Date   | Type    | Department           | Care Team            | Description          |
+--------+---------+----------------------+----------------------+----------------------+
| / | Office  |   [facility] [place]      |   Sohail Campbell PA  | Cheyne-Gregory        |
| 2014   | Visit   | SLEEP DISORDER  [address]  |  [address]     | respiration (Primary |
|        |         | [address]      | [place], [place]      |  Dx); Obstructive    |
|        |         | [place] [address] | [address]  [phone]  | sleep apnea (adult)  |
|        |         |   [phone]       |  [phone] (Fax)  | (pediatric)          |
+--------+---------+----------------------+----------------------+----------------------+
 
 
 
 Social History
 
 
+---------------+------------+-----------+--------+------------------+
| Tobacco Use   | Types      | Packs/Day | Years  | Date             |
|               |            |           | Used   |                  |
+---------------+------------+-----------+--------+------------------+
| Former Smoker | Cigarettes | 1.3       | 34     | Quit: 1996 |
+---------------+------------+-----------+--------+------------------+
 
 
 
+---------------------+---+---+---+
| Smokeless Tobacco:  |   |   |   |
| Never Used          |   |   |   |
+---------------------+---+---+---+
 
 
 
+-------------+-------------+---------+----------+
| Alcohol Use | Drinks/Week | oz/Week | Comments |
+-------------+-------------+---------+----------+
 
| No          |             |         |          |
+-------------+-------------+---------+----------+
 
 
 
+------------------+---------------+
| Sex Assigned at  | Date Recorded |
| Birth            |               |
+------------------+---------------+
| Not on file      |               |
+------------------+---------------+
 
 
 
+----------------+-------------+-------------+
| Job Start Date | Occupation  | Industry    |
+----------------+-------------+-------------+
| Not on file    | Not on file | Not on file |
+----------------+-------------+-------------+
 
 
 
+----------------+--------------+------------+
| Travel History | Travel Start | Travel End |
+----------------+--------------+------------+
 
 
 
+-------------------------------------+
| No recent travel history available. |
+-------------------------------------+
 documented as of this encounter
 
 Last Filed Vital Signs
 
 
+-------------------+----------------------+----------------------+----------+
| Vital Sign        | Reading              | Time Taken           | Comments |
+-------------------+----------------------+----------------------+----------+
| Blood Pressure    | 102/66               | 2014  2:02 PM  |          |
|                   |                      | PDT                  |          |
+-------------------+----------------------+----------------------+----------+
| Pulse             | 68                   | 2014  2:02 PM  |          |
|                   |                      | PDT                  |          |
+-------------------+----------------------+----------------------+----------+
| Temperature       | -                    | -                    |          |
+-------------------+----------------------+----------------------+----------+
| Respiratory Rate  | 16                   | 2014  2:02 PM  |          |
|                   |                      | PDT                  |          |
+-------------------+----------------------+----------------------+----------+
| Oxygen Saturation | 97%                  | 2014  2:02 PM  |          |
|                   |                      | PDT                  |          |
+-------------------+----------------------+----------------------+----------+
| Inhaled Oxygen    | -                    | -                    |          |
| Concentration     |                      |                      |          |
+-------------------+----------------------+----------------------+----------+
| Weight            | 82.2 kg (181 lb 3.2  | 2014  2:02 PM  |          |
|                   | oz)                  | PDT                  |          |
+-------------------+----------------------+----------------------+----------+
| Height            | -                    | -                    |          |
 
+-------------------+----------------------+----------------------+----------+
| Body Mass Index   | 29.25                | 2013  1:28 PM  |          |
|                   |                      | PDT                  |          |
+-------------------+----------------------+----------------------+----------+
 documented in this encounter
 
 Progress Notes
 Sohail Campbell PA - 2014  1:57 PM PDTFormatting of this note might be different from 
the original.
 Subjective: 
  
 Patient ID: Wyatt Shane is a 68 y.o. male.
 
 HPI
 
 last office visit was:  3/13/2014
 date of polysomnography: 10/07/2013 
 AHI: 77.4
 RDI: 77.4
 O2%: 86% with 15.8 minutes below 88% 
 Machine type: Respironics ASV BiPAP with nasal mask (Aditi)
 obtained from:  [facility]
 pressure: EPAP = 4cm;PSmin=0cm;PSmax=25cm;backup rate=auto
 Nights using BiPAP:  
 average usage (all nights):  0:27
 average usage (nights used):  2:34
 AHI: 1.5
 
 Wyatt comes in for BiPAP compliance.  He continues to have problems with congestion.  He has
 increased his humidity to the maximum of 5.0, but this has not been helpful.  [facility], he is only using a small amount of water, which often indicates that it is not working p
roperly.  He continues to have dried blood in his nose in the morning when using his BiPAP. 
 He has not been using his BiPAP on most nights.  He understands the severity of his apnea a
nd the importance of treating it.  He did very well during the first few months with his BiP
AP.  He and his wife noticed a significant improvement in his sleep quality and his daytime 
sleepiness and energy.  During this time, he was not having congestion problems and he was u
sing more water each night.
 
 I have discussed the download in detail.  This shows that his sleep apnea is controlled, wi
th an AHI of 1.5.  It also shows that his leaks are well controlled.  He has not met the com
pliance standard of wearing his BiPAP for >4 hours for 70% or more nights during at least a 
thirty day period.
 
 Review of Systems
 
 Objective: 
 Physical Exam
 
 Assessment: 
 
 Problem #1: OBSTRUCTIVE SLEEP APNEA (ICD 9-327.23)
 This is controlled with BiPAP.  He is wearing his BiPAP regularly, but continues to struggl
e with wearing it for the duration of the night because of problems with congestion.
 
 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 
 This is controlled with ASV BiPAP.
 
 Plan: 
 
 He is to continue with his BiPAP indefinitely.  I recommended that he take his BiPAP to [facility] in [place] to have his humidifier checked and repaired or replaced, if necessary.  He
 would also benefit from seeing an ENT specialist.
 
 I will follow up again in 1 month, sooner prn.  Fifteen minutes were spent face-to-face, wi
th the majority of time spent in counseling.
 
 Sohail Campbell PA-C
 
 cc: 
 Dr. Erwin Foote
 
 Electronically signed by ROWENA Greene at 2014  2:48 PM PDTdocumented in this enco
unter
 
 Plan of Treatment
 
 
+--------+---------+-------------+----------------------+-------------+
| Date   | Type    | Specialty   | Care Team            | Description |
+--------+---------+-------------+----------------------+-------------+
| / | Office  | Pulmonology |   Juan F,          |             |
|    | Visit   |             | Jessica Cheung,   |             |
|        |         |             | MD [address] |             |
|        |         |             |   [place]   |             |
|        |         |             | [place] [address]             |             |
|        |         |             | [phone]         |             |
|        |         |             | [phone] (Fax)   |             |
+--------+---------+-------------+----------------------+-------------+
| / | Office  | Cardiology  |   Eric Akins, |             |
|    | Visit   |             |  MD [address]   |             |
|        |         |             | [place]  |             |
|        |         |             | [address]  [phone]  |             |
|        |         |             |  [phone] (Fax)  |             |
+--------+---------+-------------+----------------------+-------------+
 documented as of this encounter
 
 Visit Diagnoses
 
 
+-----------------------------------------------+
| Diagnosis                                     |
+-----------------------------------------------+
|   Cheyne-Gregory respiration - Primary         |
+-----------------------------------------------+
|   Obstructive sleep apnea (adult) (pediatric) |
+-----------------------------------------------+
 documented in this encounter

## 2019-12-06 NOTE — XMS
Encounter Summary
  Created on: 2019
 
 Wyatt Shane
 External Reference #: [id number]
 : 43
 Sex: Male
 
 Demographics
 
 
+-----------------------+------------------------+
| Address               | [address]     |
|                       | [place]  [address]   |
+-----------------------+------------------------+
| Home Phone            | [phone]        |
+-----------------------+------------------------+
| Preferred Language    | Unknown                |
+-----------------------+------------------------+
| Marital Status        |                 |
+-----------------------+------------------------+
| Rastafarian Affiliation | LUT                    |
+-----------------------+------------------------+
| Race                  | White                  |
+-----------------------+------------------------+
| Ethnic Group          | Not  or  |
+-----------------------+------------------------+
 
 
 Author
 
 
+--------------+------------------------------+
| Author       | [facility] |
+--------------+------------------------------+
| Organization | [facility] |
+--------------+------------------------------+
| Address      | Unknown                      |
+--------------+------------------------------+
| Phone        | Unavailable                  |
+--------------+------------------------------+
 
 
 
 Support
 
 
+---------------+--------------+---------+-----------------+
| Name          | Relationship | Address | Phone           |
+---------------+--------------+---------+-----------------+
| Opal Shane | ECON         | Unknown | [phone] |
+---------------+--------------+---------+-----------------+
 
 
 
 Care Team Providers
 
 
 
+-----------------------+------+-----------------+
| Care Team Member Name | Role | Phone           |
+-----------------------+------+-----------------+
| Erwin Iniguez MD   | PCP  | [phone] |
+-----------------------+------+-----------------+
 
 
 
 Encounter Details
 
 
+--------+-------------+-----------------+---------------------+---------------+
| Date   | Type        | Department      | Care Team           | Description   |
+--------+-------------+-----------------+---------------------+---------------+
| / | Office      |   [facility]  |   Note, Outpatient  | Progress Note |
|    | Visit-Trans | MEDICINE        | Clinic              |               |
|        | cribed      |                 |                     |               |
+--------+-------------+-----------------+---------------------+---------------+
 
 
 
 Social History
 
 
+----------------+-------+-----------+--------+------+
| Tobacco Use    | Types | Packs/Day | Years  | Date |
|                |       |           | Used   |      |
+----------------+-------+-----------+--------+------+
| Never Assessed |       |           |        |      |
+----------------+-------+-----------+--------+------+
 
 
 
+------------------+---------------+
| Sex Assigned at  | Date Recorded |
| Birth            |               |
+------------------+---------------+
| Not on file      |               |
+------------------+---------------+
 
 
 
+----------------+-------------+-------------+
| Job Start Date | Occupation  | Industry    |
+----------------+-------------+-------------+
| Not on file    | Not on file | Not on file |
+----------------+-------------+-------------+
 
 
 
+----------------+--------------+------------+
| Travel History | Travel Start | Travel End |
+----------------+--------------+------------+
 
 
 
+-------------------------------------+
| No recent travel history available. |
+-------------------------------------+
 documented as of this encounter
 
 
 Progress Notes
 Interface, Transcription In - 2007  5:06 AM PSTCLINIC DATE:       1999
 
 OTOLARYNGOLOGY CLINIC
 
 SUBJECTIVE:  I have reviewed Mr. Shane's pathology slides including going
 over the cut-in sections to determine the orientation for the margin on the
 anterior-inferior surface, which is being called positive.  Indeed,
 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
 of tumor beyond this that are certainly very close to the margin.
 
 PLAN:  I think, given this behavior, we need to irradiate this gentleman
 postoperatively and I called to discuss this with him.  We'll probably plan
 to do this in February, once things have healed further.
 
 Jimmy Esposito M.D.
 , Otolaryngology
 Head and Neck Surgery
 
 MARIA ALEJANDRA/lizett
 D: [birth year]
 T: 99Electronically signed by Interface, Transcription In at 2007  5:06 AM PSTd
ocumented in this encounter
 
 Plan of Treatment
 Not on filedocumented as of this encounter
 
 Visit Diagnoses
 Not on filedocumented in this encounter

## 2019-12-06 NOTE — XMS
Encounter Summary
  Created on: 2019
 
 Wyatt Shane
 External Reference #: [id number]
 : 43
 Sex: Male
 
 Demographics
 
 
+-----------------------+------------------------+
| Address               | [address]     |
|                       | [place]  [address]   |
+-----------------------+------------------------+
| Home Phone            | [phone]        |
+-----------------------+------------------------+
| Preferred Language    | Unknown                |
+-----------------------+------------------------+
| Marital Status        |                 |
+-----------------------+------------------------+
| Jew Affiliation | LUT                    |
+-----------------------+------------------------+
| Race                  | White                  |
+-----------------------+------------------------+
| Ethnic Group          | Not  or  |
+-----------------------+------------------------+
 
 
 Author
 
 
+--------------+------------------------------+
| Author       | [facility] |
+--------------+------------------------------+
| Organization | [facility] |
+--------------+------------------------------+
| Address      | Unknown                      |
+--------------+------------------------------+
| Phone        | Unavailable                  |
+--------------+------------------------------+
 
 
 
 Support
 
 
+---------------+--------------+---------+-----------------+
| Name          | Relationship | Address | Phone           |
+---------------+--------------+---------+-----------------+
| Opal Shane | ECON         | Unknown | [phone] |
+---------------+--------------+---------+-----------------+
 
 
 
 Care Team Providers
 
 
 
+-----------------------+------+-----------------+
| Care Team Member Name | Role | Phone           |
+-----------------------+------+-----------------+
| Erwin Iniguez MD   | PCP  | [phone] |
+-----------------------+------+-----------------+
 
 
 
 Encounter Details
 
 
+--------+-------------+-----------------+---------------------+---------------+
| Date   | Type        | Department      | Care Team           | Description   |
+--------+-------------+-----------------+---------------------+---------------+
| / | Office      |   [facility]  |   Note, Outpatient  | Progress Note |
| 2000   | Visit-Trans | MEDICINE        | Clinic              |               |
|        | cribed      |                 |                     |               |
+--------+-------------+-----------------+---------------------+---------------+
 
 
 
 Social History
 
 
+----------------+-------+-----------+--------+------+
| Tobacco Use    | Types | Packs/Day | Years  | Date |
|                |       |           | Used   |      |
+----------------+-------+-----------+--------+------+
| Never Assessed |       |           |        |      |
+----------------+-------+-----------+--------+------+
 
 
 
+------------------+---------------+
| Sex Assigned at  | Date Recorded |
| Birth            |               |
+------------------+---------------+
| Not on file      |               |
+------------------+---------------+
 
 
 
+----------------+-------------+-------------+
| Job Start Date | Occupation  | Industry    |
+----------------+-------------+-------------+
| Not on file    | Not on file | Not on file |
+----------------+-------------+-------------+
 
 
 
+----------------+--------------+------------+
| Travel History | Travel Start | Travel End |
+----------------+--------------+------------+
 
 
 
+-------------------------------------+
| No recent travel history available. |
+-------------------------------------+
 documented as of this encounter
 
 
 Progress Notes
 Interface, Transcription In - 2006  1:10 AM PSTCLINIC DATE:       2000
 
 SPEECH PATHOLOGY CLINIC
 
 Mr. Shane is a 54-year-old male who comes  in for preoperative evaluation as
 he is to undergo a laryngotracheoplasty  with  rib  graft, laryngeal stent,
 and tracheostomy.  He is the patient of Dr. Jimmy Esposito.  He is accompanied
 by his wife today.
 
 PREOPERATIVE VOICE:  The patient's vocal quality is persistently hoarse and
 strained.  He is, however, 100% intelligible.
 
 PREOPERATIVE SWALLOW:  The patient does  not  complain  of  any  swallowing
 difficulty.   He  does  note  that  he   has   an  occasional  reaction  to
 vinegar-like substances.  He states  that  he  feels  like  his  throat  is
 closing off in response to certain acidity.   Otherwise,  he  has a regular
 diet with thin liquids.  He has also been maintaining his weight.
 
 The  patient  seems  to  have  a good understanding  of  what  the  surgery
 involves, discussed with him and his  wife  the  effects  that  the surgery
 might  have  on  his  voice quality, and  swallowing.   The  patient  asked
 appropriate questions, and these were addressed at this time.
 
 PLAN:  We will follow up with Mr. Shane  for postoperative rehabilitation of
 voice and swallowing as medically appropriate.
 
 Janet Anthony M.A.
 Speech Pathology Fellow
 
 [facility]
 [id number] / [id number] / [id number] / [id number]
 D: 2000
 T: 2000
 C: 2000 dsElectronically signed by Interface, Transcription In at 2006  1:10 AM
 PSTInterface, Transcription In - 2006  1:10 AM PSTCLINIC DATE:       2000
 
 OTOLARYNGOLOGY [facility]ECK SURGERY CLINIC
 
 Mr. Shane  is  seen  back  today  for  preoperative   discussion  regarding
 laryngotracheoplasty.  He seems to have  good  understanding  of the issues
 involved.  Full PAR was held, and consent was signed.
 
 Jimmy Esposito M.D.
 
 [facility] / 
 [id number] / [id number] / [id number] / [id number]
 D: 2000
 T: 2000Electronically signed by Interface, Transcription In at 2006  1:10 AM PS
Tdocumented in this encounter
 
 Plan of Treatment
 Not on filedocumented as of this encounter
 
 Visit Diagnoses
 Not on filedocumented in this encounter

## 2019-12-06 NOTE — XMS
Encounter Summary
  Created on: 2019
 
 Wyatt Shane
 External Reference #: [id number]
 : 43
 Sex: Male
 
 Demographics
 
 
+-----------------------+------------------------+
| Address               | [address]     |
|                       | [place]  [address]   |
+-----------------------+------------------------+
| Home Phone            | [phone]        |
+-----------------------+------------------------+
| Preferred Language    | Unknown                |
+-----------------------+------------------------+
| Marital Status        |                 |
+-----------------------+------------------------+
| Protestant Affiliation | LUT                    |
+-----------------------+------------------------+
| Race                  | White                  |
+-----------------------+------------------------+
| Ethnic Group          | Not  or  |
+-----------------------+------------------------+
 
 
 Author
 
 
+--------------+-------------+
| Organization | Unknown     |
+--------------+-------------+
| Address      | Unknown     |
+--------------+-------------+
| Phone        | Unavailable |
+--------------+-------------+
 
 
 
 Support
 
 
+---------------+--------------+---------+-----------------+
| Name          | Relationship | Address | Phone           |
+---------------+--------------+---------+-----------------+
| Opal Shane | ECON         | Unknown | [phone] |
+---------------+--------------+---------+-----------------+
 
 
 
 Care Team Providers
 
 
+-----------------------+------+-----------------+
| Care Team Member Name | Role | Phone           |
 
+-----------------------+------+-----------------+
| Erwin Iniguez MD   | PCP  | [phone] |
+-----------------------+------+-----------------+
 
 
 
 Encounter Details
 
 
+--------+-------------+------------+--------------------+--------------------+
| Date   | Type        | Department | Care Team          | Description        |
+--------+-------------+------------+--------------------+--------------------+
| / | Procedure - |            |   Documentation,   | OP REPORT-TEACHING |
|    |             |            | Teaching Physician |                    |
|        | Transcribed |            |                    |                    |
+--------+-------------+------------+--------------------+--------------------+
 
 
 
 Social History
 
 
+----------------+-------+-----------+--------+------+
| Tobacco Use    | Types | Packs/Day | Years  | Date |
|                |       |           | Used   |      |
+----------------+-------+-----------+--------+------+
| Never Assessed |       |           |        |      |
+----------------+-------+-----------+--------+------+
 
 
 
+------------------+---------------+
| Sex Assigned at  | Date Recorded |
| Birth            |               |
+------------------+---------------+
| Not on file      |               |
+------------------+---------------+
 
 
 
+----------------+-------------+-------------+
| Job Start Date | Occupation  | Industry    |
+----------------+-------------+-------------+
| Not on file    | Not on file | Not on file |
+----------------+-------------+-------------+
 
 
 
+----------------+--------------+------------+
| Travel History | Travel Start | Travel End |
+----------------+--------------+------------+
 
 
 
+-------------------------------------+
| No recent travel history available. |
+-------------------------------------+
 documented as of this encounter
 
 Plan of Treatment
 
 Not on filedocumented as of this encounter
 
 Procedures
 
 
+--------------------+--------+------------+----------------------+----------+
| Procedure Name     | Priori | Date/Time  | Associated Diagnosis | Comments |
|                    | ty     |            |                      |          |
+--------------------+--------+------------+----------------------+----------+
| TEACHING PHYSICIAN |        | 1999 |                      |          |
+--------------------+--------+------------+----------------------+----------+
 documented in this encounter
 
 Visit Diagnoses
 Not on filedocumented in this encounter

## 2019-12-06 NOTE — XMS
Encounter Summary
  Created on: 2019
 
 Wyatt Shane
 External Reference #: [id number]
 : 43
 Sex: Male
 
 Demographics
 
 
+-----------------------+------------------------+
| Address               | [address]     |
|                       | [place]  [address]   |
+-----------------------+------------------------+
| Home Phone            | [phone]        |
+-----------------------+------------------------+
| Preferred Language    | Unknown                |
+-----------------------+------------------------+
| Marital Status        |                 |
+-----------------------+------------------------+
| Jewish Affiliation | LUT                    |
+-----------------------+------------------------+
| Race                  | White                  |
+-----------------------+------------------------+
| Ethnic Group          | Not  or  |
+-----------------------+------------------------+
 
 
 Author
 
 
+--------------+------------------------------+
| Author       | [facility] |
+--------------+------------------------------+
| Organization | [facility] |
+--------------+------------------------------+
| Address      | Unknown                      |
+--------------+------------------------------+
| Phone        | Unavailable                  |
+--------------+------------------------------+
 
 
 
 Support
 
 
+---------------+--------------+---------+-----------------+
| Name          | Relationship | Address | Phone           |
+---------------+--------------+---------+-----------------+
| Opal Shane | ECON         | Unknown | [phone] |
+---------------+--------------+---------+-----------------+
 
 
 
 Care Team Providers
 
 
 
+-----------------------+------+-----------------+
| Care Team Member Name | Role | Phone           |
+-----------------------+------+-----------------+
| Erwin Iniguez MD   | PCP  | [phone] |
+-----------------------+------+-----------------+
 
 
 
 Encounter Details
 
 
+--------+-------------+-----------------+---------------------+---------------+
| Date   | Type        | Department      | Care Team           | Description   |
+--------+-------------+-----------------+---------------------+---------------+
| 02/10/ | Office      |   [facility]  |   Note, Outpatient  | Progress Note |
| 2000   | Visit-Trans | MEDICINE        | Clinic              |               |
|        | cribed      |                 |                     |               |
+--------+-------------+-----------------+---------------------+---------------+
 
 
 
 Social History
 
 
+----------------+-------+-----------+--------+------+
| Tobacco Use    | Types | Packs/Day | Years  | Date |
|                |       |           | Used   |      |
+----------------+-------+-----------+--------+------+
| Never Assessed |       |           |        |      |
+----------------+-------+-----------+--------+------+
 
 
 
+------------------+---------------+
| Sex Assigned at  | Date Recorded |
| Birth            |               |
+------------------+---------------+
| Not on file      |               |
+------------------+---------------+
 
 
 
+----------------+-------------+-------------+
| Job Start Date | Occupation  | Industry    |
+----------------+-------------+-------------+
| Not on file    | Not on file | Not on file |
+----------------+-------------+-------------+
 
 
 
+----------------+--------------+------------+
| Travel History | Travel Start | Travel End |
+----------------+--------------+------------+
 
 
 
+-------------------------------------+
| No recent travel history available. |
+-------------------------------------+
 documented as of this encounter
 
 
 Progress Notes
 Interface, Transcription In - 2006  1[facility]:       [facility]
 
 OTOLARYNGOLOGY CLINIC
 
 SUBJECTIVE:  The patient was seen in the [facility] for a
 bedside evaluation.  He was brought over from the inpatient unit.
 
 OBJECTIVE:  The patient was presented  with  thin  liquids  with  blue dye,
 nectar-thick  liquids  with  blue dye,  and  apple  sauce  with  blue  dye.
 Throughout the evaluation, the patient  coughed intermittently with each of
 these textures, but there was no evidence  of  blue dye coughed through the
 trach site.  During each of the swallows  for  each  of these textures, the
 patient demonstrated an effortful texture  and complained of some amount of
 pain  during  the  swallow.   In addition,  the  patient  performed  double
 swallows  to  clear  the  material  swallowed.    Upon  completion  of  the
 evaluation and textures presented, the  patient  was suctioned by the nurse
 present,  and there was no evidence  of  blue  dye  during  the  suctioning
 process.   The patient indicates that  he  felt  comfortable  during  these
 swallows and was interested in initiating p.o. trials.
 
       ASSESSMENT AND PLAN:  The patient appears safe to initiate p.o. trials
 pureed textures with regular liquids  at  this time.  The patient should be
 monitored for any signs of blue dye  during  any  suction  by  the  nursing
 staff.  It is recommended that the patient  initiate  pureed  textures with
 regular liquids with close supervision  and monitoring the amount of intake
 and  signs or symptoms of aspiration.   Speech  Pathology  Department  will
 follow this patient and advance diet as indicated.
 
 Leticia Zhu,                            CCC-SLP
 Jimmy Esposito M.D.
 Speech Language Pathologist
 
 [facility]
 [id number] / [id number] / [id number] / [id number]
 D: 02/10/2000
 T: 2000Electronically signed by Interface, Transcription In at 2006  1:10 AM PS
Tdocumented in this encounter
 
 Plan of Treatment
 Not on filedocumented as of this encounter
 
 Visit Diagnoses
 Not on filedocumented in this encounter

## 2019-12-06 NOTE — XMS
Encounter Summary
  Created on: 2019
 
 Wyatt Shane
 External Reference #: [id number]
 : 43
 Sex: Male
 
 Demographics
 
 
+-----------------------+------------------------+
| Address               | [address]     |
|                       | [place]  [address]   |
+-----------------------+------------------------+
| Home Phone            | [phone]        |
+-----------------------+------------------------+
| Preferred Language    | Unknown                |
+-----------------------+------------------------+
| Marital Status        |                 |
+-----------------------+------------------------+
| Bahai Affiliation | LUT                    |
+-----------------------+------------------------+
| Race                  | White                  |
+-----------------------+------------------------+
| Ethnic Group          | Not  or  |
+-----------------------+------------------------+
 
 
 Author
 
 
+--------------+------------------------------+
| Author       | [facility] |
+--------------+------------------------------+
| Organization | [facility] |
+--------------+------------------------------+
| Address      | Unknown                      |
+--------------+------------------------------+
| Phone        | Unavailable                  |
+--------------+------------------------------+
 
 
 
 Support
 
 
+---------------+--------------+---------+-----------------+
| Name          | Relationship | Address | Phone           |
+---------------+--------------+---------+-----------------+
| Opal Shane | ECON         | Unknown | [phone] |
+---------------+--------------+---------+-----------------+
 
 
 
 Care Team Providers
 
 
 
+-----------------------+------+-----------------+
| Care Team Member Name | Role | Phone           |
+-----------------------+------+-----------------+
| Erwin Iniguez MD   | PCP  | [phone] |
+-----------------------+------+-----------------+
 
 
 
 Encounter Details
 
 
+--------+----------+----------------------+-----------+-------------+
| Date   | Type     | Department           | Care Team | Description |
+--------+----------+----------------------+-----------+-------------+
| / | Results  |   Registration  [address] |           |             |
|    | Only     |  [address] |           |             |
|        |          |  [place]  Mailcode: [id number] |           |             |
|        |          |   [place], [place]       |           |             |
|        |          | [address]           |           |             |
|        |          | [phone]         |           |             |
+--------+----------+----------------------+-----------+-------------+
 
 
 
 Social History
 
 
+----------------+-------+-----------+--------+------+
| Tobacco Use    | Types | Packs/Day | Years  | Date |
|                |       |           | Used   |      |
+----------------+-------+-----------+--------+------+
| Never Assessed |       |           |        |      |
+----------------+-------+-----------+--------+------+
 
 
 
+------------------+---------------+
| Sex Assigned at  | Date Recorded |
| Birth            |               |
+------------------+---------------+
| Not on file      |               |
+------------------+---------------+
 
 
 
+----------------+-------------+-------------+
| Job Start Date | Occupation  | Industry    |
+----------------+-------------+-------------+
| Not on file    | Not on file | Not on file |
+----------------+-------------+-------------+
 
 
 
+----------------+--------------+------------+
| Travel History | Travel Start | Travel End |
+----------------+--------------+------------+
 
 
 
+-------------------------------------+
 
| No recent travel history available. |
+-------------------------------------+
 documented as of this encounter
 
 Plan of Treatment
 Not on filedocumented as of this encounter
 
 Procedures
 
 
+-------------------+--------+-------------+----------------------+----------------------+
| Procedure Name    | Priori | Date/Time   | Associated Diagnosis | Comments             |
|                   | ty     |             |                      |                      |
+-------------------+--------+-------------+----------------------+----------------------+
| CHEMISTRY TESTS 4 | Routin | 1998  |                      |   Results for this   |
|                   | e      |  7:02 AM    |                      | procedure are in the |
|                   |        | PST         |                      |  results section.    |
+-------------------+--------+-------------+----------------------+----------------------+
| CBC TESTS 2       | Routin | 1998  |                      |   Results for this   |
|                   | e      |  7:02 AM    |                      | procedure are in the |
|                   |        | PST         |                      |  results section.    |
+-------------------+--------+-------------+----------------------+----------------------+
| CBC TESTS 2       | Routin | 1998  |                      |   Results for this   |
|                   | e      |  4:00 PM    |                      | procedure are in the |
|                   |        | PST         |                      |  results section.    |
+-------------------+--------+-------------+----------------------+----------------------+
| CHEMISTRY TESTS 4 | Routin | 1998  |                      |   Results for this   |
|                   | e      |  2:41 PM    |                      | procedure are in the |
|                   |        | PST         |                      |  results section.    |
+-------------------+--------+-------------+----------------------+----------------------+
 documented in this encounter
 
 Results
 CBC TESTS 2 (1998  7:02 AM PST)
 
+-------------+-----------------+-----------+------------+--------------+
| Component   | Value           | Ref Range | Performed  | Pathologist  |
|             |                 |           | At         | Signature    |
+-------------+-----------------+-----------+------------+--------------+
| WHITE CELL  |        15.6 (H) | K/CU MM   |            |              |
| COUNT       |                 |           |            |              |
+-------------+-----------------+-----------+------------+--------------+
| RED CELL    |         4.36    | M/CU MM   |            |              |
| COUNT       |                 |           |            |              |
+-------------+-----------------+-----------+------------+--------------+
| HEMOGLOBIN  |        13.8     | GM/DL     |            |              |
+-------------+-----------------+-----------+------------+--------------+
| HEMATOCRIT  |        40.9     | %         |            |              |
+-------------+-----------------+-----------+------------+--------------+
| MCV         |        93.8     | FL        |            |              |
+-------------+-----------------+-----------+------------+--------------+
| MCH         |        31.5     | PG        |            |              |
+-------------+-----------------+-----------+------------+--------------+
| MCHC        |        33.6     | GM/DL     |            |              |
+-------------+-----------------+-----------+------------+--------------+
| RDW         |        12.2     | %         |            |              |
+-------------+-----------------+-----------+------------+--------------+
| PLATELET    |       188.      | K/CU MM   |            |              |
| COUNT       |                 |           |            |              |
+-------------+-----------------+-----------+------------+--------------+
 
| MPV         |         9.6     | FL        |            |              |
+-------------+-----------------+-----------+------------+--------------+
 
 
 
+----------+
| Specimen |
+----------+
|          |
+----------+
 
 
 
+----------------------+------------------------+--------------------+--------------+
| Performing           | Address                | City/State/Zipcode | Phone Number |
| Organization         |                        |                    |              |
+----------------------+------------------------+--------------------+--------------+
|   [facility] |   [address]  | [place] [address] |              |
|  PATHOLOGY           | [address]                |                    |              |
+----------------------+------------------------+--------------------+--------------+
 CHEMISTRY TESTS 4 (1998  7:02 AM PST)
 
+-------------+----------------+-----------+------------+--------------+
| Component   | Value          | Ref Range | Performed  | Pathologist  |
|             |                |           | At         | Signature    |
+-------------+----------------+-----------+------------+--------------+
| SODIUM,     |       140.     | mmol/l    |            |              |
| PLASMA      |                |           |            |              |
| (LAB)       |                |           |            |              |
+-------------+----------------+-----------+------------+--------------+
| POTASSIUM,  |         3.9    | mmol/l    |            |              |
| PLASMA      |                |           |            |              |
| (LAB)       |                |           |            |              |
+-------------+----------------+-----------+------------+--------------+
| CHLORIDE,   |       100.     | mmol/l    |            |              |
| PLASMA      |                |           |            |              |
| (LAB)       |                |           |            |              |
+-------------+----------------+-----------+------------+--------------+
| TOTAL CO2,  |        26.     | mmol/l    |            |              |
| PLASMA      |                |           |            |              |
| (LAB)       |                |           |            |              |
+-------------+----------------+-----------+------------+--------------+
| BUN, PLASMA |        11.     | mg/dL     |            |              |
|  (LAB)      |                |           |            |              |
+-------------+----------------+-----------+------------+--------------+
| CREATININE  |         0.9    | mg/dL     |            |              |
| PLASMA      |                |           |            |              |
| (LAB)       |                |           |            |              |
+-------------+----------------+-----------+------------+--------------+
| GLUCOSE,    |       147. (H) | mg/dL     |            |              |
| PLASMA      |                |           |            |              |
| (LAB)       |                |           |            |              |
+-------------+----------------+-----------+------------+--------------+
 
 
 
+----------+
| Specimen |
+----------+
|          |
 
+----------+
 
 
 
+----------------------+------------------------+--------------------+--------------+
| Performing           | Address                | City/State/Zipcode | Phone Number |
| Organization         |                        |                    |              |
+----------------------+------------------------+--------------------+--------------+
|   [facility] |   [address]  | [place] [address] |              |
|  PATHOLOGY           | [address]                |                    |              |
+----------------------+------------------------+--------------------+--------------+
 CBC TESTS 2 (1998  4:00 PM PST)
 
+-------------+-----------------+-----------+------------+--------------+
| Component   | Value           | Ref Range | Performed  | Pathologist  |
|             |                 |           | At         | Signature    |
+-------------+-----------------+-----------+------------+--------------+
| WHITE CELL  |         8.      | K/CU MM   |            |              |
| COUNT       |                 |           |            |              |
+-------------+-----------------+-----------+------------+--------------+
| RED CELL    |         4.54    | M/CU MM   |            |              |
| COUNT       |                 |           |            |              |
+-------------+-----------------+-----------+------------+--------------+
| HEMOGLOBIN  |        14.4     | GM/DL     |            |              |
+-------------+-----------------+-----------+------------+--------------+
| HEMATOCRIT  |        41.4     | %         |            |              |
+-------------+-----------------+-----------+------------+--------------+
| MCV         |        91.3     | FL        |            |              |
+-------------+-----------------+-----------+------------+--------------+
| MCH         |        31.8     | PG        |            |              |
+-------------+-----------------+-----------+------------+--------------+
| MCHC        |        34.8 (H) | GM/DL     |            |              |
+-------------+-----------------+-----------+------------+--------------+
| RDW         |        12.2     | %         |            |              |
+-------------+-----------------+-----------+------------+--------------+
| PLATELET    |       226.      | K/CU MM   |            |              |
| COUNT       |                 |           |            |              |
+-------------+-----------------+-----------+------------+--------------+
| MPV         |         9.6     | FL        |            |              |
+-------------+-----------------+-----------+------------+--------------+
 
 
 
+----------+
| Specimen |
+----------+
|          |
+----------+
 
 
 
+----------------------+------------------------+--------------------+--------------+
| Performing           | Address                | City/State/Zipcode | Phone Number |
| Organization         |                        |                    |              |
+----------------------+------------------------+--------------------+--------------+
|   [facility] |   [address]  | [place] [address] |              |
|  PATHOLOGY           | [address]                |                    |              |
+----------------------+------------------------+--------------------+--------------+
 CHEMISTRY TESTS 4 (1998  2:41 PM PST)
 
 
+-------------+----------------+-----------+------------+--------------+
| Component   | Value          | Ref Range | Performed  | Pathologist  |
|             |                |           | At         | Signature    |
+-------------+----------------+-----------+------------+--------------+
| SODIUM,     |       141.     | mmol/l    |            |              |
| PLASMA      |                |           |            |              |
| (LAB)       |                |           |            |              |
+-------------+----------------+-----------+------------+--------------+
| POTASSIUM,  |         3.8    | mmol/l    |            |              |
| PLASMA      |                |           |            |              |
| (LAB)       |                |           |            |              |
+-------------+----------------+-----------+------------+--------------+
| CHLORIDE,   |       105.     | mmol/l    |            |              |
| PLASMA      |                |           |            |              |
| (LAB)       |                |           |            |              |
+-------------+----------------+-----------+------------+--------------+
| TOTAL CO2,  |        31. (H) | mmol/l    |            |              |
| PLASMA      |                |           |            |              |
| (LAB)       |                |           |            |              |
+-------------+----------------+-----------+------------+--------------+
 
 
 
+----------+
| Specimen |
+----------+
|          |
+----------+
 
 
 
+----------------------+------------------------+--------------------+--------------+
| Performing           | Address                | City/State/Zipcode | Phone Number |
| Organization         |                        |                    |              |
+----------------------+------------------------+--------------------+--------------+
|   [facility] |   [address]  | [place] [address] |              |
|  PATHOLOGY           | [address]                |                    |              |
+----------------------+------------------------+--------------------+--------------+
 documented in this encounter
 
 Visit Diagnoses
 Not on filedocumented in this encounter

## 2019-12-06 NOTE — XMS
Encounter Summary
  Created on: 2019
 
 Wyatt Shane
 External Reference #: [id number]
 : 43
 Sex: Male
 
 Demographics
 
 
+-----------------------+------------------------+
| Address               | [address]     |
|                       | [place]  [address]   |
+-----------------------+------------------------+
| Home Phone            | [phone]        |
+-----------------------+------------------------+
| Preferred Language    | Unknown                |
+-----------------------+------------------------+
| Marital Status        |                 |
+-----------------------+------------------------+
| Orthodox Affiliation | LUT                    |
+-----------------------+------------------------+
| Race                  | White                  |
+-----------------------+------------------------+
| Ethnic Group          | Not  or  |
+-----------------------+------------------------+
 
 
 Author
 
 
+--------------+------------------------------+
| Author       | [facility] |
+--------------+------------------------------+
| Organization | [facility] |
+--------------+------------------------------+
| Address      | Unknown                      |
+--------------+------------------------------+
| Phone        | Unavailable                  |
+--------------+------------------------------+
 
 
 
 Support
 
 
+---------------+--------------+---------+-----------------+
| Name          | Relationship | Address | Phone           |
+---------------+--------------+---------+-----------------+
| Opal Shane | ECON         | Unknown | [phone] |
+---------------+--------------+---------+-----------------+
 
 
 
 Care Team Providers
 
 
 
+-----------------------+------+-----------------+
| Care Team Member Name | Role | Phone           |
+-----------------------+------+-----------------+
| Erwin Iniguez MD   | PCP  | [phone] |
+-----------------------+------+-----------------+
 
 
 
 Encounter Details
 
 
+--------+----------+----------------------+--------------------+-------------+
| Date   | Type     | Department           | Care Team          | Description |
+--------+----------+----------------------+--------------------+-------------+
| 11/10/ | Results  |   [facility]  [address]  |   [facility]   |             |
|    | Only     | [address]  | [phone]       |             |
|        |          |  [place], [place]        |                    |             |
|        |          | [address]           |                    |             |
|        |          | [phone]         |                    |             |
+--------+----------+----------------------+--------------------+-------------+
 
 
 
 Social History
 
 
+----------------+-------+-----------+--------+------+
| Tobacco Use    | Types | Packs/Day | Years  | Date |
|                |       |           | Used   |      |
+----------------+-------+-----------+--------+------+
| Never Assessed |       |           |        |      |
+----------------+-------+-----------+--------+------+
 
 
 
+------------------+---------------+
| Sex Assigned at  | Date Recorded |
| Birth            |               |
+------------------+---------------+
| Not on file      |               |
+------------------+---------------+
 
 
 
+----------------+-------------+-------------+
| Job Start Date | Occupation  | Industry    |
+----------------+-------------+-------------+
| Not on file    | Not on file | Not on file |
+----------------+-------------+-------------+
 
 
 
+----------------+--------------+------------+
| Travel History | Travel Start | Travel End |
+----------------+--------------+------------+
 
 
 
+-------------------------------------+
| No recent travel history available. |
 
+-------------------------------------+
 documented as of this encounter
 
 Plan of Treatment
 Not on filedocumented as of this encounter
 
 Procedures
 
 
+--------------------+--------+------------+----------------------+----------------------+
| Procedure Name     | Priori | Date/Time  | Associated Diagnosis | Comments             |
|                    | ty     |            |                      |                      |
+--------------------+--------+------------+----------------------+----------------------+
| SURGICAL PATHOLOGY | Routin | 11/10/1998 |                      |   Results for this   |
|                    | e      |            |                      | procedure are in the |
|                    |        |            |                      |  results section.    |
+--------------------+--------+------------+----------------------+----------------------+
 documented in this encounter
 
 Results
 SURGICAL PATHOLOGY (11/10/1998)
 
+-----------+--------------------------+-----------+-------------+--------------+
| Component | Value                    | Ref Range | Performed   | Pathologist  |
|           |                          |           | At          | Signature    |
+-----------+--------------------------+-----------+-------------+--------------+
| SURGICAL  | SOURCE OF SPECIMEN: SEE  |           | [facility]        |              |
| PATHOLOGY | RESULTS                  |           | DEPARTMENT  |              |
|           | Preliminary              |           | OF          |              |
|           | History:GROSS            |           | PATHOLOGY   |              |
|           | DESCRIPTIONReceived from |           |             |              |
|           |  [facility]           |           |             |              |
|           | [facility]         |           |             |              |
|           | [place], [place], are   |           |             |              |
|           | fiveH&E-stained slides   |           |             |              |
|           | bearing accession number |           |             |              |
|           |  [id number], sublabeled    |           |             |              |
|           | FS,A-B, and one          |           |             |              |
|           | H&E-stained slide        |           |             |              |
|           | labeled [id number]. The   |           |             |              |
|           | correspondingpathology   |           |             |              |
|           | reports are dated        |           |             |              |
|           | 98, 10/19/98,       |           |             |              |
|           | respectively.            |           |             |              |
|           | FINAL DIAGNOSISSLIDES    |           |             |              |
|           | -SA:RIGHT TRUE     |           |             |              |
|           | VOCAL CORD LESION (A):   |           |             |              |
|           |   FRAGMENTS OF SQUAMOUS  |           |             |              |
|           | EPITHELIUM SHOWING       |           |             |              |
|           | MODERATE DYSPLASIA, NO   |           |             |              |
|           |      DEFINITE INVASION   |           |             |              |
|           | IDENTIFIEDRIGHT TRUE     |           |             |              |
|           | VOCAL CORD LESION (B):   |           |             |              |
|           |   FRAGMENTS OF SQUAMOUS  |           |             |              |
|           | EPITHELIUM SHOWING       |           |             |              |
|           | MODERATE TO SEVERE       |           |             |              |
|           | DYSPLASIA,      NO       |           |             |              |
|           | DEFINITIVE INVASION      |           |             |              |
|           | IDENTIFIEDRIGHT VOCAL    |           |             |              |
|           | CORD, BIOPSY             |           |             |              |
 
|           | (-SA):            |           |             |              |
|           |   FRAGMENTS OF SQUAMOUS  |           |             |              |
|           | MUCOSA WITH MODERATE TO  |           |             |              |
|           | SEVERE DYSPLASIA,        |           |             |              |
|           |   NO DEFINITIVE INVASION |           |             |              |
|           |  IDENTIFIED(outside      |           |             |              |
|           | slides)       Case       |           |             |              |
|           | reviewed by:   Bayron GOMEZ  |           |             |              |
|           | JORGE PavonT:         |           |             |              |
|           | 98/fSix slides are |           |             |              |
|           |  returned with the       |           |             |              |
|           | report.       My         |           |             |              |
|           | electronic signature     |           |             |              |
|           | indicates that I have    |           |             |              |
|           | personally reviewed      |           |             |              |
|           | alldiagnostic slides,    |           |             |              |
|           | the gross and/or         |           |             |              |
|           | microscopic portion of   |           |             |              |
|           | thisreport and           |           |             |              |
|           | formulated the final     |           |             |              |
|           | diagnosis.               |           |             |              |
+-----------+--------------------------+-----------+-------------+--------------+
 
 
 
+----------+
| Specimen |
+----------+
| Other    |
+----------+
 
 
 
+----------------------+------------------------+--------------------+--------------+
| Performing           | Address                | City/State/Zipcode | Phone Number |
| Organization         |                        |                    |              |
+----------------------+------------------------+--------------------+--------------+
|   [facility] |   [address]  | [place] [address] |              |
|  PATHOLOGY           | [address]                |                    |              |
+----------------------+------------------------+--------------------+--------------+
 documented in this encounter
 
 Visit Diagnoses
 Not on filedocumented in this encounter

## 2019-12-06 NOTE — XMS
Encounter Summary
  Created on: 2019
 
 Shane Wyatttien BroussardJosue
 External Reference #: [id number]
 : 43
 Sex: Male
 
 Demographics
 
 
+-----------------------+---------------------------+
| Address               | [address]        |
|                       | [place]  [address] |
+-----------------------+---------------------------+
| Home Phone            | [phone]           |
+-----------------------+---------------------------+
| Preferred Language    | Unknown                   |
+-----------------------+---------------------------+
| Marital Status        |                    |
+-----------------------+---------------------------+
| Moravian Affiliation | 1028                      |
+-----------------------+---------------------------+
| Race                  | Unknown                   |
+-----------------------+---------------------------+
| Ethnic Group          | Unknown                   |
+-----------------------+---------------------------+
 
 
 Author
 
 
+--------------+--------------------------------------------+
| Author       | [facility]  |
|              | [facility] [place]                                |
+--------------+--------------------------------------------+
| Organization | [facility]  |
|              | [facility] [place]                                |
+--------------+--------------------------------------------+
| Address      | Unknown                                    |
+--------------+--------------------------------------------+
| Phone        | Unavailable                                |
+--------------+--------------------------------------------+
 
 
 
 Support
 
 
+---------------+--------------+--------------------+-----------------+
| Name          | Relationship | Address            | Phone           |
+---------------+--------------+--------------------+-----------------+
| Opal Shane | ECON         | [address]     | [phone] |
|               |              | [place]   |                 |
|               |              | [address]              |                 |
+---------------+--------------+--------------------+-----------------+
 
 
 
 
 Care Team Providers
 
 
+------------------------+------+-----------------+
| Care Team Member Name  | Role | Phone           |
+------------------------+------+-----------------+
| Calvin Robertson MD | PCP  | [phone] |
+------------------------+------+-----------------+
 
 
 
 Reason for Visit
 
 
+-------------------+----------+
| Reason            | Comments |
+-------------------+----------+
| Medication Refill |          |
+-------------------+----------+
 
 
 
 Encounter Details
 
 
+--------+--------+----------------------+----------------------+-------------------+
| Date   | Type   | Department           | Care Team            | Description       |
+--------+--------+----------------------+----------------------+-------------------+
| 10/16/ | Refill |   [facility]      |   Eric Akins, | Medication Refill |
| 2019   |        | CARDIOLOGY [facility] |  MD  [address]   |                   |
|        |        |   [address]    | [address]  [place], [place]  |                   |
|        |        | [address]          | [address]  [phone]  |                   |
|        |        | [place]        |  [phone] (Fax)  |                   |
|        |        | [address]           |                      |                   |
|        |        | [phone]         |                      |                   |
+--------+--------+----------------------+----------------------+-------------------+
 
 
 
 Social History
 
 
+---------------+------------+-----------+--------+------------------+
| Tobacco Use   | Types      | Packs/Day | Years  | Date             |
|               |            |           | Used   |                  |
+---------------+------------+-----------+--------+------------------+
| Former Smoker | Cigarettes | 1         | 34     | Quit: 1996 |
+---------------+------------+-----------+--------+------------------+
 
 
 
+---------------------+---+---+---+
| Smokeless Tobacco:  |   |   |   |
| Never Used          |   |   |   |
+---------------------+---+---+---+
 
 
 
+-------------+-------------+---------+----------+
 
| Alcohol Use | Drinks/Week | oz/Week | Comments |
+-------------+-------------+---------+----------+
| No          |             |         |          |
+-------------+-------------+---------+----------+
 
 
 
+------------------+---------------+
| Sex Assigned at  | Date Recorded |
| Birth            |               |
+------------------+---------------+
| Not on file      |               |
+------------------+---------------+
 
 
 
+----------------+-------------+-------------+
| Job Start Date | Occupation  | Industry    |
+----------------+-------------+-------------+
| Not on file    | Not on file | Not on file |
+----------------+-------------+-------------+
 
 
 
+----------------+--------------+------------+
| Travel History | Travel Start | Travel End |
+----------------+--------------+------------+
 
 
 
+-------------------------------------+
| No recent travel history available. |
+-------------------------------------+
 documented as of this encounter
 
 Plan of Treatment
 
 
+--------+---------+-------------+----------------------+-------------+
| Date   | Type    | Specialty   | Care Team            | Description |
+--------+---------+-------------+----------------------+-------------+
| / | Office  | Pulmonology |   Juan F,          |             |
| 2019   | Visit   |             | Jessica Cheung,   |             |
|        |         |             | MD  [address]  |             |
|        |         |             |   [place],   |             |
|        |         |             | [place] [address]             |             |
|        |         |             | [phone]         |             |
|        |         |             | [phone] (Fax)   |             |
+--------+---------+-------------+----------------------+-------------+
| / | Office  | Cardiology  |   Eric Akins, |             |
|    | Visit   |             |  MD  [address]   |             |
|        |         |             | [place]  |             |
|        |         |             | [address]  [phone]  |             |
|        |         |             |  [phone] (Fax)  |             |
+--------+---------+-------------+----------------------+-------------+
 documented as of this encounter
 
 Visit Diagnoses
 Not on filedocumented in this encounter

## 2019-12-06 NOTE — XMS
Encounter Summary
  Created on: 2019
 
 Shane Wyatttien BroussardJosue
 External Reference #: [id number]
 : 43
 Sex: Male
 
 Demographics
 
 
+-----------------------+---------------------------+
| Address               | [address]        |
|                       | [place]  [address] |
+-----------------------+---------------------------+
| Home Phone            | [phone]           |
+-----------------------+---------------------------+
| Preferred Language    | Unknown                   |
+-----------------------+---------------------------+
| Marital Status        |                    |
+-----------------------+---------------------------+
| [employer]iliation | 1028                      |
+-----------------------+---------------------------+
| Race                  | Unknown                   |
+-----------------------+---------------------------+
| Ethnic Group          | Unknown                   |
+-----------------------+---------------------------+
 
 
 Author
 
 
+--------------+--------------------------------------------+
| Author       | [facility]  |
|              | and [place]                                |
+--------------+--------------------------------------------+
| Organization | [facility]  |
|              | and [place]                                |
+--------------+--------------------------------------------+
| Address      | Unknown                                    |
+--------------+--------------------------------------------+
| Phone        | Unavailable                                |
+--------------+--------------------------------------------+
 
 
 
 Support
 
 
+---------------+--------------+--------------------+-----------------+
| Name          | Relationship | Address            | Phone           |
+---------------+--------------+--------------------+-----------------+
| Opal Shane | ECON         | [address]     | [phone] |
|               |              | [place]   |                 |
|               |              | [id number]              |                 |
+---------------+--------------+--------------------+-----------------+
 
 
 
 
 Care Team Providers
 
 
+-----------------------+------+-----------------+
| Care Team Member Name | Role | Phone           |
+-----------------------+------+-----------------+
| Erwin Iniguez MD | PCP  | [phone] |
+-----------------------+------+-----------------+
 
 
 
 Encounter Details
 
 
+--------+-----------+----------------------+--------------------+-------------+
| Date   | Type      | Department           | Care Team          | Description |
+--------+-----------+----------------------+--------------------+-------------+
| 04/03/ | Hospital  |   [facility] GENERIC IP     |   Conversion       | Pain        |
| 2018   | Encounter | CONVERSION [address]  | Transaction,       |             |
|        |           | [address]           | Provider Unknown   |             |
|        |           | [place], [place]         | [phone]       |             |
|        |           | [address]           | [phone] (Fax) |             |
|        |           | [phone]         |                    |             |
+--------+-----------+----------------------+--------------------+-------------+
 
 
 
 Social History
 
 
+---------------+------------+-----------+--------+------------------+
| Tobacco Use   | Types      | Packs/Day | Years  | Date             |
|               |            |           | Used   |                  |
+---------------+------------+-----------+--------+------------------+
| Former Smoker | Cigarettes | 1.3       | 34     | Quit: 1996 |
+---------------+------------+-----------+--------+------------------+
 
 
 
+---------------------+---+---+---+
| Smokeless Tobacco:  |   |   |   |
| Never Used          |   |   |   |
+---------------------+---+---+---+
 
 
 
+-------------+-------------+---------+----------+
| Alcohol Use | Drinks/Week | oz/Week | Comments |
+-------------+-------------+---------+----------+
| No          |             |         |          |
+-------------+-------------+---------+----------+
 
 
 
+------------------+---------------+
| Sex Assigned at  | Date Recorded |
| Birth            |               |
+------------------+---------------+
| Not on file      |               |
 
+------------------+---------------+
 
 
 
+----------------+-------------+-------------+
| Job Start Date | Occupation  | Industry    |
+----------------+-------------+-------------+
| Not on file    | Not on file | Not on file |
+----------------+-------------+-------------+
 
 
 
+----------------+--------------+------------+
| Travel History | Travel Start | Travel End |
+----------------+--------------+------------+
 
 
 
+-------------------------------------+
| No recent travel history available. |
+-------------------------------------+
 documented as of this encounter
 
 Medications at Time of Discharge
 
 
+----------------------+----------------------+-----------+---------+----------+-----------+
| Medication           | Sig                  | Dispensed | Refills | Start    | End Date  |
|                      |                      |           |         | Date     |           |
+----------------------+----------------------+-----------+---------+----------+-----------+
|   acyclovir          | Apply  topically     |           | 0       |          |           |
| (ZOVIRAX) 5%         | every 3 hours.       |           |         |          |           |
| ointment             |                      |           |         |          |           |
+----------------------+----------------------+-----------+---------+----------+-----------+
|   albuterol (PROAIR  | Inhale 2 puffs into  |           | 0       |          |           |
| HFA) 90 mcg/puff     | the lungs every 6    |           |         |          |           |
| inhaler              | hours as needed for  |           |         |          |           |
|                      | Wheezing.            |           |         |          |           |
+----------------------+----------------------+-----------+---------+----------+-----------+
|   digoxin (LANOXIN)  | Take 125 mcg by      |           | 0       |          |           |
| 250 mcg tablet       | mouth Daily.      |           |         |          |           |
|                      | capsule daily        |           |         |          |           |
+----------------------+----------------------+-----------+---------+----------+-----------+
|   ferrous sulfate    | Take 325 mg by mouth |           | 0       | 20 |           |
| 325 mg tablet        |  3 times daily.      |           |         | 12       |           |
+----------------------+----------------------+-----------+---------+----------+-----------+
|   fluticasone        | one spray in each    |           | 0       | 20 |           |
| (FLONASE) 50         | nostril daily as     |           |         | 12       |           |
| mcg/nasal spray      | needed               |           |         |          |           |
+----------------------+----------------------+-----------+---------+----------+-----------+
|                      | Inhale 1 puff into   |           | 0       |          |           |
| fluticasone-salmeter | the lungs Twice      |           |         |          |           |
| ol (ADVAIR) 500-50   | Daily.               |           |         |          |           |
| mcg/puff diskus      |                      |           |         |          |           |
| inhaler              |                      |           |         |          |           |
+----------------------+----------------------+-----------+---------+----------+-----------+
|   folic acid 1 mg    | Take 1 mg by mouth   |           | 0       |          |           |
| tablet               | Daily.               |           |         |          |           |
+----------------------+----------------------+-----------+---------+----------+-----------+
|   furosemide (LASIX) | Take 40 mg by mouth  |           | 0       |          |           |
 
|  40 mg tablet        | Daily.               |           |         |          |           |
+----------------------+----------------------+-----------+---------+----------+-----------+
|   guaiFENesin        | Take 1,200 mg by     |           | 0       |          |           |
| (MUCINEX) 600 mg 12  | mouth 2 times daily. |           |         |          |           |
| hr tablet            |                      |           |         |          |           |
+----------------------+----------------------+-----------+---------+----------+-----------+
|   levothyroxine      | Take 75 mcg by mouth |           | 0       | 20 |           |
| (LEVOTHROID) 75 MCG  |  Daily.              |           |         | 12       |           |
| tablet               |                      |           |         |          |           |
+----------------------+----------------------+-----------+---------+----------+-----------+
|   metoclopramide     | Take 10 mg by mouth  |           | 0       | 20 |           |
| (REGLAN) 10 mg       | 4 times daily as     |           |         | 12       |           |
| tablet               | needed.              |           |         |          |           |
+----------------------+----------------------+-----------+---------+----------+-----------+
|                      | Apply  topically 2   |           | 0       |          |           |
| nystatin-triamcinolo | times daily.         |           |         |          |           |
| ne (MYCOLOG II)      |                      |           |         |          |           |
| cream                |                      |           |         |          |           |
+----------------------+----------------------+-----------+---------+----------+-----------+
|   omeprazole         | Take 20 mg by mouth  |           | 0       | 20 |           |
| (PRILOSEC) 20 mg     | 2 times daily.       |           |         | 12       |           |
| capsule              |                      |           |         |          |           |
+----------------------+----------------------+-----------+---------+----------+-----------+
|   potassium citrate  | Take 10 mEq by mouth |           | 0       |          |           |
| (UROCIT-K) 10 mEq SR |  2 times daily.      |           |         |          |           |
|  tablet              |                      |           |         |          |           |
+----------------------+----------------------+-----------+---------+----------+-----------+
|   predniSONE         | Take 10 mg by mouth  |           | 0       |          |           |
| (DELTASONE) 5 mg     | Daily.               |           |         |          |           |
| tablet               |                      |           |         |          |           |
+----------------------+----------------------+-----------+---------+----------+-----------+
|   tamsulosin         | Take 0.4 mg by mouth |           | 0       | 20 |           |
| (FLOMAX) 0.4 mg CAPS |  2 times daily.      |           |         | 12       |           |
+----------------------+----------------------+-----------+---------+----------+-----------+
|   acyclovir          | Take 400 mg by mouth |           | 0       | 20 |  |
| (ZOVIRAX) 400 MG     |  3 times daily as    |           |         | 12       | 9         |
| tablet               | needed.              |           |         |          |           |
+----------------------+----------------------+-----------+---------+----------+-----------+
|   Cholecalciferol    | Take 2,000 Units by  |           | 0       | 20 |  |
| (VITAMIN D3) 2000    | mouth Daily.         |           |         | 12       | 9         |
| UNITS CAPS           |                      |           |         |          |           |
+----------------------+----------------------+-----------+---------+----------+-----------+
|   cyanocobalamin     | injected             |           | 0       | 20 |  |
| (VITAMIN B-12) 1,000 | intramuscularly once |           |         | 12       | 9         |
|  mcg/mL injection    |  a month             |           |         |          |           |
+----------------------+----------------------+-----------+---------+----------+-----------+
|   diltiazem          | Take 120 mg by mouth |           | 0       |          |  |
| (DILT-XR) 120 mg 24  |  Daily.              |           |         |          | 9         |
| hr capsule           |                      |           |         |          |           |
+----------------------+----------------------+-----------+---------+----------+-----------+
|   loratadine         | Take 10 mg by mouth  |           | 0       |          |  |
| (CLARITIN) 10 mg     | Daily.               |           |         |          | 9         |
| tablet               |                      |           |         |          |           |
+----------------------+----------------------+-----------+---------+----------+-----------+
|   losartan (COZAAR)  | Take 100 mg by mouth |           | 0       |          |  |
| 100 MG tablet        |  Daily. 1/2 daily    |           |         |          | 9         |
+----------------------+----------------------+-----------+---------+----------+-----------+
|   methotrexate 2.5   | Take 15 mg by mouth  |           | 0       |          |  |
| mg tablet            | Once a week.         |           |         |          | 9         |
+----------------------+----------------------+-----------+---------+----------+-----------+
 
|                      | Take 1 tablet by     |           | 0       |          |  |
| oxyCODONE-acetaminop | mouth every 4 hours  |           |         |          | 9         |
| hen (PERCOCET) 5-325 | as needed for Pain.  |           |         |          |           |
|  mg per tablet       |                      |           |         |          |           |
+----------------------+----------------------+-----------+---------+----------+-----------+
|   pseudoePHEDrine    | Take 30 mg by mouth  |           | 0       |          |  |
| (SUDOGEST) 30 mg     | every 4 hours as     |           |         |          | 9         |
| tablet               | needed for           |           |         |          |           |
|                      | Congestion.          |           |         |          |           |
+----------------------+----------------------+-----------+---------+----------+-----------+
|   rivaroxaban        | Take 20 mg by mouth  |           | 0       |          |  |
| (XARELTO) 20 mg      | Daily (with dinner). |           |         |          | 9         |
| tablet               |                      |           |         |          |           |
+----------------------+----------------------+-----------+---------+----------+-----------+
|   sertraline         | 2 tablets daily      |           | 0       | 20 |  |
| (ZOLOFT) 100 mg      |                      |           |         | 12       | 9         |
| tablet               |                      |           |         |          |           |
+----------------------+----------------------+-----------+---------+----------+-----------+
 documented as of this encounter
 
 Plan of Treatment
 
 
+--------+---------+-------------+----------------------+-------------+
| Date   | Type    | Specialty   | Care Team            | Description |
+--------+---------+-------------+----------------------+-------------+
| / | Office  | Pulmonology |   [facility],          |             |
|    | Visit   |             | Jessica Cheung,   |             |
|        |         |             | MD [address] |             |
|        |         |             |   [place],   |             |
|        |         |             | [place] [address]             |             |
|        |         |             | [phone]         |             |
|        |         |             | [phone] (Fax)   |             |
+--------+---------+-------------+----------------------+-------------+
| / | Office  | Cardiology  |   [place], Mershed, |             |
| 2020   | Visit   |             |  MD  [address]   |             |
|        |         |             | [place]  [place]  |             |
|        |         |             | [address]  [phone]  |             |
|        |         |             |  [phone] (Fax)  |             |
+--------+---------+-------------+----------------------+-------------+
 documented as of this encounter
 
 Procedures
 
 
+---------------------+--------+-------------+----------------------+----------------------+
| Procedure Name      | Priori | Date/Time   | Associated Diagnosis | Comments             |
|                     | ty     |             |                      |                      |
+---------------------+--------+-------------+----------------------+----------------------+
| MRI CERVICAL SPINE  | Routin | 2018  |                      |   Results for this   |
| WO CONTRAST         | e      |  5:06 AM    |                      | procedure are in the |
|                     |        | PST         |                      |  results section.    |
+---------------------+--------+-------------+----------------------+----------------------+
 documented in this encounter
 
 Results
 MRI Cervical Spine wo Contrast (2018  5:06 AM PST)
 
+----------+
| Specimen |
 
+----------+
|          |
+----------+
 
 
 
+------------------------------------------------------------------------+--------------+
| Narrative                                                              | Performed At |
+------------------------------------------------------------------------+--------------+
|   This is a non-reportable procedure without a radiologist report and  |              |
| is  used for image storage only                                        |              |
+------------------------------------------------------------------------+--------------+
 
 
 
+--------------------------------------------------------------------------------------+
| Procedure Note                                                                       |
+--------------------------------------------------------------------------------------+
|   Andrzej Steven Irvin - 2019  4:21 AM PDT  This is a non-reportable procedure  |
| without a radiologist report and isused for image storage only                       |
+--------------------------------------------------------------------------------------+
 documented in this encounter
 
 Visit Diagnoses
 
 
+--------------------------+
| Diagnosis                |
+--------------------------+
|   Pain  Generalized pain |
+--------------------------+
 documented in this encounter

## 2019-12-06 NOTE — XMS
Encounter Summary
  Created on: 2019
 
 Ramirez Wyatttien BroussardJosue
 External Reference #: [id number]
 : 43
 Sex: Male
 
 Demographics
 
 
+-----------------------+---------------------------+
| Address               | [address]        |
|                       | [place]  [address] |
+-----------------------+---------------------------+
| Home Phone            | [phone]           |
+-----------------------+---------------------------+
| Preferred Language    | Unknown                   |
+-----------------------+---------------------------+
| Marital Status        |                    |
+-----------------------+---------------------------+
| Mandaeism Affiliation | 1028                      |
+-----------------------+---------------------------+
| Race                  | Unknown                   |
+-----------------------+---------------------------+
| Ethnic Group          | Unknown                   |
+-----------------------+---------------------------+
 
 
 Author
 
 
+--------------+--------------------------------------------+
| Author       | [facility]  |
|              | and [place]                                |
+--------------+--------------------------------------------+
| Organization | [facility]  |
|              | [facility] [place]                                |
+--------------+--------------------------------------------+
| Address      | Unknown                                    |
+--------------+--------------------------------------------+
| Phone        | Unavailable                                |
+--------------+--------------------------------------------+
 
 
 
 Support
 
 
+---------------+--------------+--------------------+-----------------+
| Name          | Relationship | Address            | Phone           |
+---------------+--------------+--------------------+-----------------+
| Opal Ramirez | ECON         | [address]     | [phone] |
|               |              | [place]   |                 |
|               |              | [id number]              |                 |
+---------------+--------------+--------------------+-----------------+
 
 
 
 
 Care Team Providers
 
 
+-----------------------+------+-----------------+
| Care Team Member Name | Role | Phone           |
+-----------------------+------+-----------------+
| Erwin Iniguez MD | PCP  | [phone] |
+-----------------------+------+-----------------+
 
 
 
 Encounter Details
 
 
+--------+-----------+----------------------+----------------------+---------------------+
| Date   | Type      | Department           | Care Team            | Description         |
+--------+-----------+----------------------+----------------------+---------------------+
| / | Hospital  |   [facility]     |   Conversion         | Multifocal lung     |
| 2018   | Encounter | CENTER [facility] [address]  | Transaction,         | consolidation (HCC) |
|        |           | [address]  | Provider Unknown     |                     |
|        |           |  [place], [place]        | [phone]         |                     |
|        |           | [address]           | [phone] (Fax)   |                     |
|        |           | [phone]         | Jessica Rogel  |                     |
|        |           |                      | MD [address]  [address]    |                     |
|        |           |                      | [address]   |                     |
|        |           |                      | [place] [address]   |                     |
|        |           |                      | [phone]         |                     |
|        |           |                      | [phone] (Fax)   |                     |
+--------+-----------+----------------------+----------------------+---------------------+
 
 
 
 Social History
 
 
+---------------+------------+-----------+--------+------------------+
| Tobacco Use   | Types      | Packs/Day | Years  | Date             |
|               |            |           | Used   |                  |
+---------------+------------+-----------+--------+------------------+
| Former Smoker | Cigarettes | 1.3       | 34     | Quit: 1996 |
+---------------+------------+-----------+--------+------------------+
 
 
 
+---------------------+---+---+---+
| Smokeless Tobacco:  |   |   |   |
| Never Used          |   |   |   |
+---------------------+---+---+---+
 
 
 
+-------------+-------------+---------+----------+
| Alcohol Use | Drinks/Week | oz/Week | Comments |
+-------------+-------------+---------+----------+
| No          |             |         |          |
+-------------+-------------+---------+----------+
 
 
 
 
+------------------+---------------+
| Sex Assigned at  | Date Recorded |
| Birth            |               |
+------------------+---------------+
| Not on file      |               |
+------------------+---------------+
 
 
 
+----------------+-------------+-------------+
| Job Start Date | Occupation  | Industry    |
+----------------+-------------+-------------+
| Not on file    | Not on file | Not on file |
+----------------+-------------+-------------+
 
 
 
+----------------+--------------+------------+
| Travel History | Travel Start | Travel End |
+----------------+--------------+------------+
 
 
 
+-------------------------------------+
| No recent travel history available. |
+-------------------------------------+
 documented as of this encounter
 
 Medications at Time of Discharge
 
 
+----------------------+----------------------+-----------+---------+----------+-----------+
| Medication           | Sig                  | Dispensed | Refills | Start    | End Date  |
|                      |                      |           |         | Date     |           |
+----------------------+----------------------+-----------+---------+----------+-----------+
|   acyclovir          | Apply  topically     |           | 0       |          |           |
| (ZOVIRAX) 5%         | every 3 hours.       |           |         |          |           |
| ointment             |                      |           |         |          |           |
+----------------------+----------------------+-----------+---------+----------+-----------+
|   albuterol (PROAIR  | Inhale 2 puffs into  |           | 0       |          |           |
| HFA) 90 mcg/puff     | the lungs every 6    |           |         |          |           |
| inhaler              | hours as needed for  |           |         |          |           |
|                      | Wheezing.            |           |         |          |           |
+----------------------+----------------------+-----------+---------+----------+-----------+
|                      | Take 3 mLs by        |           | 0       | 20 |           |
| albuterol-ipratropiu | nebulization every 6 |           |         | 18       |           |
| m 2.5-0.5 mg/3 mL    |  (six) hours as      |           |         |          |           |
| SOLN                 | needed.              |           |         |          |           |
+----------------------+----------------------+-----------+---------+----------+-----------+
|   digoxin (LANOXIN)  | Take 125 mcg by      |           | 0       |          |           |
| 250 mcg tablet       | mouth Daily.      |           |         |          |           |
|                      | capsule daily        |           |         |          |           |
+----------------------+----------------------+-----------+---------+----------+-----------+
|   ferrous sulfate    | Take 325 mg by mouth |           | 0       | 20 |           |
| 325 mg tablet        |  3 times daily.      |           |         | 12       |           |
+----------------------+----------------------+-----------+---------+----------+-----------+
|   fluticasone        | one spray in each    |           | 0       | 20 |           |
| (FLONASE) 50         | nostril daily as     |           |         | 12       |           |
| mcg/nasal spray      | needed               |           |         |          |           |
+----------------------+----------------------+-----------+---------+----------+-----------+
 
|                      | Inhale 1 puff into   |           | 0       |          |           |
| fluticasone-salmeter | the lungs Twice      |           |         |          |           |
| ol (ADVAIR) 500-50   | Daily.               |           |         |          |           |
| mcg/puff diskus      |                      |           |         |          |           |
| inhaler              |                      |           |         |          |           |
+----------------------+----------------------+-----------+---------+----------+-----------+
|   folic acid 1 mg    | Take 1 mg by mouth   |           | 0       |          |           |
| tablet               | Daily.               |           |         |          |           |
+----------------------+----------------------+-----------+---------+----------+-----------+
|   furosemide (LASIX) | Take 40 mg by mouth  |           | 0       |          |           |
|  40 mg tablet        | Daily.               |           |         |          |           |
+----------------------+----------------------+-----------+---------+----------+-----------+
|   guaiFENesin        | Take 1,200 mg by     |           | 0       |          |           |
| (MUCINEX) 600 mg 12  | mouth 2 times daily. |           |         |          |           |
| hr tablet            |                      |           |         |          |           |
+----------------------+----------------------+-----------+---------+----------+-----------+
|   levothyroxine      | Take 75 mcg by mouth |           | 0       | 20 |           |
| (LEVOTHROID) 75 MCG  |  Daily.              |           |         | 12       |           |
| tablet               |                      |           |         |          |           |
+----------------------+----------------------+-----------+---------+----------+-----------+
|   metoclopramide     | Take 10 mg by mouth  |           | 0       | 20 |           |
| (REGLAN) 10 mg       | 4 times daily as     |           |         | 12       |           |
| tablet               | needed.              |           |         |          |           |
+----------------------+----------------------+-----------+---------+----------+-----------+
|                      | Apply  topically 2   |           | 0       |          |           |
| nystatin-triamcinolo | times daily.         |           |         |          |           |
| ne (MYCOLOG II)      |                      |           |         |          |           |
| cream                |                      |           |         |          |           |
+----------------------+----------------------+-----------+---------+----------+-----------+
|   ofloxacin          |                      |           | 0       | 20 |           |
| (OCUFLOX) 0.3%       |                      |           |         | 18       |           |
| ophthalmic solution  |                      |           |         |          |           |
+----------------------+----------------------+-----------+---------+----------+-----------+
|   omeprazole         | Take 20 mg by mouth  |           | 0       | 20 |           |
| (PRILOSEC) 20 mg     | 2 times daily.       |           |         | 12       |           |
| capsule              |                      |           |         |          |           |
+----------------------+----------------------+-----------+---------+----------+-----------+
|   potassium citrate  | Take 10 mEq by mouth |           | 0       |          |           |
| (UROCIT-K) 10 mEq SR |  2 times daily.      |           |         |          |           |
|  tablet              |                      |           |         |          |           |
+----------------------+----------------------+-----------+---------+----------+-----------+
|   predniSONE         | Take 10 mg by mouth  |           | 0       |          |           |
| (DELTASONE) 5 mg     | Daily.               |           |         |          |           |
| tablet               |                      |           |         |          |           |
+----------------------+----------------------+-----------+---------+----------+-----------+
|   tamsulosin         | Take 0.4 mg by mouth |           | 0       | 20 |           |
| (FLOMAX) 0.4 mg CAPS |  2 times daily.      |           |         | 12       |           |
+----------------------+----------------------+-----------+---------+----------+-----------+
|   acyclovir          | Take 400 mg by mouth |           | 0       | 20 |  |
| (ZOVIRAX) 400 MG     |  3 times daily as    |           |         | 12       | 9         |
| tablet               | needed.              |           |         |          |           |
+----------------------+----------------------+-----------+---------+----------+-----------+
|   Cholecalciferol    | Take 2,000 Units by  |           | 0       | 20 |  |
| (VITAMIN D3) 2000    | mouth Daily.         |           |         | 12       | 9         |
| UNITS CAPS           |                      |           |         |          |           |
+----------------------+----------------------+-----------+---------+----------+-----------+
|   cyanocobalamin     | injected             |           | 0       | 20 |  |
| (VITAMIN B-12) 1,000 | intramuscularly once |           |         | 12       | 9         |
|  mcg/mL injection    |  a month             |           |         |          |           |
+----------------------+----------------------+-----------+---------+----------+-----------+
 
|   diltiazem          | Take 120 mg by mouth |           | 0       |          |  |
| (DILT-XR) 120 mg 24  |  Daily.              |           |         |          | 9         |
| hr capsule           |                      |           |         |          |           |
+----------------------+----------------------+-----------+---------+----------+-----------+
|   loratadine         | Take 10 mg by mouth  |           | 0       |          |  |
| (CLARITIN) 10 mg     | Daily.               |           |         |          | 9         |
| tablet               |                      |           |         |          |           |
+----------------------+----------------------+-----------+---------+----------+-----------+
|   losartan (COZAAR)  | Take 100 mg by mouth |           | 0       |          |  |
| 100 MG tablet        |  Daily. 1/2 daily    |           |         |          | 9         |
+----------------------+----------------------+-----------+---------+----------+-----------+
|   methotrexate 2.5   | Take 15 mg by mouth  |           | 0       |          |  |
| mg tablet            | Once a week.         |           |         |          | 9         |
+----------------------+----------------------+-----------+---------+----------+-----------+
|                      | Take 1 tablet by     |           | 0       |          |  |
| oxyCODONE-acetaminop | mouth every 4 hours  |           |         |          | 9         |
| hen (PERCOCET) 5-325 | as needed for Pain.  |           |         |          |           |
|  mg per tablet       |                      |           |         |          |           |
+----------------------+----------------------+-----------+---------+----------+-----------+
|   predniSONE         | Take 4 tablets by    |           | 0       | 20 |  |
| (DELTASONE) 2.5 MG   | mouth daily. Takes   |           |         | 18       | 9         |
| tablet               | 7.5 mg daily         |           |         |          |           |
+----------------------+----------------------+-----------+---------+----------+-----------+
|   pseudoePHEDrine    | Take 30 mg by mouth  |           | 0       |          |  |
| (SUDOGEST) 30 mg     | every 4 hours as     |           |         |          | 9         |
| tablet               | needed for           |           |         |          |           |
|                      | Congestion.          |           |         |          |           |
+----------------------+----------------------+-----------+---------+----------+-----------+
|   rivaroxaban        | Take 20 mg by mouth  |           | 0       |          |  |
| (XARELTO) 20 mg      | Daily (with dinner). |           |         |          | 9         |
| tablet               |                      |           |         |          |           |
+----------------------+----------------------+-----------+---------+----------+-----------+
|   sertraline         | 2 tablets daily      |           | 0       | 20 |  |
| (ZOLOFT) 100 mg      |                      |           |         | 12       | 9         |
| tablet               |                      |           |         |          |           |
+----------------------+----------------------+-----------+---------+----------+-----------+
 documented as of this encounter
 
 Plan of Treatment
 
 
+--------+---------+-------------+----------------------+-------------+
| Date   | Type    | Specialty   | Care Team            | Description |
+--------+---------+-------------+----------------------+-------------+
| / | Office  | Pulmonology |   Juan F,          |             |
| 2019   | Visit   |             | Jessica Cheung,   |             |
|        |         |             | MD [address] |             |
|        |         |             |   [place],   |             |
|        |         |             | [place] [address]             |             |
|        |         |             | [phone]         |             |
|        |         |             | [phone] (Fax)   |             |
+--------+---------+-------------+----------------------+-------------+
| / | Office  | Cardiology  |   Eric Akins, |             |
|    | Visit   |             |  MD  [address]   |             |
|        |         |             | [place] [facility] [facility] [place]  |             |
|        |         |             | [address]  [phone]  |             |
|        |         |             |  [phone] (Fax)  |             |
+--------+---------+-------------+----------------------+-------------+
 documented as of this encounter
 
 
 Procedures
 
 
+----------------------+--------+-------------+----------------------+----------------------
+
| Procedure Name       | Priori | Date/Time   | Associated Diagnosis | Comments             
|
|                      | ty     |             |                      |                      
|
+----------------------+--------+-------------+----------------------+----------------------
+
| CT CHEST WO CONTRAST | Routin | 2018  |                      |   Results for this   
|
|                      | e      | 10:56 AM    |                      | procedure are in the 
|
|                      |        | PDT         |                      |  results section.    
|
+----------------------+--------+-------------+----------------------+----------------------
+
 documented in this encounter
 
 Results
 CT Chest wo Contrast (2018 10:56 AM PDT)
 
+----------+
| Specimen |
+----------+
|          |
+----------+
 
 
 
+------------------------------------------------------------------------+--------------+
| Impressions                                                            | Performed At |
+------------------------------------------------------------------------+--------------+
|   1.   Scattered residual nodularity of the lungs. There is tree in    |              |
| bud opacity of the left lung base which may represent inflammatory     |              |
| process. The largest nodule of the right middle lobe measuring 0.6 x   |              |
| 0.4 cm. Follow-up CT scan could be obtained in 3-6   months.  2.       |              |
|   There is significant improvement of the consolidation from the prior |              |
|  exam dated 2018 particularly at the left upper lobe and     |              |
| right lung.     Electronically signed by Ananth Verduzco MD on 2018   |              |
| 12:34 PM                                                               |              |
+------------------------------------------------------------------------+--------------+
 
 
 
+------------------------------------------------------------------------+--------------+
| Narrative                                                              | Performed At |
+------------------------------------------------------------------------+--------------+
|   WYATT RAMIREZ  1945  72 years Male  CT CHEST WO CONTRAST         |              |
| 2018 10:56 AM     HISTORY: Multifocal consolidation and nodules.  |              |
| History of cancer.     COMPARISON: 2018     TECHNIQUE: CT    |              |
| scan of the chest without contrast.   5-mm thick helically acquired    |              |
| axial images were obtained in soft tissue and lung algorithm.          |              |
| Automated exposure control done to minimize dose.     FINDINGS:        |              |
| Bilateral centrilobular emphysema.     Residual innumerable small      |              |
| tree-in-bud opacity/nodules of the left lower lobe, may represent      |              |
| resolving inflammatory process.     The bilateral lung consolidation   |              |
| is significantly improved as compared to the prior examination         |              |
 
| particularly at the left upper lobe and right lung.     There is a     |              |
| persistent nodule of the right middle lobe (2/) measuring 0.6 x 0.4  |              |
| cm. Pulmonary nodule of the right middle lobe (2/73) measuring 0.5 cm. |              |
|  There are other scattered pulmonary lung nodules of the left upper    |              |
| lobe.     Mild atelectasis of the medial aspect of the right lower     |              |
| lobe.     Prior Nissen fundoplication.     No acute abnormality of the |              |
|  visualized portions of the upper abdomen.     No acute osseous        |              |
| abnormality.                                                           |              |
+------------------------------------------------------------------------+--------------+
 
 
 
+-------------------------------------------------------------------------------------------
--------------------------------------------------------------------------------------------
----------------------------------------------------------------------+
| Procedure Note                                                                            
                                                                                            
                                                                      |
+-------------------------------------------------------------------------------------------
--------------------------------------------------------------------------------------------
----------------------------------------------------------------------+
|   Maurizio, Rad Conversion - 2019  4:21 AM PDT  WYATT RAMIREZ89 years MaleCT     
                                                                                            
                                                                      |
| CHEST WO CONTRAST2018 10:56 AM HISTORY: Multifocal consolidation and nodules.        
                                                                                            
                                                                      |
| History of cancer. COMPARISON: 2018 TECHNIQUE: CT scan of the chest without     
                                                                                            
                                                                      |
| contrast.  5-mm thick helically acquired axial images were obtained in soft tissue and    
                                                                                            
                                                                      |
| lung algorithm. Automated exposure control done to minimize dose. FINDINGS:Bilateral      
                                                                                            
                                                                      |
| centrilobular emphysema. Residual innumerable small tree-in-bud opacity/nodules of the    
                                                                                            
                                                                      |
| left lower lobe, may represent resolving inflammatory process. The bilateral lung         
                                                                                            
                                                                      |
| consolidation is significantly improved as compared to the prior examination              
                                                                                            
                                                                      |
| particularly at the left upper lobe and right lung. There is a persistent nodule of the   
                                                                                            
                                                                      |
| right middle lobe (2/87) measuring 0.6 x 0.4 cm. Pulmonary nodule of the right middle     
                                                                                            
                                                                      |
| lobe (2/73) measuring 0.5 cm. There are other scattered pulmonary lung nodules of the     
                                                                                            
                                                                      |
| left upper lobe. Mild atelectasis of the medial aspect of the right lower lobe. Prior     
                                                                                            
                                                                      |
| Nissen fundoplication. No acute abnormality of the visualized portions of the upper       
                                                                                            
                                                                      |
 
| abdomen. No acute osseous abnormality. IMPRESSION: 1.  Scattered residual nodularity of   
                                                                                            
                                                                      |
| the lungs. There is tree in bud opacity of the left lung base which may represent         
                                                                                            
                                                                      |
| inflammatory process. The largest nodule of the right middle lobe measuring 0.6 x 0.4     
                                                                                            
                                                                      |
| cm. Follow-up CT scan could be obtained in 3-6 months.2.  There is significant            
                                                                                            
                                                                      |
| improvement of the consolidation from the prior exam dated 2018 particularly    
                                                                                            
                                                                      |
| at the left upper lobe and right lung. Electronically signed by Ananth Verduzco MD on         
                                                                                            
                                                                      |
| 2018 12:34 PM                                                                        
                                                                                            
                                                                      |
|                                                                                           
                                                                                            
                                                                      |
|Mild atelectasis of the medial aspect of the right lower lobe.                             
                                                                                            
                                                                      |
|                                                                                           
                                                                                            
                                                                      |
|Prior Nissen fundoplication.                                                               
                                                                                            
                                                                      |
|                                                                                           
                                                                                            
                                                                      |
|No acute abnormality of the visualized portions of the upper abdomen.                      
                                                                                            
                                                                      |
|                                                                                           
                                                                                            
                                                                      |
|No acute osseous abnormality.                                                              
                                                                                            
                                                                      |
|                                                                                           
                                                                                            
                                                                      |
|IMPRESSION:                                                                                
                                                                                            
                                                                      |
|1.  Scattered residual nodularity of the lungs. There is tree in bud opacity of the left jodie
ng base which may represent inflammatory process. The largest nodule of the right middle lob
e measuring 0.6 x 0.4 cm. Follow-up CT scan could be obtained in 3-6  |
|months.                                                                                    
                                                                                            
                                                                     |
|2.  There is significant improvement of the consolidation from the prior exam dated 2018 particularly at the left upper lobe and right lung.                                 
                                                                      |
 
|                                                                                           
                                                                                            
                                                                      |
|Electronically signed by Ananth Verduzco MD on 2018 12:34 PM                              
                                                                                            
                                                                      |
+-------------------------------------------------------------------------------------------
--------------------------------------------------------------------------------------------
----------------------------------------------------------------------+
 documented in this encounter
 
 Visit Diagnoses
 
 
+------------------------------------------------------------------------------------------+
| Diagnosis                                                                                |
+------------------------------------------------------------------------------------------+
|   Multifocal lung consolidation (HCC)  Pneumococcal pneumonia (streptococcus pneumoniae  |
| pneumonia)                                                                               |
+------------------------------------------------------------------------------------------+
 documented in this encounter

## 2019-12-06 NOTE — XMS
Encounter Summary
  Created on: 2019
 
 Wyatt Shane
 External Reference #: [id number]
 : 43
 Sex: Male
 
 Demographics
 
 
+-----------------------+------------------------+
| Address               | [address]     |
|                       | [place]  [address]   |
+-----------------------+------------------------+
| Home Phone            | [phone]        |
+-----------------------+------------------------+
| Preferred Language    | Unknown                |
+-----------------------+------------------------+
| Marital Status        |                 |
+-----------------------+------------------------+
| Zoroastrian Affiliation | LUT                    |
+-----------------------+------------------------+
| Race                  | White                  |
+-----------------------+------------------------+
| Ethnic Group          | Not  or  |
+-----------------------+------------------------+
 
 
 Author
 
 
+--------------+------------------------------+
| Author       | [facility] |
+--------------+------------------------------+
| Organization | [facility] |
+--------------+------------------------------+
| Address      | Unknown                      |
+--------------+------------------------------+
| Phone        | Unavailable                  |
+--------------+------------------------------+
 
 
 
 Support
 
 
+---------------+--------------+---------+-----------------+
| Name          | Relationship | Address | Phone           |
+---------------+--------------+---------+-----------------+
| Opal Shane | ECON         | Unknown | [phone] |
+---------------+--------------+---------+-----------------+
 
 
 
 Care Team Providers
 
 
 
+-----------------------+------+-------------+
| Care Team Member Name | Role | Phone       |
+-----------------------+------+-------------+
 PCP  | Unavailable |
+-----------------------+------+-------------+
 
 
 
 Encounter Details
 
 
+--------+----------+----------------------+--------------------+-------------+
| Date   | Type     | Department           | Care Team          | Description |
+--------+----------+----------------------+--------------------+-------------+
| / | Results  |   Otolaryngology     |   Jimmy Esposito MD |             |
|    | Only     | Head and Neck        |                    |             |
|        |          | Surgery Services at  |                    |             |
|        |          | [facility]PV  [address] [facility]     |                    |             |
|        |          | [address]       |                    |             |
|        |          | Mailcode: PV01       |                    |             |
|        |          | [address] [address] |                    |             |
|        |          |   [place], [place]       |                    |             |
|        |          | [id number]           |                    |             |
|        |          | [phone]         |                    |             |
+--------+----------+----------------------+--------------------+-------------+
 
 
 
 Social History
 
 
+----------------+-------+-----------+--------+------+
| Tobacco Use    | Types | Packs/Day | Years  | Date |
|                |       |           | Used   |      |
+----------------+-------+-----------+--------+------+
| Never Assessed |       |           |        |      |
+----------------+-------+-----------+--------+------+
 
 
 
+------------------+---------------+
| Sex Assigned at  | Date Recorded |
| Birth            |               |
+------------------+---------------+
| Not on file      |               |
+------------------+---------------+
 
 
 
+----------------+-------------+-------------+
| Job Start Date | Occupation  | Industry    |
+----------------+-------------+-------------+
| Not on file    | Not on file | Not on file |
+----------------+-------------+-------------+
 
 
 
+----------------+--------------+------------+
| Travel History | Travel Start | Travel End |
+----------------+--------------+------------+
 
 
 
 
+-------------------------------------+
| No recent travel history available. |
+-------------------------------------+
 documented as of this encounter
 
 Plan of Treatment
 Not on filedocumented as of this encounter
 
 Procedures
 
 
+--------------------+--------+-------------+----------------------+----------------------+
| Procedure Name     | Priori | Date/Time   | Associated Diagnosis | Comments             |
|                    | ty     |             |                      |                      |
+--------------------+--------+-------------+----------------------+----------------------+
| X-RAY CHEST 2 VIEW | Routin | 2004  |                      |   Results for this   |
|                    | e      |  1:35 PM    |                      | procedure are in the |
|                    |        | PST         |                      |  results section.    |
+--------------------+--------+-------------+----------------------+----------------------+
 documented in this encounter
 
 Results
 CHEST 2 VIEW (2004  1:35 PM PST)
 
+-----------+--------------------------+-----------+------------+--------------+
| Component | Value                    | Ref Range | Performed  | Pathologist  |
|           |                          |           | At         | Signature    |
+-----------+--------------------------+-----------+------------+--------------+
| CHEST, 2  | Radiologist 1: [facility], |           |            |              |
| [facility]  |  SIMIN MEEKEXAM:   PA and   |           |            |              |
| STEREO    | lateral chest.           |           |            |              |
|           | INDICATION: Preop, and   |           |            |              |
|           | head and neck malignancy |           |            |              |
|           |  COMPARISON: None.       |           |            |              |
|           | FINDINGS:   The cardiac  |           |            |              |
|           | and mediastinal contours |           |            |              |
|           |  are normal.             |           |            |              |
|           |   Thepatient has had a   |           |            |              |
|           | prior CABG procedure.    |           |            |              |
|           |   The lungs are          |           |            |              |
|           | clearwithout evidence    |           |            |              |
|           | for suspicious pulmonary |           |            |              |
|           |  nodules.   There is     |           |            |              |
|           | nopleural effusion.      |           |            |              |
|           | Osseous structures are   |           |            |              |
|           | unremarkable.            |           |            |              |
|           | IMPRESSION: Prior CABG   |           |            |              |
|           | procedure.   No evidence |           |            |              |
|           |  for acute pulmonary     |           |            |              |
|           | disease.                 |           |            |              |
+-----------+--------------------------+-----------+------------+--------------+
 
 
 
+----------+
| Specimen |
+----------+
 
|          |
+----------+
 
 
 
+----------------------+---------+--------------------+--------------+
| Performing           | Address | City/State/Zipcode | Phone Number |
| Organization         |         |                    |              |
+----------------------+---------+--------------------+--------------+
|   [facility] DEPARTMENT OF |         |                    |              |
|  RADIOLOGY           |         |                    |              |
+----------------------+---------+--------------------+--------------+
 documented in this encounter
 
 Visit Diagnoses
 Not on filedocumented in this encounter

## 2019-12-06 NOTE — XMS
Encounter Summary
  Created on: 2019
 
 Wyatt Shane
 External Reference #: [id number]
 : 43
 Sex: Male
 
 Demographics
 
 
+-----------------------+------------------------+
| Address               | [address]     |
|                       | [place]  [address]   |
+-----------------------+------------------------+
| Home Phone            | [phone]        |
+-----------------------+------------------------+
| Preferred Language    | Unknown                |
+-----------------------+------------------------+
| Marital Status        |                 |
+-----------------------+------------------------+
| Hoahaoism Affiliation | LUT                    |
+-----------------------+------------------------+
| Race                  | White                  |
+-----------------------+------------------------+
| Ethnic Group          | Not  or  |
+-----------------------+------------------------+
 
 
 Author
 
 
+--------------+------------------------------+
| Author       | [facility] |
+--------------+------------------------------+
| Organization | [facility] |
+--------------+------------------------------+
| Address      | Unknown                      |
+--------------+------------------------------+
| Phone        | Unavailable                  |
+--------------+------------------------------+
 
 
 
 Support
 
 
+---------------+--------------+---------+-----------------+
| Name          | Relationship | Address | Phone           |
+---------------+--------------+---------+-----------------+
| Opal Shane | ECON         | Unknown | [phone] |
+---------------+--------------+---------+-----------------+
 
 
 
 Care Team Providers
 
 
 
+-----------------------+------+-------------+
| Care Team Member Name | Role | Phone       |
+-----------------------+------+-------------+
 PCP  | Unavailable |
+-----------------------+------+-------------+
 
 
 
 Encounter Details
 
 
+--------+----------+----------------------+--------------------+-------------+
| Date   | Type     | Department           | Care Team          | Description |
+--------+----------+----------------------+--------------------+-------------+
| 10/27/ | Results  |   Otolaryngology     |   Jimmy Esposito MD |             |
|    | Only     | Head and Neck        |                    |             |
|        |          | Surgery Services at  |                    |             |
|        |          | [facility]  [address]     |                    |             |
|        |          | [address]       |                    |             |
|        |          | Mailcode: [address]       |                    |             |
|        |          | [address] |                    |             |
|        |          |   [place], [place]       |                    |             |
|        |          | [id number]           |                    |             |
|        |          | [phone]         |                    |             |
+--------+----------+----------------------+--------------------+-------------+
 
 
 
 Social History
 
 
+----------------+-------+-----------+--------+------+
| Tobacco Use    | Types | Packs/Day | Years  | Date |
|                |       |           | Used   |      |
+----------------+-------+-----------+--------+------+
| Never Assessed |       |           |        |      |
+----------------+-------+-----------+--------+------+
 
 
 
+------------------+---------------+
| Sex Assigned at  | Date Recorded |
| Birth            |               |
+------------------+---------------+
| Not on file      |               |
+------------------+---------------+
 
 
 
+----------------+-------------+-------------+
| Job Start Date | Occupation  | Industry    |
+----------------+-------------+-------------+
| Not on file    | Not on file | Not on file |
+----------------+-------------+-------------+
 
 
 
+----------------+--------------+------------+
| Travel History | Travel Start | Travel End |
+----------------+--------------+------------+
 
 
 
 
+-------------------------------------+
| No recent travel history available. |
+-------------------------------------+
 documented as of this encounter
 
 Plan of Treatment
 Not on filedocumented as of this encounter
 
 Procedures
 
 
+--------------------+--------+-------------+----------------------+----------------------+
| Procedure Name     | Priori | Date/Time   | Associated Diagnosis | Comments             |
|                    | ty     |             |                      |                      |
+--------------------+--------+-------------+----------------------+----------------------+
| SURGICAL PATHOLOGY | Routin | 10/27/1999  |                      |   Results for this   |
|                    | e      |             |                      | procedure are in the |
|                    |        |             |                      |  results section.    |
+--------------------+--------+-------------+----------------------+----------------------+
| COA MASTER PANEL  | Routin | 10/26/1999  |                      |   Results for this   |
|                    | e      |  1:30 PM    |                      | procedure are in the |
|                    |        | PDT         |                      |  results section.    |
+--------------------+--------+-------------+----------------------+----------------------+
| INR                | Routin | 10/26/1999  |                      |   Results for this   |
|                    | e      |  1:30 PM    |                      | procedure are in the |
|                    |        | PDT         |                      |  results section.    |
+--------------------+--------+-------------+----------------------+----------------------+
| APTT (ACT. PART.   | Routin | 10/26/1999  |                      |   Results for this   |
| THROMBO TIME)      | e      |  1:30 PM    |                      | procedure are in the |
|                    |        | PDT         |                      |  results section.    |
+--------------------+--------+-------------+----------------------+----------------------+
 documented in this encounter
 
 Results
 SURGICAL PATHOLOGY (10/27/1999)
 
+-----------+--------------------------+-----------+-------------+--------------+
| Component | Value                    | Ref Range | Performed   | Pathologist  |
|           |                          |           | At          | Signature    |
+-----------+--------------------------+-----------+-------------+--------------+
| SURGICAL  | SOURCE OF SPECIMEN:1     |           | [facility]        |              |
| PATHOLOGY | Posterior commisure      |           | DEPARTMENT  |              |
|           | Final Pathologic         |           | OF          |              |
|           | Diagnosis:Larynx         |           | PATHOLOGY   |              |
|           | (posterior commissure),  |           |             |              |
|           | mucosal biopsy:       -  |           |             |              |
|           | Atypia, probably         |           |             |              |
|           | reactive (see Comment)   |           |             |              |
|           | Comment: The epithelium  |           |             |              |
|           | shows                    |           |             |              |
|           | pseudoepitheliomatous    |           |             |              |
|           | hyperplasia and          |           |             |              |
|           | ismarkedly inflamed.     |           |             |              |
|           | There is prominent       |           |             |              |
|           | atypia of fibroblasts in |           |             |              |
|           |  theunderlying stroma.   |           |             |              |
|           | No definite invasion is  |           |             |              |
 
|           | seen. These probably     |           |             |              |
|           | representchanges related |           |             |              |
|           |  to previous             |           |             |              |
|           | irradiation, however     |           |             |              |
|           | clinical follow-up       |           |             |              |
|           | isrecommended. Clinical  |           |             |              |
|           | History:Posterior        |           |             |              |
|           | glottic lesion, Status   |           |             |              |
|           | post right vertical      |           |             |              |
|           | hemilaryngectomy,Decembe |           |             |              |
|           | r  ([id number]).      |           |             |              |
|           | Gross                    |           |             |              |
|           | Description:Received in  |           |             |              |
|           | formalin, labeled        |           |             |              |
|           | "posterior commissure",  |           |             |              |
|           | is a 1.0 x 0.5 x 0.4cm   |           |             |              |
|           | irregular piece of       |           |             |              |
|           | rubbery tan tissue.      |           |             |              |
|           | Cassette Index:One       |           |             |              |
|           | cassette,                |           |             |              |
|           | AS.JK:DT:TH:tdRendering  |           |             |              |
|           | Diagnostician:   DERICK      |           |             |              |
|           | Marta                   |           |             |              |
|           | M.D.PathologistElectroni |           |             |              |
|           | larry Signed             |           |             |              |
|           | 1999Comment:       |           |             |              |
|           | SOURCE OF SPECIMEN:      |           |             |              |
|           | Posterior commisure      |           |             |              |
+-----------+--------------------------+-----------+-------------+--------------+
 
 
 
+----------+
| Specimen |
+----------+
|          |
+----------+
 
 
 
+----------------------+------------------------+--------------------+--------------+
| Performing           | Address                | City/State/Zipcode | Phone Number |
| Organization         |                        |                    |              |
+----------------------+------------------------+--------------------+--------------+
|   [facility] |   [address]  | [place] [address] |              |
|  PATHOLOGY           | [address]                |                    |              |
+----------------------+------------------------+--------------------+--------------+
|   [facility] DEPARTMENT  |   [address]  | [place] [address] |              |
|  PATHOLOGY           | [address]                |                    |              |
+----------------------+------------------------+--------------------+--------------+
 PROTHROMBIN TIME (10/26/1999  1:30 PM PDT)
 
+-----------+--------------------------+-----------------+-------------+--------------+
| Component | Value                    | Ref Range       | Performed   | Pathologist  |
|           |                          |                 | At          | Signature    |
+-----------+--------------------------+-----------------+-------------+--------------+
| INR       | 0.88 (L)Comment:         | 0.98 - 1.08 INR | [facility]        |              |
|           |    PT INR Therapeutic    |                 | DEPARTMENT  |              |
|           | ranges for full          |                 | OF          |              |
|           | anticoagulation:         |                 | PATHOLOGY   |              |
 
|           |            INR for       |                 |             |              |
|           | Venous Thromboembolism   |                 |             |              |
|           |                          |                 |             |              |
|           | (2.0-3.0)INR             |                 |             |              |
|           |       INR for most       |                 |             |              |
|           | patients with mech.      |                 |             |              |
|           | valves      (2.5-3.5)INR |                 |             |              |
+-----------+--------------------------+-----------------+-------------+--------------+
 
 
 
+----------+
| Specimen |
+----------+
|          |
+----------+
 
 
 
+----------------------+------------------------+--------------------+--------------+
| Performing           | Address                | City/State/Zipcode | Phone Number |
| Organization         |                        |                    |              |
+----------------------+------------------------+--------------------+--------------+
|   [facility] |   [address]  | [place] [address] |              |
|  PATHOLOGY           | [address]                |                    |              |
+----------------------+------------------------+--------------------+--------------+
|   [facility] DEPARTMENT  |   [address]  | [place] [address] |              |
|  PATHOLOGY           | [address]                |                    |              |
+----------------------+------------------------+--------------------+--------------+
 APTT (ACT. PART. THROMBO TIME) (10/26/1999  1:30 PM PDT)
 
+-----------+--------------------------+--------------+-------------+--------------+
| Component | Value                    | Ref Range    | Performed   | Pathologist  |
|           |                          |              | At          | Signature    |
+-----------+--------------------------+--------------+-------------+--------------+
| APTT      | 31.5Comment:             | 27.0 - 35.0  | [facility]        |              |
|           | APTT Therapeutic Range   | seconds      | DEPARTMENT  |              |
|           |                          |              | OF          |              |
|           |                          |              | PATHOLOGY   |              |
|           |   ()sec            |              |             |              |
|           |         Heparin levels   |              |             |              |
|           | of 0.35-0.7 U/mL         |              |             |              |
+-----------+--------------------------+--------------+-------------+--------------+
 
 
 
+----------+
| Specimen |
+----------+
|          |
+----------+
 
 
 
+----------------------+------------------------+--------------------+--------------+
| Performing           | Address                | City/State/Zipcode | Phone Number |
| Organization         |                        |                    |              |
+----------------------+------------------------+--------------------+--------------+
|   [facility] DEPARTMENT OF |   [address]  | [place] [address] |              |
|  PATHOLOGY           | [address]                |                    |              |
 
+----------------------+------------------------+--------------------+--------------+
|   [facility] DEPARTMENT OF |   [address]  | [place] [address] |              |
|  PATHOLOGY           | [address]                |                    |              |
+----------------------+------------------------+--------------------+--------------+
 COA MASTER PANEL (10/26/1999  1:30 PM PDT)
 
+-----------+--------------------------+-----------------+-------------+--------------+
| Component | Value                    | Ref Range       | Performed   | Pathologist  |
|           |                          |                 | At          | Signature    |
+-----------+--------------------------+-----------------+-------------+--------------+
| INR       | 0.88 (L)Comment:         | 0.98 - 1.08 INR | [facility]        |              |
|           |    PT INR Therapeutic    |                 | DEPARTMENT  |              |
|           | ranges for full          |                 | OF          |              |
|           | anticoagulation:         |                 | PATHOLOGY   |              |
|           |            INR for       |                 |             |              |
|           | Venous Thromboembolism   |                 |             |              |
|           |                          |                 |             |              |
|           | (2.0-3.0)INR             |                 |             |              |
|           |       INR for most       |                 |             |              |
|           | patients with mech.      |                 |             |              |
|           | valves      (2.5-3.5)INR |                 |             |              |
+-----------+--------------------------+-----------------+-------------+--------------+
| APTT      | 31.5Comment:             | 27.0 - 35.0     | [facility]        |              |
|           | APTT Therapeutic Range   | seconds         | DEPARTMENT  |              |
|           |                          |                 | OF          |              |
|           |                          |                 | PATHOLOGY   |              |
|           |   ()sec            |                 |             |              |
|           |         Heparin levels   |                 |             |              |
|           | of 0.35-0.7 U/mL         |                 |             |              |
+-----------+--------------------------+-----------------+-------------+--------------+
 
 
 
+----------+
| Specimen |
+----------+
|          |
+----------+
 
 
 
+----------------------+------------------------+--------------------+--------------+
| Performing           | Address                | City/State/Zipcode | Phone Number |
| Organization         |                        |                    |              |
+----------------------+------------------------+--------------------+--------------+
|   [facility] DEPARTMENT OF |   [address]  | [place] [address] |              |
|  PATHOLOGY           | [address]                |                    |              |
+----------------------+------------------------+--------------------+--------------+
|   [facility] DEPARTMENT OF |   [address]  | [place] [address] |              |
|  PATHOLOGY           | [address]                |                    |              |
+----------------------+------------------------+--------------------+--------------+
 documented in this encounter
 
 Visit Diagnoses
 Not on filedocumented in this encounter

## 2019-12-06 NOTE — XMS
Encounter Summary
  Created on: 2019
 
 Shane Wyatttien BroussardJosue
 External Reference #: [id number]
 : 43
 Sex: Male
 
 Demographics
 
 
+-----------------------+---------------------------+
| Address               | [address]        |
|                       | [place]  [address] |
+-----------------------+---------------------------+
| Home Phone            | [phone]           |
+-----------------------+---------------------------+
| Preferred Language    | Unknown                   |
+-----------------------+---------------------------+
| Marital Status        |                    |
+-----------------------+---------------------------+
| Cheondoism Affiliation | 1028                      |
+-----------------------+---------------------------+
| Race                  | Unknown                   |
+-----------------------+---------------------------+
| Ethnic Group          | Unknown                   |
+-----------------------+---------------------------+
 
 
 Author
 
 
+--------------+--------------------------------------------+
| Author       | [facility]  |
|              | and [place]                                |
+--------------+--------------------------------------------+
| Organization | [facility]  |
|              | and [place]                                |
+--------------+--------------------------------------------+
| Address      | Unknown                                    |
+--------------+--------------------------------------------+
| Phone        | Unavailable                                |
+--------------+--------------------------------------------+
 
 
 
 Support
 
 
+---------------+--------------+--------------------+-----------------+
| Name          | Relationship | Address            | Phone           |
+---------------+--------------+--------------------+-----------------+
| Opal Shane | ECON         | [address]     | [phone] |
|               |              | [place]   |                 |
|               |              | [id number]              |                 |
+---------------+--------------+--------------------+-----------------+
 
 
 
 
 Care Team Providers
 
 
+-----------------------+------+-----------------+
| Care Team Member Name | Role | Phone           |
+-----------------------+------+-----------------+
| Erwin Iniguez MD | PCP  | [phone] |
+-----------------------+------+-----------------+
 
 
 
 Encounter Details
 
 
+--------+-----------+----------------------+--------------------+-------------------+
| Date   | Type      | Department           | Care Team          | Description       |
+--------+-----------+----------------------+--------------------+-------------------+
| / | Hospital  |   [facility] GENERIC IP     |   Conversion       | Diagnosis unknown |
| 2018   | Encounter | CONVERSION [address]  | Transaction,       |                   |
|        |           | [address]           | Provider Unknown   |                   |
|        |           | [place], [place]         |        |                   |
|        |           | [phone]           |  (Fax) |                   |
|        |           | [phone]         |                    |                   |
+--------+-----------+----------------------+--------------------+-------------------+
 
 
 
 Social History
 
 
+---------------+------------+-----------+--------+------------------+
| Tobacco Use   | Types      | Packs/Day | Years  | Date             |
|               |            |           | Used   |                  |
+---------------+------------+-----------+--------+------------------+
| Former Smoker | Cigarettes | 1.3       | 34     | Quit: 1996 |
+---------------+------------+-----------+--------+------------------+
 
 
 
+---------------------+---+---+---+
| Smokeless Tobacco:  |   |   |   |
| Never Used          |   |   |   |
+---------------------+---+---+---+
 
 
 
+-------------+-------------+---------+----------+
| Alcohol Use | Drinks/Week | oz/Week | Comments |
+-------------+-------------+---------+----------+
| No          |             |         |          |
+-------------+-------------+---------+----------+
 
 
 
+------------------+---------------+
| Sex Assigned at  | Date Recorded |
| Birth            |               |
+------------------+---------------+
| Not on file      |               |
 
+------------------+---------------+
 
 
 
+----------------+-------------+-------------+
| Job Start Date | Occupation  | Industry    |
+----------------+-------------+-------------+
| Not on file    | Not on file | Not on file |
+----------------+-------------+-------------+
 
 
 
+----------------+--------------+------------+
| Travel History | Travel Start | Travel End |
+----------------+--------------+------------+
 
 
 
+-------------------------------------+
| No recent travel history available. |
+-------------------------------------+
 documented as of this encounter
 
 Medications at Time of Discharge
 
 
+----------------------+----------------------+-----------+---------+----------+-----------+
| Medication           | Sig                  | Dispensed | Refills | Start    | End Date  |
|                      |                      |           |         | Date     |           |
+----------------------+----------------------+-----------+---------+----------+-----------+
|   acyclovir          | Apply  topically     |           | 0       |          |           |
| (ZOVIRAX) 5%         | every 3 hours.       |           |         |          |           |
| ointment             |                      |           |         |          |           |
+----------------------+----------------------+-----------+---------+----------+-----------+
|   albuterol (PROAIR  | Inhale 2 puffs into  |           | 0       |          |           |
| HFA) 90 mcg/puff     | the lungs every 6    |           |         |          |           |
| inhaler              | hours as needed for  |           |         |          |           |
|                      | Wheezing.            |           |         |          |           |
+----------------------+----------------------+-----------+---------+----------+-----------+
|   digoxin (LANOXIN)  | Take 125 mcg by      |           | 0       |          |           |
| 250 mcg tablet       | mouth Daily.      |           |         |          |           |
|                      | capsule daily        |           |         |          |           |
+----------------------+----------------------+-----------+---------+----------+-----------+
|   ferrous sulfate    | Take 325 mg by mouth |           | 0       | 20 |           |
| 325 mg tablet        |  3 times daily.      |           |         | 12       |           |
+----------------------+----------------------+-----------+---------+----------+-----------+
|   fluticasone        | one spray in each    |           | 0       | 20 |           |
| (FLONASE) 50         | nostril daily as     |           |         | 12       |           |
| mcg/nasal spray      | needed               |           |         |          |           |
+----------------------+----------------------+-----------+---------+----------+-----------+
|                      | Inhale 1 puff into   |           | 0       |          |           |
| fluticasone-salmeter | the lungs Twice      |           |         |          |           |
| ol (ADVAIR) 500-50   | Daily.               |           |         |          |           |
| mcg/puff diskus      |                      |           |         |          |           |
| inhaler              |                      |           |         |          |           |
+----------------------+----------------------+-----------+---------+----------+-----------+
|   folic acid 1 mg    | Take 1 mg by mouth   |           | 0       |          |           |
| tablet               | Daily.               |           |         |          |           |
+----------------------+----------------------+-----------+---------+----------+-----------+
|   furosemide (LASIX) | Take 40 mg by mouth  |           | 0       |          |           |
 
|  40 mg tablet        | Daily.               |           |         |          |           |
+----------------------+----------------------+-----------+---------+----------+-----------+
|   guaiFENesin        | Take 1,200 mg by     |           | 0       |          |           |
| (MUCINEX) 600 mg 12  | mouth 2 times daily. |           |         |          |           |
| hr tablet            |                      |           |         |          |           |
+----------------------+----------------------+-----------+---------+----------+-----------+
|   levothyroxine      | Take 75 mcg by mouth |           | 0       | 20 |           |
| (LEVOTHROID) 75 MCG  |  Daily.              |           |         | 12       |           |
| tablet               |                      |           |         |          |           |
+----------------------+----------------------+-----------+---------+----------+-----------+
|   metoclopramide     | Take 10 mg by mouth  |           | 0       | 20 |           |
| (REGLAN) 10 mg       | 4 times daily as     |           |         | 12       |           |
| tablet               | needed.              |           |         |          |           |
+----------------------+----------------------+-----------+---------+----------+-----------+
|                      | Apply  topically 2   |           | 0       |          |           |
| nystatin-triamcinolo | times daily.         |           |         |          |           |
| ne (MYCOLOG II)      |                      |           |         |          |           |
| cream                |                      |           |         |          |           |
+----------------------+----------------------+-----------+---------+----------+-----------+
|   ofloxacin          |                      |           | 0       | 20 |           |
| (OCUFLOX) 0.3%       |                      |           |         | 18       |           |
| ophthalmic solution  |                      |           |         |          |           |
+----------------------+----------------------+-----------+---------+----------+-----------+
|   omeprazole         | Take 20 mg by mouth  |           | 0       | 20 |           |
| (PRILOSEC) 20 mg     | 2 times daily.       |           |         | 12       |           |
| capsule              |                      |           |         |          |           |
+----------------------+----------------------+-----------+---------+----------+-----------+
|   potassium citrate  | Take 10 mEq by mouth |           | 0       |          |           |
| (UROCIT-K) 10 mEq SR |  2 times daily.      |           |         |          |           |
|  tablet              |                      |           |         |          |           |
+----------------------+----------------------+-----------+---------+----------+-----------+
|   predniSONE         | Take 10 mg by mouth  |           | 0       |          |           |
| (DELTASONE) 5 mg     | Daily.               |           |         |          |           |
| tablet               |                      |           |         |          |           |
+----------------------+----------------------+-----------+---------+----------+-----------+
|   tamsulosin         | Take 0.4 mg by mouth |           | 0       | 20 |           |
| (FLOMAX) 0.4 mg CAPS |  2 times daily.      |           |         | 12       |           |
+----------------------+----------------------+-----------+---------+----------+-----------+
|   acyclovir          | Take 400 mg by mouth |           | 0       | 20 |  |
| (ZOVIRAX) 400 MG     |  3 times daily as    |           |         | 12       | 9         |
| tablet               | needed.              |           |         |          |           |
+----------------------+----------------------+-----------+---------+----------+-----------+
|   Cholecalciferol    | Take 2,000 Units by  |           | 0       | 20 |  |
| (VITAMIN D3) 2000    | mouth Daily.         |           |         | 12       | 9         |
| UNITS CAPS           |                      |           |         |          |           |
+----------------------+----------------------+-----------+---------+----------+-----------+
|   cyanocobalamin     | injected             |           | 0       | 20 |  |
| (VITAMIN B-12) 1,000 | intramuscularly once |           |         | 12       | 9         |
|  mcg/mL injection    |  a month             |           |         |          |           |
+----------------------+----------------------+-----------+---------+----------+-----------+
|   diltiazem          | Take 120 mg by mouth |           | 0       |          |  |
| (DILT-XR) 120 mg 24  |  Daily.              |           |         |          | 9         |
| hr capsule           |                      |           |         |          |           |
+----------------------+----------------------+-----------+---------+----------+-----------+
|   loratadine         | Take 10 mg by mouth  |           | 0       |          |  |
| (CLARITIN) 10 mg     | Daily.               |           |         |          | 9         |
| tablet               |                      |           |         |          |           |
+----------------------+----------------------+-----------+---------+----------+-----------+
|   losartan (COZAAR)  | Take 100 mg by mouth |           | 0       |          |  |
| 100 MG tablet        |  Daily. 1/2 daily    |           |         |          | 9         |
 
+----------------------+----------------------+-----------+---------+----------+-----------+
|   methotrexate 2.5   | Take 15 mg by mouth  |           | 0       |          |  |
| mg tablet            | Once a week.         |           |         |          | 9         |
+----------------------+----------------------+-----------+---------+----------+-----------+
|                      | Take 1 tablet by     |           | 0       |          |  |
| oxyCODONE-acetaminop | mouth every 4 hours  |           |         |          | 9         |
| hen (PERCOCET) 5-325 | as needed for Pain.  |           |         |          |           |
|  mg per tablet       |                      |           |         |          |           |
+----------------------+----------------------+-----------+---------+----------+-----------+
|   pseudoePHEDrine    | Take 30 mg by mouth  |           | 0       |          |  |
| (SUDOGEST) 30 mg     | every 4 hours as     |           |         |          | 9         |
| tablet               | needed for           |           |         |          |           |
|                      | Congestion.          |           |         |          |           |
+----------------------+----------------------+-----------+---------+----------+-----------+
|   rivaroxaban        | Take 20 mg by mouth  |           | 0       |          |  |
| (XARELTO) 20 mg      | Daily (with dinner). |           |         |          | 9         |
| tablet               |                      |           |         |          |           |
+----------------------+----------------------+-----------+---------+----------+-----------+
|   sertraline         | 2 tablets daily      |           | 0       | 20 |  |
| (ZOLOFT) 100 mg      |                      |           |         | 12       | 9         |
| tablet               |                      |           |         |          |           |
+----------------------+----------------------+-----------+---------+----------+-----------+
 documented as of this encounter
 
 Plan of Treatment
 
 
+--------+---------+-------------+----------------------+-------------+
| Date   | Type    | Specialty   | Care Team            | Description |
+--------+---------+-------------+----------------------+-------------+
| /  Office  | Pulmonology |   Juan F,          |             |
| 2019   | Visit   |             | Jessica Cheung,   |             |
|        |         |             | MD  [address] |             |
|        |         |             |   [place],   |             |
|        |         |             | [place] [address]             |             |
|        |         |             | [phone]         |             |
|        |         |             | [phone] (Fax)   |             |
+--------+---------+-------------+----------------------+-------------+
| / | Office  | Cardiology  |   MableEric, |             |
| 2020   | Visit   |             |  MD  [address]   |             |
|        |         |             | [place]  |             |
|        |         |             | [address]  [phone]  |             |
|        |         |             |  [phone] (Fax)  |             |
+--------+---------+-------------+----------------------+-------------+
 documented as of this encounter
 
 Procedures
 
 
+----------------------+--------+-------------+----------------------+----------------------
+
| Procedure Name       | Priori | Date/Time   | Associated Diagnosis | Comments             
|
|                      | ty     |             |                      |                      
|
+----------------------+--------+-------------+----------------------+----------------------
+
| CT ANGIOGRAM ABDOMEN | Routin | 2016  |                      |   Results for this   
|
|  W CONTRAST          | e      |  8:26 PM    |                      | procedure are in the 
 
|
|                      |        | PDT         |                      |  results section.    
|
+----------------------+--------+-------------+----------------------+----------------------
+
 documented in this encounter
 
 Results
 CT Angiogram Abdomen w Contrast (2016  8:26 PM PDT)
 
+----------+
| Specimen |
+----------+
|          |
+----------+
 
 
 
+------------------------------------------------------------------------+--------------+
| Narrative                                                              | Performed At |
+------------------------------------------------------------------------+--------------+
|   This is a non-reportable procedure without a radiologist report and  |              |
| is  used for image storage only                                        |              |
+------------------------------------------------------------------------+--------------+
 
 
 
+--------------------------------------------------------------------------------------+
| Procedure Note                                                                       |
+--------------------------------------------------------------------------------------+
|   Andrzej Steven Irvin - 2019  4:21 AM PDT  This is a non-reportable procedure  |
| without a radiologist report and isused for image storage only                       |
+--------------------------------------------------------------------------------------+
 documented in this encounter
 
 Visit Diagnoses
 
 
+------------------------------------------------------------------------------------+
| Diagnosis                                                                          |
+------------------------------------------------------------------------------------+
|   Diagnosis unknown  Other unknown and unspecified cause of morbidity or mortality |
+------------------------------------------------------------------------------------+
 documented in this encounter

## 2019-12-06 NOTE — XMS
Encounter Summary
  Created on: 2019
 
 Wyatt Shane
 External Reference #: [id number]
 : 43
 Sex: Male
 
 Demographics
 
 
+-----------------------+------------------------+
| Address               | [address]     |
|                       | [place]  [address]   |
+-----------------------+------------------------+
| Home Phone            | [phone]        |
+-----------------------+------------------------+
| Preferred Language    | Unknown                |
+-----------------------+------------------------+
| Marital Status        |                 |
+-----------------------+------------------------+
| Latter day Affiliation | LUT                    |
+-----------------------+------------------------+
| Race                  | White                  |
+-----------------------+------------------------+
| Ethnic Group          | Not  or  |
+-----------------------+------------------------+
 
 
 Author
 
 
+--------------+------------------------------+
| Author       | [facility] |
+--------------+------------------------------+
| Organization | [facility] |
+--------------+------------------------------+
| Address      | Unknown                      |
+--------------+------------------------------+
| Phone        | Unavailable                  |
+--------------+------------------------------+
 
 
 
 Support
 
 
+---------------+--------------+---------+-----------------+
| Name          | Relationship | Address | Phone           |
+---------------+--------------+---------+-----------------+
| Opal Shane | ECON         | Unknown | [phone] |
+---------------+--------------+---------+-----------------+
 
 
 
 Care Team Providers
 
 
 
+-----------------------+------+-----------------+
| Care Team Member Name | Role | Phone           |
+-----------------------+------+-----------------+
| Erwin Iniguez MD   | PCP  | [phone] |
+-----------------------+------+-----------------+
 
 
 
 Encounter Details
 
 
+--------+-------------+-----------------+---------------------+---------------+
| Date   | Type        | Department      | Care Team           | Description   |
+--------+-------------+-----------------+---------------------+---------------+
| / | Office      |   [facility]  |   Note, Outpatient  | Progress Note |
| 2000   | Visit-Trans | MEDICINE        | Clinic              |               |
|        | cribed      |                 |                     |               |
+--------+-------------+-----------------+---------------------+---------------+
 
 
 
 Social History
 
 
+----------------+-------+-----------+--------+------+
| Tobacco Use    | Types | Packs/Day | Years  | Date |
|                |       |           | Used   |      |
+----------------+-------+-----------+--------+------+
| Never Assessed |       |           |        |      |
+----------------+-------+-----------+--------+------+
 
 
 
+------------------+---------------+
| Sex Assigned at  | Date Recorded |
| Birth            |               |
+------------------+---------------+
| Not on file      |               |
+------------------+---------------+
 
 
 
+----------------+-------------+-------------+
| Job Start Date | Occupation  | Industry    |
+----------------+-------------+-------------+
| Not on file    | Not on file | Not on file |
+----------------+-------------+-------------+
 
 
 
+----------------+--------------+------------+
| Travel History | Travel Start | Travel End |
+----------------+--------------+------------+
 
 
 
+-------------------------------------+
| No recent travel history available. |
+-------------------------------------+
 documented as of this encounter
 
 
 Progress Notes
 Interface, Transcription In - 2006  1:04 AM PSTCLINIC DATE:       2000
 
 OTOLARYNGOLOGY HEAD AND NECK SURGERY
 
 SUBJECTIVE:    Mr. Shane   is   seen    back    in   followup   from   his
 laryngotracheoplasty and true vocal  cord  carcinoma.   His  voice  remains
 hoarse but stable.  His airway is adequate  for  all but the most strenuous
 of activities.
 
 PHYSICAL EXAMINATION:  Today, there is no adenopathy in the neck.  Flexible
 fiberoptic laryngoscopy is done.  It  reveals  some  diminished mucosa over
 the area of previous granulation in  his  anterior  glottis,  but otherwise
 complete resolution of the edema of his  glottis and really very reasonable
 glottic  aperture  with  only mild restriction  of  his  right  vocal  cord
 mobility.
 
 ASSESSMENT:   Status  post  laryngotracheoplasty  and  laryngeal  carcinoma
 without evidence of disease.
 
 PLAN:  We will see him back in 2 months.
 
 Jimmy Esposito M.D.
 
 MARIA ALEJANDRA / 
 [id number] / [id number] / [id number] / [id number]
 D: 2000
 T: 2000
 
 [id number] signed by Interface, Transcription In at 2006  1:04 AM PSTdocume
nted in this encounter
 
 Plan of Treatment
 Not on filedocumented as of this encounter
 
 Visit Diagnoses
 Not on filedocumented in this encounter

## 2019-12-06 NOTE — XMS
Encounter Summary
  Created on: 2019
 
 Shane Daryltien BroussardJosue
 External Reference #: [id number]
 : 43
 Sex: Male
 
 Demographics
 
 
+-----------------------+---------------------------+
| Address               | [address]        |
|                       | [place]  [address] |
+-----------------------+---------------------------+
| Home Phone            | [phone]           |
+-----------------------+---------------------------+
| Preferred Language    | Unknown                   |
+-----------------------+---------------------------+
| Marital Status        |                    |
+-----------------------+---------------------------+
| Restorationism Affiliation | [id number]                      |
+-----------------------+---------------------------+
| Race                  | Unknown                   |
+-----------------------+---------------------------+
| Ethnic Group          | Unknown                   |
+-----------------------+---------------------------+
 
 
 Author
 
 
+--------------+--------------------------------------------+
| Author       | [facility]  |
|              | and [place]                                |
+--------------+--------------------------------------------+
| Organization | [facility]  |
|              | and [place]                                |
+--------------+--------------------------------------------+
| Address      | Unknown                                    |
+--------------+--------------------------------------------+
| Phone        | Unavailable                                |
+--------------+--------------------------------------------+
 
 
 
 Support
 
 
+---------------+--------------+--------------------+-----------------+
| Name          | Relationship | Address            | Phone           |
+---------------+--------------+--------------------+-----------------+
| Opal Shane | ECON         | [address]     | [phone] |
|               |              | [place]   |                 |
|               |              | [id number]              |                 |
+---------------+--------------+--------------------+-----------------+
 
 
 
 
 Care Team Providers
 
 
+-----------------------+------+-----------------+
| Care Team Member Name | Role | Phone           |
+-----------------------+------+-----------------+
| Erwin Iniguez MD | PCP  | [phone] |
+-----------------------+------+-----------------+
 
 
 
 Encounter Details
 
 
+--------+-----------+----------------------+----------------------+----------------------+
| Date   | Type      | Department           | Care Team            | Description          |
+--------+-----------+----------------------+----------------------+----------------------+
| / | Hospital  |   [facility]    |   Honey Donovan   | Thoracic aortic      |
| 2018 - | Encounter | [facility] | MD JAVI  [address]     | aneurysm without     |
|        |           |  CARE FLOOR [address]   | [address]  [place], [place]   | rupture (HCC); ASCVD |
| / |           | [address]           | [id number]  [phone]  |  (arteriosclerotic   |
| 2018   |           | [place], [place]         |  [phone] (Fax)  | cardiovascular       |
|        |           | [address]           |                      | disease); Chronic    |
|        |           | [phone]         |                      | atrial fibrillation  |
|        |           |                      |                      | ([facility]); Essential     |
|        |           |                      |                      | hypertension;        |
|        |           |                      |                      | Precordial pain;     |
|        |           |                      |                      | Dysphagia,           |
|        |           |                      |                      | unspecified type     |
+--------+-----------+----------------------+----------------------+----------------------+
 
 
 
 Social History
 
 
+---------------+------------+-----------+--------+------------------+
| Tobacco Use   | Types      | Packs/Day | Years  | Date             |
|               |            |           | Used   |                  |
+---------------+------------+-----------+--------+------------------+
| Former Smoker | Cigarettes | 1.3       | 35     | Quit: 1996 |
+---------------+------------+-----------+--------+------------------+
 
 
 
+---------------------+---+---+---+
| Smokeless Tobacco:  |   |   |   |
| Never Used          |   |   |   |
+---------------------+---+---+---+
 
 
 
+-------------+-------------+---------+----------+
| Alcohol Use | Drinks/Week | oz/Week | Comments |
+-------------+-------------+---------+----------+
| No          |             |         |          |
+-------------+-------------+---------+----------+
 
 
 
 
+------------------+---------------+
| Sex Assigned at  | Date Recorded |
| Birth            |               |
+------------------+---------------+
| Not on file      |               |
+------------------+---------------+
 
 
 
+----------------+-------------+-------------+
| Job Start Date | Occupation  | Industry    |
+----------------+-------------+-------------+
| Not on file    | Not on file | Not on file |
+----------------+-------------+-------------+
 
 
 
+----------------+--------------+------------+
| Travel History | Travel Start | Travel End |
+----------------+--------------+------------+
 
 
 
+-------------------------------------+
| No recent travel history available. |
+-------------------------------------+
 documented as of this encounter
 
 Discharge Summaries
 Andrew Ryan MD - 2018 10:43 AM PDTFormatting of this note might be different from
 the original.
 Discharge Summaries by Andrew Ryan MD at 18 1043  
  Author:  Andrew Ryan MD Service:  Hospitalist Author Type:  Physician 
  Filed:  18 1221 Date of Service:  18 1043 Status:  Signed 
  :  Andrew Ryan MD (Physician)   
   
 
 Patient ID:
 Daryl Shane
 [id number]
 72 y.o.
 1945
 
 Admit date: 2018
 
 Discharge date and time:  18
 
 Admitting Physician: Honey Donovan MD 
 
 Discharge Physician: MD Patience
 
 Consults: 
 
 Primary Discharge Diagnoses: Precordial pain [R07.2]
 ASCVD (arteriosclerotic cardiovascular disease) [I25.10]
 Essential hypertension [I10]
 Chronic atrial fibrillation (HCC) [I48.2]
 Thoracic aortic aneurysm without rupture (HCC) [I71.2]
 Hemoptysis
 
 Aspiration PNA
 Iron def anemia
 
 Discharged Condition: good
 
 Significant Diagnostic Studies: 
 Lab Results    
 Component  Value Date 
  WBC 5.20 2018 
  HGB 9.6 (L) 2018 
  HCT 28.4 (L) 2018 
  MCV 76.7 (L) 2018 
   (L) 2018 
 
 GLUCOSE    
 Date Value Ref Range Status 
 2018 101 (H) 65 - 99 mg/dL Final 
 
 BUN    
 Date Value Ref Range Status 
 2018 12 8 - 25 mg/dL Final 
 
 CREATININE    
 Date Value Ref Range Status 
 2018 1.1 0.70 - 1.30 mg/dL Final 
 
 BUN/CREAT    
 Date Value Ref Range Status 
 2018 11  Final 
 
 TOTAL PROTEIN    
 Date Value Ref Range Status 
 2018 5.5 (L) 6.3 - 8.2 g/dL Final 
 
 GLOBULIN    
 Date Value Ref Range Status 
 2018 3.0 1.3 - 4.9 g/dL Final 
 
 TBIL    
 Date Value Ref Range Status 
 2018 0.7 0.1 - 1.5 mg/dL Final 
 
 ALT    
 Date Value Ref Range Status 
 2018 14 10 - 65 U/L Final 
 
 AST    
 Date Value Ref Range Status 
 2018 16 10 - 45 U/L Final 
 
 SODIUM    
 Date Value Ref Range Status 
 2018 143 135 - 145 mmol/L Final 
 
 POTASSIUM    
 Date Value Ref Range Status 
 2018 3.3 (L) 3.5 - 4.9 mmol/L Final 
 
 CHLORIDE    
 Date Value Ref Range Status 
 
 2018 108 99 - 109 mmol/L Final 
 
 CO2    
 Date Value Ref Range Status 
 2018 26 23 - 32 mmol/L Final 
 
 ANION GAP AGAP    
 Date Value Ref Range Status 
 2018 12 5 - 20 mmol/L Final 
 
 Xr Chest 2 View
 
 Result Date: 2018
 DARYL SHANE 1945 72 years Male XR CHEST 2 VIEW FRONTAL AND LATERAL 2018 9:52 AM 
INDICATION: Shortness of breath with chest pain COMPARISON: 2018 TECHNIQUE: Two view nate
st, PA and lateral views FINDINGS: Median sternotomy wires are maintained. The cardiac silho
uette is borderline enlarged. There is no mediastinal widening or shift. Mild calcification 
of the aortic arch is present. There is improving aeration of the left upper lobe. Some resi
dual opacities remain along the lower lobes bilaterally. The pulmonary markings are normal i
n caliber. There is mild degenerative disc disease of the thoracic spine. 
 
 1.  There is improved aeration of the left upper lobe. 2.  Hazy opacities throughout the jodie
ng may reflect some residual acute pneumonitis. Electronically signed by JENNIFER Kebede on 2018 10:02 AM
 
 Xr Chest 2 View
 
 Result Date: 2018
 HISTORY: Precordial chest pain. Question pneumonia. COMPARISON: 18. TECHNIQUE: PA and
 lateral films of the chest. FINDINGS: Median sternotomy. Heart size is upper normal. Pulmon
ramón venous congestion. Bilateral perihilar mixed interstitial and airspace infiltrates again
 noted, essentially unchanged. No effusions. Subtle thickening of the major and minor fissur
es. Mild degenerative changes of the spine. Osteopenia. 
 
 1.  Borderline cardiac enlargement, with probable pulmonary edema. Atypical pneumonitis see
ms less likely. Electronically signed by Yonatan Montague MD on 2018 11:34 AM
 
 X-ray Chest 2 View Frontal & Lateral
 
 Result Date: 2018
 This is a non-reportable procedure without a radiologist report and is used for image [employer] only
 
 Ct Head Without Contrast
 
 Result Date: 2018
 This is a non-reportable procedure without a radiologist report and is used for image [employer] only
 
 Ct Chest Without Contrast
 
 Result Date: 2018
 HISTORY: 72 years year-old Male, hemoptysis. TECHNIQUE: CT through the chest. Noncontrast e
xamination. Automatic dose adjustment to minimize patient exposure. PRIOR EXAMINATION: Earli
er chest radiograph. FINDINGS:  demonstrates cardiomegaly, sternal wires and dense reti
cular interstitial lung disease throughout the left mid chest. Lung windows demonstrate bila
teral reticular and confluent infiltrates throughout mid lower lung fields. In the posterior
 medial left lung on image 81 series 3 in underlying mass would be difficult to exclude, but
 this is simply a larger multiple similar findings throughout both lungs. Inflammatory nodul
es and/or inflammatory masses are very plausibly superimposed In the mid central superior le
 
ft lung the lung disease is more cystic in appearance, asymmetric centrilobular emphysema/CO
PD and superimposed edema edema Bone windows demonstrate degenerative and postprocedural clare
nges, without acute appearing lesion or active fracture. The sternotomy is healed. Nonaggres
sive and degenerative changes not uncommon for age. What is seen of the upper abdomen demons
trates fatty liver. Splenomegaly. No adenopathy or fluid collection discernible. Large hiatu
s hernia. Within the chest dense coronary calcification, interventional changes, diffuse car
diac megaly and hilar vascular congestion symmetrically distributed in midlung fields. Elizabeth
es post CABG. 
 
 1. At the very least there is pulmonary vascular congestion, edema-and centrilobular emphys
ema is superimposed 2. Pulmonary infiltrates are asymmetric and throughout the left hemithor
ax. This could be due to superimposed infection of the left upper lung and there is a small 
effusion also 3. Lastly, there are inflammatory appearing airspace or infiltrative nodules s
uperimposed throughout Will be important to repeat this examination when the patient's acute
 issues are resolved to evaluate for underlying lesion/mass Electronically signed by Juan Francisco Conn MD on 2018 12:01 PM
 
 X-ray Swallowing Function Video
 
 Result Date: 2018
 This is a non-reportable procedure without a radiologist report and is used for image [employer] only
 
 Cl Coronary Angio With Grafts
 
 Result Date: 8/10/2018
 Accession:  [id number] ____________________________________________________________________ DA
TE OF BIRTH:  1945. PROCEDURE: 1. Right femoral access with ultrasound guidance. 2. Le
ft heart catheterization. 3. Coronary angiogram. 4. SVG angiogram to the RCA. 5. SVG angiogr
am to the left circumflex. 6. LIMA to LAD angiogram. INDICATION:  This is a 72-year-old male
 who presented to the hospital because of shortness of breath, pulmonary edema, and elevatio
n of cardiac enzymes.  For further details, refer to my consultation note.  Given the above 
findings, left heart catheterization was recommended.  The patient had previous history of C
ABG times 4 in . PROCEDURE NOTE:  The patient was brought electively to the heart cathet
erization lab.  Consent was obtained after reviewing risks and benefits.  Timeout was done a
t the bedside.  The patient was prepped in the usual sterile manner, received IV fentanyl vi
a independent observer.  Right groin was anesthetized with 1 percent lidocaine.  Using ultra
sound and a micropuncture needle, right femoral access was obtained.  A 5-French sheath was 
introduced without any difficulty.  A 0.035 wire was used and advanced under fluoroscopic gu
idance into the ascending aorta.  A 5-French JL4 catheter was used and advanced over the wir
e and placed selectively in the left coronary cusp.  Wire was removed.  Catheter was flushed
.  Selectively engaged the left coronary system.  Contrast was injected.  Multiple views wer
e obtained.  Exchange over the wire was done with JR4 catheter that was placed selectively i
n the right coronary cusp, selectively engaged the right coronary artery, and multiple views
 were obtained.  Attempted to engage the SVG to RCA and the obtuse marginal with the JR4 cat
heter; however, was unsuccessful.  Then JR4 catheter was used to cannulate the left subclavi
an artery and then a long 0.035 wire was used to exchange with a LIMA catheter that selectiv
ely engaged the LIMA graft and multiple views were obtained.  Exchange over the wire was don
e with a 5-French multipurpose catheter that selectively engaged the right SVG to RCA graft.
 Multiple views were obtained.  Exchange over the wire was done with 5-French AL1 catheter t
hat selectively engaged the SVG to left circumflex system graft.  Multiple views were obtain
ed.  Finally, the catheter was removed over the wire.  Hemostasis was achieved by TR band. C
ONTRAST USED:  100 mL. BLOOD LOSS:  Less than 10. COMPLICATIONS:  None. HEMOSTASIS:  By manu
al pressure. ANGIOGRAPHIC FINDINGS: 1. Left main is large caliber vessel with normal origin.
  Bifurcates to LAD and left circumflex with mild diffuse disease. 2. LAD is 100 percent pro
ximally occluded. 3. Left circumflex is a small to moderate vessel that is severely calcifie
d, has multiple lesions and diffusely diseased.  Distally it is very small and severely calc
ified.  Has 90 percent proximal and subtotal occlusion distal.  However again smaller and se
verely calcified.  No competitive flow was seen. 4. RCA has 100 percent ostial occlusion. 5.
 SVG to RCA is widely patent with mildly diffuse disease. 6. SVG to left circumflex system i
 
s diffusely diseased with severe degenerative disease; however, occluded at the insertion si
te.  The jump graft of the 2nd obtuse marginal is occluded.  LIMA to LAD is widely patent. C
ONCLUSION: 1. Severe 3-vessel coronary artery disease. 2. Patent SVG to RCA and LIMA to LAD.
 3. Occluded SVG to the left circumflex system, which is a jump graft. 4. Severely calcified
 left circumflex, which is small caliber severely calcified. However, not amenable to any PC
I. RECOMMENDATIONS:  Given the above findings, the patient will continue medical therapy for
 coronary artery disease.  Will be restarted on antiplatelet therapy and will be on optimiza
tion of blood pressure control, statin therapy, and will be re-started on anticoagulation fo
r atrial fibrillation.  Further recommendations to follow. Read by MARÍA AKINS MD 2018 07:56 P Electronically signed by María Akins MD on 8/10/2018 6:24 AM
 
 Echo Cardiac Adult Complete
 
 Result Date: 2018
 Patient Name:  DARYL SHANE YOB: 1945 Accession:  [id number] Performing Physici
an:   María Akins _______________________________________________________________ INDIC
ATIONS ----------- sob,h/o 4 cabg CONCLUSIONS ----------- 1. The left ventricle cavity is no
rmal in size, mild concentric hypertrophy and  normal systolic function EF 55%. Mild inferio
r and inferolateral hypokinetic segments. 2. Mild degenerative changes in the aortic and bhupinder
ral valves. 3. There is no pericardial effusion. FINDINGS -------- ECG rhythm: Atrial fibril
lation. Study: A 2-dimensional transthoracic echocardiogram with m-mode, spectral and color 
flow Doppler was perfomed. Study: This was a technically adequate study. Left Ventricle: Ove
rall left ventricular systolic function is low-normal with, an EF 55 %. Left Ventricle: The 
left ventricle cavity size is normal. Left Ventricle: There is mild concentric left ventricu
lar hypertrophy. Right Ventricle: The RV was not well visualized. Left Atrium: The left atri
um is mildly dilated. Right Atrium: The right atrial size is normal. Aortic Valve: The aorti
c valve is trileaflet. Aortic Valve: The aortic valve is mildly calcified. Aortic Valve: The
re is mild aortic regurgitation. Mitral Valve: There is trace mitral regurgitation. Mitral V
alve: Mild mitral annular calcification present. Tricuspid Valve: The tricuspid valve appear
s structurally normal. Tricuspid Valve: Trace tricuspid regurgitation present. Tricuspid Layla
ve: There is no evidence of pulmonary hypertension. Pulmonic Valve: The pulmonic valve is no
rmal. Pulmonic Valve: Mild pulmonic regurgitation. Pulmonic Valve: Mild degenerative changes
 in the aortic and mitral valves. Pericardium: There is no pericardial effusion. Pericardium
: No pleural effusion seen. IVC/Hepatic Veins: The inferior vena cava is normal in size and 
collapses > 50 % with sniff, indicating normal central venous pressures. MEASUREMENTS ------
------- Ao asc:   4.19 cm Ao sinus:   3.71 cm Ao st junct:   3.01 cm IVC:   1.42 cm LA Diam:
   5.50 cm LA Major:   5.97 cm EDV(Teich):   106.74 ml IVSd:   1.19 cm LVIDd:   4.78 cm LVPW
d:   1.21 cm LVOT Diam:   2.25 cm %FS:   26.84 % EF(Teich):   52.32 % ESV(Teich):   50.89 ml
 IVSs:   1.81 cm LVIDs:   3.50 cm LVPWs:   1.71 cm SV(Teich):   55.85 ml RA Major:   5.04 cm
 LVEF MOD A4C:   55.16 % SV MOD A4C:   49.58 ml LVEDV MOD A4C:   89.88 ml LVLd A4C:   7.50 c
m LVESV MOD A4C:   40.30 ml LVLs A4C:   5.87 cm LAESV(A-L):   76.74 ml LAESV Index (A-L):   
41.48 ml/m2 LAAs A2C:   17.05 cm2 LAESV A-L A2C:   48.56 ml LALs A2C:   5.08 cm LAAs A4C:   
26.95 cm2 LAESV A-L A4C:   95.07 ml LALs A4C:   6.48 cm Ao Diam:   4.03 cm AV Cusp:   1.91 c
m LA Diam:   4.92 cm LA/Ao:   1.22 D-E Excursion:   2.29 cm E-F Beltrami:   0.09 m/s AV maxPG: 
  10.18 mmHg AV meanP.51 mmHg AV Vmax:   1.59 m/s AV Vmean:   1.23 m/s AV VTI:   28.06
 cm KIRK Vmax:   3.26 cm2 KIRK (VTI):   3.35 cm2 AVAI Vmax:   0.00 cm2/m2 AVAI (VTI):   0.00 c
m2/m2 LVOT maxP.86 mmHg LVOT meanPG:   3.16 mmHg LVSI Dopp:   50.95 ml/m2 LVSV Dopp:  
 94.27 ml LVOT Vmax:   1.31 m/s LVOT Vmean:   0.84 m/s LVOT VTI:   23.69 cm MV E Luis:   0.93
 m/s E' Lat:   0.12 m/s E' Sept:   0.06 m/s PRend P.85 mmHg PRend Vmax:   1.48 m/s HR:
   77.27 BPM PV maxPG:   3.37 mmHg PV meanP.56 mmHg PV Vmax:   0.91 m/s PV Vmean:   0.
56 m/s PV VTI:   12.48 cm RV S':   0.08 m/s TR maxP.05 mmHg TR Vmax:   3.04 m/s TV A 
Luis:   0.00 m/s TV Dec Beltrami:   3.93 m/s2 TV Dec Time:   207.73 ms TV E Luis:   0.56 m/s TV E
/A Ratio:   69.55 Sonographer: PADMINI Authenticated by: María DíazCoatesvilleghanshyam Report Date/Time: 
018 13:7:33 
 
 1. The left ventricle cavity is normal in size, mild concentric hypertrophy and  normal sys
tolic function EF 55%. Mild inferior and inferolateral hypokinetic segments. 2. Mild degener
ative changes in the aortic and mitral valves. 3. There is no pericardial effusion.
 
 Cl Guidance [facility] Us
 
 
 Result Date: 2018
 This Point of Care (POC) ultrasound image has been reviewed and interpreted by the physicbela bhagat as the performing physician in the associated interpretation and report. 
 
 HPI and Hospital Course: 72-year-old gentleman with past medical history of chronic atrial 
fibrillation on anticoagulation, history of vocal cord cancer status post treatment and dysp
hagia on and off for last 9 years, history of coronary disease status post CABG in , his
tory of tobacco abuse disorder and COPD, history of peptic ulcer disease and GI bleed who we
nt to [facility] with productive cough, shortness of breath and hemoptysis seconda
ry to aspiration pneumonia and found to have positive troponin around 1 hence  he was transf
erred to [facility] for further workup and treatment.
  Hospital course by issues:
 
 #1 non-ST elevation MI: Status post cardiac cath which showed triple-vessel disease and rec
ommend medical management only.  Patient started on Plavix, atorvastatin, patient allergic t
o beta blocker has not initiated and patient currently asymptomatic.  Patient is being disch
arged home with follow-up with his cardiologist
 
 #2 aspiration pneumonia for which patient was started on Unasyn and doing better and being 
discharged home on Augmentin  twice a day for another 5 days
 
 #3 dysphagia likely secondary to history of vocal cord cancer and speech recommended mechan
ical soft diet.  Patient referred to  speech therapy as an outpatient
 
 #4 Hemoptysis  likely secondary to underlying pneumonia and being on aspirin as well as on 
Xarelleto which was held on admission and Plavix restarted yesterday.  Patient will be resta
rted on Xarelleto as he is not spitting up any blood and he was advised to hold any blood th
inner if he has recurrence of hemoptysis  and seek medical attention
 
 #5.Iron deficiency anemia secondary to ongoing hemoptysis and patient was given IV iron now
 being discharged home on iron supplement hemoglobin stable around 9
 
 #6 acute COPD exacerbation secondary to pneumonia resolved now
 
 I have spent more than 35 minutes on discharge
 
 /67 (BP Location: Left upper arm)  | Pulse 66  | Temp 97.8 F (36.6 C) (Oral)  | R
scott 18  | Ht 1.727 m (5' 8")  | Wt 71.9 kg (158 lb 8 oz)  | SpO2 98%  | BMI 24.10 kg/m 
 
 Intake/Output Summary (Last 24 hours) at 18 1221
 Last data filed at 18 0616
  Gross per 24 hour 
 Intake          1191.23 ml 
 Output              825 ml 
 Net           366.23 ml 
 
 Discharge Exam:
 
 Constitutional: Alert and oriented to person, place, and time. Appears well-developed and w
ell-nourished. 
 
 Cardiovascular: Normal rate, regular rhythm, normal heart sounds and intact distal pulses. 
 Exam reveals no gallop and no friction rub.  No murmur heard.
 
 Pulmonary/Chest: Effort normal and breath sounds normal. No stridor. No respiratory distres
s.  no wheezes. no rales. exhibits no tenderness.  Except bibasilar crackles
 
 Abdominal: Soft. Bowel sounds are normal. exhibits no distension and no mass. There is no t
enderness. There is no rebound and no guarding. 
 
 
 Musculoskeletal: Normal range of motion.exhibits no tenderness.  exhibits no edema. 
   
 Neurological:  Alert and oriented to person, place, and time. Has normal reflexes.  display
s normal reflexes. No cranial nerve deficit.  Exhibits normal muscle tone. Coordination norm
al.
 
 Skin: Skin is warm and dry. No rash noted. No erythema. No pallor. 
 
 Psychiatric: Has a normal mood and affect. Behavior is normal. Judgment normal. 
 
 Disposition: 
 
 Home or Self Care
 
 Patient Instructions: 
  
 Medication List 
  
 START taking these medications  
 amoxicillin-clavulanate 875-125 MG per tablet
 QTY:  10 tablet
 Refills:  0
 Commonly known as:  AUGMENTIN
 Take 1 tablet by mouth every 12 (twelve) hours.
  
 atorvastatin 40 MG tablet
 QTY:  30 tablet
 Refills:  2
 Commonly known as:  LIPITOR
 Take 1 tablet by mouth nightly.
  
 budesonide-formoterol 160-4.5 MCG/ACT inhaler
 QTY:  1 Inhaler
 Refills:  0
 Commonly known as:  SYMBICORT
 Inhale 2 puffs into the lungs 2 (two) times daily.
  
 clopidogrel 75 MG tablet
 QTY:  90 tablet
 Refills:  0
 Commonly known as:  PLAVIX
 Take 1 tablet by mouth daily.
  
 ipratropium-albuterol 0.5-2.5 mg/3mL
 QTY:  360 mL
 Refills:  0
 Commonly known as:  DUO-NEB
 Take 3 mLs by nebulization every 6 (six) hours as needed.
  
  
 CHANGE how you take these medications  
 mupirocin 2 % ointment
 QTY:  22 g
 Refills:  2
 Commonly known as:  BACTROBAN
 Apply  topically 3 (three) times daily.
 What changed:
  when to take this
  reasons to take this
 
  
 predniSONE 2.5 MG tablet
 QTY:  30 tablet
 Refills:  0
 Commonly known as:  DELTASONE
 Take 4 tablets by mouth daily. Takes 7.5 mg daily
 What changed:  additional instructions
  
  
 CONTINUE taking these medications  
 acyclovir 400 MG tablet
 Refills:  0
 Commonly known as:  ZOVIRAX
  
 Albuterol Sulfate 108 (90 Base) MCG/ACT Aepb
 Refills:  0
  
 alendronate 70 MG tablet
 Refills:  0
 Commonly known as:  FOSAMAX
  
 azaTHIOprine 50 MG tablet
 Refills:  0
 Commonly known as:  IMURAN
  
 azelastine 0.1 % nasal spray
 QTY:  30 mL
 Refills:  12
 1 spray by Nasal route 2 (two) times daily. Use in each nostril as directed
  
 bacitracin-polymyxin b ophthalmic ointment
 Refills:  0
 Commonly known as:  POLYSPORIN
  
 bismuth subsalicylate 262 MG chewable tablet
 Refills:  0
 Commonly known as:  PEPTO BISMOL
  
 CALCIUM-MAGNESIUM-ZINC PO
 Refills:  0
  
 cholecalciferol 1000 units tablet
 Refills:  0
 Commonly known as:  VITAMIN D-3
  
 cyanocobalamin 1000 MCG tablet
 Refills:  0
 Commonly known as:  VITAMIN B-12
  
 famotidine 40 MG tablet
 Refills:  0
 Commonly known as:  PEPCID
  
 ferrous sulfate (65 FE) 325 (65 FE) MG tablet
 Refills:  0
  
 FLOMAX 0.4 MG capsule
 Refills:  0
 Generic drug:  tamsulosin
  
 
 fluticasone 50 MCG/ACT nasal
 Refills:  0
 Commonly known as:  FLONASE
  
 fluticasone-salmeterol 500-50 MCG/DOSE diskus inhaler
 Refills:  0
 Commonly known as:  ADVAIR
  
 folic acid 1 MG tablet
 Refills:  0
 Commonly known as:  FOLVITE
  
 furosemide 40 MG tablet
 Refills:  0
 Commonly known as:  LASIX
  
 guaiFENesin 600 MG 12 hr tablet
 Refills:  0
 Commonly known as:  MUCINEX
  
 HYDROcodone-acetaminophen 7.5-325 MG per tablet
 Refills:  0
 Commonly known as:  NORCO
  
 Krill Oil 500 MG Caps
 Refills:  0
  
 LANOXIN 0.25 MG tablet
 Refills:  0
 Generic drug:  digoxin
  
 levothyroxine 75 MCG tablet
 Refills:  0
 Commonly known as:  SYNTHROID
  
 loratadine 10 MG tablet
 Refills:  0
 Commonly known as:  CLARITIN
  
 losartan 50 MG tablet
 Refills:  0
 Commonly known as:  COZAAR
  
 mupirocin 2 % nasal ointment
 Refills:  0
 Commonly known as:  BACTROBAN
  
 ofloxacin 0.3 % ophthalmic solution
 Refills:  0
 Commonly known as:  OCUFLOX
  
 oxyCODONE-acetaminophen 5-325 MG per tablet
 Refills:  0
 Commonly known as:  PERCOCET
  
 potassium chloride 10 MEQ tablet
 Refills:  0
 Commonly known as:  K-DUR
  
 sertraline 100 MG tablet
 
 Refills:  0
 Commonly known as:  ZOLOFT
  
 trolamine salicylate 10 % cream
 Refills:  0
 Commonly known as:  ASPERCREME
  
 VOLTAREN 1 %
 Refills:  0
 Generic drug:  diclofenac
  
 XARELTO 20 MG tablet
 Refills:  0
 Generic drug:  rivaroxaban
  
  
 You might also be taking other medications not listed above. If you have questions about an
y of your other medications, talk to the person who prescribed them or your Primary Care Pro
vider. 
  
  
  
 STOP taking these medications  
 diltiazem 120 MG tablet
 Commonly known as:  CARDIZEM
  
 nystatin cream
 Commonly known as:  MYCOSTATIN
  
 nystatin-triamcinolone ointment
 Commonly known as:  MYCOLOG
  
 omeprazole 20 MG capsule
 Commonly known as:  PRILOSEC
  
 pseudoephedrine 30 MG tablet
 Commonly known as:  SUDAFED
  
  
  
 Where to Get Your Medications 
  
 You can get these medications from any pharmacy  
 Bring a paper prescription for each of these medications
  amoxicillin-clavulanate 875-125 MG per tablet
  atorvastatin 40 MG tablet
  budesonide-formoterol 160-4.5 MCG/ACT inhaler
  clopidogrel 75 MG tablet
  ipratropium-albuterol 0.5-2.5 mg/3mL
  
 Information about where to get these medications is not yet available  
 Ask your nurse or doctor about these medications
  predniSONE 2.5 MG tablet
  
 
 Activity: activity as tolerated
 Diet: cardiac diet
 Wound Care: not applicable
 
 There are no outpatient Patient Instructions on file for this admission. 
 
 
 Calvin Robertson MD
 [address]
 [place] [address] [phone]
 
 Calvin Robertson MD
 [address]
 [place] [address] [phone]
 
 In 1 week
 
 María Akins MD
 [address] [place] [address]
 [phone]
 
 In 1 week
 
 Signed:
 
 ANDREW RYAN
 2018
 12:21 PM
  
  Electronically signed by Andrew Ryan MD at 2018 12:21 PM PDTdocumented in this en
counter
 
 Medications at Time of Discharge
 
 
+----------------------+----------------------+-----------+---------+----------+-----------+
| Medication           | Sig                  | Dispensed | Refills | Start    | End Date  |
|                      |                      |           |         | Date     |           |
+----------------------+----------------------+-----------+---------+----------+-----------+
|   acyclovir          | Apply  topically     |           | 0       |          |           |
| (ZOVIRAX) 5%         | every 3 hours.       |           |         |          |           |
| ointment             |                      |           |         |          |           |
+----------------------+----------------------+-----------+---------+----------+-----------+
|   albuterol (PROAIR  | Inhale 2 puffs into  |           | 0       |          |           |
| HFA) 90 mcg/puff     | the lungs every 6    |           |         |          |           |
| inhaler              | hours as needed for  |           |         |          |           |
|                      | Wheezing.            |           |         |          |           |
+----------------------+----------------------+-----------+---------+----------+-----------+
|                      | Take 3 mLs by        |           | 0       | 20 |           |
| albuterol-ipratropiu | nebulization every 6 |           |         | 18       |           |
| m 2.5-0.5 mg/3 mL    |  (six) hours as      |           |         |          |           |
| SOLN                 | needed.              |           |         |          |           |
+----------------------+----------------------+-----------+---------+----------+-----------+
|   digoxin (LANOXIN)  | Take 125 mcg by      |           | 0       |          |           |
| 250 mcg tablet       | mouth Daily.      |           |         |          |           |
|                      | capsule daily        |           |         |          |           |
+----------------------+----------------------+-----------+---------+----------+-----------+
|   ferrous sulfate    | Take 325 mg by mouth |           | 0       | 20 |           |
| 325 mg tablet        |  3 times daily.      |           |         | 12       |           |
+----------------------+----------------------+-----------+---------+----------+-----------+
|   fluticasone        | one spray in each    |           | 0       | 20 |           |
| (FLONASE) 50         | nostril daily as     |           |         | 12       |           |
| mcg/nasal spray      | needed               |           |         |          |           |
 
+----------------------+----------------------+-----------+---------+----------+-----------+
|                      | Inhale 1 puff into   |           | 0       |          |           |
| fluticasone-salmeter | the lungs Twice      |           |         |          |           |
| ol (ADVAIR) 500-50   | Daily.               |           |         |          |           |
| mcg/puff diskus      |                      |           |         |          |           |
| inhaler              |                      |           |         |          |           |
+----------------------+----------------------+-----------+---------+----------+-----------+
|   folic acid 1 mg    | Take 1 mg by mouth   |           | 0       |          |           |
| tablet               | Daily.               |           |         |          |           |
+----------------------+----------------------+-----------+---------+----------+-----------+
|   furosemide (LASIX) | Take 40 mg by mouth  |           | 0       |          |           |
|  40 mg tablet        | Daily.               |           |         |          |           |
+----------------------+----------------------+-----------+---------+----------+-----------+
|   guaiFENesin        | Take 1,200 mg by     |           | 0       |          |           |
| (MUCINEX) 600 mg 12  | mouth 2 times daily. |           |         |          |           |
| hr tablet            |                      |           |         |          |           |
+----------------------+----------------------+-----------+---------+----------+-----------+
|   levothyroxine      | Take 75 mcg by mouth |           | 0       | 20 |           |
| (LEVOTHROID) 75 MCG  |  Daily.              |           |         | 12       |           |
| tablet               |                      |           |         |          |           |
+----------------------+----------------------+-----------+---------+----------+-----------+
|   metoclopramide     | Take 10 mg by mouth  |           | 0       | 20 |           |
| (REGLAN) 10 mg       | 4 times daily as     |           |         | 12       |           |
| tablet               | needed.              |           |         |          |           |
+----------------------+----------------------+-----------+---------+----------+-----------+
|                      | Apply  topically 2   |           | 0       |          |           |
| nystatin-triamcinolo | times daily.         |           |         |          |           |
| ne (MYCOLOG II)      |                      |           |         |          |           |
| cream                |                      |           |         |          |           |
+----------------------+----------------------+-----------+---------+----------+-----------+
|   ofloxacin          |                      |           | 0       | 20 |           |
| (OCUFLOX) 0.3%       |                      |           |         | 18       |           |
| ophthalmic solution  |                      |           |         |          |           |
+----------------------+----------------------+-----------+---------+----------+-----------+
|   omeprazole         | Take 20 mg by mouth  |           | 0       | 20 |           |
| (PRILOSEC) 20 mg     | 2 times daily.       |           |         | 12       |           |
| capsule              |                      |           |         |          |           |
+----------------------+----------------------+-----------+---------+----------+-----------+
|   potassium citrate  | Take 10 mEq by mouth |           | 0       |          |           |
| (UROCIT-K) 10 mEq SR |  2 times daily.      |           |         |          |           |
|  tablet              |                      |           |         |          |           |
+----------------------+----------------------+-----------+---------+----------+-----------+
|   predniSONE         | Take 10 mg by mouth  |           | 0       |          |           |
| (DELTASONE) 5 mg     | Daily.               |           |         |          |           |
| tablet               |                      |           |         |          |           |
+----------------------+----------------------+-----------+---------+----------+-----------+
|   tamsulosin         | Take 0.4 mg by mouth |           | 0       | 20 |           |
| (FLOMAX) 0.4 mg CAPS |  2 times daily.      |           |         | 12       |           |
+----------------------+----------------------+-----------+---------+----------+-----------+
|   acyclovir          | Take 400 mg by mouth |           | 0       | 20 |  |
| (ZOVIRAX) 400 MG     |  3 times daily as    |           |         | 12       | 9         |
| tablet               | needed.              |           |         |          |           |
+----------------------+----------------------+-----------+---------+----------+-----------+
|   Cholecalciferol    | Take 2,000 Units by  |           | 0       | 20 |  |
| (VITAMIN D3) 2000    | mouth Daily.         |           |         | 12       | 9         |
| UNITS CAPS           |                      |           |         |          |           |
+----------------------+----------------------+-----------+---------+----------+-----------+
|   cyanocobalamin     | injected             |           | 0       | 20 |  |
| (VITAMIN B-12) 1,000 | intramuscularly once |           |         | 12       | 9         |
|  mcg/mL injection    |  a month             |           |         |          |           |
 
+----------------------+----------------------+-----------+---------+----------+-----------+
|   diltiazem          | Take 120 mg by mouth |           | 0       |          |  |
| (DILT-XR) 120 mg 24  |  Daily.              |           |         |          | 9         |
| hr capsule           |                      |           |         |          |           |
+----------------------+----------------------+-----------+---------+----------+-----------+
|   loratadine         | Take 10 mg by mouth  |           | 0       |          |  |
| (CLARITIN) 10 mg     | Daily.               |           |         |          | 9         |
| tablet               |                      |           |         |          |           |
+----------------------+----------------------+-----------+---------+----------+-----------+
|   losartan (COZAAR)  | Take 100 mg by mouth |           | 0       |          |  |
| 100 MG tablet        |  Daily. 1/2 daily    |           |         |          | 9         |
+----------------------+----------------------+-----------+---------+----------+-----------+
|   methotrexate 2.5   | Take 15 mg by mouth  |           | 0       |          |  |
| mg tablet            | Once a week.         |           |         |          | 9         |
+----------------------+----------------------+-----------+---------+----------+-----------+
|                      | Take 1 tablet by     |           | 0       |          |  |
| oxyCODONE-acetaminop | mouth every 4 hours  |           |         |          | 9         |
| hen (PERCOCET) 5-325 | as needed for Pain.  |           |         |          |           |
|  mg per tablet       |                      |           |         |          |           |
+----------------------+----------------------+-----------+---------+----------+-----------+
|   predniSONE         | Take 4 tablets by    |           | 0       | 20 |  |
| (DELTASONE) 2.5 MG   | mouth daily. Takes   |           |         | 18       | 9         |
| tablet               | 7.5 mg daily         |           |         |          |           |
+----------------------+----------------------+-----------+---------+----------+-----------+
|   pseudoePHEDrine    | Take 30 mg by mouth  |           | 0       |          |  |
| (SUDOGEST) 30 mg     | every 4 hours as     |           |         |          | 9         |
| tablet               | needed for           |           |         |          |           |
|                      | Congestion.          |           |         |          |           |
+----------------------+----------------------+-----------+---------+----------+-----------+
|   rivaroxaban        | Take 20 mg by mouth  |           | 0       |          |  |
| (XARELTO) 20 mg      | Daily (with dinner). |           |         |          | 9         |
| tablet               |                      |           |         |          |           |
+----------------------+----------------------+-----------+---------+----------+-----------+
|   sertraline         | 2 tablets daily      |           | 0       | 20 |  |
| (ZOLOFT) 100 mg      |                      |           |         | 12       | 9         |
| tablet               |                      |           |         |          |           |
+----------------------+----------------------+-----------+---------+----------+-----------+
 documented as of this encounter
 
 Progress Notes
 Conversion Transaction, Provider Unknown - 2018  2:34 PM PDTFormatting of this note m
ight be different from the original.
 Nurse Progress Note by Yamel Aranda RN at 18 1434  
  Author:  Yamel Aranda RN Service:  (none) Author Type:  Registered Nurse 
  Filed:  18 1701 Date of Service:  18 1434 Status:  Signed 
  :  Yamel Aranda RN (Registered Nurse)   
   
 Pt given discharge instructions with wife present. No questions or concerns at this time. I
V removed, wound dressing changed, and pt ready. [facility] 
 
 Yamel Aranda RN
 
  
  Electronically signed by Conversion Transaction, Provider at 2019  7:01 AM PDTMaría Damon ra, MD - 2018  9:37 AM PDTFormatting of this note might be different from the
 original.
 Progress Notes by María Akins MD at 18 0937  
  Author:  María Akins MD Service:  Cardiology Author Type:  Physician 
  Filed:  18 0955 Date of Service:  18 Status:  Signed 
 
  :  María Akins MD (Physician)   
   
 [facility]
 Service:  Cardiology
 Progress Note
 
 Name of Consultant: María Akins MD
 I have seen the patient on 2018. No more hemoptysis. 
 Had LHC showed severe disease in LCX and occluded SVG to OM. 
 SUBJECTIVE
 Denies any chest pain
 
 Current Facility-Administered Medications: 
    acetaminophen (TYLENOL) tablet 650 mg, 650 mg, Oral, Q6H PRN **OR** acetaminophen (TYL
ENOL) suppository 650 mg, 650 mg, Rectal, Q6H PRN, Honey Donovan MD
    ampicillin-sulbactam (UNASYN) 3 g in sodium chloride (IV) 0.9 % 100 mL IVPB, 3 g, Intr
avenous, Q6H, Andrew Ryan MD, 3 g at 18 0442
    atorvastatin (LIPITOR) tablet 40 mg, 40 mg, Oral, Nightly, Honey Donovan MD, 40 m
g at 08/10/18 2130
    azithromycin (ZITHROMAX) 500 mg in sodium chloride (IV) 0.9 % 250 mL IVPB, 500 mg, Int
ravenous, Q24H, Honey Donovan MD, 500 mg at 08/10/18 1248
    budesonide (PULMICORT) nebulizer suspension 0.5 mg, 0.5 mg, Nebulization, BID, Andrew Ryan MD, 0.5 mg at 08/10/18 2015
    clopidogrel (PLAVIX) tablet 75 mg, 75 mg, Oral, Daily, María Akins MD, 75 mg at 
18
    digoxin (LANOXIN) tablet 0.125 mg, 0.125 mg, Oral, Daily, Honey Donovan MD, 0.125
 mg at 18
    famotidine (PEPCID) tablet 40 mg, 40 mg, Oral, Daily, Honey Donovan MD, 40 mg at 
18
    fentaNYL (SUBLIMAZE) injection, , , Once PRN, María Akins MD, 50 mcg at 18
 191
    ferric gluconate (FERRLECIT) 250 mg in sodium chloride (IV) 0.9 % 100 mL IVPB, 250 mg,
 Intravenous, Q24H, Andrew Ryan MD
    furosemide (LASIX) tablet 20 mg, 20 mg, Oral, Daily, Honey Donovan MD, 20 mg at 0
18 09
    guaiFENesin (MUCINEX) 12 hr tablet 600 mg, 600 mg, Oral, TID, María Akins MD, 60
0 mg at 18 09
    ipratropium-albuterol (DUO-NEB) 0.5-2.5 mg/3mL nebulizer solution 3 mL, 3 mL, Nebuliza
tion, Q6H, Honey Donovan MD, 3 mL at 18 0315
    levothyroxine (SYNTHROID) tablet 75 mcg, 75 mcg, Oral, QAM AC, Honey Donovan MD, 
75 mcg at 18 09
    lidocaine 1 % injection, , , Once PRN, María Akins MD, 10 mL at 18 191
    losartan (COZAAR) tablet 50 mg, 50 mg, Oral, Daily, Honey Donovan MD, 50 mg at  09
    morphine (PF) injection 2 mg, 2 mg, Intravenous, Q4H PRN, Andrew Ryan MD, 2 mg at 0
18
    nitroGLYCERIN (NITRO-BID) 2 % ointment 0.5 inch, 0.5 inch, Topical, Q6H, Honey lou MD, 0.5 inch at 18 06
    nitroGLYCERIN (NITROSTAT) SL tablet 0.4 mg, 0.4 mg, Sublingual, Q5 Min PRN, Honey morse MD
    polyethylene glycol (GLYCOLAX) packet 17 g, 17 g, Oral, Daily PRN, Honey Donovan MD
    potassium chloride oral solution 40 mEq, 40 mEq, Oral, Daily with breakfast, Andrew garza MD
    rivaroxaban (XARELTO) tablet 20 mg, 20 mg, Oral, Daily with dinner, Andrew Ryan MD
    sertraline (ZOLOFT) tablet 100 mg, 100 mg, Oral, Daily, Honey Donovan MD, 100 mg 
at 18 09
    sodium bicarbonate buffer (NEUT) injection, , , Once PRN, María Akins MD, 5 mL a
t 18
    saline lock IV, , , Continuous **AND** sodium chloride (PF) 0.9 % flush 10 mL, 10 mL, 
 
Intravenous, Q8H, Honey Donovan MD, 10 mL at 18 0442
    tamsulosin (FLOMAX) capsule 0.4 mg, 0.4 mg, Oral, after dinner, Honey Donovan MD,
 0.4 mg at 08/10/18 1701
    zolpidem (AMBIEN) tablet 5 mg, 5 mg, Oral, Nightly PRN, María Akins MD
 
 OBJECTIVE
 Vital Signs:
 /61  | Pulse 64  | Temp 97.7 F (36.5 C) (Oral)  | Resp 18  | Ht 1.727 m (5' 8")  
| Wt 71.9 kg (158 lb 8 oz)  | SpO2 98%  | BMI 24.10 kg/m 
 
 Intake/Output Summary (Last 24 hours) at 18 0937
 Last data filed at 18 0616
  Gross per 24 hour 
 Intake          1191.23 ml 
 Output             1075 ml 
 Net           116.23 ml 
 
 Cardiovascular: iiregular irregular, S1 normal and S2 normal.  
 No murmur heard.
 Pulses:
      Radial pulses are 2+ on the right side, and 2+ on the left side. 
 Pulmonary/Chest: poor air exchange with mild rhonchi bilaterally. 
 Abdominal: Soft. No tenderness. 
 Musculoskeletal: No edema. 
 Neurological: Alert. No cranial nerve deficit. 
 Skin: Warm and dry. 
 
 DATA
 
 Recent Labs
 Lab 18
 0535 08/10/18
 1000 18
 0157 
  139 138 
 K 3.3* 3.8 4.3 
 CO2 26 24 24 
 BUN 12 15 14 
 CREATININE 1.1 1.2 1.3 
 EGFR >60 59* 54* 
 MG  --   --  1.8 
 
 Recent Labs
 Lab 18
 0535 08/10/18
 1000 18
 0157 
 WBC 5.20 7.51 11.05* 
 HGB 9.6* 9.4* 11.5* 
 HCT 28.4* 27.9* 35.3* 
 MCV 76.7* 77.0* 77.5* 
 * PLATELETS CLUMPED, APPEAR ADEQUATE 160 
 
 Recent Labs
 Lab 18
 0551 18
 0157 18
 2212 
 TROPONINI 1.28* 1.72* 1.76* 
 TSH  --  4.150  --  
 
 
 Lab Results    
 Component  Value Date 
  CHOL 103 2018 
  TRIG 116 2018 
  LDL 56 2018 
  HDL 24 (L) 2018 
  GLUF 101 (H) 2018 
  TSH 4.150 2018 
 
 [id number]
 Reviewed by myself showed atrial fibrillation with RBBB. 
 Last Echo: 2018
 Normal LV size and function EF 55%. Inferior and inferolateral hypokinetic segments.
 04/15/2013
 Reported with normal LV size and function EF 56%. RV is normal in size and function. 
 Last Stress test: 2018
 Lexiscan Cardiolite stress test with evidence of ischemia inferior segment and small revers
ible defect as well as in the anterior segment.
 
 Last Cath: 2018
 LM mild disease, % occluded. LCX small caliber calcified with severe disease proxima
l and distal not amenable to PCI. % occluded.
 SVG to distal RCA patent, LIMA to LAD patent.
 SVG to LCX system is occluded at the insertion site. 
 Last US carotid:
 
 ASSESSMENT: 
 Patient is 72 y.o.with the following medical problems:
 
 1. Coronary artery disease, occluded SVG to LCX system, LCX is not amenable to PCI. 
 2. Non-ST elevation MI.
 3. Dysphagia.
 4. Hemoptysis minimal, improved.
 5. Chronic atrial fibrillation. On rate control strategy and anticoagulation. CHADSVASc sco
re of 4. 
 6. AAA.
 7. Coronary artery disease S/P CABG x [id number].
 8. RBBB.
 9. Hypertension blood pressures controlled.
 10. Chronic obstructive pulmonary disease.
 11. History of upper GI bleed due to peptic ulcer was off any antiplatelet therapy.
 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
 
 Recommendations:
 Please provide Prescription for Clopidogrel for 3 months. 
 Stop aspirin for increased risk of bleed given the patient need to be restarted on Rivaroxa
ban.
 Continue to monitor H and H and any sign of increased hemoptysis. 
 Once Clopidogrel stopped will restart aspirin. 
 Patient has been off Rivaroxaban for 4 days with no change in patient's hemoptysis.
 Continue with Losartan, statin.
 Patient should be restarted on Rivaroxaban. 
 Not a candidate for BB. HR is controlled rate atrial fibrillation.
 Will follow up as an outpatient.  
 
 Code Status:  Full Code
 
 María Akins MD
 2018 
 
  
  Electronically signed by María Akins MD at 2018  9:55 AM PDTConversion Transac
tion, Provider Unknown - 2018  6:22 AM PDTFormatting of this note might be different f
rom the original.
 Nurse Progress Note by Rainer Morfin RN at 18  
  Author:  Rainer Morfin RN Service:  (none) Author Type:  Registered Nurse 
  Filed:  18 Date of Service:  18 Status:  Signed 
  :  Rainer Morfin RN (Registered Nurse)   
   
 VSS and pt afebrile.  SpO2 maintained > 90% on RA. Pt reported feeling SOB. RT at bedside f
or neb treatment, improvement noted.
 
 Pt continues to refuse nitrobid, stating "it gives me too big of a headache".
 
 Pt ambulating in room, tolerating well.
 
 No acute changes from previous assessment. Chart check completed by this RN. Rainer Morfin RN
  
  Electronically signed by Conversion Transaction, Provider at 2019  7:00 AM PDTConver
ruben Transaction, Provider Unknown - 08/10/2018  6:44 PM PDTFormatting of this note might be
 different from the original.
 Nurse Progress Note by Yamel Aranda RN at 08/10/18 1844  
  Author:  Yamel Aranda RN Service:  (none) Author Type:  Registered Nurse 
  Filed:  08/10/18 1854 Date of Service:  08/10/18 1844 Status:  Signed 
  :  Yamel Aranda RN (Registered Nurse)   
   
 VSS this shift. Pt continued on IV ABX. Plavix started. Speech consulted with patient and p
ts wife about plan for diet at home. Pt resting this shift, no questions or concerns at this
 time. Chart check complete.
 
 Yamel Aranda RN
 
  
  Electronically signed by Conversion Transaction, Provider at 2019  7:09 AM PDTConver
ruben Transaction, Provider Unknown - 08/10/2018  3:28 PM PDTFormatting of this note might be
 different from the original.
 Case Management by Sapna Dupree RN at 08/10/18 1528  
  Author:  Sapna Dupree RN Service:  (none) Author Type:  Registered Nurse 
  Filed:  08/10/18 1529 Date of Service:  08/10/18 1528 Status:  Signed 
  :  Sapna Dupree RN (Registered Nurse)   
   
 Discharge Planning: no change at this time to ADC plan of home with spouse.
  
  Electronically signed by Conversion Transaction, Provider at 2019  7:05 AM Andrew Joseph MD - 08/10/2018  3:16 PM PDTFormatting of this note might be different from the or
iginal.
 Progress Notes by Andrew Ryan MD at 08/10/18 5086  
  Author:  Andrew Ryan MD Service:  Hospitalist Author Type:  Physician 
  Filed:  08/10/18 1610 Date of Service:  08/10/18 1516 Status:  Signed 
  :  Andrew Ryan MD (Physician)   
   
   Hospitalist
 Progress Note
 
 Daryl Shane   72 y.o.  [id number]
 8108/8108-1 male   [facility] Day:   LOS: 2 days 
 
      Patient Summary:   72-year-old gentleman with a past medical history of chronic atrial
 
 fibrillation on anticoagulation, history of vocal cord cancer status post treatment, corona
ry artery disease status post CABG in , history of tobacco abuse   disorder and COPD, hi
story of peptic ulcer disease and GI bleed, history of dysphagia on and off for the last 9 y
ears, who recently had abnormal cardiac stress test study who went  to [facility] 
with ongoing productive cough, shortness of breath and hemoptysis, where he was found to hav
e positive troponin and chest x-ray showed pneumonia and [facility].  The patient was not put on any anticoagulation se
condary to ongoing hemoptysis and Dr. Akins from cardiology consulted who recommended to 
get a CT chest and speech evaluation for ongoing dysphagia before cardiac workup.  The patie
nt's pneumonia is likely secondary to aspiration, hence  antibiotic changed to Unasyn.
 
 Patient CT scan of the chest without contrast showed pulmonary vascular congestion, edema a
nd centrilobular emphysema, pulmonary infiltrates asymmetrical throughout the left hemithora
x.echo showed ejection fraction of 55%.patient underwent cardiac cath today which showed sev
ere three-vessel disease, patent SVG to RCA and LIMA to LAD, occluded SVG to the left circum
flex system, severely calcified left circumflex not amenable to PCI and recommend medical ma
nagement only.
 
 When I went to see the patient he was resting comfortably on the bedside 
 complaining of shortness of breath as he refused  nebulizer treatment in the morning otherw
ise he  denied any chest pain no nausea no vomiting no abdominal pain no constipation or mandy
rrhea though still complaining of cough bringing up yellowish color phlegm mixed with scant 
amount of blood less than yesterday.
 
 Scheduled Medications 
   ampicillin-sulbactam  3 g Intravenous Q6H 
   atorvastatin  40 mg Oral Nightly 
   azithromycin  500 mg Intravenous Q24H 
   budesonide  0.5 mg Nebulization BID 
   clopidogrel  75 mg Oral Daily 
   digoxin  0.125 mg Oral Daily 
   famotidine  40 mg Oral Daily 
   furosemide  20 mg Oral Daily 
   guaiFENesin  600 mg Oral TID 
   ipratropium-albuterol  3 mL Nebulization Q6H 
   levothyroxine  75 mcg Oral QAM AC 
   losartan  50 mg Oral Daily 
   nitroGLYCERIN  0.5 inch Topical Q6H 
   [START ON 2018] rivaroxaban  20 mg Oral Daily with dinner 
   sertraline  100 mg Oral Daily 
   sodium chloride (PF)  10 mL Intravenous Q8H 
   tamsulosin  0.4 mg Oral after dinner 
 
 Continuous Infusions 
 
 PRN Medications
 acetaminophen **OR** acetaminophen, atropine sulfate, fentaNYL, lidocaine, morphine, nitroG
LYCERIN, polyethylene glycol, sodium bicarbonate buffer, sodium chloride (bolus), zolpidem
 
 Allergy:
 Allergies    
 Allergen   Reactions 
   Beta Adrenergic Blockers  Anaphylaxis 
   Diltiazem  Edema 
   Gabapentin  Other (See Comments) 
   CNS  
   Imipramine  Other (See Comments) 
   Urinary retention   
   Metoprolol  Swelling 
   Propranolol  Other (See Comments) 
 
   raynaud's   
 
 OBJECTIVE
 Vital Signs:
 /55 (BP Location: Left upper arm)  | Pulse 67  | Temp 97.8 F (36.6 C) (Oral)  | R
scott 18  | Ht 1.727 m (5' 8")  | Wt 71.8 kg (158 lb 4.8 oz)  | SpO2 97%  | BMI 24.07 kg/m 
 
 Temp:  [97.5 F (36.4 C)-99.6 F (37.6 C)] 97.8 F (36.6 C) (08/10 1110)
 BP: (116-179)/(55-90) 116/55 (08/10 1110)
 Heart Rate:  [52-94] 67 (08/10 1458)
 Resp:  [16-22] 18 (08/10 1458)
 SpO2:  [94 %-99 %] 97 % (08/10 1458)
 
 I&O Detailed Table:
  
 
 Intake/Output Summary (Last 24 hours) at 08/10/18 1516
 Last data filed at 08/10/18 1117
  Gross per 24 hour 
 Intake            990.5 ml 
 Output              900 ml 
 Net             90.5 ml 
 
 Hemodynamics Last 24hrs:   
 
 Examination:
 
 Constitutional: Alert and oriented to person, place, and time. Appears in NAD 
 
 Cardiovascular: Normal rate, irregular irregular rhythm, normal heart sounds and intact dis
dash pulses.  Exam reveals no gallop and no friction rub.  No murmur heard.
 
 Pulmonary/Chest: Effort normal and breath sounds normal. No stridor. No respiratory distres
s.  no wheezes. no rales. exhibits no tenderness. Except decreased BS at bases 
 
 Abdominal: Soft. Bowel sounds are normal. exhibits no distension and no mass. There is no t
enderness. There is no rebound and no guarding. 
 
 Musculoskeletal: Normal range of motion.exhibits no tenderness.  exhibits no edema. 
   
 Neurological:  Alert and oriented to person, place, and time. Has normal reflexes.  display
s normal reflexes. No cranial nerve deficit.  Exhibits normal muscle tone. 
 
 Skin: Skin is warm and dry. No rash noted. No erythema. No pallor. 
 
 Psychiatric: Has a normal mood and affect. Behavior is normal. Judgment normal. 
 
 Laboratory:
 
 Glucose:
 Results  
  Procedure Component Value Units Date/Time 
  Iron panel [[id number]]    Collected:  08/10/18 1443 
  Specimen:  Blood    Updated:  08/10/18 1450 
  Vitamin B12 [[id number]]    Collected:  08/10/18 1443 
  Specimen:  Blood    Updated:  08/10/18 1450 
  Ferritin [[id number]]    Collected:  08/10/18 1443 
  Specimen:  Blood    Updated:  08/10/18 1450 
  Folate [[id number]]    Collected:  08/10/18 1443 
  Specimen:  Blood    Updated:  08/10/18 1450 
 
  CBC W/Auto Diff (Reflex to Manual) [[id number]]  (Abnormal)    Collected:  08/10/18 1000 
  Specimen:  Blood    Updated:  08/10/18 1349 
   WBC 7.51 K/uL  
   RBC 3.63 (L) M/uL  
   HGB 9.4 (L) g/dL  
   HCT 27.9 (L) %  
   MCV 77.0 (L) fl  
   MCH 26.1 (L) pg  
   MCHC 33.8 g/dL  
   RDW SD 58.2 (H) fl  
   PLT    
    PLATELETS CLUMPED, APPEAR ADEQUATE   
    K/uL   
   DIFF TYPE AUTOMATED   
   NEUTROPHILS 82.55 %  
   LYMPHOCYTES 8.04 %  
   MONOCYTES 5.49 %  
   EOSINOPHILS 3.36 %  
   BASOPHILS 0.56 %  
   NEUTROPHILS ABS 6.20 K/uL  
   LYMPHOCYTES ABS 0.60 (L) K/uL  
   MONOCYTES ABS 0.41 K/uL  
   EOSINOPHILS ABS 0.25 K/uL  
   BASOPHILS ABS 0.04 K/uL  
   MORPHOLOGY 2+   
  Comprehensive metabolic panel [[id number]]  (Abnormal)    Collected:  08/10/18 1000 
  Specimen:  Blood    Updated:  08/10/18 1332 
   SODIUM 139 mmol/L  
   POTASSIUM 3.8 mmol/L  
   CHLORIDE 105 mmol/L  
   CO2 24 mmol/L  
   ANION GAP AGAP 14 mmol/L  
   GLUCOSE 137 (H) mg/dL  
   BUN 15 mg/dL  
   CREATININE 1.2 mg/dL  
   BUN/CREAT 13   
   CALCIUM 8.4 (L) mg/dL  
   TOTAL PROTEIN 5.5 (L) g/dL  
   Albumin 2.4 (L) g/dL  
   GLOBULIN 3.1 g/dL  
   A/G 0.8 (L)   
   TBIL 0.8 mg/dL  
   ALK PHOS 87 U/L  
   AST 20 U/L  
   ALT 16 U/L  
   EGFR 59 (L) mL/min/1.73m2  
  Sputum culture [[id number]]    Collected:  18 0400 
  Specimen:  Sputum from Sputum    Updated:  18 1603 
   Specimen Description SPUTUM   
   GRAM STAIN GREATER THAN 10 WBCS/LPF   
   GRAM STAIN GREATER THAN 10 SEC/LPF   
   GRAM STAIN 4+   
   GRAM STAIN GRAM POSITIVE COCCI   
   GRAM STAIN 1+   
   GRAM STAIN GRAM NEGATIVE RODS   
   GRAM STAIN 3+   
   GRAM STAIN GRAM POSITIVE RODS   
   GRAM STAIN 2+   
   GRAM STAIN YEAST   
   CULTURE SMEAR CONTAINS GREATER THAN 10 SEC/LPF SUGGESTIVE OF POOR QUALITY SPECIMEN.  SPEC
 
IMEN WILL NOT BE CULTURED OR WILL BE CULTURED BY SPECIAL REQUEST ONLY.  PLEASE RECOLLECT IF 
CLINICALLY INDICATED.  SPECIMEN WILL BE HELD 48 HOURS.   
  Troponin I [[id number]]  (Abnormal)    Collected:  18 0551 
  Specimen:  Blood    Updated:  18 0658 
   TROPONIN I 1.28 (HH) ng/mL  
  CBC W/Auto Diff (Reflex to Manual) [[id number]]  (Abnormal)    Collected:  18 0157 
  Specimen:  Blood    Updated:  18 0437 
   WBC 11.05 (H) K/uL  
   RBC 4.56 M/uL  
   HGB 11.5 (L) g/dL  
   HCT 35.3 (L) %  
   MCV 77.5 (L) fl  
   MCH 25.1 (L) pg  
   MCHC 32.4 g/dL  
   RDW SD 57.3 (H) fl  
    K/uL  
   MPV 10.1 fl  
   DIFF TYPE MANUAL   
   Neutrophils Manual 85 %  
   Lymphocytes Manual 8 %  
   Monocytes Manual 6 %  
   Eosinophils Manual 1 %  
   Neutrophils Absolute 9.40 (H) K/uL  
   Lymphocytes Absolute 0.88 (L) K/uL  
   Monocytes Absolute 0.66 K/uL  
   Eosinophils Absolute 0.11 K/uL  
   Platelet Estimate ADEQUATE   
   MORPHOLOGY 2+   
  Magnesium [[id number]]    Collected:  18 
  Specimen:  Blood    Updated:  18 
   MAGNESIUM 1.8 mg/dL  
  Phosphorus [[id number]]    Collected:  18 
  Specimen:  Blood    Updated:  18 
   PHOSPHORUS 3.4 mg/dL  
  TSH [[id number]]    Collected:  18 
  Specimen:  Blood    Updated:  18 
   TSH 4.150 uIU/mL  
  Basic metabolic panel [[id number]]  (Abnormal)    Collected:  18 
  Specimen:  Blood    Updated:  18 
   SODIUM 138 mmol/L  
   POTASSIUM 4.3 mmol/L  
   CHLORIDE 104 mmol/L  
   CO2 24 mmol/L  
   ANION GAP AGAP 14 mmol/L  
   GLUCOSE 75 mg/dL  
   BUN 14 mg/dL  
   CREATININE 1.3 mg/dL  
   BUN/CREAT 11   
   CALCIUM 8.6 mg/dL  
   EGFR 54 (L) mL/min/1.73m2  
  Lipid panel [[id number]]  (Abnormal)    Collected:  18 
  Specimen:  Blood    Updated:  18 
   CHOLESTEROL 103 mg/dL  
   Triglycerides 116 mg/dL  
   HDL CHOL 24 (L) mg/dL  
   LDL CALC 56 mg/dL  
  Troponin I [[id number]]  (Abnormal)    Collected:  18 
  Specimen:  Blood    Updated:  18 0251 
   TROPONIN I 1.72 (HH) ng/mL  
  Protime-INR [[id number]]    Collected:  18 
 
  Specimen:  Blood    Updated:  18 0221 
   INR 1.2   
  Septic Lactic Acid [[id number]]    Collected:  18 2350 
      Updated:  18 0023 
   LACTIC ACID 1.1 mmol/L  
  Troponin I [[id number]]  (Abnormal)    Collected:  18 2212 
  Specimen:  Blood    Updated:  18 225 
   TROPONIN I 1.76 (HH) ng/mL  
  
 
 CBC:  
 Lab Results    
 Component  Value Date 
  WBC 7.51 08/10/2018 
  RBC 3.63 (L) 08/10/2018 
  HGB 9.4 (L) 08/10/2018 
  HCT 27.9 (L) 08/10/2018 
  MCV 77.0 (L) 08/10/2018 
  MCH 26.1 (L) 08/10/2018 
  MCHC 33.8 08/10/2018 
  RDW 58.2 (H) 08/10/2018 
  PLT PLATELETS CLUMPED, APPEAR ADEQUATE 08/10/2018 
  MPV 10.1 2018 
  DIFFTYPE AUTOMATED 08/10/2018 
 
 CMP:  
 Lab Results    
 Component  Value Date 
   08/10/2018 
  K 3.8 08/10/2018 
   08/10/2018 
  CO2 24 08/10/2018 
  ANIONGAP 14 08/10/2018 
  GLUF 137 (H) 08/10/2018 
  BUN 15 08/10/2018 
  CREATININE 1.2 08/10/2018 
  BCR 13 08/10/2018 
  CA 8.4 (L) 08/10/2018 
  PROT 5.5 (L) 08/10/2018 
  ALB 2.4 (L) 08/10/2018 
  GLOB 3.1 08/10/2018 
  BILITOT 0.8 08/10/2018 
  ALP 87 08/10/2018 
  AST 20 08/10/2018 
  ALT 16 08/10/2018 
  EGFR 59 (L) 08/10/2018 
 
 Magnesium:  
 Lab Results    
 Component  Value Date 
  MG 1.8 2018 
 
 Phosphorus:  
 Lab Results    
 Component  Value Date 
  PHOS 3.4 2018 
 
 PT/INR:  
 Lab Results    
 Component  Value Date 
 
  INR 1.2 2018 
 
 Last 3 Troponin:  
 Lab Results    
 Component  Value Date 
  TROPONINI 1.28 () 2018 
  TROPONINI 1.72 () 2018 
  TROPONINI 1.76 () 2018 
 
 ABG:  No results found for: POCPH, POCPCO, POCPO2, POCHCO, POCTCO2, POCBD, BEART, POCSO2, P
OCCMT
 
 Xr Chest 2 View
 
 Result Date: 2018
 HISTORY: Precordial chest pain. Question pneumonia. COMPARISON: 18. TECHNIQUE: PA and
 lateral films of the chest. FINDINGS: Median sternotomy. Heart size is upper normal. Pulmon
ramón venous congestion. Bilateral perihilar mixed interstitial and airspace infiltrates again
 noted, essentially unchanged. No effusions. Subtle thickening of the major and minor fissur
es. Mild degenerative changes of the spine. Osteopenia. 
 
 1.  Borderline cardiac enlargement, with probable pulmonary edema. Atypical pneumonitis see
ms less likely. Electronically signed by Yonatan Montague MD on 2018 11:34 AM
 
 X-ray Chest 2 View Frontal & Lateral
 
 Result Date: 2018
 This is a non-reportable procedure without a radiologist report and is used for image stora
ge only
 
 Ct Head Without Contrast
 
 Result Date: 2018
 This is a non-reportable procedure without a radiologist report and is used for image stora
ge only
 
 Ct Chest Without Contrast
 
 Result Date: 2018
 HISTORY: 72 years year-old Male, hemoptysis. TECHNIQUE: CT through the chest. Noncontrast e
xamination. Automatic dose adjustment to minimize patient exposure. PRIOR EXAMINATION: [facility] chest radiograph. FINDINGS:  demonstrates cardiomegaly, sternal wires and dense reti
cular interstitial lung disease throughout the left mid chest. Lung windows demonstrate bila
teral reticular and confluent infiltrates throughout mid lower lung fields. In the posterior
 medial left lung on image 81 series 3 in underlying mass would be difficult to exclude, but
 this is simply a larger multiple similar findings throughout both lungs. Inflammatory nodul
es and/or inflammatory masses are very plausibly superimposed In the mid central superior le
ft lung the lung disease is more cystic in appearance, asymmetric centrilobular emphysema/CO
PD and superimposed edema edema Bone windows demonstrate degenerative and postprocedural clare
nges, without acute appearing lesion or active fracture. The sternotomy is healed. Nonaggres
sive and degenerative changes not uncommon for age. What is seen of the upper abdomen demons
trates fatty liver. Splenomegaly. No adenopathy or fluid collection discernible. Large hiatu
s hernia. Within the chest dense coronary calcification, interventional changes, diffuse car
diac megaly and hilar vascular congestion symmetrically distributed in midlung fields. Elizabeth
es post CABG. 
 
 1. At the very least there is pulmonary vascular congestion, edema-and centrilobular emphys
ema is superimposed 2. Pulmonary infiltrates are asymmetric and throughout the left hemithor
ax. This could be due to superimposed infection of the left upper lung and there is a small 
effusion also 3. Lastly, there are inflammatory appearing airspace or infiltrative nodules s
 
uperimposed throughout Will be important to repeat this examination when the patient's acute
 issues are resolved to evaluate for underlying lesion/mass Electronically signed by Juan Francisco Conn MD on 2018 12:01 PM
 
 X-ray Swallowing Function Video
 
 Result Date: 2018
 This is a non-reportable procedure without a radiologist report and is used for image [employer] only
 
 Cl Coronary Angio With Grafts
 
 Result Date: 8/10/2018
 Accession:  [id number] ____________________________________________________________________ DA
TE OF BIRTH:  1945. PROCEDURE: 1. Right femoral access with ultrasound guidance. 2. Le
ft heart catheterization. 3. Coronary angiogram. 4. SVG angiogram to the RCA. 5. SVG angiogr
am to the left circumflex. 6. LIMA to LAD angiogram. INDICATION:  This is a 72-year-old male
 who presented to the hospital because of shortness of breath, pulmonary edema, and elevatio
n of cardiac enzymes.  For further details, refer to my consultation note.  Given the above 
findings, left heart catheterization was recommended.  The patient had previous history of C
ABG times 4 in . PROCEDURE NOTE:  The patient was brought electively to the heart cathet
erization lab.  Consent was obtained after reviewing risks and benefits.  Timeout was done a
t the bedside.  The patient was prepped in the usual sterile manner, received IV fentanyl vi
a independent observer.  Right groin was anesthetized with 1 percent lidocaine.  Using ultra
sound and a micropuncture needle, right femoral access was obtained.  A 5-French sheath was 
introduced without any difficulty.  A 0.035 wire was used and advanced under fluoroscopic gu
idance into the ascending aorta.  A 5-French JL4 catheter was used and advanced over the wir
e and placed selectively in the left coronary cusp.  Wire was removed.  Catheter was flushed
.  Selectively engaged the left coronary system.  Contrast was injected.  Multiple views wer
e obtained.  Exchange over the wire was done with JR4 catheter that was placed selectively i
n the right coronary cusp, selectively engaged the right coronary artery, and multiple views
 were obtained.  Attempted to engage the SVG to RCA and the obtuse marginal with the JR4 cat
heter; however, was unsuccessful.  Then JR4 catheter was used to cannulate the left subclavi
an artery and then a long 0.035 wire was used to exchange with a LIMA catheter that selectiv
ely engaged the LIMA graft and multiple views were obtained.  Exchange over the wire was don
e with a 5-French multipurpose catheter that selectively engaged the right SVG to RCA graft.
 Multiple views were obtained.  Exchange over the wire was done with 5-French AL1 catheter t
hat selectively engaged the SVG to left circumflex system graft.  Multiple views were obtain
ed.  Finally, the catheter was removed over the wire.  Hemostasis was achieved by TR band. C
ONTRAST USED:  100 mL. BLOOD LOSS:  Less than 10. COMPLICATIONS:  None. HEMOSTASIS:  By manu
al pressure. ANGIOGRAPHIC FINDINGS: 1. Left main is large caliber vessel with normal origin.
  Bifurcates to LAD and left circumflex with mild diffuse disease. 2. LAD is 100 percent pro
ximally occluded. 3. Left circumflex is a small to moderate vessel that is severely calcifie
d, has multiple lesions and diffusely diseased.  Distally it is very small and severely calc
ified.  Has 90 percent proximal and subtotal occlusion distal.  However again smaller and se
verely calcified.  No competitive flow was seen. 4. RCA has 100 percent ostial occlusion. 5.
 SVG to RCA is widely patent with mildly diffuse disease. 6. SVG to left circumflex system i
s diffusely diseased with severe degenerative disease; however, occluded at the insertion si
te.  The jump graft of the 2nd obtuse marginal is occluded.  LIMA to LAD is widely patent. C
ONCLUSION: 1. Severe 3-vessel coronary artery disease. 2. Patent SVG to RCA and LIMA to LAD.
 3. Occluded SVG to the left circumflex system, which is a jump graft. 4. Severely calcified
 left circumflex, which is small caliber severely calcified. However, not amenable to any PC
I. RECOMMENDATIONS:  Given the above findings, the patient will continue medical therapy for
 coronary artery disease.  Will be restarted on antiplatelet therapy and will be on optimiza
tion of blood pressure control, statin therapy, and will be re-started on anticoagulation fo
r atrial fibrillation.  Further recommendations to follow. Read by MARÍA AKINS MD 2018 07:56 P Electronically signed by María Akins MD on 8/10/2018 6:24 AM
 
 Echo Cardiac Adult Complete
 
 
 Result Date: 2018
 Patient Name:  DARYL SHANE YOB: 1945 Accession:  [id number] Performing Physici
an:   María Akins _______________________________________________________________ INDIC
ATIONS ----------- sob,h/o 4 cabg CONCLUSIONS ----------- 1. The left ventricle cavity is no
rmal in size, mild concentric hypertrophy and  normal systolic function EF 55%. Mild inferio
r and inferolateral hypokinetic segments. 2. Mild degenerative changes in the aortic and bhupinder
ral valves. 3. There is no pericardial effusion. FINDINGS -------- ECG rhythm: Atrial fibril
lation. Study: A 2-dimensional transthoracic echocardiogram with m-mode, spectral and color 
flow Doppler was perfomed. Study: This was a technically adequate study. Left Ventricle: Ove
rall left ventricular systolic function is low-normal with, an EF 55 %. Left Ventricle: The 
left ventricle cavity size is normal. Left Ventricle: There is mild concentric left ventricu
lar hypertrophy. Right Ventricle: The RV was not well visualized. Left Atrium: The left atri
um is mildly dilated. Right Atrium: The right atrial size is normal. Aortic Valve: The aorti
c valve is trileaflet. Aortic Valve: The aortic valve is mildly calcified. Aortic Valve: The
re is mild aortic regurgitation. Mitral Valve: There is trace mitral regurgitation. Mitral V
alve: Mild mitral annular calcification present. Tricuspid Valve: The tricuspid valve appear
s structurally normal. Tricuspid Valve: Trace tricuspid regurgitation present. Tricuspid Layla
ve: There is no evidence of pulmonary hypertension. Pulmonic Valve: The pulmonic valve is no
rmal. Pulmonic Valve: Mild pulmonic regurgitation. Pulmonic Valve: Mild degenerative changes
 in the aortic and mitral valves. Pericardium: There is no pericardial effusion. Pericardium
: No pleural effusion seen. IVC/Hepatic Veins: The inferior vena cava is normal in size and 
collapses > 50 % with sniff, indicating normal central venous pressures. MEASUREMENTS ------
------- Ao asc:   4.19 cm Ao sinus:   3.71 cm Ao st junct:   3.01 cm IVC:   1.42 cm LA Diam:
   5.50 cm LA Major:   5.97 cm EDV(Teich):   106.74 ml IVSd:   1.19 cm LVIDd:   4.78 cm LVPW
d:   1.21 cm LVOT Diam:   2.25 cm %FS:   26.84 % EF(Teich):   52.32 % ESV(Teich):   50.89 ml
 IVSs:   1.81 cm LVIDs:   3.50 cm LVPWs:   1.71 cm SV(Teich):   55.85 ml RA Major:   5.04 cm
 LVEF MOD A4C:   55.16 % SV MOD A4C:   49.58 ml LVEDV MOD A4C:   89.88 ml LVLd A4C:   7.50 c
m LVESV MOD A4C:   40.30 ml LVLs A4C:   5.87 cm LAESV(A-L):   76.74 ml LAESV Index (A-L):   
41.48 ml/m2 LAAs A2C:   17.05 cm2 LAESV A-L A2C:   48.56 ml LALs A2C:   5.08 cm LAAs A4C:   
26.95 cm2 LAESV A-L A4C:   95.07 ml LALs A4C:   6.48 cm Ao Diam:   4.03 cm AV Cusp:   1.91 c
m LA Diam:   4.92 cm LA/Ao:   1.22 D-E Excursion:   2.29 cm E-F Beltrami:   0.09 m/s AV maxPG: 
  10.18 mmHg AV meanP.51 mmHg AV Vmax:   1.59 m/s AV Vmean:   1.23 m/s AV VTI:   28.06
 cm KIRK Vmax:   3.26 cm2 KIRK (VTI):   3.35 cm2 AVAI Vmax:   0.00 cm2/m2 AVAI (VTI):   0.00 c
m2/m2 LVOT maxP.86 mmHg LVOT meanPG:   3.16 mmHg LVSI Dopp:   50.95 ml/m2 LVSV Dopp:  
 94.27 ml LVOT Vmax:   1.31 m/s LVOT Vmean:   0.84 m/s LVOT VTI:   23.69 cm MV E Luis:   0.93
 m/s E' Lat:   0.12 m/s E' Sept:   0.06 m/s PRend P.85 mmHg PRend Vmax:   1.48 m/s HR:
   77.27 BPM PV maxPG:   3.37 mmHg PV meanP.56 mmHg PV Vmax:   0.91 m/s PV Vmean:   0.
56 m/s PV VTI:   12.48 cm RV S':   0.08 m/s TR maxP.05 mmHg TR Vmax:   3.04 m/s TV A 
Luis:   0.00 m/s TV Dec Beltrami:   3.93 m/s2 TV Dec Time:   207.73 ms TV E Luis:   0.56 m/s TV E
/A Ratio:   69.55 Sonographer: PADMINI Authenticated by: María Akins Report Date/Time: 
018 13:7:33 
 
 1. The left ventricle cavity is normal in size, mild concentric hypertrophy and  normal sys
tolic function EF 55%. Mild inferior and inferolateral hypokinetic segments. 2. Mild degener
ative changes in the aortic and mitral valves. 3. There is no pericardial effusion.
 
 [facility]
 
 Result Date: 2018
 This Point of Care (POC) ultrasound image has been reviewed and interpreted by the physicia
n identified as the performing physician in the associated interpretation and report. 
 
 PROBLEM LIST
 
 Principal Problem:
   NSTEMI (non-ST elevated myocardial infarction) (HCC)
 Active Problems:
   Aortic aneurysm (HCC)
   ASCVD (arteriosclerotic cardiovascular disease)
   Chronic atrial fibrillation (HCC)
 
   Essential hypertension
   Precordial pain
   Hemoptysis
   Multifocal pneumonia
   Chronic bronchitis (HCC)
   Chronic maxillary sinusitis
   Obstructive sleep apnea
   Peptic ulcer disease
   Moderate protein-calorie malnutrition (HCC)
   Severe protein-calorie malnutrition (HCC)
   Dysphagia
   Aspiration pneumonia (HCC)
 
 ASSESSMENT & PLAN
 
 Non-ST elevation myocardial infarction.  Patient currently chest pain free and troponin is 
minimally elevated around  1 status post cardiac cath which showed
 an three-vessel disease and recommend medical management
 PLAN:
 as per discontinued patient started on Plavix, continue atorvastatin, the patient is allerg
ic to BETA BLOCKER hence we will avoid it and avoid heparin drip as patient has mild hemopty
sis which can get worse
 appreciate cardiology input
 2.  Aspiration pneumonia on Unasyn Day #2 doing better  and Speech consulted who recommende
d mechanical soft diet 
 
 3.  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
t 
 
 4.  Hypertension well controlled on Losartan.
 
 5.  BPH stable on Flomax.
 
 6. Chronic atrial fibrillation.  Heart rate well controlled on Digoxin and Xarelto on hold 
in setting of hemoptysis.
 
 #7 acute COPD exacerbation likely secondary to pneumonia
 Plan continue patient on duo nebs hold Symbicort and changed to Pulmicort twice a day
 
 #8 Anemia secondary to on going hemoptysis and check complete iron panel B12 folic to rule 
out nutrition deficiencies
 
 I spent more than 35 minutes.
 
 ANDREW RYAN MD
 8/10/2018
 
  
  Electronically signed by Andrew Ryan MD at 08/10/2018  4:10 PM PDTConversion Transactio
n, Provider Unknown - 08/10/2018  2:22 PM PDTFormatting of this note might be different from
 the original.
 Progress Notes by Lexii Amador CCC-SLP at 08/10/18 1422  
  Author:  SOL AkbarSLP Service:  (none) Author Type:  Speech and Language Path
ologist 
  Filed:  08/10/18 1423 Date of Service:  08/10/18 1422 Status:  Signed 
  :  Lexii Amador CCC-SLP (Speech and Language Pathologist)   
   
 BEDSIDE SWALLOW
 
 SLP Last Visit
 
 SLP Received On: 08/10/18
 Requires SLP Follow Up: Yes
 
 Recommendations
 Liquids Consistency Recommendations: NPO/No liquids, Sips of thin water ok between meals-wa
it 30 min, Ice chips for oral comfort, Other (comments) (sips thin liquid in btwn meals on; 
DRY tray)
 Diet Consistency Recommendation: Mechanical Soft, Other (comment) (2-3 swallows per bite/si
p, Jello and Ice cream okayd per MD)
 
 Recommendations: Dysphagia treatment, Set up with meals, Check on patients frequently throu
ght out meals
 Risk for Aspiration: Mild
 Compensatory Swallowing Strategies: Upright as possible for all oral intake, Remain upright
 for 30 minutes after meals, Swallow 2 times per bite/sip, Slow rate presentation, Small bit
es/sips, Eat/feed slowly, Effortful swallow, Other (Comment) (Strict oral hygiene, 2-3 swall
ows per bite/sip)
 
 Recommended Form of Meds: Meds floated in puree, Meds crushed in puree
 
 Summary: Pt and spouse seen for dysphagia follow-up treatment to ensure understanding of MB
SS results and recommendations. Provided and reviewed handouts explaining diet restrictions 
and consistencies. Pt/spouse demo'd understanding able to provide examples of "safe" foods. 
Pt requesting specific mixed consistencies (i.e.. Jello, Ice cream) understanding elevated r
isk of aspiration. ST paged MD to clear these specific items that are outside of current die
t recommendations. Recommend pt continue mechanical soft diet with dry tray and implementing
 swallow precautions (2-3 swallows per bite/sip, slow rate, smal bites/sips).Recommend thin 
liquids only outside of meals with implemented swallow precautions. Recommend meds crushed/f
loated in puree. ST to follow-up x 1 if indicated, if family/pt with additional questions. 
 
 Staff Notified: MD, RN
 
 Plan of Care
 Treatment Plan: ST to follow, Dysphagia treatment
 Treatment Frequency: Followup x 1
 Care Duration (Days): 2 Days
 Follow up treatments: Patient/Family education, Diet tolerance monitoring
 
 Swallowing Treatment: Yes
 
 Patient Assessment
 Respiratory Status: O2 via nasual cannula
 History of Intubation: No
 Behavior/Cognition: Alert, Cooperative
 Dentition: Adequate
 
 Goals are progressing unless otherwise indicated.
 
 Dysphagia Goals
 Long Term Goals: Safe/efficient oral intake
 Pt will have safe/efficient oral intake : Mechanical soft diet, With min cues, Goal progres
sing
 Short Term Goals: Tolerate liquid consistency
 Pt will tolerate tolerate liquid consistency : Thin liquids, With min supervision, Goal pro
gressing
 
 Education Completed 
 Education Topics: 
 Dysphagia: Explain results of session, speech-language pathology role, plan of care, most s
afe diet and swallow precautions
 
 Completed with: [x] Patient   [x] Spouse   [] Significant other   [] Family   [] Caregiver 
  [] Other
 Completed by:           [x] Verbal education    [] Demonstration   [x] Handout   [] Other:
 Response to Education: [x] Stated Understanding    [] Reinforcement necessary     [] Return
ed demonstration   [] Demonstrated understanding  [] No evidence of learning  [] Refused 
 Lexii Amador CCC-SLP   
  
  Electronically signed by Irvin Transaction, Provider at 2019  7:06 AM PDTConver
ruben Transaction, Provider Unknown - 08/10/2018  5:58 AM PDTFormatting of this note might be
 different from the original.
 Nurse Progress Note by Rainer Morfin RN at 08/10/18 0558  
  Author:  Rainer Morfin RN Service:  (none) Author Type:  Registered Nurse 
  Filed:  08/10/18 0559 Date of Service:  08/10/18 0558 Status:  Signed 
  :  Rainer Mofrin RN (Registered Nurse)   
   
 Pt arrived on unit from IR at  with FemoStop in place at 40 mmHg. Post op VSS and pt af
ebrile.  SpO2 maintained > 88% on 1-2L NC.  FemoStop removed, site remains soft and dry. 
 
 Pt ambulating in room, tolerating well. Clear liquids tolerated well, advanced to Full liqu
id for breakfast.
 
 No acute changes from previous assessment. Chart check completed by this RN. Rainer Morfin RN
  
  Electronically signed by Conversion Transaction, Provider at 2019  7:10 AM María Luo ra, MD - 08/10/2018  5:46 AM PDTFormatting of this note might be different from the
 original.
 Progress Notes by María Akins MD at 08/10/18 0546  
  Author:  María Akins MD Service:  Cardiology Author Type:  Physician 
  Filed:  08/10/18 0711 Date of Service:  08/10/18 0546 Status:  Signed 
  :  María Akins MD (Physician)   
   
 [facility]
 Service:  Cardiology
 Progress Note
 
 Name of Consultant: María Akins MD
 I have seen the patient on 8/10/2018. 
 Had C showed severe disease in LCX and occluded SVG to OM. 
 SUBJECTIVE
 Denies any chest pain
 
 Current Facility-Administered Medications: 
    acetaminophen (TYLENOL) tablet 650 mg, 650 mg, Oral, Q6H PRN **OR** acetaminophen (TYL
ENOL) suppository 650 mg, 650 mg, Rectal, Q6H PRN, Honey Donovan MD
    ampicillin-sulbactam (UNASYN) 3 g in sodium chloride (IV) 0.9 % 100 mL IVPB, 3 g, Intr
avenous, Q6H, Andrew Ryan MD, 3 g at 08/10/18 0410
    aspirin chewable tablet 81 mg, 81 mg, Oral, Daily with breakfast, Andrew Ryan MD, 8
1 mg at 18 8349
    atorvastatin (LIPITOR) tablet 40 mg, 40 mg, Oral, Nightly, Honey Donovan MD, Stop
ped at 18
    atropine sulfate injection 0.5 mg, 0.5 mg, Intravenous, Once PRN, María Akins MD
    azithromycin (ZITHROMAX) 500 mg in sodium chloride (IV) 0.9 % 250 mL IVPB, 500 mg, Int
ravenous, Q24H, Honey Donovan MD, 500 mg at 18 1229
    budesonide-formoterol (SYMBICORT) 80-4.5 MCG/ACT inhaler 2 puff, 2 puff, Inhalation, 2
 times daily, Honey Donovan MD, 2 puff at 18 2253
    digoxin (LANOXIN) tablet 0.125 mg, 0.125 mg, Oral, Daily, Honey Donovan MD, Stopp
ed at 18 224
    famotidine (PEPCID) tablet 40 mg, 40 mg, Oral, Daily, Honey Donovan MD, Stopped a
t 18
 
    fentaNYL (SUBLIMAZE) injection, , , Once PRN, María Akins MD, 50 mcg at 18
    furosemide (LASIX) tablet 20 mg, 20 mg, Oral, Daily, Honey Donovan MD, Stopped at
 18 224
    ipratropium-albuterol (DUO-NEB) 0.5-2.5 mg/3mL nebulizer solution 3 mL, 3 mL, Nebuliza
tion, Q6H, Honey Donovan MD, 3 mL at 08/10/18 0632
    levothyroxine (SYNTHROID) tablet 75 mcg, 75 mcg, Oral, QAM AC, Honey Donovan MD, 
75 mcg at 08/10/18 0543
    lidocaine 1 % injection, , , Once PRN, María Akins MD, 10 mL at 18 1912
    losartan (COZAAR) tablet 50 mg, 50 mg, Oral, Daily, Honey Donovan MD, Stopped at 
18 2248
    morphine (PF) injection 2 mg, 2 mg, Intravenous, Q4H PRN, Andrew Ryan MD, 2 mg at 0
18
    nitroGLYCERIN (NITRO-BID) 2 % ointment 0.5 inch, 0.5 inch, Topical, Q6H, Honey lou MD, 0.5 inch at 18 0613
    nitroGLYCERIN (NITROSTAT) SL tablet 0.4 mg, 0.4 mg, Sublingual, Q5 Min PRN, Honey morse MD
    polyethylene glycol (GLYCOLAX) packet 17 g, 17 g, Oral, Daily PRN, Honey Donovan MD
    sertraline (ZOLOFT) tablet 100 mg, 100 mg, Oral, Daily, Honey Donovan MD, Stopped
 at 18 2248
    sodium bicarbonate buffer (NEUT) injection, , , Once PRN, María Akins MD, 5 mL a
t 18 191
    sodium chloride (bolus) 0.9 % 500 mL, 500 mL, Intravenous, Once PRN, María Akins MD
    saline lock IV, , , Continuous **AND** sodium chloride (PF) 0.9 % flush 10 mL, 10 mL, 
Intravenous, Q8H, Honey Donovan MD, 10 mL at 08/10/18 0410
    tamsulosin (FLOMAX) capsule 0.4 mg, 0.4 mg, Oral, after dinner, Honey Donovan MD,
 0.4 mg at 18 2254
    zolpidem (AMBIEN) tablet 5 mg, 5 mg, Oral, Nightly PRN, María Akins MD
 
 OBJECTIVE
 Vital Signs:
 /56 (BP Location: Left upper arm)  | Pulse 68  | Temp 99.6 F (37.6 C) (Oral)  | R
scott 18  | Ht 1.727 m (5' 8")  | Wt 71.8 kg (158 lb 4.8 oz)  | SpO2 96%  | BMI 24.07 kg/m 
 
 Intake/Output Summary (Last 24 hours) at 08/10/18 0702
 Last data filed at 08/10/18 0544
  Gross per 24 hour 
 Intake            990.5 ml 
 Output              650 ml 
 Net            340.5 ml 
 
 Cardiovascular: Regular rhythm, S1 normal and S2 normal.  
 No murmur heard.
 Pulses:
      Radial pulses are 2+ on the right side, and 2+ on the left side. 
 Pulmonary/Chest: poor air exchange with mild rhonchi bilaterally. 
 Abdominal: Soft. No tenderness. 
 Musculoskeletal: No edema. 
 Neurological: Alert. No cranial nerve deficit. 
 Skin: Warm and dry. 
 
 DATA
 
 Recent Labs
 Lab 18
 0157 
  
 K 4.3 
 
 CO2 24 
 BUN 14 
 CREATININE 1.3 
 EGFR 54* 
 MG 1.8 
 
 Recent Labs
 Lab 18
 0157 
 WBC 11.05* 
 HGB 11.5* 
 HCT 35.3* 
 MCV 77.5* 
  
 
 Recent Labs
 Lab 18
 0551 18
 0157 18
 2212 
 TROPONINI 1.28* 1.72* 1.76* 
 TSH  --  4.150  --  
 
 Lab Results    
 Component  Value Date 
  CHOL 103 2018 
  TRIG 116 2018 
  LDL 56 2018 
  HDL 24 (L) 2018 
  GLUF 75 2018 
  TSH 4.150 2018 
 
 EK2018
 Reviewed by myself showed atrial fibrillation with RBBB. 
 Last Echo: 2018
 Normal LV size and function EF 55%. Inferior and inferolateral hypokinetic segments.
 04/15/2013
 Reported with normal LV size and function EF 56%. RV is normal in size and function. 
 Last Stress test: 2018
 Lexiscan Cardiolite stress test with evidence of ischemia inferior segment and small revers
ible defect as well as in the anterior segment.
 
 Last Cath: 2018
 LM mild disease, % occluded. LCX small caliber calcified with severe disease proxima
l and distal not amenable to PCI. % occluded.
 SVG to distal RCA patent, LIMA to LAD patent.
 SVG to LCX system is occluded at the insertion site. 
 Last US carotid:
 
 ASSESSMENT: 
 Patient is 72 y.o.with the following medical problems:
 
 1. Coronary artery disease, occluded SVG to LCX system, LCX is not amenable to PCI. 
 2. Non-ST elevation MI.
 3. Dysphagia.
 4. Hemoptysis minimal, improved.
 5. Chronic atrial fibrillation. On rate control strategy and anticoagulation. CHADSVASc sco
re of 4. 
 6. AAA.
 7. Coronary artery disease S/P CABG x 12991.
 
 8. RBBB.
 9. Hypertension blood pressures controlled.
 10. Chronic obstructive pulmonary disease.
 11. History of upper GI bleed due to peptic ulcer was off any antiplatelet therapy.
 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
 
 Recommendations:
 Patient tolerated procedure well. Given the recent cardiac event and the above coronary fin
dings, will start patient on Clopidogrel for 3-6 months.
 Stop aspirin for increased risk of bleed given the patient need to be restarted on Rivaroxa
ban.
 Continue to monitor H and H and any sign of increased hemoptysis. P
 Patient has been off Rivaroxaban for 4 days with no change in patient's hemoptysis.
 Continue with Losartan, statin.
 Not a candidate for BB. HR is controlled with atrial fibrillation.
 Will follow up as an outpatient.  
 
 Code Status:  Full Code
 
 María Akins MD
 8/10/2018 
  
  Electronically signed by María Akins MD at 08/10/2018  7:11 AM PDTConversion Transac
tion, Provider Unknown - 2018  8:30 PM PDTFormatting of this note might be different f
rom the original.
 Progress Notes by Mike Travis RPH at 18  
  Author:  Mike Travis RPH Service:  Pharmacy Author Type:  Pharmacist 
  Filed:  18 Date of Service:  18 Status:  Signed 
  :  Mike Travis RPH (Pharmacist)   
   
 Note ccl 49ml/min   meds reviewed   pharmacy will follow  [facility]  
  
  Electronically signed by Conversion Transaction, Provider at 2019  7:19 AM PDTMaría Damon ra, MD - 2018  6:11 PM PDTFormatting of this note might be different from the
 original.
 Progress Notes by María Akins MD at 18  
  Author:  María Akins MD Service:  Cardiology Author Type:  Physician 
  Filed:  18 Date of Service:  18 Status:  Signed 
  :  María Akins MD (Physician)   
   
 [facility]
 Service:  Cardiology
 Pre-Operative History & Physical
 Interval Update
 
 There have been no significant clinical changes since previous encounter.
 Moderate Sedation Presedation Assessment completed. 
 ASA Classification:  ASA 3: Patient with a severe systemic disease
 Mallampati Classification:  II: Visibility of hard and soft palate, upper portion of tonsil
s and uvula
 Angiogram, with its associated alternatives, benefits, and risks, the latter including but 
not limited to possible need for more than one vascular access site, death, MI, CVA, bleedin
g (including the need for blood transfusion), vascular damage (including the need for emerge
nt surgical repair, including PCI/stent placement or CABG), renal failure (including the pos
sible need for short or long-term use of hemodialysis), allergic reactions/anaphylaxis, and 
the risks of moderate anesthesia/sedation, were discussed in detail. 
 
 María Akins MD
 2018
 
 
 *CORE MEASURES REMINDER:  If the patient has a known or suspected infection prior to surger
y, please add diagnosis to the problem list (consider:  Infection 136.9).
 
  
  Electronically signed by María Akins MD at 2018  6:15 PM PDTConversion Transac
tion, Provider Unknown - 2018  5:40 PM PDTFormatting of this note might be different f
rom the original.
 Nurse Progress Note by Ashley Santa RN at 18  
  Author:  Ashley Santa RN Service:  (none) Author Type:  Registered Nurse 
  Filed:  18 Date of Service:  18 Status:  Addendum 
  :  Ashley Santa RN (Registered Nurse)   
  Related Notes:  Original Note by Ashley Santa RN (Registered Nurse) filed at 18 1
741   
   
 Patient NPO, aware of angiogram, all questions answered, new IV placed, femoral site preppe
d. Patient resting in bed comfortably. Femoral +2, radial +2, Pedal +1, popliteal +2. ASHLEY SANTA RN
  
  Electronically signed by Conversion Transaction, Provider at 2019  7:17 AM PDTConver
ruben Transaction, Provider Unknown - 2018  4:01 PM PDTFormatting of this note might be
 different from the original.
 Case Management by Mariposa Duarte RN at 18 1601  
  Author:  Mariposa Duarte RN Service:  (none) Author Type:  Registered Nurse 
  Filed:  18 1616 Date of Service:  18 1601 Status:  Signed 
  :  Mariposa Duarte, RN (Registered Nurse)   
   
 
  18 1500 
 Discharge Planning Evaluation  
 Admitting Diagnosis NSTEMI 
 Readmission No 
 Living Arrangements Spouse/significant other 
 Support Systems Spouse/significant other 
 Type of Residence Private residence 
 House type House-1 story 
 Steps to enter Other (comment)
 (zero) 
 Independent with ADL's Yes 
 Independent with Mobility Yes 
 Home Care Services No 
 Caregiver after Discharge No 
 Mental Status Oriented 
 Prior functional status independent from home with spouse, Pt has no DME, no Dialysis, no h
ome O2, does take Xarelto for Afib. Pt drives and uses no outside services at this time 
 Anticipated Discharge Plan  
 Post Acute Care Needs None at this time 
 Plan communicated to patient/family Yes 
 Resources  
 Financial concerns No 
 Transportation issues No 
 Patient/Family concerns No 
 Prescription Plan Yes 
 Previous home health equipment No 
 Vascular access device No 
 Ostomy/Drains/Appliances No 
 Anticipated Disposition  
 Facility Type Home 
 Met with Pt and Spouse and discussed discharge planning, Pt is a 72 y.o., male admitted for
 NSTEMI. Pt is independent at home with spouse, uses no DME, and per spouse prior to admissi
on had no needs. CM to follow for discharge needs pending clinical outcomes
 
 
 Patient's PCP is:Calvin Robertson
 
 Patient's insurance:Medicare/etaskrC
 Coverage concerns: no concerns
 Medication coverage/concerns:No concerns
 Community resources utilized / needed: none currently, pt recently finished respiratory the
[facility] outpatient that included PT
 
 Assistance in transportation: Spouse to transport
 
 Identification of any specific education / training: TBD Pending clinical outcomes
 
 Barriers to Discharge / Alternative housing needed: none
 
 Anticipated DCP: home with spouse
 
 MARIPOSA DUARTE RN
 
  
  Electronically signed by Conversion Transaction, Provider at 2019  7:22 AM Andrew Joseph MD - 2018  3:10 PM PDTFormatting of this note might be different from the or
iginal.
 Progress Notes by Andrew Ryan MD at 18 1510  
  Author:  Andrew Ryan MD Service:  Hospitalist Author Type:  Physician 
  Filed:  08/10/18 1252 Date of Service:  18 1510 Status:  Addendum 
  :  Andrew Ryan MD (Physician)   
  Related Notes:  Original Note by Andrew Ryan MD (Physician) filed at 08/10/18 1251   
   
   Hospitalist
 Progress Note
 
 Daryltien Shane   72 y.o.  [id number]
 [id number]/8108-1 male   [facility] Day:   LOS: 1 day 
 
      Patient Summary:   72-year-old gentleman with a past medical history of chronic atrial
 fibrillation on anticoagulation, history of vocal cord cancer status post treatment, corona
ry artery disease status post CABG in , history of tobacco abuse   disorder and COPD, hi
story of peptic ulcer disease and GI bleed, history of dysphagia on and off for the last 9 y
ears, who recently had abnormal cardiac stress test study who went  to [facility] 
with ongoing productive cough, shortness of breath and hemoptysis, where he was found to hav
e positive troponin and chest x-ray showed pneumonia and patient was transferred to [facility] for further workup and treatment.  The patient was not put on any anticoagulation se
condary to ongoing hemoptysis and Dr. Akins from cardiology consulted who recommended to 
get a CT chest and speech evaluation for ongoing dysphagia before cardiac workup.  The patie
nt's pneumonia is likely secondary to aspiration, has antibiotic changed to Unasyn.
 
 When I went to see the patient he was resting comfortably on the bedside complaining of pro
ductive cough, bringing up yellowish-greenish colored phlegm mixed with blood.  Otherwise de
nied any chest pain, no nausea, no vomiting, no constipation, no diarrhea, though he had bee
n having dysphagia, gotten worse in the last month like something is stuck to his throat whe
never he eats solid food and sometimes will regurgitate a small amount of food.
 
 Scheduled Medications   ampicillin-sulbactam  3 g Intravenous Q6H 
   atorvastatin  40 mg Oral Nightly 
   azithromycin  500 mg Intravenous Q24H 
   budesonide-formoterol  2 puff Inhalation 2 times daily 
   digoxin  0.125 mg Oral Daily 
   famotidine  40 mg Oral Daily 
 
   furosemide  20 mg Oral Daily 
   ipratropium-albuterol  3 mL Nebulization Q6H 
   levothyroxine  75 mcg Oral QAM AC 
   losartan  50 mg Oral Daily 
   nitroGLYCERIN  0.5 inch Topical Q6H 
   sertraline  100 mg Oral Daily 
   sodium chloride (PF)  10 mL Intravenous Q8H 
   tamsulosin  0.4 mg Oral after dinner 
 
 Continuous Infusions 
 
 PRN Medications
 acetaminophen **OR** acetaminophen, morphine, nitroGLYCERIN, polyethylene glycol, zolpidem
 
 Allergy:
 Allergies    
 Allergen   Reactions 
   Beta Adrenergic Blockers  Anaphylaxis 
   Diltiazem  Edema 
   Gabapentin  Other (See Comments) 
   CNS  
   Imipramine  Other (See Comments) 
   Urinary retention   
   Metoprolol  Swelling 
   Propranolol  Other (See Comments) 
   raynaud's   
 
 OBJECTIVE
 Vital Signs:
 /72 (BP Location: Right upper arm)  | Pulse 72  | Temp 98.6 F (37 C) (Oral)  | Re
sp 18  | Ht 1.727 m (5' 8")  | Wt 71.8 kg (158 lb 4.8 oz)  | SpO2 96%  | BMI 24.07 kg/m 
 
 Temp:  [97.6 F (36.4 C)-98.6 F (37 C)] 98.6 F (37 C) (1122)
 BP: (127-164)/(61-74) 131/72 (1122)
 Heart Rate:  [72-82] 72 (1122)
 Resp:  [16-20] 18 (1122)
 SpO2:  [93 %-98 %] 96 % (1122)
 Height:  [172.7 cm (5' 8")] 172.7 cm (5' 8") (2126)
 Weight:  [71.8 kg (158 lb 4.8 oz)] 71.8 kg (158 lb 4.8 oz) (2126)
 BMI (Calculated):  [24.1] 24.1 (2126)
 
 I&O Detailed Table:
  
 
 Intake/Output Summary (Last 24 hours) at 18 1511
 Last data filed at 18 0630
  Gross per 24 hour 
 Intake                0 ml 
 Output              875 ml 
 Net             -875 ml 
 
 Hemodynamics Last 24hrs:   
 
 Examination:
 
 Constitutional: Alert and oriented to person, place, and time. Appears in NAD 
 
 Cardiovascular: Normal rate, irregular irregular rhythm, normal heart sounds and intact dis
dash pulses.  Exam reveals no gallop and no friction rub.  No murmur heard.
 
 
 Pulmonary/Chest: Effort normal and breath sounds normal. No stridor. No respiratory distres
s.  no wheezes. no rales. exhibits no tenderness. Except decreased BS at bases 
 
 Abdominal: Soft. Bowel sounds are normal. exhibits no distension and no mass. There is no t
enderness. There is no rebound and no guarding. 
 
 Musculoskeletal: Normal range of motion.exhibits no tenderness.  exhibits no edema. 
   
 Neurological:  Alert and oriented to person, place, and time. Has normal reflexes.  display
s normal reflexes. No cranial nerve deficit.  Exhibits normal muscle tone. 
 
 Skin: Skin is warm and dry. No rash noted. No erythema. No pallor. 
 
 Psychiatric: Has a normal mood and affect. Behavior is normal. Judgment normal. 
 
 Laboratory:
 
 Glucose:
 Results  
  Procedure Component Value Units Date/Time 
  Sputum culture [[id number]]    Collected:  18 0400 
  Specimen:  Sputum from Sputum    Updated:  18 
  Troponin I [[id number]]  (Abnormal)    Collected:  18 
  Specimen:  Blood    Updated:  18 
   TROPONIN I 1.28 (HH) ng/mL  
  CBC W/Auto Diff (Reflex to Manual) [[id number]]  (Abnormal)    Collected:  18 
  Specimen:  Blood    Updated:  18 
   WBC 11.05 (H) K/uL  
   RBC 4.56 M/uL  
   HGB 11.5 (L) g/dL  
   HCT 35.3 (L) %  
   MCV 77.5 (L) fl  
   MCH 25.1 (L) pg  
   MCHC 32.4 g/dL  
   RDW SD 57.3 (H) fl  
    K/uL  
   MPV 10.1 fl  
   DIFF TYPE MANUAL   
   Neutrophils Manual 85 %  
   Lymphocytes Manual 8 %  
   Monocytes Manual 6 %  
   Eosinophils Manual 1 %  
   Neutrophils Absolute 9.40 (H) K/uL  
   Lymphocytes Absolute 0.88 (L) K/uL  
   Monocytes Absolute 0.66 K/uL  
   Eosinophils Absolute 0.11 K/uL  
   Platelet Estimate ADEQUATE   
   MORPHOLOGY 2+   
  Magnesium [[id number]]    Collected:  18 
  Specimen:  Blood    Updated:  18 
   MAGNESIUM 1.8 mg/dL  
  Phosphorus [[id number]]    Collected:  18 
  Specimen:  Blood    Updated:  18 
   PHOSPHORUS 3.4 mg/dL  
  TSH [[id number]]    Collected:  18 
  Specimen:  Blood    Updated:  18 
   TSH 4.150 uIU/mL  
  Basic metabolic panel [[id number]]  (Abnormal)    Collected:  18 
  Specimen:  Blood    Updated:  18 
   SODIUM 138 mmol/L  
 
   POTASSIUM 4.3 mmol/L  
   CHLORIDE 104 mmol/L  
   CO2 24 mmol/L  
   ANION GAP AGAP 14 mmol/L  
   GLUCOSE 75 mg/dL  
   BUN 14 mg/dL  
   CREATININE 1.3 mg/dL  
   BUN/CREAT 11   
   CALCIUM 8.6 mg/dL  
   EGFR 54 (L) mL/min/1.73m2  
  Lipid panel [[id number]]  (Abnormal)    Collected:  18 
  Specimen:  Blood    Updated:  18 0434 
   CHOLESTEROL 103 mg/dL  
   Triglycerides 116 mg/dL  
   HDL CHOL 24 (L) mg/dL  
   LDL CALC 56 mg/dL  
  Troponin I [[id number]]  (Abnormal)    Collected:  18 
  Specimen:  Blood    Updated:  18 025 
   TROPONIN I 1.72 (HH) ng/mL  
  Protime-INR [[id number]]    Collected:  18 
  Specimen:  Blood    Updated:  18 022 
   INR 1.2   
  Septic Lactic Acid [[id number]]    Collected:  18 
      Updated:  18 0023 
   LACTIC ACID 1.1 mmol/L  
  Troponin I [[id number]]  (Abnormal)    Collected:  18 
  Specimen:  Blood    Updated:  18 
   TROPONIN I 1.76 (HH) ng/mL  
  
 
 CBC:  Lab Results    
 Component  Value Date 
  WBC 11.05 (H) 2018 
  RBC 4.56 2018 
  HGB 11.5 (L) 2018 
  HCT 35.3 (L) 2018 
  MCV 77.5 (L) 2018 
  MCH 25.1 (L) 2018 
  MCHC 32.4 2018 
  RDW 57.3 (H) 2018 
   2018 
  MPV 10.1 2018 
  DIFFTYPE MANUAL 2018 
 
 CMP:  Lab Results    
 Component  Value Date 
   2018 
  K 4.3 2018 
   2018 
  CO2 24 2018 
  ANIONGAP 14 2018 
  GLUF 75 2018 
  BUN 14 2018 
  CREATININE 1.3 2018 
  BCR 11 2018 
  CA 8.6 2018 
  EGFR 54 (L) 2018 
 
 Magnesium:  
 Lab Results    
 
 Component  Value Date 
  MG 1.8 2018 
 
 Phosphorus:  
 Lab Results    
 Component  Value Date 
  PHOS 3.4 2018 
 
 PT/INR:  
 Lab Results    
 Component  Value Date 
  INR 1.2 2018 
 
 Last 3 Troponin:  
 Lab Results    
 Component  Value Date 
  TROPONINI 1.28 () 2018 
  TROPONINI 1.72 () 2018 
  TROPONINI 1.76 () 2018 
 
 ABG:  No results found for: POCPH, POCPCO, POCPO2, POCHCO, POCTCO2, POCBD, BEART, POCSO2, P
OCCMT
 
 Xr Chest 2 View
 
 Result Date: 2018
 HISTORY: Precordial chest pain. Question pneumonia. COMPARISON: 18. TECHNIQUE: PA and
 lateral films of the chest. FINDINGS: Median sternotomy. Heart size is upper normal. Pulmon
ramón venous congestion. Bilateral perihilar mixed interstitial and airspace infiltrates again
 noted, essentially unchanged. No effusions. Subtle thickening of the major and minor fissur
es. Mild degenerative changes of the spine. Osteopenia. 
 
 1.  Borderline cardiac enlargement, with probable pulmonary edema. Atypical pneumonitis see
ms less likely. Electronically signed by Yonatan Montague MD on 2018 11:34 AM
 
 X-ray Chest 2 View Frontal & Lateral
 
 Result Date: 2018
 This is a non-reportable procedure without a radiologist report and is used for image [employer] only
 
 Ct Head Without Contrast
 
 Result Date: 2018
 This is a non-reportable procedure without a radiologist report and is used for image [employer] only
 
 Ct Chest Without Contrast
 
 Result Date: 2018
 HISTORY: 72 years year-old Male, hemoptysis. TECHNIQUE: CT through the chest. Noncontrast e
xamination. Automatic dose adjustment to minimize patient exposure. PRIOR EXAMINATION: [facility]
er chest radiograph. FINDINGS:  demonstrates cardiomegaly, sternal wires and dense reti
cular interstitial lung disease throughout the left mid chest. Lung windows demonstrate bila
teral reticular and confluent infiltrates throughout mid lower lung fields. In the posterior
 medial left lung on image 81 series 3 in underlying mass would be difficult to exclude, but
 this is simply a larger multiple similar findings throughout both lungs. Inflammatory nodul
es and/or inflammatory masses are very plausibly superimposed In the mid central superior le
ft lung the lung disease is more cystic in appearance, asymmetric centrilobular emphysema/CO
PD and superimposed edema edema Bone windows demonstrate degenerative and postprocedural clare
 
nges, without acute appearing lesion or active fracture. The sternotomy is healed. Nonaggres
sive and degenerative changes not uncommon for age. What is seen of the upper abdomen demons
trates fatty liver. Splenomegaly. No adenopathy or fluid collection discernible. Large hiatu
s hernia. Within the chest dense coronary calcification, interventional changes, diffuse car
diac megaly and hilar vascular congestion symmetrically distributed in midlung fields. Elizabeth
es post CABG. 
 
 1. At the very least there is pulmonary vascular congestion, edema-and centrilobular emphys
ema is superimposed 2. Pulmonary infiltrates are asymmetric and throughout the left hemithor
ax. This could be due to superimposed infection of the left upper lung and there is a small 
effusion also 3. Lastly, there are inflammatory appearing airspace or infiltrative nodules s
uperimposed throughout Will be important to repeat this examination when the patient's acute
 issues are resolved to evaluate for underlying lesion/mass Electronically signed by Juan Francisco Conn MD on 2018 12:01 PM
 
 Echo Cardiac Adult Complete
 
 Result Date: 2018
 Patient Name:  DARYL SHANE YOB: 1945 Accession:  [id number] Performing Physici
an:   María Akins _______________________________________________________________ INDIC
ATIONS ----------- sob,h/o 4 cabg CONCLUSIONS ----------- 1. The left ventricle cavity is no
rmal in size, mild concentric hypertrophy and  normal systolic function EF 55%. Mild inferio
r and inferolateral hypokinetic segments. 2. Mild degenerative changes in the aortic and bhupinder
ral valves. 3. There is no pericardial effusion. FINDINGS -------- ECG rhythm: Atrial fibril
lation. Study: A 2-dimensional transthoracic echocardiogram with m-mode, spectral and color 
flow Doppler was perfomed. Study: This was a technically adequate study. Left Ventricle: Ove
rall left ventricular systolic function is low-normal with, an EF 55 %. Left Ventricle: The 
left ventricle cavity size is normal. Left Ventricle: There is mild concentric left ventricu
lar hypertrophy. Right Ventricle: The RV was not well visualized. Left Atrium: The left atri
um is mildly dilated. Right Atrium: The right atrial size is normal. Aortic Valve: The aorti
c valve is trileaflet. Aortic Valve: The aortic valve is mildly calcified. Aortic Valve: The
re is mild aortic regurgitation. Mitral Valve: There is trace mitral regurgitation. Mitral V
alve: Mild mitral annular calcification present. Tricuspid Valve: The tricuspid valve appear
s structurally normal. Tricuspid Valve: Trace tricuspid regurgitation present. Tricuspid Layla
ve: There is no evidence of pulmonary hypertension. Pulmonic Valve: The pulmonic valve is no
rmal. Pulmonic Valve: Mild pulmonic regurgitation. Pulmonic Valve: Mild degenerative changes
 in the aortic and mitral valves. Pericardium: There is no pericardial effusion. Pericardium
: No pleural effusion seen. IVC/Hepatic Veins: The inferior vena cava is normal in size and 
collapses > 50 % with sniff, indicating normal central venous pressures. MEASUREMENTS ------
------- Ao asc:   4.19 cm Ao sinus:   3.71 cm Ao st junct:   3.01 cm IVC:   1.42 cm LA Diam:
   5.50 cm LA Major:   5.97 cm EDV(Teich):   106.74 ml IVSd:   1.19 cm LVIDd:   4.78 cm LVPW
d:   1.21 cm LVOT Diam:   2.25 cm %FS:   26.84 % EF(Teich):   52.32 % ESV(Teich):   50.89 ml
 IVSs:   1.81 cm LVIDs:   3.50 cm LVPWs:   1.71 cm SV(Teich):   55.85 ml RA Major:   5.04 cm
 LVEF MOD A4C:   55.16 % SV MOD A4C:   49.58 ml LVEDV MOD A4C:   89.88 ml LVLd A4C:   7.50 c
m LVESV MOD A4C:   40.30 ml LVLs A4C:   5.87 cm LAESV(A-L):   76.74 ml LAESV Index (A-L):   
41.48 ml/m2 LAAs A2C:   17.05 cm2 LAESV A-L A2C:   48.56 ml LALs A2C:   5.08 cm LAAs A4C:   
26.95 cm2 LAESV A-L A4C:   95.07 ml LALs A4C:   6.48 cm Ao Diam:   4.03 cm AV Cusp:   1.91 c
m LA Diam:   4.92 cm LA/Ao:   1.22 D-E Excursion:   2.29 cm E-F Beltrami:   0.09 m/s AV maxPG: 
  10.18 mmHg AV meanP.51 mmHg AV Vmax:   1.59 m/s AV Vmean:   1.23 m/s AV VTI:   28.06
 cm KIRK Vmax:   3.26 cm2 KIRK (VTI):   3.35 cm2 AVAI Vmax:   0.00 cm2/m2 AVAI (VTI):   0.00 c
m2/m2 LVOT maxP.86 mmHg LVOT meanPG:   3.16 mmHg LVSI Dopp:   50.95 ml/m2 LVSV Dopp:  
 94.27 ml LVOT Vmax:   1.31 m/s LVOT Vmean:   0.84 m/s LVOT VTI:   23.69 cm MV E Luis:   0.93
 m/s E' Lat:   0.12 m/s E' Sept:   0.06 m/s PRend P.85 mmHg PRend Vmax:   1.48 m/s HR:
   77.27 BPM PV maxPG:   3.37 mmHg PV meanP.56 mmHg PV Vmax:   0.91 m/s PV Vmean:   0.
56 m/s PV VTI:   12.48 cm RV S':   0.08 m/s TR maxP.05 mmHg TR Vmax:   3.04 m/s TV A 
Luis:   0.00 m/s TV Dec Beltrami:   3.93 m/s2 TV Dec Time:   207.73 ms TV E Luis:   0.56 m/s TV E
/A Ratio:   69.55 Sonographer: PADMINI Authenticated by: María Akins Report Date/Time: 
018 13:7:33 
 
 1. The left ventricle cavity is normal in size, mild concentric hypertrophy and  normal sys
 
tolic function EF 55%. Mild inferior and inferolateral hypokinetic segments. 2. Mild degener
ative changes in the aortic and mitral valves. 3. There is no pericardial effusion.
 
 PROBLEM LIST
 
 Principal Problem:
   NSTEMI (non-ST elevated myocardial infarction) ([facility])
 Active Problems:
   Aortic aneurysm (HCC)
   ASCVD (arteriosclerotic cardiovascular disease)
   Chronic atrial fibrillation (HCC)
   Essential hypertension
   Precordial pain
   Hemoptysis
   Multifocal pneumonia
   Chronic bronchitis (HCC)
   Chronic maxillary sinusitis
   Obstructive sleep apnea
   Peptic ulcer disease
   Moderate protein-calorie malnutrition (HCC)
   Severe protein-calorie malnutrition (HCC)
   Dysphagia
   Aspiration pneumonia (HCC)
 
 ASSESSMENT & PLAN
 
 Non-ST elevation myocardial infarction.  Patient currently chest pain free and troponin is 
minimally elevated around 1.
 PLAN:
 Start the patient on baby aspirin 81 mg, atorvastatin, the patient is allergic to BETA BLOC
KER hence we will avoid it and avoid heparin drip as patient has mild hemoptysis which can g
et worse and case discussed with Dr. Akins, cardiologist, who is in agreement and will ke
ep the patient nothing by mouth after midnight for possible cardiac cath.
 2.  Aspiration pneumonia on Unasyn Day #1 and Speech consulted who recommended swallow vide
o  study.
 3.  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
o study today.
 4.  Hypertension well controlled on Losartan.
 5.  BPH stable on Flomax.
 6. Chronic atrial fibrillation.  Heart rate well controlled on Digoxin and Xarelto on hold 
in setting of hemoptysis.
 
 I spent more than 35 minutes.
 
 COPD stable on symbicort 
 
 ANDREW RYAN MD
 2018
 
  
  Electronically signed by Andrew Ryan MD at 08/10/2018 12:52 PM PDTConversion Transactio
n, Provider Unknown - 2018  3:05 PM PDTFormatting of this note might be different from
 the original.
 Progress Notes by Jessica Maciel RD at 18 1501  
  Author:  Jessica Maciel RD Service:  (none) Author Type:  Registered Dietitian 
  Filed:  18 6318 Date of Service:  18 0262 Status:  Signed 
  :  Jessica Maciel RD (Registered Dietitian)   
   
 
  18 1400 
 
 Subjective  
 Timepoint Admit 
 Pt c/o Pt triggered for screen secondary to wt loss, dysphagia. Admitted d/t NSTEMI, h/o vo
tamar cord cancer and dysphagia. Spoke with pt and wife.  
 Reported by Patient 
 Diet Experience  
 Self-selected diet(s) followed Reports following a lower-sodium diet at home, typically eat
s 2-3 meals per day. Avoids foods like rice, certain cuts of potato (fries, baked potato) an
d bread d/t dysphagia. Reports liking vegetables (brussel sprouts, asparagus, broccoli) meat
/chicken, ice cream, and donuts. Says he also drinks chocolate Ensure at home occassionally.
 Reports intake has declined the past month - estimate eating 60-75% of baseline intake. 
 Fluid / Beverage Intake  
 Oral Fluids Amount NPO  
 Liquid Meal Replacement or Supplement Will send Ensure Enlive daily - thickened as needed u
leo diet advancement.  
 Food Intake  
 Amount of Food Pt reports he is very hungry as he hasn't eaten in two days. Is currently NP
O prior to swallow study.  
 Type of Food / Meals NPO 
 Nutrition-Focused Physical Findings  
 Overall Appearance Reports he appears much thinner and clothes have not been fitting him as
 well.  
 Body Language Pt very pleasant. 
 Extremities, Muscles and Bones Bruising 
 Digestive System (Mouth to Rectum) SLP following.  
 Anthropometrics  
 Weight change Admit wt: 71.8 kg (158.25 lbs). Pt is 102% of IBW - BMI of 24. Pt reports wt 
is down from 176 lbs in 6 months indicating a severe 10% loss. Further wt loss to be avoided
.  
 Biochemical data, medical tests, and procedures reviewed  
 Biochemical data, medical tests, and procedures reviewed Labs reviewed.  
 Estimated Energy Needs  
 Total Energy Estimated Needs 8612-9334 kcal/day 
 Method for Estimating Needs 25-30 kcal/kg at 71.8 kg admit wt 
 Estimated Protein Needs  
 Total Protein Estimated Needs  g/day 
 Method for Estimating Needs 1.2-1.5 g/kg 
 Recommendations  
 Recommended energy needs ADAT to cardiac with textures/consistencies per SLP. Upon diet adv
ancement, encourage increased energy and protein intake. Ensure Enlive daily. Determine pt's
 food preferences and provide high-protein, nutrient-dense foods. Monitor and follow per pro
tocol.  
 Nutritional Risk  
 Nutritional risk High 
 Follow up date 18 
 Malnutrition Evaluation  
 Estimated energy intake time frame 1 Month 
 Estimated % energy intake last 1 month (!) 60 % 
 RD Assessed Weight 71.8 kg (158 lb 4.6 oz) 
 Weight Loss time frame 6 Months 
 Weight 6 months ago 79.8 kg (176 lb) 
 % weight loss from 6 months ago (!) -10.06 
 Malnutrition in the Context Of Chronic Illness 
 Clinical Characteristics indicative of moderate malnutrition less than 75% of EER within 1 
month;10% weight loss over 6 months;Mild muscle mass depletion 
 Protein-Calorie Malnutrition Type (!) Moderate 
 Jhoana Rock, RD 2018 
  
  Electronically signed by Conversion Transaction, Provider at 2019  7:21 AM PDTConver
ruben Transaction, Provider Unknown - 2018  1:54 PM PDTFormatting of this note might be
 
 different from the original.
 Nurse Progress Note by Adri Borden RN at 18 1354  
  Author:  Adri Borden RN Service:  (none) Author Type:  Registered Nurse 
  Filed:  18 1353 Date of Service:  18 135 Status:  Signed 
  :  Adri Borden RN (Registered Nurse)   
   
 Patient's spouse advised of patient's sensitive skin when removing tape products and would 
like caregivers to be aware. Adri Borden RN
 
  
  Electronically signed by Conversion Transaction, Provider at 2019  7:18 AM PDTConver
ruben Transaction, Provider Unknown - 2018 11:04 AM PDTFormatting of this note might be
 different from the original.
 Nurse Progress Note by Ashley Santa RN at 18 110  
  Author:  Ashley Santa RN Service:  (none) Author Type:  Registered Nurse 
  Filed:  18 1104 Date of Service:  18 110 Status:  Signed 
  :  Ashley Santa RN (Registered Nurse)   
   
 [facility] med list complete. ASHLEY SANTA RN
  
  Electronically signed by Conversion Transaction, Provider at 2019  7:16 AM PDTConver
ruben Transaction, Provider Unknown - 2018  4:47 AM PDTFormatting of this note might be
 different from the original.
 Nurse Progress Note by Freda Rosen RN at 18 7560  
  Author:  Freda Rosen RN Service:  (none) Author Type:  Registered Nurse 
  Filed:  18 0451 Date of Service:  18 647 Status:  Signed 
  :  Freda Rosen RN (Registered Nurse)   
   
 Pt. A&Ox4. VSS. Afebrile. Pt now on 2L satting 96%. Denies SOB or chest pain. 1mg morphine 
IV for 8/10 headache. Pt has been NPO since midnight. No acute changes from initial shift as
sessment. Chart review completed by this RN. Freda Rosen RN 
 
  
  Electronically signed by Conversion Transaction, Provider at 2019  7:15 AM PDTConver
ruben Transaction, Provider Unknown - 2018 11:32 PM PDTFormatting of this note might be
 different from the original.
 Nurse Progress Note by Freda Rosen RN at 18  
  Author:  Freda Rosen RN Service:  (none) Author Type:  Registered Nurse 
  Filed:  18 Date of Service:  18 Status:  Signed 
  :  Freda Rosen RN (Registered Nurse)   
   
 Pt arrived to unit via EMS . Pt on 3 liters Nasal cannula satting 92-95%. Denies SOB or
 chest pain. VSS. On tele. Md in to see pt. Pt in bed resting. No other needs at this time. 
Freda Rosen RN 
  
  Electronically signed by Conversion Transaction, Provider at 2019  7:26 AM PDTConver
ruben Transaction, Provider Unknown - 2018 10:07 PM PDTFormatting of this note might be
 different from the original.
 Progress Notes by Janiya Geller RPH at 18  
  Author:  Janiya Geller RPH Service:  Pharmacy Author Type:  Pharmacist 
  Filed:  18 Date of Service:  18 Status:  Signed 
  :  Janiya Geller RPH (Pharmacist)   
   
 Renal Dosing Monitoring:
 Daryl Shane 72 y.o. male
 Ht Readings from Last 1 Encounters:  
 18 1.727 m (5' 8") 
  
 Wt Readings from Last 1 Encounters:  
 18 71.8 kg (158 lb 4.8 oz) 
 
  
 Creatinine clearance cannot be calculated (No order found.)
 Pharmacy dosing for renal function per Dr. Donovan
 Medication(s): digoxin, famotidine, rivaroxaban
 Plan per protocol: 
 Medication / Dose: Unable to estimate renal function at this time as there is no value avai
lable for serum creatinine.   Pharmacy will continue monitoring patient for appropriate dosi
ng per renal function.
 
 Pharmacist: Janiya Geller
 2018 10:06 PM
 
  
  Electronically signed by Irvin Transaction, Provider at 2019  7:25 AM Bob reiched in this encounter
 
 Plan of Treatment
 
 
+--------+---------+-------------+----------------------+-------------+
| Date   | Type    | Specialty   | Care Team            | Description |
+--------+---------+-------------+----------------------+-------------+
| / | Office  | Pulmonology |   Juan F,          |             |
|    | Visit   |             | Jessica Cheung,   |             |
|        |         |             | MD [address] |             |
|        |         |             |   [place]   |             |
|        |         |             | [place] [address]             |             |
|        |         |             | [phone]         |             |
|        |         |             | [phone] (Fax)   |             |
+--------+---------+-------------+----------------------+-------------+
| / | Office  | Cardiology  |   María Akins, |             |
|    | Visit   |             |  MD [address]   |             |
|        |         |             | [place]  |             |
|        |         |             | [id number] [phone]  |             |
|        |         |             |  [phone] (Fax)  |             |
+--------+---------+-------------+----------------------+-------------+
 documented as of this encounter
 
 Procedures
 
 
+----------------------+--------+-------------+----------------------+----------------------
+
| Procedure Name       | Priori | Date/Time   | Associated Diagnosis | Comments             
|
|                      | ty     |             |                      |                      
|
+----------------------+--------+-------------+----------------------+----------------------
+
| XR CHEST 2 VIEWS     | Routin | 2018  |                      |   Results for this   
|
|                      | e      |  9:52 AM    |                      | procedure are in the 
|
|                      |        | PDT         |                      |  results section.    
|
+----------------------+--------+-------------+----------------------+----------------------
+
| EXTERNAL LAB: CBC    | Routin | 2018  |                      |   Results for this   
|
|                      | e      |  5:35 AM    |                      | procedure are in the 
 
|
|                      |        | PDT         |                      |  results section.    
|
+----------------------+--------+-------------+----------------------+----------------------
+
| COMPREHENSIVE        | Routin | 2018  |                      |   Results for this   
|
| METABOLIC PANEL      | e      |  5:35 AM    |                      | procedure are in the 
|
|                      |        | PDT         |                      |  results section.    
|
+----------------------+--------+-------------+----------------------+----------------------
+
| IRON AND IRON        | Routin | 08/10/2018  |                      |   Results for this   
|
| BINDING CAPACITY     | e      |  2:43 PM    |                      | procedure are in the 
|
|                      |        | PDT         |                      |  results section.    
|
+----------------------+--------+-------------+----------------------+----------------------
+
| VITAMIN B-12         | Routin | 08/10/2018  |                      |   Results for this   
|
|                      | e      |  2:43 PM    |                      | procedure are in the 
|
|                      |        | PDT         |                      |  results section.    
|
+----------------------+--------+-------------+----------------------+----------------------
+
| FOLATE               | Routin | 08/10/2018  |                      |   Results for this   
|
|                      | e      |  2:43 PM    |                      | procedure are in the 
|
|                      |        | PDT         |                      |  results section.    
|
+----------------------+--------+-------------+----------------------+----------------------
+
| FERRITIN             | Routin | 08/10/2018  |                      |   Results for this   
|
|                      | e      |  2:43 PM    |                      | procedure are in the 
|
|                      |        | PDT         |                      |  results section.    
|
+----------------------+--------+-------------+----------------------+----------------------
+
| EXTERNAL LAB: CBC    | Routin | 08/10/2018  |                      |   Results for this   
|
|                      | e      | 10:00 AM    |                      | procedure are in the 
|
|                      |        | PDT         |                      |  results section.    
|
+----------------------+--------+-------------+----------------------+----------------------
+
| COMPREHENSIVE        | Routin | 08/10/2018  |                      |   Results for this   
|
| METABOLIC PANEL      | e      | 10:00 AM    |                      | procedure are in the 
|
|                      |        | PDT         |                      |  results section.    
|
+----------------------+--------+-------------+----------------------+----------------------
 
+
| CV VASCULAR          | Routin | 2018  |                      |   Results for this   
|
| PROCEDURE            | e      |  8:12 PM    |                      | procedure are in the 
|
|                      |        | PDT         |                      |  results section.    
|
+----------------------+--------+-------------+----------------------+----------------------
+
| CV CARDIAC PROCEDURE | Routin | 2018  |                      |   Results for this   
|
|                      | e      |  8:09 PM    |                      | procedure are in the 
|
|                      |        | PDT         |                      |  results section.    
|
+----------------------+--------+-------------+----------------------+----------------------
+
| FL VIDEO SWALLOW W   | Routin | 2018  |                      |   Results for this   
|
| SPEECH               | e      |  4:50 PM    |                      | procedure are in the 
|
|                      |        | PDT         |                      |  results section.    
|
+----------------------+--------+-------------+----------------------+----------------------
+
| CT CHEST WO CONTRAST | Routin | 2018  |                      |   Results for this   
|
|                      | e      | 11:22 AM    |                      | procedure are in the 
|
|                      |        | PDT         |                      |  results section.    
|
+----------------------+--------+-------------+----------------------+----------------------
+
| XR CHEST 2 VIEWS     | Routin | 2018  |                      |   Results for this   
|
|                      | e      | 11:03 AM    |                      | procedure are in the 
|
|                      |        | PDT         |                      |  results section.    
|
+----------------------+--------+-------------+----------------------+----------------------
+
| ECHO COMPLETE        | Routin | 2018  |                      |   Results for this   
|
|                      | e      |  7:00 AM    |                      | procedure are in the 
|
|                      |        | PDT         |                      |  results section.    
|
+----------------------+--------+-------------+----------------------+----------------------
+
| TROPONIN I           | Routin | 2018  |                      |   Results for this   
|
|                      | e      |  5:51 AM    |                      | procedure are in the 
|
|                      |        | PDT         |                      |  results section.    
|
+----------------------+--------+-------------+----------------------+----------------------
+
| GRAM STAIN, REFLEX   | Timed  | 2018  |                      |   Results for this   
|
| SPUTUM CULTURE       |        |  4:00 AM    |                      | procedure are in the 
 
|
|                      |        | PDT         |                      |  results section.    
|
+----------------------+--------+-------------+----------------------+----------------------
+
| EXTERNAL LAB: CBC    | Routin | 2018  |                      |   Results for this   
|
|                      | e      |  1:57 AM    |                      | procedure are in the 
|
|                      |        | PDT         |                      |  results section.    
|
+----------------------+--------+-------------+----------------------+----------------------
+
| LIPID PANEL          | Routin | 2018  |                      |   Results for this   
|
|                      | e      |  1:57 AM    |                      | procedure are in the 
|
|                      |        | PDT         |                      |  results section.    
|
+----------------------+--------+-------------+----------------------+----------------------
+
| TROPONIN I           | Routin | 2018  |                      |   Results for this   
|
|                      | e      |  1:57 AM    |                      | procedure are in the 
|
|                      |        | PDT         |                      |  results section.    
|
+----------------------+--------+-------------+----------------------+----------------------
+
| PROTIME INR          | Routin | 2018  |                      |   Results for this   
|
|                      | e      |  1:57 AM    |                      | procedure are in the 
|
|                      |        | PDT         |                      |  results section.    
|
+----------------------+--------+-------------+----------------------+----------------------
+
| TSH                  | Routin | 2018  |                      |   Results for this   
|
|                      | e      |  1:57 AM    |                      | procedure are in the 
|
|                      |        | PDT         |                      |  results section.    
|
+----------------------+--------+-------------+----------------------+----------------------
+
| PHOSPHORUS           | Routin | 2018  |                      |   Results for this   
|
|                      | e      |  1:57 AM    |                      | procedure are in the 
|
|                      |        | PDT         |                      |  results section.    
|
+----------------------+--------+-------------+----------------------+----------------------
+
| MAGNESIUM            | Routin | 2018  |                      |   Results for this   
|
|                      | e      |  1:57 AM    |                      | procedure are in the 
|
|                      |        | PDT         |                      |  results section.    
|
+----------------------+--------+-------------+----------------------+----------------------
 
+
| BASIC METABOLIC      | Routin | 2018  |                      |   Results for this   
|
| PANEL                | e      |  1:57 AM    |                      | procedure are in the 
|
|                      |        | PDT         |                      |  results section.    
|
+----------------------+--------+-------------+----------------------+----------------------
+
| LACTIC ACID          | Routin | 2018  |                      |   Results for this   
|
|                      | e      | 11:50 PM    |                      | procedure are in the 
|
|                      |        | PDT         |                      |  results section.    
|
+----------------------+--------+-------------+----------------------+----------------------
+
| TROPONIN I           | Routin | 2018  |                      |   Results for this   
|
|                      | e      | 10:12 PM    |                      | procedure are in the 
|
|                      |        | PDT         |                      |  results section.    
|
+----------------------+--------+-------------+----------------------+----------------------
+
 documented in this encounter
 
 Results
 XR Chest 2 Vws (2018  9:52 AM PDT)
 
+----------+
| Specimen |
+----------+
|          |
+----------+
 
 
 
+-----------------------------------------------------------------------+--------------+
| Impressions                                                           | Performed At |
+-----------------------------------------------------------------------+--------------+
|   1.   There is improved aeration of the left upper lobe.  2.   Hazy  |              |
| opacities throughout the lung may reflect some residual acute         |              |
| pneumonitis.     Electronically signed by Luis Carlos Martin MD on     |              |
| 2018 10:02 AM                                                    |              |
+-----------------------------------------------------------------------+--------------+
 
 
 
+------------------------------------------------------------------------+--------------+
| Narrative                                                              | Performed At |
+------------------------------------------------------------------------+--------------+
|   DARYL WALLACE JAMES  1945  72 years Male  XR CHEST 2 VIEW FRONTAL AND  |              |
| LATERAL  2018 9:52 AM     INDICATION: Shortness of breath with    |              |
| chest pain     COMPARISON: 2018     TECHNIQUE: Two view chest, PA  |              |
| and lateral views     FINDINGS: Median sternotomy wires are            |              |
| maintained. The cardiac silhouette is borderline enlarged. There is no |              |
|  mediastinal widening or shift. Mild calcification of the aortic arch  |              |
| is present. There is improving aeration of the left upper lobe. Some   |              |
|  residual opacities remain along the lower lobes bilaterally. The      |              |
 
| pulmonary markings are normal in caliber. There is mild degenerative   |              |
| disc disease of the thoracic spine.                                    |              |
+------------------------------------------------------------------------+--------------+
 
 
 
+-------------------------------------------------------------------------------------------
--------------------------------------------------------------------------------------------
-----------------------------------------------------------------+
| Procedure Note                                                                            
                                                                                            
                                                                 |
+-------------------------------------------------------------------------------------------
--------------------------------------------------------------------------------------------
-----------------------------------------------------------------+
|   Maurizio, Rad Conversion - 2019  4:21 AM PDT  DARYL MADISON SHANE89 years MaleXR     
                                                                                            
                                                                 |
| CHEST 2 VIEW FRONTAL AND LATERAL2018 9:52 AM INDICATION: Shortness of breath with    
                                                                                            
                                                                 |
| chest pain COMPARISON: 2018 TECHNIQUE: Two view chest, PA and lateral views           
                                                                                            
                                                                 |
| FINDINGS: Median sternotomy wires are maintained. The cardiac silhouette is borderline    
                                                                                            
                                                                 |
| enlarged. There is no mediastinal widening or shift. Mild calcification of the aortic     
                                                                                            
                                                                 |
| arch is present. There is improving aeration of the left upper lobe. Some residual        
                                                                                            
                                                                 |
| opacities remain along the lower lobes bilaterally. The pulmonary markings are normal in  
                                                                                            
                                                                 |
|  caliber. There is mild degenerative disc disease of the thoracic spine. IMPRESSION: 1.   
                                                                                            
                                                                 |
|  There is improved aeration of the left upper lobe.2.  Hazy opacities throughout the      
                                                                                            
                                                                 |
| lung may reflect some residual acute pneumonitis. Electronically signed by Luis Carlos ALVES      
                                                                                            
                                                                 |
| MD Veronica on 2018 10:02 AM                                                          
                                                                                            
                                                                 |
|                                                                                           
                                                                                            
                                                                 |
|FINDINGS: Median sternotomy wires are maintained. The cardiac silhouette is borderline enla
rged. There is no mediastinal widening or shift. Mild calcification of the aortic arch is pr
esent. There is improving aeration of the left upper lobe. Some  |
|residual opacities remain along the lower lobes bilaterally. The pulmonary markings are nor
mal in caliber. There is mild degenerative disc disease of the thoracic spine.              
                                                                 |
|                                                                                           
                                                                                            
                                                                 |
 
|IMPRESSION:                                                                                
                                                                                            
                                                                 |
|1.  There is improved aeration of the left upper lobe.                                     
                                                                                            
                                                                 |
|2.  Hazy opacities throughout the lung may reflect some residual acute pneumonitis.        
                                                                                            
                                                                 |
|                                                                                           
                                                                                            
                                                                 |
|Electronically signed by Luis Carlos Martin MD on 2018 10:02 AM                        
                                                                                            
                                                                 |
+-------------------------------------------------------------------------------------------
--------------------------------------------------------------------------------------------
-----------------------------------------------------------------+
 External Lab: CBC (2018  5:35 AM PDT)
 
+-------------+------------------------------------------------------------------------+----
-------------+------------+--------------+
| Component   | Value                                                                  | Ref
 Range       | Performed  | Pathologist  |
|             |                                                                        |    
             | At         | Signature    |
+-------------+------------------------------------------------------------------------+----
-------------+------------+--------------+
| WBC         | 5.20                                                                   | 3.8
0 - 11.00    | EXTERNAL   |              |
|             |                                                                        | K/u
L            | LAB        |              |
+-------------+------------------------------------------------------------------------+----
-------------+------------+--------------+
| RED CELL    | 3.71 (L)                                                               | 4.2
0 - 5.70     | EXTERNAL   |              |
| COUNT       |                                                                        | M/u
L            | LAB        |              |
+-------------+------------------------------------------------------------------------+----
-------------+------------+--------------+
| Hgb         | 9.6 (L)                                                                | 13.
2 - 17.0     | EXTERNAL   |              |
|             |                                                                        | g/d
L            | LAB        |              |
+-------------+------------------------------------------------------------------------+----
-------------+------------+--------------+
| Hematocrit, | 28.4 (L)                                                               | 39.
0 - 50.0 %   | EXTERNAL   |              |
|  POC        |                                                                        |    
             | LAB        |              |
+-------------+------------------------------------------------------------------------+----
-------------+------------+--------------+
| MCV         | 76.7 (L)                                                               | 80.
0 - 100.0 fl | EXTERNAL   |              |
|             |                                                                        |    
             | LAB        |              |
+-------------+------------------------------------------------------------------------+----
-------------+------------+--------------+
| MCH         | 25.9 (L)                                                               | 27.
0 - 34.0 pg  | EXTERNAL   |              |
 
|             |                                                                        |    
             | LAB        |              |
+-------------+------------------------------------------------------------------------+----
-------------+------------+--------------+
| MCHC        | 33.8                                                                   | 32.
0 - 35.5     | EXTERNAL   |              |
|             |                                                                        | g/d
L            | LAB        |              |
+-------------+------------------------------------------------------------------------+----
-------------+------------+--------------+
| RDW-CV      | 57.3 (H)                                                               | 37 
- 53 fl      | EXTERNAL   |              |
|             |                                                                        |    
             | LAB        |              |
+-------------+------------------------------------------------------------------------+----
-------------+------------+--------------+
| Platelet    | 132 (L)                                                                | 150
 - 400 K/uL  | EXTERNAL   |              |
| Count       |                                                                        |    
             | LAB        |              |
| Plasma      |                                                                        |    
             |            |              |
+-------------+------------------------------------------------------------------------+----
-------------+------------+--------------+
| MPV         | 9.9                                                                    | fl 
             | EXTERNAL   |              |
|             |                                                                        |    
             | LAB        |              |
+-------------+------------------------------------------------------------------------+----
-------------+------------+--------------+
| Differentia | AUTOMATED                                                              |    
             | EXTERNAL   |              |
| l Type      |                                                                        |    
             | LAB        |              |
+-------------+------------------------------------------------------------------------+----
-------------+------------+--------------+
| % Segmented | 76.04                                                                  | %  
             | EXTERNAL   |              |
|             |                                                                        |    
             | LAB        |              |
| Neutrophils |                                                                        |    
             |            |              |
+-------------+------------------------------------------------------------------------+----
-------------+------------+--------------+
| %           | 11.53                                                                  | %  
             | EXTERNAL   |              |
| Lymphocytes |                                                                        |    
             | LAB        |              |
+-------------+------------------------------------------------------------------------+----
-------------+------------+--------------+
| % Monocytes | 6.09                                                                   | %  
             | EXTERNAL   |              |
|             |                                                                        |    
             | LAB        |              |
+-------------+------------------------------------------------------------------------+----
-------------+------------+--------------+
| %           | 5.65                                                                   | %  
             | EXTERNAL   |              |
| Eosinophils |                                                                        |    
             | LAB        |              |
 
+-------------+------------------------------------------------------------------------+----
-------------+------------+--------------+
| % Basophils | 0.69                                                                   | %  
             | EXTERNAL   |              |
|             |                                                                        |    
             | LAB        |              |
+-------------+------------------------------------------------------------------------+----
-------------+------------+--------------+
| Absolute    | 3.95                                                                   | 1.9
0 - 7.40     | EXTERNAL   |              |
| Segmented   |                                                                        | K/u
L            | LAB        |              |
| Neutrophils |                                                                        |    
             |            |              |
+-------------+------------------------------------------------------------------------+----
-------------+------------+--------------+
| Absolute    | 0.60 (L)                                                               | 1.0
0 - 3.90     | EXTERNAL   |              |
| Lymphocytes |                                                                        | K/u
L            | LAB        |              |
+-------------+------------------------------------------------------------------------+----
-------------+------------+--------------+
| Absolute    | 0.32                                                                   | 0.0
0 - 0.80     | EXTERNAL   |              |
| Monocytes   |                                                                        | K/u
L            | LAB        |              |
+-------------+------------------------------------------------------------------------+----
-------------+------------+--------------+
| Absolute    | 0.29                                                                   | 0.0
0 - 0.50     | EXTERNAL   |              |
| Eosinophils |                                                                        | K/u
L            | LAB        |              |
+-------------+------------------------------------------------------------------------+----
-------------+------------+--------------+
| Absolute    | 0.04                                                                   | 0.0
0 - 0.10     | EXTERNAL   |              |
| Basophils   |                                                                        | K/u
L            | LAB        |              |
+-------------+------------------------------------------------------------------------+----
-------------+------------+--------------+
| RBC         | 2+Comment:                                                             |    
             | EXTERNAL   |              |
| Morphology  | OVALO2+ANISO1+MICRONORMA                                               |    
             | LAB        |              |
|             | L PLT MORPHTesting                                                     |    
             |            |              |
|             | performed at [facility], [address]                                               |    
             |            |              |
|             |  [facility],                                                      |    
             |            |              |
|             | [place]   [address]                                                  |    
             |            |              |
|             |MICRO                                                                  |     
            |            |              |
|             |NORMAL PLT MORPH                                                        |    
             |            |              |
|             |Testing performed at [facility], [address], [place]   [address] |    
             |            |              |
 
+-------------+------------------------------------------------------------------------+----
-------------+------------+--------------+
 
 
 
+-----------------+
| Specimen        |
+-----------------+
| Blood specimen  |
| (specimen)      |
+-----------------+
 
 
 
+----------------+---------+--------------------+--------------+
| Performing     | Address | City/State/Zipcode | Phone Number |
| Organization   |         |                    |              |
+----------------+---------+--------------------+--------------+
|   EXTERNAL LAB |         |                    |              |
+----------------+---------+--------------------+--------------+
 Comprehensive Metabolic Panel (2018  5:35 AM PDT)
 
+-------------+--------------------------+-----------------+------------+--------------+
| Component   | Value                    | Ref Range       | Performed  | Pathologist  |
|             |                          |                 | At         | Signature    |
+-------------+--------------------------+-----------------+------------+--------------+
| Na          | 143                      | 135 - 145       | EXTERNAL   |              |
|             |                          | mmol/L          | LAB        |              |
+-------------+--------------------------+-----------------+------------+--------------+
| K           | 3.3 (L)                  | 3.5 - 4.9       | EXTERNAL   |              |
|             |                          | mmol/L          | LAB        |              |
+-------------+--------------------------+-----------------+------------+--------------+
| Cl          | 108                      | 99 - 109 mmol/L | EXTERNAL   |              |
|             |                          |                 | LAB        |              |
+-------------+--------------------------+-----------------+------------+--------------+
| CO2         | 26                       | 23 - 32 mmol/L  | EXTERNAL   |              |
|             |                          |                 | LAB        |              |
+-------------+--------------------------+-----------------+------------+--------------+
| Anion Gap   | 12                       | 5 - 20 mmol/L   | EXTERNAL   |              |
|             |                          |                 | LAB        |              |
+-------------+--------------------------+-----------------+------------+--------------+
| Glucose,    | 101 (H)                  | 65 - 99 mg/dL   | EXTERNAL   |              |
| Fasting     |                          |                 | LAB        |              |
+-------------+--------------------------+-----------------+------------+--------------+
| BUN         | 12                       | 8 - 25 mg/dL    | EXTERNAL   |              |
|             |                          |                 | LAB        |              |
+-------------+--------------------------+-----------------+------------+--------------+
| Creatinine  | 1.1                      | 0.70 - 1.30     | EXTERNAL   |              |
|             |                          | mg/dL           | LAB        |              |
+-------------+--------------------------+-----------------+------------+--------------+
| BUN/Creatin | 11                       |                 | EXTERNAL   |              |
| ine Ratio   |                          |                 | LAB        |              |
+-------------+--------------------------+-----------------+------------+--------------+
| Calcium     | 8.5                      | 8.5 - 10.5      | EXTERNAL   |              |
|             |                          | mg/dL           | LAB        |              |
+-------------+--------------------------+-----------------+------------+--------------+
| Protein,    | 5.5 (L)                  | 6.3 - 8.2 g/dL  | EXTERNAL   |              |
| Total       |                          |                 | LAB        |              |
+-------------+--------------------------+-----------------+------------+--------------+
| Albumin     | 2.5 (L)                  | 3.3 - 4.8 g/dL  | EXTERNAL   |              |
 
|             |                          |                 | LAB        |              |
+-------------+--------------------------+-----------------+------------+--------------+
| Globulin    | 3.0                      | 1.3 - 4.9 g/dL  | EXTERNAL   |              |
|             |                          |                 | LAB        |              |
+-------------+--------------------------+-----------------+------------+--------------+
| A/G Ratio   | 0.8 (L)                  | 1.0 - 2.4       | EXTERNAL   |              |
|             |                          |                 | LAB        |              |
+-------------+--------------------------+-----------------+------------+--------------+
| Bilirubin   | 0.7                      | 0.1 - 1.5 mg/dL | EXTERNAL   |              |
| Total       |                          |                 | LAB        |              |
+-------------+--------------------------+-----------------+------------+--------------+
| ALP,        | 99                       | 35 - 115 U/L    | EXTERNAL   |              |
| External    |                          |                 | LAB        |              |
+-------------+--------------------------+-----------------+------------+--------------+
| AST         | 16                       | 10 - 45 U/L     | EXTERNAL   |              |
|             |                          |                 | LAB        |              |
+-------------+--------------------------+-----------------+------------+--------------+
| ALT         | 14                       | 10 - 65 U/L     | EXTERNAL   |              |
|             |                          |                 | LAB        |              |
+-------------+--------------------------+-----------------+------------+--------------+
| Estimated   | >60Comment: GFR <60:     | mL/min/1.73m2   | EXTERNAL   |              |
| GFR         | CHRONIC KIDNEY DISEASE,  |                 | LAB        |              |
|             | IF FOUND OVER A 3 MONTH  |                 |            |              |
|             | PERIOD.GFR <15: KIDNEY   |                 |            |              |
|             | FAILURE.FOR       |                 |            |              |
|             | [employer], MULTIPLY THE  |                 |            |              |
|             | CALCULATED GFR BY        |                 |            |              |
|             | 1.210.This eGFR is       |                 |            |              |
|             | calculated using the     |                 |            |              |
|             | MDRD IDMS traceable      |                 |            |              |
|             | equation.Testing         |                 |            |              |
|             | performed at [facility], [address] |                 |            |              |
|             |  [address],        |                 |            |              |
|             | [place]   [address]    |                 |            |              |
+-------------+--------------------------+-----------------+------------+--------------+
 
 
 
+-----------------+
| Specimen        |
+-----------------+
| Blood specimen  |
| (specimen)      |
+-----------------+
 
 
 
+----------------+---------+--------------------+--------------+
| Performing     | Address | City/State/Zipcode | Phone Number |
| Organization   |         |                    |              |
+----------------+---------+--------------------+--------------+
|   EXTERNAL LAB |         |                    |              |
+----------------+---------+--------------------+--------------+
 Iron and Iron Binding Capacity (08/10/2018  2:43 PM PDT)
 
+------------+--------------------------+-----------------+------------+--------------+
| Component  | Value                    | Ref Range       | Performed  | Pathologist  |
|            |                          |                 | At         | Signature    |
+------------+--------------------------+-----------------+------------+--------------+
| Iron       | 27 (L)                   | 45 - 190 ug/dL  | EXTERNAL   |              |
 
|            |                          |                 | LAB        |              |
+------------+--------------------------+-----------------+------------+--------------+
| TIBC       | 229 (L)                  | 250 - 450 ug/dL | EXTERNAL   |              |
|            |                          |                 | LAB        |              |
+------------+--------------------------+-----------------+------------+--------------+
| Iron       | 12 (L)Comment: Testing   | 20 - 50 %       | EXTERNAL   |              |
| Saturation | performed at [facility], [address] |                 | LAB        |              |
|            |  [address],        |                 |            |              |
|            | [place]  [address]     |                 |            |              |
+------------+--------------------------+-----------------+------------+--------------+
 
 
 
+-----------------+
| Specimen        |
+-----------------+
| Blood specimen  |
| (specimen)      |
+-----------------+
 
 
 
+----------------+---------+--------------------+--------------+
| Performing     | Address | City/State/Zipcode | Phone Number |
| Organization   |         |                    |              |
+----------------+---------+--------------------+--------------+
|   EXTERNAL LAB |         |                    |              |
+----------------+---------+--------------------+--------------+
 Folate (08/10/2018  2:43 PM PDT)
 
+-----------+--------------------------+-----------+------------+--------------+
| Component | Value                    | Ref Range | Performed  | Pathologist  |
|           |                          |           | At         | Signature    |
+-----------+--------------------------+-----------+------------+--------------+
| Folate    | 18.6Comment: Testing     | ng/mL     | EXTERNAL   |              |
|           | performed at [facility], [address] |           | LAB        |              |
|           |  [address],        |           |            |              |
|           | [place]  [address]     |           |            |              |
+-----------+--------------------------+-----------+------------+--------------+
 
 
 
+-----------------+
| Specimen        |
+-----------------+
| Blood specimen  |
| (specimen)      |
+-----------------+
 
 
 
+----------------+---------+--------------------+--------------+
| Performing     | Address | City/State/Zipcode | Phone Number |
| Organization   |         |                    |              |
+----------------+---------+--------------------+--------------+
|   EXTERNAL LAB |         |                    |              |
+----------------+---------+--------------------+--------------+
 Ferritin (08/10/2018  2:43 PM PDT)
 
+------------+--------------------------+----------------+------------+--------------+
 
| Component  | Value                    | Ref Range      | Performed  | Pathologist  |
|            |                          |                | At         | Signature    |
+------------+--------------------------+----------------+------------+--------------+
| Ferritin,  | 156Comment: Testing      | 11 - 450 ng/mL | EXTERNAL   |              |
| External   | performed at [facility], [address] |                | LAB        |              |
|            |  [address],        |                |            |              |
|            | [place]  [address]     |                |            |              |
+------------+--------------------------+----------------+------------+--------------+
 
 
 
+-----------------+
| Specimen        |
+-----------------+
| Blood specimen  |
| (specimen)      |
+-----------------+
 
 
 
+----------------+---------+--------------------+--------------+
| Performing     | Address | City/State/Zipcode | Phone Number |
| Organization   |         |                    |              |
+----------------+---------+--------------------+--------------+
|   EXTERNAL LAB |         |                    |              |
+----------------+---------+--------------------+--------------+
 Vitamin B-12 (08/10/2018  2:43 PM PDT)
 
+-----------+--------------------------+--------------+------------+--------------+
| Component | Value                    | Ref Range    | Performed  | Pathologist  |
|           |                          |              | At         | Signature    |
+-----------+--------------------------+--------------+------------+--------------+
| VITAMIN   | 292Comment: Testing      | 254 - 1,320  | EXTERNAL   |              |
| B-12      | performed at [facility], [address] | pg/mL        | LAB        |              |
|           |  [address],        |              |            |              |
|           | [place]  [address]     |              |            |              |
+-----------+--------------------------+--------------+------------+--------------+
 
 
 
+-----------------+
| Specimen        |
+-----------------+
| Blood specimen  |
| (specimen)      |
+-----------------+
 
 
 
+----------------+---------+--------------------+--------------+
| Performing     | Address | City/State/Zipcode | Phone Number |
| Organization   |         |                    |              |
+----------------+---------+--------------------+--------------+
|   EXTERNAL LAB |         |                    |              |
+----------------+---------+--------------------+--------------+
 External Lab: CBC (08/10/2018 10:00 AM PDT)
 
+-------------+------------------------------------------------------------------------+----
-------------+------------+--------------+
| Component   | Value                                                                  | Ref
 
 Range       | Performed  | Pathologist  |
|             |                                                                        |    
             | At         | Signature    |
+-------------+------------------------------------------------------------------------+----
-------------+------------+--------------+
| WBC         | 7.51                                                                   | 3.8
0 - 11.00    | EXTERNAL   |              |
|             |                                                                        | K/u
L            | LAB        |              |
+-------------+------------------------------------------------------------------------+----
-------------+------------+--------------+
| RED CELL    | 3.63 (L)                                                               | 4.2
0 - 5.70     | EXTERNAL   |              |
| COUNT       |                                                                        | M/u
L            | LAB        |              |
+-------------+------------------------------------------------------------------------+----
-------------+------------+--------------+
| Hgb         | 9.4 (L)                                                                | 13.
2 - 17.0     | EXTERNAL   |              |
|             |                                                                        | g/d
L            | LAB        |              |
+-------------+------------------------------------------------------------------------+----
-------------+------------+--------------+
| Hematocrit, | 27.9 (L)                                                               | 39.
0 - 50.0 %   | EXTERNAL   |              |
|  POC        |                                                                        |    
             | LAB        |              |
+-------------+------------------------------------------------------------------------+----
-------------+------------+--------------+
| MCV         | 77.0 (L)                                                               | 80.
0 - 100.0 fl | EXTERNAL   |              |
|             |                                                                        |    
             | LAB        |              |
+-------------+------------------------------------------------------------------------+----
-------------+------------+--------------+
| MCH         | 26.1 (L)                                                               | 27.
0 - 34.0 pg  | EXTERNAL   |              |
|             |                                                                        |    
             | LAB        |              |
+-------------+------------------------------------------------------------------------+----
-------------+------------+--------------+
| MCHC        | 33.8                                                                   | 32.
0 - 35.5     | EXTERNAL   |              |
|             |                                                                        | g/d
L            | LAB        |              |
+-------------+------------------------------------------------------------------------+----
-------------+------------+--------------+
| RDW-CV      | 58.2 (H)                                                               | 37 
- 53 fl      | EXTERNAL   |              |
|             |                                                                        |    
             | LAB        |              |
+-------------+------------------------------------------------------------------------+----
-------------+------------+--------------+
| Platelet    | PLATELETS CLUMPED,                                                     | 150
 - 400 K/uL  | EXTERNAL   |              |
| Count       | APPEAR ADEQUATE                                                        |    
             | LAB        |              |
| Plasma      |                                                                        |    
             |            |              |
+-------------+------------------------------------------------------------------------+----
 
-------------+------------+--------------+
| Differentia | AUTOMATED                                                              |    
             | EXTERNAL   |              |
| l Type      |                                                                        |    
             | LAB        |              |
+-------------+------------------------------------------------------------------------+----
-------------+------------+--------------+
| % Segmented | 82.55                                                                  | %  
             | EXTERNAL   |              |
|             |                                                                        |    
             | LAB        |              |
| Neutrophils |                                                                        |    
             |            |              |
+-------------+------------------------------------------------------------------------+----
-------------+------------+--------------+
| %           | 8.04                                                                   | %  
             | EXTERNAL   |              |
| Lymphocytes |                                                                        |    
             | LAB        |              |
+-------------+------------------------------------------------------------------------+----
-------------+------------+--------------+
| % Monocytes | 5.49                                                                   | %  
             | EXTERNAL   |              |
|             |                                                                        |    
             | LAB        |              |
+-------------+------------------------------------------------------------------------+----
-------------+------------+--------------+
| %           | 3.36                                                                   | %  
             | EXTERNAL   |              |
| Eosinophils |                                                                        |    
             | LAB        |              |
+-------------+------------------------------------------------------------------------+----
-------------+------------+--------------+
| % Basophils | 0.56                                                                   | %  
             | EXTERNAL   |              |
|             |                                                                        |    
             | LAB        |              |
+-------------+------------------------------------------------------------------------+----
-------------+------------+--------------+
| Absolute    | 6.20                                                                   | 1.9
0 - 7.40     | EXTERNAL   |              |
| Segmented   |                                                                        | K/u
L            | LAB        |              |
| Neutrophils |                                                                        |    
             |            |              |
+-------------+------------------------------------------------------------------------+----
-------------+------------+--------------+
| Absolute    | 0.60 (L)                                                               | 1.0
0 - 3.90     | EXTERNAL   |              |
| Lymphocytes |                                                                        | K/u
L            | LAB        |              |
+-------------+------------------------------------------------------------------------+----
-------------+------------+--------------+
| Absolute    | 0.41                                                                   | 0.0
0 - 0.80     | EXTERNAL   |              |
| Monocytes   |                                                                        | K/u
L            | LAB        |              |
+-------------+------------------------------------------------------------------------+----
-------------+------------+--------------+
| Absolute    | 0.25                                                                   | 0.0
 
0 - 0.50     | EXTERNAL   |              |
| Eosinophils |                                                                        | K/u
L            | LAB        |              |
+-------------+------------------------------------------------------------------------+----
-------------+------------+--------------+
| Absolute    | 0.04                                                                   | 0.0
0 - 0.10     | EXTERNAL   |              |
| Basophils   |                                                                        | K/u
L            | LAB        |              |
+-------------+------------------------------------------------------------------------+----
-------------+------------+--------------+
| RBC         | 2+Comment:                                                             |    
             | EXTERNAL   |              |
| Morphology  | OVALO1+TEARDROP2+ANISONO                                               |    
             | LAB        |              |
|             | RMAL PLT MORPHTesting                                                  |    
             |            |              |
|             | performed at [facility], [facility] [address]                                               |    
             |            |              |
|             |  [facility],                                                      |    
             |            |              |
|             | [place]   [address]                                                  |    
             |            |              |
|             |ANISO                                                                  |     
            |            |              |
|             |NORMAL PLT MORPH                                                        |    
             |            |              |
|             |Testing performed at [facility], [facility] [place]   [address] |    
             |            |              |
+-------------+------------------------------------------------------------------------+----
-------------+------------+--------------+
 
 
 
+-----------------+
| Specimen        |
+-----------------+
| Blood specimen  |
| (specimen)      |
+-----------------+
 
 
 
+----------------+---------+--------------------+--------------+
| Performing     | Address | City/State/Zipcode | Phone Number |
| Organization   |         |                    |              |
+----------------+---------+--------------------+--------------+
|   EXTERNAL LAB |         |                    |              |
+----------------+---------+--------------------+--------------+
 Comprehensive Metabolic Panel (08/10/2018 10:00 AM PDT)
 
+-------------+--------------------------+-----------------+------------+--------------+
| Component   | Value                    | Ref Range       | Performed  | Pathologist  |
|             |                          |                 | At         | Signature    |
+-------------+--------------------------+-----------------+------------+--------------+
| Na          | 139                      | 135 - 145       | EXTERNAL   |              |
|             |                          | mmol/L          | LAB        |              |
+-------------+--------------------------+-----------------+------------+--------------+
 
| K           | 3.8                      | 3.5 - 4.9       | EXTERNAL   |              |
|             |                          | mmol/L          | LAB        |              |
+-------------+--------------------------+-----------------+------------+--------------+
| Cl          | 105                      | 99 - 109 mmol/L | EXTERNAL   |              |
|             |                          |                 | LAB        |              |
+-------------+--------------------------+-----------------+------------+--------------+
| CO2         | 24                       | 23 - 32 mmol/L  | EXTERNAL   |              |
|             |                          |                 | LAB        |              |
+-------------+--------------------------+-----------------+------------+--------------+
| Anion Gap   | 14                       | 5 - 20 mmol/L   | EXTERNAL   |              |
|             |                          |                 | LAB        |              |
+-------------+--------------------------+-----------------+------------+--------------+
| Glucose,    | 137 (H)                  | 65 - 99 mg/dL   | EXTERNAL   |              |
| Fasting     |                          |                 | LAB        |              |
+-------------+--------------------------+-----------------+------------+--------------+
| BUN         | 15                       | 8 - 25 mg/dL    | EXTERNAL   |              |
|             |                          |                 | LAB        |              |
+-------------+--------------------------+-----------------+------------+--------------+
| Creatinine  | 1.2                      | 0.70 - 1.30     | EXTERNAL   |              |
|             |                          | mg/dL           | LAB        |              |
+-------------+--------------------------+-----------------+------------+--------------+
| BUN/Creatin | 13                       |                 | EXTERNAL   |              |
| ine Ratio   |                          |                 | LAB        |              |
+-------------+--------------------------+-----------------+------------+--------------+
| Calcium     | 8.4 (L)                  | 8.5 - 10.5      | EXTERNAL   |              |
|             |                          | mg/dL           | LAB        |              |
+-------------+--------------------------+-----------------+------------+--------------+
| Protein,    | 5.5 (L)                  | 6.3 - 8.2 g/dL  | EXTERNAL   |              |
| Total       |                          |                 | LAB        |              |
+-------------+--------------------------+-----------------+------------+--------------+
| Albumin     | 2.4 (L)                  | 3.3 - 4.8 g/dL  | EXTERNAL   |              |
|             |                          |                 | LAB        |              |
+-------------+--------------------------+-----------------+------------+--------------+
| Globulin    | 3.1                      | 1.3 - 4.9 g/dL  | EXTERNAL   |              |
|             |                          |                 | LAB        |              |
+-------------+--------------------------+-----------------+------------+--------------+
| A/G Ratio   | 0.8 (L)                  | 1.0 - 2.4       | EXTERNAL   |              |
|             |                          |                 | LAB        |              |
+-------------+--------------------------+-----------------+------------+--------------+
| Bilirubin   | 0.8                      | 0.1 - 1.5 mg/dL | EXTERNAL   |              |
| Total       |                          |                 | LAB        |              |
+-------------+--------------------------+-----------------+------------+--------------+
| ALP,        | 87                       | 35 - 115 U/L    | EXTERNAL   |              |
| External    |                          |                 | LAB        |              |
+-------------+--------------------------+-----------------+------------+--------------+
| AST         | 20                       | 10 - 45 U/L     | EXTERNAL   |              |
|             |                          |                 | LAB        |              |
+-------------+--------------------------+-----------------+------------+--------------+
| ALT         | 16                       | 10 - 65 U/L     | EXTERNAL   |              |
|             |                          |                 | LAB        |              |
+-------------+--------------------------+-----------------+------------+--------------+
| Estimated   | 59 (L)Comment: GFR <60:  | mL/min/1.73m2   | EXTERNAL   |              |
| GFR         | CHRONIC KIDNEY DISEASE,  |                 | LAB        |              |
|             | IF FOUND OVER A 3 MONTH  |                 |            |              |
|             | PERIOD.GFR <15: KIDNEY   |                 |            |              |
|             | FAILURE.FOR       |                 |            |              |
|             | [employer], MULTIPLY THE  |                 |            |              |
|             | CALCULATED GFR BY        |                 |            |              |
|             | 1.210.This eGFR is       |                 |            |              |
|             | calculated using the     |                 |            |              |
 
|             | MDRD IDMS traceable      |                 |            |              |
|             | equation.Testing         |                 |            |              |
|             | performed at [facility], [address] |                 |            |              |
|             |  [facility],        |                 |            |              |
|             | [place]   [address]    |                 |            |              |
+-------------+--------------------------+-----------------+------------+--------------+
 
 
 
+-----------------+
| Specimen        |
+-----------------+
| Blood specimen  |
| (specimen)      |
+-----------------+
 
 
 
+----------------+---------+--------------------+--------------+
| Performing     | Address | City/State/Zipcode | Phone Number |
| Organization   |         |                    |              |
+----------------+---------+--------------------+--------------+
|   EXTERNAL LAB |         |                    |              |
+----------------+---------+--------------------+--------------+
 CV VASCULAR PROCEDURE (2018  8:12 PM PDT)
 
+----------+
| Specimen |
+----------+
|          |
+----------+
 
 
 
+------------------------------------------------------------------------+--------------+
| Narrative                                                              | Performed At |
+------------------------------------------------------------------------+--------------+
|   This Point of Care (POC) ultrasound image has been reviewed and      |              |
| interpreted by the physician identified as the performing physician in |              |
|  the  associated interpretation and report.                            |              |
+------------------------------------------------------------------------+--------------+
 
 
 
+---------------------------------------------------------------------------------------+
| Procedure Note                                                                        |
+---------------------------------------------------------------------------------------+
|   Maurizio Rad Conversion - 2019  3:41 PM PDT  This Point of Care (POC) ultrasound  |
| image has been reviewed andinterpreted by the physician identified as the performing  |
| physician in theassociated interpretation and report.                                 |
|                                                                                       |
+---------------------------------------------------------------------------------------+
 CV CARDIAC PROCEDURE (2018  8:09 PM PDT)
 
+----------+
| Specimen |
+----------+
|          |
+----------+
 
 
 
 
+------------------------------------------------------------------------+--------------+
| Narrative                                                              | Performed At |
+------------------------------------------------------------------------+--------------+
|   Accession:   [id number]                                                 |              |
| ____________________________________________________________________   |              |
|    YOB: 1945.     PROCEDURE:  1. Right femoral       |              |
| access with ultrasound guidance.  2. Left heart catheterization.  3.   |              |
| Coronary angiogram.  4. SVG angiogram to the RCA.  5. SVG angiogram to |              |
|  the left circumflex.  6. LIMA to LAD angiogram.     INDICATION:       |              |
|   This is a 72-year-old male who presented to the hospital  because of |              |
|  shortness of breath, pulmonary edema, and elevation of cardiac        |              |
| enzymes.   For further details, refer to my consultation note.   Given |              |
|  the  above findings, left heart catheterization was recommended.      |              |
|   The patient  had previous history of CABG times 4 in .           |              |
| PROCEDURE NOTE:   The patient was brought electively to the heart      |              |
| catheterization lab.   Consent was obtained after reviewing risks and  |              |
|  benefits.   Timeout was done at the bedside.   The patient was        |              |
| prepped in the  usual sterile manner, received IV fentanyl via         |              |
| independent observer.   Right  groin was anesthetized with 1 percent   |              |
| lidocaine.   Using ultrasound and a  micropuncture needle, right       |              |
| femoral access was obtained.   A 5-French sheath  was introduced       |              |
| without any difficulty.   A 0.035 wire was used and advanced  under    |              |
| fluoroscopic guidance into the ascending aorta.   A 5-French JL4       |              |
| catheter was used and advanced over the wire and placed selectively in |              |
|  the  left coronary cusp.   Wire was removed.   Catheter was flushed.  |              |
|   Selectively  engaged the left coronary system.   Contrast was        |              |
| injected.   Multiple views  were obtained.   Exchange over the wire    |              |
| was done with JR4 catheter that was  placed selectively in the right   |              |
| coronary cusp, selectively engaged the  right coronary artery, and     |              |
| multiple views were obtained.   Attempted to  engage the SVG to RCA    |              |
| and the obtuse marginal with the JR4 catheter;  however, was           |              |
| unsuccessful.   Then JR4 catheter was used to cannulate the  left      |              |
| subclavian artery and then a long 0.035 wire was used to exchange with |              |
|   a LIMA catheter that selectively engaged the LIMA graft and multiple |              |
|  views  were obtained.   Exchange over the wire was done with a        |              |
| 5-French  multipurpose catheter that selectively engaged the right SVG |              |
|  to RCA graft.  Multiple views were obtained.   Exchange over the wire |              |
|  was done with  5-French AL1 catheter that selectively engaged the SVG |              |
|  to left circumflex  system graft.   Multiple views were obtained.     |              |
|   Finally, the catheter was  removed over the wire.   Hemostasis was   |              |
| achieved by TR band.     CONTRAST USED:   100 mL.     BLOOD LOSS:      |              |
|   Less than 10.     COMPLICATIONS:   None.     HEMOSTASIS:   By manual |              |
|  pressure.     ANGIOGRAPHIC FINDINGS:  1. Left main is large caliber   |              |
| vessel with normal origin.   Bifurcates to LAD  and left circumflex    |              |
| with mild diffuse disease.  2. LAD is 100 percent proximally occluded. |              |
|   3. Left circumflex is a small to moderate vessel that is severely    |              |
| calcified, has multiple lesions and diffusely diseased.   Distally it  |              |
| is  very small and severely calcified.   Has 90 percent proximal and   |              |
| subtotal  occlusion distal.   However again smaller and severely       |              |
| calcified.   No  competitive flow was seen.  4. RCA has 100 percent    |              |
| ostial occlusion.  5. SVG to RCA is widely patent with mildly diffuse  |              |
| disease.  6. SVG to left circumflex system is diffusely diseased with  |              |
| severe  degenerative disease; however, occluded at the insertion site. |              |
|    The jump  graft of the 2nd obtuse marginal is occluded.   LIMA to   |              |
| LAD is widely  patent.     CONCLUSION:  1. Severe 3-vessel coronary    |              |
| artery disease.  2. Patent SVG to RCA and LIMA to LAD.  3. Occluded    |              |
| SVG to the left circumflex system, which is a jump graft.  4. Severely |              |
|  calcified left circumflex, which is small caliber severely calcified. |              |
 
|   However, not amenable to any PCI.     RECOMMENDATIONS:   Given the   |              |
| above findings, the patient will continue  medical therapy for         |              |
| coronary artery disease.   Will be restarted on  antiplatelet therapy  |              |
| and will be on optimization of blood pressure control,  statin         |              |
| therapy, and will be re-started on anticoagulation for atrial          |              |
| fibrillation.   Further recommendations to follow.     Read by MARÍA Sinha              |
Ernestine AKINS MD 2018 07:56 P     Electronically signed by María Akins MD on 8/10/2018 6:24 AM                                       |              |
+------------------------------------------------------------------------+--------------+
 
 
 
+-----------------------------------------------------------------------------------+
| Procedure Note                                                                    |
+-----------------------------------------------------------------------------------+
|   Andrzej Steven Conversion - 2019  3:41 PM PDT  Accession:  [id number]              |
| ____________________________________________________________________              |
|                                                                                   |
| YOB: 1945.                                                       |
|                                                                                   |
| PROCEDURE:                                                                        |
| 1. Right femoral access with ultrasound guidance.                                 |
| 2. Left heart catheterization.                                                    |
| 3. Coronary angiogram.                                                            |
| 4. SVG angiogram to the RCA.                                                      |
| 5. SVG angiogram to the left circumflex.                                          |
| 6. LIMA to LAD angiogram.                                                         |
|                                                                                   |
| INDICATION:  This is a 72-year-old male who presented to the hospital             |
| because of shortness of breath, pulmonary edema, and elevation of cardiac         |
| enzymes.  For further details, refer to my consultation note.  Given the          |
| above findings, left heart catheterization was recommended.  The patient          |
| had previous history of CABG times 4 in .                                     |
|                                                                                   |
| PROCEDURE NOTE:  The patient was brought electively to the heart                  |
| catheterization lab.  Consent was obtained after reviewing risks and              |
| benefits.  Timeout was done at the bedside.  The patient was prepped in the       |
| usual sterile manner, received IV fentanyl via independent observer.  Right       |
| groin was anesthetized with 1 percent lidocaine.  Using ultrasound and a          |
| micropuncture needle, right femoral access was obtained.  A 5-French sheath       |
| was introduced without any difficulty.  A 0.035 wire was used and advanced        |
| under fluoroscopic guidance into the ascending aorta.  A 5-French JL4             |
| catheter was used and advanced over the wire and placed selectively in the        |
| left coronary cusp.  Wire was removed.  Catheter was flushed.  Selectively        |
| engaged the left coronary system.  Contrast was injected.  Multiple views         |
| were obtained.  Exchange over the wire was done with JR4 catheter that was        |
| placed selectively in the right coronary cusp, selectively engaged the            |
| right coronary artery, and multiple views were obtained.  Attempted to            |
| engage the SVG to RCA and the obtuse marginal with the JR4 catheter;              |
| however, was unsuccessful.  Then JR4 catheter was used to cannulate the           |
| left subclavian artery and then a long 0.035 wire was used to exchange with       |
| a LIMA catheter that selectively engaged the LIMA graft and multiple views        |
| were obtained.  Exchange over the wire was done with a 5-French                   |
| multipurpose catheter that selectively engaged the right SVG to RCA graft.        |
| Multiple views were obtained.  Exchange over the wire was done with               |
| 5-French AL1 catheter that selectively engaged the SVG to left circumflex         |
| system graft.  Multiple views were obtained.  Finally, the catheter was           |
| removed over the wire.  Hemostasis was achieved by TR band.                       |
|                                                                                   |
| CONTRAST USED:  100 mL.                                                           |
 
|                                                                                   |
| BLOOD LOSS:  Less than 10.                                                        |
|                                                                                   |
| COMPLICATIONS:  None.                                                             |
|                                                                                   |
| HEMOSTASIS:  By manual pressure.                                                  |
|                                                                                   |
| ANGIOGRAPHIC FINDINGS:                                                            |
| 1. Left main is large caliber vessel with normal origin.  Bifurcates to LAD       |
| and left circumflex with mild diffuse disease.                                    |
| 2. LAD is 100 percent proximally occluded.                                        |
| 3. Left circumflex is a small to moderate vessel that is severely                 |
| calcified, has multiple lesions and diffusely diseased.  Distally it is           |
| very small and severely calcified.  Has 90 percent proximal and subtotal          |
| occlusion distal.  However again smaller and severely calcified.  No              |
| competitive flow was seen.                                                        |
| 4. RCA has 100 percent ostial occlusion.                                          |
| 5. SVG to RCA is widely patent with mildly diffuse disease.                       |
| 6. SVG to left circumflex system is diffusely diseased with severe                |
| degenerative disease; however, occluded at the insertion site.  The jump          |
| graft of the 2nd obtuse marginal is occluded.  LIMA to LAD is widely              |
| patent.                                                                           |
|                                                                                   |
| CONCLUSION:                                                                       |
| 1. Severe 3-vessel coronary artery disease.                                       |
| 2. Patent SVG to RCA and LIMA to LAD.                                             |
| 3. Occluded SVG to the left circumflex system, which is a jump graft.             |
| 4. Severely calcified left circumflex, which is small caliber severely calcified. |
| However, not amenable to any PCI.                                                 |
|                                                                                   |
| RECOMMENDATIONS:  Given the above findings, the patient will continue             |
| medical therapy for coronary artery disease.  Will be restarted on                |
| antiplatelet therapy and will be on optimization of blood pressure control,       |
| statin therapy, and will be re-started on anticoagulation for atrial              |
| fibrillation.  Further recommendations to follow.                                 |
|                                                                                   |
| Read by MARÍA AKINS MD 2018 07:56 P                                   |
|                                                                                   |
| Electronically signed by María Akins MD on 8/10/2018 6:24 AM                 |
+-----------------------------------------------------------------------------------+
 FL Video Swallow w Speech (2018  4:50 PM PDT)
 
+----------+
| Specimen |
+----------+
|          |
+----------+
 
 
 
+------------------------------------------------------------------------+--------------+
| Narrative                                                              | Performed At |
+------------------------------------------------------------------------+--------------+
|   This is a non-reportable procedure without a radiologist report and  |              |
| is  used for image storage only                                        |              |
+------------------------------------------------------------------------+--------------+
 
 
 
+--------------------------------------------------------------------------------------+
 
| Procedure Note                                                                       |
+--------------------------------------------------------------------------------------+
|   Andrzej Steven Conversion - 2019  4:21 AM PDT  This is a non-reportable procedure  |
| without a radiologist report and isused for image storage only                       |
+--------------------------------------------------------------------------------------+
 CT Chest wo Contrast (2018 11:22 AM PDT)
 
+----------+
| Specimen |
+----------+
|          |
+----------+
 
 
 
+------------------------------------------------------------------------+--------------+
| Impressions                                                            | Performed At |
+------------------------------------------------------------------------+--------------+
|      1. At the very least there is pulmonary vascular congestion,      |              |
| edema-and centrilobular emphysema is superimposed     2. Pulmonary     |              |
| infiltrates are asymmetric and throughout the left hemithorax. This    |              |
| could be due to superimposed infection of the left upper lung and      |              |
| there is a small effusion also     3. Lastly, there are inflammatory   |              |
| appearing airspace or infiltrative nodules superimposed throughout     |              |
|  Will be important to repeat this examination when the patient's acute |              |
|  issues are resolved to evaluate for underlying lesion/mass            |              |
| Electronically signed by Juan Francisco Conn MD on 2018 12:01 PM    |              |
+------------------------------------------------------------------------+--------------+
 
 
 
+------------------------------------------------------------------------+--------------+
| Narrative                                                              | Performed At |
+------------------------------------------------------------------------+--------------+
|   HISTORY: 72 years year-old Male, hemoptysis.     TECHNIQUE: CT       |              |
| through the chest. Noncontrast examination. Automatic dose adjustment  |              |
| to minimize patient exposure.     PRIOR EXAMINATION: Earlier chest     |              |
| radiograph.     FINDINGS:      demonstrates cardiomegaly, sternal |              |
|  wires and dense reticular interstitial lung disease throughout the    |              |
| left mid chest.     Lung windows demonstrate bilateral reticular and   |              |
| confluent infiltrates throughout mid lower lung fields. In the         |              |
| posterior medial left lung on image 81 series 3 in underlying mass     |              |
| would be difficult to exclude, but this is simply a larger multiple    |              |
| similar   findings throughout both lungs. Inflammatory nodules and/or  |              |
| inflammatory masses are very plausibly superimposed     In the mid     |              |
| central superior left lung the lung disease is more cystic in          |              |
| appearance, asymmetric centrilobular emphysema/COPD and superimposed   |              |
| edema edema     Bone windows demonstrate degenerative and              |              |
| postprocedural changes, without acute appearing lesion or active       |              |
| fracture. The sternotomy is healed. Nonaggressive and degenerative     |              |
| changes not uncommon for age.     What is seen of the upper abdomen    |              |
| demonstrates fatty liver. Splenomegaly. No adenopathy or fluid         |              |
| collection discernible. Large hiatus hernia.     Within the chest      |              |
| dense coronary calcification, interventional changes, diffuse cardiac  |              |
| megaly and hilar vascular congestion symmetrically distributed in      |              |
| midlung fields. Changes post CABG.                                     |              |
+------------------------------------------------------------------------+--------------+
 
 
 
 
+-------------------------------------------------------------------------------------------
---------------------------------------------------------------------------------------+
| Procedure Note                                                                            
                                                                                       |
+-------------------------------------------------------------------------------------------
---------------------------------------------------------------------------------------+
|   Maurizio, Rad Conversion - 2019  4:21 AM PDT  HISTORY: 72 years year-old Male,         
                                                                                       |
| hemoptysis. TECHNIQUE: CT through the chest. Noncontrast examination. Automatic dose      
                                                                                       |
| adjustment to minimize patient exposure. PRIOR EXAMINATION: Earlier chest radiograph.     
                                                                                       |
| FINDINGS:  demonstrates cardiomegaly, sternal wires and dense reticular              
                                                                                       |
| interstitial lung disease throughout the left mid chest. Lung windows demonstrate         
                                                                                       |
| bilateral reticular and confluent infiltrates throughout mid lower lung fields. In the    
                                                                                       |
| posterior medial left lung on image 81 series 3 in underlying mass would be difficult to  
                                                                                       |
|  exclude, but this is simply a larger multiple similar findings throughout both lungs.    
                                                                                       |
| Inflammatory nodules and/or inflammatory masses are very plausibly superimposed In the    
                                                                                       |
| mid central superior left lung the lung disease is more cystic in appearance, asymmetric  
                                                                                       |
|  centrilobular emphysema/COPD and superimposed edema edema Bone windows demonstrate       
                                                                                       |
| degenerative and postprocedural changes, without acute appearing lesion or active         
                                                                                       |
| fracture. The sternotomy is healed. Nonaggressive and degenerative changes not uncommon   
                                                                                       |
| for age. What is seen of the upper abdomen demonstrates fatty liver. Splenomegaly. No     
                                                                                       |
| adenopathy or fluid collection discernible. Large hiatus hernia. Within the chest dense   
                                                                                       |
| coronary calcification, interventional changes, diffuse cardiac megaly and hilar          
                                                                                       |
| vascular congestion symmetrically distributed in midlung fields. Changes post CABG.       
                                                                                       |
| IMPRESSION:  1. At the very least there is pulmonary vascular congestion, edema-and       
                                                                                       |
| centrilobular emphysema is superimposed 2. Pulmonary infiltrates are asymmetric and       
                                                                                       |
| throughout the left hemithorax. This could be due to superimposed infection of the left   
                                                                                       |
| upper lung and there is a small effusion also 3. Lastly, there are inflammatory           
                                                                                       |
| appearing airspace or infiltrative nodules superimposed throughout Will be important to   
                                                                                       |
| repeat this examination when the patient's acute issues are resolved to evaluate for      
                                                                                       |
| underlying lesion/mass Electronically signed by Juan Francisco Conn MD on 2018 12:01   
                                                                                       |
| PM                                                                                        
                                                                                       |
|2. Pulmonary infiltrates are asymmetric and throughout the left hemithorax. This could be d
ue to superimposed infection of the left upper lung and there is a small effusion also |
 
|                                                                                           
                                                                                       |
|3. Lastly, there are inflammatory appearing airspace or infiltrative nodules superimposed t
hroughout                                                                              |
|                                                                                           
                                                                                       |
|Will be important to repeat this examination when the patient's acute issues are resolved t
o evaluate for underlying lesion/mass                                                  |
|                                                                                           
                                                                                       |
|Electronically signed by Juan Francisco Conn MD on 2018 12:01 PM                        
                                                                                       |
+-------------------------------------------------------------------------------------------
---------------------------------------------------------------------------------------+
 XR Chest 2 Vws (2018 11:03 AM PDT)
 
+----------+
| Specimen |
+----------+
|          |
+----------+
 
 
 
+------------------------------------------------------------------------+--------------+
| Impressions                                                            | Performed At |
+------------------------------------------------------------------------+--------------+
|   1.   Borderline cardiac enlargement, with probable pulmonary edema.  |              |
| Atypical pneumonitis seems less likely.     Electronically signed by   |              |
| Yonatan Montague MD on 2018 11:34 AM                                 |              |
+------------------------------------------------------------------------+--------------+
 
 
 
+------------------------------------------------------------------------+--------------+
| Narrative                                                              | Performed At |
+------------------------------------------------------------------------+--------------+
|   HISTORY:  Precordial chest pain. Question pneumonia.     COMPARISON: |              |
|   18.     TECHNIQUE:  PA and lateral films of the chest.         |              |
| FINDINGS:  Median sternotomy. Heart size is upper normal. Pulmonary    |              |
| venous congestion. Bilateral perihilar mixed interstitial and airspace |              |
|  infiltrates again noted, essentially unchanged. No effusions. Subtle  |              |
| thickening of the major and minor fissures. Mild   degenerative        |              |
| changes of the spine. Osteopenia.                                      |              |
+------------------------------------------------------------------------+--------------+
 
 
 
+-------------------------------------------------------------------------------------------
--------------------------------------------------------------------------------------------
----------------------------------------------------------------+
| Procedure Note                                                                            
                                                                                            
                                                                |
+-------------------------------------------------------------------------------------------
--------------------------------------------------------------------------------------------
----------------------------------------------------------------+
|   Maurizio, Rad Conversion - 2019  4:21 AM PDT  HISTORY:Precordial chest pain. Question  
                                                                                            
                                                                |
 
|  pneumonia. COMPARISON:18. TECHNIQUE:PA and lateral films of the chest.             
                                                                                            
                                                                |
| FINDINGS:Median sternotomy. Heart size is upper normal. Pulmonary venous congestion.      
                                                                                            
                                                                |
| Bilateral perihilar mixed interstitial and airspace infiltrates again noted, essentially  
                                                                                            
                                                                |
|  unchanged. No effusions. Subtle thickening of the major and minor fissures. Mild         
                                                                                            
                                                                |
| degenerative changes of the spine. Osteopenia. IMPRESSION: 1.  Borderline cardiac         
                                                                                            
                                                                |
| enlargement, with probable pulmonary edema. Atypical pneumonitis seems less likely.       
                                                                                            
                                                                |
| Electronically signed by Yonatan Montague MD on 2018 11:34 AM                           
                                                                                            
                                                                |
|                                                                                           
                                                                                            
                                                                |
|FINDINGS:                                                                                  
                                                                                            
                                                               |
|Median sternotomy. Heart size is upper normal. Pulmonary venous congestion. Bilateral perih
ilar mixed interstitial and airspace infiltrates again noted, essentially unchanged. No effu
sions. Subtle thickening of the major and minor fissures. Mild  |
|degenerative changes of the spine. Osteopenia.                                             
                                                                                            
                                                                |
|                                                                                           
                                                                                            
                                                                |
|IMPRESSION:                                                                                
                                                                                            
                                                                |
|1.  Borderline cardiac enlargement, with probable pulmonary edema. Atypical pneumonitis see
ms less likely.                                                                             
                                                                |
|                                                                                           
                                                                                            
                                                                |
|Electronically signed by Yonatan Montague MD on 2018 11:34 AM                            
                                                                                            
                                                                |
+-------------------------------------------------------------------------------------------
--------------------------------------------------------------------------------------------
----------------------------------------------------------------+
 ECHO Complete (2018  7:00 AM PDT)
 
+----------+
| Specimen |
+----------+
|          |
+----------+
 
 
 
 
+-----------------------------------------------------------------------+--------------+
| Impressions                                                           | Performed At |
+-----------------------------------------------------------------------+--------------+
|   1. The left ventricle cavity is normal in size, mild concentric     |              |
| hypertrophy and   normal systolic function EF 55%. Mild inferior and  |              |
| inferolateral hypokinetic segments.  2. Mild degenerative changes in  |              |
| the aortic and mitral valves.  3. There is no pericardial effusion.   |              |
+-----------------------------------------------------------------------+--------------+
 
 
 
+------------------------------------------------------------------------+--------------+
| Narrative                                                              | Performed At |
+------------------------------------------------------------------------+--------------+
|      Patient Name:   DARYL SHANE  YOB: 1945           |              |
| Accession:   [id number]     Performing Physician:    María Akins     |              |
| _______________________________________________________________        |              |
| INDICATIONS  -----------  sob,h/o 4 cabg     CONCLUSIONS  -----------  |              |
|  1. The left ventricle cavity is normal in size, mild concentric       |              |
| hypertrophy and   normal systolic function EF 55%. Mild inferior and   |              |
| inferolateral hypokinetic segments.  2. Mild degenerative changes in   |              |
| the aortic and mitral valves.  3. There is no pericardial effusion.    |              |
|   FINDINGS  --------  ECG rhythm: Atrial fibrillation.  Study: A       |              |
| 2-dimensional transthoracic echocardiogram with m-mode, spectral and   |              |
| color flow Doppler was perfomed.  Study: This was a technically        |              |
| adequate study.  Left Ventricle: Overall left ventricular systolic     |              |
| function is low-normal with, an EF 55 %.  Left Ventricle: The left     |              |
| ventricle cavity size is normal.  Left Ventricle: There is mild        |              |
| concentric left ventricular hypertrophy.  Right Ventricle: The RV was  |              |
| not well visualized.  Left Atrium: The left atrium is mildly dilated.  |              |
|  Right Atrium: The right atrial size is normal.  Aortic Valve: The     |              |
| aortic valve is trileaflet.  Aortic Valve: The aortic valve is mildly  |              |
| calcified.  Aortic Valve: There is mild aortic regurgitation.  Mitral  |              |
| Valve: There is trace mitral regurgitation.  Mitral Valve: Mild mitral |              |
|  annular calcification present.  Tricuspid Valve: The tricuspid valve  |              |
| appears structurally normal.  Tricuspid Valve: Trace tricuspid         |              |
| regurgitation present.  Tricuspid Valve: There is no evidence of       |              |
| pulmonary hypertension.  Pulmonic Valve: The pulmonic valve is normal. |              |
|   Pulmonic Valve: Mild pulmonic regurgitation.  Pulmonic Valve: Mild   |              |
| degenerative changes in the aortic and mitral valves.  Pericardium:    |              |
| There is no pericardial effusion.  Pericardium: No pleural effusion    |              |
| seen.  IVC/Hepatic Veins: The inferior vena cava is normal in size and |              |
|  collapses > 50 % with sniff, indicating normal central venous         |              |
| pressures.     MEASUREMENTS  -------------  Ao asc:    4.19 cm  Ao     |              |
| sinus:    3.71 cm  Ao st junct:    3.01 cm  IVC:    1.42 cm  LA Diam:  |              |
|    5.50 cm  LA Major:    5.97 cm  EDV(Teich):    106.74 ml  IVSd:      |              |
| 1.19 cm  LVIDd:    4.78 cm  LVPWd:    1.21 cm  LVOT Diam:    2.25 cm   |              |
| %FS:    26.84 %  EF(Teich):    52.32 %  ESV(Teich):    50.89 ml  IVSs: |              |
|     1.81 cm  LVIDs:    3.50 cm  LVPWs:    1.71 cm  SV(Teich):    55.85 |              |
|  ml  RA Major:    5.04 cm  LVEF MOD A4C:    55.16 %  SV MOD A4C:       |              |
| 49.58 ml  LVEDV MOD A4C:    89.88 ml  LVLd A4C:    7.50 cm  LVESV MOD  |              |
| A4C:    40.30 ml  LVLs A4C:    5.87 cm  LAESV(A-L):    76.74 ml  LAESV |              |
|  Index (A-L):    41.48 ml/m2  LAAs A2C:    17.05 cm2  LAESV A-L A2C:   |              |
|    48.56 ml  LALs A2C:    5.08 cm  LAAs A4C:    26.95 cm2  LAESV A-L   |              |
| A4C:    95.07 ml  LALs A4C:    6.48 cm  Ao Diam:    4.03 cm  AV Cusp:  |              |
|    1.91 cm  LA Diam:    4.92 cm  LA/Ao:    1.22  D-E Excursion:        |              |
| 2.29 cm  E-F Beltrami:    0.09 m/s  AV maxPG:    10.18 mmHg  AV meanPG:   |              |
|    6.51 mmHg  AV Vmax:    1.59 m/s  AV Vmean:    1.23 m/s  AV VTI:     |              |
| 28.06 cm  KIRK Vmax:    3.26 cm2  KIRK (VTI):    3.35 cm2  AVAI Vmax:    |              |
 
|  0.00 cm2/m2  AVAI (VTI):    0.00 cm2/m2  LVOT maxP.86 mmHg     |              |
| LVOT meanPG:    3.16 mmHg  LVSI Dopp:    50.95 ml/m2  LVSV Dopp:       |              |
| 94.27 ml  LVOT Vmax:    1.31 m/s  LVOT Vmean:    0.84 m/s  LVOT VTI:   |              |
|    23.69 cm  MV E Luis:    0.93 m/s  E' Lat:    0.12 m/s  E' Sept:      |              |
| 0.06 m/s  PRend P.85 mmHg  PRend Vmax:    1.48 m/s  HR:         |              |
| 77.27 BPM  PV maxPG:    3.37 mmHg  PV meanP.56 mmHg  PV Vmax:   |              |
|    0.91 m/s  PV Vmean:    0.56 m/s  PV VTI:    12.48 cm  RV S':        |              |
| 0.08 m/s  TR maxP.05 mmHg  TR Vmax:    3.04 m/s  TV A Luis:     |              |
| 0.00 m/s  TV Dec Beltrami:    3.93 m/s2  TV Dec Time:    207.73 ms  TV E  |              |
| Luis:    0.56 m/s  TV E/A Ratio:    69.55     Sonographer: CM           |              |
| Authenticated by: María DíazCoatesvilleghanshyam  Report Date/Time: 2018 13:7:33 |              |
|                                                                        |              |
+------------------------------------------------------------------------+--------------+
 
 
 
+-------------------------------------------------------------------------------------------
-----------------------------------------------------------------------------+
| Procedure Note                                                                            
                                                                             |
+-------------------------------------------------------------------------------------------
-----------------------------------------------------------------------------+
|   Andrzej Steven Conversion - 2019  4:21 AM PDT   Patient Name:  Cherise SHANE of        
                                                                             |
| Birth:  1945 Accession:  [id number] Performing Physician:   María                    
                                                                             |
| BreCoatesvilleghanshyam_______________________________________________________________INDICATIONS------  
                                                                             |
| -----sob,h/o 4 cabg CONCLUSIONS-----------1. The left ventricle cavity is normal in       
                                                                             |
| size, mild concentric hypertrophy and  normal systolic function EF 55%. Mild inferior     
                                                                             |
| and inferolateral hypokinetic segments.2. Mild degenerative changes in the aortic and     
                                                                             |
| mitral valves.3. There is no pericardial effusion. FINDINGS--------ECG rhythm: Atrial     
                                                                             |
| fibrillation.Study: A 2-dimensional transthoracic echocardiogram with m-mode, spectral    
                                                                             |
| and color flow Doppler was perfomed.Study: This was a technically adequate study.Left     
                                                                             |
| Ventricle: Overall left ventricular systolic function is low-normal with, an EF 55        
                                                                             |
| %.Left Ventricle: The left ventricle cavity size is normal.Left Ventricle: There is mild  
                                                                             |
|  concentric left ventricular hypertrophy.Right Ventricle: The RV was not well             
                                                                             |
| visualized.Left Atrium: The left atrium is mildly dilated.Right Atrium: The right atrial  
                                                                             |
|  size is normal.Aortic Valve: The aortic valve is trileaflet.Aortic Valve: The aortic     
                                                                             |
| valve is mildly calcified.Aortic Valve: There is mild aortic regurgitation.Mitral Valve:  
                                                                             |
|  There is trace mitral regurgitation.Mitral Valve: Mild mitral annular calcification      
                                                                             |
| present.Tricuspid Valve: The tricuspid valve appears structurally normal.Tricuspid        
                                                                             |
| Valve: Trace tricuspid regurgitation present.Tricuspid Valve: There is no evidence of     
                                                                             |
| pulmonary hypertension.Pulmonic Valve: The pulmonic valve is normal.Pulmonic Valve: Mild  
                                                                             |
 
|  pulmonic regurgitation.Pulmonic Valve: Mild degenerative changes in the aortic and       
                                                                             |
| mitral valves.Pericardium: There is no pericardial effusion.Pericardium: No pleural       
                                                                             |
| effusion seen.IVC/Hepatic Veins: The inferior vena cava is normal in size and collapses   
                                                                             |
| > 50 % with sniff, indicating normal central venous pressures.                            
                                                                             |
| MEASUREMENTS-------------Ao asc:   4.19 cmAo sinus:   3.71 cmAo st junct:   3.01 cmIVC:   
                                                                             |
|   1.42 cmLA Diam:   5.50 cmLA Major:   5.97 cmEDV(Teich):   106.74 mlIVSd:   1.19         
                                                                             |
| cmLVIDd:   4.78 cmLVPWd:   1.21 cmLVOT Diam:   2.25 cm%FS:   26.84 %EF(Teich):   52.32    
                                                                             |
| %ESV(Teich):   50.89 mlIVSs:   1.81 cmLVIDs:   3.50 cmLVPWs:   1.71 cmSV(Teich):   55.85  
                                                                             |
|  mlRA Major:   5.04 cmLVEF MOD A4C:   55.16 %SV MOD A4C:   49.58 mlLVEDV MOD A4C:         
                                                                             |
| 89.88 mlLVLd A4C:   7.50 cmLVESV MOD A4C:   40.30 mlLVLs A4C:   5.87 cmLAESV(A-L):        
                                                                             |
| 76.74 mlLAESV Index (A-L):   41.48 ml/m2LAAs A2C:   17.05 [id number] A-L A2C:   48.56       
                                                                             |
| mlLALs A2C:   5.08 cmLAAs A4C:   26.95 [id number] A-L A4C:   95.07 mlLALs A4C:   6.48 cmAo  
                                                                             |
|  Diam:   4.03 cmAV Cusp:   1.91 cmLA Diam:   4.92 cmLA/Ao:   1.22D-E Excursion:   2.29    
                                                                             |
| cmE-F Beltrami:   0.09 m/Miryam maxPG:   10.18 mmHgAV meanP.51 mmHgAV Vmax:   1.59 m/Miryam  
                                                                             |
|  Vmean:   1.23 m/Miryam VTI:   28.06 cmAVA Vmax:   3.26 cm2AVA (VTI):   3.35 [id number] Vmax:   
                                                                             |
|   0.00 cm2/m2AVAI (VTI):   0.00 cm2/m2LVOT maxP.86 mmHgLVOT meanPG:   3.16          
                                                                             |
| mmHgLVSI Dopp:   50.95 ml/m2LVSV Dopp:   94.27 mlLVOT Vmax:   1.31 m/sLVOT Vmean:   0.84  
                                                                             |
|  m/sLVOT VTI:   23.69 cmMV E Luis:   0.93 m/sE' Lat:   0.12 m/sE' Sept:   0.06 m/sPRend    
                                                                             |
| P.85 mmHgPRend Vmax:   1.48 m/sHR:   77.27 BPMPV maxPG:   3.37 mmHgPV meanPG:       
                                                                             |
| 1.56 mmHgPV Vmax:   0.91 m/sPV Vmean:   0.56 m/sPV VTI:   12.48 cmRV S':   0.08 m/sTR     
                                                                             |
| maxP.05 mmHgTR Vmax:   3.04 m/sTV A Luis:   0.00 m/sTV Dec Beltrami:   3.93 m/s2TV     
                                                                             |
| Dec Time:   207.73 msTV E Luis:   0.56 m/sTV E/A Ratio:   69.55 Sonographer:               
                                                                             |
| CMAuthenticated by: Jose MiguelSaint Anne's Hospitalort Date/Time: 2018 13:7:33 IMPRESSION: 1.     
                                                                             |
| The left ventricle cavity is normal in size, mild concentric hypertrophy and  normal      
                                                                             |
| systolic function EF 55%. Mild inferior and inferolateral hypokinetic segments.2. Mild    
                                                                             |
| degenerative changes in the aortic and mitral valves.3. There is no pericardial           
                                                                             |
| effusion.                                                                                 
                                                                             |
|Ao asc:   4.19 cm                                                                          
                                                                             |
|Ao sinus:   3.71 cm                                                                        
                                                                             |
|Ao st junct:   3.01 cm                                                                     
                                                                             |
 
|IVC:   1.42 cm                                                                             
                                                                             |
|LA Diam:   5.50 cm                                                                         
                                                                             |
|LA Major:   5.97 cm                                                                        
                                                                             |
|EDV(Teich):   106.74 ml                                                                    
                                                                             |
|IVSd:   1.19 cm                                                                            
                                                                             |
|LVIDd:   4.78 cm                                                                           
                                                                             |
|LVPWd:   1.21 cm                                                                           
                                                                             |
|LVOT Diam:   2.25 cm                                                                       
                                                                             |
|%FS:   26.84 %                                                                             
                                                                             |
|EF(Teich):   52.32 %                                                                       
                                                                             |
|ESV(Teich):   50.89 ml                                                                     
                                                                             |
|IVSs:   1.81 cm                                                                            
                                                                             |
|LVIDs:   3.50 cm                                                                           
                                                                             |
|LVPWs:   1.71 cm                                                                           
                                                                             |
|SV(Teich):   55.85 ml                                                                      
                                                                             |
|RA Major:   5.04 cm                                                                        
                                                                             |
|LVEF MOD A4C:   55.16 %                                                                    
                                                                             |
|SV MOD A4C:   49.58 ml                                                                     
                                                                             |
|LVEDV MOD A4C:   89.88 ml                                                                  
                                                                             |
|LVLd A4C:   7.50 cm                                                                        
                                                                             |
|LVESV MOD A4C:   40.30 ml                                                                  
                                                                             |
|LVLs A4C:   5.87 cm                                                                        
                                                                             |
|LAESV(A-L):   76.74 ml                                                                     
                                                                             |
|LAESV Index (A-L):   41.48 ml/m2                                                           
                                                                             |
|LAAs A2C:   17.05 cm2                                                                      
                                                                             |
|LAESV A-L A2C:   48.56 ml                                                                  
                                                                             |
|LALs A2C:   5.08 cm                                                                        
                                                                             |
|LAAs A4C:   26.95 cm2                                                                      
                                                                             |
|LAESV A-L A4C:   95.07 ml                                                                  
                                                                             |
|LALs A4C:   6.48 cm                                                                        
                                                                             |
 
|Ao Diam:   4.03 cm                                                                         
                                                                             |
|AV Cusp:   1.91 cm                                                                         
                                                                             |
|LA Diam:   4.92 cm                                                                         
                                                                             |
|LA/Ao:   1.22                                                                              
                                                                             |
|D-E Excursion:   2.29 cm                                                                   
                                                                             |
|E-F Beltrami:   0.09 m/s                                                                      
                                                                             |
|AV maxPG:   10.18 mmHg                                                                     
                                                                             |
|AV meanP.51 mmHg                                                                     
                                                                             |
|AV Vmax:   1.59 m/s                                                                        
                                                                             |
|AV Vmean:   1.23 m/s                                                                       
                                                                             |
|AV VTI:   28.06 cm                                                                         
                                                                             |
|KIRK Vmax:   3.26 cm2                                                                       
                                                                             |
|KIRK (VTI):   3.35 cm2                                                                      
                                                                             |
|AVAI Vmax:   0.00 cm2/m2                                                                   
                                                                             |
|AVAI (VTI):   0.00 cm2/m2                                                                  
                                                                             |
|LVOT maxP.86 mmHg                                                                    
                                                                             |
|LVOT meanPG:   3.16 mmHg                                                                   
                                                                             |
|LVSI Dopp:   50.95 ml/m2                                                                   
                                                                             |
|LVSV Dopp:   94.27 ml                                                                      
                                                                             |
|LVOT Vmax:   1.31 m/s                                                                      
                                                                             |
|LVOT Vmean:   0.84 m/s                                                                     
                                                                             |
|LVOT VTI:   23.69 cm                                                                       
                                                                             |
|MV E Luis:   0.93 m/s                                                                       
                                                                             |
|E' Lat:   0.12 m/s                                                                         
                                                                             |
|E' Sept:   0.06 m/s                                                                        
                                                                             |
|PRend P.85 mmHg                                                                      
                                                                             |
|PRend Vmax:   1.48 m/s                                                                     
                                                                             |
|HR:   77.27 BPM                                                                            
                                                                             |
|PV maxPG:   3.37 mmHg                                                                      
                                                                             |
|PV meanP.56 mmHg                                                                     
                                                                             |
 
|PV Vmax:   0.91 m/s                                                                        
                                                                             |
|PV Vmean:   0.56 m/s                                                                       
                                                                             |
|PV VTI:   12.48 cm                                                                         
                                                                             |
|RV S':   0.08 m/s                                                                          
                                                                             |
|TR maxP.05 mmHg                                                                     
                                                                             |
|TR Vmax:   3.04 m/s                                                                        
                                                                             |
|TV A Luis:   0.00 m/s                                                                       
                                                                             |
|TV Dec Beltrami:   3.93 m/s2                                                                  
                                                                             |
|TV Dec Time:   207.73 ms                                                                   
                                                                             |
|TV E Luis:   0.56 m/s                                                                       
                                                                             |
|TV E/A Ratio:   69.55                                                                      
                                                                             |
|Sonographer: CM                                                                            
                                                                             |
|Authenticated by: María Akins                                                         
                                                                             |
|Report Date/Time: 2018 13:7:33                                                         
                                                                             |
|IMPRESSION:                                                                                
                                                                             |
|1. The left ventricle cavity is normal in size, mild concentric hypertrophy and  normal sys
tolic function EF 55%. Mild inferior and inferolateral hypokinetic segments. |
|2. Mild degenerative changes in the aortic and mitral valves.                              
                                                                             |
|3. There is no pericardial effusion.                                                       
                                                                             |
+-------------------------------------------------------------------------------------------
-----------------------------------------------------------------------------+
 Troponin I (2018  5:51 AM PDT)
 
+-------------+--------------------------+--------------+------------+--------------+
| Component   | Value                    | Ref Range    | Performed  | Pathologist  |
|             |                          |              | At         | Signature    |
+-------------+--------------------------+--------------+------------+--------------+
| Troponin I, | 1.28 ()Comment:   0.00 | 0.00 - 0.10  | EXTERNAL   |              |
|  Qual       |  to 0.10    CONSISTENT   | ng/mL        | LAB        |              |
|             | WITH NORMAL              |              |            |              |
|             | POPULATION0.11 to 0.60   |              |            |              |
|             |    CONSISTENT WITH       |              |            |              |
|             | INCREASED RISK FOR       |              |            |              |
|             | ADVERSE OUTCOMES> 0.60   |              |            |              |
|             |             CONSISTENT   |              |            |              |
|             | WITH WHO CRITERIA FOR    |              |            |              |
|             | ACUTE MI CALLED NURSING  |              |            |              |
|             | UNITREAD BACK RESULTS    |              |            |              |
|             | VERIFIEDJESSICA W IN 8RP |              |            |              |
 
|             |  AT 0658 BY TDTesting    |              |            |              |
|             | performed at [facility];[address]     |              |            |              |
|             | [address];[place], [place]   |              |            |              |
|             | [id number]                    |              |            |              |
+-------------+--------------------------+--------------+------------+--------------+
 
 
 
+-----------------+
| Specimen        |
+-----------------+
| Blood specimen  |
| (specimen)      |
+-----------------+
 
 
 
+----------------+---------+--------------------+--------------+
| Performing     | Address | City/State/Zipcode | Phone Number |
| Organization   |         |                    |              |
+----------------+---------+--------------------+--------------+
|   EXTERNAL LAB |         |                    |              |
+----------------+---------+--------------------+--------------+
 Gram Stain, reflex Sputum Culture (2018  4:00 AM PDT)
 
+--------------------+
| Specimen           |
+--------------------+
| Body fluid sample  |
| (specimen)         |
+--------------------+
 
 
 
+------------------------------------------------------------------------+----------------+
| Narrative                                                              | Performed At   |
+------------------------------------------------------------------------+----------------+
|   Specimen Description                      SPUTUM        GRAM STAIN   |   EXTERNAL LAB |
|                                     GREATER THAN 10 WBCS/LPF           |                |
|                                              GREATER THAN 10 SEC/LPF   |                |
|                                                     4+                 |                |
|                                                    GRAM POSITIVE COCCI |                |
|                                                       1+               |                |
|                                                     GRAM NEGATIVE RODS |                |
|                                                       3+               |                |
|                                                     GRAM POSITIVE RODS |                |
|                                                       2+               |                |
|                                                     YEAST              |                |
| CULTURE                                          SMEAR CONTAINS        |                |
| GREATER THAN 10 SEC/LPF SUGGESTIVE OF POOR QUALITY SPECIMEN.           |                |
|   SPECIMEN WILL NOT BE CULTURED OR WILL BE CULTURED BY SPECIAL REQUEST |                |
|  ONLY.   PLEASE RECOLLECT IF CLINICALLY INDICATED.   SPECIMEN WILL BE  |                |
| HELD   48 HOURS.                                                       |                |
+------------------------------------------------------------------------+----------------+
 
 
 
+----------------+---------+--------------------+--------------+
| Performing     | Address | City/State/Zipcode | Phone Number |
| Organization   |         |                    |              |
 
+----------------+---------+--------------------+--------------+
|   EXTERNAL LAB |         |                    |              |
+----------------+---------+--------------------+--------------+
 Troponin I (2018  1:57 AM PDT)
 
+-------------+--------------------------+--------------+------------+--------------+
| Component   | Value                    | Ref Range    | Performed  | Pathologist  |
|             |                          |              | At         | Signature    |
+-------------+--------------------------+--------------+------------+--------------+
| Troponin I, | 1.72 ()Comment:   0.00 | 0.00 - 0.10  | EXTERNAL   |              |
|  Qual       |  to 0.10    CONSISTENT   | ng/mL        | LAB        |              |
|             | WITH NORMAL              |              |            |              |
|             | POPULATION0.11 to 0.60   |              |            |              |
|             |    CONSISTENT WITH       |              |            |              |
|             | INCREASED RISK FOR       |              |            |              |
|             | ADVERSE OUTCOMES> 0.60   |              |            |              |
|             |             CONSISTENT   |              |            |              |
|             | WITH WHO CRITERIA FOR    |              |            |              |
|             | ACUTE MI CALLED NURSING  |              |            |              |
|             | UNITREAD BACK RESULTS    |              |            |              |
|             | LALO NUNES IN 8RP  |              |            |              |
|             | AT 0250Testing performed |              |            |              |
|             |  at [facility];[address]        |              |            |              |
|             [address];[place] [address]   |              |            |              |
+-------------+--------------------------+--------------+------------+--------------+
 
 
 
+-----------------+
| Specimen        |
+-----------------+
| Blood specimen  |
| (specimen)      |
+-----------------+
 
 
 
+----------------+---------+--------------------+--------------+
| Performing     | Address | City/State/Zipcode | Phone Number |
| Organization   |         |                    |              |
+----------------+---------+--------------------+--------------+
|   EXTERNAL LAB |         |                    |              |
+----------------+---------+--------------------+--------------+
 Protime INR (2018  1:57 AM PDT)
 
+-----------+--------------------------+-----------+------------+--------------+
| Component | Value                    | Ref Range | Performed  | Pathologist  |
|           |                          |           | At         | Signature    |
+-----------+--------------------------+-----------+------------+--------------+
| INR       | 1.2Comment: REFERENCE    |           | EXTERNAL   |              |
|           | RANGE:0.9 - 1.2          |           | LAB        |              |
|           |   NON-ANTICOAGULATED2.0  |           |            |              |
|           | - 3.0   ALL OTHER        |           |            |              |
|           | THERAPEUTIC              |           |            |              |
|           | INDICATIONS2.5 - 3.5     |           |            |              |
|           | MECHANICAL HEART VALVES, |           |            |              |
|           |  RECURRENT OR SYSTEMIC   |           |            |              |
|           | EMBOLISMTesting          |           |            |              |
|           | performed at [facility];[address]     |           |            |              |
|           | [facility];[place], [place]   |           |            |              |
 
|           | [id number]                    |           |            |              |
+-----------+--------------------------+-----------+------------+--------------+
 
 
 
+-----------------+
| Specimen        |
+-----------------+
| Blood specimen  |
| (specimen)      |
+-----------------+
 
 
 
+----------------+---------+--------------------+--------------+
| Performing     | Address | City/State/Zipcode | Phone Number |
| Organization   |         |                    |              |
+----------------+---------+--------------------+--------------+
|   EXTERNAL LAB |         |                    |              |
+----------------+---------+--------------------+--------------+
 External Lab: KEARA (2018  1:57 AM PDT)
 
+-------------+------------------------------------------------------------------------+----
-------------+------------+--------------+
| Component   | Value                                                                  | Ref
 Range       | Performed  | Pathologist  |
|             |                                                                        |    
             | At         | Signature    |
+-------------+------------------------------------------------------------------------+----
-------------+------------+--------------+
| WBC         | 11.05 (H)                                                              | 3.8
0 - 11.00    | EXTERNAL   |              |
|             |                                                                        | K/u
L            | LAB        |              |
+-------------+------------------------------------------------------------------------+----
-------------+------------+--------------+
| RED CELL    | 4.56                                                                   | 4.2
0 - 5.70     | EXTERNAL   |              |
| COUNT       |                                                                        | M/u
L            | LAB        |              |
+-------------+------------------------------------------------------------------------+----
-------------+------------+--------------+
| Hgb         | 11.5 (L)                                                               | 13.
2 - 17.0     | EXTERNAL   |              |
|             |                                                                        | g/d
L            | LAB        |              |
+-------------+------------------------------------------------------------------------+----
-------------+------------+--------------+
| Hematocrit, | 35.3 (L)                                                               | 39.
0 - 50.0 %   | EXTERNAL   |              |
|  POC        |                                                                        |    
             | LAB        |              |
+-------------+------------------------------------------------------------------------+----
-------------+------------+--------------+
| MCV         | 77.5 (L)                                                               | 80.
0 - 100.0 fl | EXTERNAL   |              |
|             |                                                                        |    
             | LAB        |              |
+-------------+------------------------------------------------------------------------+----
-------------+------------+--------------+
 
| MCH         | 25.1 (L)                                                               | 27.
0 - 34.0 pg  | EXTERNAL   |              |
|             |                                                                        |    
             | LAB        |              |
+-------------+------------------------------------------------------------------------+----
-------------+------------+--------------+
| MCHC        | 32.4                                                                   | 32.
0 - 35.5     | EXTERNAL   |              |
|             |                                                                        | g/d
L            | LAB        |              |
+-------------+------------------------------------------------------------------------+----
-------------+------------+--------------+
| RDW-CV      | 57.3 (H)                                                               | 37 
- 53 fl      | EXTERNAL   |              |
|             |                                                                        |    
             | LAB        |              |
+-------------+------------------------------------------------------------------------+----
-------------+------------+--------------+
| Platelet    | 160                                                                    | 150
 - 400 K/uL  | EXTERNAL   |              |
| Count       |                                                                        |    
             | LAB        |              |
| Plasma      |                                                                        |    
             |            |              |
+-------------+------------------------------------------------------------------------+----
-------------+------------+--------------+
| MPV         | 10.1                                                                   | fl 
             | EXTERNAL   |              |
|             |                                                                        |    
             | LAB        |              |
+-------------+------------------------------------------------------------------------+----
-------------+------------+--------------+
| Differentia | MANUAL                                                                 |    
             | EXTERNAL   |              |
| l Type      |                                                                        |    
             | LAB        |              |
+-------------+------------------------------------------------------------------------+----
-------------+------------+--------------+
| Segmented   | 85                                                                     | %  
             | EXTERNAL   |              |
| Neutrophils |                                                                        |    
             | LAB        |              |
|  Manual     |                                                                        |    
             |            |              |
+-------------+------------------------------------------------------------------------+----
-------------+------------+--------------+
| Lymphocytes | 8                                                                      | %  
             | EXTERNAL   |              |
|  Manual     |                                                                        |    
             | LAB        |              |
+-------------+------------------------------------------------------------------------+----
-------------+------------+--------------+
| Monocytes   | 6                                                                      | %  
             | EXTERNAL   |              |
| Manual      |                                                                        |    
             | LAB        |              |
+-------------+------------------------------------------------------------------------+----
-------------+------------+--------------+
| Eosinophils | 1                                                                      | %  
             | EXTERNAL   |              |
 
|  Manual     |                                                                        |    
             | LAB        |              |
+-------------+------------------------------------------------------------------------+----
-------------+------------+--------------+
| Absolute    | 9.40 (H)                                                               | 1.9
0 - 7.40     | EXTERNAL   |              |
| Neutrophils |                                                                        | K/u
L            | LAB        |              |
+-------------+------------------------------------------------------------------------+----
-------------+------------+--------------+
| Absolute    | 0.88 (L)                                                               | 1.0
0 - 3.90     | EXTERNAL   |              |
| Lymphocytes |                                                                        | K/u
L            | LAB        |              |
+-------------+------------------------------------------------------------------------+----
-------------+------------+--------------+
| Absolute    | 0.66                                                                   | 0.0
0 - 0.80     | EXTERNAL   |              |
| Monocytes   |                                                                        | K/u
L            | LAB        |              |
+-------------+------------------------------------------------------------------------+----
-------------+------------+--------------+
| Absolute    | 0.11                                                                   | 0.0
0 - 0.50     | EXTERNAL   |              |
| Eosinophils |                                                                        | K/u
L            | LAB        |              |
+-------------+------------------------------------------------------------------------+----
-------------+------------+--------------+
| Platelet    | ADEQUATE                                                               |    
             | EXTERNAL   |              |
| Estimate    |                                                                        |    
             | LAB        |              |
+-------------+------------------------------------------------------------------------+----
-------------+------------+--------------+
| RBC         | 2+Comment:                                                             |    
             | EXTERNAL   |              |
| Morphology  | ANISO2+OVALO1+TEARDROPNO                                               |    
             | LAB        |              |
|             | RMAL PLT MORPHTesting                                                  |    
             |            |              |
|             | performed at [facility], [address]                                               |    
             |            |              |
|             |  [facility],                                                      |    
             |            |              |
|             | [place]   [address]                                                  |    
             |            |              |
|             |TEARDROP                                                               |     
            |            |              |
|             |NORMAL PLT MORPH                                                        |    
             |            |              |
|             |Testing performed at [facility], [address] [facility], [place]   [address] |    
             |            |              |
+-------------+------------------------------------------------------------------------+----
-------------+------------+--------------+
 
 
 
+-----------------+
 
| Specimen        |
+-----------------+
| Blood specimen  |
| (specimen)      |
+-----------------+
 
 
 
+----------------+---------+--------------------+--------------+
| Performing     | Address | City/State/Zipcode | Phone Number |
| Organization   |         |                    |              |
+----------------+---------+--------------------+--------------+
|   EXTERNAL LAB |         |                    |              |
+----------------+---------+--------------------+--------------+
 TSH (2018  1:57 AM PDT)
 
+-----------+--------------------------+----------------+------------+--------------+
| Component | Value                    | Ref Range      | Performed  | Pathologist  |
|           |                          |                | At         | Signature    |
+-----------+--------------------------+----------------+------------+--------------+
| TSH       | 4.150Comment: Testing    | 0.450 - 5.100  | EXTERNAL   |              |
|           | performed at [facility], [address] | uIU/mL         | LAB        |              |
|           |  [address],        |                |            |              |
|           | [place]  [id number]     |                |            |              |
+-----------+--------------------------+----------------+------------+--------------+
 
 
 
+-----------------+
| Specimen        |
+-----------------+
| Blood specimen  |
| (specimen)      |
+-----------------+
 
 
 
+----------------+---------+--------------------+--------------+
| Performing     | Address | City/State/Zipcode | Phone Number |
| Organization   |         |                    |              |
+----------------+---------+--------------------+--------------+
|   EXTERNAL LAB |         |                    |              |
+----------------+---------+--------------------+--------------+
 Phosphorus (2018  1:57 AM PDT)
 
+------------+--------------------------+-----------------+------------+--------------+
| Component  | Value                    | Ref Range       | Performed  | Pathologist  |
|            |                          |                 | At         | Signature    |
+------------+--------------------------+-----------------+------------+--------------+
| PHOSPHORUS | 3.4Comment: Testing      | 2.3 - 4.8 mg/dL | EXTERNAL   |              |
|            | performed at [facility], [address] |                 | LAB        |              |
|            |  [address],        |                 |            |              |
|            | [place]  [address]     |                 |            |              |
+------------+--------------------------+-----------------+------------+--------------+
 
 
 
+-----------------+
| Specimen        |
+-----------------+
 
| Blood specimen  |
| (specimen)      |
+-----------------+
 
 
 
+----------------+---------+--------------------+--------------+
| Performing     | Address | City/State/Zipcode | Phone Number |
| Organization   |         |                    |              |
+----------------+---------+--------------------+--------------+
|   EXTERNAL LAB |         |                    |              |
+----------------+---------+--------------------+--------------+
 Magnesium (2018  1:57 AM PDT)
 
+-----------+--------------------------+-----------------+------------+--------------+
| Component | Value                    | Ref Range       | Performed  | Pathologist  |
|           |                          |                 | At         | Signature    |
+-----------+--------------------------+-----------------+------------+--------------+
| Magnesium | 1.8Comment: Testing      | 1.7 - 2.4 mg/dL | EXTERNAL   |              |
|           | performed at [facility], [address] |                 | LAB        |              |
|           |  [address],        |                 |            |              |
|           | [place]  [address]     |                 |            |              |
+-----------+--------------------------+-----------------+------------+--------------+
 
 
 
+-----------------+
| Specimen        |
+-----------------+
| Blood specimen  |
| (specimen)      |
+-----------------+
 
 
 
+----------------+---------+--------------------+--------------+
| Performing     | Address | City/State/Zipcode | Phone Number |
| Organization   |         |                    |              |
+----------------+---------+--------------------+--------------+
|   EXTERNAL LAB |         |                    |              |
+----------------+---------+--------------------+--------------+
 Lipid Panel (2018  1:57 AM PDT)
 
+-------------+--------------------------+-----------+------------+--------------+
| Component   | Value                    | Ref Range | Performed  | Pathologist  |
|             |                          |           | At         | Signature    |
+-------------+--------------------------+-----------+------------+--------------+
| Cholesterol | 103                      | mg/dL     | EXTERNAL   |              |
|             |                          |           | LAB        |              |
+-------------+--------------------------+-----------+------------+--------------+
| Triglycerid | 116                      | mg/dL     | EXTERNAL   |              |
| es          |                          |           | LAB        |              |
+-------------+--------------------------+-----------+------------+--------------+
| HDL         | 24 (L)                   | mg/dL     | EXTERNAL   |              |
|             |                          |           | LAB        |              |
+-------------+--------------------------+-----------+------------+--------------+
| LDL         | 56Comment: Testing       | mg/dL     | EXTERNAL   |              |
| Cholesterol | performed at [facility], [address] |           | LAB        |              |
| ,           |  [address],        |           |            |              |
| Calculated, | [place]  [id number]     |           |            |              |
 
|  External   |                          |           |            |              |
+-------------+--------------------------+-----------+------------+--------------+
 
 
 
+-----------------+
| Specimen        |
+-----------------+
| Blood specimen  |
| (specimen)      |
+-----------------+
 
 
 
+----------------+---------+--------------------+--------------+
| Performing     | Address | City/State/Zipcode | Phone Number |
| Organization   |         |                    |              |
+----------------+---------+--------------------+--------------+
|   EXTERNAL LAB |         |                    |              |
+----------------+---------+--------------------+--------------+
 Basic Metabolic Panel (2018  1:57 AM PDT)
 
+-------------+--------------------------+-----------------+------------+--------------+
| Component   | Value                    | Ref Range       | Performed  | Pathologist  |
|             |                          |                 | At         | Signature    |
+-------------+--------------------------+-----------------+------------+--------------+
| Na          | 138                      | 135 - 145       | EXTERNAL   |              |
|             |                          | mmol/L          | LAB        |              |
+-------------+--------------------------+-----------------+------------+--------------+
| K           | 4.3                      | 3.5 - 4.9       | EXTERNAL   |              |
|             |                          | mmol/L          | LAB        |              |
+-------------+--------------------------+-----------------+------------+--------------+
| Cl          | 104                      | 99 - 109 mmol/L | EXTERNAL   |              |
|             |                          |                 | LAB        |              |
+-------------+--------------------------+-----------------+------------+--------------+
| CO2         | 24                       | 23 - 32 mmol/L  | EXTERNAL   |              |
|             |                          |                 | LAB        |              |
+-------------+--------------------------+-----------------+------------+--------------+
| Anion Gap   | 14                       | 5 - 20 mmol/L   | EXTERNAL   |              |
|             |                          |                 | LAB        |              |
+-------------+--------------------------+-----------------+------------+--------------+
| Glucose,    | 75                       | 65 - 99 mg/dL   | EXTERNAL   |              |
| Fasting     |                          |                 | LAB        |              |
+-------------+--------------------------+-----------------+------------+--------------+
| BUN         | 14                       | 8 - 25 mg/dL    | EXTERNAL   |              |
|             |                          |                 | LAB        |              |
+-------------+--------------------------+-----------------+------------+--------------+
| Creatinine  | 1.3                      | 0.70 - 1.30     | EXTERNAL   |              |
|             |                          | mg/dL           | LAB        |              |
+-------------+--------------------------+-----------------+------------+--------------+
| BUN/Creatin | 11                       |                 | EXTERNAL   |              |
| ine Ratio   |                          |                 | LAB        |              |
+-------------+--------------------------+-----------------+------------+--------------+
| Calcium     | 8.6                      | 8.5 - 10.5      | EXTERNAL   |              |
|             |                          | mg/dL           | LAB        |              |
+-------------+--------------------------+-----------------+------------+--------------+
| Estimated   | 54 (L)Comment: GFR <60:  | mL/min/1.73m2   | EXTERNAL   |              |
| GFR         | CHRONIC KIDNEY DISEASE,  |                 | LAB        |              |
|             | IF FOUND OVER A 3 MONTH  |                 |            |              |
|             | PERIOD.GFR <15: KIDNEY   |                 |            |              |
 
|             | FAILURE.FOR       |                 |            |              |
|             | [employer], MULTIPLY THE  |                 |            |              |
|             | CALCULATED GFR BY        |                 |            |              |
|             | 1.210.This eGFR is       |                 |            |              |
|             | calculated using the     |                 |            |              |
|             | MDRD IDMS traceable      |                 |            |              |
|             | equation.Testing         |                 |            |              |
|             | performed at [facility], [address] |                 |            |              |
|             |  [facility],        |                 |            |              |
|             | [place]   [address]    |                 |            |              |
+-------------+--------------------------+-----------------+------------+--------------+
 
 
 
+-----------------+
| Specimen        |
+-----------------+
| Blood specimen  |
| (specimen)      |
+-----------------+
 
 
 
+----------------+---------+--------------------+--------------+
| Performing     | Address | City/State/Zipcode | Phone Number |
| Organization   |         |                    |              |
+----------------+---------+--------------------+--------------+
|   EXTERNAL LAB |         |                    |              |
+----------------+---------+--------------------+--------------+
 Lactic Acid (2018 11:50 PM PDT)
 
+-----------+-------------------------+------------+------------+--------------+
| Component | Value                   | Ref Range  | Performed  | Pathologist  |
|           |                         |            | At         | Signature    |
+-----------+-------------------------+------------+------------+--------------+
| Lactate   | 1.1Comment: Testing     | 0.4 - 2.0  | EXTERNAL   |              |
|           | performed at [facility];[address]    | mmol/L     | LAB        |              |
|           | [address];[place]  |            |            |              |
|           | [address]                   |            |            |              |
+-----------+-------------------------+------------+------------+--------------+
 
 
 
+----------+
| Specimen |
+----------+
|          |
+----------+
 
 
 
+----------------+---------+--------------------+--------------+
| Performing     | Address | City/State/Zipcode | Phone Number |
| Organization   |         |                    |              |
+----------------+---------+--------------------+--------------+
|   EXTERNAL LAB |         |                    |              |
+----------------+---------+--------------------+--------------+
 Troponin I (2018 10:12 PM PDT)
 
+-------------+--------------------------+--------------+------------+--------------+
 
| Component   | Value                    | Ref Range    | Performed  | Pathologist  |
|             |                          |              | At         | Signature    |
+-------------+--------------------------+--------------+------------+--------------+
| Troponin I, | 1.76 ()Comment:   0.00 | 0.00 - 0.10  | EXTERNAL   |              |
|  Qual       |  to 0.10    CONSISTENT   | ng/mL        | LAB        |              |
|             | WITH NORMAL              |              |            |              |
|             | POPULATION0.11 to 0.60   |              |            |              |
|             |    CONSISTENT WITH       |              |            |              |
|             | INCREASED RISK FOR       |              |            |              |
|             | ADVERSE OUTCOMES> 0.60   |              |            |              |
|             |             CONSISTENT   |              |            |              |
|             | WITH WHO CRITERIA FOR    |              |            |              |
|             | ACUTE MI CALLED TO       |              |            |              |
|             | TENNILLE NUNES ON 8RP AT 2255  |              |            |              |
|             | BY CD, READ BACKTesting  |              |            |              |
|             | performed at [facility];[address]     |              |            |              |
|             | [address];[place], [place]   |              |            |              |
|             | [address]                    |              |            |              |
+-------------+--------------------------+--------------+------------+--------------+
 
 
 
+-----------------+
| Specimen        |
+-----------------+
| Blood specimen  |
| (specimen)      |
+-----------------+
 
 
 
+----------------+---------+--------------------+--------------+
| Performing     | Address | City/State/Zipcode | Phone Number |
| Organization   |         |                    |              |
+----------------+---------+--------------------+--------------+
|   EXTERNAL LAB |         |                    |              |
+----------------+---------+--------------------+--------------+
 documented in this encounter
 
 Visit Diagnoses
 
 
+-----------------------------------------------------------------------------------------+
| Diagnosis                                                                               |
+-----------------------------------------------------------------------------------------+
|   Thoracic aortic aneurysm without rupture (HCC)  Thoracic aneurysm without mention of  |
| rupture                                                                                 |
+-----------------------------------------------------------------------------------------+
|   ASCVD (arteriosclerotic cardiovascular disease)  Unspecified cardiovascular disease   |
+-----------------------------------------------------------------------------------------+
|   Chronic atrial fibrillation  Atrial fibrillation                                      |
+-----------------------------------------------------------------------------------------+
|   Essential hypertension  Unspecified essential hypertension                            |
+-----------------------------------------------------------------------------------------+
|   Precordial pain                                                                       |
+-----------------------------------------------------------------------------------------+
|   Dysphagia, unspecified type                                                           |
+-----------------------------------------------------------------------------------------+
 documented in this encounter

## 2019-12-06 NOTE — XMS
Encounter Summary
  Created on: 2019
 
 Wyatt Shane
 External Reference #: [id number]
 : 43
 Sex: Male
 
 Demographics
 
 
+-----------------------+------------------------+
| Address               | [address]     |
|                       | [place]  [address]   |
+-----------------------+------------------------+
| Home Phone            | [phone]        |
+-----------------------+------------------------+
| Preferred Language    | Unknown                |
+-----------------------+------------------------+
| Marital Status        |                 |
+-----------------------+------------------------+
| Yarsani Affiliation | LUT                    |
+-----------------------+------------------------+
| Race                  | White                  |
+-----------------------+------------------------+
| Ethnic Group          | Not  or  |
+-----------------------+------------------------+
 
 
 Author
 
 
+--------------+-------------+
| Organization | Unknown     |
+--------------+-------------+
| Address      | Unknown     |
+--------------+-------------+
| Phone        | Unavailable |
+--------------+-------------+
 
 
 
 Support
 
 
+---------------+--------------+---------+-----------------+
| Name          | Relationship | Address | Phone           |
+---------------+--------------+---------+-----------------+
| Opal Shane | ECON         | Unknown | [phone] |
+---------------+--------------+---------+-----------------+
 
 
 
 Care Team Providers
 
 
+-----------------------+------+-----------------+
| Care Team Member Name | Role | Phone           |
 
+-----------------------+------+-----------------+
| Erwin Iniguez MD   | PCP  | [phone] |
+-----------------------+------+-----------------+
 
 
 
 Encounter Details
 
 
+--------+-------------+------------+--------------------+--------------------+
| Date   | Type        | Department | Care Team          | Description        |
+--------+-------------+------------+--------------------+--------------------+
| / | Procedure - |            |   Documentation,   | OP REPORT-TEACHING |
|    |             |            | Teaching Physician |                    |
|        | Transcribed |            |                    |                    |
+--------+-------------+------------+--------------------+--------------------+
 
 
 
 Social History
 
 
+----------------+-------+-----------+--------+------+
| Tobacco Use    | Types | Packs/Day | Years  | Date |
|                |       |           | Used   |      |
+----------------+-------+-----------+--------+------+
| Never Assessed |       |           |        |      |
+----------------+-------+-----------+--------+------+
 
 
 
+------------------+---------------+
| Sex Assigned at  | Date Recorded |
| Birth            |               |
+------------------+---------------+
| Not on file      |               |
+------------------+---------------+
 
 
 
+----------------+-------------+-------------+
| Job Start Date | Occupation  | Industry    |
+----------------+-------------+-------------+
| Not on file    | Not on file | Not on file |
+----------------+-------------+-------------+
 
 
 
+----------------+--------------+------------+
| Travel History | Travel Start | Travel End |
+----------------+--------------+------------+
 
 
 
+-------------------------------------+
| No recent travel history available. |
+-------------------------------------+
 documented as of this encounter
 
 Plan of Treatment
 
 Not on filedocumented as of this encounter
 
 Procedures
 
 
+--------------------+--------+------------+----------------------+----------+
| Procedure Name     | Priori | Date/Time  | Associated Diagnosis | Comments |
|                    | ty     |            |                      |          |
+--------------------+--------+------------+----------------------+----------+
| TEACHING PHYSICIAN |        | 1999 |                      |          |
+--------------------+--------+------------+----------------------+----------+
 documented in this encounter
 
 Visit Diagnoses
 Not on filedocumented in this encounter

## 2019-12-06 NOTE — XMS
Encounter Summary
  Created on: 2019
 
 Shane Wyatttien BroussardJosue
 External Reference #: [id number]
 : 43
 Sex: Male
 
 Demographics
 
 
+-----------------------+---------------------------+
| Address               | [address]        |
|                       | [place]  [address] |
+-----------------------+---------------------------+
| Home Phone            | [phone]           |
+-----------------------+---------------------------+
| Preferred Language    | Unknown                   |
+-----------------------+---------------------------+
| Marital Status        |                    |
+-----------------------+---------------------------+
| Mosque Affiliation | 1028                      |
+-----------------------+---------------------------+
| Race                  | Unknown                   |
+-----------------------+---------------------------+
| Ethnic Group          | Unknown                   |
+-----------------------+---------------------------+
 
 
 Author
 
 
+--------------+--------------------------------------------+
| Author       | [facility]  |
|              | [facility] [place]                                |
+--------------+--------------------------------------------+
| Organization | [facility]  |
|              | and [place]                                |
+--------------+--------------------------------------------+
| Address      | Unknown                                    |
+--------------+--------------------------------------------+
| Phone        | Unavailable                                |
+--------------+--------------------------------------------+
 
 
 
 Support
 
 
+---------------+--------------+--------------------+-----------------+
| Name          | Relationship | Address            | Phone           |
+---------------+--------------+--------------------+-----------------+
| Opal Shane | ECON         | [address]     | [phone] |
|               |              | [place]   |                 |
|               |              | [address]              |                 |
+---------------+--------------+--------------------+-----------------+
 
 
 
 
 Care Team Providers
 
 
+-----------------------+------+-----------------+
| Care Team Member Name | Role | Phone           |
+-----------------------+------+-----------------+
| Erwin Iniguez MD | PCP  | [phone] |
+-----------------------+------+-----------------+
 
 
 
 Encounter Details
 
 
+--------+-----------+----------------------+--------------------+-------------+
| Date   | Type      | Department           | Care Team          | Description |
+--------+-----------+----------------------+--------------------+-------------+
| 08/01/ | Hospital  |   [facility]     |   Conversion       | Pain        |
| 2018   | Encounter | CONVERSION [address]  | Transaction,       |             |
|        |           | [address]           | Provider Unknown   |             |
|        |           | [place], [place]         | [phone]       |             |
|        |           | [address]           | [phone] (Fax) |             |
|        |           | [phone]         |                    |             |
+--------+-----------+----------------------+--------------------+-------------+
 
 
 
 Social History
 
 
+---------------+------------+-----------+--------+------------------+
| Tobacco Use   | Types      | Packs/Day | Years  | Date             |
|               |            |           | Used   |                  |
+---------------+------------+-----------+--------+------------------+
| Former Smoker | Cigarettes | 1.3       | 34     | Quit: 1996 |
+---------------+------------+-----------+--------+------------------+
 
 
 
+---------------------+---+---+---+
| Smokeless Tobacco:  |   |   |   |
| Never Used          |   |   |   |
+---------------------+---+---+---+
 
 
 
+-------------+-------------+---------+----------+
| Alcohol Use | Drinks/Week | oz/Week | Comments |
+-------------+-------------+---------+----------+
| No          |             |         |          |
+-------------+-------------+---------+----------+
 
 
 
+------------------+---------------+
| Sex Assigned at  | Date Recorded |
| Birth            |               |
+------------------+---------------+
| Not on file      |               |
 
+------------------+---------------+
 
 
 
+----------------+-------------+-------------+
| Job Start Date | Occupation  | Industry    |
+----------------+-------------+-------------+
| Not on file    | Not on file | Not on file |
+----------------+-------------+-------------+
 
 
 
+----------------+--------------+------------+
| Travel History | Travel Start | Travel End |
+----------------+--------------+------------+
 
 
 
+-------------------------------------+
| No recent travel history available. |
+-------------------------------------+
 documented as of this encounter
 
 Medications at Time of Discharge
 
 
+----------------------+----------------------+-----------+---------+----------+-----------+
| Medication           | Sig                  | Dispensed | Refills | Start    | End Date  |
|                      |                      |           |         | Date     |           |
+----------------------+----------------------+-----------+---------+----------+-----------+
|   acyclovir          | Apply  topically     |           | 0       |          |           |
| (ZOVIRAX) 5%         | every 3 hours.       |           |         |          |           |
| ointment             |                      |           |         |          |           |
+----------------------+----------------------+-----------+---------+----------+-----------+
|   albuterol (PROAIR  | Inhale 2 puffs into  |           | 0       |          |           |
| HFA) 90 mcg/puff     | the lungs every 6    |           |         |          |           |
| inhaler              | hours as needed for  |           |         |          |           |
|                      | Wheezing.            |           |         |          |           |
+----------------------+----------------------+-----------+---------+----------+-----------+
|   digoxin (LANOXIN)  | Take 125 mcg by      |           | 0       |          |           |
| 250 mcg tablet       | mouth Daily.      |           |         |          |           |
|                      | capsule daily        |           |         |          |           |
+----------------------+----------------------+-----------+---------+----------+-----------+
|   ferrous sulfate    | Take 325 mg by mouth |           | 0       | 20 |           |
| 325 mg tablet        |  3 times daily.      |           |         | 12       |           |
+----------------------+----------------------+-----------+---------+----------+-----------+
|   fluticasone        | one spray in each    |           | 0       | 20 |           |
| (FLONASE) 50         | nostril daily as     |           |         | 12       |           |
| mcg/nasal spray      | needed               |           |         |          |           |
+----------------------+----------------------+-----------+---------+----------+-----------+
|                      | Inhale 1 puff into   |           | 0       |          |           |
| fluticasone-salmeter | the lungs Twice      |           |         |          |           |
| ol (ADVAIR) 500-50   | Daily.               |           |         |          |           |
| mcg/puff diskus      |                      |           |         |          |           |
| inhaler              |                      |           |         |          |           |
+----------------------+----------------------+-----------+---------+----------+-----------+
|   folic acid 1 mg    | Take 1 mg by mouth   |           | 0       |          |           |
| tablet               | Daily.               |           |         |          |           |
+----------------------+----------------------+-----------+---------+----------+-----------+
|   furosemide (LASIX) | Take 40 mg by mouth  |           | 0       |          |           |
 
|  40 mg tablet        | Daily.               |           |         |          |           |
+----------------------+----------------------+-----------+---------+----------+-----------+
|   guaiFENesin        | Take 1,200 mg by     |           | 0       |          |           |
| (MUCINEX) 600 mg 12  | mouth 2 times daily. |           |         |          |           |
| hr tablet            |                      |           |         |          |           |
+----------------------+----------------------+-----------+---------+----------+-----------+
|   levothyroxine      | Take 75 mcg by mouth |           | 0       | 20 |           |
| (LEVOTHROID) 75 MCG  |  Daily.              |           |         | 12       |           |
| tablet               |                      |           |         |          |           |
+----------------------+----------------------+-----------+---------+----------+-----------+
|   metoclopramide     | Take 10 mg by mouth  |           | 0       | 20 |           |
| (REGLAN) 10 mg       | 4 times daily as     |           |         | 12       |           |
| tablet               | needed.              |           |         |          |           |
+----------------------+----------------------+-----------+---------+----------+-----------+
|                      | Apply  topically 2   |           | 0       |          |           |
| nystatin-triamcinolo | times daily.         |           |         |          |           |
| ne (MYCOLOG II)      |                      |           |         |          |           |
| cream                |                      |           |         |          |           |
+----------------------+----------------------+-----------+---------+----------+-----------+
|   ofloxacin          |                      |           | 0       | 20 |           |
| (OCUFLOX) 0.3%       |                      |           |         | 18       |           |
| ophthalmic solution  |                      |           |         |          |           |
+----------------------+----------------------+-----------+---------+----------+-----------+
|   omeprazole         | Take 20 mg by mouth  |           | 0       | 20 |           |
| (PRILOSEC) 20 mg     | 2 times daily.       |           |         | 12       |           |
| capsule              |                      |           |         |          |           |
+----------------------+----------------------+-----------+---------+----------+-----------+
|   potassium citrate  | Take 10 mEq by mouth |           | 0       |          |           |
| (UROCIT-K) 10 mEq SR |  2 times daily.      |           |         |          |           |
|  tablet              |                      |           |         |          |           |
+----------------------+----------------------+-----------+---------+----------+-----------+
|   predniSONE         | Take 10 mg by mouth  |           | 0       |          |           |
| (DELTASONE) 5 mg     | Daily.               |           |         |          |           |
| tablet               |                      |           |         |          |           |
+----------------------+----------------------+-----------+---------+----------+-----------+
|   tamsulosin         | Take 0.4 mg by mouth |           | 0       | 20 |           |
| (FLOMAX) 0.4 mg CAPS |  2 times daily.      |           |         | 12       |           |
+----------------------+----------------------+-----------+---------+----------+-----------+
|   acyclovir          | Take 400 mg by mouth |           | 0       | 20 |  |
| (ZOVIRAX) 400 MG     |  3 times daily as    |           |         | 12       | 9         |
| tablet               | needed.              |           |         |          |           |
+----------------------+----------------------+-----------+---------+----------+-----------+
|   Cholecalciferol    | Take 2,000 Units by  |           | 0       | 20 |  |
| (VITAMIN D3) 2000    | mouth Daily.         |           |         | 12       | 9         |
| UNITS CAPS           |                      |           |         |          |           |
+----------------------+----------------------+-----------+---------+----------+-----------+
|   cyanocobalamin     | injected             |           | 0       | 20 |  |
| (VITAMIN B-12) 1,000 | intramuscularly once |           |         | 12       | 9         |
|  mcg/mL injection    |  a month             |           |         |          |           |
+----------------------+----------------------+-----------+---------+----------+-----------+
|   diltiazem          | Take 120 mg by mouth |           | 0       |          |  |
| (DILT-XR) 120 mg 24  |  Daily.              |           |         |          | 9         |
| hr capsule           |                      |           |         |          |           |
+----------------------+----------------------+-----------+---------+----------+-----------+
|   loratadine         | Take 10 mg by mouth  |           | 0       |          |  |
| (CLARITIN) 10 mg     | Daily.               |           |         |          | 9         |
| tablet               |                      |           |         |          |           |
+----------------------+----------------------+-----------+---------+----------+-----------+
|   losartan (COZAAR)  | Take 100 mg by mouth |           | 0       |          |  |
| 100 MG tablet        |  Daily. 1/2 daily    |           |         |          | 9         |
 
+----------------------+----------------------+-----------+---------+----------+-----------+
|   methotrexate 2.5   | Take 15 mg by mouth  |           | 0       |          |  |
| mg tablet            | Once a week.         |           |         |          | 9         |
+----------------------+----------------------+-----------+---------+----------+-----------+
|                      | Take 1 tablet by     |           | 0       |          |  |
| oxyCODONE-acetaminop | mouth every 4 hours  |           |         |          | 9         |
| hen (PERCOCET) 5-325 | as needed for Pain.  |           |         |          |           |
|  mg per tablet       |                      |           |         |          |           |
+----------------------+----------------------+-----------+---------+----------+-----------+
|   pseudoePHEDrine    | Take 30 mg by mouth  |           | 0       |          |  |
| (SUDOGEST) 30 mg     | every 4 hours as     |           |         |          | 9         |
| tablet               | needed for           |           |         |          |           |
|                      | Congestion.          |           |         |          |           |
+----------------------+----------------------+-----------+---------+----------+-----------+
|   rivaroxaban        | Take 20 mg by mouth  |           | 0       |          |  |
| (XARELTO) 20 mg      | Daily (with dinner). |           |         |          | 9         |
| tablet               |                      |           |         |          |           |
+----------------------+----------------------+-----------+---------+----------+-----------+
|   sertraline         | 2 tablets daily      |           | 0       | 20 |  |
| (ZOLOFT) 100 mg      |                      |           |         | 12       | 9         |
| tablet               |                      |           |         |          |           |
+----------------------+----------------------+-----------+---------+----------+-----------+
 documented as of this encounter
 
 Plan of Treatment
 
 
+--------+---------+-------------+----------------------+-------------+
| Date   | Type    | Specialty   | Care Team            | Description |
+--------+---------+-------------+----------------------+-------------+
| / | Office  | Pulmonology |   Juan F,          |             |
| 2019   | Visit   |             | Jessica Cheung,   |             |
|        |         |             | MD [address] |             |
|        |         |             |   [place],   |             |
|        |         |             | [place] [address]             |             |
|        |         |             | [phone]         |             |
|        |         |             | [phone] (Fax)   |             |
+--------+---------+-------------+----------------------+-------------+
| / | Office  | Cardiology  |   Mable Jose Miguelargenis, |             |
|    | Visit   |             |  MD  [address]   |             |
|        |         |             | [place]  |             |
|        |         |             | [address]  [phone]  |             |
|        |         |             |  [phone] (Fax)  |             |
+--------+---------+-------------+----------------------+-------------+
 documented as of this encounter
 
 Procedures
 
 
+------------------+--------+-------------+----------------------+----------------------+
| Procedure Name   | Priori | Date/Time   | Associated Diagnosis | Comments             |
|                  | ty     |             |                      |                      |
+------------------+--------+-------------+----------------------+----------------------+
| NM MYOCARDIAL    | Routin | 2018  |                      |   Results for this   |
| PERFUSION SPECT  | e      |  1:12 AM    |                      | procedure are in the |
| STRESS           |        | PDT         |                      |  results section.    |
+------------------+--------+-------------+----------------------+----------------------+
 documented in this encounter
 
 Results
 
 NM Myocardial Perfusion SPECT Stress (2018  1:12 AM PDT)
 
+----------+
| Specimen |
+----------+
|          |
+----------+
 
 
 
+------------------------------------------------------------------------+--------------+
| Narrative                                                              | Performed At |
+------------------------------------------------------------------------+--------------+
|   This is a non-reportable procedure without a radiologist report and  |              |
| is  used for image storage only                                        |              |
+------------------------------------------------------------------------+--------------+
 
 
 
+--------------------------------------------------------------------------------------+
| Procedure Note                                                                       |
+--------------------------------------------------------------------------------------+
|   Andrzej Steven Irvin - 2019  4:21 AM PDT  This is a non-reportable procedure  |
| without a radiologist report and isused for image storage only                       |
+--------------------------------------------------------------------------------------+
 documented in this encounter
 
 Visit Diagnoses
 
 
+--------------------------+
| Diagnosis                |
+--------------------------+
|   Pain  Generalized pain |
+--------------------------+
 documented in this encounter

## 2019-12-06 NOTE — XMS
Encounter Summary
  Created on: 2019
 
 Wyatt Shane
 External Reference #: [id number]
 : 43
 Sex: Male
 
 Demographics
 
 
+-----------------------+------------------------+
| Address               | [address]     |
|                       | [place]  [address]   |
+-----------------------+------------------------+
| Home Phone            | [phone]        |
+-----------------------+------------------------+
| Preferred Language    | Unknown                |
+-----------------------+------------------------+
| Marital Status        |                 |
+-----------------------+------------------------+
| Alevism Affiliation | LUT                    |
+-----------------------+------------------------+
| Race                  | White                  |
+-----------------------+------------------------+
| Ethnic Group          | Not  or  |
+-----------------------+------------------------+
 
 
 Author
 
 
+--------------+-------------+
| Organization | Unknown     |
+--------------+-------------+
| Address      | Unknown     |
+--------------+-------------+
| Phone        | Unavailable |
+--------------+-------------+
 
 
 
 Support
 
 
+---------------+--------------+---------+-----------------+
| Name          | Relationship | Address | Phone           |
+---------------+--------------+---------+-----------------+
| Opal Shane | ECON         | Unknown | [phone] |
+---------------+--------------+---------+-----------------+
 
 
 
 Care Team Providers
 
 
+-----------------------+------+-----------------+
| Care Team Member Name | Role | Phone           |
 
+-----------------------+------+-----------------+
| Erwin Steel MD   | PCP  | [phone] |
+-----------------------+------+-----------------+
 
 
 
 Encounter Details
 
 
+--------+-------------+------------+---------------------+------------------+
| Date   | Type        | Department | Care Team           | Description      |
+--------+-------------+------------+---------------------+------------------+
| / | Procedure - |            |   Record, Operation | Operative Report |
|    |             |            |                     |                  |
|        | Transcribed |            |                     |                  |
+--------+-------------+------------+---------------------+------------------+
 
 
 
 Social History
 
 
+----------------+-------+-----------+--------+------+
| Tobacco Use    | Types | Packs/Day | Years  | Date |
|                |       |           | Used   |      |
+----------------+-------+-----------+--------+------+
| Never Assessed |       |           |        |      |
+----------------+-------+-----------+--------+------+
 
 
 
+------------------+---------------+
| Sex Assigned at  | Date Recorded |
| Birth            |               |
+------------------+---------------+
| Not on file      |               |
+------------------+---------------+
 
 
 
+----------------+-------------+-------------+
| Job Start Date | Occupation  | Industry    |
+----------------+-------------+-------------+
| Not on file    | Not on file | Not on file |
+----------------+-------------+-------------+
 
 
 
+----------------+--------------+------------+
| Travel History | Travel Start | Travel End |
+----------------+--------------+------------+
 
 
 
+-------------------------------------+
| No recent travel history available. |
+-------------------------------------+
 documented as of this encounter
 
 Plan of Treatment
 
 Not on filedocumented as of this encounter
 
 Procedures
 
 
+------------------+--------+------------+----------------------+----------------------+
| Procedure Name   | Priori | Date/Time  | Associated Diagnosis | Comments             |
|                  | ty     |            |                      |                      |
+------------------+--------+------------+----------------------+----------------------+
| OPERATION RECORD |        | 1999 |                      |   Results for this   |
|                  |        |            |                      | procedure are in the |
|                  |        |            |                      |  results section.    |
+------------------+--------+------------+----------------------+----------------------+
 documented in this encounter
 
 Results
 OPERATION RECORD (1999)
 
+------------------------------------------------------------------------------------------+
| Transcriptions                                                                           |
+------------------------------------------------------------------------------------------+
|   Interface, Transcription In - 2006  3:11 AM PST                                  |
|    [facility] [facility]    |
| [place], [place] [address] [phone]                     [place]          |
| [facility] HospitalOPERATION RECORDMed Rec No.:  [id number]           Date:           |
| 1999Name:   Neris ShaneRONAN SURGEON:                 Ghassan Doll M.D.   |
|                                  Sohail Curiel M.D.ASSISTANTS:                          |
| Diana Givens M.D.POSTOPERATIVE DIAGNOSIS(ES):        1.  Gastroesophageal reflux    |
| disease.                                    2.  Umbilical hernia.OPERATIONS PERFORMED:   |
|              1.  Laparoscopic Nissen fundoplication.                                     |
|   2.    Open  umbilical  herniorrhaphy.SPECIMEN(S) REMOVED:                              |
| None.ANESTHESIA:                         General endotracheal tube anesthesia byDr.      |
| Hebert.ESTIMATED BLOOD LOSS:               Minimal.FLUIDS:                              |
| 2700 cc of Crystalloid.COMPLICATIONS:                      None.DRAINS:                  |
|             None.COUNTS:                             Correct.INDICATIONS:                |
|          Mr. Shane  is  a 53-year-old gentlemanwith a history of left true vocal        |
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  He has also had  |
| symptoms of gastroesophagealreflux  disease  for  several years.   Recently  he  has     |
| had  evidence  ofesophagitis and aspirations per his ENT  physician,   Dr. Esposito, and he |
|  wasreferred for possible laparoscopic Nissen fundoplication.After assessment by .     |
| Dangelo Levi   in the Gastroenterology laboratoryand assessment by us, we also         |
| recommend laparoscopic Nissen fundoplication.He understands the risks benefits of the    |
| procedures and agreed to proceed.Informed consent was obtained prior to the              |
| procedure.PROCEDURE:                          Prior  to  initiation to our procedure,the |
|  patient was seen and evaluated by  Dr. Esposito for laryngeal dilation andplacement of an  |
| endotracheal tube.   Please  see  his  dictation  for thosedetails.  After initiation of |
|  general  endotracheal  tube  anesthesia,  thepatient's abdomen was prepped and draped   |
| in the usual sterile fashion.A skin incision was made just to the left  of the patient's |
|  superior aspectof the umbilicus, after it was infiltrated  with  0.25% Marcaine.  A     |
| Veressneedle was inserted through the incision and the abdomen was insufflated to15 mmHg |
|  pressure without difficulty.   A  10  mm  trocar  was then insertedthrough the skin     |
| incision. The 50 degree  laparoscope was introduced.  Nexta second 10 mm trocar was      |
| introduced  on  the  right  side of the patient'sabdomen after infiltration with 0.25%   |
| Marcaine.   Three  5 mm trocars werethen introduced, one in the epigastric  area  and    |
| the  two  others  on thepatient's right side of the abdomen after injection with 0.25%   |
| Marcaine.The areolar tissue overlying the esophagus  between  the  liver and stomachwas  |
| then divided using Bovie electrocautery.   This  was continued over theesophagus itself. |
|   This allowed exposure to the right tato of the diaphragmas well as to the esophagus.   |
| A window  between  the  right  tato  and  theposterior esophagus was then developed      |
| using blunt dissection.Next, the short gastrics were taken down  along  the  superior    |
 
| third of thegreater curvature of the stomach using the Harmonic device.  Once the        |
| shortgastric vessels were completely taken  down,  we  then proceeded to dissectout  the |
|   left crura of the diaphragm  and  the  posterior  aspect  of  theesophagus, allowing a |
|  window retroesophageally to be formed.  Further bluntdissection retroesophageal was     |
| performed  so  that  the  two  crura  of thediaphragm were easily opposable.  The        |
| endostitch  device was then insertedand used to suture the two right and  left  crura of |
|  the diaphragm togetherusing two simple stitches.  In this fashion,  the  diaphragmatic  |
| hiatus wasreapproximated.Next, a #56 Bulgarian bougie was passed  by  the  anesthesiologist |
|  through theesophagus and into the stomach with direct  visualization  performed by      |
| us.The  gastroesophageal junction was straightened  in  order  to  allow  easypassage    |
| of   the  bougie.   Next,   the   gastric   fundus   was   pulledretroesophageally in    |
| order to perform  the  wrap.  The wrap was noted to beappropriately loose.  The          |
| endostitch   device  was then used and two simplestitches   were   used   for  the       |
| fundoplication.    The   stitches   werefull-thickness  through  the  stomach  and       |
| partial-thickness  through  theesophagus  and  then  again  full-thickness   through     |
| the  stomach.   Uponcompletion of the fundoplication, the wrap was noted to be           |
| adequately looseagain.  The wrap encompassed the 2 cm  above the gastroesophageal        |
| junction.The abdomen was then re-evaluated and  adequate  hemostasis  was noted.         |
| Thebougie was removed without difficulty.   Additionally  the nasogastric tubewas also   |
| removed without difficulty simultaneously.   The trocars were thenremoved individually   |
| under direct visualization  and  the  pneumoperitoneumwas completely emptied from the    |
| abdomen.We   then  focused  our  attention  on   the   umbilical   hernia.               |
| Aninfraumbilical semicircular incision  was  made  and  carried  down  to thefascial     |
| layer. The umbilicus was then   from the fascia, revealingan approximately 1 cm |
|  umbilical defect. This was reapproximated using threeinterrupted sutures with #0        |
| Ethibond suture.  The umbilicus was then tackeddown  to  the  fascia  with  #3-0 Vicryl  |
|  suture.  The  skin  incision  wasreapproximated in a running subcuticular  fashion      |
| using #4-0 Vicryl suture.The two 10 mm trocar sites had the fascia  reapproximated  with |
|  #0 Ethibondsuture.   The skin incisions were all  closed  in  a  running                |
| subcuticularfashion using #4-0 Vicryl suture.  Steri-Strips  and sterile dressings       |
| wereapplied.The patient tolerated the procedure well  ,  was extubated in the            |
| OperatingRoom  and  was  transported  to  the Postanesthesia  Care  Unit  in             |
| stablecondition.                                   Ghassan Doll M.D.               |
|                      Sohail Curiel M.D.LOREE/Radha: 1999T:  1999 12:21          |
| [id number]:   ERWIN STEEL MD      [address] [place] [address] [address]      [place] [id number]          |
| JORGE Sadler M.D., F.A.C.P., F.A.C.G.                    |
|greater curvature of the stomach using the Harmonic device.  Once the short               |
|gastric vessels were completely taken  down,  we  then proceeded to dissect               |
|out  the  left crura of the diaphragm  and  the  posterior  aspect  of  the               |
|esophagus, allowing a window retroesophageally to be formed.  Further blunt               |
|dissection retroesophageal was performed  so  that  the  two  crura  of the               |
|diaphragm were easily opposable.  The  endostitch  device was then inserted               |
|and used to suture the two right and  left  crura of the diaphragm together               |
|using two simple stitches.  In this fashion,  the  diaphragmatic hiatus was               |
|reapproximated.                                                                          |
|                                                                                          |
|Next, a #56 Bulgarian bougie was passed  by  the  anesthesiologist through the               |
|esophagus and into the stomach with direct  visualization  performed by us.               |
|The  gastroesophageal junction was straightened  in  order  to  allow  easy               |
|passage   of   the  bougie.   Next,   the   gastric   fundus   was   pulled               |
|retroesophageally in order to perform  the  wrap.  The wrap was noted to be               |
|appropriately loose.  The endostitch   device  was then used and two simple               |
|stitches   were   used   for  the  fundoplication.    The   stitches   were               |
|full-thickness  through  the  stomach  and  partial-thickness  through  the               |
|esophagus  and  then  again  full-thickness   through  the  stomach.   Upon               |
|completion of the fundoplication, the wrap was noted to be adequately loose               |
|again.  The wrap encompassed the 2 cm  above the gastroesophageal junction.              |
|                                                                                          |
|                                                                                          |
|The abdomen was then re-evaluated and  adequate  hemostasis  was noted. The               |
 
|bougie was removed without difficulty.   Additionally  the nasogastric tube               |
|was also removed without difficulty simultaneously.   The trocars were then               |
|removed individually under direct visualization  and  the  pneumoperitoneum               |
|was completely emptied from the abdomen.                                                  |
|                                                                                          |
|We   then  focused  our  attention  on   the   umbilical   hernia.       An               |
|infraumbilical semicircular incision  was  made  and  carried  down  to the               |
|fascial layer. The umbilicus was then   from the fascia, revealing               |
|an approximately 1 cm umbilical defect. This was reapproximated using three               |
|interrupted sutures with #0 Ethibond suture.  The umbilicus was then tacked               |
|down  to  the  fascia  with  #3-0 Vicryl  suture.  The  skin  incision  was               |
|reapproximated in a running subcuticular  fashion using #4-0 Vicryl suture.               |
|The two 10 mm trocar sites had the fascia  reapproximated  with #0 Ethibond               |
|suture.   The skin incisions were all  closed  in  a  running  subcuticular              |
|fashion using #4-0 Vicryl suture.  Steri-Strips  and sterile dressings were               |
|applied.                                                                                 |
|                                                                                          |
|The patient tolerated the procedure well  ,  was extubated in the Operating               |
|Room  and  was  transported  to  the Postanesthesia  Care  Unit  in  stable               |
|condition.                                                                               |
|                                                                                          |
|                                                                                          |
|                                   Ghassan Doll M.D.                                |
|                                                                                          |
|                                                                                          |
|                                   Sohail Curiel M.D.                                    |
|                                                                                          |
|DN/ljm                                                                                    |
|D: 1999                                                                             |
|T:  1999 12:21 P                                                                    |
|[id number]                                                                                    |
|                                                                                          |
|cc:   ERWIN STEEL MD                                                                 |
|      [address]                                                                   |
|      [place] [address]                                                                  |
|                                                                                          |
|                                                                                          |
|      Jimmy Esposito M.D.                                                                |
|                                                                                          |
|                                                                                          |
|      M. Dangelo Levi M.D., F.A.C.P., F.A.C.G.                                         |
+------------------------------------------------------------------------------------------+
 documented in this encounter
 
 Visit Diagnoses
 Not on filedocumented in this encounter

## 2019-12-06 NOTE — XMS
Encounter Summary
  Created on: 2019
 
 Shane Wyatttien BroussardJosue
 External Reference #: [id number]
 : 43
 Sex: Male
 
 Demographics
 
 
+-----------------------+---------------------------+
| Address               | [address]        |
|                       | [place]  [address] |
+-----------------------+---------------------------+
| Home Phone            | [phone]           |
+-----------------------+---------------------------+
| Preferred Language    | Unknown                   |
+-----------------------+---------------------------+
| Marital Status        |                    |
+-----------------------+---------------------------+
| Yazidism Affiliation | [id number]                      |
+-----------------------+---------------------------+
| Race                  | Unknown                   |
+-----------------------+---------------------------+
| Ethnic Group          | Unknown                   |
+-----------------------+---------------------------+
 
 
 Author
 
 
+--------------+--------------------------------------------+
| Author       | [facility]  |
|              | and [place]                                |
+--------------+--------------------------------------------+
| Organization | [facility]  |
|              | and [place]                                |
+--------------+--------------------------------------------+
| Address      | Unknown                                    |
+--------------+--------------------------------------------+
| Phone        | Unavailable                                |
+--------------+--------------------------------------------+
 
 
 
 Support
 
 
+---------------+--------------+--------------------+-----------------+
| Name          | Relationship | Address            | Phone           |
+---------------+--------------+--------------------+-----------------+
| Opal Shane | ECON         | [address]     | [phone] |
|               |              | [place]   |                 |
|               |              | [id number]              |                 |
+---------------+--------------+--------------------+-----------------+
 
 
 
 
 Care Team Providers
 
 
+-----------------------+------+-----------------+
| Care Team Member Name | Role | Phone           |
+-----------------------+------+-----------------+
| Erwin Iniguez MD | PCP  | [phone] |
+-----------------------+------+-----------------+
 
 
 
 Encounter Details
 
 
+--------+-----------+----------------------+--------------------+-------------+
| Date   | Type      | Department           | Care Team          | Description |
+--------+-----------+----------------------+--------------------+-------------+
| 05/16/ | Hospital  |   [facility] GENERIC IP     |   Conversion       | Pain        |
| 2018   | Encounter | CONVERSION [address]  | Transaction,       |             |
|        |           | [address]           | Provider Unknown   |             |
|        |           | [place], [place]         | [phone]       |             |
|        |           | [address]           | [phone] (Fax) |             |
|        |           | [phone]         |                    |             |
+--------+-----------+----------------------+--------------------+-------------+
 
 
 
 Social History
 
 
+---------------+------------+-----------+--------+------------------+
| Tobacco Use   | Types      | Packs/Day | Years  | Date             |
|               |            |           | Used   |                  |
+---------------+------------+-----------+--------+------------------+
| Former Smoker | Cigarettes | 1.3       | 34     | Quit: 1996 |
+---------------+------------+-----------+--------+------------------+
 
 
 
+---------------------+---+---+---+
| Smokeless Tobacco:  |   |   |   |
| Never Used          |   |   |   |
+---------------------+---+---+---+
 
 
 
+-------------+-------------+---------+----------+
| Alcohol Use | Drinks/Week | oz/Week | Comments |
+-------------+-------------+---------+----------+
| No          |             |         |          |
+-------------+-------------+---------+----------+
 
 
 
+------------------+---------------+
| Sex Assigned at  | Date Recorded |
| Birth            |               |
+------------------+---------------+
| Not on file      |               |
 
+------------------+---------------+
 
 
 
+----------------+-------------+-------------+
| Job Start Date | Occupation  | Industry    |
+----------------+-------------+-------------+
| Not on file    | Not on file | Not on file |
+----------------+-------------+-------------+
 
 
 
+----------------+--------------+------------+
| Travel History | Travel Start | Travel End |
+----------------+--------------+------------+
 
 
 
+-------------------------------------+
| No recent travel history available. |
+-------------------------------------+
 documented as of this encounter
 
 Medications at Time of Discharge
 
 
+----------------------+----------------------+-----------+---------+----------+-----------+
| Medication           | Sig                  | Dispensed | Refills | Start    | End Date  |
|                      |                      |           |         | Date     |           |
+----------------------+----------------------+-----------+---------+----------+-----------+
|   acyclovir          | Apply  topically     |           | 0       |          |           |
| (ZOVIRAX) 5%         | every 3 hours.       |           |         |          |           |
| ointment             |                      |           |         |          |           |
+----------------------+----------------------+-----------+---------+----------+-----------+
|   albuterol (PROAIR  | Inhale 2 puffs into  |           | 0       |          |           |
| HFA) 90 mcg/puff     | the lungs every 6    |           |         |          |           |
| inhaler              | hours as needed for  |           |         |          |           |
|                      | Wheezing.            |           |         |          |           |
+----------------------+----------------------+-----------+---------+----------+-----------+
|   digoxin (LANOXIN)  | Take 125 mcg by      |           | 0       |          |           |
| 250 mcg tablet       | mouth Daily.      |           |         |          |           |
|                      | capsule daily        |           |         |          |           |
+----------------------+----------------------+-----------+---------+----------+-----------+
|   ferrous sulfate    | Take 325 mg by mouth |           | 0       | 20 |           |
| 325 mg tablet        |  3 times daily.      |           |         | 12       |           |
+----------------------+----------------------+-----------+---------+----------+-----------+
|   fluticasone        | one spray in each    |           | 0       | 20 |           |
| (FLONASE) 50         | nostril daily as     |           |         | 12       |           |
| mcg/nasal spray      | needed               |           |         |          |           |
+----------------------+----------------------+-----------+---------+----------+-----------+
|                      | Inhale 1 puff into   |           | 0       |          |           |
| fluticasone-salmeter | the lungs Twice      |           |         |          |           |
| ol (ADVAIR) 500-50   | Daily.               |           |         |          |           |
| mcg/puff diskus      |                      |           |         |          |           |
| inhaler              |                      |           |         |          |           |
+----------------------+----------------------+-----------+---------+----------+-----------+
|   folic acid 1 mg    | Take 1 mg by mouth   |           | 0       |          |           |
| tablet               | Daily.               |           |         |          |           |
+----------------------+----------------------+-----------+---------+----------+-----------+
|   furosemide (LASIX) | Take 40 mg by mouth  |           | 0       |          |           |
 
|  40 mg tablet        | Daily.               |           |         |          |           |
+----------------------+----------------------+-----------+---------+----------+-----------+
|   guaiFENesin        | Take 1,200 mg by     |           | 0       |          |           |
| (MUCINEX) 600 mg 12  | mouth 2 times daily. |           |         |          |           |
| hr tablet            |                      |           |         |          |           |
+----------------------+----------------------+-----------+---------+----------+-----------+
|   levothyroxine      | Take 75 mcg by mouth |           | 0       | 20 |           |
| (LEVOTHROID) 75 MCG  |  Daily.              |           |         | 12       |           |
| tablet               |                      |           |         |          |           |
+----------------------+----------------------+-----------+---------+----------+-----------+
|   metoclopramide     | Take 10 mg by mouth  |           | 0       | 20 |           |
| (REGLAN) 10 mg       | 4 times daily as     |           |         | 12       |           |
| tablet               | needed.              |           |         |          |           |
+----------------------+----------------------+-----------+---------+----------+-----------+
|                      | Apply  topically 2   |           | 0       |          |           |
| nystatin-triamcinolo | times daily.         |           |         |          |           |
| ne (MYCOLOG II)      |                      |           |         |          |           |
| cream                |                      |           |         |          |           |
+----------------------+----------------------+-----------+---------+----------+-----------+
|   ofloxacin          |                      |           | 0       | 20 |           |
| (OCUFLOX) 0.3%       |                      |           |         | 18       |           |
| ophthalmic solution  |                      |           |         |          |           |
+----------------------+----------------------+-----------+---------+----------+-----------+
|   omeprazole         | Take 20 mg by mouth  |           | 0       | 20 |           |
| (PRILOSEC) 20 mg     | 2 times daily.       |           |         | 12       |           |
| capsule              |                      |           |         |          |           |
+----------------------+----------------------+-----------+---------+----------+-----------+
|   potassium citrate  | Take 10 mEq by mouth |           | 0       |          |           |
| (UROCIT-K) 10 mEq SR |  2 times daily.      |           |         |          |           |
|  tablet              |                      |           |         |          |           |
+----------------------+----------------------+-----------+---------+----------+-----------+
|   predniSONE         | Take 10 mg by mouth  |           | 0       |          |           |
| (DELTASONE) 5 mg     | Daily.               |           |         |          |           |
| tablet               |                      |           |         |          |           |
+----------------------+----------------------+-----------+---------+----------+-----------+
|   tamsulosin         | Take 0.4 mg by mouth |           | 0       | 20 |           |
| (FLOMAX) 0.4 mg CAPS |  2 times daily.      |           |         | 12       |           |
+----------------------+----------------------+-----------+---------+----------+-----------+
|   acyclovir          | Take 400 mg by mouth |           | 0       | 20 |  |
| (ZOVIRAX) 400 MG     |  3 times daily as    |           |         | 12       | 9         |
| tablet               | needed.              |           |         |          |           |
+----------------------+----------------------+-----------+---------+----------+-----------+
|   Cholecalciferol    | Take 2,000 Units by  |           | 0       | 20 |  |
| (VITAMIN D3) 2000    | mouth Daily.         |           |         | 12       | 9         |
| UNITS CAPS           |                      |           |         |          |           |
+----------------------+----------------------+-----------+---------+----------+-----------+
|   cyanocobalamin     | injected             |           | 0       | 20 |  |
| (VITAMIN B-12) 1,000 | intramuscularly once |           |         | 12       | 9         |
|  mcg/mL injection    |  a month             |           |         |          |           |
+----------------------+----------------------+-----------+---------+----------+-----------+
|   diltiazem          | Take 120 mg by mouth |           | 0       |          |  |
| (DILT-XR) 120 mg 24  |  Daily.              |           |         |          | 9         |
| hr capsule           |                      |           |         |          |           |
+----------------------+----------------------+-----------+---------+----------+-----------+
|   loratadine         | Take 10 mg by mouth  |           | 0       |          |  |
| (CLARITIN) 10 mg     | Daily.               |           |         |          | 9         |
| tablet               |                      |           |         |          |           |
+----------------------+----------------------+-----------+---------+----------+-----------+
|   losartan (COZAAR)  | Take 100 mg by mouth |           | 0       |          |  |
| 100 MG tablet        |  Daily. 1/2 daily    |           |         |          | 9         |
 
+----------------------+----------------------+-----------+---------+----------+-----------+
|   methotrexate 2.5   | Take 15 mg by mouth  |           | 0       |          |  |
| mg tablet            | Once a week.         |           |         |          | 9         |
+----------------------+----------------------+-----------+---------+----------+-----------+
|                      | Take 1 tablet by     |           | 0       |          |  |
| oxyCODONE-acetaminop | mouth every 4 hours  |           |         |          | 9         |
| hen (PERCOCET) 5-325 | as needed for Pain.  |           |         |          |           |
|  mg per tablet       |                      |           |         |          |           |
+----------------------+----------------------+-----------+---------+----------+-----------+
|   pseudoePHEDrine    | Take 30 mg by mouth  |           | 0       |          |  |
| (SUDOGEST) 30 mg     | every 4 hours as     |           |         |          | 9         |
| tablet               | needed for           |           |         |          |           |
|                      | Congestion.          |           |         |          |           |
+----------------------+----------------------+-----------+---------+----------+-----------+
|   rivaroxaban        | Take 20 mg by mouth  |           | 0       |          |  |
| (XARELTO) 20 mg      | Daily (with dinner). |           |         |          | 9         |
| tablet               |                      |           |         |          |           |
+----------------------+----------------------+-----------+---------+----------+-----------+
|   sertraline         | 2 tablets daily      |           | 0       | 20 |  |
| (ZOLOFT) 100 mg      |                      |           |         | 12       | 9         |
| tablet               |                      |           |         |          |           |
+----------------------+----------------------+-----------+---------+----------+-----------+
 documented as of this encounter
 
 Plan of Treatment
 
 
+--------+---------+-------------+----------------------+-------------+
| Date   | Type    | Specialty   | Care Team            | Description |
+--------+---------+-------------+----------------------+-------------+
| / | Office  | Pulmonology |   Juan F,          |             |
| 2019   | Visit   |             | Jessica Cheung,   |             |
|        |         |             | MD [address] |             |
|        |         |             |   [place],   |             |
|        |         |             | [place] [address]             |             |
|        |         |             | [phone]         |             |
|        |         |             | [phone] (Fax)   |             |
+--------+---------+-------------+----------------------+-------------+
| / | Office  | Cardiology  |   Eric Akins, |             |
|    | Visit   |             |  MD [address]   |             |
|        |         |             | [place]  |             |
|        |         |             | [address]  [phone]  |             |
|        |         |             |  [phone] (Fax)  |             |
+--------+---------+-------------+----------------------+-------------+
 documented as of this encounter
 
 Procedures
 
 
+----------------------+--------+-------------+----------------------+----------------------
+
| Procedure Name       | Priori | Date/Time   | Associated Diagnosis | Comments             
|
|                      | ty     |             |                      |                      
|
+----------------------+--------+-------------+----------------------+----------------------
+
| CT ANGIOGRAM ABDOMEN | Routin | 2018  |                      |   Results for this   
|
|  PELVIS W CONTRAST   | e      |  9:20 AM    |                      | procedure are in the 
 
|
|                      |        | PDT         |                      |  results section.    
|
+----------------------+--------+-------------+----------------------+----------------------
+
 documented in this encounter
 
 Results
 CT Angiogram Abdomen Pelvis w Contrast (2018  9:20 AM PDT)
 
+----------+
| Specimen |
+----------+
|          |
+----------+
 
 
 
+------------------------------------------------------------------------+--------------+
| Narrative                                                              | Performed At |
+------------------------------------------------------------------------+--------------+
|   This is a non-reportable procedure without a radiologist report and  |              |
| is  used for image storage only                                        |              |
+------------------------------------------------------------------------+--------------+
 
 
 
+--------------------------------------------------------------------------------------+
| Procedure Note                                                                       |
+--------------------------------------------------------------------------------------+
|   Maurizio, Rad Conversion - 2019  4:21 AM PDT  This is a non-reportable procedure  |
| without a radiologist report and isused for image storage only                       |
+--------------------------------------------------------------------------------------+
 documented in this encounter
 
 Visit Diagnoses
 
 
+--------------------------+
| Diagnosis                |
+--------------------------+
|   Pain  Generalized pain |
+--------------------------+
 documented in this encounter

## 2019-12-06 NOTE — XMS
Encounter Summary
  Created on: 2019
 
 Wyatt Shane
 External Reference #: [id number]
 : 43
 Sex: Male
 
 Demographics
 
 
+-----------------------+------------------------+
| Address               | [address]     |
|                       | [place]  [address]   |
+-----------------------+------------------------+
| Home Phone            | [phone]        |
+-----------------------+------------------------+
| Preferred Language    | Unknown                |
+-----------------------+------------------------+
| Marital Status        |                 |
+-----------------------+------------------------+
| Lutheran Affiliation | LUT                    |
+-----------------------+------------------------+
| Race                  | White                  |
+-----------------------+------------------------+
| Ethnic Group          | Not  or  |
+-----------------------+------------------------+
 
 
 Author
 
 
+--------------+------------------------------+
| Author       | [facility] |
+--------------+------------------------------+
| Organization | [facility] |
+--------------+------------------------------+
| Address      | Unknown                      |
+--------------+------------------------------+
| Phone        | Unavailable                  |
+--------------+------------------------------+
 
 
 
 Support
 
 
+---------------+--------------+---------+-----------------+
| Name          | Relationship | Address | Phone           |
+---------------+--------------+---------+-----------------+
| Opal Shane | ECON         | Unknown | [phone] |
+---------------+--------------+---------+-----------------+
 
 
 
 Care Team Providers
 
 
 
+-----------------------+------+-----------------+
| Care Team Member Name | Role | Phone           |
+-----------------------+------+-----------------+
| Erwin Iniguez MD   | PCP  | [phone] |
+-----------------------+------+-----------------+
 
 
 
 Encounter Details
 
 
+--------+-------------+-----------------+---------------------+---------------+
| Date   | Type        | Department      | Care Team           | Description   |
+--------+-------------+-----------------+---------------------+---------------+
| / | Office      |   [facility]  |   Note, Outpatient  | Progress Note |
|    | Visit-Trans | MEDICINE        | Clinic              |               |
|        | cribed      |                 |                     |               |
+--------+-------------+-----------------+---------------------+---------------+
 
 
 
 Social History
 
 
+----------------+-------+-----------+--------+------+
| Tobacco Use    | Types | Packs/Day | Years  | Date |
|                |       |           | Used   |      |
+----------------+-------+-----------+--------+------+
| Never Assessed |       |           |        |      |
+----------------+-------+-----------+--------+------+
 
 
 
+------------------+---------------+
| Sex Assigned at  | Date Recorded |
| Birth            |               |
+------------------+---------------+
| Not on file      |               |
+------------------+---------------+
 
 
 
+----------------+-------------+-------------+
| Job Start Date | Occupation  | Industry    |
+----------------+-------------+-------------+
| Not on file    | Not on file | Not on file |
+----------------+-------------+-------------+
 
 
 
+----------------+--------------+------------+
| Travel History | Travel Start | Travel End |
+----------------+--------------+------------+
 
 
 
+-------------------------------------+
| No recent travel history available. |
+-------------------------------------+
 documented as of this encounter
 
 
 Progress Notes
 Interface, Transcription In - 2006  5:02 AM PSTCLINIC DATE:       1999
 
                            OTOLARYNGOLOGY CLINIC
 
 SUBJECTIVE:  Wyatt is seen back in followup.  He is breathing better than he
 was before his nisin fundoplication and dilatation but still feels there is
 some limitation in his exercise tolerance  which  has  precluded  him  from
 returning  to work on a full-time basis.  His  voice  remains  stable.  His
 swallowing  remains stable and he really  feels  that  overall  his  reflux
 situation has improved.
 
 PHYSICAL EXAMINATION:  A flexible fiberoptic  laryngoscopy  is  done.   The
 previous inflammation of his larynx has  more or less resolved itself. With
 this being gone, there is only a mild edema and one can now see that he has
 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
 commissure that prevents his cords from fully abducting.  The configuration
 of his anterior neocord is actually pretty  good. There is some fullness of
 the false cord as one sees after vertical hemilaryngectomy but nothing that
 is suspicious for recurrence of disease.
 
 ASSESSMENT: Post pharynglottic stenosis.
 
 PLAN:  He will continue to heal for another  month  from  resolution of his
 gastroesophageal reflux. If he does  not  feel  any  better  from an airway
 standpoint in a month, he will call  and  we  will  set him up for dilation
 possibly with laser lysis of his posterior  commissure  band  at  the  same
 time.  I will see him back in six weeks.
 
 Jimmy Esposito M.D.
 , Otolaryngology
 Head and Neck Surgery
 
 [facility]
 D: 1999
 T: 1999
 
 Cc:
 
 Ghassan Doll M.D.
 
 General Surgery
 
 [id number] signed by Interface, Transcription In at 2006  5:02 AM PSTdocume
nted in this encounter
 
 Plan of Treatment
 Not on filedocumented as of this encounter
 
 Visit Diagnoses
 Not on filedocumented in this encounter

## 2019-12-06 NOTE — XMS
Encounter Summary
  Created on: 2019
 
 Wyatt Shane
 External Reference #: [id number]
 : 43
 Sex: Male
 
 Demographics
 
 
+-----------------------+------------------------+
| Address               | [address]     |
|                       | [place]  [address]   |
+-----------------------+------------------------+
| Home Phone            | [phone]        |
+-----------------------+------------------------+
| Preferred Language    | Unknown                |
+-----------------------+------------------------+
| Marital Status        |                 |
+-----------------------+------------------------+
| Lutheran Affiliation | LUT                    |
+-----------------------+------------------------+
| Race                  | White                  |
+-----------------------+------------------------+
| Ethnic Group          | Not  or  |
+-----------------------+------------------------+
 
 
 Author
 
 
+--------------+-------------+
| Organization | Unknown     |
+--------------+-------------+
| Address      | Unknown     |
+--------------+-------------+
| Phone        | Unavailable |
+--------------+-------------+
 
 
 
 Support
 
 
+---------------+--------------+---------+-----------------+
| Name          | Relationship | Address | Phone           |
+---------------+--------------+---------+-----------------+
| Opal Shane | ECON         | Unknown | [phone] |
+---------------+--------------+---------+-----------------+
 
 
 
 Care Team Providers
 
 
+-----------------------+------+-----------------+
| Care Team Member Name | Role | Phone           |
 
+-----------------------+------+-----------------+
| Erwin Steel MD   | PCP  | [phone] |
+-----------------------+------+-----------------+
 
 
 
 Encounter Details
 
 
+--------+-------------+------------+----------------------+------------------+
| Date   | Type        | Department | Care Team            | Description      |
+--------+-------------+------------+----------------------+------------------+
| / | Discharge   |            |   Summary, Discharge | D/C Summary ODDS |
| 1998   | Summary-Tra |            |                      |                  |
|        | nscribed    |            |                      |                  |
+--------+-------------+------------+----------------------+------------------+
 
 
 
 Social History
 
 
+----------------+-------+-----------+--------+------+
| Tobacco Use    | Types | Packs/Day | Years  | Date |
|                |       |           | Used   |      |
+----------------+-------+-----------+--------+------+
| Never Assessed |       |           |        |      |
+----------------+-------+-----------+--------+------+
 
 
 
+------------------+---------------+
| Sex Assigned at  | Date Recorded |
| Birth            |               |
+------------------+---------------+
| Not on file      |               |
+------------------+---------------+
 
 
 
+----------------+-------------+-------------+
| Job Start Date | Occupation  | Industry    |
+----------------+-------------+-------------+
| Not on file    | Not on file | Not on file |
+----------------+-------------+-------------+
 
 
 
+----------------+--------------+------------+
| Travel History | Travel Start | Travel End |
+----------------+--------------+------------+
 
 
 
+-------------------------------------+
| No recent travel history available. |
+-------------------------------------+
 documented as of this encounter
 
 Discharge Summaries
 
 Interface, Transcription In - 2007  5:11 AM [address] [address] [phone]
                      [facility]
 
 MEDICAL SUMMARY OF HOSPITALIZATION
 
 Med Rec No: [id number]             Admission Date: 1998
 
 Name: Wyatt Shane                    Discharge Date:  1998
 
 STAFF PHYSICIAN:
                                    Jimmy Esposito M.D.
                                    , Otolaryngology
                                    Head and Neck Surgery
 
 PRINCIPAL FINAL DIAGNOSIS:          Vocal cord squamous cell carcinoma.
 
 ADDITIONAL DIAGNOSES:
 1.  Hypertension.
 2.  Arthritis.
 3.  Nephrolithiasis.
 4.  Hyperlipidemia.
 5.  Coronary artery disease, status post  coronary  artery bypass graft x 4
    in 1996.
 
 PRINCIPAL PROCEDURE:                Right hemilaryngectomy.
 
 ADDITIONAL PROCEDURES:
 1.  Trach placed 98, removed 98.
 2.  Dobbhoff placed and removed.
 3.  Speech, Occupational Therapy, Physical  Therapy and Nutrition consults.
 
 REASON FOR ADMISSION:                Mr. Shane  is  a 52-year-old gentleman
 with complaint of twelve months of hoarseness.   He  has  been followed for
 the past year for a true vocal cord lesion with treatments, including laser
 surgery x 2.  He was referred to Dr. Esposito  in 1998 for further
 evaluation.  Biopsy in  shows squamous  cell  carcinoma  of  the  true
 vocal  cord  on  the  right.   He  is being  admitted  for  right  vertical
 hemilaryngectomy.
 
 HOSPITAL  COURSE:                      The  patient  was  admitted  to  the
 hospital  on  98  where  he  underwent   a   right  hemilaryngectomy,
 tracheostomy and Dobbhoff placement.   The  patient tolerated the procedure
 well without complication.  Postoperatively,  he  was  transferred  to  the
 surgical torres where he had an unremarkable hospital course.
 
 The  final  pathology on frozen section  intraoperatively  showed  negative
 margins for squamous cell carcinoma.  The patient was rapidly advanced from
 trach to metal trach to decannulation  by  postoperative  day five.  Speech
 Therapy noted no difficulty in swallowing.   A  Dobbhoff  was  taken out on
 postoperative  day  number  five.  The  patient  was  discharged  from  the
 hospital on postoperative day five,  ambulating  without  difficulty,  with
 good  p.o.  intake,  without  aspiration.   He   was   controlled  on  p.o.
 medications.  Incisions were clean, dry and intact.  Sutures were removed.
 
 CONDITION ON DISCHARGE:              Stable  to  home  in [place], [place]
 with wife.
 
 
 DISCHARGE MEDICATION(S):
 1.  Augmentin 875 mg p.o. b.i.d. x seven days.
 2.  Zantac 150 mg p.o. b.i.d.
 3.  Lipitor 10 mg q.d.
 4.  Multivitamin p.o. q.d.
 5.  Vicodin 1-2 tabs p.o. q.4h. p.r.n.
 6.  Norvasc 10 mg p.o. q.d.
 
 DISCHARGE INSTRUCTION(S):
 DIET:   Regular.
 ACTIVITY:  Ad coby.
 FOLLOWUP:   With Dr. Esposito in two weeks  in  the  ENT  Clinic  with  speech
 evaluation and swallowing at that time as well.
 
 Dangelo Byers M.D.                      Jimmy Esposito M.D.
 Resident, Pediatric Surgery            , Otolaryngology
                                        Head and Neck Surgery
 
 [facility]
 D:  1998
 T:  1998  9:39 A
 
 cc:
 
       ERWIN STEEL MD
       [address]
       [place] [id number] signed by Interface, Transcription In at 2007 
 5:11 AM PSTdocumented in this encounter
 
 Plan of Treatment
 Not on filedocumented as of this encounter
 
 Visit Diagnoses
 Not on filedocumented in this encounter

## 2019-12-06 NOTE — XMS
Encounter Summary
  Created on: 2019
 
 Wyatt Shane
 External Reference #: [id number]
 : 43
 Sex: Male
 
 Demographics
 
 
+-----------------------+------------------------+
| Address               | [address]     |
|                       | [place]  [address]   |
+-----------------------+------------------------+
| Home Phone            | [phone]        |
+-----------------------+------------------------+
| Preferred Language    | Unknown                |
+-----------------------+------------------------+
| Marital Status        |                 |
+-----------------------+------------------------+
| Yazidi Affiliation | LUT                    |
+-----------------------+------------------------+
| Race                  | White                  |
+-----------------------+------------------------+
| Ethnic Group          | Not  or  |
+-----------------------+------------------------+
 
 
 Author
 
 
+--------------+------------------------------+
| Author       | [facility] |
+--------------+------------------------------+
| Organization | [facility] |
+--------------+------------------------------+
| Address      | Unknown                      |
+--------------+------------------------------+
| Phone        | Unavailable                  |
+--------------+------------------------------+
 
 
 
 Support
 
 
+---------------+--------------+---------+-----------------+
| Name          | Relationship | Address | Phone           |
+---------------+--------------+---------+-----------------+
| Opal Shane | ECON         | Unknown | [phone] |
+---------------+--------------+---------+-----------------+
 
 
 
 Care Team Providers
 
 
 
+-----------------------+------+-------------+
| Care Team Member Name | Role | Phone       |
+-----------------------+------+-------------+
 PCP  | Unavailable |
+-----------------------+------+-------------+
 
 
 
 Encounter Details
 
 
+--------+----------+----------------------+---------------------+-------------+
| Date   | Type     | Department           | Care Team           | Description |
+--------+----------+----------------------+---------------------+-------------+
| / | Results  |   General Surgery    |   Ghassan Doll,  |             |
|    | Only     | [address]  | MD  [address]     |             |
|        |          | [place]  Mailcode:   | [address]     |             |
|        |          | [id number]  [address]   | [place], [place]        |             |
|        |          | [address]     | [address]          |             |
|        |          | [place], [place]         | [phone]        |             |
|        |          | [address]           | [phone] (Fax)  |             |
|        |          | [phone]         |                     |             |
+--------+----------+----------------------+---------------------+-------------+
 
 
 
 Social History
 
 
+----------------+-------+-----------+--------+------+
| Tobacco Use    | Types | Packs/Day | Years  | Date |
|                |       |           | Used   |      |
+----------------+-------+-----------+--------+------+
| Never Assessed |       |           |        |      |
+----------------+-------+-----------+--------+------+
 
 
 
+------------------+---------------+
| Sex Assigned at  | Date Recorded |
| Birth            |               |
+------------------+---------------+
| Not on file      |               |
+------------------+---------------+
 
 
 
+----------------+-------------+-------------+
| Job Start Date | Occupation  | Industry    |
+----------------+-------------+-------------+
| Not on file    | Not on file | Not on file |
+----------------+-------------+-------------+
 
 
 
+----------------+--------------+------------+
| Travel History | Travel Start | Travel End |
+----------------+--------------+------------+
 
 
 
 
+-------------------------------------+
| No recent travel history available. |
+-------------------------------------+
 documented as of this encounter
 
 Plan of Treatment
 Not on filedocumented as of this encounter
 
 Procedures
 
 
+----------------+--------+-------------+----------------------+----------------------+
| Procedure Name | Priori | Date/Time   | Associated Diagnosis | Comments             |
|                | ty     |             |                      |                      |
+----------------+--------+-------------+----------------------+----------------------+
| ESOPHAGRAM     | Urgent | 1999  |                      |   Results for this   |
|                |        | 10:33 AM    |                      | procedure are in the |
|                |        | PDT         |                      |  results section.    |
+----------------+--------+-------------+----------------------+----------------------+
 documented in this encounter
 
 Results
 ESOPHAGRAM (1999 10:33 AM PDT)
 
+-------------+--------------------------+-----------+------------+--------------+
| Component   | Value                    | Ref Range | Performed  | Pathologist  |
|             |                          |           | At         | Signature    |
+-------------+--------------------------+-----------+------------+--------------+
| ESOPHAGUS,  | Radiologist 1: NONI,   |           |            |              |
| W/BARIUM    | CARMINA HARDY              |           |            |              |
|             | M.D.ESOPHAGRAM:          |           |            |              |
|             |   99.    Dictated  |           |            |              |
|             | 99 FINDINGS:   The |           |            |              |
|             |  esophageal motility was |           |            |              |
|             |  normal.   The distal    |           |            |              |
|             | most 5 cmof the          |           |            |              |
|             | esophagus is             |           |            |              |
|             | persistently narrowed.   |           |            |              |
|             |   The narrowing is       |           |            |              |
|             | smoothwith no mucosal    |           |            |              |
|             | abnormality.   The       |           |            |              |
|             | maximum diameter of the  |           |            |              |
|             | narrowingis              |           |            |              |
|             | approximately 5 mm.   No |           |            |              |
|             |  other esophageal        |           |            |              |
|             | abnormality was seen.    |           |            |              |
|             |   Theright vocal cord    |           |            |              |
|             | appeared to be           |           |            |              |
|             | paralyzed. IMPRESSION:   |           |            |              |
|             | The persistent narrowing |           |            |              |
|             |  of the distal esophagus |           |            |              |
|             |  is thought to be dueto  |           |            |              |
|             | the recent Nissen        |           |            |              |
|             | fundoplication.   It is  |           |            |              |
|             | thought to be            |           |            |              |
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
|             |  paralysis of the right  |           |            |              |
|             | vocal cord.   The        |           |            |              |
|             | examinationis otherwise  |           |            |              |
 
|             | negative.   END OF       |           |            |              |
|             | IMPRESSION:              |           |            |              |
+-------------+--------------------------+-----------+------------+--------------+
 
 
 
+----------+
| Specimen |
+----------+
|          |
+----------+
 
 
 
+----------------------+---------+--------------------+--------------+
| Performing           | Address | City/State/Zipcode | Phone Number |
| Organization         |         |                    |              |
+----------------------+---------+--------------------+--------------+
|   [facility] DEPARTMENT OF |         |                    |              |
|  RADIOLOGY           |         |                    |              |
+----------------------+---------+--------------------+--------------+
 documented in this encounter
 
 Visit Diagnoses
 Not on filedocumented in this encounter

## 2019-12-06 NOTE — XMS
Encounter Summary
  Created on: 2019
 
 Wyatt Shane
 External Reference #: [id number]
 : 43
 Sex: Male
 
 Demographics
 
 
+-----------------------+------------------------+
| Address               | [address]     |
|                       | [place]  [address]   |
+-----------------------+------------------------+
| Home Phone            | [phone]        |
+-----------------------+------------------------+
| Preferred Language    | Unknown                |
+-----------------------+------------------------+
| Marital Status        |                 |
+-----------------------+------------------------+
| Spiritism Affiliation | LUT                    |
+-----------------------+------------------------+
| Race                  | White                  |
+-----------------------+------------------------+
| Ethnic Group          | Not  or  |
+-----------------------+------------------------+
 
 
 Author
 
 
+--------------+------------------------------+
| Author       | [facility] |
+--------------+------------------------------+
| Organization | [facility] |
+--------------+------------------------------+
| Address      | Unknown                      |
+--------------+------------------------------+
| Phone        | Unavailable                  |
+--------------+------------------------------+
 
 
 
 Support
 
 
+---------------+--------------+---------+-----------------+
| Name          | Relationship | Address | Phone           |
+---------------+--------------+---------+-----------------+
| Opal Shane | ECON         | Unknown | [phone] |
+---------------+--------------+---------+-----------------+
 
 
 
 Care Team Providers
 
 
 
+-----------------------+------+-----------------+
| Care Team Member Name | Role | Phone           |
+-----------------------+------+-----------------+
| Erwin Iniguez MD   | PCP  | [phone] |
+-----------------------+------+-----------------+
 
 
 
 Encounter Details
 
 
+--------+----------+----------------------+-----------+-------------+
| Date   | Type     | Department           | Care Team | Description |
+--------+----------+----------------------+-----------+-------------+
| / | Results  |   Registration  [address] |           |             |
|    | Only     |  [address] |           |             |
|        |          |  [place]  Mailcode: [id number] |           |             |
|        |          |   [place], [place]       |           |             |
|        |          | [address]           |           |             |
|        |          | [phone]         |           |             |
+--------+----------+----------------------+-----------+-------------+
 
 
 
 Social History
 
 
+----------------+-------+-----------+--------+------+
| Tobacco Use    | Types | Packs/Day | Years  | Date |
|                |       |           | Used   |      |
+----------------+-------+-----------+--------+------+
| Never Assessed |       |           |        |      |
+----------------+-------+-----------+--------+------+
 
 
 
+------------------+---------------+
| Sex Assigned at  | Date Recorded |
| Birth            |               |
+------------------+---------------+
| Not on file      |               |
+------------------+---------------+
 
 
 
+----------------+-------------+-------------+
| Job Start Date | Occupation  | Industry    |
+----------------+-------------+-------------+
| Not on file    | Not on file | Not on file |
+----------------+-------------+-------------+
 
 
 
+----------------+--------------+------------+
| Travel History | Travel Start | Travel End |
+----------------+--------------+------------+
 
 
 
+-------------------------------------+
 
| No recent travel history available. |
+-------------------------------------+
 documented as of this encounter
 
 Plan of Treatment
 Not on filedocumented as of this encounter
 
 Procedures
 
 
+---------------------+--------+-------------+----------------------+----------------------+
| Procedure Name      | Priori | Date/Time   | Associated Diagnosis | Comments             |
|                     | ty     |             |                      |                      |
+---------------------+--------+-------------+----------------------+----------------------+
| CHEMISTRY TESTS 4   | Routin | 1999  |                      |   Results for this   |
|                     | e      |  1:50 PM    |                      | procedure are in the |
|                     |        | PDT         |                      |  results section.    |
+---------------------+--------+-------------+----------------------+----------------------+
| CBC TESTS 2         | Routin | 1999  |                      |   Results for this   |
|                     | e      |  1:50 PM    |                      | procedure are in the |
|                     |        | PDT         |                      |  results section.    |
+---------------------+--------+-------------+----------------------+----------------------+
| COAGULATION TESTS 2 | Routin | 1999  |                      |   Results for this   |
|                     | e      |  1:50 PM    |                      | procedure are in the |
|                     |        | PDT         |                      |  results section.    |
+---------------------+--------+-------------+----------------------+----------------------+
 documented in this encounter
 
 Results
 CHEMISTRY TESTS 4 (1999  1:50 PM PDT)
 
+-------------+-----------------+-----------+------------+--------------+
| Component   | Value           | Ref Range | Performed  | Pathologist  |
|             |                 |           | At         | Signature    |
+-------------+-----------------+-----------+------------+--------------+
| SODIUM,     |       140.      | mmol/l    |            |              |
| PLASMA      |                 |           |            |              |
| (LAB)       |                 |           |            |              |
+-------------+-----------------+-----------+------------+--------------+
| POTASSIUM,  |         3.3 (L) | mmol/l    |            |              |
| PLASMA      |                 |           |            |              |
| (LAB)       |                 |           |            |              |
+-------------+-----------------+-----------+------------+--------------+
| CHLORIDE,   |       101.      | mmol/l    |            |              |
| PLASMA      |                 |           |            |              |
| (LAB)       |                 |           |            |              |
+-------------+-----------------+-----------+------------+--------------+
| TOTAL CO2,  |        33. (H)  | mmol/l    |            |              |
| PLASMA      |                 |           |            |              |
| (LAB)       |                 |           |            |              |
+-------------+-----------------+-----------+------------+--------------+
| BUN, PLASMA |        10.      | mg/dL     |            |              |
|  (LAB)      |                 |           |            |              |
+-------------+-----------------+-----------+------------+--------------+
| CREATININE  |         1.1     | mg/dL     |            |              |
| PLASMA      |                 |           |            |              |
| (LAB)       |                 |           |            |              |
+-------------+-----------------+-----------+------------+--------------+
| GLUCOSE,    |       105.      | mg/dL     |            |              |
| PLASMA      |                 |           |            |              |
 
| (LAB)       |                 |           |            |              |
+-------------+-----------------+-----------+------------+--------------+
| CALCIUM,    |         9.5     | mg/dL     |            |              |
| PLASMA      |                 |           |            |              |
| (LAB)       |                 |           |            |              |
+-------------+-----------------+-----------+------------+--------------+
| MAGNESIUM,P |         1.7     | mg/dL     |            |              |
| LASMA       |                 |           |            |              |
+-------------+-----------------+-----------+------------+--------------+
| PHOSPHORUS, |         3.3     | mg/dL     |            |              |
|  PLASMA     |                 |           |            |              |
| (LAB)       |                 |           |            |              |
+-------------+-----------------+-----------+------------+--------------+
| ALBUMIN,    |         4.1     | GM/DL     |            |              |
| PLASMA      |                 |           |            |              |
| (LAB)       |                 |           |            |              |
+-------------+-----------------+-----------+------------+--------------+
 
 
 
+----------+
| Specimen |
+----------+
|          |
+----------+
 
 
 
+----------------------+------------------------+--------------------+--------------+
| Performing           | Address                | City/State/Zipcode | Phone Number |
| Organization         |                        |                    |              |
+----------------------+------------------------+--------------------+--------------+
|   [facility] |   [address]  | [place] [address] |              |
|  PATHOLOGY           | [address]                |                    |              |
+----------------------+------------------------+--------------------+--------------+
 COAGULATION TESTS 2 (1999  1:50 PM PDT)
 
+-----------+------------------+-----------+------------+--------------+
| Component | Value            | Ref Range | Performed  | Pathologist  |
|           |                  |           | At         | Signature    |
+-----------+------------------+-----------+------------+--------------+
| INR       |         0.94 (L) | INR       |            |              |
+-----------+------------------+-----------+------------+--------------+
| APTT      |        30.3      | SECONDS   |            |              |
+-----------+------------------+-----------+------------+--------------+
 
 
 
+----------+
| Specimen |
+----------+
|          |
+----------+
 
 
 
+----------------------+------------------------+--------------------+--------------+
| Performing           | Address                | City/State/Zipcode | Phone Number |
| Organization         |                        |                    |              |
+----------------------+------------------------+--------------------+--------------+
 
|   [facility] |   [address]  | [place] [address] |              |
|  PATHOLOGY           | [address]                |                    |              |
+----------------------+------------------------+--------------------+--------------+
 CBC TESTS 2 (1999  1:50 PM PDT)
 
+-------------+------------------+-----------+------------+--------------+
| Component   | Value            | Ref Range | Performed  | Pathologist  |
|             |                  |           | At         | Signature    |
+-------------+------------------+-----------+------------+--------------+
| WHITE CELL  |         6.       | K/CU MM   |            |              |
| COUNT       |                  |           |            |              |
+-------------+------------------+-----------+------------+--------------+
| RED CELL    |         3.87 (L) | M/CU MM   |            |              |
| COUNT       |                  |           |            |              |
+-------------+------------------+-----------+------------+--------------+
| HEMOGLOBIN  |        12.2 (L)  | GM/DL     |            |              |
+-------------+------------------+-----------+------------+--------------+
| HEMATOCRIT  |        34.9 (L)  | %         |            |              |
+-------------+------------------+-----------+------------+--------------+
| MCV         |        90.2      | FL        |            |              |
+-------------+------------------+-----------+------------+--------------+
| MCH         |        31.5      | PG        |            |              |
+-------------+------------------+-----------+------------+--------------+
| MCHC        |        34.9 (H)  | GM/DL     |            |              |
+-------------+------------------+-----------+------------+--------------+
| RDW         |        15.4 (H)  | %         |            |              |
+-------------+------------------+-----------+------------+--------------+
| PLATELET    |       249.       | K/CU MM   |            |              |
| COUNT       |                  |           |            |              |
+-------------+------------------+-----------+------------+--------------+
| MPV         |         8.3      | FL        |            |              |
+-------------+------------------+-----------+------------+--------------+
 
 
 
+----------+
| Specimen |
+----------+
|          |
+----------+
 
 
 
+----------------------+------------------------+--------------------+--------------+
| Performing           | Address                | City/State/Zipcode | Phone Number |
| Organization         |                        |                    |              |
+----------------------+------------------------+--------------------+--------------+
|   [facility] |   [address]  | [place] [address] |              |
|  PATHOLOGY           | [address]                |                    |              |
+----------------------+------------------------+--------------------+--------------+
 documented in this encounter
 
 Visit Diagnoses
 Not on filedocumented in this encounter

## 2019-12-06 NOTE — XMS
Encounter Summary
  Created on: 2019
 
 Wyatt Shane
 External Reference #: [id number]
 : 43
 Sex: Male
 
 Demographics
 
 
+-----------------------+------------------------+
| Address               | [address]     |
|                       | [place]  [address]   |
+-----------------------+------------------------+
| Home Phone            | [phone]        |
+-----------------------+------------------------+
| Preferred Language    | Unknown                |
+-----------------------+------------------------+
| Marital Status        |                 |
+-----------------------+------------------------+
| Sikhism Affiliation | LUT                    |
+-----------------------+------------------------+
| Race                  | White                  |
+-----------------------+------------------------+
| Ethnic Group          | Not  or  |
+-----------------------+------------------------+
 
 
 Author
 
 
+--------------+-------------+
| Organization | Unknown     |
+--------------+-------------+
| Address      | Unknown     |
+--------------+-------------+
| Phone        | Unavailable |
+--------------+-------------+
 
 
 
 Support
 
 
+---------------+--------------+---------+-----------------+
| Name          | Relationship | Address | Phone           |
+---------------+--------------+---------+-----------------+
| Opal Shane | ECON         | Unknown | [phone] |
+---------------+--------------+---------+-----------------+
 
 
 
 Care Team Providers
 
 
+-----------------------+------+-----------------+
| Care Team Member Name | Role | Phone           |
 
+-----------------------+------+-----------------+
| Erwin Iniguez MD   | PCP  | [phone] |
+-----------------------+------+-----------------+
 
 
 
 Encounter Details
 
 
+--------+-------------+------------+--------------------+--------------------+
| Date   | Type        | Department | Care Team          | Description        |
+--------+-------------+------------+--------------------+--------------------+
| / | Procedure - |            |   Documentation,   | OP REPORT-TEACHING |
|    |             |            | Teaching Physician |                    |
|        | Transcribed |            |                    |                    |
+--------+-------------+------------+--------------------+--------------------+
 
 
 
 Social History
 
 
+----------------+-------+-----------+--------+------+
| Tobacco Use    | Types | Packs/Day | Years  | Date |
|                |       |           | Used   |      |
+----------------+-------+-----------+--------+------+
| Never Assessed |       |           |        |      |
+----------------+-------+-----------+--------+------+
 
 
 
+------------------+---------------+
| Sex Assigned at  | Date Recorded |
| Birth            |               |
+------------------+---------------+
| Not on file      |               |
+------------------+---------------+
 
 
 
+----------------+-------------+-------------+
| Job Start Date | Occupation  | Industry    |
+----------------+-------------+-------------+
| Not on file    | Not on file | Not on file |
+----------------+-------------+-------------+
 
 
 
+----------------+--------------+------------+
| Travel History | Travel Start | Travel End |
+----------------+--------------+------------+
 
 
 
+-------------------------------------+
| No recent travel history available. |
+-------------------------------------+
 documented as of this encounter
 
 Plan of Treatment
 
 Not on filedocumented as of this encounter
 
 Procedures
 
 
+--------------------+--------+------------+----------------------+----------+
| Procedure Name     | Priori | Date/Time  | Associated Diagnosis | Comments |
|                    | ty     |            |                      |          |
+--------------------+--------+------------+----------------------+----------+
| TEACHING PHYSICIAN |        | 1999 |                      |          |
+--------------------+--------+------------+----------------------+----------+
 documented in this encounter
 
 Visit Diagnoses
 Not on filedocumented in this encounter

## 2019-12-06 NOTE — XMS
Encounter Summary
  Created on: 2019
 
 Wyatt Shane
 External Reference #: [id number]
 : 43
 Sex: Male
 
 Demographics
 
 
+-----------------------+------------------------+
| Address               | [address]     |
|                       | [place]  [address]   |
+-----------------------+------------------------+
| Home Phone            | [phone]        |
+-----------------------+------------------------+
| Preferred Language    | Unknown                |
+-----------------------+------------------------+
| Marital Status        |                 |
+-----------------------+------------------------+
| Rastafari Affiliation | LUT                    |
+-----------------------+------------------------+
| Race                  | White                  |
+-----------------------+------------------------+
| Ethnic Group          | Not  or  |
+-----------------------+------------------------+
 
 
 Author
 
 
+--------------+------------------------------+
| Author       | [facility] |
+--------------+------------------------------+
| Organization | [facility] |
+--------------+------------------------------+
| Address      | Unknown                      |
+--------------+------------------------------+
| Phone        | Unavailable                  |
+--------------+------------------------------+
 
 
 
 Support
 
 
+---------------+--------------+---------+-----------------+
| Name          | Relationship | Address | Phone           |
+---------------+--------------+---------+-----------------+
| Opal Shane | ECON         | Unknown | [phone] |
+---------------+--------------+---------+-----------------+
 
 
 
 Care Team Providers
 
 
 
+-----------------------+------+-----------------+
| Care Team Member Name | Role | Phone           |
+-----------------------+------+-----------------+
| Erwin Iniguez MD   | PCP  | [phone] |
+-----------------------+------+-----------------+
 
 
 
 Encounter Details
 
 
+--------+-------------+----------------------+----------------------+---------------------+
| Date   | Type        | Department           | Care Team            | Description         |
+--------+-------------+----------------------+----------------------+---------------------+
| 02/10/ | DELETED     |   Preoperative       |   Consult,           | ANESTHESIA/SEDATION |
|    | TRANSCRIPTI | Medicine Clinic at   | Anesthesia  [address] |                     |
|        | ON          | [facility] [address]th Floor  [address]  |  [facility]    |                     |
|        |             | [address]          | Road  [place], [place]   |                     |
|        |             | Mailcode: [facility]       | [id number]                |                     |
|        |             | [facility]    |                      |                     |
|        |             | and Healing,         |                      |                     |
|        |             | [address] |                      |                     |
|        |             |   [facility] [place]       |                      |                     |
|        |             | [address]           |                      |                     |
|        |             | [phone]         |                      |                     |
+--------+-------------+----------------------+----------------------+---------------------+
 
 
 
 Social History
 
 
+----------------+-------+-----------+--------+------+
| Tobacco Use    | Types | Packs/Day | Years  | Date |
|                |       |           | Used   |      |
+----------------+-------+-----------+--------+------+
| Never Assessed |       |           |        |      |
+----------------+-------+-----------+--------+------+
 
 
 
+------------------+---------------+
| Sex Assigned at  | Date Recorded |
| Birth            |               |
+------------------+---------------+
| Not on file      |               |
+------------------+---------------+
 
 
 
+----------------+-------------+-------------+
| Job Start Date | Occupation  | Industry    |
+----------------+-------------+-------------+
| Not on file    | Not on file | Not on file |
+----------------+-------------+-------------+
 
 
 
+----------------+--------------+------------+
| Travel History | Travel Start | Travel End |
 
+----------------+--------------+------------+
 
 
 
+-------------------------------------+
| No recent travel history available. |
+-------------------------------------+
 documented as of this encounter
 
 Plan of Treatment
 Not on filedocumented as of this encounter
 
 Procedures
 
 
+---------------------+--------+-------------+----------------------+----------------------+
| Procedure Name      | Priori | Date/Time   | Associated Diagnosis | Comments             |
|                     | ty     |             |                      |                      |
+---------------------+--------+-------------+----------------------+----------------------+
| ANESTHESIA/SEDATION |        | 02/10/2000  |                      |   Results for this   |
|                     |        | 11:24 AM    |                      | procedure are in the |
|                     |        | PST         |                      |  results section.    |
+---------------------+--------+-------------+----------------------+----------------------+
 documented in this encounter
 
 Visit Diagnoses
 Not on filedocumented in this encounter

## 2019-12-06 NOTE — XMS
Encounter Summary
  Created on: 2019
 
 Shane Wyatttien BroussardJosue
 External Reference #: [id number]
 : 43
 Sex: Male
 
 Demographics
 
 
+-----------------------+---------------------------+
| Address               | [address]        |
|                       | [place]  [address] |
+-----------------------+---------------------------+
| Home Phone            | [phone]           |
+-----------------------+---------------------------+
| Preferred Language    | Unknown                   |
+-----------------------+---------------------------+
| Marital Status        |                    |
+-----------------------+---------------------------+
| Protestant Affiliation | 1028                      |
+-----------------------+---------------------------+
| Race                  | Unknown                   |
+-----------------------+---------------------------+
| Ethnic Group          | Unknown                   |
+-----------------------+---------------------------+
 
 
 Author
 
 
+--------------+--------------------------------------------+
| Author       | [facility]  |
|              | [facility] [place]                                |
+--------------+--------------------------------------------+
| Organization | [facility]  |
|              | [facility] [place]                                |
+--------------+--------------------------------------------+
| Address      | Unknown                                    |
+--------------+--------------------------------------------+
| Phone        | Unavailable                                |
+--------------+--------------------------------------------+
 
 
 
 Support
 
 
+---------------+--------------+--------------------+-----------------+
| Name          | Relationship | Address            | Phone           |
+---------------+--------------+--------------------+-----------------+
| Opal Shane | ECON         | [address]     | [phone] |
|               |              | [place]   |                 |
|               |              | [address]              |                 |
+---------------+--------------+--------------------+-----------------+
 
 
 
 
 Care Team Providers
 
 
+------------------------+------+-----------------+
| Care Team Member Name  | Role | Phone           |
+------------------------+------+-----------------+
| Calvin Robertson MD | PCP  | [phone] |
+------------------------+------+-----------------+
 
 
 
 Encounter Details
 
 
+--------+-------------+---------------------+---------------------+-------------+
| Date   | Type        | Department          | Care Team           | Description |
+--------+-------------+---------------------+---------------------+-------------+
| / | Orders Only |   [facility]   |   Tim Celso MARIE,  |             |
|    |             | [address]      | DO                  |             |
|        |             | [place]        |                     |             |
|        |             | [address]          |                     |             |
|        |             | [phone]        |                     |             |
+--------+-------------+---------------------+---------------------+-------------+
 
 
 
 Social History
 
 
+---------------+------------+-----------+--------+------------------+
| Tobacco Use   | Types      | Packs/Day | Years  | Date             |
|               |            |           | Used   |                  |
+---------------+------------+-----------+--------+------------------+
| Former Smoker | Cigarettes | 1.3       | 34     | Quit: 1996 |
+---------------+------------+-----------+--------+------------------+
 
 
 
+---------------------+---+---+---+
| Smokeless Tobacco:  |   |   |   |
| Never Used          |   |   |   |
+---------------------+---+---+---+
 
 
 
+-------------+-------------+---------+----------+
| Alcohol Use | Drinks/Week | oz/Week | Comments |
+-------------+-------------+---------+----------+
| No          |             |         |          |
+-------------+-------------+---------+----------+
 
 
 
+------------------+---------------+
| Sex Assigned at  | Date Recorded |
| Birth            |               |
+------------------+---------------+
| Not on file      |               |
+------------------+---------------+
 
 
 
 
+----------------+-------------+-------------+
| Job Start Date | Occupation  | Industry    |
+----------------+-------------+-------------+
| Not on file    | Not on file | Not on file |
+----------------+-------------+-------------+
 
 
 
+----------------+--------------+------------+
| Travel History | Travel Start | Travel End |
+----------------+--------------+------------+
 
 
 
+-------------------------------------+
| No recent travel history available. |
+-------------------------------------+
 documented as of this encounter
 
 Plan of Treatment
 
 
+--------+---------+-------------+----------------------+-------------+
| Date   | Type    | Specialty   | Care Team            | Description |
+--------+---------+-------------+----------------------+-------------+
| / | Office  | Pulmonology |   Juan F,          |             |
|    | Visit   |             | Jessica Cheung,   |             |
|        |         |             | MD [address] |             |
|        |         |             |   [place]   |             |
|        |         |             | [place] [address]             |             |
|        |         |             | [phone]         |             |
|        |         |             | [phone] (Fax)   |             |
+--------+---------+-------------+----------------------+-------------+
| / | Office  | Cardiology  |   Eric Akins, |             |
|    | Visit   |             |  MD [address]   |             |
|        |         |             | [place]  |             |
|        |         |             | [address]  [phone]  |             |
|        |         |             |  [phone] (Fax)  |             |
+--------+---------+-------------+----------------------+-------------+
 documented as of this encounter
 
 Procedures
 
 
+--------------------+--------+-------------+----------------------+----------------------+
| Procedure Name     | Priori | Date/Time   | Associated Diagnosis | Comments             |
|                    | ty     |             |                      |                      |
+--------------------+--------+-------------+----------------------+----------------------+
| CULTURE, ANAEROBIC | Routin | 2016  |                      |   Results for this   |
|                    | e      | 12:01 AM    |                      | procedure are in the |
|                    |        | PDT         |                      |  results section.    |
+--------------------+--------+-------------+----------------------+----------------------+
 documented in this encounter
 
 Results
 Culture, Anaerobic (2016 12:01 AM PDT)
 
 
+----------+
| Specimen |
+----------+
|          |
+----------+
 
 
 
+------------------------------------------------------------------------+----------------+
| Narrative                                                              | Performed At   |
+------------------------------------------------------------------------+----------------+
|   Specimen Description                      OTHER         GRAM STAIN   |   EXTERNAL LAB |
|                                     2+                                 |                |
|                                   WBC'S SEEN                           |                |
|                             1+                                         |                |
|                            EPITHELIAL CELLS                            |                |
|                            NO ORGANISMS SEEN  CULTURE                  |                |
|                           2+                                           |                |
|                         PSEUDOMONAS SPECIESAbnormal                    |                |
|                                     1+                                 |                |
|                                   NORMAL UPPER RESPIRATORY ELIZABETH       |                |
|                                                 NO FURTHER WORKUP      |                |
| Suscepibility for - PSEUDOMONAS SPECIES  Ceftazidime                   |                |
|           SUSCEPTIBLESensitive  Ciprofloxacin                          |                |
| SUSCEPTIBLESensitive  Gentamicin                                       |                |
|   SUSCEPTIBLESensitive  Levofloxacin                                   |                |
|   SUSCEPTIBLESensitive  Tobramycin                                     |                |
|   SUSCEPTIBLESensitive                                                 |                |
+------------------------------------------------------------------------+----------------+
 
 
 
+----------------+---------+--------------------+--------------+
| Performing     | Address | City/State/Zipcode | Phone Number |
| Organization   |         |                    |              |
+----------------+---------+--------------------+--------------+
|   EXTERNAL LAB |         |                    |              |
+----------------+---------+--------------------+--------------+
 documented in this encounter
 
 Visit Diagnoses
 Not on filedocumented in this encounter

## 2019-12-06 NOTE — XMS
Encounter Summary
  Created on: 2019
 
 Shane Wyatttien BroussardJosue
 External Reference #: [id number]
 : 43
 Sex: Male
 
 Demographics
 
 
+-----------------------+---------------------------+
| Address               | [address]        |
|                       | [place]  [address] |
+-----------------------+---------------------------+
| Home Phone            | [phone]           |
+-----------------------+---------------------------+
| Preferred Language    | Unknown                   |
+-----------------------+---------------------------+
| Marital Status        |                    |
+-----------------------+---------------------------+
| Oriental orthodox Affiliation | 1028                      |
+-----------------------+---------------------------+
| Race                  | Unknown                   |
+-----------------------+---------------------------+
| Ethnic Group          | Unknown                   |
+-----------------------+---------------------------+
 
 
 Author
 
 
+--------------+--------------------------------------------+
| Author       | [facility]  |
|              | and [place]                                |
+--------------+--------------------------------------------+
| Organization | [facility]  |
|              | [facility] [place]                                |
+--------------+--------------------------------------------+
| Address      | Unknown                                    |
+--------------+--------------------------------------------+
| Phone        | Unavailable                                |
+--------------+--------------------------------------------+
 
 
 
 Support
 
 
+---------------+--------------+--------------------+-----------------+
| Name          | Relationship | Address            | Phone           |
+---------------+--------------+--------------------+-----------------+
| Opal Shane | ECON         | [address]     | [phone] |
|               |              | [place]   |                 |
|               |              | [address]              |                 |
+---------------+--------------+--------------------+-----------------+
 
 
 
 
 Care Team Providers
 
 
+-----------------------+------+-----------------+
| Care Team Member Name | Role | Phone           |
+-----------------------+------+-----------------+
| Erwin Iniguez MD | PCP  | [phone] |
+-----------------------+------+-----------------+
 
 
 
 Encounter Details
 
 
+--------+-----------+----------------------+--------------------+-------------+
| Date   | Type      | Department           | Care Team          | Description |
+--------+-----------+----------------------+--------------------+-------------+
| 02/15/ | Hospital  |   [facility]     |   Conversion       |             |
| 2016   | Encounter | [facility] [address]  | Transaction,       |             |
|        |           | [address]  | Provider Unknown   |             |
|        |           |  [place], [place]        | [phone]       |             |
|        |           | [address]           | [phone] (Fax) |             |
|        |           | [phone]         |                    |             |
+--------+-----------+----------------------+--------------------+-------------+
 
 
 
 Social History
 
 
+---------------+------------+-----------+--------+------------------+
| Tobacco Use   | Types      | Packs/Day | Years  | Date             |
|               |            |           | Used   |                  |
+---------------+------------+-----------+--------+------------------+
| Former Smoker | Cigarettes | 1.3       | 34     | Quit: 1996 |
+---------------+------------+-----------+--------+------------------+
 
 
 
+---------------------+---+---+---+
| Smokeless Tobacco:  |   |   |   |
| Never Used          |   |   |   |
+---------------------+---+---+---+
 
 
 
+-------------+-------------+---------+----------+
| Alcohol Use | Drinks/Week | oz/Week | Comments |
+-------------+-------------+---------+----------+
| No          |             |         |          |
+-------------+-------------+---------+----------+
 
 
 
+------------------+---------------+
| Sex Assigned at  | Date Recorded |
| Birth            |               |
+------------------+---------------+
| Not on file      |               |
 
+------------------+---------------+
 
 
 
+----------------+-------------+-------------+
| Job Start Date | Occupation  | Industry    |
+----------------+-------------+-------------+
| Not on file    | Not on file | Not on file |
+----------------+-------------+-------------+
 
 
 
+----------------+--------------+------------+
| Travel History | Travel Start | Travel End |
+----------------+--------------+------------+
 
 
 
+-------------------------------------+
| No recent travel history available. |
+-------------------------------------+
 documented as of this encounter
 
 Medications at Time of Discharge
 
 
+----------------------+----------------------+-----------+---------+----------+-----------+
| Medication           | Sig                  | Dispensed | Refills | Start    | End Date  |
|                      |                      |           |         | Date     |           |
+----------------------+----------------------+-----------+---------+----------+-----------+
|   acyclovir          | Apply  topically     |           | 0       |          |           |
| (ZOVIRAX) 5%         | every 3 hours.       |           |         |          |           |
| ointment             |                      |           |         |          |           |
+----------------------+----------------------+-----------+---------+----------+-----------+
|   albuterol (PROAIR  | Inhale 2 puffs into  |           | 0       |          |           |
| HFA) 90 mcg/puff     | the lungs every 6    |           |         |          |           |
| inhaler              | hours as needed for  |           |         |          |           |
|                      | Wheezing.            |           |         |          |           |
+----------------------+----------------------+-----------+---------+----------+-----------+
|   digoxin (LANOXIN)  | Take 125 mcg by      |           | 0       |          |           |
| 250 mcg tablet       | mouth Daily.      |           |         |          |           |
|                      | capsule daily        |           |         |          |           |
+----------------------+----------------------+-----------+---------+----------+-----------+
|   ferrous sulfate    | Take 325 mg by mouth |           | 0       | 20 |           |
| 325 mg tablet        |  3 times daily.      |           |         | 12       |           |
+----------------------+----------------------+-----------+---------+----------+-----------+
|   fluticasone        | one spray in each    |           | 0       | 20 |           |
| (FLONASE) 50         | nostril daily as     |           |         | 12       |           |
| mcg/nasal spray      | needed               |           |         |          |           |
+----------------------+----------------------+-----------+---------+----------+-----------+
|                      | Inhale 1 puff into   |           | 0       |          |           |
| fluticasone-salmeter | the lungs Twice      |           |         |          |           |
| ol (ADVAIR) 500-50   | Daily.               |           |         |          |           |
| mcg/puff diskus      |                      |           |         |          |           |
| inhaler              |                      |           |         |          |           |
+----------------------+----------------------+-----------+---------+----------+-----------+
|   folic acid 1 mg    | Take 1 mg by mouth   |           | 0       |          |           |
| tablet               | Daily.               |           |         |          |           |
+----------------------+----------------------+-----------+---------+----------+-----------+
|   furosemide (LASIX) | Take 40 mg by mouth  |           | 0       |          |           |
 
|  40 mg tablet        | Daily.               |           |         |          |           |
+----------------------+----------------------+-----------+---------+----------+-----------+
|   guaiFENesin        | Take 1,200 mg by     |           | 0       |          |           |
| (MUCINEX) 600 mg 12  | mouth 2 times daily. |           |         |          |           |
| hr tablet            |                      |           |         |          |           |
+----------------------+----------------------+-----------+---------+----------+-----------+
|   levothyroxine      | Take 75 mcg by mouth |           | 0       | 20 |           |
| (LEVOTHROID) 75 MCG  |  Daily.              |           |         | 12       |           |
| tablet               |                      |           |         |          |           |
+----------------------+----------------------+-----------+---------+----------+-----------+
|   metoclopramide     | Take 10 mg by mouth  |           | 0       | 20 |           |
| (REGLAN) 10 mg       | 4 times daily as     |           |         | 12       |           |
| tablet               | needed.              |           |         |          |           |
+----------------------+----------------------+-----------+---------+----------+-----------+
|                      | Apply  topically 2   |           | 0       |          |           |
| nystatin-triamcinolo | times daily.         |           |         |          |           |
| ne (MYCOLOG II)      |                      |           |         |          |           |
| cream                |                      |           |         |          |           |
+----------------------+----------------------+-----------+---------+----------+-----------+
|   omeprazole         | Take 20 mg by mouth  |           | 0       | 20 |           |
| (PRILOSEC) 20 mg     | 2 times daily.       |           |         | 12       |           |
| capsule              |                      |           |         |          |           |
+----------------------+----------------------+-----------+---------+----------+-----------+
|   potassium citrate  | Take 10 mEq by mouth |           | 0       |          |           |
| (UROCIT-K) 10 mEq SR |  2 times daily.      |           |         |          |           |
|  tablet              |                      |           |         |          |           |
+----------------------+----------------------+-----------+---------+----------+-----------+
|   predniSONE         | Take 10 mg by mouth  |           | 0       |          |           |
| (DELTASONE) 5 mg     | Daily.               |           |         |          |           |
| tablet               |                      |           |         |          |           |
+----------------------+----------------------+-----------+---------+----------+-----------+
|   tamsulosin         | Take 0.4 mg by mouth |           | 0       | 20 |           |
| (FLOMAX) 0.4 mg CAPS |  2 times daily.      |           |         | 12       |           |
+----------------------+----------------------+-----------+---------+----------+-----------+
|   acyclovir          | Take 400 mg by mouth |           | 0       | 20 |  |
| (ZOVIRAX) 400 MG     |  3 times daily as    |           |         | 12       | 9         |
| tablet               | needed.              |           |         |          |           |
+----------------------+----------------------+-----------+---------+----------+-----------+
|   Cholecalciferol    | Take 2,000 Units by  |           | 0       | 20 |  |
| (VITAMIN D3) 2000    | mouth Daily.         |           |         | 12       | 9         |
| UNITS CAPS           |                      |           |         |          |           |
+----------------------+----------------------+-----------+---------+----------+-----------+
|   cyanocobalamin     | injected             |           | 0       | 20 |  |
| (VITAMIN B-12) 1,000 | intramuscularly once |           |         | 12       | 9         |
|  mcg/mL injection    |  a month             |           |         |          |           |
+----------------------+----------------------+-----------+---------+----------+-----------+
|   diltiazem          | Take 120 mg by mouth |           | 0       |          |  |
| (DILT-XR) 120 mg 24  |  Daily.              |           |         |          | 9         |
| hr capsule           |                      |           |         |          |           |
+----------------------+----------------------+-----------+---------+----------+-----------+
|   loratadine         | Take 10 mg by mouth  |           | 0       |          |  |
| (CLARITIN) 10 mg     | Daily.               |           |         |          | 9         |
| tablet               |                      |           |         |          |           |
+----------------------+----------------------+-----------+---------+----------+-----------+
|   losartan (COZAAR)  | Take 100 mg by mouth |           | 0       |          |  |
| 100 MG tablet        |  Daily. 1/2 daily    |           |         |          | 9         |
+----------------------+----------------------+-----------+---------+----------+-----------+
|   methotrexate 2.5   | Take 15 mg by mouth  |           | 0       |          |  |
| mg tablet            | Once a week.         |           |         |          | 9         |
+----------------------+----------------------+-----------+---------+----------+-----------+
 
|                      | Take 1 tablet by     |           | 0       |          |  |
| oxyCODONE-acetaminop | mouth every 4 hours  |           |         |          | 9         |
| hen (PERCOCET) 5-325 | as needed for Pain.  |           |         |          |           |
|  mg per tablet       |                      |           |         |          |           |
+----------------------+----------------------+-----------+---------+----------+-----------+
|   pseudoePHEDrine    | Take 30 mg by mouth  |           | 0       |          |  |
| (SUDOGEST) 30 mg     | every 4 hours as     |           |         |          | 9         |
| tablet               | needed for           |           |         |          |           |
|                      | Congestion.          |           |         |          |           |
+----------------------+----------------------+-----------+---------+----------+-----------+
|   rivaroxaban        | Take 20 mg by mouth  |           | 0       |          |  |
| (XARELTO) 20 mg      | Daily (with dinner). |           |         |          | 9         |
| tablet               |                      |           |         |          |           |
+----------------------+----------------------+-----------+---------+----------+-----------+
|   sertraline         | 2 tablets daily      |           | 0       | 20 |  |
| (ZOLOFT) 100 mg      |                      |           |         | 12       | 9         |
| tablet               |                      |           |         |          |           |
+----------------------+----------------------+-----------+---------+----------+-----------+
 documented as of this encounter
 
 Plan of Treatment
 
 
+--------+---------+-------------+----------------------+-------------+
| Date   | Type    | Specialty   | Care Team            | Description |
+--------+---------+-------------+----------------------+-------------+
| / | Office  | Pulmonology |   uJan F,          |             |
| 2019   | Visit   |             | Jessica Cheung,   |             |
|        |         |             | MD [address] |             |
|        |         |             |   [place],   |             |
|        |         |             | [place] [address]             |             |
|        |         |             | [phone]         |             |
|        |         |             | [phone] (Fax)   |             |
+--------+---------+-------------+----------------------+-------------+
| / | Office  | Cardiology  |   Eric Akins, |             |
| 2020   | Visit   |             |  MD [address]   |             |
|        |         |             | [place]  |             |
|        |         |             | [address]  [phone]  |             |
|        |         |             |  [phone] (Fax)  |             |
+--------+---------+-------------+----------------------+-------------+
 documented as of this encounter
 
 Visit Diagnoses
 Not on filedocumented in this encounter

## 2019-12-06 NOTE — XMS
Encounter Summary
  Created on: 2019
 
 Shane Wyatttien BroussradJosue
 External Reference #: [id number]
 : 43
 Sex: Male
 
 Demographics
 
 
+-----------------------+---------------------------+
| Address               | [address]        |
|                       | [place]  [address] |
+-----------------------+---------------------------+
| Home Phone            | [phone]           |
+-----------------------+---------------------------+
| Preferred Language    | Unknown                   |
+-----------------------+---------------------------+
| Marital Status        |                    |
+-----------------------+---------------------------+
| Sikh Affiliation | [id number]                      |
+-----------------------+---------------------------+
| Race                  | Unknown                   |
+-----------------------+---------------------------+
| Ethnic Group          | Unknown                   |
+-----------------------+---------------------------+
 
 
 Author
 
 
+--------------+--------------------------------------------+
| Author       | [facility]  |
|              | [facility] [place]                                |
+--------------+--------------------------------------------+
| Organization | [facility]  |
|              | [facility] [place]                                |
+--------------+--------------------------------------------+
| Address      | Unknown                                    |
+--------------+--------------------------------------------+
| Phone        | Unavailable                                |
+--------------+--------------------------------------------+
 
 
 
 Support
 
 
+---------------+--------------+--------------------+-----------------+
| Name          | Relationship | Address            | Phone           |
+---------------+--------------+--------------------+-----------------+
| Opal Shane | ECON         | [address]     | [phone] |
|               |              | [place]   |                 |
|               |              | [id number]              |                 |
+---------------+--------------+--------------------+-----------------+
 
 
 
 
 Care Team Providers
 
 
+-----------------------+------+-----------------+
| Care Team Member Name | Role | Phone           |
+-----------------------+------+-----------------+
| Erwin Iniguez MD | PCP  | [phone] |
+-----------------------+------+-----------------+
 
 
 
 Encounter Details
 
 
+--------+----------+----------------------+----------------------+-------------+
| Date   | Type     | Department           | Care Team            | Description |
+--------+----------+----------------------+----------------------+-------------+
| / | Imaging  |   [facility] |   Provider,          |             |
| 2017   | Exam     |  [facility]    | MD Zack  [id number] |             |
|        |          | IMAGING              |  [place]         |             |
|        |          | [phone]         | [place] [address]     |             |
+--------+----------+----------------------+----------------------+-------------+
 
 
 
 Social History
 
 
+---------------+------------+-----------+--------+------------------+
| Tobacco Use   | Types      | Packs/Day | Years  | Date             |
|               |            |           | Used   |                  |
+---------------+------------+-----------+--------+------------------+
| Former Smoker | Cigarettes | 1.3       | 34     | Quit: 1996 |
+---------------+------------+-----------+--------+------------------+
 
 
 
+---------------------+---+---+---+
| Smokeless Tobacco:  |   |   |   |
| Never Used          |   |   |   |
+---------------------+---+---+---+
 
 
 
+-------------+-------------+---------+----------+
| Alcohol Use | Drinks/Week | oz/Week | Comments |
+-------------+-------------+---------+----------+
| No          |             |         |          |
+-------------+-------------+---------+----------+
 
 
 
+------------------+---------------+
| Sex Assigned at  | Date Recorded |
| Birth            |               |
+------------------+---------------+
| Not on file      |               |
+------------------+---------------+
 
 
 
 
+----------------+-------------+-------------+
| Job Start Date | Occupation  | Industry    |
+----------------+-------------+-------------+
| Not on file    | Not on file | Not on file |
+----------------+-------------+-------------+
 
 
 
+----------------+--------------+------------+
| Travel History | Travel Start | Travel End |
+----------------+--------------+------------+
 
 
 
+-------------------------------------+
| No recent travel history available. |
+-------------------------------------+
 documented as of this encounter
 
 Plan of Treatment
 
 
+--------+---------+-------------+----------------------+-------------+
| Date   | Type    | Specialty   | Care Team            | Description |
+--------+---------+-------------+----------------------+-------------+
| / | Office  | Pulmonology |   Juan F,          |             |
|    | Visit   |             | Jessica Cheung,   |             |
|        |         |             | MD [address] |             |
|        |         |             |   [place]   |             |
|        |         |             | [place] [address]             |             |
|        |         |             | [phone]         |             |
|        |         |             | [phone] (Fax)   |             |
+--------+---------+-------------+----------------------+-------------+
| / | Office  | Cardiology  |   Eric Akins, |             |
|    | Visit   |             |  MD [address]   |             |
|        |         |             | [place]  |             |
|        |         |             | [address]  [phone]  |             |
|        |         |             |  [phone] (Fax)  |             |
+--------+---------+-------------+----------------------+-------------+
 documented as of this encounter
 
 Procedures
 
 
+----------------------+--------+-------------+----------------------+----------------------
+
| Procedure Name       | Priori | Date/Time   | Associated Diagnosis | Comments             
|
|                      | ty     |             |                      |                      
|
+----------------------+--------+-------------+----------------------+----------------------
+
| MRI LUMBAR SPINE WO  | Routin | 2014  |                      |   Results for this   
|
| CONTRAST             | e      | 10:25 AM    |                      | procedure are in the 
|
|                      |        | PDT         |                      |  results section.    
|
 
+----------------------+--------+-------------+----------------------+----------------------
+
 documented in this encounter
 
 Results
 MRI Lumbar Spine wo Contrast (2014 10:25 AM PDT)
 
+----------+
| Specimen |
+----------+
|          |
+----------+
 
 
 
+--------------------------------------------------------------------+---------------+
| Narrative                                                          | Performed At  |
+--------------------------------------------------------------------+---------------+
|   External films for comparison only - no result from [facility].  |   [facility] |
+--------------------------------------------------------------------+---------------+
 
 
 
+---------------+---------+--------------------+--------------+
| Performing    | Address | City/State/Zipcode | Phone Number |
| Organization  |         |                    |              |
+---------------+---------+--------------------+--------------+
|   [facility] |         |                    |              |
+---------------+---------+--------------------+--------------+
 documented in this encounter
 
 Visit Diagnoses
 Not on filedocumented in this encounter

## 2019-12-06 NOTE — XMS
Encounter Summary
  Created on: 2019
 
 Wyatt Shane
 External Reference #: [id number]
 : 43
 Sex: Male
 
 Demographics
 
 
+-----------------------+------------------------+
| Address               | [address]     |
|                       | [place]  [address]   |
+-----------------------+------------------------+
| Home Phone            | [phone]        |
+-----------------------+------------------------+
| Preferred Language    | Unknown                |
+-----------------------+------------------------+
| Marital Status        |                 |
+-----------------------+------------------------+
| Episcopalian Affiliation | LUT                    |
+-----------------------+------------------------+
| Race                  | White                  |
+-----------------------+------------------------+
| Ethnic Group          | Not  or  |
+-----------------------+------------------------+
 
 
 Author
 
 
+--------------+------------------------------+
| Author       | [facility] |
+--------------+------------------------------+
| Organization | [facility] |
+--------------+------------------------------+
| Address      | Unknown                      |
+--------------+------------------------------+
| Phone        | Unavailable                  |
+--------------+------------------------------+
 
 
 
 Support
 
 
+---------------+--------------+---------+-----------------+
| Name          | Relationship | Address | Phone           |
+---------------+--------------+---------+-----------------+
| Opal Shane | ECON         | Unknown | [phone] |
+---------------+--------------+---------+-----------------+
 
 
 
 Care Team Providers
 
 
 
+-----------------------+------+-----------------+
| Care Team Member Name | Role | Phone           |
+-----------------------+------+-----------------+
| Erwin Iniguez MD   | PCP  | [phone] |
+-----------------------+------+-----------------+
 
 
 
 Encounter Details
 
 
+--------+-------------+-----------------+---------------------+---------------+
| Date   | Type        | Department      | Care Team           | Description   |
+--------+-------------+-----------------+---------------------+---------------+
| / | Office      |   [facility]  |   Note, Outpatient  | Progress Note |
|    | Visit-Trans | MEDICINE        | Clinic              |               |
|        | cribed      |                 |                     |               |
+--------+-------------+-----------------+---------------------+---------------+
 
 
 
 Social History
 
 
+----------------+-------+-----------+--------+------+
| Tobacco Use    | Types | Packs/Day | Years  | Date |
|                |       |           | Used   |      |
+----------------+-------+-----------+--------+------+
| Never Assessed |       |           |        |      |
+----------------+-------+-----------+--------+------+
 
 
 
+------------------+---------------+
| Sex Assigned at  | Date Recorded |
| Birth            |               |
+------------------+---------------+
| Not on file      |               |
+------------------+---------------+
 
 
 
+----------------+-------------+-------------+
| Job Start Date | Occupation  | Industry    |
+----------------+-------------+-------------+
| Not on file    | Not on file | Not on file |
+----------------+-------------+-------------+
 
 
 
+----------------+--------------+------------+
| Travel History | Travel Start | Travel End |
+----------------+--------------+------------+
 
 
 
+-------------------------------------+
| No recent travel history available. |
+-------------------------------------+
 documented as of this encounter
 
 
 Progress Notes
 Interface, Transcription In - 2007  5:11 AM PSTCLINIC DATE:       1998
 
                 OTOLARYNGOLOGY/HEAD AND NECK SURGERY CLINIC
 
 Mr. Shane is seen back preoperatively with respect to the proposed
 panendoscopy and has a full GA regarding the proposed procedure and consent
 was signed.  I will see him back tomorrow for surgery.
 
 Jimmy Esposito M.D.
 , Otolaryngology
 Head and Neck Surgery
 
 MARIA ALEJANDRA:tabitha
 D: [birth year]
 T: 98Electronically signed by Interface, Transcription In at 2007  5:11 AM PSTd
ocumented in this encounter
 
 Plan of Treatment
 Not on filedocumented as of this encounter
 
 Visit Diagnoses
 Not on filedocumented in this encounter

## 2019-12-06 NOTE — XMS
Encounter Summary
  Created on: 2019
 
 Wyatt Shane
 External Reference #: [id number]
 : 43
 Sex: Male
 
 Demographics
 
 
+-----------------------+------------------------+
| Address               | [address]     |
|                       | [place]  [address]   |
+-----------------------+------------------------+
| Home Phone            | [phone]        |
+-----------------------+------------------------+
| Preferred Language    | Unknown                |
+-----------------------+------------------------+
| Marital Status        |                 |
+-----------------------+------------------------+
| Bahai Affiliation | LUT                    |
+-----------------------+------------------------+
| Race                  | White                  |
+-----------------------+------------------------+
| Ethnic Group          | Not  or  |
+-----------------------+------------------------+
 
 
 Author
 
 
+--------------+------------------------------+
| Author       | [facility] |
+--------------+------------------------------+
| Organization | [facility] |
+--------------+------------------------------+
| Address      | Unknown                      |
+--------------+------------------------------+
| Phone        | Unavailable                  |
+--------------+------------------------------+
 
 
 
 Support
 
 
+---------------+--------------+---------+-----------------+
| Name          | Relationship | Address | Phone           |
+---------------+--------------+---------+-----------------+
| Opal Shane | ECON         | Unknown | [phone] |
+---------------+--------------+---------+-----------------+
 
 
 
 Care Team Providers
 
 
 
+-----------------------+------+-----------------+
| Care Team Member Name | Role | Phone           |
+-----------------------+------+-----------------+
| Erwin Iniguez MD   | PCP  | [phone] |
+-----------------------+------+-----------------+
 
 
 
 Reason for Referral
 Diagnostic Testing (Routine)
 
+--------+--------+-----------+--------------+--------------+---------------+
| Status | Reason | Specialty | Diagnoses /  | Referred By  | Referred To   |
|        |        |           | Procedures   | Contact      | Contact       |
+--------+--------+-----------+--------------+--------------+---------------+
| Closed |        | Radiology |   Diagnoses  |   Tay, |   Rad General |
|        |        |           |  Esophageal  |  Jonathan S,   |  [address] |
|        |        |           | stenosis     | MD  [address]  |  [address]  |
|        |        |           | Procedures   | [address]  |  Mailcode:    |
|        |        |           | X-RAY        | [facility]      | [facility]  |
|        |        |           | ESOPHAGRAM   | [facility] [place] | for Health    |
|        |        |           |              |  [address]  | and Healing,  |
|        |        |           |              |  Phone:      | [facility],   |
|        |        |           |              | [phone] | 3rd Floor     |
|        |        |           |              |   Fax:       | [place], [place]  |
|        |        |           |              | [phone] | [address]    |
|        |        |           |              |              | Phone:        |
|        |        |           |              |              | [phone]  |
|        |        |           |              |              |  Fax:         |
|        |        |           |              |              | [phone]  |
+--------+--------+-----------+--------------+--------------+---------------+
 
 
 Speech Therapy (Routine)
 
+--------+--------+-----------+--------------+--------------+---------------+
| Status | Reason | Specialty | Diagnoses /  | Referred By  | Referred To   |
|        |        |           | Procedures   | Contact      | Contact       |
+--------+--------+-----------+--------------+--------------+---------------+
| Closed |        | Speech    |   Diagnoses  |   Tay, |   Ent Speech  |
|        |        | Therapy   |  Pharyngeal  |  Jonathan S,   | [facility]  [address]  |
|        |        |           | dysphagia    | MD  [address]  | [address]   |
|        |        |           | Procedures   | [address]  | [facility]       |
|        |        |           | CONSULT TO   | [facility]      | Mailcode:     |
|        |        |           | ENT SPEECH   | [place], [place] | [address]          |
|        |        |           | THERAPY      |  [address]  | Physician's   |
|        |        |           |              |  Phone:      | Pavilion      |
|        |        |           |              | [phone] | [place], [place]  |
|        |        |           |              |   Fax:       | [address]    |
|        |        |           |              | [phone] | Phone:        |
|        |        |           |              |              | [phone]  |
|        |        |           |              |              |  Fax:         |
|        |        |           |              |              | [phone]  |
+--------+--------+-----------+--------------+--------------+---------------+
 
 
 
 
 Reason for Visit
 
 
 
+--------------+----------+
| Reason       | Comments |
+--------------+----------+
| New patient  |          |
| consultation |          |
+--------------+----------+
 Office Visit - E/M Services (Routine)
 
+--------+--------+---------------+--------------+--------------+---------------+
| Status | Reason | Specialty     | Diagnoses /  | Referred By  | Referred To   |
|        |        |               | Procedures   | Contact      | Contact       |
+--------+--------+---------------+--------------+--------------+---------------+
| Closed |        | Otolaryngolog |              |   Vaibhav,     |   Ent         |
|        |        | y             |              | MD Fred    | Laryngology   |
|        |        |               |              | [address]  | [address]  [address]  |
|        |        |               |              | [address] | [address]   |
|        |        |               |              |  [place]          | [address]       |
|        |        |               |              | [place], [place] | Mailcode:     |
|        |        |               |              |  [address]  | [address]          |
|        |        |               |              |              | Physician's   |
|        |        |               |              |              | Pavilion      |
|        |        |               |              |              | [place], [place]  |
|        |        |               |              |              | [address]    |
|        |        |               |              |              | Phone:        |
|        |        |               |              |              | [phone]  |
|        |        |               |              |              |  Fax:         |
|        |        |               |              |              | [phone]  |
+--------+--------+---------------+--------------+--------------+---------------+
 
 
 
 
 Encounter Details
 
 
+--------+---------+----------------------+----------------------+----------------------+
| Date   | Type    | Department           | Care Team            | Description          |
+--------+---------+----------------------+----------------------+----------------------+
| / | Office  |   Otolaryngology     |   Jonathan Cross  | Glottic stenosis     |
|    | Visit   | Laryngology Services | MD RUSS  [address]   | (Primary Dx);        |
|        |         |  at [facility]  [address] | [address]      | Pharyngeal           |
|        |         |  [address]     | [facility] [place]         | dysphagia;           |
|        |         | Mailcode: [address]       | [address]           | Esophageal stenosis; |
|        |         | [facility] | [phone]         |  Larynx edema        |
|        |         |   [place], [place]       | [phone] (Fax)   |                      |
|        |         | [address]           |                      |                      |
|        |         | [phone]         |                      |                      |
+--------+---------+----------------------+----------------------+----------------------+
 
 
 
 Social History
 
 
+---------------+------------+-----------+--------+------------------+
| Tobacco Use   | Types      | Packs/Day | Years  | Date             |
|               |            |           | Used   |                  |
+---------------+------------+-----------+--------+------------------+
| Former Smoker | Cigarettes | 1         | 30     | Quit: 1996 |
 
+---------------+------------+-----------+--------+------------------+
 
 
 
+-------------+-------------+---------+----------+
| Alcohol Use | Drinks/Week | oz/Week | Comments |
+-------------+-------------+---------+----------+
| No          |             |         |          |
+-------------+-------------+---------+----------+
 
 
 
+------------------+---------------+
| Sex Assigned at  | Date Recorded |
| Birth            |               |
+------------------+---------------+
| Not on file      |               |
+------------------+---------------+
 
 
 
+----------------+-------------+-------------+
| Job Start Date | Occupation  | Industry    |
+----------------+-------------+-------------+
| Not on file    | Not on file | Not on file |
+----------------+-------------+-------------+
 
 
 
+----------------+--------------+------------+
| Travel History | Travel Start | Travel End |
+----------------+--------------+------------+
 
 
 
+-------------------------------------+
| No recent travel history available. |
+-------------------------------------+
 documented as of this encounter
 
 Last Filed Vital Signs
 
 
+-------------------+--------------------+----------------------+----------+
| Vital Sign        | Reading            | Time Taken           | Comments |
+-------------------+--------------------+----------------------+----------+
| Blood Pressure    | -                  | -                    |          |
+-------------------+--------------------+----------------------+----------+
| Pulse             | -                  | -                    |          |
+-------------------+--------------------+----------------------+----------+
| Temperature       | -                  | -                    |          |
+-------------------+--------------------+----------------------+----------+
| Respiratory Rate  | -                  | -                    |          |
+-------------------+--------------------+----------------------+----------+
| Oxygen Saturation | -                  | -                    |          |
+-------------------+--------------------+----------------------+----------+
| Inhaled Oxygen    | -                  | -                    |          |
| Concentration     |                    |                      |          |
+-------------------+--------------------+----------------------+----------+
| Weight            | 85.5 kg (188 lb 8  | 2010 12:53 PM  |          |
 
|                   | oz)                | PDT                  |          |
+-------------------+--------------------+----------------------+----------+
| Height            | -                  | -                    |          |
+-------------------+--------------------+----------------------+----------+
| Body Mass Index   | -                  | -                    |          |
+-------------------+--------------------+----------------------+----------+
 documented in this encounter
 
 Progress Notes
 Jonathan Cross MD - 2010  2:05 PM PDTFormatting of this note might be different
 from the original.
 PATIENT NAME: Wyatt Shane
 OHIRENA MR#: [id number]
 : 1945
 
 REFERRING PROVIDER:
 FRED MORIN
 Otolaryngology
 [address] [place] [address]
 
 PRIMARY CARE PROVIDER:
 Erwin Iniguez MD
 
 CLINIC: [facility]
 
 REASON FOR FOLLOW-UP:Chief Complaint 
 Patient presents with 
   New patient consultation 
 
 HPI:  Wyatt Shane is a 64 y.o. male who was last seen at the [facility] for acute throat discomfort and found to have a resolving upper respiratory
 tract infection.  It was recommended that he return as needed. 
  
 He returns today noting that he feels that his voice is worse and his swallowing is giving 
him more trouble.  He thinks that this has occurred over the past few months and he associat
es it with tightness in the bilateral anterior neck.  He does cough on liquids occasionally 
and particulates.  He is very careful with both.  He thinks that swallowing is more effortfu
l recently.  He has not noted any otalgia.  He denies odynophagia.  He has not noted any hem
optysis.  He does have some dyspnea on exertion.  He has noted noisy breathing on deep inspi
ration.  He does note problems with nasal allergy symptoms, including itchy eyes, rhinorrhea
 and sneezing. He uses Claritin and Sudafed for this.  He is not smoking.  
 
 He is maintained on a proton-pump inhibitor.  He notes no symptoms of reflux, including hea
rtburn, indigestion, acidic taste or belching while on proton pump inhibitor therapy. 
 
 VOCAL DEMANDS:  Unchanged from previous visit.
 
 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 
 
 TNM/STAGE: CIS--> T2N0M0/II
 
 IDENTIFICATION DATE: 1998
 
 SURGICAL EXCISION DATE: 1998
 
 TYPE OF SURGERY: extended vertical hemilaryngectomy
 
 LAST SURGERY DATE: laryngotracheal reconstruction2000
 
 
 RADIATION THERAPY COMPLETED: 1998
 
 RADIATION THERAPIST: Unknown
 
 RADIATION DOSE: Unknown
 
 PMHx: Past Medical History 
 Diagnosis Date 
   Larynx cancer  
   T2N0 right glottis 
   Radiation fibrosis  
   larynx 
   Glottic stenosis  
   MI (myocardial infarction)  
   PUD (peptic ulcer disease)  
   Fibromyalgia  
   Depression  
   Nephrolithiasis  
 
 SURGHx:Past Surgical History 
 Procedure Date 
   Hx extended vertical hemilaryngectomy  
   right posterior arytenoid retained. 
   Hx stenosis dilation  
   Hx nissen fundoplication  
   Hx dml bx 1998 
   cCIS--pT2N0 SCCa right vocal fold 
   Hx laryngotracheoplasty  
 
  
 
 ALLERGIES: No Active Allergies
 
 MEDICATIONS:Current outpatient prescriptions 
 Medication Sig 
   ACYCLOVIR ORAL Take  by mouth as needed. 
   Amlodipine-Atorvastatin (CADUET) 10-10 mg Oral Tablet Take  by mouth. 
   CYANOCOBALAMIN (VITAMIN B-12 INJ) by Injection route every thirty days. 
   LANSOPRAZOLE (PREVACID ORAL) Take  by mouth. 
   metoclopramide (REGLAN) 5 mg Oral Tablet Take 5 mg by mouth every six hours as needed. 
   pregabalin (LYRICA) 50 mg Oral Capsule Take  by mouth three times daily. Max: 600 mg/da
y  
   sertraline (ZOLOFT) 50 mg Oral Tablet Take 50 mg by mouth once daily. 
   tamsulosin (FLOMAX) 0.4 mg Oral Capsule, Sust. Release 24 hr Take 0.4 mg by mouth once 
daily. 
   TESTOSTERONE IM Inject  into the muscle (IM). 
   VICODIN ORAL None Entered 
 
 LAST WEIGHTS:  Wt Readings from Last 3 Encounters: 
 2010 85.503 kg (188 lb 8 oz) 
 10/03/2007 92.534 kg (204 lb) 
 
 EXAMINATION:  Wt 85.503 kg (188 lb 8 oz)
 Gen: He is a 64 y.o. male. He is awake, alert and comfortable with the examination. H
is weight is appropriate.
 Ears: The pinnae are normal. External auditory canals show minimal cerumen bilaterally.
 The tympanic membranes are clear with normal anatomic landmarks and an aerated middle ear
 space.
 Nose: The nasal dorsum is straight and the nares are widely patent. The mucosa is pink an
d there are no lesions or masses noted. There is no significant nasal obstruction noted.
 
 Face: There are no worrisome lesions of the face or head noted.
 Salivary Glands: The salivary glands are soft and show no lesions or masses within the pa
rotid or submandibular glands bilaterally. There is no obvious obstruction of Stensen's or
 Equinunk's ducts bilaterally.
 Oropharynx: Normal lips and oral competence are noted. The dentition is fair. The muc
archana is dry and pale, but shows no lesions or masses. The tonsils are small or absent and t
he fossae show no lesions. Bimanual palpation of the gigivolabial sulcus, lingual sulcus, 
tonsillar pillars, palate and base of tongue did not demonstrate any concerning lesions or m
asses.
 Neck: The neck has well healed anterior transverse neck incision and tracheotomy tube scar.
 There is no lymphadenopathy noted in the anterior, posterior, digastric or submental tria
ngles. The thyroid is palpable, but not enlarged or tender. I cannot appreciate any nodu
les. The larynx is no longer anatomic and has cartilage loss along the right side. He 
has tenderness along the right anterior strap muscles in the region of the omohyoid muscles 
bilaterally.
 Chest: Chest rise is symmetric and there is no audible wheezing, stridor or wet vocal romina
lity. There is very mild stridor with deep inspiration.
 Neuro: Extraoccular movements are grossly intact. There is symmetric sensation and move
ment noted of the upper and midface. Marginal mandibular nerve function is normal. Heari
ng is grossly intact. Palatal elevation is symmetric and full. Trapezius function is nor
mal. Tongue protrusion is midline.
 
 VOICE EVALUATION: Perceptual voice evaluation demonstrates severe dysphonia. The pitch 
is low for a male and shows limited range. Vocal intensity is acceptable and shows limited u
pper range. There is 3+ roughness, 1+ breathiness, and 2+ tightness appreciated. T
here is no tremor noted with sustained vowel phonation. I am unable to detect voice breaks.
 Glottal orellana is not detected and there is no diplophonia noted. Articulation is normal.  
 
 LARYNGOSCOPY: Indirect laryngoscopy was performed using a 90 degree Jean-Baptiste sahil telescope
 and distal-chip Olympus flexible videolaryngoscope following the topical nasal application 
of oxymetazoline and pontocaine.  This was performed because the patient's gag reflex preclu
ded adequate transoral indirect laryngoscopy. This demonstrates a clear vallecula and rajiv
p epiglottis. The right arytenoid is partially present and mobile with mucosal redundancy.
 There is soft tissue where the right hemilarynx would be. This is benign and mucosalized.
 The true vocal folds are absent. The left false vocal fold is at least partially present.
 The left arytenoid is present, but fixed. Both aryepiglottic folds are shortened. The
 base of tongue is clear. The subglottic airway is slightly narrowed, but adequate.  It is
 scarred anteriorly without mass.
 
 VIDEOSTROBOSCOPY:  Laryngovideostroboscopy was performed today.  Review of laryngovideostro
boscopy demonstrates laryngeal anatomy and motion as noted above.  There laryngeal hyperfunc
tion is severe and most notable in the anterior-posterior and lateral dimension. There is no
t noted to be increased thick laryngeal mucous.  The mucosal wave comes from the right aryte
noid mound pressed into the epiglottis and left false vocal fold. The tissue appears very pl
iable and has no mucosal irregularity.
 
 ASSESSMENT:  Mr. Shane appears to suffer from dysphonia associated with prior partial laryng
ectomy with reconstruction which has not changed significantly since I last saw him.  He has
 more laryngeal hyperfunction and increased effort of voicing compared to that time, but I d
o not have a laryngovideostroboscopy from then to compare to.  I certainly don't see any wor
risome lesions and his voice is quite good for not having any true vocal folds.
  Mr. Shane's pharyngeal dysphagia may be due to radiation fibrosis and esophageal/pharyngeal
 stenosis.  I am concerned about the increased reports of aspiration and avoidance of most p
articulates.
 
 PLAN:  Today I have recommended obtaining a modified barium swallow with barium esophagram.
  I don't think that this is emergent, but would be helpful.  I recommended observation of h
is voice.  I don't think that there is much I can do to facilitate this at this time.
  I will see him back following the completion of the modified barium swallow, or sooner if 
symptoms worsen. 
 
 
 Jonathan Cross M.D.
 
 Laryngology and Head & Neck Surgery
 Tel:[phone]
 Fax:[phone] Electronically signed by Jonathan Cross MD at 2010  2:05 PM PDT
documented in this encounter
 
 Plan of Treatment
 
 
+----------------------+------------+--------+----------------------+----------------------+
| Name                 | Type       | Priori | Associated Diagnoses | Order Schedule       |
|                      |            | ty     |                      |                      |
+----------------------+------------+--------+----------------------+----------------------+
| ENT MODIFIED BARIUM  | Procedures | Routin |   Pharyngeal         | Expected: 2010 |
| SWALLOW              |            | e      | dysphagia            |                      |
+----------------------+------------+--------+----------------------+----------------------+
| [place]                   | Procedures | Routin |   Glottic stenosis   | Ordered: 2010  |
| LARYNGOSCOPY,FLEX/RI |            | e      | Larynx edema         |                      |
| GID+STROBOSCOPY      |            |        |                      |                      |
+----------------------+------------+--------+----------------------+----------------------+
 documented as of this encounter
 
 Results
 X-RAY ESOPHAGRAM (09/15/2010 10:14 AM PDT)
 
+-----------+--------------------------+-----------+------------+--------------+
| Component | Value                    | Ref Range | Performed  | Pathologist  |
|           |                          |           | At         | Signature    |
+-----------+--------------------------+-----------+------------+--------------+
| ESOPHAGUS | Exam: Single contrast    |           |            |              |
|           | barium esophagram        |           |            |              |
|           | Findings: The cervical   |           |            |              |
|           | segment is discussed in  |           |            |              |
|           | the modified             |           |            |              |
|           | bariumswallowing study.  |           |            |              |
|           |   No mucosal             |           |            |              |
|           | abnormalities are seen.  |           |            |              |
|           |   Multipletertiary       |           |            |              |
|           | contractions are seen in |           |            |              |
|           |  the distal esophagus    |           |            |              |
|           | with evidencefor         |           |            |              |
|           | previous fundoplication. |           |            |              |
|           |    There is evidence for |           |            |              |
|           |  small axial andsmall    |           |            |              |
|           | paraesophageal hernia    |           |            |              |
|           | but no evidence for      |           |            |              |
|           | reflux.   Survey ofthe   |           |            |              |
|           | stomach and proximal     |           |            |              |
|           | duodenum is              |           |            |              |
|           | unremarkable.            |           |            |              |
|           | Impression: Distal       |           |            |              |
|           | esophageal dysmotility   |           |            |              |
|           | characterized by         |           |            |              |
|           | multipletertiary         |           |            |              |
|           | contractions.   Small    |           |            |              |
|           | and presumably recurrent |           |            |              |
|           |  axial andparaesophageal |           |            |              |
|           |  hernia with no          |           |            |              |
 
|           | demonstrable reflux. I   |           |            |              |
|           | have personally viewed   |           |            |              |
|           | this procedure/exam and  |           |            |              |
|           | reviewed this report.    |           |            |              |
|           | Author: JUSTIN BAIRD       |           |            |              |
|           | JORGE ELIZALDEReviewer:   |           |            |              |
|           | JUSTIN ELIZALDE M.D. |           |            |              |
|           |   STATUS FINAL / Dr.     |           |            |              |
|           | JUSTIN ELIZALDE       |           |            |              |
+-----------+--------------------------+-----------+------------+--------------+
 
 
 
+----------+
| Specimen |
+----------+
|          |
+----------+
 
 
 
+----------------------+---------+--------------------+--------------+
| Performing           | Address | City/State/Zipcode | Phone Number |
| Organization         |         |                    |              |
+----------------------+---------+--------------------+--------------+
|   [facility] |         |                    |              |
|  RADIOLOGY           |         |                    |              |
+----------------------+---------+--------------------+--------------+
 documented in this encounter
 
 Visit Diagnoses
 
 
+------------------------------------------------------------+
| Diagnosis                                                  |
+------------------------------------------------------------+
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
+------------------------------------------------------------+
|   Pharyngeal dysphagia  Dysphagia, pharyngeal phase        |
+------------------------------------------------------------+
|   Esophageal stenosis  Stricture and stenosis of esophagus |
+------------------------------------------------------------+
|   Larynx edema  Edema of larynx                            |
+------------------------------------------------------------+
 documented in this encounter

## 2019-12-06 NOTE — XMS
Encounter Summary
  Created on: 2019
 
 Wyatt Shane
 External Reference #: [id number]
 : 43
 Sex: Male
 
 Demographics
 
 
+-----------------------+------------------------+
| Address               | [address]     |
|                       | [place]  [address]   |
+-----------------------+------------------------+
| Home Phone            | [phone]        |
+-----------------------+------------------------+
| Preferred Language    | Unknown                |
+-----------------------+------------------------+
| Marital Status        |                 |
+-----------------------+------------------------+
| Advent Affiliation | LUT                    |
+-----------------------+------------------------+
| Race                  | White                  |
+-----------------------+------------------------+
| Ethnic Group          | Not  or  |
+-----------------------+------------------------+
 
 
 Author
 
 
+--------------+------------------------------+
| Author       | [facility] |
+--------------+------------------------------+
| Organization | [facility] |
+--------------+------------------------------+
| Address      | Unknown                      |
+--------------+------------------------------+
| Phone        | Unavailable                  |
+--------------+------------------------------+
 
 
 
 Support
 
 
+---------------+--------------+---------+-----------------+
| Name          | Relationship | Address | Phone           |
+---------------+--------------+---------+-----------------+
| Opal Shane | ECON         | Unknown | [phone] |
+---------------+--------------+---------+-----------------+
 
 
 
 Care Team Providers
 
 
 
+-----------------------+------+-----------------+
| Care Team Member Name | Role | Phone           |
+-----------------------+------+-----------------+
| Erwin Iniguez MD   | PCP  | [phone] |
+-----------------------+------+-----------------+
 
 
 
 Reason for Visit
 
 
+-----------------+----------+
| Reason          | Comments |
+-----------------+----------+
| Follow-up visit |          |
+-----------------+----------+
 Office Visit - E/M Services (Routine)
 
+--------+--------+---------------+--------------+--------------+---------------+
| Status | Reason | Specialty     | Diagnoses /  | Referred By  | Referred To   |
|        |        |               | Procedures   | Contact      | Contact       |
+--------+--------+---------------+--------------+--------------+---------------+
| Closed |        | Otolaryngolog |              |   Vaibhav,     |   Ent         |
|        |        | y             |              | MD Fred    | Laryngology   |
|        |        |               |              | [address]  | [facility]  [address]  |
|        |        |               |              | [address]   |
|        |        |               |              |  [address]          | [place]       |
|        |        |               |              | [place], [place] | Mailcode:     |
|        |        |               |              |  [id number]  | [address]          |
|        |        |               |              |              | Physician[facility]   |
|        |        |               |              |              | Pavilion      |
|        |        |               |              |              | [place], [place]  |
|        |        |               |              |              | [address]    |
|        |        |               |              |              | Phone:        |
|        |        |               |              |              | [phone]  |
|        |        |               |              |              |  Fax:         |
|        |        |               |              |              | [phone]  |
+--------+--------+---------------+--------------+--------------+---------------+
 
 
 
 
 Encounter Details
 
 
+--------+---------+----------------------+----------------------+----------------------+
| Date   | Type    | Department           | Care Team            | Description          |
+--------+---------+----------------------+----------------------+----------------------+
| 09/15/ | Office  |   Otolaryngology     |   Jonathan Cross  | Pharyngeal dysphagia |
|    | Visit   | Laryngology Services | MD RUSS  [address]   |  (Primary Dx);       |
|        |         |  at [facility]  [address] | [address]      | Glottic stenosis;    |
|        |         |  [address]     | [place], [place]         | Esophageal stenosis; |
|        |         | Mailcode: [address]       | [id number]           |  Larynx edema        |
|        |         | [facility] [address] | [phone]         |                      |
|        |         |   [place], [place]       | [phone] (Fax)   |                      |
|        |         | [address]           |                      |                      |
|        |         | [phone]         |                      |                      |
+--------+---------+----------------------+----------------------+----------------------+
 
 
 
 
 Social History
 
 
+---------------+------------+-----------+--------+------------------+
| Tobacco Use   | Types      | Packs/Day | Years  | Date             |
|               |            |           | Used   |                  |
+---------------+------------+-----------+--------+------------------+
| Former Smoker | Cigarettes | 1         | 30     | Quit: 1996 |
+---------------+------------+-----------+--------+------------------+
 
 
 
+-------------+-------------+---------+----------+
| Alcohol Use | Drinks/Week | oz/Week | Comments |
+-------------+-------------+---------+----------+
| No          |             |         |          |
+-------------+-------------+---------+----------+
 
 
 
+------------------+---------------+
| Sex Assigned at  | Date Recorded |
| Birth            |               |
+------------------+---------------+
| Not on file      |               |
+------------------+---------------+
 
 
 
+----------------+-------------+-------------+
| Job Start Date | Occupation  | Industry    |
+----------------+-------------+-------------+
| Not on file    | Not on file | Not on file |
+----------------+-------------+-------------+
 
 
 
+----------------+--------------+------------+
| Travel History | Travel Start | Travel End |
+----------------+--------------+------------+
 
 
 
+-------------------------------------+
| No recent travel history available. |
+-------------------------------------+
 documented as of this encounter
 
 Last Filed Vital Signs
 
 
+-------------------+------------------+----------------------+----------+
| Vital Sign        | Reading          | Time Taken           | Comments |
+-------------------+------------------+----------------------+----------+
| Blood Pressure    | -                | -                    |          |
+-------------------+------------------+----------------------+----------+
| Pulse             | -                | -                    |          |
+-------------------+------------------+----------------------+----------+
| Temperature       | -                | -                    |          |
 
+-------------------+------------------+----------------------+----------+
| Respiratory Rate  | -                | -                    |          |
+-------------------+------------------+----------------------+----------+
| Oxygen Saturation | -                | -                    |          |
+-------------------+------------------+----------------------+----------+
| Inhaled Oxygen    | -                | -                    |          |
| Concentration     |                  |                      |          |
+-------------------+------------------+----------------------+----------+
| Weight            | 97.5 kg (215 lb) | 09/15/2010 11:54 AM  |          |
|                   |                  | PDT                  |          |
+-------------------+------------------+----------------------+----------+
| Height            | -                | -                    |          |
+-------------------+------------------+----------------------+----------+
| Body Mass Index   | -                | -                    |          |
+-------------------+------------------+----------------------+----------+
 documented in this encounter
 
 Progress Notes
 Jonathan Cross MD - 2010  2:34 PM PDTFormatting of this note might be different
 from the original.
 PATIENT NAME: Wyatt Shane MR#: [id number]
 : 1945
 
 REFERRING PROVIDER:
 FRED MORIN
 Otolaryngology
 [address] [place] [address]
 
 PRIMARY CARE PROVIDER:
 Erwin Iniguez MD
 
 CLINIC: [facility]
 
 REASON FOR FOLLOW-UP:Chief Complaint 
 Patient presents with 
   Follow-up visit 
 
 HPI:  Wyatt Shane is a 64 y.o. male who was last seen at the [facility] for follow up of swallowing complaints.  I recommended a modified barium sw
allow. 
  
 He returns today noting that he feels that his swallowing is about the same.  He is still v
rodney careful with particulates and avoids them often.  He does cough on liquids occasionally 
and particulates.  He thinks that his voice is about the same.  He has not noted any otalgia
.  He denies odynophagia.  He has not noted any hemoptysis.  He does have some dyspnea on ex
ertion.  He has noted noisy breathing on deep inspiration.  This has not worsened and is onl
y noted with vigorous activity.   He does note problems with nasal allergy symptoms, includi
ng itchy eyes, rhinorrhea and sneezing. He uses Claritin and Sudafed for this.  He is not sm
oking.  
 
 He is maintained on a proton-pump inhibitor.  He notes no symptoms of reflux, including hea
rtburn, indigestion, acidic taste or belching while on proton pump inhibitor therapy. 
 
 VOCAL DEMANDS:  Unchanged from previous visit.
 
 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 
 
 TNM/STAGE: CIS--> T2N0M0/II
 
 
 IDENTIFICATION DATE: 1998
 
 SURGICAL EXCISION DATE: 1998
 
 TYPE OF SURGERY: extended vertical hemilaryngectomy
 
 LAST SURGERY DATE: laryngotracheal reconstruction2000
 
 RADIATION THERAPY COMPLETED: 1998
 
 RADIATION THERAPIST: Unknown
 
 RADIATION DOSE: Unknown
 
 PMHx: Past Medical History 
 Diagnosis Date 
   Larynx cancer  
   T2N0 right glottis 
   Radiation fibrosis  
   larynx 
   Glottic stenosis  
   MI (myocardial infarction)  
   PUD (peptic ulcer disease)  
   Fibromyalgia  
   Depression  
   Nephrolithiasis  
 
 SURGHx:Past Surgical History 
 Procedure Date 
   Hx extended vertical hemilaryngectomy  
   right posterior arytenoid retained. 
   Hx stenosis dilation  
   Nissen fundoplication  
   Hx dml bx 1998 
   cCIS--pT2N0 SCCa right vocal fold 
   Hx laryngotracheoplasty  
 
  
 
 ALLERGIES: No Known Allergies
 
 MEDICATIONS:Current outpatient prescriptions 
 Medication Sig 
   ACYCLOVIR ORAL Take  by mouth as needed. 
   Amlodipine-Atorvastatin (CADUET) 10-10 mg Oral Tablet Take  by mouth. 
   CYANOCOBALAMIN (VITAMIN B-12 INJ) by Injection route every thirty days. 
   gabapentin 300 mg Oral Tablet Take 300 mg by mouth three times daily. 
   LANSOPRAZOLE (PREVACID ORAL) Take  by mouth. 
   LORATADINE (CLARITIN ORAL) Take  by mouth. 
   metoclopramide (REGLAN) 5 mg Oral Tablet Take 5 mg by mouth every six hours as needed. 
   NAPROXEN SODIUM/P-EPHED HCL (SUDAFED 12 HR SINUS-PAIN ORAL) Take  by mouth. 
   sertraline (ZOLOFT) 50 mg Oral Tablet Take 50 mg by mouth once daily. 
   tamsulosin (FLOMAX) 0.4 mg Oral Capsule, Sust. Release 24 hr Take 0.4 mg by mouth once 
daily. 
   TESTOSTERONE IM Inject  into the muscle (IM). 
   VICODIN ORAL None Entered 
 
 LAST WEIGHTS:  Wt Readings from Last 3 Encounters: 
 09/15/2010 97.523 kg (215 lb) 
 
 2010 85.503 kg (188 lb 8 oz) 
 10/03/2007 92.534 kg (204 lb) 
 
 EXAMINATION:  Wt 97.523 kg (215 lb)
 Gen: He is a 64 y.o. male. He is awake, alert and comfortable with the examination. H
is weight is appropriate.
 Ears: The pinnae are normal. External auditory canals show minimal cerumen bilaterally.
 The tympanic membranes are clear with normal anatomic landmarks and an aerated middle ear
 space.
 Nose: The nasal dorsum is straight and the nares are widely patent. The mucosa is pink an
d there are no lesions or masses noted. There is no significant nasal obstruction noted.
 Face: There are no worrisome lesions of the face or head noted.
 Salivary Glands: The salivary glands are soft and show no lesions or masses within the pa
rotid or submandibular glands bilaterally. There is no obvious obstruction of Stensen's or
 Jim Hogg's ducts bilaterally.
 Oropharynx: Normal lips and oral competence are noted. The dentition is fair. The muc
archana is dry and pale, but shows no lesions or masses. The tonsils are small or absent and t
he fossae show no lesions. Bimanual palpation of the gigivolabial sulcus, lingual sulcus, 
tonsillar pillars, palate and base of tongue did not demonstrate any concerning lesions or m
asses.
 Neck: The neck has well healed anterior transverse neck incision and tracheotomy tube scar.
 There is no lymphadenopathy noted in the anterior, posterior, digastric or submental tria
ngles. The thyroid is palpable, but not enlarged or tender. I cannot appreciate any nodu
les. The larynx is no longer anatomic and has cartilage loss along the right side. He 
has tenderness along the right anterior strap muscles in the region of the omohyoid muscles 
bilaterally.
 Chest: Chest rise is symmetric and there is no audible wheezing, stridor or wet vocal romina
lity. There is very mild stridor with deep inspiration.
 Neuro: Extraoccular movements are grossly intact. There is symmetric sensation and move
ment noted of the upper and midface. Marginal mandibular nerve function is normal. Heari
ng is grossly intact. Palatal elevation is symmetric and full. Trapezius function is nor
mal. Tongue protrusion is midline.
 
 VOICE EVALUATION: Perceptual voice evaluation demonstrates severe dysphonia. The pitch 
is low for a male and shows limited range. Vocal intensity is acceptable and shows limited u
pper range. There is 3+ roughness, 1+ breathiness, and 2+ tightness appreciated. T
here is no tremor noted with sustained vowel phonation. I am unable to detect voice breaks.
 Glottal orellana is not detected and there is no diplophonia noted. Articulation is normal.  
 
 LARYNGOSCOPY: Indirect laryngoscopy was performed using a 90 degree Jean-Baptiste sahil telescope
 and distal-chip Olympus flexible videolaryngoscope following the topical nasal application 
of oxymetazoline and pontocaine.  This was performed because the patient's gag reflex preclu
ded adequate transoral indirect laryngoscopy. This demonstrates a clear vallecula and rajiv
p epiglottis. The right arytenoid is partially present and mobile with mucosal redundancy.
 There is soft tissue where the right hemilarynx would be. This is benign and mucosalized.
 The true vocal folds are absent. The left false vocal fold is at least partially present.
 The left arytenoid is present, but fixed. Both aryepiglottic folds are shortened. The
 base of tongue is clear. The subglottic airway is slightly narrowed, but adequate.  It is
 scarred anteriorly without mass.
 
 MODIFIED BARIUM SWALLOW:  a modified barium swallow demonstrates:
 
 Examination: Modified barium swallowing study.  Technique and
 findings: The study was performed in conjunction with the [facility] speech
 pathologist.  The patient drank a variety of barium impregnated
 liquids and solids and swallowed a barium tablet using fluoroscopic
 observation.  There was no delay in initiating the primary propulsive
 wave on swallowing.  The patient swallowed thin barium without
 difficulty with no obstruction of flow.  With thicker texture barium
 liquids, there was mild partial delay and about the C6 level
 
 associated with a mild short segment of circumferential narrowing of
 the proximal esophagus at this level but no sign of extravasation.
 There is no evidence for obstruction of flow of solid materials.  The
 ingested barium tablet passed through the oropharynx, hypopharynx and
 thoracic esophagus but there was delay in passage at the
 gastroesophageal junction.  See formal esophagram report.
  
 Impression: No sign of extravasation or aspiration.  Short segment of
 narrowing of the proximal esophagus at the C6 level perhaps
 accounting for some retropulsion of thicker barium liquids through
 this segment.  Delay in passage of barium tablet at the
 gastroesophageal junction.
  
 I have personally viewed this procedure/exam and reviewed this report.
  
 Author: JUSTIN ELIZALDE M.D.
 Reviewer: JUSTIN ELIZALDE M.D.
 
 I have reviewed these images and agree with the radiologist's findings.  Minimal cervical e
sophageal narrowing.  --JSS 
 -------
 
 ASSESSMENT:  Mr. Shane appears to suffer from dysphonia, dysphagia and dyspnea associated wi
th prior vertical hemilaryngectomy with reconstruction and radiation therapy which has not c
hanged significantly since I last saw him or since his last modified barium swallow.  His sw
allowing is consistent with radiation fibrosis and his previous surgery.  It appears safe an
d effective.  I don't think that his esophageal stenosis requires dilation at this time, but
 may in the future.  His airway certainly appears adequate and I do not think that his dyspn
ea on exertion is secondary to laryngeal stenosis.  Finally, his voice is not great, but I d
on't think that there is anything I can do to improve this.  He understands and agrees.
 
 PLAN:   Continued periodic observation.  I will see him back in approximately 6 months, or 
sooner if symptoms worsen.  I recommended transnasal esophagoscopy at that time to look at t
he cervical esophagus.  He understands and agrees.
 
 Jonathan Cross M.D.
 
 Laryngology and Head & Neck Surgery
 Tel:[phone]
 Fax:[phone] 
 
 Electronically signed by Jonathan Cross MD at 2010  2:34 PM ChrissieMartha -
 09/15/2010 11:59 AM PDTFormatting of this note might be different from the original.
 Additional staff support provided to the patient during this encounter included:
 
 Health maintanance reviewed and documented.
 
 Medication(s) reviewed this encounter:
 
 Outpatient encounter prescriptions as of 9/15/2010 
 Medication Sig Dispense Refill 
   ACYCLOVIR ORAL Take  by mouth as needed.     
   Amlodipine-Atorvastatin (CADUET) 10-10 mg Oral Tablet Take  by mouth.     
   CYANOCOBALAMIN (VITAMIN B-12 INJ) by Injection route every thirty days.     
   gabapentin 300 mg Oral Tablet Take 300 mg by mouth three times daily.     
   LANSOPRAZOLE (PREVACID ORAL) Take  by mouth.     
   LORATADINE (CLARITIN ORAL) Take  by mouth.     
   metoclopramide (REGLAN) 5 mg Oral Tablet Take 5 mg by mouth every six hours as needed. 
    
   NAPROXEN SODIUM/P-EPHED HCL (SUDAFED 12 HR SINUS-PAIN ORAL) Take  by mouth.     
 
   DISCONTD: pregabalin (LYRICA) 50 mg Oral Capsule Take  by mouth three times daily. Max:
 600 mg/day      
   sertraline (ZOLOFT) 50 mg Oral Tablet Take 50 mg by mouth once daily.     
   tamsulosin (FLOMAX) 0.4 mg Oral Capsule, Sust. Release 24 hr Take 0.4 mg by mouth once 
daily.     
   TESTOSTERONE IM Inject  into the muscle (IM).     
   VICODIN ORAL None Entered     
 
 Electronically signed by Martha Gomez at 09/15/2010 11:59 AM PDTdocumented in this encoun
ter
 
 Plan of Treatment
 Not on filedocumented as of this encounter
 
 Procedures
 
 
+----------------------+--------+-------------+----------------------+----------+
| Procedure Name       | Priori | Date/Time   | Associated Diagnosis | Comments |
|                      | ty     |             |                      |          |
+----------------------+--------+-------------+----------------------+----------+
| AL                   | Routin | 2010  |   Glottic stenosis   |          |
| LARYNGOSCOPY,FLEXIBL | e      |  2:33 PM    | Pharyngeal dysphagia |          |
| E, DIAGNOSTIC        |        | PDT         |   Larynx edema       |          |
+----------------------+--------+-------------+----------------------+----------+
 documented in this encounter
 
 Visit Diagnoses
 
 
+---------------------------------------------------------------+
| Diagnosis                                                     |
+---------------------------------------------------------------+
|   Pharyngeal dysphagia - Primary  Dysphagia, pharyngeal phase |
+---------------------------------------------------------------+
|   Glottic stenosis  Stenosis of larynx                        |
+---------------------------------------------------------------+
|   Esophageal stenosis  Stricture and stenosis of esophagus    |
+---------------------------------------------------------------+
|   Larynx edema  Edema of larynx                               |
+---------------------------------------------------------------+
 documented in this encounter

## 2019-12-06 NOTE — XMS
Encounter Summary
  Created on: 2019
 
 Wyatt Shane
 External Reference #: [id number]
 : 43
 Sex: Male
 
 Demographics
 
 
+-----------------------+------------------------+
| Address               | [address]     |
|                       | [place]  [address]   |
+-----------------------+------------------------+
| Home Phone            | [phone]        |
+-----------------------+------------------------+
| Preferred Language    | Unknown                |
+-----------------------+------------------------+
| Marital Status        |                 |
+-----------------------+------------------------+
| Quaker Affiliation | LUT                    |
+-----------------------+------------------------+
| Race                  | White                  |
+-----------------------+------------------------+
| Ethnic Group          | Not  or  |
+-----------------------+------------------------+
 
 
 Author
 
 
+--------------+-------------+
| Organization | Unknown     |
+--------------+-------------+
| Address      | Unknown     |
+--------------+-------------+
| Phone        | Unavailable |
+--------------+-------------+
 
 
 
 Support
 
 
+---------------+--------------+---------+-----------------+
| Name          | Relationship | Address | Phone           |
+---------------+--------------+---------+-----------------+
| Opal Shane | ECON         | Unknown | [phone] |
+---------------+--------------+---------+-----------------+
 
 
 
 Care Team Providers
 
 
+-----------------------+------+-----------------+
| Care Team Member Name | Role | Phone           |
 
+-----------------------+------+-----------------+
| Erwin Iniguez MD   | PCP  | [phone] |
+-----------------------+------+-----------------+
 
 
 
 Encounter Details
 
 
+--------+-------------+------------+---------------------+---------------+
| Date   | Type        | Department | Care Team           | Description   |
+--------+-------------+------------+---------------------+---------------+
| / | Office      |            |   Note, Outpatient  | Progress Note |
| 2002   | Visit-Trans |            | Clinic              |               |
|        | cribed      |            |                     |               |
+--------+-------------+------------+---------------------+---------------+
 
 
 
 Social History
 
 
+----------------+-------+-----------+--------+------+
| Tobacco Use    | Types | Packs/Day | Years  | Date |
|                |       |           | Used   |      |
+----------------+-------+-----------+--------+------+
| Never Assessed |       |           |        |      |
+----------------+-------+-----------+--------+------+
 
 
 
+------------------+---------------+
| Sex Assigned at  | Date Recorded |
| Birth            |               |
+------------------+---------------+
| Not on file      |               |
+------------------+---------------+
 
 
 
+----------------+-------------+-------------+
| Job Start Date | Occupation  | Industry    |
+----------------+-------------+-------------+
| Not on file    | Not on file | Not on file |
+----------------+-------------+-------------+
 
 
 
+----------------+--------------+------------+
| Travel History | Travel Start | Travel End |
+----------------+--------------+------------+
 
 
 
+-------------------------------------+
| No recent travel history available. |
+-------------------------------------+
 documented as of this encounter
 
 Progress Notes
 
 Interface, Transcription In - 2006  3:07 AM PDTCLINIC DATE:       2002
 
 Mr. Shane is seen back today.  Details  of  interaction with the patient are
 outlined in my handwritten notes.  The  summary  is  that  he is doing well
 without evidence of recurrent disease  on  full  exam  including a flexible
 fiberoptic laryngoscopy.  Chest x-ray  is  being  done  by his primary care
 physician.  I will see him back here in approximately 6 months.
 
 Jimmy Esposito M.D.
 
 [facility] / 
 [id number] / [id number] / [id number] / [id number]
 D: 2002
 T: 2002
 
 cc:
 
 Erwin Ngo M.D.
 [address]
 [place]  [address]
 
 Eddy Boone M.D.
 [address] [facility]
 [place], [place]  [id number] signed by Interface, Transcription In at 2006  3:0
7 AM PDTdocumented in this encounter
 
 Plan of Treatment
 Not on filedocumented as of this encounter
 
 Visit Diagnoses
 Not on filedocumented in this encounter

## 2019-12-06 NOTE — XMS
Encounter Summary
  Created on: 2019
 
 Wyatt Shane
 External Reference #: [id number]
 : 43
 Sex: Male
 
 Demographics
 
 
+-----------------------+------------------------+
| Address               | [address]     |
|                       | [place]  [address]   |
+-----------------------+------------------------+
| Home Phone            | [phone]        |
+-----------------------+------------------------+
| Preferred Language    | Unknown                |
+-----------------------+------------------------+
| Marital Status        |                 |
+-----------------------+------------------------+
| Restoration Affiliation | LUT                    |
+-----------------------+------------------------+
| Race                  | White                  |
+-----------------------+------------------------+
| Ethnic Group          | Not  or  |
+-----------------------+------------------------+
 
 
 Author
 
 
+--------------+------------------------------+
| Author       | [facility] |
+--------------+------------------------------+
| Organization | [facility] |
+--------------+------------------------------+
| Address      | Unknown                      |
+--------------+------------------------------+
| Phone        | Unavailable                  |
+--------------+------------------------------+
 
 
 
 Support
 
 
+---------------+--------------+---------+-----------------+
| Name          | Relationship | Address | Phone           |
+---------------+--------------+---------+-----------------+
| Opal Shane | ECON         | Unknown | [phone] |
+---------------+--------------+---------+-----------------+
 
 
 
 Care Team Providers
 
 
 
+-----------------------+------+-----------------+
| Care Team Member Name | Role | Phone           |
+-----------------------+------+-----------------+
| Erwin Iniguez MD   | PCP  | [phone] |
+-----------------------+------+-----------------+
 
 
 
 Reason for Visit
 Speech Therapy (Routine)
 
+--------+--------+-----------+--------------+--------------+---------------+
| Status | Reason | Specialty | Diagnoses /  | Referred By  | Referred To   |
|        |        |           | Procedures   | Contact      | Contact       |
+--------+--------+-----------+--------------+--------------+---------------+
| Closed |        | Speech    |   Diagnoses  |   Tay, |   Ent Speech  |
|        |        | Therapy   |  Pharyngeal  |  Jonathan SOLANO,   | Ppv  [address]  |
|        |        |           | dysphagia    | MD  [address]  | [address]   |
|        |        |           | Procedures   | [address]  | [address]       |
|        |        |           | CONSULT TO   | [facility]      | Mailcode:     |
|        |        |           | ENT SPEECH   | [place], [place] | [address]          |
|        |        |           | THERAPY      |  [address]  | Physician's   |
|        |        |           |              |  Phone:      | [address]      |
|        |        |           |              | [phone] | [place], [place]  |
|        |        |           |              |   Fax:       | [phone]    |
|        |        |           |              | [phone] | Phone:        |
|        |        |           |              |              | [phone]  |
|        |        |           |              |              |  Fax:         |
|        |        |           |              |              | [phone]  |
+--------+--------+-----------+--------------+--------------+---------------+
 
 
 
 
 Encounter Details
 
 
+--------+---------+----------------------+----------------------+---------------------+
| Date   | Type    | Department           | Care Team            | Description         |
+--------+---------+----------------------+----------------------+---------------------+
| 09/15/ | Office  |   Otolaryngology     |   Baudilio Harper,    | Dysphagia,          |
|    | Visit   | Speech Therapy       | SLP  [address]     | pharyngeal phase;   |
|        |         | Services at  [facility]     | [address]      | Dysphagia,          |
|        |         | [address]  | [place] [address]   | pharyngoesophageal  |
|        |         | [address] [facility]  Mailcode:   | [phone]         | phase               |
|        |         | [address]  Physician's    | [phone] (Fax)   |                     |
|        |         | [address]  [place],  |                      |                     |
|        |         | [place] [address]        |                      |                     |
|        |         | [phone]         |                      |                     |
+--------+---------+----------------------+----------------------+---------------------+
 
 
 
 Social History
 
 
+---------------+------------+-----------+--------+------------------+
| Tobacco Use   | Types      | Packs/Day | Years  | Date             |
|               |            |           | Used   |                  |
+---------------+------------+-----------+--------+------------------+
 
| Former Smoker | Cigarettes | 1         | 30     | Quit: 1996 |
+---------------+------------+-----------+--------+------------------+
 
 
 
+-------------+-------------+---------+----------+
| Alcohol Use | Drinks/Week | oz/Week | Comments |
+-------------+-------------+---------+----------+
| No          |             |         |          |
+-------------+-------------+---------+----------+
 
 
 
+------------------+---------------+
| Sex Assigned at  | Date Recorded |
| Birth            |               |
+------------------+---------------+
| Not on file      |               |
+------------------+---------------+
 
 
 
+----------------+-------------+-------------+
| Job Start Date | Occupation  | Industry    |
+----------------+-------------+-------------+
| Not on file    | Not on file | Not on file |
+----------------+-------------+-------------+
 
 
 
+----------------+--------------+------------+
| Travel History | Travel Start | Travel End |
+----------------+--------------+------------+
 
 
 
+-------------------------------------+
| No recent travel history available. |
+-------------------------------------+
 documented as of this encounter
 
 Progress Notes
 Baudilio Harper SLP - 09/15/2010  9:01 PM PDTClinic: [facility]
 
 Referring Physician: 
 JONATHAN FARIAS
 Otolaryngology
 [address] [place] [address]
 
 PCP: Erwin Iniguez MD
 
 Medical Diagnosis: Dysphagia, 787.20
 
 Date of Onset: 7/1/10
 
 Treatment Diagnosis: Dysphagia, 787.24
 
 Start of Care Date: 9/15/2010  
 
 
 Duration of session: 60 minutes
 
 SUBJECTIVE: Wyatt Shane is a 64 y.o. male of Dr. Farias seen for evaluation of his swall
owing complaints.
 
 CURRENT COMPLAINTS: 
 1. Choking very occasionally on foods and liquids (inc water, rice, pork, chicken)
 2. Residue for foods which he must wash down
 3. Sensation of liquids becoming stuck at the lower esophagus
 4. One episode of choking on a pill
 5. GERD - controlled s/p Nissen and with Prilosec
 
 DESCRIPTION OF PROBLEM: The patient reports that his dysphagia symptoms have worsened over 
the last year. They are intermittent. The patient denies change in respiratory status, weigh
t loss, pain on swallowing, difficulty chewing and regurgitation of foods. 
 
 Pt has a past medical history of Larynx cancer; Radiation fibrosis; Glottic stenosis; MI (m
yocardial infarction); PUD (peptic ulcer disease); Fibromyalgia; Depression; and Nephrolithi
asis.
 
 Pt has past surgical history that includes HX EXTENDED VERTICAL HEMILARYNGECTOMY; HX STENOS
IS DILATION; nissen fundoplication; HX DML BX (1998); and HX LARYNGOTRACHEOPLASTY ().
 
 DIETARY STATUS: Current diet: Regular, no restrictions. He must chew food well and wash it 
down, however. 
 
 WEIGHT: The patient's weight today is 215 lbs
 
 SOCIAL HISTORY: The patient currently lives with their spouse in [place], [place]. 
 
 COMMUNICATION/SPEECH STATUS: The patient communicates verbally. Speech quality was noted to
 be normal and Always understandable (PSS-Speech 100). Voice quality was significantly hoars
e and reduced in strength. In terms of respiration, the patient is breathing easily at rest.
 
 
 ORAL-MOTOR EXAMINATION: WFL
 
 MODIFIED BARIUM SWALLOW STUDY: Prior to starting the study, the patient's name and  were
 verified. The patient was evaluated in the [facility] 10th floor Radiology suite & was observed i
n the lateral and anteroposterior planes while standing. Consistencies/materials administere
d included: thin liquids, nectar thick liquids, honey, pudding, barium pill, fruit cocktail 
mixed with barium and michelle cracker coated with barium. The patient tolerated the exam well
. 
 
 Oral Phase:  Oral bolus control and preparation were normal. 
 
 Pharyngeal Phase:  Initiation of the pharyngeal phase was timely. Once initiated, laryngeal
 elevation was moderately reduced resulting in moderately reduced epiglottic deflection (to 
the horizontal only but epiglottis to arytenoid contact was noted to occur). Velopharyngeal 
closure was normal. Tongue base to pharyngeal wall contact was within functional limits.  Fo
r nectar thick liquids there was noted to be trace penetration at the start of the study but
 otherwise no penetration, aspiration or significant residue. For liquid and food there was 
noted to be no penetration or aspiration and no significant residue after the swallow. In th
e yash-posterior view, the swallow was noted to be asymmetric with significant bulk on the
 right likely due to post-operative reconstruction. The bolus transited primarily on the lef
t. 
 
 Esophageal Phase:  Cricopharyngeal opening was noted to be mildly reduced and there is the 
suggestion of a stricture at the level of C6 with some retention and backflow of barium. Alt
 
ernatively this could be muscular, however. The barium pill transited to the LES where it be
came lodged for some time. Tertiary contractions and a paraesophageal hernia were seen, per 
the radiologist.  Please see the radiologist  s report for additional information.
 
 PATIENT EDUCATION & TREATMENT: Subsequent to the examination the findings were reviewed wit
h the patient and  spouse and Dr. Farias. He will see them today also and discuss treatme
nt options. 
 
 ASSESSMENT: 
 1. Functional oropharyngeal swallowing
 2. Possible stricture vs spasm at level of C6 (mild)
 3. Delayed transit of barium through LES due to hernia/previous Nissen
 
 FOLLOW-UP: The patient was advised to follow-up with us as needed. 
 
 Thank you for this consult.
  
 Baudilio Harper, MS, CCC-SLP
 [facility]
 Dept. of Otolaryngology, [address]
 [address] [place]
 [place] [address] [phone]
 
 Electronically signed by Baudilio Harper SLP at 09/15/2010  9:01 PM PDTdocumented in this en
counter
 
 Plan of Treatment
 Not on filedocumented as of this encounter
 
 Procedures
 
 
+----------------------+--------+-------------+----------------------+----------+
| Procedure Name       | Priori | Date/Time   | Associated Diagnosis | Comments |
|                      | ty     |             |                      |          |
+----------------------+--------+-------------+----------------------+----------+
| ND EVAL,SWALLOW      | Routin | 09/15/2010  |   Dysphagia,         |          |
| FUNCTION,CINE/VIDEO  | e      |  9:02 PM    | pharyngeal phase     |          |
| RECORD               |        | PDT         | Dysphagia,           |          |
|                      |        |             | pharyngoesophageal   |          |
|                      |        |             | phase                |          |
+----------------------+--------+-------------+----------------------+----------+
| ND EVAL,ORAL &       | Routin | 09/15/2010  |   Dysphagia,         |          |
| PHARYNGEAL SWALLOW   | e      |  9:02 PM    | pharyngeal phase     |          |
| FUNCTION             |        | PDT         | Dysphagia,           |          |
|                      |        |             | pharyngoesophageal   |          |
|                      |        |             | phase                |          |
+----------------------+--------+-------------+----------------------+----------+
 documented in this encounter
 
 Visit Diagnoses
 
 
+---------------------------------------+
| Diagnosis                             |
+---------------------------------------+
|   Dysphagia, pharyngeal phase         |
+---------------------------------------+
|   Dysphagia, pharyngoesophageal phase |
 
+---------------------------------------+
 documented in this encounter

## 2019-12-06 NOTE — XMS
Encounter Summary
  Created on: 2019
 
 Wyatt Shane
 External Reference #: [id number]
 : 43
 Sex: Male
 
 Demographics
 
 
+-----------------------+------------------------+
| Address               | [address]     |
|                       | [place]  [address]   |
+-----------------------+------------------------+
| Home Phone            | [phone]        |
+-----------------------+------------------------+
| Preferred Language    | Unknown                |
+-----------------------+------------------------+
| Marital Status        |                 |
+-----------------------+------------------------+
| Hindu Affiliation | LUT                    |
+-----------------------+------------------------+
| Race                  | White                  |
+-----------------------+------------------------+
| Ethnic Group          | Not  or  |
+-----------------------+------------------------+
 
 
 Author
 
 
+--------------+------------------------------+
| Author       | [facility] |
+--------------+------------------------------+
| Organization | [facility] |
+--------------+------------------------------+
| Address      | Unknown                      |
+--------------+------------------------------+
| Phone        | Unavailable                  |
+--------------+------------------------------+
 
 
 
 Support
 
 
+---------------+--------------+---------+-----------------+
| Name          | Relationship | Address | Phone           |
+---------------+--------------+---------+-----------------+
| Opal Shane | ECON         | Unknown | [phone] |
+---------------+--------------+---------+-----------------+
 
 
 
 Care Team Providers
 
 
 
+-----------------------+------+-----------------+
| Care Team Member Name | Role | Phone           |
+-----------------------+------+-----------------+
| Erwin Iniguez MD   | PCP  | [phone] |
+-----------------------+------+-----------------+
 
 
 
 Encounter Details
 
 
+--------+-------------+----------------------+--------------------+-------------+
| Date   | Type        | Department           | Care Team          | Description |
+--------+-------------+----------------------+--------------------+-------------+
| / | Documentati |   Health Information |   Other, Faculty   |             |
| 2018   | on          |  Services  [address]   | [phone]       |             |
|        |             | [address]  |                    |             |
|        |             |  Mailcode: [id number]     |                    |             |
|        |             | [facility]  |                    |             |
|        |             | [place], [place]  |                    |             |
|        |             | [address]           |                    |             |
|        |             | [phone]         |                    |             |
+--------+-------------+----------------------+--------------------+-------------+
 
 
 
 Social History
 
 
+---------------+------------+-----------+--------+------------------+
| Tobacco Use   | Types      | Packs/Day | Years  | Date             |
|               |            |           | Used   |                  |
+---------------+------------+-----------+--------+------------------+
| Former Smoker | Cigarettes | 1         | 30     | Quit: 1996 |
+---------------+------------+-----------+--------+------------------+
 
 
 
+-------------+-------------+---------+----------+
| Alcohol Use | Drinks/Week | oz/Week | Comments |
+-------------+-------------+---------+----------+
| No          |             |         |          |
+-------------+-------------+---------+----------+
 
 
 
+------------------+---------------+
| Sex Assigned at  | Date Recorded |
| Birth            |               |
+------------------+---------------+
| Not on file      |               |
+------------------+---------------+
 
 
 
+----------------+-------------+-------------+
| Job Start Date | Occupation  | Industry    |
+----------------+-------------+-------------+
| Not on file    | Not on file | Not on file |
+----------------+-------------+-------------+
 
 
 
 
+----------------+--------------+------------+
| Travel History | Travel Start | Travel End |
+----------------+--------------+------------+
 
 
 
+-------------------------------------+
| No recent travel history available. |
+-------------------------------------+
 documented as of this encounter
 
 Plan of Treatment
 Not on filedocumented as of this encounter
 
 Visit Diagnoses
 Not on filedocumented in this encounter

## 2019-12-06 NOTE — XMS
Encounter Summary
  Created on: 2019
 
 Shane Wyatttien BroussardJosue
 External Reference #: [id number]
 : 43
 Sex: Male
 
 Demographics
 
 
+-----------------------+---------------------------+
| Address               | [address]        |
|                       | [place]  [address] |
+-----------------------+---------------------------+
| Home Phone            | [phone]           |
+-----------------------+---------------------------+
| Preferred Language    | Unknown                   |
+-----------------------+---------------------------+
| Marital Status        |                    |
+-----------------------+---------------------------+
| Yazdanism Affiliation | [id number]                      |
+-----------------------+---------------------------+
| Race                  | Unknown                   |
+-----------------------+---------------------------+
| Ethnic Group          | Unknown                   |
+-----------------------+---------------------------+
 
 
 Author
 
 
+--------------+--------------------------------------------+
| Author       | [facility]  |
|              | [facility] [place]                                |
+--------------+--------------------------------------------+
| Organization | [facility]  |
|              | [facility] [place]                                |
+--------------+--------------------------------------------+
| Address      | Unknown                                    |
+--------------+--------------------------------------------+
| Phone        | Unavailable                                |
+--------------+--------------------------------------------+
 
 
 
 Support
 
 
+---------------+--------------+--------------------+-----------------+
| Name          | Relationship | Address            | Phone           |
+---------------+--------------+--------------------+-----------------+
| Opal Shane | ECON         | [address]     | [phone] |
|               |              | [place]   |                 |
|               |              | [id number]              |                 |
+---------------+--------------+--------------------+-----------------+
 
 
 
 
 Care Team Providers
 
 
+-----------------------+------+-----------------+
| Care Team Member Name | Role | Phone           |
+-----------------------+------+-----------------+
| Erwin Iniguez MD | PCP  | [phone] |
+-----------------------+------+-----------------+
 
 
 
 Reason for Visit
 Evaluate & Treat (Routine)
 
+--------+--------------+-----------+--------------+--------------+---------------+
| Status | Reason       | Specialty | Diagnoses /  | Referred By  | Referred To   |
|        |              |           | Procedures   | Contact      | Contact       |
+--------+--------------+-----------+--------------+--------------+---------------+
| Closed |   Specialty  | Sleep     |   Diagnoses  |   Chris,     |   [facility]   |
|        | Services     | Medicine  |  [place]     | [facility] [place]    | [place]  [address] |
|        | Required     |           | sleep apnea  | MD [address]  [address]       |
|        |              |           | due to       |  [address] | [place],  |
|        |              |           | [place] |    [place]   | [place] [address] |
|        |              |           | s            | [place]    |   Phone:      |
|        |              |           | respiration  | [address]        | [phone]  |
|        |              |           |  JOANN         | Phone:       |  Fax:         |
|        |              |           | (obstructive | [phone] | [phone]  |
|        |              |           |  sleep       |   Fax:       |               |
|        |              |           | apnea)       | [phone] |               |
|        |              |           | Procedures   |              |               |
|        |              |           | NM POLYSOM   |              |               |
|        |              |           | 6/>YRS SLEEP |              |               |
|        |              |           |  4/> ADDL    |              |               |
|        |              |           | MALIK ATTND  |              |               |
|        |              |           |  NPSG        |              |               |
+--------+--------------+-----------+--------------+--------------+---------------+
 
 
 
 
 Encounter Details
 
 
+--------+-----------+----------------------+---------------------+----------------------+
| Date   | Type      | Department           | Care Team           | Description          |
+--------+-----------+----------------------+---------------------+----------------------+
| 10/19/ | Hospital  |   [facility] |   Luis Carlos Perez  | Central sleep apnea  |
| 2015   | Encounter |  [facility]       | MD [address]   | due to [place] |
|        |           | CENTER  [address] | [address]  [facility]    |  respiration         |
|        |           |   [place], [place]    | [facility] [place] [address]     | (Primary Dx); [place]    |
|        |           | [address]           | [phone]        | (obstructive sleep   |
|        |           | [phone]         | [phone] (Fax)  | apnea)               |
+--------+-----------+----------------------+---------------------+----------------------+
 
 
 
 Social History
 
 
 
+---------------+------------+-----------+--------+------------------+
| Tobacco Use   | Types      | Packs/Day | Years  | Date             |
|               |            |           | Used   |                  |
+---------------+------------+-----------+--------+------------------+
| Former Smoker | Cigarettes | 1.3       | 34     | Quit: 1996 |
+---------------+------------+-----------+--------+------------------+
 
 
 
+---------------------+---+---+---+
| Smokeless Tobacco:  |   |   |   |
| Never Used          |   |   |   |
+---------------------+---+---+---+
 
 
 
+-------------+-------------+---------+----------+
| Alcohol Use | Drinks/Week | oz/Week | Comments |
+-------------+-------------+---------+----------+
| No          |             |         |          |
+-------------+-------------+---------+----------+
 
 
 
+------------------+---------------+
| Sex Assigned at  | Date Recorded |
| Birth            |               |
+------------------+---------------+
| Not on file      |               |
+------------------+---------------+
 
 
 
+----------------+-------------+-------------+
| Job Start Date | Occupation  | Industry    |
+----------------+-------------+-------------+
| Not on file    | Not on file | Not on file |
+----------------+-------------+-------------+
 
 
 
+----------------+--------------+------------+
| Travel History | Travel Start | Travel End |
+----------------+--------------+------------+
 
 
 
+-------------------------------------+
| No recent travel history available. |
+-------------------------------------+
 documented as of this encounter
 
 Medications at Time of Discharge
 
 
+----------------------+----------------------+-----------+---------+----------+-----------+
| Medication           | Sig                  | Dispensed | Refills | Start    | End Date  |
|                      |                      |           |         | Date     |           |
+----------------------+----------------------+-----------+---------+----------+-----------+
|   acyclovir          | Apply  topically     |           | 0       |          |           |
 
| (ZOVIRAX) 5%         | every 3 hours.       |           |         |          |           |
| ointment             |                      |           |         |          |           |
+----------------------+----------------------+-----------+---------+----------+-----------+
|   albuterol (PROAIR  | Inhale 2 puffs into  |           | 0       |          |           |
| HFA) 90 mcg/puff     | the lungs every 6    |           |         |          |           |
| inhaler              | hours as needed for  |           |         |          |           |
|                      | Wheezing.            |           |         |          |           |
+----------------------+----------------------+-----------+---------+----------+-----------+
|   digoxin (LANOXIN)  | Take 125 mcg by      |           | 0       |          |           |
| 250 mcg tablet       | mouth Daily.      |           |         |          |           |
|                      | capsule daily        |           |         |          |           |
+----------------------+----------------------+-----------+---------+----------+-----------+
|   ferrous sulfate    | Take 325 mg by mouth |           | 0       | 20 |           |
| 325 mg tablet        |  3 times daily.      |           |         | 12       |           |
+----------------------+----------------------+-----------+---------+----------+-----------+
|   fluticasone        | one spray in each    |           | 0       | 20 |           |
| (FLONASE) 50         | nostril daily as     |           |         | 12       |           |
| mcg/nasal spray      | needed               |           |         |          |           |
+----------------------+----------------------+-----------+---------+----------+-----------+
|                      | Inhale 1 puff into   |           | 0       |          |           |
| fluticasone-salmeter | the lungs Twice      |           |         |          |           |
| ol (ADVAIR) 500-50   | Daily.               |           |         |          |           |
| mcg/puff diskus      |                      |           |         |          |           |
| inhaler              |                      |           |         |          |           |
+----------------------+----------------------+-----------+---------+----------+-----------+
|   folic acid 1 mg    | Take 1 mg by mouth   |           | 0       |          |           |
| tablet               | Daily.               |           |         |          |           |
+----------------------+----------------------+-----------+---------+----------+-----------+
|   furosemide (LASIX) | Take 40 mg by mouth  |           | 0       |          |           |
|  40 mg tablet        | Daily.               |           |         |          |           |
+----------------------+----------------------+-----------+---------+----------+-----------+
|   guaiFENesin        | Take 1,200 mg by     |           | 0       |          |           |
| (MUCINEX) 600 mg 12  | mouth 2 times daily. |           |         |          |           |
| hr tablet            |                      |           |         |          |           |
+----------------------+----------------------+-----------+---------+----------+-----------+
|   levothyroxine      | Take 75 mcg by mouth |           | 0       | 20 |           |
| (LEVOTHROID) 75 MCG  |  Daily.              |           |         | 12       |           |
| tablet               |                      |           |         |          |           |
+----------------------+----------------------+-----------+---------+----------+-----------+
|   metoclopramide     | Take 10 mg by mouth  |           | 0       | 20 |           |
| (REGLAN) 10 mg       | 4 times daily as     |           |         | 12       |           |
| tablet               | needed.              |           |         |          |           |
+----------------------+----------------------+-----------+---------+----------+-----------+
|                      | Apply  topically 2   |           | 0       |          |           |
| nystatin-triamcinolo | times daily.         |           |         |          |           |
| ne (MYCOLOG II)      |                      |           |         |          |           |
| cream                |                      |           |         |          |           |
+----------------------+----------------------+-----------+---------+----------+-----------+
|   omeprazole         | Take 20 mg by mouth  |           | 0       | 20 |           |
| (PRILOSEC) 20 mg     | 2 times daily.       |           |         | 12       |           |
| capsule              |                      |           |         |          |           |
+----------------------+----------------------+-----------+---------+----------+-----------+
|   potassium citrate  | Take 10 mEq by mouth |           | 0       |          |           |
| (UROCIT-K) 10 mEq SR |  2 times daily.      |           |         |          |           |
|  tablet              |                      |           |         |          |           |
+----------------------+----------------------+-----------+---------+----------+-----------+
|   predniSONE         | Take 10 mg by mouth  |           | 0       |          |           |
| (DELTASONE) 5 mg     | Daily.               |           |         |          |           |
| tablet               |                      |           |         |          |           |
+----------------------+----------------------+-----------+---------+----------+-----------+
 
|   tamsulosin         | Take 0.4 mg by mouth |           | 0       | 20 |           |
| (FLOMAX) 0.4 mg CAPS |  2 times daily.      |           |         | 12       |           |
+----------------------+----------------------+-----------+---------+----------+-----------+
|   acyclovir          | Take 400 mg by mouth |           | 0       | 20 |  |
| (ZOVIRAX) 400 MG     |  3 times daily as    |           |         | 12       | 9         |
| tablet               | needed.              |           |         |          |           |
+----------------------+----------------------+-----------+---------+----------+-----------+
|   Cholecalciferol    | Take 2,000 Units by  |           | 0       | 20 |  |
| (VITAMIN D3) 2000    | mouth Daily.         |           |         | 12       | 9         |
| UNITS CAPS           |                      |           |         |          |           |
+----------------------+----------------------+-----------+---------+----------+-----------+
|   cyanocobalamin     | injected             |           | 0       | 20 |  |
| (VITAMIN B-12) 1,000 | intramuscularly once |           |         | 12       | 9         |
|  mcg/mL injection    |  a month             |           |         |          |           |
+----------------------+----------------------+-----------+---------+----------+-----------+
|   diltiazem          | Take 120 mg by mouth |           | 0       |          |  |
| (DILT-XR) 120 mg 24  |  Daily.              |           |         |          | 9         |
| hr capsule           |                      |           |         |          |           |
+----------------------+----------------------+-----------+---------+----------+-----------+
|   loratadine         | Take 10 mg by mouth  |           | 0       |          |  |
| (CLARITIN) 10 mg     | Daily.               |           |         |          | 9         |
| tablet               |                      |           |         |          |           |
+----------------------+----------------------+-----------+---------+----------+-----------+
|   losartan (COZAAR)  | Take 100 mg by mouth |           | 0       |          |  |
| 100 MG tablet        |  Daily. 1/2 daily    |           |         |          | 9         |
+----------------------+----------------------+-----------+---------+----------+-----------+
|   methotrexate 2.5   | Take 15 mg by mouth  |           | 0       |          |  |
| mg tablet            | Once a week.         |           |         |          | 9         |
+----------------------+----------------------+-----------+---------+----------+-----------+
|                      | Take 1 tablet by     |           | 0       |          |  |
| oxyCODONE-acetaminop | mouth every 4 hours  |           |         |          | 9         |
| hen (PERCOCET) 5-325 | as needed for Pain.  |           |         |          |           |
|  mg per tablet       |                      |           |         |          |           |
+----------------------+----------------------+-----------+---------+----------+-----------+
|   pseudoePHEDrine    | Take 30 mg by mouth  |           | 0       |          |  |
| (SUDOGEST) 30 mg     | every 4 hours as     |           |         |          | 9         |
| tablet               | needed for           |           |         |          |           |
|                      | Congestion.          |           |         |          |           |
+----------------------+----------------------+-----------+---------+----------+-----------+
|   rivaroxaban        | Take 20 mg by mouth  |           | 0       |          |  |
| (XARELTO) 20 mg      | Daily (with dinner). |           |         |          | 9         |
| tablet               |                      |           |         |          |           |
+----------------------+----------------------+-----------+---------+----------+-----------+
|   sertraline         | 2 tablets daily      |           | 0       | 20 |  |
| (ZOLOFT) 100 mg      |                      |           |         | 12       | 9         |
| tablet               |                      |           |         |          |           |
+----------------------+----------------------+-----------+---------+----------+-----------+
 documented as of this encounter
 
 Plan of Treatment
 
 
+--------+---------+-------------+----------------------+-------------+
| Date   | Type    | Specialty   | Care Team            | Description |
+--------+---------+-------------+----------------------+-------------+
| / | Office  | Pulmonology |   Juan F,          |             |
| 2019   | Visit   |             | Jessica Cheung,   |             |
|        |         |             | MD  [address] |             |
|        |         |             |   [place],   |             |
|        |         |             | [place] [address]             |             |
 
|        |         |             | [phone]         |             |
|        |         |             | [phone] (Fax)   |             |
+--------+---------+-------------+----------------------+-------------+
| / | Office  | Cardiology  |   Eric Akins, |             |
| 2020   | Visit   |             |  MD  [address]   |             |
|        |         |             | [place]  [place], [place]  |             |
|        |         |             | [address]  [phone]  |             |
|        |         |             |  [phone] (Fax)  |             |
+--------+---------+-------------+----------------------+-------------+
 documented as of this encounter
 
 Procedures
 
 
+----------------------+--------+-------------+----------------------+----------+
| Procedure Name       | Priori | Date/Time   | Associated Diagnosis | Comments |
|                      | ty     |             |                      |          |
+----------------------+--------+-------------+----------------------+----------+
| DIAGNOSTIC REPORT -  |        | 10/19/2015  |                      |          |
| EXTERNAL SCAN        |        | 12:00 AM    |                      |          |
|                      |        | PDT         |                      |          |
+----------------------+--------+-------------+----------------------+----------+
 documented in this encounter
 
 Visit Diagnoses
 
 
+----------------------------------------------------------------------------------+
| Diagnosis                                                                        |
+----------------------------------------------------------------------------------+
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
| respiration                                                                      |
+----------------------------------------------------------------------------------+
|   JOANN (obstructive sleep apnea)  Obstructive sleep apnea (adult) (pediatric)     |
+----------------------------------------------------------------------------------+
 documented in this encounter

## 2019-12-06 NOTE — XMS
PreManage Notification: DARYL RAMIREZ MRN:[id number]
 
Security Information
 
Security Events
No recent Security Events currently on file
 
 
 
CRITERIA MET
------------
- Group Notification
- [facility] - Has Care Guidelines
 
 
CARE PROVIDERS
-------------------------------------------------------------------------------------
SALVADOR MURGUIA     Internal Medicine     07/03/2018-Current
 
PHONE: Unknown
-------------------------------------------------------------------------------------
Erwin Iniguez MD     Primary Care     Current
 
PHONE: [phone]
-------------------------------------------------------------------------------------
ornaya     Case or Care Manager     Current
 
PHONE: Unknown
-------------------------------------------------------------------------------------
 
[facility] has no Care Guidelines for this patient.
Care History
Medical/Surgical
07/03/2018    [facility]
 
      - Patient is currently established with [facility]. If patient is seen 
      in the ED during business hours. Please contact [facility] at [facility] at
      Ext [phone]. Care Recommendation: This patient has had 5 or more Emergency
      Department visits in the last 12 months.\T\nbsp; Patient requires education on
      the scope and purpose of the ED as an acute care provider not a Primary Care
      Provider and should not be utilized for chronic conditions.\T\nbsp; If patient
      returns to ED please contact Community Health Worker Ana Luisa at [phone].
      These are guidelines and the provider should exercise clinical judgment when
      providing care.
E.D. VISIT COUNT (12 MO.)
-------------------------------------------------------------------------------------
2 KOLE Craft
-------------------------------------------------------------------------------------
TOTAL 2
-------------------------------------------------------------------------------------
NOTE: Visits indicate total known visits.
 
ED/UCC VISIT TRACKING (12 MO.)
-------------------------------------------------------------------------------------
12/06/2019 12:20
[facility] [place]
 
TYPE: Emergency
 
 
COMPLAINT:
- COUGHING UP BLOOD
-------------------------------------------------------------------------------------
01/03/2019 20:08
[facility] [place]
 
TYPE: Emergency
 
COMPLAINT:
- NOSEBLEED
 
DIAGNOSES:
- Peripheral vascular disease, unspecified
- Personal history of other malignant neoplasm of skin
- Chronic obstructive pulmonary disease, unspecified
- Epistaxis
- Gastro-esophageal reflux disease without esophagitis
- Anemia in chronic kidney disease
- Hypothyroidism, unspecified
- Chronic kidney disease, unspecified
- Long term (current) use of anticoagulants
- Unspecified atrial fibrillation
- Allergy status to oth drug/meds/biol subst status
- Allergy status to narcotic agent status
- Other long term (current) drug therapy
- Rheumatoid arthritis, unspecified
- Old myocardial infarction
- Mixed hyperlipidemia
- Personal history of pneumonia (recurrent)
- Presence of aortocoronary bypass graft
- Major depressive disorder, single episode, unspecified
- Age-related osteoporosis w/o current pathological fracture
- Obstructive sleep apnea (adult) (pediatric)
- Long term (current) use of systemic steroids
-------------------------------------------------------------------------------------
 
 
INPATIENT VISIT TRACKING (12 MO.)
No inpatient visits to display in this time frame
 
[email or web address]

## 2019-12-06 NOTE — XMS
Encounter Summary
  Created on: 2019
 
 Wyatt Shane
 External Reference #: [id number]
 : 43
 Sex: Male
 
 Demographics
 
 
+-----------------------+------------------------+
| Address               | [address]     |
|                       | [place]  [address]   |
+-----------------------+------------------------+
| Home Phone            | [phone]        |
+-----------------------+------------------------+
| Preferred Language    | Unknown                |
+-----------------------+------------------------+
| Marital Status        |                 |
+-----------------------+------------------------+
| Congregational Affiliation | LUT                    |
+-----------------------+------------------------+
| Race                  | White                  |
+-----------------------+------------------------+
| Ethnic Group          | Not  or  |
+-----------------------+------------------------+
 
 
 Author
 
 
+--------------+------------------------------+
| Author       | [facility] |
+--------------+------------------------------+
| Organization | [facility] |
+--------------+------------------------------+
| Address      | Unknown                      |
+--------------+------------------------------+
| Phone        | Unavailable                  |
+--------------+------------------------------+
 
 
 
 Support
 
 
+---------------+--------------+---------+-----------------+
| Name          | Relationship | Address | Phone           |
+---------------+--------------+---------+-----------------+
| Opal Shane | ECON         | Unknown | [phone] |
+---------------+--------------+---------+-----------------+
 
 
 
 Care Team Providers
 
 
 
+-----------------------+------+-----------------+
| Care Team Member Name | Role | Phone           |
+-----------------------+------+-----------------+
| Erwin Iniguez MD   | PCP  | [phone] |
+-----------------------+------+-----------------+
 
 
 
 Reason for Visit
 
 
+----------------+-------------------------------------------------------------------+
| Reason         | Comments                                                          |
+----------------+-------------------------------------------------------------------+
| Throat problem | Airway constricting, periodic choking on food, and tightening     |
|                | sensation. PCP suggested Wyatt return to see Dr. Esposito.  Please    |
|                | advise for scheduling - Dr. Cross or Dr. Esposito.  Call Gloria,  |
|                | wife, back to schedule.                                           |
+----------------+-------------------------------------------------------------------+
 
 
 
 Encounter Details
 
 
+--------+-----------+----------------------+--------------------+----------------------+
| Date   | Type      | Department           | Care Team          | Description          |
+--------+-----------+----------------------+--------------------+----------------------+
| / | Telephone |   Otolaryngology     |   Jimmy Esposito MD | Throat problem       |
|    |           | Head and Neck        |                    | (Airway              |
|        |           | Surgery Services at  |                    | constricting,        |
|        |           | [facility]  [address]     |                    | periodic choking on  |
|        |           | [address]      |                    | food, and tightening |
|        |           | Mailcode: PV01       |                    |  sensation. PCP      |
|        |           | [facility] |                    | suggested Wyatt       |
|        |           |   [place], [place]       |                    | return to see     |
|        |           | [address]           |                    | Vaibhav.  Please       |
|        |           | [phone]         |                    | advise for           |
|        |           |                      |                    | scheduling -      |
|        |           |                      |                    | Tay or      |
|        |           |                      |                    | Vaibhav.  Call Gloria,  |
|        |           |                      |                    | wife, back to        |
|        |           |                      |                    | schedule.)           |
+--------+-----------+----------------------+--------------------+----------------------+
 
 
 
 Social History
 
 
+----------------+-------+-----------+--------+------+
| Tobacco Use    | Types | Packs/Day | Years  | Date |
|                |       |           | Used   |      |
+----------------+-------+-----------+--------+------+
| Never Assessed |       |           |        |      |
+----------------+-------+-----------+--------+------+
 
 
 
+------------------+---------------+
 
| Sex Assigned at  | Date Recorded |
| Birth            |               |
+------------------+---------------+
| Not on file      |               |
+------------------+---------------+
 
 
 
+----------------+-------------+-------------+
| Job Start Date | Occupation  | Industry    |
+----------------+-------------+-------------+
| Not on file    | Not on file | Not on file |
+----------------+-------------+-------------+
 
 
 
+----------------+--------------+------------+
| Travel History | Travel Start | Travel End |
+----------------+--------------+------------+
 
 
 
+-------------------------------------+
| No recent travel history available. |
+-------------------------------------+
 documented as of this encounter
 
 Plan of Treatment
 Not on filedocumented as of this encounter
 
 Visit Diagnoses
 Not on filedocumented in this encounter

## 2019-12-06 NOTE — XMS
Encounter Summary
  Created on: 2019
 
 Wyatt Shane
 External Reference #: [id number]
 : 43
 Sex: Male
 
 Demographics
 
 
+-----------------------+------------------------+
| Address               | [address]     |
|                       | [place]  [address]   |
+-----------------------+------------------------+
| Home Phone            | [phone]        |
+-----------------------+------------------------+
| Preferred Language    | Unknown                |
+-----------------------+------------------------+
| Marital Status        |                 |
+-----------------------+------------------------+
| Holiness Affiliation | LUT                    |
+-----------------------+------------------------+
| Race                  | White                  |
+-----------------------+------------------------+
| Ethnic Group          | Not  or  |
+-----------------------+------------------------+
 
 
 Author
 
 
+--------------+------------------------------+
| Author       | [facility] |
+--------------+------------------------------+
| Organization | [facility] |
+--------------+------------------------------+
| Address      | Unknown                      |
+--------------+------------------------------+
| Phone        | Unavailable                  |
+--------------+------------------------------+
 
 
 
 Support
 
 
+---------------+--------------+---------+-----------------+
| Name          | Relationship | Address | Phone           |
+---------------+--------------+---------+-----------------+
| Opal Shane | ECON         | Unknown | [phone] |
+---------------+--------------+---------+-----------------+
 
 
 
 Care Team Providers
 
 
 
+-----------------------+------+-----------------+
| Care Team Member Name | Role | Phone           |
+-----------------------+------+-----------------+
| Erwin Iniguez MD   | PCP  | [phone] |
+-----------------------+------+-----------------+
 
 
 
 Encounter Details
 
 
+--------+-------------+-----------------+---------------------+---------------+
| Date   | Type        | Department      | Care Team           | Description   |
+--------+-------------+-----------------+---------------------+---------------+
| / | Office      |   [facility]  |   Note, Outpatient  | Progress Note |
| 2000   | Visit-Trans | MEDICINE        | Clinic              |               |
|        | cribed      |                 |                     |               |
+--------+-------------+-----------------+---------------------+---------------+
 
 
 
 Social History
 
 
+----------------+-------+-----------+--------+------+
| Tobacco Use    | Types | Packs/Day | Years  | Date |
|                |       |           | Used   |      |
+----------------+-------+-----------+--------+------+
| Never Assessed |       |           |        |      |
+----------------+-------+-----------+--------+------+
 
 
 
+------------------+---------------+
| Sex Assigned at  | Date Recorded |
| Birth            |               |
+------------------+---------------+
| Not on file      |               |
+------------------+---------------+
 
 
 
+----------------+-------------+-------------+
| Job Start Date | Occupation  | Industry    |
+----------------+-------------+-------------+
| Not on file    | Not on file | Not on file |
+----------------+-------------+-------------+
 
 
 
+----------------+--------------+------------+
| Travel History | Travel Start | Travel End |
+----------------+--------------+------------+
 
 
 
+-------------------------------------+
| No recent travel history available. |
+-------------------------------------+
 documented as of this encounter
 
 
 Progress Notes
 Interface, Transcription In - 2006  1:06 AM PSTCLINIC DATE:       2000
 
 SUBJECTIVE:  Mr. Shane is seen back on a walk-in basis because he pulled his
 trach tube out in the shower, and although  he  has  reinserted  it,  he is
 concerned that it may not be in the right place.  He did have some bleeding
 around the time of the removal, and this has settled.
 
 OBJECTIVE:  HEENT: Today, his nasoendoscopy  is  done  confirming  that the
 tube  is  in the normal position.  There  is  no  evidence  of  surrounding
 infection.  There is mild tracheitis which is settling.
 
 PLAN:  I will see him back as previously scheduled.
 
 Jimmy Esposito M.D.
 
 MARIA ALEJANDRA / 
 [id number] / [id number] / [id number] / [id number]
 D: 2000
 T: 2000
 
 [id number] signed by Interface, Transcription In at 2006  1:06 AM PSTdocume
nted in this encounter
 
 Plan of Treatment
 Not on filedocumented as of this encounter
 
 Visit Diagnoses
 Not on filedocumented in this encounter

## 2019-12-06 NOTE — XMS
Encounter Summary
  Created on: 2019
 
 Shane Wyatttien BroussardJosue
 External Reference #: [id number]
 : 43
 Sex: Male
 
 Demographics
 
 
+-----------------------+---------------------------+
| Address               | [address]        |
|                       | [place]  [address] |
+-----------------------+---------------------------+
| Home Phone            | [phone]           |
+-----------------------+---------------------------+
| Preferred Language    | Unknown                   |
+-----------------------+---------------------------+
| Marital Status        |                    |
+-----------------------+---------------------------+
| Jew Affiliation | 1028                      |
+-----------------------+---------------------------+
| Race                  | Unknown                   |
+-----------------------+---------------------------+
| Ethnic Group          | Unknown                   |
+-----------------------+---------------------------+
 
 
 Author
 
 
+--------------+--------------------------------------------+
| Author       | [facility]  |
|              | [facility] [place]                                |
+--------------+--------------------------------------------+
| Organization | [facility]  |
|              | [facility] [place]                                |
+--------------+--------------------------------------------+
| Address      | Unknown                                    |
+--------------+--------------------------------------------+
| Phone        | Unavailable                                |
+--------------+--------------------------------------------+
 
 
 
 Support
 
 
+---------------+--------------+--------------------+-----------------+
| Name          | Relationship | Address            | Phone           |
+---------------+--------------+--------------------+-----------------+
| Opal Shane | ECON         | [address]     | [phone] |
|               |              | [place]   |                 |
|               |              | [address]              |                 |
+---------------+--------------+--------------------+-----------------+
 
 
 
 
 Care Team Providers
 
 
+------------------------+------+-----------------+
| Care Team Member Name  | Role | Phone           |
+------------------------+------+-----------------+
| Calvin Robertson MD | PCP  | [phone] |
+------------------------+------+-----------------+
 
 
 
 Encounter Details
 
 
+--------+-------------+---------------------+----------------------+-------------+
| Date   | Type        | Department          | Care Team            | Description |
+--------+-------------+---------------------+----------------------+-------------+
| 11/15/ | Orders Only |   [facility]     |   Neris Wilson, SHELLEY      |             |
|    |             | VASCULAR SURGERY    | [address]     |             |
|        |             | [address]  [address]    | [address]  [place], [place]  |             |
|        |             | [address]   | [address]  [phone]  |             |
|        |             | [place], [place]        |  [phone] (Fax)  |             |
|        |             | [address]          |                      |             |
|        |             | [phone]        |                      |             |
+--------+-------------+---------------------+----------------------+-------------+
 
 
 
 Social History
 
 
+---------------+------------+-----------+--------+------------------+
| Tobacco Use   | Types      | Packs/Day | Years  | Date             |
|               |            |           | Used   |                  |
+---------------+------------+-----------+--------+------------------+
| Former Smoker | Cigarettes | 1.3       | 34     | Quit: 1996 |
+---------------+------------+-----------+--------+------------------+
 
 
 
+---------------------+---+---+---+
| Smokeless Tobacco:  |   |   |   |
| Never Used          |   |   |   |
+---------------------+---+---+---+
 
 
 
+-------------+-------------+---------+----------+
| Alcohol Use | Drinks/Week | oz/Week | Comments |
+-------------+-------------+---------+----------+
| No          |             |         |          |
+-------------+-------------+---------+----------+
 
 
 
+------------------+---------------+
| Sex Assigned at  | Date Recorded |
| Birth            |               |
+------------------+---------------+
 
| Not on file      |               |
+------------------+---------------+
 
 
 
+----------------+-------------+-------------+
| Job Start Date | Occupation  | Industry    |
+----------------+-------------+-------------+
| Not on file    | Not on file | Not on file |
+----------------+-------------+-------------+
 
 
 
+----------------+--------------+------------+
| Travel History | Travel Start | Travel End |
+----------------+--------------+------------+
 
 
 
+-------------------------------------+
| No recent travel history available. |
+-------------------------------------+
 documented as of this encounter
 
 Plan of Treatment
 
 
+--------+---------+-------------+----------------------+-------------+
| Date   | Type    | Specialty   | Care Team            | Description |
+--------+---------+-------------+----------------------+-------------+
| / | Office  | Pulmonology |   Juan F,          |             |
| 2019   | Visit   |             | Jessica Cheung,   |             |
|        |         |             | MD [address] |             |
|        |         |             |   [place],   |             |
|        |         |             | [place] [address]             |             |
|        |         |             | [phone]         |             |
|        |         |             | [phone] (Fax)   |             |
+--------+---------+-------------+----------------------+-------------+
| / | Office  | Cardiology  |   Eric Akins, |             |
|    | Visit   |             |  MD [address]   |             |
|        |         |             | [address]  [place], [place]  |             |
|        |         |             | [address]  [phone]  |             |
|        |         |             |  [phone] (Fax)  |             |
+--------+---------+-------------+----------------------+-------------+
 documented as of this encounter
 
 Procedures
 
 
+------------------+--------+-------------+----------------------+----------------------+
| Procedure Name   | Priori | Date/Time   | Associated Diagnosis | Comments             |
|                  | ty     |             |                      |                      |
+------------------+--------+-------------+----------------------+----------------------+
| VAS AORTA ILIAC  | Routin | 11/15/2018  |                      |   Results for this   |
| DUPLEX LIMITED   | e      | 10:55 AM    |                      | procedure are in the |
|                  |        | PST         |                      |  results section.    |
+------------------+--------+-------------+----------------------+----------------------+
 documented in this encounter
 
 Results
 
 VAS Aorta Iliac Duplex Limited (11/15/2018 10:55 AM PST)
 
+----------+
| Specimen |
+----------+
|          |
+----------+
 
 
 
+------------------------------------------------------------------------+--------------+
| Narrative                                                              | Performed At |
+------------------------------------------------------------------------+--------------+
|   WYATT SHANE  US AORTA WITH DUPLEX DOPPLER  11/15/2018 10:55 AM       |              |
| HISTORY:   72 years.   Male.   Abdominal aortic aneurysm.              |              |
| COMPARISON:  None.     TECHNIQUE:  Sonographic evaluation of the       |              |
| abdominal aorta was performed.     FINDINGS:     Level   /   AP        |              |
| Diameter /   Transverse Diameter   (cm)     Proximal Abdominal Aorta:  |              |
|    2.8   /   2.6  Mid Abdominal Aorta:    2.9   /   3.2  Distal        |              |
| Abdominal Aorta:    4.9   /   4.7  Right Common Iliac Artery:    1.9   |              |
|   /   1.2  Left Common Iliac Artery:    1.7   /   1.2     Conclusion:  |              |
|  1.   Abdominal aortic aneurysm measuring 4.9 x 4.7 cm, unchanged from |              |
|  May 16, 2018.     Electronically signed by Ananth Verduzco MD on          |              |
| 11/15/2018 11:16 AM                                                    |              |
+------------------------------------------------------------------------+--------------+
 
 
 
+------------------------------------------------------------------------------------+
| Procedure Note                                                                     |
+------------------------------------------------------------------------------------+
|   Maurizio, Rad Conversion - 2019  3:16 PM PDT  WYATT SHANE                       |
| US AORTA WITH DUPLEX DOPPLER                                                       |
| 11/15/2018 10:55 AM                                                                |
|                                                                                    |
| HISTORY:  72 years.  Male.  Abdominal aortic aneurysm.                             |
|                                                                                    |
| COMPARISON:                                                                        |
| None.                                                                              |
|                                                                                    |
| TECHNIQUE:                                                                         |
| Sonographic evaluation of the abdominal aorta was performed.                       |
|                                                                                    |
| FINDINGS:                                                                          |
|                                                                                    |
| Level  /  AP Diameter /  Transverse Diameter  (cm)                                 |
|                                                                                    |
| Proximal Abdominal Aorta:   2.8  /  2.6                                            |
| Mid Abdominal Aorta:   2.9  /  3.2                                                 |
| Distal Abdominal Aorta:   4.9  /  4.7                                              |
| Right Common Iliac Artery:   1.9  /  1.2                                           |
| Left Common Iliac Artery:   1.7  /  1.2                                            |
|                                                                                    |
| Conclusion:                                                                        |
| 1.  Abdominal aortic aneurysm measuring 4.9 x 4.7 cm, unchanged from May 16, 2018. |
|                                                                                    |
| Electronically signed by Ananth Verduzco MD on 11/15/2018 11:16 AM                     |
+------------------------------------------------------------------------------------+
 documented in this encounter
 
 
 Visit Diagnoses
 Not on filedocumented in this encounter

## 2019-12-06 NOTE — XMS
Encounter Summary
  Created on: 2019
 
 Wyatt Shane
 External Reference #: [id number]
 : 43
 Sex: Male
 
 Demographics
 
 
+-----------------------+------------------------+
| Address               | [address]     |
|                       | [place]  [address]   |
+-----------------------+------------------------+
| Home Phone            | [phone]        |
+-----------------------+------------------------+
| Preferred Language    | Unknown                |
+-----------------------+------------------------+
| Marital Status        |                 |
+-----------------------+------------------------+
| Orthodoxy Affiliation | LUT                    |
+-----------------------+------------------------+
| Race                  | White                  |
+-----------------------+------------------------+
| Ethnic Group          | Not  or  |
+-----------------------+------------------------+
 
 
 Author
 
 
+--------------+------------------------------+
| Author       | [facility] |
+--------------+------------------------------+
| Organization | [facility] |
+--------------+------------------------------+
| Address      | Unknown                      |
+--------------+------------------------------+
| Phone        | Unavailable                  |
+--------------+------------------------------+
 
 
 
 Support
 
 
+---------------+--------------+---------+-----------------+
| Name          | Relationship | Address | Phone           |
+---------------+--------------+---------+-----------------+
| Opal Shane | ECON         | Unknown | [phone] |
+---------------+--------------+---------+-----------------+
 
 
 
 Care Team Providers
 
 
 
+-----------------------+------+-----------------+
| Care Team Member Name | Role | Phone           |
+-----------------------+------+-----------------+
| Erwin Iniguez MD   | PCP  | [phone] |
+-----------------------+------+-----------------+
 
 
 
 Encounter Details
 
 
+--------+-------------+-----------------+---------------------+---------------+
| Date   | Type        | Department      | Care Team           | Description   |
+--------+-------------+-----------------+---------------------+---------------+
| / | Office      |   [facility]  |   Note, Outpatient  | Progress Note |
|    | Visit-Trans | MEDICINE        | Clinic              |               |
|        | cribed      |                 |                     |               |
+--------+-------------+-----------------+---------------------+---------------+
 
 
 
 Social History
 
 
+----------------+-------+-----------+--------+------+
| Tobacco Use    | Types | Packs/Day | Years  | Date |
|                |       |           | Used   |      |
+----------------+-------+-----------+--------+------+
| Never Assessed |       |           |        |      |
+----------------+-------+-----------+--------+------+
 
 
 
+------------------+---------------+
| Sex Assigned at  | Date Recorded |
| Birth            |               |
+------------------+---------------+
| Not on file      |               |
+------------------+---------------+
 
 
 
+----------------+-------------+-------------+
| Job Start Date | Occupation  | Industry    |
+----------------+-------------+-------------+
| Not on file    | Not on file | Not on file |
+----------------+-------------+-------------+
 
 
 
+----------------+--------------+------------+
| Travel History | Travel Start | Travel End |
+----------------+--------------+------------+
 
 
 
+-------------------------------------+
| No recent travel history available. |
+-------------------------------------+
 documented as of this encounter
 
 
 Progress Notes
 Interface, Transcription In - 2006  5:02 AM PSTCLINIC DATE: 1999
 
 GENERAL SURGERY CLINIC
 
 PRIMARY PHYSICIAN: Dr. Erwin Iniguez.
 
 CHIEF COMPLAINT:  Gastroesophageal reflux disease.
 
 HISTORY OF PRESENT ILLNESS:  The staff physician for this patient's case
 was Dr. Ghassan Doll.  Mr. Wyatt Shane is a 54-year-old gentleman with a
 history of laryngeal stenosis, after undergoing a partial laryngectomy for
 squamous cell cancer in .  He had initial improvement of his laryngeal
 stenosis with dilation procedures, but he had recurrent stenosis.  During
 one of his follow-up visits with the Otolaryngology Clinic, the physician
 in that clinic noted that the patient was having significant reflux
 symptoms and possible aspirations.  A pH probe study was performed,
 revealing significant reflux distally in the esophagus.  He visits us now
 for an evaluation for possible surgical intervention and treatment of his
 gastroesophageal reflux disease.
 
 PAST MEDICAL HISTORY:  The patient's past medical history is significant
 for a myocardial infarction in 1996.  He underwent a quadruple
 bypass surgery in .  His past medical history is additionally
 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
 noted.  The patient also has the history of kidney stones, with a
 subsequently scarred ureter.
 
 PAST SURGICAL HISTORY:  The patient's previous surgical procedures include
 the coronary artery bypass graft procedure x four, the previously mentioned
 ear, nose, and throat surgeries, as well as radiation therapy for his vocal
 cord cancer.
 
 CURRENT MEDICATIONS:
 1.  Prilosec, 40 mg p.o.b.i.d.
 2.  Norvasc, 10 mg q. day.
 3.  Baby aspirin, one tablet q. day.
 4.  Lipitor, 10 mg p.o.q. day.
 
 ALLERGIES:  THE PATIENT'S CURRENT ALLERGIES INCLUDE BETA BLOCKERS, TO WHICH
 HE DEVELOPS RAYNAUD'S SYNDROME.  HE DENIES ANY PROBLEMS WITH BLEEDING.
 
 SOCIAL HISTORY/PERSONAL HEALTH HABITS:  The patient lives with his wife in
 [place], [place].
 
 REVIEW OF SYSTEMS:  On the review of systems, neurologically the patient
 denies any loss of consciousness or syncopal episodes.  With regard to the
 pulmonary system, the patient notes shortness of breath with dyspnea on
 exertion on approximately eight steps.  He denies any history of asthma.
 With regard to the cardiovascular review of systems, the patient denies
 chest pain or palpitations.  Regarding the genitourinary system, the
 patient denies dysuria.  With regard to the gastrointestinal system, the
 patient denies melena.
 
 OBJECTIVE:  VITAL SIGNS:  On the physical examination, the patient's blood
 pressure is 138/78, his heart rate is 88, and his respiratory rate is 18.
 GENERAL APPEARANCE:  In general, the patient is in no acute distress,
 although he does have raspy vocalization.  HEENT:  The head, eyes, ears,
 nose, and throat examination reveals that the pupils are equally round and
 
 reactive to light.  The extraocular movements are intact.  The teeth appear
 to be intact.  NECK:  The neck examination reveals that the patient has a
 healed neck incision.  LUNGS:  On examination of the lungs, the patient has
 a midline skin incision from his previous coronary artery bypass graft
 procedure.  This is well healed.  The lungs are otherwise clear to
 auscultation bilaterally.  CARDIOVASCULAR:  On the cardiovascular
 examination, the patient's heart has a regular rate and rhythm, with no
 murmurs, rubs, or gallops noted.  ABDOMEN:  The abdomen is obese,
 nondistended, and nontender.  It is soft, with no palpable masses.  The
 patient does have an umbilical hernia, which is easily reducible.
 BACK/EXTREMITIES:  The back and extremities examinations are within normal
 limits.  NEUROLOGICAL EXAMINATION:  MENTAL STATUS:  The patient appears to
 be alert and oriented x three, with a nonfocal neurological examination
 otherwise.
 
 ASSESSMENT:
 1.  Gastroesophageal reflux disease, unresponsive to medical therapy.
 2.  Laryngeal stenosis, status post partial laryngectomy.
 3.  Umbilical hernia.
 
 PLAN:  We will plan to have the patient undergo manometry studies with Dr. Dangelo Levi or one of his associates on July 15, 1999.  Depending upon
 the results of these studies, we will plan to proceed with a partial versus
 complete fundoplication procedure on 1999.  We will attempt to
 perform the fundoplication laparoscopically.  However, the patient is aware
 of the possibility of converting to an open procedure, if necessary.
 Additionally, we will plan to repair the umbilical hernia, possibly with
 mesh if we are able to do so.  Concurrently with these procedures, Dr. Jimmy Esposito of the Otolaryngology Clinic desires to evaluate the patient
 intraoperatively, with a possible laryngeal dilation procedure.  The
 patient understands the risks and benefits of the procedure, including but
 not exclusive to the risk of bleeding, infection, anesthesia including
 stroke, heart attack, or death, and the possibilities gas bloating, with
 esophageal or gastric perforation.  The patient and his wife signed the
 informed consent forms, and desire to proceed with surgery.
 
 Sohail Curiel M.D.
 
 Ghassan Doll M.D.
 
 VIRI/blane/marly
 D: 1999
 T: 1999
 
 cc:
 
 EDMUNDO Levi M.D., F.A.C.P., F.A.C.JORGE Whipple MD
 [address] [place] [address] [place]  [id number] signed by Interface, Transcription In at 2006  5:02
 AM PSTdocumented in this encounter
 
 Plan of Treatment
 Not on filedocumented as of this encounter
 
 Visit Diagnoses
 Not on filedocumented in this encounter

## 2019-12-06 NOTE — XMS
Encounter Summary
  Created on: 2019
 
 Wyatt Shane
 External Reference #: [id number]
 : 43
 Sex: Male
 
 Demographics
 
 
+-----------------------+------------------------+
| Address               | [address]     |
|                       | [place]  [address]   |
+-----------------------+------------------------+
| Home Phone            | [phone]        |
+-----------------------+------------------------+
| Preferred Language    | Unknown                |
+-----------------------+------------------------+
| Marital Status        |                 |
+-----------------------+------------------------+
| [employer] Affiliation | LUT                    |
+-----------------------+------------------------+
| Race                  | White                  |
+-----------------------+------------------------+
| Ethnic Group          | Not  or  |
+-----------------------+------------------------+
 
 
 Author
 
 
+--------------+-------------+
| Organization | Unknown     |
+--------------+-------------+
| Address      | Unknown     |
+--------------+-------------+
| Phone        | Unavailable |
+--------------+-------------+
 
 
 
 Support
 
 
+---------------+--------------+---------+-----------------+
| Name          | Relationship | Address | Phone           |
+---------------+--------------+---------+-----------------+
| Opal Shane | ECON         | Unknown | [phone] |
+---------------+--------------+---------+-----------------+
 
 
 
 Care Team Providers
 
 
+-----------------------+------+-----------------+
| Care Team Member Name | Role | Phone           |
 
+-----------------------+------+-----------------+
| Erwin Iniguez MD   | PCP  | [phone] |
+-----------------------+------+-----------------+
 
 
 
 Encounter Details
 
 
+--------+-------------+------------+--------------------+-------------+
| Date   | Type        | Department | Care Team          | Description |
+--------+-------------+------------+--------------------+-------------+
| / | Transcribed |            |   Dictation, Other | Transcribed |
| 1999   |             |            |                    |             |
+--------+-------------+------------+--------------------+-------------+
 
 
 
 Social History
 
 
+----------------+-------+-----------+--------+------+
| Tobacco Use    | Types | Packs/Day | Years  | Date |
|                |       |           | Used   |      |
+----------------+-------+-----------+--------+------+
| Never Assessed |       |           |        |      |
+----------------+-------+-----------+--------+------+
 
 
 
+------------------+---------------+
| Sex Assigned at  | Date Recorded |
| Birth            |               |
+------------------+---------------+
| Not on file      |               |
+------------------+---------------+
 
 
 
+----------------+-------------+-------------+
| Job Start Date | Occupation  | Industry    |
+----------------+-------------+-------------+
| Not on file    | Not on file | Not on file |
+----------------+-------------+-------------+
 
 
 
+----------------+--------------+------------+
| Travel History | Travel Start | Travel End |
+----------------+--------------+------------+
 
 
 
+-------------------------------------+
| No recent travel history available. |
+-------------------------------------+
 documented as of this encounter
 
 Progress Notes
 Interface, Transcription In - 2006  3:11 AM [facility]                                    [place]
 
[facility] [place] [address], [place] [address] [phone] or [phone]
 
 1999
 
 FRED MORIN MD
 [facility] DEPT OTOLARYNGOLOGY
 [address] [facility]   [address]
 
 RE:   Wyatt Shane
 MR#:  [id number]
 
 Dear Vadim:
 
 As you know, Mr. Shane underwent a laparoscopic Nissen fundoplication on the
 .   His procedure was uneventful.   He  had  a  2  cm  floppy
 fundoplication calibrated over a 60-Fr bougie. Postoperatively, he did well
 in the Post Anesthesia Care Unit, had  no  problem  with his airway.  I was
 able  do discharge him , the ,  after  he  tolerated  a  standard
 Nissen diet.  I will see Wyatt back in  approximately  two weeks and let you
 know how he is doing at that time.  By  the  way, I also owe some thanks to
 Dr. Levi, who really bent over backwards  to  get  Mr. Shane's motility
 done so that we could continue as scheduled.   Thank  you,  again,  for the
 opportunity to help care for Mr. Shane.   I wish you the best on your August
 sojourn.
 
 Yours sincerely,
 
 Ghassan Doll M.D.
 
 BCS:x17
 D:  1999
 T:  1999
 
 cc:
 
 EDMUNDO Levi M.D., F.A.C.P., F.A.CRAJENDRA
 , Medicine
 [facility]
 
 [id number] signed by Interface, Transcription In at 2006  3:11 AM PSTdocume
nted in this encounter
 
 Plan of Treatment
 Not on filedocumented as of this encounter
 
 Visit Diagnoses
 Not on filedocumented in this encounter

## 2019-12-06 NOTE — XMS
Encounter Summary
  Created on: 2019
 
 Wyatt Shane
 External Reference #: [id number]
 : 43
 Sex: Male
 
 Demographics
 
 
+-----------------------+------------------------+
| Address               | [address]     |
|                       | [place]  [address]   |
+-----------------------+------------------------+
| Home Phone            | [phone]        |
+-----------------------+------------------------+
| Preferred Language    | Unknown                |
+-----------------------+------------------------+
| Marital Status        |                 |
+-----------------------+------------------------+
| Christianity Affiliation | LUT                    |
+-----------------------+------------------------+
| Race                  | White                  |
+-----------------------+------------------------+
| Ethnic Group          | Not  or  |
+-----------------------+------------------------+
 
 
 Author
 
 
+--------------+------------------------------+
| Author       | [facility] |
+--------------+------------------------------+
| Organization | [facility] |
+--------------+------------------------------+
| Address      | Unknown                      |
+--------------+------------------------------+
| Phone        | Unavailable                  |
+--------------+------------------------------+
 
 
 
 Support
 
 
+---------------+--------------+---------+-----------------+
| Name          | Relationship | Address | Phone           |
+---------------+--------------+---------+-----------------+
| Opal Shane | ECON         | Unknown | [phone] |
+---------------+--------------+---------+-----------------+
 
 
 
 Care Team Providers
 
 
 
+-----------------------+------+-----------------+
| Care Team Member Name | Role | Phone           |
+-----------------------+------+-----------------+
| Erwin Iniguez MD   | PCP  | [phone] |
+-----------------------+------+-----------------+
 
 
 
 Encounter Details
 
 
+--------+-------------+-----------------+---------------------+---------------+
| Date   | Type        | Department      | Care Team           | Description   |
+--------+-------------+-----------------+---------------------+---------------+
| 02/10/ | Office      |   [facility]  |   Note, Outpatient  | Progress Note |
| 2000   | Visit-Trans | MEDICINE        | Clinic              |               |
|        | cribed      |                 |                     |               |
+--------+-------------+-----------------+---------------------+---------------+
 
 
 
 Social History
 
 
+----------------+-------+-----------+--------+------+
| Tobacco Use    | Types | Packs/Day | Years  | Date |
|                |       |           | Used   |      |
+----------------+-------+-----------+--------+------+
| Never Assessed |       |           |        |      |
+----------------+-------+-----------+--------+------+
 
 
 
+------------------+---------------+
| Sex Assigned at  | Date Recorded |
| Birth            |               |
+------------------+---------------+
| Not on file      |               |
+------------------+---------------+
 
 
 
+----------------+-------------+-------------+
| Job Start Date | Occupation  | Industry    |
+----------------+-------------+-------------+
| Not on file    | Not on file | Not on file |
+----------------+-------------+-------------+
 
 
 
+----------------+--------------+------------+
| Travel History | Travel Start | Travel End |
+----------------+--------------+------------+
 
 
 
+-------------------------------------+
| No recent travel history available. |
+-------------------------------------+
 documented as of this encounter
 
 
 Progress Notes
 [facility]cription In [facility]
 
 [facility]
 
 SUBJECTIVE:  The patient was seen in the Ear, Nose, and Throat Clinic for a
 bedside evaluation.  He was brought over from the inpatient unit.
 
 OBJECTIVE:  The patient was presented  with  thin  liquids  with  blue dye,
 nectar-thick  liquids  with  blue dye,  and  apple  sauce  with  blue  dye.
 Throughout the evaluation, the patient  coughed intermittently with each of
 these textures, but there was no evidence  of  blue dye coughed through the
 trach site.  During each of the swallows  for  each  of these textures, the
 patient demonstrated an effortful texture  and complained of some amount of
 pain  during  the  swallow.   In addition,  the  patient  performed  double
 swallows  to  clear  the  material  swallowed.    Upon  completion  of  the
 evaluation and textures presented, the  patient  was suctioned by the nurse
 present,  and there was no evidence  of  blue  dye  during  the  suctioning
 process.   The patient indicates that  he  felt  comfortable  during  these
 swallows and was interested in initiating p.o. trials.
 
       ASSESSMENT AND PLAN:  The patient appears safe to initiate p.o. trials
 pureed textures with regular liquids  at  this time.  The patient should be
 monitored for any signs of blue dye  during  any  suction  by  the  nursing
 staff.  It is recommended that the patient  initiate  pureed  textures with
 regular liquids with close supervision  and monitoring the amount of intake
 and  signs or symptoms of aspiration.   Speech  Pathology  Department  will
 follow this patient and advance diet as indicated.
 
 Leticia Zhu,                            CCC-SLP
 Jimmy Esposito M.D.
 Speech Language Pathologist
 
 SOLITARIO / TIESHA
 [id number] / [id number] / [id number] / [id number]
 D: 02/10/2000
 T: 2000Electronically signed by Interface, Transcription In at 2006  1:10 AM PS
Tdocumented in this encounter
 
 Plan of Treatment
 Not on filedocumented as of this encounter
 
 Visit Diagnoses
 Not on filedocumented in this encounter

## 2019-12-06 NOTE — XMS
Encounter Summary
  Created on: 2019
 
 Wyatt Shane
 External Reference #: [id number]
 : 43
 Sex: Male
 
 Demographics
 
 
+-----------------------+------------------------+
| Address               | [address]     |
|                       | [place]  [address]   |
+-----------------------+------------------------+
| Home Phone            | [phone]        |
+-----------------------+------------------------+
| Preferred Language    | Unknown                |
+-----------------------+------------------------+
| Marital Status        |                 |
+-----------------------+------------------------+
| Advent Affiliation | LUT                    |
+-----------------------+------------------------+
| Race                  | White                  |
+-----------------------+------------------------+
| Ethnic Group          | Not  or  |
+-----------------------+------------------------+
 
 
 Author
 
 
+--------------+------------------------------+
| Author       | [facility] |
+--------------+------------------------------+
| Organization | [facility] |
+--------------+------------------------------+
| Address      | Unknown                      |
+--------------+------------------------------+
| Phone        | Unavailable                  |
+--------------+------------------------------+
 
 
 
 Support
 
 
+---------------+--------------+---------+-----------------+
| Name          | Relationship | Address | Phone           |
+---------------+--------------+---------+-----------------+
| Opal Shane | ECON         | Unknown | [phone] |
+---------------+--------------+---------+-----------------+
 
 
 
 Care Team Providers
 
 
 
+-----------------------+------+-------------+
| Care Team Member Name | Role | Phone       |
+-----------------------+------+-------------+
 PCP  | Unavailable |
+-----------------------+------+-------------+
 
 
 
 Encounter Details
 
 
+--------+----------+----------------------+--------------------+-------------+
| Date   | Type     | Department           | Care Team          | Description |
+--------+----------+----------------------+--------------------+-------------+
| / | Results  |   Otolaryngology     |   Jimmy Esposito MD |             |
|    | Only     | Head and Neck        |                    |             |
|        |          | Surgery Services at  |                    |             |
|        |          | PPV  [address]     |                    |             |
|        |          | [address]       |                    |             |
|        |          | Mailcode: [address]       |                    |             |
|        |          | [address] |                    |             |
|        |          |   [place], [place]       |                    |             |
|        |          | [address]           |                    |             |
|        |          | [phone]         |                    |             |
+--------+----------+----------------------+--------------------+-------------+
 
 
 
 Social History
 
 
+----------------+-------+-----------+--------+------+
| Tobacco Use    | Types | Packs/Day | Years  | Date |
|                |       |           | Used   |      |
+----------------+-------+-----------+--------+------+
| Never Assessed |       |           |        |      |
+----------------+-------+-----------+--------+------+
 
 
 
+------------------+---------------+
| Sex Assigned at  | Date Recorded |
| Birth            |               |
+------------------+---------------+
| Not on file      |               |
+------------------+---------------+
 
 
 
+----------------+-------------+-------------+
| Job Start Date | Occupation  | Industry    |
+----------------+-------------+-------------+
| Not on file    | Not on file | Not on file |
+----------------+-------------+-------------+
 
 
 
+----------------+--------------+------------+
| Travel History | Travel Start | Travel End |
+----------------+--------------+------------+
 
 
 
 
+-------------------------------------+
| No recent travel history available. |
+-------------------------------------+
 documented as of this encounter
 
 Plan of Treatment
 Not on filedocumented as of this encounter
 
 Procedures
 
 
+--------------------+--------+-------------+----------------------+----------------------+
| Procedure Name     | Priori | Date/Time   | Associated Diagnosis | Comments             |
|                    | ty     |             |                      |                      |
+--------------------+--------+-------------+----------------------+----------------------+
| CHEST, 1 VIEW,     | Urgent | 1998  |                      |   Results for this   |
| PORTABLE           |        | 10:20 AM    |                      | procedure are in the |
|                    |        | PST         |                      |  results section.    |
+--------------------+--------+-------------+----------------------+----------------------+
| X-RAY CHEST 2 VIEW | Priori | 1998  |                      |   Results for this   |
|                    | ty     |  4:35 PM    |                      | procedure are in the |
|                    |        | PST         |                      |  results section.    |
+--------------------+--------+-------------+----------------------+----------------------+
 documented in this encounter
 
 Results
 CHEST, 1 VIEW, PORTABLE (1998 10:20 AM PST)
 
+-----------+--------------------------+-----------+------------+--------------+
| Component | Value                    | Ref Range | Performed  | Pathologist  |
|           |                          |           | At         | Signature    |
+-----------+--------------------------+-----------+------------+--------------+
| CHEST, 1  | Radiologist 1: SERGO,   |           |            |              |
| LOYD,     | JOSE LONG M.D.PORTABLE    |           |            |              |
| PORTABLE  | CHEST:   98 at     |           |            |              |
|           | 1020 hours.              |           |            |              |
|           |   Dictated:   98   |           |            |              |
|           | FINDINGS: Upright AP     |           |            |              |
|           | examination at bedside,  |           |            |              |
|           | to include the left      |           |            |              |
|           | upperabdomen, is         |           |            |              |
|           | compared with previous   |           |            |              |
|           | examination of 12/02/98. |           |            |              |
|           |    ADobbhoff tube        |           |            |              |
|           | extends into the gastric |           |            |              |
|           |  fundus.   Wire sutures  |           |            |              |
|           | of thesternum are        |           |            |              |
|           | redemonstrated.   There  |           |            |              |
|           | is no gross evidence of  |           |            |              |
|           | activechest disease.     |           |            |              |
|           |   The intestinal gas     |           |            |              |
|           | pattern is unremarkable. |           |            |              |
|           |  IMPRESSION: 1.          |           |            |              |
|           |   Dobbhoff tube in       |           |            |              |
|           | gastric fundus. 2.   No  |           |            |              |
|           | gross evidence of acute  |           |            |              |
|           | chest disease.     END   |           |            |              |
 
|           | OF IMPRESSION:           |           |            |              |
+-----------+--------------------------+-----------+------------+--------------+
 
 
 
+----------+
| Specimen |
+----------+
|          |
+----------+
 
 
 
+----------------------+---------+--------------------+--------------+
| Performing           | Address | City/State/Zipcode | Phone Number |
| Organization         |         |                    |              |
+----------------------+---------+--------------------+--------------+
|   [facility] DEPARTMENT OF |         |                    |              |
|  RADIOLOGY           |         |                    |              |
+----------------------+---------+--------------------+--------------+
 CHEST 2 VIEW (1998  4:35 PM PST)
 
+-----------+--------------------------+-----------+------------+--------------+
| Component | Value                    | Ref Range | Performed  | Pathologist  |
|           |                          |           | At         | Signature    |
+-----------+--------------------------+-----------+------------+--------------+
| CHEST, 2  | Radiologist 1: [facility]  |           |            |              |
| [place]  | [facility]:       |           |            |              |
| STEREO    | 98 at 1635 hours.  |           |            |              |
|           |    Dictated 98     |           |            |              |
|           | COMPARISON: There are no |           |            |              |
|           |  prior studies available |           |            |              |
|           |  for comparison.         |           |            |              |
|           | FINDINGS: There are      |           |            |              |
|           | sternotomy wires in      |           |            |              |
|           | place.   Heart size is   |           |            |              |
|           | normal.Mediastinal       |           |            |              |
|           | contours are within      |           |            |              |
|           | normal limits. There is  |           |            |              |
|           | some                     |           |            |              |
|           | aorticcalcification. The |           |            |              |
|           |  lungs are clear.        |           |            |              |
|           |   There is no pleural    |           |            |              |
|           | effusion. IMPRESSION:    |           |            |              |
|           | Previous sternotomy,     |           |            |              |
|           | otherwise within normal  |           |            |              |
|           | limits.  END OF          |           |            |              |
|           | IMPRESSION:              |           |            |              |
+-----------+--------------------------+-----------+------------+--------------+
 
 
 
+----------+
| Specimen |
+----------+
|          |
+----------+
 
 
 
 
+----------------------+---------+--------------------+--------------+
| Performing           | Address | City/State/Zipcode | Phone Number |
| Organization         |         |                    |              |
+----------------------+---------+--------------------+--------------+
|   [facility] DEPARTMENT OF |         |                    |              |
|  RADIOLOGY           |         |                    |              |
+----------------------+---------+--------------------+--------------+
 documented in this encounter
 
 Visit Diagnoses
 Not on filedocumented in this encounter

## 2019-12-06 NOTE — XMS
Encounter Summary
  Created on: 2019
 
 Shane Wyatttien BroussardJosue
 External Reference #: [id number]
 : 43
 Sex: Male
 
 Demographics
 
 
+-----------------------+---------------------------+
| Address               | [address]        |
|                       | [place]  [address] |
+-----------------------+---------------------------+
| Home Phone            | [phone]           |
+-----------------------+---------------------------+
| Preferred Language    | Unknown                   |
+-----------------------+---------------------------+
| Marital Status        |                    |
+-----------------------+---------------------------+
| Spiritism Affiliation | 1028                      |
+-----------------------+---------------------------+
| Race                  | Unknown                   |
+-----------------------+---------------------------+
| Ethnic Group          | Unknown                   |
+-----------------------+---------------------------+
 
 
 Author
 
 
+--------------+--------------------------------------------+
| Author       | [facility]  |
|              | and [place]                                |
+--------------+--------------------------------------------+
| Organization | [facility]  |
|              | and [place]                                |
+--------------+--------------------------------------------+
| Address      | Unknown                                    |
+--------------+--------------------------------------------+
| Phone        | Unavailable                                |
+--------------+--------------------------------------------+
 
 
 
 Support
 
 
+---------------+--------------+--------------------+-----------------+
| Name          | Relationship | Address            | Phone           |
+---------------+--------------+--------------------+-----------------+
| Opal Shane | ECON         | [address]     | [phone] |
|               |              | [place]   |                 |
|               |              | [address]              |                 |
+---------------+--------------+--------------------+-----------------+
 
 
 
 
 Care Team Providers
 
 
+------------------------+------+-----------------+
| Care Team Member Name  | Role | Phone           |
+------------------------+------+-----------------+
| Calvin Robertson MD | PCP  | [phone] |
+------------------------+------+-----------------+
 
 
 
 Encounter Details
 
 
+--------+-------------+---------------------+---------------------+-------------+
| Date   | Type        | Department          | Care Team           | Description |
+--------+-------------+---------------------+---------------------+-------------+
| / | Orders Only |   [facility]   |   Tim Celso MARIE,  |             |
|    |             | [address]      | DO                  |             |
|        |             | [place]        |                     |             |
|        |             | [address]          |                     |             |
|        |             | [phone]        |                     |             |
+--------+-------------+---------------------+---------------------+-------------+
 
 
 
 Social History
 
 
+---------------+------------+-----------+--------+------------------+
| Tobacco Use   | Types      | Packs/Day | Years  | Date             |
|               |            |           | Used   |                  |
+---------------+------------+-----------+--------+------------------+
| Former Smoker | Cigarettes | 1.3       | 34     | Quit: 1996 |
+---------------+------------+-----------+--------+------------------+
 
 
 
+---------------------+---+---+---+
| Smokeless Tobacco:  |   |   |   |
| Never Used          |   |   |   |
+---------------------+---+---+---+
 
 
 
+-------------+-------------+---------+----------+
| Alcohol Use | Drinks/Week | oz/Week | Comments |
+-------------+-------------+---------+----------+
| No          |             |         |          |
+-------------+-------------+---------+----------+
 
 
 
+------------------+---------------+
| Sex Assigned at  | Date Recorded |
| Birth            |               |
+------------------+---------------+
| Not on file      |               |
+------------------+---------------+
 
 
 
 
+----------------+-------------+-------------+
| Job Start Date | Occupation  | Industry    |
+----------------+-------------+-------------+
| Not on file    | Not on file | Not on file |
+----------------+-------------+-------------+
 
 
 
+----------------+--------------+------------+
| Travel History | Travel Start | Travel End |
+----------------+--------------+------------+
 
 
 
+-------------------------------------+
| No recent travel history available. |
+-------------------------------------+
 documented as of this encounter
 
 Plan of Treatment
 
 
+--------+---------+-------------+----------------------+-------------+
| Date   | Type    | Specialty   | Care Team            | Description |
+--------+---------+-------------+----------------------+-------------+
| / | Office  | Pulmonology |   Juan F,          |             |
|    | Visit   |             | Jessica Cheung,   |             |
|        |         |             | MD [address] |             |
|        |         |             |   [place]   |             |
|        |         |             | [place] [address]             |             |
|        |         |             | [phone]         |             |
|        |         |             | [phone] (Fax)   |             |
+--------+---------+-------------+----------------------+-------------+
| / | Office  | Cardiology  |   Eric Akins, |             |
|    | Visit   |             |  MD [address]   |             |
|        |         |             | [place]  |             |
|        |         |             | [address]  [phone]  |             |
|        |         |             |  [phone] (Fax)  |             |
+--------+---------+-------------+----------------------+-------------+
 documented as of this encounter
 
 Procedures
 
 
+--------------------+--------+-------------+----------------------+----------------------+
| Procedure Name     | Priori | Date/Time   | Associated Diagnosis | Comments             |
|                    | ty     |             |                      |                      |
+--------------------+--------+-------------+----------------------+----------------------+
| CULTURE, ANAEROBIC | Routin | 2016  |                      |   Results for this   |
|                    | e      | 12:01 AM    |                      | procedure are in the |
|                    |        | PDT         |                      |  results section.    |
+--------------------+--------+-------------+----------------------+----------------------+
 documented in this encounter
 
 Results
 Culture, Anaerobic (2016 12:01 AM PDT)
 
 
+----------+
| Specimen |
+----------+
|          |
+----------+
 
 
 
+------------------------------------------------------------------------+----------------+
| Narrative                                                              | Performed At   |
+------------------------------------------------------------------------+----------------+
|   Specimen Description                      OTHER         GRAM STAIN   |   EXTERNAL LAB |
|                                     2+                                 |                |
|                                   WBC'S SEEN                           |                |
|                             1+                                         |                |
|                            EPITHELIAL CELLS                            |                |
|                            NO ORGANISMS SEEN  CULTURE                  |                |
|                           2+                                           |                |
|                         PSEUDOMONAS SPECIESAbnormal                    |                |
|                                     1+                                 |                |
|                                   NORMAL UPPER RESPIRATORY ELIZABETH       |                |
|                                                 NO FURTHER WORKUP      |                |
| Suscepibility for - PSEUDOMONAS SPECIES  Ceftazidime                   |                |
|           SUSCEPTIBLESensitive  Ciprofloxacin                          |                |
| SUSCEPTIBLESensitive  Gentamicin                                       |                |
|   SUSCEPTIBLESensitive  Levofloxacin                                   |                |
|   SUSCEPTIBLESensitive  Tobramycin                                     |                |
|   SUSCEPTIBLESensitive                                                 |                |
+------------------------------------------------------------------------+----------------+
 
 
 
+----------------+---------+--------------------+--------------+
| Performing     | Address | City/State/Zipcode | Phone Number |
| Organization   |         |                    |              |
+----------------+---------+--------------------+--------------+
|   EXTERNAL LAB |         |                    |              |
+----------------+---------+--------------------+--------------+
 documented in this encounter
 
 Visit Diagnoses
 Not on filedocumented in this encounter

## 2019-12-06 NOTE — XMS
Encounter Summary
  Created on: 2019
 
 Shane Wyatttien BroussardJosue
 External Reference #: [id number]
 : 43
 Sex: Male
 
 Demographics
 
 
+-----------------------+---------------------------+
| Address               | [address]        |
|                       | [place]  [address] |
+-----------------------+---------------------------+
| Home Phone            | [phone]           |
+-----------------------+---------------------------+
| Preferred Language    | Unknown                   |
+-----------------------+---------------------------+
| Marital Status        |                    |
+-----------------------+---------------------------+
| [employer] | 1028                      |
+-----------------------+---------------------------+
| Race                  | Unknown                   |
+-----------------------+---------------------------+
| Ethnic Group          | Unknown                   |
+-----------------------+---------------------------+
 
 
 Author
 
 
+--------------+--------------------------------------------+
| Author       | [facility]  |
|              | [facility] [place]                                |
+--------------+--------------------------------------------+
| Organization | [facility]  |
|              | [facility] [place]                                |
+--------------+--------------------------------------------+
| Address      | Unknown                                    |
+--------------+--------------------------------------------+
| Phone        | Unavailable                                |
+--------------+--------------------------------------------+
 
 
 
 Support
 
 
+---------------+--------------+--------------------+-----------------+
| Name          | Relationship | Address            | Phone           |
+---------------+--------------+--------------------+-----------------+
| Opal Shane | ECON         | [address]     | [phone] |
|               |              | [place]   |                 |
|               |              | [address]              |                 |
+---------------+--------------+--------------------+-----------------+
 
 
 
 
 Care Team Providers
 
 
+------------------------+------+-----------------+
| Care Team Member Name  | Role | Phone           |
+------------------------+------+-----------------+
| Calvin Robertson MD | PCP  | [phone] |
+------------------------+------+-----------------+
 
 
 
 Reason for Visit
 Diagnostic/Screening (Routine)
 
+--------+--------+-----------+--------------+--------------+---------------+
| Status | Reason | Specialty | Diagnoses /  | Referred By  | Referred To   |
|        |        |           | Procedures   | Contact      | Contact       |
+--------+--------+-----------+--------------+--------------+---------------+
| Closed |        |           |   Diagnoses  |   Juan F,  |   [facility]         |
|        |        |           |  [facility]     | Jessica    | Pulmonary     |
|        |        |           | obstructive  | MD Skye  | Function Lab  |
|        |        |           | pulmonary    |  [address]        |  [address]     |
|        |        |           | disease,     | [address]  | [address]          |
|        |        |           | unspecified  |  [address] [address]       | [place], [place]  |
|        |        |           | (HCC)        | [place], [place] | [address]    |
|        |        |           | Procedures   |  [id number]       | Phone:        |
|        |        |           | PULM FULL    | Phone:       | [phone]  |
|        |        |           | PFT          | [phone] |  Fax:         |
|        |        |           |              |   Fax:       | [phone]  |
|        |        |           |              | [phone] |               |
+--------+--------+-----------+--------------+--------------+---------------+
 
 
 
 
 Encounter Details
 
 
+--------+-----------+----------------------+----------------------+----------------------+
| Date   | Type      | Department           | Care Team            | Description          |
+--------+-----------+----------------------+----------------------+----------------------+
| / | Hospital  |   [facility]  |   Juan F,          | COPD, moderate (HCC) |
| 2019   | Encounter | PULMONARY FUNCTION   | Jessica Cheung,   |                      |
|        |           | LAB  [address]   | MD [address] |                      |
|        |           | [facility] [place]         |   [place],   |                      |
|        |           | [address]           | [place] [address]             |                      |
|        |           | [phone]         | [phone]         |                      |
|        |           |                      | [phone] (Fax)   |                      |
+--------+-----------+----------------------+----------------------+----------------------+
 
 
 
 Social History
 
 
+---------------+------------+-----------+--------+------------------+
| Tobacco Use   | Types      | Packs/Day | Years  | Date             |
|               |            |           | Used   |                  |
+---------------+------------+-----------+--------+------------------+
 
| Former Smoker | Cigarettes | 1         | 35     | Quit: 1996 |
+---------------+------------+-----------+--------+------------------+
 
 
 
+---------------------+---+---+---+
| Smokeless Tobacco:  |   |   |   |
| Never Used          |   |   |   |
+---------------------+---+---+---+
 
 
 
+-------------+-------------+---------+----------+
| Alcohol Use | Drinks/Week | oz/Week | Comments |
+-------------+-------------+---------+----------+
| No          |             |         |          |
+-------------+-------------+---------+----------+
 
 
 
+------------------+---------------+
| Sex Assigned at  | Date Recorded |
| Birth            |               |
+------------------+---------------+
| Not on file      |               |
+------------------+---------------+
 
 
 
+----------------+-------------+-------------+
| Job Start Date | Occupation  | Industry    |
+----------------+-------------+-------------+
| Not on file    | Not on file | Not on file |
+----------------+-------------+-------------+
 
 
 
+----------------+--------------+------------+
| Travel History | Travel Start | Travel End |
+----------------+--------------+------------+
 
 
 
+-------------------------------------+
| No recent travel history available. |
+-------------------------------------+
 documented as of this encounter
 
 Medications at Time of Discharge
 
 
+----------------------+----------------------+-----------+---------+----------+-----------+
| Medication           | Sig                  | Dispensed | Refills | Start    | End Date  |
|                      |                      |           |         | Date     |           |
+----------------------+----------------------+-----------+---------+----------+-----------+
|   acyclovir          | Take 400 mg by mouth |           | 0       |          |           |
| (ZOVIRAX) 400 MG     |  5 (five) times      |           |         |          |           |
| tablet               | daily. PRN           |           |         |          |           |
+----------------------+----------------------+-----------+---------+----------+-----------+
|   acyclovir          | Apply  topically     |           | 0       |          |           |
 
| (ZOVIRAX) 5%         | every 3 hours.       |           |         |          |           |
| ointment             |                      |           |         |          |           |
+----------------------+----------------------+-----------+---------+----------+-----------+
|   albuterol (PROAIR  | Inhale 2 puffs into  |           | 0       |          |           |
| HFA) 90 mcg/puff     | the lungs every 6    |           |         |          |           |
| inhaler              | hours as needed for  |           |         |          |           |
|                      | Wheezing.            |           |         |          |           |
+----------------------+----------------------+-----------+---------+----------+-----------+
|   albuterol (PROAIR  | Inhale  into the     |           | 0       |          |           |
| RESPICLICK) 90       | lungs.               |           |         |          |           |
| mcg/puff inhaler     |                      |           |         |          |           |
+----------------------+----------------------+-----------+---------+----------+-----------+
|                      | Take 3 mLs by        |           | 0       | 08/11/20 |           |
| albuterol-ipratropiu | nebulization every 6 |           |         | 18       |           |
| m 2.5-0.5 mg/3 mL    |  (six) hours as      |           |         |          |           |
| SOLN                 | needed.              |           |         |          |           |
+----------------------+----------------------+-----------+---------+----------+-----------+
|   atorvaSTATin       | TAKE 1 TABLET BY     |           | 0       | 20 |  |
| (LIPITOR) 40 mg      | MOUTH NIGHTLY        |           |         | 19       | 0         |
| tablet               |                      |           |         |          |           |
+----------------------+----------------------+-----------+---------+----------+-----------+
|   azaTHIOprine       | Take 150 mg by mouth |           | 0       |          |           |
| (IMURAN) 50 mg       |  daily.              |           |         |          |           |
| tablet               |                      |           |         |          |           |
+----------------------+----------------------+-----------+---------+----------+-----------+
|                      | Apply  to eye as     |           | 0       |          |           |
| bacitracin-polymyxin | needed.              |           |         |          |           |
|  b (POLYSPORIN)      |                      |           |         |          |           |
| ophthalmic ointment  |                      |           |         |          |           |
+----------------------+----------------------+-----------+---------+----------+-----------+
|   bismuth            | Take 262 mg by mouth |           | 0       |          |           |
| subsalicylate (PEPTO |  4 (four) times      |           |         |          |           |
|  BISMOL) 262 mg      | daily before meals   |           |         |          |           |
| chewable tablet      | and nightly.         |           |         |          |           |
+----------------------+----------------------+-----------+---------+----------+-----------+
|   cholecalciferol    | Take 1,000 Units by  |           | 0       |          |           |
| (CHOLECALCIFEROL)    | mouth daily.         |           |         |          |           |
| 1000 units TABS      |                      |           |         |          |           |
+----------------------+----------------------+-----------+---------+----------+-----------+
|   cyanocobalamin     | Take 1,000 mcg by    |           | 0       |          |           |
| (VITAMIN B-12) 1000  | mouth daily.         |           |         |          |           |
| MCG tablet           |                      |           |         |          |           |
+----------------------+----------------------+-----------+---------+----------+-----------+
|   diclofenac         | Apply 2 g topically  |           | 0       |          |           |
| (VOLTAREN) 1% GEL    | 4 (four) times       |           |         |          |           |
|                      | daily. PRN           |           |         |          |           |
+----------------------+----------------------+-----------+---------+----------+-----------+
|   digoxin (LANOXIN)  | Take 125 mcg by      |           | 0       |          |           |
| 250 mcg tablet       | mouth Daily.      |           |         |          |           |
|                      | capsule daily        |           |         |          |           |
+----------------------+----------------------+-----------+---------+----------+-----------+
|   famotidine         | Take 40 mg by mouth  |           | 0       |          |           |
| (PEPCID) 40 MG       | daily.               |           |         |          |           |
| tablet               |                      |           |         |          |           |
+----------------------+----------------------+-----------+---------+----------+-----------+
|   ferrous sulfate    | Take 325 mg by mouth |           | 0       | 20 |           |
| 325 mg tablet        |  3 times daily.      |           |         | 12       |           |
+----------------------+----------------------+-----------+---------+----------+-----------+
|   fluticasone        | one spray in each    |           | 0       | 20 |           |
| (FLONASE) 50         | nostril daily as     |           |         | 12       |           |
 
| mcg/nasal spray      | needed               |           |         |          |           |
+----------------------+----------------------+-----------+---------+----------+-----------+
|                      | Inhale 1 puff into   |           | 0       |          |           |
| fluticasone-salmeter | the lungs Twice      |           |         |          |           |
| ol (ADVAIR) 500-50   | Daily.               |           |         |          |           |
| mcg/puff diskus      |                      |           |         |          |           |
| inhaler              |                      |           |         |          |           |
+----------------------+----------------------+-----------+---------+----------+-----------+
|   folic acid 1 mg    | Take 1 mg by mouth   |           | 0       |          |           |
| tablet               | Daily.               |           |         |          |           |
+----------------------+----------------------+-----------+---------+----------+-----------+
|   furosemide (LASIX) | Take 40 mg by mouth  |           | 0       |          |           |
|  40 mg tablet        | Daily.               |           |         |          |           |
+----------------------+----------------------+-----------+---------+----------+-----------+
|   guaiFENesin        | Take 1,200 mg by     |           | 0       |          |           |
| (MUCINEX) 600 mg 12  | mouth 2 times daily. |           |         |          |           |
| hr tablet            |                      |           |         |          |           |
+----------------------+----------------------+-----------+---------+----------+-----------+
|   levocetirizine     | Take 5 mg by mouth   |           | 0       |          |           |
| (XYZAL) 5 MG tablet  | as needed.           |           |         |          |           |
+----------------------+----------------------+-----------+---------+----------+-----------+
|   levothyroxine      | Take 75 mcg by mouth |           | 0       | 20 |           |
| (LEVOTHROID) 75 MCG  |  Daily.              |           |         | 12       |           |
| tablet               |                      |           |         |          |           |
+----------------------+----------------------+-----------+---------+----------+-----------+
|   losartan (COZAAR)  | Take 50 mg by mouth  |           | 0       |          |           |
| 50 mg tablet         | daily.               |           |         |          |           |
+----------------------+----------------------+-----------+---------+----------+-----------+
|   metoclopramide     | Take 10 mg by mouth  |           | 0       | 20 |           |
| (REGLAN) 10 mg       | 4 times daily as     |           |         | 12       |           |
| tablet               | needed.              |           |         |          |           |
+----------------------+----------------------+-----------+---------+----------+-----------+
|   mupirocin          | 1 g by Nasal route   |           | 0       |          |           |
| (BACTROBAN) 2%       | as needed. Use pea   |           |         |          |           |
| ointment             | sized amount in each |           |         |          |           |
|                      |  nostril twice daily |           |         |          |           |
|                      |  for 5 days. After   |           |         |          |           |
|                      | applications, press  |           |         |          |           |
|                      | sides of nose        |           |         |          |           |
|                      | together, gently     |           |         |          |           |
|                      | massage for 1 min.   |           |         |          |           |
+----------------------+----------------------+-----------+---------+----------+-----------+
|                      | Apply  topically 2   |           | 0       |          |           |
| nystatin-triamcinolo | times daily.         |           |         |          |           |
| ne (MYCOLOG II)      |                      |           |         |          |           |
| cream                |                      |           |         |          |           |
+----------------------+----------------------+-----------+---------+----------+-----------+
|   ofloxacin          |                      |           | 0       | 20 |           |
| (OCUFLOX) 0.3%       |                      |           |         | 18       |           |
| ophthalmic solution  |                      |           |         |          |           |
+----------------------+----------------------+-----------+---------+----------+-----------+
|   omeprazole         | Take 20 mg by mouth  |           | 0       | 20 |           |
| (PRILOSEC) 20 mg     | 2 times daily.       |           |         | 12       |           |
| capsule              |                      |           |         |          |           |
+----------------------+----------------------+-----------+---------+----------+-----------+
|   potassium citrate  | Take 10 mEq by mouth |           | 0       |          |           |
| (UROCIT-K) 10 mEq SR |  2 times daily.      |           |         |          |           |
|  tablet              |                      |           |         |          |           |
+----------------------+----------------------+-----------+---------+----------+-----------+
|   predniSONE         | Take 10 mg by mouth  |           | 0       |          |           |
 
| (DELTASONE) 5 mg     | Daily.               |           |         |          |           |
| tablet               |                      |           |         |          |           |
+----------------------+----------------------+-----------+---------+----------+-----------+
|   sertraline         | Take 100 mg by mouth |           | 0       |          |           |
| (ZOLOFT) 100 mg      |  daily.              |           |         |          |           |
| tablet               |                      |           |         |          |           |
+----------------------+----------------------+-----------+---------+----------+-----------+
|   tamsulosin         | Take 0.4 mg by mouth |           | 0       | 20 |           |
| (FLOMAX) 0.4 mg CAPS |  2 times daily.      |           |         | 12       |           |
+----------------------+----------------------+-----------+---------+----------+-----------+
|   trolamine          | Apply  topically as  |           | 0       |          |           |
| salicylate           | needed.              |           |         |          |           |
| (ASPERCREME) 10%     |                      |           |         |          |           |
| cream                |                      |           |         |          |           |
+----------------------+----------------------+-----------+---------+----------+-----------+
|   rivaroxaban        | Take 1 tablet by     |           | 0       | 20 | 10/16/201 |
| (XARELTO) 10 mg      | mouth daily.         |           |         | 19       | 9         |
| tablet               |                      |           |         |          |           |
+----------------------+----------------------+-----------+---------+----------+-----------+
 documented as of this encounter
 
 Plan of Treatment
 
 
+--------+---------+-------------+----------------------+-------------+
| Date   | Type    | Specialty   | Care Team            | Description |
+--------+---------+-------------+----------------------+-------------+
| / | Office  | Pulmonology |   Juan F,          |             |
| 2019   | Visit   |             | Jessica Cheung,   |             |
|        |         |             | MD [address] |             |
|        |         |             |   [place],   |             |
|        |         |             | [place] [address]             |             |
|        |         |             | [phone]         |             |
|        |         |             | [phone] (Fax)   |             |
+--------+---------+-------------+----------------------+-------------+
| / | Office  | Cardiology  |   Eric Akins, |             |
| 2020   | Visit   |             |  MD [address]   |             |
|        |         |             | [place]  |             |
|        |         |             | [address]  [phone]  |             |
|        |         |             |  [phone] (Fax)  |             |
+--------+---------+-------------+----------------------+-------------+
 documented as of this encounter
 
 Procedures
 
 
+-------------------+--------+-------------+----------------------+----------------------+
| Procedure Name    | Priori | Date/Time   | Associated Diagnosis | Comments             |
|                   | ty     |             |                      |                      |
+-------------------+--------+-------------+----------------------+----------------------+
| PFT PULMONARY     | Routin | 2019  |   COPD, moderate     |   Results for this   |
| FUNCTION TESTING  | e      |  2:04 PM    | ([facility])                | procedure are in the |
| ORDERS            |        | PDT         |                      |  results section.    |
+-------------------+--------+-------------+----------------------+----------------------+
 documented in this encounter
 
 Results
 Pulmonary function test full PFT (2019  2:04 PM PDT)
 
+------------------------------------------------------------------------+--------------+
 
| Narrative                                                              | Performed At |
+------------------------------------------------------------------------+--------------+
|   Jessica Rogel MD       2019 14:07  PULMONARY       |              |
| FUNCTION TEST     NAME: Wyatt Shane  : 1945  MRN:       |              |
| [id number]        REASON FOR TESTING:  COPD     FINDINGS              |              |
| SPIROMETRY:   1. FEV-1/FVC is 64   2. FEV-1 is 1.97 L or 75%  3. FVC   |              |
| is 3.06L or 84%.  4.There is no significant bronchodilator response.   |              |
|     LUNG VOLUMES:   1. TLC is 5.54L or 89%.   2. RV/TLC is 44.         |              |
| DIFFUSING CAPACITY:  1. Diffusing capacity is 10.7mL/mmHg/min or 46%   |              |
|           INTERPRETATION:   There is a moderate obstruction without    |              |
| significant   bronchodilator change. Lung volumes are within normal.   |              |
| Diffusion   capacity is severely reduced. There has been some          |              |
| improvement in   his TLC but all the other parameters have shown       |              |
| decline compared   to a study done in 2018.      Jessica       |              |
| Skye Rogel MD  Pulmonary and Critical Care Medicine  [facility]  |              |
| [facility] [facility]  [address], [address]      |              |
| [place] [address]                                                     |              |
+------------------------------------------------------------------------+--------------+
 documented in this encounter
 
 Visit Diagnoses
 
 
+------------------------------------------------------------------------------+
| Diagnosis                                                                    |
+------------------------------------------------------------------------------+
|   COPD, moderate (HCC)  Chronic airway obstruction, not elsewhere classified |
+------------------------------------------------------------------------------+
 documented in this encounter
 
 Administered Medications
 
 
+-----------------------------------+--------+----------+--------+------+------+
| Medication Order                  | MAR    | Action   | Dose   | Rate | Site |
|                                   | Action | Date     |        |      |      |
+-----------------------------------+--------+----------+--------+------+------+
|   albuterol 2.5 mg/3 mL nebulizer | Given  | 20 | 2.5 mg |      |      |
|  solution 2.5 mg  2.5 mg,         |        | 19  1:27 |        |      |      |
| Nebulization, RT Once, u 19 |        |  PM PDT  |        |      |      |
|  at 1345, For 1 dose, Dose for    |        |          |        |      |      |
| outpatient testing.,              |        |          |        |      |      |
+-----------------------------------+--------+----------+--------+------+------+
 
 
 
+---+---+
|   |   |
+---+---+
 documented in this encounter

## 2019-12-06 NOTE — XMS
Encounter Summary
  Created on: 2019
 
 Wyatt Shane
 External Reference #: [id number]
 : 43
 Sex: Male
 
 Demographics
 
 
+-----------------------+------------------------+
| Address               | [address]     |
|                       | [place]  [address]   |
+-----------------------+------------------------+
| Home Phone            | [phone]        |
+-----------------------+------------------------+
| Preferred Language    | Unknown                |
+-----------------------+------------------------+
| Marital Status        |                 |
+-----------------------+------------------------+
| Bahai Affiliation | LUT                    |
+-----------------------+------------------------+
| Race                  | White                  |
+-----------------------+------------------------+
| Ethnic Group          | Not  or  |
+-----------------------+------------------------+
 
 
 Author
 
 
+--------------+------------------------------+
| Author       | [facility] |
+--------------+------------------------------+
| Organization | [facility] |
+--------------+------------------------------+
| Address      | Unknown                      |
+--------------+------------------------------+
| Phone        | Unavailable                  |
+--------------+------------------------------+
 
 
 
 Support
 
 
+---------------+--------------+---------+-----------------+
| Name          | Relationship | Address | Phone           |
+---------------+--------------+---------+-----------------+
| Opal Shane | ECON         | Unknown | [phone] |
+---------------+--------------+---------+-----------------+
 
 
 
 Care Team Providers
 
 
 
+-----------------------+------+-----------------+
| Care Team Member Name | Role | Phone           |
+-----------------------+------+-----------------+
| Erwin Iniguez MD   | PCP  | [phone] |
+-----------------------+------+-----------------+
 
 
 
 Encounter Details
 
 
+--------+-------------+-----------------+---------------------+---------------+
| Date   | Type        | Department      | Care Team           | Description   |
+--------+-------------+-----------------+---------------------+---------------+
| / | Office      |   [facility]  |   Note, Outpatient  | Progress Note |
| 2000   | Visit-Trans | MEDICINE        | Clinic              |               |
|        | cribed      |                 |                     |               |
+--------+-------------+-----------------+---------------------+---------------+
 
 
 
 Social History
 
 
+----------------+-------+-----------+--------+------+
| Tobacco Use    | Types | Packs/Day | Years  | Date |
|                |       |           | Used   |      |
+----------------+-------+-----------+--------+------+
| Never Assessed |       |           |        |      |
+----------------+-------+-----------+--------+------+
 
 
 
+------------------+---------------+
| Sex Assigned at  | Date Recorded |
| Birth            |               |
+------------------+---------------+
| Not on file      |               |
+------------------+---------------+
 
 
 
+----------------+-------------+-------------+
| Job Start Date | Occupation  | Industry    |
+----------------+-------------+-------------+
| Not on file    | Not on file | Not on file |
+----------------+-------------+-------------+
 
 
 
+----------------+--------------+------------+
| Travel History | Travel Start | Travel End |
+----------------+--------------+------------+
 
 
 
+-------------------------------------+
| No recent travel history available. |
+-------------------------------------+
 documented as of this encounter
 
 
 Progress Notes
 Interface, Transcription In - 2006  1:04 AM PSTCLINIC DATE:       2000
 
 OTOLARYNGOLOGY/HEAD AND NECK SURGERY
 
 SUBJECTIVE:  Mr. Shane is seen back today  in  followup  with respect to his
 laryngotracheoplasty following hemilaryngectomy.   He feels that his airway
 is back to normal.  He is back working  and  is  quite  pleased with things
 overall.  He has no pain.  He still occasional  thick  drainage  which gets
 caught.
 
 OBJECTIVE:  Today, his ears are normal bilaterally. Nose normal to anterior
 rhinoscopy.   Flexible  fiberoptic   laryngoscopy   to   the   nasopharynx,
 oropharynx,  hypopharynx is done.  He  has  good  glottic  airway  and  the
 expected post-radiation supraglottic  mild  edema.  One  can still see some
 area of cartilaginous sequestra just below the anterior commissure, but the
 total area measures less than 5 mm by 3 mm.
 
 ASSESSMENT:  Status post laryngotracheoplasty, doing well.
 
 PLAN:  I will see him back in three months.
 
 Jimmy Esposito M.D.
 
 MARIA ALEJANDRA / 
 [id number] / [id number] / [id number] / [id number]
 D: 2000
 T: 2000
 C: 2000 luis antonio
 
 cc:
 
 ERWIN DOWNS MD
 [address] [place] [address] [address]
 [place] [id number] signed by Interface, Transcription In at 2006  1:04 
AM PSTdocumented in this encounter
 
 Plan of Treatment
 Not on filedocumented as of this encounter
 
 Visit Diagnoses
 Not on filedocumented in this encounter

## 2019-12-06 NOTE — XMS
Encounter Summary
  Created on: 2019
 
 Wyatt Shane
 External Reference #: [id number]
 : 43
 Sex: Male
 
 Demographics
 
 
+-----------------------+------------------------+
| Address               | [address]     |
|                       | [place]  [address]   |
+-----------------------+------------------------+
| Home Phone            | [phone]        |
+-----------------------+------------------------+
| Preferred Language    | Unknown                |
+-----------------------+------------------------+
| Marital Status        |                 |
+-----------------------+------------------------+
| Congregational Affiliation | LUT                    |
+-----------------------+------------------------+
| Race                  | White                  |
+-----------------------+------------------------+
| Ethnic Group          | Not  or  |
+-----------------------+------------------------+
 
 
 Author
 
 
+--------------+------------------------------+
| Author       | [facility] |
+--------------+------------------------------+
| Organization | [facility] |
+--------------+------------------------------+
| Address      | Unknown                      |
+--------------+------------------------------+
| Phone        | Unavailable                  |
+--------------+------------------------------+
 
 
 
 Support
 
 
+---------------+--------------+---------+-----------------+
| Name          | Relationship | Address | Phone           |
+---------------+--------------+---------+-----------------+
| Oapl Shane | ECON         | Unknown | [phone] |
+---------------+--------------+---------+-----------------+
 
 
 
 Care Team Providers
 
 
 
+-----------------------+------+-------------+
| Care Team Member Name | Role | Phone       |
+-----------------------+------+-------------+
 PCP  | Unavailable |
+-----------------------+------+-------------+
 
 
 
 Encounter Details
 
 
+--------+----------+----------------------+--------------------+-------------+
| Date   | Type     | Department           | Care Team          | Description |
+--------+----------+----------------------+--------------------+-------------+
| / | Results  |   Otolaryngology     |   Jimmy Esposito MD |             |
|    | Only     | Head and Neck        |                    |             |
|        |          | Surgery Services at  |                    |             |
|        |          | PPV  [address] [facility]     |                    |             |
|        |          | [address]      |                    |             |
|        |          | Mailcode: PV01       |                    |             |
|        |          | [address] |                    |             |
|        |          |   [place], [place]       |                    |             |
|        |          | [id number]           |                    |             |
|        |          | [phone]         |                    |             |
+--------+----------+----------------------+--------------------+-------------+
 
 
 
 Social History
 
 
+----------------+-------+-----------+--------+------+
| Tobacco Use    | Types | Packs/Day | Years  | Date |
|                |       |           | Used   |      |
+----------------+-------+-----------+--------+------+
| Never Assessed |       |           |        |      |
+----------------+-------+-----------+--------+------+
 
 
 
+------------------+---------------+
| Sex Assigned at  | Date Recorded |
| Birth            |               |
+------------------+---------------+
| Not on file      |               |
+------------------+---------------+
 
 
 
+----------------+-------------+-------------+
| Job Start Date | Occupation  | Industry    |
+----------------+-------------+-------------+
| Not on file    | Not on file | Not on file |
+----------------+-------------+-------------+
 
 
 
+----------------+--------------+------------+
| Travel History | Travel Start | Travel End |
+----------------+--------------+------------+
 
 
 
 
+-------------------------------------+
| No recent travel history available. |
+-------------------------------------+
 documented as of this encounter
 
 Plan of Treatment
 Not on filedocumented as of this encounter
 
 Procedures
 
 
+---------------------+--------+-------------+----------------------+----------------------+
| Procedure Name      | Priori | Date/Time   | Associated Diagnosis | Comments             |
|                     | ty     |             |                      |                      |
+---------------------+--------+-------------+----------------------+----------------------+
| X-RAY CHEST 2 VIEW  | Priori | 2000  |                      |   Results for this   |
|                     | ty     |  4:00 PM    |                      | procedure are in the |
|                     |        | PST         |                      |  results section.    |
+---------------------+--------+-------------+----------------------+----------------------+
| URINE, MICROSCOPIC  | Routin | 2000  |                      |   Results for this   |
| EXAM                | e      |  3:15 PM    |                      | procedure are in the |
|                     |        | PST         |                      |  results section.    |
+---------------------+--------+-------------+----------------------+----------------------+
 documented in this encounter
 
 Results
 CHEST 2 VIEW (2000  4:00 PM PST)
 
+-----------+--------------------------+-----------+------------+--------------+
| Component | Value                    | Ref Range | Performed  | Pathologist  |
|           |                          |           | At         | Signature    |
+-----------+--------------------------+-----------+------------+--------------+
| CHEST, 2  | Radiologist 1: NONI,   |           |            |              |
| VIEWS OR  | CARMINA HARDY M.D.TWO      |           |            |              |
| STEREO    | VIEWS OF THE CHEST:      |           |            |              |
|           |   00 at 1600       |           |            |              |
|           | hours.    Dictated       |           |            |              |
|           | 00. FINDINGS:      |           |            |              |
|           | The patient has had a    |           |            |              |
|           | median sternotomy.       |           |            |              |
|           |   Thelungs are clear.    |           |            |              |
|           |   The aorta is tortuous  |           |            |              |
|           | and ectatic.   The heart |           |            |              |
|           |  ismildly enlarged.      |           |            |              |
|           |   There is degenerative  |           |            |              |
|           | disease of the thoracic  |           |            |              |
|           | spine. IMPRESSION: No    |           |            |              |
|           | change from 99 in  |           |            |              |
|           | cardiomegaly.   The      |           |            |              |
|           | lungs are clear.    END  |           |            |              |
|           | OF IMPRESSION:           |           |            |              |
+-----------+--------------------------+-----------+------------+--------------+
 
 
 
+----------+
| Specimen |
 
+----------+
|          |
+----------+
 
 
 
+----------------------+---------+--------------------+--------------+
| Performing           | Address | City/State/Zipcode | Phone Number |
| Organization         |         |                    |              |
+----------------------+---------+--------------------+--------------+
|   [facility] DEPARTMENT OF |         |                    |              |
|  RADIOLOGY           |         |                    |              |
+----------------------+---------+--------------------+--------------+
 URINE, MICROSCOPIC EXAM (2000  3:15 PM PST)
 
+-------------+-------+------------+-------------+--------------+
| Component   | Value | Ref Range  | Performed   | Pathologist  |
|             |       |            | At          | Signature    |
+-------------+-------+------------+-------------+--------------+
| RED CELLS   | None  | 0 - 3 /hpf | [facility]        |              |
|             |       |            | DEPARTMENT  |              |
|             |       |            | OF          |              |
|             |       |            | PATHOLOGY   |              |
+-------------+-------+------------+-------------+--------------+
| WHITE CELLS | 0-1   | 0 - 5 /hpf | [facility]        |              |
|             |       |            | DEPARTMENT  |              |
|             |       |            | OF          |              |
|             |       |            | PATHOLOGY   |              |
+-------------+-------+------------+-------------+--------------+
| BACTERIA    | None  | /hpf       | [facility]        |              |
|             |       |            | DEPARTMENT  |              |
|             |       |            | OF          |              |
|             |       |            | PATHOLOGY   |              |
+-------------+-------+------------+-------------+--------------+
| HYALINE     | None  | 0 - 1 /lpf | [facility]        |              |
| [facility]       |       |            | DEPARTMENT  |              |
|             |       |            | OF          |              |
|             |       |            | PATHOLOGY   |              |
+-------------+-------+------------+-------------+--------------+
| GRANULAR    | None  | /lpf       | [facility]        |              |
| [facility]       |       |            | DEPARTMENT  |              |
|             |       |            | OF          |              |
|             |       |            | PATHOLOGY   |              |
+-------------+-------+------------+-------------+--------------+
| CELLULAR    | None  | /lpf       | [facility]        |              |
| [facility]       |       |            | DEPARTMENT  |              |
|             |       |            | OF          |              |
|             |       |            | PATHOLOGY   |              |
+-------------+-------+------------+-------------+--------------+
 
 
 
+----------+
| Specimen |
+----------+
|          |
+----------+
 
 
 
 
+----------------------+------------------------+--------------------+--------------+
| Performing           | Address                | City/State/Zipcode | Phone Number |
| Organization         |                        |                    |              |
+----------------------+------------------------+--------------------+--------------+
|   [facility] DEPARTMENT OF |   [address]  | [place] [address] |              |
|  PATHOLOGY           | [address]                |                    |              |
+----------------------+------------------------+--------------------+--------------+
|   [facility] |   [address]  | [place] [address] |              |
|  PATHOLOGY           | [place]                |                    |              |
+----------------------+------------------------+--------------------+--------------+
 documented in this encounter
 
 Visit Diagnoses
 Not on filedocumented in this encounter

## 2019-12-06 NOTE — XMS
Encounter Summary
  Created on: 2019
 
 Wyatt Shane
 External Reference #: [id number]
 : 43
 Sex: Male
 
 Demographics
 
 
+-----------------------+------------------------+
| Address               | [address]     |
|                       | [place]  [address]   |
+-----------------------+------------------------+
| Home Phone            | [phone]        |
+-----------------------+------------------------+
| Preferred Language    | Unknown                |
+-----------------------+------------------------+
| Marital Status        |                 |
+-----------------------+------------------------+
| Synagogue Affiliation | LUT                    |
+-----------------------+------------------------+
| Race                  | White                  |
+-----------------------+------------------------+
| Ethnic Group          | Not  or  |
+-----------------------+------------------------+
 
 
 Author
 
 
+--------------+-------------+
| Organization | Unknown     |
+--------------+-------------+
| Address      | Unknown     |
+--------------+-------------+
| Phone        | Unavailable |
+--------------+-------------+
 
 
 
 Support
 
 
+---------------+--------------+---------+-----------------+
| Name          | Relationship | Address | Phone           |
+---------------+--------------+---------+-----------------+
| Opal Shane | ECON         | Unknown | [phone] |
+---------------+--------------+---------+-----------------+
 
 
 
 Care Team Providers
 
 
+-----------------------+------+-----------------+
| Care Team Member Name | Role | Phone           |
 
+-----------------------+------+-----------------+
| Erwin Iniguez MD   | PCP  | [phone] |
+-----------------------+------+-----------------+
 
 
 
 Encounter Details
 
 
+--------+-------------+------------+----------------------+------------------+
| Date   | Type        | Department | Care Team            | Description      |
+--------+-------------+------------+----------------------+------------------+
| / | H&P-Transcr |            |   Physical, History  | Hstry & Physical |
| 2000   | ibed        |            | &                    |                  |
+--------+-------------+------------+----------------------+------------------+
 
 
 
 Social History
 
 
+----------------+-------+-----------+--------+------+
| Tobacco Use    | Types | Packs/Day | Years  | Date |
|                |       |           | Used   |      |
+----------------+-------+-----------+--------+------+
| Never Assessed |       |           |        |      |
+----------------+-------+-----------+--------+------+
 
 
 
+------------------+---------------+
| Sex Assigned at  | Date Recorded |
| Birth            |               |
+------------------+---------------+
| Not on file      |               |
+------------------+---------------+
 
 
 
+----------------+-------------+-------------+
| Job Start Date | Occupation  | Industry    |
+----------------+-------------+-------------+
| Not on file    | Not on file | Not on file |
+----------------+-------------+-------------+
 
 
 
+----------------+--------------+------------+
| Travel History | Travel Start | Travel End |
+----------------+--------------+------------+
 
 
 
+-------------------------------------+
| No recent travel history available. |
+-------------------------------------+
 documented as of this encounter
 
 Plan of Treatment
 Not on filedocumented as of this encounter
 
 
 Visit Diagnoses
 Not on filedocumented in this encounter

## 2019-12-06 NOTE — XMS
Encounter Summary
  Created on: 2019
 
 Shane Wyatttien BroussardJosue
 External Reference #: [id number]
 : 43
 Sex: Male
 
 Demographics
 
 
+-----------------------+---------------------------+
| Address               | [address]        |
|                       | [place]  [address] |
+-----------------------+---------------------------+
| Home Phone            | [phone]           |
+-----------------------+---------------------------+
| Preferred Language    | Unknown                   |
+-----------------------+---------------------------+
| Marital Status        |                    |
+-----------------------+---------------------------+
| Confucianist Affiliation | [id number]                      |
+-----------------------+---------------------------+
| Race                  | Unknown                   |
+-----------------------+---------------------------+
| Ethnic Group          | Unknown                   |
+-----------------------+---------------------------+
 
 
 Author
 
 
+--------------+--------------------------------------------+
| Author       | Located within [facility]  |
|              | and [place]                                |
+--------------+--------------------------------------------+
| Organization | Located within [facility]  |
|              | and [place]                                |
+--------------+--------------------------------------------+
| Address      | Unknown                                    |
+--------------+--------------------------------------------+
| Phone        | Unavailable                                |
+--------------+--------------------------------------------+
 
 
 
 Support
 
 
+---------------+--------------+--------------------+-----------------+
| Name          | Relationship | Address            | Phone           |
+---------------+--------------+--------------------+-----------------+
| Opal Shane | ECON         | [address]     | [phone] |
|               |              | [place]   |                 |
|               |              | [id number]              |                 |
+---------------+--------------+--------------------+-----------------+
 
 
 
 
 Care Team Providers
 
 
+------------------------+------+-----------------+
| Care Team Member Name  | Role | Phone           |
+------------------------+------+-----------------+
| Calvin Robertson MD | PCP  | [phone] |
+------------------------+------+-----------------+
 
 
 
 Reason for Referral
 Diagnostic/Screening (Routine)
 
+--------+--------+-----------+--------------+--------------+---------------+
| Status | Reason | Specialty | Diagnoses /  | Referred By  | Referred To   |
|        |        |           | Procedures   | Contact      | Contact       |
+--------+--------+-----------+--------------+--------------+---------------+
| Closed |        | Radiology |   Diagnoses  |   Juan F,  |   [facility] |
|        |        |           |  Multifocal  | Jessica    |   [address]         |
|        |        |           | lung         | MD Sky[address]  | [address]   |
|        |        |           | consolidatio |  [address]        | [address]       |
|        |        |           | n (HCC)      | [address]  | [place], [place]  |
|        |        |           | Procedures   |  [address]       | [id number]    |
|        |        |           | CT Chest wo  | [place], [place] | Phone:        |
|        |        |           | Contrast     |  [address]       | [phone]  |
|        |        |           |              | Phone:       |  Fax:         |
|        |        |           |              | [phone] | [phone]  |
|        |        |           |              |   Fax:       |               |
|        |        |           |              | [phone] |               |
+--------+--------+-----------+--------------+--------------+---------------+
 
 
 
 
 Reason for Visit
 
 
+--------+----------+
| Reason | Comments |
+--------+----------+
| COPD   |          |
+--------+----------+
 
 
 
 Encounter Details
 
 
+--------+---------+---------------------+----------------------+----------------------+
| Date   | Type    | Department          | Care Team            | Description          |
+--------+---------+---------------------+----------------------+----------------------+
| / | Office  |   [facility]     |   Juan F,          | COPD, moderate (HCC) |
| 2019   | Visit   | PULMONOLOGY  [address]   | Jessica Cehung,   |  (Primary Dx);       |
|        |         | [address]   | MD  [address] | Multifocal lung      |
|        |         | [place], [place]        |   [address]  [place],   | consolidation (HCC); |
|        |         | [address]          | [place] [address]             |  Aspiration          |
|        |         | [phone]        | [phone]         | pneumonia,           |
|        |         |                     | [phone] (Fax)   | unspecified          |
 
|        |         |                     |                      | aspiration pneumonia |
|        |         |                     |                      |  type, unspecified   |
|        |         |                     |                      | laterality,          |
|        |         |                     |                      | unspecified part of  |
|        |         |                     |                      | lung (HCC);          |
|        |         |                     |                      | Rheumatoid           |
|        |         |                     |                      | arthritis, involving |
|        |         |                     |                      |  unspecified site,   |
|        |         |                     |                      | unspecified          |
|        |         |                     |                      | rheumatoid factor    |
|        |         |                     |                      | presence (HCC);      |
|        |         |                     |                      | Personal history of  |
|        |         |                     |                      | tobacco use,         |
|        |         |                     |                      | presenting hazards   |
|        |         |                     |                      | to health; Cancer of |
|        |         |                     |                      |  vocal cord (HCC)    |
+--------+---------+---------------------+----------------------+----------------------+
 
 
 
 Social History
 
 
+---------------+------------+-----------+--------+------------------+
| Tobacco Use   | Types      | Packs/Day | Years  | Date             |
|               |            |           | Used   |                  |
+---------------+------------+-----------+--------+------------------+
| Former Smoker | Cigarettes | 1         | 35     | Quit: 1996 |
+---------------+------------+-----------+--------+------------------+
 
 
 
+---------------------+---+---+---+
| Smokeless Tobacco:  |   |   |   |
| Never Used          |   |   |   |
+---------------------+---+---+---+
 
 
 
+-------------+-------------+---------+----------+
| Alcohol Use | Drinks/Week | oz/Week | Comments |
+-------------+-------------+---------+----------+
| No          |             |         |          |
+-------------+-------------+---------+----------+
 
 
 
+------------------+---------------+
| Sex Assigned at  | Date Recorded |
| Birth            |               |
+------------------+---------------+
| Not on file      |               |
+------------------+---------------+
 
 
 
+----------------+-------------+-------------+
| Job Start Date | Occupation  | Industry    |
+----------------+-------------+-------------+
| Not on file    | Not on file | Not on file |
 
+----------------+-------------+-------------+
 
 
 
+----------------+--------------+------------+
| Travel History | Travel Start | Travel End |
+----------------+--------------+------------+
 
 
 
+-------------------------------------+
| No recent travel history available. |
+-------------------------------------+
 documented as of this encounter
 
 Last Filed Vital Signs
 
 
+-------------------+---------------------+----------------------+----------+
| Vital Sign        | Reading             | Time Taken           | Comments |
+-------------------+---------------------+----------------------+----------+
| Blood Pressure    | 127/60              | 2019  1:47 PM  |          |
|                   |                     | PDT                  |          |
+-------------------+---------------------+----------------------+----------+
| Pulse             | 49                  | 2019  1:47 PM  |          |
|                   |                     | PDT                  |          |
+-------------------+---------------------+----------------------+----------+
| Temperature       | 36.3   C (97.4   F) | 2019  1:47 PM  |          |
|                   |                     | PDT                  |          |
+-------------------+---------------------+----------------------+----------+
| Respiratory Rate  | -                   | -                    |          |
+-------------------+---------------------+----------------------+----------+
| Oxygen Saturation | 98%                 | 2019  1:47 PM  |          |
|                   |                     | PDT                  |          |
+-------------------+---------------------+----------------------+----------+
| Inhaled Oxygen    | -                   | -                    |          |
| Concentration     |                     |                      |          |
+-------------------+---------------------+----------------------+----------+
| Weight            | 71.7 kg (158 lb)    | 2019  1:47 PM  |          |
|                   |                     | PDT                  |          |
+-------------------+---------------------+----------------------+----------+
| Height            | -                   | -                    |          |
+-------------------+---------------------+----------------------+----------+
| Body Mass Index   | 24.75               | 2019  2:47 PM  |          |
|                   |                     | PDT                  |          |
+-------------------+---------------------+----------------------+----------+
 documented in this encounter
 
 Progress Notes
 Jessica Rogel MD - 2019  2:00 PM PDTFormatting of this note might be d
ifferent from the original.
 
 Subjective: 
 
 Patient ID: Wyatt Shane is a 73 y.o. male with CAD post CABG in , ischemic CMP, A
F on Xarelto, RA on azathioprine and prednisone, history of vocal cord cancer post surgery a
nd radiation, COPD, untreated JOANN, referred to us due to pulmonary nodules and aspiration pn
eumonia. 
 
 HPI 
 
 
 Mr Shane is a 72 yr old man with a complex medical history including ischemic CMP, AF on Xar
elto, a history of vocal cord cancer post surgery and radiation txt (had a trach for 6 weeks
, and then had a reconstruction of his VC after radiation in [facility]). He has been found to hav
e dysphagia and aspiration, and was admitted to [facility] in August for worsening cough associate
d with hemoptysis. He was treated with IV abx, and was discharged on a course of Augmentin a
nd then Clindamycin. He now complains of persistent loose stools, that are foul smelling, an
d also of low grade fever. He is fatigued and energy is low since having persistent diarrhea
. 
 
 He has been diagnosed with COPD many years ago. He is not on oxygen supplementation. He is 
maintained on Advair 250-50 mcg BID, and prn albuterol HFA. He has been using his Duoneb neb
ulization about every 4 days. He does have a chronic cough with yellow sputum, and now denie
s hemoptysis. He is short of breath, usually with strenuous exertion. He does exercises he l
earned in [place] (has finished participating in this) at home has not found any trouble with thi
s. He denies being short of breath with usual activities at home, and with showering.He does
 get winded with walking an incline. He says that his pedal edema is better. He denies chest
 pains, or PNDs. He does sleep at a semi incline, and he wakes up intermittently at night. DES rodriguez has been diagnosed with JOANN but he was ordered by his Sleep MD to stop his BiPAP for some 
reason. He has not been using any PAP txt currently. He is not on oxygen supplementation. He
 does have baseline dysphagia and aspiration events which his wife thinks has been better si
nce getting seen by Speech txt. He reports of having persistent diarrhea as above, and havin
g fatigue and low grade fevers. He has RA, and is on prednisone at 7.5 mg daily, along with 
Azathioprine at 100 mg daily. 
 
 Interval History
 
 Narrative/Comments:  Has done relatively well without acute exacerbations. However, has bee
n noting increasing aspiration events, especially with smaller and more granular food consis
tency. Arthralgias are better. Remains on 5 mg of prednisone daily, and has come off methotr
exate. He remains on azathioprine. 
 
 The patient reports the following:
 
 DYSPNEA: On moderate exertion. Able to walk more than 200 feet without getting winded. Has 
been out more with friends and doing more activities. 
 
 COUGH:   Has denied significant cough. However, has been coughing with eating lately. 
 
 ACUTE EXACERBATION: None recently. 
 
 EXPOSURES:  Former smoker. 
 
 EXERCISE:  No formal exercise but has been more active. 
 
 ACTIVITIES OF DAILY LIVING:  Independent, without usual difficulty apart from the more stre
nuous chores. Able to get dressed and shower without difficulty. 
 
 CONSTITUTIONAL SYMPTOMS: Energy is fair. Denies fevers, chills or night sweats. 
 
 SINO-NASAL SYMPTOMS:  Denies. 
 
 REFLUX or REGURGITATION:   Has had occasional coughing and likely aspiration with drinking 
fluids and with certain consistency of foods. Denies uncontrolled reflux. . 
 
 SLEEP:  Has had no isses with this. 
 
 CHEST PAINS:  None
 
 ORTHOPNEA OR PND (Paroxysmal Nocturnal Dyspnea):  None
 
 
 PEDAL EDEMA:  Chronic edema on the L ankle. 
 
 OTHER SYMPTOMS: Arthralgias. Feet pain more recently. 
 
 Inhaler regimen includes:   Advair 500-50 mcg BID, prn albuterol HFA
 
 Oxygen Use:  None
 
 PAP therapy:   None
 
 Other relevant medications: Xarelto for AF, azathioprine 150 mg daily, Prednisone 5 mg danielle
y for RA. 
 
 SOCIAL HISTORY
 
 He is a past smoker, about 30 pack years, stopped in . He drove trucks for a living whe
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  He was in the , [employer], for 4 years and reportedly exposed to A
gent Rains. He has no animals at home. 
 
 The following portions of the patient's history were reviewed and updated as appropriate an
d is available elsewhere in the record: allergies, current medications, past family history,
 past medical history, past social history, past surgical history and problem list.
 
 Review of Systems 
 Constitutional: Positive for fatigue and fever. Negative for activity change, appetite richardson
ge, chills, diaphoresis and unexpected weight change. 
 HENT: Positive for nosebleeds and postnasal drip. Negative for congestion, rhinorrhea and s
ore throat.  
 Eyes: Negative for visual disturbance. 
 Respiratory: Positive for apnea, cough and shortness of breath. Negative for choking, chest
 tightness, wheezing and stridor.  
 Cardiovascular: Positive for leg swelling. Negative for chest pain and palpitations. 
 Gastrointestinal: Negative for constipation, diarrhea and nausea. 
 Genitourinary: Negative for dysuria and frequency. 
 Musculoskeletal: Positive for arthralgias and joint swelling. Negative for myalgias and nec
k pain. 
 Skin: Negative for rash. 
 Neurological: Negative for dizziness, weakness and light-headedness. 
 Hematological: Negative for adenopathy. 
 Psychiatric/Behavioral: Positive for sleep disturbance. 
 
 Active Ambulatory Problems 
   Diagnosis Date Noted 
   Aortic aneurysm (HCC) 2018 
   Coronary artery disease of bypass graft of native heart with stable angina pectoris (HC
C) 2018 
   Chronic atrial fibrillation (HCC) 2018 
   Cancer of vocal cord (HCC) 2018 
   Essential hypertension 2018 
   Multifocal lung consolidation (HCC) 2018 
   Chronic bronchitis (HCC) 2018 
   Chronic maxillary sinusitis 2018 
   Obstructive sleep apnea 2018 
   Peptic ulcer disease 2018 
   Moderate protein-calorie malnutrition (HCC) 2018 
   Severe protein-calorie malnutrition (HCC) 2018 
   Dysphagia 2018 
   COPD, moderate (HCC) 2018 
   Personal history of tobacco use, presenting hazards to health 2018 
 
   Rheumatoid arthritis (HCC) 2018 
   Stable angina (HCC) 2018 
 
 Resolved Ambulatory Problems 
   Diagnosis Date Noted 
   Precordial pain 2018 
   Hemoptysis 2018 
   NSTEMI (non-ST elevated myocardial infarction) (HCC) 2018 
   Aspiration pneumonia (HCC) 2018 
 
 Past Medical History: 
 Diagnosis Date 
   Atrial fibrillation (HCC)  
   Cancer (HCC)  
   Coronary artery disease  
   Hemorrhage of gastrointestinal tract, unspecified  
   Hyperlipidemia  
   Hypertension  
   Joint pain  
   Old myocardial infarction  
   Other chronic pain  
   Sleep apnea  
 
 Past Surgical History 
 Procedure Laterality Date 
   ABDOMINAL SURGERY   
  mehdi fundoplasty 
   CARDIAC CATHETERIZATION   
   carpoltunnel Bilateral  
   COLONOSCOPY   
   heart bypass  1986 
   HERNIA REPAIR  2006 
   left ear surgery   
  BCC removed from left ear  
   MAXILLARY ANTROSTOMY Bilateral 2015 
  Procedure: MAXILLARY ANTROSTOMY;  Surgeon: Celso Dumont DO;  Location: [facility] MAIN OR;  Ser
vice: ENT;  Laterality: Bilateral; 
   SINUS SURGERY N/A 2015 
  Procedure: ENDOSCOPIC SINUS SURGERY;  Surgeon: Celso Dumont DO;  Location: [facility] MAIN OR; 
 Service: ENT;  Laterality: N/A;  Functional  &  left frontal 
   THROAT SURGERY   
  pt had vocal cord Sx due to cancer  
 
  Objective   
 Objective: 
 Physical Exam
 /60  | Pulse (!) 49  | Temp 36.3 C (97.4 F) (Oral)  | Wt 71.7 kg (158 lb)  | SpO2
 98%  | BMI 24.75 kg/m 
 
 Vital signs reviewed.
 Oxygen saturation noted at 98% on ambient air
 GENERAL: pleasant, cooperative, oriented, not in distress
 HEENT: pink conjunctiva, injected L sclerae, anicteric sclerae, moist oral mucosae and with
out any lesions, normal appearing nasal mucosae; no JVD; MALAMPATTI 4; no thyromegaly; no ce
rvicolymphadenopathies; surgical scars present on neck, with old trach scar; hoarse voice. 
 CVS: PMI laterally displaced, IRRR, S1 and S2, no murmurs/gallops/rubs
 CHEST: Examination of the chest showed a mild kyphosis.
 LUNGS: Normal effort, Equal in expansion, resonant to percussion, clear breath sounds today
 but with prolonged expiratory phase
 ABDOMEN: Slightly protuberant abdomen, NABS, non-tender on palpation, no masses palpated
 
 EXTREMITIES: good distal pulses, no cyanosis,  no clubbing, no nail abnormalities, trace ed
ema
 NEURO: awake and oriented, gait normal, no focal neurologic deficits
 
 LABORATORY AND IMAGING
 Pulmonary Function Test:
   3/2018
 FEV1  2.05 (70%)
 FVC  3.32 (86%)
 FEV1/FVC 62
 TLC  3.19 (82%)
 RV/TLC 39
 DLCO  11.4 (81%)
 
 CT of the chest done on 18 reviewed
 Impression 
  
 1. At the very least there is pulmonary vascular congestion, edema-and centrilobular emphys
ema is superimposed 
  
 2. Pulmonary infiltrates are asymmetric and throughout the left hemithorax. This could be d
ue to superimposed infection of the left upper lung and there is a small effusion also 
  
 3. Lastly, there are inflammatory appearing airspace or infiltrative nodules superimposed t
hroughout 
  
 Will be important to repeat this examination when the patient's acute issues are resolved t
o evaluate for underlying lesion/mass 
 
 Echo done on 18 reviewed
 Impression 
 1. The left ventricle cavity is normal in size, mild concentric hypertrophy and normal sy
stolic function EF 55%. Mild inferior and inferolateral hypokinetic segments.  
 2. Mild degenerative changes in the aortic and mitral valves.  
 3. There is no pericardial effusion. 
 
 L heart cath on 18 reviewed
 CONCLUSION:
 1. Severe 3-vessel coronary artery disease.
 2. Patent SVG to RCA and LIMA to LAD.
 3. Occluded SVG to the left circumflex system, which is a jump graft.
 4. Severely calcified left circumflex, which is small caliber severely calcified.
 However, not amenable to any PCI.
 
 RECOMMENDATIONS:  Given the above findings, the patient will continue
 medical therapy for coronary artery disease.  Will be restarted on
 antiplatelet therapy and will be on optimization of blood pressure control,
 statin therapy, and will be re-started on anticoagulation for atrial
 fibrillation.  Further recommendations to follow.
 
 CT of the chest done on 18 reviewed
 Impression 
 1. Scattered residual nodularity of the lungs. There is tree in bud opacity of the left l
shelby base which may represent inflammatory process. The largest nodule of the right middle lo
be measuring 0.6 x 0.4 cm. Follow-up CT scan could be obtained in 3-6 months. 
 2. There is significant improvement of the consolidation from the prior exam dated 2018 particularly at the left upper lobe and right lung. 
 
  Assessment/Plan  
 Assessment and Plan: 
 
 
 1. COPD, moderate (HCC)
 Mr Shane has moderate obstruction on PFT along with reduced DLCO. He does not have any restr
iction. His disease is characterized primarily by chronic bronchitis with some dyspnea. He h
as been maintained on Advair 500-50 mcg BID and prn albuterol. 
 
 I have requested for a repeat PFT this year as part of surveillance of his lung function. DES rodriguez has agreed to do this in [facility]. 
 
 - Pulmonary function test; Future
 
 2. Multifocal lung consolidation (HCC)
 This is from aspiration. Will repeat CT this year to document full resolution. He also has 
multiple pulm  Nodules noted on previous CT, largest is at 6 mm in size. Continue with yearl
y CT lung surveillance. 
 
 - CT Chest wo Contrast; Future
 
 3. Aspiration pneumonia, unspecified aspiration pneumonia type, unspecified laterality, uns
pecified part of lung (HCC)
 He has had increasing episodes of coughing while eating. This may indicate recurrence of as
piration. Advised that he talks to his PCP regarding this so that he can be sent to swallow/
speech evaluation once again. 
 
 4. Rheumatoid arthritis, involving unspecified site, unspecified rheumatoid factor presence
 (HCC)
 He is immunocompromised given chronic steroid txt and azathioprine. Continue watching out f
or infection. At the same time, he is at risk for pulmonary and pleural complications of RA,
 along with adverse effects of medications. Continue being vigilant about this. 
 
 5. Personal history of tobacco use, presenting hazards to health
 He stopped in  and has had vocal cord cancer. 
 
 6. Cancer of vocal cord (HCC)
 This is in remission. He sees a team in [facility].
 
 Thank you for allowing us to participate in this patient's care. A return visit has been re
quested/scheduled in 4 months for close clinical follow up. The patient was instructed to ca
ll our clinic for any questions, and for any concerns regarding worsening dyspnea, cough or 
change in sputum production. We will see the patient sooner than the recommended follow up d
ate,  if with any worsening of symptoms. 
 
 Jessica Rogel MD
 Pulmonary and Critical Care Medicine
 [facility]
 [address] , [address]
 [place] [address]
  
 Electronically signed by Jessica Rogel MD at 2019  3:56 PM PDTdocumente
d in this encounter
 
 Plan of Treatment
 
 
+--------+---------+-------------+----------------------+-------------+
| Date   | Type    | Specialty   | Care Team            | Description |
+--------+---------+-------------+----------------------+-------------+
| / | Office  | Pulmonology |   Juan F,          |             |
|    | Visit   |             | Jessica Cheung,   |             |
|        |         |             | MD  [address]  |             |
 
|        |         |             |   [place],   |             |
|        |         |             | [place] [address]             |             |
|        |         |             | [phone]         |             |
|        |         |             | [phone] (Fax)   |             |
+--------+---------+-------------+----------------------+-------------+
| / | Office  | Cardiology  |   Eric Akins, |             |
|    | Visit   |             |  MD  [address]   |             |
|        |         |             | [place]  |             |
|        |         |             | [address]  [phone]  |             |
|        |         |             |  [phone] (Fax)  |             |
+--------+---------+-------------+----------------------+-------------+
 documented as of this encounter
 
 Results
 Pulmonary function test full PFT (2019  2:04 PM PDT)
 
+------------------------------------------------------------------------+--------------+
| Narrative                                                              | Performed At |
+------------------------------------------------------------------------+--------------+
|   Jessica Rogel MD       2019 14:07  PULMONARY       |              |
| FUNCTION TEST     NAME: Wyatt Shane  : 1945  MRN:       |              |
| [id number]        REASON FOR TESTING:  COPD     FINDINGS              |              |
| SPIROMETRY:   1. FEV-1/FVC is 64   2. FEV-1 is 1.97 L or 75%  3. FVC   |              |
| is 3.06L or 84%.  4.There is no significant bronchodilator response.   |              |
|     LUNG VOLUMES:   1. TLC is 5.54L or 89%.   2. RV/TLC is 44.         |              |
| DIFFUSING CAPACITY:  1. Diffusing capacity is 10.7mL/mmHg/min or 46%   |              |
|           INTERPRETATION:   There is a moderate obstruction without    |              |
| significant   bronchodilator change. Lung volumes are within normal.   |              |
| Diffusion   capacity is severely reduced. There has been some          |              |
| improvement in   his TLC but all the other parameters have shown       |              |
| decline compared   to a study done in 2018.      Jessica       |              |
| Skye Rogel MD  Pulmonary and Critical Care Medicine  [facility]  |              |
| [facility]  [address] , [address]      |              |
| [place] [address]                                                     |              |
+------------------------------------------------------------------------+--------------+
 CT Chest wo Contrast (2019 12:28 PM PDT)
 
+----------+
| Specimen |
+----------+
|          |
+----------+
 
 
 
+------------------------------------------------------------------------+---------------+
| Narrative                                                              | Performed At  |
+------------------------------------------------------------------------+---------------+
|      CT CHEST WITHOUT CONTRAST     CLINICAL INFORMATION:  Right middle |   [facility] |
|  lobe pulmonary nodule and multiple areas of consolidation  in a       |               |
| patient with a history of aspiration and Rheumatoid arthritis.         |               |
| COMPARISON:  CT CHEST WO CONTRAST (3/25/2019);     PROCEDURE:  Axial   |               |
| images through the chest. Multiplanar reconstructions.     At least    |               |
| one of the following CT dose optimization techniques were  used:       |               |
| Automated exposure control; Adjustment of mA and/or kV according  to   |               |
| patient size; Use of iterative reconstruction technique.     FINDINGS: |               |
|   Lungs, Pleura and Airways:  -Right middle lobe nodule on series 4,   |               |
| image 161 measuring 13 mm x 6  mm, unchanged.  -Mild emphysematous     |               |
| changes.  -Combination of small amount of ground-glass and airspace    |               |
| opacity noted  involving the right upper lobe on series 4, image 66    |               |
 
| through 59. this  appears to be new since the previous examination.    |               |
| -Trace right pleural effusion.   Small amount of calcified pleural     |               |
| plaques.  Mediastinum: Mild cardiomegaly.   No pericardial effusion.   |               |
|   Aorta does  not appear aneurysmal.   Mild prominence of the          |               |
| pulmonary arteries,  potentially pulmonary arterial hypertension.      |               |
|   Probable surgical changes  seen at the GE junction region.  Lymph    |               |
| Nodes: No enlarged lymph nodes seen.  Upper Abdomen: No significant    |               |
| abnormality appreciated within limits of  unenhanced technique.        |               |
| BODY WALL  Soft Tissues: No significant abnormality appreciated.       |               |
| Bones: No acute or destructive osseous process seen.     IMPRESSION:   |               |
| 1. Combination of small amount of ground-glass and airspace opacity    |               |
| noted involving the right upper lobe.   This appears to be new since   |               |
| the  previous examination. Part solid lung nodule greater than 5 mm    |               |
| new or  of indeterminate stability. Such nodules are common and have a |               |
|  low  incidence of developing into a clinically significant cancer.    |               |
| Recommendation: Follow up chest CT in 6 months and then at 2 and 4     |               |
| years if nodule remains stable with solid component less than 6 mm.    |               |
| (Fleischner society recommendation).  2. Unchanged right middle lobe   |               |
| nodule measuring 13 mm x 6 mm.  3. Mild emphysematous changes.  4.     |               |
|   Please refer to the findings section for additional details.         |               |
|    Signed by: JORGE Vincent, Dakota  Sign Date/Time: 2019 12:54 PM   |               |
|                                                                        |               |
+------------------------------------------------------------------------+---------------+
 
 
 
+-------------------------------------------------------------------------+
| Procedure Note                                                          |
+-------------------------------------------------------------------------+
|   Maurizio, Rad Results In - 2019 12:58 PM PDT                         |
| CT CHEST WITHOUT CONTRAST                                               |
|                                                                         |
| CLINICAL INFORMATION:                                                   |
| Right middle lobe pulmonary nodule and multiple areas of consolidation  |
| in a patient with a history of aspiration and Rheumatoid arthritis.     |
|                                                                         |
| COMPARISON:                                                             |
| CT CHEST WO CONTRAST (3/25/2019);                                       |
|                                                                         |
| PROCEDURE:                                                              |
| Axial images through the chest. Multiplanar reconstructions.            |
|                                                                         |
| At least one of the following CT dose optimization techniques were      |
| used: Automated exposure control; Adjustment of mA and/or kV according  |
| to patient size; Use of iterative reconstruction technique.             |
|                                                                         |
| FINDINGS:                                                               |
| Lungs, Pleura and Airways:                                              |
| -Right middle lobe nodule on series 4, image 161 measuring 13 mm x 6    |
| mm, unchanged.                                                          |
| -Mild emphysematous changes.                                            |
| -Combination of small amount of ground-glass and airspace opacity noted |
| involving the right upper lobe on series 4, image 66 through 59. this   |
| appears to be new since the previous examination.                       |
| -Trace right pleural effusion.  Small amount of calcified pleural       |
| plaques.                                                                |
| Mediastinum: Mild cardiomegaly.  No pericardial effusion.  Aorta does   |
| not appear aneurysmal.  Mild prominence of the pulmonary arteries,      |
| potentially pulmonary arterial hypertension.  Probable surgical changes |
| seen at the GE junction region.                                         |
 
| Lymph Nodes: No enlarged lymph nodes seen.                              |
| Upper Abdomen: No significant abnormality appreciated within limits of  |
| unenhanced technique.                                                   |
|                                                                         |
| BODY WALL                                                               |
| Soft Tissues: No significant abnormality appreciated.                   |
| Bones: No acute or destructive osseous process seen.                    |
|                                                                         |
| IMPRESSION:                                                             |
| 1. Combination of small amount of ground-glass and airspace opacity     |
| noted involving the right upper lobe.  This appears to be new since the |
| previous examination. Part solid lung nodule greater than 5 mm new or   |
| of indeterminate stability. Such nodules are common and have a low      |
| incidence of developing into a clinically significant cancer.           |
| Recommendation: Follow up chest CT in 6 months and then at 2 and 4      |
| years if nodule remains stable with solid component less than 6 mm.     |
| (Fleischner society recommendation).                                    |
| 2. Unchanged right middle lobe nodule measuring 13 mm x 6 mm.           |
| 3. Mild emphysematous changes.                                          |
| 4.  Please refer to the findings section for additional details.        |
|                                                                         |
| Signed by: JORGE Vincent, Dakota                                            |
| Sign Date/Time: 2019 12:54 PM                                     |
+-------------------------------------------------------------------------+
 
 
 
+---------------+---------+--------------------+--------------+
| Performing    | Address | City/State/Zipcode | Phone Number |
| Organization  |         |                    |              |
+---------------+---------+--------------------+--------------+
|   [facility] |         |                    |              |
+---------------+---------+--------------------+--------------+
 documented in this encounter
 
 Visit Diagnoses
 
 
+------------------------------------------------------------------------------------------+
| Diagnosis                                                                                |
+------------------------------------------------------------------------------------------+
|   COPD, moderate (HCC) - Primary  Chronic airway obstruction, not elsewhere classified   |
+------------------------------------------------------------------------------------------+
|   Multifocal lung consolidation (HCC)  Pneumococcal pneumonia (streptococcus pneumoniae  |
| pneumonia)                                                                               |
+------------------------------------------------------------------------------------------+
|   Aspiration pneumonia, unspecified aspiration pneumonia type, unspecified laterality,   |
| unspecified part of lung (HCC)                                                           |
+------------------------------------------------------------------------------------------+
|   Rheumatoid arthritis, involving unspecified site, unspecified rheumatoid factor        |
| presence (HCC)                                                                           |
+------------------------------------------------------------------------------------------+
|   Personal history of tobacco use, presenting hazards to health                          |
+------------------------------------------------------------------------------------------+
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
+------------------------------------------------------------------------------------------+
 documented in this encounter

## 2019-12-06 NOTE — XMS
Encounter Summary
  Created on: 2019
 
 Shane Wyatttien BroussardJosue
 External Reference #: [id number]
 : 43
 Sex: Male
 
 Demographics
 
 
+-----------------------+---------------------------+
| Address               | [address]        |
|                       | [place]  [address] |
+-----------------------+---------------------------+
| Home Phone            | [phone]           |
+-----------------------+---------------------------+
| Preferred Language    | Unknown                   |
+-----------------------+---------------------------+
| Marital Status        |                    |
+-----------------------+---------------------------+
| Hoahaoism Affiliation | 1028                      |
+-----------------------+---------------------------+
| Race                  | Unknown                   |
+-----------------------+---------------------------+
| Ethnic Group          | Unknown                   |
+-----------------------+---------------------------+
 
 
 Author
 
 
+--------------+--------------------------------------------+
| Author       | [facility]  |
|              | [facility] [place]                                |
+--------------+--------------------------------------------+
| Organization | [facility]  |
|              | [facility] [place]                                |
+--------------+--------------------------------------------+
| Address      | Unknown                                    |
+--------------+--------------------------------------------+
| Phone        | Unavailable                                |
+--------------+--------------------------------------------+
 
 
 
 Support
 
 
+---------------+--------------+--------------------+-----------------+
| Name          | Relationship | Address            | Phone           |
+---------------+--------------+--------------------+-----------------+
| Opal Shane | ECON         | [address]     | [phone] |
|               |              | [place]   |                 |
|               |              | [id number]              |                 |
+---------------+--------------+--------------------+-----------------+
 
 
 
 
 Care Team Providers
 
 
+-----------------------+------+-----------------+
| Care Team Member Name | Role | Phone           |
+-----------------------+------+-----------------+
| Erwin Iniguez MD | PCP  | [phone] |
+-----------------------+------+-----------------+
 
 
 
 Encounter Details
 
 
+--------+-------------+----------------------+----------------------+---------------------+
| Date   | Type        | Department           | Care Team            | Description         |
+--------+-------------+----------------------+----------------------+---------------------+
| 05/15/ | Orders Only |   [facility]          |   Jian Fabian MD   | Back pain (Primary  |
| 2014   |             | NEUROSURGERY  [address]  | [address]       | Dx)                 |
|        |             | [address]     | [place] [address]  |                     |
|        |             | [place], [place]      |  [phone]        |                     |
|        |             | [address]           | [phone] (Fax)   |                     |
|        |             | [phone]         |                      |                     |
+--------+-------------+----------------------+----------------------+---------------------+
 
 
 
 Social History
 
 
+---------------+------------+-----------+--------+------------------+
| Tobacco Use   | Types      | Packs/Day | Years  | Date             |
|               |            |           | Used   |                  |
+---------------+------------+-----------+--------+------------------+
| Former Smoker | Cigarettes | 1.3       | 34     | Quit: 1996 |
+---------------+------------+-----------+--------+------------------+
 
 
 
+---------------------+---+---+---+
| Smokeless Tobacco:  |   |   |   |
| Never Used          |   |   |   |
+---------------------+---+---+---+
 
 
 
+-------------+-------------+---------+----------+
| Alcohol Use | Drinks/Week | oz/Week | Comments |
+-------------+-------------+---------+----------+
| No          |             |         |          |
+-------------+-------------+---------+----------+
 
 
 
+------------------+---------------+
| Sex Assigned at  | Date Recorded |
| Birth            |               |
+------------------+---------------+
| Not on file      |               |
 
+------------------+---------------+
 
 
 
+----------------+-------------+-------------+
| Job Start Date | Occupation  | Industry    |
+----------------+-------------+-------------+
| Not on file    | Not on file | Not on file |
+----------------+-------------+-------------+
 
 
 
+----------------+--------------+------------+
| Travel History | Travel Start | Travel End |
+----------------+--------------+------------+
 
 
 
+-------------------------------------+
| No recent travel history available. |
+-------------------------------------+
 documented as of this encounter
 
 Plan of Treatment
 
 
+--------+---------+-------------+----------------------+-------------+
| Date   | Type    | Specialty   | Care Team            | Description |
+--------+---------+-------------+----------------------+-------------+
| / | Office  | Pulmonology |   Juan F,          |             |
| 2019   | Visit   |             | Jessica Cheung,   |             |
|        |         |             | MD [address] |             |
|        |         |             |   [place],   |             |
|        |         |             | [place] [address]             |             |
|        |         |             | [phone]         |             |
|        |         |             | [phone] (Fax)   |             |
+--------+---------+-------------+----------------------+-------------+
| / | Office  | Cardiology  |   Eric Akins, |             |
|    | Visit   |             |  MD  [address]   |             |
|        |         |             | [place]  [facility] [place]  |             |
|        |         |             | [address]  [phone]  |             |
|        |         |             |  [phone] (Fax)  |             |
+--------+---------+-------------+----------------------+-------------+
 
 
 
+----------------------+---------+--------+----------------------+----------------------+
| Name                 | Type    | Priori | Associated Diagnoses | Order Schedule       |
|                      |         | ty     |                      |                      |
+----------------------+---------+--------+----------------------+----------------------+
| XR Lumbar Spine 4 +  | Imaging | Routin |   Back pain          | Expected: 2014 |
| Vw                   |         | e      |                      |  (Approximate),      |
|                      |         |        |                      | Expires: 2015  |
+----------------------+---------+--------+----------------------+----------------------+
 documented as of this encounter
 
 Visit Diagnoses
 
 
+----------------------------------------------+
 
| Diagnosis                                    |
+----------------------------------------------+
|   Back pain - Primary  Backache, unspecified |
+----------------------------------------------+
 documented in this encounter

## 2019-12-06 NOTE — XMS
Encounter Summary
  Created on: 2019
 
 Wyatt Shane
 External Reference #: [id number]
 : 43
 Sex: Male
 
 Demographics
 
 
+-----------------------+------------------------+
| Address               | [address]     |
|                       | [place]  [address]   |
+-----------------------+------------------------+
| Home Phone            | [phone]        |
+-----------------------+------------------------+
| Preferred Language    | Unknown                |
+-----------------------+------------------------+
| Marital Status        |                 |
+-----------------------+------------------------+
| Taoist Affiliation | LUT                    |
+-----------------------+------------------------+
| Race                  | White                  |
+-----------------------+------------------------+
| Ethnic Group          | Not  or  |
+-----------------------+------------------------+
 
 
 Author
 
 
+--------------+------------------------------+
| Author       | [facility] |
+--------------+------------------------------+
| Organization | [facility] |
+--------------+------------------------------+
| Address      | Unknown                      |
+--------------+------------------------------+
| Phone        | Unavailable                  |
+--------------+------------------------------+
 
 
 
 Support
 
 
+---------------+--------------+---------+-----------------+
| Name          | Relationship | Address | Phone           |
+---------------+--------------+---------+-----------------+
| Opal Shane | ECON         | Unknown | [phone] |
+---------------+--------------+---------+-----------------+
 
 
 
 Care Team Providers
 
 
 
+-----------------------+------+-----------------+
| Care Team Member Name | Role | Phone           |
+-----------------------+------+-----------------+
| Erwin Iniguez MD   | PCP  | [phone] |
+-----------------------+------+-----------------+
 
 
 
 Encounter Details
 
 
+--------+-------------+-----------------+---------------------+---------------+
| Date   | Type        | Department      | Care Team           | Description   |
+--------+-------------+-----------------+---------------------+---------------+
| / | Office      |   [facility]  |   Note, Outpatient  | Progress Note |
|    | Visit-Trans | MEDICINE        | Clinic              |               |
|        | cribed      |                 |                     |               |
+--------+-------------+-----------------+---------------------+---------------+
 
 
 
 Social History
 
 
+----------------+-------+-----------+--------+------+
| Tobacco Use    | Types | Packs/Day | Years  | Date |
|                |       |           | Used   |      |
+----------------+-------+-----------+--------+------+
| Never Assessed |       |           |        |      |
+----------------+-------+-----------+--------+------+
 
 
 
+------------------+---------------+
| Sex Assigned at  | Date Recorded |
| Birth            |               |
+------------------+---------------+
| Not on file      |               |
+------------------+---------------+
 
 
 
+----------------+-------------+-------------+
| Job Start Date | Occupation  | Industry    |
+----------------+-------------+-------------+
| Not on file    | Not on file | Not on file |
+----------------+-------------+-------------+
 
 
 
+----------------+--------------+------------+
| Travel History | Travel Start | Travel End |
+----------------+--------------+------------+
 
 
 
+-------------------------------------+
| No recent travel history available. |
+-------------------------------------+
 documented as of this encounter
 
 
 Progress Notes
 Interface, Transcription In - 2007  5:11 AM PSTCLINIC DATE:       1998
 
 OTOLARYNGOLOGY CLINIC
 
 SUBJECTIVE:  Mr. Shane is seen back today for pre-operative discussion of
 vertical hemilaryngectomy.  A full PARQ was held and Informed Consent was
 signed.  All of these issues had been discussed on the telephone
 previously.  He is comfortable with what will occur.
 
 I will see him back tomorrow for surgery.
 
 Jimmy Esposito M.D.
 , Otolaryngology
 Head and Neck Surgery
 
 JIC:ero/cak
 D: 98
 T: 98Electronically signed by Interface, Transcription In at 2007  5:11 AM PSTd
ocumented in this encounter
 
 Plan of Treatment
 Not on filedocumented as of this encounter
 
 Visit Diagnoses
 Not on filedocumented in this encounter

## 2019-12-06 NOTE — XMS
Encounter Summary
  Created on: 2019
 
 Wytat Shane
 External Reference #: [id number]
 : 43
 Sex: Male
 
 Demographics
 
 
+-----------------------+------------------------+
| Address               | [address]     |
|                       | [place]  [address]   |
+-----------------------+------------------------+
| Home Phone            | [phone]        |
+-----------------------+------------------------+
| Preferred Language    | Unknown                |
+-----------------------+------------------------+
| Marital Status        |                 |
+-----------------------+------------------------+
| Orthodoxy Affiliation | LUT                    |
+-----------------------+------------------------+
| Race                  | White                  |
+-----------------------+------------------------+
| Ethnic Group          | Not  or  |
+-----------------------+------------------------+
 
 
 Author
 
 
+--------------+------------------------------+
| Author       | [facility] |
+--------------+------------------------------+
| Organization | [facility] |
+--------------+------------------------------+
| Address      | Unknown                      |
+--------------+------------------------------+
| Phone        | Unavailable                  |
+--------------+------------------------------+
 
 
 
 Support
 
 
+---------------+--------------+---------+-----------------+
| Name          | Relationship | Address | Phone           |
+---------------+--------------+---------+-----------------+
| Opal Shane | ECON         | Unknown | [phone] |
+---------------+--------------+---------+-----------------+
 
 
 
 Care Team Providers
 
 
 
+-----------------------+------+-----------------+
| Care Team Member Name | Role | Phone           |
+-----------------------+------+-----------------+
| Erwin Steel MD   | PCP  | [phone] |
+-----------------------+------+-----------------+
 
 
 
 Encounter Details
 
 
+--------+-------------+-----------------+---------------------+---------------+
| Date   | Type        | Department      | Care Team           | Description   |
+--------+-------------+-----------------+---------------------+---------------+
| / | Office      |   [facility]  |   Note, Outpatient  | Progress Note |
|    | Visit-Trans | MEDICINE        | Clinic              |               |
|        | cribed      |                 |                     |               |
+--------+-------------+-----------------+---------------------+---------------+
 
 
 
 Social History
 
 
+----------------+-------+-----------+--------+------+
| Tobacco Use    | Types | Packs/Day | Years  | Date |
|                |       |           | Used   |      |
+----------------+-------+-----------+--------+------+
| Never Assessed |       |           |        |      |
+----------------+-------+-----------+--------+------+
 
 
 
+------------------+---------------+
| Sex Assigned at  | Date Recorded |
| Birth            |               |
+------------------+---------------+
| Not on file      |               |
+------------------+---------------+
 
 
 
+----------------+-------------+-------------+
| Job Start Date | Occupation  | Industry    |
+----------------+-------------+-------------+
| Not on file    | Not on file | Not on file |
+----------------+-------------+-------------+
 
 
 
+----------------+--------------+------------+
| Travel History | Travel Start | Travel End |
+----------------+--------------+------------+
 
 
 
+-------------------------------------+
| No recent travel history available. |
+-------------------------------------+
 documented as of this encounter
 
 
 Progress Notes
 Interface, Transcription In - 2006  1:03 AM PSTCLINIC DATE:       1999
 
 OTOLARYNGOLOGY CLINIC
 
 SUBJECTIVE:  Mr. Shane is seen back today  in  follow up with respect to his
 glottic  carcinoma  and  subglottic stenosis.   He  has  not  yet  made  an
 appointment with Dr. Doll and that is going to be facilitated today.  I
 saw the patient with my senior Resident,  Dr. Larry Menas, and details of my
 interaction with the patient are outlined  in his note.  In brief, however,
 although  better  than he was prior  to  his  dilation,  he  has  had  some
 regression again and is once again having  mild  difficulty  with breathing
 with exertion.
 
 OBJECTIVE:  Flexible fiberoptic laryngoscopy  shows a significant amount of
 glottic   edema  and  subglottic  edema   consistent   with   his   ongoing
 symptomatology of reflux.  I have told  that although we probably will need
 to re-dilate this, we are going to have  to  deal  with  the  reflux before
 anything improves and he understands that.
 
 ASSESSMENT AND PLAN:  I will see him  back  in  conjunction  with his visit
 with Dr. Doll.  I will dilate him before that if he has trouble.
 
 Jimmy Esposito M.D.
 
 MARIA ALEJANDRA/kalli
 D: 1999
 T: 1999
 
 cc:
 
 Ghassan Doll M.D.
 [facility]
 
 BE PAULA MD
 [address]
 [place]  [id number]
 
 ERWIN STEEL MD
 [address] [address]
 [place]  [id number] signed by Interface, Transcription In at 2006  1:03
 AM PSTInterface, Transcription In - 2006  1:03 AM PSTCLINIC DATE:       1999
 
 OTOLARYNGOLOGY CLINIC
 
 SUBJECTIVE:   The  patient  returns  to  the  clinic  today  regarding  his
 laryngeal  stenosis  and reflux issues.   He  is  status  post  a  vertical
 hemilaryngectomy several months ago.   He has had difficulty with laryngeal
 stenosis and stridor on exertion.  He underwent dilation one month ago.  He
 reports  that  he  initially had fairly  good  improvement  but  has  since
 returned to almost baseline.  He also  underwent  a  pH  probe  study  that
 showed significant gastroesophageal reflux including reflux at the proximal
 probe.  He has been unable to see Dr. Doll  because of work conflicts.
 In the meantime, he continues on his Prilosec 20 mg po twice daily.
 
 OBJECTIVE:  Flexible nasopharyngoscopy reveals prominent inflammation about
 the supraglottics and glottis with collapse  of the glottis and scarring on
 the right consistent with his hemilaryngectomy.   Overall,  the stenosis is
 slightly better than prior to dilation but still significantly narrowed.
 
 
 ASSESSMENT:   Transient  improvement   in   laryngeal  airway  status  post
 dilation, now returned almost to baseline.   The patient will be seeing Dr. Doll regarding a Nissen procedure  some time soon.  We would repeat his
 dilation at the time of the Nissen.
 
 PLAN:   The patient will telephone and  set  up  an  appointment  with  Dr. Doll.  We will coordinate the dilation at the same time as the Nissen.
 
 The patient was interviewed and examined  by  Dr. Jimmy Esposito  in  clinic
 today.
 
 Larry Means M.D.
 
 Jimmy Esposito M.D.
 
 JOHNNY/kalli
 D: 1999
 T: 1999Electronically signed by Interface, Transcription In at 2006  1:03 AM PS
Tdocumented in this encounter
 
 Plan of Treatment
 Not on filedocumented as of this encounter
 
 Visit Diagnoses
 Not on filedocumented in this encounter

## 2019-12-06 NOTE — XMS
Encounter Summary
  Created on: 2019
 
 Wyatt Shane
 External Reference #: [id number]
 : 43
 Sex: Male
 
 Demographics
 
 
+-----------------------+------------------------+
| Address               | [address]     |
|                       | [place]  [address]   |
+-----------------------+------------------------+
| Home Phone            | [phone]        |
+-----------------------+------------------------+
| Preferred Language    | Unknown                |
+-----------------------+------------------------+
| Marital Status        |                 |
+-----------------------+------------------------+
| Orthodox Affiliation | LUT                    |
+-----------------------+------------------------+
| Race                  | White                  |
+-----------------------+------------------------+
| Ethnic Group          | Not  or  |
+-----------------------+------------------------+
 
 
 Author
 
 
+--------------+------------------------------+
| Author       | [facility] |
+--------------+------------------------------+
| Organization | [facility] |
+--------------+------------------------------+
| Address      | Unknown                      |
+--------------+------------------------------+
| Phone        | Unavailable                  |
+--------------+------------------------------+
 
 
 
 Support
 
 
+---------------+--------------+---------+-----------------+
| Name          | Relationship | Address | Phone           |
+---------------+--------------+---------+-----------------+
| Opal Shane | ECON         | Unknown | [phone] |
+---------------+--------------+---------+-----------------+
 
 
 
 Care Team Providers
 
 
 
+-----------------------+------+-------------+
| Care Team Member Name | Role | Phone       |
+-----------------------+------+-------------+
 PCP  | Unavailable |
+-----------------------+------+-------------+
 
 
 
 Encounter Details
 
 
+--------+----------+----------------------+--------------------+-------------+
| Date   | Type     | Department           | Care Team          | Description |
+--------+----------+----------------------+--------------------+-------------+
| / | Results  |   Otolaryngology     |   Jimmy Esposito MD |             |
|    | Only     | Head and Neck        |                    |             |
|        |          | Surgery Services at  |                    |             |
|        |          | PPV  [address]     |                    |             |
|        |          | [address]       |                    |             |
|        |          | Mailcode: [address]       |                    |             |
|        |          | [facility] [address] |                    |             |
|        |          |   [place], [place]       |                    |             |
|        |          | [id number]           |                    |             |
|        |          | [phone]         |                    |             |
+--------+----------+----------------------+--------------------+-------------+
 
 
 
 Social History
 
 
+----------------+-------+-----------+--------+------+
| Tobacco Use    | Types | Packs/Day | Years  | Date |
|                |       |           | Used   |      |
+----------------+-------+-----------+--------+------+
| Never Assessed |       |           |        |      |
+----------------+-------+-----------+--------+------+
 
 
 
+------------------+---------------+
| Sex Assigned at  | Date Recorded |
| Birth            |               |
+------------------+---------------+
| Not on file      |               |
+------------------+---------------+
 
 
 
+----------------+-------------+-------------+
| Job Start Date | Occupation  | Industry    |
+----------------+-------------+-------------+
| Not on file    | Not on file | Not on file |
+----------------+-------------+-------------+
 
 
 
+----------------+--------------+------------+
| Travel History | Travel Start | Travel End |
+----------------+--------------+------------+
 
 
 
 
+-------------------------------------+
| No recent travel history available. |
+-------------------------------------+
 documented as of this encounter
 
 Plan of Treatment
 Not on filedocumented as of this encounter
 
 Procedures
 
 
+--------------------+--------+-------------+----------------------+----------------------+
| Procedure Name     | Priori | Date/Time   | Associated Diagnosis | Comments             |
|                    | ty     |             |                      |                      |
+--------------------+--------+-------------+----------------------+----------------------+
| X-RAY CHEST 2 VIEW | Routin | 2004  |                      |   Results for this   |
|                    | e      |  1:35 PM    |                      | procedure are in the |
|                    |        | PST         |                      |  results section.    |
+--------------------+--------+-------------+----------------------+----------------------+
 documented in this encounter
 
 Results
 CHEST 2 VIEW (2004  1:35 PM PST)
 
+-----------+--------------------------+-----------+------------+--------------+
| Component | Value                    | Ref Range | Performed  | Pathologist  |
|           |                          |           | At         | Signature    |
+-----------+--------------------------+-----------+------------+--------------+
| CHEST, 2  | Radiologist 1: [facility], |           |            |              |
| [facility]  |  SIMIN MEEKEXAM:   PA and   |           |            |              |
| STEREO    | lateral chest.           |           |            |              |
|           | INDICATION: Preop, and   |           |            |              |
|           | head and neck malignancy |           |            |              |
|           |  COMPARISON: None.       |           |            |              |
|           | FINDINGS:   The cardiac  |           |            |              |
|           | and mediastinal contours |           |            |              |
|           |  are normal.             |           |            |              |
|           |   Thepatient has had a   |           |            |              |
|           | prior CABG procedure.    |           |            |              |
|           |   The lungs are          |           |            |              |
|           | clearwithout evidence    |           |            |              |
|           | for suspicious pulmonary |           |            |              |
|           |  nodules.   There is     |           |            |              |
|           | nopleural effusion.      |           |            |              |
|           | Osseous structures are   |           |            |              |
|           | unremarkable.            |           |            |              |
|           | IMPRESSION: Prior CABG   |           |            |              |
|           | procedure.   No evidence |           |            |              |
|           |  for acute pulmonary     |           |            |              |
|           | disease.                 |           |            |              |
+-----------+--------------------------+-----------+------------+--------------+
 
 
 
+----------+
| Specimen |
+----------+
 
|          |
+----------+
 
 
 
+----------------------+---------+--------------------+--------------+
| Performing           | Address | City/State/Zipcode | Phone Number |
| Organization         |         |                    |              |
+----------------------+---------+--------------------+--------------+
|   [facility] DEPARTMENT OF |         |                    |              |
|  RADIOLOGY           |         |                    |              |
+----------------------+---------+--------------------+--------------+
 documented in this encounter
 
 Visit Diagnoses
 Not on filedocumented in this encounter

## 2019-12-06 NOTE — XMS
Encounter Summary
  Created on: 2019
 
 Wyatt Shane
 External Reference #: [id number]
 : 43
 Sex: Male
 
 Demographics
 
 
+-----------------------+------------------------+
| Address               | [address]     |
|                       | [place]  [address]   |
+-----------------------+------------------------+
| Home Phone            | [phone]        |
+-----------------------+------------------------+
| Preferred Language    | Unknown                |
+-----------------------+------------------------+
| Marital Status        |                 |
+-----------------------+------------------------+
| Oriental orthodox Affiliation | LUT                    |
+-----------------------+------------------------+
| Race                  | White                  |
+-----------------------+------------------------+
| Ethnic Group          | Not  or  |
+-----------------------+------------------------+
 
 
 Author
 
 
+--------------+------------------------------+
| Author       | [facility] |
+--------------+------------------------------+
| Organization | [facility] |
+--------------+------------------------------+
| Address      | Unknown                      |
+--------------+------------------------------+
| Phone        | Unavailable                  |
+--------------+------------------------------+
 
 
 
 Support
 
 
+---------------+--------------+---------+-----------------+
| Name          | Relationship | Address | Phone           |
+---------------+--------------+---------+-----------------+
| Opal Shane | ECON         | Unknown | [phone] |
+---------------+--------------+---------+-----------------+
 
 
 
 Care Team Providers
 
 
 
+-----------------------+------+-----------------+
| Care Team Member Name | Role | Phone           |
+-----------------------+------+-----------------+
| Erwin Iniguez MD   | PCP  | [phone] |
+-----------------------+------+-----------------+
 
 
 
 Encounter Details
 
 
+--------+----------+----------------------+-----------+-------------+
| Date   | Type     | Department           | Care Team | Description |
+--------+----------+----------------------+-----------+-------------+
| / | Results  |   Registration  [address] |           |             |
|    | Only     |  [address] |           |             |
|        |          |  [place]  Mailcode: [id number] |           |             |
|        |          |   [place], [place]       |           |             |
|        |          | [id number]           |           |             |
|        |          | [phone]         |           |             |
+--------+----------+----------------------+-----------+-------------+
 
 
 
 Social History
 
 
+----------------+-------+-----------+--------+------+
| Tobacco Use    | Types | Packs/Day | Years  | Date |
|                |       |           | Used   |      |
+----------------+-------+-----------+--------+------+
| Never Assessed |       |           |        |      |
+----------------+-------+-----------+--------+------+
 
 
 
+------------------+---------------+
| Sex Assigned at  | Date Recorded |
| Birth            |               |
+------------------+---------------+
| Not on file      |               |
+------------------+---------------+
 
 
 
+----------------+-------------+-------------+
| Job Start Date | Occupation  | Industry    |
+----------------+-------------+-------------+
| Not on file    | Not on file | Not on file |
+----------------+-------------+-------------+
 
 
 
+----------------+--------------+------------+
| Travel History | Travel Start | Travel End |
+----------------+--------------+------------+
 
 
 
+-------------------------------------+
 
| No recent travel history available. |
+-------------------------------------+
 documented as of this encounter
 
 Plan of Treatment
 Not on filedocumented as of this encounter
 
 Procedures
 
 
+---------------------+--------+-------------+----------------------+----------------------+
| Procedure Name      | Priori | Date/Time   | Associated Diagnosis | Comments             |
|                     | ty     |             |                      |                      |
+---------------------+--------+-------------+----------------------+----------------------+
| CHEMISTRY TESTS 4   | Routin | 1999  |                      |   Results for this   |
|                     | e      |  1:50 PM    |                      | procedure are in the |
|                     |        | PDT         |                      |  results section.    |
+---------------------+--------+-------------+----------------------+----------------------+
| CBC TESTS 2         | Routin | 1999  |                      |   Results for this   |
|                     | e      |  1:50 PM    |                      | procedure are in the |
|                     |        | PDT         |                      |  results section.    |
+---------------------+--------+-------------+----------------------+----------------------+
| COAGULATION TESTS 2 | Routin | 1999  |                      |   Results for this   |
|                     | e      |  1:50 PM    |                      | procedure are in the |
|                     |        | PDT         |                      |  results section.    |
+---------------------+--------+-------------+----------------------+----------------------+
 documented in this encounter
 
 Results
 CHEMISTRY TESTS 4 (1999  1:50 PM PDT)
 
+-------------+-----------------+-----------+------------+--------------+
| Component   | Value           | Ref Range | Performed  | Pathologist  |
|             |                 |           | At         | Signature    |
+-------------+-----------------+-----------+------------+--------------+
| SODIUM,     |       140.      | mmol/l    |            |              |
| PLASMA      |                 |           |            |              |
| (LAB)       |                 |           |            |              |
+-------------+-----------------+-----------+------------+--------------+
| POTASSIUM,  |         3.3 (L) | mmol/l    |            |              |
| PLASMA      |                 |           |            |              |
| (LAB)       |                 |           |            |              |
+-------------+-----------------+-----------+------------+--------------+
| CHLORIDE,   |       101.      | mmol/l    |            |              |
| PLASMA      |                 |           |            |              |
| (LAB)       |                 |           |            |              |
+-------------+-----------------+-----------+------------+--------------+
| TOTAL CO2,  |        33. (H)  | mmol/l    |            |              |
| PLASMA      |                 |           |            |              |
| (LAB)       |                 |           |            |              |
+-------------+-----------------+-----------+------------+--------------+
| BUN, PLASMA |        10.      | mg/dL     |            |              |
|  (LAB)      |                 |           |            |              |
+-------------+-----------------+-----------+------------+--------------+
| CREATININE  |         1.1     | mg/dL     |            |              |
| PLASMA      |                 |           |            |              |
| (LAB)       |                 |           |            |              |
+-------------+-----------------+-----------+------------+--------------+
| GLUCOSE,    |       105.      | mg/dL     |            |              |
| PLASMA      |                 |           |            |              |
 
| (LAB)       |                 |           |            |              |
+-------------+-----------------+-----------+------------+--------------+
| CALCIUM,    |         9.5     | mg/dL     |            |              |
| PLASMA      |                 |           |            |              |
| (LAB)       |                 |           |            |              |
+-------------+-----------------+-----------+------------+--------------+
| MAGNESIUM,P |         1.7     | mg/dL     |            |              |
| LASMA       |                 |           |            |              |
+-------------+-----------------+-----------+------------+--------------+
| PHOSPHORUS, |         3.3     | mg/dL     |            |              |
|  PLASMA     |                 |           |            |              |
| (LAB)       |                 |           |            |              |
+-------------+-----------------+-----------+------------+--------------+
| ALBUMIN,    |         4.1     | GM/DL     |            |              |
| PLASMA      |                 |           |            |              |
| (LAB)       |                 |           |            |              |
+-------------+-----------------+-----------+------------+--------------+
 
 
 
+----------+
| Specimen |
+----------+
|          |
+----------+
 
 
 
+----------------------+------------------------+--------------------+--------------+
| Performing           | Address                | City/State/Zipcode | Phone Number |
| Organization         |                        |                    |              |
+----------------------+------------------------+--------------------+--------------+
|   [facility] |   [address]  | [place] [address] |              |
|  PATHOLOGY           | [address]                |                    |              |
+----------------------+------------------------+--------------------+--------------+
 COAGULATION TESTS 2 (1999  1:50 PM PDT)
 
+-----------+------------------+-----------+------------+--------------+
| Component | Value            | Ref Range | Performed  | Pathologist  |
|           |                  |           | At         | Signature    |
+-----------+------------------+-----------+------------+--------------+
| INR       |         0.94 (L) | INR       |            |              |
+-----------+------------------+-----------+------------+--------------+
| APTT      |        30.3      | SECONDS   |            |              |
+-----------+------------------+-----------+------------+--------------+
 
 
 
+----------+
| Specimen |
+----------+
|          |
+----------+
 
 
 
+----------------------+------------------------+--------------------+--------------+
| Performing           | Address                | City/State/Zipcode | Phone Number |
| Organization         |                        |                    |              |
+----------------------+------------------------+--------------------+--------------+
 
|   [facility] |   [address]  | [place] [address] |              |
|  PATHOLOGY           | [address]                |                    |              |
+----------------------+------------------------+--------------------+--------------+
 CBC TESTS 2 (1999  1:50 PM PDT)
 
+-------------+------------------+-----------+------------+--------------+
| Component   | Value            | Ref Range | Performed  | Pathologist  |
|             |                  |           | At         | Signature    |
+-------------+------------------+-----------+------------+--------------+
| WHITE CELL  |         6.       | K/CU MM   |            |              |
| COUNT       |                  |           |            |              |
+-------------+------------------+-----------+------------+--------------+
| RED CELL    |         3.87 (L) | M/CU MM   |            |              |
| COUNT       |                  |           |            |              |
+-------------+------------------+-----------+------------+--------------+
| HEMOGLOBIN  |        12.2 (L)  | GM/DL     |            |              |
+-------------+------------------+-----------+------------+--------------+
| HEMATOCRIT  |        34.9 (L)  | %         |            |              |
+-------------+------------------+-----------+------------+--------------+
| MCV         |        90.2      | FL        |            |              |
+-------------+------------------+-----------+------------+--------------+
| MCH         |        31.5      | PG        |            |              |
+-------------+------------------+-----------+------------+--------------+
| MCHC        |        34.9 (H)  | GM/DL     |            |              |
+-------------+------------------+-----------+------------+--------------+
| RDW         |        15.4 (H)  | %         |            |              |
+-------------+------------------+-----------+------------+--------------+
| PLATELET    |       249.       | K/CU MM   |            |              |
| COUNT       |                  |           |            |              |
+-------------+------------------+-----------+------------+--------------+
| MPV         |         8.3      | FL        |            |              |
+-------------+------------------+-----------+------------+--------------+
 
 
 
+----------+
| Specimen |
+----------+
|          |
+----------+
 
 
 
+----------------------+------------------------+--------------------+--------------+
| Performing           | Address                | City/State/Zipcode | Phone Number |
| Organization         |                        |                    |              |
+----------------------+------------------------+--------------------+--------------+
|   [facility] |   [address]  | [place] [address] |              |
|  PATHOLOGY           | [address]                |                    |              |
+----------------------+------------------------+--------------------+--------------+
 documented in this encounter
 
 Visit Diagnoses
 Not on filedocumented in this encounter

## 2019-12-06 NOTE — XMS
Encounter Summary
  Created on: 2019
 
 Wyatt Shane
 External Reference #: [id number]
 : 43
 Sex: Male
 
 Demographics
 
 
+-----------------------+------------------------+
| Address               | [address]     |
|                       | [place]  [address]   |
+-----------------------+------------------------+
| Home Phone            | [phone]        |
+-----------------------+------------------------+
| Preferred Language    | Unknown                |
+-----------------------+------------------------+
| Marital Status        |                 |
+-----------------------+------------------------+
| Christianity Affiliation | LUT                    |
+-----------------------+------------------------+
| Race                  | White                  |
+-----------------------+------------------------+
| Ethnic Group          | Not  or  |
+-----------------------+------------------------+
 
 
 Author
 
 
+--------------+-------------+
| Organization | Unknown     |
+--------------+-------------+
| Address      | Unknown     |
+--------------+-------------+
| Phone        | Unavailable |
+--------------+-------------+
 
 
 
 Support
 
 
+---------------+--------------+---------+-----------------+
| Name          | Relationship | Address | Phone           |
+---------------+--------------+---------+-----------------+
| Opal Shane | ECON         | Unknown | [phone] |
+---------------+--------------+---------+-----------------+
 
 
 
 Care Team Providers
 
 
+-----------------------+------+-----------------+
| Care Team Member Name | Role | Phone           |
 
+-----------------------+------+-----------------+
| Erwin Iniguez MD   | PCP  | [phone] |
+-----------------------+------+-----------------+
 
 
 
 Encounter Details
 
 
+--------+-------------+------------+--------------------+--------------------+
| Date   | Type        | Department | Care Team          | Description        |
+--------+-------------+------------+--------------------+--------------------+
| 10/27/ | Procedure - |            |   Documentation,   | OP REPORT-TEACHING |
|    |             |            | Teaching Physician |                    |
|        | Transcribed |            |                    |                    |
+--------+-------------+------------+--------------------+--------------------+
 
 
 
 Social History
 
 
+----------------+-------+-----------+--------+------+
| Tobacco Use    | Types | Packs/Day | Years  | Date |
|                |       |           | Used   |      |
+----------------+-------+-----------+--------+------+
| Never Assessed |       |           |        |      |
+----------------+-------+-----------+--------+------+
 
 
 
+------------------+---------------+
| Sex Assigned at  | Date Recorded |
| Birth            |               |
+------------------+---------------+
| Not on file      |               |
+------------------+---------------+
 
 
 
+----------------+-------------+-------------+
| Job Start Date | Occupation  | Industry    |
+----------------+-------------+-------------+
| Not on file    | Not on file | Not on file |
+----------------+-------------+-------------+
 
 
 
+----------------+--------------+------------+
| Travel History | Travel Start | Travel End |
+----------------+--------------+------------+
 
 
 
+-------------------------------------+
| No recent travel history available. |
+-------------------------------------+
 documented as of this encounter
 
 Plan of Treatment
 
 Not on filedocumented as of this encounter
 
 Procedures
 
 
+--------------------+--------+------------+----------------------+----------+
| Procedure Name     | Priori | Date/Time  | Associated Diagnosis | Comments |
|                    | ty     |            |                      |          |
+--------------------+--------+------------+----------------------+----------+
| TEACHING PHYSICIAN |        | 10/27/1999 |                      |          |
+--------------------+--------+------------+----------------------+----------+
 documented in this encounter
 
 Visit Diagnoses
 Not on filedocumented in this encounter

## 2019-12-06 NOTE — XMS
Encounter Summary
  Created on: 2019
 
 Wyatt Shane
 External Reference #: [id number]
 : 43
 Sex: Male
 
 Demographics
 
 
+-----------------------+------------------------+
| Address               | [address]     |
|                       | [place]  [address]   |
+-----------------------+------------------------+
| Home Phone            | [phone]        |
+-----------------------+------------------------+
| Preferred Language    | Unknown                |
+-----------------------+------------------------+
| Marital Status        |                 |
+-----------------------+------------------------+
| Gnosticist Affiliation | LUT                    |
+-----------------------+------------------------+
| Race                  | White                  |
+-----------------------+------------------------+
| Ethnic Group          | Not  or  |
+-----------------------+------------------------+
 
 
 Author
 
 
+--------------+------------------------------+
| Author       | [facility] |
+--------------+------------------------------+
| Organization | [facility] |
+--------------+------------------------------+
| Address      | Unknown                      |
+--------------+------------------------------+
| Phone        | Unavailable                  |
+--------------+------------------------------+
 
 
 
 Support
 
 
+---------------+--------------+---------+-----------------+
| Name          | Relationship | Address | Phone           |
+---------------+--------------+---------+-----------------+
| Opal Shane | ECON         | Unknown | [phone] |
+---------------+--------------+---------+-----------------+
 
 
 
 Care Team Providers
 
 
 
+-----------------------+------+-----------------+
| Care Team Member Name | Role | Phone           |
+-----------------------+------+-----------------+
| Erwin Iniguez MD   | PCP  | [phone] |
+-----------------------+------+-----------------+
 
 
 
 Reason for Visit
 Speech Therapy (Routine)
 
+--------+--------+-----------+--------------+--------------+---------------+
| Status | Reason | Specialty | Diagnoses /  | Referred By  | Referred To   |
|        |        |           | Procedures   | Contact      | Contact       |
+--------+--------+-----------+--------------+--------------+---------------+
| Closed |        | Speech    |   Diagnoses  |   Tay, |   Ent Speech  |
|        |        | Therapy   |  Pharyngeal  |  Jonathan SOLANO,   | Ppv  [address]  |
|        |        |           | dysphagia    | MD  [address]  | [address]   |
|        |        |           | Procedures   | [address]  | [address]       |
|        |        |           | CONSULT TO   | [address] [facility]      | Mailcode:     |
|        |        |           | ENT SPEECH   | [place], [place] | [address]          |
|        |        |           | THERAPY      |  [address]  | Physician's   |
|        |        |           |              |  Phone:      | [address]      |
|        |        |           |              | [phone] | [place], [place]  |
|        |        |           |              |   Fax:       | [phone]    |
|        |        |           |              | [phone] | Phone:        |
|        |        |           |              |              | [phone]  |
|        |        |           |              |              |  Fax:         |
|        |        |           |              |              | [phone]  |
+--------+--------+-----------+--------------+--------------+---------------+
 
 
 
 
 Encounter Details
 
 
+--------+---------+----------------------+----------------------+---------------------+
| Date   | Type    | Department           | Care Team            | Description         |
+--------+---------+----------------------+----------------------+---------------------+
| 09/15/ | Office  |   Otolaryngology     |   Baudilio Harper,    | Dysphagia,          |
|    | Visit   | Speech Therapy       | SLP  [address]     | pharyngeal phase;   |
|        |         | Services at  [facility]     | [address]      | Dysphagia,          |
|        |         | [address]  | [place] [address]   | pharyngoesophageal  |
|        |         | [address]  Mailcode:   | [phone]         | phase               |
|        |         | [address]  Physician's    | [phone] (Fax)   |                     |
|        |         | [address]  [place],  |                      |                     |
|        |         | [place] [address]        |                      |                     |
|        |         | [phone]         |                      |                     |
+--------+---------+----------------------+----------------------+---------------------+
 
 
 
 Social History
 
 
+---------------+------------+-----------+--------+------------------+
| Tobacco Use   | Types      | Packs/Day | Years  | Date             |
|               |            |           | Used   |                  |
+---------------+------------+-----------+--------+------------------+
 
| Former Smoker | Cigarettes | 1         | 30     | Quit: 1996 |
+---------------+------------+-----------+--------+------------------+
 
 
 
+-------------+-------------+---------+----------+
| Alcohol Use | Drinks/Week | oz/Week | Comments |
+-------------+-------------+---------+----------+
| No          |             |         |          |
+-------------+-------------+---------+----------+
 
 
 
+------------------+---------------+
| Sex Assigned at  | Date Recorded |
| Birth            |               |
+------------------+---------------+
| Not on file      |               |
+------------------+---------------+
 
 
 
+----------------+-------------+-------------+
| Job Start Date | Occupation  | Industry    |
+----------------+-------------+-------------+
| Not on file    | Not on file | Not on file |
+----------------+-------------+-------------+
 
 
 
+----------------+--------------+------------+
| Travel History | Travel Start | Travel End |
+----------------+--------------+------------+
 
 
 
+-------------------------------------+
| No recent travel history available. |
+-------------------------------------+
 documented as of this encounter
 
 Progress Notes
 Baudilio Harper SLP - 09/15/2010  9:01 PM PDTClinic: [facility]
 
 Referring Physician: 
 JONATHAN FARIAS
 Otolaryngology
 [address] [place] [address]
 
 PCP: Erwin Iniguez MD
 
 Medical Diagnosis: Dysphagia, 787.20
 
 Date of Onset: 7/1/10
 
 Treatment Diagnosis: Dysphagia, 787.24
 
 Start of Care Date: 9/15/2010  
 
 
 Duration of session: 60 minutes
 
 SUBJECTIVE: Wyatt Shane is a 64 y.o. male of Dr. Farias seen for evaluation of his swall
owing complaints.
 
 CURRENT COMPLAINTS: 
 1. Choking very occasionally on foods and liquids (inc water, rice, pork, chicken)
 2. Residue for foods which he must wash down
 3. Sensation of liquids becoming stuck at the lower esophagus
 4. One episode of choking on a pill
 5. GERD - controlled s/p Nissen and with Prilosec
 
 DESCRIPTION OF PROBLEM: The patient reports that his dysphagia symptoms have worsened over 
the last year. They are intermittent. The patient denies change in respiratory status, weigh
t loss, pain on swallowing, difficulty chewing and regurgitation of foods. 
 
 Pt has a past medical history of Larynx cancer; Radiation fibrosis; Glottic stenosis; MI (m
yocardial infarction); PUD (peptic ulcer disease); Fibromyalgia; Depression; and Nephrolithi
asis.
 
 Pt has past surgical history that includes HX EXTENDED VERTICAL HEMILARYNGECTOMY; HX STENOS
IS DILATION; nissen fundoplication; HX DML BX (1998); and HX LARYNGOTRACHEOPLASTY ().
 
 DIETARY STATUS: Current diet: Regular, no restrictions. He must chew food well and wash it 
down, however. 
 
 WEIGHT: The patient's weight today is 215 lbs
 
 SOCIAL HISTORY: The patient currently lives with their spouse in [place], [place]. 
 
 COMMUNICATION/SPEECH STATUS: The patient communicates verbally. Speech quality was noted to
 be normal and Always understandable (PSS-Speech 100). Voice quality was significantly hoars
e and reduced in strength. In terms of respiration, the patient is breathing easily at rest.
 
 
 ORAL-MOTOR EXAMINATION: WFL
 
 MODIFIED BARIUM SWALLOW STUDY: Prior to starting the study, the patient's name and  were
 verified. The patient was evaluated in the [facility] 10th floor Radiology suite & was observed i
n the lateral and anteroposterior planes while standing. Consistencies/materials administere
d included: thin liquids, nectar thick liquids, honey, pudding, barium pill, fruit cocktail 
mixed with barium and michelle cracker coated with barium. The patient tolerated the exam well
. 
 
 Oral Phase:  Oral bolus control and preparation were normal. 
 
 Pharyngeal Phase:  Initiation of the pharyngeal phase was timely. Once initiated, laryngeal
 elevation was moderately reduced resulting in moderately reduced epiglottic deflection (to 
the horizontal only but epiglottis to arytenoid contact was noted to occur). Velopharyngeal 
closure was normal. Tongue base to pharyngeal wall contact was within functional limits.  Fo
r nectar thick liquids there was noted to be trace penetration at the start of the study but
 otherwise no penetration, aspiration or significant residue. For liquid and food there was 
noted to be no penetration or aspiration and no significant residue after the swallow. In th
e yash-posterior view, the swallow was noted to be asymmetric with significant bulk on the
 right likely due to post-operative reconstruction. The bolus transited primarily on the lef
t. 
 
 Esophageal Phase:  Cricopharyngeal opening was noted to be mildly reduced and there is the 
suggestion of a stricture at the level of C6 with some retention and backflow of barium. Alt
 
ernatively this could be muscular, however. The barium pill transited to the LES where it be
came lodged for some time. Tertiary contractions and a paraesophageal hernia were seen, per 
the radiologist.  Please see the radiologist  s report for additional information.
 
 PATIENT EDUCATION & TREATMENT: Subsequent to the examination the findings were reviewed wit
h the patient and  spouse and Dr. Farias. He will see them today also and discuss treatme
nt options. 
 
 ASSESSMENT: 
 1. Functional oropharyngeal swallowing
 2. Possible stricture vs spasm at level of C6 (mild)
 3. Delayed transit of barium through LES due to hernia/previous Nissen
 
 FOLLOW-UP: The patient was advised to follow-up with us as needed. 
 
 Thank you for this consult.
  
 Baudilio Harper, MS, CCC-SLP
 [facility]
 Dept. of Otolaryngology, [address]
 [address]
 [place] [address] [phone]
 
 Electronically signed by Bauidlio Harper SLP at 09/15/2010  9:01 PM PDTdocumented in this en
counter
 
 Plan of Treatment
 Not on filedocumented as of this encounter
 
 Procedures
 
 
+----------------------+--------+-------------+----------------------+----------+
| Procedure Name       | Priori | Date/Time   | Associated Diagnosis | Comments |
|                      | ty     |             |                      |          |
+----------------------+--------+-------------+----------------------+----------+
| AK EVAL,SWALLOW      | Routin | 09/15/2010  |   Dysphagia,         |          |
| FUNCTION,CINE/VIDEO  | e      |  9:02 PM    | pharyngeal phase     |          |
| RECORD               |        | PDT         | Dysphagia,           |          |
|                      |        |             | pharyngoesophageal   |          |
|                      |        |             | phase                |          |
+----------------------+--------+-------------+----------------------+----------+
| AK EVAL,ORAL &       | Routin | 09/15/2010  |   Dysphagia,         |          |
| PHARYNGEAL SWALLOW   | e      |  9:02 PM    | pharyngeal phase     |          |
| FUNCTION             |        | PDT         | Dysphagia,           |          |
|                      |        |             | pharyngoesophageal   |          |
|                      |        |             | phase                |          |
+----------------------+--------+-------------+----------------------+----------+
 documented in this encounter
 
 Visit Diagnoses
 
 
+---------------------------------------+
| Diagnosis                             |
+---------------------------------------+
|   Dysphagia, pharyngeal phase         |
+---------------------------------------+
|   Dysphagia, pharyngoesophageal phase |
 
+---------------------------------------+
 documented in this encounter

## 2019-12-06 NOTE — XMS
Encounter Summary
  Created on: 2019
 
 Wyatt Shane
 External Reference #: [id number]
 : 43
 Sex: Male
 
 Demographics
 
 
+-----------------------+------------------------+
| Address               | [address]     |
|                       | [place]  [address]   |
+-----------------------+------------------------+
| Home Phone            | [phone]        |
+-----------------------+------------------------+
| Preferred Language    | Unknown                |
+-----------------------+------------------------+
| Marital Status        |                 |
+-----------------------+------------------------+
| Restoration Affiliation | LUT                    |
+-----------------------+------------------------+
| Race                  | White                  |
+-----------------------+------------------------+
| Ethnic Group          | Not  or  |
+-----------------------+------------------------+
 
 
 Author
 
 
+--------------+------------------------------+
| Author       | [facility] |
+--------------+------------------------------+
| Organization | [facility] |
+--------------+------------------------------+
| Address      | Unknown                      |
+--------------+------------------------------+
| Phone        | Unavailable                  |
+--------------+------------------------------+
 
 
 
 Support
 
 
+---------------+--------------+---------+-----------------+
| Name          | Relationship | Address | Phone           |
+---------------+--------------+---------+-----------------+
| Opal Shane | ECON         | Unknown | [phone] |
+---------------+--------------+---------+-----------------+
 
 
 
 Care Team Providers
 
 
 
+-----------------------+------+-----------------+
| Care Team Member Name | Role | Phone           |
+-----------------------+------+-----------------+
| Erwin Iniguez MD   | PCP  | [phone] |
+-----------------------+------+-----------------+
 
 
 
 Encounter Details
 
 
+--------+-------------+-----------------+---------------------+---------------+
| Date   | Type        | Department      | Care Team           | Description   |
+--------+-------------+-----------------+---------------------+---------------+
| / | Office      |   [facility]  |   Note, Outpatient  | Progress Note |
| 2000   | Visit-Trans | MEDICINE        | Clinic              |               |
|        | cribed      |                 |                     |               |
+--------+-------------+-----------------+---------------------+---------------+
 
 
 
 Social History
 
 
+----------------+-------+-----------+--------+------+
| Tobacco Use    | Types | Packs/Day | Years  | Date |
|                |       |           | Used   |      |
+----------------+-------+-----------+--------+------+
| Never Assessed |       |           |        |      |
+----------------+-------+-----------+--------+------+
 
 
 
+------------------+---------------+
| Sex Assigned at  | Date Recorded |
| Birth            |               |
+------------------+---------------+
| Not on file      |               |
+------------------+---------------+
 
 
 
+----------------+-------------+-------------+
| Job Start Date | Occupation  | Industry    |
+----------------+-------------+-------------+
| Not on file    | Not on file | Not on file |
+----------------+-------------+-------------+
 
 
 
+----------------+--------------+------------+
| Travel History | Travel Start | Travel End |
+----------------+--------------+------------+
 
 
 
+-------------------------------------+
| No recent travel history available. |
+-------------------------------------+
 documented as of this encounter
 
 
 Progress Notes
 Interface, Transcription In - 2006  1:06 AM PSTCLINIC DATE:       2000
 
 SUBJECTIVE:  Mr. Shane is seen back on a walk-in basis because he pulled his
 trach tube out in the shower, and although  he  has  reinserted  it,  he is
 concerned that it may not be in the right place.  He did have some bleeding
 around the time of the removal, and this has settled.
 
 OBJECTIVE:  HEENT: Today, his nasoendoscopy  is  done  confirming  that the
 tube  is  in the normal position.  There  is  no  evidence  of  surrounding
 infection.  There is mild tracheitis which is settling.
 
 PLAN:  I will see him back as previously scheduled.
 
 Jimmy Esposito M.D.
 
 MARIA ALEJANDRA / 
 [id number] / [id number] / [id number] / [id number]
 D: 2000
 T: 2000
 
 [id number] signed by Interface, Transcription In at 2006  1:06 AM PSTdocume
nted in this encounter
 
 Plan of Treatment
 Not on filedocumented as of this encounter
 
 Visit Diagnoses
 Not on filedocumented in this encounter

## 2019-12-06 NOTE — XMS
Encounter Summary
  Created on: 2019
 
 Shane Daryltien BroussardJosue
 External Reference #: [id number]
 : 43
 Sex: Male
 
 Demographics
 
 
+-----------------------+---------------------------+
| Address               | [address]        |
|                       | [place]  [address] |
+-----------------------+---------------------------+
| Home Phone            | [phone]           |
+-----------------------+---------------------------+
| Preferred Language    | Unknown                   |
+-----------------------+---------------------------+
| Marital Status        |                    |
+-----------------------+---------------------------+
| Confucianist Affiliation | [id number]                      |
+-----------------------+---------------------------+
| Race                  | Unknown                   |
+-----------------------+---------------------------+
| Ethnic Group          | Unknown                   |
+-----------------------+---------------------------+
 
 
 Author
 
 
+--------------+--------------------------------------------+
| Author       | [facility]  |
|              | and [place]                                |
+--------------+--------------------------------------------+
| Organization | [facility]  |
|              | and [place]                                |
+--------------+--------------------------------------------+
| Address      | Unknown                                    |
+--------------+--------------------------------------------+
| Phone        | Unavailable                                |
+--------------+--------------------------------------------+
 
 
 
 Support
 
 
+---------------+--------------+--------------------+-----------------+
| Name          | Relationship | Address            | Phone           |
+---------------+--------------+--------------------+-----------------+
| Opal Shane | ECON         | [address]     | [phone] |
|               |              | [place]   |                 |
|               |              | [id number]              |                 |
+---------------+--------------+--------------------+-----------------+
 
 
 
 
 Care Team Providers
 
 
+-----------------------+------+-----------------+
| Care Team Member Name | Role | Phone           |
+-----------------------+------+-----------------+
| Erwin Iniguez MD | PCP  | [phone] |
+-----------------------+------+-----------------+
 
 
 
 Encounter Details
 
 
+--------+-----------+----------------------+----------------------+----------------------+
| Date   | Type      | Department           | Care Team            | Description          |
+--------+-----------+----------------------+----------------------+----------------------+
| / | Hospital  |   [facility]    |   Honey Donovan   | Thoracic aortic      |
| 2018 - | Encounter | [facility] | MD JAVI  [address]     | aneurysm without     |
|        |           |  CARE FLOOR [address]  [place], [place]   | rupture (HCC); [address] |
| / |           | [address]           | [address]  [phone]  |  (arteriosclerotic   |
| 2018   |           | [place], [place]         |  [phone] (Fax)  | cardiovascular       |
|        |           | [address]           |                      | disease); Chronic    |
|        |           | [phone]         |                      | atrial fibrillation  |
|        |           |                      |                      | (Formerly [facility]); Essential     |
|        |           |                      |                      | hypertension;        |
|        |           |                      |                      | Precordial pain;     |
|        |           |                      |                      | Dysphagia,           |
|        |           |                      |                      | unspecified type     |
+--------+-----------+----------------------+----------------------+----------------------+
 
 
 
 Social History
 
 
+---------------+------------+-----------+--------+------------------+
| Tobacco Use   | Types      | Packs/Day | Years  | Date             |
|               |            |           | Used   |                  |
+---------------+------------+-----------+--------+------------------+
| Former Smoker | Cigarettes | 1.3       | 35     | Quit: 1996 |
+---------------+------------+-----------+--------+------------------+
 
 
 
+---------------------+---+---+---+
| Smokeless Tobacco:  |   |   |   |
| Never Used          |   |   |   |
+---------------------+---+---+---+
 
 
 
+-------------+-------------+---------+----------+
| Alcohol Use | Drinks/Week | oz/Week | Comments |
+-------------+-------------+---------+----------+
| No          |             |         |          |
+-------------+-------------+---------+----------+
 
 
 
 
+------------------+---------------+
| Sex Assigned at  | Date Recorded |
| Birth            |               |
+------------------+---------------+
| Not on file      |               |
+------------------+---------------+
 
 
 
+----------------+-------------+-------------+
| Job Start Date | Occupation  | Industry    |
+----------------+-------------+-------------+
| Not on file    | Not on file | Not on file |
+----------------+-------------+-------------+
 
 
 
+----------------+--------------+------------+
| Travel History | Travel Start | Travel End |
+----------------+--------------+------------+
 
 
 
+-------------------------------------+
| No recent travel history available. |
+-------------------------------------+
 documented as of this encounter
 
 Discharge Summaries
 Andrew Ryan MD - 2018 10:43 AM PDTFormatting of this note might be different from
 the original.
 Discharge Summaries by Andrew Ryan MD at 18 1043  
  Author:  Andrew Ryan MD Service:  Hospitalist Author Type:  Physician 
  Filed:  18 1221 Date of Service:  18 1043 Status:  Signed 
  :  Andrew Ryan MD (Physician)   
   
 
 Patient ID:
 Daryl Shane
 [id number]
 72 y.o.
 1945
 
 Admit date: 2018
 
 Discharge date and time:  18
 
 Admitting Physician: Honey Donovan MD 
 
 Discharge Physician: MD Patience
 
 Consults: 
 
 Primary Discharge Diagnoses: Precordial pain [R07.2]
 ASCVD (arteriosclerotic cardiovascular disease) [I25.10]
 Essential hypertension [I10]
 Chronic atrial fibrillation (HCC) [I48.2]
 Thoracic aortic aneurysm without rupture (HCC) [I71.2]
 Hemoptysis
 
 Aspiration PNA
 Iron def anemia
 
 Discharged Condition: good
 
 Significant Diagnostic Studies: 
 Lab Results    
 Component  Value Date 
  WBC 5.20 2018 
  HGB 9.6 (L) 2018 
  HCT 28.4 (L) 2018 
  MCV 76.7 (L) 2018 
   (L) 2018 
 
 GLUCOSE    
 Date Value Ref Range Status 
 2018 101 (H) 65 - 99 mg/dL Final 
 
 BUN    
 Date Value Ref Range Status 
 2018 12 8 - 25 mg/dL Final 
 
 CREATININE    
 Date Value Ref Range Status 
 2018 1.1 0.70 - 1.30 mg/dL Final 
 
 BUN/CREAT    
 Date Value Ref Range Status 
 2018 11  Final 
 
 TOTAL PROTEIN    
 Date Value Ref Range Status 
 2018 5.5 (L) 6.3 - 8.2 g/dL Final 
 
 GLOBULIN    
 Date Value Ref Range Status 
 2018 3.0 1.3 - 4.9 g/dL Final 
 
 TBIL    
 Date Value Ref Range Status 
 2018 0.7 0.1 - 1.5 mg/dL Final 
 
 ALT    
 Date Value Ref Range Status 
 2018 14 10 - 65 U/L Final 
 
 AST    
 Date Value Ref Range Status 
 2018 16 10 - 45 U/L Final 
 
 SODIUM    
 Date Value Ref Range Status 
 2018 143 135 - 145 mmol/L Final 
 
 POTASSIUM    
 Date Value Ref Range Status 
 2018 3.3 (L) 3.5 - 4.9 mmol/L Final 
 
 CHLORIDE    
 Date Value Ref Range Status 
 
 2018 108 99 - 109 mmol/L Final 
 
 CO2    
 Date Value Ref Range Status 
 2018 26 23 - 32 mmol/L Final 
 
 ANION GAP AGAP    
 Date Value Ref Range Status 
 2018 12 5 - 20 mmol/L Final 
 
 Xr Chest 2 View
 
 Result Date: 2018
 DARYL SHANE 1945 72 years Male XR CHEST 2 VIEW FRONTAL AND LATERAL 2018 9:52 AM 
INDICATION: Shortness of breath with chest pain COMPARISON: 2018 TECHNIQUE: Two view ntae
st, PA and lateral views FINDINGS: Median sternotomy wires are maintained. The cardiac silho
uette is borderline enlarged. There is no mediastinal widening or shift. Mild calcification 
of the aortic arch is present. There is improving aeration of the left upper lobe. Some resi
dual opacities remain along the lower lobes bilaterally. The pulmonary markings are normal i
n caliber. There is mild degenerative disc disease of the thoracic spine. 
 
 1.  There is improved aeration of the left upper lobe. 2.  Hazy opacities throughout the jodie
ng may reflect some residual acute pneumonitis. Electronically signed by JENNIFER Kebede on 2018 10:02 AM
 
 Xr Chest 2 View
 
 Result Date: 2018
 HISTORY: Precordial chest pain. Question pneumonia. COMPARISON: 18. TECHNIQUE: PA and
 lateral films of the chest. FINDINGS: Median sternotomy. Heart size is upper normal. Pulmon
ramón venous congestion. Bilateral perihilar mixed interstitial and airspace infiltrates again
 noted, essentially unchanged. No effusions. Subtle thickening of the major and minor fissur
es. Mild degenerative changes of the spine. Osteopenia. 
 
 1.  Borderline cardiac enlargement, with probable pulmonary edema. Atypical pneumonitis see
ms less likely. Electronically signed by Yonatan Montague MD on 2018 11:34 AM
 
 X-ray Chest 2 View Frontal & Lateral
 
 Result Date: 2018
 This is a non-reportable procedure without a radiologist report and is used for image [facility] only
 
 Ct Head Without Contrast
 
 Result Date: 2018
 This is a non-reportable procedure without a radiologist report and is used for image [facility] only
 
 Ct Chest Without Contrast
 
 Result Date: 2018
 HISTORY: 72 years year-old Male, hemoptysis. TECHNIQUE: CT through the chest. Noncontrast e
xamination. Automatic dose adjustment to minimize patient exposure. PRIOR EXAMINATION: Earli
er chest radiograph. FINDINGS:  demonstrates cardiomegaly, sternal wires and dense reti
cular interstitial lung disease throughout the left mid chest. Lung windows demonstrate bila
teral reticular and confluent infiltrates throughout mid lower lung fields. In the posterior
 medial left lung on image 81 series 3 in underlying mass would be difficult to exclude, but
 this is simply a larger multiple similar findings throughout both lungs. Inflammatory nodul
es and/or inflammatory masses are very plausibly superimposed In the mid central superior le
 
ft lung the lung disease is more cystic in appearance, asymmetric centrilobular emphysema/CO
PD and superimposed edema edema Bone windows demonstrate degenerative and postprocedural clare
nges, without acute appearing lesion or active fracture. The sternotomy is healed. Nonaggres
sive and degenerative changes not uncommon for age. What is seen of the upper abdomen demons
trates fatty liver. Splenomegaly. No adenopathy or fluid collection discernible. Large hiatu
s hernia. Within the chest dense coronary calcification, interventional changes, diffuse car
diac megaly and hilar vascular congestion symmetrically distributed in midlung fields. Elizabeth
es post CABG. 
 
 1. At the very least there is pulmonary vascular congestion, edema-and centrilobular emphys
ema is superimposed 2. Pulmonary infiltrates are asymmetric and throughout the left hemithor
ax. This could be due to superimposed infection of the left upper lung and there is a small 
effusion also 3. Lastly, there are inflammatory appearing airspace or infiltrative nodules s
uperimposed throughout Will be important to repeat this examination when the patient's acute
 issues are resolved to evaluate for underlying lesion/mass Electronically signed by Juan Francisco Conn MD on 2018 12:01 PM
 
 X-ray Swallowing Function Video
 
 Result Date: 2018
 This is a non-reportable procedure without a radiologist report and is used for image [employer] [facility] [employer] only
 
 Cl Coronary Angio With Grafts
 
 Result Date: 8/10/2018
 Accession:  [id number] ____________________________________________________________________ DA
TE OF BIRTH:  1945. PROCEDURE: 1. Right femoral access with ultrasound guidance. 2. Le
ft heart catheterization. 3. Coronary angiogram. 4. SVG angiogram to the RCA. 5. SVG angiogr
am to the left circumflex. 6. LIMA to LAD angiogram. INDICATION:  This is a 72-year-old male
 who presented to the hospital because of shortness of breath, pulmonary edema, and elevatio
n of cardiac enzymes.  For further details, refer to my consultation note.  Given the above 
findings, left heart catheterization was recommended.  The patient had previous history of C
ABG times 4 in . PROCEDURE NOTE:  The patient was brought electively to the heart cathet
erization lab.  Consent was obtained after reviewing risks and benefits.  Timeout was done a
t the bedside.  The patient was prepped in the usual sterile manner, received IV fentanyl vi
a independent observer.  Right groin was anesthetized with 1 percent lidocaine.  Using ultra
sound and a micropuncture needle, right femoral access was obtained.  A 5-French sheath was 
introduced without any difficulty.  A 0.035 wire was used and advanced under fluoroscopic gu
idance into the ascending aorta.  A 5-French JL4 catheter was used and advanced over the wir
e and placed selectively in the left coronary cusp.  Wire was removed.  Catheter was flushed
.  Selectively engaged the left coronary system.  Contrast was injected.  Multiple views wer
e obtained.  Exchange over the wire was done with JR4 catheter that was placed selectively i
n the right coronary cusp, selectively engaged the right coronary artery, and multiple views
 were obtained.  Attempted to engage the SVG to RCA and the obtuse marginal with the JR4 cat
heter; however, was unsuccessful.  Then JR4 catheter was used to cannulate the left subclavi
an artery and then a long 0.035 wire was used to exchange with a LIMA catheter that selectiv
ely engaged the LIMA graft and multiple views were obtained.  Exchange over the wire was don
e with a 5-French multipurpose catheter that selectively engaged the right SVG to RCA graft.
 Multiple views were obtained.  Exchange over the wire was done with 5-French AL1 catheter t
hat selectively engaged the SVG to left circumflex system graft.  Multiple views were obtain
ed.  Finally, the catheter was removed over the wire.  Hemostasis was achieved by TR band. C
ONTRAST USED:  100 mL. BLOOD LOSS:  Less than 10. COMPLICATIONS:  None. HEMOSTASIS:  By manu
al pressure. ANGIOGRAPHIC FINDINGS: 1. Left main is large caliber vessel with normal origin.
  Bifurcates to LAD and left circumflex with mild diffuse disease. 2. LAD is 100 percent pro
ximally occluded. 3. Left circumflex is a small to moderate vessel that is severely calcifie
d, has multiple lesions and diffusely diseased.  Distally it is very small and severely calc
ified.  Has 90 percent proximal and subtotal occlusion distal.  However again smaller and se
verely calcified.  No competitive flow was seen. 4. RCA has 100 percent ostial occlusion. 5.
 SVG to RCA is widely patent with mildly diffuse disease. 6. SVG to left circumflex system i
 
s diffusely diseased with severe degenerative disease; however, occluded at the insertion si
te.  The jump graft of the 2nd obtuse marginal is occluded.  LIMA to LAD is widely patent. C
ONCLUSION: 1. Severe 3-vessel coronary artery disease. 2. Patent SVG to RCA and LIMA to LAD.
 3. Occluded SVG to the left circumflex system, which is a jump graft. 4. Severely calcified
 left circumflex, which is small caliber severely calcified. However, not amenable to any PC
I. RECOMMENDATIONS:  Given the above findings, the patient will continue medical therapy for
 coronary artery disease.  Will be restarted on antiplatelet therapy and will be on optimiza
tion of blood pressure control, statin therapy, and will be re-started on anticoagulation fo
r atrial fibrillation.  Further recommendations to follow. Read by MARÍA AKINS MD 2018 07:56 P Electronically signed by María Akins MD on 8/10/2018 6:24 AM
 
 Echo Cardiac Adult Complete
 
 Result Date: 2018
 Patient Name:  DARYL SHANE YOB: 1945 Accession:  [id number] Performing Physici
an:   María Akins _______________________________________________________________ INDIC
ATIONS ----------- sob,h/o 4 cabg CONCLUSIONS ----------- 1. The left ventricle cavity is no
rmal in size, mild concentric hypertrophy and  normal systolic function EF 55%. Mild inferio
r and inferolateral hypokinetic segments. 2. Mild degenerative changes in the aortic and bhupinder
ral valves. 3. There is no pericardial effusion. FINDINGS -------- ECG rhythm: Atrial fibril
lation. Study: A 2-dimensional transthoracic echocardiogram with m-mode, spectral and color 
flow Doppler was perfomed. Study: This was a technically adequate study. Left Ventricle: Ove
rall left ventricular systolic function is low-normal with, an EF 55 %. Left Ventricle: The 
left ventricle cavity size is normal. Left Ventricle: There is mild concentric left ventricu
lar hypertrophy. Right Ventricle: The RV was not well visualized. Left Atrium: The left atri
um is mildly dilated. Right Atrium: The right atrial size is normal. Aortic Valve: The aorti
c valve is trileaflet. Aortic Valve: The aortic valve is mildly calcified. Aortic Valve: The
re is mild aortic regurgitation. Mitral Valve: There is trace mitral regurgitation. Mitral V
alve: Mild mitral annular calcification present. Tricuspid Valve: The tricuspid valve appear
s structurally normal. Tricuspid Valve: Trace tricuspid regurgitation present. Tricuspid Layla
ve: There is no evidence of pulmonary hypertension. Pulmonic Valve: The pulmonic valve is no
rmal. Pulmonic Valve: Mild pulmonic regurgitation. Pulmonic Valve: Mild degenerative changes
 in the aortic and mitral valves. Pericardium: There is no pericardial effusion. Pericardium
: No pleural effusion seen. IVC/Hepatic Veins: The inferior vena cava is normal in size and 
collapses > 50 % with sniff, indicating normal central venous pressures. MEASUREMENTS ------
------- Ao asc:   4.19 cm Ao sinus:   3.71 cm Ao st junct:   3.01 cm IVC:   1.42 cm LA Diam:
   5.50 cm LA Major:   5.97 cm EDV(Teich):   106.74 ml IVSd:   1.19 cm LVIDd:   4.78 cm LVPW
d:   1.21 cm LVOT Diam:   2.25 cm %FS:   26.84 % EF(Teich):   52.32 % ESV(Teich):   50.89 ml
 IVSs:   1.81 cm LVIDs:   3.50 cm LVPWs:   1.71 cm SV(Teich):   55.85 ml RA Major:   5.04 cm
 LVEF MOD A4C:   55.16 % SV MOD A4C:   49.58 ml LVEDV MOD A4C:   89.88 ml LVLd A4C:   7.50 c
m LVESV MOD A4C:   40.30 ml LVLs A4C:   5.87 cm LAESV(A-L):   76.74 ml LAESV Index (A-L):   
41.48 ml/m2 LAAs A2C:   17.05 cm2 LAESV A-L A2C:   48.56 ml LALs A2C:   5.08 cm LAAs A4C:   
26.95 cm2 LAESV A-L A4C:   95.07 ml LALs A4C:   6.48 cm Ao Diam:   4.03 cm AV Cusp:   1.91 c
m LA Diam:   4.92 cm LA/Ao:   1.22 D-E Excursion:   2.29 cm E-F Powell:   0.09 m/s AV maxPG: 
  10.18 mmHg AV meanP.51 mmHg AV Vmax:   1.59 m/s AV Vmean:   1.23 m/s AV VTI:   28.06
 cm KIRK Vmax:   3.26 cm2 KIRK (VTI):   3.35 cm2 AVAI Vmax:   0.00 cm2/m2 AVAI (VTI):   0.00 c
m2/m2 LVOT maxP.86 mmHg LVOT meanPG:   3.16 mmHg LVSI Dopp:   50.95 ml/m2 LVSV Dopp:  
 94.27 ml LVOT Vmax:   1.31 m/s LVOT Vmean:   0.84 m/s LVOT VTI:   23.69 cm MV E Luis:   0.93
 m/s E' Lat:   0.12 m/s E' Sept:   0.06 m/s PRend P.85 mmHg PRend Vmax:   1.48 m/s HR:
   77.27 BPM PV maxPG:   3.37 mmHg PV meanP.56 mmHg PV Vmax:   0.91 m/s PV Vmean:   0.
56 m/s PV VTI:   12.48 cm RV S':   0.08 m/s TR maxP.05 mmHg TR Vmax:   3.04 m/s TV A 
Luis:   0.00 m/s TV Dec Powell:   3.93 m/s2 TV Dec Time:   207.73 ms TV E Luis:   0.56 m/s TV E
/A Ratio:   69.55 Sonographer: PADMINI Authenticated by: María DíazSaint Paulghanshyam Report Date/Time: 
018 13:7:33 
 
 1. The left ventricle cavity is normal in size, mild concentric hypertrophy and  normal sys
tolic function EF 55%. Mild inferior and inferolateral hypokinetic segments. 2. Mild degener
ative changes in the aortic and mitral valves. 3. There is no pericardial effusion.
 
 Cl Guidance V[facility] Access Us
 
 
 Result Date: 2018
 This Point of Care (POC) ultrasound image has been reviewed and interpreted by the physicbela bhagat as the performing physician in the associated interpretation and report. 
 
 HPI and Hospital Course: 72-year-old gentleman with past medical history of chronic atrial 
fibrillation on anticoagulation, history of vocal cord cancer status post treatment and dysp
hagia on and off for last 9 years, history of coronary disease status post CABG in , his
tory of tobacco abuse disorder and COPD, history of peptic ulcer disease and GI bleed who we
nt to [facility] with productive cough, shortness of breath and hemoptysis seconda
ry to aspiration pneumonia and found to have positive troponin around 1 hence  he was transf
erred to [facility] for further workup and treatment.
  Hospital course by issues:
 
 #1 non-ST elevation MI: Status post cardiac cath which showed triple-vessel disease and rec
ommend medical management only.  Patient started on Plavix, atorvastatin, patient allergic t
o beta blocker has not initiated and patient currently asymptomatic.  Patient is being disch
arged home with follow-up with his cardiologist
 
 #2 aspiration pneumonia for which patient was started on Unasyn and doing better and being 
discharged home on Augmentin  twice a day for another 5 days
 
 #3 dysphagia likely secondary to history of vocal cord cancer and speech recommended mechan
ical soft diet.  Patient referred to  speech therapy as an outpatient
 
 #4 Hemoptysis  likely secondary to underlying pneumonia and being on aspirin as well as on 
Xarelleto which was held on admission and Plavix restarted yesterday.  Patient will be resta
rted on Xarelleto as he is not spitting up any blood and he was advised to hold any blood th
inner if he has recurrence of hemoptysis  and seek medical attention
 
 #5.Iron deficiency anemia secondary to ongoing hemoptysis and patient was given IV iron now
 being discharged home on iron supplement hemoglobin stable around 9
 
 #6 acute COPD exacerbation secondary to pneumonia resolved now
 
 I have spent more than 35 minutes on discharge
 
 /67 (BP Location: Left upper arm)  | Pulse 66  | Temp 97.8 F (36.6 C) (Oral)  | R
scott 18  | Ht 1.727 m (5' 8")  | Wt 71.9 kg (158 lb 8 oz)  | SpO2 98%  | BMI 24.10 kg/m 
 
 Intake/Output Summary (Last 24 hours) at 18 1221
 Last data filed at 18 0616
  Gross per 24 hour 
 Intake          1191.23 ml 
 Output              825 ml 
 Net           366.23 ml 
 
 Discharge Exam:
 
 Constitutional: Alert and oriented to person, place, and time. Appears well-developed and w
ell-nourished. 
 
 Cardiovascular: Normal rate, regular rhythm, normal heart sounds and intact distal pulses. 
 Exam reveals no gallop and no friction rub.  No murmur heard.
 
 Pulmonary/Chest: Effort normal and breath sounds normal. No stridor. No respiratory distres
s.  no wheezes. no rales. exhibits no tenderness.  Except bibasilar crackles
 
 Abdominal: Soft. Bowel sounds are normal. exhibits no distension and no mass. There is no t
enderness. There is no rebound and no guarding. 
 
 
 Musculoskeletal: Normal range of motion.exhibits no tenderness.  exhibits no edema. 
   
 Neurological:  Alert and oriented to person, place, and time. Has normal reflexes.  display
s normal reflexes. No cranial nerve deficit.  Exhibits normal muscle tone. Coordination norm
al.
 
 Skin: Skin is warm and dry. No rash noted. No erythema. No pallor. 
 
 Psychiatric: Has a normal mood and affect. Behavior is normal. Judgment normal. 
 
 Disposition: 
 
 Home or Self Care
 
 Patient Instructions: 
  
 Medication List 
  
 START taking these medications  
 amoxicillin-clavulanate 875-125 MG per tablet
 QTY:  10 tablet
 Refills:  0
 Commonly known as:  AUGMENTIN
 Take 1 tablet by mouth every 12 (twelve) hours.
  
 atorvastatin 40 MG tablet
 QTY:  30 tablet
 Refills:  2
 Commonly known as:  LIPITOR
 Take 1 tablet by mouth nightly.
  
 budesonide-formoterol 160-4.5 MCG/ACT inhaler
 QTY:  1 Inhaler
 Refills:  0
 Commonly known as:  SYMBICORT
 Inhale 2 puffs into the lungs 2 (two) times daily.
  
 clopidogrel 75 MG tablet
 QTY:  90 tablet
 Refills:  0
 Commonly known as:  PLAVIX
 Take 1 tablet by mouth daily.
  
 ipratropium-albuterol 0.5-2.5 mg/3mL
 QTY:  360 mL
 Refills:  0
 Commonly known as:  DUO-NEB
 Take 3 mLs by nebulization every 6 (six) hours as needed.
  
  
 CHANGE how you take these medications  
 mupirocin 2 % ointment
 QTY:  22 g
 Refills:  2
 Commonly known as:  BACTROBAN
 Apply  topically 3 (three) times daily.
 What changed:
  when to take this
  reasons to take this
 
  
 predniSONE 2.5 MG tablet
 QTY:  30 tablet
 Refills:  0
 Commonly known as:  DELTASONE
 Take 4 tablets by mouth daily. Takes 7.5 mg daily
 What changed:  additional instructions
  
  
 CONTINUE taking these medications  
 acyclovir 400 MG tablet
 Refills:  0
 Commonly known as:  ZOVIRAX
  
 Albuterol Sulfate 108 (90 Base) MCG/ACT Aepb
 Refills:  0
  
 alendronate 70 MG tablet
 Refills:  0
 Commonly known as:  FOSAMAX
  
 azaTHIOprine 50 MG tablet
 Refills:  0
 Commonly known as:  IMURAN
  
 azelastine 0.1 % nasal spray
 QTY:  30 mL
 Refills:  12
 1 spray by Nasal route 2 (two) times daily. Use in each nostril as directed
  
 bacitracin-polymyxin b ophthalmic ointment
 Refills:  0
 Commonly known as:  POLYSPORIN
  
 bismuth subsalicylate 262 MG chewable tablet
 Refills:  0
 Commonly known as:  PEPTO BISMOL
  
 CALCIUM-MAGNESIUM-ZINC PO
 Refills:  0
  
 cholecalciferol 1000 units tablet
 Refills:  0
 Commonly known as:  VITAMIN D-3
  
 cyanocobalamin 1000 MCG tablet
 Refills:  0
 Commonly known as:  VITAMIN B-12
  
 famotidine 40 MG tablet
 Refills:  0
 Commonly known as:  PEPCID
  
 ferrous sulfate (65 FE) 325 (65 FE) MG tablet
 Refills:  0
  
 FLOMAX 0.4 MG capsule
 Refills:  0
 Generic drug:  tamsulosin
  
 
 fluticasone 50 MCG/ACT nasal
 Refills:  0
 Commonly known as:  FLONASE
  
 fluticasone-salmeterol 500-50 MCG/DOSE diskus inhaler
 Refills:  0
 Commonly known as:  ADVAIR
  
 folic acid 1 MG tablet
 Refills:  0
 Commonly known as:  FOLVITE
  
 furosemide 40 MG tablet
 Refills:  0
 Commonly known as:  LASIX
  
 guaiFENesin 600 MG 12 hr tablet
 Refills:  0
 Commonly known as:  MUCINEX
  
 HYDROcodone-acetaminophen 7.5-325 MG per tablet
 Refills:  0
 Commonly known as:  NORCO
  
 Krill Oil 500 MG Caps
 Refills:  0
  
 LANOXIN 0.25 MG tablet
 Refills:  0
 Generic drug:  digoxin
  
 levothyroxine 75 MCG tablet
 Refills:  0
 Commonly known as:  SYNTHROID
  
 loratadine 10 MG tablet
 Refills:  0
 Commonly known as:  CLARITIN
  
 losartan 50 MG tablet
 Refills:  0
 Commonly known as:  COZAAR
  
 mupirocin 2 % nasal ointment
 Refills:  0
 Commonly known as:  BACTROBAN
  
 ofloxacin 0.3 % ophthalmic solution
 Refills:  0
 Commonly known as:  OCUFLOX
  
 oxyCODONE-acetaminophen 5-325 MG per tablet
 Refills:  0
 Commonly known as:  PERCOCET
  
 potassium chloride 10 MEQ tablet
 Refills:  0
 Commonly known as:  K-DUR
  
 sertraline 100 MG tablet
 
 Refills:  0
 Commonly known as:  ZOLOFT
  
 trolamine salicylate 10 % cream
 Refills:  0
 Commonly known as:  ASPERCREME
  
 VOLTAREN 1 %
 Refills:  0
 Generic drug:  diclofenac
  
 XARELTO 20 MG tablet
 Refills:  0
 Generic drug:  rivaroxaban
  
  
 You might also be taking other medications not listed above. If you have questions about an
y of your other medications, talk to the person who prescribed them or your Primary Care Pro
vider. 
  
  
  
 STOP taking these medications  
 diltiazem 120 MG tablet
 Commonly known as:  CARDIZEM
  
 nystatin cream
 Commonly known as:  MYCOSTATIN
  
 nystatin-triamcinolone ointment
 Commonly known as:  MYCOLOG
  
 omeprazole 20 MG capsule
 Commonly known as:  PRILOSEC
  
 pseudoephedrine 30 MG tablet
 Commonly known as:  SUDAFED
  
  
  
 Where to Get Your Medications 
  
 You can get these medications from any pharmacy  
 Bring a paper prescription for each of these medications
  amoxicillin-clavulanate 875-125 MG per tablet
  atorvastatin 40 MG tablet
  budesonide-formoterol 160-4.5 MCG/ACT inhaler
  clopidogrel 75 MG tablet
  ipratropium-albuterol 0.5-2.5 mg/3mL
  
 Information about where to get these medications is not yet available  
 Ask your nurse or doctor about these medications
  predniSONE 2.5 MG tablet
  
 
 Activity: activity as tolerated
 Diet: cardiac diet
 Wound Care: not applicable
 
 There are no outpatient Patient Instructions on file for this admission. 
 
 
 Calvin Robertson MD
 [address]
 [place] [id number] [phone]
 
 Calvin Robertson MD
 [address]
 [place] [address] [phone]
 
 In 1 week
 
 María Akins MD
 [address] [place] [address]
 [phone]
 
 In 1 week
 
 Signed:
 
 ANDREW RYAN
 2018
 12:21 PM
  
  Electronically signed by Andrew Ryan MD at 2018 12:21 PM PDTdocumented in this en
counter
 
 Medications at Time of Discharge
 
 
+----------------------+----------------------+-----------+---------+----------+-----------+
| Medication           | Sig                  | Dispensed | Refills | Start    | End Date  |
|                      |                      |           |         | Date     |           |
+----------------------+----------------------+-----------+---------+----------+-----------+
|   acyclovir          | Apply  topically     |           | 0       |          |           |
| (ZOVIRAX) 5%         | every 3 hours.       |           |         |          |           |
| ointment             |                      |           |         |          |           |
+----------------------+----------------------+-----------+---------+----------+-----------+
|   albuterol (PROAIR  | Inhale 2 puffs into  |           | 0       |          |           |
| HFA) 90 mcg/puff     | the lungs every 6    |           |         |          |           |
| inhaler              | hours as needed for  |           |         |          |           |
|                      | Wheezing.            |           |         |          |           |
+----------------------+----------------------+-----------+---------+----------+-----------+
|                      | Take 3 mLs by        |           | 0       | 20 |           |
| albuterol-ipratropiu | nebulization every 6 |           |         | 18       |           |
| m 2.5-0.5 mg/3 mL    |  (six) hours as      |           |         |          |           |
| SOLN                 | needed.              |           |         |          |           |
+----------------------+----------------------+-----------+---------+----------+-----------+
|   digoxin (LANOXIN)  | Take 125 mcg by      |           | 0       |          |           |
| 250 mcg tablet       | mouth Daily.      |           |         |          |           |
|                      | capsule daily        |           |         |          |           |
+----------------------+----------------------+-----------+---------+----------+-----------+
|   ferrous sulfate    | Take 325 mg by mouth |           | 0       | 20 |           |
| 325 mg tablet        |  3 times daily.      |           |         | 12       |           |
+----------------------+----------------------+-----------+---------+----------+-----------+
|   fluticasone        | one spray in each    |           | 0       | 20 |           |
| (FLONASE) 50         | nostril daily as     |           |         | 12       |           |
| mcg/nasal spray      | needed               |           |         |          |           |
 
+----------------------+----------------------+-----------+---------+----------+-----------+
|                      | Inhale 1 puff into   |           | 0       |          |           |
| fluticasone-salmeter | the lungs Twice      |           |         |          |           |
| ol (ADVAIR) 500-50   | Daily.               |           |         |          |           |
| mcg/puff diskus      |                      |           |         |          |           |
| inhaler              |                      |           |         |          |           |
+----------------------+----------------------+-----------+---------+----------+-----------+
|   folic acid 1 mg    | Take 1 mg by mouth   |           | 0       |          |           |
| tablet               | Daily.               |           |         |          |           |
+----------------------+----------------------+-----------+---------+----------+-----------+
|   furosemide (LASIX) | Take 40 mg by mouth  |           | 0       |          |           |
|  40 mg tablet        | Daily.               |           |         |          |           |
+----------------------+----------------------+-----------+---------+----------+-----------+
|   guaiFENesin        | Take 1,200 mg by     |           | 0       |          |           |
| (MUCINEX) 600 mg 12  | mouth 2 times daily. |           |         |          |           |
| hr tablet            |                      |           |         |          |           |
+----------------------+----------------------+-----------+---------+----------+-----------+
|   levothyroxine      | Take 75 mcg by mouth |           | 0       | 20 |           |
| (LEVOTHROID) 75 MCG  |  Daily.              |           |         | 12       |           |
| tablet               |                      |           |         |          |           |
+----------------------+----------------------+-----------+---------+----------+-----------+
|   metoclopramide     | Take 10 mg by mouth  |           | 0       | 20 |           |
| (REGLAN) 10 mg       | 4 times daily as     |           |         | 12       |           |
| tablet               | needed.              |           |         |          |           |
+----------------------+----------------------+-----------+---------+----------+-----------+
|                      | Apply  topically 2   |           | 0       |          |           |
| nystatin-triamcinolo | times daily.         |           |         |          |           |
| ne (MYCOLOG II)      |                      |           |         |          |           |
| cream                |                      |           |         |          |           |
+----------------------+----------------------+-----------+---------+----------+-----------+
|   ofloxacin          |                      |           | 0       | 20 |           |
| (OCUFLOX) 0.3%       |                      |           |         | 18       |           |
| ophthalmic solution  |                      |           |         |          |           |
+----------------------+----------------------+-----------+---------+----------+-----------+
|   omeprazole         | Take 20 mg by mouth  |           | 0       | 20 |           |
| (PRILOSEC) 20 mg     | 2 times daily.       |           |         | 12       |           |
| capsule              |                      |           |         |          |           |
+----------------------+----------------------+-----------+---------+----------+-----------+
|   potassium citrate  | Take 10 mEq by mouth |           | 0       |          |           |
| (UROCIT-K) 10 mEq SR |  2 times daily.      |           |         |          |           |
|  tablet              |                      |           |         |          |           |
+----------------------+----------------------+-----------+---------+----------+-----------+
|   predniSONE         | Take 10 mg by mouth  |           | 0       |          |           |
| (DELTASONE) 5 mg     | Daily.               |           |         |          |           |
| tablet               |                      |           |         |          |           |
+----------------------+----------------------+-----------+---------+----------+-----------+
|   tamsulosin         | Take 0.4 mg by mouth |           | 0       | 20 |           |
| (FLOMAX) 0.4 mg CAPS |  2 times daily.      |           |         | 12       |           |
+----------------------+----------------------+-----------+---------+----------+-----------+
|   acyclovir          | Take 400 mg by mouth |           | 0       | 20 |  |
| (ZOVIRAX) 400 MG     |  3 times daily as    |           |         | 12       | 9         |
| tablet               | needed.              |           |         |          |           |
+----------------------+----------------------+-----------+---------+----------+-----------+
|   Cholecalciferol    | Take 2,000 Units by  |           | 0       | 20 |  |
| (VITAMIN D3) 2000    | mouth Daily.         |           |         | 12       | 9         |
| UNITS CAPS           |                      |           |         |          |           |
+----------------------+----------------------+-----------+---------+----------+-----------+
|   cyanocobalamin     | injected             |           | 0       | 20 |  |
| (VITAMIN B-12) 1,000 | intramuscularly once |           |         | 12       | 9         |
|  mcg/mL injection    |  a month             |           |         |          |           |
 
+----------------------+----------------------+-----------+---------+----------+-----------+
|   diltiazem          | Take 120 mg by mouth |           | 0       |          |  |
| (DILT-XR) 120 mg 24  |  Daily.              |           |         |          | 9         |
| hr capsule           |                      |           |         |          |           |
+----------------------+----------------------+-----------+---------+----------+-----------+
|   loratadine         | Take 10 mg by mouth  |           | 0       |          |  |
| (CLARITIN) 10 mg     | Daily.               |           |         |          | 9         |
| tablet               |                      |           |         |          |           |
+----------------------+----------------------+-----------+---------+----------+-----------+
|   losartan (COZAAR)  | Take 100 mg by mouth |           | 0       |          |  |
| 100 MG tablet        |  Daily. 1/2 daily    |           |         |          | 9         |
+----------------------+----------------------+-----------+---------+----------+-----------+
|   methotrexate 2.5   | Take 15 mg by mouth  |           | 0       |          |  |
| mg tablet            | Once a week.         |           |         |          | 9         |
+----------------------+----------------------+-----------+---------+----------+-----------+
|                      | Take 1 tablet by     |           | 0       |          |  |
| oxyCODONE-acetaminop | mouth every 4 hours  |           |         |          | 9         |
| hen (PERCOCET) 5-325 | as needed for Pain.  |           |         |          |           |
|  mg per tablet       |                      |           |         |          |           |
+----------------------+----------------------+-----------+---------+----------+-----------+
|   predniSONE         | Take 4 tablets by    |           | 0       | 20 |  |
| (DELTASONE) 2.5 MG   | mouth daily. Takes   |           |         | 18       | 9         |
| tablet               | 7.5 mg daily         |           |         |          |           |
+----------------------+----------------------+-----------+---------+----------+-----------+
|   pseudoePHEDrine    | Take 30 mg by mouth  |           | 0       |          |  |
| (SUDOGEST) 30 mg     | every 4 hours as     |           |         |          | 9         |
| tablet               | needed for           |           |         |          |           |
|                      | Congestion.          |           |         |          |           |
+----------------------+----------------------+-----------+---------+----------+-----------+
|   rivaroxaban        | Take 20 mg by mouth  |           | 0       |          |  |
| (XARELTO) 20 mg      | Daily (with dinner). |           |         |          | 9         |
| tablet               |                      |           |         |          |           |
+----------------------+----------------------+-----------+---------+----------+-----------+
|   sertraline         | 2 tablets daily      |           | 0       | 20 |  |
| (ZOLOFT) 100 mg      |                      |           |         | 12       | 9         |
| tablet               |                      |           |         |          |           |
+----------------------+----------------------+-----------+---------+----------+-----------+
 documented as of this encounter
 
 Progress Notes
 Conversion Transaction, Provider Unknown - 2018  2:34 PM PDTFormatting of this note m
ight be different from the original.
 Nurse Progress Note by Yamel Aranda RN at 18 1434  
  Author:  Yamel Aranda RN Service:  (none) Author Type:  Registered Nurse 
  Filed:  18 1701 Date of Service:  18 1434 Status:  Signed 
  :  Yamel Aranda RN (Registered Nurse)   
   
 Pt given discharge instructions with wife present. No questions or concerns at this time. I
V removed, wound dressing changed, and pt ready. [facility] 
 
 Yamel Aranda RN
 
  
  Electronically signed by Conversion Transaction, Provider at 2019  7:01 AM PDTMaría Damon ra, MD - 2018  9:37 AM PDTFormatting of this note might be different from the
 original.
 Progress Notes by María Akins MD at 18 0937  
  Author:  María Akins MD Service:  Cardiology Author Type:  Physician 
  Filed:  18 0955 Date of Service:  18 Status:  Signed 
 
  :  María Akins MD (Physician)   
   
 Formerly [facility]
 Service:  Cardiology
 Progress Note
 
 Name of Consultant: María Akins MD
 I have seen the patient on 2018. No more hemoptysis. 
 Had LHC showed severe disease in LCX and occluded SVG to OM. 
 SUBJECTIVE
 Denies any chest pain
 
 Current Facility-Administered Medications: 
    acetaminophen (TYLENOL) tablet 650 mg, 650 mg, Oral, Q6H PRN **OR** acetaminophen (TYL
ENOL) suppository 650 mg, 650 mg, Rectal, Q6H PRN, Honey Donovan MD
    ampicillin-sulbactam (UNASYN) 3 g in sodium chloride (IV) 0.9 % 100 mL IVPB, 3 g, Intr
avenous, Q6H, Andrew Ryan MD, 3 g at 18 0442
    atorvastatin (LIPITOR) tablet 40 mg, 40 mg, Oral, Nightly, Honey Donovan MD, 40 m
g at 08/10/18 2130
    azithromycin (ZITHROMAX) 500 mg in sodium chloride (IV) 0.9 % 250 mL IVPB, 500 mg, Int
ravenous, Q24H, Honey Donovan MD, 500 mg at 08/10/18 1248
    budesonide (PULMICORT) nebulizer suspension 0.5 mg, 0.5 mg, Nebulization, BID, Andrew Ryan MD, 0.5 mg at 08/10/18 2015
    clopidogrel (PLAVIX) tablet 75 mg, 75 mg, Oral, Daily, María Akins MD, 75 mg at 
18
    digoxin (LANOXIN) tablet 0.125 mg, 0.125 mg, Oral, Daily, Honey Donovan MD, 0.125
 mg at 18
    famotidine (PEPCID) tablet 40 mg, 40 mg, Oral, Daily, Honey Donovan MD, 40 mg at 
18
    fentaNYL (SUBLIMAZE) injection, , , Once PRN, María Akins MD, 50 mcg at 18
 191
    ferric gluconate (FERRLECIT) 250 mg in sodium chloride (IV) 0.9 % 100 mL IVPB, 250 mg,
 Intravenous, Q24H, Andrew Ryan MD
    furosemide (LASIX) tablet 20 mg, 20 mg, Oral, Daily, Honey Donovan MD, 20 mg at 0
18 09
    guaiFENesin (MUCINEX) 12 hr tablet 600 mg, 600 mg, Oral, TID, María Akins MD, 60
0 mg at 18 09
    ipratropium-albuterol (DUO-NEB) 0.5-2.5 mg/3mL nebulizer solution 3 mL, 3 mL, Nebuliza
tion, Q6H, Honey Donovan MD, 3 mL at 18 0315
    levothyroxine (SYNTHROID) tablet 75 mcg, 75 mcg, Oral, QAM AC, Honey Donovan MD, 
75 mcg at 18 09
    lidocaine 1 % injection, , , Once PRN, María Akins MD, 10 mL at 18 191
    losartan (COZAAR) tablet 50 mg, 50 mg, Oral, Daily, Honey Donovan MD, 50 mg at  09
    morphine (PF) injection 2 mg, 2 mg, Intravenous, Q4H PRN, Andrew Ryan MD, 2 mg at 0
18
    nitroGLYCERIN (NITRO-BID) 2 % ointment 0.5 inch, 0.5 inch, Topical, Q6H, Honey lou MD, 0.5 inch at 18 06
    nitroGLYCERIN (NITROSTAT) SL tablet 0.4 mg, 0.4 mg, Sublingual, Q5 Min PRN, Honey morse MD
    polyethylene glycol (GLYCOLAX) packet 17 g, 17 g, Oral, Daily PRN, Honey Donovan MD
    potassium chloride oral solution 40 mEq, 40 mEq, Oral, Daily with breakfast, Andrew garza MD
    rivaroxaban (XARELTO) tablet 20 mg, 20 mg, Oral, Daily with dinner, Andrew Ryan MD
    sertraline (ZOLOFT) tablet 100 mg, 100 mg, Oral, Daily, Honey Donovan MD, 100 mg 
at 18 09
    sodium bicarbonate buffer (NEUT) injection, , , Once PRN, María Akins MD, 5 mL a
t 18
    saline lock IV, , , Continuous **AND** sodium chloride (PF) 0.9 % flush 10 mL, 10 mL, 
 
Intravenous, Q8H, Honey Donovan MD, 10 mL at 18 0442
    tamsulosin (FLOMAX) capsule 0.4 mg, 0.4 mg, Oral, after dinner, Honey Donovan MD,
 0.4 mg at 08/10/18 1701
    zolpidem (AMBIEN) tablet 5 mg, 5 mg, Oral, Nightly PRN, María Akins MD
 
 OBJECTIVE
 Vital Signs:
 /61  | Pulse 64  | Temp 97.7 F (36.5 C) (Oral)  | Resp 18  | Ht 1.727 m (5' 8")  
| Wt 71.9 kg (158 lb 8 oz)  | SpO2 98%  | BMI 24.10 kg/m 
 
 Intake/Output Summary (Last 24 hours) at 18 0937
 Last data filed at 18 0616
  Gross per 24 hour 
 Intake          1191.23 ml 
 Output             1075 ml 
 Net           116.23 ml 
 
 Cardiovascular: iiregular irregular, S1 normal and S2 normal.  
 No murmur heard.
 Pulses:
      Radial pulses are 2+ on the right side, and 2+ on the left side. 
 Pulmonary/Chest: poor air exchange with mild rhonchi bilaterally. 
 Abdominal: Soft. No tenderness. 
 Musculoskeletal: No edema. 
 Neurological: Alert. No cranial nerve deficit. 
 Skin: Warm and dry. 
 
 DATA
 
 Recent Labs
 Lab 18
 0535 08/10/18
 1000 18
 0157 
  139 138 
 K 3.3* 3.8 4.3 
 CO2 26 24 24 
 BUN 12 15 14 
 CREATININE 1.1 1.2 1.3 
 EGFR >60 59* 54* 
 MG  --   --  1.8 
 
 Recent Labs
 Lab 18
 0535 08/10/18
 1000 18
 0157 
 WBC 5.20 7.51 11.05* 
 HGB 9.6* 9.4* 11.5* 
 HCT 28.4* 27.9* 35.3* 
 MCV 76.7* 77.0* 77.5* 
 * PLATELETS CLUMPED, APPEAR ADEQUATE 160 
 
 Recent Labs
 Lab 18
 0551 18
 0157 18
 2212 
 TROPONINI 1.28* 1.72* 1.76* 
 TSH  --  4.150  --  
 
 
 Lab Results    
 Component  Value Date 
  CHOL 103 2018 
  TRIG 116 2018 
  LDL 56 2018 
  HDL 24 (L) 2018 
  GLUF 101 (H) 2018 
  TSH 4.150 2018 
 
 [id number]
 Reviewed by myself showed atrial fibrillation with RBBB. 
 Last Echo: 2018
 Normal LV size and function EF 55%. Inferior and inferolateral hypokinetic segments.
 04/15/2013
 Reported with normal LV size and function EF 56%. RV is normal in size and function. 
 Last Stress test: 2018
 Lexiscan Cardiolite stress test with evidence of ischemia inferior segment and small revers
ible defect as well as in the anterior segment.
 
 Last Cath: 2018
 LM mild disease, % occluded. LCX small caliber calcified with severe disease proxima
l and distal not amenable to PCI. % occluded.
 SVG to distal RCA patent, LIMA to LAD patent.
 SVG to LCX system is occluded at the insertion site. 
 Last US carotid:
 
 ASSESSMENT: 
 Patient is 72 y.o.with the following medical problems:
 
 1. Coronary artery disease, occluded SVG to LCX system, LCX is not amenable to PCI. 
 2. Non-ST elevation MI.
 3. Dysphagia.
 4. Hemoptysis minimal, improved.
 5. Chronic atrial fibrillation. On rate control strategy and anticoagulation. CHADSVASc sco
re of 4. 
 6. AAA.
 7. Coronary artery disease S/P CABG x [id number].
 8. RBBB.
 9. Hypertension blood pressures controlled.
 10. Chronic obstructive pulmonary disease.
 11. History of upper GI bleed due to peptic ulcer was off any antiplatelet therapy.
 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
 
 Recommendations:
 Please provide Prescription for Clopidogrel for 3 months. 
 Stop aspirin for increased risk of bleed given the patient need to be restarted on Rivaroxa
ban.
 Continue to monitor H and H and any sign of increased hemoptysis. 
 Once Clopidogrel stopped will restart aspirin. 
 Patient has been off Rivaroxaban for 4 days with no change in patient's hemoptysis.
 Continue with Losartan, statin.
 Patient should be restarted on Rivaroxaban. 
 Not a candidate for BB. HR is controlled rate atrial fibrillation.
 Will follow up as an outpatient.  
 
 Code Status:  Full Code
 
 María Akins MD
 2018 
 
  
  Electronically signed by María Akins MD at 2018  9:55 AM PDTConversion Transac
tion, Provider Unknown - 2018  6:22 AM PDTFormatting of this note might be different f
rom the original.
 Nurse Progress Note by Rainer Morfin RN at 18  
  Author:  Rainer Morfin RN Service:  (none) Author Type:  Registered Nurse 
  Filed:  18 Date of Service:  18 Status:  Signed 
  :  Rainer Morfin RN (Registered Nurse)   
   
 VSS and pt afebrile.  SpO2 maintained > 90% on RA. Pt reported feeling SOB. RT at bedside f
or neb treatment, improvement noted.
 
 Pt continues to refuse nitrobid, stating "it gives me too big of a headache".
 
 Pt ambulating in room, tolerating well.
 
 No acute changes from previous assessment. Chart check completed by this RN. Rainer Morfin RN
  
  Electronically signed by Conversion Transaction, Provider at 2019  7:00 AM PDTConver
ruben Transaction, Provider Unknown - 08/10/2018  6:44 PM PDTFormatting of this note might be
 different from the original.
 Nurse Progress Note by Yamel Aranda RN at 08/10/18 1844  
  Author:  Yamel Aranda RN Service:  (none) Author Type:  Registered Nurse 
  Filed:  08/10/18 1854 Date of Service:  08/10/18 1844 Status:  Signed 
  :  Yamel Aranda RN (Registered Nurse)   
   
 VSS this shift. Pt continued on IV ABX. Plavix started. Speech consulted with patient and p
ts wife about plan for diet at home. Pt resting this shift, no questions or concerns at this
 time. Chart check complete.
 
 Yamel Aranda RN
 
  
  Electronically signed by Conversion Transaction, Provider at 2019  7:09 AM PDTConver
ruben Transaction, Provider Unknown - 08/10/2018  3:28 PM PDTFormatting of this note might be
 different from the original.
 Case Management by Sapna Dupree RN at 08/10/18 1528  
  Author:  Sapna Dupree RN Service:  (none) Author Type:  Registered Nurse 
  Filed:  08/10/18 1529 Date of Service:  08/10/18 1528 Status:  Signed 
  :  Sapna Dupree RN (Registered Nurse)   
   
 Discharge Planning: no change at this time to ADC plan of home with spouse.
  
  Electronically signed by Conversion Transaction, Provider at 2019  7:05 AM Andrew Joseph MD - 08/10/2018  3:16 PM PDTFormatting of this note might be different from the or
iginal.
 Progress Notes by Andrew Ryan MD at 08/10/18 0456  
  Author:  Andrew Ryan MD Service:  Hospitalist Author Type:  Physician 
  Filed:  08/10/18 1610 Date of Service:  08/10/18 1516 Status:  Signed 
  :  Andrew Ryan MD (Physician)   
   
   Hospitalist
 Progress Note
 
 Daryl Shane   72 y.o.  [id number]
 8108/8108-1 male   [facility] Day:   LOS: 2 days 
 
      Patient Summary:   72-year-old gentleman with a past medical history of chronic atrial
 
 fibrillation on anticoagulation, history of vocal cord cancer status post treatment, corona
ry artery disease status post CABG in , history of tobacco abuse   disorder and COPD, hi
story of peptic ulcer disease and GI bleed, history of dysphagia on and off for the last 9 y
ears, who recently had abnormal cardiac stress test study who went  to [facility] 
with ongoing productive cough, shortness of breath and hemoptysis, where he was found to hav
e positive troponin and chest x-ray showed pneumonia and [facility].  The patient was not put on any anticoagulation se
condary to ongoing hemoptysis and Dr. Akins from cardiology consulted who recommended to 
get a CT chest and speech evaluation for ongoing dysphagia before cardiac workup.  The patie
nt's pneumonia is likely secondary to aspiration, hence  antibiotic changed to Unasyn.
 
 Patient CT scan of the chest without contrast showed pulmonary vascular congestion, edema a
nd centrilobular emphysema, pulmonary infiltrates asymmetrical throughout the left hemithora
x.echo showed ejection fraction of 55%.patient underwent cardiac cath today which showed sev
ere three-vessel disease, patent SVG to RCA and LIMA to LAD, occluded SVG to the left circum
flex system, severely calcified left circumflex not amenable to PCI and recommend medical ma
nagement only.
 
 When I went to see the patient he was resting comfortably on the bedside 
 complaining of shortness of breath as he refused  nebulizer treatment in the morning otherw
ise he  denied any chest pain no nausea no vomiting no abdominal pain no constipation or mandy
rrhea though still complaining of cough bringing up yellowish color phlegm mixed with scant 
amount of blood less than yesterday.
 
 Scheduled Medications 
   ampicillin-sulbactam  3 g Intravenous Q6H 
   atorvastatin  40 mg Oral Nightly 
   azithromycin  500 mg Intravenous Q24H 
   budesonide  0.5 mg Nebulization BID 
   clopidogrel  75 mg Oral Daily 
   digoxin  0.125 mg Oral Daily 
   famotidine  40 mg Oral Daily 
   furosemide  20 mg Oral Daily 
   guaiFENesin  600 mg Oral TID 
   ipratropium-albuterol  3 mL Nebulization Q6H 
   levothyroxine  75 mcg Oral QAM AC 
   losartan  50 mg Oral Daily 
   nitroGLYCERIN  0.5 inch Topical Q6H 
   [START ON 2018] rivaroxaban  20 mg Oral Daily with dinner 
   sertraline  100 mg Oral Daily 
   sodium chloride (PF)  10 mL Intravenous Q8H 
   tamsulosin  0.4 mg Oral after dinner 
 
 Continuous Infusions 
 
 PRN Medications
 acetaminophen **OR** acetaminophen, atropine sulfate, fentaNYL, lidocaine, morphine, nitroG
LYCERIN, polyethylene glycol, sodium bicarbonate buffer, sodium chloride (bolus), zolpidem
 
 Allergy:
 Allergies    
 Allergen   Reactions 
   Beta Adrenergic Blockers  Anaphylaxis 
   Diltiazem  Edema 
   Gabapentin  Other (See Comments) 
   CNS  
   Imipramine  Other (See Comments) 
   Urinary retention   
   Metoprolol  Swelling 
   Propranolol  Other (See Comments) 
 
   raynaud's   
 
 OBJECTIVE
 Vital Signs:
 /55 (BP Location: Left upper arm)  | Pulse 67  | Temp 97.8 F (36.6 C) (Oral)  | R
scott 18  | Ht 1.727 m (5' 8")  | Wt 71.8 kg (158 lb 4.8 oz)  | SpO2 97%  | BMI 24.07 kg/m 
 
 Temp:  [97.5 F (36.4 C)-99.6 F (37.6 C)] 97.8 F (36.6 C) (08/10 1110)
 BP: (116-179)/(55-90) 116/55 (08/10 1110)
 Heart Rate:  [52-94] 67 (08/10 1458)
 Resp:  [16-22] 18 (08/10 1458)
 SpO2:  [94 %-99 %] 97 % (08/10 1458)
 
 I&O Detailed Table:
  
 
 Intake/Output Summary (Last 24 hours) at 08/10/18 1516
 Last data filed at 08/10/18 1117
  Gross per 24 hour 
 Intake            990.5 ml 
 Output              900 ml 
 Net             90.5 ml 
 
 Hemodynamics Last 24hrs:   
 
 Examination:
 
 Constitutional: Alert and oriented to person, place, and time. Appears in NAD 
 
 Cardiovascular: Normal rate, irregular irregular rhythm, normal heart sounds and intact dis
dash pulses.  Exam reveals no gallop and no friction rub.  No murmur heard.
 
 Pulmonary/Chest: Effort normal and breath sounds normal. No stridor. No respiratory distres
s.  no wheezes. no rales. exhibits no tenderness. Except decreased BS at bases 
 
 Abdominal: Soft. Bowel sounds are normal. exhibits no distension and no mass. There is no t
enderness. There is no rebound and no guarding. 
 
 Musculoskeletal: Normal range of motion.exhibits no tenderness.  exhibits no edema. 
   
 Neurological:  Alert and oriented to person, place, and time. Has normal reflexes.  display
s normal reflexes. No cranial nerve deficit.  Exhibits normal muscle tone. 
 
 Skin: Skin is warm and dry. No rash noted. No erythema. No pallor. 
 
 Psychiatric: Has a normal mood and affect. Behavior is normal. Judgment normal. 
 
 Laboratory:
 
 Glucose:
 Results  
  Procedure Component Value Units Date/Time 
  Iron panel [[id number]]    Collected:  08/10/18 1443 
  Specimen:  Blood    Updated:  08/10/18 1450 
  Vitamin B12 [[id number]]    Collected:  08/10/18 1443 
  Specimen:  Blood    Updated:  08/10/18 1450 
  Ferritin [[id number]]    Collected:  08/10/18 1443 
  Specimen:  Blood    Updated:  08/10/18 1450 
  Folate [[id number]]    Collected:  08/10/18 1443 
  Specimen:  Blood    Updated:  08/10/18 1450 
 
  CBC W/Auto Diff (Reflex to Manual) [[id number]]  (Abnormal)    Collected:  08/10/18 1000 
  Specimen:  Blood    Updated:  08/10/18 1349 
   WBC 7.51 K/uL  
   RBC 3.63 (L) M/uL  
   HGB 9.4 (L) g/dL  
   HCT 27.9 (L) %  
   MCV 77.0 (L) fl  
   MCH 26.1 (L) pg  
   MCHC 33.8 g/dL  
   RDW SD 58.2 (H) fl  
   PLT    
    PLATELETS CLUMPED, APPEAR ADEQUATE   
    K/uL   
   DIFF TYPE AUTOMATED   
   NEUTROPHILS 82.55 %  
   LYMPHOCYTES 8.04 %  
   MONOCYTES 5.49 %  
   EOSINOPHILS 3.36 %  
   BASOPHILS 0.56 %  
   NEUTROPHILS ABS 6.20 K/uL  
   LYMPHOCYTES ABS 0.60 (L) K/uL  
   MONOCYTES ABS 0.41 K/uL  
   EOSINOPHILS ABS 0.25 K/uL  
   BASOPHILS ABS 0.04 K/uL  
   MORPHOLOGY 2+   
  Comprehensive metabolic panel [[id number]]  (Abnormal)    Collected:  08/10/18 1000 
  Specimen:  Blood    Updated:  08/10/18 1332 
   SODIUM 139 mmol/L  
   POTASSIUM 3.8 mmol/L  
   CHLORIDE 105 mmol/L  
   CO2 24 mmol/L  
   ANION GAP AGAP 14 mmol/L  
   GLUCOSE 137 (H) mg/dL  
   BUN 15 mg/dL  
   CREATININE 1.2 mg/dL  
   BUN/CREAT 13   
   CALCIUM 8.4 (L) mg/dL  
   TOTAL PROTEIN 5.5 (L) g/dL  
   Albumin 2.4 (L) g/dL  
   GLOBULIN 3.1 g/dL  
   A/G 0.8 (L)   
   TBIL 0.8 mg/dL  
   ALK PHOS 87 U/L  
   AST 20 U/L  
   ALT 16 U/L  
   EGFR 59 (L) mL/min/1.73m2  
  Sputum culture [[id number]]    Collected:  18 0400 
  Specimen:  Sputum from Sputum    Updated:  18 1603 
   Specimen Description SPUTUM   
   GRAM STAIN GREATER THAN 10 WBCS/LPF   
   GRAM STAIN GREATER THAN 10 SEC/LPF   
   GRAM STAIN 4+   
   GRAM STAIN GRAM POSITIVE COCCI   
   GRAM STAIN 1+   
   GRAM STAIN GRAM NEGATIVE RODS   
   GRAM STAIN 3+   
   GRAM STAIN GRAM POSITIVE RODS   
   GRAM STAIN 2+   
   GRAM STAIN YEAST   
   CULTURE SMEAR CONTAINS GREATER THAN 10 SEC/LPF SUGGESTIVE OF POOR QUALITY SPECIMEN.  SPEC
 
IMEN WILL NOT BE CULTURED OR WILL BE CULTURED BY SPECIAL REQUEST ONLY.  PLEASE RECOLLECT IF 
CLINICALLY INDICATED.  SPECIMEN WILL BE HELD 48 HOURS.   
  Troponin I [[id number]]  (Abnormal)    Collected:  18 0551 
  Specimen:  Blood    Updated:  18 0658 
   TROPONIN I 1.28 (HH) ng/mL  
  CBC W/Auto Diff (Reflex to Manual) [[id number]]  (Abnormal)    Collected:  18 0157 
  Specimen:  Blood    Updated:  18 0437 
   WBC 11.05 (H) K/uL  
   RBC 4.56 M/uL  
   HGB 11.5 (L) g/dL  
   HCT 35.3 (L) %  
   MCV 77.5 (L) fl  
   MCH 25.1 (L) pg  
   MCHC 32.4 g/dL  
   RDW SD 57.3 (H) fl  
    K/uL  
   MPV 10.1 fl  
   DIFF TYPE MANUAL   
   Neutrophils Manual 85 %  
   Lymphocytes Manual 8 %  
   Monocytes Manual 6 %  
   Eosinophils Manual 1 %  
   Neutrophils Absolute 9.40 (H) K/uL  
   Lymphocytes Absolute 0.88 (L) K/uL  
   Monocytes Absolute 0.66 K/uL  
   Eosinophils Absolute 0.11 K/uL  
   Platelet Estimate ADEQUATE   
   MORPHOLOGY 2+   
  Magnesium [[id number]]    Collected:  18 
  Specimen:  Blood    Updated:  18 
   MAGNESIUM 1.8 mg/dL  
  Phosphorus [[id number]]    Collected:  18 
  Specimen:  Blood    Updated:  18 
   PHOSPHORUS 3.4 mg/dL  
  TSH [[id number]]    Collected:  18 
  Specimen:  Blood    Updated:  18 
   TSH 4.150 uIU/mL  
  Basic metabolic panel [[id number]]  (Abnormal)    Collected:  18 
  Specimen:  Blood    Updated:  18 
   SODIUM 138 mmol/L  
   POTASSIUM 4.3 mmol/L  
   CHLORIDE 104 mmol/L  
   CO2 24 mmol/L  
   ANION GAP AGAP 14 mmol/L  
   GLUCOSE 75 mg/dL  
   BUN 14 mg/dL  
   CREATININE 1.3 mg/dL  
   BUN/CREAT 11   
   CALCIUM 8.6 mg/dL  
   EGFR 54 (L) mL/min/1.73m2  
  Lipid panel [[id number]]  (Abnormal)    Collected:  18 
  Specimen:  Blood    Updated:  18 
   CHOLESTEROL 103 mg/dL  
   Triglycerides 116 mg/dL  
   HDL CHOL 24 (L) mg/dL  
   LDL CALC 56 mg/dL  
  Troponin I [[id number]]  (Abnormal)    Collected:  18 
  Specimen:  Blood    Updated:  18 0251 
   TROPONIN I 1.72 (HH) ng/mL  
  Protime-INR [[id number]]    Collected:  18 
 
  Specimen:  Blood    Updated:  18 0221 
   INR 1.2   
  Septic Lactic Acid [[id number]]    Collected:  18 2350 
      Updated:  18 0023 
   LACTIC ACID 1.1 mmol/L  
  Troponin I [[id number]]  (Abnormal)    Collected:  18 2212 
  Specimen:  Blood    Updated:  18 225 
   TROPONIN I 1.76 (HH) ng/mL  
  
 
 CBC:  
 Lab Results    
 Component  Value Date 
  WBC 7.51 08/10/2018 
  RBC 3.63 (L) 08/10/2018 
  HGB 9.4 (L) 08/10/2018 
  HCT 27.9 (L) 08/10/2018 
  MCV 77.0 (L) 08/10/2018 
  MCH 26.1 (L) 08/10/2018 
  MCHC 33.8 08/10/2018 
  RDW 58.2 (H) 08/10/2018 
  PLT PLATELETS CLUMPED, APPEAR ADEQUATE 08/10/2018 
  MPV 10.1 2018 
  DIFFTYPE AUTOMATED 08/10/2018 
 
 CMP:  
 Lab Results    
 Component  Value Date 
   08/10/2018 
  K 3.8 08/10/2018 
   08/10/2018 
  CO2 24 08/10/2018 
  ANIONGAP 14 08/10/2018 
  GLUF 137 (H) 08/10/2018 
  BUN 15 08/10/2018 
  CREATININE 1.2 08/10/2018 
  BCR 13 08/10/2018 
  CA 8.4 (L) 08/10/2018 
  PROT 5.5 (L) 08/10/2018 
  ALB 2.4 (L) 08/10/2018 
  GLOB 3.1 08/10/2018 
  BILITOT 0.8 08/10/2018 
  ALP 87 08/10/2018 
  AST 20 08/10/2018 
  ALT 16 08/10/2018 
  EGFR 59 (L) 08/10/2018 
 
 Magnesium:  
 Lab Results    
 Component  Value Date 
  MG 1.8 2018 
 
 Phosphorus:  
 Lab Results    
 Component  Value Date 
  PHOS 3.4 2018 
 
 PT/INR:  
 Lab Results    
 Component  Value Date 
 
  INR 1.2 2018 
 
 Last 3 Troponin:  
 Lab Results    
 Component  Value Date 
  TROPONINI 1.28 () 2018 
  TROPONINI 1.72 () 2018 
  TROPONINI 1.76 () 2018 
 
 ABG:  No results found for: POCPH, POCPCO, POCPO2, POCHCO, POCTCO2, POCBD, BEART, POCSO2, P
OCCMT
 
 Xr Chest 2 View
 
 Result Date: 2018
 HISTORY: Precordial chest pain. Question pneumonia. COMPARISON: 18. TECHNIQUE: PA and
 lateral films of the chest. FINDINGS: Median sternotomy. Heart size is upper normal. Pulmon
ramón venous congestion. Bilateral perihilar mixed interstitial and airspace infiltrates again
 noted, essentially unchanged. No effusions. Subtle thickening of the major and minor fissur
es. Mild degenerative changes of the spine. Osteopenia. 
 
 1.  Borderline cardiac enlargement, with probable pulmonary edema. Atypical pneumonitis see
ms less likely. Electronically signed by Yonatan Montague MD on 2018 11:34 AM
 
 X-ray Chest 2 View Frontal & Lateral
 
 Result Date: 2018
 This is a non-reportable procedure without a radiologist report and is used for image stora
ge only
 
 Ct Head Without Contrast
 
 Result Date: 2018
 This is a non-reportable procedure without a radiologist report and is used for image stora
ge only
 
 Ct Chest Without Contrast
 
 Result Date: 2018
 HISTORY: 72 years year-old Male, hemoptysis. TECHNIQUE: CT through the chest. Noncontrast e
xamination. Automatic dose adjustment to minimize patient exposure. PRIOR EXAMINATION: [facility] chest radiograph. FINDINGS:  demonstrates cardiomegaly, sternal wires and dense reti
cular interstitial lung disease throughout the left mid chest. Lung windows demonstrate bila
teral reticular and confluent infiltrates throughout mid lower lung fields. In the posterior
 medial left lung on image 81 series 3 in underlying mass would be difficult to exclude, but
 this is simply a larger multiple similar findings throughout both lungs. Inflammatory nodul
es and/or inflammatory masses are very plausibly superimposed In the mid central superior le
ft lung the lung disease is more cystic in appearance, asymmetric centrilobular emphysema/CO
PD and superimposed edema edema Bone windows demonstrate degenerative and postprocedural clare
nges, without acute appearing lesion or active fracture. The sternotomy is healed. Nonaggres
sive and degenerative changes not uncommon for age. What is seen of the upper abdomen demons
trates fatty liver. Splenomegaly. No adenopathy or fluid collection discernible. Large hiatu
s hernia. Within the chest dense coronary calcification, interventional changes, diffuse car
diac megaly and hilar vascular congestion symmetrically distributed in midlung fields. Elizabeth
es post CABG. 
 
 1. At the very least there is pulmonary vascular congestion, edema-and centrilobular emphys
ema is superimposed 2. Pulmonary infiltrates are asymmetric and throughout the left hemithor
ax. This could be due to superimposed infection of the left upper lung and there is a small 
effusion also 3. Lastly, there are inflammatory appearing airspace or infiltrative nodules s
 
uperimposed throughout Will be important to repeat this examination when the patient's acute
 issues are resolved to evaluate for underlying lesion/mass Electronically signed by Juan Francisco Conn MD on 2018 12:01 PM
 
 X-ray Swallowing Function Video
 
 Result Date: 2018
 This is a non-reportable procedure without a radiologist report and is used for image [employer]
[place] only
 
 Cl Coronary Angio With Grafts
 
 Result Date: 8/10/2018
 Accession:  [id number] ____________________________________________________________________ DA
TE OF BIRTH:  1945. PROCEDURE: 1. Right femoral access with ultrasound guidance. 2. Le
ft heart catheterization. 3. Coronary angiogram. 4. SVG angiogram to the RCA. 5. SVG angiogr
am to the left circumflex. 6. LIMA to LAD angiogram. INDICATION:  This is a 72-year-old male
 who presented to the hospital because of shortness of breath, pulmonary edema, and elevatio
n of cardiac enzymes.  For further details, refer to my consultation note.  Given the above 
findings, left heart catheterization was recommended.  The patient had previous history of C
ABG times 4 in . PROCEDURE NOTE:  The patient was brought electively to the heart cathet
erization lab.  Consent was obtained after reviewing risks and benefits.  Timeout was done a
t the bedside.  The patient was prepped in the usual sterile manner, received IV fentanyl vi
a independent observer.  Right groin was anesthetized with 1 percent lidocaine.  Using ultra
sound and a micropuncture needle, right femoral access was obtained.  A 5-French sheath was 
introduced without any difficulty.  A 0.035 wire was used and advanced under fluoroscopic gu
idance into the ascending aorta.  A 5-French JL4 catheter was used and advanced over the wir
e and placed selectively in the left coronary cusp.  Wire was removed.  Catheter was flushed
.  Selectively engaged the left coronary system.  Contrast was injected.  Multiple views wer
e obtained.  Exchange over the wire was done with JR4 catheter that was placed selectively i
n the right coronary cusp, selectively engaged the right coronary artery, and multiple views
 were obtained.  Attempted to engage the SVG to RCA and the obtuse marginal with the JR4 cat
heter; however, was unsuccessful.  Then JR4 catheter was used to cannulate the left subclavi
an artery and then a long 0.035 wire was used to exchange with a LIMA catheter that selectiv
ely engaged the LIMA graft and multiple views were obtained.  Exchange over the wire was don
e with a 5-French multipurpose catheter that selectively engaged the right SVG to RCA graft.
 Multiple views were obtained.  Exchange over the wire was done with 5-French AL1 catheter t
hat selectively engaged the SVG to left circumflex system graft.  Multiple views were obtain
ed.  Finally, the catheter was removed over the wire.  Hemostasis was achieved by TR band. C
ONTRAST USED:  100 mL. BLOOD LOSS:  Less than 10. COMPLICATIONS:  None. HEMOSTASIS:  By manu
al pressure. ANGIOGRAPHIC FINDINGS: 1. Left main is large caliber vessel with normal origin.
  Bifurcates to LAD and left circumflex with mild diffuse disease. 2. LAD is 100 percent pro
ximally occluded. 3. Left circumflex is a small to moderate vessel that is severely calcifie
d, has multiple lesions and diffusely diseased.  Distally it is very small and severely calc
ified.  Has 90 percent proximal and subtotal occlusion distal.  However again smaller and se
verely calcified.  No competitive flow was seen. 4. RCA has 100 percent ostial occlusion. 5.
 SVG to RCA is widely patent with mildly diffuse disease. 6. SVG to left circumflex system i
s diffusely diseased with severe degenerative disease; however, occluded at the insertion si
te.  The jump graft of the 2nd obtuse marginal is occluded.  LIMA to LAD is widely patent. C
ONCLUSION: 1. Severe 3-vessel coronary artery disease. 2. Patent SVG to RCA and LIMA to LAD.
 3. Occluded SVG to the left circumflex system, which is a jump graft. 4. Severely calcified
 left circumflex, which is small caliber severely calcified. However, not amenable to any PC
I. RECOMMENDATIONS:  Given the above findings, the patient will continue medical therapy for
 coronary artery disease.  Will be restarted on antiplatelet therapy and will be on optimiza
tion of blood pressure control, statin therapy, and will be re-started on anticoagulation fo
r atrial fibrillation.  Further recommendations to follow. Read by MARÍA AKINS MD 2018 07:56 P Electronically signed by María Akins MD on 8/10/2018 6:24 AM
 
 Echo Cardiac Adult Complete
 
 
 Result Date: 2018
 Patient Name:  DARYL SHANE YOB: 1945 Accession:  [id number] Performing Physici
an:   María Akins _______________________________________________________________ INDIC
ATIONS ----------- sob,h/o 4 cabg CONCLUSIONS ----------- 1. The left ventricle cavity is no
rmal in size, mild concentric hypertrophy and  normal systolic function EF 55%. Mild inferio
r and inferolateral hypokinetic segments. 2. Mild degenerative changes in the aortic and bhupinder
ral valves. 3. There is no pericardial effusion. FINDINGS -------- ECG rhythm: Atrial fibril
lation. Study: A 2-dimensional transthoracic echocardiogram with m-mode, spectral and color 
flow Doppler was perfomed. Study: This was a technically adequate study. Left Ventricle: Ove
rall left ventricular systolic function is low-normal with, an EF 55 %. Left Ventricle: The 
left ventricle cavity size is normal. Left Ventricle: There is mild concentric left ventricu
lar hypertrophy. Right Ventricle: The RV was not well visualized. Left Atrium: The left atri
um is mildly dilated. Right Atrium: The right atrial size is normal. Aortic Valve: The aorti
c valve is trileaflet. Aortic Valve: The aortic valve is mildly calcified. Aortic Valve: The
re is mild aortic regurgitation. Mitral Valve: There is trace mitral regurgitation. Mitral V
alve: Mild mitral annular calcification present. Tricuspid Valve: The tricuspid valve appear
s structurally normal. Tricuspid Valve: Trace tricuspid regurgitation present. Tricuspid Layla
ve: There is no evidence of pulmonary hypertension. Pulmonic Valve: The pulmonic valve is no
rmal. Pulmonic Valve: Mild pulmonic regurgitation. Pulmonic Valve: Mild degenerative changes
 in the aortic and mitral valves. Pericardium: There is no pericardial effusion. Pericardium
: No pleural effusion seen. IVC/Hepatic Veins: The inferior vena cava is normal in size and 
collapses > 50 % with sniff, indicating normal central venous pressures. MEASUREMENTS ------
------- Ao asc:   4.19 cm Ao sinus:   3.71 cm Ao st junct:   3.01 cm IVC:   1.42 cm LA Diam:
   5.50 cm LA Major:   5.97 cm EDV(Teich):   106.74 ml IVSd:   1.19 cm LVIDd:   4.78 cm LVPW
d:   1.21 cm LVOT Diam:   2.25 cm %FS:   26.84 % EF(Teich):   52.32 % ESV(Teich):   50.89 ml
 IVSs:   1.81 cm LVIDs:   3.50 cm LVPWs:   1.71 cm SV(Teich):   55.85 ml RA Major:   5.04 cm
 LVEF MOD A4C:   55.16 % SV MOD A4C:   49.58 ml LVEDV MOD A4C:   89.88 ml LVLd A4C:   7.50 c
m LVESV MOD A4C:   40.30 ml LVLs A4C:   5.87 cm LAESV(A-L):   76.74 ml LAESV Index (A-L):   
41.48 ml/m2 LAAs A2C:   17.05 cm2 LAESV A-L A2C:   48.56 ml LALs A2C:   5.08 cm LAAs A4C:   
26.95 cm2 LAESV A-L A4C:   95.07 ml LALs A4C:   6.48 cm Ao Diam:   4.03 cm AV Cusp:   1.91 c
m LA Diam:   4.92 cm LA/Ao:   1.22 D-E Excursion:   2.29 cm E-F Powell:   0.09 m/s AV maxPG: 
  10.18 mmHg AV meanP.51 mmHg AV Vmax:   1.59 m/s AV Vmean:   1.23 m/s AV VTI:   28.06
 cm KIRK Vmax:   3.26 cm2 KIRK (VTI):   3.35 cm2 AVAI Vmax:   0.00 cm2/m2 AVAI (VTI):   0.00 c
m2/m2 LVOT maxP.86 mmHg LVOT meanPG:   3.16 mmHg LVSI Dopp:   50.95 ml/m2 LVSV Dopp:  
 94.27 ml LVOT Vmax:   1.31 m/s LVOT Vmean:   0.84 m/s LVOT VTI:   23.69 cm MV E Luis:   0.93
 m/s E' Lat:   0.12 m/s E' Sept:   0.06 m/s PRend P.85 mmHg PRend Vmax:   1.48 m/s HR:
   77.27 BPM PV maxPG:   3.37 mmHg PV meanP.56 mmHg PV Vmax:   0.91 m/s PV Vmean:   0.
56 m/s PV VTI:   12.48 cm RV S':   0.08 m/s TR maxP.05 mmHg TR Vmax:   3.04 m/s TV A 
Luis:   0.00 m/s TV Dec Powell:   3.93 m/s2 TV Dec Time:   207.73 ms TV E Luis:   0.56 m/s TV E
/A Ratio:   69.55 Sonographer: PADMINI Authenticated by: María Akins Report Date/Time: 
018 13:7:33 
 
 1. The left ventricle cavity is normal in size, mild concentric hypertrophy and  normal sys
tolic function EF 55%. Mild inferior and inferolateral hypokinetic segments. 2. Mild degener
ative changes in the aortic and mitral valves. 3. There is no pericardial effusion.
 
 [facility]
 
 Result Date: 2018
 This Point of Care (POC) ultrasound image has been reviewed and interpreted by the physicia
n identified as the performing physician in the associated interpretation and report. 
 
 PROBLEM LIST
 
 Principal Problem:
   NSTEMI (non-ST elevated myocardial infarction) (HCC)
 Active Problems:
   Aortic aneurysm (HCC)
   ASCVD (arteriosclerotic cardiovascular disease)
   Chronic atrial fibrillation (HCC)
 
   Essential hypertension
   Precordial pain
   Hemoptysis
   Multifocal pneumonia
   Chronic bronchitis (HCC)
   Chronic maxillary sinusitis
   Obstructive sleep apnea
   Peptic ulcer disease
   Moderate protein-calorie malnutrition (HCC)
   Severe protein-calorie malnutrition (HCC)
   Dysphagia
   Aspiration pneumonia (HCC)
 
 ASSESSMENT & PLAN
 
 Non-ST elevation myocardial infarction.  Patient currently chest pain free and troponin is 
minimally elevated around  1 status post cardiac cath which showed
 an three-vessel disease and recommend medical management
 PLAN:
 as per discontinued patient started on Plavix, continue atorvastatin, the patient is allerg
ic to BETA BLOCKER hence we will avoid it and avoid heparin drip as patient has mild hemopty
sis which can get worse
 appreciate cardiology input
 2.  Aspiration pneumonia on Unasyn Day #2 doing better  and Speech consulted who recommende
d mechanical soft diet 
 
 3.  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
t 
 
 4.  Hypertension well controlled on Losartan.
 
 5.  BPH stable on Flomax.
 
 6. Chronic atrial fibrillation.  Heart rate well controlled on Digoxin and Xarelto on hold 
in setting of hemoptysis.
 
 #7 acute COPD exacerbation likely secondary to pneumonia
 Plan continue patient on duo nebs hold Symbicort and changed to Pulmicort twice a day
 
 #8 Anemia secondary to on going hemoptysis and check complete iron panel B12 folic to rule 
out nutrition deficiencies
 
 I spent more than 35 minutes.
 
 ANDREW RYAN MD
 8/10/2018
 
  
  Electronically signed by Andrew Ryan MD at 08/10/2018  4:10 PM PDTConversion Transactio
n, Provider Unknown - 08/10/2018  2:22 PM PDTFormatting of this note might be different from
 the original.
 Progress Notes by Lexii Amador CCC-SLP at 08/10/18 1422  
  Author:  SOL AkbarSLP Service:  (none) Author Type:  Speech and Language Path
ologist 
  Filed:  08/10/18 1423 Date of Service:  08/10/18 1422 Status:  Signed 
  :  Lexii Amador CCC-SLP (Speech and Language Pathologist)   
   
 BEDSIDE SWALLOW
 
 SLP Last Visit
 
 SLP Received On: 08/10/18
 Requires SLP Follow Up: Yes
 
 Recommendations
 Liquids Consistency Recommendations: NPO/No liquids, Sips of thin water ok between meals-wa
it 30 min, Ice chips for oral comfort, Other (comments) (sips thin liquid in btwn meals on; 
DRY tray)
 Diet Consistency Recommendation: Mechanical Soft, Other (comment) (2-3 swallows per bite/si
p, Jello and Ice cream okayd per MD)
 
 Recommendations: Dysphagia treatment, Set up with meals, Check on patients frequently throu
ght out meals
 Risk for Aspiration: Mild
 Compensatory Swallowing Strategies: Upright as possible for all oral intake, Remain upright
 for 30 minutes after meals, Swallow 2 times per bite/sip, Slow rate presentation, Small bit
es/sips, Eat/feed slowly, Effortful swallow, Other (Comment) (Strict oral hygiene, 2-3 swall
ows per bite/sip)
 
 Recommended Form of Meds: Meds floated in puree, Meds crushed in puree
 
 Summary: Pt and spouse seen for dysphagia follow-up treatment to ensure understanding of MB
SS results and recommendations. Provided and reviewed handouts explaining diet restrictions 
and consistencies. Pt/spouse demo'd understanding able to provide examples of "safe" foods. 
Pt requesting specific mixed consistencies (i.e.. Jello, Ice cream) understanding elevated r
isk of aspiration. ST paged MD to clear these specific items that are outside of current die
t recommendations. Recommend pt continue mechanical soft diet with dry tray and implementing
 swallow precautions (2-3 swallows per bite/sip, slow rate, smal bites/sips).Recommend thin 
liquids only outside of meals with implemented swallow precautions. Recommend meds crushed/f
loated in puree. ST to follow-up x 1 if indicated, if family/pt with additional questions. 
 
 Staff Notified: MD, RN
 
 Plan of Care
 Treatment Plan: ST to follow, Dysphagia treatment
 Treatment Frequency: Followup x 1
 Care Duration (Days): 2 Days
 Follow up treatments: Patient/Family education, Diet tolerance monitoring
 
 Swallowing Treatment: Yes
 
 Patient Assessment
 Respiratory Status: O2 via nasual cannula
 History of Intubation: No
 Behavior/Cognition: Alert, Cooperative
 Dentition: Adequate
 
 Goals are progressing unless otherwise indicated.
 
 Dysphagia Goals
 Long Term Goals: Safe/efficient oral intake
 Pt will have safe/efficient oral intake : Mechanical soft diet, With min cues, Goal progres
sing
 Short Term Goals: Tolerate liquid consistency
 Pt will tolerate tolerate liquid consistency : Thin liquids, With min supervision, Goal pro
gressing
 
 Education Completed 
 Education Topics: 
 Dysphagia: Explain results of session, speech-language pathology role, plan of care, most s
afe diet and swallow precautions
 
 Completed with: [x] Patient   [x] Spouse   [] Significant other   [] Family   [] Caregiver 
  [] Other
 Completed by:           [x] Verbal education    [] Demonstration   [x] Handout   [] Other:
 Response to Education: [x] Stated Understanding    [] Reinforcement necessary     [] Return
ed demonstration   [] Demonstrated understanding  [] No evidence of learning  [] Refused 
 Lexii Amador CCC-SLP   
  
  Electronically signed by Irvin Transaction, Provider at 2019  7:06 AM PDTConver
ruben Transaction, Provider Unknown - 08/10/2018  5:58 AM PDTFormatting of this note might be
 different from the original.
 Nurse Progress Note by Rainer Morfin RN at 08/10/18 0558  
  Author:  Rainer Morfin RN Service:  (none) Author Type:  Registered Nurse 
  Filed:  08/10/18 0559 Date of Service:  08/10/18 0558 Status:  Signed 
  :  Rainer Morfin RN (Registered Nurse)   
   
 Pt arrived on unit from IR at  with FemoStop in place at 40 mmHg. Post op VSS and pt af
ebrile.  SpO2 maintained > 88% on 1-2L NC.  FemoStop removed, site remains soft and dry. 
 
 Pt ambulating in room, tolerating well. Clear liquids tolerated well, advanced to Full liqu
id for breakfast.
 
 No acute changes from previous assessment. Chart check completed by this RN. Rainer Morfin RN
  
  Electronically signed by Conversion Transaction, Provider at 2019  7:10 AM María Luo ra, MD - 08/10/2018  5:46 AM PDTFormatting of this note might be different from the
 original.
 Progress Notes by María Akins MD at 08/10/18 0546  
  Author:  María Akins MD Service:  Cardiology Author Type:  Physician 
  Filed:  08/10/18 0711 Date of Service:  08/10/18 0546 Status:  Signed 
  :  María Akins MD (Physician)   
   
 Formerly [facility]
 Service:  Cardiology
 Progress Note
 
 Name of Consultant: María Akins MD
 I have seen the patient on 8/10/2018. 
 Had C showed severe disease in LCX and occluded SVG to OM. 
 SUBJECTIVE
 Denies any chest pain
 
 Current Facility-Administered Medications: 
    acetaminophen (TYLENOL) tablet 650 mg, 650 mg, Oral, Q6H PRN **OR** acetaminophen (TYL
ENOL) suppository 650 mg, 650 mg, Rectal, Q6H PRN, Honey Donovan MD
    ampicillin-sulbactam (UNASYN) 3 g in sodium chloride (IV) 0.9 % 100 mL IVPB, 3 g, Intr
avenous, Q6H, Andrew Ryan MD, 3 g at 08/10/18 0410
    aspirin chewable tablet 81 mg, 81 mg, Oral, Daily with breakfast, Andrew Ryan MD, 8
1 mg at 18 9529
    atorvastatin (LIPITOR) tablet 40 mg, 40 mg, Oral, Nightly, Honey Donovan MD, Stop
ped at 18
    atropine sulfate injection 0.5 mg, 0.5 mg, Intravenous, Once PRN, María Akins MD
    azithromycin (ZITHROMAX) 500 mg in sodium chloride (IV) 0.9 % 250 mL IVPB, 500 mg, Int
ravenous, Q24H, Honey Donovan MD, 500 mg at 18 1229
    budesonide-formoterol (SYMBICORT) 80-4.5 MCG/ACT inhaler 2 puff, 2 puff, Inhalation, 2
 times daily, Honey Donovan MD, 2 puff at 18 2253
    digoxin (LANOXIN) tablet 0.125 mg, 0.125 mg, Oral, Daily, Honey Donovan MD, Stopp
ed at 18 224
    famotidine (PEPCID) tablet 40 mg, 40 mg, Oral, Daily, Honey Donovan MD, Stopped a
t 18
 
    fentaNYL (SUBLIMAZE) injection, , , Once PRN, María Akins MD, 50 mcg at 18
    furosemide (LASIX) tablet 20 mg, 20 mg, Oral, Daily, Honey Donovan MD, Stopped at
 18 224
    ipratropium-albuterol (DUO-NEB) 0.5-2.5 mg/3mL nebulizer solution 3 mL, 3 mL, Nebuliza
tion, Q6H, Honey Donovan MD, 3 mL at 08/10/18 0632
    levothyroxine (SYNTHROID) tablet 75 mcg, 75 mcg, Oral, QAM AC, Honey Donovan MD, 
75 mcg at 08/10/18 0543
    lidocaine 1 % injection, , , Once PRN, María Akins MD, 10 mL at 18 1912
    losartan (COZAAR) tablet 50 mg, 50 mg, Oral, Daily, Honey Donovan MD, Stopped at 
18 2248
    morphine (PF) injection 2 mg, 2 mg, Intravenous, Q4H PRN, Andrew Ryan MD, 2 mg at 0
18
    nitroGLYCERIN (NITRO-BID) 2 % ointment 0.5 inch, 0.5 inch, Topical, Q6H, Honey lou MD, 0.5 inch at 18 0613
    nitroGLYCERIN (NITROSTAT) SL tablet 0.4 mg, 0.4 mg, Sublingual, Q5 Min PRN, Honey morse MD
    polyethylene glycol (GLYCOLAX) packet 17 g, 17 g, Oral, Daily PRN, Honey Donovan MD
    sertraline (ZOLOFT) tablet 100 mg, 100 mg, Oral, Daily, Honey Donovan MD, Stopped
 at 18 2248
    sodium bicarbonate buffer (NEUT) injection, , , Once PRN, María Akins MD, 5 mL a
t 18 191
    sodium chloride (bolus) 0.9 % 500 mL, 500 mL, Intravenous, Once PRN, María Akins MD
    saline lock IV, , , Continuous **AND** sodium chloride (PF) 0.9 % flush 10 mL, 10 mL, 
Intravenous, Q8H, Honey Donovan MD, 10 mL at 08/10/18 0410
    tamsulosin (FLOMAX) capsule 0.4 mg, 0.4 mg, Oral, after dinner, Honey Donovan MD,
 0.4 mg at 18 2254
    zolpidem (AMBIEN) tablet 5 mg, 5 mg, Oral, Nightly PRN, María Akins MD
 
 OBJECTIVE
 Vital Signs:
 /56 (BP Location: Left upper arm)  | Pulse 68  | Temp 99.6 F (37.6 C) (Oral)  | R
scott 18  | Ht 1.727 m (5' 8")  | Wt 71.8 kg (158 lb 4.8 oz)  | SpO2 96%  | BMI 24.07 kg/m 
 
 Intake/Output Summary (Last 24 hours) at 08/10/18 0702
 Last data filed at 08/10/18 0544
  Gross per 24 hour 
 Intake            990.5 ml 
 Output              650 ml 
 Net            340.5 ml 
 
 Cardiovascular: Regular rhythm, S1 normal and S2 normal.  
 No murmur heard.
 Pulses:
      Radial pulses are 2+ on the right side, and 2+ on the left side. 
 Pulmonary/Chest: poor air exchange with mild rhonchi bilaterally. 
 Abdominal: Soft. No tenderness. 
 Musculoskeletal: No edema. 
 Neurological: Alert. No cranial nerve deficit. 
 Skin: Warm and dry. 
 
 DATA
 
 Recent Labs
 Lab 18
 0157 
  
 K 4.3 
 
 CO2 24 
 BUN 14 
 CREATININE 1.3 
 EGFR 54* 
 MG 1.8 
 
 Recent Labs
 Lab 18
 0157 
 WBC 11.05* 
 HGB 11.5* 
 HCT 35.3* 
 MCV 77.5* 
  
 
 Recent Labs
 Lab 18
 0551 18
 0157 18
 2212 
 TROPONINI 1.28* 1.72* 1.76* 
 TSH  --  4.150  --  
 
 Lab Results    
 Component  Value Date 
  CHOL 103 2018 
  TRIG 116 2018 
  LDL 56 2018 
  HDL 24 (L) 2018 
  GLUF 75 2018 
  TSH 4.150 2018 
 
 EK2018
 Reviewed by myself showed atrial fibrillation with RBBB. 
 Last Echo: 2018
 Normal LV size and function EF 55%. Inferior and inferolateral hypokinetic segments.
 04/15/2013
 Reported with normal LV size and function EF 56%. RV is normal in size and function. 
 Last Stress test: 2018
 Lexiscan Cardiolite stress test with evidence of ischemia inferior segment and small revers
ible defect as well as in the anterior segment.
 
 Last Cath: 2018
 LM mild disease, % occluded. LCX small caliber calcified with severe disease proxima
l and distal not amenable to PCI. % occluded.
 SVG to distal RCA patent, LIMA to LAD patent.
 SVG to LCX system is occluded at the insertion site. 
 Last US carotid:
 
 ASSESSMENT: 
 Patient is 72 y.o.with the following medical problems:
 
 1. Coronary artery disease, occluded SVG to LCX system, LCX is not amenable to PCI. 
 2. Non-ST elevation MI.
 3. Dysphagia.
 4. Hemoptysis minimal, improved.
 5. Chronic atrial fibrillation. On rate control strategy and anticoagulation. CHADSVASc sco
re of 4. 
 6. AAA.
 7. Coronary artery disease S/P CABG x 46208.
 
 8. RBBB.
 9. Hypertension blood pressures controlled.
 10. Chronic obstructive pulmonary disease.
 11. History of upper GI bleed due to peptic ulcer was off any antiplatelet therapy.
 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
 
 Recommendations:
 Patient tolerated procedure well. Given the recent cardiac event and the above coronary fin
dings, will start patient on Clopidogrel for 3-6 months.
 Stop aspirin for increased risk of bleed given the patient need to be restarted on Rivaroxa
ban.
 Continue to monitor H and H and any sign of increased hemoptysis. P
 Patient has been off Rivaroxaban for 4 days with no change in patient's hemoptysis.
 Continue with Losartan, statin.
 Not a candidate for BB. HR is controlled with atrial fibrillation.
 Will follow up as an outpatient.  
 
 Code Status:  Full Code
 
 María Akins MD
 8/10/2018 
  
  Electronically signed by María Akins MD at 08/10/2018  7:11 AM PDTConversion Transac
tion, Provider Unknown - 2018  8:30 PM PDTFormatting of this note might be different f
rom the original.
 Progress Notes by Mike Travis RPH at 18  
  Author:  Mike Travis RPH Service:  Pharmacy Author Type:  Pharmacist 
  Filed:  18 Date of Service:  18 Status:  Signed 
  :  Mike Travis RPH (Pharmacist)   
   
 Note ccl 49ml/min   meds reviewed   pharmacy will follow  [facility]  
  
  Electronically signed by Conversion Transaction, Provider at 2019  7:19 AM PDTMaría Damon ra, MD - 2018  6:11 PM PDTFormatting of this note might be different from the
 original.
 Progress Notes by María Akins MD at 18  
  Author:  María Akins MD Service:  Cardiology Author Type:  Physician 
  Filed:  18 Date of Service:  18 Status:  Signed 
  :  María Akins MD (Physician)   
   
 Formerly [facility]
 Service:  Cardiology
 Pre-Operative History & Physical
 Interval Update
 
 There have been no significant clinical changes since previous encounter.
 Moderate Sedation Presedation Assessment completed. 
 ASA Classification:  ASA 3: Patient with a severe systemic disease
 Mallampati Classification:  II: Visibility of hard and soft palate, upper portion of tonsil
s and uvula
 Angiogram, with its associated alternatives, benefits, and risks, the latter including but 
not limited to possible need for more than one vascular access site, death, MI, CVA, bleedin
g (including the need for blood transfusion), vascular damage (including the need for emerge
nt surgical repair, including PCI/stent placement or CABG), renal failure (including the pos
sible need for short or long-term use of hemodialysis), allergic reactions/anaphylaxis, and 
the risks of moderate anesthesia/sedation, were discussed in detail. 
 
 María Akins MD
 2018
 
 
 *CORE MEASURES REMINDER:  If the patient has a known or suspected infection prior to surger
y, please add diagnosis to the problem list (consider:  Infection 136.9).
 
  
  Electronically signed by María Akins MD at 2018  6:15 PM PDTConversion Transac
tion, Provider Unknown - 2018  5:40 PM PDTFormatting of this note might be different f
rom the original.
 Nurse Progress Note by Ashley Santa RN at 18  
  Author:  Ashley Santa RN Service:  (none) Author Type:  Registered Nurse 
  Filed:  18 Date of Service:  18 Status:  Addendum 
  :  Ashley Santa RN (Registered Nurse)   
  Related Notes:  Original Note by Ashley Santa RN (Registered Nurse) filed at 18 1
741   
   
 Patient NPO, aware of angiogram, all questions answered, new IV placed, femoral site preppe
d. Patient resting in bed comfortably. Femoral +2, radial +2, Pedal +1, popliteal +2. ASHLEY SANTA RN
  
  Electronically signed by Conversion Transaction, Provider at 2019  7:17 AM PDTConver
ruben Transaction, Provider Unknown - 2018  4:01 PM PDTFormatting of this note might be
 different from the original.
 Case Management by Mariposa Daurte RN at 18 1601  
  Author:  Mariposa Duarte RN Service:  (none) Author Type:  Registered Nurse 
  Filed:  18 161 Date of Service:  18 1601 Status:  Signed 
  :  Mariposa Duarte, RN (Registered Nurse)   
   
 
  18 1500 
 Discharge Planning Evaluation  
 Admitting Diagnosis NSTEMI 
 Readmission No 
 Living Arrangements Spouse/significant other 
 Support Systems Spouse/significant other 
 Type of Residence Private residence 
 House type House-1 story 
 Steps to enter Other (comment)
 (zero) 
 Independent with ADL's Yes 
 Independent with Mobility Yes 
 Home Care Services No 
 Caregiver after Discharge No 
 Mental Status Oriented 
 Prior functional status independent from home with spouse, Pt has no DME, no Dialysis, no h
ome O2, does take Xarelto for Afib. Pt drives and uses no outside services at this time 
 Anticipated Discharge Plan  
 Post Acute Care Needs None at this time 
 Plan communicated to patient/family Yes 
 Resources  
 Financial concerns No 
 Transportation issues No 
 Patient/Family concerns No 
 Prescription Plan Yes 
 Previous home health equipment No 
 Vascular access device No 
 Ostomy/Drains/Appliances No 
 Anticipated Disposition  
 Facility Type Home 
 Met with Pt and Spouse and discussed discharge planning, Pt is a 72 y.o., male admitted for
 NSTEMI. Pt is independent at home with spouse, uses no DME, and per spouse prior to admissi
on had no needs. CM to follow for discharge needs pending clinical outcomes
 
 
 Patient's PCP is:Calvin Robertson
 
 Patient's insurance:Medicare/[employer]
 Coverage concerns: no concerns
 Medication coverage/concerns:No concerns
 Community resources utilized / needed: none currently, pt recently finished respiratory the
[facility] outpatient that included PT
 
 Assistance in transportation: Spouse to transport
 
 Identification of any specific education / training: TBD Pending clinical outcomes
 
 Barriers to Discharge / Alternative housing needed: none
 
 Anticipated DCP: home with spouse
 
 MARIPOSA DUARTE RN
 
  
  Electronically signed by Conversion Transaction, Provider at 2019  7:22 AM Andrew Jsoeph MD - 2018  3:10 PM PDTFormatting of this note might be different from the or
iginal.
 Progress Notes by Andrew Ryan MD at 18 1510  
  Author:  Andrew Ryan MD Service:  Hospitalist Author Type:  Physician 
  Filed:  08/10/18 1252 Date of Service:  18 1510 Status:  Addendum 
  :  Andrew Ryan MD (Physician)   
  Related Notes:  Original Note by Andrew Ryan MD (Physician) filed at 08/10/18 1251   
   
   Hospitalist
 Progress Note
 
 Daryltien Shane   72 y.o.  [id number]
 [id number]/8108-1 male   [facility] Day:   LOS: 1 day 
 
      Patient Summary:   72-year-old gentleman with a past medical history of chronic atrial
 fibrillation on anticoagulation, history of vocal cord cancer status post treatment, corona
ry artery disease status post CABG in , history of tobacco abuse   disorder and COPD, hi
story of peptic ulcer disease and GI bleed, history of dysphagia on and off for the last 9 y
ears, who recently had abnormal cardiac stress test study who went  to [facility] 
with ongoing productive cough, shortness of breath and hemoptysis, where he was found to hav
e positive troponin and chest x-ray showed pneumonia and [facility]up and treatment.  The patient was not put on any anticoagulation se
condary to ongoing hemoptysis and Dr. Akins from cardiology consulted who recommended to 
get a CT chest and speech evaluation for ongoing dysphagia before cardiac workup.  The patie
nt's pneumonia is likely secondary to aspiration, has antibiotic changed to Unasyn.
 
 When I went to see the patient he was resting comfortably on the bedside complaining of pro
ductive cough, bringing up yellowish-greenish colored phlegm mixed with blood.  Otherwise de
nied any chest pain, no nausea, no vomiting, no constipation, no diarrhea, though he had bee
n having dysphagia, gotten worse in the last month like something is stuck to his throat whe
never he eats solid food and sometimes will regurgitate a small amount of food.
 
 Scheduled Medications   ampicillin-sulbactam  3 g Intravenous Q6H 
   atorvastatin  40 mg Oral Nightly 
   azithromycin  500 mg Intravenous Q24H 
   budesonide-formoterol  2 puff Inhalation 2 times daily 
   digoxin  0.125 mg Oral Daily 
   famotidine  40 mg Oral Daily 
 
   furosemide  20 mg Oral Daily 
   ipratropium-albuterol  3 mL Nebulization Q6H 
   levothyroxine  75 mcg Oral QAM AC 
   losartan  50 mg Oral Daily 
   nitroGLYCERIN  0.5 inch Topical Q6H 
   sertraline  100 mg Oral Daily 
   sodium chloride (PF)  10 mL Intravenous Q8H 
   tamsulosin  0.4 mg Oral after dinner 
 
 Continuous Infusions 
 
 PRN Medications
 acetaminophen **OR** acetaminophen, morphine, nitroGLYCERIN, polyethylene glycol, zolpidem
 
 Allergy:
 Allergies    
 Allergen   Reactions 
   Beta Adrenergic Blockers  Anaphylaxis 
   Diltiazem  Edema 
   Gabapentin  Other (See Comments) 
   CNS  
   Imipramine  Other (See Comments) 
   Urinary retention   
   Metoprolol  Swelling 
   Propranolol  Other (See Comments) 
   raynaud's   
 
 OBJECTIVE
 Vital Signs:
 /72 (BP Location: Right upper arm)  | Pulse 72  | Temp 98.6 F (37 C) (Oral)  | Re
sp 18  | Ht 1.727 m (5' 8")  | Wt 71.8 kg (158 lb 4.8 oz)  | SpO2 96%  | BMI 24.07 kg/m 
 
 Temp:  [97.6 F (36.4 C)-98.6 F (37 C)] 98.6 F (37 C) (1122)
 BP: (127-164)/(61-74) 131/72 (1122)
 Heart Rate:  [72-82] 72 (1122)
 Resp:  [16-20] 18 (1122)
 SpO2:  [93 %-98 %] 96 % (1122)
 Height:  [172.7 cm (5' 8")] 172.7 cm (5' 8") (2126)
 Weight:  [71.8 kg (158 lb 4.8 oz)] 71.8 kg (158 lb 4.8 oz) (2126)
 BMI (Calculated):  [24.1] 24.1 (2126)
 
 I&O Detailed Table:
  
 
 Intake/Output Summary (Last 24 hours) at 18 1511
 Last data filed at 18 0630
  Gross per 24 hour 
 Intake                0 ml 
 Output              875 ml 
 Net             -875 ml 
 
 Hemodynamics Last 24hrs:   
 
 Examination:
 
 Constitutional: Alert and oriented to person, place, and time. Appears in NAD 
 
 Cardiovascular: Normal rate, irregular irregular rhythm, normal heart sounds and intact dis
dash pulses.  Exam reveals no gallop and no friction rub.  No murmur heard.
 
 
 Pulmonary/Chest: Effort normal and breath sounds normal. No stridor. No respiratory distres
s.  no wheezes. no rales. exhibits no tenderness. Except decreased BS at bases 
 
 Abdominal: Soft. Bowel sounds are normal. exhibits no distension and no mass. There is no t
enderness. There is no rebound and no guarding. 
 
 Musculoskeletal: Normal range of motion.exhibits no tenderness.  exhibits no edema. 
   
 Neurological:  Alert and oriented to person, place, and time. Has normal reflexes.  display
s normal reflexes. No cranial nerve deficit.  Exhibits normal muscle tone. 
 
 Skin: Skin is warm and dry. No rash noted. No erythema. No pallor. 
 
 Psychiatric: Has a normal mood and affect. Behavior is normal. Judgment normal. 
 
 Laboratory:
 
 Glucose:
 Results  
  Procedure Component Value Units Date/Time 
  Sputum culture [[id number]]    Collected:  18 0400 
  Specimen:  Sputum from Sputum    Updated:  18 
  Troponin I [[id number]]  (Abnormal)    Collected:  18 
  Specimen:  Blood    Updated:  18 
   TROPONIN I 1.28 (HH) ng/mL  
  CBC W/Auto Diff (Reflex to Manual) [[id number]]  (Abnormal)    Collected:  18 
  Specimen:  Blood    Updated:  18 
   WBC 11.05 (H) K/uL  
   RBC 4.56 M/uL  
   HGB 11.5 (L) g/dL  
   HCT 35.3 (L) %  
   MCV 77.5 (L) fl  
   MCH 25.1 (L) pg  
   MCHC 32.4 g/dL  
   RDW SD 57.3 (H) fl  
    K/uL  
   MPV 10.1 fl  
   DIFF TYPE MANUAL   
   Neutrophils Manual 85 %  
   Lymphocytes Manual 8 %  
   Monocytes Manual 6 %  
   Eosinophils Manual 1 %  
   Neutrophils Absolute 9.40 (H) K/uL  
   Lymphocytes Absolute 0.88 (L) K/uL  
   Monocytes Absolute 0.66 K/uL  
   Eosinophils Absolute 0.11 K/uL  
   Platelet Estimate ADEQUATE   
   MORPHOLOGY 2+   
  Magnesium [[id number]]    Collected:  18 
  Specimen:  Blood    Updated:  18 
   MAGNESIUM 1.8 mg/dL  
  Phosphorus [[id number]]    Collected:  18 
  Specimen:  Blood    Updated:  18 
   PHOSPHORUS 3.4 mg/dL  
  TSH [[id number]]    Collected:  18 
  Specimen:  Blood    Updated:  18 
   TSH 4.150 uIU/mL  
  Basic metabolic panel [[id number]]  (Abnormal)    Collected:  18 
  Specimen:  Blood    Updated:  18 
   SODIUM 138 mmol/L  
 
   POTASSIUM 4.3 mmol/L  
   CHLORIDE 104 mmol/L  
   CO2 24 mmol/L  
   ANION GAP AGAP 14 mmol/L  
   GLUCOSE 75 mg/dL  
   BUN 14 mg/dL  
   CREATININE 1.3 mg/dL  
   BUN/CREAT 11   
   CALCIUM 8.6 mg/dL  
   EGFR 54 (L) mL/min/1.73m2  
  Lipid panel [[id number]]  (Abnormal)    Collected:  18 
  Specimen:  Blood    Updated:  18 0434 
   CHOLESTEROL 103 mg/dL  
   Triglycerides 116 mg/dL  
   HDL CHOL 24 (L) mg/dL  
   LDL CALC 56 mg/dL  
  Troponin I [[id number]]  (Abnormal)    Collected:  18 
  Specimen:  Blood    Updated:  18 025 
   TROPONIN I 1.72 (HH) ng/mL  
  Protime-INR [[id number]]    Collected:  18 
  Specimen:  Blood    Updated:  18 022 
   INR 1.2   
  Septic Lactic Acid [[id number]]    Collected:  18 
      Updated:  18 0023 
   LACTIC ACID 1.1 mmol/L  
  Troponin I [[id number]]  (Abnormal)    Collected:  18 
  Specimen:  Blood    Updated:  18 
   TROPONIN I 1.76 (HH) ng/mL  
  
 
 CBC:  Lab Results    
 Component  Value Date 
  WBC 11.05 (H) 2018 
  RBC 4.56 2018 
  HGB 11.5 (L) 2018 
  HCT 35.3 (L) 2018 
  MCV 77.5 (L) 2018 
  MCH 25.1 (L) 2018 
  MCHC 32.4 2018 
  RDW 57.3 (H) 2018 
   2018 
  MPV 10.1 2018 
  DIFFTYPE MANUAL 2018 
 
 CMP:  Lab Results    
 Component  Value Date 
   2018 
  K 4.3 2018 
   2018 
  CO2 24 2018 
  ANIONGAP 14 2018 
  GLUF 75 2018 
  BUN 14 2018 
  CREATININE 1.3 2018 
  BCR 11 2018 
  CA 8.6 2018 
  EGFR 54 (L) 2018 
 
 Magnesium:  
 Lab Results    
 
 Component  Value Date 
  MG 1.8 2018 
 
 Phosphorus:  
 Lab Results    
 Component  Value Date 
  PHOS 3.4 2018 
 
 PT/INR:  
 Lab Results    
 Component  Value Date 
  INR 1.2 2018 
 
 Last 3 Troponin:  
 Lab Results    
 Component  Value Date 
  TROPONINI 1.28 () 2018 
  TROPONINI 1.72 () 2018 
  TROPONINI 1.76 () 2018 
 
 ABG:  No results found for: POCPH, POCPCO, POCPO2, POCHCO, POCTCO2, POCBD, BEART, POCSO2, P
OCCMT
 
 Xr Chest 2 View
 
 Result Date: 2018
 HISTORY: Precordial chest pain. Question pneumonia. COMPARISON: 18. TECHNIQUE: PA and
 lateral films of the chest. FINDINGS: Median sternotomy. Heart size is upper normal. Pulmon
ramón venous congestion. Bilateral perihilar mixed interstitial and airspace infiltrates again
 noted, essentially unchanged. No effusions. Subtle thickening of the major and minor fissur
es. Mild degenerative changes of the spine. Osteopenia. 
 
 1.  Borderline cardiac enlargement, with probable pulmonary edema. Atypical pneumonitis see
ms less likely. Electronically signed by Yonatan Montague MD on 2018 11:34 AM
 
 X-ray Chest 2 View Frontal & Lateral
 
 Result Date: 2018
 This is a non-reportable procedure without a radiologist report and is used for image [facility] only
 
 Ct Head Without Contrast
 
 Result Date: 2018
 This is a non-reportable procedure without a radiologist report and is used for image [facility] only
 
 Ct Chest Without Contrast
 
 Result Date: 2018
 HISTORY: 72 years year-old Male, hemoptysis. TECHNIQUE: CT through the chest. Noncontrast e
xamination. Automatic dose adjustment to minimize patient exposure. PRIOR EXAMINATION: [facility]
er chest radiograph. FINDINGS:  demonstrates cardiomegaly, sternal wires and dense reti
cular interstitial lung disease throughout the left mid chest. Lung windows demonstrate bila
teral reticular and confluent infiltrates throughout mid lower lung fields. In the posterior
 medial left lung on image 81 series 3 in underlying mass would be difficult to exclude, but
 this is simply a larger multiple similar findings throughout both lungs. Inflammatory nodul
es and/or inflammatory masses are very plausibly superimposed In the mid central superior le
ft lung the lung disease is more cystic in appearance, asymmetric centrilobular emphysema/CO
PD and superimposed edema edema Bone windows demonstrate degenerative and postprocedural clare
 
nges, without acute appearing lesion or active fracture. The sternotomy is healed. Nonaggres
sive and degenerative changes not uncommon for age. What is seen of the upper abdomen demons
trates fatty liver. Splenomegaly. No adenopathy or fluid collection discernible. Large hiatu
s hernia. Within the chest dense coronary calcification, interventional changes, diffuse car
diac megaly and hilar vascular congestion symmetrically distributed in midlung fields. Elizabeth
es post CABG. 
 
 1. At the very least there is pulmonary vascular congestion, edema-and centrilobular emphys
ema is superimposed 2. Pulmonary infiltrates are asymmetric and throughout the left hemithor
ax. This could be due to superimposed infection of the left upper lung and there is a small 
effusion also 3. Lastly, there are inflammatory appearing airspace or infiltrative nodules s
uperimposed throughout Will be important to repeat this examination when the patient's acute
 issues are resolved to evaluate for underlying lesion/mass Electronically signed by Juan Francisco Conn MD on 2018 12:01 PM
 
 Echo Cardiac Adult Complete
 
 Result Date: 2018
 Patient Name:  DARYL SHANE YOB: 1945 Accession:  [id number] Performing Physici
an:   María Akins _______________________________________________________________ INDIC
ATIONS ----------- sob,h/o 4 cabg CONCLUSIONS ----------- 1. The left ventricle cavity is no
rmal in size, mild concentric hypertrophy and  normal systolic function EF 55%. Mild inferio
r and inferolateral hypokinetic segments. 2. Mild degenerative changes in the aortic and bhupinder
ral valves. 3. There is no pericardial effusion. FINDINGS -------- ECG rhythm: Atrial fibril
lation. Study: A 2-dimensional transthoracic echocardiogram with m-mode, spectral and color 
flow Doppler was perfomed. Study: This was a technically adequate study. Left Ventricle: Ove
rall left ventricular systolic function is low-normal with, an EF 55 %. Left Ventricle: The 
left ventricle cavity size is normal. Left Ventricle: There is mild concentric left ventricu
lar hypertrophy. Right Ventricle: The RV was not well visualized. Left Atrium: The left atri
um is mildly dilated. Right Atrium: The right atrial size is normal. Aortic Valve: The aorti
c valve is trileaflet. Aortic Valve: The aortic valve is mildly calcified. Aortic Valve: The
re is mild aortic regurgitation. Mitral Valve: There is trace mitral regurgitation. Mitral V
alve: Mild mitral annular calcification present. Tricuspid Valve: The tricuspid valve appear
s structurally normal. Tricuspid Valve: Trace tricuspid regurgitation present. Tricuspid Lalya
ve: There is no evidence of pulmonary hypertension. Pulmonic Valve: The pulmonic valve is no
rmal. Pulmonic Valve: Mild pulmonic regurgitation. Pulmonic Valve: Mild degenerative changes
 in the aortic and mitral valves. Pericardium: There is no pericardial effusion. Pericardium
: No pleural effusion seen. IVC/Hepatic Veins: The inferior vena cava is normal in size and 
collapses > 50 % with sniff, indicating normal central venous pressures. MEASUREMENTS ------
------- Ao asc:   4.19 cm Ao sinus:   3.71 cm Ao st junct:   3.01 cm IVC:   1.42 cm LA Diam:
   5.50 cm LA Major:   5.97 cm EDV(Teich):   106.74 ml IVSd:   1.19 cm LVIDd:   4.78 cm LVPW
d:   1.21 cm LVOT Diam:   2.25 cm %FS:   26.84 % EF(Teich):   52.32 % ESV(Teich):   50.89 ml
 IVSs:   1.81 cm LVIDs:   3.50 cm LVPWs:   1.71 cm SV(Teich):   55.85 ml RA Major:   5.04 cm
 LVEF MOD A4C:   55.16 % SV MOD A4C:   49.58 ml LVEDV MOD A4C:   89.88 ml LVLd A4C:   7.50 c
m LVESV MOD A4C:   40.30 ml LVLs A4C:   5.87 cm LAESV(A-L):   76.74 ml LAESV Index (A-L):   
41.48 ml/m2 LAAs A2C:   17.05 cm2 LAESV A-L A2C:   48.56 ml LALs A2C:   5.08 cm LAAs A4C:   
26.95 cm2 LAESV A-L A4C:   95.07 ml LALs A4C:   6.48 cm Ao Diam:   4.03 cm AV Cusp:   1.91 c
m LA Diam:   4.92 cm LA/Ao:   1.22 D-E Excursion:   2.29 cm E-F Powell:   0.09 m/s AV maxPG: 
  10.18 mmHg AV meanP.51 mmHg AV Vmax:   1.59 m/s AV Vmean:   1.23 m/s AV VTI:   28.06
 cm KIRK Vmax:   3.26 cm2 KIRK (VTI):   3.35 cm2 AVAI Vmax:   0.00 cm2/m2 AVAI (VTI):   0.00 c
m2/m2 LVOT maxP.86 mmHg LVOT meanPG:   3.16 mmHg LVSI Dopp:   50.95 ml/m2 LVSV Dopp:  
 94.27 ml LVOT Vmax:   1.31 m/s LVOT Vmean:   0.84 m/s LVOT VTI:   23.69 cm MV E Luis:   0.93
 m/s E' Lat:   0.12 m/s E' Sept:   0.06 m/s PRend P.85 mmHg PRend Vmax:   1.48 m/s HR:
   77.27 BPM PV maxPG:   3.37 mmHg PV meanP.56 mmHg PV Vmax:   0.91 m/s PV Vmean:   0.
56 m/s PV VTI:   12.48 cm RV S':   0.08 m/s TR maxP.05 mmHg TR Vmax:   3.04 m/s TV A 
Luis:   0.00 m/s TV Dec Powell:   3.93 m/s2 TV Dec Time:   207.73 ms TV E Luis:   0.56 m/s TV E
/A Ratio:   69.55 Sonographer: PADMINI Authenticated by: Maraí Akins Report Date/Time: 
018 13:7:33 
 
 1. The left ventricle cavity is normal in size, mild concentric hypertrophy and  normal sys
 
tolic function EF 55%. Mild inferior and inferolateral hypokinetic segments. 2. Mild degener
ative changes in the aortic and mitral valves. 3. There is no pericardial effusion.
 
 PROBLEM LIST
 
 Principal Problem:
   NSTEMI (non-ST elevated myocardial infarction) (Formerly [facility])
 Active Problems:
   Aortic aneurysm (HCC)
   ASCVD (arteriosclerotic cardiovascular disease)
   Chronic atrial fibrillation (HCC)
   Essential hypertension
   Precordial pain
   Hemoptysis
   Multifocal pneumonia
   Chronic bronchitis (HCC)
   Chronic maxillary sinusitis
   Obstructive sleep apnea
   Peptic ulcer disease
   Moderate protein-calorie malnutrition (HCC)
   Severe protein-calorie malnutrition (HCC)
   Dysphagia
   Aspiration pneumonia (HCC)
 
 ASSESSMENT & PLAN
 
 Non-ST elevation myocardial infarction.  Patient currently chest pain free and troponin is 
minimally elevated around 1.
 PLAN:
 Start the patient on baby aspirin 81 mg, atorvastatin, the patient is allergic to BETA BLOC
KER hence we will avoid it and avoid heparin drip as patient has mild hemoptysis which can g
et worse and case discussed with Dr. Akins, cardiologist, who is in agreement and will ke
ep the patient nothing by mouth after midnight for possible cardiac cath.
 2.  Aspiration pneumonia on Unasyn Day #1 and Speech consulted who recommended swallow vide
o  study.
 3.  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
o study today.
 4.  Hypertension well controlled on Losartan.
 5.  BPH stable on Flomax.
 6. Chronic atrial fibrillation.  Heart rate well controlled on Digoxin and Xarelto on hold 
in setting of hemoptysis.
 
 I spent more than 35 minutes.
 
 COPD stable on symbicort 
 
 ANDREW RYAN MD
 2018
 
  
  Electronically signed by Andrew Ryan MD at 08/10/2018 12:52 PM PDTConversion Transactio
n, Provider Unknown - 2018  3:05 PM PDTFormatting of this note might be different from
 the original.
 Progress Notes by Jessica Maciel RD at 18 1508  
  Author:  Jessica Maciel RD Service:  (none) Author Type:  Registered Dietitian 
  Filed:  18 5317 Date of Service:  18 4721 Status:  Signed 
  :  Jessica Maciel RD (Registered Dietitian)   
   
 
  18 1400 
 
 Subjective  
 Timepoint Admit 
 Pt c/o Pt triggered for screen secondary to wt loss, dysphagia. Admitted d/t NSTEMI, h/o vo
tamar cord cancer and dysphagia. Spoke with pt and wife.  
 Reported by Patient 
 Diet Experience  
 Self-selected diet(s) followed Reports following a lower-sodium diet at home, typically eat
s 2-3 meals per day. Avoids foods like rice, certain cuts of potato (fries, baked potato) an
d bread d/t dysphagia. Reports liking vegetables (brussel sprouts, asparagus, broccoli) meat
/chicken, ice cream, and donuts. Says he also drinks chocolate Ensure at home occassionally.
 Reports intake has declined the past month - estimate eating 60-75% of baseline intake. 
 Fluid / Beverage Intake  
 Oral Fluids Amount NPO  
 Liquid Meal Replacement or Supplement Will send Ensure Enlive daily - thickened as needed u
leo diet advancement.  
 Food Intake  
 Amount of Food Pt reports he is very hungry as he hasn't eaten in two days. Is currently NP
O prior to swallow study.  
 Type of Food / Meals NPO 
 Nutrition-Focused Physical Findings  
 Overall Appearance Reports he appears much thinner and clothes have not been fitting him as
 well.  
 Body Language Pt very pleasant. 
 Extremities, Muscles and Bones Bruising 
 Digestive System (Mouth to Rectum) SLP following.  
 Anthropometrics  
 Weight change Admit wt: 71.8 kg (158.25 lbs). Pt is 102% of IBW - BMI of 24. Pt reports wt 
is down from 176 lbs in 6 months indicating a severe 10% loss. Further wt loss to be avoided
.  
 Biochemical data, medical tests, and procedures reviewed  
 Biochemical data, medical tests, and procedures reviewed Labs reviewed.  
 Estimated Energy Needs  
 Total Energy Estimated Needs 4570-0724 kcal/day 
 Method for Estimating Needs 25-30 kcal/kg at 71.8 kg admit wt 
 Estimated Protein Needs  
 Total Protein Estimated Needs  g/day 
 Method for Estimating Needs 1.2-1.5 g/kg 
 Recommendations  
 Recommended energy needs ADAT to cardiac with textures/consistencies per SLP. Upon diet adv
ancement, encourage increased energy and protein intake. Ensure Enlive daily. Determine pt's
 food preferences and provide high-protein, nutrient-dense foods. Monitor and follow per pro
tocol.  
 Nutritional Risk  
 Nutritional risk High 
 Follow up date 18 
 Malnutrition Evaluation  
 Estimated energy intake time frame 1 Month 
 Estimated % energy intake last 1 month (!) 60 % 
 RD Assessed Weight 71.8 kg (158 lb 4.6 oz) 
 Weight Loss time frame 6 Months 
 Weight 6 months ago 79.8 kg (176 lb) 
 % weight loss from 6 months ago (!) -10.06 
 Malnutrition in the Context Of Chronic Illness 
 Clinical Characteristics indicative of moderate malnutrition less than 75% of EER within 1 
month;10% weight loss over 6 months;Mild muscle mass depletion 
 Protein-Calorie Malnutrition Type (!) Moderate 
 Jhoana Rock, RD 2018 
  
  Electronically signed by Conversion Transaction, Provider at 2019  7:21 AM PDTConver
ruben Transaction, Provider Unknown - 2018  1:54 PM PDTFormatting of this note might be
 
 different from the original.
 Nurse Progress Note by Adri Borden RN at 18 1354  
  Author:  Adri Borden RN Service:  (none) Author Type:  Registered Nurse 
  Filed:  18 1352 Date of Service:  18 135 Status:  Signed 
  :  Adri Borden RN (Registered Nurse)   
   
 Patient's spouse advised of patient's sensitive skin when removing tape products and would 
like caregivers to be aware. Adri Borden RN
 
  
  Electronically signed by Conversion Transaction, Provider at 2019  7:18 AM PDTConver
ruben Transaction, Provider Unknown - 2018 11:04 AM PDTFormatting of this note might be
 different from the original.
 Nurse Progress Note by Ashley Santa RN at 18 110  
  Author:  Ashley Santa RN Service:  (none) Author Type:  Registered Nurse 
  Filed:  18 1104 Date of Service:  18 110 Status:  Signed 
  :  Ashley Santa RN (Registered Nurse)   
   
 [facility] med list complete. ASHLEY SANTA RN
  
  Electronically signed by Conversion Transaction, Provider at 2019  7:16 AM PDTConver
ruben Transaction, Provider Unknown - 2018  4:47 AM PDTFormatting of this note might be
 different from the original.
 Nurse Progress Note by Freda Rosen RN at 18 3060  
  Author:  Freda Rosen RN Service:  (none) Author Type:  Registered Nurse 
  Filed:  18 0451 Date of Service:  18 116 Status:  Signed 
  :  Freda Rosen RN (Registered Nurse)   
   
 Pt. A&Ox4. VSS. Afebrile. Pt now on 2L satting 96%. Denies SOB or chest pain. 1mg morphine 
IV for 8/10 headache. Pt has been NPO since midnight. No acute changes from initial shift as
sessment. Chart review completed by this RN. Freda Rosen RN 
 
  
  Electronically signed by Conversion Transaction, Provider at 2019  7:15 AM PDTConver
ruben Transaction, Provider Unknown - 2018 11:32 PM PDTFormatting of this note might be
 different from the original.
 Nurse Progress Note by Freda Rosen RN at 18  
  Author:  Freda Rosen RN Service:  (none) Author Type:  Registered Nurse 
  Filed:  18 Date of Service:  18 Status:  Signed 
  :  Freda Rosen RN (Registered Nurse)   
   
 Pt arrived to unit via EMS . Pt on 3 liters Nasal cannula satting 92-95%. Denies SOB or
 chest pain. VSS. On tele. Md in to see pt. Pt in bed resting. No other needs at this time. 
Freda Rosen RN 
  
  Electronically signed by Conversion Transaction, Provider at 2019  7:26 AM PDTConver
ruben Transaction, Provider Unknown - 2018 10:07 PM PDTFormatting of this note might be
 different from the original.
 Progress Notes by Janiya Geller RPH at 18  
  Author:  Janiya Geller RPH Service:  Pharmacy Author Type:  Pharmacist 
  Filed:  18 Date of Service:  18 Status:  Signed 
  :  Janiya Geller RPH (Pharmacist)   
   
 Renal Dosing Monitoring:
 Daryl Shane 72 y.o. male
 Ht Readings from Last 1 Encounters:  
 18 1.727 m (5' 8") 
  
 Wt Readings from Last 1 Encounters:  
 18 71.8 kg (158 lb 4.8 oz) 
 
  
 Creatinine clearance cannot be calculated (No order found.)
 Pharmacy dosing for renal function per Dr. Donovan
 Medication(s): digoxin, famotidine, rivaroxaban
 Plan per protocol: 
 Medication / Dose: Unable to estimate renal function at this time as there is no value avai
lable for serum creatinine.   Pharmacy will continue monitoring patient for appropriate dosi
ng per renal function.
 
 Pharmacist: Janiya Geller
 2018 10:06 PM
 
  
  Electronically signed by Irvin Transaction, Provider at 2019  7:25 AM Bob reiched in this encounter
 
 Plan of Treatment
 
 
+--------+---------+-------------+----------------------+-------------+
| Date   | Type    | Specialty   | Care Team            | Description |
+--------+---------+-------------+----------------------+-------------+
| / | Office  | Pulmonology |   Juan F,          |             |
|    | Visit   |             | Jessica Cheung,   |             |
|        |         |             | MD [address] |             |
|        |         |             |   [place]   |             |
|        |         |             | [place] [address]             |             |
|        |         |             | [phone]         |             |
|        |         |             | [phone] (Fax)   |             |
+--------+---------+-------------+----------------------+-------------+
| / | Office  | Cardiology  |   María Akins, |             |
|    | Visit   |             |  MD [address]   |             |
|        |         |             | [place]  |             |
|        |         |             | [id number] [phone]  |             |
|        |         |             |  [phone] (Fax)  |             |
+--------+---------+-------------+----------------------+-------------+
 documented as of this encounter
 
 Procedures
 
 
+----------------------+--------+-------------+----------------------+----------------------
+
| Procedure Name       | Priori | Date/Time   | Associated Diagnosis | Comments             
|
|                      | ty     |             |                      |                      
|
+----------------------+--------+-------------+----------------------+----------------------
+
| XR CHEST 2 VIEWS     | Routin | 2018  |                      |   Results for this   
|
|                      | e      |  9:52 AM    |                      | procedure are in the 
|
|                      |        | PDT         |                      |  results section.    
|
+----------------------+--------+-------------+----------------------+----------------------
+
| EXTERNAL LAB: CBC    | Routin | 2018  |                      |   Results for this   
|
|                      | e      |  5:35 AM    |                      | procedure are in the 
 
|
|                      |        | PDT         |                      |  results section.    
|
+----------------------+--------+-------------+----------------------+----------------------
+
| COMPREHENSIVE        | Routin | 2018  |                      |   Results for this   
|
| METABOLIC PANEL      | e      |  5:35 AM    |                      | procedure are in the 
|
|                      |        | PDT         |                      |  results section.    
|
+----------------------+--------+-------------+----------------------+----------------------
+
| IRON AND IRON        | Routin | 08/10/2018  |                      |   Results for this   
|
| BINDING CAPACITY     | e      |  2:43 PM    |                      | procedure are in the 
|
|                      |        | PDT         |                      |  results section.    
|
+----------------------+--------+-------------+----------------------+----------------------
+
| VITAMIN B-12         | Routin | 08/10/2018  |                      |   Results for this   
|
|                      | e      |  2:43 PM    |                      | procedure are in the 
|
|                      |        | PDT         |                      |  results section.    
|
+----------------------+--------+-------------+----------------------+----------------------
+
| FOLATE               | Routin | 08/10/2018  |                      |   Results for this   
|
|                      | e      |  2:43 PM    |                      | procedure are in the 
|
|                      |        | PDT         |                      |  results section.    
|
+----------------------+--------+-------------+----------------------+----------------------
+
| FERRITIN             | Routin | 08/10/2018  |                      |   Results for this   
|
|                      | e      |  2:43 PM    |                      | procedure are in the 
|
|                      |        | PDT         |                      |  results section.    
|
+----------------------+--------+-------------+----------------------+----------------------
+
| EXTERNAL LAB: CBC    | Routin | 08/10/2018  |                      |   Results for this   
|
|                      | e      | 10:00 AM    |                      | procedure are in the 
|
|                      |        | PDT         |                      |  results section.    
|
+----------------------+--------+-------------+----------------------+----------------------
+
| COMPREHENSIVE        | Routin | 08/10/2018  |                      |   Results for this   
|
| METABOLIC PANEL      | e      | 10:00 AM    |                      | procedure are in the 
|
|                      |        | PDT         |                      |  results section.    
|
+----------------------+--------+-------------+----------------------+----------------------
 
+
| CV VASCULAR          | Routin | 2018  |                      |   Results for this   
|
| PROCEDURE            | e      |  8:12 PM    |                      | procedure are in the 
|
|                      |        | PDT         |                      |  results section.    
|
+----------------------+--------+-------------+----------------------+----------------------
+
| CV CARDIAC PROCEDURE | Routin | 2018  |                      |   Results for this   
|
|                      | e      |  8:09 PM    |                      | procedure are in the 
|
|                      |        | PDT         |                      |  results section.    
|
+----------------------+--------+-------------+----------------------+----------------------
+
| FL VIDEO SWALLOW W   | Routin | 2018  |                      |   Results for this   
|
| SPEECH               | e      |  4:50 PM    |                      | procedure are in the 
|
|                      |        | PDT         |                      |  results section.    
|
+----------------------+--------+-------------+----------------------+----------------------
+
| CT CHEST WO CONTRAST | Routin | 2018  |                      |   Results for this   
|
|                      | e      | 11:22 AM    |                      | procedure are in the 
|
|                      |        | PDT         |                      |  results section.    
|
+----------------------+--------+-------------+----------------------+----------------------
+
| XR CHEST 2 VIEWS     | Routin | 2018  |                      |   Results for this   
|
|                      | e      | 11:03 AM    |                      | procedure are in the 
|
|                      |        | PDT         |                      |  results section.    
|
+----------------------+--------+-------------+----------------------+----------------------
+
| ECHO COMPLETE        | Routin | 2018  |                      |   Results for this   
|
|                      | e      |  7:00 AM    |                      | procedure are in the 
|
|                      |        | PDT         |                      |  results section.    
|
+----------------------+--------+-------------+----------------------+----------------------
+
| TROPONIN I           | Routin | 2018  |                      |   Results for this   
|
|                      | e      |  5:51 AM    |                      | procedure are in the 
|
|                      |        | PDT         |                      |  results section.    
|
+----------------------+--------+-------------+----------------------+----------------------
+
| GRAM STAIN, REFLEX   | Timed  | 2018  |                      |   Results for this   
|
| SPUTUM CULTURE       |        |  4:00 AM    |                      | procedure are in the 
 
|
|                      |        | PDT         |                      |  results section.    
|
+----------------------+--------+-------------+----------------------+----------------------
+
| EXTERNAL LAB: CBC    | Routin | 2018  |                      |   Results for this   
|
|                      | e      |  1:57 AM    |                      | procedure are in the 
|
|                      |        | PDT         |                      |  results section.    
|
+----------------------+--------+-------------+----------------------+----------------------
+
| LIPID PANEL          | Routin | 2018  |                      |   Results for this   
|
|                      | e      |  1:57 AM    |                      | procedure are in the 
|
|                      |        | PDT         |                      |  results section.    
|
+----------------------+--------+-------------+----------------------+----------------------
+
| TROPONIN I           | Routin | 2018  |                      |   Results for this   
|
|                      | e      |  1:57 AM    |                      | procedure are in the 
|
|                      |        | PDT         |                      |  results section.    
|
+----------------------+--------+-------------+----------------------+----------------------
+
| PROTIME INR          | Routin | 2018  |                      |   Results for this   
|
|                      | e      |  1:57 AM    |                      | procedure are in the 
|
|                      |        | PDT         |                      |  results section.    
|
+----------------------+--------+-------------+----------------------+----------------------
+
| TSH                  | Routin | 2018  |                      |   Results for this   
|
|                      | e      |  1:57 AM    |                      | procedure are in the 
|
|                      |        | PDT         |                      |  results section.    
|
+----------------------+--------+-------------+----------------------+----------------------
+
| PHOSPHORUS           | Routin | 2018  |                      |   Results for this   
|
|                      | e      |  1:57 AM    |                      | procedure are in the 
|
|                      |        | PDT         |                      |  results section.    
|
+----------------------+--------+-------------+----------------------+----------------------
+
| MAGNESIUM            | Routin | 2018  |                      |   Results for this   
|
|                      | e      |  1:57 AM    |                      | procedure are in the 
|
|                      |        | PDT         |                      |  results section.    
|
+----------------------+--------+-------------+----------------------+----------------------
 
+
| BASIC METABOLIC      | Routin | 2018  |                      |   Results for this   
|
| PANEL                | e      |  1:57 AM    |                      | procedure are in the 
|
|                      |        | PDT         |                      |  results section.    
|
+----------------------+--------+-------------+----------------------+----------------------
+
| LACTIC ACID          | Routin | 2018  |                      |   Results for this   
|
|                      | e      | 11:50 PM    |                      | procedure are in the 
|
|                      |        | PDT         |                      |  results section.    
|
+----------------------+--------+-------------+----------------------+----------------------
+
| TROPONIN I           | Routin | 2018  |                      |   Results for this   
|
|                      | e      | 10:12 PM    |                      | procedure are in the 
|
|                      |        | PDT         |                      |  results section.    
|
+----------------------+--------+-------------+----------------------+----------------------
+
 documented in this encounter
 
 Results
 XR Chest 2 Vws (2018  9:52 AM PDT)
 
+----------+
| Specimen |
+----------+
|          |
+----------+
 
 
 
+-----------------------------------------------------------------------+--------------+
| Impressions                                                           | Performed At |
+-----------------------------------------------------------------------+--------------+
|   1.   There is improved aeration of the left upper lobe.  2.   Hazy  |              |
| opacities throughout the lung may reflect some residual acute         |              |
| pneumonitis.     Electronically signed by Luis Carlos Martin MD on     |              |
| 2018 10:02 AM                                                    |              |
+-----------------------------------------------------------------------+--------------+
 
 
 
+------------------------------------------------------------------------+--------------+
| Narrative                                                              | Performed At |
+------------------------------------------------------------------------+--------------+
|   DARYL WALLACE JAMES  1945  72 years Male  XR CHEST 2 VIEW FRONTAL AND  |              |
| LATERAL  2018 9:52 AM     INDICATION: Shortness of breath with    |              |
| chest pain     COMPARISON: 2018     TECHNIQUE: Two view chest, PA  |              |
| and lateral views     FINDINGS: Median sternotomy wires are            |              |
| maintained. The cardiac silhouette is borderline enlarged. There is no |              |
|  mediastinal widening or shift. Mild calcification of the aortic arch  |              |
| is present. There is improving aeration of the left upper lobe. Some   |              |
|  residual opacities remain along the lower lobes bilaterally. The      |              |
 
| pulmonary markings are normal in caliber. There is mild degenerative   |              |
| disc disease of the thoracic spine.                                    |              |
+------------------------------------------------------------------------+--------------+
 
 
 
+-------------------------------------------------------------------------------------------
--------------------------------------------------------------------------------------------
-----------------------------------------------------------------+
| Procedure Note                                                                            
                                                                                            
                                                                 |
+-------------------------------------------------------------------------------------------
--------------------------------------------------------------------------------------------
-----------------------------------------------------------------+
|   Maurizio, Rad Conversion - 2019  4:21 AM PDT  DARYL MADISON SHANE89 years MaleXR     
                                                                                            
                                                                 |
| CHEST 2 VIEW FRONTAL AND LATERAL2018 9:52 AM INDICATION: Shortness of breath with    
                                                                                            
                                                                 |
| chest pain COMPARISON: 2018 TECHNIQUE: Two view chest, PA and lateral views           
                                                                                            
                                                                 |
| FINDINGS: Median sternotomy wires are maintained. The cardiac silhouette is borderline    
                                                                                            
                                                                 |
| enlarged. There is no mediastinal widening or shift. Mild calcification of the aortic     
                                                                                            
                                                                 |
| arch is present. There is improving aeration of the left upper lobe. Some residual        
                                                                                            
                                                                 |
| opacities remain along the lower lobes bilaterally. The pulmonary markings are normal in  
                                                                                            
                                                                 |
|  caliber. There is mild degenerative disc disease of the thoracic spine. IMPRESSION: 1.   
                                                                                            
                                                                 |
|  There is improved aeration of the left upper lobe.2.  Hazy opacities throughout the      
                                                                                            
                                                                 |
| lung may reflect some residual acute pneumonitis. Electronically signed by Luis Carlos ALVES      
                                                                                            
                                                                 |
| MD Veronica on 2018 10:02 AM                                                          
                                                                                            
                                                                 |
|                                                                                           
                                                                                            
                                                                 |
|FINDINGS: Median sternotomy wires are maintained. The cardiac silhouette is borderline enla
rged. There is no mediastinal widening or shift. Mild calcification of the aortic arch is pr
esent. There is improving aeration of the left upper lobe. Some  |
|residual opacities remain along the lower lobes bilaterally. The pulmonary markings are nor
mal in caliber. There is mild degenerative disc disease of the thoracic spine.              
                                                                 |
|                                                                                           
                                                                                            
                                                                 |
 
|IMPRESSION:                                                                                
                                                                                            
                                                                 |
|1.  There is improved aeration of the left upper lobe.                                     
                                                                                            
                                                                 |
|2.  Hazy opacities throughout the lung may reflect some residual acute pneumonitis.        
                                                                                            
                                                                 |
|                                                                                           
                                                                                            
                                                                 |
|Electronically signed by Luis Carlos Martin MD on 2018 10:02 AM                        
                                                                                            
                                                                 |
+-------------------------------------------------------------------------------------------
--------------------------------------------------------------------------------------------
-----------------------------------------------------------------+
 External Lab: CBC (2018  5:35 AM PDT)
 
+-------------+------------------------------------------------------------------------+----
-------------+------------+--------------+
| Component   | Value                                                                  | Ref
 Range       | Performed  | Pathologist  |
|             |                                                                        |    
             | At         | Signature    |
+-------------+------------------------------------------------------------------------+----
-------------+------------+--------------+
| WBC         | 5.20                                                                   | 3.8
0 - 11.00    | EXTERNAL   |              |
|             |                                                                        | K/u
L            | LAB        |              |
+-------------+------------------------------------------------------------------------+----
-------------+------------+--------------+
| RED CELL    | 3.71 (L)                                                               | 4.2
0 - 5.70     | EXTERNAL   |              |
| COUNT       |                                                                        | M/u
L            | LAB        |              |
+-------------+------------------------------------------------------------------------+----
-------------+------------+--------------+
| Hgb         | 9.6 (L)                                                                | 13.
2 - 17.0     | EXTERNAL   |              |
|             |                                                                        | g/d
L            | LAB        |              |
+-------------+------------------------------------------------------------------------+----
-------------+------------+--------------+
| Hematocrit, | 28.4 (L)                                                               | 39.
0 - 50.0 %   | EXTERNAL   |              |
|  POC        |                                                                        |    
             | LAB        |              |
+-------------+------------------------------------------------------------------------+----
-------------+------------+--------------+
| MCV         | 76.7 (L)                                                               | 80.
0 - 100.0 fl | EXTERNAL   |              |
|             |                                                                        |    
             | LAB        |              |
+-------------+------------------------------------------------------------------------+----
-------------+------------+--------------+
| MCH         | 25.9 (L)                                                               | 27.
0 - 34.0 pg  | EXTERNAL   |              |
 
|             |                                                                        |    
             | LAB        |              |
+-------------+------------------------------------------------------------------------+----
-------------+------------+--------------+
| MCHC        | 33.8                                                                   | 32.
0 - 35.5     | EXTERNAL   |              |
|             |                                                                        | g/d
L            | LAB        |              |
+-------------+------------------------------------------------------------------------+----
-------------+------------+--------------+
| RDW-CV      | 57.3 (H)                                                               | 37 
- 53 fl      | EXTERNAL   |              |
|             |                                                                        |    
             | LAB        |              |
+-------------+------------------------------------------------------------------------+----
-------------+------------+--------------+
| Platelet    | 132 (L)                                                                | 150
 - 400 K/uL  | EXTERNAL   |              |
| Count       |                                                                        |    
             | LAB        |              |
| Plasma      |                                                                        |    
             |            |              |
+-------------+------------------------------------------------------------------------+----
-------------+------------+--------------+
| MPV         | 9.9                                                                    | fl 
             | EXTERNAL   |              |
|             |                                                                        |    
             | LAB        |              |
+-------------+------------------------------------------------------------------------+----
-------------+------------+--------------+
| Differentia | AUTOMATED                                                              |    
             | EXTERNAL   |              |
| l Type      |                                                                        |    
             | LAB        |              |
+-------------+------------------------------------------------------------------------+----
-------------+------------+--------------+
| % Segmented | 76.04                                                                  | %  
             | EXTERNAL   |              |
|             |                                                                        |    
             | LAB        |              |
| Neutrophils |                                                                        |    
             |            |              |
+-------------+------------------------------------------------------------------------+----
-------------+------------+--------------+
| %           | 11.53                                                                  | %  
             | EXTERNAL   |              |
| Lymphocytes |                                                                        |    
             | LAB        |              |
+-------------+------------------------------------------------------------------------+----
-------------+------------+--------------+
| % Monocytes | 6.09                                                                   | %  
             | EXTERNAL   |              |
|             |                                                                        |    
             | LAB        |              |
+-------------+------------------------------------------------------------------------+----
-------------+------------+--------------+
| %           | 5.65                                                                   | %  
             | EXTERNAL   |              |
| Eosinophils |                                                                        |    
             | LAB        |              |
 
+-------------+------------------------------------------------------------------------+----
-------------+------------+--------------+
| % Basophils | 0.69                                                                   | %  
             | EXTERNAL   |              |
|             |                                                                        |    
             | LAB        |              |
+-------------+------------------------------------------------------------------------+----
-------------+------------+--------------+
| Absolute    | 3.95                                                                   | 1.9
0 - 7.40     | EXTERNAL   |              |
| Segmented   |                                                                        | K/u
L            | LAB        |              |
| Neutrophils |                                                                        |    
             |            |              |
+-------------+------------------------------------------------------------------------+----
-------------+------------+--------------+
| Absolute    | 0.60 (L)                                                               | 1.0
0 - 3.90     | EXTERNAL   |              |
| Lymphocytes |                                                                        | K/u
L            | LAB        |              |
+-------------+------------------------------------------------------------------------+----
-------------+------------+--------------+
| Absolute    | 0.32                                                                   | 0.0
0 - 0.80     | EXTERNAL   |              |
| Monocytes   |                                                                        | K/u
L            | LAB        |              |
+-------------+------------------------------------------------------------------------+----
-------------+------------+--------------+
| Absolute    | 0.29                                                                   | 0.0
0 - 0.50     | EXTERNAL   |              |
| Eosinophils |                                                                        | K/u
L            | LAB        |              |
+-------------+------------------------------------------------------------------------+----
-------------+------------+--------------+
| Absolute    | 0.04                                                                   | 0.0
0 - 0.10     | EXTERNAL   |              |
| Basophils   |                                                                        | K/u
L            | LAB        |              |
+-------------+------------------------------------------------------------------------+----
-------------+------------+--------------+
| RBC         | 2+Comment:                                                             |    
             | EXTERNAL   |              |
| Morphology  | OVALO2+ANISO1+MICRONORMA                                               |    
             | LAB        |              |
|             | L PLT MORPHTesting                                                     |    
             |            |              |
|             | performed at [facility], [address]                                               |    
             |            |              |
|             |  [facility],                                                      |    
             |            |              |
|             | [place]   [address]                                                  |    
             |            |              |
|             |MICRO                                                                  |     
            |            |              |
|             |NORMAL PLT MORPH                                                        |    
             |            |              |
|             |Testing performed at [facility], [address], [place]   [address] |    
             |            |              |
 
+-------------+------------------------------------------------------------------------+----
-------------+------------+--------------+
 
 
 
+-----------------+
| Specimen        |
+-----------------+
| Blood specimen  |
| (specimen)      |
+-----------------+
 
 
 
+----------------+---------+--------------------+--------------+
| Performing     | Address | City/State/Zipcode | Phone Number |
| Organization   |         |                    |              |
+----------------+---------+--------------------+--------------+
|   EXTERNAL LAB |         |                    |              |
+----------------+---------+--------------------+--------------+
 Comprehensive Metabolic Panel (2018  5:35 AM PDT)
 
+-------------+--------------------------+-----------------+------------+--------------+
| Component   | Value                    | Ref Range       | Performed  | Pathologist  |
|             |                          |                 | At         | Signature    |
+-------------+--------------------------+-----------------+------------+--------------+
| Na          | 143                      | 135 - 145       | EXTERNAL   |              |
|             |                          | mmol/L          | LAB        |              |
+-------------+--------------------------+-----------------+------------+--------------+
| K           | 3.3 (L)                  | 3.5 - 4.9       | EXTERNAL   |              |
|             |                          | mmol/L          | LAB        |              |
+-------------+--------------------------+-----------------+------------+--------------+
| Cl          | 108                      | 99 - 109 mmol/L | EXTERNAL   |              |
|             |                          |                 | LAB        |              |
+-------------+--------------------------+-----------------+------------+--------------+
| CO2         | 26                       | 23 - 32 mmol/L  | EXTERNAL   |              |
|             |                          |                 | LAB        |              |
+-------------+--------------------------+-----------------+------------+--------------+
| Anion Gap   | 12                       | 5 - 20 mmol/L   | EXTERNAL   |              |
|             |                          |                 | LAB        |              |
+-------------+--------------------------+-----------------+------------+--------------+
| Glucose,    | 101 (H)                  | 65 - 99 mg/dL   | EXTERNAL   |              |
| Fasting     |                          |                 | LAB        |              |
+-------------+--------------------------+-----------------+------------+--------------+
| BUN         | 12                       | 8 - 25 mg/dL    | EXTERNAL   |              |
|             |                          |                 | LAB        |              |
+-------------+--------------------------+-----------------+------------+--------------+
| Creatinine  | 1.1                      | 0.70 - 1.30     | EXTERNAL   |              |
|             |                          | mg/dL           | LAB        |              |
+-------------+--------------------------+-----------------+------------+--------------+
| BUN/Creatin | 11                       |                 | EXTERNAL   |              |
| ine Ratio   |                          |                 | LAB        |              |
+-------------+--------------------------+-----------------+------------+--------------+
| Calcium     | 8.5                      | 8.5 - 10.5      | EXTERNAL   |              |
|             |                          | mg/dL           | LAB        |              |
+-------------+--------------------------+-----------------+------------+--------------+
| Protein,    | 5.5 (L)                  | 6.3 - 8.2 g/dL  | EXTERNAL   |              |
| Total       |                          |                 | LAB        |              |
+-------------+--------------------------+-----------------+------------+--------------+
| Albumin     | 2.5 (L)                  | 3.3 - 4.8 g/dL  | EXTERNAL   |              |
 
|             |                          |                 | LAB        |              |
+-------------+--------------------------+-----------------+------------+--------------+
| Globulin    | 3.0                      | 1.3 - 4.9 g/dL  | EXTERNAL   |              |
|             |                          |                 | LAB        |              |
+-------------+--------------------------+-----------------+------------+--------------+
| A/G Ratio   | 0.8 (L)                  | 1.0 - 2.4       | EXTERNAL   |              |
|             |                          |                 | LAB        |              |
+-------------+--------------------------+-----------------+------------+--------------+
| Bilirubin   | 0.7                      | 0.1 - 1.5 mg/dL | EXTERNAL   |              |
| Total       |                          |                 | LAB        |              |
+-------------+--------------------------+-----------------+------------+--------------+
| ALP,        | 99                       | 35 - 115 U/L    | EXTERNAL   |              |
| External    |                          |                 | LAB        |              |
+-------------+--------------------------+-----------------+------------+--------------+
| AST         | 16                       | 10 - 45 U/L     | EXTERNAL   |              |
|             |                          |                 | LAB        |              |
+-------------+--------------------------+-----------------+------------+--------------+
| ALT         | 14                       | 10 - 65 U/L     | EXTERNAL   |              |
|             |                          |                 | LAB        |              |
+-------------+--------------------------+-----------------+------------+--------------+
| Estimated   | >60Comment: GFR <60:     | mL/min/1.73m2   | EXTERNAL   |              |
| GFR         | CHRONIC KIDNEY DISEASE,  |                 | LAB        |              |
|             | IF FOUND OVER A 3 MONTH  |                 |            |              |
|             | PERIOD.GFR <15: KIDNEY   |                 |            |              |
|             | FAILURE.FOR       |                 |            |              |
|             | [employer], MULTIPLY THE  |                 |            |              |
|             | CALCULATED GFR BY        |                 |            |              |
|             | 1.210.This eGFR is       |                 |            |              |
|             | calculated using the     |                 |            |              |
|             | MDRD IDMS traceable      |                 |            |              |
|             | equation.Testing         |                 |            |              |
|             | performed at [facility], [address] |                 |            |              |
|             |  [address],        |                 |            |              |
|             | [place]   [address]    |                 |            |              |
+-------------+--------------------------+-----------------+------------+--------------+
 
 
 
+-----------------+
| Specimen        |
+-----------------+
| Blood specimen  |
| (specimen)      |
+-----------------+
 
 
 
+----------------+---------+--------------------+--------------+
| Performing     | Address | City/State/Zipcode | Phone Number |
| Organization   |         |                    |              |
+----------------+---------+--------------------+--------------+
|   EXTERNAL LAB |         |                    |              |
+----------------+---------+--------------------+--------------+
 Iron and Iron Binding Capacity (08/10/2018  2:43 PM PDT)
 
+------------+--------------------------+-----------------+------------+--------------+
| Component  | Value                    | Ref Range       | Performed  | Pathologist  |
|            |                          |                 | At         | Signature    |
+------------+--------------------------+-----------------+------------+--------------+
| Iron       | 27 (L)                   | 45 - 190 ug/dL  | EXTERNAL   |              |
 
|            |                          |                 | LAB        |              |
+------------+--------------------------+-----------------+------------+--------------+
| TIBC       | 229 (L)                  | 250 - 450 ug/dL | EXTERNAL   |              |
|            |                          |                 | LAB        |              |
+------------+--------------------------+-----------------+------------+--------------+
| Iron       | 12 (L)Comment: Testing   | 20 - 50 %       | EXTERNAL   |              |
| Saturation | performed at [facility], [address] |                 | LAB        |              |
|            |  [address],        |                 |            |              |
|            | [place]  [address]     |                 |            |              |
+------------+--------------------------+-----------------+------------+--------------+
 
 
 
+-----------------+
| Specimen        |
+-----------------+
| Blood specimen  |
| (specimen)      |
+-----------------+
 
 
 
+----------------+---------+--------------------+--------------+
| Performing     | Address | City/State/Zipcode | Phone Number |
| Organization   |         |                    |              |
+----------------+---------+--------------------+--------------+
|   EXTERNAL LAB |         |                    |              |
+----------------+---------+--------------------+--------------+
 Folate (08/10/2018  2:43 PM PDT)
 
+-----------+--------------------------+-----------+------------+--------------+
| Component | Value                    | Ref Range | Performed  | Pathologist  |
|           |                          |           | At         | Signature    |
+-----------+--------------------------+-----------+------------+--------------+
| Folate    | 18.6Comment: Testing     | ng/mL     | EXTERNAL   |              |
|           | performed at [facility], [address] |           | LAB        |              |
|           |  [address],        |           |            |              |
|           | [place]  [address]     |           |            |              |
+-----------+--------------------------+-----------+------------+--------------+
 
 
 
+-----------------+
| Specimen        |
+-----------------+
| Blood specimen  |
| (specimen)      |
+-----------------+
 
 
 
+----------------+---------+--------------------+--------------+
| Performing     | Address | City/State/Zipcode | Phone Number |
| Organization   |         |                    |              |
+----------------+---------+--------------------+--------------+
|   EXTERNAL LAB |         |                    |              |
+----------------+---------+--------------------+--------------+
 Ferritin (08/10/2018  2:43 PM PDT)
 
+------------+--------------------------+----------------+------------+--------------+
 
| Component  | Value                    | Ref Range      | Performed  | Pathologist  |
|            |                          |                | At         | Signature    |
+------------+--------------------------+----------------+------------+--------------+
| Ferritin,  | 156Comment: Testing      | 11 - 450 ng/mL | EXTERNAL   |              |
| External   | performed at [facility], [address] |                | LAB        |              |
|            |  [address],        |                |            |              |
|            | [place]  [address]     |                |            |              |
+------------+--------------------------+----------------+------------+--------------+
 
 
 
+-----------------+
| Specimen        |
+-----------------+
| Blood specimen  |
| (specimen)      |
+-----------------+
 
 
 
+----------------+---------+--------------------+--------------+
| Performing     | Address | City/State/Zipcode | Phone Number |
| Organization   |         |                    |              |
+----------------+---------+--------------------+--------------+
|   EXTERNAL LAB |         |                    |              |
+----------------+---------+--------------------+--------------+
 Vitamin B-12 (08/10/2018  2:43 PM PDT)
 
+-----------+--------------------------+--------------+------------+--------------+
| Component | Value                    | Ref Range    | Performed  | Pathologist  |
|           |                          |              | At         | Signature    |
+-----------+--------------------------+--------------+------------+--------------+
| VITAMIN   | 292Comment: Testing      | 254 - 1,320  | EXTERNAL   |              |
| B-12      | performed at [facility], [address] | pg/mL        | LAB        |              |
|           |  [address],        |              |            |              |
|           | [place]  [address]     |              |            |              |
+-----------+--------------------------+--------------+------------+--------------+
 
 
 
+-----------------+
| Specimen        |
+-----------------+
| Blood specimen  |
| (specimen)      |
+-----------------+
 
 
 
+----------------+---------+--------------------+--------------+
| Performing     | Address | City/State/Zipcode | Phone Number |
| Organization   |         |                    |              |
+----------------+---------+--------------------+--------------+
|   EXTERNAL LAB |         |                    |              |
+----------------+---------+--------------------+--------------+
 External Lab: CBC (08/10/2018 10:00 AM PDT)
 
+-------------+------------------------------------------------------------------------+----
-------------+------------+--------------+
| Component   | Value                                                                  | Ref
 
 Range       | Performed  | Pathologist  |
|             |                                                                        |    
             | At         | Signature    |
+-------------+------------------------------------------------------------------------+----
-------------+------------+--------------+
| WBC         | 7.51                                                                   | 3.8
0 - 11.00    | EXTERNAL   |              |
|             |                                                                        | K/u
L            | LAB        |              |
+-------------+------------------------------------------------------------------------+----
-------------+------------+--------------+
| RED CELL    | 3.63 (L)                                                               | 4.2
0 - 5.70     | EXTERNAL   |              |
| COUNT       |                                                                        | M/u
L            | LAB        |              |
+-------------+------------------------------------------------------------------------+----
-------------+------------+--------------+
| Hgb         | 9.4 (L)                                                                | 13.
2 - 17.0     | EXTERNAL   |              |
|             |                                                                        | g/d
L            | LAB        |              |
+-------------+------------------------------------------------------------------------+----
-------------+------------+--------------+
| Hematocrit, | 27.9 (L)                                                               | 39.
0 - 50.0 %   | EXTERNAL   |              |
|  POC        |                                                                        |    
             | LAB        |              |
+-------------+------------------------------------------------------------------------+----
-------------+------------+--------------+
| MCV         | 77.0 (L)                                                               | 80.
0 - 100.0 fl | EXTERNAL   |              |
|             |                                                                        |    
             | LAB        |              |
+-------------+------------------------------------------------------------------------+----
-------------+------------+--------------+
| MCH         | 26.1 (L)                                                               | 27.
0 - 34.0 pg  | EXTERNAL   |              |
|             |                                                                        |    
             | LAB        |              |
+-------------+------------------------------------------------------------------------+----
-------------+------------+--------------+
| MCHC        | 33.8                                                                   | 32.
0 - 35.5     | EXTERNAL   |              |
|             |                                                                        | g/d
L            | LAB        |              |
+-------------+------------------------------------------------------------------------+----
-------------+------------+--------------+
| RDW-CV      | 58.2 (H)                                                               | 37 
- 53 fl      | EXTERNAL   |              |
|             |                                                                        |    
             | LAB        |              |
+-------------+------------------------------------------------------------------------+----
-------------+------------+--------------+
| Platelet    | PLATELETS CLUMPED,                                                     | 150
 - 400 K/uL  | EXTERNAL   |              |
| Count       | APPEAR ADEQUATE                                                        |    
             | LAB        |              |
| Plasma      |                                                                        |    
             |            |              |
+-------------+------------------------------------------------------------------------+----
 
-------------+------------+--------------+
| Differentia | AUTOMATED                                                              |    
             | EXTERNAL   |              |
| l Type      |                                                                        |    
             | LAB        |              |
+-------------+------------------------------------------------------------------------+----
-------------+------------+--------------+
| % Segmented | 82.55                                                                  | %  
             | EXTERNAL   |              |
|             |                                                                        |    
             | LAB        |              |
| Neutrophils |                                                                        |    
             |            |              |
+-------------+------------------------------------------------------------------------+----
-------------+------------+--------------+
| %           | 8.04                                                                   | %  
             | EXTERNAL   |              |
| Lymphocytes |                                                                        |    
             | LAB        |              |
+-------------+------------------------------------------------------------------------+----
-------------+------------+--------------+
| % Monocytes | 5.49                                                                   | %  
             | EXTERNAL   |              |
|             |                                                                        |    
             | LAB        |              |
+-------------+------------------------------------------------------------------------+----
-------------+------------+--------------+
| %           | 3.36                                                                   | %  
             | EXTERNAL   |              |
| Eosinophils |                                                                        |    
             | LAB        |              |
+-------------+------------------------------------------------------------------------+----
-------------+------------+--------------+
| % Basophils | 0.56                                                                   | %  
             | EXTERNAL   |              |
|             |                                                                        |    
             | LAB        |              |
+-------------+------------------------------------------------------------------------+----
-------------+------------+--------------+
| Absolute    | 6.20                                                                   | 1.9
0 - 7.40     | EXTERNAL   |              |
| Segmented   |                                                                        | K/u
L            | LAB        |              |
| Neutrophils |                                                                        |    
             |            |              |
+-------------+------------------------------------------------------------------------+----
-------------+------------+--------------+
| Absolute    | 0.60 (L)                                                               | 1.0
0 - 3.90     | EXTERNAL   |              |
| Lymphocytes |                                                                        | K/u
L            | LAB        |              |
+-------------+------------------------------------------------------------------------+----
-------------+------------+--------------+
| Absolute    | 0.41                                                                   | 0.0
0 - 0.80     | EXTERNAL   |              |
| Monocytes   |                                                                        | K/u
L            | LAB        |              |
+-------------+------------------------------------------------------------------------+----
-------------+------------+--------------+
| Absolute    | 0.25                                                                   | 0.0
 
0 - 0.50     | EXTERNAL   |              |
| Eosinophils |                                                                        | K/u
L            | LAB        |              |
+-------------+------------------------------------------------------------------------+----
-------------+------------+--------------+
| Absolute    | 0.04                                                                   | 0.0
0 - 0.10     | EXTERNAL   |              |
| Basophils   |                                                                        | K/u
L            | LAB        |              |
+-------------+------------------------------------------------------------------------+----
-------------+------------+--------------+
| RBC         | 2+Comment:                                                             |    
             | EXTERNAL   |              |
| Morphology  | OVALO1+TEARDROP2+ANISONO                                               |    
             | LAB        |              |
|             | RMAL PLT MORPHTesting                                                  |    
             |            |              |
|             | performed at [facility], [facility] [address]                                               |    
             |            |              |
|             |  [facility],                                                      |    
             |            |              |
|             | [place]   [address]                                                  |    
             |            |              |
|             |ANISO                                                                  |     
            |            |              |
|             |NORMAL PLT MORPH                                                        |    
             |            |              |
|             |Testing performed at [facility], [facility] [place]   [address] |    
             |            |              |
+-------------+------------------------------------------------------------------------+----
-------------+------------+--------------+
 
 
 
+-----------------+
| Specimen        |
+-----------------+
| Blood specimen  |
| (specimen)      |
+-----------------+
 
 
 
+----------------+---------+--------------------+--------------+
| Performing     | Address | City/State/Zipcode | Phone Number |
| Organization   |         |                    |              |
+----------------+---------+--------------------+--------------+
|   EXTERNAL LAB |         |                    |              |
+----------------+---------+--------------------+--------------+
 Comprehensive Metabolic Panel (08/10/2018 10:00 AM PDT)
 
+-------------+--------------------------+-----------------+------------+--------------+
| Component   | Value                    | Ref Range       | Performed  | Pathologist  |
|             |                          |                 | At         | Signature    |
+-------------+--------------------------+-----------------+------------+--------------+
| Na          | 139                      | 135 - 145       | EXTERNAL   |              |
|             |                          | mmol/L          | LAB        |              |
+-------------+--------------------------+-----------------+------------+--------------+
 
| K           | 3.8                      | 3.5 - 4.9       | EXTERNAL   |              |
|             |                          | mmol/L          | LAB        |              |
+-------------+--------------------------+-----------------+------------+--------------+
| Cl          | 105                      | 99 - 109 mmol/L | EXTERNAL   |              |
|             |                          |                 | LAB        |              |
+-------------+--------------------------+-----------------+------------+--------------+
| CO2         | 24                       | 23 - 32 mmol/L  | EXTERNAL   |              |
|             |                          |                 | LAB        |              |
+-------------+--------------------------+-----------------+------------+--------------+
| Anion Gap   | 14                       | 5 - 20 mmol/L   | EXTERNAL   |              |
|             |                          |                 | LAB        |              |
+-------------+--------------------------+-----------------+------------+--------------+
| Glucose,    | 137 (H)                  | 65 - 99 mg/dL   | EXTERNAL   |              |
| Fasting     |                          |                 | LAB        |              |
+-------------+--------------------------+-----------------+------------+--------------+
| BUN         | 15                       | 8 - 25 mg/dL    | EXTERNAL   |              |
|             |                          |                 | LAB        |              |
+-------------+--------------------------+-----------------+------------+--------------+
| Creatinine  | 1.2                      | 0.70 - 1.30     | EXTERNAL   |              |
|             |                          | mg/dL           | LAB        |              |
+-------------+--------------------------+-----------------+------------+--------------+
| BUN/Creatin | 13                       |                 | EXTERNAL   |              |
| ine Ratio   |                          |                 | LAB        |              |
+-------------+--------------------------+-----------------+------------+--------------+
| Calcium     | 8.4 (L)                  | 8.5 - 10.5      | EXTERNAL   |              |
|             |                          | mg/dL           | LAB        |              |
+-------------+--------------------------+-----------------+------------+--------------+
| Protein,    | 5.5 (L)                  | 6.3 - 8.2 g/dL  | EXTERNAL   |              |
| Total       |                          |                 | LAB        |              |
+-------------+--------------------------+-----------------+------------+--------------+
| Albumin     | 2.4 (L)                  | 3.3 - 4.8 g/dL  | EXTERNAL   |              |
|             |                          |                 | LAB        |              |
+-------------+--------------------------+-----------------+------------+--------------+
| Globulin    | 3.1                      | 1.3 - 4.9 g/dL  | EXTERNAL   |              |
|             |                          |                 | LAB        |              |
+-------------+--------------------------+-----------------+------------+--------------+
| A/G Ratio   | 0.8 (L)                  | 1.0 - 2.4       | EXTERNAL   |              |
|             |                          |                 | LAB        |              |
+-------------+--------------------------+-----------------+------------+--------------+
| Bilirubin   | 0.8                      | 0.1 - 1.5 mg/dL | EXTERNAL   |              |
| Total       |                          |                 | LAB        |              |
+-------------+--------------------------+-----------------+------------+--------------+
| ALP,        | 87                       | 35 - 115 U/L    | EXTERNAL   |              |
| External    |                          |                 | LAB        |              |
+-------------+--------------------------+-----------------+------------+--------------+
| AST         | 20                       | 10 - 45 U/L     | EXTERNAL   |              |
|             |                          |                 | LAB        |              |
+-------------+--------------------------+-----------------+------------+--------------+
| ALT         | 16                       | 10 - 65 U/L     | EXTERNAL   |              |
|             |                          |                 | LAB        |              |
+-------------+--------------------------+-----------------+------------+--------------+
| Estimated   | 59 (L)Comment: GFR <60:  | mL/min/1.73m2   | EXTERNAL   |              |
| GFR         | CHRONIC KIDNEY DISEASE,  |                 | LAB        |              |
|             | IF FOUND OVER A 3 MONTH  |                 |            |              |
|             | PERIOD.GFR <15: KIDNEY   |                 |            |              |
|             | FAILURE.FOR       |                 |            |              |
|             | [employer], MULTIPLY THE  |                 |            |              |
|             | CALCULATED GFR BY        |                 |            |              |
|             | 1.210.This eGFR is       |                 |            |              |
|             | calculated using the     |                 |            |              |
 
|             | MDRD IDMS traceable      |                 |            |              |
|             | equation.Testing         |                 |            |              |
|             | performed at [facility], [address] |                 |            |              |
|             |  [facility],        |                 |            |              |
|             | [place]   [address]    |                 |            |              |
+-------------+--------------------------+-----------------+------------+--------------+
 
 
 
+-----------------+
| Specimen        |
+-----------------+
| Blood specimen  |
| (specimen)      |
+-----------------+
 
 
 
+----------------+---------+--------------------+--------------+
| Performing     | Address | City/State/Zipcode | Phone Number |
| Organization   |         |                    |              |
+----------------+---------+--------------------+--------------+
|   EXTERNAL LAB |         |                    |              |
+----------------+---------+--------------------+--------------+
 CV VASCULAR PROCEDURE (2018  8:12 PM PDT)
 
+----------+
| Specimen |
+----------+
|          |
+----------+
 
 
 
+------------------------------------------------------------------------+--------------+
| Narrative                                                              | Performed At |
+------------------------------------------------------------------------+--------------+
|   This Point of Care (POC) ultrasound image has been reviewed and      |              |
| interpreted by the physician identified as the performing physician in |              |
|  the  associated interpretation and report.                            |              |
+------------------------------------------------------------------------+--------------+
 
 
 
+---------------------------------------------------------------------------------------+
| Procedure Note                                                                        |
+---------------------------------------------------------------------------------------+
|   Maurizio Rad Conversion - 2019  3:41 PM PDT  This Point of Care (POC) ultrasound  |
| image has been reviewed andinterpreted by the physician identified as the performing  |
| physician in theassociated interpretation and report.                                 |
|                                                                                       |
+---------------------------------------------------------------------------------------+
 CV CARDIAC PROCEDURE (2018  8:09 PM PDT)
 
+----------+
| Specimen |
+----------+
|          |
+----------+
 
 
 
 
+------------------------------------------------------------------------+--------------+
| Narrative                                                              | Performed At |
+------------------------------------------------------------------------+--------------+
|   Accession:   [id number]                                                 |              |
| ____________________________________________________________________   |              |
|    YOB: 1945.     PROCEDURE:  1. Right femoral       |              |
| access with ultrasound guidance.  2. Left heart catheterization.  3.   |              |
| Coronary angiogram.  4. SVG angiogram to the RCA.  5. SVG angiogram to |              |
|  the left circumflex.  6. LIMA to LAD angiogram.     INDICATION:       |              |
|   This is a 72-year-old male who presented to the hospital  because of |              |
|  shortness of breath, pulmonary edema, and elevation of cardiac        |              |
| enzymes.   For further details, refer to my consultation note.   Given |              |
|  the  above findings, left heart catheterization was recommended.      |              |
|   The patient  had previous history of CABG times 4 in .           |              |
| PROCEDURE NOTE:   The patient was brought electively to the heart      |              |
| catheterization lab.   Consent was obtained after reviewing risks and  |              |
|  benefits.   Timeout was done at the bedside.   The patient was        |              |
| prepped in the  usual sterile manner, received IV fentanyl via         |              |
| independent observer.   Right  groin was anesthetized with 1 percent   |              |
| lidocaine.   Using ultrasound and a  micropuncture needle, right       |              |
| femoral access was obtained.   A 5-French sheath  was introduced       |              |
| without any difficulty.   A 0.035 wire was used and advanced  under    |              |
| fluoroscopic guidance into the ascending aorta.   A 5-French JL4       |              |
| catheter was used and advanced over the wire and placed selectively in |              |
|  the  left coronary cusp.   Wire was removed.   Catheter was flushed.  |              |
|   Selectively  engaged the left coronary system.   Contrast was        |              |
| injected.   Multiple views  were obtained.   Exchange over the wire    |              |
| was done with JR4 catheter that was  placed selectively in the right   |              |
| coronary cusp, selectively engaged the  right coronary artery, and     |              |
| multiple views were obtained.   Attempted to  engage the SVG to RCA    |              |
| and the obtuse marginal with the JR4 catheter;  however, was           |              |
| unsuccessful.   Then JR4 catheter was used to cannulate the  left      |              |
| subclavian artery and then a long 0.035 wire was used to exchange with |              |
|   a LIMA catheter that selectively engaged the LIMA graft and multiple |              |
|  views  were obtained.   Exchange over the wire was done with a        |              |
| 5-French  multipurpose catheter that selectively engaged the right SVG |              |
|  to RCA graft.  Multiple views were obtained.   Exchange over the wire |              |
|  was done with  5-French AL1 catheter that selectively engaged the SVG |              |
|  to left circumflex  system graft.   Multiple views were obtained.     |              |
|   Finally, the catheter was  removed over the wire.   Hemostasis was   |              |
| achieved by TR band.     CONTRAST USED:   100 mL.     BLOOD LOSS:      |              |
|   Less than 10.     COMPLICATIONS:   None.     HEMOSTASIS:   By manual |              |
|  pressure.     ANGIOGRAPHIC FINDINGS:  1. Left main is large caliber   |              |
| vessel with normal origin.   Bifurcates to LAD  and left circumflex    |              |
| with mild diffuse disease.  2. LAD is 100 percent proximally occluded. |              |
|   3. Left circumflex is a small to moderate vessel that is severely    |              |
| calcified, has multiple lesions and diffusely diseased.   Distally it  |              |
| is  very small and severely calcified.   Has 90 percent proximal and   |              |
| subtotal  occlusion distal.   However again smaller and severely       |              |
| calcified.   No  competitive flow was seen.  4. RCA has 100 percent    |              |
| ostial occlusion.  5. SVG to RCA is widely patent with mildly diffuse  |              |
| disease.  6. SVG to left circumflex system is diffusely diseased with  |              |
| severe  degenerative disease; however, occluded at the insertion site. |              |
|    The jump  graft of the 2nd obtuse marginal is occluded.   LIMA to   |              |
| LAD is widely  patent.     CONCLUSION:  1. Severe 3-vessel coronary    |              |
| artery disease.  2. Patent SVG to RCA and LIMA to LAD.  3. Occluded    |              |
| SVG to the left circumflex system, which is a jump graft.  4. Severely |              |
|  calcified left circumflex, which is small caliber severely calcified. |              |
 
|   However, not amenable to any PCI.     RECOMMENDATIONS:   Given the   |              |
| above findings, the patient will continue  medical therapy for         |              |
| coronary artery disease.   Will be restarted on  antiplatelet therapy  |              |
| and will be on optimization of blood pressure control,  statin         |              |
| therapy, and will be re-started on anticoagulation for atrial          |              |
| fibrillation.   Further recommendations to follow.     Read by MARÍA Sinha              |
Ernestine AKINS MD 2018 07:56 P     Electronically signed by María Akins MD on 8/10/2018 6:24 AM                                       |              |
+------------------------------------------------------------------------+--------------+
 
 
 
+-----------------------------------------------------------------------------------+
| Procedure Note                                                                    |
+-----------------------------------------------------------------------------------+
|   Andrzej Steven Conversion - 2019  3:41 PM PDT  Accession:  [id number]              |
| ____________________________________________________________________              |
|                                                                                   |
| YOB: 1945.                                                       |
|                                                                                   |
| PROCEDURE:                                                                        |
| 1. Right femoral access with ultrasound guidance.                                 |
| 2. Left heart catheterization.                                                    |
| 3. Coronary angiogram.                                                            |
| 4. SVG angiogram to the RCA.                                                      |
| 5. SVG angiogram to the left circumflex.                                          |
| 6. LIMA to LAD angiogram.                                                         |
|                                                                                   |
| INDICATION:  This is a 72-year-old male who presented to the hospital             |
| because of shortness of breath, pulmonary edema, and elevation of cardiac         |
| enzymes.  For further details, refer to my consultation note.  Given the          |
| above findings, left heart catheterization was recommended.  The patient          |
| had previous history of CABG times 4 in .                                     |
|                                                                                   |
| PROCEDURE NOTE:  The patient was brought electively to the heart                  |
| catheterization lab.  Consent was obtained after reviewing risks and              |
| benefits.  Timeout was done at the bedside.  The patient was prepped in the       |
| usual sterile manner, received IV fentanyl via independent observer.  Right       |
| groin was anesthetized with 1 percent lidocaine.  Using ultrasound and a          |
| micropuncture needle, right femoral access was obtained.  A 5-French sheath       |
| was introduced without any difficulty.  A 0.035 wire was used and advanced        |
| under fluoroscopic guidance into the ascending aorta.  A 5-French JL4             |
| catheter was used and advanced over the wire and placed selectively in the        |
| left coronary cusp.  Wire was removed.  Catheter was flushed.  Selectively        |
| engaged the left coronary system.  Contrast was injected.  Multiple views         |
| were obtained.  Exchange over the wire was done with JR4 catheter that was        |
| placed selectively in the right coronary cusp, selectively engaged the            |
| right coronary artery, and multiple views were obtained.  Attempted to            |
| engage the SVG to RCA and the obtuse marginal with the JR4 catheter;              |
| however, was unsuccessful.  Then JR4 catheter was used to cannulate the           |
| left subclavian artery and then a long 0.035 wire was used to exchange with       |
| a LIMA catheter that selectively engaged the LIMA graft and multiple views        |
| were obtained.  Exchange over the wire was done with a 5-French                   |
| multipurpose catheter that selectively engaged the right SVG to RCA graft.        |
| Multiple views were obtained.  Exchange over the wire was done with               |
| 5-French AL1 catheter that selectively engaged the SVG to left circumflex         |
| system graft.  Multiple views were obtained.  Finally, the catheter was           |
| removed over the wire.  Hemostasis was achieved by TR band.                       |
|                                                                                   |
| CONTRAST USED:  100 mL.                                                           |
 
|                                                                                   |
| BLOOD LOSS:  Less than 10.                                                        |
|                                                                                   |
| COMPLICATIONS:  None.                                                             |
|                                                                                   |
| HEMOSTASIS:  By manual pressure.                                                  |
|                                                                                   |
| ANGIOGRAPHIC FINDINGS:                                                            |
| 1. Left main is large caliber vessel with normal origin.  Bifurcates to LAD       |
| and left circumflex with mild diffuse disease.                                    |
| 2. LAD is 100 percent proximally occluded.                                        |
| 3. Left circumflex is a small to moderate vessel that is severely                 |
| calcified, has multiple lesions and diffusely diseased.  Distally it is           |
| very small and severely calcified.  Has 90 percent proximal and subtotal          |
| occlusion distal.  However again smaller and severely calcified.  No              |
| competitive flow was seen.                                                        |
| 4. RCA has 100 percent ostial occlusion.                                          |
| 5. SVG to RCA is widely patent with mildly diffuse disease.                       |
| 6. SVG to left circumflex system is diffusely diseased with severe                |
| degenerative disease; however, occluded at the insertion site.  The jump          |
| graft of the 2nd obtuse marginal is occluded.  LIMA to LAD is widely              |
| patent.                                                                           |
|                                                                                   |
| CONCLUSION:                                                                       |
| 1. Severe 3-vessel coronary artery disease.                                       |
| 2. Patent SVG to RCA and LIMA to LAD.                                             |
| 3. Occluded SVG to the left circumflex system, which is a jump graft.             |
| 4. Severely calcified left circumflex, which is small caliber severely calcified. |
| However, not amenable to any PCI.                                                 |
|                                                                                   |
| RECOMMENDATIONS:  Given the above findings, the patient will continue             |
| medical therapy for coronary artery disease.  Will be restarted on                |
| antiplatelet therapy and will be on optimization of blood pressure control,       |
| statin therapy, and will be re-started on anticoagulation for atrial              |
| fibrillation.  Further recommendations to follow.                                 |
|                                                                                   |
| Read by MARÍA AKINS MD 2018 07:56 P                                   |
|                                                                                   |
| Electronically signed by aMría Akins MD on 8/10/2018 6:24 AM                 |
+-----------------------------------------------------------------------------------+
 FL Video Swallow w Speech (2018  4:50 PM PDT)
 
+----------+
| Specimen |
+----------+
|          |
+----------+
 
 
 
+------------------------------------------------------------------------+--------------+
| Narrative                                                              | Performed At |
+------------------------------------------------------------------------+--------------+
|   This is a non-reportable procedure without a radiologist report and  |              |
| is  used for image storage only                                        |              |
+------------------------------------------------------------------------+--------------+
 
 
 
+--------------------------------------------------------------------------------------+
 
| Procedure Note                                                                       |
+--------------------------------------------------------------------------------------+
|   Andrzej Steven Conversion - 2019  4:21 AM PDT  This is a non-reportable procedure  |
| without a radiologist report and isused for image storage only                       |
+--------------------------------------------------------------------------------------+
 CT Chest wo Contrast (2018 11:22 AM PDT)
 
+----------+
| Specimen |
+----------+
|          |
+----------+
 
 
 
+------------------------------------------------------------------------+--------------+
| Impressions                                                            | Performed At |
+------------------------------------------------------------------------+--------------+
|      1. At the very least there is pulmonary vascular congestion,      |              |
| edema-and centrilobular emphysema is superimposed     2. Pulmonary     |              |
| infiltrates are asymmetric and throughout the left hemithorax. This    |              |
| could be due to superimposed infection of the left upper lung and      |              |
| there is a small effusion also     3. Lastly, there are inflammatory   |              |
| appearing airspace or infiltrative nodules superimposed throughout     |              |
|  Will be important to repeat this examination when the patient's acute |              |
|  issues are resolved to evaluate for underlying lesion/mass            |              |
| Electronically signed by Juan Francisco Conn MD on 2018 12:01 PM    |              |
+------------------------------------------------------------------------+--------------+
 
 
 
+------------------------------------------------------------------------+--------------+
| Narrative                                                              | Performed At |
+------------------------------------------------------------------------+--------------+
|   HISTORY: 72 years year-old Male, hemoptysis.     TECHNIQUE: CT       |              |
| through the chest. Noncontrast examination. Automatic dose adjustment  |              |
| to minimize patient exposure.     PRIOR EXAMINATION: Earlier chest     |              |
| radiograph.     FINDINGS:      demonstrates cardiomegaly, sternal |              |
|  wires and dense reticular interstitial lung disease throughout the    |              |
| left mid chest.     Lung windows demonstrate bilateral reticular and   |              |
| confluent infiltrates throughout mid lower lung fields. In the         |              |
| posterior medial left lung on image 81 series 3 in underlying mass     |              |
| would be difficult to exclude, but this is simply a larger multiple    |              |
| similar   findings throughout both lungs. Inflammatory nodules and/or  |              |
| inflammatory masses are very plausibly superimposed     In the mid     |              |
| central superior left lung the lung disease is more cystic in          |              |
| appearance, asymmetric centrilobular emphysema/COPD and superimposed   |              |
| edema edema     Bone windows demonstrate degenerative and              |              |
| postprocedural changes, without acute appearing lesion or active       |              |
| fracture. The sternotomy is healed. Nonaggressive and degenerative     |              |
| changes not uncommon for age.     What is seen of the upper abdomen    |              |
| demonstrates fatty liver. Splenomegaly. No adenopathy or fluid         |              |
| collection discernible. Large hiatus hernia.     Within the chest      |              |
| dense coronary calcification, interventional changes, diffuse cardiac  |              |
| megaly and hilar vascular congestion symmetrically distributed in      |              |
| midlung fields. Changes post CABG.                                     |              |
+------------------------------------------------------------------------+--------------+
 
 
 
 
+-------------------------------------------------------------------------------------------
---------------------------------------------------------------------------------------+
| Procedure Note                                                                            
                                                                                       |
+-------------------------------------------------------------------------------------------
---------------------------------------------------------------------------------------+
|   Maurizio, Rad Conversion - 2019  4:21 AM PDT  HISTORY: 72 years year-old Male,         
                                                                                       |
| hemoptysis. TECHNIQUE: CT through the chest. Noncontrast examination. Automatic dose      
                                                                                       |
| adjustment to minimize patient exposure. PRIOR EXAMINATION: Earlier chest radiograph.     
                                                                                       |
| FINDINGS:  demonstrates cardiomegaly, sternal wires and dense reticular              
                                                                                       |
| interstitial lung disease throughout the left mid chest. Lung windows demonstrate         
                                                                                       |
| bilateral reticular and confluent infiltrates throughout mid lower lung fields. In the    
                                                                                       |
| posterior medial left lung on image 81 series 3 in underlying mass would be difficult to  
                                                                                       |
|  exclude, but this is simply a larger multiple similar findings throughout both lungs.    
                                                                                       |
| Inflammatory nodules and/or inflammatory masses are very plausibly superimposed In the    
                                                                                       |
| mid central superior left lung the lung disease is more cystic in appearance, asymmetric  
                                                                                       |
|  centrilobular emphysema/COPD and superimposed edema edema Bone windows demonstrate       
                                                                                       |
| degenerative and postprocedural changes, without acute appearing lesion or active         
                                                                                       |
| fracture. The sternotomy is healed. Nonaggressive and degenerative changes not uncommon   
                                                                                       |
| for age. What is seen of the upper abdomen demonstrates fatty liver. Splenomegaly. No     
                                                                                       |
| adenopathy or fluid collection discernible. Large hiatus hernia. Within the chest dense   
                                                                                       |
| coronary calcification, interventional changes, diffuse cardiac megaly and hilar          
                                                                                       |
| vascular congestion symmetrically distributed in midlung fields. Changes post CABG.       
                                                                                       |
| IMPRESSION:  1. At the very least there is pulmonary vascular congestion, edema-and       
                                                                                       |
| centrilobular emphysema is superimposed 2. Pulmonary infiltrates are asymmetric and       
                                                                                       |
| throughout the left hemithorax. This could be due to superimposed infection of the left   
                                                                                       |
| upper lung and there is a small effusion also 3. Lastly, there are inflammatory           
                                                                                       |
| appearing airspace or infiltrative nodules superimposed throughout Will be important to   
                                                                                       |
| repeat this examination when the patient's acute issues are resolved to evaluate for      
                                                                                       |
| underlying lesion/mass Electronically signed by Juan Francisco Conn MD on 2018 12:01   
                                                                                       |
| PM                                                                                        
                                                                                       |
|2. Pulmonary infiltrates are asymmetric and throughout the left hemithorax. This could be d
ue to superimposed infection of the left upper lung and there is a small effusion also |
 
|                                                                                           
                                                                                       |
|3. Lastly, there are inflammatory appearing airspace or infiltrative nodules superimposed t
hroughout                                                                              |
|                                                                                           
                                                                                       |
|Will be important to repeat this examination when the patient's acute issues are resolved t
o evaluate for underlying lesion/mass                                                  |
|                                                                                           
                                                                                       |
|Electronically signed by Juan Francisco Conn MD on 2018 12:01 PM                        
                                                                                       |
+-------------------------------------------------------------------------------------------
---------------------------------------------------------------------------------------+
 XR Chest 2 Vws (2018 11:03 AM PDT)
 
+----------+
| Specimen |
+----------+
|          |
+----------+
 
 
 
+------------------------------------------------------------------------+--------------+
| Impressions                                                            | Performed At |
+------------------------------------------------------------------------+--------------+
|   1.   Borderline cardiac enlargement, with probable pulmonary edema.  |              |
| Atypical pneumonitis seems less likely.     Electronically signed by   |              |
| Yonatan Montague MD on 2018 11:34 AM                                 |              |
+------------------------------------------------------------------------+--------------+
 
 
 
+------------------------------------------------------------------------+--------------+
| Narrative                                                              | Performed At |
+------------------------------------------------------------------------+--------------+
|   HISTORY:  Precordial chest pain. Question pneumonia.     COMPARISON: |              |
|   18.     TECHNIQUE:  PA and lateral films of the chest.         |              |
| FINDINGS:  Median sternotomy. Heart size is upper normal. Pulmonary    |              |
| venous congestion. Bilateral perihilar mixed interstitial and airspace |              |
|  infiltrates again noted, essentially unchanged. No effusions. Subtle  |              |
| thickening of the major and minor fissures. Mild   degenerative        |              |
| changes of the spine. Osteopenia.                                      |              |
+------------------------------------------------------------------------+--------------+
 
 
 
+-------------------------------------------------------------------------------------------
--------------------------------------------------------------------------------------------
----------------------------------------------------------------+
| Procedure Note                                                                            
                                                                                            
                                                                |
+-------------------------------------------------------------------------------------------
--------------------------------------------------------------------------------------------
----------------------------------------------------------------+
|   Maurizio, Rad Conversion - 2019  4:21 AM PDT  HISTORY:Precordial chest pain. Question  
                                                                                            
                                                                |
 
|  pneumonia. COMPARISON:18. TECHNIQUE:PA and lateral films of the chest.             
                                                                                            
                                                                |
| FINDINGS:Median sternotomy. Heart size is upper normal. Pulmonary venous congestion.      
                                                                                            
                                                                |
| Bilateral perihilar mixed interstitial and airspace infiltrates again noted, essentially  
                                                                                            
                                                                |
|  unchanged. No effusions. Subtle thickening of the major and minor fissures. Mild         
                                                                                            
                                                                |
| degenerative changes of the spine. Osteopenia. IMPRESSION: 1.  Borderline cardiac         
                                                                                            
                                                                |
| enlargement, with probable pulmonary edema. Atypical pneumonitis seems less likely.       
                                                                                            
                                                                |
| Electronically signed by Yonatan Montague MD on 2018 11:34 AM                           
                                                                                            
                                                                |
|                                                                                           
                                                                                            
                                                                |
|FINDINGS:                                                                                  
                                                                                            
                                                               |
|Median sternotomy. Heart size is upper normal. Pulmonary venous congestion. Bilateral perih
ilar mixed interstitial and airspace infiltrates again noted, essentially unchanged. No effu
sions. Subtle thickening of the major and minor fissures. Mild  |
|degenerative changes of the spine. Osteopenia.                                             
                                                                                            
                                                                |
|                                                                                           
                                                                                            
                                                                |
|IMPRESSION:                                                                                
                                                                                            
                                                                |
|1.  Borderline cardiac enlargement, with probable pulmonary edema. Atypical pneumonitis see
ms less likely.                                                                             
                                                                |
|                                                                                           
                                                                                            
                                                                |
|Electronically signed by Yonatan Montague MD on 2018 11:34 AM                            
                                                                                            
                                                                |
+-------------------------------------------------------------------------------------------
--------------------------------------------------------------------------------------------
----------------------------------------------------------------+
 ECHO Complete (2018  7:00 AM PDT)
 
+----------+
| Specimen |
+----------+
|          |
+----------+
 
 
 
 
+-----------------------------------------------------------------------+--------------+
| Impressions                                                           | Performed At |
+-----------------------------------------------------------------------+--------------+
|   1. The left ventricle cavity is normal in size, mild concentric     |              |
| hypertrophy and   normal systolic function EF 55%. Mild inferior and  |              |
| inferolateral hypokinetic segments.  2. Mild degenerative changes in  |              |
| the aortic and mitral valves.  3. There is no pericardial effusion.   |              |
+-----------------------------------------------------------------------+--------------+
 
 
 
+------------------------------------------------------------------------+--------------+
| Narrative                                                              | Performed At |
+------------------------------------------------------------------------+--------------+
|      Patient Name:   DARYL SHANE  YOB: 1945           |              |
| Accession:   [id number]     Performing Physician:    María Akins     |              |
| _______________________________________________________________        |              |
| INDICATIONS  -----------  sob,h/o 4 cabg     CONCLUSIONS  -----------  |              |
|  1. The left ventricle cavity is normal in size, mild concentric       |              |
| hypertrophy and   normal systolic function EF 55%. Mild inferior and   |              |
| inferolateral hypokinetic segments.  2. Mild degenerative changes in   |              |
| the aortic and mitral valves.  3. There is no pericardial effusion.    |              |
|   FINDINGS  --------  ECG rhythm: Atrial fibrillation.  Study: A       |              |
| 2-dimensional transthoracic echocardiogram with m-mode, spectral and   |              |
| color flow Doppler was perfomed.  Study: This was a technically        |              |
| adequate study.  Left Ventricle: Overall left ventricular systolic     |              |
| function is low-normal with, an EF 55 %.  Left Ventricle: The left     |              |
| ventricle cavity size is normal.  Left Ventricle: There is mild        |              |
| concentric left ventricular hypertrophy.  Right Ventricle: The RV was  |              |
| not well visualized.  Left Atrium: The left atrium is mildly dilated.  |              |
|  Right Atrium: The right atrial size is normal.  Aortic Valve: The     |              |
| aortic valve is trileaflet.  Aortic Valve: The aortic valve is mildly  |              |
| calcified.  Aortic Valve: There is mild aortic regurgitation.  Mitral  |              |
| Valve: There is trace mitral regurgitation.  Mitral Valve: Mild mitral |              |
|  annular calcification present.  Tricuspid Valve: The tricuspid valve  |              |
| appears structurally normal.  Tricuspid Valve: Trace tricuspid         |              |
| regurgitation present.  Tricuspid Valve: There is no evidence of       |              |
| pulmonary hypertension.  Pulmonic Valve: The pulmonic valve is normal. |              |
|   Pulmonic Valve: Mild pulmonic regurgitation.  Pulmonic Valve: Mild   |              |
| degenerative changes in the aortic and mitral valves.  Pericardium:    |              |
| There is no pericardial effusion.  Pericardium: No pleural effusion    |              |
| seen.  IVC/Hepatic Veins: The inferior vena cava is normal in size and |              |
|  collapses > 50 % with sniff, indicating normal central venous         |              |
| pressures.     MEASUREMENTS  -------------  Ao asc:    4.19 cm  Ao     |              |
| sinus:    3.71 cm  Ao st junct:    3.01 cm  IVC:    1.42 cm  LA Diam:  |              |
|    5.50 cm  LA Major:    5.97 cm  EDV(Teich):    106.74 ml  IVSd:      |              |
| 1.19 cm  LVIDd:    4.78 cm  LVPWd:    1.21 cm  LVOT Diam:    2.25 cm   |              |
| %FS:    26.84 %  EF(Teich):    52.32 %  ESV(Teich):    50.89 ml  IVSs: |              |
|     1.81 cm  LVIDs:    3.50 cm  LVPWs:    1.71 cm  SV(Teich):    55.85 |              |
|  ml  RA Major:    5.04 cm  LVEF MOD A4C:    55.16 %  SV MOD A4C:       |              |
| 49.58 ml  LVEDV MOD A4C:    89.88 ml  LVLd A4C:    7.50 cm  LVESV MOD  |              |
| A4C:    40.30 ml  LVLs A4C:    5.87 cm  LAESV(A-L):    76.74 ml  LAESV |              |
|  Index (A-L):    41.48 ml/m2  LAAs A2C:    17.05 cm2  LAESV A-L A2C:   |              |
|    48.56 ml  LALs A2C:    5.08 cm  LAAs A4C:    26.95 cm2  LAESV A-L   |              |
| A4C:    95.07 ml  LALs A4C:    6.48 cm  Ao Diam:    4.03 cm  AV Cusp:  |              |
|    1.91 cm  LA Diam:    4.92 cm  LA/Ao:    1.22  D-E Excursion:        |              |
| 2.29 cm  E-F Powell:    0.09 m/s  AV maxPG:    10.18 mmHg  AV meanPG:   |              |
|    6.51 mmHg  AV Vmax:    1.59 m/s  AV Vmean:    1.23 m/s  AV VTI:     |              |
| 28.06 cm  KIRK Vmax:    3.26 cm2  KIRK (VTI):    3.35 cm2  AVAI Vmax:    |              |
 
|  0.00 cm2/m2  AVAI (VTI):    0.00 cm2/m2  LVOT maxP.86 mmHg     |              |
| LVOT meanPG:    3.16 mmHg  LVSI Dopp:    50.95 ml/m2  LVSV Dopp:       |              |
| 94.27 ml  LVOT Vmax:    1.31 m/s  LVOT Vmean:    0.84 m/s  LVOT VTI:   |              |
|    23.69 cm  MV E Luis:    0.93 m/s  E' Lat:    0.12 m/s  E' Sept:      |              |
| 0.06 m/s  PRend P.85 mmHg  PRend Vmax:    1.48 m/s  HR:         |              |
| 77.27 BPM  PV maxPG:    3.37 mmHg  PV meanP.56 mmHg  PV Vmax:   |              |
|    0.91 m/s  PV Vmean:    0.56 m/s  PV VTI:    12.48 cm  RV S':        |              |
| 0.08 m/s  TR maxP.05 mmHg  TR Vmax:    3.04 m/s  TV A Luis:     |              |
| 0.00 m/s  TV Dec Powell:    3.93 m/s2  TV Dec Time:    207.73 ms  TV E  |              |
| Luis:    0.56 m/s  TV E/A Ratio:    69.55     Sonographer: CM           |              |
| Authenticated by: María DíazSaint Paulghanshyam  Report Date/Time: 2018 13:7:33 |              |
|                                                                        |              |
+------------------------------------------------------------------------+--------------+
 
 
 
+-------------------------------------------------------------------------------------------
-----------------------------------------------------------------------------+
| Procedure Note                                                                            
                                                                             |
+-------------------------------------------------------------------------------------------
-----------------------------------------------------------------------------+
|   Andrzej Steven Conversion - 2019  4:21 AM PDT   Patient Name:  Cherise SHANE of        
                                                                             |
| Birth:  1945 Accession:  [id number] Performing Physician:   María                    
                                                                             |
| BreSaint Paulghanshyam_______________________________________________________________INDICATIONS------  
                                                                             |
| -----sob,h/o 4 cabg CONCLUSIONS-----------1. The left ventricle cavity is normal in       
                                                                             |
| size, mild concentric hypertrophy and  normal systolic function EF 55%. Mild inferior     
                                                                             |
| and inferolateral hypokinetic segments.2. Mild degenerative changes in the aortic and     
                                                                             |
| mitral valves.3. There is no pericardial effusion. FINDINGS--------ECG rhythm: Atrial     
                                                                             |
| fibrillation.Study: A 2-dimensional transthoracic echocardiogram with m-mode, spectral    
                                                                             |
| and color flow Doppler was perfomed.Study: This was a technically adequate study.Left     
                                                                             |
| Ventricle: Overall left ventricular systolic function is low-normal with, an EF 55        
                                                                             |
| %.Left Ventricle: The left ventricle cavity size is normal.Left Ventricle: There is mild  
                                                                             |
|  concentric left ventricular hypertrophy.Right Ventricle: The RV was not well             
                                                                             |
| visualized.Left Atrium: The left atrium is mildly dilated.Right Atrium: The right atrial  
                                                                             |
|  size is normal.Aortic Valve: The aortic valve is trileaflet.Aortic Valve: The aortic     
                                                                             |
| valve is mildly calcified.Aortic Valve: There is mild aortic regurgitation.Mitral Valve:  
                                                                             |
|  There is trace mitral regurgitation.Mitral Valve: Mild mitral annular calcification      
                                                                             |
| present.Tricuspid Valve: The tricuspid valve appears structurally normal.Tricuspid        
                                                                             |
| Valve: Trace tricuspid regurgitation present.Tricuspid Valve: There is no evidence of     
                                                                             |
| pulmonary hypertension.Pulmonic Valve: The pulmonic valve is normal.Pulmonic Valve: Mild  
                                                                             |
 
|  pulmonic regurgitation.Pulmonic Valve: Mild degenerative changes in the aortic and       
                                                                             |
| mitral valves.Pericardium: There is no pericardial effusion.Pericardium: No pleural       
                                                                             |
| effusion seen.IVC/Hepatic Veins: The inferior vena cava is normal in size and collapses   
                                                                             |
| > 50 % with sniff, indicating normal central venous pressures.                            
                                                                             |
| MEASUREMENTS-------------Ao asc:   4.19 cmAo sinus:   3.71 cmAo st junct:   3.01 cmIVC:   
                                                                             |
|   1.42 cmLA Diam:   5.50 cmLA Major:   5.97 cmEDV(Teich):   106.74 mlIVSd:   1.19         
                                                                             |
| cmLVIDd:   4.78 cmLVPWd:   1.21 cmLVOT Diam:   2.25 cm%FS:   26.84 %EF(Teich):   52.32    
                                                                             |
| %ESV(Teich):   50.89 mlIVSs:   1.81 cmLVIDs:   3.50 cmLVPWs:   1.71 cmSV(Teich):   55.85  
                                                                             |
|  mlRA Major:   5.04 cmLVEF MOD A4C:   55.16 %SV MOD A4C:   49.58 mlLVEDV MOD A4C:         
                                                                             |
| 89.88 mlLVLd A4C:   7.50 cmLVESV MOD A4C:   40.30 mlLVLs A4C:   5.87 cmLAESV(A-L):        
                                                                             |
| 76.74 mlLAESV Index (A-L):   41.48 ml/m2LAAs A2C:   17.05 [id number] A-L A2C:   48.56       
                                                                             |
| mlLALs A2C:   5.08 cmLAAs A4C:   26.95 [id number] A-L A4C:   95.07 mlLALs A4C:   6.48 cmAo  
                                                                             |
|  Diam:   4.03 cmAV Cusp:   1.91 cmLA Diam:   4.92 cmLA/Ao:   1.22D-E Excursion:   2.29    
                                                                             |
| cmE-F Powell:   0.09 m/Miryam maxPG:   10.18 mmHgAV meanP.51 mmHgAV Vmax:   1.59 m/Miryam  
                                                                             |
|  Vmean:   1.23 m/Miryam VTI:   28.06 cmAVA Vmax:   3.26 cm2AVA (VTI):   3.35 [id number] Vmax:   
                                                                             |
|   0.00 cm2/m2AVAI (VTI):   0.00 cm2/m2LVOT maxP.86 mmHgLVOT meanPG:   3.16          
                                                                             |
| mmHgLVSI Dopp:   50.95 ml/m2LVSV Dopp:   94.27 mlLVOT Vmax:   1.31 m/sLVOT Vmean:   0.84  
                                                                             |
|  m/sLVOT VTI:   23.69 cmMV E Luis:   0.93 m/sE' Lat:   0.12 m/sE' Sept:   0.06 m/sPRend    
                                                                             |
| P.85 mmHgPRend Vmax:   1.48 m/sHR:   77.27 BPMPV maxPG:   3.37 mmHgPV meanPG:       
                                                                             |
| 1.56 mmHgPV Vmax:   0.91 m/sPV Vmean:   0.56 m/sPV VTI:   12.48 cmRV S':   0.08 m/sTR     
                                                                             |
| maxP.05 mmHgTR Vmax:   3.04 m/sTV A Luis:   0.00 m/sTV Dec Powell:   3.93 m/s2TV     
                                                                             |
| Dec Time:   207.73 msTV E Luis:   0.56 m/sTV E/A Ratio:   69.55 Sonographer:               
                                                                             |
| CMAuthenticated by: Jose MiguelBoston Lying-In Hospitalort Date/Time: 2018 13:7:33 IMPRESSION: 1.     
                                                                             |
| The left ventricle cavity is normal in size, mild concentric hypertrophy and  normal      
                                                                             |
| systolic function EF 55%. Mild inferior and inferolateral hypokinetic segments.2. Mild    
                                                                             |
| degenerative changes in the aortic and mitral valves.3. There is no pericardial           
                                                                             |
| effusion.                                                                                 
                                                                             |
|Ao asc:   4.19 cm                                                                          
                                                                             |
|Ao sinus:   3.71 cm                                                                        
                                                                             |
|Ao st junct:   3.01 cm                                                                     
                                                                             |
 
|IVC:   1.42 cm                                                                             
                                                                             |
|LA Diam:   5.50 cm                                                                         
                                                                             |
|LA Major:   5.97 cm                                                                        
                                                                             |
|EDV(Teich):   106.74 ml                                                                    
                                                                             |
|IVSd:   1.19 cm                                                                            
                                                                             |
|LVIDd:   4.78 cm                                                                           
                                                                             |
|LVPWd:   1.21 cm                                                                           
                                                                             |
|LVOT Diam:   2.25 cm                                                                       
                                                                             |
|%FS:   26.84 %                                                                             
                                                                             |
|EF(Teich):   52.32 %                                                                       
                                                                             |
|ESV(Teich):   50.89 ml                                                                     
                                                                             |
|IVSs:   1.81 cm                                                                            
                                                                             |
|LVIDs:   3.50 cm                                                                           
                                                                             |
|LVPWs:   1.71 cm                                                                           
                                                                             |
|SV(Teich):   55.85 ml                                                                      
                                                                             |
|RA Major:   5.04 cm                                                                        
                                                                             |
|LVEF MOD A4C:   55.16 %                                                                    
                                                                             |
|SV MOD A4C:   49.58 ml                                                                     
                                                                             |
|LVEDV MOD A4C:   89.88 ml                                                                  
                                                                             |
|LVLd A4C:   7.50 cm                                                                        
                                                                             |
|LVESV MOD A4C:   40.30 ml                                                                  
                                                                             |
|LVLs A4C:   5.87 cm                                                                        
                                                                             |
|LAESV(A-L):   76.74 ml                                                                     
                                                                             |
|LAESV Index (A-L):   41.48 ml/m2                                                           
                                                                             |
|LAAs A2C:   17.05 cm2                                                                      
                                                                             |
|LAESV A-L A2C:   48.56 ml                                                                  
                                                                             |
|LALs A2C:   5.08 cm                                                                        
                                                                             |
|LAAs A4C:   26.95 cm2                                                                      
                                                                             |
|LAESV A-L A4C:   95.07 ml                                                                  
                                                                             |
|LALs A4C:   6.48 cm                                                                        
                                                                             |
 
|Ao Diam:   4.03 cm                                                                         
                                                                             |
|AV Cusp:   1.91 cm                                                                         
                                                                             |
|LA Diam:   4.92 cm                                                                         
                                                                             |
|LA/Ao:   1.22                                                                              
                                                                             |
|D-E Excursion:   2.29 cm                                                                   
                                                                             |
|E-F Powell:   0.09 m/s                                                                      
                                                                             |
|AV maxPG:   10.18 mmHg                                                                     
                                                                             |
|AV meanP.51 mmHg                                                                     
                                                                             |
|AV Vmax:   1.59 m/s                                                                        
                                                                             |
|AV Vmean:   1.23 m/s                                                                       
                                                                             |
|AV VTI:   28.06 cm                                                                         
                                                                             |
|KIRK Vmax:   3.26 cm2                                                                       
                                                                             |
|KIRK (VTI):   3.35 cm2                                                                      
                                                                             |
|AVAI Vmax:   0.00 cm2/m2                                                                   
                                                                             |
|AVAI (VTI):   0.00 cm2/m2                                                                  
                                                                             |
|LVOT maxP.86 mmHg                                                                    
                                                                             |
|LVOT meanPG:   3.16 mmHg                                                                   
                                                                             |
|LVSI Dopp:   50.95 ml/m2                                                                   
                                                                             |
|LVSV Dopp:   94.27 ml                                                                      
                                                                             |
|LVOT Vmax:   1.31 m/s                                                                      
                                                                             |
|LVOT Vmean:   0.84 m/s                                                                     
                                                                             |
|LVOT VTI:   23.69 cm                                                                       
                                                                             |
|MV E Luis:   0.93 m/s                                                                       
                                                                             |
|E' Lat:   0.12 m/s                                                                         
                                                                             |
|E' Sept:   0.06 m/s                                                                        
                                                                             |
|PRend P.85 mmHg                                                                      
                                                                             |
|PRend Vmax:   1.48 m/s                                                                     
                                                                             |
|HR:   77.27 BPM                                                                            
                                                                             |
|PV maxPG:   3.37 mmHg                                                                      
                                                                             |
|PV meanP.56 mmHg                                                                     
                                                                             |
 
|PV Vmax:   0.91 m/s                                                                        
                                                                             |
|PV Vmean:   0.56 m/s                                                                       
                                                                             |
|PV VTI:   12.48 cm                                                                         
                                                                             |
|RV S':   0.08 m/s                                                                          
                                                                             |
|TR maxP.05 mmHg                                                                     
                                                                             |
|TR Vmax:   3.04 m/s                                                                        
                                                                             |
|TV A Luis:   0.00 m/s                                                                       
                                                                             |
|TV Dec Powell:   3.93 m/s2                                                                  
                                                                             |
|TV Dec Time:   207.73 ms                                                                   
                                                                             |
|TV E Luis:   0.56 m/s                                                                       
                                                                             |
|TV E/A Ratio:   69.55                                                                      
                                                                             |
|Sonographer: CM                                                                            
                                                                             |
|Authenticated by: María Akins                                                         
                                                                             |
|Report Date/Time: 2018 13:7:33                                                         
                                                                             |
|IMPRESSION:                                                                                
                                                                             |
|1. The left ventricle cavity is normal in size, mild concentric hypertrophy and  normal sys
tolic function EF 55%. Mild inferior and inferolateral hypokinetic segments. |
|2. Mild degenerative changes in the aortic and mitral valves.                              
                                                                             |
|3. There is no pericardial effusion.                                                       
                                                                             |
+-------------------------------------------------------------------------------------------
-----------------------------------------------------------------------------+
 Troponin I (2018  5:51 AM PDT)
 
+-------------+--------------------------+--------------+------------+--------------+
| Component   | Value                    | Ref Range    | Performed  | Pathologist  |
|             |                          |              | At         | Signature    |
+-------------+--------------------------+--------------+------------+--------------+
| Troponin I, | 1.28 ()Comment:   0.00 | 0.00 - 0.10  | EXTERNAL   |              |
|  Qual       |  to 0.10    CONSISTENT   | ng/mL        | LAB        |              |
|             | WITH NORMAL              |              |            |              |
|             | POPULATION0.11 to 0.60   |              |            |              |
|             |    CONSISTENT WITH       |              |            |              |
|             | INCREASED RISK FOR       |              |            |              |
|             | ADVERSE OUTCOMES> 0.60   |              |            |              |
|             |             CONSISTENT   |              |            |              |
|             | WITH WHO CRITERIA FOR    |              |            |              |
|             | ACUTE MI CALLED NURSING  |              |            |              |
|             | UNITREAD BACK RESULTS    |              |            |              |
|             | VERIFIEDJESSICA W IN 8RP |              |            |              |
 
|             |  AT 0658 BY TDTesting    |              |            |              |
|             | performed at [facility];[address]     |              |            |              |
|             | [address];[place], [place]   |              |            |              |
|             | [id number]                    |              |            |              |
+-------------+--------------------------+--------------+------------+--------------+
 
 
 
+-----------------+
| Specimen        |
+-----------------+
| Blood specimen  |
| (specimen)      |
+-----------------+
 
 
 
+----------------+---------+--------------------+--------------+
| Performing     | Address | City/State/Zipcode | Phone Number |
| Organization   |         |                    |              |
+----------------+---------+--------------------+--------------+
|   EXTERNAL LAB |         |                    |              |
+----------------+---------+--------------------+--------------+
 Gram Stain, reflex Sputum Culture (2018  4:00 AM PDT)
 
+--------------------+
| Specimen           |
+--------------------+
| Body fluid sample  |
| (specimen)         |
+--------------------+
 
 
 
+------------------------------------------------------------------------+----------------+
| Narrative                                                              | Performed At   |
+------------------------------------------------------------------------+----------------+
|   Specimen Description                      SPUTUM        GRAM STAIN   |   EXTERNAL LAB |
|                                     GREATER THAN 10 WBCS/LPF           |                |
|                                              GREATER THAN 10 SEC/LPF   |                |
|                                                     4+                 |                |
|                                                    GRAM POSITIVE COCCI |                |
|                                                       1+               |                |
|                                                     GRAM NEGATIVE RODS |                |
|                                                       3+               |                |
|                                                     GRAM POSITIVE RODS |                |
|                                                       2+               |                |
|                                                     YEAST              |                |
| CULTURE                                          SMEAR CONTAINS        |                |
| GREATER THAN 10 SEC/LPF SUGGESTIVE OF POOR QUALITY SPECIMEN.           |                |
|   SPECIMEN WILL NOT BE CULTURED OR WILL BE CULTURED BY SPECIAL REQUEST |                |
|  ONLY.   PLEASE RECOLLECT IF CLINICALLY INDICATED.   SPECIMEN WILL BE  |                |
| HELD   48 HOURS.                                                       |                |
+------------------------------------------------------------------------+----------------+
 
 
 
+----------------+---------+--------------------+--------------+
| Performing     | Address | City/State/Zipcode | Phone Number |
| Organization   |         |                    |              |
 
+----------------+---------+--------------------+--------------+
|   EXTERNAL LAB |         |                    |              |
+----------------+---------+--------------------+--------------+
 Troponin I (2018  1:57 AM PDT)
 
+-------------+--------------------------+--------------+------------+--------------+
| Component   | Value                    | Ref Range    | Performed  | Pathologist  |
|             |                          |              | At         | Signature    |
+-------------+--------------------------+--------------+------------+--------------+
| Troponin I, | 1.72 ()Comment:   0.00 | 0.00 - 0.10  | EXTERNAL   |              |
|  Qual       |  to 0.10    CONSISTENT   | ng/mL        | LAB        |              |
|             | WITH NORMAL              |              |            |              |
|             | POPULATION0.11 to 0.60   |              |            |              |
|             |    CONSISTENT WITH       |              |            |              |
|             | INCREASED RISK FOR       |              |            |              |
|             | ADVERSE OUTCOMES> 0.60   |              |            |              |
|             |             CONSISTENT   |              |            |              |
|             | WITH WHO CRITERIA FOR    |              |            |              |
|             | ACUTE MI CALLED NURSING  |              |            |              |
|             | UNITREAD BACK RESULTS    |              |            |              |
|             | LALO NUNES IN 8RP  |              |            |              |
|             | AT 0250Testing performed |              |            |              |
|             |  at [facility];[address]        |              |            |              |
|             | [address];[place] [address]   |              |            |              |
+-------------+--------------------------+--------------+------------+--------------+
 
 
 
+-----------------+
| Specimen        |
+-----------------+
| Blood specimen  |
| (specimen)      |
+-----------------+
 
 
 
+----------------+---------+--------------------+--------------+
| Performing     | Address | City/State/Zipcode | Phone Number |
| Organization   |         |                    |              |
+----------------+---------+--------------------+--------------+
|   EXTERNAL LAB |         |                    |              |
+----------------+---------+--------------------+--------------+
 Protime INR (2018  1:57 AM PDT)
 
+-----------+--------------------------+-----------+------------+--------------+
| Component | Value                    | Ref Range | Performed  | Pathologist  |
|           |                          |           | At         | Signature    |
+-----------+--------------------------+-----------+------------+--------------+
| INR       | 1.2Comment: REFERENCE    |           | EXTERNAL   |              |
|           | RANGE:0.9 - 1.2          |           | LAB        |              |
|           |   NON-ANTICOAGULATED2.0  |           |            |              |
|           | - 3.0   ALL OTHER        |           |            |              |
|           | THERAPEUTIC              |           |            |              |
|           | INDICATIONS2.5 - 3.5     |           |            |              |
|           | MECHANICAL HEART VALVES, |           |            |              |
|           |  RECURRENT OR SYSTEMIC   |           |            |              |
|           | EMBOLISMTesting          |           |            |              |
|           | performed at [facility];[address]     |           |            |              |
|           | [facility];[place], [place]   |           |            |              |
 
|           | [id number]                    |           |            |              |
+-----------+--------------------------+-----------+------------+--------------+
 
 
 
+-----------------+
| Specimen        |
+-----------------+
| Blood specimen  |
| (specimen)      |
+-----------------+
 
 
 
+----------------+---------+--------------------+--------------+
| Performing     | Address | City/State/Zipcode | Phone Number |
| Organization   |         |                    |              |
+----------------+---------+--------------------+--------------+
|   EXTERNAL LAB |         |                    |              |
+----------------+---------+--------------------+--------------+
 External Lab: KEARA (2018  1:57 AM PDT)
 
+-------------+------------------------------------------------------------------------+----
-------------+------------+--------------+
| Component   | Value                                                                  | Ref
 Range       | Performed  | Pathologist  |
|             |                                                                        |    
             | At         | Signature    |
+-------------+------------------------------------------------------------------------+----
-------------+------------+--------------+
| WBC         | 11.05 (H)                                                              | 3.8
0 - 11.00    | EXTERNAL   |              |
|             |                                                                        | K/u
L            | LAB        |              |
+-------------+------------------------------------------------------------------------+----
-------------+------------+--------------+
| RED CELL    | 4.56                                                                   | 4.2
0 - 5.70     | EXTERNAL   |              |
| COUNT       |                                                                        | M/u
L            | LAB        |              |
+-------------+------------------------------------------------------------------------+----
-------------+------------+--------------+
| Hgb         | 11.5 (L)                                                               | 13.
2 - 17.0     | EXTERNAL   |              |
|             |                                                                        | g/d
L            | LAB        |              |
+-------------+------------------------------------------------------------------------+----
-------------+------------+--------------+
| Hematocrit, | 35.3 (L)                                                               | 39.
0 - 50.0 %   | EXTERNAL   |              |
|  POC        |                                                                        |    
             | LAB        |              |
+-------------+------------------------------------------------------------------------+----
-------------+------------+--------------+
| MCV         | 77.5 (L)                                                               | 80.
0 - 100.0 fl | EXTERNAL   |              |
|             |                                                                        |    
             | LAB        |              |
+-------------+------------------------------------------------------------------------+----
-------------+------------+--------------+
 
| MCH         | 25.1 (L)                                                               | 27.
0 - 34.0 pg  | EXTERNAL   |              |
|             |                                                                        |    
             | LAB        |              |
+-------------+------------------------------------------------------------------------+----
-------------+------------+--------------+
| MCHC        | 32.4                                                                   | 32.
0 - 35.5     | EXTERNAL   |              |
|             |                                                                        | g/d
L            | LAB        |              |
+-------------+------------------------------------------------------------------------+----
-------------+------------+--------------+
| RDW-CV      | 57.3 (H)                                                               | 37 
- 53 fl      | EXTERNAL   |              |
|             |                                                                        |    
             | LAB        |              |
+-------------+------------------------------------------------------------------------+----
-------------+------------+--------------+
| Platelet    | 160                                                                    | 150
 - 400 K/uL  | EXTERNAL   |              |
| Count       |                                                                        |    
             | LAB        |              |
| Plasma      |                                                                        |    
             |            |              |
+-------------+------------------------------------------------------------------------+----
-------------+------------+--------------+
| MPV         | 10.1                                                                   | fl 
             | EXTERNAL   |              |
|             |                                                                        |    
             | LAB        |              |
+-------------+------------------------------------------------------------------------+----
-------------+------------+--------------+
| Differentia | MANUAL                                                                 |    
             | EXTERNAL   |              |
| l Type      |                                                                        |    
             | LAB        |              |
+-------------+------------------------------------------------------------------------+----
-------------+------------+--------------+
| Segmented   | 85                                                                     | %  
             | EXTERNAL   |              |
| Neutrophils |                                                                        |    
             | LAB        |              |
|  Manual     |                                                                        |    
             |            |              |
+-------------+------------------------------------------------------------------------+----
-------------+------------+--------------+
| Lymphocytes | 8                                                                      | %  
             | EXTERNAL   |              |
|  Manual     |                                                                        |    
             | LAB        |              |
+-------------+------------------------------------------------------------------------+----
-------------+------------+--------------+
| Monocytes   | 6                                                                      | %  
             | EXTERNAL   |              |
| Manual      |                                                                        |    
             | LAB        |              |
+-------------+------------------------------------------------------------------------+----
-------------+------------+--------------+
| Eosinophils | 1                                                                      | %  
             | EXTERNAL   |              |
 
|  Manual     |                                                                        |    
             | LAB        |              |
+-------------+------------------------------------------------------------------------+----
-------------+------------+--------------+
| Absolute    | 9.40 (H)                                                               | 1.9
0 - 7.40     | EXTERNAL   |              |
| Neutrophils |                                                                        | K/u
L            | LAB        |              |
+-------------+------------------------------------------------------------------------+----
-------------+------------+--------------+
| Absolute    | 0.88 (L)                                                               | 1.0
0 - 3.90     | EXTERNAL   |              |
| Lymphocytes |                                                                        | K/u
L            | LAB        |              |
+-------------+------------------------------------------------------------------------+----
-------------+------------+--------------+
| Absolute    | 0.66                                                                   | 0.0
0 - 0.80     | EXTERNAL   |              |
| Monocytes   |                                                                        | K/u
L            | LAB        |              |
+-------------+------------------------------------------------------------------------+----
-------------+------------+--------------+
| Absolute    | 0.11                                                                   | 0.0
0 - 0.50     | EXTERNAL   |              |
| Eosinophils |                                                                        | K/u
L            | LAB        |              |
+-------------+------------------------------------------------------------------------+----
-------------+------------+--------------+
| Platelet    | ADEQUATE                                                               |    
             | EXTERNAL   |              |
| Estimate    |                                                                        |    
             | LAB        |              |
+-------------+------------------------------------------------------------------------+----
-------------+------------+--------------+
| RBC         | 2+Comment:                                                             |    
             | EXTERNAL   |              |
| Morphology  | ANISO2+OVALO1+TEARDROPNO                                               |    
             | LAB        |              |
|             | RMAL PLT MORPHTesting                                                  |    
             |            |              |
|             | performed at [facility], [address]                                               |    
             |            |              |
|             |  [facility],                                                      |    
             |            |              |
|             | [place]   [address]                                                  |    
             |            |              |
|             |TEARDROP                                                               |     
            |            |              |
|             |NORMAL PLT MORPH                                                        |    
             |            |              |
|             |Testing performed at [facility], [address] [facility], [place]   [address] |    
             |            |              |
+-------------+------------------------------------------------------------------------+----
-------------+------------+--------------+
 
 
 
+-----------------+
 
| Specimen        |
+-----------------+
| Blood specimen  |
| (specimen)      |
+-----------------+
 
 
 
+----------------+---------+--------------------+--------------+
| Performing     | Address | City/State/Zipcode | Phone Number |
| Organization   |         |                    |              |
+----------------+---------+--------------------+--------------+
|   EXTERNAL LAB |         |                    |              |
+----------------+---------+--------------------+--------------+
 TSH (2018  1:57 AM PDT)
 
+-----------+--------------------------+----------------+------------+--------------+
| Component | Value                    | Ref Range      | Performed  | Pathologist  |
|           |                          |                | At         | Signature    |
+-----------+--------------------------+----------------+------------+--------------+
| TSH       | 4.150Comment: Testing    | 0.450 - 5.100  | EXTERNAL   |              |
|           | performed at [facility], [address] | uIU/mL         | LAB        |              |
|           |  [address],        |                |            |              |
|           | [place]  [address]     |                |            |              |
+-----------+--------------------------+----------------+------------+--------------+
 
 
 
+-----------------+
| Specimen        |
+-----------------+
| Blood specimen  |
| (specimen)      |
+-----------------+
 
 
 
+----------------+---------+--------------------+--------------+
| Performing     | Address | City/State/Zipcode | Phone Number |
| Organization   |         |                    |              |
+----------------+---------+--------------------+--------------+
|   EXTERNAL LAB |         |                    |              |
+----------------+---------+--------------------+--------------+
 Phosphorus (2018  1:57 AM PDT)
 
+------------+--------------------------+-----------------+------------+--------------+
| Component  | Value                    | Ref Range       | Performed  | Pathologist  |
|            |                          |                 | At         | Signature    |
+------------+--------------------------+-----------------+------------+--------------+
| PHOSPHORUS | 3.4Comment: Testing      | 2.3 - 4.8 mg/dL | EXTERNAL   |              |
|            | performed at [facility], [address] |                 | LAB        |              |
|            |  [address],        |                 |            |              |
|            | [place]  [address]     |                 |            |              |
+------------+--------------------------+-----------------+------------+--------------+
 
 
 
+-----------------+
| Specimen        |
+-----------------+
 
| Blood specimen  |
| (specimen)      |
+-----------------+
 
 
 
+----------------+---------+--------------------+--------------+
| Performing     | Address | City/State/Zipcode | Phone Number |
| Organization   |         |                    |              |
+----------------+---------+--------------------+--------------+
|   EXTERNAL LAB |         |                    |              |
+----------------+---------+--------------------+--------------+
 Magnesium (2018  1:57 AM PDT)
 
+-----------+--------------------------+-----------------+------------+--------------+
| Component | Value                    | Ref Range       | Performed  | Pathologist  |
|           |                          |                 | At         | Signature    |
+-----------+--------------------------+-----------------+------------+--------------+
| Magnesium | 1.8Comment: Testing      | 1.7 - 2.4 mg/dL | EXTERNAL   |              |
|           | performed at [facility], [address] |                 | LAB        |              |
|           |  [address],        |                 |            |              |
|           | [place]  [address]     |                 |            |              |
+-----------+--------------------------+-----------------+------------+--------------+
 
 
 
+-----------------+
| Specimen        |
+-----------------+
| Blood specimen  |
| (specimen)      |
+-----------------+
 
 
 
+----------------+---------+--------------------+--------------+
| Performing     | Address | City/State/Zipcode | Phone Number |
| Organization   |         |                    |              |
+----------------+---------+--------------------+--------------+
|   EXTERNAL LAB |         |                    |              |
+----------------+---------+--------------------+--------------+
 Lipid Panel (2018  1:57 AM PDT)
 
+-------------+--------------------------+-----------+------------+--------------+
| Component   | Value                    | Ref Range | Performed  | Pathologist  |
|             |                          |           | At         | Signature    |
+-------------+--------------------------+-----------+------------+--------------+
| Cholesterol | 103                      | mg/dL     | EXTERNAL   |              |
|             |                          |           | LAB        |              |
+-------------+--------------------------+-----------+------------+--------------+
| Triglycerid | 116                      | mg/dL     | EXTERNAL   |              |
| es          |                          |           | LAB        |              |
+-------------+--------------------------+-----------+------------+--------------+
| HDL         | 24 (L)                   | mg/dL     | EXTERNAL   |              |
|             |                          |           | LAB        |              |
+-------------+--------------------------+-----------+------------+--------------+
| LDL         | 56Comment: Testing       | mg/dL     | EXTERNAL   |              |
| Cholesterol | performed at [facility], [address] |           | LAB        |              |
| ,           |  [address],        |           |            |              |
| Calculated, | [place]  [address]     |           |            |              |
 
|  External   |                          |           |            |              |
+-------------+--------------------------+-----------+------------+--------------+
 
 
 
+-----------------+
| Specimen        |
+-----------------+
| Blood specimen  |
| (specimen)      |
+-----------------+
 
 
 
+----------------+---------+--------------------+--------------+
| Performing     | Address | City/State/Zipcode | Phone Number |
| Organization   |         |                    |              |
+----------------+---------+--------------------+--------------+
|   EXTERNAL LAB |         |                    |              |
+----------------+---------+--------------------+--------------+
 Basic Metabolic Panel (2018  1:57 AM PDT)
 
+-------------+--------------------------+-----------------+------------+--------------+
| Component   | Value                    | Ref Range       | Performed  | Pathologist  |
|             |                          |                 | At         | Signature    |
+-------------+--------------------------+-----------------+------------+--------------+
| Na          | 138                      | 135 - 145       | EXTERNAL   |              |
|             |                          | mmol/L          | LAB        |              |
+-------------+--------------------------+-----------------+------------+--------------+
| K           | 4.3                      | 3.5 - 4.9       | EXTERNAL   |              |
|             |                          | mmol/L          | LAB        |              |
+-------------+--------------------------+-----------------+------------+--------------+
| Cl          | 104                      | 99 - 109 mmol/L | EXTERNAL   |              |
|             |                          |                 | LAB        |              |
+-------------+--------------------------+-----------------+------------+--------------+
| CO2         | 24                       | 23 - 32 mmol/L  | EXTERNAL   |              |
|             |                          |                 | LAB        |              |
+-------------+--------------------------+-----------------+------------+--------------+
| Anion Gap   | 14                       | 5 - 20 mmol/L   | EXTERNAL   |              |
|             |                          |                 | LAB        |              |
+-------------+--------------------------+-----------------+------------+--------------+
| Glucose,    | 75                       | 65 - 99 mg/dL   | EXTERNAL   |              |
| Fasting     |                          |                 | LAB        |              |
+-------------+--------------------------+-----------------+------------+--------------+
| BUN         | 14                       | 8 - 25 mg/dL    | EXTERNAL   |              |
|             |                          |                 | LAB        |              |
+-------------+--------------------------+-----------------+------------+--------------+
| Creatinine  | 1.3                      | 0.70 - 1.30     | EXTERNAL   |              |
|             |                          | mg/dL           | LAB        |              |
+-------------+--------------------------+-----------------+------------+--------------+
| BUN/Creatin | 11                       |                 | EXTERNAL   |              |
| ine Ratio   |                          |                 | LAB        |              |
+-------------+--------------------------+-----------------+------------+--------------+
| Calcium     | 8.6                      | 8.5 - 10.5      | EXTERNAL   |              |
|             |                          | mg/dL           | LAB        |              |
+-------------+--------------------------+-----------------+------------+--------------+
| Estimated   | 54 (L)Comment: GFR <60:  | mL/min/1.73m2   | EXTERNAL   |              |
| GFR         | CHRONIC KIDNEY DISEASE,  |                 | LAB        |              |
|             | IF FOUND OVER A 3 MONTH  |                 |            |              |
|             | PERIOD.GFR <15: KIDNEY   |                 |            |              |
 
|             | FAILURE.FOR       |                 |            |              |
|             | [employer], MULTIPLY THE  |                 |            |              |
|             | CALCULATED GFR BY        |                 |            |              |
|             | 1.210.This eGFR is       |                 |            |              |
|             | calculated using the     |                 |            |              |
|             | MDRD IDMS traceable      |                 |            |              |
|             | equation.Testing         |                 |            |              |
|             | performed at [facility], [address] |                 |            |              |
|             |  [facility],        |                 |            |              |
|             | [place]   [address]    |                 |            |              |
+-------------+--------------------------+-----------------+------------+--------------+
 
 
 
+-----------------+
| Specimen        |
+-----------------+
| Blood specimen  |
| (specimen)      |
+-----------------+
 
 
 
+----------------+---------+--------------------+--------------+
| Performing     | Address | City/State/Zipcode | Phone Number |
| Organization   |         |                    |              |
+----------------+---------+--------------------+--------------+
|   EXTERNAL LAB |         |                    |              |
+----------------+---------+--------------------+--------------+
 Lactic Acid (2018 11:50 PM PDT)
 
+-----------+-------------------------+------------+------------+--------------+
| Component | Value                   | Ref Range  | Performed  | Pathologist  |
|           |                         |            | At         | Signature    |
+-----------+-------------------------+------------+------------+--------------+
| Lactate   | 1.1Comment: Testing     | 0.4 - 2.0  | EXTERNAL   |              |
|           | performed at [facility];[address]    | mmol/L     | LAB        |              |
|           | [address];[place]  |            |            |              |
|           | [address]                   |            |            |              |
+-----------+-------------------------+------------+------------+--------------+
 
 
 
+----------+
| Specimen |
+----------+
|          |
+----------+
 
 
 
+----------------+---------+--------------------+--------------+
| Performing     | Address | City/State/Zipcode | Phone Number |
| Organization   |         |                    |              |
+----------------+---------+--------------------+--------------+
|   EXTERNAL LAB |         |                    |              |
+----------------+---------+--------------------+--------------+
 Troponin I (2018 10:12 PM PDT)
 
+-------------+--------------------------+--------------+------------+--------------+
 
| Component   | Value                    | Ref Range    | Performed  | Pathologist  |
|             |                          |              | At         | Signature    |
+-------------+--------------------------+--------------+------------+--------------+
| Troponin I, | 1.76 ()Comment:   0.00 | 0.00 - 0.10  | EXTERNAL   |              |
|  Qual       |  to 0.10    CONSISTENT   | ng/mL        | LAB        |              |
|             | WITH NORMAL              |              |            |              |
|             | POPULATION0.11 to 0.60   |              |            |              |
|             |    CONSISTENT WITH       |              |            |              |
|             | INCREASED RISK FOR       |              |            |              |
|             | ADVERSE OUTCOMES> 0.60   |              |            |              |
|             |             CONSISTENT   |              |            |              |
|             | WITH WHO CRITERIA FOR    |              |            |              |
|             | ACUTE MI CALLED TO       |              |            |              |
|             | TENNILLE NUNES ON 8RP AT 2255  |              |            |              |
|             | BY CD, READ BACKTesting  |              |            |              |
|             | performed at [facility];[address]     |              |            |              |
|             | [address];[place], [place]   |              |            |              |
|             | [address]                    |              |            |              |
+-------------+--------------------------+--------------+------------+--------------+
 
 
 
+-----------------+
| Specimen        |
+-----------------+
| Blood specimen  |
| (specimen)      |
+-----------------+
 
 
 
+----------------+---------+--------------------+--------------+
| Performing     | Address | City/State/Zipcode | Phone Number |
| Organization   |         |                    |              |
+----------------+---------+--------------------+--------------+
|   EXTERNAL LAB |         |                    |              |
+----------------+---------+--------------------+--------------+
 documented in this encounter
 
 Visit Diagnoses
 
 
+-----------------------------------------------------------------------------------------+
| Diagnosis                                                                               |
+-----------------------------------------------------------------------------------------+
|   Thoracic aortic aneurysm without rupture (HCC)  Thoracic aneurysm without mention of  |
| rupture                                                                                 |
+-----------------------------------------------------------------------------------------+
|   ASCVD (arteriosclerotic cardiovascular disease)  Unspecified cardiovascular disease   |
+-----------------------------------------------------------------------------------------+
|   Chronic atrial fibrillation  Atrial fibrillation                                      |
+-----------------------------------------------------------------------------------------+
|   Essential hypertension  Unspecified essential hypertension                            |
+-----------------------------------------------------------------------------------------+
|   Precordial pain                                                                       |
+-----------------------------------------------------------------------------------------+
|   Dysphagia, unspecified type                                                           |
+-----------------------------------------------------------------------------------------+
 documented in this encounter

## 2019-12-06 NOTE — XMS
Encounter Summary
  Created on: 2019
 
 Shane Wyatttien BroussardJosue
 External Reference #: [id number]
 : 43
 Sex: Male
 
 Demographics
 
 
+-----------------------+---------------------------+
| Address               | [address]        |
|                       | [place]  [address] |
+-----------------------+---------------------------+
| Home Phone            | [phone]           |
+-----------------------+---------------------------+
| Preferred Language    | Unknown                   |
+-----------------------+---------------------------+
| Marital Status        |                    |
+-----------------------+---------------------------+
| Rastafari Affiliation | [id number]                      |
+-----------------------+---------------------------+
| Race                  | Unknown                   |
+-----------------------+---------------------------+
| Ethnic Group          | Unknown                   |
+-----------------------+---------------------------+
 
 
 Author
 
 
+--------------+--------------------------------------------+
| Author       | [facility]  |
|              | [facility] [place]                                |
+--------------+--------------------------------------------+
| Organization | [facility]  |
|              | [facility] [place]                                |
+--------------+--------------------------------------------+
| Address      | Unknown                                    |
+--------------+--------------------------------------------+
| Phone        | Unavailable                                |
+--------------+--------------------------------------------+
 
 
 
 Support
 
 
+---------------+--------------+--------------------+-----------------+
| Name          | Relationship | Address            | Phone           |
+---------------+--------------+--------------------+-----------------+
| Opal Shane | ECON         | [address]     | [phone] |
|               |              | [place]   |                 |
|               |              | [address]              |                 |
+---------------+--------------+--------------------+-----------------+
 
 
 
 
 Care Team Providers
 
 
+-----------------------+------+-----------------+
| Care Team Member Name | Role | Phone           |
+-----------------------+------+-----------------+
| Erwin Iniguez MD | PCP  | [phone] |
+-----------------------+------+-----------------+
 
 
 
 Reason for Visit
 
 
+--------+----------+
| Reason | Comments |
+--------+----------+
| Apnea  |          |
+--------+----------+
 
 
 
 Encounter Details
 
 
+--------+---------+----------------------+----------------------+----------------------+
| Date   | Type    | Department           | Care Team            | Description          |
+--------+---------+----------------------+----------------------+----------------------+
| / | Office  |   [facility]      |   Sohail Campbell PA  | JOANN on CPAP (Primary |
|    | Visit   | SLEEP DISORDER  [address]  |  [address]     |  Dx); [address]  |
|        |         | [address]  [place]      | [place]      | respiration          |
|        |         | [place] [address] | [address]  [phone]  |                      |
|        |         |   [phone]       |  [phone] (Fax)  |                      |
+--------+---------+----------------------+----------------------+----------------------+
 
 
 
 Social History
 
 
+---------------+------------+-----------+--------+------------------+
| Tobacco Use   | Types      | Packs/Day | Years  | Date             |
|               |            |           | Used   |                  |
+---------------+------------+-----------+--------+------------------+
| Former Smoker | Cigarettes | 1.3       | 34     | Quit: 1996 |
+---------------+------------+-----------+--------+------------------+
 
 
 
+---------------------+---+---+---+
| Smokeless Tobacco:  |   |   |   |
| Never Used          |   |   |   |
+---------------------+---+---+---+
 
 
 
+-------------+-------------+---------+----------+
| Alcohol Use | Drinks/Week | oz/Week | Comments |
+-------------+-------------+---------+----------+
 
| No          |             |         |          |
+-------------+-------------+---------+----------+
 
 
 
+------------------+---------------+
| Sex Assigned at  | Date Recorded |
| Birth            |               |
+------------------+---------------+
| Not on file      |               |
+------------------+---------------+
 
 
 
+----------------+-------------+-------------+
| Job Start Date | Occupation  | Industry    |
+----------------+-------------+-------------+
| Not on file    | Not on file | Not on file |
+----------------+-------------+-------------+
 
 
 
+----------------+--------------+------------+
| Travel History | Travel Start | Travel End |
+----------------+--------------+------------+
 
 
 
+-------------------------------------+
| No recent travel history available. |
+-------------------------------------+
 documented as of this encounter
 
 Last Filed Vital Signs
 
 
+-------------------+----------------------+----------------------+----------+
| Vital Sign        | Reading              | Time Taken           | Comments |
+-------------------+----------------------+----------------------+----------+
| Blood Pressure    | 102/58               | 2013 10:15 AM  |          |
|                   |                      | PST                  |          |
+-------------------+----------------------+----------------------+----------+
| Pulse             | 78                   | 2013 10:15 AM  |          |
|                   |                      | PST                  |          |
+-------------------+----------------------+----------------------+----------+
| Temperature       | -                    | -                    |          |
+-------------------+----------------------+----------------------+----------+
| Respiratory Rate  | 16                   | 2013 10:15 AM  |          |
|                   |                      | PST                  |          |
+-------------------+----------------------+----------------------+----------+
| Oxygen Saturation | -                    | -                    |          |
+-------------------+----------------------+----------------------+----------+
| Inhaled Oxygen    | -                    | -                    |          |
| Concentration     |                      |                      |          |
+-------------------+----------------------+----------------------+----------+
| Weight            | 86.1 kg (189 lb 14.4 | 2013 10:15 AM  |          |
|                   |  oz)                 | PST                  |          |
+-------------------+----------------------+----------------------+----------+
| Height            | -                    | -                    |          |
+-------------------+----------------------+----------------------+----------+
 
| Body Mass Index   | 30.65                | 2013  1:28 PM  |          |
|                   |                      | PDT                  |          |
+-------------------+----------------------+----------------------+----------+
 documented in this encounter
 
 Progress Notes
 Sohail Campbell PA - 2013 10:09 AM PSTFormatting of this note might be different from 
the original.
 Subjective: 
  
 Patient ID: Wyatt Shane is a 68 y.o. male.
 
 HPI
 
 last office visit was:  2013
 date of polysomnography: 10/07/2013 
 AHI: 77.4
 RDI: 77.4
 O2%: 86% with 15.8 minutes below 88% 
 Machine type: Respironics ASV BiPAP with nasal mask ([employer]iti)
 obtained from:  [facility]
 pressure: EPAP = 4cm;PSmin=0cm;PSmax=25cm;backup rate=auto
 Nights using BiPAP: 36/42
 average usage (all nights):  3:40
 average usage (nights used):   4:17
 AHI: 4.2
 
 Wyatt comes in for BiPAP compliance.  He continues to wear his BiPAP on a regular basis.  He
 had struggled with adjusting to the fluctuating pressure of the BiPAP, but was doing well f
or about two weeks until he got a severe cold.  There were many nights that he wasn't able t
o breathe through his nose and he had to take off his mask.  He is starting to feel better.
 
 I have discussed the download in detail.  This shows that his sleep apnea is controlled, wi
th an AHI of 4.2.  It also shows that his leaks are well controlled. 
 
 Review of Systems
 
 Objective: 
 Physical Exam
 
 Assessment: 
 
 Problem #1: OBSTRUCTIVE SLEEP APNEA (ICD 9-327.23)
 This is controlled with BiPAP.  His compliance was going well until he got a cold and has s
truggled with breathing through his nose.
 
 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 
 This is controlled with ASV BiPAP.
 
 Plan: 
 
 He is to continue with his BiPAP indefinitely.  I recommended that he increase his humidity
 to help clear his congestion.  If this is not helpful, he should consider using a full face
 mask.
 
 I will follow up again in 1 month, sooner prn.  Fifteen minutes were spent face-to-face, wi
th the majority of time spent in counseling.
 
 Sohail Campbell PA-C
 
 
 cc: 
 Dr. Erwin Foote
 
 Electronically signed by ROWENA Greene at 2013 11:02 AM PSTdocumented in this enco
unter
 
 Plan of Treatment
 
 
+--------+---------+-------------+----------------------+-------------+
| Date   | Type    | Specialty   | Care Team            | Description |
+--------+---------+-------------+----------------------+-------------+
| / | Office  | Pulmonology |   Juan F,          |             |
|    | Visit   |             | Jessica Cheung,   |             |
|        |         |             | MD  [address] |             |
|        |         |             |   [place],   |             |
|        |         |             | [place] [address]             |             |
|        |         |             | [phone]         |             |
|        |         |             | [phone] (Fax)   |             |
+--------+---------+-------------+----------------------+-------------+
| / | Office  | Cardiology  |   Mable Eric, |             |
|    | Visit   |             |  MD  [address]   |             |
|        |         |             | [place]  |             |
|        |         |             | [address]  [phone]  |             |
|        |         |             |  [phone] (Fax)  |             |
+--------+---------+-------------+----------------------+-------------+
 documented as of this encounter
 
 Visit Diagnoses
 
 
+----------------------------------------------------------------------+
| Diagnosis                                                            |
+----------------------------------------------------------------------+
|   JOANN on CPAP - Primary  Obstructive sleep apnea (adult) (pediatric) |
+----------------------------------------------------------------------+
|   [facility] respiration                                          |
+----------------------------------------------------------------------+
 documented in this encounter

## 2019-12-06 NOTE — XMS
Encounter Summary
  Created on: 2019
 
 Wyatt hSane
 External Reference #: [id number]
 : 43
 Sex: Male
 
 Demographics
 
 
+-----------------------+------------------------+
| Address               | [address]     |
|                       | [place]  [address]   |
+-----------------------+------------------------+
| Home Phone            | [phone]        |
+-----------------------+------------------------+
| Preferred Language    | Unknown                |
+-----------------------+------------------------+
| Marital Status        |                 |
+-----------------------+------------------------+
| Oriental orthodox Affiliation | LUT                    |
+-----------------------+------------------------+
| Race                  | White                  |
+-----------------------+------------------------+
| Ethnic Group          | Not  or  |
+-----------------------+------------------------+
 
 
 Author
 
 
+--------------+------------------------------+
| Author       | [facility] |
+--------------+------------------------------+
| Organization | [facility] |
+--------------+------------------------------+
| Address      | Unknown                      |
+--------------+------------------------------+
| Phone        | Unavailable                  |
+--------------+------------------------------+
 
 
 
 Support
 
 
+---------------+--------------+---------+-----------------+
| Name          | Relationship | Address | Phone           |
+---------------+--------------+---------+-----------------+
| Opal Shane | ECON         | Unknown | [phone] |
+---------------+--------------+---------+-----------------+
 
 
 
 Care Team Providers
 
 
 
+-----------------------+------+-----------------+
| Care Team Member Name | Role | Phone           |
+-----------------------+------+-----------------+
| Erwin Iniguez MD   | PCP  | [phone] |
+-----------------------+------+-----------------+
 
 
 
 Encounter Details
 
 
+--------+----------+----------------------+-----------+-------------+
| Date   | Type     | Department           | Care Team | Description |
+--------+----------+----------------------+-----------+-------------+
| / | Results  |   Registration  [address] |           |             |
|    | Only     |  [address] |           |             |
|        |          |  [place]  Mailcode: [id number] |           |             |
|        |          |   [place], [place]       |           |             |
|        |          | [id number]           |           |             |
|        |          | [phone]         |           |             |
+--------+----------+----------------------+-----------+-------------+
 
 
 
 Social History
 
 
+----------------+-------+-----------+--------+------+
| Tobacco Use    | Types | Packs/Day | Years  | Date |
|                |       |           | Used   |      |
+----------------+-------+-----------+--------+------+
| Never Assessed |       |           |        |      |
+----------------+-------+-----------+--------+------+
 
 
 
+------------------+---------------+
| Sex Assigned at  | Date Recorded |
| Birth            |               |
+------------------+---------------+
| Not on file      |               |
+------------------+---------------+
 
 
 
+----------------+-------------+-------------+
| Job Start Date | Occupation  | Industry    |
+----------------+-------------+-------------+
| Not on file    | Not on file | Not on file |
+----------------+-------------+-------------+
 
 
 
+----------------+--------------+------------+
| Travel History | Travel Start | Travel End |
+----------------+--------------+------------+
 
 
 
+-------------------------------------+
 
| No recent travel history available. |
+-------------------------------------+
 documented as of this encounter
 
 Plan of Treatment
 Not on filedocumented as of this encounter
 
 Procedures
 
 
+-------------------+--------+-------------+----------------------+----------------------+
| Procedure Name    | Priori | Date/Time   | Associated Diagnosis | Comments             |
|                   | ty     |             |                      |                      |
+-------------------+--------+-------------+----------------------+----------------------+
| CHEMISTRY TESTS 4 | Routin | 1998  |                      |   Results for this   |
|                   | e      |  7:02 AM    |                      | procedure are in the |
|                   |        | PST         |                      |  results section.    |
+-------------------+--------+-------------+----------------------+----------------------+
| CBC TESTS 2       | Routin | 1998  |                      |   Results for this   |
|                   | e      |  7:02 AM    |                      | procedure are in the |
|                   |        | PST         |                      |  results section.    |
+-------------------+--------+-------------+----------------------+----------------------+
| CBC TESTS 2       | Routin | 1998  |                      |   Results for this   |
|                   | e      |  4:00 PM    |                      | procedure are in the |
|                   |        | PST         |                      |  results section.    |
+-------------------+--------+-------------+----------------------+----------------------+
| CHEMISTRY TESTS 4 | Routin | 1998  |                      |   Results for this   |
|                   | e      |  2:41 PM    |                      | procedure are in the |
|                   |        | PST         |                      |  results section.    |
+-------------------+--------+-------------+----------------------+----------------------+
 documented in this encounter
 
 Results
 CBC TESTS 2 (1998  7:02 AM PST)
 
+-------------+-----------------+-----------+------------+--------------+
| Component   | Value           | Ref Range | Performed  | Pathologist  |
|             |                 |           | At         | Signature    |
+-------------+-----------------+-----------+------------+--------------+
| WHITE CELL  |        15.6 (H) | K/CU MM   |            |              |
| COUNT       |                 |           |            |              |
+-------------+-----------------+-----------+------------+--------------+
| RED CELL    |         4.36    | M/CU MM   |            |              |
| COUNT       |                 |           |            |              |
+-------------+-----------------+-----------+------------+--------------+
| HEMOGLOBIN  |        13.8     | GM/DL     |            |              |
+-------------+-----------------+-----------+------------+--------------+
| HEMATOCRIT  |        40.9     | %         |            |              |
+-------------+-----------------+-----------+------------+--------------+
| MCV         |        93.8     | FL        |            |              |
+-------------+-----------------+-----------+------------+--------------+
| MCH         |        31.5     | PG        |            |              |
+-------------+-----------------+-----------+------------+--------------+
| MCHC        |        33.6     | GM/DL     |            |              |
+-------------+-----------------+-----------+------------+--------------+
| RDW         |        12.2     | %         |            |              |
+-------------+-----------------+-----------+------------+--------------+
| PLATELET    |       188.      | K/CU MM   |            |              |
| COUNT       |                 |           |            |              |
+-------------+-----------------+-----------+------------+--------------+
 
| MPV         |         9.6     | FL        |            |              |
+-------------+-----------------+-----------+------------+--------------+
 
 
 
+----------+
| Specimen |
+----------+
|          |
+----------+
 
 
 
+----------------------+------------------------+--------------------+--------------+
| Performing           | Address                | City/State/Zipcode | Phone Number |
| Organization         |                        |                    |              |
+----------------------+------------------------+--------------------+--------------+
|   [facility] |   [address]  | [place] [address] |              |
|  PATHOLOGY           | [address]                |                    |              |
+----------------------+------------------------+--------------------+--------------+
 CHEMISTRY TESTS 4 (1998  7:02 AM PST)
 
+-------------+----------------+-----------+------------+--------------+
| Component   | Value          | Ref Range | Performed  | Pathologist  |
|             |                |           | At         | Signature    |
+-------------+----------------+-----------+------------+--------------+
| SODIUM,     |       140.     | mmol/l    |            |              |
| PLASMA      |                |           |            |              |
| (LAB)       |                |           |            |              |
+-------------+----------------+-----------+------------+--------------+
| POTASSIUM,  |         3.9    | mmol/l    |            |              |
| PLASMA      |                |           |            |              |
| (LAB)       |                |           |            |              |
+-------------+----------------+-----------+------------+--------------+
| CHLORIDE,   |       100.     | mmol/l    |            |              |
| PLASMA      |                |           |            |              |
| (LAB)       |                |           |            |              |
+-------------+----------------+-----------+------------+--------------+
| TOTAL CO2,  |        26.     | mmol/l    |            |              |
| PLASMA      |                |           |            |              |
| (LAB)       |                |           |            |              |
+-------------+----------------+-----------+------------+--------------+
| BUN, PLASMA |        11.     | mg/dL     |            |              |
|  (LAB)      |                |           |            |              |
+-------------+----------------+-----------+------------+--------------+
| CREATININE  |         0.9    | mg/dL     |            |              |
| PLASMA      |                |           |            |              |
| (LAB)       |                |           |            |              |
+-------------+----------------+-----------+------------+--------------+
| GLUCOSE,    |       147. (H) | mg/dL     |            |              |
| PLASMA      |                |           |            |              |
| (LAB)       |                |           |            |              |
+-------------+----------------+-----------+------------+--------------+
 
 
 
+----------+
| Specimen |
+----------+
|          |
 
+----------+
 
 
 
+----------------------+------------------------+--------------------+--------------+
| Performing           | Address                | City/State/Zipcode | Phone Number |
| Organization         |                        |                    |              |
+----------------------+------------------------+--------------------+--------------+
|   [facility] |   [address]  | [place] [address] |              |
|  PATHOLOGY           | [address]                |                    |              |
+----------------------+------------------------+--------------------+--------------+
 CBC TESTS 2 (1998  4:00 PM PST)
 
+-------------+-----------------+-----------+------------+--------------+
| Component   | Value           | Ref Range | Performed  | Pathologist  |
|             |                 |           | At         | Signature    |
+-------------+-----------------+-----------+------------+--------------+
| WHITE CELL  |         8.      | K/CU MM   |            |              |
| COUNT       |                 |           |            |              |
+-------------+-----------------+-----------+------------+--------------+
| RED CELL    |         4.54    | M/CU MM   |            |              |
| COUNT       |                 |           |            |              |
+-------------+-----------------+-----------+------------+--------------+
| HEMOGLOBIN  |        14.4     | GM/DL     |            |              |
+-------------+-----------------+-----------+------------+--------------+
| HEMATOCRIT  |        41.4     | %         |            |              |
+-------------+-----------------+-----------+------------+--------------+
| MCV         |        91.3     | FL        |            |              |
+-------------+-----------------+-----------+------------+--------------+
| MCH         |        31.8     | PG        |            |              |
+-------------+-----------------+-----------+------------+--------------+
| MCHC        |        34.8 (H) | GM/DL     |            |              |
+-------------+-----------------+-----------+------------+--------------+
| RDW         |        12.2     | %         |            |              |
+-------------+-----------------+-----------+------------+--------------+
| PLATELET    |       226.      | K/CU MM   |            |              |
| COUNT       |                 |           |            |              |
+-------------+-----------------+-----------+------------+--------------+
| MPV         |         9.6     | FL        |            |              |
+-------------+-----------------+-----------+------------+--------------+
 
 
 
+----------+
| Specimen |
+----------+
|          |
+----------+
 
 
 
+----------------------+------------------------+--------------------+--------------+
| Performing           | Address                | City/State/Zipcode | Phone Number |
| Organization         |                        |                    |              |
+----------------------+------------------------+--------------------+--------------+
|   [facility] |   [address]  | [place] [address] |              |
|  PATHOLOGY           | [address]                |                    |              |
+----------------------+------------------------+--------------------+--------------+
 CHEMISTRY TESTS 4 (1998  2:41 PM PST)
 
 
+-------------+----------------+-----------+------------+--------------+
| Component   | Value          | Ref Range | Performed  | Pathologist  |
|             |                |           | At         | Signature    |
+-------------+----------------+-----------+------------+--------------+
| SODIUM,     |       141.     | mmol/l    |            |              |
| PLASMA      |                |           |            |              |
| (LAB)       |                |           |            |              |
+-------------+----------------+-----------+------------+--------------+
| POTASSIUM,  |         3.8    | mmol/l    |            |              |
| PLASMA      |                |           |            |              |
| (LAB)       |                |           |            |              |
+-------------+----------------+-----------+------------+--------------+
| CHLORIDE,   |       105.     | mmol/l    |            |              |
| PLASMA      |                |           |            |              |
| (LAB)       |                |           |            |              |
+-------------+----------------+-----------+------------+--------------+
| TOTAL CO2,  |        31. (H) | mmol/l    |            |              |
| PLASMA      |                |           |            |              |
| (LAB)       |                |           |            |              |
+-------------+----------------+-----------+------------+--------------+
 
 
 
+----------+
| Specimen |
+----------+
|          |
+----------+
 
 
 
+----------------------+------------------------+--------------------+--------------+
| Performing           | Address                | City/State/Zipcode | Phone Number |
| Organization         |                        |                    |              |
+----------------------+------------------------+--------------------+--------------+
|   [facility] |   [address]  | [place] [address] |              |
|  PATHOLOGY           | [address]                |                    |              |
+----------------------+------------------------+--------------------+--------------+
 documented in this encounter
 
 Visit Diagnoses
 Not on filedocumented in this encounter

## 2019-12-06 NOTE — XMS
Encounter Summary
  Created on: 2019
 
 Shane Wyatttien BroussardJosue
 External Reference #: [id number]
 : 43
 Sex: Male
 
 Demographics
 
 
+-----------------------+---------------------------+
| Address               | [address]        |
|                       | [place]  [address] |
+-----------------------+---------------------------+
| Home Phone            | [phone]           |
+-----------------------+---------------------------+
| Preferred Language    | Unknown                   |
+-----------------------+---------------------------+
| Marital Status        |                    |
+-----------------------+---------------------------+
| Judaism Affiliation | [id number]                      |
+-----------------------+---------------------------+
| Race                  | Unknown                   |
+-----------------------+---------------------------+
| Ethnic Group          | Unknown                   |
+-----------------------+---------------------------+
 
 
 Author
 
 
+--------------+--------------------------------------------+
| Author       | [facility]  |
|              | [facility] [place]                                |
+--------------+--------------------------------------------+
| Organization | [facility]  |
|              | [facility] [place]                                |
+--------------+--------------------------------------------+
| Address      | Unknown                                    |
+--------------+--------------------------------------------+
| Phone        | Unavailable                                |
+--------------+--------------------------------------------+
 
 
 
 Support
 
 
+---------------+--------------+--------------------+-----------------+
| Name          | Relationship | Address            | Phone           |
+---------------+--------------+--------------------+-----------------+
| Opal Shane | ECON         | [address]     | [phone] |
|               |              | [place]   |                 |
|               |              | [address]              |                 |
+---------------+--------------+--------------------+-----------------+
 
 
 
 
 Care Team Providers
 
 
+-----------------------+------+-----------------+
| Care Team Member Name | Role | Phone           |
+-----------------------+------+-----------------+
| Erwin Iniguez MD | PCP  | [phone] |
+-----------------------+------+-----------------+
 
 
 
 Reason for Referral
 Evaluate & Treat (Routine)
 
+--------+--------------+-----------+--------------+--------------+---------------+
| Status | Reason       | Specialty | Diagnoses /  | Referred By  | Referred To   |
|        |              |           | Procedures   | Contact      | Contact       |
+--------+--------------+-----------+--------------+--------------+---------------+
| Closed |   Specialty  | Sleep     |   Diagnoses  |   Chris,     |   [facility]   |
|        | Services     | Medicine  |  [place]     | Luis Carlos [place]    | [address] |
|        | Required     |           | sleep apnea  | MD [address] |  [address]       |
|        |              |           | due to       |  [place] | [place],  |
|        |              |           | [address] |    [place]   | [place] [address] |
|        |              |           | s            | [place]    |   Phone:      |
|        |              |           | respiration  | [address]        | [phone]  |
|        |              |           |  JOANN         | Phone:       |  Fax:         |
|        |              |           | (obstructive | [phone] | [phone]  |
|        |              |           |  sleep       |   Fax:       |               |
|        |              |           | apnea)       | [phone] |               |
|        |              |           | Procedures   |              |               |
|        |              |           | PA POLYSOM   |              |               |
|        |              |           | 6/>YRS SLEEP |              |               |
|        |              |           |  4/> ADDL    |              |               |
|        |              |           | MALIK ATTND  |              |               |
|        |              |           |  NPSG        |              |               |
+--------+--------------+-----------+--------------+--------------+---------------+
 
 
 
 
 Reason for Visit
 
 
+--------+----------+
| Reason | Comments |
+--------+----------+
| Apnea  |          |
+--------+----------+
 
 
 
 Encounter Details
 
 
+--------+---------+----------------------+---------------------+----------------------+
| Date   | Type    | Department           | Care Team           | Description          |
+--------+---------+----------------------+---------------------+----------------------+
| / | Office  |   [facility]      |   Luis Carlos Perez  | Central sleep apnea  |
|    | Visit   | SLEEP DISORDER  [address]  | [address]   | due to [place] |
 
|        |         | [address]  [place]      | [address]  [place]    |  respiration         |
|        |         | [place] [address] | [place] [address]     | (Primary Dx); JOANN    |
|        |         |   [phone]       | [phone]        | (obstructive sleep   |
|        |         |                      | [phone] (Fax)  | apnea)               |
+--------+---------+----------------------+---------------------+----------------------+
 
 
 
 Social History
 
 
+---------------+------------+-----------+--------+------------------+
| Tobacco Use   | Types      | Packs/Day | Years  | Date             |
|               |            |           | Used   |                  |
+---------------+------------+-----------+--------+------------------+
| Former Smoker | Cigarettes | 1.3       | 34     | Quit: 1996 |
+---------------+------------+-----------+--------+------------------+
 
 
 
+---------------------+---+---+---+
| Smokeless Tobacco:  |   |   |   |
| Never Used          |   |   |   |
+---------------------+---+---+---+
 
 
 
+-------------+-------------+---------+----------+
| Alcohol Use | Drinks/Week | oz/Week | Comments |
+-------------+-------------+---------+----------+
| No          |             |         |          |
+-------------+-------------+---------+----------+
 
 
 
+------------------+---------------+
| Sex Assigned at  | Date Recorded |
| Birth            |               |
+------------------+---------------+
| Not on file      |               |
+------------------+---------------+
 
 
 
+----------------+-------------+-------------+
| Job Start Date | Occupation  | Industry    |
+----------------+-------------+-------------+
| Not on file    | Not on file | Not on file |
+----------------+-------------+-------------+
 
 
 
+----------------+--------------+------------+
| Travel History | Travel Start | Travel End |
+----------------+--------------+------------+
 
 
 
+-------------------------------------+
| No recent travel history available. |
 
+-------------------------------------+
 documented as of this encounter
 
 Last Filed Vital Signs
 
 
+-------------------+--------------------+----------------------+----------+
| Vital Sign        | Reading            | Time Taken           | Comments |
+-------------------+--------------------+----------------------+----------+
| Blood Pressure    | 132/64             | 2015  8:09 AM  |          |
|                   |                    | PDT                  |          |
+-------------------+--------------------+----------------------+----------+
| Pulse             | 68                 | 2015  8:09 AM  |          |
|                   |                    | PDT                  |          |
+-------------------+--------------------+----------------------+----------+
| Temperature       | -                  | -                    |          |
+-------------------+--------------------+----------------------+----------+
| Respiratory Rate  | 16                 | 2015  8:09 AM  |          |
|                   |                    | PDT                  |          |
+-------------------+--------------------+----------------------+----------+
| Oxygen Saturation | 97%                | 2015  8:09 AM  |          |
|                   |                    | PDT                  |          |
+-------------------+--------------------+----------------------+----------+
| Inhaled Oxygen    | -                  | -                    |          |
| Concentration     |                    |                      |          |
+-------------------+--------------------+----------------------+----------+
| Weight            | 81.9 kg (180 lb 8  | 2015  8:09 AM  |          |
|                   | oz)                | PDT                  |          |
+-------------------+--------------------+----------------------+----------+
| Height            | -                  | -                    |          |
+-------------------+--------------------+----------------------+----------+
| Body Mass Index   | 27.44              | 2014 10:00 AM  |          |
|                   |                    | PDT                  |          |
+-------------------+--------------------+----------------------+----------+
 documented in this encounter
 
 Progress Notes
 Luis Carlos Perez Jr., MD - 2015  8:17 AM PDTFormatting of this note might be differen
t from the original.
 He had his sinus surgery done on . He isn't wearing his ASVBPAP. He has noted that
 he feels more alert when he wears his ASVPAP though.
 
 Since his surgery he has noted significant improvement in his nasal stuffiness although it 
hasn't resolved completely. His wife has noted that his breathing during sleep has improved 
but she still notices some snoring (not very loud though) and apneas at night. He is sleepin
g in the right lateral decubitus position with his head elevated.
 
 He states that he still gets some nasal stuffiness when he wears the ASV-PAP which isn't mu
ch (only 3 times since the surgery).
 
 Past Medical History:  has a past medical history of HBP (high blood pressure); Atrial fibr
illation (HCC); Heart failure (HCC); Hyperlipidemia; Raynaud disease; Fibromyalgia; ASCVD (a
rteriosclerotic cardiovascular disease); Aortic aneurysm (); JOANN (obstructive sleep apnea
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
es respiration.
  has past surgical history that includes Coronary artery bypass graft (); Esophagus mikhail
karolyn (); Knee arthroscopy (); hernia repair (); laryngectomy (); Kidney ston
e surgery; Tracheal surgery (); Carpal tunnel release (); basal cell cancer resectio
n left year (); and Nasal sinus surgery ().
 Allergies 
 
 Allergen Reactions 
   Diltiazem Hcl  
   Lipitor  
   Propranolol Hcl  
 
 Current Outpatient Prescriptions 
 Medication Sig Dispense Refill 
   acyclovir (ZOVIRAX) 400 MG tablet Take 400 mg by mouth 3 times daily as needed.   
   acyclovir (ZOVIRAX) 5% ointment Apply  topically every 3 hours.   
   albuterol (PROAIR HFA) 90 mcg/puff inhaler Inhale 2 puffs into the lungs every 6 hours 
as needed for Wheezing.   
   Cholecalciferol (VITAMIN D3) 2000 UNITS CAPS Take 2,000 Units by mouth Daily.   
   cyanocobalamin (VITAMIN B-12) 1,000 mcg/mL injection injected intramuscularly once a mo
[facility]   
   digoxin (LANOXIN) 250 mcg tablet Take 250 mcg by mouth Daily. 1/2 capsule daily   
   diltiazem (DILT-XR) 120 mg 24 hr capsule Take 120 mg by mouth Daily.   
   ferrous sulfate 325 mg tablet Take 325 mg by mouth 3 times daily.   
   fluticasone (FLONASE) 50 mcg/nasal spray one spray in each nostril daily as needed   
   fluticasone-salmeterol (ADVAIR) 500-50 mcg/puff diskus inhaler Inhale 1 puff into the l
ungs Twice Daily.   
   folic acid 1 mg tablet Take 1 mg by mouth Daily.   
   furosemide (LASIX) 40 mg tablet Take 40 mg by mouth Daily.   
   guaiFENesin (MUCINEX) 600 mg 12 hr tablet Take 1,200 mg by mouth 2 times daily.   
   levothyroxine (LEVOTHROID) 75 MCG tablet Take 75 mcg by mouth Daily.   
   loratadine (CLARITIN) 10 mg tablet Take 10 mg by mouth Daily.   
   losartan (COZAAR) 100 MG tablet Take 100 mg by mouth Daily. 1/2 daily   
   methotrexate 2.5 mg tablet Take 15 mg by mouth Once a week.   
   metoclopramide (REGLAN) 10 mg tablet Take 10 mg by mouth 4 times daily as needed.   
   nystatin-triamcinolone (MYCOLOG II) cream Apply  topically 2 times daily.   
   omeprazole (PRILOSEC) 20 mg capsule Take 20 mg by mouth 2 times daily.   
   oxyCODONE-acetaminophen (PERCOCET) 5-325 mg per tablet Take 1 tablet by mouth every 4 h
ours as needed for Pain.   
   potassium citrate (UROCIT-K) 10 mEq SR tablet Take 10 mEq by mouth 2 times daily.   
   predniSONE (DELTASONE) 5 mg tablet Take 5 mg by mouth Daily.   
   pseudoePHEDrine (SUDOGEST) 30 mg tablet Take 30 mg by mouth every 4 hours as needed for
 Congestion.   
   rivaroxaban (XARELTO) 20 mg tablet Take 20 mg by mouth Daily (with dinner).   
   sertraline (ZOLOFT) 100 mg tablet 2 tablets daily   
   tamsulosin (FLOMAX) 0.4 mg CAPS Take 0.4 mg by mouth 2 times daily.   
 
 No current facility-administered medications for this visit. 
 
 Past Surgical History 
 Procedure Laterality Date 
   Coronary artery bypass graft   
   4 vessel 
   Esophageal surgery   
   [place] 
   Knee arthroscopy  1977 
   right knee 
   Hernia repair  2006 
   Laryngectomy  1998 
   right vocal cord cancer 
   Kidney stone surgery   
   Tracheal surgery  2000 
   Reconstruction 
   Carpal tunnel release   
   bilateral 
   Basal cell cancer resection left year  2012 
   Nasal sinus surgery   
 
 
 /64 mmHg | Pulse 68 | Resp 16 | Wt 81.874 kg (180 lb 8 oz) | SpO2 97%
 
 A: Central Sleep Apnea + JOANN: I suspect apnea is still present, but I suspect it may have i
mproved since surgery. I am suggesting that his PSG be repeated in 4-5 weeks. By then he geno
uld have recovered nearly fully from surgery. If the apnea is mild, we may well decide not t
o treat or perhaps to see if low pressures of CPAP might be helpful rather than ASV-BPAP. He
 is on Norco for pain and I have encouraged him to minimize the use of this as much as possi
ble because this can exacerbate his complex apnea.
 
 P: PSG in 4-5 weeks with f/u in 6 weeks.
 
 Patient Active Problem List 
 Diagnosis 
   CARPAL TUNNEL SYNDROME 
   HBP (high blood pressure) 
   Atrial fibrillation 
   Heart failure 
   Raynaud disease 
   Fibromyalgia 
   ASCVD (arteriosclerotic cardiovascular disease) 
   Aortic aneurysm 
   JOANN (obstructive sleep apnea) 
   Cancer of vocal cord 
   Parasomnia 
   Central sleep apnea due to Cheyne-Nichole respiration 
 
 Today, 15 minutes was spent face to face with the patient; the majority of time was spent c
joon regarding Complex Apnea.
 Electronically signed by Luis Carlos Perez Jr., MD at 2015  8:49 AM PDTdocumented in th
is encounter
 
 Plan of Treatment
 
 
+--------+---------+-------------+----------------------+-------------+
| Date   | Type    | Specialty   | Care Team            | Description |
+--------+---------+-------------+----------------------+-------------+
| / | Office  | Pulmonology |   Juan F,          |             |
|    | Visit   |             | Jessica Cheung,   |             |
|        |         |             | MD [address] |             |
|        |         |             |   [place],   |             |
|        |         |             | [place] [address]             |             |
|        |         |             | [phone]         |             |
|        |         |             | [phone] (Fax)   |             |
+--------+---------+-------------+----------------------+-------------+
| / | Office  | Cardiology  |   Eric Akins, |             |
|    | Visit   |             |  MD [address]   |             |
|        |         |             | [place]  |             |
|        |         |             | [address]  [phone]  |             |
|        |         |             |  [phone] (Fax)  |             |
+--------+---------+-------------+----------------------+-------------+
 
 
 
+----------------------+-------------+--------+----------------------+---------------------+
| Name                 | Type        | Priori | Associated Diagnoses | Order Schedule      |
|                      |             | ty     |                      |                     |
+----------------------+-------------+--------+----------------------+---------------------+
| Ambulatory Referral  | Outpatient  | Routin |   Central sleep      | Ordered: 2015 |
 
| to Sleep Studies     | Referral    | e      | apnea due to         |                     |
|                      |             |        | Cheyne-Nichole        |                     |
|                      |             |        | respiration  JOANN     |                     |
|                      |             |        | (obstructive sleep   |                     |
|                      |             |        | apnea)               |                     |
+----------------------+-------------+--------+----------------------+---------------------+
 documented as of this encounter
 
 Visit Diagnoses
 
 
+----------------------------------------------------------------------------------+
| Diagnosis                                                                        |
+----------------------------------------------------------------------------------+
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
| respiration                                                                      |
+----------------------------------------------------------------------------------+
|   JOANN (obstructive sleep apnea)  Obstructive sleep apnea (adult) (pediatric)     |
+----------------------------------------------------------------------------------+
 documented in this encounter

## 2019-12-06 NOTE — XMS
Encounter Summary
  Created on: 2019
 
 Wyatt Shane
 External Reference #: [id number]
 : 43
 Sex: Male
 
 Demographics
 
 
+-----------------------+------------------------+
| Address               | [address]     |
|                       | [place]  [address]   |
+-----------------------+------------------------+
| Home Phone            | [phone]        |
+-----------------------+------------------------+
| Preferred Language    | Unknown                |
+-----------------------+------------------------+
| Marital Status        |                 |
+-----------------------+------------------------+
| Restorationist Affiliation | LUT                    |
+-----------------------+------------------------+
| Race                  | White                  |
+-----------------------+------------------------+
| Ethnic Group          | Not  or  |
+-----------------------+------------------------+
 
 
 Author
 
 
+--------------+------------------------------+
| Author       | [facility] |
+--------------+------------------------------+
| Organization | [facility] |
+--------------+------------------------------+
| Address      | Unknown                      |
+--------------+------------------------------+
| Phone        | Unavailable                  |
+--------------+------------------------------+
 
 
 
 Support
 
 
+---------------+--------------+---------+-----------------+
| Name          | Relationship | Address | Phone           |
+---------------+--------------+---------+-----------------+
| Opal Shane | ECON         | Unknown | [phone] |
+---------------+--------------+---------+-----------------+
 
 
 
 Care Team Providers
 
 
 
+-----------------------+------+-----------------+
| Care Team Member Name | Role | Phone           |
+-----------------------+------+-----------------+
| Erwin Iniguez MD   | PCP  | [phone] |
+-----------------------+------+-----------------+
 
 
 
 Encounter Details
 
 
+--------+-------------+-----------------+---------------------+---------------+
| Date   | Type        | Department      | Care Team           | Description   |
+--------+-------------+-----------------+---------------------+---------------+
| / | Office      |   [facility]  |   Note, Outpatient  | Progress Note |
|    | Visit-Trans | MEDICINE        | Clinic              |               |
|        | cribed      |                 |                     |               |
+--------+-------------+-----------------+---------------------+---------------+
 
 
 
 Social History
 
 
+----------------+-------+-----------+--------+------+
| Tobacco Use    | Types | Packs/Day | Years  | Date |
|                |       |           | Used   |      |
+----------------+-------+-----------+--------+------+
| Never Assessed |       |           |        |      |
+----------------+-------+-----------+--------+------+
 
 
 
+------------------+---------------+
| Sex Assigned at  | Date Recorded |
| Birth            |               |
+------------------+---------------+
| Not on file      |               |
+------------------+---------------+
 
 
 
+----------------+-------------+-------------+
| Job Start Date | Occupation  | Industry    |
+----------------+-------------+-------------+
| Not on file    | Not on file | Not on file |
+----------------+-------------+-------------+
 
 
 
+----------------+--------------+------------+
| Travel History | Travel Start | Travel End |
+----------------+--------------+------------+
 
 
 
+-------------------------------------+
| No recent travel history available. |
+-------------------------------------+
 documented as of this encounter
 
 
 Progress Notes
 Interface, Transcription In - 2006  5:13 AM PSTCLINIC DATE:       2001
 
 OTOLARYNGOLOGY HEAD AND NECK SURGERY
 
 SUBJECTIVE:  Mr. Shane is seen back today  in  followup  with respect to his
 laryngeal carcinoma and subglottic stenosis.   He  is  doing very well, and
 from a breathing standpoint, can do all  but  the most extremes of exertion
 without difficulty.
 
 PHYSICAL EXAMINATION:  NECK: Today, his  neck was without masses.  Flexible
 fiberoptic laryngoscopy of the nasopharynx,  oropharynx,  hypopharynx,  and
 larynx was done.  There were no abnormalities.   He had still mild residual
 irregularity.  The anterior commissure  as  was  seen  before  and somewhat
 limited abduction, but otherwise, no recurrence.
 
 ASSESSMENT:  Laryngeal carcinoma, no evidence of disease.
 
 PLAN:  I will see him back in 6 months.
 
 Jimmy Esposito M.D.
 
 RINA / 
 [id number] / [id number] / [id number] /
 D: 2001
 T: 2001
 
 [id number] signed by Interface, Transcription In at 2006  5:13 AM PSTdocume
nted in this encounter
 
 Plan of Treatment
 Not on filedocumented as of this encounter
 
 Visit Diagnoses
 Not on filedocumented in this encounter

## 2019-12-06 NOTE — XMS
Encounter Summary
  Created on: 2019
 
 Wyatt Shane
 External Reference #: [id number]
 : 43
 Sex: Male
 
 Demographics
 
 
+-----------------------+------------------------+
| Address               | [address]     |
|                       | [place]  [address]   |
+-----------------------+------------------------+
| Home Phone            | [phone]        |
+-----------------------+------------------------+
| Preferred Language    | Unknown                |
+-----------------------+------------------------+
| Marital Status        |                 |
+-----------------------+------------------------+
| Restorationism Affiliation | LUT                    |
+-----------------------+------------------------+
| Race                  | White                  |
+-----------------------+------------------------+
| Ethnic Group          | Not  or  |
+-----------------------+------------------------+
 
 
 Author
 
 
+--------------+-------------+
| Organization | Unknown     |
+--------------+-------------+
| Address      | Unknown     |
+--------------+-------------+
| Phone        | Unavailable |
+--------------+-------------+
 
 
 
 Support
 
 
+---------------+--------------+---------+-----------------+
| Name          | Relationship | Address | Phone           |
+---------------+--------------+---------+-----------------+
| Opal Shane | ECON         | Unknown | [phone] |
+---------------+--------------+---------+-----------------+
 
 
 
 Care Team Providers
 
 
+-----------------------+------+-----------------+
| Care Team Member Name | Role | Phone           |
 
+-----------------------+------+-----------------+
| Erwin Iniguez MD   | PCP  | [phone] |
+-----------------------+------+-----------------+
 
 
 
 Encounter Details
 
 
+--------+-------------+------------+--------------------+--------------------+
| Date   | Type        | Department | Care Team          | Description        |
+--------+-------------+------------+--------------------+--------------------+
| / | Procedure - |            |   Documentation,   | OP REPORT-TEACHING |
|    |             |            | Teaching Physician |                    |
|        | Transcribed |            |                    |                    |
+--------+-------------+------------+--------------------+--------------------+
 
 
 
 Social History
 
 
+----------------+-------+-----------+--------+------+
| Tobacco Use    | Types | Packs/Day | Years  | Date |
|                |       |           | Used   |      |
+----------------+-------+-----------+--------+------+
| Never Assessed |       |           |        |      |
+----------------+-------+-----------+--------+------+
 
 
 
+------------------+---------------+
| Sex Assigned at  | Date Recorded |
| Birth            |               |
+------------------+---------------+
| Not on file      |               |
+------------------+---------------+
 
 
 
+----------------+-------------+-------------+
| Job Start Date | Occupation  | Industry    |
+----------------+-------------+-------------+
| Not on file    | Not on file | Not on file |
+----------------+-------------+-------------+
 
 
 
+----------------+--------------+------------+
| Travel History | Travel Start | Travel End |
+----------------+--------------+------------+
 
 
 
+-------------------------------------+
| No recent travel history available. |
+-------------------------------------+
 documented as of this encounter
 
 Plan of Treatment
 
 Not on filedocumented as of this encounter
 
 Procedures
 
 
+--------------------+--------+------------+----------------------+----------+
| Procedure Name     | Priori | Date/Time  | Associated Diagnosis | Comments |
|                    | ty     |            |                      |          |
+--------------------+--------+------------+----------------------+----------+
| TEACHING PHYSICIAN |        | 1998 |                      |          |
+--------------------+--------+------------+----------------------+----------+
 documented in this encounter
 
 Visit Diagnoses
 Not on filedocumented in this encounter

## 2019-12-06 NOTE — XMS
Encounter Summary
  Created on: 2019
 
 Shane Wyatttien BroussardJosue
 External Reference #: [id number]
 : 43
 Sex: Male
 
 Demographics
 
 
+-----------------------+---------------------------+
| Address               | [address]        |
|                       | [place]  [address] |
+-----------------------+---------------------------+
| Home Phone            | [phone]           |
+-----------------------+---------------------------+
| Preferred Language    | Unknown                   |
+-----------------------+---------------------------+
| Marital Status        |                    |
+-----------------------+---------------------------+
| Yazidism Affiliation | [id number]                      |
+-----------------------+---------------------------+
| Race                  | Unknown                   |
+-----------------------+---------------------------+
| Ethnic Group          | Unknown                   |
+-----------------------+---------------------------+
 
 
 Author
 
 
+--------------+--------------------------------------------+
| Author       | [facility]  |
|              | and [place]                                |
+--------------+--------------------------------------------+
| Organization | [facility]  |
|              | and [place]                                |
+--------------+--------------------------------------------+
| Address      | Unknown                                    |
+--------------+--------------------------------------------+
| Phone        | Unavailable                                |
+--------------+--------------------------------------------+
 
 
 
 Support
 
 
+---------------+--------------+--------------------+-----------------+
| Name          | Relationship | Address            | Phone           |
+---------------+--------------+--------------------+-----------------+
| Opal Shane | ECON         | [address]     | [phone] |
|               |              | [place]   |                 |
|               |              | [address]              |                 |
+---------------+--------------+--------------------+-----------------+
 
 
 
 
 Care Team Providers
 
 
+-----------------------+------+-----------------+
| Care Team Member Name | Role | Phone           |
+-----------------------+------+-----------------+
| Erwin Iniguez MD | PCP  | [phone] |
+-----------------------+------+-----------------+
 
 
 
 Reason for Visit
 Evaluate & Treat (Routine)
 
+--------+--------------+-----------+--------------+--------------+---------------+
| Status | Reason       | Specialty | Diagnoses /  | Referred By  | Referred To   |
|        |              |           | Procedures   | Contact      | Contact       |
+--------+--------------+-----------+--------------+--------------+---------------+
| Closed |   Specialty  | Sleep     |   Diagnoses  |   Chris,     |   [facility]   |
|        | Services     | Medicine  |  [place]     | [facility] [place]    | [place]  [address] |
|        | Required     |           | sleep apnea  | MD [address]  [place]       |
|        |              |           | due to       |  [place] | [place],  |
|        |              |           | [place] |    [place]   | [place] [address] |
|        |              |           | s            | [place]    |   Phone:      |
|        |              |           | respiration  | [address]        | [phone]  |
|        |              |           |  JO[facility]         | Phone:       |  Fax:         |
|        |              |           | (obstructive | [phone] | [phone]  |
|        |              |           |  sleep       |   Fax:       |               |
|        |              |           | apnea)       | [phone] |               |
|        |              |           | Procedures   |              |               |
|        |              |           | [place] POLYSOM   |              |               |
|        |              |           | 6/>YRS SLEEP |              |               |
|        |              |           |  4/> ADDL    |              |               |
|        |              |           | MALIK ATTND  |              |               |
|        |              |           |  NPSG        |              |               |
+--------+--------------+-----------+--------------+--------------+---------------+
 
 
 
 
 Encounter Details
 
 
+--------+-----------+----------------------+---------------------+----------------------+
| Date   | Type      | Department           | Care Team           | Description          |
+--------+-----------+----------------------+---------------------+----------------------+
| 10/19/ | Hospital  |   [facility] |   Luis Carlos Perez  | Central sleep apnea  |
| 2015   | Encounter |  [facility]       | MD [address]   | due to [place] |
|        |           | CENTER  [address] | [address]  [facility]    |  respiration         |
|        |           |   [place], [place]    | [place] [address]     | (Primary Dx); [place]    |
|        |           | [address]           | [phone]        | (obstructive sleep   |
|        |           | [phone]         | [phone] (Fax)  | apnea)               |
+--------+-----------+----------------------+---------------------+----------------------+
 
 
 
 Social History
 
 
 
+---------------+------------+-----------+--------+------------------+
| Tobacco Use   | Types      | Packs/Day | Years  | Date             |
|               |            |           | Used   |                  |
+---------------+------------+-----------+--------+------------------+
| Former Smoker | Cigarettes | 1.3       | 34     | Quit: 1996 |
+---------------+------------+-----------+--------+------------------+
 
 
 
+---------------------+---+---+---+
| Smokeless Tobacco:  |   |   |   |
| Never Used          |   |   |   |
+---------------------+---+---+---+
 
 
 
+-------------+-------------+---------+----------+
| Alcohol Use | Drinks/Week | oz/Week | Comments |
+-------------+-------------+---------+----------+
| No          |             |         |          |
+-------------+-------------+---------+----------+
 
 
 
+------------------+---------------+
| Sex Assigned at  | Date Recorded |
| Birth            |               |
+------------------+---------------+
| Not on file      |               |
+------------------+---------------+
 
 
 
+----------------+-------------+-------------+
| Job Start Date | Occupation  | Industry    |
+----------------+-------------+-------------+
| Not on file    | Not on file | Not on file |
+----------------+-------------+-------------+
 
 
 
+----------------+--------------+------------+
| Travel History | Travel Start | Travel End |
+----------------+--------------+------------+
 
 
 
+-------------------------------------+
| No recent travel history available. |
+-------------------------------------+
 documented as of this encounter
 
 Medications at Time of Discharge
 
 
+----------------------+----------------------+-----------+---------+----------+-----------+
| Medication           | Sig                  | Dispensed | Refills | Start    | End Date  |
|                      |                      |           |         | Date     |           |
+----------------------+----------------------+-----------+---------+----------+-----------+
|   acyclovir          | Apply  topically     |           | 0       |          |           |
 
| (ZOVIRAX) 5%         | every 3 hours.       |           |         |          |           |
| ointment             |                      |           |         |          |           |
+----------------------+----------------------+-----------+---------+----------+-----------+
|   albuterol (PROAIR  | Inhale 2 puffs into  |           | 0       |          |           |
| HFA) 90 mcg/puff     | the lungs every 6    |           |         |          |           |
| inhaler              | hours as needed for  |           |         |          |           |
|                      | Wheezing.            |           |         |          |           |
+----------------------+----------------------+-----------+---------+----------+-----------+
|   digoxin (LANOXIN)  | Take 125 mcg by      |           | 0       |          |           |
| 250 mcg tablet       | mouth Daily.      |           |         |          |           |
|                      | capsule daily        |           |         |          |           |
+----------------------+----------------------+-----------+---------+----------+-----------+
|   ferrous sulfate    | Take 325 mg by mouth |           | 0       | 20 |           |
| 325 mg tablet        |  3 times daily.      |           |         | 12       |           |
+----------------------+----------------------+-----------+---------+----------+-----------+
|   fluticasone        | one spray in each    |           | 0       | 20 |           |
| (FLONASE) 50         | nostril daily as     |           |         | 12       |           |
| mcg/nasal spray      | needed               |           |         |          |           |
+----------------------+----------------------+-----------+---------+----------+-----------+
|                      | Inhale 1 puff into   |           | 0       |          |           |
| fluticasone-salmeter | the lungs Twice      |           |         |          |           |
| ol (ADVAIR) 500-50   | Daily.               |           |         |          |           |
| mcg/puff diskus      |                      |           |         |          |           |
| inhaler              |                      |           |         |          |           |
+----------------------+----------------------+-----------+---------+----------+-----------+
|   folic acid 1 mg    | Take 1 mg by mouth   |           | 0       |          |           |
| tablet               | Daily.               |           |         |          |           |
+----------------------+----------------------+-----------+---------+----------+-----------+
|   furosemide (LASIX) | Take 40 mg by mouth  |           | 0       |          |           |
|  40 mg tablet        | Daily.               |           |         |          |           |
+----------------------+----------------------+-----------+---------+----------+-----------+
|   guaiFENesin        | Take 1,200 mg by     |           | 0       |          |           |
| (MUCINEX) 600 mg 12  | mouth 2 times daily. |           |         |          |           |
| hr tablet            |                      |           |         |          |           |
+----------------------+----------------------+-----------+---------+----------+-----------+
|   levothyroxine      | Take 75 mcg by mouth |           | 0       | 20 |           |
| (LEVOTHROID) 75 MCG  |  Daily.              |           |         | 12       |           |
| tablet               |                      |           |         |          |           |
+----------------------+----------------------+-----------+---------+----------+-----------+
|   metoclopramide     | Take 10 mg by mouth  |           | 0       | 20 |           |
| (REGLAN) 10 mg       | 4 times daily as     |           |         | 12       |           |
| tablet               | needed.              |           |         |          |           |
+----------------------+----------------------+-----------+---------+----------+-----------+
|                      | Apply  topically 2   |           | 0       |          |           |
| nystatin-triamcinolo | times daily.         |           |         |          |           |
| ne (MYCOLOG II)      |                      |           |         |          |           |
| cream                |                      |           |         |          |           |
+----------------------+----------------------+-----------+---------+----------+-----------+
|   omeprazole         | Take 20 mg by mouth  |           | 0       | 20 |           |
| (PRILOSEC) 20 mg     | 2 times daily.       |           |         | 12       |           |
| capsule              |                      |           |         |          |           |
+----------------------+----------------------+-----------+---------+----------+-----------+
|   potassium citrate  | Take 10 mEq by mouth |           | 0       |          |           |
| (UROCIT-K) 10 mEq SR |  2 times daily.      |           |         |          |           |
|  tablet              |                      |           |         |          |           |
+----------------------+----------------------+-----------+---------+----------+-----------+
|   predniSONE         | Take 10 mg by mouth  |           | 0       |          |           |
| (DELTASONE) 5 mg     | Daily.               |           |         |          |           |
| tablet               |                      |           |         |          |           |
+----------------------+----------------------+-----------+---------+----------+-----------+
 
|   tamsulosin         | Take 0.4 mg by mouth |           | 0       | 20 |           |
| (FLOMAX) 0.4 mg CAPS |  2 times daily.      |           |         | 12       |           |
+----------------------+----------------------+-----------+---------+----------+-----------+
|   acyclovir          | Take 400 mg by mouth |           | 0       | 20 |  |
| (ZOVIRAX) 400 MG     |  3 times daily as    |           |         | 12       | 9         |
| tablet               | needed.              |           |         |          |           |
+----------------------+----------------------+-----------+---------+----------+-----------+
|   Cholecalciferol    | Take 2,000 Units by  |           | 0       | 20 |  |
| (VITAMIN D3) 2000    | mouth Daily.         |           |         | 12       | 9         |
| UNITS CAPS           |                      |           |         |          |           |
+----------------------+----------------------+-----------+---------+----------+-----------+
|   cyanocobalamin     | injected             |           | 0       | 20 |  |
| (VITAMIN B-12) 1,000 | intramuscularly once |           |         | 12       | 9         |
|  mcg/mL injection    |  a month             |           |         |          |           |
+----------------------+----------------------+-----------+---------+----------+-----------+
|   diltiazem          | Take 120 mg by mouth |           | 0       |          |  |
| (DILT-XR) 120 mg 24  |  Daily.              |           |         |          | 9         |
| hr capsule           |                      |           |         |          |           |
+----------------------+----------------------+-----------+---------+----------+-----------+
|   loratadine         | Take 10 mg by mouth  |           | 0       |          |  |
| (CLARITIN) 10 mg     | Daily.               |           |         |          | 9         |
| tablet               |                      |           |         |          |           |
+----------------------+----------------------+-----------+---------+----------+-----------+
|   losartan (COZAAR)  | Take 100 mg by mouth |           | 0       |          |  |
| 100 MG tablet        |  Daily. 1/2 daily    |           |         |          | 9         |
+----------------------+----------------------+-----------+---------+----------+-----------+
|   methotrexate 2.5   | Take 15 mg by mouth  |           | 0       |          |  |
| mg tablet            | Once a week.         |           |         |          | 9         |
+----------------------+----------------------+-----------+---------+----------+-----------+
|                      | Take 1 tablet by     |           | 0       |          |  |
| oxyCODONE-acetaminop | mouth every 4 hours  |           |         |          | 9         |
| hen (PERCOCET) 5-325 | as needed for Pain.  |           |         |          |           |
|  mg per tablet       |                      |           |         |          |           |
+----------------------+----------------------+-----------+---------+----------+-----------+
|   pseudoePHEDrine    | Take 30 mg by mouth  |           | 0       |          |  |
| (SUDOGEST) 30 mg     | every 4 hours as     |           |         |          | 9         |
| tablet               | needed for           |           |         |          |           |
|                      | Congestion.          |           |         |          |           |
+----------------------+----------------------+-----------+---------+----------+-----------+
|   rivaroxaban        | Take 20 mg by mouth  |           | 0       |          |  |
| (XARELTO) 20 mg      | Daily (with dinner). |           |         |          | 9         |
| tablet               |                      |           |         |          |           |
+----------------------+----------------------+-----------+---------+----------+-----------+
|   sertraline         | 2 tablets daily      |           | 0       | 20 |  |
| (ZOLOFT) 100 mg      |                      |           |         | 12       | 9         |
| tablet               |                      |           |         |          |           |
+----------------------+----------------------+-----------+---------+----------+-----------+
 documented as of this encounter
 
 Plan of Treatment
 
 
+--------+---------+-------------+----------------------+-------------+
| Date   | Type    | Specialty   | Care Team            | Description |
+--------+---------+-------------+----------------------+-------------+
| / | Office  | Pulmonology |   Juan F,          |             |
| 2019   | Visit   |             | Jessica Cheung,   |             |
|        |         |             | MD  [address] |             |
|        |         |             |   [place],   |             |
|        |         |             | [place] [address]             |             |
 
|        |         |             | [phone]         |             |
|        |         |             | [phone] (Fax)   |             |
+--------+---------+-------------+----------------------+-------------+
| / | Office  | Cardiology  |   Eric Akins, |             |
| 2020   | Visit   |             |  MD  [address]   |             |
|        |         |             | [place]  [place], [place]  |             |
|        |         |             | [address]  [phone]  |             |
|        |         |             |  [phone] (Fax)  |             |
+--------+---------+-------------+----------------------+-------------+
 documented as of this encounter
 
 Procedures
 
 
+----------------------+--------+-------------+----------------------+----------+
| Procedure Name       | Priori | Date/Time   | Associated Diagnosis | Comments |
|                      | ty     |             |                      |          |
+----------------------+--------+-------------+----------------------+----------+
| DIAGNOSTIC REPORT -  |        | 10/19/2015  |                      |          |
| EXTERNAL SCAN        |        | 12:00 AM    |                      |          |
|                      |        | PDT         |                      |          |
+----------------------+--------+-------------+----------------------+----------+
 documented in this encounter
 
 Visit Diagnoses
 
 
+----------------------------------------------------------------------------------+
| Diagnosis                                                                        |
+----------------------------------------------------------------------------------+
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
| respiration                                                                      |
+----------------------------------------------------------------------------------+
|   JOANN (obstructive sleep apnea)  Obstructive sleep apnea (adult) (pediatric)     |
+----------------------------------------------------------------------------------+
 documented in this encounter

## 2019-12-06 NOTE — XMS
Encounter Summary
  Created on: 2019
 
 Shane Wyatttien BroussardJosue
 External Reference #: [id number]
 : 43
 Sex: Male
 
 Demographics
 
 
+-----------------------+---------------------------+
| Address               | [address]        |
|                       | [place]  [address] |
+-----------------------+---------------------------+
| Home Phone            | [phone]           |
+-----------------------+---------------------------+
| Preferred Language    | Unknown                   |
+-----------------------+---------------------------+
| Marital Status        |                    |
+-----------------------+---------------------------+
| Congregation Affiliation | 1028                      |
+-----------------------+---------------------------+
| Race                  | Unknown                   |
+-----------------------+---------------------------+
| Ethnic Group          | Unknown                   |
+-----------------------+---------------------------+
 
 
 Author
 
 
+--------------+--------------------------------------------+
| Author       | [facility]  |
|              | and [place]                                |
+--------------+--------------------------------------------+
| Organization | [facility]  |
|              | and [place]                                |
+--------------+--------------------------------------------+
| Address      | Unknown                                    |
+--------------+--------------------------------------------+
| Phone        | Unavailable                                |
+--------------+--------------------------------------------+
 
 
 
 Support
 
 
+---------------+--------------+--------------------+-----------------+
| Name          | Relationship | Address            | Phone           |
+---------------+--------------+--------------------+-----------------+
| Opal Shane | ECON         | [address]     | [phone] |
|               |              | [place]   |                 |
|               |              | [address]              |                 |
+---------------+--------------+--------------------+-----------------+
 
 
 
 
 Care Team Providers
 
 
+------------------------+------+-----------------+
| Care Team Member Name  | Role | Phone           |
+------------------------+------+-----------------+
| Calvin Robertson MD | PCP  | [phone] |
+------------------------+------+-----------------+
 
 
 
 Reason for Referral
 Diagnostic/Screening (Routine)
 
+--------+--------+-----------+--------------+--------------+--------------+
| Status | Reason | Specialty | Diagnoses /  | Referred By  | Referred To  |
|        |        |           | Procedures   | Contact      | Contact      |
+--------+--------+-----------+--------------+--------------+--------------+
| Closed |        | Radiology |   Diagnoses  |   Neris Wilson,  |              |
|        |        |           |  Carotid     | DNP  [address]    |              |
|        |        |           | stenosis,    | [facility]  |              |
|        |        |           | bilateral    |  [place]       |              |
|        |        |           | Procedures   | [place] |              |
|        |        |           | VAS Carotid  |  [id number]       |              |
|        |        |           | Duplex       | Phone:       |              |
|        |        |           | Bilateral    | [phone] |              |
|        |        |           |              |   Fax:       |              |
|        |        |           |              | [phone] |              |
+--------+--------+-----------+--------------+--------------+--------------+
 
 
 
 
 Encounter Details
 
 
+--------+-----------+---------------------+-----------+--------------------+
| Date   | Type      | Department          | Care Team | Description        |
+--------+-----------+---------------------+-----------+--------------------+
| / | Hospital  |   [facility]     |           | Carotid stenosis,  |
| 2019   | Encounter | VASCULAR SURGERY    |           | bilateral          |
|        |           | ULTRASOUND  [address]    |           |                    |
|        |           | [address]   |           |                    |
|        |           | [place]        |           |                    |
|        |           | [address]          |           |                    |
|        |           | [phone]        |           |                    |
+--------+-----------+---------------------+-----------+--------------------+
 
 
 
 Social History
 
 
+---------------+------------+-----------+--------+------------------+
| Tobacco Use   | Types      | Packs/Day | Years  | Date             |
|               |            |           | Used   |                  |
+---------------+------------+-----------+--------+------------------+
| Former Smoker | Cigarettes | 1         | 35     | Quit: 1996 |
+---------------+------------+-----------+--------+------------------+
 
 
 
 
+---------------------+---+---+---+
| Smokeless Tobacco:  |   |   |   |
| Never Used          |   |   |   |
+---------------------+---+---+---+
 
 
 
+-------------+-------------+---------+----------+
| Alcohol Use | Drinks/Week | oz/Week | Comments |
+-------------+-------------+---------+----------+
| No          |             |         |          |
+-------------+-------------+---------+----------+
 
 
 
+------------------+---------------+
| Sex Assigned at  | Date Recorded |
| Birth            |               |
+------------------+---------------+
| Not on file      |               |
+------------------+---------------+
 
 
 
+----------------+-------------+-------------+
| Job Start Date | Occupation  | Industry    |
+----------------+-------------+-------------+
| Not on file    | Not on file | Not on file |
+----------------+-------------+-------------+
 
 
 
+----------------+--------------+------------+
| Travel History | Travel Start | Travel End |
+----------------+--------------+------------+
 
 
 
+-------------------------------------+
| No recent travel history available. |
+-------------------------------------+
 documented as of this encounter
 
 Medications at Time of Discharge
 
 
+----------------------+----------------------+-----------+---------+----------+-----------+
| Medication           | Sig                  | Dispensed | Refills | Start    | End Date  |
|                      |                      |           |         | Date     |           |
+----------------------+----------------------+-----------+---------+----------+-----------+
|   acyclovir          | Take 400 mg by mouth |           | 0       |          |           |
| (ZOVIRAX) 400 MG     |  5 (five) times      |           |         |          |           |
| tablet               | daily. PRN           |           |         |          |           |
+----------------------+----------------------+-----------+---------+----------+-----------+
|   acyclovir          | Apply  topically     |           | 0       |          |           |
| (ZOVIRAX) 5%         | every 3 hours.       |           |         |          |           |
| ointment             |                      |           |         |          |           |
 
+----------------------+----------------------+-----------+---------+----------+-----------+
|   albuterol (PROAIR  | Inhale 2 puffs into  |           | 0       |          |           |
| HFA) 90 mcg/puff     | the lungs every 6    |           |         |          |           |
| inhaler              | hours as needed for  |           |         |          |           |
|                      | Wheezing.            |           |         |          |           |
+----------------------+----------------------+-----------+---------+----------+-----------+
|   albuterol (PROAIR  | Inhale  into the     |           | 0       |          |           |
| RESPICLICK) 90       | lungs.               |           |         |          |           |
| mcg/puff inhaler     |                      |           |         |          |           |
+----------------------+----------------------+-----------+---------+----------+-----------+
|                      | Take 3 mLs by        |           | 0       | 20 |           |
| albuterol-ipratropiu | nebulization every 6 |           |         | 18       |           |
| m 2.5-0.5 mg/3 mL    |  (six) hours as      |           |         |          |           |
| SOLN                 | needed.              |           |         |          |           |
+----------------------+----------------------+-----------+---------+----------+-----------+
|   atorvaSTATin       | TAKE 1 TABLET BY     |           | 0       | 20 |  |
| (LIPITOR) 40 mg      | MOUTH NIGHTLY        |           |         | 19       | 0         |
| tablet               |                      |           |         |          |           |
+----------------------+----------------------+-----------+---------+----------+-----------+
|   azaTHIOprine       | Take 150 mg by mouth |           | 0       |          |           |
| (IMURAN) 50 mg       |  daily.              |           |         |          |           |
| tablet               |                      |           |         |          |           |
+----------------------+----------------------+-----------+---------+----------+-----------+
|                      | Apply  to eye as     |           | 0       |          |           |
| bacitracin-polymyxin | needed.              |           |         |          |           |
|  b (POLYSPORIN)      |                      |           |         |          |           |
| ophthalmic ointment  |                      |           |         |          |           |
+----------------------+----------------------+-----------+---------+----------+-----------+
|   bismuth            | Take 262 mg by mouth |           | 0       |          |           |
| subsalicylate (PEPTO |  4 (four) times      |           |         |          |           |
|  BISMOL) 262 mg      | daily before meals   |           |         |          |           |
| chewable tablet      | and nightly.         |           |         |          |           |
+----------------------+----------------------+-----------+---------+----------+-----------+
|   cholecalciferol    | Take 1,000 Units by  |           | 0       |          |           |
| (CHOLECALCIFEROL)    | mouth daily.         |           |         |          |           |
| 1000 units TABS      |                      |           |         |          |           |
+----------------------+----------------------+-----------+---------+----------+-----------+
|   cyanocobalamin     | Take 1,000 mcg by    |           | 0       |          |           |
| (VITAMIN B-12) 1000  | mouth daily.         |           |         |          |           |
| MCG tablet           |                      |           |         |          |           |
+----------------------+----------------------+-----------+---------+----------+-----------+
|   diclofenac         | Apply 2 g topically  |           | 0       |          |           |
| (VOLTAREN) 1% GEL    | 4 (four) times       |           |         |          |           |
|                      | daily. PRN           |           |         |          |           |
+----------------------+----------------------+-----------+---------+----------+-----------+
|   digoxin (LANOXIN)  | Take 125 mcg by      |           | 0       |          |           |
| 250 mcg tablet       | mouth Daily. 1/2     |           |         |          |           |
|                      | capsule daily        |           |         |          |           |
+----------------------+----------------------+-----------+---------+----------+-----------+
|   famotidine         | Take 40 mg by mouth  |           | 0       |          |           |
| (PEPCID) 40 MG       | daily.               |           |         |          |           |
| tablet               |                      |           |         |          |           |
+----------------------+----------------------+-----------+---------+----------+-----------+
|   ferrous sulfate    | Take 325 mg by mouth |           | 0       | 20 |           |
| 325 mg tablet        |  3 times daily.      |           |         | 12       |           |
+----------------------+----------------------+-----------+---------+----------+-----------+
|   fluticasone        | one spray in each    |           | 0       | 20 |           |
| (FLONASE) 50         | nostril daily as     |           |         | 12       |           |
| mcg/nasal spray      | needed               |           |         |          |           |
+----------------------+----------------------+-----------+---------+----------+-----------+
 
|                      | Inhale 1 puff into   |           | 0       |          |           |
| fluticasone-salmeter | the lungs Twice      |           |         |          |           |
| ol (ADVAIR) 500-50   | Daily.               |           |         |          |           |
| mcg/puff diskus      |                      |           |         |          |           |
| inhaler              |                      |           |         |          |           |
+----------------------+----------------------+-----------+---------+----------+-----------+
|   folic acid 1 mg    | Take 1 mg by mouth   |           | 0       |          |           |
| tablet               | Daily.               |           |         |          |           |
+----------------------+----------------------+-----------+---------+----------+-----------+
|   furosemide (LASIX) | Take 40 mg by mouth  |           | 0       |          |           |
|  40 mg tablet        | Daily.               |           |         |          |           |
+----------------------+----------------------+-----------+---------+----------+-----------+
|   guaiFENesin        | Take 1,200 mg by     |           | 0       |          |           |
| (MUCINEX) 600 mg 12  | mouth 2 times daily. |           |         |          |           |
| hr tablet            |                      |           |         |          |           |
+----------------------+----------------------+-----------+---------+----------+-----------+
|   levocetirizine     | Take 5 mg by mouth   |           | 0       |          |           |
| (XYZAL) 5 MG tablet  | as needed.           |           |         |          |           |
+----------------------+----------------------+-----------+---------+----------+-----------+
|   levothyroxine      | Take 75 mcg by mouth |           | 0       | 20 |           |
| (LEVOTHROID) 75 MCG  |  Daily.              |           |         | 12       |           |
| tablet               |                      |           |         |          |           |
+----------------------+----------------------+-----------+---------+----------+-----------+
|   losartan (COZAAR)  | Take 50 mg by mouth  |           | 0       |          |           |
| 50 mg tablet         | daily.               |           |         |          |           |
+----------------------+----------------------+-----------+---------+----------+-----------+
|   metoclopramide     | Take 10 mg by mouth  |           | 0       | 20 |           |
| (REGLAN) 10 mg       | 4 times daily as     |           |         | 12       |           |
| tablet               | needed.              |           |         |          |           |
+----------------------+----------------------+-----------+---------+----------+-----------+
|   mupirocin          | 1 g by Nasal route   |           | 0       |          |           |
| (BACTROBAN) 2%       | as needed. Use pea   |           |         |          |           |
| ointment             | sized amount in each |           |         |          |           |
|                      |  nostril twice daily |           |         |          |           |
|                      |  for 5 days. After   |           |         |          |           |
|                      | applications, press  |           |         |          |           |
|                      | sides of nose        |           |         |          |           |
|                      | together, gently     |           |         |          |           |
|                      | massage for 1 min.   |           |         |          |           |
+----------------------+----------------------+-----------+---------+----------+-----------+
|                      | Apply  topically 2   |           | 0       |          |           |
| nystatin-triamcinolo | times daily.         |           |         |          |           |
| ne (MYCOLOG II)      |                      |           |         |          |           |
| cream                |                      |           |         |          |           |
+----------------------+----------------------+-----------+---------+----------+-----------+
|   ofloxacin          |                      |           | 0       | 20 |           |
| (OCUFLOX) 0.3%       |                      |           |         | 18       |           |
| ophthalmic solution  |                      |           |         |          |           |
+----------------------+----------------------+-----------+---------+----------+-----------+
|   omeprazole         | Take 20 mg by mouth  |           | 0       | 20 |           |
| (PRILOSEC) 20 mg     | 2 times daily.       |           |         | 12       |           |
| capsule              |                      |           |         |          |           |
+----------------------+----------------------+-----------+---------+----------+-----------+
|   potassium citrate  | Take 10 mEq by mouth |           | 0       |          |           |
| (UROCIT-K) 10 mEq SR |  2 times daily.      |           |         |          |           |
|  tablet              |                      |           |         |          |           |
+----------------------+----------------------+-----------+---------+----------+-----------+
|   predniSONE         | Take 10 mg by mouth  |           | 0       |          |           |
| (DELTASONE) 5 mg     | Daily.               |           |         |          |           |
| tablet               |                      |           |         |          |           |
 
+----------------------+----------------------+-----------+---------+----------+-----------+
|   sertraline         | Take 100 mg by mouth |           | 0       |          |           |
| (ZOLOFT) 100 mg      |  daily.              |           |         |          |           |
| tablet               |                      |           |         |          |           |
+----------------------+----------------------+-----------+---------+----------+-----------+
|   tamsulosin         | Take 0.4 mg by mouth |           | 0       | 20 |           |
| (FLOMAX) 0.4 mg CAPS |  2 times daily.      |           |         | 12       |           |
+----------------------+----------------------+-----------+---------+----------+-----------+
|   trolamine          | Apply  topically as  |           | 0       |          |           |
| salicylate           | needed.              |           |         |          |           |
| (ASPERCREME) 10%     |                      |           |         |          |           |
| cream                |                      |           |         |          |           |
+----------------------+----------------------+-----------+---------+----------+-----------+
|   XARELTO 10 MG      | TAKE ONE TABLET BY   |   90      | 2       | 10/17/20 |           |
| tablet               | MOUTH EVERY DAY      | tablet    |         | 19       |           |
+----------------------+----------------------+-----------+---------+----------+-----------+
 documented as of this encounter
 
 Plan of Treatment
 
 
+--------+---------+-------------+----------------------+-------------+
| Date   | Type    | Specialty   | Care Team            | Description |
+--------+---------+-------------+----------------------+-------------+
| / | Office  | Pulmonology |   Juan F,          |             |
|    | Visit   |             | Jessica Cheung,   |             |
|        |         |             | MD [address] |             |
|        |         |             |   [place]   |             |
|        |         |             | [place] [address]             |             |
|        |         |             | [phone]         |             |
|        |         |             | [phone] (Fax)   |             |
+--------+---------+-------------+----------------------+-------------+
| / | Office  | Cardiology  |   Eric Akins, |             |
|    | Visit   |             |  MD [address]   |             |
|        |         |             | [place]  |             |
|        |         |             | [address]  [phone]  |             |
|        |         |             |  [phone] (Fax)  |             |
+--------+---------+-------------+----------------------+-------------+
 documented as of this encounter
 
 Procedures
 
 
+---------------------+--------+-------------+----------------------+----------------------+
| Procedure Name      | Priori | Date/Time   | Associated Diagnosis | Comments             |
|                     | ty     |             |                      |                      |
+---------------------+--------+-------------+----------------------+----------------------+
| VAS CAROTID DUPLEX  | Routin | 2019  |   Carotid stenosis,  |   Results for this   |
| BILATERAL           | e      | 11:15 AM    | bilateral            | procedure are in the |
|                     |        | PST         |                      |  results section.    |
+---------------------+--------+-------------+----------------------+----------------------+
 documented in this encounter
 
 Results
 VAS Carotid Duplex Bilateral (2019 11:15 AM PST)
 
+----------+
| Specimen |
+----------+
|          |
 
+----------+
 
 
 
+------------------------------------------------------------------------+---------------+
| Impressions                                                            | Performed At  |
+------------------------------------------------------------------------+---------------+
|   Extensive calcific plaque bilaterally without high-grade stenosis,   |   [facility] |
| similar to prior examination     In the left common carotid artery     |               |
| there is dense calcific plaque versus  an intimal flap which is not    |               |
| flow limiting.     The left vertebral artery is not visualized on      |               |
| today's examination.     Internal Carotid Artery Diagnostic Grades     |               |
|  Grade 1: Stenosis = 01-50% / PSV <125 cm/sec / EDV (cm/s)<40 cm/sec   |               |
| (mild plaque)  Grade 2: Stenosis = 01-50% / PSV <125 cm/sec / EDV      |               |
| (cm/s)<40 cm/sec  (moderate plaque)  Grade 3: Stenosis = 50-69% / PSV  |               |
| >125 cm/sec / EDV (cm/s)>40 cm/sec  Grade 4: Stenosis = 70-95% / PSV   |               |
| >230 cm/sec / EDV (cm/s)>100 cm/sec  Grade 5: Stenosis = 90-95% / PSV  |               |
| <125 cm/sec / EDV (cm/s)<40 cm/sec  Grade 6: Stenosis Occluded         |               |
| Society of Radiologists in Ultrasound (SRU) criteria applied for       |               |
| internal carotid stenosis determination.           Signed by: Sly  |               |
| Johnson PATEL  Sign Date/Time: 2019 1:36 PM                      |               |
+------------------------------------------------------------------------+---------------+
 
 
 
+------------------------------------------------------------------------+---------------+
| Narrative                                                              | Performed At  |
+------------------------------------------------------------------------+---------------+
|      CAROTID DUPLEX ULTRASOUND     CLINICAL INFORMATION:  Carotid      |   [facility] |
| artery stenosis.     COMPARISON:  US CAROTID DOPPLER BILATERAL         |               |
| (2018);     PROCEDURE:  Evaluation of the extracranial carotid    |               |
| and vertebral arteries with  production of real-time images            |               |
| integrating B-mode two-dimensional  vascular structure, Doppler        |               |
| spectral analysis and color-flow Doppler  imaging.     FINDINGS:  Peak |               |
|  systolic and diastolic velocities measured in the common and          |               |
| internal carotid arteries. All velocities recorded in cm/sec:          |               |
| Anterior Circulation:     Right  CCA: 65/10  ICA: 65/9  ECA: 78/9      |               |
| ICA/CCA: 1.0     Left  CCA: 86/15  ICA: 90 /7  ECA: 133/0  ICA/CCA:    |               |
| 1.0     Posterior Circulation:     Right:  Vertebral: 121/21 Antegrade |               |
|      Left:  Vertebral: Not visualized     Gray scale images: There is  |               |
| extensive calcified plaque with a possible  intimal flap of the left   |               |
| common carotid artery.                                                 |               |
+------------------------------------------------------------------------+---------------+
 
 
 
+-------------------------------------------------------------------------+
| Procedure Note                                                          |
+-------------------------------------------------------------------------+
|   Maurizio, Rad Results In - 2019  1:40 PM PST                         |
| CAROTID DUPLEX ULTRASOUND                                               |
|                                                                         |
| CLINICAL INFORMATION:                                                   |
| Carotid artery stenosis.                                                |
|                                                                         |
| COMPARISON:                                                             |
| US CAROTID DOPPLER BILATERAL (2018);                               |
|                                                                         |
| PROCEDURE:                                                              |
| Evaluation of the extracranial carotid and vertebral arteries with      |
 
| production of real-time images integrating B-mode two-dimensional       |
| vascular structure, Doppler spectral analysis and color-flow Doppler    |
| imaging.                                                                |
|                                                                         |
| FINDINGS:                                                               |
| Peak systolic and diastolic velocities measured in the common and       |
| internal carotid arteries. All velocities recorded in cm/sec:           |
|                                                                         |
| Anterior Circulation:                                                   |
|                                                                         |
| Right                                                                   |
| CCA: 65/10                                                              |
| ICA: 65/9                                                               |
| ECA: 78/9                                                               |
| ICA/CCA: 1.0                                                            |
|                                                                         |
| Left                                                                    |
| CCA: 86/15                                                              |
| ICA: 90 /7                                                              |
| ECA: 133/0                                                              |
| ICA/CCA: 1.0                                                            |
|                                                                         |
| Posterior Circulation:                                                  |
|                                                                         |
| Right:                                                                  |
| Vertebral: 121/21 Antegrade                                             |
|                                                                         |
| Left:                                                                   |
| Vertebral: Not visualized                                               |
|                                                                         |
| Gray scale images: There is extensive calcified plaque with a possible  |
| intimal flap of the left common carotid artery.                         |
|                                                                         |
| IMPRESSION:                                                             |
| Extensive calcific plaque bilaterally without high-grade stenosis,      |
| similar to prior examination                                            |
|                                                                         |
| In the left common carotid artery there is dense calcific plaque versus |
| an intimal flap which is not flow limiting.                             |
|                                                                         |
| The left vertebral artery is not visualized on today's examination.     |
|                                                                         |
| Internal Carotid Artery Diagnostic Grades                               |
|                                                                         |
| Grade 1: Stenosis = 01-50% / PSV <125 cm/sec / EDV (cm/s)<40 cm/sec     |
| (mild plaque)                                                           |
| Grade 2: Stenosis = 01-50% / PSV <125 cm/sec / EDV (cm/s)<40 cm/sec     |
| (moderate plaque)                                                       |
| Grade 3: Stenosis = 50-69% / PSV >125 cm/sec / EDV (cm/s)>40 cm/sec     |
| Grade 4: Stenosis = 70-95% / PSV >230 cm/sec / EDV (cm/s)>100 cm/sec    |
| Grade 5: Stenosis = 90-95% / PSV <125 cm/sec / EDV (cm/s)<40 cm/sec     |
| Grade 6: Stenosis Occluded                                              |
|                                                                         |
| [employer] in Ultras[employer] (SRU) criteria applied for        |
| internal carotid stenosis determination.                                |
|                                                                         |
| Signed by: JORGE Heart Matthew                                        |
| Sign Date/Time: 2019 1:36 PM                                      |
 
+-------------------------------------------------------------------------+
 
 
 
+---------------+---------+--------------------+--------------+
| Performing    | Address | City/State/Zipcode | Phone Number |
| Organization  |         |                    |              |
+---------------+---------+--------------------+--------------+
|   [facility] |         |                    |              |
+---------------+---------+--------------------+--------------+
 documented in this encounter
 
 Visit Diagnoses
 
 
+----------------------------------------------------------------------------------+
| Diagnosis                                                                        |
+----------------------------------------------------------------------------------+
|   Carotid stenosis, bilateral  Occlusion and stenosis of multiple and bilateral  |
| precerebral arteries without mention of cerebral infarction                      |
+----------------------------------------------------------------------------------+
 documented in this encounter

## 2019-12-06 NOTE — XMS
Encounter Summary
  Created on: 2019
 
 Shane Wyatttien BroussardJosue
 External Reference #: [id number]
 : 43
 Sex: Male
 
 Demographics
 
 
+-----------------------+---------------------------+
| Address               | [address]        |
|                       | [place]  [address] |
+-----------------------+---------------------------+
| Home Phone            | [phone]           |
+-----------------------+---------------------------+
| Preferred Language    | Unknown                   |
+-----------------------+---------------------------+
| Marital Status        |                    |
+-----------------------+---------------------------+
| Latter day Affiliation | 1028                      |
+-----------------------+---------------------------+
| Race                  | Unknown                   |
+-----------------------+---------------------------+
| Ethnic Group          | Unknown                   |
+-----------------------+---------------------------+
 
 
 Author
 
 
+--------------+--------------------------------------------+
| Author       | [facility]  |
|              | and [place]                                |
+--------------+--------------------------------------------+
| Organization | [facility]  |
|              | and [place]                                |
+--------------+--------------------------------------------+
| Address      | Unknown                                    |
+--------------+--------------------------------------------+
| Phone        | Unavailable                                |
+--------------+--------------------------------------------+
 
 
 
 Support
 
 
+---------------+--------------+--------------------+-----------------+
| Name          | Relationship | Address            | Phone           |
+---------------+--------------+--------------------+-----------------+
| Opal Shane | ECON         | [address]     | [phone] |
|               |              | [place]   |                 |
|               |              | [address]              |                 |
+---------------+--------------+--------------------+-----------------+
 
 
 
 
 Care Team Providers
 
 
+------------------------+------+-----------------+
| Care Team Member Name  | Role | Phone           |
+------------------------+------+-----------------+
| Calvin Robertson MD | PCP  | [phone] |
+------------------------+------+-----------------+
 
 
 
 Reason for Referral
 Diagnostic/Screening (Routine)
 
+--------+--------+-----------+--------------+--------------+--------------+
| Status | Reason | Specialty | Diagnoses /  | Referred By  | Referred To  |
|        |        |           | Procedures   | Contact      | Contact      |
+--------+--------+-----------+--------------+--------------+--------------+
| Closed |        | Radiology |   Diagnoses  |   Steve, Si,  |              |
|        |        |           |  Abdominal   | DNP  [address]    |              |
|        |        |           | aortic       | [address]  |              |
|        |        |           | aneurysm     |  [address]       |              |
|        |        |           | (AAA)        | [place] |              |
|        |        |           | without      |  [id number]       |              |
|        |        |           | rupture      | Phone:       |              |
|        |        |           | (formerly [facility])        | [phone] |              |
|        |        |           | Procedures   |   Fax:       |              |
|        |        |           | VAS Aorta    | [phone] |              |
|        |        |           | Iliac Duplex |              |              |
|        |        |           |  Complete    |              |              |
+--------+--------+-----------+--------------+--------------+--------------+
 
 
 
 
 Encounter Details
 
 
+--------+-----------+---------------------+-----------+-------------------+
| Date   | Type      | Department          | Care Team | Description       |
+--------+-----------+---------------------+-----------+-------------------+
| / | Hospital  |   [facility]     |           | Abdominal aortic  |
| 2019   | Encounter | VASCULAR SURGERY    |           | aneurysm (AAA)    |
|        |           | ULTRASOUND  [address]    |           | without rupture   |
|        |           | [address]   |           | (formerly [facility])             |
|        |           | [facility] [place]        |           |                   |
|        |           | [address]          |           |                   |
|        |           | [phone]        |           |                   |
+--------+-----------+---------------------+-----------+-------------------+
 
 
 
 Social History
 
 
+---------------+------------+-----------+--------+------------------+
| Tobacco Use   | Types      | Packs/Day | Years  | Date             |
|               |            |           | Used   |                  |
+---------------+------------+-----------+--------+------------------+
 
| Former Smoker | Cigarettes | 1         | 35     | Quit: 1996 |
+---------------+------------+-----------+--------+------------------+
 
 
 
+---------------------+---+---+---+
| Smokeless Tobacco:  |   |   |   |
| Never Used          |   |   |   |
+---------------------+---+---+---+
 
 
 
+-------------+-------------+---------+----------+
| Alcohol Use | Drinks/Week | oz/Week | Comments |
+-------------+-------------+---------+----------+
| No          |             |         |          |
+-------------+-------------+---------+----------+
 
 
 
+------------------+---------------+
| Sex Assigned at  | Date Recorded |
| Birth            |               |
+------------------+---------------+
| Not on file      |               |
+------------------+---------------+
 
 
 
+----------------+-------------+-------------+
| Job Start Date | Occupation  | Industry    |
+----------------+-------------+-------------+
| Not on file    | Not on file | Not on file |
+----------------+-------------+-------------+
 
 
 
+----------------+--------------+------------+
| Travel History | Travel Start | Travel End |
+----------------+--------------+------------+
 
 
 
+-------------------------------------+
| No recent travel history available. |
+-------------------------------------+
 documented as of this encounter
 
 Medications at Time of Discharge
 
 
+----------------------+----------------------+-----------+---------+----------+-----------+
| Medication           | Sig                  | Dispensed | Refills | Start    | End Date  |
|                      |                      |           |         | Date     |           |
+----------------------+----------------------+-----------+---------+----------+-----------+
|   acyclovir          | Take 400 mg by mouth |           | 0       |          |           |
| (ZOVIRAX) 400 MG     |  5 (five) times      |           |         |          |           |
| tablet               | daily. PRN           |           |         |          |           |
+----------------------+----------------------+-----------+---------+----------+-----------+
|   acyclovir          | Apply  topically     |           | 0       |          |           |
 
| (ZOVIRAX) 5%         | every 3 hours.       |           |         |          |           |
| ointment             |                      |           |         |          |           |
+----------------------+----------------------+-----------+---------+----------+-----------+
|   albuterol (PROAIR  | Inhale 2 puffs into  |           | 0       |          |           |
| HFA) 90 mcg/puff     | the lungs every 6    |           |         |          |           |
| inhaler              | hours as needed for  |           |         |          |           |
|                      | Wheezing.            |           |         |          |           |
+----------------------+----------------------+-----------+---------+----------+-----------+
|   albuterol (PROAIR  | Inhale  into the     |           | 0       |          |           |
| RESPICLICK) 90       | lungs.               |           |         |          |           |
| mcg/puff inhaler     |                      |           |         |          |           |
+----------------------+----------------------+-----------+---------+----------+-----------+
|                      | Take 3 mLs by        |           | 0       | 20 |           |
| albuterol-ipratropiu | nebulization every 6 |           |         | 18       |           |
| m 2.5-0.5 mg/3 mL    |  (six) hours as      |           |         |          |           |
| SOLN                 | needed.              |           |         |          |           |
+----------------------+----------------------+-----------+---------+----------+-----------+
|   atorvaSTATin       | TAKE 1 TABLET BY     |           | 0       | 20 |  |
| (LIPITOR) 40 mg      | MOUTH NIGHTLY        |           |         | 19       | 0         |
| tablet               |                      |           |         |          |           |
+----------------------+----------------------+-----------+---------+----------+-----------+
|   azaTHIOprine       | Take 150 mg by mouth |           | 0       |          |           |
| (IMURAN) 50 mg       |  daily.              |           |         |          |           |
| tablet               |                      |           |         |          |           |
+----------------------+----------------------+-----------+---------+----------+-----------+
|                      | Apply  to eye as     |           | 0       |          |           |
| bacitracin-polymyxin | needed.              |           |         |          |           |
|  b (POLYSPORIN)      |                      |           |         |          |           |
| ophthalmic ointment  |                      |           |         |          |           |
+----------------------+----------------------+-----------+---------+----------+-----------+
|   bismuth            | Take 262 mg by mouth |           | 0       |          |           |
| subsalicylate (PEPTO |  4 (four) times      |           |         |          |           |
|  BISMOL) 262 mg      | daily before meals   |           |         |          |           |
| chewable tablet      | and nightly.         |           |         |          |           |
+----------------------+----------------------+-----------+---------+----------+-----------+
|   cholecalciferol    | Take 1,000 Units by  |           | 0       |          |           |
| (CHOLECALCIFEROL)    | mouth daily.         |           |         |          |           |
| 1000 units TABS      |                      |           |         |          |           |
+----------------------+----------------------+-----------+---------+----------+-----------+
|   cyanocobalamin     | Take 1,000 mcg by    |           | 0       |          |           |
| (VITAMIN B-12) 1000  | mouth daily.         |           |         |          |           |
| MCG tablet           |                      |           |         |          |           |
+----------------------+----------------------+-----------+---------+----------+-----------+
|   diclofenac         | Apply 2 g topically  |           | 0       |          |           |
| (VOLTAREN) 1% GEL    | 4 (four) times       |           |         |          |           |
|                      | daily. PRN           |           |         |          |           |
+----------------------+----------------------+-----------+---------+----------+-----------+
|   digoxin (LANOXIN)  | Take 125 mcg by      |           | 0       |          |           |
| 250 mcg tablet       | mouth Daily.      |           |         |          |           |
|                      | capsule daily        |           |         |          |           |
+----------------------+----------------------+-----------+---------+----------+-----------+
|   famotidine         | Take 40 mg by mouth  |           | 0       |          |           |
| (PEPCID) 40 MG       | daily.               |           |         |          |           |
| tablet               |                      |           |         |          |           |
+----------------------+----------------------+-----------+---------+----------+-----------+
|   ferrous sulfate    | Take 325 mg by mouth |           | 0       | 20 |           |
| 325 mg tablet        |  3 times daily.      |           |         | 12       |           |
+----------------------+----------------------+-----------+---------+----------+-----------+
|   fluticasone        | one spray in each    |           | 0       | 20 |           |
| (FLONASE) 50         | nostril daily as     |           |         | 12       |           |
 
| mcg/nasal spray      | needed               |           |         |          |           |
+----------------------+----------------------+-----------+---------+----------+-----------+
|                      | Inhale 1 puff into   |           | 0       |          |           |
| fluticasone-salmeter | the lungs Twice      |           |         |          |           |
| ol (ADVAIR) 500-50   | Daily.               |           |         |          |           |
| mcg/puff diskus      |                      |           |         |          |           |
| inhaler              |                      |           |         |          |           |
+----------------------+----------------------+-----------+---------+----------+-----------+
|   folic acid 1 mg    | Take 1 mg by mouth   |           | 0       |          |           |
| tablet               | Daily.               |           |         |          |           |
+----------------------+----------------------+-----------+---------+----------+-----------+
|   furosemide (LASIX) | Take 40 mg by mouth  |           | 0       |          |           |
|  40 mg tablet        | Daily.               |           |         |          |           |
+----------------------+----------------------+-----------+---------+----------+-----------+
|   guaiFENesin        | Take 1,200 mg by     |           | 0       |          |           |
| (MUCINEX) 600 mg 12  | mouth 2 times daily. |           |         |          |           |
| hr tablet            |                      |           |         |          |           |
+----------------------+----------------------+-----------+---------+----------+-----------+
|   levocetirizine     | Take 5 mg by mouth   |           | 0       |          |           |
| (XYZAL) 5 MG tablet  | as needed.           |           |         |          |           |
+----------------------+----------------------+-----------+---------+----------+-----------+
|   levothyroxine      | Take 75 mcg by mouth |           | 0       | 20 |           |
| (LEVOTHROID) 75 MCG  |  Daily.              |           |         | 12       |           |
| tablet               |                      |           |         |          |           |
+----------------------+----------------------+-----------+---------+----------+-----------+
|   losartan (COZAAR)  | Take 50 mg by mouth  |           | 0       |          |           |
| 50 mg tablet         | daily.               |           |         |          |           |
+----------------------+----------------------+-----------+---------+----------+-----------+
|   metoclopramide     | Take 10 mg by mouth  |           | 0       | 20 |           |
| (REGLAN) 10 mg       | 4 times daily as     |           |         | 12       |           |
| tablet               | needed.              |           |         |          |           |
+----------------------+----------------------+-----------+---------+----------+-----------+
|   mupirocin          | 1 g by Nasal route   |           | 0       |          |           |
| (BACTROBAN) 2%       | as needed. Use pea   |           |         |          |           |
| ointment             | sized amount in each |           |         |          |           |
|                      |  nostril twice daily |           |         |          |           |
|                      |  for 5 days. After   |           |         |          |           |
|                      | applications, press  |           |         |          |           |
|                      | sides of nose        |           |         |          |           |
|                      | together, gently     |           |         |          |           |
|                      | massage for 1 min.   |           |         |          |           |
+----------------------+----------------------+-----------+---------+----------+-----------+
|                      | Apply  topically 2   |           | 0       |          |           |
| nystatin-triamcinolo | times daily.         |           |         |          |           |
| ne (MYCOLOG II)      |                      |           |         |          |           |
| cream                |                      |           |         |          |           |
+----------------------+----------------------+-----------+---------+----------+-----------+
|   ofloxacin          |                      |           | 0       | 20 |           |
| (OCUFLOX) 0.3%       |                      |           |         | 18       |           |
| ophthalmic solution  |                      |           |         |          |           |
+----------------------+----------------------+-----------+---------+----------+-----------+
|   omeprazole         | Take 20 mg by mouth  |           | 0       | 20 |           |
| (PRILOSEC) 20 mg     | 2 times daily.       |           |         | 12       |           |
| capsule              |                      |           |         |          |           |
+----------------------+----------------------+-----------+---------+----------+-----------+
|   potassium citrate  | Take 10 mEq by mouth |           | 0       |          |           |
| (UROCIT-K) 10 mEq SR |  2 times daily.      |           |         |          |           |
|  tablet              |                      |           |         |          |           |
+----------------------+----------------------+-----------+---------+----------+-----------+
|   predniSONE         | Take 10 mg by mouth  |           | 0       |          |           |
 
| (DELTASONE) 5 mg     | Daily.               |           |         |          |           |
| tablet               |                      |           |         |          |           |
+----------------------+----------------------+-----------+---------+----------+-----------+
|   sertraline         | Take 100 mg by mouth |           | 0       |          |           |
| (ZOLOFT) 100 mg      |  daily.              |           |         |          |           |
| tablet               |                      |           |         |          |           |
+----------------------+----------------------+-----------+---------+----------+-----------+
|   tamsulosin         | Take 0.4 mg by mouth |           | 0       | 20 |           |
| (FLOMAX) 0.4 mg CAPS |  2 times daily.      |           |         | 12       |           |
+----------------------+----------------------+-----------+---------+----------+-----------+
|   trolamine          | Apply  topically as  |           | 0       |          |           |
| salicylate           | needed.              |           |         |          |           |
| (ASPERCREME) 10%     |                      |           |         |          |           |
| cream                |                      |           |         |          |           |
+----------------------+----------------------+-----------+---------+----------+-----------+
|   XARELTO 10 MG      | TAKE ONE TABLET BY   |   90      | 2       | 10/17/20 |           |
| tablet               | MOUTH EVERY DAY      | tablet    |         | 19       |           |
+----------------------+----------------------+-----------+---------+----------+-----------+
 documented as of this encounter
 
 Plan of Treatment
 
 
+--------+---------+-------------+----------------------+-------------+
| Date   | Type    | Specialty   | Care Team            | Description |
+--------+---------+-------------+----------------------+-------------+
| / | Office  | Pulmonology |   Juan F,          |             |
|    | Visit   |             | Jessica Cheung,   |             |
|        |         |             | MD [address] |             |
|        |         |             |   [place]   |             |
|        |         |             | [place] [address]             |             |
|        |         |             | [phone]         |             |
|        |         |             | [phone] (Fax)   |             |
+--------+---------+-------------+----------------------+-------------+
| / | Office  | Cardiology  |   Eric Akins, |             |
|    | Visit   |             |  MD [address]   |             |
|        |         |             | [place]  |             |
|        |         |             | [address] [phone]  |             |
|        |         |             |  [phone] (Fax)  |             |
+--------+---------+-------------+----------------------+-------------+
 documented as of this encounter
 
 Procedures
 
 
+------------------+--------+-------------+----------------------+----------------------+
| Procedure Name   | Priori | Date/Time   | Associated Diagnosis | Comments             |
|                  | ty     |             |                      |                      |
+------------------+--------+-------------+----------------------+----------------------+
| VAS AORTA ILIAC  | Routin | 2019  |   Abdominal aortic   |   Results for this   |
| DUPLEX COMPLETE  | e      | 11:11 AM    | aneurysm (AAA)       | procedure are in the |
|                  |        | PST         | without rupture      |  results section.    |
|                  |        |             | (HCC)                |                      |
+------------------+--------+-------------+----------------------+----------------------+
 documented in this encounter
 
 Results
 VAS Aorta Iliac Duplex Complete (2019 11:11 AM PST)
 
+----------+
 
| Specimen |
+----------+
|          |
+----------+
 
 
 
+------------------------------------------------------------------------+---------------+
| Impressions                                                            | Performed At  |
+------------------------------------------------------------------------+---------------+
|   4.9 cm infrarenal abdominal aortic aneurysm unchanged when compared  |   [facility] |
| to  the CT angiogram of 2018.           Signed by: JORGE Heart,  |               |
| Johnson  Sign Date/Time: 2019 1:07 PM                            |               |
+------------------------------------------------------------------------+---------------+
 
 
 
+------------------------------------------------------------------------+---------------+
| Narrative                                                              | Performed At  |
+------------------------------------------------------------------------+---------------+
|      ABDOMINAL AORTA DUPLEX SCAN     CLINICAL INFORMATION:  Abdominal  |   [facility] |
| aorta aneurysm surveillance.     COMPARISON:  CL US GUIDANCE VASCULAR  |               |
| ACCESS (2018); CL CORONARY ANGIO WITH  GRAFTS (2018); CTA      |               |
| ABDOMEN AND PELVIS W WO CONTRAST (2018);  ECHO CARDIAC ADULT      |               |
| COMPLETE (2018); XR SWALLOWING FUNCTION VIDEO  (2018); CT      |               |
| CHEST WO CONTRAST (2018); XR CHEST 2 VIEW  (2018); XR CHEST 2  |               |
| VIEW (2018); CT HEAD WO CONTRAST (2018);  NM MYOCARDIAL        |               |
| PERFUSION SPECT - STRESS ONLY (2018); US CAROTID  DOPPLER         |               |
| BILATERAL (2018); MRI CERVICAL SPINE WO CONTRAST  (2018);    |               |
|   PROCEDURE:  Evaluation of the aorta and iliac vessels.     FINDINGS: |               |
|   The study is technically difficult due to overlying bowel gas.       |               |
|   There  is an infrarenal abdominal aortic aneurysm  Proximal aorta:   |               |
| 2.8 cm AP x 2.7 cm transverse  Mid aorta: 3.4 cm AP x 3.3 cm           |               |
| transverse  Distal aorta: 4.9 cm AP x 4.7 cm transverse  Right common  |               |
| iliac artery: 1.3 cm AP x 1.4 cm transverse  Left common iliac artery: |               |
|  1.4 cm AP x 1.3 cm transverse                                         |               |
+------------------------------------------------------------------------+---------------+
 
 
 
+-------------------------------------------------------------------------+
| Procedure Note                                                          |
+-------------------------------------------------------------------------+
|   Maurizio, Rad Results In - 2019  1:10 PM PST                         |
| ABDOMINAL AORTA DUPLEX SCAN                                             |
|                                                                         |
| CLINICAL INFORMATION:                                                   |
| Abdominal aorta aneurysm surveillance.                                  |
|                                                                         |
| COMPARISON:                                                             |
| CL US GUIDANCE VASCULAR ACCESS (2018); CL CORONARY ANGIO WITH       |
| GRAFTS (2018); CTA ABDOMEN AND PELVIS W WO CONTRAST (2018);    |
| ECHO CARDIAC ADULT COMPLETE (2018); XR SWALLOWING FUNCTION VIDEO    |
| (2018); CT CHEST WO CONTRAST (2018); XR CHEST 2 VIEW            |
| (2018); XR CHEST 2 VIEW (2018); CT HEAD WO CONTRAST (2018); |
| NM MYOCARDIAL PERFUSION SPECT - STRESS ONLY (2018); US CAROTID     |
| DOPPLER BILATERAL (2018); MRI CERVICAL SPINE WO CONTRAST           |
| (2018);                                                            |
|                                                                         |
| PROCEDURE:                                                              |
 
| Evaluation of the aorta and iliac vessels.                              |
|                                                                         |
| FINDINGS:                                                               |
| The study is technically difficult due to overlying bowel gas.  There   |
| is an infrarenal abdominal aortic aneurysm                              |
| Proximal aorta: 2.8 cm AP x 2.7 cm transverse                           |
| Mid aorta: 3.4 cm AP x 3.3 cm transverse                                |
| Distal aorta: 4.9 cm AP x 4.7 cm transverse                             |
| Right common iliac artery: 1.3 cm AP x 1.4 cm transverse                |
| Left common iliac artery: 1.4 cm AP x 1.3 cm transverse                 |
|                                                                         |
|                                                                         |
| IMPRESSION:                                                             |
| 4.9 cm infrarenal abdominal aortic aneurysm unchanged when compared to  |
| the CT angiogram of 2018.                                          |
|                                                                         |
| Signed by: JORGE Heart Matthew                                        |
| Sign Date/Time: 2019 1:07 PM                                      |
+-------------------------------------------------------------------------+
 
 
 
+---------------+---------+--------------------+--------------+
| Performing    | Address | City/State/Zipcode | Phone Number |
| Organization  |         |                    |              |
+---------------+---------+--------------------+--------------+
|   [facility] |         |                    |              |
+---------------+---------+--------------------+--------------+
 documented in this encounter
 
 Visit Diagnoses
 
 
+---------------------------------------------------------+
| Diagnosis                                               |
+---------------------------------------------------------+
|   Abdominal aortic aneurysm (AAA) without rupture (HCC) |
+---------------------------------------------------------+
 documented in this encounter

## 2019-12-06 NOTE — XMS
Encounter Summary
  Created on: 2019
 
 Wyatt Shane
 External Reference #: [id number]
 : 43
 Sex: Male
 
 Demographics
 
 
+-----------------------+------------------------+
| Address               | [address]     |
|                       | [place]  [address]   |
+-----------------------+------------------------+
| Home Phone            | [phone]        |
+-----------------------+------------------------+
| Preferred Language    | Unknown                |
+-----------------------+------------------------+
| Marital Status        |                 |
+-----------------------+------------------------+
| Religion Affiliation | LUT                    |
+-----------------------+------------------------+
| Race                  | White                  |
+-----------------------+------------------------+
| Ethnic Group          | Not  or  |
+-----------------------+------------------------+
 
 
 Author
 
 
+--------------+------------------------------+
| Author       | [facility] |
+--------------+------------------------------+
| Organization | [facility] |
+--------------+------------------------------+
| Address      | Unknown                      |
+--------------+------------------------------+
| Phone        | Unavailable                  |
+--------------+------------------------------+
 
 
 
 Support
 
 
+---------------+--------------+---------+-----------------+
| Name          | Relationship | Address | Phone           |
+---------------+--------------+---------+-----------------+
| Opal Shane | ECON         | Unknown | [phone] |
+---------------+--------------+---------+-----------------+
 
 
 
 Care Team Providers
 
 
 
+-----------------------+------+-----------------+
| Care Team Member Name | Role | Phone           |
+-----------------------+------+-----------------+
| Erwin Iniguez MD   | PCP  | [phone] |
+-----------------------+------+-----------------+
 
 
 
 Reason for Visit
 
 
+-----------------+----------+
| Reason          | Comments |
+-----------------+----------+
| Follow-up visit |          |
+-----------------+----------+
 Office Visit - E/M Services (Routine)
 
+--------+--------+---------------+--------------+--------------+---------------+
| Status | Reason | Specialty     | Diagnoses /  | Referred By  | Referred To   |
|        |        |               | Procedures   | Contact      | Contact       |
+--------+--------+---------------+--------------+--------------+---------------+
| Closed |        | Otolaryngolog |              |   Vaibhav,     |   Ent         |
|        |        | y             |              | MD Fred    | Laryngology   |
|        |        |               |              | [address]  | [facility]  [address]  |
|        |        |               |              | [address]   |
|        |        |               |              |  [address]          | [place]       |
|        |        |               |              | [place], [place] | Mailcode:     |
|        |        |               |              |  [id number]  | [address]          |
|        |        |               |              |              | Physician's   |
|        |        |               |              |              | Pavilion      |
|        |        |               |              |              | [place], [place]  |
|        |        |               |              |              | [address]    |
|        |        |               |              |              | Phone:        |
|        |        |               |              |              | [phone]  |
|        |        |               |              |              |  Fax:         |
|        |        |               |              |              | [phone]  |
+--------+--------+---------------+--------------+--------------+---------------+
 
 
 
 
 Encounter Details
 
 
+--------+---------+----------------------+----------------------+----------------------+
| Date   | Type    | Department           | Care Team            | Description          |
+--------+---------+----------------------+----------------------+----------------------+
| 09/15/ | Office  |   Otolaryngology     |   Jonathan Cross  | Pharyngeal dysphagia |
|    | Visit   | Laryngology Services | MD RUSS  [address]   |  (Primary Dx);       |
|        |         |  at [facility]  [address] | [address]      | Glottic stenosis;    |
|        |         |  [address]     | [place], [place]         | Esophageal stenosis; |
|        |         | Mailcode: [address]       | [id number]           |  Larynx edema        |
|        |         | [facility] [address] | [phone]         |                      |
|        |         |   [place], [place]       | [phone] (Fax)   |                      |
|        |         | [address]           |                      |                      |
|        |         | [phone]         |                      |                      |
+--------+---------+----------------------+----------------------+----------------------+
 
 
 
 
 Social History
 
 
+---------------+------------+-----------+--------+------------------+
| Tobacco Use   | Types      | Packs/Day | Years  | Date             |
|               |            |           | Used   |                  |
+---------------+------------+-----------+--------+------------------+
| Former Smoker | Cigarettes | 1         | 30     | Quit: 1996 |
+---------------+------------+-----------+--------+------------------+
 
 
 
+-------------+-------------+---------+----------+
| Alcohol Use | Drinks/Week | oz/Week | Comments |
+-------------+-------------+---------+----------+
| No          |             |         |          |
+-------------+-------------+---------+----------+
 
 
 
+------------------+---------------+
| Sex Assigned at  | Date Recorded |
| Birth            |               |
+------------------+---------------+
| Not on file      |               |
+------------------+---------------+
 
 
 
+----------------+-------------+-------------+
| Job Start Date | Occupation  | Industry    |
+----------------+-------------+-------------+
| Not on file    | Not on file | Not on file |
+----------------+-------------+-------------+
 
 
 
+----------------+--------------+------------+
| Travel History | Travel Start | Travel End |
+----------------+--------------+------------+
 
 
 
+-------------------------------------+
| No recent travel history available. |
+-------------------------------------+
 documented as of this encounter
 
 Last Filed Vital Signs
 
 
+-------------------+------------------+----------------------+----------+
| Vital Sign        | Reading          | Time Taken           | Comments |
+-------------------+------------------+----------------------+----------+
| Blood Pressure    | -                | -                    |          |
+-------------------+------------------+----------------------+----------+
| Pulse             | -                | -                    |          |
+-------------------+------------------+----------------------+----------+
| Temperature       | -                | -                    |          |
 
+-------------------+------------------+----------------------+----------+
| Respiratory Rate  | -                | -                    |          |
+-------------------+------------------+----------------------+----------+
| Oxygen Saturation | -                | -                    |          |
+-------------------+------------------+----------------------+----------+
| Inhaled Oxygen    | -                | -                    |          |
| Concentration     |                  |                      |          |
+-------------------+------------------+----------------------+----------+
| Weight            | 97.5 kg (215 lb) | 09/15/2010 11:54 AM  |          |
|                   |                  | PDT                  |          |
+-------------------+------------------+----------------------+----------+
| Height            | -                | -                    |          |
+-------------------+------------------+----------------------+----------+
| Body Mass Index   | -                | -                    |          |
+-------------------+------------------+----------------------+----------+
 documented in this encounter
 
 Progress Notes
 Jonathan Cross MD - 2010  2:34 PM PDTFormatting of this note might be different
 from the original.
 PATIENT NAME: Wyatt Shane MR#: [id number]
 : 1945
 
 REFERRING PROVIDER:
 FRED MORIN
 Otolaryngology
 [address] [place] [address]
 
 PRIMARY CARE PROVIDER:
 Erwin Iniguez MD
 
 CLINIC: [facility]
 
 REASON FOR FOLLOW-UP:Chief Complaint 
 Patient presents with 
   Follow-up visit 
 
 HPI:  Wyatt Shane is a 64 y.o. male who was last seen at the [facility] for follow up of swallowing complaints.  I recommended a modified barium sw
allow. 
  
 He returns today noting that he feels that his swallowing is about the same.  He is still v
rodney careful with particulates and avoids them often.  He does cough on liquids occasionally 
and particulates.  He thinks that his voice is about the same.  He has not noted any otalgia
.  He denies odynophagia.  He has not noted any hemoptysis.  He does have some dyspnea on ex
ertion.  He has noted noisy breathing on deep inspiration.  This has not worsened and is onl
y noted with vigorous activity.   He does note problems with nasal allergy symptoms, includi
ng itchy eyes, rhinorrhea and sneezing. He uses Claritin and Sudafed for this.  He is not sm
oking.  
 
 He is maintained on a proton-pump inhibitor.  He notes no symptoms of reflux, including hea
rtburn, indigestion, acidic taste or belching while on proton pump inhibitor therapy. 
 
 VOCAL DEMANDS:  Unchanged from previous visit.
 
 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 
 
 TNM/STAGE: CIS--> T2N0M0/II
 
 
 IDENTIFICATION DATE: 1998
 
 SURGICAL EXCISION DATE: 1998
 
 TYPE OF SURGERY: extended vertical hemilaryngectomy
 
 LAST SURGERY DATE: laryngotracheal reconstruction2000
 
 RADIATION THERAPY COMPLETED: 1998
 
 RADIATION THERAPIST: Unknown
 
 RADIATION DOSE: Unknown
 
 PMHx: Past Medical History 
 Diagnosis Date 
   Larynx cancer  
   T2N0 right glottis 
   Radiation fibrosis  
   larynx 
   Glottic stenosis  
   MI (myocardial infarction)  
   PUD (peptic ulcer disease)  
   Fibromyalgia  
   Depression  
   Nephrolithiasis  
 
 SURGHx:Past Surgical History 
 Procedure Date 
   Hx extended vertical hemilaryngectomy  
   right posterior arytenoid retained. 
   Hx stenosis dilation  
   Nissen fundoplication  
   Hx dml bx 1998 
   cCIS--pT2N0 SCCa right vocal fold 
   Hx laryngotracheoplasty  
 
  
 
 ALLERGIES: No Known Allergies
 
 MEDICATIONS:Current outpatient prescriptions 
 Medication Sig 
   ACYCLOVIR ORAL Take  by mouth as needed. 
   Amlodipine-Atorvastatin (CADUET) 10-10 mg Oral Tablet Take  by mouth. 
   CYANOCOBALAMIN (VITAMIN B-12 INJ) by Injection route every thirty days. 
   gabapentin 300 mg Oral Tablet Take 300 mg by mouth three times daily. 
   LANSOPRAZOLE (PREVACID ORAL) Take  by mouth. 
   LORATADINE (CLARITIN ORAL) Take  by mouth. 
   metoclopramide (REGLAN) 5 mg Oral Tablet Take 5 mg by mouth every six hours as needed. 
   NAPROXEN SODIUM/P-EPHED HCL (SUDAFED 12 HR SINUS-PAIN ORAL) Take  by mouth. 
   sertraline (ZOLOFT) 50 mg Oral Tablet Take 50 mg by mouth once daily. 
   tamsulosin (FLOMAX) 0.4 mg Oral Capsule, Sust. Release 24 hr Take 0.4 mg by mouth once 
daily. 
   TESTOSTERONE IM Inject  into the muscle (IM). 
   VICODIN ORAL None Entered 
 
 LAST WEIGHTS:  Wt Readings from Last 3 Encounters: 
 09/15/2010 97.523 kg (215 lb) 
 
 2010 85.503 kg (188 lb 8 oz) 
 10/03/2007 92.534 kg (204 lb) 
 
 EXAMINATION:  Wt 97.523 kg (215 lb)
 Gen: He is a 64 y.o. male. He is awake, alert and comfortable with the examination. H
is weight is appropriate.
 Ears: The pinnae are normal. External auditory canals show minimal cerumen bilaterally.
 The tympanic membranes are clear with normal anatomic landmarks and an aerated middle ear
 space.
 Nose: The nasal dorsum is straight and the nares are widely patent. The mucosa is pink an
d there are no lesions or masses noted. There is no significant nasal obstruction noted.
 Face: There are no worrisome lesions of the face or head noted.
 Salivary Glands: The salivary glands are soft and show no lesions or masses within the pa
rotid or submandibular glands bilaterally. There is no obvious obstruction of Stensen's or
 Newport's ducts bilaterally.
 Oropharynx: Normal lips and oral competence are noted. The dentition is fair. The muc
archana is dry and pale, but shows no lesions or masses. The tonsils are small or absent and t
he fossae show no lesions. Bimanual palpation of the gigivolabial sulcus, lingual sulcus, 
tonsillar pillars, palate and base of tongue did not demonstrate any concerning lesions or m
asses.
 Neck: The neck has well healed anterior transverse neck incision and tracheotomy tube scar.
 There is no lymphadenopathy noted in the anterior, posterior, digastric or submental tria
ngles. The thyroid is palpable, but not enlarged or tender. I cannot appreciate any nodu
les. The larynx is no longer anatomic and has cartilage loss along the right side. He 
has tenderness along the right anterior strap muscles in the region of the omohyoid muscles 
bilaterally.
 Chest: Chest rise is symmetric and there is no audible wheezing, stridor or wet vocal romina
lity. There is very mild stridor with deep inspiration.
 Neuro: Extraoccular movements are grossly intact. There is symmetric sensation and move
ment noted of the upper and midface. Marginal mandibular nerve function is normal. Heari
ng is grossly intact. Palatal elevation is symmetric and full. Trapezius function is nor
mal. Tongue protrusion is midline.
 
 VOICE EVALUATION: Perceptual voice evaluation demonstrates severe dysphonia. The pitch 
is low for a male and shows limited range. Vocal intensity is acceptable and shows limited u
pper range. There is 3+ roughness, 1+ breathiness, and 2+ tightness appreciated. T
here is no tremor noted with sustained vowel phonation. I am unable to detect voice breaks.
 Glottal orellana is not detected and there is no diplophonia noted. Articulation is normal.  
 
 LARYNGOSCOPY: Indirect laryngoscopy was performed using a 90 degree Jean-Baptiste sahil telescope
 and distal-chip Olympus flexible videolaryngoscope following the topical nasal application 
of oxymetazoline and pontocaine.  This was performed because the patient's gag reflex preclu
ded adequate transoral indirect laryngoscopy. This demonstrates a clear vallecula and rajiv
p epiglottis. The right arytenoid is partially present and mobile with mucosal redundancy.
 There is soft tissue where the right hemilarynx would be. This is benign and mucosalized.
 The true vocal folds are absent. The left false vocal fold is at least partially present.
 The left arytenoid is present, but fixed. Both aryepiglottic folds are shortened. The
 base of tongue is clear. The subglottic airway is slightly narrowed, but adequate.  It is
 scarred anteriorly without mass.
 
 MODIFIED BARIUM SWALLOW:  a modified barium swallow demonstrates:
 
 Examination: Modified barium swallowing study.  Technique and
 findings: The study was performed in conjunction with the [facility] speech
 pathologist.  The patient drank a variety of barium impregnated
 liquids and solids and swallowed a barium tablet using fluoroscopic
 observation.  There was no delay in initiating the primary propulsive
 wave on swallowing.  The patient swallowed thin barium without
 difficulty with no obstruction of flow.  With thicker texture barium
 liquids, there was mild partial delay and about the C6 level
 
 associated with a mild short segment of circumferential narrowing of
 the proximal esophagus at this level but no sign of extravasation.
 There is no evidence for obstruction of flow of solid materials.  The
 ingested barium tablet passed through the oropharynx, hypopharynx and
 thoracic esophagus but there was delay in passage at the
 gastroesophageal junction.  See formal esophagram report.
  
 Impression: No sign of extravasation or aspiration.  Short segment of
 narrowing of the proximal esophagus at the C6 level perhaps
 accounting for some retropulsion of thicker barium liquids through
 this segment.  Delay in passage of barium tablet at the
 gastroesophageal junction.
  
 I have personally viewed this procedure/exam and reviewed this report.
  
 Author: JUSTIN ELIZALDE M.D.
 Reviewer: JUSTIN ELIZALDE M.D.
 
 I have reviewed these images and agree with the radiologist's findings.  Minimal cervical e
sophageal narrowing.  --JSS 
 -------
 
 ASSESSMENT:  Mr. Shane appears to suffer from dysphonia, dysphagia and dyspnea associated wi
th prior vertical hemilaryngectomy with reconstruction and radiation therapy which has not c
hanged significantly since I last saw him or since his last modified barium swallow.  His sw
allowing is consistent with radiation fibrosis and his previous surgery.  It appears safe an
d effective.  I don't think that his esophageal stenosis requires dilation at this time, but
 may in the future.  His airway certainly appears adequate and I do not think that his dyspn
ea on exertion is secondary to laryngeal stenosis.  Finally, his voice is not great, but I d
on't think that there is anything I can do to improve this.  He understands and agrees.
 
 PLAN:   Continued periodic observation.  I will see him back in approximately 6 months, or 
sooner if symptoms worsen.  I recommended transnasal esophagoscopy at that time to look at t
he cervical esophagus.  He understands and agrees.
 
 Jonathan Cross M.D.
 
 Laryngology and Head & Neck Surgery
 Tel:[phone]
 Fax:[phone] 
 
 Electronically signed by Jonathan Cross MD at 2010  2:34 PM ChrissieMartha -
 09/15/2010 11:59 AM PDTFormatting of this note might be different from the original.
 Additional staff support provided to the patient during this encounter included:
 
 Health maintanance reviewed and documented.
 
 Medication(s) reviewed this encounter:
 
 Outpatient encounter prescriptions as of 9/15/2010 
 Medication Sig Dispense Refill 
   ACYCLOVIR ORAL Take  by mouth as needed.     
   Amlodipine-Atorvastatin (CADUET) 10-10 mg Oral Tablet Take  by mouth.     
   CYANOCOBALAMIN (VITAMIN B-12 INJ) by Injection route every thirty days.     
   gabapentin 300 mg Oral Tablet Take 300 mg by mouth three times daily.     
   LANSOPRAZOLE (PREVACID ORAL) Take  by mouth.     
   LORATADINE (CLARITIN ORAL) Take  by mouth.     
   metoclopramide (REGLAN) 5 mg Oral Tablet Take 5 mg by mouth every six hours as needed. 
    
   NAPROXEN SODIUM/P-EPHED HCL (SUDAFED 12 HR SINUS-PAIN ORAL) Take  by mouth.     
 
   DISCONTD: pregabalin (LYRICA) 50 mg Oral Capsule Take  by mouth three times daily. Max:
 600 mg/day      
   sertraline (ZOLOFT) 50 mg Oral Tablet Take 50 mg by mouth once daily.     
   tamsulosin (FLOMAX) 0.4 mg Oral Capsule, Sust. Release 24 hr Take 0.4 mg by mouth once 
daily.     
   TESTOSTERONE IM Inject  into the muscle (IM).     
   VICODIN ORAL None Entered     
 
 Electronically signed by Martha Gomez at 09/15/2010 11:59 AM PDTdocumented in this encoun
ter
 
 Plan of Treatment
 Not on filedocumented as of this encounter
 
 Procedures
 
 
+----------------------+--------+-------------+----------------------+----------+
| Procedure Name       | Priori | Date/Time   | Associated Diagnosis | Comments |
|                      | ty     |             |                      |          |
+----------------------+--------+-------------+----------------------+----------+
| [place]                   | Routin | 2010  |   Glottic stenosis   |          |
| LARYNGOSCOPY,FLEXIBL | e      |  2:33 PM    | Pharyngeal dysphagia |          |
| E, DIAGNOSTIC        |        | PDT         |   Larynx edema       |          |
+----------------------+--------+-------------+----------------------+----------+
 documented in this encounter
 
 Visit Diagnoses
 
 
+---------------------------------------------------------------+
| Diagnosis                                                     |
+---------------------------------------------------------------+
|   Pharyngeal dysphagia - Primary  Dysphagia, pharyngeal phase |
+---------------------------------------------------------------+
|   Glottic stenosis  Stenosis of larynx                        |
+---------------------------------------------------------------+
|   Esophageal stenosis  Stricture and stenosis of esophagus    |
+---------------------------------------------------------------+
|   Larynx edema  Edema of larynx                               |
+---------------------------------------------------------------+
 documented in this encounter

## 2019-12-06 NOTE — XMS
Encounter Summary
  Created on: 2019
 
 Shane Wyatttien BroussardJosue
 External Reference #: [id number]
 : 43
 Sex: Male
 
 Demographics
 
 
+-----------------------+---------------------------+
| Address               | [address]        |
|                       | [place]  [address] |
+-----------------------+---------------------------+
| Home Phone            | [phone]           |
+-----------------------+---------------------------+
| Preferred Language    | Unknown                   |
+-----------------------+---------------------------+
| Marital Status        |                    |
+-----------------------+---------------------------+
| Adventism Affiliation | [id number]                      |
+-----------------------+---------------------------+
| Race                  | Unknown                   |
+-----------------------+---------------------------+
| Ethnic Group          | Unknown                   |
+-----------------------+---------------------------+
 
 
 Author
 
 
+--------------+--------------------------------------------+
| Author       | [facility]  |
|              | and [place]                                |
+--------------+--------------------------------------------+
| Organization | [facility]  |
|              | [facility] [place]                                |
+--------------+--------------------------------------------+
| Address      | Unknown                                    |
+--------------+--------------------------------------------+
| Phone        | Unavailable                                |
+--------------+--------------------------------------------+
 
 
 
 Support
 
 
+---------------+--------------+--------------------+-----------------+
| Name          | Relationship | Address            | Phone           |
+---------------+--------------+--------------------+-----------------+
| Opal Shane | ECON         | [address]     | [phone] |
|               |              | [place]   |                 |
|               |              | [id number]              |                 |
+---------------+--------------+--------------------+-----------------+
 
 
 
 
 Care Team Providers
 
 
+-----------------------+------+-----------------+
| Care Team Member Name | Role | Phone           |
+-----------------------+------+-----------------+
| Erwin Iniguez MD | PCP  | [phone] |
+-----------------------+------+-----------------+
 
 
 
 Reason for Visit
 
 
+--------+-----------------------+
| Reason | Comments              |
+--------+-----------------------+
| Other  | sleep study follow up |
+--------+-----------------------+
 
 
 
 Encounter Details
 
 
+--------+---------+----------------------+---------------------+----------------------+
| Date   | Type    | Department           | Care Team           | Description          |
+--------+---------+----------------------+---------------------+----------------------+
| / | Office  |   [facility] [place]      |   Luis Carlos Perez  | JOANN (obstructive     |
| 2015   | Visit   | SLEEP DISORDER  [address]  | MD [address]   | sleep apnea)         |
|        |         | [address]  [place]      | [address]  [place]    | (Primary Dx);        |
|        |         | [place] [address] | [place] [address]     | Rheumatoid arthritis |
|        |         |   [phone]       | [phone]        |  involving both      |
|        |         |                      | [phone] (Fax)  | hands with positive  |
|        |         |                      |                     | rheumatoid factor    |
|        |         |                      |                     | (HCC)                |
+--------+---------+----------------------+---------------------+----------------------+
 
 
 
 Social History
 
 
+---------------+------------+-----------+--------+------------------+
| Tobacco Use   | Types      | Packs/Day | Years  | Date             |
|               |            |           | Used   |                  |
+---------------+------------+-----------+--------+------------------+
| Former Smoker | Cigarettes | 1.3       | 34     | Quit: 1996 |
+---------------+------------+-----------+--------+------------------+
 
 
 
+---------------------+---+---+---+
| Smokeless Tobacco:  |   |   |   |
| Never Used          |   |   |   |
+---------------------+---+---+---+
 
 
 
 
+-------------+-------------+---------+----------+
| Alcohol Use | Drinks/Week | oz/Week | Comments |
+-------------+-------------+---------+----------+
| No          |             |         |          |
+-------------+-------------+---------+----------+
 
 
 
+------------------+---------------+
| Sex Assigned at  | Date Recorded |
| Birth            |               |
+------------------+---------------+
| Not on file      |               |
+------------------+---------------+
 
 
 
+----------------+-------------+-------------+
| Job Start Date | Occupation  | Industry    |
+----------------+-------------+-------------+
| Not on file    | Not on file | Not on file |
+----------------+-------------+-------------+
 
 
 
+----------------+--------------+------------+
| Travel History | Travel Start | Travel End |
+----------------+--------------+------------+
 
 
 
+-------------------------------------+
| No recent travel history available. |
+-------------------------------------+
 documented as of this encounter
 
 Last Filed Vital Signs
 
 
+-------------------+----------------------+----------------------+----------+
| Vital Sign        | Reading              | Time Taken           | Comments |
+-------------------+----------------------+----------------------+----------+
| Blood Pressure    | 130/78               | 2015  8:51 AM  |          |
|                   |                      | PST                  |          |
+-------------------+----------------------+----------------------+----------+
| Pulse             | 88                   | 2015  8:51 AM  |          |
|                   |                      | PST                  |          |
+-------------------+----------------------+----------------------+----------+
| Temperature       | -                    | -                    |          |
+-------------------+----------------------+----------------------+----------+
| Respiratory Rate  | 16                   | 2015  8:51 AM  |          |
|                   |                      | PST                  |          |
+-------------------+----------------------+----------------------+----------+
| Oxygen Saturation | 95%                  | 2015  8:51 AM  |          |
|                   |                      | PST                  |          |
+-------------------+----------------------+----------------------+----------+
| Inhaled Oxygen    | -                    | -                    |          |
| Concentration     |                      |                      |          |
+-------------------+----------------------+----------------------+----------+
| Weight            | 81.6 kg (179 lb 14.4 | 2015  8:51 AM  |          |
 
|                   |  oz)                 | PST                  |          |
+-------------------+----------------------+----------------------+----------+
| Height            | -                    | -                    |          |
+-------------------+----------------------+----------------------+----------+
| Body Mass Index   | 27.35                | 2014 10:00 AM  |          |
|                   |                      | PDT                  |          |
+-------------------+----------------------+----------------------+----------+
 documented in this encounter
 
 Progress Notes
 Luis Carlos Perez Jr., MD - 2015  9:01 AM PSTThe patient comes in for follow-up after 
undergoing diagnostic polysomnography. My interpretation of the patient's sleep study, which
 I have reviewed with the patient, is as follows:
 
 Polysomnogram Report on Wyatt Shane performed on 10/19/2015
 
 Clinical Information: Wyatt Shane is a 69 y.o. male who underwent diagnostic nocturna
l polysomnography on 10/19/2015 on referral from Dr. Iniguez because of JOANN and CSA. PSG perf
ormed on 10/7/2013 demonstrated an AHI of 77.4 (combination of Central Sleep Apnea secondary
 to Cheyne-Nichole Respiration and JOANN). This was treated with ASV-BPAP. The patient has a hi
story of heart failure and atrial fibrillation. Because of this, PSG off of ASV-BPAP was rep
eated on 6/15/2015 which demonstrated an AHI of 50.3 (combination once again of JOANN and CSA-
). Since then he has undergone nasal surgery. The current study is thus done.
 
 Technical Information: Please see technical data stored as Polysomnography under "Media" se
ction in the EPIC EMR.
 
 Definitions (The AASM Manual for the Scoring of Sleep and Associated Events, Version 2.0; 2
012):
 Apnea: There is a drop in the peak signal excursion by 90% or greater of pre-event baseline
 using an oronasal thermal sensor (diagnostic study), PAP device flow (titration study), or 
an alternative apnea sensor (diagnostic study); the duration of the 90% or greater drop in s
ensor signal is 10 seconds or longer.
 Obstructive Apnea: Event associated with continued or increased inspiratory effort througho
ut the entire period of absent airflow.
 Central Apnea: Event associated with absent inspiratory effort throughout the entire period
 of absent airflow.
 Mixed Apnea: Event associated with absent inspiratory effort in the initial portion of the 
event followed by resumption of inspiratory effort during the second portion of the event.
 Hypopnea: Nasal pressure excursion drop by 30% or more from baseline, lasting at lease 10 s
econds and 90% of the event's duration meets this amplitude criteria. This is associated wit
h a 4% or greater desaturation from pre-baseline.
 Respiratory Event Related Arousal: A sequence of breaths lasting 10 seconds or longer kendall
cterized by increasing respiratory effort or by flattening of the inspiratory portion of the
 nasal pressure (diagnostic study) or PAP device flow (titration study) waveform leading to 
arousal from sleep when the sequence of breaths does not meet criteria for an apnea or hypop
octaviano.
 
 Sleep Architecture:  Lights out was recorded at 2204 hundred hours on 10/19/2015 and lights
 on was recorded at 0728 hundred hours on 10/20/2015. The latency to sleep onset was normal 
at 6 minutes. The patient slept for 484.5 minutes out of 562 minutes of study time resulting
 an a sleep efficiency that was near normal at 86.2 %. The amount of N1 sleep was normal occ
upying 3.5% of the Total Sleep Time; the amount of N2 sleep was high normal for age occupyin
g 80.3% of the Total Sleep Time; the amount of N3 sleep was low occupying 0% of the Total Sl
eep Time; the amount of REM sleep was normal occupying 16.2% of the Total Sleep Time and the
 latency to REM sleep was mildly prolonged at 194.5 minutes.
 
 Sleep in the following positions was recorded: left lateral decubitus 0%, right lateral dec
ubitus 99.5%, supine 0.2%, prone 0%. He was unable to sleep supine.
 
 
 Sleep was minimally fragmented; the Arousal Index was 14.2.
 
 The patient reported this to be a usual night's sleep.
 
 Cardiopulmonary Monitoring: The heart rate averaged high-70's beats per minute. Moderate ra
te variability was noted. The rhythm was atrial fibrillation.
 
 In the course of the evening there were 1 obstructive apneas, 0 mixed apneas, 1 central apn
eas, 80 hypopneas, and 0 Respiratory Effort Related Arousals (RERA's). The Respiratory Distu
rbance Index (RDI) was mildly elevated at 10.2; the Apnea-Hypopnea Index was mildly elevated
 at 10.2; the Apnea Index (AI) was normal at 0.2. The respiratory events were not sleep stag
e dependent. The respiratory events were positional. They were much more frequently seen in 
the supine position, but only 1 minute was spent in supine sleep. 
 
 The respiratory events occasioned mild sleep fragmentation; the Respiratory Arousal Index w
as 5.7.
 
 The miguel oxygen saturation was 86% and the patient spent 3.5 minutes with an oxygen satura
tion of less than 88%.
 
 ETCO2 was normal.
 
 Limb Movement Monitoring: There were 162 Periodic Limb Movements (PLMS Index of 20.1) of wh
ich 18 were associated with arousals; the PLMS Arousal Index was normal at 2.2.
 
 Interpretation: This polysomnogram is abnormal secondary to:
 
 Very mild obstructive sleep apnea is diagnosed and is associated with mild sleep fragmentat
ion and very mild oxygen desaturation.
 Very little supine sleep was recorded.
 Periodic Limb Movements of Sleep are present but they do not significantly fragment sleep.
 There was only one central apnea seen in this study.
 
 Suggestions:
 1. The principles of sleep hygiene should be reviewed with the patient.
 2. Consideration should be given to treatment of mild obstructive sleep apnea.
 
 /78 mmHg | Pulse 88 | Resp 16 | Wt 81.602 kg (179 lb 14.4 oz) | SpO2 95%
 
 A: Complex Sleep Apnea: This seems to have nearly completely resolved. There are several po
ssibilities accounting for the improvement: nasal/sinus surgery has helped significantly, pe
rhaps his cardiac status has improved, and he states he is taking fewer opioids for pain. I 
suspect the improvement is a combination of surgery and fewer opioids. His apnea currently i
s mild and probably doesn't need specific treatment. In view of the issues we've had with ROWENA ALVES, I'm going to suggest no PAP.
      RA: He is contemplating a new therapy for RA (biologic infusion). I've told him that I
 no longer keep up in that area but that if he is concerned, he might want to ask Dr. Iniguez
 for a referral to [facility] for a second opinion.
 
 P: No PAP for now - should he or his wife become concerned that his sleep is deteriorating 
or that he is snoring loudly or that he is having more apneas, then they will call and I'll 
see him to consider restarting PAP.
      He will discuss with Dr. Iniguez not using opioids for pain at night and possibly reall
y limiting or eliminating the use of opioids altogether.
      He will discuss with Dr. Iniguez the possibility of second opinion on his RA.
      F/u thus will be prn.
 
 Today, 15 minutes was spent face to face with the patient; the majority of time was spent c
joon regarding JOANN and RA.
 
 
 Today, 15 minutes was spent face to face with the patient; the majority of time was spent c
jono regarding sleep issues.
 Electronically signed by Luis Carlos Perez Jr., MD at 2015 10:13 AM PSTdocumented in th
is encounter
 
 Plan of Treatment
 
 
+--------+---------+-------------+----------------------+-------------+
| Date   | Type    | Specialty   | Care Team            | Description |
+--------+---------+-------------+----------------------+-------------+
| / | Office  | Pulmonology |   Juan F,          |             |
|    | Visit   |             | Jessica Cheung,   |             |
|        |         |             | MD [address] |             |
|        |         |             |   [place]   |             |
|        |         |             | [place] [address]             |             |
|        |         |             | [phone]         |             |
|        |         |             | [phone] (Fax)   |             |
+--------+---------+-------------+----------------------+-------------+
| / | Office  | Cardiology  |   Eric Akins, |             |
|    | Visit   |             |  MD [address]   |             |
|        |         |             | [place]  |             |
|        |         |             | [address]  [phone]  |             |
|        |         |             |  [phone] (Fax)  |             |
+--------+---------+-------------+----------------------+-------------+
 documented as of this encounter
 
 Visit Diagnoses
 
 
+----------------------------------------------------------------------------------------+
| Diagnosis                                                                              |
+----------------------------------------------------------------------------------------+
|   JOANN (obstructive sleep apnea) - Primary  Obstructive sleep apnea (adult) (pediatric) |
+----------------------------------------------------------------------------------------+
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
+----------------------------------------------------------------------------------------+
 documented in this encounter

## 2019-12-06 NOTE — XMS
Encounter Summary
  Created on: 2019
 
 Shane Wyatttien BroussardJosue
 External Reference #: [id number]
 : 43
 Sex: Male
 
 Demographics
 
 
+-----------------------+---------------------------+
| Address               | [address]        |
|                       | [place]  [address] |
+-----------------------+---------------------------+
| Home Phone            | [phone]           |
+-----------------------+---------------------------+
| Preferred Language    | Unknown                   |
+-----------------------+---------------------------+
| Marital Status        |                    |
+-----------------------+---------------------------+
| Gnosticism Affiliation | 1028                      |
+-----------------------+---------------------------+
| Race                  | Unknown                   |
+-----------------------+---------------------------+
| Ethnic Group          | Unknown                   |
+-----------------------+---------------------------+
 
 
 Author
 
 
+--------------+--------------------------------------------+
| Author       | [facility]  |
|              | [facility] [place]                                |
+--------------+--------------------------------------------+
| Organization | [facility]  |
|              | [facility] [place]                                |
+--------------+--------------------------------------------+
| Address      | Unknown                                    |
+--------------+--------------------------------------------+
| Phone        | Unavailable                                |
+--------------+--------------------------------------------+
 
 
 
 Support
 
 
+---------------+--------------+--------------------+-----------------+
| Name          | Relationship | Address            | Phone           |
+---------------+--------------+--------------------+-----------------+
| Opal Shane | ECON         | [address]     | [phone] |
|               |              | [place]   |                 |
|               |              | [address]              |                 |
+---------------+--------------+--------------------+-----------------+
 
 
 
 
 Care Team Providers
 
 
+------------------------+------+-----------------+
| Care Team Member Name  | Role | Phone           |
+------------------------+------+-----------------+
| Calvin Robertson MD | PCP  | [phone] |
+------------------------+------+-----------------+
 
 
 
 Reason for Visit
 Diagnostic/Screening (Routine)
 
+--------+--------+-----------+--------------+--------------+---------------+
| Status | Reason | Specialty | Diagnoses /  | Referred By  | Referred To   |
|        |        |           | Procedures   | Contact      | Contact       |
+--------+--------+-----------+--------------+--------------+---------------+
| Closed |        |           |   Diagnoses  |   Juan F,  |   [facility]         |
|        |        |           |  [facility]     | Jessica    | Pulmonary     |
|        |        |           | obstructive  | MD Skye  | Function Lab  |
|        |        |           | pulmonary    |  [address]        |  [address]     |
|        |        |           | disease,     | [address]  | [address]          |
|        |        |           | unspecified  |  [address] [address]       | [place], [place]  |
|        |        |           | (HCC)        | [place], [place] | [address]    |
|        |        |           | Procedures   |  [address]       | Phone:        |
|        |        |           | PULM FULL    | Phone:       | [phone]  |
|        |        |           | PFT          | [phone] |  Fax:         |
|        |        |           |              |   Fax:       | [phone]  |
|        |        |           |              | [phone] |               |
+--------+--------+-----------+--------------+--------------+---------------+
 
 
 
 
 Encounter Details
 
 
+--------+-----------+----------------------+----------------------+----------------------+
| Date   | Type      | Department           | Care Team            | Description          |
+--------+-----------+----------------------+----------------------+----------------------+
| / | Hospital  |   [facility]  |   Juan F,          | COPD, moderate (HCC) |
| 2019   | Encounter | PULMONARY FUNCTION   | Jessica Cheung,   |                      |
|        |           | LAB  [address]   | MD [address] |                      |
|        |           | [facility] [place]         |   [place],   |                      |
|        |           | [address]           | [place] [address]             |                      |
|        |           | [phone]         | [phone]         |                      |
|        |           |                      | [phone] (Fax)   |                      |
+--------+-----------+----------------------+----------------------+----------------------+
 
 
 
 Social History
 
 
+---------------+------------+-----------+--------+------------------+
| Tobacco Use   | Types      | Packs/Day | Years  | Date             |
|               |            |           | Used   |                  |
+---------------+------------+-----------+--------+------------------+
 
| Former Smoker | Cigarettes | 1         | 35     | Quit: 1996 |
+---------------+------------+-----------+--------+------------------+
 
 
 
+---------------------+---+---+---+
| Smokeless Tobacco:  |   |   |   |
| Never Used          |   |   |   |
+---------------------+---+---+---+
 
 
 
+-------------+-------------+---------+----------+
| Alcohol Use | Drinks/Week | oz/Week | Comments |
+-------------+-------------+---------+----------+
| No          |             |         |          |
+-------------+-------------+---------+----------+
 
 
 
+------------------+---------------+
| Sex Assigned at  | Date Recorded |
| Birth            |               |
+------------------+---------------+
| Not on file      |               |
+------------------+---------------+
 
 
 
+----------------+-------------+-------------+
| Job Start Date | Occupation  | Industry    |
+----------------+-------------+-------------+
| Not on file    | Not on file | Not on file |
+----------------+-------------+-------------+
 
 
 
+----------------+--------------+------------+
| Travel History | Travel Start | Travel End |
+----------------+--------------+------------+
 
 
 
+-------------------------------------+
| No recent travel history available. |
+-------------------------------------+
 documented as of this encounter
 
 Medications at Time of Discharge
 
 
+----------------------+----------------------+-----------+---------+----------+-----------+
| Medication           | Sig                  | Dispensed | Refills | Start    | End Date  |
|                      |                      |           |         | Date     |           |
+----------------------+----------------------+-----------+---------+----------+-----------+
|   acyclovir          | Take 400 mg by mouth |           | 0       |          |           |
| (ZOVIRAX) 400 MG     |  5 (five) times      |           |         |          |           |
| tablet               | daily. PRN           |           |         |          |           |
+----------------------+----------------------+-----------+---------+----------+-----------+
|   acyclovir          | Apply  topically     |           | 0       |          |           |
 
| (ZOVIRAX) 5%         | every 3 hours.       |           |         |          |           |
| ointment             |                      |           |         |          |           |
+----------------------+----------------------+-----------+---------+----------+-----------+
|   albuterol (PROAIR  | Inhale 2 puffs into  |           | 0       |          |           |
| HFA) 90 mcg/puff     | the lungs every 6    |           |         |          |           |
| inhaler              | hours as needed for  |           |         |          |           |
|                      | Wheezing.            |           |         |          |           |
+----------------------+----------------------+-----------+---------+----------+-----------+
|   albuterol (PROAIR  | Inhale  into the     |           | 0       |          |           |
| RESPICLICK) 90       | lungs.               |           |         |          |           |
| mcg/puff inhaler     |                      |           |         |          |           |
+----------------------+----------------------+-----------+---------+----------+-----------+
|                      | Take 3 mLs by        |           | 0       | 08/11/20 |           |
| albuterol-ipratropiu | nebulization every 6 |           |         | 18       |           |
| m 2.5-0.5 mg/3 mL    |  (six) hours as      |           |         |          |           |
| SOLN                 | needed.              |           |         |          |           |
+----------------------+----------------------+-----------+---------+----------+-----------+
|   atorvaSTATin       | TAKE 1 TABLET BY     |           | 0       | 20 |  |
| (LIPITOR) 40 mg      | MOUTH NIGHTLY        |           |         | 19       | 0         |
| tablet               |                      |           |         |          |           |
+----------------------+----------------------+-----------+---------+----------+-----------+
|   azaTHIOprine       | Take 150 mg by mouth |           | 0       |          |           |
| (IMURAN) 50 mg       |  daily.              |           |         |          |           |
| tablet               |                      |           |         |          |           |
+----------------------+----------------------+-----------+---------+----------+-----------+
|                      | Apply  to eye as     |           | 0       |          |           |
| bacitracin-polymyxin | needed.              |           |         |          |           |
|  b (POLYSPORIN)      |                      |           |         |          |           |
| ophthalmic ointment  |                      |           |         |          |           |
+----------------------+----------------------+-----------+---------+----------+-----------+
|   bismuth            | Take 262 mg by mouth |           | 0       |          |           |
| subsalicylate (PEPTO |  4 (four) times      |           |         |          |           |
|  BISMOL) 262 mg      | daily before meals   |           |         |          |           |
| chewable tablet      | and nightly.         |           |         |          |           |
+----------------------+----------------------+-----------+---------+----------+-----------+
|   cholecalciferol    | Take 1,000 Units by  |           | 0       |          |           |
| (CHOLECALCIFEROL)    | mouth daily.         |           |         |          |           |
| 1000 units TABS      |                      |           |         |          |           |
+----------------------+----------------------+-----------+---------+----------+-----------+
|   cyanocobalamin     | Take 1,000 mcg by    |           | 0       |          |           |
| (VITAMIN B-12) 1000  | mouth daily.         |           |         |          |           |
| MCG tablet           |                      |           |         |          |           |
+----------------------+----------------------+-----------+---------+----------+-----------+
|   diclofenac         | Apply 2 g topically  |           | 0       |          |           |
| (VOLTAREN) 1% GEL    | 4 (four) times       |           |         |          |           |
|                      | daily. PRN           |           |         |          |           |
+----------------------+----------------------+-----------+---------+----------+-----------+
|   digoxin (LANOXIN)  | Take 125 mcg by      |           | 0       |          |           |
| 250 mcg tablet       | mouth Daily.      |           |         |          |           |
|                      | capsule daily        |           |         |          |           |
+----------------------+----------------------+-----------+---------+----------+-----------+
|   famotidine         | Take 40 mg by mouth  |           | 0       |          |           |
| (PEPCID) 40 MG       | daily.               |           |         |          |           |
| tablet               |                      |           |         |          |           |
+----------------------+----------------------+-----------+---------+----------+-----------+
|   ferrous sulfate    | Take 325 mg by mouth |           | 0       | 20 |           |
| 325 mg tablet        |  3 times daily.      |           |         | 12       |           |
+----------------------+----------------------+-----------+---------+----------+-----------+
|   fluticasone        | one spray in each    |           | 0       | 20 |           |
| (FLONASE) 50         | nostril daily as     |           |         | 12       |           |
 
| mcg/nasal spray      | needed               |           |         |          |           |
+----------------------+----------------------+-----------+---------+----------+-----------+
|                      | Inhale 1 puff into   |           | 0       |          |           |
| fluticasone-salmeter | the lungs Twice      |           |         |          |           |
| ol (ADVAIR) 500-50   | Daily.               |           |         |          |           |
| mcg/puff diskus      |                      |           |         |          |           |
| inhaler              |                      |           |         |          |           |
+----------------------+----------------------+-----------+---------+----------+-----------+
|   folic acid 1 mg    | Take 1 mg by mouth   |           | 0       |          |           |
| tablet               | Daily.               |           |         |          |           |
+----------------------+----------------------+-----------+---------+----------+-----------+
|   furosemide (LASIX) | Take 40 mg by mouth  |           | 0       |          |           |
|  40 mg tablet        | Daily.               |           |         |          |           |
+----------------------+----------------------+-----------+---------+----------+-----------+
|   guaiFENesin        | Take 1,200 mg by     |           | 0       |          |           |
| (MUCINEX) 600 mg 12  | mouth 2 times daily. |           |         |          |           |
| hr tablet            |                      |           |         |          |           |
+----------------------+----------------------+-----------+---------+----------+-----------+
|   levocetirizine     | Take 5 mg by mouth   |           | 0       |          |           |
| (XYZAL) 5 MG tablet  | as needed.           |           |         |          |           |
+----------------------+----------------------+-----------+---------+----------+-----------+
|   levothyroxine      | Take 75 mcg by mouth |           | 0       | 20 |           |
| (LEVOTHROID) 75 MCG  |  Daily.              |           |         | 12       |           |
| tablet               |                      |           |         |          |           |
+----------------------+----------------------+-----------+---------+----------+-----------+
|   losartan (COZAAR)  | Take 50 mg by mouth  |           | 0       |          |           |
| 50 mg tablet         | daily.               |           |         |          |           |
+----------------------+----------------------+-----------+---------+----------+-----------+
|   metoclopramide     | Take 10 mg by mouth  |           | 0       | 20 |           |
| (REGLAN) 10 mg       | 4 times daily as     |           |         | 12       |           |
| tablet               | needed.              |           |         |          |           |
+----------------------+----------------------+-----------+---------+----------+-----------+
|   mupirocin          | 1 g by Nasal route   |           | 0       |          |           |
| (BACTROBAN) 2%       | as needed. Use pea   |           |         |          |           |
| ointment             | sized amount in each |           |         |          |           |
|                      |  nostril twice daily |           |         |          |           |
|                      |  for 5 days. After   |           |         |          |           |
|                      | applications, press  |           |         |          |           |
|                      | sides of nose        |           |         |          |           |
|                      | together, gently     |           |         |          |           |
|                      | massage for 1 min.   |           |         |          |           |
+----------------------+----------------------+-----------+---------+----------+-----------+
|                      | Apply  topically 2   |           | 0       |          |           |
| nystatin-triamcinolo | times daily.         |           |         |          |           |
| ne (MYCOLOG II)      |                      |           |         |          |           |
| cream                |                      |           |         |          |           |
+----------------------+----------------------+-----------+---------+----------+-----------+
|   ofloxacin          |                      |           | 0       | 20 |           |
| (OCUFLOX) 0.3%       |                      |           |         | 18       |           |
| ophthalmic solution  |                      |           |         |          |           |
+----------------------+----------------------+-----------+---------+----------+-----------+
|   omeprazole         | Take 20 mg by mouth  |           | 0       | 20 |           |
| (PRILOSEC) 20 mg     | 2 times daily.       |           |         | 12       |           |
| capsule              |                      |           |         |          |           |
+----------------------+----------------------+-----------+---------+----------+-----------+
|   potassium citrate  | Take 10 mEq by mouth |           | 0       |          |           |
| (UROCIT-K) 10 mEq SR |  2 times daily.      |           |         |          |           |
|  tablet              |                      |           |         |          |           |
+----------------------+----------------------+-----------+---------+----------+-----------+
|   predniSONE         | Take 10 mg by mouth  |           | 0       |          |           |
 
| (DELTASONE) 5 mg     | Daily.               |           |         |          |           |
| tablet               |                      |           |         |          |           |
+----------------------+----------------------+-----------+---------+----------+-----------+
|   sertraline         | Take 100 mg by mouth |           | 0       |          |           |
| (ZOLOFT) 100 mg      |  daily.              |           |         |          |           |
| tablet               |                      |           |         |          |           |
+----------------------+----------------------+-----------+---------+----------+-----------+
|   tamsulosin         | Take 0.4 mg by mouth |           | 0       | 20 |           |
| (FLOMAX) 0.4 mg CAPS |  2 times daily.      |           |         | 12       |           |
+----------------------+----------------------+-----------+---------+----------+-----------+
|   trolamine          | Apply  topically as  |           | 0       |          |           |
| salicylate           | needed.              |           |         |          |           |
| (ASPERCREME) 10%     |                      |           |         |          |           |
| cream                |                      |           |         |          |           |
+----------------------+----------------------+-----------+---------+----------+-----------+
|   rivaroxaban        | Take 1 tablet by     |           | 0       | 20 | 10/16/201 |
| (XARELTO) 10 mg      | mouth daily.         |           |         | 19       | 9         |
| tablet               |                      |           |         |          |           |
+----------------------+----------------------+-----------+---------+----------+-----------+
 documented as of this encounter
 
 Plan of Treatment
 
 
+--------+---------+-------------+----------------------+-------------+
| Date   | Type    | Specialty   | Care Team            | Description |
+--------+---------+-------------+----------------------+-------------+
| / | Office  | Pulmonology |   Juan F,          |             |
| 2019   | Visit   |             | Jessica Cheung,   |             |
|        |         |             | MD [address] |             |
|        |         |             |   [place],   |             |
|        |         |             | [place] [address]             |             |
|        |         |             | [phone]         |             |
|        |         |             | [phone] (Fax)   |             |
+--------+---------+-------------+----------------------+-------------+
| / | Office  | Cardiology  |   Eric Akins, |             |
| 2020   | Visit   |             |  MD [address]   |             |
|        |         |             | [place]  |             |
|        |         |             | [address]  [phone]  |             |
|        |         |             |  [phone] (Fax)  |             |
+--------+---------+-------------+----------------------+-------------+
 documented as of this encounter
 
 Procedures
 
 
+-------------------+--------+-------------+----------------------+----------------------+
| Procedure Name    | Priori | Date/Time   | Associated Diagnosis | Comments             |
|                   | ty     |             |                      |                      |
+-------------------+--------+-------------+----------------------+----------------------+
| PFT PULMONARY     | Routin | 2019  |   COPD, moderate     |   Results for this   |
| FUNCTION TESTING  | e      |  2:04 PM    | ([facility])                | procedure are in the |
| ORDERS            |        | PDT         |                      |  results section.    |
+-------------------+--------+-------------+----------------------+----------------------+
 documented in this encounter
 
 Results
 Pulmonary function test full PFT (2019  2:04 PM PDT)
 
+------------------------------------------------------------------------+--------------+
 
| Narrative                                                              | Performed At |
+------------------------------------------------------------------------+--------------+
|   Jessica Rogel MD       2019 14:07  PULMONARY       |              |
| FUNCTION TEST     NAME: Wyatt Shane  : 1945  MRN:       |              |
| [id number]        REASON FOR TESTING:  COPD     FINDINGS              |              |
| SPIROMETRY:   1. FEV-1/FVC is 64   2. FEV-1 is 1.97 L or 75%  3. FVC   |              |
| is 3.06L or 84%.  4.There is no significant bronchodilator response.   |              |
|     LUNG VOLUMES:   1. TLC is 5.54L or 89%.   2. RV/TLC is 44.         |              |
| DIFFUSING CAPACITY:  1. Diffusing capacity is 10.7mL/mmHg/min or 46%   |              |
|           INTERPRETATION:   There is a moderate obstruction without    |              |
| significant   bronchodilator change. Lung volumes are within normal.   |              |
| Diffusion   capacity is severely reduced. There has been some          |              |
| improvement in   his TLC but all the other parameters have shown       |              |
| decline compared   to a study done in 2018.      Jessica       |              |
| Skye Rogel MD  Pulmonary and Critical Care Medicine  [facility]  |              |
| [facility]  [address] [address], [address]      |              |
| [place] [address]                                                     |              |
+------------------------------------------------------------------------+--------------+
 documented in this encounter
 
 Visit Diagnoses
 
 
+------------------------------------------------------------------------------+
| Diagnosis                                                                    |
+------------------------------------------------------------------------------+
|   COPD, moderate (HCC)  Chronic airway obstruction, not elsewhere classified |
+------------------------------------------------------------------------------+
 documented in this encounter
 
 Administered Medications
 
 
+-----------------------------------+--------+----------+--------+------+------+
| Medication Order                  | MAR    | Action   | Dose   | Rate | Site |
|                                   | Action | Date     |        |      |      |
+-----------------------------------+--------+----------+--------+------+------+
|   albuterol 2.5 mg/3 mL nebulizer | Given  | 20 | 2.5 mg |      |      |
|  solution 2.5 mg  2.5 mg,         |        | 19  1:27 |        |      |      |
| Nebulization, RT Once, u 19 |        |  PM PDT  |        |      |      |
|  at 1345, For 1 dose, Dose for    |        |          |        |      |      |
| outpatient testing.,              |        |          |        |      |      |
+-----------------------------------+--------+----------+--------+------+------+
 
 
 
+---+---+
|   |   |
+---+---+
 documented in this encounter

## 2019-12-06 NOTE — XMS
Encounter Summary
  Created on: 2019
 
 Wyatt Shane
 External Reference #: [id number]
 : 43
 Sex: Male
 
 Demographics
 
 
+-----------------------+------------------------+
| Address               | [address]     |
|                       | [place]  [address]   |
+-----------------------+------------------------+
| Home Phone            | [phone]        |
+-----------------------+------------------------+
| Preferred Language    | Unknown                |
+-----------------------+------------------------+
| Marital Status        |                 |
+-----------------------+------------------------+
| [employer] Affiliation | LUT                    |
+-----------------------+------------------------+
| Race                  | White                  |
+-----------------------+------------------------+
| Ethnic Group          | Not  or  |
+-----------------------+------------------------+
 
 
 Author
 
 
+--------------+------------------------------+
| Author       | [facility] |
+--------------+------------------------------+
| Organization | [facility] |
+--------------+------------------------------+
| Address      | Unknown                      |
+--------------+------------------------------+
| Phone        | Unavailable                  |
+--------------+------------------------------+
 
 
 
 Support
 
 
+---------------+--------------+---------+-----------------+
| Name          | Relationship | Address | Phone           |
+---------------+--------------+---------+-----------------+
| Opal Shane | ECON         | Unknown | [phone] |
+---------------+--------------+---------+-----------------+
 
 
 
 Care Team Providers
 
 
 
+-----------------------+------+-----------------+
| Care Team Member Name | Role | Phone           |
+-----------------------+------+-----------------+
| Erwin Iniguez MD   | PCP  | [phone] |
+-----------------------+------+-----------------+
 
 
 
 Encounter Details
 
 
+--------+-------------+-----------------+---------------------+---------------+
| Date   | Type        | Department      | Care Team           | Description   |
+--------+-------------+-----------------+---------------------+---------------+
| / | Office      |   [facility]  |   Note, Outpatient  | Progress Note |
|    | Visit-Trans | MEDICINE        | Clinic              |               |
|        | cribed      |                 |                     |               |
+--------+-------------+-----------------+---------------------+---------------+
 
 
 
 Social History
 
 
+----------------+-------+-----------+--------+------+
| Tobacco Use    | Types | Packs/Day | Years  | Date |
|                |       |           | Used   |      |
+----------------+-------+-----------+--------+------+
| Never Assessed |       |           |        |      |
+----------------+-------+-----------+--------+------+
 
 
 
+------------------+---------------+
| Sex Assigned at  | Date Recorded |
| Birth            |               |
+------------------+---------------+
| Not on file      |               |
+------------------+---------------+
 
 
 
+----------------+-------------+-------------+
| Job Start Date | Occupation  | Industry    |
+----------------+-------------+-------------+
| Not on file    | Not on file | Not on file |
+----------------+-------------+-------------+
 
 
 
+----------------+--------------+------------+
| Travel History | Travel Start | Travel End |
+----------------+--------------+------------+
 
 
 
+-------------------------------------+
| No recent travel history available. |
+-------------------------------------+
 documented as of this encounter
 
 
 Progress Notes
 Interface, Transcription In - 2007  5:11 AM PSTCLINIC DATE:       1998
 
 [facility]IC
 
 SUBJECTIVE:  Mr. Shane is seen back today for pre-operative discussion of
 vertical hemilaryngectomy.  A full PARQ was held and Informed Consent was
 signed.  All of these issues had been discussed on the telephone
 previously.  He is comfortable with what will occur.
 
 I will see him back tomorrow for surgery.
 
 Jimmy Esposito M.D.
 , Otolaryngology
 Head and Neck Surgery
 
 JIC:ero/cak
 D: 98
 T: 98Electronically signed by Interface, Transcription In at 2007  5:11 AM PSTd
ocumented in this encounter
 
 Plan of Treatment
 Not on filedocumented as of this encounter
 
 Visit Diagnoses
 Not on filedocumented in this encounter

## 2019-12-06 NOTE — XMS
Encounter Summary
  Created on: 2019
 
 Wyatt Shane
 External Reference #: [id number]
 : 43
 Sex: Male
 
 Demographics
 
 
+-----------------------+------------------------+
| Address               | [address]     |
|                       | [place]  [address]   |
+-----------------------+------------------------+
| Home Phone            | [phone]        |
+-----------------------+------------------------+
| Preferred Language    | Unknown                |
+-----------------------+------------------------+
| Marital Status        |                 |
+-----------------------+------------------------+
| Jehovah's witness Affiliation | LUT                    |
+-----------------------+------------------------+
| Race                  | White                  |
+-----------------------+------------------------+
| Ethnic Group          | Not  or  |
+-----------------------+------------------------+
 
 
 Author
 
 
+--------------+-------------+
| Organization | Unknown     |
+--------------+-------------+
| Address      | Unknown     |
+--------------+-------------+
| Phone        | Unavailable |
+--------------+-------------+
 
 
 
 Support
 
 
+---------------+--------------+---------+-----------------+
| Name          | Relationship | Address | Phone           |
+---------------+--------------+---------+-----------------+
| Opal Shane | ECON         | Unknown | [phone] |
+---------------+--------------+---------+-----------------+
 
 
 
 Care Team Providers
 
 
+-----------------------+------+-----------------+
| Care Team Member Name | Role | Phone           |
 
+-----------------------+------+-----------------+
| Erwin Steel MD   | PCP  | [phone] |
+-----------------------+------+-----------------+
 
 
 
 Encounter Details
 
 
+--------+-------------+------------+----------------------+------------------+
| Date   | Type        | Department | Care Team            | Description      |
+--------+-------------+------------+----------------------+------------------+
| / | Discharge   |            |   Summary, Discharge | D/C Summary ODDS |
| 1998   | Summary-Tra |            |                      |                  |
|        | nscribed    |            |                      |                  |
+--------+-------------+------------+----------------------+------------------+
 
 
 
 Social History
 
 
+----------------+-------+-----------+--------+------+
| Tobacco Use    | Types | Packs/Day | Years  | Date |
|                |       |           | Used   |      |
+----------------+-------+-----------+--------+------+
| Never Assessed |       |           |        |      |
+----------------+-------+-----------+--------+------+
 
 
 
+------------------+---------------+
| Sex Assigned at  | Date Recorded |
| Birth            |               |
+------------------+---------------+
| Not on file      |               |
+------------------+---------------+
 
 
 
+----------------+-------------+-------------+
| Job Start Date | Occupation  | Industry    |
+----------------+-------------+-------------+
| Not on file    | Not on file | Not on file |
+----------------+-------------+-------------+
 
 
 
+----------------+--------------+------------+
| Travel History | Travel Start | Travel End |
+----------------+--------------+------------+
 
 
 
+-------------------------------------+
| No recent travel history available. |
+-------------------------------------+
 documented as of this encounter
 
 Discharge Summaries
 
 Interface, Transcription In - 2007  5:11 AM [address] [address] [phone]
                      [facility]
 
 MEDICAL SUMMARY OF HOSPITALIZATION
 
 Med Rec No: [id number]             Admission Date: 1998
 
 Name: Wyatt Shane                    Discharge Date:  1998
 
 STAFF PHYSICIAN:
                                    Jimmy Esposito M.D.
                                    , Otolaryngology
                                    Head and Neck Surgery
 
 PRINCIPAL FINAL DIAGNOSIS:          Vocal cord squamous cell carcinoma.
 
 ADDITIONAL DIAGNOSES:
 1.  Hypertension.
 2.  Arthritis.
 3.  Nephrolithiasis.
 4.  Hyperlipidemia.
 5.  Coronary artery disease, status post  coronary  artery bypass graft x 4
    in 1996.
 
 PRINCIPAL PROCEDURE:                Right hemilaryngectomy.
 
 ADDITIONAL PROCEDURES:
 1.  Trach placed 98, removed 98.
 2.  Dobbhoff placed and removed.
 3.  Speech, Occupational Therapy, Physical  Therapy and Nutrition consults.
 
 REASON FOR ADMISSION:                Mr. Shane  is  a 52-year-old gentleman
 with complaint of twelve months of hoarseness.   He  has  been followed for
 the past year for a true vocal cord lesion with treatments, including laser
 surgery x 2.  He was referred to Dr. Esposito  in 1998 for further
 evaluation.  Biopsy in  shows squamous  cell  carcinoma  of  the  true
 vocal  cord  on  the  right.   He  is being  admitted  for  right  vertical
 hemilaryngectomy.
 
 HOSPITAL  COURSE:                      The  patient  was  admitted  to  the
 hospital  on  98  where  he  underwent   a   right  hemilaryngectomy,
 tracheostomy and Dobbhoff placement.   The  patient tolerated the procedure
 well without complication.  Postoperatively,  he  was  transferred  to  the
 surgical torres where he had an unremarkable hospital course.
 
 The  final  pathology on frozen section  intraoperatively  showed  negative
 margins for squamous cell carcinoma.  The patient was rapidly advanced from
 trach to metal trach to decannulation  by  postoperative  day five.  Speech
 Therapy noted no difficulty in swallowing.   A  Dobbhoff  was  taken out on
 postoperative  day  number  five.  The  patient  was  discharged  from  the
 hospital on postoperative day five,  ambulating  without  difficulty,  with
 good  p.o.  intake,  without  aspiration.   He   was   controlled  on  p.o.
 medications.  Incisions were clean, dry and intact.  Sutures were removed.
 
 CONDITION ON DISCHARGE:              Stable  to  home  in [place], [place]
 with wife.
 
 
 DISCHARGE MEDICATION(S):
 1.  Augmentin 875 mg p.o. b.i.d. x seven days.
 2.  Zantac 150 mg p.o. b.i.d.
 3.  Lipitor 10 mg q.d.
 4.  Multivitamin p.o. q.d.
 5.  Vicodin 1-2 tabs p.o. q.4h. p.r.n.
 6.  Norvasc 10 mg p.o. q.d.
 
 DISCHARGE INSTRUCTION(S):
 DIET:   Regular.
 ACTIVITY:  Ad coby.
 FOLLOWUP:   With Dr. Esposito in two weeks  in  the  ENT  Clinic  with  speech
 evaluation and swallowing at that time as well.
 
 Dangelo Byers M.D.                      Jimmy Esposito M.D.
 Resident, Pediatric Surgery            , Otolaryngology
                                        Head and Neck Surgery
 
 NOLBERTO/marielena
 D:  1998
 T:  1998  9:39 A
 
 cc:
 
       ERWIN STEEL MD
       [address]
       [place] [id number] signed by Interface, Transcription In at 2007 
 5:11 AM PSTdocumented in this encounter
 
 Plan of Treatment
 Not on filedocumented as of this encounter
 
 Visit Diagnoses
 Not on filedocumented in this encounter

## 2019-12-06 NOTE — XMS
Encounter Summary
  Created on: 2019
 
 Shane Wyatttien BroussardJosue
 External Reference #: [id number]
 : 43
 Sex: Male
 
 Demographics
 
 
+-----------------------+---------------------------+
| Address               | [address]        |
|                       | [place]  [address] |
+-----------------------+---------------------------+
| Home Phone            | [phone]           |
+-----------------------+---------------------------+
| Preferred Language    | Unknown                   |
+-----------------------+---------------------------+
| Marital Status        |                    |
+-----------------------+---------------------------+
| Zoroastrianism Affiliation | [id number]                      |
+-----------------------+---------------------------+
| Race                  | Unknown                   |
+-----------------------+---------------------------+
| Ethnic Group          | Unknown                   |
+-----------------------+---------------------------+
 
 
 Author
 
 
+--------------+--------------------------------------------+
| Author       | [facility]  |
|              | [facility] [place]                                |
+--------------+--------------------------------------------+
| Organization | [facility]  |
|              | [facility] [place]                                |
+--------------+--------------------------------------------+
| Address      | Unknown                                    |
+--------------+--------------------------------------------+
| Phone        | Unavailable                                |
+--------------+--------------------------------------------+
 
 
 
 Support
 
 
+---------------+--------------+--------------------+-----------------+
| Name          | Relationship | Address            | Phone           |
+---------------+--------------+--------------------+-----------------+
| Opal Shane | ECON         | [address]     | [phone] |
|               |              | [place]   |                 |
|               |              | [id number]              |                 |
+---------------+--------------+--------------------+-----------------+
 
 
 
 
 Care Team Providers
 
 
+-----------------------+------+-----------------+
| Care Team Member Name | Role | Phone           |
+-----------------------+------+-----------------+
| Erwin Iniguez MD | PCP  | [phone] |
+-----------------------+------+-----------------+
 
 
 
 Reason for Visit
 
 
+--------+---------------+
| Reason | Comments      |
+--------+---------------+
| Other  | sleep results |
+--------+---------------+
 
 
 
 Encounter Details
 
 
+--------+---------+----------------------+---------------------+----------------------+
| Date   | Type    | Department           | Care Team           | Description          |
+--------+---------+----------------------+---------------------+----------------------+
| / | Office  |   [facility]      |   Luis Carlos Perez  | JOANN (obstructive     |
|    | Visit   | SLEEP DISORDER  [address]  | MD [address]   | sleep apnea)         |
|        |         | [address]  [place]      | [address]  [place]    | (Primary Dx);        |
|        |         | [place] [address] | [place] [address]     | Central sleep apnea  |
|        |         |   [phone]       | [phone]        | due to Cheyne-Nichole |
|        |         |                      | [phone] (Fax)  |  respiration         |
+--------+---------+----------------------+---------------------+----------------------+
 
 
 
 Social History
 
 
+---------------+------------+-----------+--------+------------------+
| Tobacco Use   | Types      | Packs/Day | Years  | Date             |
|               |            |           | Used   |                  |
+---------------+------------+-----------+--------+------------------+
| Former Smoker | Cigarettes | 1.3       | 35     | Quit: 1996 |
+---------------+------------+-----------+--------+------------------+
 
 
 
+---------------------+---+---+---+
| Smokeless Tobacco:  |   |   |   |
| Never Used          |   |   |   |
+---------------------+---+---+---+
 
 
 
+-------------+-------------+---------+----------+
| Alcohol Use | Drinks/Week | oz/Week | Comments |
 
+-------------+-------------+---------+----------+
| No          |             |         |          |
+-------------+-------------+---------+----------+
 
 
 
+------------------+---------------+
| Sex Assigned at  | Date Recorded |
| Birth            |               |
+------------------+---------------+
| Not on file      |               |
+------------------+---------------+
 
 
 
+----------------+-------------+-------------+
| Job Start Date | Occupation  | Industry    |
+----------------+-------------+-------------+
| Not on file    | Not on file | Not on file |
+----------------+-------------+-------------+
 
 
 
+----------------+--------------+------------+
| Travel History | Travel Start | Travel End |
+----------------+--------------+------------+
 
 
 
+-------------------------------------+
| No recent travel history available. |
+-------------------------------------+
 documented as of this encounter
 
 Last Filed Vital Signs
 
 
+-------------------+----------------------+----------------------+----------+
| Vital Sign        | Reading              | Time Taken           | Comments |
+-------------------+----------------------+----------------------+----------+
| Blood Pressure    | 128/72               | 2015  2:28 PM  |          |
|                   |                      | PDT                  |          |
+-------------------+----------------------+----------------------+----------+
| Pulse             | 95                   | 2015  2:28 PM  |          |
|                   |                      | PDT                  |          |
+-------------------+----------------------+----------------------+----------+
| Temperature       | -                    | -                    |          |
+-------------------+----------------------+----------------------+----------+
| Respiratory Rate  | 16                   | 2015  2:28 PM  |          |
|                   |                      | PDT                  |          |
+-------------------+----------------------+----------------------+----------+
| Oxygen Saturation | 97%                  | 2015  2:28 PM  |          |
|                   |                      | PDT                  |          |
+-------------------+----------------------+----------------------+----------+
| Inhaled Oxygen    | -                    | -                    |          |
| Concentration     |                      |                      |          |
+-------------------+----------------------+----------------------+----------+
| Weight            | 81.3 kg (179 lb 4.8  | 2015  2:28 PM  |          |
|                   | oz)                  | PDT                  |          |
+-------------------+----------------------+----------------------+----------+
 
| Height            | -                    | -                    |          |
+-------------------+----------------------+----------------------+----------+
| Body Mass Index   | 27.26                | 2014 10:00 AM  |          |
|                   |                      | PDT                  |          |
+-------------------+----------------------+----------------------+----------+
 documented in this encounter
 
 Progress Notes
 Luis Carlos Perez Jr., MD - 2015 10:25 AM PDTThe patient comes in for follow-up after 
undergoing diagnostic polysomnography. My interpretation of the patient's sleep study, which
 I have reviewed with the patient, is as follows:
 
 Polysomnogram Report on Wyatt Shane performed on 6/15/2015
 
 Clinical Information: Wyatt Shane is a 69 y.o. male who underwent diagnostic nocturna
l polysomnography on 6/15/2015. The patient has a history of atrial fibrillation and heart f
ailure. PSG performed on 10/7/2013 demonstrated an AHI of 77.4. Of the 419 apneas, 380 were 
central apneas in a Cheyne-Nichole pattern. He underwent ASV-BPAP titration on 2013 and 
has been on ASV-BPAP (EPAP 4cm, PSmin0, PSmax 21cm) since then. Because of the recently repo
rted SERVE-HF study indicating that ASV-BPAP may actually increase cardiac mortality in eduard
ents with heart failure (EF < 45%), ASV-BPAP was discontinued and the current study is now d
one off of therapy. A 2-D Echocardiogram was obtained on 2015 which demonstrates an EF 
currently of 60-65%.
 
 Technical Information: Please see technical data stored as Polysomnography under "Media" se
ction in the EPIC EMR.
 
 Definitions (The AASM Manual for the Scoring of Sleep and Associated Events, Version 2.0; 2
012):
 Apnea: There is a drop in the peak signal excursion by 90% or greater of pre-event baseline
 using an oronasal thermal sensor (diagnostic study), PAP device flow (titration study), or 
an alternative apnea sensor (diagnostic study); the duration of the 90% or greater drop in s
ensor signal is 10 seconds or longer.
 Obstructive Apnea: Event associated with continued or increased inspiratory effort througho
ut the entire period of absent airflow.
 Central Apnea: Event associated with absent inspiratory effort throughout the entire period
 of absent airflow.
 Mixed Apnea: Event associated with absent inspiratory effort in the initial portion of the 
event followed by resumption of inspiratory effort during the second portion of the event.
 Hypopnea: Nasal pressure excursion drop by 30% or more from baseline, lasting at lease 10 s
econds and 90% of the event's duration meets this amplitude criteria. This is associated wit
h a 4% or greater desaturation from pre-baseline.
 Respiratory Event Related Arousal: A sequence of breaths lasting 10 seconds or longer kendall
cterized by increasing respiratory effort or by flattening of the inspiratory portion of the
 nasal pressure (diagnostic study) or PAP device flow (titration study) waveform leading to 
arousal from sleep when the sequence of breaths does not meet criteria for an apnea or hypop
octaviano.
 
 Sleep Architecture:  Lights out was recorded at 2215 hundred hours on 6/15/2015 and lights 
on was recorded at 0606 hundred hours on 2015. The latency to sleep onset was short at 
2.5 minutes. The patient slept for 395 minutes out of 470 minutes of study time resulting an
 a sleep efficiency that was mildly decreased at 84 %. The amount of N1 sleep was normal occ
upying 3.8% of the Total Sleep Time; the amount of N2 sleep was elevated occupying 85.3% of 
the Total Sleep Time; the amount of N3 sleep was low occupying 2.1% of the Total Sleep Time;
 the amount of REM sleep was low occupying 7.1% of the Total Sleep Time and the latency to R
EM sleep was shoirt at 43.5 minutes.
 
 Sleep in the following positions was recorded: left lateral decubitus 0%, right lateral dec
ubitus 92.3%, supine 7.7%, prone 0%.
 
 
 Sleep was severely fragmented; the Arousal Index was 70.9.
 
 The patient reported this to be a better than "usual" night's sleep.
 
 Cardiopulmonary Monitoring: The heart rate averaged in the 80's beats per minute. Mild rate
 variability was noted. The rhythm was atrial fibrillation.
 
 In the course of the evening there were 15 obstructive apneas, 9 mixed apneas, 35 central a
pneas, 272 hypopneas, and 78 Respiratory Effort Related Arousals (RERA's). The Respiratory D
isturbance Index (RDI) was elevated at 62.1; the Apnea-Hypopnea Index was elevated at 50.3; 
the Apnea Index (AI) was elevated at 9. The respiratory events were not clinically significa
ntly sleep stage dependent. The respiratory events were not clinically significantly positio
nal. The number of central apneas was much less than in 2013 although they still appeared in
 a Cheyne-Nichole pattern. 
 
 The respiratory events occasioned severe sleep fragmentation; the Respiratory Arousal Index
 was 50.7.
 
 The miguel oxygen saturation was 80% and the patient spent 30 minutes with an oxygen saturat
ion of less than 88%.
 
 ETCO2 was normal.
 
 Limb Movement Monitoring: There were 0 Periodic Limb Movements (PLMS Index of 0) of which 0
 were associated with arousals; the PLMS Arousal Index was normal at 0.
 
 Interpretation: This polysomnogram is abnormal secondary to:
 
 Obstructive Sleep Apnea is diagnosed and is associated with severe sleep fragmentation and 
significant oxygen desaturation.
 Mild Central Sleep Apnea secondary to Cheyne-Nichole Respiration is present.
 
 Suggestions:
 1. The principles of sleep hygiene should be reviewed with the patient.
 2. PSG guided CPAP titration is advised. If central apneas persist, ASV-BPAP could be consi
dered in view of the normal Ejection Fraction noted on recent 2-D Echocardiogram done in May
 2015.
 
 He also had a 2-D Echocardiogram performed which demonstrated the following:
 
 ECHOCARDIOGRAM REPORT
 
 STUDY DATE: 2015
 
 PATIENT NAME: Wyatt Shane
 : 1945
 MRN: [id number]
 PCP: Erwin Iniguez MD
 
 CLINICAL HISTORY/DIAGNOSIS: A-fib, heart failure, ef
 
 A transthoracic echocardiogram with M-mode, pulsed-wave and color Doppler 
 was performed with standard views obtained. The technical quality of this 
 examination is adequate. The heart rhythm during the echo is atrial 
 fibrillation. The M-mode, two-dimensional, color flow and spectral 
 Doppler data were reviewed and support the following interpretation:
 
 Interpretation:
 Left Atrium: Left atrial size is mildly dilated.
 Left ventricle: Left ventricular size is normal with mild-to-moderate 
 
 concentric left ventricular hypertrophy, and normal left ventricular 
 systolic function. The estimated ejection fraction is 60-65 %. 
 Undetermined diastolic function. 
 Aortic root: Aortic root is normal.
 Right Atrium: Right atrial sizes normal.
 Right ventricle: Right ventricular size is normal with normal wall 
 thickness and normal right ventricular systolic function.
 Pericardium: Pericardium is normal.
 Pulmonary artery: Pulmonary artery is normal. mild pulmonary hypertension 
 with a peak systolic pressure of 35-40 mmHg.
 Aortic valve: Aortic valve is trileaflet with mild thickening and opens 
 adequately. There is a mild to moderate aortic valve insufficiency.
 Mitral valve: Mitral valve is normal. mild mitral regurgitation.
 Pulmonic valve: Pulmonic valve is normal.
 Tricuspid valve: Tricuspid valve is normal. mild tricuspid valve 
 regurgitation.
 Vena cava: The inferior vena cava is normal. There is greater than 50% 
 inspiratory collapse of the IVC.
 
 IMPRESSIONS:
 1. Mild left atrial dilatation.
 2. Mild-to-moderate concentric left ventricular hypertrophy. No LVOT 
 obstruction noted. Left ventricular systolic dysfunction is preserved. 
 LVEF is 60-65%.
 3. Mildly thickened trileaflet aortic valve with adequate opening. There 
 is a mild to moderate aortic valve insufficiency.
 4. Mild mitral valve regurgitation.
 5. Mild tricuspid valve regurgitation.
 6. Mild pulmonary hypertension with a peak systolic pressure of 35-40 
 mmHg.
 7. Normal IVC with normal respiratory collapse.
 
 Measurements:
 Height: 5'8''
 Weight: 182lbs
 Aortic root: 31 mm
 Aortic cusp sep: 20 mm
 LA: 2d 51 mm
 IVS-diastole: 22 mm
 IVS-systole: 19 mm
 LVPW diastole: 14 mm
 LVPW systole: 16 mm
 LV diameter-diastole: 47 mm
 LV diameter-systole: 29 mm
 Fractional shortenin %
 PFV aortic valve: m/s
 MPG mitral valve: mmHg
 PFV TR jet: 2.84 m/s
 RA/RV PP mmHg
 LA volume: 135 mL
 LA index: 38 mL/m2
 Mitral Inflow DT: ms
 IVRT: ms
 Valsalva: 
 PWDTI S wave: cm/s
 PWDTI E wave: cm/s
 PWDTI A wave: cm/s
 E/A Ratio: 
 E/E Ratio: 
 
 
 Signed by: Brittni Shoemaker MD [facility] 
 2015 9:20 
 
 He and his wife both note that he is feeling more fatigued and tired since stopping the ASV
-BPAP.
 
 /72 | Pulse 95 | Resp 16 | Wt 81.33 kg (179 lb 4.8 oz) | SpO2 97%
 
 A: JOANN + CSA-: Although less in severity than when first seen, he still has significant 
complex apnea. Note that his ejection fraction was 60-65% and thus the SERVE-HF study does n
ot apply to him. I am advising that he continue on his current ASV-BPAP. He states that it d
oes cause significant nasal/sinus dryness and he states that he has turned his heat and humi
dity up as high as it will go. Even so, he uses virtually no water in the course of an eveni
ng wearing the BPAP. I have suggested today that he take his unit by [facility] to have them chec
k it to assure that the heated humidity is properly functioning. I suspect that his nasal/si
nus issues are likely secondary to lack of humidification from his ASV-BPAP. He does have an
 ENT appointment in the [facility] in 2 weeks - we will find out with whom he has an appointm
ent and make sure that his sleep records are forwarded to that office.
 
 P: Restart ASV-BPAP at his previous settings.
      [place] to check his ASV-BPAP to assure that the heated humidification system is properl
y functioning.
      All sleep records will be sent to his ENT MD's - they will find the names and addresse
s.
      F[facility]u [facility] 4 weeks.
 
 Today, 15minutes was spent face to face with the patient; the majority of time was spent co
unseling regarding sleep issues.
 
      Electronically signed by Luis Carlos Perez Jr., MD at 2015  2:54 PM PDTdocumented 
in this encounter
 
 Plan of Treatment
 
 
+--------+---------+-------------+----------------------+-------------+
| Date   | Type    | Specialty   | Care Team            | Description |
+--------+---------+-------------+----------------------+-------------+
| / | Office  | Pulmonology |   Juan F,          |             |
|    | Visit   |             | Jessica Cheung,   |             |
|        |         |             | MD [address] |             |
|        |         |             |   [place],   |             |
|        |         |             | [place] [address]             |             |
|        |         |             | [phone]         |             |
|        |         |             | [phone] (Fax)   |             |
+--------+---------+-------------+----------------------+-------------+
| / | Office  | Cardiology  |   Eric Akins, |             |
|    | Visit   |             |  MD [address]   |             |
|        |         |             | [place]  |             |
|        |         |             | [id number] [phone]  |             |
|        |         |             |  [phone] (Fax)  |             |
+--------+---------+-------------+----------------------+-------------+
 documented as of this encounter
 
 Visit Diagnoses
 
 
+----------------------------------------------------------------------------------------+
| Diagnosis                                                                              |
+----------------------------------------------------------------------------------------+
 
|   JOANN (obstructive sleep apnea) - Primary  Obstructive sleep apnea (adult) (pediatric) |
+----------------------------------------------------------------------------------------+
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
+----------------------------------------------------------------------------------------+
 documented in this encounter

## 2019-12-06 NOTE — XMS
Encounter Summary
  Created on: 2019
 
 Wyatt Shane
 External Reference #: [id number]
 : 43
 Sex: Male
 
 Demographics
 
 
+-----------------------+------------------------+
| Address               | [address]     |
|                       | [place]  [id number]   |
+-----------------------+------------------------+
| Home Phone            | [phone]        |
+-----------------------+------------------------+
| Preferred Language    | Unknown                |
+-----------------------+------------------------+
| Marital Status        |                 |
+-----------------------+------------------------+
| Mor[employer] Affiliation | LUT                    |
+-----------------------+------------------------+
| Race                  | White                  |
+-----------------------+------------------------+
| Ethnic Group          | Not  or  |
+-----------------------+------------------------+
 
 
 Author
 
 
+--------------+------------------------------+
| Author       | [facility] |
+--------------+------------------------------+
| Organization | [facility] |
+--------------+------------------------------+
| Address      | Unknown                      |
+--------------+------------------------------+
| Phone        | Unavailable                  |
+--------------+------------------------------+
 
 
 
 Support
 
 
+---------------+--------------+---------+-----------------+
| Name          | Relationship | Address | Phone           |
+---------------+--------------+---------+-----------------+
| Opal Shane | ECON         | Unknown | [phone] |
+---------------+--------------+---------+-----------------+
 
 
 
 Care Team Providers
 
 
 
+-----------------------+------+-----------------+
| Care Team Member Name | Role | Phone           |
+-----------------------+------+-----------------+
| Erwin Iniguez MD   | PCP  | [phone] |
+-----------------------+------+-----------------+
 
 
 
 Encounter Details
 
 
+--------+-------------+-----------------+---------------------+---------------+
| Date   | Type        | Department      | Care Team           | Description   |
+--------+-------------+-----------------+---------------------+---------------+
| / | Office      |   [facility]  |   Note, Outpatient  | Progress Note |
|    | Visit-Trans | MEDICINE        | Clinic              |               |
|        | cribed      |                 |                     |               |
+--------+-------------+-----------------+---------------------+---------------+
 
 
 
 Social History
 
 
+----------------+-------+-----------+--------+------+
| Tobacco Use    | Types | Packs/Day | Years  | Date |
|                |       |           | Used   |      |
+----------------+-------+-----------+--------+------+
| Never Assessed |       |           |        |      |
+----------------+-------+-----------+--------+------+
 
 
 
+------------------+---------------+
| Sex Assigned at  | Date Recorded |
| Birth            |               |
+------------------+---------------+
| Not on file      |               |
+------------------+---------------+
 
 
 
+----------------+-------------+-------------+
| Job Start Date | Occupation  | Industry    |
+----------------+-------------+-------------+
| Not on file    | Not on file | Not on file |
+----------------+-------------+-------------+
 
 
 
+----------------+--------------+------------+
| Travel History | Travel Start | Travel End |
+----------------+--------------+------------+
 
 
 
+-------------------------------------+
| No recent travel history available. |
+-------------------------------------+
 documented as of this encounter
 
 
 Progress Notes
 Interface, Transcription In - 2006  5:02 AM PSTCLINIC DATE:       1999
 
                            OTOLARYNGOLOGY CLINIC
 
 SUBJECTIVE:  Wyatt is seen back in followup.  He is breathing better than he
 was before his nisin fundoplication and dilatation but still feels there is
 some limitation in his exercise tolerance  which  has  precluded  him  from
 returning  to work on a full-time basis.  His  voice  remains  stable.  His
 swallowing  remains stable and he really  feels  that  overall  his  reflux
 situation has improved.
 
 PHYSICAL EXAMINATION:  A flexible fiberoptic  laryngoscopy  is  done.   The
 previous inflammation of his larynx has  more or less resolved itself. With
 this being gone, there is only a mild edema and one can now see that he has
 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
 commissure that prevents his cords from fully abducting.  The configuration
 of his anterior neocord is actually pretty  good. There is some fullness of
 the false cord as one sees after vertical hemilaryngectomy but nothing that
 is suspicious for recurrence of disease.
 
 ASSESSMENT: Post pharynglottic stenosis.
 
 PLAN:  He will continue to heal for another  month  from  resolution of his
 gastroesophageal reflux. If he does  not  feel  any  better  from an airway
 standpoint in a month, he will call  and  we  will  set him up for dilation
 possibly with laser lysis of his posterior  commissure  band  at  the  same
 time.  I will see him back in six weeks.
 
 Jimmy Esposito M.D.
 , Otolaryngology
 Head and Neck Surgery
 
 [facility]
 D: 1999
 T: 1999
 
 Cc:
 
 Ghassan Doll M.D.
 
 General Surgery
 
 [id number] signed by Interface, Transcription In at 2006  5:02 AM PSTdocume
nted in this encounter
 
 Plan of Treatment
 Not on filedocumented as of this encounter
 
 Visit Diagnoses
 Not on filedocumented in this encounter

## 2019-12-06 NOTE — XMS
Encounter Summary
  Created on: 2019
 
 Wyatt Shane
 External Reference #: [id number]
 : 43
 Sex: Male
 
 Demographics
 
 
+-----------------------+------------------------+
| Address               | [address]     |
|                       | [place]  [address]   |
+-----------------------+------------------------+
| Home Phone            | [phone]        |
+-----------------------+------------------------+
| Preferred Language    | Unknown                |
+-----------------------+------------------------+
| Marital Status        |                 |
+-----------------------+------------------------+
| Zoroastrianism Affiliation | LUT                    |
+-----------------------+------------------------+
| Race                  | White                  |
+-----------------------+------------------------+
| Ethnic Group          | Not  or  |
+-----------------------+------------------------+
 
 
 Author
 
 
+--------------+------------------------------+
| Author       | [facility] |
+--------------+------------------------------+
| Organization | [facility] |
+--------------+------------------------------+
| Address      | Unknown                      |
+--------------+------------------------------+
| Phone        | Unavailable                  |
+--------------+------------------------------+
 
 
 
 Support
 
 
+---------------+--------------+---------+-----------------+
| Name          | Relationship | Address | Phone           |
+---------------+--------------+---------+-----------------+
| Oapl Shane | ECON         | Unknown | [phone] |
+---------------+--------------+---------+-----------------+
 
 
 
 Care Team Providers
 
 
 
+-----------------------+------+-----------------+
| Care Team Member Name | Role | Phone           |
+-----------------------+------+-----------------+
| Erwin Iniguez MD   | PCP  | [phone] |
+-----------------------+------+-----------------+
 
 
 
 Encounter Details
 
 
+--------+----------+----------------------+--------------------+-------------+
| Date   | Type     | Department           | Care Team          | Description |
+--------+----------+----------------------+--------------------+-------------+
| / | Results  |   [facility]  [address]  |   [facility]   |             |
|    | Only     | [address]  | [phone]       |             |
|        |          |  [place], [place]        |                    |             |
|        |          | [id number]           |                    |             |
|        |          | [phone]         |                    |             |
+--------+----------+----------------------+--------------------+-------------+
 
 
 
 Social History
 
 
+----------------+-------+-----------+--------+------+
| Tobacco Use    | Types | Packs/Day | Years  | Date |
|                |       |           | Used   |      |
+----------------+-------+-----------+--------+------+
| Never Assessed |       |           |        |      |
+----------------+-------+-----------+--------+------+
 
 
 
+------------------+---------------+
| Sex Assigned at  | Date Recorded |
| Birth            |               |
+------------------+---------------+
| Not on file      |               |
+------------------+---------------+
 
 
 
+----------------+-------------+-------------+
| Job Start Date | Occupation  | Industry    |
+----------------+-------------+-------------+
| Not on file    | Not on file | Not on file |
+----------------+-------------+-------------+
 
 
 
+----------------+--------------+------------+
| Travel History | Travel Start | Travel End |
+----------------+--------------+------------+
 
 
 
+-------------------------------------+
| No recent travel history available. |
 
+-------------------------------------+
 documented as of this encounter
 
 Plan of Treatment
 Not on filedocumented as of this encounter
 
 Procedures
 
 
+--------------------+--------+------------+----------------------+----------------------+
| Procedure Name     | Priori | Date/Time  | Associated Diagnosis | Comments             |
|                    | ty     |            |                      |                      |
+--------------------+--------+------------+----------------------+----------------------+
| SURGICAL PATHOLOGY | Routin | 1998 |                      |   Results for this   |
|                    | e      |            |                      | procedure are in the |
|                    |        |            |                      |  results section.    |
+--------------------+--------+------------+----------------------+----------------------+
 documented in this encounter
 
 Results
 SURGICAL PATHOLOGY (1998)
 
+-----------+--------------------------+-----------+-------------+--------------+
| Component | Value                    | Ref Range | Performed   | Pathologist  |
|           |                          |           | At          | Signature    |
+-----------+--------------------------+-----------+-------------+--------------+
| SURGICAL  | SOURCE OF SPECIMEN: SEE  |           | [facility]        |              |
| PATHOLOGY | RESULTS                  |           | DEPARTMENT  |              |
|           | Preliminary              |           | OF          |              |
|           | History:FROZEN SECTION   |           | PATHOLOGY   |              |
|           | DIAGNOSISMUCOSA LEFT     |           |             |              |
|           | TRUE VOCAL CORD          |           |             |              |
|           | (SPECIMEN #2) - INVASIVE |           |             |              |
|           |  SQUAMOUS CELL           |           |             |              |
|           |   CARCINOMARIGHT         |           |             |              |
|           | HEMILARYNGECTOMY         |           |             |              |
|           | VERTICAL (SPECIMEN #3) - |           |             |              |
|           |  RIGHT HEMILARYNGECTOMY  |           |             |              |
|           |   MARGINS NO TUMORLEFT   |           |             |              |
|           | TRUE VOCAL CORD          |           |             |              |
|           | (SPECIMEN #4) -   NO     |           |             |              |
|           | TUMOR       Confirmed    |           |             |              |
|           | by: Elgin Harper M.D.  |           |             |              |
|           |       CLINICAL HISTORY   |           |             |              |
|           |       Patient age: 53    |           |             |              |
|           | year old male.           |           |             |              |
|           | Patient History: NO      |           |             |              |
|           | HISTORY PROVIDED         |           |             |              |
|           | GROSS DESCRIPTION        |           |             |              |
|           | Specimens Received:      |           |             |              |
|           | Three fresh, one in      |           |             |              |
|           | formalin       #1        |           |             |              |
|           | DELPHIAN LYMPH NODE: The |           |             |              |
|           |  specimen is received in |           |             |              |
|           |  formalin andconsists of |           |             |              |
|           |  a 3.5 x 1.8 x 0.9 cm    |           |             |              |
|           | yellow, lobular, fatty   |           |             |              |
|           | tissue which             |           |             |              |
|           | onsectioning reveals     |           |             |              |
|           | three possible lymph     |           |             |              |
 
|           | nodes ranging from 0.6 x |           |             |              |
|           |  0.5 x0.4 cm to 1.5 x    |           |             |              |
|           | 0.6 x 0.5 cm.       #2   |           |             |              |
|           | MUCOSA LEFT TRUE VOCAL   |           |             |              |
|           | CORD-FS: The specimen is |           |             |              |
|           |  received fresh          |           |             |              |
|           | andconsists of two       |           |             |              |
|           | irregular, red-brown     |           |             |              |
|           | tissues measuring 0.5 x  |           |             |              |
|           | 0.3 x 0.2cm and 0.8 x    |           |             |              |
|           | 0.2 x 0.1 cm. The frozen |           |             |              |
|           |  section residue which   |           |             |              |
|           | consists ofthe entire    |           |             |              |
|           | specimen is wrapped.     |           |             |              |
|           |    #3 RIGHT              |           |             |              |
|           | HEMILARYNGECTOMY         |           |             |              |
|           | VERTICAL-FS: The         |           |             |              |
|           | specimen is received     |           |             |              |
|           | freshand consists of a   |           |             |              |
|           | 4.2 x 2.5 x 2.2 cm       |           |             |              |
|           | product of a right       |           |             |              |
|           | hemilaryngectomyconsisti |           |             |              |
|           | ng of the anterior half  |           |             |              |
|           | of the thyroid cartilage |           |             |              |
|           |  with remnantsof the     |           |             |              |
|           | superior thyroid notch.  |           |             |              |
|           | Along the posterior      |           |             |              |
|           | aspect of thecartilage   |           |             |              |
|           | is an attached remnant   |           |             |              |
|           | of mucosa measuring 2.5  |           |             |              |
|           | x 1.8 cm with araised,   |           |             |              |
|           | focally granular, tan to |           |             |              |
|           |  red-purple lesion       |           |             |              |
|           | involving the truecord   |           |             |              |
|           | and intraglottic mucosa  |           |             |              |
|           | measuring 2.2 x 1.3 cm   |           |             |              |
|           | and extending 0.3        |           |             |              |
|           | cmabove the mucosal      |           |             |              |
|           | surface. The tumor does  |           |             |              |
|           | not grossly appear to    |           |             |              |
|           | involvethe false cord.   |           |             |              |
|           |              A frozen    |           |             |              |
|           | section from the         |           |             |              |
|           | medioanterior margin is  |           |             |              |
|           | prepared by thesurgeon.  |           |             |              |
|           | The area of this frozen  |           |             |              |
|           | is inked yellow and the  |           |             |              |
|           | remainingmargin is inked |           |             |              |
|           |  black. Remnant of       |           |             |              |
|           | thyroid cartilage is     |           |             |              |
|           | calcified. Onsectioning  |           |             |              |
|           | the mass grossly extends |           |             |              |
|           |  up to the deep          |           |             |              |
|           | anteroinferior           |           |             |              |
|           | inkedmargin and within   |           |             |              |
|           | less than 0.1 cm of the  |           |             |              |
|           | lateral inked margin.    |           |             |              |
|           | Thetumor grossly extends |           |             |              |
|           |  up to but not through   |           |             |              |
|           | the thyroid cartilage    |           |             |              |
 
|           | andexamination of the    |           |             |              |
|           | medial margin reveals    |           |             |              |
|           | tumor to grossly extend  |           |             |              |
|           | up tofocal mucosal       |           |             |              |
|           | margins. The tumor has a |           |             |              |
|           |  well circumscribed,     |           |             |              |
|           | granular tanappearance   |           |             |              |
|           | and averages 1.2 cm      |           |             |              |
|           | thick. The tumor is      |           |             |              |
|           | grossly free of          |           |             |              |
|           | theinferior margin of    |           |             |              |
|           | resection and is within  |           |             |              |
|           | 0.3 cm of the superior   |           |             |              |
|           | inkedmargin.       #4    |           |             |              |
|           | LEFT TRUE VOCAL CORD-FS: |           |             |              |
|           |  The specimen is         |           |             |              |
|           | received fresh and       |           |             |              |
|           | consistsof a 0.9 x 0.7 x |           |             |              |
|           |  0.3 cm rubbery,         |           |             |              |
|           | predominantly            |           |             |              |
|           | hemorrhagic purple.      |           |             |              |
|           | Thefrozen section        |           |             |              |
|           | residue consists of the  |           |             |              |
|           | entire specimen.         |           |             |              |
|           | CASSETTE INDEX:#1        |           |             |              |
|           |   DELPHIAN LYMPH NODE:1, |           |             |              |
|           |  three nodes, TS#2       |           |             |              |
|           |   MUCOSA LEFT TRUE VOCAL |           |             |              |
|           |  CORD-FS:2, wrapped,     |           |             |              |
|           | TS#3   RIGHT             |           |             |              |
|           | HEMILARYNGECTOMY         |           |             |              |
|           | VERTICAL-FS:3A, frozen   |           |             |              |
|           | section residue of       |           |             |              |
|           | medioanterior margin and |           |             |              |
|           |  section of tumor   for  |           |             |              |
|           | comparison, TS3B,        |           |             |              |
|           | sections of lateral      |           |             |              |
|           | margin taken             |           |             |              |
|           | transversely, following  |           |             |              |
|           |   decalcification,       |           |             |              |
|           | RS3C-D, longitudinal     |           |             |              |
|           | sections of entire       |           |             |              |
|           | specimen following       |           |             |              |
|           |   decalcification, RS3E, |           |             |              |
|           |  sections of most medial |           |             |              |
|           |  margin taken            |           |             |              |
|           | transversely, following  |           |             |              |
|           |   decalcification, RS#4  |           |             |              |
|           |   LEFT TRUE VOCAL        |           |             |              |
|           | CORD-FS:4, TS       All  |           |             |              |
|           | tissue sections taken    |           |             |              |
|           | are submitted for        |           |             |              |
|           | microscopic              |           |             |              |
|           | evaluation.T:FATOUMATA/ELAINE/LUCILA:dj |           |             |              |
|           |               FINAL      |           |             |              |
|           | DIAGNOSIS#1 DELPHIAN     |           |             |              |
|           | LYMPH NODE:   ONE        |           |             |              |
|           | REGIONAL LYMPH NODE WITH |           |             |              |
|           |  NO EVIDENCE OF          |           |             |              |
|           | MALIGNANCY (0/1)#2       |           |             |              |
 
|           | MUCOSA LEFT TRUE VOCAL   |           |             |              |
|           | CORD:   INVASIVE         |           |             |              |
|           | SQUAMOUS CELL            |           |             |              |
|           | CARCINOMA#3 RIGHT        |           |             |              |
|           | HEMILARYNGECTOMY         |           |             |              |
|           | VERTICAL:   INVASIVE     |           |             |              |
|           | SQUAMOUS CELL CARCINOMA, |           |             |              |
|           |  WELL DIFFERENTIATED,    |           |             |              |
|           | 1.5 CM IN      GREATEST  |           |             |              |
|           | DIMENSION, INVADING INTO |           |             |              |
|           |  BUT NOT THROUGH THE     |           |             |              |
|           | THYROID      CARTILAGE.  |           |             |              |
|           | ANTEROINFERIOR SURGICAL  |           |             |              |
|           | MARGIN POSITIVE FOR      |           |             |              |
|           | TUMOR.#4 LEFT TRUE VOCAL |           |             |              |
|           |  CORD:   NO EVIDENCE OF  |           |             |              |
|           | MALIGNANCY       Note:   |           |             |              |
|           | In the absence of        |           |             |              |
|           | clinical history, the    |           |             |              |
|           | diagnosis is based       |           |             |              |
|           | ongross and/or           |           |             |              |
|           | histologic findings      |           |             |              |
|           | only.       Case         |           |             |              |
|           | reviewed by:   Jian MARTÍNEZ  |           |             |              |
|           | JORGE MaherCase staffed  |           |             |              |
|           | by:   Ernestine Webb           |             |              |
|           | M.D.T:98:tg        |           |             |              |
|           | My electronic signature  |           |             |              |
|           | indicates that I have    |           |             |              |
|           | personally reviewed      |           |             |              |
|           | alldiagnostic slides,    |           |             |              |
|           | the gross and/or         |           |             |              |
|           | microscopic portion of   |           |             |              |
|           | thisreport and           |           |             |              |
|           | formulated the final     |           |             |              |
|           | diagnosis.               |           |             |              |
+-----------+--------------------------+-----------+-------------+--------------+
 
 
 
+----------+
| Specimen |
+----------+
| Other    |
+----------+
 
 
 
+----------------------+------------------------+--------------------+--------------+
| Performing           | Address                | City/State/Zipcode | Phone Number |
| Organization         |                        |                    |              |
+----------------------+------------------------+--------------------+--------------+
|   [facility] |   [address]  | [place] [address] |              |
|  PATHOLOGY           | [address]                |                    |              |
+----------------------+------------------------+--------------------+--------------+
 documented in this encounter
 
 Visit Diagnoses
 Not on filedocumented in this encounter

## 2019-12-06 NOTE — XMS
Encounter Summary
  Created on: 2019
 
 Wyatt Shane
 External Reference #: [id number]
 : 43
 Sex: Male
 
 Demographics
 
 
+-----------------------+------------------------+
| Address               | [address]     |
|                       | [place]  [address]   |
+-----------------------+------------------------+
| Home Phone            | [phone]        |
+-----------------------+------------------------+
| Preferred Language    | Unknown                |
+-----------------------+------------------------+
| Marital Status        |                 |
+-----------------------+------------------------+
| [employer]ist Affiliation | LUT                    |
+-----------------------+------------------------+
| Race                  | White                  |
+-----------------------+------------------------+
| Ethnic Group          | Not  or  |
+-----------------------+------------------------+
 
 
 Author
 
 
+--------------+------------------------------+
| Author       | [facility] |
+--------------+------------------------------+
| Organization | [facility] |
+--------------+------------------------------+
| Address      | Unknown                      |
+--------------+------------------------------+
| Phone        | Unavailable                  |
+--------------+------------------------------+
 
 
 
 Support
 
 
+---------------+--------------+---------+-----------------+
| Name          | Relationship | Address | Phone           |
+---------------+--------------+---------+-----------------+
| Opal Shane | ECON         | Unknown | [phone] |
+---------------+--------------+---------+-----------------+
 
 
 
 Care Team Providers
 
 
 
+-----------------------+------+-----------------+
| Care Team Member Name | Role | Phone           |
+-----------------------+------+-----------------+
| Erwin Iniguez MD   | PCP  | [phone] |
+-----------------------+------+-----------------+
 
 
 
 Reason for Visit
 
 
+-----------------+----------+
| Reason          | Comments |
+-----------------+----------+
| Follow-up visit |          |
+-----------------+----------+
 
 
 
 Encounter Details
 
 
+--------+---------+----------------------+--------------------+---------------------+
| Date   | Type    | Department           | Care Team          | Description         |
+--------+---------+----------------------+--------------------+---------------------+
| / | Office  |   Otolaryngology     |   Jimmy Esposito MD | MALIGNANT NEOPLASM  |
|    | Visit   | Head and Neck        |                    | OF GLOTTIS (HCC)    |
|        |         | Surgery Services at  |                    | (Primary Dx)        |
|        |         | [facility]  [address] [facility]     |                    |                     |
|        |         | [address]       |                    |                     |
|        |         | Mailcode: PV01       |                    |                     |
|        |         | [facility] |                    |                     |
|        |         |   [place], [place]       |                    |                     |
|        |         | [address]           |                    |                     |
|        |         | [phone]         |                    |                     |
+--------+---------+----------------------+--------------------+---------------------+
 
 
 
 Social History
 
 
+----------------+-------+-----------+--------+------+
| Tobacco Use    | Types | Packs/Day | Years  | Date |
|                |       |           | Used   |      |
+----------------+-------+-----------+--------+------+
| Never Assessed |       |           |        |      |
+----------------+-------+-----------+--------+------+
 
 
 
+------------------+---------------+
| Sex Assigned at  | Date Recorded |
| Birth            |               |
+------------------+---------------+
| Not on file      |               |
+------------------+---------------+
 
 
 
 
+----------------+-------------+-------------+
| Job Start Date | Occupation  | Industry    |
+----------------+-------------+-------------+
| Not on file    | Not on file | Not on file |
+----------------+-------------+-------------+
 
 
 
+----------------+--------------+------------+
| Travel History | Travel Start | Travel End |
+----------------+--------------+------------+
 
 
 
+-------------------------------------+
| No recent travel history available. |
+-------------------------------------+
 documented as of this encounter
 
 Progress Notes
 Vaibhav Guerrero Phd, Jimmy - 2006  1:09 PM PSTHere for general check up- with his history of
 cancer wants to be sure nothing serious following recent episode of bronchitis.  Rx with an
tibiotics- now improving
 
 ROS: 
  
 No new skin lesions.
 No otalgia or otorrhea. 
 No dysphagia/odynophagia. 
 No change in voice. 
 No mouth or throat pain
 No epistaxix or nasal obstruction. 
 No new masses or pain 
 No chest pain on exertion.  
 No abdominal pain, change in bowel habits.  
 No urinary symptoms.  
 No new or unusual musculoskeletal symptoms. 
 No weight loss or fatigue.
 
 Skin- no evidence of lesions
 Ears- pinnae, ear canals, typmanic membranes- normal.
 Nose-normal to anterior rhinoscopy, mucosa normal, septum midline
 Oral cavity/Orophaynx- Mucosa of the oral cavity, tongue, pharynx and palate has no inflamm
ation or suspicious lesions.  Dentition normal. 
 Salivary Glands- no evidence of masses, normal salivary flow from Pedro's/Ogemaw's ducts
 Thyroid- normal size, no nodules
 Neck- No adenopathy or masses Levels I-V.
 Cranial Nerves- II-XII normal 
 
 Transnasal flexible fiberoptic laryngoscopy was performed under topical anesthesia (psuedoe
phedrine/lidocaine).  The nasal cavity, nasopharynx, oropharynx, hypopharynx and larynx were
 all well seen.  All mucosal surfaces are without any visible abnormality.  Laryngeal config
uration unchanged from before. Mild erythema only. There is no evidence of any pooled secret
ions.
 
 Electronically signed by Jimmy Esposito Md Phd at 2006  1:09 PM PSTdocumented in this en
counter
 
 Plan of Treatment
 
 
 
+----------------------+------------+--------+----------------------+---------------------+
| Name                 | Type       | Priori | Associated Diagnoses | Order Schedule      |
|                      |            | ty     |                      |                     |
+----------------------+------------+--------+----------------------+---------------------+
| CT LARYNGOSCOPY,FLEX | Procedures | Routin |   Malignant Neoplasm | Ordered: 2006 |
|  FIBER,DIAGNOSTIC    |            | e      |  Of Glottis (Hcc)    |                     |
+----------------------+------------+--------+----------------------+---------------------+
 documented as of this encounter
 
 Visit Diagnoses
 
 
+--------------------------------------------------------------------------------+
| Diagnosis                                                                      |
+--------------------------------------------------------------------------------+
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
+--------------------------------------------------------------------------------+
 documented in this encounter

## 2019-12-06 NOTE — XMS
Encounter Summary
  Created on: 2019
 
 Wyatt Shane
 External Reference #: [id number]
 : 43
 Sex: Male
 
 Demographics
 
 
+-----------------------+------------------------+
| Address               | [address]     |
|                       | [place]  [address]   |
+-----------------------+------------------------+
| Home Phone            | [phone]        |
+-----------------------+------------------------+
| Preferred Language    | Unknown                |
+-----------------------+------------------------+
| Marital Status        |                 |
+-----------------------+------------------------+
| Oriental orthodox Affiliation | LUT                    |
+-----------------------+------------------------+
| Race                  | White                  |
+-----------------------+------------------------+
| Ethnic Group          | Not  or  |
+-----------------------+------------------------+
 
 
 Author
 
 
+--------------+------------------------------+
| Author       | [facility] |
+--------------+------------------------------+
| Organization | [facility] |
+--------------+------------------------------+
| Address      | Unknown                      |
+--------------+------------------------------+
| Phone        | Unavailable                  |
+--------------+------------------------------+
 
 
 
 Support
 
 
+---------------+--------------+---------+-----------------+
| Name          | Relationship | Address | Phone           |
+---------------+--------------+---------+-----------------+
| Opal Shane | ECON         | Unknown | [phone] |
+---------------+--------------+---------+-----------------+
 
 
 
 Care Team Providers
 
 
 
+-----------------------+------+-----------------+
| Care Team Member Name | Role | Phone           |
+-----------------------+------+-----------------+
| Erwin Iniguez MD   | PCP  | [phone] |
+-----------------------+------+-----------------+
 
 
 
 Encounter Details
 
 
+--------+-------------+----------------------+----------------------+--------------------+
| Date   | Type        | Department           | Care Team            | Description        |
+--------+-------------+----------------------+----------------------+--------------------+
| / |  |   Otolaryngology     |   Jonathan Cross  | Laryngeal cancer   |
|    |             | Laryngology Services | MD [facility]  [address]   | ([facility]); Pharyngeal  |
|        |             |  at [facility]  [address] | [address]      | dysphagia          |
|        |             |  [address]     | [place], [place]         |                    |
|        |             | Mailcode: [address]       | [id number]           |                    |
|        |             | [facility] | [phone]         |                    |
|        |             |   [place], [place]       | [phone] (Fax)   |                    |
|        |             | [address]           |                      |                    |
|        |             | [phone]         |                      |                    |
+--------+-------------+----------------------+----------------------+--------------------+
 
 
 
 Social History
 
 
+---------------+------------+-----------+--------+------------------+
| Tobacco Use   | Types      | Packs/Day | Years  | Date             |
|               |            |           | Used   |                  |
+---------------+------------+-----------+--------+------------------+
| Former Smoker | Cigarettes | 1         | 30     | Quit: 1996 |
+---------------+------------+-----------+--------+------------------+
 
 
 
+-------------+-------------+---------+----------+
| Alcohol Use | Drinks/Week | oz/Week | Comments |
+-------------+-------------+---------+----------+
| No          |             |         |          |
+-------------+-------------+---------+----------+
 
 
 
+------------------+---------------+
| Sex Assigned at  | Date Recorded |
| Birth            |               |
+------------------+---------------+
| Not on file      |               |
+------------------+---------------+
 
 
 
+----------------+-------------+-------------+
| Job Start Date | Occupation  | Industry    |
+----------------+-------------+-------------+
| Not on file    | Not on file | Not on file |
 
+----------------+-------------+-------------+
 
 
 
+----------------+--------------+------------+
| Travel History | Travel Start | Travel End |
+----------------+--------------+------------+
 
 
 
+-------------------------------------+
| No recent travel history available. |
+-------------------------------------+
 documented as of this encounter
 
 Plan of Treatment
 Not on filedocumented as of this encounter
 
 Results
 MODIFIED BARIUM SWALLOWING (09/15/2010 10:14 AM PDT)
 
+-------------+--------------------------+-----------+------------+--------------+
| Component   | Value                    | Ref Range | Performed  | Pathologist  |
|             |                          |           | At         | Signature    |
+-------------+--------------------------+-----------+------------+--------------+
| ESOPHAGUS   | Examination: Modified    |           |            |              |
| W/BARIUM/FO | barium swallowing study. |           |            |              |
| OD/PHONAT   |    Technique             |           |            |              |
|             | andfindings: The study   |           |            |              |
|             | was performed in         |           |            |              |
|             | conjunction with the     |           |            |              |
|             | [facility] speechpathologist.  |           |            |              |
|             |   The patient drank a    |           |            |              |
|             | variety of barium        |           |            |              |
|             | impregnatedliquids and   |           |            |              |
|             | solids and swallowed a   |           |            |              |
|             | barium tablet using      |           |            |              |
|             | fluoroscopicobservation. |           |            |              |
|             |    There was no delay in |           |            |              |
|             |  initiating the primary  |           |            |              |
|             | propulsivewave on        |           |            |              |
|             | swallowing.   The        |           |            |              |
|             | patient swallowed thin   |           |            |              |
|             | barium withoutdifficulty |           |            |              |
|             |  with no obstruction of  |           |            |              |
|             | flow.   With thicker     |           |            |              |
|             | texture bariumliquids,   |           |            |              |
|             | there was mild partial   |           |            |              |
|             | delay and about the C6   |           |            |              |
|             | levelassociated with a   |           |            |              |
|             | mild short segment of    |           |            |              |
|             | circumferential          |           |            |              |
|             | narrowing ofthe proximal |           |            |              |
|             |  esophagus at this level |           |            |              |
|             |  but no sign of          |           |            |              |
|             | extravasation.There is   |           |            |              |
|             | no evidence for          |           |            |              |
|             | obstruction of flow of   |           |            |              |
|             | solid materials.         |           |            |              |
|             |   Theingested barium     |           |            |              |
 
|             | tablet passed through    |           |            |              |
|             | the oropharynx,          |           |            |              |
|             | hypopharynx andthoracic  |           |            |              |
|             | esophagus but there was  |           |            |              |
|             | delay in passage at      |           |            |              |
|             | thegastroesophageal      |           |            |              |
|             | junction.   See formal   |           |            |              |
|             | esophagram report.       |           |            |              |
|             | Impression: No sign of   |           |            |              |
|             | extravasation or         |           |            |              |
|             | aspiration.   Short      |           |            |              |
|             | segment ofnarrowing of   |           |            |              |
|             | the proximal esophagus   |           |            |              |
|             | at the C6 level          |           |            |              |
|             | perhapsaccounting for    |           |            |              |
|             | some retropulsion of     |           |            |              |
|             | thicker barium liquids   |           |            |              |
|             | throughthis segment.     |           |            |              |
|             |   Delay in passage of    |           |            |              |
|             | barium tablet at         |           |            |              |
|             | thegastroesophageal      |           |            |              |
|             | junction. I have         |           |            |              |
|             | personally viewed this   |           |            |              |
|             | procedure/exam and       |           |            |              |
|             | reviewed this report.    |           |            |              |
|             | Author: JUSTIN BAIRD       |           |            |              |
|             | JORGE ELIZALDEReviewer:   |           |            |              |
|             | JUSTIN ELIZALDE M.D. |           |            |              |
|             |   STATUS FINAL / [facility]     |           |            |              |
|             | JUSTIN ELIZALDE       |           |            |              |
+-------------+--------------------------+-----------+------------+--------------+
 
 
 
+----------+
| Specimen |
+----------+
|          |
+----------+
 
 
 
+----------------------+---------+--------------------+--------------+
| Performing           | Address | City/State/Zipcode | Phone Number |
| Organization         |         |                    |              |
+----------------------+---------+--------------------+--------------+
|   [facility] |         |                    |              |
|  RADIOLOGY           |         |                    |              |
+----------------------+---------+--------------------+--------------+
 documented in this encounter
 
 Visit Diagnoses
 
 
+--------------------------------------------------------------------------+
| Diagnosis                                                                |
+--------------------------------------------------------------------------+
|   Laryngeal cancer (HCC)  Malignant neoplasm of larynx, unspecified site |
+--------------------------------------------------------------------------+
|   Pharyngeal dysphagia  Dysphagia, pharyngeal phase                      |
 
+--------------------------------------------------------------------------+
 documented in this encounter

## 2019-12-06 NOTE — XMS
Encounter Summary
  Created on: 2019
 
 Wyatt Shane
 External Reference #: [id number]
 : 43
 Sex: Male
 
 Demographics
 
 
+-----------------------+------------------------+
| Address               | [address]     |
|                       | [place]  [address]   |
+-----------------------+------------------------+
| Home Phone            | [phone]        |
+-----------------------+------------------------+
| Preferred Language    | Unknown                |
+-----------------------+------------------------+
| Marital Status        |                 |
+-----------------------+------------------------+
| Rastafarian Affiliation | LUT                    |
+-----------------------+------------------------+
| Race                  | White                  |
+-----------------------+------------------------+
| Ethnic Group          | Not  or  |
+-----------------------+------------------------+
 
 
 Author
 
 
+--------------+------------------------------+
| Author       | [facility] |
+--------------+------------------------------+
| Organization | [facility] |
+--------------+------------------------------+
| Address      | Unknown                      |
+--------------+------------------------------+
| Phone        | Unavailable                  |
+--------------+------------------------------+
 
 
 
 Support
 
 
+---------------+--------------+---------+-----------------+
| Name          | Relationship | Address | Phone           |
+---------------+--------------+---------+-----------------+
| Opal Shane | ECON         | Unknown | [phone] |
+---------------+--------------+---------+-----------------+
 
 
 
 Care Team Providers
 
 
 
+-----------------------+------+-----------------+
| Care Team Member Name | Role | Phone           |
+-----------------------+------+-----------------+
| Erwin Iniguez MD   | PCP  | [phone] |
+-----------------------+------+-----------------+
 
 
 
 Encounter Details
 
 
+--------+-------------+-----------------+---------------------+---------------+
| Date   | Type        | Department      | Care Team           | Description   |
+--------+-------------+-----------------+---------------------+---------------+
| 10/20/ | Office      |   [facility]  |   Note, Outpatient  | Progress Note |
| 2000   | Visit-Trans | MEDICINE        | Clinic              |               |
|        | cribed      |                 |                     |               |
+--------+-------------+-----------------+---------------------+---------------+
 
 
 
 Social History
 
 
+----------------+-------+-----------+--------+------+
| Tobacco Use    | Types | Packs/Day | Years  | Date |
|                |       |           | Used   |      |
+----------------+-------+-----------+--------+------+
| Never Assessed |       |           |        |      |
+----------------+-------+-----------+--------+------+
 
 
 
+------------------+---------------+
| Sex Assigned at  | Date Recorded |
| Birth            |               |
+------------------+---------------+
| Not on file      |               |
+------------------+---------------+
 
 
 
+----------------+-------------+-------------+
| Job Start Date | Occupation  | Industry    |
+----------------+-------------+-------------+
| Not on file    | Not on file | Not on file |
+----------------+-------------+-------------+
 
 
 
+----------------+--------------+------------+
| Travel History | Travel Start | Travel End |
+----------------+--------------+------------+
 
 
 
+-------------------------------------+
| No recent travel history available. |
+-------------------------------------+
 documented as of this encounter
 
 
 Progress Notes
 Interface, Transcription In - 2006  1:09 AM PSTCLINIC DATE:       10/20/2000
 
 OTOLARYNGOLOGY HEAD AND NECK SURGERY
 
 SUBJECTIVE:   Mr. Shane  is seen back  in  followup  with  respect  to  his
 laryngotracheal reconstruction as well  as  his laryngeal carcinoma.  He is
 basically asymptomatic from a voice standpoint right now and has completely
 normal exercise tolerance.  His voice is a little lower in pitch today, but
 otherwise, he has no complaints.
 
 PHYSICAL  EXAMINATION:   Flexible fiberoptic  laryngoscopy  is  done.   His
 subglottic and glottic airways look  good.   He  still  has a little bit of
 residual scar tissue anteriorly which  is  maturing;  otherwise,  he  has a
 fairly normal configuration.
 
 ASSESSMENT:  Status post normal tracheoplasty, doing well.
 
 PLAN:  I will see him back here in approximately  2  months  for his 2-year
 checkup.  He will be seen at the same time for videostroboscopy.
 
 Jimmy Esposito M.D.
 
 RINA / 
 [id number] / [id number] / [id number] /
 D: 10/22/2000
 T: 10/21/2000
 
 cc:
 
 Eddy Boone M.D.
 [address] [place]
 [place]  [address]
 
 Erwin Iniguez M.D.
 [address]
 [place]  [address]
 
 [id number] signed by Interface, Transcription In at 2006  1:09 AM PSTdocume
nted in this encounter
 
 Plan of Treatment
 Not on filedocumented as of this encounter
 
 Visit Diagnoses
 Not on filedocumented in this encounter

## 2019-12-06 NOTE — XMS
Encounter Summary
  Created on: 2019
 
 Wyatt Shane
 External Reference #: [id number]
 : 43
 Sex: Male
 
 Demographics
 
 
+-----------------------+------------------------+
| Address               | [address]     |
|                       | [place]  [address]   |
+-----------------------+------------------------+
| Home Phone            | [phone]        |
+-----------------------+------------------------+
| Preferred Language    | Unknown                |
+-----------------------+------------------------+
| Marital Status        |                 |
+-----------------------+------------------------+
| Mandaen Affiliation | LUT                    |
+-----------------------+------------------------+
| Race                  | White                  |
+-----------------------+------------------------+
| Ethnic Group          | Not  or  |
+-----------------------+------------------------+
 
 
 Author
 
 
+--------------+------------------------------+
| Author       | [facility] |
+--------------+------------------------------+
| Organization | [facility] |
+--------------+------------------------------+
| Address      | Unknown                      |
+--------------+------------------------------+
| Phone        | Unavailable                  |
+--------------+------------------------------+
 
 
 
 Support
 
 
+---------------+--------------+---------+-----------------+
| Name          | Relationship | Address | Phone           |
+---------------+--------------+---------+-----------------+
| Opal Shane | ECON         | Unknown | [phone] |
+---------------+--------------+---------+-----------------+
 
 
 
 Care Team Providers
 
 
 
+-----------------------+------+-------------+
| Care Team Member Name | Role | Phone       |
+-----------------------+------+-------------+
 PCP  | Unavailable |
+-----------------------+------+-------------+
 
 
 
 Encounter Details
 
 
+--------+----------+----------------------+--------------------+-------------+
| Date   | Type     | Department           | Care Team          | Description |
+--------+----------+----------------------+--------------------+-------------+
| / | Results  |   Otolaryngology     |   Jimmy Esposito MD |             |
|    | Only     | Head and Neck        |                    |             |
|        |          | Surgery Services at  |                    |             |
|        |          | PPV  [address] [facility]     |                    |             |
|        |          | [address]       |                    |             |
|        |          | Mailcode: [address]       |                    |             |
|        |          | [address] |                    |             |
|        |          |   [place], [place]       |                    |             |
|        |          | [address]           |                    |             |
|        |          | [phone]         |                    |             |
+--------+----------+----------------------+--------------------+-------------+
 
 
 
 Social History
 
 
+----------------+-------+-----------+--------+------+
| Tobacco Use    | Types | Packs/Day | Years  | Date |
|                |       |           | Used   |      |
+----------------+-------+-----------+--------+------+
| Never Assessed |       |           |        |      |
+----------------+-------+-----------+--------+------+
 
 
 
+------------------+---------------+
| Sex Assigned at  | Date Recorded |
| Birth            |               |
+------------------+---------------+
| Not on file      |               |
+------------------+---------------+
 
 
 
+----------------+-------------+-------------+
| Job Start Date | Occupation  | Industry    |
+----------------+-------------+-------------+
| Not on file    | Not on file | Not on file |
+----------------+-------------+-------------+
 
 
 
+----------------+--------------+------------+
| Travel History | Travel Start | Travel End |
+----------------+--------------+------------+
 
 
 
 
+-------------------------------------+
| No recent travel history available. |
+-------------------------------------+
 documented as of this encounter
 
 Plan of Treatment
 Not on filedocumented as of this encounter
 
 Procedures
 
 
+--------------------+--------+-------------+----------------------+----------------------+
| Procedure Name     | Priori | Date/Time   | Associated Diagnosis | Comments             |
|                    | ty     |             |                      |                      |
+--------------------+--------+-------------+----------------------+----------------------+
| X-RAY CHEST 2 VIEW | Priori | 1999  |                      |   Results for this   |
|                    | ty     |  2:43 PM    |                      | procedure are in the |
|                    |        | PDT         |                      |  results section.    |
+--------------------+--------+-------------+----------------------+----------------------+
 documented in this encounter
 
 Results
 CHEST 2 VIEW (1999  2:43 PM PDT)
 
+-----------+--------------------------+-----------+------------+--------------+
| Component | Value                    | Ref Range | Performed  | Pathologist  |
|           |                          |           | At         | Signature    |
+-----------+--------------------------+-----------+------------+--------------+
| CHEST, 2  | Radiologist 1: [facility], |           |            |              |
| VIEWS [place]  |  SARINA ALVA M.D. TWO    |           |            |              |
| STEREO    | VIEWS OF THE CHEST:      |           |            |              |
|           | 99 at 1443 hours.  |           |            |              |
|           |    Dictated 99     |           |            |              |
|           | COMPARISON: Comparison   |           |            |              |
|           | is made with prior two   |           |            |              |
|           | view exam from 98. |           |            |              |
|           |  FINDINGS: Median        |           |            |              |
|           | sternotomy wires are in  |           |            |              |
|           | place. The configuration |           |            |              |
|           |  ofthe cardiomediastinal |           |            |              |
|           |  silhouette is           |           |            |              |
|           | unchanged. The           |           |            |              |
|           | descending thoracicaorta |           |            |              |
|           |  is mildly tortuous. The |           |            |              |
|           |  costophrenic angles are |           |            |              |
|           |  sharp and thepleural    |           |            |              |
|           | margins are smooth. No   |           |            |              |
|           | obvious developing       |           |            |              |
|           | pulmonary nodule         |           |            |              |
|           | orconsolidative process  |           |            |              |
|           | is identified.           |           |            |              |
|           | Degenerative changes are |           |            |              |
|           |  noted inthe thoracic    |           |            |              |
|           | spine.  IMPRESSION: No   |           |            |              |
|           | significant radiographic |           |            |              |
|           |  change since 98;  |           |            |              |
|           | no evidence ofactive     |           |            |              |
 
|           | cardiopulmonary disease. |           |            |              |
|           |    END OF IMPRESSION:    |           |            |              |
+-----------+--------------------------+-----------+------------+--------------+
 
 
 
+----------+
| Specimen |
+----------+
|          |
+----------+
 
 
 
+----------------------+---------+--------------------+--------------+
| Performing           | Address | City/State/Zipcode | Phone Number |
| Organization         |         |                    |              |
+----------------------+---------+--------------------+--------------+
|   [facility] DEPARTMENT OF |         |                    |              |
|  RADIOLOGY           |         |                    |              |
+----------------------+---------+--------------------+--------------+
 documented in this encounter
 
 Visit Diagnoses
 Not on filedocumented in this encounter

## 2019-12-06 NOTE — XMS
Encounter Summary
  Created on: 2019
 
 Wyatt Shane
 External Reference #: [id number]
 : 43
 Sex: Male
 
 Demographics
 
 
+-----------------------+------------------------+
| Address               | [address]     |
|                       | [place]  [address]   |
+-----------------------+------------------------+
| Home Phone            | [phone]        |
+-----------------------+------------------------+
| Preferred Language    | Unknown                |
+-----------------------+------------------------+
| Marital Status        |                 |
+-----------------------+------------------------+
| Pentecostalism Affiliation | LUT                    |
+-----------------------+------------------------+
| Race                  | White                  |
+-----------------------+------------------------+
| Ethnic Group          | Not  or  |
+-----------------------+------------------------+
 
 
 Author
 
 
+--------------+------------------------------+
| Author       | [facility] |
+--------------+------------------------------+
| Organization | [facility] |
+--------------+------------------------------+
| Address      | Unknown                      |
+--------------+------------------------------+
| Phone        | Unavailable                  |
+--------------+------------------------------+
 
 
 
 Support
 
 
+---------------+--------------+---------+-----------------+
| Name          | Relationship | Address | Phone           |
+---------------+--------------+---------+-----------------+
| Opal Shane | ECON         | Unknown | [phone] |
+---------------+--------------+---------+-----------------+
 
 
 
 Care Team Providers
 
 
 
+-----------------------+------+-----------------+
| Care Team Member Name | Role | Phone           |
+-----------------------+------+-----------------+
| Erwin Iniguez MD   | PCP  | [phone] |
+-----------------------+------+-----------------+
 
 
 
 Encounter Details
 
 
+--------+----------+----------------------+--------------------+-------------+
| Date   | Type     | Department           | Care Team          | Description |
+--------+----------+----------------------+--------------------+-------------+
| / | Results  |   [facility]  [address]  |   [facility]   |             |
|    | Only     | [address]  | [phone]       |             |
|        |          |  [place], [place]        |                    |             |
|        |          | [address]           |                    |             |
|        |          | [phone]         |                    |             |
+--------+----------+----------------------+--------------------+-------------+
 
 
 
 Social History
 
 
+----------------+-------+-----------+--------+------+
| Tobacco Use    | Types | Packs/Day | Years  | Date |
|                |       |           | Used   |      |
+----------------+-------+-----------+--------+------+
| Never Assessed |       |           |        |      |
+----------------+-------+-----------+--------+------+
 
 
 
+------------------+---------------+
| Sex Assigned at  | Date Recorded |
| Birth            |               |
+------------------+---------------+
| Not on file      |               |
+------------------+---------------+
 
 
 
+----------------+-------------+-------------+
| Job Start Date | Occupation  | Industry    |
+----------------+-------------+-------------+
| Not on file    | Not on file | Not on file |
+----------------+-------------+-------------+
 
 
 
+----------------+--------------+------------+
| Travel History | Travel Start | Travel End |
+----------------+--------------+------------+
 
 
 
+-------------------------------------+
| No recent travel history available. |
 
+-------------------------------------+
 documented as of this encounter
 
 Plan of Treatment
 Not on filedocumented as of this encounter
 
 Procedures
 
 
+--------------------+--------+------------+----------------------+----------------------+
| Procedure Name     | Priori | Date/Time  | Associated Diagnosis | Comments             |
|                    | ty     |            |                      |                      |
+--------------------+--------+------------+----------------------+----------------------+
| SURGICAL PATHOLOGY | Routin | 1998 |                      |   Results for this   |
|                    | e      |            |                      | procedure are in the |
|                    |        |            |                      |  results section.    |
+--------------------+--------+------------+----------------------+----------------------+
 documented in this encounter
 
 Results
 SURGICAL PATHOLOGY (1998)
 
+-----------+--------------------------+-----------+-------------+--------------+
| Component | Value                    | Ref Range | Performed   | Pathologist  |
|           |                          |           | At          | Signature    |
+-----------+--------------------------+-----------+-------------+--------------+
| SURGICAL  | SOURCE OF SPECIMEN: SEE  |           | [facility]        |              |
| PATHOLOGY | RESULTS                  |           | DEPARTMENT  |              |
|           | Preliminary              |           | OF          |              |
|           | History:CLINICAL HISTORY |           | PATHOLOGY   |              |
|           |        Patient Age:   52 |           |             |              |
|           |  year old male.          |           |             |              |
|           | Patient History: Prior   |           |             |              |
|           | biopsy showed squamous   |           |             |              |
|           | cell carcinoma in        |           |             |              |
|           | situ,now for deeper      |           |             |              |
|           | biopsy.       GROSS      |           |             |              |
|           | DESCRIPTION              |           |             |              |
|           | Specimens received:   1  |           |             |              |
|           | in formalin.       #1    |           |             |              |
|           | RIGHT TRUE VOCAL CORD    |           |             |              |
|           | BIOPSY: The specimen     |           |             |              |
|           | consists of multiple     |           |             |              |
|           | softpale pink to dark    |           |             |              |
|           | purple tissues,          |           |             |              |
|           | measuring 0.8 x 0.7 x    |           |             |              |
|           | 0.5 cm inaggregate. The  |           |             |              |
|           | specimen is filtered.    |           |             |              |
|           |     CASSETTE INDEX:#1    |           |             |              |
|           |   RIGHT TRUE VOCAL CORD  |           |             |              |
|           | BIOPSY:one cassette, TS. |           |             |              |
|           |        All tissue        |           |             |              |
|           | sections taken are       |           |             |              |
|           | submitted for            |           |             |              |
|           | microscopic              |           |             |              |
|           | evaluation.JAVIK:KB:CM:tm   |           |             |              |
|           |       FINAL DIAGNOSIS#1  |           |             |              |
|           | RIGHT TRUE VOCAL CORD    |           |             |              |
|           | BIOPSY:   INVASIVE,      |           |             |              |
|           | MODERATELY               |           |             |              |
 
|           | DIFFERENTIATED SQUAMOUS  |           |             |              |
|           | CELL CARCINOMA           |           |             |              |
|           | Case reviewed by:   Enedina |           |             |              |
|           |  Brianna, Student         |           |             |              |
|           | FellowCase staffed by:   |           |             |              |
|           |   Bayron Pavon,        |           |             |              |
|           | MTABT:98:tg        |           |             |              |
|           | My electronic signature  |           |             |              |
|           | indicates that I have    |           |             |              |
|           | personally reviewed      |           |             |              |
|           | alldiagnostic slides,    |           |             |              |
|           | the gross and/or         |           |             |              |
|           | microscopic portion of   |           |             |              |
|           | thisreport and           |           |             |              |
|           | formulated the final     |           |             |              |
|           | diagnosis.               |           |             |              |
+-----------+--------------------------+-----------+-------------+--------------+
 
 
 
+----------+
| Specimen |
+----------+
| Other    |
+----------+
 
 
 
+----------------------+------------------------+--------------------+--------------+
| Performing           | Address                | City/State/Zipcode | Phone Number |
| Organization         |                        |                    |              |
+----------------------+------------------------+--------------------+--------------+
|   [facility] |   [address]  | [place] [address] |              |
|  PATHOLOGY           | [place]                |                    |              |
+----------------------+------------------------+--------------------+--------------+
 documented in this encounter
 
 Visit Diagnoses
 Not on filedocumented in this encounter

## 2019-12-06 NOTE — XMS
Encounter Summary
  Created on: 2019
 
 Wyatt Shane
 External Reference #: [id number]
 : 43
 Sex: Male
 
 Demographics
 
 
+-----------------------+------------------------+
| Address               | [address]     |
|                       | [place]  [address]   |
+-----------------------+------------------------+
| Home Phone            | [phone]        |
+-----------------------+------------------------+
| Preferred Language    | Unknown                |
+-----------------------+------------------------+
| Marital Status        |                 |
+-----------------------+------------------------+
| Presybeterian Affiliation | LUT                    |
+-----------------------+------------------------+
| Race                  | White                  |
+-----------------------+------------------------+
| Ethnic Group          | Not  or  |
+-----------------------+------------------------+
 
 
 Author
 
 
+--------------+------------------------------+
| Author       | [facility] |
+--------------+------------------------------+
| Organization | [facility] |
+--------------+------------------------------+
| Address      | Unknown                      |
+--------------+------------------------------+
| Phone        | Unavailable                  |
+--------------+------------------------------+
 
 
 
 Support
 
 
+---------------+--------------+---------+-----------------+
| Name          | Relationship | Address | Phone           |
+---------------+--------------+---------+-----------------+
| Opal Shane | ECON         | Unknown | [phone] |
+---------------+--------------+---------+-----------------+
 
 
 
 Care Team Providers
 
 
 
+-----------------------+------+-----------------+
| Care Team Member Name | Role | Phone           |
+-----------------------+------+-----------------+
| Erwin Iniguez MD   | PCP  | [phone] |
+-----------------------+------+-----------------+
 
 
 
 Encounter Details
 
 
+--------+-------------+-----------------+---------------------+---------------+
| Date   | Type        | Department      | Care Team           | Description   |
+--------+-------------+-----------------+---------------------+---------------+
| / | Office      |   [facility]  |   Note, Outpatient  | Progress Note |
| 2000   | Visit-Trans | MEDICINE        | Clinic              |               |
|        | cribed      |                 |                     |               |
+--------+-------------+-----------------+---------------------+---------------+
 
 
 
 Social History
 
 
+----------------+-------+-----------+--------+------+
| Tobacco Use    | Types | Packs/Day | Years  | Date |
|                |       |           | Used   |      |
+----------------+-------+-----------+--------+------+
| Never Assessed |       |           |        |      |
+----------------+-------+-----------+--------+------+
 
 
 
+------------------+---------------+
| Sex Assigned at  | Date Recorded |
| Birth            |               |
+------------------+---------------+
| Not on file      |               |
+------------------+---------------+
 
 
 
+----------------+-------------+-------------+
| Job Start Date | Occupation  | Industry    |
+----------------+-------------+-------------+
| Not on file    | Not on file | Not on file |
+----------------+-------------+-------------+
 
 
 
+----------------+--------------+------------+
| Travel History | Travel Start | Travel End |
+----------------+--------------+------------+
 
 
 
+-------------------------------------+
| No recent travel history available. |
+-------------------------------------+
 documented as of this encounter
 
 
 Progress Notes
 Interface, Transcription In - 2006  1:06 AM PSTCLINIC DATE:       2000
 
 OTOLARYNGOLOGY, HEAD AND NECK SURGERY
 
 The  patient  is  seen  back  today  in   followup   with  respect  to  his
 laryngotracheal reconstruction for glottic stenosis.  He is doing very well
 and has not had any specific problems.  Flexible fiberoptic laryngoscopy is
 done  today  and reveals that his T-tube  is  in  good  position.   At  the
 anterior commissure, I can still see  a  little bit of exposed and probably
 sequestered  cartilage,  but  certainly   there   is   minimal  inflamation
 surrounding this.  He and I discussed, therefore, removal of his T-tube and
 placement of a Stoll cannula.  I  anesthetized the stomal site with 1%
 Xylocaine with 1:100,000 epinephrine for hemostasis removing the T-tube and
 placing a #8 Stoll cannula.  I  trimmed  it  to  length,  and  we will
 observe him back in a month to see if  there  is any restenosis or collapse
 as a result of removal of his tube.
 
 Jimmy Esposito M.D.
 
 MARIA ALEJANDRA / 
 [id number] / [id number] / [id number] / [id number]
 D: 2000
 T: 2000
 
 [id number] signed by Interface, Transcription In at 2006  1:06 AM PSTdocume
nted in this encounter
 
 Plan of Treatment
 Not on filedocumented as of this encounter
 
 Visit Diagnoses
 Not on filedocumented in this encounter

## 2019-12-06 NOTE — XMS
Encounter Summary
  Created on: 2019
 
 Shane Wyatttien BroussardJosue
 External Reference #: [id number]
 : 43
 Sex: Male
 
 Demographics
 
 
+-----------------------+---------------------------+
| Address               | [address]        |
|                       | [place]  [address] |
+-----------------------+---------------------------+
| Home Phone            | [phone]           |
+-----------------------+---------------------------+
| Preferred Language    | Unknown                   |
+-----------------------+---------------------------+
| Marital Status        |                    |
+-----------------------+---------------------------+
| Buddhist Affiliation | 1028                      |
+-----------------------+---------------------------+
| Race                  | Unknown                   |
+-----------------------+---------------------------+
| Ethnic Group          | Unknown                   |
+-----------------------+---------------------------+
 
 
 Author
 
 
+--------------+--------------------------------------------+
| Author       | [facility]  |
|              | [facility] [place]                                |
+--------------+--------------------------------------------+
| Organization | [facility]  |
|              | [facility] [place]                                |
+--------------+--------------------------------------------+
| Address      | Unknown                                    |
+--------------+--------------------------------------------+
| Phone        | Unavailable                                |
+--------------+--------------------------------------------+
 
 
 
 Support
 
 
+---------------+--------------+--------------------+-----------------+
| Name          | Relationship | Address            | Phone           |
+---------------+--------------+--------------------+-----------------+
| Opal Shane | ECON         | [address]     | [phone] |
|               |              | [place]   |                 |
|               |              | [address]              |                 |
+---------------+--------------+--------------------+-----------------+
 
 
 
 
 Care Team Providers
 
 
+-----------------------+------+-------------+
| Care Team Member Name | Role | Phone       |
+-----------------------+------+-------------+
 PCP  | Unavailable |
+-----------------------+------+-------------+
 
 
 
 Encounter Details
 
 
+--------+-----------+----------------------+-----------+-------------+
| Date   | Type      | Department           | Care Team | Description |
+--------+-----------+----------------------+-----------+-------------+
| / | Hospital  |   [facility] |           |             |
|    | Encounter |  MED [facility] GENERIC OP  |           |             |
|        |           | CONV DEPT  [address]     |           |             |
|        |           | [address]  [place], |           |             |
|        |           |  [place] [address]       |           |             |
|        |           | [phone]         |           |             |
+--------+-----------+----------------------+-----------+-------------+
 
 
 
 Social History
 
 
+----------------+-------+-----------+--------+------+
| Tobacco Use    | Types | Packs/Day | Years  | Date |
|                |       |           | Used   |      |
+----------------+-------+-----------+--------+------+
| Never Assessed |       |           |        |      |
+----------------+-------+-----------+--------+------+
 
 
 
+------------------+---------------+
| Sex Assigned at  | Date Recorded |
| Birth            |               |
+------------------+---------------+
| Not on file      |               |
+------------------+---------------+
 
 
 
+----------------+-------------+-------------+
| Job Start Date | Occupation  | Industry    |
+----------------+-------------+-------------+
| Not on file    | Not on file | Not on file |
+----------------+-------------+-------------+
 
 
 
+----------------+--------------+------------+
| Travel History | Travel Start | Travel End |
+----------------+--------------+------------+
 
 
 
 
+-------------------------------------+
| No recent travel history available. |
+-------------------------------------+
 documented as of this encounter
 
 Plan of Treatment
 
 
+--------+---------+-------------+----------------------+-------------+
| Date   | Type    | Specialty   | Care Team            | Description |
+--------+---------+-------------+----------------------+-------------+
| / | Office  | Pulmonology |   Juan F,          |             |
| 2019   | Visit   |             | Jessica Cheung,   |             |
|        |         |             | MD  [address] |             |
|        |         |             |   [place],   |             |
|        |         |             | [place] [address]             |             |
|        |         |             | [phone]         |             |
|        |         |             | [phone] (Fax)   |             |
+--------+---------+-------------+----------------------+-------------+
| / | Office  | Cardiology  |   Eric Akins, |             |
| 2020   | Visit   |             |  MD [address]   |             |
|        |         |             | [place]  |             |
|        |         |             | [address]  [phone]  |             |
|        |         |             |  [phone] (Fax)  |             |
+--------+---------+-------------+----------------------+-------------+
 documented as of this encounter
 
 Visit Diagnoses
 Not on filedocumented in this encounter

## 2019-12-06 NOTE — XMS
Encounter Summary
  Created on: 2019
 
 Wyatt Shane
 External Reference #: [id number]
 : 43
 Sex: Male
 
 Demographics
 
 
+-----------------------+------------------------+
| Address               | [address]     |
|                       | [place]  [address]   |
+-----------------------+------------------------+
| Home Phone            | [phone]        |
+-----------------------+------------------------+
| Preferred Language    | Unknown                |
+-----------------------+------------------------+
| Marital Status        |                 |
+-----------------------+------------------------+
| Presybeterian Affiliation | LUT                    |
+-----------------------+------------------------+
| Race                  | White                  |
+-----------------------+------------------------+
| Ethnic Group          | Not  or  |
+-----------------------+------------------------+
 
 
 Author
 
 
+--------------+------------------------------+
| Author       | [facility] |
+--------------+------------------------------+
| Organization | [facility] |
+--------------+------------------------------+
| Address      | Unknown                      |
+--------------+------------------------------+
| Phone        | Unavailable                  |
+--------------+------------------------------+
 
 
 
 Support
 
 
+---------------+--------------+---------+-----------------+
| Name          | Relationship | Address | Phone           |
+---------------+--------------+---------+-----------------+
| Opal Shane | ECON         | Unknown | [phone] |
+---------------+--------------+---------+-----------------+
 
 
 
 Care Team Providers
 
 
 
+-----------------------+------+-----------------+
| Care Team Member Name | Role | Phone           |
+-----------------------+------+-----------------+
| Erwin Iniguez MD   | PCP  | [phone] |
+-----------------------+------+-----------------+
 
 
 
 Encounter Details
 
 
+--------+-------------+----------------------+--------------+-------------+
| Date   | Type        | Department           | Care Team    | Description |
+--------+-------------+----------------------+--------------+-------------+
| 02/10/ | Documentati |   Anesthesiology     |   Unknown  . |             |
|    | on          | [address]  |              |             |
|        |             | [place]  [place],   |              |             |
|        |             | [place] [address]        |              |             |
+--------+-------------+----------------------+--------------+-------------+
 
 
 
 Social History
 
 
+----------------+-------+-----------+--------+------+
| Tobacco Use    | Types | Packs/Day | Years  | Date |
|                |       |           | Used   |      |
+----------------+-------+-----------+--------+------+
| Never Assessed |       |           |        |      |
+----------------+-------+-----------+--------+------+
 
 
 
+------------------+---------------+
| Sex Assigned at  | Date Recorded |
| Birth            |               |
+------------------+---------------+
| Not on file      |               |
+------------------+---------------+
 
 
 
+----------------+-------------+-------------+
| Job Start Date | Occupation  | Industry    |
+----------------+-------------+-------------+
| Not on file    | Not on file | Not on file |
+----------------+-------------+-------------+
 
 
 
+----------------+--------------+------------+
| Travel History | Travel Start | Travel End |
+----------------+--------------+------------+
 
 
 
+-------------------------------------+
| No recent travel history available. |
+-------------------------------------+
 
 documented as of this encounter
 
 Plan of Treatment
 Not on filedocumented as of this encounter
 
 Procedures
 
 
+---------------------+--------+-------------+----------------------+----------------------+
| Procedure Name      | Priori | Date/Time   | Associated Diagnosis | Comments             |
|                     | ty     |             |                      |                      |
+---------------------+--------+-------------+----------------------+----------------------+
| ANESTHESIA/SEDATION |        | 02/10/2000  |                      |   Results for this   |
|                     |        | 11:24 AM    |                      | procedure are in the |
|                     |        | PST         |                      |  results section.    |
+---------------------+--------+-------------+----------------------+----------------------+
 documented in this encounter
 
 Results
 ANESTHESIA/SEDATION (02/10/2000 11:24 AM PST)
 
+---------------------------------------+--------------+
| Narrative                             | Performed At |
+---------------------------------------+--------------+
|   Ordered by an unspecified provider. |              |
+---------------------------------------+--------------+
 
 
 
+------------------------------------------------------------------+
| Transcriptions                                                   |
+------------------------------------------------------------------+
|   02/10/2000 11:24 AM [facility]    Anesthesia PostOp Report            |
|                                                                  |
|  Patient: WYATT SHANE Med Rec: [id number] Sex M Bdate: 1945 |
|   Date/Time Data                                                 |
|   Entered Into [facility]                                               |
|  Anesth PostOp                                                   |
|   Surgery Date [id number] 02/10/00 11:24                           |
|   [id number] 10/28/99 11:31                                        |
|   Anesthesiologist FRED PENALOZA 02/10/00 11:24                 |
|   FRED MARIA 10/28/99 11:31                                  |
|   Resident Anesthesiolog AMANDA GAYTAN 10/28/99 11:31            |
|                                                                  |
+------------------------------------------------------------------+
 documented in this encounter
 
 Visit Diagnoses
 Not on filedocumented in this encounter

## 2019-12-06 NOTE — XMS
Encounter Summary
  Created on: 2019
 
 Wyatt Shane
 External Reference #: [id number]
 : 43
 Sex: Male
 
 Demographics
 
 
+-----------------------+------------------------+
| Address               | [address]     |
|                       | [place]  [address]   |
+-----------------------+------------------------+
| Home Phone            | [phone]        |
+-----------------------+------------------------+
| Preferred Language    | Unknown                |
+-----------------------+------------------------+
| Marital Status        |                 |
+-----------------------+------------------------+
| Gnosticist Affiliation | LUT                    |
+-----------------------+------------------------+
| Race                  | White                  |
+-----------------------+------------------------+
| Ethnic Group          | Not  or  |
+-----------------------+------------------------+
 
 
 Author
 
 
+--------------+------------------------------+
| Author       | [facility] |
+--------------+------------------------------+
| Organization | [facility] |
+--------------+------------------------------+
| Address      | Unknown                      |
+--------------+------------------------------+
| Phone        | Unavailable                  |
+--------------+------------------------------+
 
 
 
 Support
 
 
+---------------+--------------+---------+-----------------+
| Name          | Relationship | Address | Phone           |
+---------------+--------------+---------+-----------------+
| Opal Shane | ECON         | Unknown | [phone] |
+---------------+--------------+---------+-----------------+
 
 
 
 Care Team Providers
 
 
 
+-----------------------+------+-----------------+
| Care Team Member Name | Role | Phone           |
+-----------------------+------+-----------------+
| Erwin Iniguez MD   | PCP  | [phone] |
+-----------------------+------+-----------------+
 
 
 
 Encounter Details
 
 
+--------+-------------+-----------------+---------------------+---------------+
| Date   | Type        | Department      | Care Team           | Description   |
+--------+-------------+-----------------+---------------------+---------------+
| / | Office      |   [facility]  |   Note, Outpatient  | Progress Note |
|    | Visit-Trans | MEDICINE        | Clinic              |               |
|        | cribed      |                 |                     |               |
+--------+-------------+-----------------+---------------------+---------------+
 
 
 
 Social History
 
 
+----------------+-------+-----------+--------+------+
| Tobacco Use    | Types | Packs/Day | Years  | Date |
|                |       |           | Used   |      |
+----------------+-------+-----------+--------+------+
| Never Assessed |       |           |        |      |
+----------------+-------+-----------+--------+------+
 
 
 
+------------------+---------------+
| Sex Assigned at  | Date Recorded |
| Birth            |               |
+------------------+---------------+
| Not on file      |               |
+------------------+---------------+
 
 
 
+----------------+-------------+-------------+
| Job Start Date | Occupation  | Industry    |
+----------------+-------------+-------------+
| Not on file    | Not on file | Not on file |
+----------------+-------------+-------------+
 
 
 
+----------------+--------------+------------+
| Travel History | Travel Start | Travel End |
+----------------+--------------+------------+
 
 
 
+-------------------------------------+
| No recent travel history available. |
+-------------------------------------+
 documented as of this encounter
 
 
 Progress Notes
 Interface, Transcription In - 2006  5:03 AM PSTINIC DATE:       1999
 
 OTOLARYNGOLOGY CLINIC
 
 SUBJECTIVE:  The patient returns to clinic today for placement of a pH
 probe.  He has a history of laryngeal stenosis status post a partial
 laryngectomy.  There is some question of gastroesophageal reflux disease
 being a factor in the stenosis.
 
 PROCEDURE:  The patient's nose was anesthetized and decongested with 1%
 lidocaine with phenylephrine spray.  The throat was likewise anesthetized.
 The probe was inserted through the right naris into the oropharynx.  A
 nasopharyngoscope was then inserted through the left naris to visualize the
 probe being placed.  The probe was advanced until the upper probe was just
 into the postcricoid region.  The probe was secured in place at the nose,
 and its position was rechecked with the flexible nasopharyngoscope.
 
 ASSESSMENT:  Placement of pH probe in clinic today.
 
 PLAN:  The patient will return to the GI lab in 24 hours for removal of the
 probe.  He will then follow up with Dr. Esposito in the ENT Clinic to review
 the results and to undergo preoperative workup for the planned dilatation
 of the laryngeal stenosis.
 
 Larry Means M.D.
 Resident, Otolaryngology
 Head and Neck Surgery
 
 JOHNNY/odette
 D: 1999
 T: 1999Electronically signed by Interface, Transcription In at 2006  5:03 AM MIKE Paez, Transcription In - 2006  5:03 AM PSTINIC DATE:       1999
 
                 OTOLARYNGOLOGY HEAD AND NECK SURGERY CLINIC
 
 Mr. Shane is seen back preoperatively in follow-up from his pH probe which
 shows significant supine, proximal and distal reflux.  I have discussed
 this with him.  We had a PAR regarding the proposed procedure including the
 possibility of trachea.  He seems to understand very well what is involved.
 Consent was signed.
 
 Jimmy Esposito M.D.
 
 CIRILO/ulices
 D: 1999
 T: 1999Electronically signed by Interface, Transcription In at 2006  5:03 AM PS
Tdocumented in this encounter
 
 Plan of Treatment
 Not on filedocumented as of this encounter
 
 Visit Diagnoses
 Not on filedocumented in this encounter

## 2019-12-06 NOTE — XMS
Encounter Summary
  Created on: 2019
 
 Wyatt Shane
 External Reference #: [id number]
 : 43
 Sex: Male
 
 Demographics
 
 
+-----------------------+------------------------+
| Address               | [address]     |
|                       | [place]  [address]   |
+-----------------------+------------------------+
| Home Phone            | [phone]        |
+-----------------------+------------------------+
| Preferred Language    | Unknown                |
+-----------------------+------------------------+
| Marital Status        |                 |
+-----------------------+------------------------+
| Confucianist Affiliation | LUT                    |
+-----------------------+------------------------+
| Race                  | White                  |
+-----------------------+------------------------+
| Ethnic Group          | Not  or  |
+-----------------------+------------------------+
 
 
 Author
 
 
+--------------+------------------------------+
| Author       | [facility] |
+--------------+------------------------------+
| Organization | [facility] |
+--------------+------------------------------+
| Address      | Unknown                      |
+--------------+------------------------------+
| Phone        | Unavailable                  |
+--------------+------------------------------+
 
 
 
 Support
 
 
+---------------+--------------+---------+-----------------+
| Name          | Relationship | Address | Phone           |
+---------------+--------------+---------+-----------------+
| Opal Shane | ECON         | Unknown | [phone] |
+---------------+--------------+---------+-----------------+
 
 
 
 Care Team Providers
 
 
 
+-----------------------+------+-----------------+
| Care Team Member Name | Role | Phone           |
+-----------------------+------+-----------------+
| Erwin Iniguez MD   | PCP  | [phone] |
+-----------------------+------+-----------------+
 
 
 
 Encounter Details
 
 
+--------+-----------+----------------------+-----------+-------------+
| Date   | Type      | Department           | Care Team | Description |
+--------+-----------+----------------------+-----------+-------------+
| 09/15/ | Hospital  |   Diagnostic Imaging |           |             |
|    | Encounter |  Services at [facility]     |           |             |
|        |           | [address]  |           |             |
|        |           | [place]  Mailcode:   |           |             |
|        |           | [address]  [facility]  |           |             |
|        |           |  [place], [place]        |           |             |
|        |           | [address]           |           |             |
|        |           | [phone]         |           |             |
+--------+-----------+----------------------+-----------+-------------+
 
 
 
 Social History
 
 
+---------------+------------+-----------+--------+------------------+
| Tobacco Use   | Types      | Packs/Day | Years  | Date             |
|               |            |           | Used   |                  |
+---------------+------------+-----------+--------+------------------+
| Former Smoker | Cigarettes | 1         | 30     | Quit: 1996 |
+---------------+------------+-----------+--------+------------------+
 
 
 
+-------------+-------------+---------+----------+
| Alcohol Use | Drinks/Week | oz/Week | Comments |
+-------------+-------------+---------+----------+
| No          |             |         |          |
+-------------+-------------+---------+----------+
 
 
 
+------------------+---------------+
| Sex Assigned at  | Date Recorded |
| Birth            |               |
+------------------+---------------+
| Not on file      |               |
+------------------+---------------+
 
 
 
+----------------+-------------+-------------+
| Job Start Date | Occupation  | Industry    |
+----------------+-------------+-------------+
| Not on file    | Not on file | Not on file |
+----------------+-------------+-------------+
 
 
 
 
+----------------+--------------+------------+
| Travel History | Travel Start | Travel End |
+----------------+--------------+------------+
 
 
 
+-------------------------------------+
| No recent travel history available. |
+-------------------------------------+
 documented as of this encounter
 
 Medications at Time of Discharge
 
 
+----------------------+----------------------+-----------+---------+----------+----------+
| Medication           | Sig                  | Dispensed | Refills | Start    | End Date |
|                      |                      |           |         | Date     |          |
+----------------------+----------------------+-----------+---------+----------+----------+
|   ACYCLOVIR ORAL     | Take  by mouth as    |           | 0       | 20 |          |
|                      | needed.              |           |         | 10       |          |
+----------------------+----------------------+-----------+---------+----------+----------+
|                      | Take  by mouth.      |           | 0       |          |          |
| Amlodipine-Atorvasta |                      |           |         |          |          |
| tin (CADUET) 10-10   |                      |           |         |          |          |
| mg Oral Tablet       |                      |           |         |          |          |
+----------------------+----------------------+-----------+---------+----------+----------+
|   CYANOCOBALAMIN     | by Injection route   |           | 0       | 20 |          |
| (VITAMIN B-12 INJ)   | every thirty days.   |           |         | 10       |          |
+----------------------+----------------------+-----------+---------+----------+----------+
|   gabapentin 300 mg  | Take 300 mg by mouth |           | 0       |          |          |
| Oral Tablet          |  three times daily.  |           |         |          |          |
+----------------------+----------------------+-----------+---------+----------+----------+
|   LANSOPRAZOLE       | Take  by mouth.      |           | 0       |          |          |
| (PREVACID ORAL)      |                      |           |         |          |          |
+----------------------+----------------------+-----------+---------+----------+----------+
|   LORATADINE         | Take  by mouth.      |           | 0       |          |          |
| (CLARITIN ORAL)      |                      |           |         |          |          |
+----------------------+----------------------+-----------+---------+----------+----------+
|   metoclopramide     | Take 5 mg by mouth   |           | 0       |          |          |
| (REGLAN) 5 mg Oral   | every six hours as   |           |         |          |          |
| Tablet               | needed.              |           |         |          |          |
+----------------------+----------------------+-----------+---------+----------+----------+
|   NAPROXEN           | Take  by mouth.      |           | 0       |          |          |
| SODIUM/P-EPHED HCL   |                      |           |         |          |          |
| (SUDAFED 12 HR       |                      |           |         |          |          |
| SINUS-PAIN ORAL)     |                      |           |         |          |          |
+----------------------+----------------------+-----------+---------+----------+----------+
|   sertraline         | Take 50 mg by mouth  |           | 0       |          |          |
| (ZOLOFT) 50 mg Oral  | once daily.          |           |         |          |          |
| Tablet               |                      |           |         |          |          |
+----------------------+----------------------+-----------+---------+----------+----------+
|   tamsulosin         | Take 0.4 mg by mouth |           | 0       |          |          |
| (FLOMAX) 0.4 mg Oral |  once daily.         |           |         |          |          |
|  Capsule, Sust.      |                      |           |         |          |          |
| Release 24 hr        |                      |           |         |          |          |
+----------------------+----------------------+-----------+---------+----------+----------+
|   TESTOSTERONE IM    | Inject  into the     |           | 0       |          |          |
 
|                      | muscle (IM).         |           |         |          |          |
+----------------------+----------------------+-----------+---------+----------+----------+
|   VICODIN ORAL       | None Entered         |           | 0       |          |          |
+----------------------+----------------------+-----------+---------+----------+----------+
 documented as of this encounter
 
 Plan of Treatment
 Not on filedocumented as of this encounter
 
 Procedures
 
 
+------------------+--------+-------------+----------------------+----------------------+
| Procedure Name   | Priori | Date/Time   | Associated Diagnosis | Comments             |
|                  | ty     |             |                      |                      |
+------------------+--------+-------------+----------------------+----------------------+
| MODIFIED BARIUM  | Routin | 09/15/2010  |   Laryngeal cancer   |   Results for this   |
| SWALLOWING       | e      | 10:14 AM    | (HCC)  Pharyngeal    | procedure are in the |
|                  |        | PDT         | dysphagia            |  results section.    |
+------------------+--------+-------------+----------------------+----------------------+
 documented in this encounter
 
 Results
 MODIFIED BARIUM SWALLOWING (09/15/2010 10:14 AM PDT)
 
+-------------+--------------------------+-----------+------------+--------------+
| Component   | Value                    | Ref Range | Performed  | Pathologist  |
|             |                          |           | At         | Signature    |
+-------------+--------------------------+-----------+------------+--------------+
| ESOPHAGUS   | Examination: Modified    |           |            |              |
| W/BARIUM/FO | barium swallowing study. |           |            |              |
| OD/PHONAT   |    Technique             |           |            |              |
|             | andfindings: The study   |           |            |              |
|             | was performed in         |           |            |              |
|             | conjunction with the     |           |            |              |
|             | [facility] speechpathologist.  |           |            |              |
|             |   The patient drank a    |           |            |              |
|             | variety of barium        |           |            |              |
|             | impregnatedliquids and   |           |            |              |
|             | solids and swallowed a   |           |            |              |
|             | barium tablet using      |           |            |              |
|             | fluoroscopicobservation. |           |            |              |
|             |    There was no delay in |           |            |              |
|             |  initiating the primary  |           |            |              |
|             | propulsivewave on        |           |            |              |
|             | swallowing.   The        |           |            |              |
|             | patient swallowed thin   |           |            |              |
|             | barium withoutdifficulty |           |            |              |
|             |  with no obstruction of  |           |            |              |
|             | flow.   With thicker     |           |            |              |
|             | texture bariumliquids,   |           |            |              |
|             | there was mild partial   |           |            |              |
|             | delay and about the C6   |           |            |              |
|             | levelassociated with a   |           |            |              |
|             | mild short segment of    |           |            |              |
|             | circumferential          |           |            |              |
|             | narrowing ofthe proximal |           |            |              |
|             |  esophagus at this level |           |            |              |
|             |  but no sign of          |           |            |              |
|             | extravasation.There is   |           |            |              |
 
|             | no evidence for          |           |            |              |
|             | obstruction of flow of   |           |            |              |
|             | solid materials.         |           |            |              |
|             |   Theingested barium     |           |            |              |
|             | tablet passed through    |           |            |              |
|             | the oropharynx,          |           |            |              |
|             | hypopharynx andthoracic  |           |            |              |
|             | esophagus but there was  |           |            |              |
|             | delay in passage at      |           |            |              |
|             | thegastroesophageal      |           |            |              |
|             | junction.   See formal   |           |            |              |
|             | esophagram report.       |           |            |              |
|             | Impression: No sign of   |           |            |              |
|             | extravasation or         |           |            |              |
|             | aspiration.   Short      |           |            |              |
|             | segment ofnarrowing of   |           |            |              |
|             | the proximal esophagus   |           |            |              |
|             | at the C6 level          |           |            |              |
|             | perhapsaccounting for    |           |            |              |
|             | some retropulsion of     |           |            |              |
|             | thicker barium liquids   |           |            |              |
|             | throughthis segment.     |           |            |              |
|             |   Delay in passage of    |           |            |              |
|             | barium tablet at         |           |            |              |
|             | thegastroesophageal      |           |            |              |
|             | junction. I have         |           |            |              |
|             | personally viewed this   |           |            |              |
|             | procedure/exam and       |           |            |              |
|             | reviewed this report.    |           |            |              |
|             | Author: JUSTIN BAIRD       |           |            |              |
|             | JORGE ELIZALDEReviewer:   |           |            |              |
|             | JUSTIN ELIZALDE M.D. |           |            |              |
|             |   STATUS FINAL / Dr.     |           |            |              |
|             | JUSTIN ELIZALDE       |           |            |              |
+-------------+--------------------------+-----------+------------+--------------+
 
 
 
+----------+
| Specimen |
+----------+
|          |
+----------+
 
 
 
+----------------------+---------+--------------------+--------------+
| Performing           | Address | City/State/Zipcode | Phone Number |
| Organization         |         |                    |              |
+----------------------+---------+--------------------+--------------+
|   [facility] DEPARTMENT OF |         |                    |              |
|  RADIOLOGY           |         |                    |              |
+----------------------+---------+--------------------+--------------+
 documented in this encounter
 
 Visit Diagnoses
 
 
+--------------------------------------------------------------------------+
| Diagnosis                                                                |
 
+--------------------------------------------------------------------------+
|   Laryngeal cancer (HCC)  Malignant neoplasm of larynx, unspecified site |
+--------------------------------------------------------------------------+
|   Pharyngeal dysphagia  Dysphagia, pharyngeal phase                      |
+--------------------------------------------------------------------------+
 documented in this encounter

## 2019-12-06 NOTE — XMS
Encounter Summary
  Created on: 2019
 
 Shane Wyatttien BroussardJosue
 External Reference #: [id number]
 : 43
 Sex: Male
 
 Demographics
 
 
+-----------------------+---------------------------+
| Address               | [address]        |
|                       | [place]  [address] |
+-----------------------+---------------------------+
| Home Phone            | [phone]           |
+-----------------------+---------------------------+
| Preferred Language    | Unknown                   |
+-----------------------+---------------------------+
| Marital Status        |                    |
+-----------------------+---------------------------+
| Sikhism Affiliation | [id number]                      |
+-----------------------+---------------------------+
| Race                  | Unknown                   |
+-----------------------+---------------------------+
| Ethnic Group          | Unknown                   |
+-----------------------+---------------------------+
 
 
 Author
 
 
+--------------+--------------------------------------------+
| Author       | Located within [facility]  |
|              | and [place]                                |
+--------------+--------------------------------------------+
| Organization | Located within [facility]  |
|              | and [place]                                |
+--------------+--------------------------------------------+
| Address      | Unknown                                    |
+--------------+--------------------------------------------+
| Phone        | Unavailable                                |
+--------------+--------------------------------------------+
 
 
 
 Support
 
 
+---------------+--------------+--------------------+-----------------+
| Name          | Relationship | Address            | Phone           |
+---------------+--------------+--------------------+-----------------+
| Opal Shane | ECON         | [address]     | [phone] |
|               |              | [place]   |                 |
|               |              | [id number]              |                 |
+---------------+--------------+--------------------+-----------------+
 
 
 
 
 Care Team Providers
 
 
+-----------------------+------+-----------------+
| Care Team Member Name | Role | Phone           |
+-----------------------+------+-----------------+
| Erwin Iniguez MD | PCP  | [phone] |
+-----------------------+------+-----------------+
 
 
 
 Encounter Details
 
 
+--------+-----------+----------------------+--------------------+-------------+
| Date   | Type      | Department           | Care Team          | Description |
+--------+-----------+----------------------+--------------------+-------------+
| 04/03/ | Hospital  |   [facility] GENERIC IP     |   Conversion       | Pain        |
| 2018   | Encounter | CONVERSION DEP  [address]  | Transaction,       |             |
|        |           | [address]           | Provider Unknown   |             |
|        |           | [place], [place]         | [phone]       |             |
|        |           | [address]           | [phone] (Fax) |             |
|        |           | [phone]         |                    |             |
+--------+-----------+----------------------+--------------------+-------------+
 
 
 
 Social History
 
 
+---------------+------------+-----------+--------+------------------+
| Tobacco Use   | Types      | Packs/Day | Years  | Date             |
|               |            |           | Used   |                  |
+---------------+------------+-----------+--------+------------------+
| Former Smoker | Cigarettes | 1.3       | 34     | Quit: 1996 |
+---------------+------------+-----------+--------+------------------+
 
 
 
+---------------------+---+---+---+
| Smokeless Tobacco:  |   |   |   |
| Never Used          |   |   |   |
+---------------------+---+---+---+
 
 
 
+-------------+-------------+---------+----------+
| Alcohol Use | Drinks/Week | oz/Week | Comments |
+-------------+-------------+---------+----------+
| No          |             |         |          |
+-------------+-------------+---------+----------+
 
 
 
+------------------+---------------+
| Sex Assigned at  | Date Recorded |
| Birth            |               |
+------------------+---------------+
| Not on file      |               |
 
+------------------+---------------+
 
 
 
+----------------+-------------+-------------+
| Job Start Date | Occupation  | Industry    |
+----------------+-------------+-------------+
| Not on file    | Not on file | Not on file |
+----------------+-------------+-------------+
 
 
 
+----------------+--------------+------------+
| Travel History | Travel Start | Travel End |
+----------------+--------------+------------+
 
 
 
+-------------------------------------+
| No recent travel history available. |
+-------------------------------------+
 documented as of this encounter
 
 Medications at Time of Discharge
 
 
+----------------------+----------------------+-----------+---------+----------+-----------+
| Medication           | Sig                  | Dispensed | Refills | Start    | End Date  |
|                      |                      |           |         | Date     |           |
+----------------------+----------------------+-----------+---------+----------+-----------+
|   acyclovir          | Apply  topically     |           | 0       |          |           |
| (ZOVIRAX) 5%         | every 3 hours.       |           |         |          |           |
| ointment             |                      |           |         |          |           |
+----------------------+----------------------+-----------+---------+----------+-----------+
|   albuterol (PROAIR  | Inhale 2 puffs into  |           | 0       |          |           |
| HFA) 90 mcg/puff     | the lungs every 6    |           |         |          |           |
| inhaler              | hours as needed for  |           |         |          |           |
|                      | Wheezing.            |           |         |          |           |
+----------------------+----------------------+-----------+---------+----------+-----------+
|   digoxin (LANOXIN)  | Take 125 mcg by      |           | 0       |          |           |
| 250 mcg tablet       | mouth Daily.      |           |         |          |           |
|                      | capsule daily        |           |         |          |           |
+----------------------+----------------------+-----------+---------+----------+-----------+
|   ferrous sulfate    | Take 325 mg by mouth |           | 0       | 20 |           |
| 325 mg tablet        |  3 times daily.      |           |         | 12       |           |
+----------------------+----------------------+-----------+---------+----------+-----------+
|   fluticasone        | one spray in each    |           | 0       | 20 |           |
| (FLONASE) 50         | nostril daily as     |           |         | 12       |           |
| mcg/nasal spray      | needed               |           |         |          |           |
+----------------------+----------------------+-----------+---------+----------+-----------+
|                      | Inhale 1 puff into   |           | 0       |          |           |
| fluticasone-salmeter | the lungs Twice      |           |         |          |           |
| ol (ADVAIR) 500-50   | Daily.               |           |         |          |           |
| mcg/puff diskus      |                      |           |         |          |           |
| inhaler              |                      |           |         |          |           |
+----------------------+----------------------+-----------+---------+----------+-----------+
|   folic acid 1 mg    | Take 1 mg by mouth   |           | 0       |          |           |
| tablet               | Daily.               |           |         |          |           |
+----------------------+----------------------+-----------+---------+----------+-----------+
|   furosemide (LASIX) | Take 40 mg by mouth  |           | 0       |          |           |
 
|  40 mg tablet        | Daily.               |           |         |          |           |
+----------------------+----------------------+-----------+---------+----------+-----------+
|   guaiFENesin        | Take 1,200 mg by     |           | 0       |          |           |
| (MUCINEX) 600 mg 12  | mouth 2 times daily. |           |         |          |           |
| hr tablet            |                      |           |         |          |           |
+----------------------+----------------------+-----------+---------+----------+-----------+
|   levothyroxine      | Take 75 mcg by mouth |           | 0       | 20 |           |
| (LEVOTHROID) 75 MCG  |  Daily.              |           |         | 12       |           |
| tablet               |                      |           |         |          |           |
+----------------------+----------------------+-----------+---------+----------+-----------+
|   metoclopramide     | Take 10 mg by mouth  |           | 0       | 20 |           |
| (REGLAN) 10 mg       | 4 times daily as     |           |         | 12       |           |
| tablet               | needed.              |           |         |          |           |
+----------------------+----------------------+-----------+---------+----------+-----------+
|                      | Apply  topically 2   |           | 0       |          |           |
| nystatin-triamcinolo | times daily.         |           |         |          |           |
| ne (MYCOLOG II)      |                      |           |         |          |           |
| cream                |                      |           |         |          |           |
+----------------------+----------------------+-----------+---------+----------+-----------+
|   omeprazole         | Take 20 mg by mouth  |           | 0       | 20 |           |
| (PRILOSEC) 20 mg     | 2 times daily.       |           |         | 12       |           |
| capsule              |                      |           |         |          |           |
+----------------------+----------------------+-----------+---------+----------+-----------+
|   potassium citrate  | Take 10 mEq by mouth |           | 0       |          |           |
| (UROCIT-K) 10 mEq SR |  2 times daily.      |           |         |          |           |
|  tablet              |                      |           |         |          |           |
+----------------------+----------------------+-----------+---------+----------+-----------+
|   predniSONE         | Take 10 mg by mouth  |           | 0       |          |           |
| (DELTASONE) 5 mg     | Daily.               |           |         |          |           |
| tablet               |                      |           |         |          |           |
+----------------------+----------------------+-----------+---------+----------+-----------+
|   tamsulosin         | Take 0.4 mg by mouth |           | 0       | 20 |           |
| (FLOMAX) 0.4 mg CAPS |  2 times daily.      |           |         | 12       |           |
+----------------------+----------------------+-----------+---------+----------+-----------+
|   acyclovir          | Take 400 mg by mouth |           | 0       | 20 |  |
| (ZOVIRAX) 400 MG     |  3 times daily as    |           |         | 12       | 9         |
| tablet               | needed.              |           |         |          |           |
+----------------------+----------------------+-----------+---------+----------+-----------+
|   Cholecalciferol    | Take 2,000 Units by  |           | 0       | 20 |  |
| (VITAMIN D3) 2000    | mouth Daily.         |           |         | 12       | 9         |
| UNITS CAPS           |                      |           |         |          |           |
+----------------------+----------------------+-----------+---------+----------+-----------+
|   cyanocobalamin     | injected             |           | 0       | 20 |  |
| (VITAMIN B-12) 1,000 | intramuscularly once |           |         | 12       | 9         |
|  mcg/mL injection    |  a month             |           |         |          |           |
+----------------------+----------------------+-----------+---------+----------+-----------+
|   diltiazem          | Take 120 mg by mouth |           | 0       |          |  |
| (DILT-XR) 120 mg 24  |  Daily.              |           |         |          | 9         |
| hr capsule           |                      |           |         |          |           |
+----------------------+----------------------+-----------+---------+----------+-----------+
|   loratadine         | Take 10 mg by mouth  |           | 0       |          |  |
| (CLARITIN) 10 mg     | Daily.               |           |         |          | 9         |
| tablet               |                      |           |         |          |           |
+----------------------+----------------------+-----------+---------+----------+-----------+
|   losartan (COZAAR)  | Take 100 mg by mouth |           | 0       |          |  |
| 100 MG tablet        |  Daily. 1/2 daily    |           |         |          | 9         |
+----------------------+----------------------+-----------+---------+----------+-----------+
|   methotrexate 2.5   | Take 15 mg by mouth  |           | 0       |          |  |
| mg tablet            | Once a week.         |           |         |          | 9         |
+----------------------+----------------------+-----------+---------+----------+-----------+
 
|                      | Take 1 tablet by     |           | 0       |          |  |
| oxyCODONE-acetaminop | mouth every 4 hours  |           |         |          | 9         |
| hen (PERCOCET) 5-325 | as needed for Pain.  |           |         |          |           |
|  mg per tablet       |                      |           |         |          |           |
+----------------------+----------------------+-----------+---------+----------+-----------+
|   pseudoePHEDrine    | Take 30 mg by mouth  |           | 0       |          |  |
| (SUDOGEST) 30 mg     | every 4 hours as     |           |         |          | 9         |
| tablet               | needed for           |           |         |          |           |
|                      | Congestion.          |           |         |          |           |
+----------------------+----------------------+-----------+---------+----------+-----------+
|   rivaroxaban        | Take 20 mg by mouth  |           | 0       |          |  |
| (XARELTO) 20 mg      | Daily (with dinner). |           |         |          | 9         |
| tablet               |                      |           |         |          |           |
+----------------------+----------------------+-----------+---------+----------+-----------+
|   sertraline         | 2 tablets daily      |           | 0       | 20 |  |
| (ZOLOFT) 100 mg      |                      |           |         | 12       | 9         |
| tablet               |                      |           |         |          |           |
+----------------------+----------------------+-----------+---------+----------+-----------+
 documented as of this encounter
 
 Plan of Treatment
 
 
+--------+---------+-------------+----------------------+-------------+
| Date   | Type    | Specialty   | Care Team            | Description |
+--------+---------+-------------+----------------------+-------------+
| / | Office  | Pulmonology |   [facility],          |             |
|    | Visit   |             | Jessica Cheung,   |             |
|        |         |             | MD [address] |             |
|        |         |             |   [place],   |             |
|        |         |             | [place] [address]             |             |
|        |         |             | [phone]         |             |
|        |         |             | [phone] (Fax)   |             |
+--------+---------+-------------+----------------------+-------------+
| / | Office  | Cardiology  |   [place], Mershed, |             |
| 2020   | Visit   |             |  MD  [address]   |             |
|        |         |             | [place]  [place]  |             |
|        |         |             | [address]  [phone]  |             |
|        |         |             |  [phone] (Fax)  |             |
+--------+---------+-------------+----------------------+-------------+
 documented as of this encounter
 
 Procedures
 
 
+--------------------+--------+-------------+----------------------+----------------------+
| Procedure Name     | Priori | Date/Time   | Associated Diagnosis | Comments             |
|                    | ty     |             |                      |                      |
+--------------------+--------+-------------+----------------------+----------------------+
| US HEAD NECK SOFT  | Routin | 12/10/2010  |                      |   Results for this   |
| TISSUE             | e      |  5:05 AM    |                      | procedure are in the |
|                    |        | PST         |                      |  results section.    |
+--------------------+--------+-------------+----------------------+----------------------+
 documented in this encounter
 
 Results
 US Head Neck Soft Tissue (12/10/2010  5:05 AM PST)
 
+----------+
| Specimen |
 
+----------+
|          |
+----------+
 
 
 
+------------------------------------------------------------------------+--------------+
| Narrative                                                              | Performed At |
+------------------------------------------------------------------------+--------------+
|   This is a non-reportable procedure without a radiologist report and  |              |
| is  used for image storage only                                        |              |
+------------------------------------------------------------------------+--------------+
 
 
 
+--------------------------------------------------------------------------------------+
| Procedure Note                                                                       |
+--------------------------------------------------------------------------------------+
|   Andrzej Steven Irvin - 2019  4:21 AM PDT  This is a non-reportable procedure  |
| without a radiologist report and isused for image storage only                       |
+--------------------------------------------------------------------------------------+
 documented in this encounter
 
 Visit Diagnoses
 
 
+--------------------------+
| Diagnosis                |
+--------------------------+
|   Pain  Generalized pain |
+--------------------------+
 documented in this encounter

## 2019-12-06 NOTE — XMS
Encounter Summary
  Created on: 2019
 
 Shane Wyatttien BroussardJosue
 External Reference #: [id number]
 : 43
 Sex: Male
 
 Demographics
 
 
+-----------------------+---------------------------+
| Address               | [address]        |
|                       | [place]  [address] |
+-----------------------+---------------------------+
| Home Phone            | [phone]           |
+-----------------------+---------------------------+
| Preferred Language    | Unknown                   |
+-----------------------+---------------------------+
| Marital Status        |                    |
+-----------------------+---------------------------+
| Zoroastrian Affiliation | [id number]                      |
+-----------------------+---------------------------+
| Race                  | Unknown                   |
+-----------------------+---------------------------+
| Ethnic Group          | Unknown                   |
+-----------------------+---------------------------+
 
 
 Author
 
 
+--------------+--------------------------------------------+
| Author       | [facility]  |
|              | [facility] [place]                                |
+--------------+--------------------------------------------+
| Organization | [facility]  |
|              | [facility] [place]                                |
+--------------+--------------------------------------------+
| Address      | Unknown                                    |
+--------------+--------------------------------------------+
| Phone        | Unavailable                                |
+--------------+--------------------------------------------+
 
 
 
 Support
 
 
+---------------+--------------+--------------------+-----------------+
| Name          | Relationship | Address            | Phone           |
+---------------+--------------+--------------------+-----------------+
| Opal Shane | ECON         | [address]     | [phone] |
|               |              | [place]   |                 |
|               |              | [address]              |                 |
+---------------+--------------+--------------------+-----------------+
 
 
 
 
 Care Team Providers
 
 
+-----------------------+------+-----------------+
| Care Team Member Name | Role | Phone           |
+-----------------------+------+-----------------+
| Erwin Iniguez MD | PCP  | [phone] |
+-----------------------+------+-----------------+
 
 
 
 Reason for Visit
 
 
+--------+----------+
| Reason | Comments |
+--------+----------+
| Other  |          |
+--------+----------+
 
 
 
 Encounter Details
 
 
+--------+-----------+----------------------+----------------------+-------------+
| Date   | Type      | Department           | Care Team            | Description |
+--------+-----------+----------------------+----------------------+-------------+
| / | Telephone |   [facility]          |   Jian Fabian MD   | Other       |
|    |           | NEUROSURGERY  [address]  | [address]       |             |
|        |           | [address]     | [place] [address]  |             |
|        |           | [place]      |  [phone]        |             |
|        |           | [address]           | [phone] (Fax)   |             |
|        |           | [phone]         |                      |             |
+--------+-----------+----------------------+----------------------+-------------+
 
 
 
 Social History
 
 
+---------------+------------+-----------+--------+------------------+
| Tobacco Use   | Types      | Packs/Day | Years  | Date             |
|               |            |           | Used   |                  |
+---------------+------------+-----------+--------+------------------+
| Former Smoker | Cigarettes | 1.3       | 34     | Quit: 1996 |
+---------------+------------+-----------+--------+------------------+
 
 
 
+---------------------+---+---+---+
| Smokeless Tobacco:  |   |   |   |
| Never Used          |   |   |   |
+---------------------+---+---+---+
 
 
 
+-------------+-------------+---------+----------+
| Alcohol Use | Drinks/Week | oz/Week | Comments |
 
+-------------+-------------+---------+----------+
| No          |             |         |          |
+-------------+-------------+---------+----------+
 
 
 
+------------------+---------------+
| Sex Assigned at  | Date Recorded |
| Birth            |               |
+------------------+---------------+
| Not on file      |               |
+------------------+---------------+
 
 
 
+----------------+-------------+-------------+
| Job Start Date | Occupation  | Industry    |
+----------------+-------------+-------------+
| Not on file    | Not on file | Not on file |
+----------------+-------------+-------------+
 
 
 
+----------------+--------------+------------+
| Travel History | Travel Start | Travel End |
+----------------+--------------+------------+
 
 
 
+-------------------------------------+
| No recent travel history available. |
+-------------------------------------+
 documented as of this encounter
 
 Plan of Treatment
 
 
+--------+---------+-------------+----------------------+-------------+
| Date   | Type    | Specialty   | Care Team            | Description |
+--------+---------+-------------+----------------------+-------------+
| / | Office  | Pulmonology |   Juan F,          |             |
| 2019   | Visit   |             | Jessica Cheung,   |             |
|        |         |             | MD [address] |             |
|        |         |             |   [place],   |             |
|        |         |             | [place] [address]             |             |
|        |         |             | [phone]         |             |
|        |         |             | [phone] (Fax)   |             |
+--------+---------+-------------+----------------------+-------------+
| / | Office  | Cardiology  |   Eric Akins, |             |
|    | Visit   |             |  MD [address]   |             |
|        |         |             | [place]  |             |
|        |         |             | [address]  [phone]  |             |
|        |         |             |  [phone] (Fax)  |             |
+--------+---------+-------------+----------------------+-------------+
 documented as of this encounter
 
 Visit Diagnoses
 Not on filedocumented in this encounter

## 2019-12-06 NOTE — XMS
Encounter Summary
  Created on: 2019
 
 Shane Wyatttien BroussardJosue
 External Reference #: [id number]
 : 43
 Sex: Male
 
 Demographics
 
 
+-----------------------+---------------------------+
| Address               | [address]        |
|                       | [place]  [address] |
+-----------------------+---------------------------+
| Home Phone            | [phone]           |
+-----------------------+---------------------------+
| Preferred Language    | Unknown                   |
+-----------------------+---------------------------+
| Marital Status        |                    |
+-----------------------+---------------------------+
| Sabianist Affiliation | [id number]                      |
+-----------------------+---------------------------+
| Race                  | Unknown                   |
+-----------------------+---------------------------+
| Ethnic Group          | Unknown                   |
+-----------------------+---------------------------+
 
 
 Author
 
 
+--------------+--------------------------------------------+
| Author       | [facility] [place]  |
|              | [facility] [place]                                |
+--------------+--------------------------------------------+
| Organization | [facility] [place]  |
|              | [facility] [place]                                |
+--------------+--------------------------------------------+
| Address      | Unknown                                    |
+--------------+--------------------------------------------+
| Phone        | Unavailable                                |
+--------------+--------------------------------------------+
 
 
 
 Support
 
 
+---------------+--------------+--------------------+-----------------+
| Name          | Relationship | Address            | Phone           |
+---------------+--------------+--------------------+-----------------+
| Opal Shane | ECON         | [address]     | [phone] |
|               |              | [place]   |                 |
|               |              | [address]              |                 |
+---------------+--------------+--------------------+-----------------+
 
 
 
 
 Care Team Providers
 
 
+-----------------------+------+-----------------+
| Care Team Member Name | Role | Phone           |
+-----------------------+------+-----------------+
| Erwin Iniguez MD | PCP  | [phone] |
+-----------------------+------+-----------------+
 
 
 
 Reason for Visit
 Evaluate & Treat (Routine)
 
+--------+--------------+-----------+--------------+--------------+---------------+
| Status | Reason       | Specialty | Diagnoses /  | Referred By  | Referred To   |
|        |              |           | Procedures   | Contact      | Contact       |
+--------+--------------+-----------+--------------+--------------+---------------+
| Closed |   Specialty  | Sleep     |   Diagnoses  |   Chris,     |   [facility]   |
|        | Services     | Medicine  |  [place]     | Luis Carlos WALLACE    | [place]  [address] |
|        | Required     |           | sleep apnea  | MD [address]  [place]       |
|        |              |           |  Procedures  |  [place] | [place],  |
|        |              |           |  ID POLYSOM  |  St  [facility]   | [place] [address] |
|        |              |           | 6/>YRS SLEEP | [place], [place]    |   Phone:      |
|        |              |           |  4/> ADDL    | [address]        | [phone]  |
|        |              |           | [address]  | Phone:       |  Fax:         |
|        |              |           |  [address]       | [phone] | [phone]  |
|        |              |           |              |   Fax:       |               |
|        |              |           |              | [phone] |               |
+--------+--------------+-----------+--------------+--------------+---------------+
 
 
 
 
 Encounter Details
 
 
+--------+-----------+----------------------+---------------------+----------------------+
| Date   | Type      | Department           | Care Team           | Description          |
+--------+-----------+----------------------+---------------------+----------------------+
| 06/15/ | Hospital  |   [facility] |   Luis Carlos Perez  | Central sleep apnea  |
| 2015   | Encounter |  [facility] SLE[facility]       | MD [address]   | (Primary Dx);        |
|        |           | CENTER  [address] | [facility]    | Central sleep apnea  |
|        |           |   [place], [place]    | [facility] [place] [address]     | due to Cheyne-Nichole |
|        |           | [address]           | [phone]        |  respiration; JOANN    |
|        |           | [phone]         | [phone] (Fax)  | (obstructive sleep   |
|        |           |                      |                     | apnea)               |
+--------+-----------+----------------------+---------------------+----------------------+
 
 
 
 Social History
 
 
+---------------+------------+-----------+--------+------------------+
| Tobacco Use   | Types      | Packs/Day | Years  | Date             |
|               |            |           | Used   |                  |
+---------------+------------+-----------+--------+------------------+
| Former Smoker | Cigarettes | 1.3       | 34     | Quit: 1996 |
 
+---------------+------------+-----------+--------+------------------+
 
 
 
+---------------------+---+---+---+
| Smokeless Tobacco:  |   |   |   |
| Never Used          |   |   |   |
+---------------------+---+---+---+
 
 
 
+-------------+-------------+---------+----------+
| Alcohol Use | Drinks/Week | oz/Week | Comments |
+-------------+-------------+---------+----------+
| No          |             |         |          |
+-------------+-------------+---------+----------+
 
 
 
+------------------+---------------+
| Sex Assigned at  | Date Recorded |
| Birth            |               |
+------------------+---------------+
| Not on file      |               |
+------------------+---------------+
 
 
 
+----------------+-------------+-------------+
| Job Start Date | Occupation  | Industry    |
+----------------+-------------+-------------+
| Not on file    | Not on file | Not on file |
+----------------+-------------+-------------+
 
 
 
+----------------+--------------+------------+
| Travel History | Travel Start | Travel End |
+----------------+--------------+------------+
 
 
 
+-------------------------------------+
| No recent travel history available. |
+-------------------------------------+
 documented as of this encounter
 
 Medications at Time of Discharge
 
 
+----------------------+----------------------+-----------+---------+----------+-----------+
| Medication           | Sig                  | Dispensed | Refills | Start    | End Date  |
|                      |                      |           |         | Date     |           |
+----------------------+----------------------+-----------+---------+----------+-----------+
|   acyclovir          | Apply  topically     |           | 0       |          |           |
| (ZOVIRAX) 5%         | every 3 hours.       |           |         |          |           |
| ointment             |                      |           |         |          |           |
+----------------------+----------------------+-----------+---------+----------+-----------+
|   albuterol (PROAIR  | Inhale 2 puffs into  |           | 0       |          |           |
| HFA) 90 mcg/puff     | the lungs every 6    |           |         |          |           |
 
| inhaler              | hours as needed for  |           |         |          |           |
|                      | Wheezing.            |           |         |          |           |
+----------------------+----------------------+-----------+---------+----------+-----------+
|   ferrous sulfate    | Take 325 mg by mouth |           | 0       | 20 |           |
| 325 mg tablet        |  3 times daily.      |           |         | 12       |           |
+----------------------+----------------------+-----------+---------+----------+-----------+
|   fluticasone        | one spray in each    |           | 0       | 20 |           |
| (FLONASE) 50         | nostril daily as     |           |         | 12       |           |
| mcg/nasal spray      | needed               |           |         |          |           |
+----------------------+----------------------+-----------+---------+----------+-----------+
|   folic acid 1 mg    | Take 1 mg by mouth   |           | 0       |          |           |
| tablet               | Daily.               |           |         |          |           |
+----------------------+----------------------+-----------+---------+----------+-----------+
|   furosemide (LASIX) | Take 40 mg by mouth  |           | 0       |          |           |
|  40 mg tablet        | Daily.               |           |         |          |           |
+----------------------+----------------------+-----------+---------+----------+-----------+
|   guaiFENesin        | Take 1,200 mg by     |           | 0       |          |           |
| (MUCINEX) 600 mg 12  | mouth 2 times daily. |           |         |          |           |
| hr tablet            |                      |           |         |          |           |
+----------------------+----------------------+-----------+---------+----------+-----------+
|   levothyroxine      | Take 75 mcg by mouth |           | 0       | 20 |           |
| (LEVOTHROID) 75 MCG  |  Daily.              |           |         | 12       |           |
| tablet               |                      |           |         |          |           |
+----------------------+----------------------+-----------+---------+----------+-----------+
|   metoclopramide     | Take 10 mg by mouth  |           | 0       | 20 |           |
| (REGLAN) 10 mg       | 4 times daily as     |           |         | 12       |           |
| tablet               | needed.              |           |         |          |           |
+----------------------+----------------------+-----------+---------+----------+-----------+
|                      | Apply  topically 2   |           | 0       |          |           |
| nystatin-triamcinolo | times daily.         |           |         |          |           |
| ne (MYCOLOG II)      |                      |           |         |          |           |
| cream                |                      |           |         |          |           |
+----------------------+----------------------+-----------+---------+----------+-----------+
|   omeprazole         | Take 20 mg by mouth  |           | 0       | 20 |           |
| (PRILOSEC) 20 mg     | 2 times daily.       |           |         | 12       |           |
| capsule              |                      |           |         |          |           |
+----------------------+----------------------+-----------+---------+----------+-----------+
|   potassium citrate  | Take 10 mEq by mouth |           | 0       |          |           |
| (UROCIT-K) 10 mEq SR |  2 times daily.      |           |         |          |           |
|  tablet              |                      |           |         |          |           |
+----------------------+----------------------+-----------+---------+----------+-----------+
|   predniSONE         | Take 10 mg by mouth  |           | 0       |          |           |
| (DELTASONE) 5 mg     | Daily.               |           |         |          |           |
| tablet               |                      |           |         |          |           |
+----------------------+----------------------+-----------+---------+----------+-----------+
|   tamsulosin         | Take 0.4 mg by mouth |           | 0       | 20 |           |
| (FLOMAX) 0.4 mg CAPS |  2 times daily.      |           |         | 12       |           |
+----------------------+----------------------+-----------+---------+----------+-----------+
|   acyclovir          | Take 400 mg by mouth |           | 0       | 20 |  |
| (ZOVIRAX) 400 MG     |  3 times daily as    |           |         | 12       | 9         |
| tablet               | needed.              |           |         |          |           |
+----------------------+----------------------+-----------+---------+----------+-----------+
|   Cholecalciferol    | Take 2,000 Units by  |           | 0       | 20 |  |
| (VITAMIN D3) 2000    | mouth Daily.         |           |         | 12       | 9         |
| UNITS CAPS           |                      |           |         |          |           |
+----------------------+----------------------+-----------+---------+----------+-----------+
|   cyanocobalamin     | injected             |           | 0       | 20 |  |
| (VITAMIN B-12) 1,000 | intramuscularly once |           |         | 12       | 9         |
|  mcg/mL injection    |  a month             |           |         |          |           |
+----------------------+----------------------+-----------+---------+----------+-----------+
 
|   digoxin (LANOXIN)  | Take 250 mcg by      |           | 0       |          |  |
| 250 mcg tablet       | mouth Daily.      |           |         |          | 5         |
|                      | tablet daily         |           |         |          |           |
+----------------------+----------------------+-----------+---------+----------+-----------+
|   diltiazem          | Take 120 mg by mouth |           | 0       |          |  |
| (DILT-XR) 120 mg 24  |  Daily.              |           |         |          | 9         |
| hr capsule           |                      |           |         |          |           |
+----------------------+----------------------+-----------+---------+----------+-----------+
|   loratadine         | Take 10 mg by mouth  |           | 0       |          |  |
| (CLARITIN) 10 mg     | Daily.               |           |         |          | 9         |
| tablet               |                      |           |         |          |           |
+----------------------+----------------------+-----------+---------+----------+-----------+
|   losartan (COZAAR)  | Take 100 mg by mouth |           | 0       |          |  |
| 100 MG tablet        |  Daily. 1/ daily    |           |         |          | 9         |
+----------------------+----------------------+-----------+---------+----------+-----------+
|   methotrexate 2.5   | Take 15 mg by mouth  |           | 0       |          |  |
| mg tablet            | Once a week.         |           |         |          | 9         |
+----------------------+----------------------+-----------+---------+----------+-----------+
|   rivaroxaban        | Take 20 mg by mouth  |           | 0       |          |  |
| (XARELTO) 20 mg      | Daily (with dinner). |           |         |          | 9         |
| tablet               |                      |           |         |          |           |
+----------------------+----------------------+-----------+---------+----------+-----------+
|   sertraline         | 2 tablets daily      |           | 0       | 20 |  |
| (ZOLOFT) 100 mg      |                      |           |         | 12       | 9         |
| tablet               |                      |           |         |          |           |
+----------------------+----------------------+-----------+---------+----------+-----------+
 documented as of this encounter
 
 Plan of Treatment
 
 
+--------+---------+-------------+----------------------+-------------+
| Date   | Type    | Specialty   | Care Team            | Description |
+--------+---------+-------------+----------------------+-------------+
| / | Office  | Pulmonology |   Juan F,          |             |
| 2019   | Visit   |             | Jessica Cheung,   |             |
|        |         |             | MD [address] |             |
|        |         |             |   [place],   |             |
|        |         |             | [place] [address]             |             |
|        |         |             | [phone]         |             |
|        |         |             | [phone] (Fax)   |             |
+--------+---------+-------------+----------------------+-------------+
| / | Office  | Cardiology  |   Eric Akins, |             |
|    | Visit   |             |  MD  [address]   |             |
|        |         |             | [place]  [place]  |             |
|        |         |             | [address]  [phone]  |             |
|        |         |             |  [phone] (Fax)  |             |
+--------+---------+-------------+----------------------+-------------+
 documented as of this encounter
 
 Procedures
 
 
+----------------------+--------+-------------+----------------------+----------+
| Procedure Name       | Priori | Date/Time   | Associated Diagnosis | Comments |
|                      | ty     |             |                      |          |
+----------------------+--------+-------------+----------------------+----------+
| DIAGNOSTIC REPORT -  |        | 06/15/2015  |                      |          |
| EXTERNAL SCAN        |        | 12:00 AM    |                      |          |
|                      |        | PDT         |                      |          |
 
+----------------------+--------+-------------+----------------------+----------+
 documented in this encounter
 
 Visit Diagnoses
 
 
+-----------------------------------------------------------------------------------+
| Diagnosis                                                                         |
+-----------------------------------------------------------------------------------+
|   Central sleep apnea - Primary  Unspecified sleep apnea                          |
+-----------------------------------------------------------------------------------+
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
+-----------------------------------------------------------------------------------+
|   JOANN (obstructive sleep apnea)  Obstructive sleep apnea (adult) (pediatric)      |
+-----------------------------------------------------------------------------------+
 documented in this encounter

## 2019-12-06 NOTE — XMS
Encounter Summary
  Created on: 2019
 
 Wyatt Shane
 External Reference #: [id number]
 : 43
 Sex: Male
 
 Demographics
 
 
+-----------------------+------------------------+
| Address               | [address]     |
|                       | [place]  [address]   |
+-----------------------+------------------------+
| Home Phone            | [phone]        |
+-----------------------+------------------------+
| Preferred Language    | Unknown                |
+-----------------------+------------------------+
| Marital Status        |                 |
+-----------------------+------------------------+
| Restorationism Affiliation | LUT                    |
+-----------------------+------------------------+
| Race                  | White                  |
+-----------------------+------------------------+
| Ethnic Group          | Not  or  |
+-----------------------+------------------------+
 
 
 Author
 
 
+--------------+------------------------------+
| Author       | [facility] |
+--------------+------------------------------+
| Organization | [facility] |
+--------------+------------------------------+
| Address      | Unknown                      |
+--------------+------------------------------+
| Phone        | Unavailable                  |
+--------------+------------------------------+
 
 
 
 Support
 
 
+---------------+--------------+---------+-----------------+
| Name          | Relationship | Address | Phone           |
+---------------+--------------+---------+-----------------+
| Opal Shane | ECON         | Unknown | [phone] |
+---------------+--------------+---------+-----------------+
 
 
 
 Care Team Providers
 
 
 
+-----------------------+------+-----------------+
| Care Team Member Name | Role | Phone           |
+-----------------------+------+-----------------+
| Erwin Steel MD   | PCP  | [phone] |
+-----------------------+------+-----------------+
 
 
 
 Encounter Details
 
 
+--------+-------------+-----------------+---------------------+---------------+
| Date   | Type        | Department      | Care Team           | Description   |
+--------+-------------+-----------------+---------------------+---------------+
| / | Office      |   [facility]  |   Note, Outpatient  | Progress Note |
|    | Visit-Trans | MEDICINE        | Clinic              |               |
|        | cribed      |                 |                     |               |
+--------+-------------+-----------------+---------------------+---------------+
 
 
 
 Social History
 
 
+----------------+-------+-----------+--------+------+
| Tobacco Use    | Types | Packs/Day | Years  | Date |
|                |       |           | Used   |      |
+----------------+-------+-----------+--------+------+
| Never Assessed |       |           |        |      |
+----------------+-------+-----------+--------+------+
 
 
 
+------------------+---------------+
| Sex Assigned at  | Date Recorded |
| Birth            |               |
+------------------+---------------+
| Not on file      |               |
+------------------+---------------+
 
 
 
+----------------+-------------+-------------+
| Job Start Date | Occupation  | Industry    |
+----------------+-------------+-------------+
| Not on file    | Not on file | Not on file |
+----------------+-------------+-------------+
 
 
 
+----------------+--------------+------------+
| Travel History | Travel Start | Travel End |
+----------------+--------------+------------+
 
 
 
+-------------------------------------+
| No recent travel history available. |
+-------------------------------------+
 documented as of this encounter
 
 
 Progress Notes
 [facility] - [facility]
 
 [facility]
 
 Mr. Shane is seen back ahead of his scheduled visit because of some concerns
 on his part and his treating oncologist, Dr. Vibha Braswell, with his
 overall airway.  Although he has only had approximately 12 treatments, he
 is noticing that he becomes dyspneic with minimal exertion, although he is
 comfortable at rest.  He also has a significant amount of pain in his
 throat.  It sounds as if, at night, he also has episodes of choking when
 his secretions get caught in his throat.
 
 ON EXAMINATION TODAY:  He has really minimal skin reaction from the
 radiation therapy.  There are no palpable masses in his neck.  All of his
 incisions are well healed.  Flexible fiberoptic laryngoscopy was done.  He
 has significant erythema and edema of his supraglottic larynx, particularly
 his false cords.  When one looks beyond this, however, the glottic area
 itself, although foreshortened in the AP dimension, appears to be adequate,
 with good mobility of his contralateral vocal cord and his right adenoid.
 There is some eschar on his vocal cord itself.  The surrounding pharynx
 handles secretions well, although there is erythema.
 
 ASSESSMENT:  Status post hemilaryngectomy, with airway edema.
 
 PLAN:  I have discussed with him the fact that Zantac is probably not
 managing his reflux, for which he is fairly symptomatic.  He, however, is
 intolerant of Prilosec.  I am going to place him on Prevacid and see
 whether or not that works for him, in addition to his Prilosec.  We will
 also try a short course of steroids.
 
 They will call me next week if there is no improvement.  I had a long talk
 with them about the alternative of tracheotomy at this point, which would
 certainly deal with the problem, but this is not an acceptable option to
 him at present.  I will check in with them next week.
 
 Jimmy Esposito M.D.
 
 MARIA ALEJANDRA:[email or web address]
 D: 99
 T: 99
 
 cc:
 
 ERWIN STEEL MD
 [address]
 [place]  [address]
 
 BE PAULA MD
 [address]
 [place]  [address]
 
 VIBHA BRASWELL MD
 [facility]
 [address] signed by Interface, Transcription In at 2006  5:13 AM P
STdocumented in this encounter
 
 Plan of Treatment
 
 Not on filedocumented as of this encounter
 
 Visit Diagnoses
 Not on filedocumented in this encounter

## 2019-12-06 NOTE — XMS
Encounter Summary
  Created on: 2019
 
 Shane Wyatttien BroussardJosue
 External Reference #: [id number]
 : 43
 Sex: Male
 
 Demographics
 
 
+-----------------------+---------------------------+
| Address               | [address]        |
|                       | [place]  [address] |
+-----------------------+---------------------------+
| Home Phone            | [phone]           |
+-----------------------+---------------------------+
| Preferred Language    | Unknown                   |
+-----------------------+---------------------------+
| Marital Status        |                    |
+-----------------------+---------------------------+
| Yazidi Affiliation | 1028                      |
+-----------------------+---------------------------+
| Race                  | Unknown                   |
+-----------------------+---------------------------+
| Ethnic Group          | Unknown                   |
+-----------------------+---------------------------+
 
 
 Author
 
 
+--------------+--------------------------------------------+
| Author       | [facility]  |
|              | and [place]                                |
+--------------+--------------------------------------------+
| Organization | [facility]  |
|              | and [place]                                |
+--------------+--------------------------------------------+
| Address      | Unknown                                    |
+--------------+--------------------------------------------+
| Phone        | Unavailable                                |
+--------------+--------------------------------------------+
 
 
 
 Support
 
 
+---------------+--------------+--------------------+-----------------+
| Name          | Relationship | Address            | Phone           |
+---------------+--------------+--------------------+-----------------+
| Opal Shane | ECON         | [address]     | [phone] |
|               |              | [place]   |                 |
|               |              | [address]              |                 |
+---------------+--------------+--------------------+-----------------+
 
 
 
 
 Care Team Providers
 
 
+-----------------------+------+-----------------+
| Care Team Member Name | Role | Phone           |
+-----------------------+------+-----------------+
| Erwin Iniguez MD | PCP  | [phone] |
+-----------------------+------+-----------------+
 
 
 
 Encounter Details
 
 
+--------+-----------+----------------------+--------------------+-------------+
| Date   | Type      | Department           | Care Team          | Description |
+--------+-----------+----------------------+--------------------+-------------+
| 08/08/ | Hospital  |   [facility] GENERIC IP     |   Conversion       | Pain        |
| 2018   | Encounter | CONVERSION DEP  [address]  | Transaction,       |             |
|        |           | [address]           | Provider Unknown   |             |
|        |           | [place], [place]         | [phone]       |             |
|        |           | [address]           | [phone] (Fax) |             |
|        |           | [phone]         |                    |             |
+--------+-----------+----------------------+--------------------+-------------+
 
 
 
 Social History
 
 
+---------------+------------+-----------+--------+------------------+
| Tobacco Use   | Types      | Packs/Day | Years  | Date             |
|               |            |           | Used   |                  |
+---------------+------------+-----------+--------+------------------+
| Former Smoker | Cigarettes | 1.3       | 34     | Quit: 1996 |
+---------------+------------+-----------+--------+------------------+
 
 
 
+---------------------+---+---+---+
| Smokeless Tobacco:  |   |   |   |
| Never Used          |   |   |   |
+---------------------+---+---+---+
 
 
 
+-------------+-------------+---------+----------+
| Alcohol Use | Drinks/Week | oz/Week | Comments |
+-------------+-------------+---------+----------+
| No          |             |         |          |
+-------------+-------------+---------+----------+
 
 
 
+------------------+---------------+
| Sex Assigned at  | Date Recorded |
| Birth            |               |
+------------------+---------------+
| Not on file      |               |
 
+------------------+---------------+
 
 
 
+----------------+-------------+-------------+
| Job Start Date | Occupation  | Industry    |
+----------------+-------------+-------------+
| Not on file    | Not on file | Not on file |
+----------------+-------------+-------------+
 
 
 
+----------------+--------------+------------+
| Travel History | Travel Start | Travel End |
+----------------+--------------+------------+
 
 
 
+-------------------------------------+
| No recent travel history available. |
+-------------------------------------+
 documented as of this encounter
 
 Medications at Time of Discharge
 
 
+----------------------+----------------------+-----------+---------+----------+-----------+
| Medication           | Sig                  | Dispensed | Refills | Start    | End Date  |
|                      |                      |           |         | Date     |           |
+----------------------+----------------------+-----------+---------+----------+-----------+
|   acyclovir          | Apply  topically     |           | 0       |          |           |
| (ZOVIRAX) 5%         | every 3 hours.       |           |         |          |           |
| ointment             |                      |           |         |          |           |
+----------------------+----------------------+-----------+---------+----------+-----------+
|   albuterol (PROAIR  | Inhale 2 puffs into  |           | 0       |          |           |
| HFA) 90 mcg/puff     | the lungs every 6    |           |         |          |           |
| inhaler              | hours as needed for  |           |         |          |           |
|                      | Wheezing.            |           |         |          |           |
+----------------------+----------------------+-----------+---------+----------+-----------+
|   digoxin (LANOXIN)  | Take 125 mcg by      |           | 0       |          |           |
| 250 mcg tablet       | mouth Daily.      |           |         |          |           |
|                      | capsule daily        |           |         |          |           |
+----------------------+----------------------+-----------+---------+----------+-----------+
|   ferrous sulfate    | Take 325 mg by mouth |           | 0       | 20 |           |
| 325 mg tablet        |  3 times daily.      |           |         | 12       |           |
+----------------------+----------------------+-----------+---------+----------+-----------+
|   fluticasone        | one spray in each    |           | 0       | 20 |           |
| (FLONASE) 50         | nostril daily as     |           |         | 12       |           |
| mcg/nasal spray      | needed               |           |         |          |           |
+----------------------+----------------------+-----------+---------+----------+-----------+
|                      | Inhale 1 puff into   |           | 0       |          |           |
| fluticasone-salmeter | the lungs Twice      |           |         |          |           |
| ol (ADVAIR) 500-50   | Daily.               |           |         |          |           |
| mcg/puff diskus      |                      |           |         |          |           |
| inhaler              |                      |           |         |          |           |
+----------------------+----------------------+-----------+---------+----------+-----------+
|   folic acid 1 mg    | Take 1 mg by mouth   |           | 0       |          |           |
| tablet               | Daily.               |           |         |          |           |
+----------------------+----------------------+-----------+---------+----------+-----------+
|   furosemide (LASIX) | Take 40 mg by mouth  |           | 0       |          |           |
 
|  40 mg tablet        | Daily.               |           |         |          |           |
+----------------------+----------------------+-----------+---------+----------+-----------+
|   guaiFENesin        | Take 1,200 mg by     |           | 0       |          |           |
| (MUCINEX) 600 mg 12  | mouth 2 times daily. |           |         |          |           |
| hr tablet            |                      |           |         |          |           |
+----------------------+----------------------+-----------+---------+----------+-----------+
|   levothyroxine      | Take 75 mcg by mouth |           | 0       | 20 |           |
| (LEVOTHROID) 75 MCG  |  Daily.              |           |         | 12       |           |
| tablet               |                      |           |         |          |           |
+----------------------+----------------------+-----------+---------+----------+-----------+
|   metoclopramide     | Take 10 mg by mouth  |           | 0       | 20 |           |
| (REGLAN) 10 mg       | 4 times daily as     |           |         | 12       |           |
| tablet               | needed.              |           |         |          |           |
+----------------------+----------------------+-----------+---------+----------+-----------+
|                      | Apply  topically 2   |           | 0       |          |           |
| nystatin-triamcinolo | times daily.         |           |         |          |           |
| ne (MYCOLOG II)      |                      |           |         |          |           |
| cream                |                      |           |         |          |           |
+----------------------+----------------------+-----------+---------+----------+-----------+
|   ofloxacin          |                      |           | 0       | 20 |           |
| (OCUFLOX) 0.3%       |                      |           |         | 18       |           |
| ophthalmic solution  |                      |           |         |          |           |
+----------------------+----------------------+-----------+---------+----------+-----------+
|   omeprazole         | Take 20 mg by mouth  |           | 0       | 20 |           |
| (PRILOSEC) 20 mg     | 2 times daily.       |           |         | 12       |           |
| capsule              |                      |           |         |          |           |
+----------------------+----------------------+-----------+---------+----------+-----------+
|   potassium citrate  | Take 10 mEq by mouth |           | 0       |          |           |
| (UROCIT-K) 10 mEq SR |  2 times daily.      |           |         |          |           |
|  tablet              |                      |           |         |          |           |
+----------------------+----------------------+-----------+---------+----------+-----------+
|   predniSONE         | Take 10 mg by mouth  |           | 0       |          |           |
| (DELTASONE) 5 mg     | Daily.               |           |         |          |           |
| tablet               |                      |           |         |          |           |
+----------------------+----------------------+-----------+---------+----------+-----------+
|   tamsulosin         | Take 0.4 mg by mouth |           | 0       | 20 |           |
| (FLOMAX) 0.4 mg CAPS |  2 times daily.      |           |         | 12       |           |
+----------------------+----------------------+-----------+---------+----------+-----------+
|   acyclovir          | Take 400 mg by mouth |           | 0       | 20 |  |
| (ZOVIRAX) 400 MG     |  3 times daily as    |           |         | 12       | 9         |
| tablet               | needed.              |           |         |          |           |
+----------------------+----------------------+-----------+---------+----------+-----------+
|   Cholecalciferol    | Take 2,000 Units by  |           | 0       | 20 |  |
| (VITAMIN D3) 2000    | mouth Daily.         |           |         | 12       | 9         |
| UNITS CAPS           |                      |           |         |          |           |
+----------------------+----------------------+-----------+---------+----------+-----------+
|   cyanocobalamin     | injected             |           | 0       | 20 |  |
| (VITAMIN B-12) 1,000 | intramuscularly once |           |         | 12       | 9         |
|  mcg/mL injection    |  a month             |           |         |          |           |
+----------------------+----------------------+-----------+---------+----------+-----------+
|   diltiazem          | Take 120 mg by mouth |           | 0       |          |  |
| (DILT-XR) 120 mg 24  |  Daily.              |           |         |          | 9         |
| hr capsule           |                      |           |         |          |           |
+----------------------+----------------------+-----------+---------+----------+-----------+
|   loratadine         | Take 10 mg by mouth  |           | 0       |          |  |
| (CLARITIN) 10 mg     | Daily.               |           |         |          | 9         |
| tablet               |                      |           |         |          |           |
+----------------------+----------------------+-----------+---------+----------+-----------+
|   losartan (COZAAR)  | Take 100 mg by mouth |           | 0       |          |  |
| 100 MG tablet        |  Daily. 1/2 daily    |           |         |          | 9         |
 
+----------------------+----------------------+-----------+---------+----------+-----------+
|   methotrexate 2.5   | Take 15 mg by mouth  |           | 0       |          |  |
| mg tablet            | Once a week.         |           |         |          | 9         |
+----------------------+----------------------+-----------+---------+----------+-----------+
|                      | Take 1 tablet by     |           | 0       |          |  |
| oxyCODONE-acetaminop | mouth every 4 hours  |           |         |          | 9         |
| hen (PERCOCET) 5-325 | as needed for Pain.  |           |         |          |           |
|  mg per tablet       |                      |           |         |          |           |
+----------------------+----------------------+-----------+---------+----------+-----------+
|   pseudoePHEDrine    | Take 30 mg by mouth  |           | 0       |          |  |
| (SUDOGEST) 30 mg     | every 4 hours as     |           |         |          | 9         |
| tablet               | needed for           |           |         |          |           |
|                      | Congestion.          |           |         |          |           |
+----------------------+----------------------+-----------+---------+----------+-----------+
|   rivaroxaban        | Take 20 mg by mouth  |           | 0       |          |  |
| (XARELTO) 20 mg      | Daily (with dinner). |           |         |          | 9         |
| tablet               |                      |           |         |          |           |
+----------------------+----------------------+-----------+---------+----------+-----------+
|   sertraline         | 2 tablets daily      |           | 0       | 20 |  |
| (ZOLOFT) 100 mg      |                      |           |         | 12       | 9         |
| tablet               |                      |           |         |          |           |
+----------------------+----------------------+-----------+---------+----------+-----------+
 documented as of this encounter
 
 Plan of Treatment
 
 
+--------+---------+-------------+----------------------+-------------+
| Date   | Type    | Specialty   | Care Team            | Description |
+--------+---------+-------------+----------------------+-------------+
| / | Office  | Pulmonology |   Juan F,          |             |
| 2019   | Visit   |             | Jessica Cheung,   |             |
|        |         |             | MD [address] |             |
|        |         |             |   [place],   |             |
|        |         |             | [place] [address]             |             |
|        |         |             | [phone]         |             |
|        |         |             | [phone] (Fax)   |             |
+--------+---------+-------------+----------------------+-------------+
| / | Office  | Cardiology  |   Eric Akins, |             |
|    | Visit   |             |  MD  [address]   |             |
|        |         |             | [place]  |             |
|        |         |             | [address]  [phone]  |             |
|        |         |             |  [phone] (Fax)  |             |
+--------+---------+-------------+----------------------+-------------+
 documented as of this encounter
 
 Procedures
 
 
+------------------+--------+-------------+----------------------+----------------------+
| Procedure Name   | Priori | Date/Time   | Associated Diagnosis | Comments             |
|                  | ty     |             |                      |                      |
+------------------+--------+-------------+----------------------+----------------------+
| XR CHEST 2 VIEWS | Routin | 2018  |                      |   Results for this   |
|                  | e      |  5:05 PM    |                      | procedure are in the |
|                  |        | PDT         |                      |  results section.    |
+------------------+--------+-------------+----------------------+----------------------+
 documented in this encounter
 
 Results
 
 XR Chest 2 Vws (2018  5:05 PM PDT)
 
+----------+
| Specimen |
+----------+
|          |
+----------+
 
 
 
+------------------------------------------------------------------------+--------------+
| Narrative                                                              | Performed At |
+------------------------------------------------------------------------+--------------+
|   This is a non-reportable procedure without a radiologist report and  |              |
| is  used for image storage only                                        |              |
+------------------------------------------------------------------------+--------------+
 
 
 
+--------------------------------------------------------------------------------------+
| Procedure Note                                                                       |
+--------------------------------------------------------------------------------------+
|   Andrzej Steven Irvin - 2019  4:21 AM PDT  This is a non-reportable procedure  |
| without a radiologist report and isused for image storage only                       |
+--------------------------------------------------------------------------------------+
 documented in this encounter
 
 Visit Diagnoses
 
 
+--------------------------+
| Diagnosis                |
+--------------------------+
|   Pain  Generalized pain |
+--------------------------+
 documented in this encounter

## 2019-12-06 NOTE — XMS
Encounter Summary
  Created on: 2019
 
 Shane Wyatttien BroussardJosue
 External Reference #: [id number]
 : 43
 Sex: Male
 
 Demographics
 
 
+-----------------------+---------------------------+
| Address               | [address]        |
|                       | [place]  [address] |
+-----------------------+---------------------------+
| Home Phone            | [phone]           |
+-----------------------+---------------------------+
| Preferred Language    | Unknown                   |
+-----------------------+---------------------------+
| Marital Status        |                    |
+-----------------------+---------------------------+
| Yazdanism Affiliation | [id number]                      |
+-----------------------+---------------------------+
| Race                  | Unknown                   |
+-----------------------+---------------------------+
| Ethnic Group          | Unknown                   |
+-----------------------+---------------------------+
 
 
 Author
 
 
+--------------+--------------------------------------------+
| Author       | [facility]  |
|              | and [place]                                |
+--------------+--------------------------------------------+
| Organization | [facility]  |
|              | and [place]                                |
+--------------+--------------------------------------------+
| Address      | Unknown                                    |
+--------------+--------------------------------------------+
| Phone        | Unavailable                                |
+--------------+--------------------------------------------+
 
 
 
 Support
 
 
+---------------+--------------+--------------------+-----------------+
| Name          | Relationship | Address            | Phone           |
+---------------+--------------+--------------------+-----------------+
| Opal Shane | ECON         | [address]     | [phone] |
|               |              | [place]   |                 |
|               |              | [address]              |                 |
+---------------+--------------+--------------------+-----------------+
 
 
 
 
 Care Team Providers
 
 
+------------------------+------+-----------------+
| Care Team Member Name  | Role | Phone           |
+------------------------+------+-----------------+
| Calvin Robertson MD | PCP  | [phone] |
+------------------------+------+-----------------+
 
 
 
 Reason for Visit
 
 
+-----------+-----------+
| Reason    | Comments  |
+-----------+-----------+
| Follow-up | 5 mo f/u  |
+-----------+-----------+
 
 
 
 Encounter Details
 
 
+--------+---------+----------------------+----------------------+----------------------+
| Date   | Type    | Department           | Care Team            | Description          |
+--------+---------+----------------------+----------------------+----------------------+
| / | Office  |   [facility]      |   Mable Eric, | Coronary artery      |
| 2019   | Visit   | CARDIOLOGY DEANDRE |  MD  [address]   | disease of bypass    |
|        |         |   [address]    | [place]  | graft of native      |
|        |         | [address] [place] [address]          | [address]  [phone]  | heart with stable    |
|        |         | [place]        |  [phone] (Fax)  | angina pectoris      |
|        |         | [address]           |                      | (HCC); Essential     |
|        |         | [phone]         |                      | hypertension; Stable |
|        |         |                      |                      |  angina ([facility]);       |
|        |         |                      |                      | Chronic atrial       |
|        |         |                      |                      | fibrillation ([facility])   |
+--------+---------+----------------------+----------------------+----------------------+
 
 
 
 Social History
 
 
+---------------+------------+-----------+--------+------------------+
| Tobacco Use   | Types      | Packs/Day | Years  | Date             |
|               |            |           | Used   |                  |
+---------------+------------+-----------+--------+------------------+
| Former Smoker | Cigarettes | 1         | 35     | Quit: 1996 |
+---------------+------------+-----------+--------+------------------+
 
 
 
+---------------------+---+---+---+
| Smokeless Tobacco:  |   |   |   |
| Never Used          |   |   |   |
+---------------------+---+---+---+
 
 
 
 
+-------------+-------------+---------+----------+
| Alcohol Use | Drinks/Week | oz/Week | Comments |
+-------------+-------------+---------+----------+
| No          |             |         |          |
+-------------+-------------+---------+----------+
 
 
 
+------------------+---------------+
| Sex Assigned at  | Date Recorded |
| Birth            |               |
+------------------+---------------+
| Not on file      |               |
+------------------+---------------+
 
 
 
+----------------+-------------+-------------+
| Job Start Date | Occupation  | Industry    |
+----------------+-------------+-------------+
| Not on file    | Not on file | Not on file |
+----------------+-------------+-------------+
 
 
 
+----------------+--------------+------------+
| Travel History | Travel Start | Travel End |
+----------------+--------------+------------+
 
 
 
+-------------------------------------+
| No recent travel history available. |
+-------------------------------------+
 documented as of this encounter
 
 Last Filed Vital Signs
 
 
+-------------------+----------------------+----------------------+----------+
| Vital Sign        | Reading              | Time Taken           | Comments |
+-------------------+----------------------+----------------------+----------+
| Blood Pressure    | 108/48               | 2019  1:03 PM  |          |
|                   |                      | PDT                  |          |
+-------------------+----------------------+----------------------+----------+
| Pulse             | 61                   | 2019  1:03 PM  |          |
|                   |                      | PDT                  |          |
+-------------------+----------------------+----------------------+----------+
| Temperature       | -                    | -                    |          |
+-------------------+----------------------+----------------------+----------+
| Respiratory Rate  | -                    | -                    |          |
+-------------------+----------------------+----------------------+----------+
| Oxygen Saturation | 95%                  | 2019  1:03 PM  |          |
|                   |                      | PDT                  |          |
+-------------------+----------------------+----------------------+----------+
| Inhaled Oxygen    | -                    | -                    |          |
| Concentration     |                      |                      |          |
+-------------------+----------------------+----------------------+----------+
 
| Weight            | 72.9 kg (160 lb 11.2 | 2019  1:03 PM  |          |
|                   |  oz)                 | PDT                  |          |
+-------------------+----------------------+----------------------+----------+
| Height            | 172.7 cm (5' 8")     | 2019  1:03 PM  |          |
|                   |                      | PDT                  |          |
+-------------------+----------------------+----------------------+----------+
| Body Mass Index   | 24.43                | 2019  1:03 PM  |          |
|                   |                      | PDT                  |          |
+-------------------+----------------------+----------------------+----------+
 documented in this encounter
 
 Progress Eric Slade MD - 2019  1:00 PM PDTFormatting of this note might be different f
rom the original.
  
 Date of visit: 2019
 Primary Care Physician: Calvin Robertson MD
 CHIEF COMPLAINT:  
 Chief Complaint 
 Patient presents with 
   Follow-up 
   5 mo f/u  
 
 HISTORY OF PRESENT ILLNESS:
 
 Wyatt is 73 y.o. here for follow up visit. 
 Since prior evaluation no cardiac events.
 Chronic shortness of breath due to chronic obstructive pulmonary disease.
 Weight has been stable.
 Left lower extremity has increased swelling no swelling in the leg.
 
 No significant episodes of epistaxis after decreasing the dose of Rivaroxaban to 10 mg danielle
y.
 Previously had multiple episodes where he had to go to emergency room with epistaxis.
 Continues to be on Rivaroxaban, couldn't tolerate Aspirin or clopidogrel due to recurrent e
pistaxis. 
 Was hospitalized in 2018 secondary to non-ST elevation MI in the setting of COPD exa
cerbation.
 Coronary angiogram was performed and was found to have occluded saphenous vein graft to lef
t circumflex system however left circumflex is small and not amenable to PCI. 
 
 Denies any chest pain, continued to have shortness of breath which is chronic due to chroni
c obstructive pulmonary disease.
 
 S/P CABG x 4 in . Had a vocal cord cancer in . Diagnosed with atrial fibrillation m
any years ago.
 Had peptic ulcer disease and aspirin had to be stopped, patient had to have blood transfusi
on.
 [facility] last time seen was in , after that he was e
valuated by Dr. Foote in .
 
 Past medical history, SH, FH, and medications were reviewed in the chart.
 
 Medications:
 Outpatient Encounter Medications as of 2019 
 Medication Sig Dispense Refill 
   acyclovir (ZOVIRAX) 400 MG tablet Take 400 mg by mouth 5 (five) times daily. PRN   
   acyclovir (ZOVIRAX) 5% ointment Apply  topically every 3 hours.   
   albuterol (PROAIR HFA) 90 mcg/puff inhaler Inhale 2 puffs into the lungs every 6 hours 
as needed for Wheezing.   
 
   albuterol (PROAIR RESPICLICK) 90 mcg/puff inhaler Inhale  into the lungs.   
   albuterol-ipratropium 2.5-0.5 mg/3 mL SOLN Take 3 mLs by nebulization every 6 (six) nic
rs as needed.   
   atorvaSTATin (LIPITOR) 40 mg tablet TAKE 1 TABLET BY MOUTH NIGHTLY   
   azaTHIOprine (IMURAN) 50 mg tablet Take 150 mg by mouth daily.   
   bacitracin-polymyxin b (POLYSPORIN) ophthalmic ointment Apply  to eye as needed.   
   bismuth subsalicylate (PEPTO BISMOL) 262 mg chewable tablet Take 262 mg by mouth 4 (fou
r) times daily before meals and nightly.   
   cholecalciferol (CHOLECALCIFEROL) 1000 units TABS Take 1,000 Units by mouth daily.   
   Cholecalciferol (VITAMIN D3) 2000 UNITS CAPS Take 2,000 Units by mouth Daily.   
   cyanocobalamin (VITAMIN B-12) 1000 MCG tablet Take 1,000 mcg by mouth daily.   
   diclofenac (VOLTAREN) 1% GEL Apply 2 g topically 4 (four) times daily. PRN   
   digoxin (LANOXIN) 250 mcg tablet Take 250 mcg by mouth Daily. 1/2 capsule daily   
   diltiazem (DILT-XR) 120 mg 24 hr capsule Take 120 mg by mouth Daily.   
   famotidine (PEPCID) 40 MG tablet Take 40 mg by mouth daily.   
   ferrous sulfate 325 mg tablet Take 325 mg by mouth 3 times daily.   
   fluticasone (FLONASE) 50 mcg/nasal spray one spray in each nostril daily as needed   
   fluticasone-salmeterol (ADVAIR) 500-50 mcg/puff diskus inhaler Inhale 1 puff into the l
ungs Twice Daily.   
   folic acid 1 mg tablet Take 1 mg by mouth Daily.   
   furosemide (LASIX) 40 mg tablet Take 40 mg by mouth Daily.   
   guaiFENesin (MUCINEX) 600 mg 12 hr tablet Take 1,200 mg by mouth 2 times daily.   
   HYDROcodone-acetaminophen (NORCO) 7.5-325 mg per tablet Take 1 tablet by mouth as neede
d for Pain.   
   levocetirizine (XYZAL) 5 MG tablet Take 5 mg by mouth as needed.   
   levothyroxine (LEVOTHROID) 75 MCG tablet Take 75 mcg by mouth Daily.   
   levothyroxine (SYNTHROID) 75 MCG tablet Take 75 mcg by mouth every morning before break
fast.   
   losartan (COZAAR) 50 mg tablet Take 50 mg by mouth daily.   
   methotrexate 2.5 mg tablet Take 15 mg by mouth Once a week.   
   metoclopramide (REGLAN) 10 mg tablet Take 10 mg by mouth 4 times daily as needed.   
   mupirocin (BACTROBAN) 2% ointment 1 g by Nasal route as needed. Use pea sized amount in
 each nostril twice daily for 5 days. After applications, press sides of nose together, gent
ly massage for 1 min.   
   nystatin-triamcinolone (MYCOLOG II) cream Apply  topically 2 times daily.   
   ofloxacin (OCUFLOX) 0.3% ophthalmic solution    
   omeprazole (PRILOSEC) 20 mg capsule Take 20 mg by mouth 2 times daily.   
   potassium chloride (KLOR-CON) 10 MEQ ER tablet Take 10 mEq by mouth 2 (two) times daily
 with meals.   
   potassium citrate (UROCIT-K) 10 mEq SR tablet Take 10 mEq by mouth 2 times daily.   
   predniSONE (DELTASONE) 5 mg tablet Take 10 mg by mouth Daily.   
   pseudoePHEDrine (SUDOGEST) 30 mg tablet Take 30 mg by mouth every 4 hours as needed for
 Congestion.   
   rivaroxaban (XARELTO) 10 mg tablet Take 1 tablet by mouth daily.   
   sertraline (ZOLOFT) 100 mg tablet Take 100 mg by mouth daily.   
   tamsulosin (FLOMAX) 0.4 mg CAPS Take 0.4 mg by mouth 2 times daily.   
   trolamine salicylate (ASPERCREME) 10% cream Apply  topically as needed.   
 
 No facility-administered encounter medications on file as of 2019.  
 
 Allergies
 Allergies 
 Allergen Reactions 
   Beta Adrenergic Blockers Anaphylaxis 
   Diltiazem Other (See Comments) 
   Gabapentin Other (See Comments) 
   CNS 
   Imipramine Other (See Comments) 
   Urinary retention  
   Metoprolol Swelling 
 
   Propranolol Other (See Comments) 
   raynaud's  
   Diltiazem Hcl  
   Lipitor  
   Propranolol Hcl  
 
 REVIEW OF SYSTEMS:
 Constitutional: Positive for fatigue. No fever, chills, and rigors.  Weight has been stable
. 
 HEENT: Negative for nosebleeds, ear discharge, nasal congestion or soar throat.  
 Eyes: Negative for visual disturbance, redness, or secretion. 
 Respiratory: Negative for cough, sputum production, hemoptysis, wheezing. 
 Cardiovascular: Negative for orthopnea.  No chest pain or tightness. No LE edema.
 Gastrointestinal: Negative for nausea, vomiting, diarrhea, abdominal pain and blood in stoo
l. 
 Genitourinary: Negative for dysuria or hematuria. 
 Musculoskeletal: Arthritic pain. 
 Skin: Negative for rash. 
 Neurological: Negative for dizziness. No numbness. No recent falls. No slurred speech.
 Hematological: No significant bruising. 
 Psychiatric/Behavioral: No depression or anxiety.  
 
 PHYSICAL EXAM
 Vital Signs:
 /48  | Pulse 61  | Ht 1.727 m (5' 8")  | Wt 72.9 kg (160 lb 11.2 oz)  | SpO2 95%  | B
MI 24.43 kg/m 
   
 GENERAL APPEARANCE: Alert, oriented, cooperative, no distress, appears stated age.
 HEENT: Bleeding of the right eye.  Extraocular movements were intact.  No jaundice. Pupiles
 round and reactive. 
 NECK: No JVD, lymphadenopathy. Carotid upstrokes normal. No carotid bruit heard.
 CARDIAC:  Regular rhythm and rate. There is normal S1 and S2. No galop. No murmur.
 CHEST: Diminished air exchange.  No wheezing.  
 ABDOMEN: Soft.No tenderness or guarding. No palpable organs. Active bowel sounds.
 EXTREMITIES: No lower extremities edema, cyanosis or clubbing.
 NEURO: Alert and oriented times three with no focal deficit.  Cranial nerves are grossly no
rmal. 
 SKIN: Warm and dry. No rash.  Ecchymosis noted in bilateral arms.
 Psych: Normal affect and mood.  
 
 DATA 2019
 WBC 7.1, hemoglobin 10.0, platelets 188, MCV 88.
 Sodium 139, potassium 3.8, chloride 105, bicarb 24, BUN 22, creatinine 1.25, GFR 56.
 AST 9, AST 5, alk phos 73.  Troponin 1.6 7.  Total iron 47.4, saturation 12.6%.  TSH 3.2.  
TIBC 377.
 
 Lab Results 
 Component Value Date/Time 
   2018 05:35 
   08/10/2018 10:00 
   2018 01:57 
  K 3.3 (L) 2018 05:35 
  K 3.8 08/10/2018 10:00 
  K 4.3 2018 01:57 
  CO2 26 2018 05:35 
  CO2 24 08/10/2018 10:00 
  CO2 24 2018 01:57 
  BUN 12 2018 05:35 
  BUN 15 08/10/2018 10:00 
  BUN 14 2018 01:57 
 
  LABCREA 1.1 2018 05:35 
  LABCREA 1.2 08/10/2018 10:00 
  LABCREA 1.3 2018 01:57 
  CALCIUM 8.5 2018 05:35 
  CALCIUM 8.4 (L) 08/10/2018 10:00 
  CALCIUM 8.6 2018 01:57 
  MG 1.8 2018 01:57 
 
 Lab Results 
 Component Value Date/Time 
  WBC 5.20 2018 05:35 
  WBC 7.51 08/10/2018 10:00 
  WBC 11.05 (H) 2018 01:57 
  HGB 9.6 (L) 2018 05:35 
  HGB 9.4 (L) 08/10/2018 10:00 
  HGB 11.5 (L) 2018 01:57 
  MCV 76.7 (L) 2018 05:35 
  MCV 77.0 (L) 08/10/2018 10:00 
  MCV 77.5 (L) 2018 01:57 
 
 Lab Results 
 Component Value Date 
  CHOL 103 2018 
  TRIG 116 2018 
  HDL 24 (L) 2018 
  GLUF 101 (H) 2018 
  GLUF 137 (H) 08/10/2018 
 
 [id number]
 Ordered and reviewed that showed atrial fibrillation with controlled ventricular rate and r
ight bundle branch block.
 2018
 Reviewed by myself showed atrial fibrillation with RBBB. 
 Last Echo: 2018
 Normal LV size and function EF 55%. Inferior and inferolateral hypokinetic segments.
 04/15/2013
 Reported with normal LV size and function EF 56%. RV is normal in size and function. 
 Last Stress test: 2018
 Lexiscan Cardiolite stress test with evidence of ischemia inferior segment and small revers
ible defect as well as in the anterior segment.
 
 Last Cath: 2018
 LM mild disease, % occluded. LCX small caliber calcified with severe disease proxima
l and distal not amenable to PCI. % occluded.
 SVG to distal RCA patent, LIMA to LAD patent.
 SVG to LCX system is occluded at the insertion site. 
 Last US carotid:
 
 ASSESSMENT: 
 Patient is 73 y.o.with the following medical problems:
 
 1. Coronary artery disease, occluded SVG to LCX system, LCX is not amenable to PCI.  No ang
inal symptoms.
 2. Recurrent epistaxis, couldn't tolerate aspirin neither clopidogrel. Currently on low-dos
e Rivaroxaban.
 3. History of Non-ST elevation MI.
 4. Dysphagia.
 5. Chronic atrial fibrillation. On rate control strategy with digoxin and anticoagulation. 
CHADSVASc score of 4.
 6. AAA.
 
 7. History of CABG  4 in .
 8. Chronic right bundle branch block.
 9. Hypertension blood pressures controlled.
 10. Chronic obstructive pulmonary disease.
 11. History of upper GI bleed due to peptic ulcer was off any antiplatelet therapy.
 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
 13. Normocytic anemia likely of chronic disease.
 
 Recommendations:
 Finished cardiac rehabilitation soon.  No cardiac events since prior evaluation.
 Could not tolerate any dual antiplatelet therapy secondary to significant epistaxis that re
quired ER evaluation. Could not tolerate even aspirin.
 Continue with anticoagulation with Rivaroxaban 10 mg po q day. 
 If epistaxis becomes again of significance will discuss possible left atrial appendage occl
usion, Watchman device placement. 
 Continue to monitor H and H and any sign of increased hemoptysis. 
 Continue with Losartan 50 mg p.o. daily, statin.
 Digoxin 0.125 mg daily.
 Not a candidate for BB. HR is controlled ratewith atrial fibrillation.
 We will continue to follow-up with pulmonary.
 We will call with any change in status.
 
 *This report has been prepared using a voice recognition system. The report was reviewed fo
r accuracy, however, sound-alike word errors, addition and/or deletions may occur. If there 
is any question about this report please contact me.
 
 Eric London MD, MPH
 Electronically signed by Eric London MD at 2019  1:58 PM PDTdocumented in this 
encounter
 
 Plan of Treatment
 
 
+--------+---------+-------------+----------------------+-------------+
| Date   | Type    | Specialty   | Care Team            | Description |
+--------+---------+-------------+----------------------+-------------+
| / | Office  | Pulmonology |   Juan F,          |             |
| 2019   | Visit   |             | Jessica Cheung,   |             |
|        |         |             | MD  [address] |             |
|        |         |             |   [place],   |             |
|        |         |             | [place] [address]             |             |
|        |         |             | [phone]         |             |
|        |         |             | [phone] (Fax)   |             |
+--------+---------+-------------+----------------------+-------------+
| / | Office  | Cardiology  |   Eric London, |             |
| 2020   | Visit   |             |  MD  [address]   |             |
|        |         |             | [place]  |             |
|        |         |             | [address]  [phone]  |             |
|        |         |             |  [phone] (Fax)  |             |
+--------+---------+-------------+----------------------+-------------+
 documented as of this encounter
 
 Procedures
 
 
+----------------------+--------+-------------+----------------------+----------------------
+
| Procedure Name       | Priori | Date/Time   | Associated Diagnosis | Comments             
|
|                      | ty     |             |                      |                      
 
|
+----------------------+--------+-------------+----------------------+----------------------
+
| LABS - EXTERNAL SCAN |        | 2019  |                      |   Results for this   
|
|                      |        | 12:00 AM    |                      | procedure are in the 
|
|                      |        | PDT         |                      |  results section.    
|
+----------------------+--------+-------------+----------------------+----------------------
+
| ECG 12 LEAD          | Routin | 2019  |   Coronary artery    |   Results for this   
|
|                      | e      |  1:31 PM    | disease of bypass    | procedure are in the 
|
|                      |        | PDT         | graft of native      |  results section.    
|
|                      |        |             | heart with stable    |                      
|
|                      |        |             | angina pectoris      |                      
|
|                      |        |             | (HCC)  Essential     |                      
|
|                      |        |             | hypertension  Stable |                      
|
|                      |        |             |  angina (HCC)        |                      
|
|                      |        |             | Chronic atrial       |                      
|
|                      |        |             | fibrillation (HCC)   |                      
|
+----------------------+--------+-------------+----------------------+----------------------
+
 documented in this encounter
 
 Results
 LABS - EXTERNAL SCAN (2019 12:00 AM PDT)
 
+------------------------------------------------------------------------+--------------+
| Narrative                                                              | Performed At |
+------------------------------------------------------------------------+--------------+
|   This result has an attachment that is not available.  Ordered by an  |              |
| unspecified provider.                                                  |              |
+------------------------------------------------------------------------+--------------+
 ECG 12 lead (2019  1:31 PM PDT)
 
+-------------+--------------------------+-----------+------------+--------------+
| Component   | Value                    | Ref Range | Performed  | Pathologist  |
|             |                          |           | At         | Signature    |
+-------------+--------------------------+-----------+------------+--------------+
| VENTRICULAR | 50                       | BPM       | [facility]  |              |
|  RATE EKG   |                          |           |            |              |
+-------------+--------------------------+-----------+------------+--------------+
| ATRIAL RATE | 178                      | BPM       | [facility]  |              |
+-------------+--------------------------+-----------+------------+--------------+
| QRS         | 132                      | ms        | [facility]  |              |
| DURATION    |                          |           |            |              |
+-------------+--------------------------+-----------+------------+--------------+
| Q-T         | 436                      | ms        | [facility]  |              |
| INTERVAL    |                          |           |            |              |
 
+-------------+--------------------------+-----------+------------+--------------+
| Q-T         | 397                      | ms        | [facility]  |              |
| INTERVAL    |                          |           |            |              |
| (CORRECTED) |                          |           |            |              |
+-------------+--------------------------+-----------+------------+--------------+
| QRS AXIS    | -68                      | degrees   | [facility]  |              |
+-------------+--------------------------+-----------+------------+--------------+
| T AXIS      | 55                       | degrees   | [facility]  |              |
+-------------+--------------------------+-----------+------------+--------------+
| INTERPRETAT | Atrial fibrillation with |           | [facility]  |              |
| ION TEXT    |  slow ventricular        |           |            |              |
|             | responseRight bundle     |           |            |              |
|             | branch blockLeft         |           |            |              |
|             | anterior fascicular      |           |            |              |
|             | block*** Bifascicular    |           |            |              |
|             | block ***Abnormal        |           |            |              |
|             | ECGWhen compared with    |           |            |              |
|             | ECG of 09-AUG-2018       |           |            |              |
|             | 06:28,QT has             |           |            |              |
|             | shortenedPlease refer to |           |            |              |
|             |  Providers office visit  |           |            |              |
|             | note for Providers       |           |            |              |
|             | Interpretation.Confirmed |           |            |              |
|             |  by [facility] Read Only,  |           |            |              |
|             | [facility] (502),      |           |            |              |
|             |  Arjun Cramer    |           |            |              |
|             | (253) on 2019       |           |            |              |
|             | 5:21:34 PM               |           |            |              |
+-------------+--------------------------+-----------+------------+--------------+
 
 
 
+----------+
| Specimen |
+----------+
|          |
+----------+
 
 
 
+----------------------------------------------------------+--------------+
| Narrative                                                | Performed At |
+----------------------------------------------------------+--------------+
|   This result has an attachment that is not available.   |              |
+----------------------------------------------------------+--------------+
 
 
 
+--------------+---------+--------------------+--------------+
| Performing   | Address | City/State/Zipcode | Phone Number |
| Organization |         |                    |              |
+--------------+---------+--------------------+--------------+
|   [facility]  |         |                    |              |
+--------------+---------+--------------------+--------------+
 documented in this encounter
 
 Visit Diagnoses
 
 
+----------------------------------------------------------------------------------------+
 
| Diagnosis                                                                              |
+----------------------------------------------------------------------------------------+
|   Coronary artery disease of bypass graft of native heart with stable angina pectoris  |
| (HCC)                                                                                  |
+----------------------------------------------------------------------------------------+
|   Essential hypertension  Unspecified essential hypertension                           |
+----------------------------------------------------------------------------------------+
|   Stable angina (HCC)  Other and unspecified angina pectoris                           |
+----------------------------------------------------------------------------------------+
|   Chronic atrial fibrillation  Atrial fibrillation                                     |
+----------------------------------------------------------------------------------------+
 documented in this encounter

## 2019-12-06 NOTE — XMS
Encounter Summary
  Created on: 2019
 
 Shane Wyatttien BroussardJosue
 External Reference #: [id number]
 : 43
 Sex: Male
 
 Demographics
 
 
+-----------------------+---------------------------+
| Address               | [address]        |
|                       | [place]  [address] |
+-----------------------+---------------------------+
| Home Phone            | [phone]           |
+-----------------------+---------------------------+
| Preferred Language    | Unknown                   |
+-----------------------+---------------------------+
| Marital Status        |                    |
+-----------------------+---------------------------+
| Restoration Affiliation | [id number]                      |
+-----------------------+---------------------------+
| Race                  | Unknown                   |
+-----------------------+---------------------------+
| Ethnic Group          | Unknown                   |
+-----------------------+---------------------------+
 
 
 Author
 
 
+--------------+--------------------------------------------+
| Author       | [facility] [place]  |
|              | and [place]                                |
+--------------+--------------------------------------------+
| Organization | [facility] [place]  |
|              | and [place]                                |
+--------------+--------------------------------------------+
| Address      | Unknown                                    |
+--------------+--------------------------------------------+
| Phone        | Unavailable                                |
+--------------+--------------------------------------------+
 
 
 
 Support
 
 
+---------------+--------------+--------------------+-----------------+
| Name          | Relationship | Address            | Phone           |
+---------------+--------------+--------------------+-----------------+
| Opal Shane | ECON         | [address]     | [phone] |
|               |              | [place]   |                 |
|               |              | [address]              |                 |
+---------------+--------------+--------------------+-----------------+
 
 
 
 
 Care Team Providers
 
 
+-----------------------+------+-----------------+
| Care Team Member Name | Role | Phone           |
+-----------------------+------+-----------------+
| Erwin Iniguez MD | PCP  | [phone] |
+-----------------------+------+-----------------+
 
 
 
 Reason for Visit
 
 
+--------+----------+
| Reason | Comments |
+--------+----------+
| Apnea  |          |
+--------+----------+
 
 
 
 Encounter Details
 
 
+--------+---------+----------------------+----------------------+----------------------+
| Date   | Type    | Department           | Care Team            | Description          |
+--------+---------+----------------------+----------------------+----------------------+
| / | Office  |   [facility]      |   Sohail Campbell PA  | JOANN treated with     |
| 2015   | Visit   | SLEEP DISORDER  [address]  |  [address]     | BiPAP (Primary Dx);  |
|        |         | [address]      | [place]      | [place]        |
|        |         | [place] [address] | [address]  [phone]  | respiration          |
|        |         |   [phone]       |  [phone] (Fax)  |                      |
+--------+---------+----------------------+----------------------+----------------------+
 
 
 
 Social History
 
 
+---------------+------------+-----------+--------+------------------+
| Tobacco Use   | Types      | Packs/Day | Years  | Date             |
|               |            |           | Used   |                  |
+---------------+------------+-----------+--------+------------------+
| Former Smoker | Cigarettes | 1.3       | 34     | Quit: 1996 |
+---------------+------------+-----------+--------+------------------+
 
 
 
+---------------------+---+---+---+
| Smokeless Tobacco:  |   |   |   |
| Never Used          |   |   |   |
+---------------------+---+---+---+
 
 
 
+-------------+-------------+---------+----------+
| Alcohol Use | Drinks/Week | oz/Week | Comments |
+-------------+-------------+---------+----------+
 
| No          |             |         |          |
+-------------+-------------+---------+----------+
 
 
 
+------------------+---------------+
| Sex Assigned at  | Date Recorded |
| Birth            |               |
+------------------+---------------+
| Not on file      |               |
+------------------+---------------+
 
 
 
+----------------+-------------+-------------+
| Job Start Date | Occupation  | Industry    |
+----------------+-------------+-------------+
| Not on file    | Not on file | Not on file |
+----------------+-------------+-------------+
 
 
 
+----------------+--------------+------------+
| Travel History | Travel Start | Travel End |
+----------------+--------------+------------+
 
 
 
+-------------------------------------+
| No recent travel history available. |
+-------------------------------------+
 documented as of this encounter
 
 Last Filed Vital Signs
 
 
+-------------------+----------------------+----------------------+----------+
| Vital Sign        | Reading              | Time Taken           | Comments |
+-------------------+----------------------+----------------------+----------+
| Blood Pressure    | 120/68               | 2015 10:07 AM  |          |
|                   |                      | PDT                  |          |
+-------------------+----------------------+----------------------+----------+
| Pulse             | 68                   | 2015 10:07 AM  |          |
|                   |                      | PDT                  |          |
+-------------------+----------------------+----------------------+----------+
| Temperature       | -                    | -                    |          |
+-------------------+----------------------+----------------------+----------+
| Respiratory Rate  | 16                   | 2015 10:07 AM  |          |
|                   |                      | PDT                  |          |
+-------------------+----------------------+----------------------+----------+
| Oxygen Saturation | 98%                  | 2015 10:07 AM  |          |
|                   |                      | PDT                  |          |
+-------------------+----------------------+----------------------+----------+
| Inhaled Oxygen    | -                    | -                    |          |
| Concentration     |                      |                      |          |
+-------------------+----------------------+----------------------+----------+
| Weight            | 80.8 kg (178 lb 3.2  | 2015 10:07 AM  |          |
|                   | oz)                  | PDT                  |          |
+-------------------+----------------------+----------------------+----------+
| Height            | -                    | -                    |          |
 
+-------------------+----------------------+----------------------+----------+
| Body Mass Index   | 27.1                 | 2014 10:00 AM  |          |
|                   |                      | PDT                  |          |
+-------------------+----------------------+----------------------+----------+
 documented in this encounter
 
 Progress Notes
 Sohail Campbell PA - 2015 10:08 AM PDTFormatting of this note might be different from 
the original.
 Subjective: 
  
 Patient ID: Wyatt Shane is a 69 y.o. male.
 
 HPI
 
 last office visit was:  10/07/2014
 date of polysomnography: 10/07/2013 
 AHI: 77.4
 RDI: 77.4
 O2%: 86% with 15.8 minutes below 88% 
 Machine type: Respironics ASV BiPAP with nasal mask ([employer])
 obtained from:  [facility] in [place] 
 pressure: EPAP = 4cm;PSmin=0cm;PSmax=20 cm;backup rate=auto
 Nights using BiPAP:    1564  163/191
 % of nights >4 hours:  3.1%   43%
 average usage (all nights):  0:27  0:28  3:26
 average usage (nights used):  2:34  2:03  4:02
 AHI: 0.0
 
 Wyatt comes in for BiPAP compliance.  He continues to have problems with congestion, which h
as always caused problems with his compliance.  This has improved since he started Sudafed. 
 He has been able to wear it for longer durations, but he is still working to develop it int
o a regular part of his sleep routine.  He is sleeping much better when using his BiPAP and 
has noticed that he feels better during the day.  When he takes his Sudafed, he does well wi
th his compliance.  He has forgotten on many nights.  He has also had significant shoulder p
ain, which has distracted him on many nights from using his BiPAP.
 
 I have discussed the download in detail.  This shows that his sleep apnea is controlled, wi
th an AHI of 0.0.  It also shows that his leaks are well controlled.  He has not met the com
pliance standard of wearing his BiPAP for >4 hours for 70% or more nights during at least a 
thirty day period.
 
 Review of Systems
 
 Objective: 
 Physical Exam
 
 Assessment: 
 
 Problem #1: OBSTRUCTIVE SLEEP APNEA (ICD 9-327.23)
 This is controlled with BiPAP.  His compliance has improved since he began taking Sudafed f
or his sinus congestion, but he still struggles with wearing it consistently.
 
 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 
 See above.
 
 Plan: 
 
 He is to continue with his BiPAP indefinitely.  I have recommended that he work toward wear
ing his BiPAP 100% of the time he is asleep. 
 
 
 I will follow up with him in 2 months, sooner prn.  Fifteen minutes were spent face-to-face
, with the majority of time spent in counseling.
 
 Sohail Campbell PA-C
 
 cc: 
 Dr. Erwin Foote
 
 Electronically signed by ROWENA Greene at 2015 10:33 AM PDTdocumented in this enco
unter
 
 Plan of Treatment
 
 
+--------+---------+-------------+----------------------+-------------+
| Date   | Type    | Specialty   | Care Team            | Description |
+--------+---------+-------------+----------------------+-------------+
| / | Office  | Pulmonology |   Juan F,          |             |
|    | Visit   |             | Jessica Cheung,   |             |
|        |         |             | MD [address] |             |
|        |         |             |   [place]   |             |
|        |         |             | [place] [address]             |             |
|        |         |             | [phone]         |             |
|        |         |             | [phone] (Fax)   |             |
+--------+---------+-------------+----------------------+-------------+
| / | Office  | Cardiology  |   Eric Akins, |             |
|    | Visit   |             |  MD [address]   |             |
|        |         |             | [place]  |             |
|        |         |             | [address]  [phone]  |             |
|        |         |             |  [phone] (Fax)  |             |
+--------+---------+-------------+----------------------+-------------+
 documented as of this encounter
 
 Visit Diagnoses
 
 
+------------------------------------+
| Diagnosis                          |
+------------------------------------+
|   JOANN treated with BiPAP - Primary |
+------------------------------------+
|   [place] respiration        |
+------------------------------------+
 documented in this encounter

## 2019-12-06 NOTE — XMS
Encounter Summary
  Created on: 2019
 
 Wyatt Shane
 External Reference #: [id number]
 : 43
 Sex: Male
 
 Demographics
 
 
+-----------------------+------------------------+
| Address               | [address]     |
|                       | [place]  [address]   |
+-----------------------+------------------------+
| Home Phone            | [phone]        |
+-----------------------+------------------------+
| Preferred Language    | Unknown                |
+-----------------------+------------------------+
| Marital Status        |                 |
+-----------------------+------------------------+
| [employer] Affiliation | LUT                    |
+-----------------------+------------------------+
| Race                  | White                  |
+-----------------------+------------------------+
| Ethnic Group          | Not  or  |
+-----------------------+------------------------+
 
 
 Author
 
 
+--------------+-------------+
| Organization | Unknown     |
+--------------+-------------+
| Address      | Unknown     |
+--------------+-------------+
| Phone        | Unavailable |
+--------------+-------------+
 
 
 
 Support
 
 
+---------------+--------------+---------+-----------------+
| Name          | Relationship | Address | Phone           |
+---------------+--------------+---------+-----------------+
| Opal Shane | ECON         | Unknown | [phone] |
+---------------+--------------+---------+-----------------+
 
 
 
 Care Team Providers
 
 
+-----------------------+------+-----------------+
| Care Team Member Name | Role | Phone           |
 
+-----------------------+------+-----------------+
| Erwin Iniguez MD   | PCP  | [phone] |
+-----------------------+------+-----------------+
 
 
 
 Encounter Details
 
 
+--------+-------------+------------+----------------------+------------------+
| Date   | Type        | Department | Care Team            | Description      |
+--------+-------------+------------+----------------------+------------------+
| / | H&P-Transcr |            |   Physical, History  | Hstry & Physical |
| 2000   | ibed        |            | &                    |                  |
+--------+-------------+------------+----------------------+------------------+
 
 
 
 Social History
 
 
+----------------+-------+-----------+--------+------+
| Tobacco Use    | Types | Packs/Day | Years  | Date |
|                |       |           | Used   |      |
+----------------+-------+-----------+--------+------+
| Never Assessed |       |           |        |      |
+----------------+-------+-----------+--------+------+
 
 
 
+------------------+---------------+
| Sex Assigned at  | Date Recorded |
| Birth            |               |
+------------------+---------------+
| Not on file      |               |
+------------------+---------------+
 
 
 
+----------------+-------------+-------------+
| Job Start Date | Occupation  | Industry    |
+----------------+-------------+-------------+
| Not on file    | Not on file | Not on file |
+----------------+-------------+-------------+
 
 
 
+----------------+--------------+------------+
| Travel History | Travel Start | Travel End |
+----------------+--------------+------------+
 
 
 
+-------------------------------------+
| No recent travel history available. |
+-------------------------------------+
 documented as of this encounter
 
 Plan of Treatment
 Not on filedocumented as of this encounter
 
 
 Visit Diagnoses
 Not on filedocumented in this encounter

## 2019-12-06 NOTE — XMS
Clinical Summary
  Created on: 2019
 
 Svitlana Wyatt Salas
 External Reference #: [id number]
 : 43
 Sex: Male
 
 Demographics
 
 
+-----------------------+---------------------------+
| Address               | [address]        |
|                       | [place]  [address] |
+-----------------------+---------------------------+
| Home Phone            | [phone]           |
+-----------------------+---------------------------+
| Preferred Language    | Unknown                   |
+-----------------------+---------------------------+
| Marital Status        |                    |
+-----------------------+---------------------------+
| Mandaeism Affiliation | [id number]                      |
+-----------------------+---------------------------+
| Race                  | Unknown                   |
+-----------------------+---------------------------+
| Ethnic Group          | Unknown                   |
+-----------------------+---------------------------+
 
 
 Author
 
 
+--------------+--------------------------------------------+
| Author       | Merged with [facility]  |
|              | and [place]                                |
+--------------+--------------------------------------------+
| Organization | Merged with [facility]  |
|              | and [place]                                |
+--------------+--------------------------------------------+
| Address      | Unknown                                    |
+--------------+--------------------------------------------+
| Phone        | Unavailable                                |
+--------------+--------------------------------------------+
 
 
 
 Support
 
 
+---------------+--------------+--------------------+-----------------+
| Name          | Relationship | Address            | Phone           |
+---------------+--------------+--------------------+-----------------+
| Opal Shane | ECON         | [address]     | [phone] |
|               |              | [place]   |                 |
|               |              | [id number]              |                 |
+---------------+--------------+--------------------+-----------------+
 
 
 
 
 Care Team Providers
 
 
+------------------------+------+-----------------+
| Care Team Member Name  | Role | Phone           |
+------------------------+------+-----------------+
| Calvin Robertson MD | PCP  | [phone] |
+------------------------+------+-----------------+
 
 
 
 Allergies
 
 
+------------------+----------------------+----------+----------+----------------------+
| Active Allergy   | Reactions            | Severity | Noted    | Comments             |
|                  |                      |          | Date     |                      |
+------------------+----------------------+----------+----------+----------------------+
| Beta Adrenergic  | Anaphylaxis          | High     | 20 |                      |
| Blockers         |                      |          | 15       |                      |
+------------------+----------------------+----------+----------+----------------------+
| Diltiazem        | Other (See Comments) | Medium   | 20 |                      |
|                  |                      |          | 15       |                      |
+------------------+----------------------+----------+----------+----------------------+
| Diltiazem Hcl    |                      |          |          |                      |
+------------------+----------------------+----------+----------+----------------------+
| Gabapentin       | Other (See Comments) | Medium   | 20 |   CNS                |
|                  |                      |          | 15       |                      |
+------------------+----------------------+----------+----------+----------------------+
| Imipramine       | Other (See Comments) | Medium   | 20 |   Urinary retention  |
|                  |                      |          | 15       |                      |
+------------------+----------------------+----------+----------+----------------------+
| Lipitor          |                      |          |          |                      |
+------------------+----------------------+----------+----------+----------------------+
| Metoprolol       | Swelling             | Medium   | 20 |                      |
|                  |                      |          | 15       |                      |
+------------------+----------------------+----------+----------+----------------------+
| Propranolol      | Other (See Comments) | Medium   | 20 |   raynaud's          |
|                  |                      |          | 15       |                      |
+------------------+----------------------+----------+----------+----------------------+
| Propranolol Hcl  |                      |          |          |                      |
+------------------+----------------------+----------+----------+----------------------+
 
 
 
 Medications
 
 
+----------------------+----------------------+-----------+---------+------+------+-------+
| Medication           | Sig                  | Dispensed | Refills | Star | End  | Statu |
|                      |                      |           |         | t    | Date | s     |
|                      |                      |           |         | Date |      |       |
+----------------------+----------------------+-----------+---------+------+------+-------+
|   fluticasone        | one spray in each    |           | 0       | 09/1 |      | Activ |
| (FLONASE) 50         | nostril daily as     |           |         | 4/20 |      | e     |
| mcg/nasal spray      | needed               |           |         | 12   |      |       |
+----------------------+----------------------+-----------+---------+------+------+-------+
|   ferrous sulfate    | Take 325 mg by mouth |           | 0       | 09/1 |      | Activ |
| 325 mg tablet        |  3 times daily.      |           |         | 4/20 |      | e     |
 
|                      |                      |           |         | 12   |      |       |
+----------------------+----------------------+-----------+---------+------+------+-------+
|   levothyroxine      | Take 75 mcg by mouth |           | 0       | 09/1 |      | Activ |
| (LEVOTHROID) 75 MCG  |  Daily.              |           |         | 4/20 |      | e     |
| tablet               |                      |           |         | 12   |      |       |
+----------------------+----------------------+-----------+---------+------+------+-------+
|   omeprazole         | Take 20 mg by mouth  |           | 0       | 1 |      | Activ |
| (PRILOSEC) 20 mg     | 2 times daily.       |           |         |  |      | e     |
| capsule              |                      |           |         | 12   |      |       |
+----------------------+----------------------+-----------+---------+------+------+-------+
|   metoclopramide     | Take 10 mg by mouth  |           | 0       | /1 |      | Activ |
| (REGLAN) 10 mg       | 4 times daily as     |           |         | 420 |      | e     |
| tablet               | needed.              |           |         | 12   |      |       |
+----------------------+----------------------+-----------+---------+------+------+-------+
|   guaiFENesin        | Take 1,200 mg by     |           | 0       |      |      | Activ |
| (MUCINEX) 600 mg 12  | mouth 2 times daily. |           |         |      |      | e     |
| hr tablet            |                      |           |         |      |      |       |
+----------------------+----------------------+-----------+---------+------+------+-------+
|   furosemide (LASIX) | Take 40 mg by mouth  |           | 0       |      |      | Activ |
|  40 mg tablet        | Daily.               |           |         |      |      | e     |
+----------------------+----------------------+-----------+---------+------+------+-------+
|   folic acid 1 mg    | Take 1 mg by mouth   |           | 0       |      |      | Activ |
| tablet               | Daily.               |           |         |      |      | e     |
+----------------------+----------------------+-----------+---------+------+------+-------+
|   tamsulosin         | Take 0.4 mg by mouth |           | 0       | 09/1 |      | Activ |
| (FLOMAX) 0.4 mg CAPS |  2 times daily.      |           |         | 4/20 |      | e     |
|                      |                      |           |         | 12   |      |       |
+----------------------+----------------------+-----------+---------+------+------+-------+
|                      | Apply  topically 2   |           | 0       |      |      | Activ |
| nystatin-triamcinolo | times daily.         |           |         |      |      | e     |
| ne (MYCOLOG II)      |                      |           |         |      |      |       |
| cream                |                      |           |         |      |      |       |
+----------------------+----------------------+-----------+---------+------+------+-------+
|   potassium citrate  | Take 10 mEq by mouth |           | 0       |      |      | Activ |
| (UROCIT-K) 10 mEq SR |  2 times daily.      |           |         |      |      | e     |
|  tablet              |                      |           |         |      |      |       |
+----------------------+----------------------+-----------+---------+------+------+-------+
|   albuterol (PROAIR  | Inhale 2 puffs into  |           | 0       |      |      | Activ |
| HFA) 90 mcg/puff     | the lungs every 6    |           |         |      |      | e     |
| inhaler              | hours as needed for  |           |         |      |      |       |
|                      | Wheezing.            |           |         |      |      |       |
+----------------------+----------------------+-----------+---------+------+------+-------+
|   predniSONE         | Take 10 mg by mouth  |           | 0       |      |      | Activ |
| (DELTASONE) 5 mg     | Daily.               |           |         |      |      | e     |
| tablet               |                      |           |         |      |      |       |
+----------------------+----------------------+-----------+---------+------+------+-------+
|   acyclovir          | Apply  topically     |           | 0       |      |      | Activ |
| (ZOVIRAX) 5%         | every 3 hours.       |           |         |      |      | e     |
| ointment             |                      |           |         |      |      |       |
+----------------------+----------------------+-----------+---------+------+------+-------+
|                      | Inhale 1 puff into   |           | 0       |      |      | Activ |
| fluticasone-salmeter | the lungs Twice      |           |         |      |      | e     |
| ol (ADVAIR) 500-50   | Daily.               |           |         |      |      |       |
| mcg/puff diskus      |                      |           |         |      |      |       |
| inhaler              |                      |           |         |      |      |       |
+----------------------+----------------------+-----------+---------+------+------+-------+
|   digoxin (LANOXIN)  | Take 125 mcg by      |           | 0       |      |      | Activ |
| 250 mcg tablet       | mouth Daily. 1/2     |           |         |      |      | e     |
|                      | capsule daily        |           |         |      |      |       |
+----------------------+----------------------+-----------+---------+------+------+-------+
 
|   atorvaSTATin       | TAKE 1 TABLET BY     |           | 0       | 03/2 | 03/2 | Activ |
| (LIPITOR) 40 mg      | MOUTH NIGHTLY        |           |         |  |  | e     |
| tablet               |                      |           |         | 19   | 20   |       |
+----------------------+----------------------+-----------+---------+------+------+-------+
|   azaTHIOprine       | Take 150 mg by mouth |           | 0       |      |      | Activ |
| (IMURAN) 50 mg       |  daily.              |           |         |      |      | e     |
| tablet               |                      |           |         |      |      |       |
+----------------------+----------------------+-----------+---------+------+------+-------+
|                      | Apply  to eye as     |           | 0       |      |      | Activ |
| bacitracin-polymyxin | needed.              |           |         |      |      | e     |
|  b (POLYSPORIN)      |                      |           |         |      |      |       |
| ophthalmic ointment  |                      |           |         |      |      |       |
+----------------------+----------------------+-----------+---------+------+------+-------+
|   bismuth            | Take 262 mg by mouth |           | 0       |      |      | Activ |
| subsalicylate (PEPTO |  4 (four) times      |           |         |      |      | e     |
|  BISMOL) 262 mg      | daily before meals   |           |         |      |      |       |
| chewable tablet      | and nightly.         |           |         |      |      |       |
+----------------------+----------------------+-----------+---------+------+------+-------+
|   diclofenac         | Apply 2 g topically  |           | 0       |      |      | Activ |
| (VOLTAREN) 1% GEL    | 4 (four) times       |           |         |      |      | e     |
|                      | daily. PRN           |           |         |      |      |       |
+----------------------+----------------------+-----------+---------+------+------+-------+
|   famotidine         | Take 40 mg by mouth  |           | 0       |      |      | Activ |
| (PEPCID) 40 MG       | daily.               |           |         |      |      | e     |
| tablet               |                      |           |         |      |      |       |
+----------------------+----------------------+-----------+---------+------+------+-------+
|                      | Take 3 mLs by        |           | 0       | 08/1 |      | Activ |
| albuterol-ipratropiu | nebulization every 6 |           |         | 1/20 |      | e     |
| m 2.5-0.5 mg/3 mL    |  (six) hours as      |           |         | 18   |      |       |
| SOLN                 | needed.              |           |         |      |      |       |
+----------------------+----------------------+-----------+---------+------+------+-------+
|   levocetirizine     | Take 5 mg by mouth   |           | 0       |      |      | Activ |
| (XYZAL) 5 MG tablet  | as needed.           |           |         |      |      | e     |
+----------------------+----------------------+-----------+---------+------+------+-------+
|   mupirocin          | 1 g by Nasal route   |           | 0       |      |      | Activ |
| (BACTROBAN) 2%       | as needed. Use pea   |           |         |      |      | e     |
| ointment             | sized amount in each |           |         |      |      |       |
|                      |  nostril twice daily |           |         |      |      |       |
|                      |  for 5 days. After   |           |         |      |      |       |
|                      | applications, press  |           |         |      |      |       |
|                      | sides of nose        |           |         |      |      |       |
|                      | together, gently     |           |         |      |      |       |
|                      | massage for 1 min.   |           |         |      |      |       |
+----------------------+----------------------+-----------+---------+------+------+-------+
|   ofloxacin          |                      |           | 0       | 04/0 |      | Activ |
| (OCUFLOX) 0.3%       |                      |           |         | 9/20 |      | e     |
| ophthalmic solution  |                      |           |         | 18   |      |       |
+----------------------+----------------------+-----------+---------+------+------+-------+
|   trolamine          | Apply  topically as  |           | 0       |      |      | Activ |
| salicylate           | needed.              |           |         |      |      | e     |
| (ASPERCREME) 10%     |                      |           |         |      |      |       |
| cream                |                      |           |         |      |      |       |
+----------------------+----------------------+-----------+---------+------+------+-------+
|   acyclovir          | Take 400 mg by mouth |           | 0       |      |      | Activ |
| (ZOVIRAX) 400 MG     |  5 (five) times      |           |         |      |      | e     |
| tablet               | daily. PRN           |           |         |      |      |       |
+----------------------+----------------------+-----------+---------+------+------+-------+
|   albuterol (PROAIR  | Inhale  into the     |           | 0       |      |      | Activ |
| RESPICLICK) 90       | lungs.               |           |         |      |      | e     |
| mcg/puff inhaler     |                      |           |         |      |      |       |
 
+----------------------+----------------------+-----------+---------+------+------+-------+
|   sertraline         | Take 100 mg by mouth |           | 0       |      |      | Activ |
| (ZOLOFT) 100 mg      |  daily.              |           |         |      |      | e     |
| tablet               |                      |           |         |      |      |       |
+----------------------+----------------------+-----------+---------+------+------+-------+
|   losartan (COZAAR)  | Take 50 mg by mouth  |           | 0       |      |      | Activ |
| 50 mg tablet         | daily.               |           |         |      |      | e     |
+----------------------+----------------------+-----------+---------+------+------+-------+
|   cyanocobalamin     | Take 1,000 mcg by    |           | 0       |      |      | Activ |
| (VITAMIN B-12) 1000  | mouth daily.         |           |         |      |      | e     |
| MCG tablet           |                      |           |         |      |      |       |
+----------------------+----------------------+-----------+---------+------+------+-------+
|   cholecalciferol    | Take 1,000 Units by  |           | 0       |      |      | Activ |
| (CHOLECALCIFEROL)    | mouth daily.         |           |         |      |      | e     |
| 1000 units TABS      |                      |           |         |      |      |       |
+----------------------+----------------------+-----------+---------+------+------+-------+
|   XARELTO 10 MG      | TAKE ONE TABLET BY   |   90      | 2       | 10/ |      | Activ |
| tablet               | MOUTH EVERY DAY      | tablet    |         |  |      | e     |
|                      |                      |           |         | 19   |      |       |
+----------------------+----------------------+-----------+---------+------+------+-------+
 
 
 
 Active Problems
 
 
+---------------------------------------------------------------+------------+
| Problem                                                       | Noted Date |
+---------------------------------------------------------------+------------+
| Stable angina                                                 | 2018 |
+---------------------------------------------------------------+------------+
| COPD, moderate                                                | 2018 |
+---------------------------------------------------------------+------------+
| Personal history of tobacco use, presenting hazards to health | 2018 |
+---------------------------------------------------------------+------------+
| Rheumatoid arthritis                                          | 2018 |
+---------------------------------------------------------------+------------+
| Moderate protein-calorie malnutrition                         | 2018 |
+---------------------------------------------------------------+------------+
| Dysphagia                                                     | 2018 |
+---------------------------------------------------------------+------------+
| Severe protein-calorie malnutrition                           | 2018 |
+---------------------------------------------------------------+------------+
| Chronic bronchitis                                            | 2018 |
+---------------------------------------------------------------+------------+
| Chronic maxillary sinusitis                                   | 2018 |
+---------------------------------------------------------------+------------+
| Multifocal lung consolidation                                 | 2018 |
+---------------------------------------------------------------+------------+
| Peptic ulcer disease                                          | 2018 |
+---------------------------------------------------------------+------------+
| CARPAL TUNNEL SYNDROME                                        |            |
+---------------------------------------------------------------+------------+
| Essential hypertension                                        |            |
+---------------------------------------------------------------+------------+
| Chronic atrial fibrillation                                   |            |
+---------------------------------------------------------------+------------+
| Raynaud disease                                               |            |
+---------------------------------------------------------------+------------+
| Fibromyalgia                                                  |            |
 
+---------------------------------------------------------------+------------+
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
| stable angina pectoris                                        |            |
+---------------------------------------------------------------+------------+
| Aortic aneurysm                                               |            |
+---------------------------------------------------------------+------------+
| JOANN (obstructive sleep apnea)                                 |            |
+---------------------------------------------------------------+------------+
| Cancer of vocal cord                                          |            |
+---------------------------------------------------------------+------------+
 
 
 
+--------------------------------------------------+
|   Overview:   Treated with surgery and radiation |
+--------------------------------------------------+
 
 
 
+------------------------------------------------------+---+
| Parasomnia                                           |   |
+------------------------------------------------------+---+
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
+------------------------------------------------------+---+
 
 
 
 Resolved Problems
 
 
+---------------+--------+-----------+
| Problem       | Noted  | Resolved  |
|               | Date   | Date      |
+---------------+--------+-----------+
| Heart failure |        |  |
|               |        | 9         |
+---------------+--------+-----------+
 
 
 
 Encounters
 
 
+--------+-----------+------------------+----------------------+----------------------+
| Date   | Type      | Specialty        | Care Team            | Description          |
+--------+-----------+------------------+----------------------+----------------------+
| / | Office    | Vascular Surgery |   Neris Wilson DNP      | Abdominal aortic     |
|    | Visit     |                  |                      | aneurysm (AAA)       |
|        |           |                  |                      | without rupture      |
|        |           |                  |                      | (HCC) (Primary Dx);  |
|        |           |                  |                      | Carotid stenosis,    |
|        |           |                  |                      | bilateral            |
+--------+-----------+------------------+----------------------+----------------------+
| / | Hospital  | Radiology        |                      | Carotid stenosis,    |
|    | Encounter |                  |                      | bilateral            |
+--------+-----------+------------------+----------------------+----------------------+
| / | Hospital  | Radiology        |                      | Abdominal aortic     |
| 2019   | Encounter |                  |                      | aneurysm (AAA)       |
|        |           |                  |                      | without rupture      |
|        |           |                  |                      | (HCC)                |
 
+--------+-----------+------------------+----------------------+----------------------+
| / | Telephone | Vascular Surgery |   Neris Wilson DNP      | Follow-up            |
|    |           |                  |                      |                      |
+--------+-----------+------------------+----------------------+----------------------+
| 10/28/ | Telephone | Pulmonology      |   Juan F,          | Other (         |
|    |           |                  | Jessica Cheung,   | appointment)         |
|        |           |                  | MD                   |                      |
+--------+-----------+------------------+----------------------+----------------------+
| 10/16/ | Refill    | Cardiology       |   Eric Akins, | Medication Refill    |
|    |           |                  |  MD                  |                      |
+--------+-----------+------------------+----------------------+----------------------+
| / | Telephone | Vascular Surgery |   Neris Wilson DNP      | Follow-up            |
| 2019   |           |                  |                      |                      |
+--------+-----------+------------------+----------------------+----------------------+
 from Last 3 Months
 
 Family History
 
 
+---------------------+----------+------+----------------+
| Medical History     | Relation | Name | Comments       |
+---------------------+----------+------+----------------+
| High blood pressure | Father   |      |                |
+---------------------+----------+------+----------------+
| Hypertension        | Father   |      |                |
+---------------------+----------+------+----------------+
| Cancer              | Mother   |      | breast cancer  |
+---------------------+----------+------+----------------+
 
 
 
+----------+------+--------+----------+
| Relation | Name | Status | Comments |
+----------+------+--------+----------+
| Brother  |      | Alive  |          |
+----------+------+--------+----------+
| Daughter |      | Alive  |          |
+----------+------+--------+----------+
| Father   |      |        |          |
+----------+------+--------+----------+
| Mother   |      |        |          |
+----------+------+--------+----------+
| Son      |      | Alive  |          |
+----------+------+--------+----------+
 
 
 
 Social History
 
 
+---------------+------------+-----------+--------+------------------+
| Tobacco Use   | Types      | Packs/Day | Years  | Date             |
|               |            |           | Used   |                  |
+---------------+------------+-----------+--------+------------------+
| Former Smoker | Cigarettes | 1         | 35     | Quit: 1996 |
+---------------+------------+-----------+--------+------------------+
 
 
 
+---------------------+---+---+---+
 
| Smokeless Tobacco:  |   |   |   |
| Never Used          |   |   |   |
+---------------------+---+---+---+
 
 
 
+-------------+-------------+---------+----------+
| Alcohol Use | Drinks/Week | oz/Week | Comments |
+-------------+-------------+---------+----------+
| No          |             |         |          |
+-------------+-------------+---------+----------+
 
 
 
+------------------+---------------+
| Sex Assigned at  | Date Recorded |
| Birth            |               |
+------------------+---------------+
| Not on file      |               |
+------------------+---------------+
 
 
 
+----------------+-------------+-------------+
| Job Start Date | Occupation  | Industry    |
+----------------+-------------+-------------+
| Not on file    | Not on file | Not on file |
+----------------+-------------+-------------+
 
 
 
+----------------+--------------+------------+
| Travel History | Travel Start | Travel End |
+----------------+--------------+------------+
 
 
 
+-------------------------------------+
| No recent travel history available. |
+-------------------------------------+
 
 
 
 Last Filed Vital Signs
 
 
+-------------------+----------------------+----------------------+----------+
| Vital Sign        | Reading              | Time Taken           | Comments |
+-------------------+----------------------+----------------------+----------+
| Blood Pressure    | 171/73               | 2019 11:03 AM  |          |
|                   |                      | PST                  |          |
+-------------------+----------------------+----------------------+----------+
| Pulse             | 56                   | 2019 11:03 AM  |          |
|                   |                      | PST                  |          |
+-------------------+----------------------+----------------------+----------+
| Temperature       | 36.3   C (97.4   F)  | 2019  1:47 PM  |          |
|                   |                      | PDT                  |          |
+-------------------+----------------------+----------------------+----------+
| Respiratory Rate  | 14                   | 11/15/2018 10:48 AM  |          |
|                   |                      | PST                  |          |
 
+-------------------+----------------------+----------------------+----------+
| Oxygen Saturation | 98%                  | 2019 11:03 AM  |          |
|                   |                      | PST                  |          |
+-------------------+----------------------+----------------------+----------+
| Inhaled Oxygen    | -                    | -                    |          |
| Concentration     |                      |                      |          |
+-------------------+----------------------+----------------------+----------+
| Weight            | 72.9 kg (160 lb 11.2 | 2019  1:03 PM  |          |
|                   |  oz)                 | PDT                  |          |
+-------------------+----------------------+----------------------+----------+
| Height            | 172.7 cm (5' 8")     | 2019  1:03 PM  |          |
|                   |                      | PDT                  |          |
+-------------------+----------------------+----------------------+----------+
| Body Mass Index   | 24.43                | 2019  1:03 PM  |          |
|                   |                      | PDT                  |          |
+-------------------+----------------------+----------------------+----------+
 
 
 
 Plan of Treatment
 
 
+--------+---------+-------------+----------------------+-------------+
| Date   | Type    | Specialty   | Care Team            | Description |
+--------+---------+-------------+----------------------+-------------+
| / | Office  | Pulmonology |   Juan F,          |             |
|    | Visit   |             | Jessica Skye,   |             |
|        |         |             | MD  [address]  |             |
|        |         |             |   [place],   |             |
|        |         |             | [place] [address]             |             |
|        |         |             | [phone]         |             |
|        |         |             | [phone] (Fax)   |             |
+--------+---------+-------------+----------------------+-------------+
| / | Office  | Cardiology  |   Eric Akins, |             |
|    | Visit   |             |  MD  [address]   |             |
|        |         |             | [place]  |             |
|        |         |             | [id number]  [phone]  |             |
|        |         |             |  [phone] (Fax)  |             |
+--------+---------+-------------+----------------------+-------------+
 
 
 
+----------------------+-----------+---------------------------------+----------+
| Health Maintenance   | Due Date  | Last Done                       | Comments |
+----------------------+-----------+---------------------------------+----------+
| Vaccine:             |  |                                 |          |
| Pneumococcal 65+ (1  | 9         |                                 |          |
| of 2 - PCV13)        |           |                                 |          |
+----------------------+-----------+---------------------------------+----------+
| Colorectal Cancer    |  | 2008                      |          |
| Screening            | 8         |                                 |          |
| (Colonoscopy)        |           |                                 |          |
+----------------------+-----------+---------------------------------+----------+
| Vaccine: Zoster (2   |  | 2018                      |          |
| of 2)                | 8         |                                 |          |
+----------------------+-----------+---------------------------------+----------+
| Adult Annual         |  |                                 |          |
| Wellness Visit       | 9         |                                 |          |
+----------------------+-----------+---------------------------------+----------+
| Vaccine:             |  | 2019                      |          |
 
| Dtap/Tdap/Td (2 -    | 9         |                                 |          |
| Td)                  |           |                                 |          |
+----------------------+-----------+---------------------------------+----------+
| Vaccine: Influenza   | Completed | 2019, 2018,         |          |
|                      |           | 10/16/2017, Additional history  |          |
|                      |           | exists                          |          |
+----------------------+-----------+---------------------------------+----------+
| AAA Screening        | Completed | 2019, 11/15/2018,         |          |
|                      |           | 2018, Additional history  |          |
|                      |           | exists                          |          |
+----------------------+-----------+---------------------------------+----------+
 
 
 
 Procedures
 
 
+----------------------+--------+-------------+----------------------+----------------------
+
| Procedure Name       | Priori | Date/Time   | Associated Diagnosis | Comments             
|
|                      | ty     |             |                      |                      
|
+----------------------+--------+-------------+----------------------+----------------------
+
| VAS CAROTID DUPLEX   | Routin | 2019  |   Carotid stenosis,  |   Results for this   
|
| BILATERAL            | e      | 11:15 AM    | bilateral            | procedure are in the 
|
|                      |        | PST         |                      |  results section.    
|
+----------------------+--------+-------------+----------------------+----------------------
+
| VAS AORTA ILIAC      | Routin | 2019  |   Abdominal aortic   |   Results for this   
|
| DUPLEX COMPLETE      | e      | 11:11 AM    | aneurysm (AAA)       | procedure are in the 
|
|                      |        | PST         | without rupture      |  results section.    
|
|                      |        |             | (HCC)                |                      
|
+----------------------+--------+-------------+----------------------+----------------------
+
| LABS - EXTERNAL SCAN |        | 2019  |                      |   Results for this   
|
|                      |        | 12:00 AM    |                      | procedure are in the 
|
|                      |        | PDT         |                      |  results section.    
|
+----------------------+--------+-------------+----------------------+----------------------
+
 from Last 3 Months
 
 Results
 VAS Carotid Duplex Bilateral (2019 11:15 AM PST)
 
+----------+
| Specimen |
+----------+
|          |
 
+----------+
 
 
 
+------------------------------------------------------------------------+---------------+
| Impressions                                                            | Performed At  |
+------------------------------------------------------------------------+---------------+
|   Extensive calcific plaque bilaterally without high-grade stenosis,   |   [facility] |
| similar to prior examination     In the left common carotid artery     |               |
| there is dense calcific plaque versus  an intimal flap which is not    |               |
| flow limiting.     The left vertebral artery is not visualized on      |               |
| today's examination.     Internal Carotid Artery Diagnostic Grades     |               |
|  Grade 1: Stenosis = 01-50% / PSV <125 cm/sec / EDV (cm/s)<40 cm/sec   |               |
| (mild plaque)  Grade 2: Stenosis = 01-50% / PSV <125 cm/sec / EDV      |               |
| (cm/s)<40 cm/sec  (moderate plaque)  Grade 3: Stenosis = 50-69% / PSV  |               |
| >125 cm/sec / EDV (cm/s)>40 cm/sec  Grade 4: Stenosis = 70-95% / PSV   |               |
| >230 cm/sec / EDV (cm/s)>100 cm/sec  Grade 5: Stenosis = 90-95% / PSV  |               |
| <125 cm/sec / EDV (cm/s)<40 cm/sec  Grade 6: Stenosis Occluded         |               |
| Society of Radiologists in Ultrasound (SRU) criteria applied for       |               |
| internal carotid stenosis determination.           Signed by: Sly  |               |
| Johnson PATEL  Sign Date/Time: 2019 1:36 PM                      |               |
+------------------------------------------------------------------------+---------------+
 
 
 
+------------------------------------------------------------------------+---------------+
| Narrative                                                              | Performed At  |
+------------------------------------------------------------------------+---------------+
|      CAROTID DUPLEX ULTRASOUND     CLINICAL INFORMATION:  Carotid      |   [facility] |
| artery stenosis.     COMPARISON:  US CAROTID DOPPLER BILATERAL         |               |
| (2018);     PROCEDURE:  Evaluation of the extracranial carotid    |               |
| and vertebral arteries with  production of real-time images            |               |
| integrating B-mode two-dimensional  vascular structure, Doppler        |               |
| spectral analysis and color-flow Doppler  imaging.     FINDINGS:  Peak |               |
|  systolic and diastolic velocities measured in the common and          |               |
| internal carotid arteries. All velocities recorded in cm/sec:          |               |
| Anterior Circulation:     Right  CCA: 65/10  ICA: 65/9  ECA: 78/9      |               |
| ICA/CCA: 1.0     Left  CCA: 86/15  ICA: 90 /7  ECA: 133/0  ICA/CCA:    |               |
| 1.0     Posterior Circulation:     Right:  Vertebral: 121/21 Antegrade |               |
|      Left:  Vertebral: Not visualized     Gray scale images: There is  |               |
| extensive calcified plaque with a possible  intimal flap of the left   |               |
| common carotid artery.                                                 |               |
+------------------------------------------------------------------------+---------------+
 
 
 
+-------------------------------------------------------------------------+
| Procedure Note                                                          |
+-------------------------------------------------------------------------+
|   Maurizio, Rad Results In - 2019  1:40 PM PST                         |
| CAROTID DUPLEX ULTRASOUND                                               |
|                                                                         |
| CLINICAL INFORMATION:                                                   |
| Carotid artery stenosis.                                                |
|                                                                         |
| COMPARISON:                                                             |
| US CAROTID DOPPLER BILATERAL (2018);                               |
|                                                                         |
| PROCEDURE:                                                              |
| Evaluation of the extracranial carotid and vertebral arteries with      |
 
| production of real-time images integrating B-mode two-dimensional       |
| vascular structure, Doppler spectral analysis and color-flow Doppler    |
| imaging.                                                                |
|                                                                         |
| FINDINGS:                                                               |
| Peak systolic and diastolic velocities measured in the common and       |
| internal carotid arteries. All velocities recorded in cm/sec:           |
|                                                                         |
| Anterior Circulation:                                                   |
|                                                                         |
| Right                                                                   |
| CCA: 65/10                                                              |
| ICA: 65/9                                                               |
| ECA: 78/9                                                               |
| ICA/CCA: 1.0                                                            |
|                                                                         |
| Left                                                                    |
| CCA: 86/15                                                              |
| ICA: 90 /7                                                              |
| ECA: 133/0                                                              |
| ICA/CCA: 1.0                                                            |
|                                                                         |
| Posterior Circulation:                                                  |
|                                                                         |
| Right:                                                                  |
| Vertebral: 121/21 Antegrade                                             |
|                                                                         |
| Left:                                                                   |
| Vertebral: Not visualized                                               |
|                                                                         |
| Gray scale images: There is extensive calcified plaque with a possible  |
| intimal flap of the left common carotid artery.                         |
|                                                                         |
| IMPRESSION:                                                             |
| Extensive calcific plaque bilaterally without high-grade stenosis,      |
| similar to prior examination                                            |
|                                                                         |
| In the left common carotid artery there is dense calcific plaque versus |
| an intimal flap which is not flow limiting.                             |
|                                                                         |
| The left vertebral artery is not visualized on today's examination.     |
|                                                                         |
| Internal Carotid Artery Diagnostic Grades                               |
|                                                                         |
| Grade 1: Stenosis = 01-50% / PSV <125 cm/sec / EDV (cm/s)<40 cm/sec     |
| (mild plaque)                                                           |
| Grade 2: Stenosis = 01-50% / PSV <125 cm/sec / EDV (cm/s)<40 cm/sec     |
| (moderate plaque)                                                       |
| Grade 3: Stenosis = 50-69% / PSV >125 cm/sec / EDV (cm/s)>40 cm/sec     |
| Grade 4: Stenosis = 70-95% / PSV >230 cm/sec / EDV (cm/s)>100 cm/sec    |
| Grade 5: Stenosis = 90-95% / PSV <125 cm/sec / EDV (cm/s)<40 cm/sec     |
| Grade 6: Stenosis Occluded                                              |
|                                                                         |
| Society of Radiologists in Ultrasound (SRU) criteria applied for        |
| internal carotid stenosis determination.                                |
|                                                                         |
| Signed by: OJRGE Heart Matthew                                        |
| Sign Date/Time: 2019 1:36 PM                                      |
 
+-------------------------------------------------------------------------+
 
 
 
+---------------+---------+--------------------+--------------+
| Performing    | Address | City/State/Zipcode | Phone Number |
| Organization  |         |                    |              |
+---------------+---------+--------------------+--------------+
|   [facility] |         |                    |              |
+---------------+---------+--------------------+--------------+
 VAS Aorta Iliac Duplex Complete (2019 11:11 AM PST)
 
+----------+
| Specimen |
+----------+
|          |
+----------+
 
 
 
+------------------------------------------------------------------------+---------------+
| Impressions                                                            | Performed At  |
+------------------------------------------------------------------------+---------------+
|   4.9 cm infrarenal abdominal aortic aneurysm unchanged when compared  |   [facility] |
| to  the CT angiogram of 2018.           Signed by: JORGE Heart,  |               |
| Johnson  Sign Date/Time: 2019 1:07 PM                            |               |
+------------------------------------------------------------------------+---------------+
 
 
 
+------------------------------------------------------------------------+---------------+
| Narrative                                                              | Performed At  |
+------------------------------------------------------------------------+---------------+
|      ABDOMINAL AORTA DUPLEX SCAN     CLINICAL INFORMATION:  Abdominal  |   [facility] |
| aorta aneurysm surveillance.     COMPARISON:  CL US GUIDANCE VASCULAR  |               |
| ACCESS (2018); CL CORONARY ANGIO WITH  GRAFTS (2018); CTA      |               |
| ABDOMEN AND PELVIS W WO CONTRAST (2018);  ECHO CARDIAC ADULT      |               |
| COMPLETE (2018); XR SWALLOWING FUNCTION VIDEO  (2018); CT      |               |
| CHEST WO CONTRAST (2018); XR CHEST 2 VIEW  (2018); XR CHEST 2  |               |
| VIEW (2018); CT HEAD WO CONTRAST (2018);  NM MYOCARDIAL        |               |
| PERFUSION SPECT - STRESS ONLY (2018); US CAROTID  DOPPLER         |               |
| BILATERAL (2018); MRI CERVICAL SPINE WO CONTRAST  (2018);    |               |
|   PROCEDURE:  Evaluation of the aorta and iliac vessels.     FINDINGS: |               |
|   The study is technically difficult due to overlying bowel gas.       |               |
|   There  is an infrarenal abdominal aortic aneurysm  Proximal aorta:   |               |
| 2.8 cm AP x 2.7 cm transverse  Mid aorta: 3.4 cm AP x 3.3 cm           |               |
| transverse  Distal aorta: 4.9 cm AP x 4.7 cm transverse  Right common  |               |
| iliac artery: 1.3 cm AP x 1.4 cm transverse  Left common iliac artery: |               |
|  1.4 cm AP x 1.3 cm transverse                                         |               |
+------------------------------------------------------------------------+---------------+
 
 
 
+-------------------------------------------------------------------------+
| Procedure Note                                                          |
+-------------------------------------------------------------------------+
|   Maurizio, Rad Results In - 2019  1:10 PM PST                         |
| ABDOMINAL AORTA DUPLEX SCAN                                             |
|                                                                         |
| CLINICAL INFORMATION:                                                   |
 
| Abdominal aorta aneurysm surveillance.                                  |
|                                                                         |
| COMPARISON:                                                             |
| CL US GUIDANCE VASCULAR ACCESS (2018); CL CORONARY ANGIO WITH       |
| GRAFTS (2018); CTA ABDOMEN AND PELVIS W WO CONTRAST (2018);    |
| ECHO CARDIAC ADULT COMPLETE (2018); XR SWALLOWING FUNCTION VIDEO    |
| (2018); CT CHEST WO CONTRAST (2018); XR CHEST 2 VIEW            |
| (2018); XR CHEST 2 VIEW (2018); CT HEAD WO CONTRAST (2018); |
| NM MYOCARDIAL PERFUSION SPECT - STRESS ONLY (2018); US CAROTID     |
| DOPPLER BILATERAL (2018); MRI CERVICAL SPINE WO CONTRAST           |
| (2018);                                                            |
|                                                                         |
| PROCEDURE:                                                              |
| Evaluation of the aorta and iliac vessels.                              |
|                                                                         |
| FINDINGS:                                                               |
| The study is technically difficult due to overlying bowel gas.  There   |
| is an infrarenal abdominal aortic aneurysm                              |
| Proximal aorta: 2.8 cm AP x 2.7 cm transverse                           |
| Mid aorta: 3.4 cm AP x 3.3 cm transverse                                |
| Distal aorta: 4.9 cm AP x 4.7 cm transverse                             |
| Right common iliac artery: 1.3 cm AP x 1.4 cm transverse                |
| Left common iliac artery: 1.4 cm AP x 1.3 cm transverse                 |
|                                                                         |
|                                                                         |
| IMPRESSION:                                                             |
| 4.9 cm infrarenal abdominal aortic aneurysm unchanged when compared to  |
| the CT angiogram of 2018.                                          |
|                                                                         |
| Signed by: JORGE Heart Matthew                                        |
| Sign Date/Time: 2019 1:07 PM                                      |
+-------------------------------------------------------------------------+
 
 
 
+---------------+---------+--------------------+--------------+
| Performing    | Address | City/State/Zipcode | Phone Number |
| Organization  |         |                    |              |
+---------------+---------+--------------------+--------------+
|   [facility] |         |                    |              |
+---------------+---------+--------------------+--------------+
 LABS - EXTERNAL SCAN (2019 12:00 AM PDT)
 
+------------------------------------------------------------------------+--------------+
| Narrative                                                              | Performed At |
+------------------------------------------------------------------------+--------------+
|   This result has an attachment that is not available.  Ordered by an  |              |
| unspecified provider.                                                  |              |
+------------------------------------------------------------------------+--------------+
 from Last 3 Months
 
 Insurance
 
 
+----------+--------+-------------+--------+-------------+---------+--------+
| Payer    | Benefi | Subscriber  | Effect | Phone       | Address | Type   |
|          | t Plan | ID          | daisy    |             |         |        |
|          |  /     |             | Dates  |             |         |        |
 
|          | Group  |             |        |             |         |        |
+----------+--------+-------------+--------+-------------+---------+--------+
| MEDICARE | MEDICA | [id number]  |  | [phone] |         | Medica |
|          | RE     |             | 009-Pr | 5           |         | re     |
|          | PART A |             | esent  |             |         |        |
|          |  AND B |             |        |             |         |        |
+----------+--------+-------------+--------+-------------+---------+--------+
| MEDICARE | MEDICA | [id number] |  | [phone] |         | Medica |
|          | RE     |             | 009-Pr | 5           |         | re     |
|          | PART A |             | esent  |             |         |        |
|          |  AND B |             |        |             |         |        |
+----------+--------+-------------+--------+-------------+---------+--------+
| AARP     | AARP   | [id number] | 20 | [phone] |         | Indemn |
|          | MDCR   |             | 12-Pre | 0           |         | ity    |
|          | SUPPL  |             | sent   |             |         |        |
+----------+--------+-------------+--------+-------------+---------+--------+
| AARP     | AARP   | [id number] | 20 | [phone] |         | Indemn |
|          | MDCR   |             | 19-Pre | 0           |         | ity    |
|          | SUPPL  |             | sent   |             |         |        |
+----------+--------+-------------+--------+-------------+---------+--------+
 
 
 
+-------------------+--------+-------------+--------+-------------+----------------------+
| Guarantor Name    | Accoun | Relation to | Date   | Phone       | Billing Address      |
|                   | t Type |  Patient    | of     |             |                      |
|                   |        |             | Birth  |             |                      |
+-------------------+--------+-------------+--------+-------------+----------------------+
| Wyatt Shane | Person | Self        | [birth year]             |   [address] |
|                   | al/Fam |             | 1945   | [phone] |   [place], [place]      |
|                   | gautam    |             |        | [address] (Home)    | [id number]           |
+-------------------+--------+-------------+--------+-------------+----------------------+
| Wyatt Shane | Person | Self        | [birth year]             |   [address] |
|                   | al/Fam |             | 1945   | [phone] |   [place]      |
|                   | [place]    |             |        | 5 (Home)    | [phone]           |
+-------------------+--------+-------------+--------+-------------+----------------------+
 
 
 
 Advance Directives
 
 
+-----------+-----------------+----------------+-------------+
| Type      | Date Recorded   | Patient        | Explanation |
|           |                 | Representative |             |
+-----------+-----------------+----------------+-------------+
| Power of  |                 |                |             |
|   |                 |                |             |
+-----------+-----------------+----------------+-------------+
| Advance   | 2015  8:35 |                |             |
| Directive |  AM             |                |             |
+-----------+-----------------+----------------+-------------+

## 2019-12-06 NOTE — XMS
Encounter Summary
  Created on: 2019
 
 Wyatt Shane
 External Reference #: [id number]
 : 43
 Sex: Male
 
 Demographics
 
 
+-----------------------+------------------------+
| Address               | [address]     |
|                       | [place]  [address]   |
+-----------------------+------------------------+
| Home Phone            | [phone]        |
+-----------------------+------------------------+
| Preferred Language    | Unknown                |
+-----------------------+------------------------+
| Marital Status        |                 |
+-----------------------+------------------------+
| Uatsdin Affiliation | LUT                    |
+-----------------------+------------------------+
| Race                  | White                  |
+-----------------------+------------------------+
| Ethnic Group          | Not  or  |
+-----------------------+------------------------+
 
 
 Author
 
 
+--------------+------------------------------+
| Author       | [facility] |
+--------------+------------------------------+
| Organization | [facility] |
+--------------+------------------------------+
| Address      | Unknown                      |
+--------------+------------------------------+
| Phone        | Unavailable                  |
+--------------+------------------------------+
 
 
 
 Support
 
 
+---------------+--------------+---------+-----------------+
| Name          | Relationship | Address | Phone           |
+---------------+--------------+---------+-----------------+
| Opal Shane | ECON         | Unknown | [phone] |
+---------------+--------------+---------+-----------------+
 
 
 
 Care Team Providers
 
 
 
+-----------------------+------+-----------------+
| Care Team Member Name | Role | Phone           |
+-----------------------+------+-----------------+
| Erwin Iniguez MD   | PCP  | [phone] |
+-----------------------+------+-----------------+
 
 
 
 Encounter Details
 
 
+--------+-------------+-----------------+---------------------+---------------+
| Date   | Type        | Department      | Care Team           | Description   |
+--------+-------------+-----------------+---------------------+---------------+
| / | Office      |   [facility]  |   Note, Outpatient  | Progress Note |
| 2000   | Visit-Trans | MEDICINE        | Clinic              |               |
|        | cribed      |                 |                     |               |
+--------+-------------+-----------------+---------------------+---------------+
 
 
 
 Social History
 
 
+----------------+-------+-----------+--------+------+
| Tobacco Use    | Types | Packs/Day | Years  | Date |
|                |       |           | Used   |      |
+----------------+-------+-----------+--------+------+
| Never Assessed |       |           |        |      |
+----------------+-------+-----------+--------+------+
 
 
 
+------------------+---------------+
| Sex Assigned at  | Date Recorded |
| Birth            |               |
+------------------+---------------+
| Not on file      |               |
+------------------+---------------+
 
 
 
+----------------+-------------+-------------+
| Job Start Date | Occupation  | Industry    |
+----------------+-------------+-------------+
| Not on file    | Not on file | Not on file |
+----------------+-------------+-------------+
 
 
 
+----------------+--------------+------------+
| Travel History | Travel Start | Travel End |
+----------------+--------------+------------+
 
 
 
+-------------------------------------+
| No recent travel history available. |
+-------------------------------------+
 documented as of this encounter
 
 
 Progress Notes
 Interface, Transcription In - 2006  1:06 AM PSTCLINIC DATE:       2000
 
 OTOLARYNGOLOGY HEAD AND NECK SURGERY
 
 SUBJECTIVE:   The  patient  was  seen   back  today  with  respect  to  the
 possibility of decannulation.  He is  doing  very well with his tracheotomy
 plug and had no issues.  He is able to  keep  it plugged day and night, and
 has been participating in pretty vigorous sports without difficulty.
 
 PHYSICAL EXAMINATION:  Flexible fiberoptic  laryngoscopy  is done antegrade
 and  retrograde  through his tracheotomy.   There  is  a  small  amount  of
 granulation tissue in his posterior  pharyngeal  wall,  but  his airway is,
 otherwise, intact and appears to be adequate  for  phonation  and exercise.
 I, therefore, removed his tracheotomy  and  counseled  him  in the care.  I
 will see him back here in four weeks for final check.
 
 Jimmy Esposito M.D.
 
 [facility] / 
 [id number] / [id number] / [id number] / [id number]
 D: 2000
 T: 2000
 
 [id number] signed by Interface, Transcription In at 2006  1:06 AM PSTdocume
nted in this encounter
 
 Plan of Treatment
 Not on filedocumented as of this encounter
 
 Visit Diagnoses
 Not on filedocumented in this encounter

## 2019-12-06 NOTE — XMS
Encounter Summary
  Created on: 2019
 
 Wyatt Shane
 External Reference #: [id number]
 : 43
 Sex: Male
 
 Demographics
 
 
+-----------------------+------------------------+
| Address               | [address]     |
|                       | [place]  [address]   |
+-----------------------+------------------------+
| Home Phone            | [phone]        |
+-----------------------+------------------------+
| Preferred Language    | Unknown                |
+-----------------------+------------------------+
| Marital Status        |                 |
+-----------------------+------------------------+
| Restoration Affiliation | LUT                    |
+-----------------------+------------------------+
| Race                  | White                  |
+-----------------------+------------------------+
| Ethnic Group          | Not  or  |
+-----------------------+------------------------+
 
 
 Author
 
 
+--------------+-------------+
| Organization | Unknown     |
+--------------+-------------+
| Address      | Unknown     |
+--------------+-------------+
| Phone        | Unavailable |
+--------------+-------------+
 
 
 
 Support
 
 
+---------------+--------------+---------+-----------------+
| Name          | Relationship | Address | Phone           |
+---------------+--------------+---------+-----------------+
| Opal Shane | ECON         | Unknown | [phone] |
+---------------+--------------+---------+-----------------+
 
 
 
 Care Team Providers
 
 
+-----------------------+------+-----------------+
| Care Team Member Name | Role | Phone           |
 
+-----------------------+------+-----------------+
| Erwin Iniguez MD   | PCP  | [phone] |
+-----------------------+------+-----------------+
 
 
 
 Encounter Details
 
 
+--------+-------------+------------+------------------+-------------+
| Date   | Type        | Department | Care Team        | Description |
+--------+-------------+------------+------------------+-------------+
| / | [facility] |            |   Letters, Other | Letters     |
| 2003   | scribed     |            |                  |             |
+--------+-------------+------------+------------------+-------------+
 
 
 
 Social History
 
 
+----------------+-------+-----------+--------+------+
| Tobacco Use    | Types | Packs/Day | Years  | Date |
|                |       |           | Used   |      |
+----------------+-------+-----------+--------+------+
| Never Assessed |       |           |        |      |
+----------------+-------+-----------+--------+------+
 
 
 
+------------------+---------------+
| Sex Assigned at  | Date Recorded |
| Birth            |               |
+------------------+---------------+
| Not on file      |               |
+------------------+---------------+
 
 
 
+----------------+-------------+-------------+
| Job Start Date | Occupation  | Industry    |
+----------------+-------------+-------------+
| Not on file    | Not on file | Not on file |
+----------------+-------------+-------------+
 
 
 
+----------------+--------------+------------+
| Travel History | Travel Start | Travel End |
+----------------+--------------+------------+
 
 
 
+-------------------------------------+
| No recent travel history available. |
+-------------------------------------+
 documented as of this encounter
 
 Progress Notes
 Interface, Transcription In - 2006  1:03 AM DANIA                                    [place]
 
[facility] [place] [address] [place], [place] [id number] [phone] or [phone]
 
 2003
 
 Erwin Iniguez M.D.
 [address]
 [place]  [address]
 
 RE:   WYATT SHANE
 MR #: [id number]
 
 Dear Dr. Iniguez:
 
 Just a brief note to let you know I saw  Mr. Shane  back in followup, now 4
 years out from hemilaryngectomy for recurrent  squamous  cell  carcinoma of
 the larynx.  The details of my interaction with the patient are outlined in
 my handwritten notes.  I am pleased to  report  that he is free of evidence
 of recurrent disease and is really functioning  at  a  very  high level.  I
 will see him back for 1 more year in  followup  and  then we would consider
 him cured.  I appreciate very much the  chance  to care for him.  Please do
 not hesitate to contact me if you have any questions.
 
 Yours sincerely,
 
 Jimmy Esposito M.D.
 
 [facility] / 
 [id number] / [id number] / [id number] /
 D: 2003
 T: 2003
 
 cc:
 
 Eddy Boone M.D.
 [address] [facility]
 [place], [place]  [id number] signed by Interface, Transcription In at 2006  1:0
3 AM PDTdocumented in this encounter
 
 Plan of Treatment
 Not on filedocumented as of this encounter
 
 Visit Diagnoses
 Not on filedocumented in this encounter

## 2019-12-06 NOTE — XMS
Encounter Summary
  Created on: 2019
 
 Wyatt Shane
 External Reference #: [id number]
 : 43
 Sex: Male
 
 Demographics
 
 
+-----------------------+------------------------+
| Address               | [address]     |
|                       | [place]  [address]   |
+-----------------------+------------------------+
| Home Phone            | [phone]        |
+-----------------------+------------------------+
| Preferred Language    | Unknown                |
+-----------------------+------------------------+
| Marital Status        |                 |
+-----------------------+------------------------+
| Episcopal Affiliation | LUT                    |
+-----------------------+------------------------+
| Race                  | White                  |
+-----------------------+------------------------+
| Ethnic Group          | Not  or  |
+-----------------------+------------------------+
 
 
 Author
 
 
+--------------+------------------------------+
| Author       | [facility] |
+--------------+------------------------------+
| Organization | [facility] |
+--------------+------------------------------+
| Address      | Unknown                      |
+--------------+------------------------------+
| Phone        | Unavailable                  |
+--------------+------------------------------+
 
 
 
 Support
 
 
+---------------+--------------+---------+-----------------+
| Name          | Relationship | Address | Phone           |
+---------------+--------------+---------+-----------------+
| Opal Shane | ECON         | Unknown | [phone] |
+---------------+--------------+---------+-----------------+
 
 
 
 Care Team Providers
 
 
 
+-----------------------+------+-----------------+
| Care Team Member Name | Role | Phone           |
+-----------------------+------+-----------------+
| Erwin Iniguez MD   | PCP  | [phone] |
+-----------------------+------+-----------------+
 
 
 
 Encounter Details
 
 
+--------+-------------+-----------------+---------------------+---------------+
| Date   | Type        | Department      | Care Team           | Description   |
+--------+-------------+-----------------+---------------------+---------------+
| / | Office      |   [facility]  |   Note, Outpatient  | Progress Note |
|    | Visit-Trans | MEDICINE        | Clinic              |               |
|        | cribed      |                 |                     |               |
+--------+-------------+-----------------+---------------------+---------------+
 
 
 
 Social History
 
 
+----------------+-------+-----------+--------+------+
| Tobacco Use    | Types | Packs/Day | Years  | Date |
|                |       |           | Used   |      |
+----------------+-------+-----------+--------+------+
| Never Assessed |       |           |        |      |
+----------------+-------+-----------+--------+------+
 
 
 
+------------------+---------------+
| Sex Assigned at  | Date Recorded |
| Birth            |               |
+------------------+---------------+
| Not on file      |               |
+------------------+---------------+
 
 
 
+----------------+-------------+-------------+
| Job Start Date | Occupation  | Industry    |
+----------------+-------------+-------------+
| Not on file    | Not on file | Not on file |
+----------------+-------------+-------------+
 
 
 
+----------------+--------------+------------+
| Travel History | Travel Start | Travel End |
+----------------+--------------+------------+
 
 
 
+-------------------------------------+
| No recent travel history available. |
+-------------------------------------+
 documented as of this encounter
 
 
 Progress Notes
 Interface, Transcription In - 2007  3:15 AM [facility]INIC DATE:       1998
 
 OTOLARYNGOLOGY CLINIC
 
 CHIEF COMPLAINT:  Hoarseness.
 
 HISTORY OF PRESENT ILLNESS:  The patient is a 52-year-old gentleman who has
 noted the onset of hoarseness since December.  He states that he was
 treated with some antibiotics but this did not improve the situation.  He
 denies any weight loss.  He does complain of occasional soreness in the
 mornings but this usually resolves on its own, especially on the right
 side.  He denies any problems swallowing.  He denies any ear pain.  He does
 describe some occasional difficulty with breathing because of some thick
 secretions.  He has had a vocal cord scraping on 98 which showed
 dysplasia.  He was seen by Dr. Paolo mackey and two months later underwent a
 laser treatment of this area.  He was sent here for further evaluation and
 treatment.  The second laser biopsy showed carcinoma in situ on the right
 side.  The patient denies any acid reflux symptoms though he has a history
 of ulcers and has been on Zantac for approximately five years.
 
 REVIEW OF SYSTEMS:  Review of systems is positive for occasional shortness
 of breath; otherwise as per history of illness.
 
 PAST MEDICAL HISTORY:  Past medical history is significant for:
 1.  Hypertension.
 2.  Arthritis.
 3.  Renal stones.
 4.  Hyperlipidemia.
 
 Surgeries include a quadruple bypass in 1996.
 
 CURRENT MEDICATIONS:
 1.  Zantac 150 twice a day.
 2.  Norvasc 10 mg once a day.
 3.  Lipitor 10 mg one a day.
 4.  Baby aspirin one a day.
 5.  Vitamins one q.d.
 6.  He is currently on a prescription for Zithromax as well as prednisone.
 
 ALLERGIES:  Beta-blockers.
 
 SOCIAL HISTORY:  The patient is  and lives in [place], [place].  He
 has three children with one still living at home.  He works as a truck
 .  He has a history of smoking, approximately a 30 pack year though
 he quit after his bypass surgery.  He has occasional alcohol.
 
 FAMILY HISTORY:  Significant for breast cancer in his mother.
 
 OBJECTIVE:  On exam, in general, he is a well-developed, well-nourished
 white male in no acute distress.  VITALS:  Height 5 feet 8 inches, weight
 134.  EYES:  Pupils equal, round, and reactive to light.  Extraocular
 motions are intact.  NOSE:  Mucosa is pink.  No mucal pus.  ORAL
 CAVITY/OROPHARYNX:  He has his own teeth.  No oral mucosa lesions are
 noted.  NECK:  No lymphadenopathy.  EARS:  External canals are normal.
 Tympanic membranes are visible and no fluid behind them.
 
 Nasal cavity was sprayed with Phenylephrine and Lidocaine and a
 nasopharyngoscopy was performed.  The cords are mobile bilaterally.  The
 
 right side has an erythematous mucosa just over the false cord.  There is
 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
 false cord appear edematous and erythematous.  Pathology reports were
 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
 some carcinoma in situ on his last biopsy and dysplasia noted and also with
 some carcinoma in situ noted in the August biopsy.
 
 ASSESSMENT AND PLAN:  The patient has hoarseness with biopsy proving
 squamous cell carcinoma in situ.  He currently has a very edematous and
 erythematous right true and false cord.  There is a question whether there
 is some submucosal process under this.  He also has evidence of some
 reflux.  We will start him on Prilosec 20 mg twice a day.  We will get a CT
 scan of his neck and larynx.  This will be done today.  We will have him
 call back next week for the CT results and have him return in three to four
 weeks to re-examine the cords.
 
 Dr. Jimmy Esposito, attending, was present for the history and physical.
 
 Kadie Clark M.D.
 Resident, Otolaryngology
 Head and Neck Surgery
 
 Jimmy Esposito M.D.
 , Otolaryngology
 Head and Neck Surgery
 
 CY/rel
 D: 98
 T: 98
 C: 98 dsElectronically signed by Interface, Transcription In at 2007  3:15 AM P
STIntrandy, Transcription In - 2007  3:15 AM [facility] DATE:       1998
 
 OTOLARYNGOLOGY CLINIC:
 
 SUBJECTIVE:  Mr. Shane is seen in consultation from Dr. Eddy Boone.  I
 first saw this patient with my senior resident, Dr. Kadie Clark and the
 details of my interaction with the patient are outlined in her note as well
 as my conclusions and plans, history of present illness, past medical
 history, personal and social history, family history, review of systems,
 and complete examination of the head and neck including flexible fiberoptic
 laryngoscopy.  I have his slides here, which I will review with the
 pathologist personally so that we can determine the depth of invasion.
 
 SUMMARY:  A summary of my findings:  This is a healthy 53-year-old
 gentleman with a lesion on his right true vocal cord which is recurrent and
 now, questionably, shows squamous cell carcinoma with in situ disease.  He
 has a chronic history of hoarseness and probably has gastroesophageal
 reflux in association with this.  He used to smoke, but quit almost two
 years ago before this lesion really became problematic.  There really are
 no other contributing factors except, perhaps some vocal abuse.  He is
 otherwise in reasonable health except for a history of coronary artery
 disease.
 
 PHYSICAL EXAMINATION:  The significant findings are limited to the larynx
 on flexible fiberoptic laryngoscopy.  There is significant swelling of the
 right true vocal cord with overlying erythema and some leukoplakia
 anteriorly.  This is effacing the right ventricle but does not appear to
 significantly interfere in any way with the movement of the vocal cord
 itself.  He, however, is hoarse because the mass effect of the cord
 prevents the contralateral cord from opposing it along its full extent.
 
 
 PLAN:  Get a CT scan to determine whether or not there could be a lesion
 deeper within the cord, to review the slides here to determine whether or
 not there is a true risk of squamous cell carcinoma here, and to recheck
 him in three to four weeks, after he is healed, on a regimen of Prilosec.
 He does have significant hyperkeratosis in his posterior commissure,
 suggesting that this may an etiologic factor and probably is contributing
 to his symptoms of postnasal discharge as well.  I will see him back in
 three weeks.
 
 Jimmy Esposito M.D.
 , Otolaryngology
 Head  and Neck Surgery
 
 MARIA ALEJANDRA/lizett
 D: 98
 T: 98Electronically signed by Interface, Transcription In at 2007  3:15 AM PSTd
ocumented in this encounter
 
 Plan of Treatment
 Not on filedocumented as of this encounter
 
 Visit Diagnoses
 Not on filedocumented in this encounter

## 2019-12-06 NOTE — XMS
Encounter Summary
  Created on: 2019
 
 Shane Wyatttien BroussardJosue
 External Reference #: [id number]
 : 43
 Sex: Male
 
 Demographics
 
 
+-----------------------+---------------------------+
| Address               | [address]        |
|                       | [place]  [address] |
+-----------------------+---------------------------+
| Home Phone            | [phone]           |
+-----------------------+---------------------------+
| Preferred Language    | Unknown                   |
+-----------------------+---------------------------+
| Marital Status        |                    |
+-----------------------+---------------------------+
| Alevism Affiliation | [id number]                      |
+-----------------------+---------------------------+
| Race                  | Unknown                   |
+-----------------------+---------------------------+
| Ethnic Group          | Unknown                   |
+-----------------------+---------------------------+
 
 
 Author
 
 
+--------------+--------------------------------------------+
| Author       | [facility]  |
|              | [facility] [place]                                |
+--------------+--------------------------------------------+
| Organization | [facility]  |
|              | [facility] [place]                                |
+--------------+--------------------------------------------+
| Address      | Unknown                                    |
+--------------+--------------------------------------------+
| Phone        | Unavailable                                |
+--------------+--------------------------------------------+
 
 
 
 Support
 
 
+---------------+--------------+--------------------+-----------------+
| Name          | Relationship | Address            | Phone           |
+---------------+--------------+--------------------+-----------------+
| Opal Shane | ECON         | [address]     | [phone] |
|               |              | [place]   |                 |
|               |              | [address]              |                 |
+---------------+--------------+--------------------+-----------------+
 
 
 
 
 Care Team Providers
 
 
+------------------------+------+-----------------+
| Care Team Member Name  | Role | Phone           |
+------------------------+------+-----------------+
| Calvin Robertson MD | PCP  | [phone] |
+------------------------+------+-----------------+
 
 
 
 Reason for Referral
 Diagnostic/Screening (Routine)
 
+--------+--------+-----------+--------------+--------------+---------------+
| Status | Reason | Specialty | Diagnoses /  | Referred By  | Referred To   |
|        |        |           | Procedures   | Contact      | Contact       |
+--------+--------+-----------+--------------+--------------+---------------+
| Closed |        | Radiology |   Diagnoses  |   Juan F,  |   [facility] |
|        |        |           |  Multifocal  | Jessica    |   [address]         |
|        |        |           | lung         | MD [address]  | [address]   |
|        |        |           | consolidatio |  [address]        | [address]       |
|        |        |           | n (HCC)      | [address]  | [place], [place]  |
|        |        |           | Procedures   |  [address]       | [address]    |
|        |        |           | CT Chest wo  | [place], [place] | Phone:        |
|        |        |           | Contrast     |  [address]       | [phone]  |
|        |        |           |              | Phone:       |  Fax:         |
|        |        |           |              | [phone] | [phone]  |
|        |        |           |              |   Fax:       |               |
|        |        |           |              | [phone] |               |
+--------+--------+-----------+--------------+--------------+---------------+
 
 
 
 
 Reason for Visit
 Diagnostic/Screening (Routine)
 
+--------+--------+-----------+--------------+--------------+---------------+
| Status | Reason | Specialty | Diagnoses /  | Referred By  | Referred To   |
|        |        |           | Procedures   | Contact      | Contact       |
+--------+--------+-----------+--------------+--------------+---------------+
| Closed |        | Radiology |   Diagnoses  |   Juan F,  |   [facility] |
|        |        |           |  Multifocal  | Jessica    |   [address]         |
|        |        |           | lung         | MD [address]  | [address]   |
|        |        |           | consolidatio |  [address]        | [address]       |
|        |        |           | n (HCC)      | [address]  | [place], [place]  |
|        |        |           | Procedures   |  [address]       | [address]    |
|        |        |           | CT Chest wo  | [place], [place] | Phone:        |
|        |        |           | Contrast     |  [address]       | [phone]  |
|        |        |           |              | Phone:       |  Fax:         |
|        |        |           |              | [phone] | [phone]  |
|        |        |           |              |   Fax:       |               |
|        |        |           |              | [phone] |               |
+--------+--------+-----------+--------------+--------------+---------------+
 
 
 
 
 
 Encounter Details
 
 
+--------+-----------+----------------------+----------------------+---------------------+
| Date   | Type      | Department           | Care Team            | Description         |
+--------+-----------+----------------------+----------------------+---------------------+
| / | Hospital  |   [facility]     |   Juan F,          | Multifocal lung     |
| 2019   | Encounter | [facility] [address]  [address]  | Jessica Cheung,   | consolidation (HCC) |
|        |           | [address]  | MD  [address] |                     |
|        |           |  [place], [place]        |   [address]  [facility]   |                     |
|        |           | [address]           | [place] [address]             |                     |
|        |           | [phone]         | [phone]         |                     |
|        |           |                      | [phone] (Fax)   |                     |
+--------+-----------+----------------------+----------------------+---------------------+
 
 
 
 Social History
 
 
+---------------+------------+-----------+--------+------------------+
| Tobacco Use   | Types      | Packs/Day | Years  | Date             |
|               |            |           | Used   |                  |
+---------------+------------+-----------+--------+------------------+
| Former Smoker | Cigarettes | 1         | 35     | Quit: 1996 |
+---------------+------------+-----------+--------+------------------+
 
 
 
+---------------------+---+---+---+
| Smokeless Tobacco:  |   |   |   |
| Never Used          |   |   |   |
+---------------------+---+---+---+
 
 
 
+-------------+-------------+---------+----------+
| Alcohol Use | Drinks/Week | oz/Week | Comments |
+-------------+-------------+---------+----------+
| No          |             |         |          |
+-------------+-------------+---------+----------+
 
 
 
+------------------+---------------+
| Sex Assigned at  | Date Recorded |
| Birth            |               |
+------------------+---------------+
| Not on file      |               |
+------------------+---------------+
 
 
 
+----------------+-------------+-------------+
| Job Start Date | Occupation  | Industry    |
+----------------+-------------+-------------+
| Not on file    | Not on file | Not on file |
+----------------+-------------+-------------+
 
 
 
 
+----------------+--------------+------------+
| Travel History | Travel Start | Travel End |
+----------------+--------------+------------+
 
 
 
+-------------------------------------+
| No recent travel history available. |
+-------------------------------------+
 documented as of this encounter
 
 Medications at Time of Discharge
 
 
+----------------------+----------------------+-----------+---------+----------+-----------+
| Medication           | Sig                  | Dispensed | Refills | Start    | End Date  |
|                      |                      |           |         | Date     |           |
+----------------------+----------------------+-----------+---------+----------+-----------+
|   acyclovir          | Take 400 mg by mouth |           | 0       |          |           |
| (ZOVIRAX) 400 MG     |  5 (five) times      |           |         |          |           |
| tablet               | daily. PRN           |           |         |          |           |
+----------------------+----------------------+-----------+---------+----------+-----------+
|   acyclovir          | Apply  topically     |           | 0       |          |           |
| (ZOVIRAX) 5%         | every 3 hours.       |           |         |          |           |
| ointment             |                      |           |         |          |           |
+----------------------+----------------------+-----------+---------+----------+-----------+
|   albuterol (PROAIR  | Inhale 2 puffs into  |           | 0       |          |           |
| HFA) 90 mcg/puff     | the lungs every 6    |           |         |          |           |
| inhaler              | hours as needed for  |           |         |          |           |
|                      | Wheezing.            |           |         |          |           |
+----------------------+----------------------+-----------+---------+----------+-----------+
|   albuterol (PROAIR  | Inhale  into the     |           | 0       |          |           |
| RESPICLICK) 90       | lungs.               |           |         |          |           |
| mcg/puff inhaler     |                      |           |         |          |           |
+----------------------+----------------------+-----------+---------+----------+-----------+
|                      | Take 3 mLs by        |           | 0       | 08// |           |
| albuterol-ipratropiu | nebulization every 6 |           |         | 18       |           |
| m 2.5-0.5 mg/3 mL    |  (six) hours as      |           |         |          |           |
| SOLN                 | needed.              |           |         |          |           |
+----------------------+----------------------+-----------+---------+----------+-----------+
|   atorvaSTATin       | TAKE 1 TABLET BY     |           | 0       | 20 |  |
| (LIPITOR) 40 mg      | MOUTH NIGHTLY        |           |         | 19       | 0         |
| tablet               |                      |           |         |          |           |
+----------------------+----------------------+-----------+---------+----------+-----------+
|   azaTHIOprine       | Take 150 mg by mouth |           | 0       |          |           |
| (IMURAN) 50 mg       |  daily.              |           |         |          |           |
| tablet               |                      |           |         |          |           |
+----------------------+----------------------+-----------+---------+----------+-----------+
|                      | Apply  to eye as     |           | 0       |          |           |
| bacitracin-polymyxin | needed.              |           |         |          |           |
|  b (POLYSPORIN)      |                      |           |         |          |           |
| ophthalmic ointment  |                      |           |         |          |           |
+----------------------+----------------------+-----------+---------+----------+-----------+
|   bismuth            | Take 262 mg by mouth |           | 0       |          |           |
| subsalicylate (PEPTO |  4 (four) times      |           |         |          |           |
|  BISMOL) 262 mg      | daily before meals   |           |         |          |           |
| chewable tablet      | and nightly.         |           |         |          |           |
+----------------------+----------------------+-----------+---------+----------+-----------+
|   cholecalciferol    | Take 1,000 Units by  |           | 0       |          |           |
 
| (CHOLECALCIFEROL)    | mouth daily.         |           |         |          |           |
| 1000 units TABS      |                      |           |         |          |           |
+----------------------+----------------------+-----------+---------+----------+-----------+
|   cyanocobalamin     | Take 1,000 mcg by    |           | 0       |          |           |
| (VITAMIN B-12) 1000  | mouth daily.         |           |         |          |           |
| MCG tablet           |                      |           |         |          |           |
+----------------------+----------------------+-----------+---------+----------+-----------+
|   diclofenac         | Apply 2 g topically  |           | 0       |          |           |
| (VOLTAREN) 1% GEL    | 4 (four) times       |           |         |          |           |
|                      | daily. PRN           |           |         |          |           |
+----------------------+----------------------+-----------+---------+----------+-----------+
|   digoxin (LANOXIN)  | Take 125 mcg by      |           | 0       |          |           |
| 250 mcg tablet       | mouth Daily.      |           |         |          |           |
|                      | capsule daily        |           |         |          |           |
+----------------------+----------------------+-----------+---------+----------+-----------+
|   famotidine         | Take 40 mg by mouth  |           | 0       |          |           |
| (PEPCID) 40 MG       | daily.               |           |         |          |           |
| tablet               |                      |           |         |          |           |
+----------------------+----------------------+-----------+---------+----------+-----------+
|   ferrous sulfate    | Take 325 mg by mouth |           | 0       | 20 |           |
| 325 mg tablet        |  3 times daily.      |           |         | 12       |           |
+----------------------+----------------------+-----------+---------+----------+-----------+
|   fluticasone        | one spray in each    |           | 0       | 20 |           |
| (FLONASE) 50         | nostril daily as     |           |         | 12       |           |
| mcg/nasal spray      | needed               |           |         |          |           |
+----------------------+----------------------+-----------+---------+----------+-----------+
|                      | Inhale 1 puff into   |           | 0       |          |           |
| fluticasone-salmeter | the lungs Twice      |           |         |          |           |
| ol (ADVAIR) 500-50   | Daily.               |           |         |          |           |
| mcg/puff diskus      |                      |           |         |          |           |
| inhaler              |                      |           |         |          |           |
+----------------------+----------------------+-----------+---------+----------+-----------+
|   folic acid 1 mg    | Take 1 mg by mouth   |           | 0       |          |           |
| tablet               | Daily.               |           |         |          |           |
+----------------------+----------------------+-----------+---------+----------+-----------+
|   furosemide (LASIX) | Take 40 mg by mouth  |           | 0       |          |           |
|  40 mg tablet        | Daily.               |           |         |          |           |
+----------------------+----------------------+-----------+---------+----------+-----------+
|   guaiFENesin        | Take 1,200 mg by     |           | 0       |          |           |
| (MUCINEX) 600 mg 12  | mouth 2 times daily. |           |         |          |           |
| hr tablet            |                      |           |         |          |           |
+----------------------+----------------------+-----------+---------+----------+-----------+
|   levocetirizine     | Take 5 mg by mouth   |           | 0       |          |           |
| (XYZAL) 5 MG tablet  | as needed.           |           |         |          |           |
+----------------------+----------------------+-----------+---------+----------+-----------+
|   levothyroxine      | Take 75 mcg by mouth |           | 0       | 20 |           |
| (LEVOTHROID) 75 MCG  |  Daily.              |           |         | 12       |           |
| tablet               |                      |           |         |          |           |
+----------------------+----------------------+-----------+---------+----------+-----------+
|   losartan (COZAAR)  | Take 50 mg by mouth  |           | 0       |          |           |
| 50 mg tablet         | daily.               |           |         |          |           |
+----------------------+----------------------+-----------+---------+----------+-----------+
|   metoclopramide     | Take 10 mg by mouth  |           | 0       | 20 |           |
| (REGLAN) 10 mg       | 4 times daily as     |           |         | 12       |           |
| tablet               | needed.              |           |         |          |           |
+----------------------+----------------------+-----------+---------+----------+-----------+
|   mupirocin          | 1 g by Nasal route   |           | 0       |          |           |
| (BACTROBAN) 2%       | as needed. Use pea   |           |         |          |           |
| ointment             | sized amount in each |           |         |          |           |
|                      |  nostril twice daily |           |         |          |           |
 
|                      |  for 5 days. After   |           |         |          |           |
|                      | applications, press  |           |         |          |           |
|                      | sides of nose        |           |         |          |           |
|                      | together, gently     |           |         |          |           |
|                      | massage for 1 min.   |           |         |          |           |
+----------------------+----------------------+-----------+---------+----------+-----------+
|                      | Apply  topically 2   |           | 0       |          |           |
| nystatin-triamcinolo | times daily.         |           |         |          |           |
| ne (MYCOLOG II)      |                      |           |         |          |           |
| cream                |                      |           |         |          |           |
+----------------------+----------------------+-----------+---------+----------+-----------+
|   ofloxacin          |                      |           | 0       | 20 |           |
| (OCUFLOX) 0.3%       |                      |           |         | 18       |           |
| ophthalmic solution  |                      |           |         |          |           |
+----------------------+----------------------+-----------+---------+----------+-----------+
|   omeprazole         | Take 20 mg by mouth  |           | 0       | 20 |           |
| (PRILOSEC) 20 mg     | 2 times daily.       |           |         | 12       |           |
| capsule              |                      |           |         |          |           |
+----------------------+----------------------+-----------+---------+----------+-----------+
|   potassium citrate  | Take 10 mEq by mouth |           | 0       |          |           |
| (UROCIT-K) 10 mEq SR |  2 times daily.      |           |         |          |           |
|  tablet              |                      |           |         |          |           |
+----------------------+----------------------+-----------+---------+----------+-----------+
|   predniSONE         | Take 10 mg by mouth  |           | 0       |          |           |
| (DELTASONE) 5 mg     | Daily.               |           |         |          |           |
| tablet               |                      |           |         |          |           |
+----------------------+----------------------+-----------+---------+----------+-----------+
|   sertraline         | Take 100 mg by mouth |           | 0       |          |           |
| (ZOLOFT) 100 mg      |  daily.              |           |         |          |           |
| tablet               |                      |           |         |          |           |
+----------------------+----------------------+-----------+---------+----------+-----------+
|   tamsulosin         | Take 0.4 mg by mouth |           | 0       | 20 |           |
| (FLOMAX) 0.4 mg CAPS |  2 times daily.      |           |         | 12       |           |
+----------------------+----------------------+-----------+---------+----------+-----------+
|   trolamine          | Apply  topically as  |           | 0       |          |           |
| salicylate           | needed.              |           |         |          |           |
| (ASPERCREME) 10%     |                      |           |         |          |           |
| cream                |                      |           |         |          |           |
+----------------------+----------------------+-----------+---------+----------+-----------+
|   rivaroxaban        | Take 1 tablet by     |           | 0       | 20 | 10/16/201 |
| (XARELTO) 10 mg      | mouth daily.         |           |         | 19       | 9         |
| tablet               |                      |           |         |          |           |
+----------------------+----------------------+-----------+---------+----------+-----------+
 documented as of this encounter
 
 Plan of Treatment
 
 
+--------+---------+-------------+----------------------+-------------+
| Date   | Type    | Specialty   | Care Team            | Description |
+--------+---------+-------------+----------------------+-------------+
| / | Office  | Pulmonology |   Juan F,          |             |
|    | Visit   |             | Jessica Cheung,   |             |
|        |         |             | MD [address] |             |
|        |         |             |   [place]   |             |
|        |         |             | [place] [address]             |             |
|        |         |             | [phone]         |             |
|        |         |             | [phone] (Fax)   |             |
+--------+---------+-------------+----------------------+-------------+
| / | Office  | Cardiology  |   Eric Akins, |             |
 
|    | Visit   |             |  MD [address]   |             |
|        |         |             | [place]  |             |
|        |         |             | [address]  [phone]  |             |
|        |         |             |  [phone] (Fax)  |             |
+--------+---------+-------------+----------------------+-------------+
 documented as of this encounter
 
 Procedures
 
 
+----------------------+--------+-------------+----------------------+----------------------
+
| Procedure Name       | Priori | Date/Time   | Associated Diagnosis | Comments             
|
|                      | ty     |             |                      |                      
|
+----------------------+--------+-------------+----------------------+----------------------
+
| CT CHEST WO CONTRAST | Routin | 2019  |   Multifocal lung    |   Results for this   
|
|                      | e      | 12:28 PM    | consolidation (HCC)  | procedure are in the 
|
|                      |        | PDT         |                      |  results section.    
|
+----------------------+--------+-------------+----------------------+----------------------
+
 documented in this encounter
 
 Results
 CT Chest wo Contrast (2019 12:28 PM PDT)
 
+----------+
| Specimen |
+----------+
|          |
+----------+
 
 
 
+------------------------------------------------------------------------+---------------+
| Narrative                                                              | Performed At  |
+------------------------------------------------------------------------+---------------+
|      CT CHEST WITHOUT CONTRAST     CLINICAL INFORMATION:  Right middle |   [facility] |
|  lobe pulmonary nodule and multiple areas of consolidation  in a       |               |
| patient with a history of aspiration and Rheumatoid arthritis.         |               |
| COMPARISON:  CT CHEST WO CONTRAST (3/25/2019);     PROCEDURE:  Axial   |               |
| images through the chest. Multiplanar reconstructions.     At least    |               |
| one of the following CT dose optimization techniques were  used:       |               |
| Automated exposure control; Adjustment of mA and/or kV according  to   |               |
| patient size; Use of iterative reconstruction technique.     FINDINGS: |               |
|   Lungs, Pleura and Airways:  -Right middle lobe nodule on series 4,   |               |
| image 161 measuring 13 mm x 6  mm, unchanged.  -Mild emphysematous     |               |
| changes.  -Combination of small amount of ground-glass and airspace    |               |
| opacity noted  involving the right upper lobe on series 4, image 66    |               |
| through 59. this  appears to be new since the previous examination.    |               |
| -Trace right pleural effusion.   Small amount of calcified pleural     |               |
| plaques.  Mediastinum: Mild cardiomegaly.   No pericardial effusion.   |               |
|   Aorta does  not appear aneurysmal.   Mild prominence of the          |               |
| pulmonary arteries,  potentially pulmonary arterial hypertension.      |               |
|   Probable surgical changes  seen at the GE junction region.  Lymph    |               |
 
| Nodes: No enlarged lymph nodes seen.  Upper Abdomen: No significant    |               |
| abnormality appreciated within limits of  unenhanced technique.        |               |
| BODY WALL  Soft Tissues: No significant abnormality appreciated.       |               |
| Bones: No acute or destructive osseous process seen.     IMPRESSION:   |               |
| 1. Combination of small amount of ground-glass and airspace opacity    |               |
| noted involving the right upper lobe.   This appears to be new since   |               |
| the  previous examination. Part solid lung nodule greater than 5 mm    |               |
| new or  of indeterminate stability. Such nodules are common and have a |               |
|  low  incidence of developing into a clinically significant cancer.    |               |
| Recommendation: Follow up chest CT in 6 months and then at 2 and 4     |               |
| years if nodule remains stable with solid component less than 6 mm.    |               |
| (Fleischner society recommendation).  2. Unchanged right middle lobe   |               |
| nodule measuring 13 mm x 6 mm.  3. Mild emphysematous changes.  4.     |               |
|   Please refer to the findings section for additional details.         |               |
|    Signed by: JORGE Vincent Amit  Sign Date/Time: 2019 12:54 PM   |               |
|                                                                        |               |
+------------------------------------------------------------------------+---------------+
 
 
 
+-------------------------------------------------------------------------+
| Procedure Note                                                          |
+-------------------------------------------------------------------------+
|   Maurizio, Rad Results In - 2019 12:58 PM PDT                         |
| CT CHEST WITHOUT CONTRAST                                               |
|                                                                         |
| CLINICAL INFORMATION:                                                   |
| Right middle lobe pulmonary nodule and multiple areas of consolidation  |
| in a patient with a history of aspiration and Rheumatoid arthritis.     |
|                                                                         |
| COMPARISON:                                                             |
| CT CHEST WO CONTRAST (3/25/2019);                                       |
|                                                                         |
| PROCEDURE:                                                              |
| Axial images through the chest. Multiplanar reconstructions.            |
|                                                                         |
| At least one of the following CT dose optimization techniques were      |
| used: Automated exposure control; Adjustment of mA and/or kV according  |
| to patient size; Use of iterative reconstruction technique.             |
|                                                                         |
| FINDINGS:                                                               |
| Lungs, Pleura and Airways:                                              |
| -Right middle lobe nodule on series 4, image 161 measuring 13 mm x 6    |
| mm, unchanged.                                                          |
| -Mild emphysematous changes.                                            |
| -Combination of small amount of ground-glass and airspace opacity noted |
| involving the right upper lobe on series 4, image 66 through 59. this   |
| appears to be new since the previous examination.                       |
| -Trace right pleural effusion.  Small amount of calcified pleural       |
| plaques.                                                                |
| Mediastinum: Mild cardiomegaly.  No pericardial effusion.  Aorta does   |
| not appear aneurysmal.  Mild prominence of the pulmonary arteries,      |
| potentially pulmonary arterial hypertension.  Probable surgical changes |
| seen at the GE junction region.                                         |
| Lymph Nodes: No enlarged lymph nodes seen.                              |
| Upper Abdomen: No significant abnormality appreciated within limits of  |
| unenhanced technique.                                                   |
|                                                                         |
| BODY WALL                                                               |
| Soft Tissues: No significant abnormality appreciated.                   |
 
| Bones: No acute or destructive osseous process seen.                    |
|                                                                         |
| IMPRESSION:                                                             |
| 1. Combination of small amount of ground-glass and airspace opacity     |
| noted involving the right upper lobe.  This appears to be new since the |
| previous examination. Part solid lung nodule greater than 5 mm new or   |
| of indeterminate stability. Such nodules are common and have a low      |
| incidence of developing into a clinically significant cancer.           |
| Recommendation: Follow up chest CT in 6 months and then at 2 and 4      |
| years if nodule remains stable with solid component less than 6 mm.     |
| (Fleischner society recommendation).                                    |
| 2. Unchanged right middle lobe nodule measuring 13 mm x 6 mm.           |
| 3. Mild emphysematous changes.                                          |
| 4.  Please refer to the findings section for additional details.        |
|                                                                         |
| Signed by: JORGE Vincent, [place]                                            |
| Sign Date/Time: 2019 12:54 PM                                     |
+-------------------------------------------------------------------------+
 
 
 
+---------------+---------+--------------------+--------------+
| Performing    | Address | City/State/Zipcode | Phone Number |
| Organization  |         |                    |              |
+---------------+---------+--------------------+--------------+
|   [facility] |         |                    |              |
+---------------+---------+--------------------+--------------+
 documented in this encounter
 
 Visit Diagnoses
 
 
+------------------------------------------------------------------------------------------+
| Diagnosis                                                                                |
+------------------------------------------------------------------------------------------+
|   Multifocal lung consolidation (HCC)  Pneumococcal pneumonia (streptococcus pneumoniae  |
| pneumonia)                                                                               |
+------------------------------------------------------------------------------------------+
 documented in this encounter

## 2019-12-06 NOTE — XMS
Encounter Summary
  Created on: 2019
 
 Shane Wyatttien BroussardJosue
 External Reference #: [id number]
 : 43
 Sex: Male
 
 Demographics
 
 
+-----------------------+---------------------------+
| Address               | [address]        |
|                       | [place]  [address] |
+-----------------------+---------------------------+
| Home Phone            | [phone]           |
+-----------------------+---------------------------+
| Preferred Language    | Unknown                   |
+-----------------------+---------------------------+
| Marital Status        |                    |
+-----------------------+---------------------------+
| Gnosticism Affiliation | 1028                      |
+-----------------------+---------------------------+
| Race                  | Unknown                   |
+-----------------------+---------------------------+
| Ethnic Group          | Unknown                   |
+-----------------------+---------------------------+
 
 
 Author
 
 
+--------------+--------------------------------------------+
| Author       | [facility]  |
|              | [facility] [place]                                |
+--------------+--------------------------------------------+
| Organization | [facility]  |
|              | [facility] [place]                                |
+--------------+--------------------------------------------+
| Address      | Unknown                                    |
+--------------+--------------------------------------------+
| Phone        | Unavailable                                |
+--------------+--------------------------------------------+
 
 
 
 Support
 
 
+---------------+--------------+--------------------+-----------------+
| Name          | Relationship | Address            | Phone           |
+---------------+--------------+--------------------+-----------------+
| Opal Shane | ECON         | [address]     | [phone] |
|               |              | [place]   |                 |
|               |              | [id number]              |                 |
+---------------+--------------+--------------------+-----------------+
 
 
 
 
 Care Team Providers
 
 
+-----------------------+------+-----------------+
| Care Team Member Name | Role | Phone           |
+-----------------------+------+-----------------+
| Erwin Iniguez MD | PCP  | [phone] |
+-----------------------+------+-----------------+
 
 
 
 Encounter Details
 
 
+--------+-----------+----------------------+---------------------+---------------------+
| Date   | Type      | Department           | Care Team           | Description         |
+--------+-----------+----------------------+---------------------+---------------------+
| / | Hospital  |   [facility]    |   Celso Dumont,  | Chronic maxillary   |
| 2015   | Encounter [facility] PAC[facility]  | DO                  | sinusitis; Chronic  |
|        |           |  [address]      |                     | frontal sinusitis   |
|        |           | [place], [place]         |                     |                     |
|        |           | [address]           |                     |                     |
|        |           | [phone]         |                     |                     |
+--------+-----------+----------------------+---------------------+---------------------+
 
 
 
 Social History
 
 
+---------------+------------+-----------+--------+------------------+
| Tobacco Use   | Types      | Packs/Day | Years  | Date             |
|               |            |           | Used   |                  |
+---------------+------------+-----------+--------+------------------+
| Former Smoker | Cigarettes | 1.3       | 34     | Quit: 1996 |
+---------------+------------+-----------+--------+------------------+
 
 
 
+---------------------+---+---+---+
| Smokeless Tobacco:  |   |   |   |
| Never Used          |   |   |   |
+---------------------+---+---+---+
 
 
 
+-------------+-------------+---------+----------+
| Alcohol Use | Drinks/Week | oz/Week | Comments |
+-------------+-------------+---------+----------+
| No          |             |         |          |
+-------------+-------------+---------+----------+
 
 
 
+------------------+---------------+
| Sex Assigned at  | Date Recorded |
| Birth            |               |
+------------------+---------------+
| Not on file      |               |
 
+------------------+---------------+
 
 
 
+----------------+-------------+-------------+
| Job Start Date | Occupation  | Industry    |
+----------------+-------------+-------------+
| Not on file    | Not on file | Not on file |
+----------------+-------------+-------------+
 
 
 
+----------------+--------------+------------+
| Travel History | Travel Start | Travel End |
+----------------+--------------+------------+
 
 
 
+-------------------------------------+
| No recent travel history available. |
+-------------------------------------+
 documented as of this encounter
 
 Medications at Time of Discharge
 
 
+----------------------+----------------------+-----------+---------+----------+-----------+
| Medication           | Sig                  | Dispensed | Refills | Start    | End Date  |
|                      |                      |           |         | Date     |           |
+----------------------+----------------------+-----------+---------+----------+-----------+
|   acyclovir          | Apply  topically     |           | 0       |          |           |
| (ZOVIRAX) 5%         | every 3 hours.       |           |         |          |           |
| ointment             |                      |           |         |          |           |
+----------------------+----------------------+-----------+---------+----------+-----------+
|   albuterol (PROAIR  | Inhale 2 puffs into  |           | 0       |          |           |
| HFA) 90 mcg/puff     | the lungs every 6    |           |         |          |           |
| inhaler              | hours as needed for  |           |         |          |           |
|                      | Wheezing.            |           |         |          |           |
+----------------------+----------------------+-----------+---------+----------+-----------+
|   ferrous sulfate    | Take 325 mg by mouth |           | 0       | 20 |           |
| 325 mg tablet        |  3 times daily.      |           |         | 12       |           |
+----------------------+----------------------+-----------+---------+----------+-----------+
|   fluticasone        | one spray in each    |           | 0       | 20 |           |
| (FLONASE) 50         | nostril daily as     |           |         | 12       |           |
| mcg/nasal spray      | needed               |           |         |          |           |
+----------------------+----------------------+-----------+---------+----------+-----------+
|                      | Inhale 1 puff into   |           | 0       |          |           |
| fluticasone-salmeter | the lungs Twice      |           |         |          |           |
| ol (ADVAIR) 500-50   | Daily.               |           |         |          |           |
| mcg/puff diskus      |                      |           |         |          |           |
| inhaler              |                      |           |         |          |           |
+----------------------+----------------------+-----------+---------+----------+-----------+
|   folic acid 1 mg    | Take 1 mg by mouth   |           | 0       |          |           |
| tablet               | Daily.               |           |         |          |           |
+----------------------+----------------------+-----------+---------+----------+-----------+
|   furosemide (LASIX) | Take 40 mg by mouth  |           | 0       |          |           |
|  40 mg tablet        | Daily.               |           |         |          |           |
+----------------------+----------------------+-----------+---------+----------+-----------+
|   guaiFENesin        | Take 1,200 mg by     |           | 0       |          |           |
| (MUCINEX) 600 mg 12  | mouth 2 times daily. |           |         |          |           |
 
| hr tablet            |                      |           |         |          |           |
+----------------------+----------------------+-----------+---------+----------+-----------+
|   levothyroxine      | Take 75 mcg by mouth |           | 0       | 20 |           |
| (LEVOTHROID) 75 MCG  |  Daily.              |           |         | 12       |           |
| tablet               |                      |           |         |          |           |
+----------------------+----------------------+-----------+---------+----------+-----------+
|   metoclopramide     | Take 10 mg by mouth  |           | 0       | 20 |           |
| (REGLAN) 10 mg       | 4 times daily as     |           |         | 12       |           |
| tablet               | needed.              |           |         |          |           |
+----------------------+----------------------+-----------+---------+----------+-----------+
|                      | Apply  topically 2   |           | 0       |          |           |
| nystatin-triamcinolo | times daily.         |           |         |          |           |
| ne (MYCOLOG II)      |                      |           |         |          |           |
| cream                |                      |           |         |          |           |
+----------------------+----------------------+-----------+---------+----------+-----------+
|   omeprazole         | Take 20 mg by mouth  |           | 0       | 20 |           |
| (PRILOSEC) 20 mg     | 2 times daily.       |           |         | 12       |           |
| capsule              |                      |           |         |          |           |
+----------------------+----------------------+-----------+---------+----------+-----------+
|   potassium citrate  | Take 10 mEq by mouth |           | 0       |          |           |
| (UROCIT-K) 10 mEq SR |  2 times daily.      |           |         |          |           |
|  tablet              |                      |           |         |          |           |
+----------------------+----------------------+-----------+---------+----------+-----------+
|   predniSONE         | Take 10 mg by mouth  |           | 0       |          |           |
| (DELTASONE) 5 mg     | Daily.               |           |         |          |           |
| tablet               |                      |           |         |          |           |
+----------------------+----------------------+-----------+---------+----------+-----------+
|   tamsulosin         | Take 0.4 mg by mouth |           | 0       | 20 |           |
| (FLOMAX) 0.4 mg CAPS |  2 times daily.      |           |         | 12       |           |
+----------------------+----------------------+-----------+---------+----------+-----------+
|   acyclovir          | Take 400 mg by mouth |           | 0       | 20 |  |
| (ZOVIRAX) 400 MG     |  3 times daily as    |           |         | 12       | 9         |
| tablet               | needed.              |           |         |          |           |
+----------------------+----------------------+-----------+---------+----------+-----------+
|   Cholecalciferol    | Take 2,000 Units by  |           | 0       | 20 |  |
| (VITAMIN D3) 2000    | mouth Daily.         |           |         | 12       | 9         |
| UNITS CAPS           |                      |           |         |          |           |
+----------------------+----------------------+-----------+---------+----------+-----------+
|   cyanocobalamin     | injected             |           | 0       | 20 |  |
| (VITAMIN B-12) 1,000 | intramuscularly once |           |         | 12       | 9         |
|  mcg/mL injection    |  a month             |           |         |          |           |
+----------------------+----------------------+-----------+---------+----------+-----------+
|   digoxin (LANOXIN)  | Take 250 mcg by      |           | 0       |          |  |
| 250 mcg tablet       | mouth Daily.      |           |         |          | 5         |
|                      | tablet daily         |           |         |          |           |
+----------------------+----------------------+-----------+---------+----------+-----------+
|   diltiazem          | Take 120 mg by mouth |           | 0       |          |  |
| (DILT-XR) 120 mg 24  |  Daily.              |           |         |          | 9         |
| hr capsule           |                      |           |         |          |           |
+----------------------+----------------------+-----------+---------+----------+-----------+
|   loratadine         | Take 10 mg by mouth  |           | 0       |          |  |
| (CLARITIN) 10 mg     | Daily.               |           |         |          | 9         |
| tablet               |                      |           |         |          |           |
+----------------------+----------------------+-----------+---------+----------+-----------+
|   losartan (COZAAR)  | Take 100 mg by mouth |           | 0       |          |  |
| 100 MG tablet        |  Daily. 1/2 daily    |           |         |          | 9         |
+----------------------+----------------------+-----------+---------+----------+-----------+
|   methotrexate 2.5   | Take 15 mg by mouth  |           | 0       |          |  |
| mg tablet            | Once a week.         |           |         |          | 9         |
+----------------------+----------------------+-----------+---------+----------+-----------+
 
|                      | Take 1 tablet by     |           | 0       |          |  |
| oxyCODONE-acetaminop | mouth every 4 hours  |           |         |          | 9         |
| hen (PERCOCET) 5-325 | as needed for Pain.  |           |         |          |           |
|  mg per tablet       |                      |           |         |          |           |
+----------------------+----------------------+-----------+---------+----------+-----------+
|   pseudoePHEDrine    | Take 30 mg by mouth  |           | 0       |          |  |
| (SUDOGEST) 30 mg     | every 4 hours as     |           |         |          | 9         |
| tablet               | needed for           |           |         |          |           |
|                      | Congestion.          |           |         |          |           |
+----------------------+----------------------+-----------+---------+----------+-----------+
|   rivaroxaban        | Take 20 mg by mouth  |           | 0       |          |  |
| (XARELTO) 20 mg      | Daily (with dinner). |           |         |          | 9         |
| tablet               |                      |           |         |          |           |
+----------------------+----------------------+-----------+---------+----------+-----------+
|   sertraline         | 2 tablets daily      |           | 0       | 20 |  |
| (ZOLOFT) 100 mg      |                      |           |         | 12       | 9         |
| tablet               |                      |           |         |          |           |
+----------------------+----------------------+-----------+---------+----------+-----------+
 documented as of this encounter
 
 Progress Notes
 Irvin Bashir, Provider Unknown - 2015  1:55 PM PDTFormatting of this note m
ight be different from the original.
 Progress Notes by Kristy Midkiff, RN at 08/20/15 5974  
  Author:  Kristy Midkiff, RN Service:  (none) Author Type:  Registered Nurse 
  Filed:  08/20/15 5524 Date of Service:  08/20/15 1262 Status:  Signed 
  :  Kristy Midkiff, RN (Registered Nurse)   
   
 Noted 3 small skin tears on patient's right upper arm. I put an eye patch over it per patie
nts request. Asked if he wanted to have it looked at and he declined, "stating it happens ev
rodney day."
  
  Electronically signed by Meir Hooks at 2019  3:48 PM PDTdocume
nted in this encounter
 
 Plan of Treatment
 
 
+--------+---------+-------------+----------------------+-------------+
| Date   | Type    | Specialty   | Care Team            | Description |
+--------+---------+-------------+----------------------+-------------+
| / | Office  | Pulmonology |   Juan F,          |             |
|    | Visit   |             | Jessica Rodrigezse,   |             |
|        |         |             | MD  [address] |             |
|        |         |             |   [place],   |             |
|        |         |             | [place] [address]             |             |
|        |         |             | [phone]         |             |
|        |         |             | [phone] (Fax)   |             |
+--------+---------+-------------+----------------------+-------------+
| / | Office  | Cardiology  |   Eric Akins, |             |
|    | Visit   |             |  MD  [address]   |             |
|        |         |             | [place]  |             |
|        |         |             | [address]  [phone]  |             |
|        |         |             |  [phone] (Fax)  |             |
+--------+---------+-------------+----------------------+-------------+
 documented as of this encounter
 
 Visit Diagnoses
 
 
 
+-------------------------------+
| Diagnosis                     |
+-------------------------------+
|   Chronic maxillary sinusitis |
+-------------------------------+
|   Chronic frontal sinusitis   |
+-------------------------------+
 documented in this encounter

## 2019-12-06 NOTE — XMS
Encounter Summary
  Created on: 2019
 
 Wyatt Shane
 External Reference #: [id number]
 : 43
 Sex: Male
 
 Demographics
 
 
+-----------------------+------------------------+
| Address               | [address]     |
|                       | [place]  [address]   |
+-----------------------+------------------------+
| Home Phone            | [phone]        |
+-----------------------+------------------------+
| Preferred Language    | Unknown                |
+-----------------------+------------------------+
| Marital Status        |                 |
+-----------------------+------------------------+
| Buddhism Affiliation | LUT                    |
+-----------------------+------------------------+
| Race                  | White                  |
+-----------------------+------------------------+
| Ethnic Group          | Not  or  |
+-----------------------+------------------------+
 
 
 Author
 
 
+--------------+------------------------------+
| Author       | [facility] |
+--------------+------------------------------+
| Organization | [facility] |
+--------------+------------------------------+
| Address      | Unknown                      |
+--------------+------------------------------+
| Phone        | Unavailable                  |
+--------------+------------------------------+
 
 
 
 Support
 
 
+---------------+--------------+---------+-----------------+
| Name          | Relationship | Address | Phone           |
+---------------+--------------+---------+-----------------+
| Opal Shane | ECON         | Unknown | [phone] |
+---------------+--------------+---------+-----------------+
 
 
 
 Care Team Providers
 
 
 
+-----------------------+------+-------------+
| Care Team Member Name | Role | Phone       |
+-----------------------+------+-------------+
 PCP  | Unavailable |
+-----------------------+------+-------------+
 
 
 
 Encounter Details
 
 
+--------+----------+----------------------+--------------------+-------------+
| Date   | Type     | Department           | Care Team          | Description |
+--------+----------+----------------------+--------------------+-------------+
| / | Results  |   Otolaryngology     |   Jimmy Esposito MD |             |
|    | Only     | Head and Neck        |                    |             |
|        |          | Surgery Services at  |                    |             |
|        |          | PPV  [address] [facility]     |                    |             |
|        |          | [address]      |                    |             |
|        |          | Mailcode: PV[address]       |                    |             |
|        |          | [address] [address] |                    |             |
|        |          |   [place], [place]       |                    |             |
|        |          | [address]           |                    |             |
|        |          | [phone]         |                    |             |
+--------+----------+----------------------+--------------------+-------------+
 
 
 
 Social History
 
 
+----------------+-------+-----------+--------+------+
| Tobacco Use    | Types | Packs/Day | Years  | Date |
|                |       |           | Used   |      |
+----------------+-------+-----------+--------+------+
| Never Assessed |       |           |        |      |
+----------------+-------+-----------+--------+------+
 
 
 
+------------------+---------------+
| Sex Assigned at  | Date Recorded |
| Birth            |               |
+------------------+---------------+
| Not on file      |               |
+------------------+---------------+
 
 
 
+----------------+-------------+-------------+
| Job Start Date | Occupation  | Industry    |
+----------------+-------------+-------------+
| Not on file    | Not on file | Not on file |
+----------------+-------------+-------------+
 
 
 
+----------------+--------------+------------+
| Travel History | Travel Start | Travel End |
+----------------+--------------+------------+
 
 
 
 
+-------------------------------------+
| No recent travel history available. |
+-------------------------------------+
 documented as of this encounter
 
 Plan of Treatment
 Not on filedocumented as of this encounter
 
 Procedures
 
 
+---------------------+--------+-------------+----------------------+----------------------+
| Procedure Name      | Priori | Date/Time   | Associated Diagnosis | Comments             |
|                     | ty     |             |                      |                      |
+---------------------+--------+-------------+----------------------+----------------------+
| X-RAY CHEST 2 VIEW  | Priori | 2000  |                      |   Results for this   |
|                     | ty     |  4:00 PM    |                      | procedure are in the |
|                     |        | PST         |                      |  results section.    |
+---------------------+--------+-------------+----------------------+----------------------+
| URINE, MICROSCOPIC  | Routin | 2000  |                      |   Results for this   |
| EXAM                | e      |  3:15 PM    |                      | procedure are in the |
|                     |        | PST         |                      |  results section.    |
+---------------------+--------+-------------+----------------------+----------------------+
 documented in this encounter
 
 Results
 CHEST 2 VIEW (2000  4:00 PM PST)
 
+-----------+--------------------------+-----------+------------+--------------+
| Component | Value                    | Ref Range | Performed  | Pathologist  |
|           |                          |           | At         | Signature    |
+-----------+--------------------------+-----------+------------+--------------+
| CHEST, 2  | Radiologist 1: [facility],   |           |            |              |
| VIEWS OR  | CARMINA HARDY M.D.TWO      |           |            |              |
| STEREO    | VIEWS OF THE CHEST:      |           |            |              |
|           |   00 at 1600       |           |            |              |
|           | hours.    Dictated       |           |            |              |
|           | 00. FINDINGS:      |           |            |              |
|           | The patient has had a    |           |            |              |
|           | median sternotomy.       |           |            |              |
|           |   Thelungs are clear.    |           |            |              |
|           |   The aorta is tortuous  |           |            |              |
|           | and ectatic.   The heart |           |            |              |
|           |  ismildly enlarged.      |           |            |              |
|           |   There is degenerative  |           |            |              |
|           | disease of the thoracic  |           |            |              |
|           | spine. IMPRESSION: No    |           |            |              |
|           | change from 99 in  |           |            |              |
|           | cardiomegaly.   The      |           |            |              |
|           | lungs are clear.    END  |           |            |              |
|           | OF IMPRESSION:           |           |            |              |
+-----------+--------------------------+-----------+------------+--------------+
 
 
 
+----------+
| Specimen |
 
+----------+
|          |
+----------+
 
 
 
+----------------------+---------+--------------------+--------------+
| Performing           | Address | City/State/Zipcode | Phone Number |
| Organization         |         |                    |              |
+----------------------+---------+--------------------+--------------+
|   [facility] DEPARTMENT OF |         |                    |              |
|  RADIOLOGY           |         |                    |              |
+----------------------+---------+--------------------+--------------+
 URINE, MICROSCOPIC EXAM (2000  3:15 PM PST)
 
+-------------+-------+------------+-------------+--------------+
| Component   | Value | Ref Range  | Performed   | Pathologist  |
|             |       |            | At          | Signature    |
+-------------+-------+------------+-------------+--------------+
| RED CELLS   | None  | 0 - 3 /hpf | [facility]        |              |
|             |       |            | DEPARTMENT  |              |
|             |       |            | OF          |              |
|             |       |            | PATHOLOGY   |              |
+-------------+-------+------------+-------------+--------------+
| WHITE CELLS | 0-1   | 0 - 5 /hpf | [facility]        |              |
|             |       |            | DEPARTMENT  |              |
|             |       |            | OF          |              |
|             |       |            | PATHOLOGY   |              |
+-------------+-------+------------+-------------+--------------+
| BACTERIA    | None  | /hpf       | [facility]        |              |
|             |       |            | DEPARTMENT  |              |
|             |       |            | OF          |              |
|             |       |            | PATHOLOGY   |              |
+-------------+-------+------------+-------------+--------------+
| HYALINE     | None  | 0 - 1 /lpf | [facility]        |              |
| [facility]STS       |       |            | DEPARTMENT  |              |
|             |       |            | OF          |              |
|             |       |            | PATHOLOGY   |              |
+-------------+-------+------------+-------------+--------------+
| GRANULAR    | None  | /lpf       | [facility]        |              |
| CASTS       |       |            | DEPARTMENT  |              |
|             |       |            | OF          |              |
|             |       |            | PATHOLOGY   |              |
+-------------+-------+------------+-------------+--------------+
| CELLULAR    | None  | /lpf       | [facility]        |              |
| CASTS       |       |            | DEPARTMENT  |              |
|             |       |            | OF          |              |
|             |       |            | PATHOLOGY   |              |
+-------------+-------+------------+-------------+--------------+
 
 
 
+----------+
| Specimen |
+----------+
|          |
+----------+
 
 
 
 
+----------------------+------------------------+--------------------+--------------+
| Performing           | Address                | City/State/Zipcode | Phone Number |
| Organization         |                        |                    |              |
+----------------------+------------------------+--------------------+--------------+
|   [facility] DEPARTMENT OF |   [address]  | [place] [address] |              |
|  PATHOLOGY           | [address]                |                    |              |
+----------------------+------------------------+--------------------+--------------+
|   [facility] |   [address]  | [place] [address] |              |
|  PATHOLOGY           | [place]                |                    |              |
+----------------------+------------------------+--------------------+--------------+
 documented in this encounter
 
 Visit Diagnoses
 Not on filedocumented in this encounter

## 2019-12-06 NOTE — XMS
Encounter Summary
  Created on: 2019
 
 Shane Wyatttien BroussardJosue
 External Reference #: [id number]
 : 43
 Sex: Male
 
 Demographics
 
 
+-----------------------+---------------------------+
| Address               | [address]        |
|                       | [place]  [address] |
+-----------------------+---------------------------+
| Home Phone            | [phone]           |
+-----------------------+---------------------------+
| Preferred Language    | Unknown                   |
+-----------------------+---------------------------+
| Marital Status        |                    |
+-----------------------+---------------------------+
| Hindu Affiliation | 1028                      |
+-----------------------+---------------------------+
| Race                  | Unknown                   |
+-----------------------+---------------------------+
| Ethnic Group          | Unknown                   |
+-----------------------+---------------------------+
 
 
 Author
 
 
+--------------+--------------------------------------------+
| Author       | [facility]  |
|              | [facility] [place]                                |
+--------------+--------------------------------------------+
| Organization | [facility]  |
|              | [facility] [place]                                |
+--------------+--------------------------------------------+
| Address      | Unknown                                    |
+--------------+--------------------------------------------+
| Phone        | Unavailable                                |
+--------------+--------------------------------------------+
 
 
 
 Support
 
 
+---------------+--------------+--------------------+-----------------+
| Name          | Relationship | Address            | Phone           |
+---------------+--------------+--------------------+-----------------+
| Opal Shane | ECON         | [address]     | [phone] |
|               |              | [place]   |                 |
|               |              | [address]              |                 |
+---------------+--------------+--------------------+-----------------+
 
 
 
 
 Care Team Providers
 
 
+-----------------------+------+-----------------+
| Care Team Member Name | Role | Phone           |
+-----------------------+------+-----------------+
| Erwin Iniguez MD | PCP  | [phone] |
+-----------------------+------+-----------------+
 
 
 
 Reason for Visit
 
 
+--------+----------+
| Reason | Comments |
+--------+----------+
| Apnea  |          |
+--------+----------+
 
 
 
 Encounter Details
 
 
+--------+---------+----------------------+----------------------+----------------------+
| Date   | Type    | Department           | Care Team            | Description          |
+--------+---------+----------------------+----------------------+----------------------+
| / | Office  |   [facility]      |   Sohail Campbell PA  | JOANN on CPAP (Primary |
|    | Visit   | SLEEP DISORDER  [address]  |  [address]     |  Dx); [address]  |
|        |         | [address]      | [place]      | respiration          |
|        |         | [place] [address] | [address]  [phone]  |                      |
|        |         |   [phone]       |  [phone] (Fax)  |                      |
+--------+---------+----------------------+----------------------+----------------------+
 
 
 
 Social History
 
 
+---------------+------------+-----------+--------+------------------+
| Tobacco Use   | Types      | Packs/Day | Years  | Date             |
|               |            |           | Used   |                  |
+---------------+------------+-----------+--------+------------------+
| Former Smoker | Cigarettes | 1.3       | 34     | Quit: 1996 |
+---------------+------------+-----------+--------+------------------+
 
 
 
+---------------------+---+---+---+
| Smokeless Tobacco:  |   |   |   |
| Never Used          |   |   |   |
+---------------------+---+---+---+
 
 
 
+-------------+-------------+---------+----------+
| Alcohol Use | Drinks/Week | oz/Week | Comments |
+-------------+-------------+---------+----------+
 
| No          |             |         |          |
+-------------+-------------+---------+----------+
 
 
 
+------------------+---------------+
| Sex Assigned at  | Date Recorded |
| Birth            |               |
+------------------+---------------+
| Not on file      |               |
+------------------+---------------+
 
 
 
+----------------+-------------+-------------+
| Job Start Date | Occupation  | Industry    |
+----------------+-------------+-------------+
| Not on file    | Not on file | Not on file |
+----------------+-------------+-------------+
 
 
 
+----------------+--------------+------------+
| Travel History | Travel Start | Travel End |
+----------------+--------------+------------+
 
 
 
+-------------------------------------+
| No recent travel history available. |
+-------------------------------------+
 documented as of this encounter
 
 Last Filed Vital Signs
 
 
+-------------------+----------------------+----------------------+----------+
| Vital Sign        | Reading              | Time Taken           | Comments |
+-------------------+----------------------+----------------------+----------+
| Blood Pressure    | 102/58               | 2013 10:15 AM  |          |
|                   |                      | PST                  |          |
+-------------------+----------------------+----------------------+----------+
| Pulse             | 78                   | 2013 10:15 AM  |          |
|                   |                      | PST                  |          |
+-------------------+----------------------+----------------------+----------+
| Temperature       | -                    | -                    |          |
+-------------------+----------------------+----------------------+----------+
| Respiratory Rate  | 16                   | 2013 10:15 AM  |          |
|                   |                      | PST                  |          |
+-------------------+----------------------+----------------------+----------+
| Oxygen Saturation | -                    | -                    |          |
+-------------------+----------------------+----------------------+----------+
| Inhaled Oxygen    | -                    | -                    |          |
| Concentration     |                      |                      |          |
+-------------------+----------------------+----------------------+----------+
| Weight            | 86.1 kg (189 lb 14.4 | 2013 10:15 AM  |          |
|                   |  oz)                 | PST                  |          |
+-------------------+----------------------+----------------------+----------+
| Height            | -                    | -                    |          |
+-------------------+----------------------+----------------------+----------+
 
| Body Mass Index   | 30.65                | 2013  1:28 PM  |          |
|                   |                      | PDT                  |          |
+-------------------+----------------------+----------------------+----------+
 documented in this encounter
 
 Progress Notes
 Sohail Campbell PA - 2013 10:09 AM PSTFormatting of this note might be different from 
the original.
 Subjective: 
  
 Patient ID: Wyatt Shane is a 68 y.o. male.
 
 HPI
 
 last office visit was:  2013
 date of polysomnography: 10/07/2013 
 AHI: 77.4
 RDI: 77.4
 O2%: 86% with 15.8 minutes below 88% 
 Machine type: Respironics ASV BiPAP with nasal mask (Aditi)
 obtained from:  [facility]
 pressure: EPAP = 4cm;PSmin=0cm;PSmax=25cm;backup rate=auto
 Nights using BiPAP: 36/42
 average usage (all nights):  3:40
 average usage (nights used):   4:17
 AHI: 4.2
 
 Wyatt comes in for BiPAP compliance.  He continues to wear his BiPAP on a regular basis.  He
 had struggled with adjusting to the fluctuating pressure of the BiPAP, but was doing well f
or about two weeks until he got a severe cold.  There were many nights that he wasn't able t
o breathe through his nose and he had to take off his mask.  He is starting to feel better.
 
 I have discussed the download in detail.  This shows that his sleep apnea is controlled, wi
th an AHI of 4.2.  It also shows that his leaks are well controlled. 
 
 Review of Systems
 
 Objective: 
 Physical Exam
 
 Assessment: 
 
 Problem #1: OBSTRUCTIVE SLEEP APNEA (ICD 9-327.23)
 This is controlled with BiPAP.  His compliance was going well until he got a cold and has s
truggled with breathing through his nose.
 
 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 
 This is controlled with ASV BiPAP.
 
 Plan: 
 
 He is to continue with his BiPAP indefinitely.  I recommended that he increase his humidity
 to help clear his congestion.  If this is not helpful, he should consider using a full face
 mask.
 
 I will follow up again in 1 month, sooner prn.  Fifteen minutes were spent face-to-face, wi
th the majority of time spent in counseling.
 
 Sohail Campbell PA-C
 
 
 cc: 
 Dr. Erwin Foote
 
 Electronically signed by ROWENA Greene at 2013 11:02 AM PSTdocumented in this enco
unter
 
 Plan of Treatment
 
 
+--------+---------+-------------+----------------------+-------------+
| Date   | Type    | Specialty   | Care Team            | Description |
+--------+---------+-------------+----------------------+-------------+
| / | Office  | Pulmonology |   Juan F,          |             |
|    | Visit   |             | Jessica Cheung,   |             |
|        |         |             | MD  [address] |             |
|        |         |             |   [place],   |             |
|        |         |             | [place] [address]             |             |
|        |         |             | [phone]         |             |
|        |         |             | [phone] (Fax)   |             |
+--------+---------+-------------+----------------------+-------------+
| / | Office  | Cardiology  |   Mable Eric, |             |
|    | Visit   |             |  MD  [address]   |             |
|        |         |             | [place]  |             |
|        |         |             | [address]  [phone]  |             |
|        |         |             |  [phone] (Fax)  |             |
+--------+---------+-------------+----------------------+-------------+
 documented as of this encounter
 
 Visit Diagnoses
 
 
+----------------------------------------------------------------------+
| Diagnosis                                                            |
+----------------------------------------------------------------------+
|   JOANN on CPAP - Primary  Obstructive sleep apnea (adult) (pediatric) |
+----------------------------------------------------------------------+
|   [facility] respiration                                          |
+----------------------------------------------------------------------+
 documented in this encounter

## 2019-12-06 NOTE — XMS
Encounter Summary
  Created on: 2019
 
 Wyatt Shane
 External Reference #: [id number]
 : 43
 Sex: Male
 
 Demographics
 
 
+-----------------------+------------------------+
| Address               | [address]     |
|                       | [place]  [address]   |
+-----------------------+------------------------+
| Home Phone            | [phone]        |
+-----------------------+------------------------+
| Preferred Language    | Unknown                |
+-----------------------+------------------------+
| Marital Status        |                 |
+-----------------------+------------------------+
| Worship Affiliation | LUT                    |
+-----------------------+------------------------+
| Race                  | White                  |
+-----------------------+------------------------+
| Ethnic Group          | Not  or  |
+-----------------------+------------------------+
 
 
 Author
 
 
+--------------+------------------------------+
| Author       | [facility] |
+--------------+------------------------------+
| Organization | [facility] |
+--------------+------------------------------+
| Address      | Unknown                      |
+--------------+------------------------------+
| Phone        | Unavailable                  |
+--------------+------------------------------+
 
 
 
 Support
 
 
+---------------+--------------+---------+-----------------+
| Name          | Relationship | Address | Phone           |
+---------------+--------------+---------+-----------------+
| Opal Shane | ECON         | Unknown | [phone] |
+---------------+--------------+---------+-----------------+
 
 
 
 Care Team Providers
 
 
 
+-----------------------+------+-------------+
| Care Team Member Name | Role | Phone       |
+-----------------------+------+-------------+
 PCP  | Unavailable |
+-----------------------+------+-------------+
 
 
 
 Encounter Details
 
 
+--------+----------+----------------------+--------------------+-------------+
| Date   | Type     | Department           | Care Team          | Description |
+--------+----------+----------------------+--------------------+-------------+
| 10/27/ | Results  |   Otolaryngology     |   Jimmy Esposito MD |             |
|    | Only     | Head and Neck        |                    |             |
|        |          | Surgery Services at  |                    |             |
|        |          | PPV  [address] [facility]     |                    |             |
|        |          | [address]       |                    |             |
|        |          | Mailcode: [address]       |                    |             |
|        |          | [address] |                    |             |
|        |          |   [place], [place]       |                    |             |
|        |          | [id number]           |                    |             |
|        |          | [phone]         |                    |             |
+--------+----------+----------------------+--------------------+-------------+
 
 
 
 Social History
 
 
+----------------+-------+-----------+--------+------+
| Tobacco Use    | Types | Packs/Day | Years  | Date |
|                |       |           | Used   |      |
+----------------+-------+-----------+--------+------+
| Never Assessed |       |           |        |      |
+----------------+-------+-----------+--------+------+
 
 
 
+------------------+---------------+
| Sex Assigned at  | Date Recorded |
| Birth            |               |
+------------------+---------------+
| Not on file      |               |
+------------------+---------------+
 
 
 
+----------------+-------------+-------------+
| Job Start Date | Occupation  | Industry    |
+----------------+-------------+-------------+
| Not on file    | Not on file | Not on file |
+----------------+-------------+-------------+
 
 
 
+----------------+--------------+------------+
| Travel History | Travel Start | Travel End |
+----------------+--------------+------------+
 
 
 
 
+-------------------------------------+
| No recent travel history available. |
+-------------------------------------+
 documented as of this encounter
 
 Plan of Treatment
 Not on filedocumented as of this encounter
 
 Procedures
 
 
+--------------------+--------+-------------+----------------------+----------------------+
| Procedure Name     | Priori | Date/Time   | Associated Diagnosis | Comments             |
|                    | ty     |             |                      |                      |
+--------------------+--------+-------------+----------------------+----------------------+
| SURGICAL PATHOLOGY | Routin | 10/27/1999  |                      |   Results for this   |
|                    | e      |             |                      | procedure are in the |
|                    |        |             |                      |  results section.    |
+--------------------+--------+-------------+----------------------+----------------------+
| COA MASTER PANEL  | Routin | 10/26/1999  |                      |   Results for this   |
|                    | e      |  1:30 PM    |                      | procedure are in the |
|                    |        | PDT         |                      |  results section.    |
+--------------------+--------+-------------+----------------------+----------------------+
| INR                | Routin | 10/26/1999  |                      |   Results for this   |
|                    | e      |  1:30 PM    |                      | procedure are in the |
|                    |        | PDT         |                      |  results section.    |
+--------------------+--------+-------------+----------------------+----------------------+
| APTT (ACT. PART.   | Routin | 10/26/1999  |                      |   Results for this   |
| THROMBO TIME)      | e      |  1:30 PM    |                      | procedure are in the |
|                    |        | PDT         |                      |  results section.    |
+--------------------+--------+-------------+----------------------+----------------------+
 documented in this encounter
 
 Results
 SURGICAL PATHOLOGY (10/27/1999)
 
+-----------+--------------------------+-----------+-------------+--------------+
| Component | Value                    | Ref Range | Performed   | Pathologist  |
|           |                          |           | At          | Signature    |
+-----------+--------------------------+-----------+-------------+--------------+
| SURGICAL  | SOURCE OF SPECIMEN:1     |           | [facility]        |              |
| PATHOLOGY | Posterior commisure      |           | DEPARTMENT  |              |
|           | Final Pathologic         |           | OF          |              |
|           | Diagnosis:Larynx         |           | PATHOLOGY   |              |
|           | (posterior commissure),  |           |             |              |
|           | mucosal biopsy:       -  |           |             |              |
|           | Atypia, probably         |           |             |              |
|           | reactive (see Comment)   |           |             |              |
|           | Comment: The epithelium  |           |             |              |
|           | shows                    |           |             |              |
|           | pseudoepitheliomatous    |           |             |              |
|           | hyperplasia and          |           |             |              |
|           | ismarkedly inflamed.     |           |             |              |
|           | There is prominent       |           |             |              |
|           | atypia of fibroblasts in |           |             |              |
|           |  theunderlying stroma.   |           |             |              |
|           | No definite invasion is  |           |             |              |
 
|           | seen. These probably     |           |             |              |
|           | representchanges related |           |             |              |
|           |  to previous             |           |             |              |
|           | irradiation, however     |           |             |              |
|           | clinical follow-up       |           |             |              |
|           | isrecommended. Clinical  |           |             |              |
|           | History:Posterior        |           |             |              |
|           | glottic lesion, Status   |           |             |              |
|           | post right vertical      |           |             |              |
|           | hemilaryngectomy,Decembe |           |             |              |
|           | r  ([id number]).      |           |             |              |
|           | Gross                    |           |             |              |
|           | Description:Received in  |           |             |              |
|           | formalin, labeled        |           |             |              |
|           | "posterior commissure",  |           |             |              |
|           | is a 1.0 x 0.5 x 0.4cm   |           |             |              |
|           | irregular piece of       |           |             |              |
|           | rubbery tan tissue.      |           |             |              |
|           | Cassette Index:One       |           |             |              |
|           | cassette,                |           |             |              |
|           | AS.JK:DT:TH:tdRendering  |           |             |              |
|           | Diagnostician:   DERICK      |           |             |              |
|           | Marta                   |           |             |              |
|           | M.D.PathologistElectroni |           |             |              |
|           | larry Signed             |           |             |              |
|           | 1999Comment:       |           |             |              |
|           | SOURCE OF SPECIMEN:      |           |             |              |
|           | Posterior commisure      |           |             |              |
+-----------+--------------------------+-----------+-------------+--------------+
 
 
 
+----------+
| Specimen |
+----------+
|          |
+----------+
 
 
 
+----------------------+------------------------+--------------------+--------------+
| Performing           | Address                | City/State/Zipcode | Phone Number |
| Organization         |                        |                    |              |
+----------------------+------------------------+--------------------+--------------+
|   [facility] |   [address]  | [place] [address] |              |
|  PATHOLOGY           | [address]                |                    |              |
+----------------------+------------------------+--------------------+--------------+
|   [facility] DEPARTMENT  |   [address]  | [place] [address] |              |
|  PATHOLOGY           | [address]                |                    |              |
+----------------------+------------------------+--------------------+--------------+
 PROTHROMBIN TIME (10/26/1999  1:30 PM PDT)
 
+-----------+--------------------------+-----------------+-------------+--------------+
| Component | Value                    | Ref Range       | Performed   | Pathologist  |
|           |                          |                 | At          | Signature    |
+-----------+--------------------------+-----------------+-------------+--------------+
| INR       | 0.88 (L)Comment:         | 0.98 - 1.08 INR | [facility]        |              |
|           |    PT INR Therapeutic    |                 | DEPARTMENT  |              |
|           | ranges for full          |                 | OF          |              |
|           | anticoagulation:         |                 | PATHOLOGY   |              |
 
|           |            INR for       |                 |             |              |
|           | Venous Thromboembolism   |                 |             |              |
|           |                          |                 |             |              |
|           | (2.0-3.0)INR             |                 |             |              |
|           |       INR for most       |                 |             |              |
|           | patients with mech.      |                 |             |              |
|           | valves      (2.5-3.5)INR |                 |             |              |
+-----------+--------------------------+-----------------+-------------+--------------+
 
 
 
+----------+
| Specimen |
+----------+
|          |
+----------+
 
 
 
+----------------------+------------------------+--------------------+--------------+
| Performing           | Address                | City/State/Zipcode | Phone Number |
| Organization         |                        |                    |              |
+----------------------+------------------------+--------------------+--------------+
|   [facility] |   [address]  | [place] [address] |              |
|  PATHOLOGY           | [address]                |                    |              |
+----------------------+------------------------+--------------------+--------------+
|   [facility] DEPARTMENT  |   [address]  | [place] [address] |              |
|  PATHOLOGY           | [address]                |                    |              |
+----------------------+------------------------+--------------------+--------------+
 APTT (ACT. PART. THROMBO TIME) (10/26/1999  1:30 PM PDT)
 
+-----------+--------------------------+--------------+-------------+--------------+
| Component | Value                    | Ref Range    | Performed   | Pathologist  |
|           |                          |              | At          | Signature    |
+-----------+--------------------------+--------------+-------------+--------------+
| APTT      | 31.5Comment:             | 27.0 - 35.0  | [facility]        |              |
|           | APTT Therapeutic Range   | seconds      | DEPARTMENT  |              |
|           |                          |              | OF          |              |
|           |                          |              | PATHOLOGY   |              |
|           |   ()sec            |              |             |              |
|           |         Heparin levels   |              |             |              |
|           | of 0.35-0.7 U/mL         |              |             |              |
+-----------+--------------------------+--------------+-------------+--------------+
 
 
 
+----------+
| Specimen |
+----------+
|          |
+----------+
 
 
 
+----------------------+------------------------+--------------------+--------------+
| Performing           | Address                | City/State/Zipcode | Phone Number |
| Organization         |                        |                    |              |
+----------------------+------------------------+--------------------+--------------+
|   [facility] DEPARTMENT OF |   [address]  | [place] [address] |              |
|  PATHOLOGY           | [address]                |                    |              |
 
+----------------------+------------------------+--------------------+--------------+
|   [facility] DEPARTMENT OF |   [address]  | [place] [address] |              |
|  PATHOLOGY           | [address]                |                    |              |
+----------------------+------------------------+--------------------+--------------+
 COA MASTER PANEL (10/26/1999  1:30 PM PDT)
 
+-----------+--------------------------+-----------------+-------------+--------------+
| Component | Value                    | Ref Range       | Performed   | Pathologist  |
|           |                          |                 | At          | Signature    |
+-----------+--------------------------+-----------------+-------------+--------------+
| INR       | 0.88 (L)Comment:         | 0.98 - 1.08 INR | [facility]        |              |
|           |    PT INR Therapeutic    |                 | DEPARTMENT  |              |
|           | ranges for full          |                 | OF          |              |
|           | anticoagulation:         |                 | PATHOLOGY   |              |
|           |            INR for       |                 |             |              |
|           | Venous Thromboembolism   |                 |             |              |
|           |                          |                 |             |              |
|           | (2.0-3.0)INR             |                 |             |              |
|           |       INR for most       |                 |             |              |
|           | patients with mech.      |                 |             |              |
|           | valves      (2.5-3.5)INR |                 |             |              |
+-----------+--------------------------+-----------------+-------------+--------------+
| APTT      | 31.5Comment:             | 27.0 - 35.0     | [facility]        |              |
|           | APTT Therapeutic Range   | seconds         | DEPARTMENT  |              |
|           |                          |                 | OF          |              |
|           |                          |                 | PATHOLOGY   |              |
|           |   ()sec            |                 |             |              |
|           |         Heparin levels   |                 |             |              |
|           | of 0.35-0.7 U/mL         |                 |             |              |
+-----------+--------------------------+-----------------+-------------+--------------+
 
 
 
+----------+
| Specimen |
+----------+
|          |
+----------+
 
 
 
+----------------------+------------------------+--------------------+--------------+
| Performing           | Address                | City/State/Zipcode | Phone Number |
| Organization         |                        |                    |              |
+----------------------+------------------------+--------------------+--------------+
|   [facility] DEPARTMENT OF |   [address]  | [place] [address] |              |
|  PATHOLOGY           | [address]                |                    |              |
+----------------------+------------------------+--------------------+--------------+
|   [facility] DEPARTMENT OF |   [address]  | [place] [address] |              |
|  PATHOLOGY           | [address]                |                    |              |
+----------------------+------------------------+--------------------+--------------+
 documented in this encounter
 
 Visit Diagnoses
 Not on filedocumented in this encounter

## 2019-12-06 NOTE — XMS
Encounter Summary
  Created on: 2019
 
 Shane Wyatttien BroussardJosue
 External Reference #: [id number]
 : 43
 Sex: Male
 
 Demographics
 
 
+-----------------------+---------------------------+
| Address               | [address]        |
|                       | [place]  [address] |
+-----------------------+---------------------------+
| Home Phone            | [phone]           |
+-----------------------+---------------------------+
| Preferred Language    | Unknown                   |
+-----------------------+---------------------------+
| Marital Status        |                    |
+-----------------------+---------------------------+
| Jain Affiliation | [id number]                      |
+-----------------------+---------------------------+
| Race                  | Unknown                   |
+-----------------------+---------------------------+
| Ethnic Group          | Unknown                   |
+-----------------------+---------------------------+
 
 
 Author
 
 
+--------------+--------------------------------------------+
| Author       | [facility]  |
|              | [facility] [place]                                |
+--------------+--------------------------------------------+
| Organization | [facility]  |
|              | and [place]                                |
+--------------+--------------------------------------------+
| Address      | Unknown                                    |
+--------------+--------------------------------------------+
| Phone        | Unavailable                                |
+--------------+--------------------------------------------+
 
 
 
 Support
 
 
+---------------+--------------+--------------------+-----------------+
| Name          | Relationship | Address            | Phone           |
+---------------+--------------+--------------------+-----------------+
| Opal Shane | ECON         | [address]     | [phone] |
|               |              | [place]   |                 |
|               |              | [address]              |                 |
+---------------+--------------+--------------------+-----------------+
 
 
 
 
 Care Team Providers
 
 
+-----------------------+------+-----------------+
| Care Team Member Name | Role | Phone           |
+-----------------------+------+-----------------+
| Erwin Iniguez MD | PCP  | [phone] |
+-----------------------+------+-----------------+
 
 
 
 Encounter Details
 
 
+--------+-----------+----------------------+--------------------+-------------+
| Date   | Type      | Department           | Care Team          | Description |
+--------+-----------+----------------------+--------------------+-------------+
| 04/03/ | Hospital  |   [facility] GENERIC IP     |   Conversion       | Pain        |
| 2018   | Encounter | CONVERSION DEP  [address]  | Transaction,       |             |
|        |           | [address]           | Provider Unknown   |             |
|        |           | [place], [place]         | [phone]       |             |
|        |           | [address]           | [phone] (Fax) |             |
|        |           | [phone]         |                    |             |
+--------+-----------+----------------------+--------------------+-------------+
 
 
 
 Social History
 
 
+---------------+------------+-----------+--------+------------------+
| Tobacco Use   | Types      | Packs/Day | Years  | Date             |
|               |            |           | Used   |                  |
+---------------+------------+-----------+--------+------------------+
| Former Smoker | Cigarettes | 1.3       | 34     | Quit: 1996 |
+---------------+------------+-----------+--------+------------------+
 
 
 
+---------------------+---+---+---+
| Smokeless Tobacco:  |   |   |   |
| Never Used          |   |   |   |
+---------------------+---+---+---+
 
 
 
+-------------+-------------+---------+----------+
| Alcohol Use | Drinks/Week | oz/Week | Comments |
+-------------+-------------+---------+----------+
| No          |             |         |          |
+-------------+-------------+---------+----------+
 
 
 
+------------------+---------------+
| Sex Assigned at  | Date Recorded |
| Birth            |               |
+------------------+---------------+
| Not on file      |               |
 
+------------------+---------------+
 
 
 
+----------------+-------------+-------------+
| Job Start Date | Occupation  | Industry    |
+----------------+-------------+-------------+
| Not on file    | Not on file | Not on file |
+----------------+-------------+-------------+
 
 
 
+----------------+--------------+------------+
| Travel History | Travel Start | Travel End |
+----------------+--------------+------------+
 
 
 
+-------------------------------------+
| No recent travel history available. |
+-------------------------------------+
 documented as of this encounter
 
 Medications at Time of Discharge
 
 
+----------------------+----------------------+-----------+---------+----------+-----------+
| Medication           | Sig                  | Dispensed | Refills | Start    | End Date  |
|                      |                      |           |         | Date     |           |
+----------------------+----------------------+-----------+---------+----------+-----------+
|   acyclovir          | Apply  topically     |           | 0       |          |           |
| (ZOVIRAX) 5%         | every 3 hours.       |           |         |          |           |
| ointment             |                      |           |         |          |           |
+----------------------+----------------------+-----------+---------+----------+-----------+
|   albuterol (PROAIR  | Inhale 2 puffs into  |           | 0       |          |           |
| HFA) 90 mcg/puff     | the lungs every 6    |           |         |          |           |
| inhaler              | hours as needed for  |           |         |          |           |
|                      | Wheezing.            |           |         |          |           |
+----------------------+----------------------+-----------+---------+----------+-----------+
|   digoxin (LANOXIN)  | Take 125 mcg by      |           | 0       |          |           |
| 250 mcg tablet       | mouth Daily.      |           |         |          |           |
|                      | capsule daily        |           |         |          |           |
+----------------------+----------------------+-----------+---------+----------+-----------+
|   ferrous sulfate    | Take 325 mg by mouth |           | 0       | 20 |           |
| 325 mg tablet        |  3 times daily.      |           |         | 12       |           |
+----------------------+----------------------+-----------+---------+----------+-----------+
|   fluticasone        | one spray in each    |           | 0       | 20 |           |
| (FLONASE) 50         | nostril daily as     |           |         | 12       |           |
| mcg/nasal spray      | needed               |           |         |          |           |
+----------------------+----------------------+-----------+---------+----------+-----------+
|                      | Inhale 1 puff into   |           | 0       |          |           |
| fluticasone-salmeter | the lungs Twice      |           |         |          |           |
| ol (ADVAIR) 500-50   | Daily.               |           |         |          |           |
| mcg/puff diskus      |                      |           |         |          |           |
| inhaler              |                      |           |         |          |           |
+----------------------+----------------------+-----------+---------+----------+-----------+
|   folic acid 1 mg    | Take 1 mg by mouth   |           | 0       |          |           |
| tablet               | Daily.               |           |         |          |           |
+----------------------+----------------------+-----------+---------+----------+-----------+
|   furosemide (LASIX) | Take 40 mg by mouth  |           | 0       |          |           |
 
|  40 mg tablet        | Daily.               |           |         |          |           |
+----------------------+----------------------+-----------+---------+----------+-----------+
|   guaiFENesin        | Take 1,200 mg by     |           | 0       |          |           |
| (MUCINEX) 600 mg 12  | mouth 2 times daily. |           |         |          |           |
| hr tablet            |                      |           |         |          |           |
+----------------------+----------------------+-----------+---------+----------+-----------+
|   levothyroxine      | Take 75 mcg by mouth |           | 0       | 20 |           |
| (LEVOTHROID) 75 MCG  |  Daily.              |           |         | 12       |           |
| tablet               |                      |           |         |          |           |
+----------------------+----------------------+-----------+---------+----------+-----------+
|   metoclopramide     | Take 10 mg by mouth  |           | 0       | 20 |           |
| (REGLAN) 10 mg       | 4 times daily as     |           |         | 12       |           |
| tablet               | needed.              |           |         |          |           |
+----------------------+----------------------+-----------+---------+----------+-----------+
|                      | Apply  topically 2   |           | 0       |          |           |
| nystatin-triamcinolo | times daily.         |           |         |          |           |
| ne (MYCOLOG II)      |                      |           |         |          |           |
| cream                |                      |           |         |          |           |
+----------------------+----------------------+-----------+---------+----------+-----------+
|   omeprazole         | Take 20 mg by mouth  |           | 0       | 20 |           |
| (PRILOSEC) 20 mg     | 2 times daily.       |           |         | 12       |           |
| capsule              |                      |           |         |          |           |
+----------------------+----------------------+-----------+---------+----------+-----------+
|   potassium citrate  | Take 10 mEq by mouth |           | 0       |          |           |
| (UROCIT-K) 10 mEq SR |  2 times daily.      |           |         |          |           |
|  tablet              |                      |           |         |          |           |
+----------------------+----------------------+-----------+---------+----------+-----------+
|   predniSONE         | Take 10 mg by mouth  |           | 0       |          |           |
| (DELTASONE) 5 mg     | Daily.               |           |         |          |           |
| tablet               |                      |           |         |          |           |
+----------------------+----------------------+-----------+---------+----------+-----------+
|   tamsulosin         | Take 0.4 mg by mouth |           | 0       | 20 |           |
| (FLOMAX) 0.4 mg CAPS |  2 times daily.      |           |         | 12       |           |
+----------------------+----------------------+-----------+---------+----------+-----------+
|   acyclovir          | Take 400 mg by mouth |           | 0       | 20 |  |
| (ZOVIRAX) 400 MG     |  3 times daily as    |           |         | 12       | 9         |
| tablet               | needed.              |           |         |          |           |
+----------------------+----------------------+-----------+---------+----------+-----------+
|   Cholecalciferol    | Take 2,000 Units by  |           | 0       | 20 |  |
| (VITAMIN D3) 2000    | mouth Daily.         |           |         | 12       | 9         |
| UNITS CAPS           |                      |           |         |          |           |
+----------------------+----------------------+-----------+---------+----------+-----------+
|   cyanocobalamin     | injected             |           | 0       | 20 |  |
| (VITAMIN B-12) 1,000 | intramuscularly once |           |         | 12       | 9         |
|  mcg/mL injection    |  a month             |           |         |          |           |
+----------------------+----------------------+-----------+---------+----------+-----------+
|   diltiazem          | Take 120 mg by mouth |           | 0       |          |  |
| (DILT-XR) 120 mg 24  |  Daily.              |           |         |          | 9         |
| hr capsule           |                      |           |         |          |           |
+----------------------+----------------------+-----------+---------+----------+-----------+
|   loratadine         | Take 10 mg by mouth  |           | 0       |          |  |
| (CLARITIN) 10 mg     | Daily.               |           |         |          | 9         |
| tablet               |                      |           |         |          |           |
+----------------------+----------------------+-----------+---------+----------+-----------+
|   losartan (COZAAR)  | Take 100 mg by mouth |           | 0       |          |  |
| 100 MG tablet        |  Daily. 1/2 daily    |           |         |          | 9         |
+----------------------+----------------------+-----------+---------+----------+-----------+
|   methotrexate 2.5   | Take 15 mg by mouth  |           | 0       |          |  |
| mg tablet            | Once a week.         |           |         |          | 9         |
+----------------------+----------------------+-----------+---------+----------+-----------+
 
|                      | Take 1 tablet by     |           | 0       |          |  |
| oxyCODONE-acetaminop | mouth every 4 hours  |           |         |          | 9         |
| hen (PERCOCET) 5-325 | as needed for Pain.  |           |         |          |           |
|  mg per tablet       |                      |           |         |          |           |
+----------------------+----------------------+-----------+---------+----------+-----------+
|   pseudoePHEDrine    | Take 30 mg by mouth  |           | 0       |          |  |
| (SUDOGEST) 30 mg     | every 4 hours as     |           |         |          | 9         |
| tablet               | needed for           |           |         |          |           |
|                      | Congestion.          |           |         |          |           |
+----------------------+----------------------+-----------+---------+----------+-----------+
|   rivaroxaban        | Take 20 mg by mouth  |           | 0       |          |  |
| (XARELTO) 20 mg      | Daily (with dinner). |           |         |          | 9         |
| tablet               |                      |           |         |          |           |
+----------------------+----------------------+-----------+---------+----------+-----------+
|   sertraline         | 2 tablets daily      |           | 0       | 20 |  |
| (ZOLOFT) 100 mg      |                      |           |         | 12       | 9         |
| tablet               |                      |           |         |          |           |
+----------------------+----------------------+-----------+---------+----------+-----------+
 documented as of this encounter
 
 Plan of Treatment
 
 
+--------+---------+-------------+----------------------+-------------+
| Date   | Type    | Specialty   | Care Team            | Description |
+--------+---------+-------------+----------------------+-------------+
| / | Office  | Pulmonology |   [facility],          |             |
|    | Visit   |             | Jessica Cheung,   |             |
|        |         |             | MD [address] |             |
|        |         |             |   [place],   |             |
|        |         |             | [place] [address]             |             |
|        |         |             | [phone]         |             |
|        |         |             | [phone] (Fax)   |             |
+--------+---------+-------------+----------------------+-------------+
| / | Office  | Cardiology  |   [place], [place], |             |
| 2020   | Visit   |             |  MD  [address]   |             |
|        |         |             | [place]  [place]  |             |
|        |         |             | [address]  [phone]  |             |
|        |         |             |  [phone] (Fax)  |             |
+--------+---------+-------------+----------------------+-------------+
 documented as of this encounter
 
 Procedures
 
 
+--------------------+--------+-------------+----------------------+----------------------+
| Procedure Name     | Priori | Date/Time   | Associated Diagnosis | Comments             |
|                    | ty     |             |                      |                      |
+--------------------+--------+-------------+----------------------+----------------------+
| US HEAD NECK SOFT  | Routin | 12/10/2010  |                      |   Results for this   |
| TISSUE             | e      |  5:13 AM    |                      | procedure are in the |
|                    |        | PST         |                      |  results section.    |
+--------------------+--------+-------------+----------------------+----------------------+
 documented in this encounter
 
 Results
 US Head Neck Soft Tissue (12/10/2010  5:13 AM PST)
 
+----------+
| Specimen |
 
+----------+
|          |
+----------+
 
 
 
+------------------------------------------------------------------------+--------------+
| Narrative                                                              | Performed At |
+------------------------------------------------------------------------+--------------+
|   This is a non-reportable procedure without a radiologist report and  |              |
| is  used for image storage only                                        |              |
+------------------------------------------------------------------------+--------------+
 
 
 
+--------------------------------------------------------------------------------------+
| Procedure Note                                                                       |
+--------------------------------------------------------------------------------------+
|   Andrzej Steven Irvin - 2019  4:21 AM PDT  This is a non-reportable procedure  |
| without a radiologist report and isused for image storage only                       |
+--------------------------------------------------------------------------------------+
 documented in this encounter
 
 Visit Diagnoses
 
 
+--------------------------+
| Diagnosis                |
+--------------------------+
|   Pain  Generalized pain |
+--------------------------+
 documented in this encounter

## 2019-12-06 NOTE — XMS
Encounter Summary
  Created on: 2019
 
 Wyatt Shane
 External Reference #: [id number]
 : 43
 Sex: Male
 
 Demographics
 
 
+-----------------------+------------------------+
| Address               | [address]     |
|                       | [place]  [address]   |
+-----------------------+------------------------+
| Home Phone            | [phone]        |
+-----------------------+------------------------+
| Preferred Language    | Unknown                |
+-----------------------+------------------------+
| Marital Status        |                 |
+-----------------------+------------------------+
| Orthodoxy Affiliation | LUT                    |
+-----------------------+------------------------+
| Race                  | White                  |
+-----------------------+------------------------+
| Ethnic Group          | Not  or  |
+-----------------------+------------------------+
 
 
 Author
 
 
+--------------+------------------------------+
| Author       | [facility] |
+--------------+------------------------------+
| Organization | [facility] |
+--------------+------------------------------+
| Address      | Unknown                      |
+--------------+------------------------------+
| Phone        | Unavailable                  |
+--------------+------------------------------+
 
 
 
 Support
 
 
+---------------+--------------+---------+-----------------+
| Name          | Relationship | Address | Phone           |
+---------------+--------------+---------+-----------------+
| Opal Shane | ECON         | Unknown | [phone] |
+---------------+--------------+---------+-----------------+
 
 
 
 Care Team Providers
 
 
 
+-----------------------+------+-----------------+
| Care Team Member Name | Role | Phone           |
+-----------------------+------+-----------------+
| Erwin Iniguez MD   | PCP  | [phone] |
+-----------------------+------+-----------------+
 
 
 
 Encounter Details
 
 
+--------+-------------+-----------------+---------------------+---------------+
| Date   | Type        | Department      | Care Team           | Description   |
+--------+-------------+-----------------+---------------------+---------------+
| / | Office      |   [facility]  |   Note, Outpatient  | Progress Note |
| 2000   | Visit-Trans | MEDICINE        | Clinic              |               |
|        | cribed      |                 |                     |               |
+--------+-------------+-----------------+---------------------+---------------+
 
 
 
 Social History
 
 
+----------------+-------+-----------+--------+------+
| Tobacco Use    | Types | Packs/Day | Years  | Date |
|                |       |           | Used   |      |
+----------------+-------+-----------+--------+------+
| Never Assessed |       |           |        |      |
+----------------+-------+-----------+--------+------+
 
 
 
+------------------+---------------+
| Sex Assigned at  | Date Recorded |
| Birth            |               |
+------------------+---------------+
| Not on file      |               |
+------------------+---------------+
 
 
 
+----------------+-------------+-------------+
| Job Start Date | Occupation  | Industry    |
+----------------+-------------+-------------+
| Not on file    | Not on file | Not on file |
+----------------+-------------+-------------+
 
 
 
+----------------+--------------+------------+
| Travel History | Travel Start | Travel End |
+----------------+--------------+------------+
 
 
 
+-------------------------------------+
| No recent travel history available. |
+-------------------------------------+
 documented as of this encounter
 
 
 Progress Notes
 Interface, Transcription In - 2006  3:18 AM PSTCLINIC DATE:       2000
 
 OTOLARYNGOLOGY - HEAD AND NECK SURGERY
 
 Mr. Shane is seen back in followup today after largyngotracheoplasty.  He is
 really  doing  pretty  well.  He has  some  stitches  which  have  not  yet
 dissolved and I removed these from his neck as well from his upper chest at
 the  cartilage  graft  site.   Flexible  fiberoptic  laryngoscopy  is  done
 revealing  that the cartilage graft is  still  not  completing  mucosalized
 anteriorly but that the stent is in good  position  and is patent.  It does
 not  appear  to  be  significantly  interfering   with  his  airway  either
 supraglottically or subglottically,  although,  he  does  have  a  moderate
 amount of supraglottic swelling.  I  think  it  part  related  to  both the
 operation  of  the  stent  as well his  radiation.   He  will  continue  on
 antibiotics, and I have refilled a prescription  for  clindamycin.   I will
 see him back here in approximately a month's time.
 
 Jimmy Esposito M.D.
 
 MARIA ALEJANDRA / 
 [id number] / [id number] / [id number] / [id number]
 D: 2000
 T: 2000Electronically signed by Interface, Transcription In at 2006  3:18 AM PS
Tdocumented in this encounter
 
 Plan of Treatment
 Not on filedocumented as of this encounter
 
 Visit Diagnoses
 Not on filedocumented in this encounter

## 2019-12-06 NOTE — XMS
Encounter Summary
  Created on: 2019
 
 Wyatt Shane
 External Reference #: [id number]
 : 43
 Sex: Male
 
 Demographics
 
 
+-----------------------+------------------------+
| Address               | [address]     |
|                       | [place]  [address]   |
+-----------------------+------------------------+
| Home Phone            | [phone]        |
+-----------------------+------------------------+
| Preferred Language    | Unknown                |
+-----------------------+------------------------+
| Marital Status        |                 |
+-----------------------+------------------------+
| Yazidi Affiliation | LUT                    |
+-----------------------+------------------------+
| Race                  | White                  |
+-----------------------+------------------------+
| Ethnic Group          | Not  or  |
+-----------------------+------------------------+
 
 
 Author
 
 
+--------------+------------------------------+
| Author       | [facility] |
+--------------+------------------------------+
| Organization | [facility] |
+--------------+------------------------------+
| Address      | Unknown                      |
+--------------+------------------------------+
| Phone        | Unavailable                  |
+--------------+------------------------------+
 
 
 
 Support
 
 
+---------------+--------------+---------+-----------------+
| Name          | Relationship | Address | Phone           |
+---------------+--------------+---------+-----------------+
| Opal Shane | ECON         | Unknown | [phone] |
+---------------+--------------+---------+-----------------+
 
 
 
 Care Team Providers
 
 
 
+-----------------------+------+-----------------+
| Care Team Member Name | Role | Phone           |
+-----------------------+------+-----------------+
| Erwin Iniguez MD   | PCP  | [phone] |
+-----------------------+------+-----------------+
 
 
 
 Reason for Visit
 
 
+------------+----------+
| Reason     | Comments |
+------------+----------+
| Discussion |          |
+------------+----------+
 
 
 
 Encounter Details
 
 
+--------+-----------+----------------------+----------------------+-------------+
| Date   | Type      | Department           | Care Team            | Description |
+--------+-----------+----------------------+----------------------+-------------+
| / | Telephone |   Otolaryngology     |   Jonathan Cross  | Discussion  |
|    |           | Laryngology Services | MD RUSS  [address]   |             |
|        |           |  at [facility]  [address] | [address]      |             |
|        |           |  [address]     | [place], [place]         |             |
|        |           | Mailcode: PV01       | [id number]           |             |
|        |           | [facility] | [phone]         |             |
|        |           |   [place], [place]       | [phone] (Fax)   |             |
|        |           | [address]           |                      |             |
|        |           | [phone]         |                      |             |
+--------+-----------+----------------------+----------------------+-------------+
 
 
 
 Social History
 
 
+---------------+------------+-----------+--------+------------------+
| Tobacco Use   | Types      | Packs/Day | Years  | Date             |
|               |            |           | Used   |                  |
+---------------+------------+-----------+--------+------------------+
| Former Smoker | Cigarettes | 1         | 30     | Quit: 1996 |
+---------------+------------+-----------+--------+------------------+
 
 
 
+-------------+-------------+---------+----------+
| Alcohol Use | Drinks/Week | oz/Week | Comments |
+-------------+-------------+---------+----------+
| No          |             |         |          |
+-------------+-------------+---------+----------+
 
 
 
+------------------+---------------+
| Sex Assigned at  | Date Recorded |
 
| Birth            |               |
+------------------+---------------+
| Not on file      |               |
+------------------+---------------+
 
 
 
+----------------+-------------+-------------+
| Job Start Date | Occupation  | Industry    |
+----------------+-------------+-------------+
| Not on file    | Not on file | Not on file |
+----------------+-------------+-------------+
 
 
 
+----------------+--------------+------------+
| Travel History | Travel Start | Travel End |
+----------------+--------------+------------+
 
 
 
+-------------------------------------+
| No recent travel history available. |
+-------------------------------------+
 documented as of this encounter
 
 Plan of Treatment
 Not on filedocumented as of this encounter
 
 Visit Diagnoses
 Not on filedocumented in this encounter

## 2019-12-06 NOTE — XMS
Encounter Summary
  Created on: 2019
 
 Wyatt Shane
 External Reference #: [id number]
 : 43
 Sex: Male
 
 Demographics
 
 
+-----------------------+------------------------+
| Address               | [address]     |
|                       | [place]  [address]   |
+-----------------------+------------------------+
| Home Phone            | [phone]        |
+-----------------------+------------------------+
| Preferred Language    | Unknown                |
+-----------------------+------------------------+
| Marital Status        |                 |
+-----------------------+------------------------+
| Jewish Affiliation | LUT                    |
+-----------------------+------------------------+
| Race                  | White                  |
+-----------------------+------------------------+
| Ethnic Group          | Not  or  |
+-----------------------+------------------------+
 
 
 Author
 
 
+--------------+-------------+
| Organization | Unknown     |
+--------------+-------------+
| Address      | Unknown     |
+--------------+-------------+
| Phone        | Unavailable |
+--------------+-------------+
 
 
 
 Support
 
 
+---------------+--------------+---------+-----------------+
| Name          | Relationship | Address | Phone           |
+---------------+--------------+---------+-----------------+
| Opal Shane | ECON         | Unknown | [phone] |
+---------------+--------------+---------+-----------------+
 
 
 
 Care Team Providers
 
 
+-----------------------+------+-----------------+
| Care Team Member Name | Role | Phone           |
 
+-----------------------+------+-----------------+
| Erwin Iniguez MD   | PCP  | [phone] |
+-----------------------+------+-----------------+
 
 
 
 Encounter Details
 
 
+--------+-------------+------------+--------------------+--------------------+
| Date   | Type        | Department | Care Team          | Description        |
+--------+-------------+------------+--------------------+--------------------+
| 10/27/ | Procedure - |            |   Documentation,   | OP REPORT-TEACHING |
|    |             |            | Teaching Physician |                    |
|        | Transcribed |            |                    |                    |
+--------+-------------+------------+--------------------+--------------------+
 
 
 
 Social History
 
 
+----------------+-------+-----------+--------+------+
| Tobacco Use    | Types | Packs/Day | Years  | Date |
|                |       |           | Used   |      |
+----------------+-------+-----------+--------+------+
| Never Assessed |       |           |        |      |
+----------------+-------+-----------+--------+------+
 
 
 
+------------------+---------------+
| Sex Assigned at  | Date Recorded |
| Birth            |               |
+------------------+---------------+
| Not on file      |               |
+------------------+---------------+
 
 
 
+----------------+-------------+-------------+
| Job Start Date | Occupation  | Industry    |
+----------------+-------------+-------------+
| Not on file    | Not on file | Not on file |
+----------------+-------------+-------------+
 
 
 
+----------------+--------------+------------+
| Travel History | Travel Start | Travel End |
+----------------+--------------+------------+
 
 
 
+-------------------------------------+
| No recent travel history available. |
+-------------------------------------+
 documented as of this encounter
 
 Plan of Treatment
 
 Not on filedocumented as of this encounter
 
 Procedures
 
 
+--------------------+--------+------------+----------------------+----------+
| Procedure Name     | Priori | Date/Time  | Associated Diagnosis | Comments |
|                    | ty     |            |                      |          |
+--------------------+--------+------------+----------------------+----------+
| TEACHING PHYSICIAN |        | 10/27/1999 |                      |          |
+--------------------+--------+------------+----------------------+----------+
 documented in this encounter
 
 Visit Diagnoses
 Not on filedocumented in this encounter

## 2019-12-06 NOTE — XMS
Encounter Summary
  Created on: 2019
 
 Shane Wyatttien BroussardJosue
 External Reference #: [id number]
 : 43
 Sex: Male
 
 Demographics
 
 
+-----------------------+---------------------------+
| Address               | [address]        |
|                       | [place]  [address] |
+-----------------------+---------------------------+
| Home Phone            | [phone]           |
+-----------------------+---------------------------+
| Preferred Language    | Unknown                   |
+-----------------------+---------------------------+
| Marital Status        |                    |
+-----------------------+---------------------------+
| Zoroastrianism Affiliation | [id number]                      |
+-----------------------+---------------------------+
| Race                  | Unknown                   |
+-----------------------+---------------------------+
| Ethnic Group          | Unknown                   |
+-----------------------+---------------------------+
 
 
 Author
 
 
+--------------+--------------------------------------------+
| Author       |  [facility]  |
|              | [facility] [place]                                |
+--------------+--------------------------------------------+
| Organization |  [facility]  |
|              | [facility] [place]                                |
+--------------+--------------------------------------------+
| Address      | Unknown                                    |
+--------------+--------------------------------------------+
| Phone        | Unavailable                                |
+--------------+--------------------------------------------+
 
 
 
 Support
 
 
+---------------+--------------+--------------------+-----------------+
| Name          | Relationship | Address            | Phone           |
+---------------+--------------+--------------------+-----------------+
| Opal Shane | ECON         | [address]     | [phone] |
|               |              | [place]   |                 |
|               |              | [address]              |                 |
+---------------+--------------+--------------------+-----------------+
 
 
 
 
 Care Team Providers
 
 
+-----------------------+------+-----------------+
| Care Team Member Name | Role | Phone           |
+-----------------------+------+-----------------+
| Erwin Iniguez MD | PCP  | [phone] |
+-----------------------+------+-----------------+
 
 
 
 Reason for Visit
 
 
+--------+----------+
| Reason | Comments |
+--------+----------+
| Apnea  |          |
+--------+----------+
 
 
 
 Encounter Details
 
 
+--------+---------+----------------------+----------------------+-------------------+
| Date   | Type    | Department           | Care Team            | Description       |
+--------+---------+----------------------+----------------------+-------------------+
| / | Office  |   [facility]      |   Sohail Campbell PA  | JOANN (obstructive  |
|    | Visit   | SLEEP DISORDER  [address]  |  [address]     | sleep apnea)      |
|        |         | [address]      | [facility] [place]      | (Primary Dx);     |
|        |         | [place] [address] | [address]  [phone]  | Cheyne-Gregory     |
|        |         |   [phone]       |  [phone] (Fax)  | respiration       |
+--------+---------+----------------------+----------------------+-------------------+
 
 
 
 Social History
 
 
+---------------+------------+-----------+--------+------------------+
| Tobacco Use   | Types      | Packs/Day | Years  | Date             |
|               |            |           | Used   |                  |
+---------------+------------+-----------+--------+------------------+
| Former Smoker | Cigarettes | 1.3       | 35     | Quit: 1996 |
+---------------+------------+-----------+--------+------------------+
 
 
 
+---------------------+---+---+---+
| Smokeless Tobacco:  |   |   |   |
| Never Used          |   |   |   |
+---------------------+---+---+---+
 
 
 
+-------------+-------------+---------+----------+
| Alcohol Use | Drinks/Week | oz/Week | Comments |
+-------------+-------------+---------+----------+
 
| No          |             |         |          |
+-------------+-------------+---------+----------+
 
 
 
+------------------+---------------+
| Sex Assigned at  | Date Recorded |
| Birth            |               |
+------------------+---------------+
| Not on file      |               |
+------------------+---------------+
 
 
 
+----------------+-------------+-------------+
| Job Start Date | Occupation  | Industry    |
+----------------+-------------+-------------+
| Not on file    | Not on file | Not on file |
+----------------+-------------+-------------+
 
 
 
+----------------+--------------+------------+
| Travel History | Travel Start | Travel End |
+----------------+--------------+------------+
 
 
 
+-------------------------------------+
| No recent travel history available. |
+-------------------------------------+
 documented as of this encounter
 
 Last Filed Vital Signs
 
 
+-------------------+----------------------+----------------------+----------+
| Vital Sign        | Reading              | Time Taken           | Comments |
+-------------------+----------------------+----------------------+----------+
| Blood Pressure    | 110/62               | 2013 10:04 AM  |          |
|                   |                      | PST                  |          |
+-------------------+----------------------+----------------------+----------+
| Pulse             | 70                   | 2013 10:04 AM  |          |
|                   |                      | PST                  |          |
+-------------------+----------------------+----------------------+----------+
| Temperature       | -                    | -                    |          |
+-------------------+----------------------+----------------------+----------+
| Respiratory Rate  | 16                   | 2013 10:04 AM  |          |
|                   |                      | PST                  |          |
+-------------------+----------------------+----------------------+----------+
| Oxygen Saturation | -                    | -                    |          |
+-------------------+----------------------+----------------------+----------+
| Inhaled Oxygen    | -                    | -                    |          |
| Concentration     |                      |                      |          |
+-------------------+----------------------+----------------------+----------+
| Weight            | 87.1 kg (192 lb 1.6  | 2013 10:04 AM  |          |
|                   | oz)                  | PST                  |          |
+-------------------+----------------------+----------------------+----------+
| Height            | -                    | -                    |          |
+-------------------+----------------------+----------------------+----------+
 
| Body Mass Index   | 31.01                | 2013  1:28 PM  |          |
|                   |                      | PDT                  |          |
+-------------------+----------------------+----------------------+----------+
 documented in this encounter
 
 Progress Notes
 Sohail Campbell PA - 2013 10:08 AM PSTFormatting of this note might be different from 
the original.
 Subjective: 
  
 Patient ID: Wyatt Shane is a 67 y.o. male.
 
 HPI
 
 last office visit was:  2013
 date of polysomnography: 10/07/2013 
 AHI: 77.4
 RDI: 77.4
 O2%: 86% with 15.8 minutes below 88% 
 Machine type: Respironics ASV BiPAP with nasal mask (Aditi)
 obtained from:  [facility]
 pressure: EPAP = 4cm;PSmin=0cm;PSmax=25cm;backup rate=auto
 Nights using BiPAP: 
 average usage (all nights): 4:57
 average usage (nights used): 4:57
 AHI: 10.6
 
 Wyatt comes in for BiPAP compliance.  He has used his BiPAP each of the first eight nights. 
 He did very well during the first four nights, but has struggled with a few of the nights s
ramon with getting into a comfortable rhythm with the BiPAP.  He has started the night doing 
fine and is able to fall asleep without difficult, but is waking in the early morning and st
ruggling with his breathing.  He says that he feels like he is working against the BiPAP and
 that it is trying to make him breathe too fast.  This is a common problem for many people t
hat are trying to adjust to the IPAP and EPAP.  It is often difficult to get into a rhythm w
ith the change of the pressures, especially when someone is focusing on their breathing.  As
 he continues using his BiPAP, he will get more comfortable with it.
 
 I have discussed the download in detail.  This shows that his sleep apnea is controlled, wi
th an AHI of 10.1.  This will likely improve as he continues to use the BiPAP.  It also show
s that his leaks are well controlled. 
 
 Review of Systems
 
 Objective: 
 Physical Exam
 
 Assessment: 
 
 Problem #1: OBSTRUCTIVE SLEEP APNEA (ICD 9-327.23)
 This is controlled with BiPAP.  He is doing well with his compliance, but has struggled a l
ittle with getting into a breathing rhythm with the BiPAP pressures.  He is also not sure if
 his current mask is the correct mask for him.
 
 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 
 This is controlled with ASV BiPAP.
 
 Plan: 
 
 He is to continue with his BiPAP indefinitely.  I recommended that he wear his BiPAP while 
watching television to help desensitize him to the pressures.  This should allow him to dereck
 
the more naturally and possibly help him forget about the BiPAP.  I also recommended that he
 go to [facility] to try a different mask.
 
 I will follow up again in 1 month, sooner prn.  Thirty minutes were spent face-to-face, wit
h the majority of time spent in counseling.
 
 Sohail Campbell PA-C
 
 cc: 
 Dr. Erwin Foote
 
 Electronically signed by ROWENA Greene at 2013  1:01 PM PSTdocumented in this enco
unter
 
 Plan of Treatment
 
 
+--------+---------+-------------+----------------------+-------------+
| Date   | Type    | Specialty   | Care Team            | Description |
+--------+---------+-------------+----------------------+-------------+
| / | Office  | Pulmonology |   Juan F,          |             |
|    | Visit   |             | Jessica Cheung,   |             |
|        |         |             | MD [address] |             |
|        |         |             |   [place]   |             |
|        |         |             | [place] [address]             |             |
|        |         |             | [phone]         |             |
|        |         |             | [phone] (Fax)   |             |
+--------+---------+-------------+----------------------+-------------+
| / | Office  | Cardiology  |   Eric Akins, |             |
|    | Visit   |             |  MD [address]   |             |
|        |         |             | [place]  |             |
|        |         |             | [id number] [phone]  |             |
|        |         |             |  [phone] (Fax)  |             |
+--------+---------+-------------+----------------------+-------------+
 documented as of this encounter
 
 Visit Diagnoses
 
 
+----------------------------------------------------------------------------------------+
| Diagnosis                                                                              |
+----------------------------------------------------------------------------------------+
|   JOANN (obstructive sleep apnea) - Primary  Obstructive sleep apnea (adult) (pediatric) |
+----------------------------------------------------------------------------------------+
|   Cheyne-Gregory respiration                                                            |
+----------------------------------------------------------------------------------------+
 documented in this encounter

## 2019-12-06 NOTE — XMS
Encounter Summary
  Created on: 2019
 
 Shane Wyatttien BroussardJosue
 External Reference #: [id number]
 : 43
 Sex: Male
 
 Demographics
 
 
+-----------------------+---------------------------+
| Address               | [address]        |
|                       | [place]  [address] |
+-----------------------+---------------------------+
| Home Phone            | [phone]           |
+-----------------------+---------------------------+
| Preferred Language    | Unknown                   |
+-----------------------+---------------------------+
| Marital Status        |                    |
+-----------------------+---------------------------+
| Shinto Affiliation | 1028                      |
+-----------------------+---------------------------+
| Race                  | Unknown                   |
+-----------------------+---------------------------+
| Ethnic Group          | Unknown                   |
+-----------------------+---------------------------+
 
 
 Author
 
 
+--------------+--------------------------------------------+
| Author       | [facility]  |
|              | and [place]                                |
+--------------+--------------------------------------------+
| Organization | [facility]  |
|              | and [place]                                |
+--------------+--------------------------------------------+
| Address      | Unknown                                    |
+--------------+--------------------------------------------+
| Phone        | Unavailable                                |
+--------------+--------------------------------------------+
 
 
 
 Support
 
 
+---------------+--------------+--------------------+-----------------+
| Name          | Relationship | Address            | Phone           |
+---------------+--------------+--------------------+-----------------+
| Opal Shane | ECON         | [address]     | [phone] |
|               |              | [place]   |                 |
|               |              | [address]              |                 |
+---------------+--------------+--------------------+-----------------+
 
 
 
 
 Care Team Providers
 
 
+-----------------------+------+-----------------+
| Care Team Member Name | Role | Phone           |
+-----------------------+------+-----------------+
| Erwin Iniguez MD | PCP  | [phone] |
+-----------------------+------+-----------------+
 
 
 
 Encounter Details
 
 
+--------+-----------+----------------------+--------------------+-------------+
| Date   | Type      | Department           | Care Team          | Description |
+--------+-----------+----------------------+--------------------+-------------+
| 04/03/ | Hospital  |   [facility] GENERIC IP     |   Conversion       | Pain        |
| 2018   | Encounter | CONVERSION DEP  [address]  | Transaction,       |             |
|        |           | [address]           | Provider Unknown   |             |
|        |           | [place], [place]         | [phone]       |             |
|        |           | [address]           | [phone] (Fax) |             |
|        |           | [phone]         |                    |             |
+--------+-----------+----------------------+--------------------+-------------+
 
 
 
 Social History
 
 
+---------------+------------+-----------+--------+------------------+
| Tobacco Use   | Types      | Packs/Day | Years  | Date             |
|               |            |           | Used   |                  |
+---------------+------------+-----------+--------+------------------+
| Former Smoker | Cigarettes | 1.3       | 34     | Quit: 1996 |
+---------------+------------+-----------+--------+------------------+
 
 
 
+---------------------+---+---+---+
| Smokeless Tobacco:  |   |   |   |
| Never Used          |   |   |   |
+---------------------+---+---+---+
 
 
 
+-------------+-------------+---------+----------+
| Alcohol Use | Drinks/Week | oz/Week | Comments |
+-------------+-------------+---------+----------+
| No          |             |         |          |
+-------------+-------------+---------+----------+
 
 
 
+------------------+---------------+
| Sex Assigned at  | Date Recorded |
| Birth            |               |
+------------------+---------------+
| Not on file      |               |
 
+------------------+---------------+
 
 
 
+----------------+-------------+-------------+
| Job Start Date | Occupation  | Industry    |
+----------------+-------------+-------------+
| Not on file    | Not on file | Not on file |
+----------------+-------------+-------------+
 
 
 
+----------------+--------------+------------+
| Travel History | Travel Start | Travel End |
+----------------+--------------+------------+
 
 
 
+-------------------------------------+
| No recent travel history available. |
+-------------------------------------+
 documented as of this encounter
 
 Medications at Time of Discharge
 
 
+----------------------+----------------------+-----------+---------+----------+-----------+
| Medication           | Sig                  | Dispensed | Refills | Start    | End Date  |
|                      |                      |           |         | Date     |           |
+----------------------+----------------------+-----------+---------+----------+-----------+
|   acyclovir          | Apply  topically     |           | 0       |          |           |
| (ZOVIRAX) 5%         | every 3 hours.       |           |         |          |           |
| ointment             |                      |           |         |          |           |
+----------------------+----------------------+-----------+---------+----------+-----------+
|   albuterol (PROAIR  | Inhale 2 puffs into  |           | 0       |          |           |
| HFA) 90 mcg/puff     | the lungs every 6    |           |         |          |           |
| inhaler              | hours as needed for  |           |         |          |           |
|                      | Wheezing.            |           |         |          |           |
+----------------------+----------------------+-----------+---------+----------+-----------+
|   digoxin (LANOXIN)  | Take 125 mcg by      |           | 0       |          |           |
| 250 mcg tablet       | mouth Daily.      |           |         |          |           |
|                      | capsule daily        |           |         |          |           |
+----------------------+----------------------+-----------+---------+----------+-----------+
|   ferrous sulfate    | Take 325 mg by mouth |           | 0       | 20 |           |
| 325 mg tablet        |  3 times daily.      |           |         | 12       |           |
+----------------------+----------------------+-----------+---------+----------+-----------+
|   fluticasone        | one spray in each    |           | 0       | 20 |           |
| (FLONASE) 50         | nostril daily as     |           |         | 12       |           |
| mcg/nasal spray      | needed               |           |         |          |           |
+----------------------+----------------------+-----------+---------+----------+-----------+
|                      | Inhale 1 puff into   |           | 0       |          |           |
| fluticasone-salmeter | the lungs Twice      |           |         |          |           |
| ol (ADVAIR) 500-50   | Daily.               |           |         |          |           |
| mcg/puff diskus      |                      |           |         |          |           |
| inhaler              |                      |           |         |          |           |
+----------------------+----------------------+-----------+---------+----------+-----------+
|   folic acid 1 mg    | Take 1 mg by mouth   |           | 0       |          |           |
| tablet               | Daily.               |           |         |          |           |
+----------------------+----------------------+-----------+---------+----------+-----------+
|   furosemide (LASIX) | Take 40 mg by mouth  |           | 0       |          |           |
 
|  40 mg tablet        | Daily.               |           |         |          |           |
+----------------------+----------------------+-----------+---------+----------+-----------+
|   guaiFENesin        | Take 1,200 mg by     |           | 0       |          |           |
| (MUCINEX) 600 mg 12  | mouth 2 times daily. |           |         |          |           |
| hr tablet            |                      |           |         |          |           |
+----------------------+----------------------+-----------+---------+----------+-----------+
|   levothyroxine      | Take 75 mcg by mouth |           | 0       | 20 |           |
| (LEVOTHROID) 75 MCG  |  Daily.              |           |         | 12       |           |
| tablet               |                      |           |         |          |           |
+----------------------+----------------------+-----------+---------+----------+-----------+
|   metoclopramide     | Take 10 mg by mouth  |           | 0       | 20 |           |
| (REGLAN) 10 mg       | 4 times daily as     |           |         | 12       |           |
| tablet               | needed.              |           |         |          |           |
+----------------------+----------------------+-----------+---------+----------+-----------+
|                      | Apply  topically 2   |           | 0       |          |           |
| nystatin-triamcinolo | times daily.         |           |         |          |           |
| ne (MYCOLOG II)      |                      |           |         |          |           |
| cream                |                      |           |         |          |           |
+----------------------+----------------------+-----------+---------+----------+-----------+
|   omeprazole         | Take 20 mg by mouth  |           | 0       | 20 |           |
| (PRILOSEC) 20 mg     | 2 times daily.       |           |         | 12       |           |
| capsule              |                      |           |         |          |           |
+----------------------+----------------------+-----------+---------+----------+-----------+
|   potassium citrate  | Take 10 mEq by mouth |           | 0       |          |           |
| (UROCIT-K) 10 mEq SR |  2 times daily.      |           |         |          |           |
|  tablet              |                      |           |         |          |           |
+----------------------+----------------------+-----------+---------+----------+-----------+
|   predniSONE         | Take 10 mg by mouth  |           | 0       |          |           |
| (DELTASONE) 5 mg     | Daily.               |           |         |          |           |
| tablet               |                      |           |         |          |           |
+----------------------+----------------------+-----------+---------+----------+-----------+
|   tamsulosin         | Take 0.4 mg by mouth |           | 0       | 20 |           |
| (FLOMAX) 0.4 mg CAPS |  2 times daily.      |           |         | 12       |           |
+----------------------+----------------------+-----------+---------+----------+-----------+
|   acyclovir          | Take 400 mg by mouth |           | 0       | 20 |  |
| (ZOVIRAX) 400 MG     |  3 times daily as    |           |         | 12       | 9         |
| tablet               | needed.              |           |         |          |           |
+----------------------+----------------------+-----------+---------+----------+-----------+
|   Cholecalciferol    | Take 2,000 Units by  |           | 0       | 20 |  |
| (VITAMIN D3) 2000    | mouth Daily.         |           |         | 12       | 9         |
| UNITS CAPS           |                      |           |         |          |           |
+----------------------+----------------------+-----------+---------+----------+-----------+
|   cyanocobalamin     | injected             |           | 0       | 20 |  |
| (VITAMIN B-12) 1,000 | intramuscularly once |           |         | 12       | 9         |
|  mcg/mL injection    |  a month             |           |         |          |           |
+----------------------+----------------------+-----------+---------+----------+-----------+
|   diltiazem          | Take 120 mg by mouth |           | 0       |          |  |
| (DILT-XR) 120 mg 24  |  Daily.              |           |         |          | 9         |
| hr capsule           |                      |           |         |          |           |
+----------------------+----------------------+-----------+---------+----------+-----------+
|   loratadine         | Take 10 mg by mouth  |           | 0       |          |  |
| (CLARITIN) 10 mg     | Daily.               |           |         |          | 9         |
| tablet               |                      |           |         |          |           |
+----------------------+----------------------+-----------+---------+----------+-----------+
|   losartan (COZAAR)  | Take 100 mg by mouth |           | 0       |          |  |
| 100 MG tablet        |  Daily. 1/2 daily    |           |         |          | 9         |
+----------------------+----------------------+-----------+---------+----------+-----------+
|   methotrexate 2.5   | Take 15 mg by mouth  |           | 0       |          |  |
| mg tablet            | Once a week.         |           |         |          | 9         |
+----------------------+----------------------+-----------+---------+----------+-----------+
 
|                      | Take 1 tablet by     |           | 0       |          |  |
| oxyCODONE-acetaminop | mouth every 4 hours  |           |         |          | 9         |
| hen (PERCOCET) 5-325 | as needed for Pain.  |           |         |          |           |
|  mg per tablet       |                      |           |         |          |           |
+----------------------+----------------------+-----------+---------+----------+-----------+
|   pseudoePHEDrine    | Take 30 mg by mouth  |           | 0       |          |  |
| (SUDOGEST) 30 mg     | every 4 hours as     |           |         |          | 9         |
| tablet               | needed for           |           |         |          |           |
|                      | Congestion.          |           |         |          |           |
+----------------------+----------------------+-----------+---------+----------+-----------+
|   rivaroxaban        | Take 20 mg by mouth  |           | 0       |          |  |
| (XARELTO) 20 mg      | Daily (with dinner). |           |         |          | 9         |
| tablet               |                      |           |         |          |           |
+----------------------+----------------------+-----------+---------+----------+-----------+
|   sertraline         | 2 tablets daily      |           | 0       | 20 |  |
| (ZOLOFT) 100 mg      |                      |           |         | 12       | 9         |
| tablet               |                      |           |         |          |           |
+----------------------+----------------------+-----------+---------+----------+-----------+
 documented as of this encounter
 
 Plan of Treatment
 
 
+--------+---------+-------------+----------------------+-------------+
| Date   | Type    | Specialty   | Care Team            | Description |
+--------+---------+-------------+----------------------+-------------+
| / | Office  | Pulmonology |   [facility],          |             |
|    | Visit   |             | Jessica Cheung,   |             |
|        |         |             | MD [address] |             |
|        |         |             |   [place],   |             |
|        |         |             | [place] [address]             |             |
|        |         |             | [phone]         |             |
|        |         |             | [phone] (Fax)   |             |
+--------+---------+-------------+----------------------+-------------+
| / | Office  | Cardiology  |   [place], Mershed, |             |
| 2020   | Visit   |             |  MD  [address]   |             |
|        |         |             | [place]  [place]  |             |
|        |         |             | [address]  [phone]  |             |
|        |         |             |  [phone] (Fax)  |             |
+--------+---------+-------------+----------------------+-------------+
 documented as of this encounter
 
 Procedures
 
 
+----------------------+--------+-------------+----------------------+----------------------
+
| Procedure Name       | Priori | Date/Time   | Associated Diagnosis | Comments             
|
|                      | ty     |             |                      |                      
|
+----------------------+--------+-------------+----------------------+----------------------
+
| CT CERVICAL SPINE WO | Routin | 2017  |                      |   Results for this   
|
|  CONTRAST            | e      |  4:46 AM    |                      | procedure are in the 
|
|                      |        | PST         |                      |  results section.    
|
+----------------------+--------+-------------+----------------------+----------------------
 
+
 documented in this encounter
 
 Results
 CT Cervical Spine wo Contrast (2017  4:46 AM PST)
 
+----------+
| Specimen |
+----------+
|          |
+----------+
 
 
 
+------------------------------------------------------------------------+--------------+
| Narrative                                                              | Performed At |
+------------------------------------------------------------------------+--------------+
|   This is a non-reportable procedure without a radiologist report and  |              |
| is  used for image storage only                                        |              |
+------------------------------------------------------------------------+--------------+
 
 
 
+--------------------------------------------------------------------------------------+
| Procedure Note                                                                       |
+--------------------------------------------------------------------------------------+
|   Andrzej Steven Irvin - 2019  4:21 AM PDT  This is a non-reportable procedure  |
| without a radiologist report and isused for image storage only                       |
+--------------------------------------------------------------------------------------+
 documented in this encounter
 
 Visit Diagnoses
 
 
+--------------------------+
| Diagnosis                |
+--------------------------+
|   Pain  Generalized pain |
+--------------------------+
 documented in this encounter

## 2019-12-06 NOTE — XMS
Encounter Summary
  Created on: 2019
 
 Wyatt Shane
 External Reference #: [id number]
 : 43
 Sex: Male
 
 Demographics
 
 
+-----------------------+------------------------+
| Address               | [address]     |
|                       | [place]  [address]   |
+-----------------------+------------------------+
| Home Phone            | [phone]        |
+-----------------------+------------------------+
| Preferred Language    | Unknown                |
+-----------------------+------------------------+
| Marital Status        |                 |
+-----------------------+------------------------+
| Orthodoxy Affiliation | LUT                    |
+-----------------------+------------------------+
| Race                  | White                  |
+-----------------------+------------------------+
| Ethnic Group          | Not  or  |
+-----------------------+------------------------+
 
 
 Author
 
 
+--------------+-------------+
| Organization | Unknown     |
+--------------+-------------+
| Address      | Unknown     |
+--------------+-------------+
| Phone        | Unavailable |
+--------------+-------------+
 
 
 
 Support
 
 
+---------------+--------------+---------+-----------------+
| Name          | Relationship | Address | Phone           |
+---------------+--------------+---------+-----------------+
| pOal Shane | ECON         | Unknown | [phone] |
+---------------+--------------+---------+-----------------+
 
 
 
 Care Team Providers
 
 
+-----------------------+------+-----------------+
| Care Team Member Name | Role | Phone           |
 
+-----------------------+------+-----------------+
| Erwin Steel MD   | PCP  | [phone] |
+-----------------------+------+-----------------+
 
 
 
 Encounter Details
 
 
+--------+-------------+------------+----------------------+------------------+
| Date   | Type        | Department | Care Team            | Description      |
+--------+-------------+------------+----------------------+------------------+
| /18/ | Discharge   |            |   Summary, Discharge | D/C Summary ODDS |
| 1999   | Summary-Tra |            |                      |                  |
|        | nscribed    |            |                      |                  |
+--------+-------------+------------+----------------------+------------------+
 
 
 
 Social History
 
 
+----------------+-------+-----------+--------+------+
| Tobacco Use    | Types | Packs/Day | Years  | Date |
|                |       |           | Used   |      |
+----------------+-------+-----------+--------+------+
| Never Assessed |       |           |        |      |
+----------------+-------+-----------+--------+------+
 
 
 
+------------------+---------------+
| Sex Assigned at  | Date Recorded |
| Birth            |               |
+------------------+---------------+
| Not on file      |               |
+------------------+---------------+
 
 
 
+----------------+-------------+-------------+
| Job Start Date | Occupation  | Industry    |
+----------------+-------------+-------------+
| Not on file    | Not on file | Not on file |
+----------------+-------------+-------------+
 
 
 
+----------------+--------------+------------+
| Travel History | Travel Start | Travel End |
+----------------+--------------+------------+
 
 
 
+-------------------------------------+
| No recent travel history available. |
+-------------------------------------+
 documented as of this encounter
 
 Discharge Summaries
 
 Interface, Transcription In - 2006  3:11 AM [address] [address] [phone]
                      [facility]
 
 MEDICAL SUMMARY OF HOSPITALIZATION
 
 Med Rec No: [id number]             Admission Date: 1999
 
 Name: Wyatt Shane                    Discharge Date:  1999
 
 STAFF PHYSICIAN:
                                    Ghassan Doll M.D.
 
                                    Jimmy Esposito M.D.
 
 PRINCIPAL FINAL DIAGNOSIS:          Gastroesophageal reflux disease (GERD).
 
 ADDITIONAL DIAGNOSES:               1.     Vocal  cord  cancer, status post
 resection.
                                     2.  Persistent vocal cord injury.
                                     3.  Umbilical hernia.
 
 PRINCIPAL PROCEDURE:                Laparoscopic Nissen fundoplication.
 
 ADDITIONAL PROCEDURES:              1.    Umbilical hernia repair.
                                     2.  Laryngeal dilatation.
                                     3.  Nutrition consultation.
 
 REASON FOR ADMISSION:               Mr. Shane  is  a 53-year-old white male
 with history of vocal cord carcinoma that  was resected late in  by Dr. Esposito here at [facility].  The patient presents with
 gastroesophageal reflux disease and recurrent  vocal  cord injury secondary
 to this condition.  The patient was referred  to  Dr. Ghassan Doll for a
 laparoscopic Nissen fundoplication or  Toupet fundoplication to relieve his
 symptoms and his vocal cord injury.   The  patient  decided to proceed with
 the operation and presented for the above  procedure.   Given  that he does
 have persistent vocal cord injury, Dr. Esposito is electing to do a laryngeal
 dilatation at the same time as the [facility] procedure.  The patient was
 admitted on 99 for above planned procedure.
 
 HOSPITAL COURSE:                    The  patient  was admitted and taken to
 the Operating Room on 99.  The Otolaryngology Service first performed
 a  laryngeal dilatation, which the patient  tolerated  well.   The  patient
 received Decadron 10 mg I.V. perioperatively  to help with inflammation and
 edema.  After this, the [facility]  Team  performed a laparoscopic Nissen
 fundoplication with no apparent complications.   Under the same anesthetic,
 the patient's umbilical hernia repair  was also completed.  The patient was
 extubated easily and did not require  reintubation.   He  was  taken to the
 Post Anesthesia Care Unit, and after a  brief time there was transferred to
 [address] [place] [address], his torres of discharge.  He tolerated the operation well.
 
 On postoperative day #1 the patient felt  well  with no complaints.  He had
 been started on clear liquids the following  night  and had tolerated these
 well.  His umbilical hernia repair was  causing  him  no  problems.  He was
 advanced to a post Nissen diet, and Nutrition  came  to  educate him on his
 diet.  He tolerated this well.  The following  morning,  postoperative  day
 #2, the patient was in great spirits  and  had  no  complaints.   He had no
 
 nausea or  vomiting, expressed a good  understanding  of  his  post  Nissen
 diet, and was anxious to go home.  He was seen by Dr. Esposito, who prescribed
 Augmentin x 10 days and who felt comfortable  with the patient's discharge.
 At  the  time  of  discharge the patient's  pain  was  controlled,  he  was
 tolerating a post Nissen diet, he was voiding, and he was anxious to leave.
 He was discharged to home later that day.
 
 CONDITION ON DISCHARGE:             Good.
 
 DISCHARGE MEDICATION(S):
 All medications to be taken in elixir form or crushed pill form.
 1.  Augmentin elixir 875 mg p.o. b.i.d. x 10 days.
 2.  Oxycodone elixir 5-15 mg p.o. q. 3 hours p.r.n. pain.
 3.  Colace elixir 100 mg p.o. b.i.d.
 4.  Norvasc.
 5.  Lipitor.
 Note:  His Norvasc and Lipitor pills must  be crushed; the patient was told
 this and expressed understanding.
 
 DISCHARGE INSTRUCTION(S):
 1.  Activity:  No heavy lifting x six weeks.
 2.  Diet:  Post Nissen fundoplication diet.
 3.  Follow-up:  (1) Follow-up with Dr. Doll  in  [facility]  in two
    weeks.   (2) Follow-up with Dr. Esposito  in  [facility]  in
    September.
 
 Diana Givens M.D.                 Ghassan Doll M.D.
 
                                        Jimmy Esposito M.D.
 
 [facility]/doug
 D:  1999
 T:  1999  2:22 P
 
 cc:
       ERWIN STEEL MD
       [address]
       [place] [id number] signed by Interface, Transcription In at 2006 
 3:11 AM PSTdocumented in this encounter
 
 Plan of Treatment
 Not on filedocumented as of this encounter
 
 Visit Diagnoses
 Not on filedocumented in this encounter

## 2019-12-06 NOTE — XMS
Clinical Summary
  Created on: 2019
 
 Svitlana Wyatttien BroussardJosue
 External Reference #: [id number]
 : 43
 Sex: Male
 
 Demographics
 
 
+-----------------------+---------------------------+
| Address               | [address]        |
|                       | [place]  [address] |
+-----------------------+---------------------------+
| Home Phone            | [phone]           |
+-----------------------+---------------------------+
| Preferred Language    | Unknown                   |
+-----------------------+---------------------------+
| Marital Status        |                    |
+-----------------------+---------------------------+
| Yazidism Affiliation | [id number]                      |
+-----------------------+---------------------------+
| Race                  | Unknown                   |
+-----------------------+---------------------------+
| Ethnic Group          | Unknown                   |
+-----------------------+---------------------------+
 
 
 Author
 
 
+--------------+------------------------------------------+
| Author       | [facility] (Historical as of  |
|              | 19)                                 |
+--------------+------------------------------------------+
| Organization | [facility] test[facility] (Historical as of  |
|              | 19)                                 |
+--------------+------------------------------------------+
| Address      | Unknown                                  |
+--------------+------------------------------------------+
| Phone        | Unavailable                              |
+--------------+------------------------------------------+
 
 
 
 Support
 
 
+---------------+--------------+--------------------+-----------------+
| Name          | Relationship | Address            | Phone           |
+---------------+--------------+--------------------+-----------------+
| Opal Shane | ECON         | [address]     | [phone] |
|               |              | [place]   |                 |
|               |              | [id number]              |                 |
+---------------+--------------+--------------------+-----------------+
 
 
 
 
 Care Team Providers
 
 
+-----------------------+------+-----------------+
| Care Team Member Name | Role | Phone           |
+-----------------------+------+-----------------+
| Calvin Robertson MD  | PP   | [phone] |
+-----------------------+------+-----------------+
 
 
 
 Allergies
 
 
+------------------+----------------------+----------+----------+----------------------+
| Active Allergy   | Reactions            | Severity | Noted    | Comments             |
|                  |                      |          | Date     |                      |
+------------------+----------------------+----------+----------+----------------------+
| Beta Adrenergic  | Anaphylaxis          | High     | 20 |                      |
| Blockers         |                      |          | 15       |                      |
+------------------+----------------------+----------+----------+----------------------+
| Diltiazem        | Edema                | Medium   | 20 |                      |
|                  |                      |          | 15       |                      |
+------------------+----------------------+----------+----------+----------------------+
| Gabapentin       | Other (See Comments) | Medium   | 20 |   CNS                |
|                  |                      |          | 15       |                      |
+------------------+----------------------+----------+----------+----------------------+
| Imipramine       | Other (See Comments) | Medium   | 20 |   Urinary retention  |
|                  |                      |          | 15       |                      |
+------------------+----------------------+----------+----------+----------------------+
| Metoprolol       | Swelling             | Medium   | 20 |                      |
|                  |                      |          | 15       |                      |
+------------------+----------------------+----------+----------+----------------------+
| Propranolol      | Other (See Comments) | Medium   | 20 |   raynaud's          |
|                  |                      |          | 15       |                      |
+------------------+----------------------+----------+----------+----------------------+
 
 
 
 Current Medications
 
 
+----------------------+----------------------+----------+---------+------+------+-------+
| Prescription         | Sig.                 | Disp.    | Refills | Star | End  | Statu |
|                      |                      |          |         | t    | Date | s     |
|                      |                      |          |         | Date |      |       |
+----------------------+----------------------+----------+---------+------+------+-------+
|   acyclovir          | Take 400 mg by mouth |          |         |      |      | Activ |
| (ZOVIRAX) 400 MG     |  5 (five) times      |          |         |      |      | e     |
| tablet               | daily. PRN           |          |         |      |      |       |
+----------------------+----------------------+----------+---------+------+------+-------+
|                      | Inhale 1 puff into   |          |         |      |      | Activ |
| fluticasone-salmeter | the lungs 2 (two)    |          |         |      |      | e     |
| ol (ADVAIR) 500-50   | times daily.         |          |         |      |      |       |
| MCG/DOSE diskus      |                      |          |         |      |      |       |
| inhaler              |                      |          |         |      |      |       |
+----------------------+----------------------+----------+---------+------+------+-------+
|   digoxin (LANOXIN)  | Take 125 mcg by      |          |         |      |      | Activ |
| 0.25 MG tablet       | mouth daily.         |          |         |      |      | e     |
 
+----------------------+----------------------+----------+---------+------+------+-------+
|   ferrous sulfate,   | Take 65 mg of iron   |          |         |      |      | Activ |
| 65 FE, 325 (65 FE)   | by mouth daily with  |          |         |      |      | e     |
| MG tablet            | breakfast.           |          |         |      |      |       |
+----------------------+----------------------+----------+---------+------+------+-------+
|   tamsulosin         | Take 0.4 mg by mouth |          |         |      |      | Activ |
| (FLOMAX) 0.4 MG      |   After dinner.      |          |         |      |      | e     |
| capsule              |                      |          |         |      |      |       |
+----------------------+----------------------+----------+---------+------+------+-------+
|   folic acid         | Take 1 mg by mouth   |          |         |      |      | Activ |
| (FOLVITE) 1 MG       | daily.               |          |         |      |      | e     |
| tablet               |                      |          |         |      |      |       |
+----------------------+----------------------+----------+---------+------+------+-------+
|   furosemide (LASIX) | Take 20 mg by mouth  |          |         |      |      | Activ |
|  40 MG tablet        | daily.               |          |         |      |      | e     |
+----------------------+----------------------+----------+---------+------+------+-------+
|   cyanocobalamin     | Take 1,000 mcg by    |          |         |      |      | Activ |
| (VITAMIN B-12) 1000  | mouth daily.         |          |         |      |      | e     |
| MCG tablet           |                      |          |         |      |      |       |
+----------------------+----------------------+----------+---------+------+------+-------+
|   levothyroxine      | Take 75 mcg by mouth |          |         |      |      | Activ |
| (SYNTHROID) 75 MCG   |  every morning       |          |         |      |      | e     |
| tablet               | before breakfast.    |          |         |      |      |       |
+----------------------+----------------------+----------+---------+------+------+-------+
|   loratadine         | Take 10 mg by mouth  |          |         |      |      | Activ |
| (CLARITIN) 10 MG     | daily.               |          |         |      |      | e     |
| tablet               |                      |          |         |      |      |       |
+----------------------+----------------------+----------+---------+------+------+-------+
|                      | Take 1 tablet by     |          |         |      |      | Activ |
| oxyCODONE-acetaminop | mouth every 4 (four) |          |         |      |      | e     |
| hen (PERCOCET) 5-325 |  hours as needed for |          |         |      |      |       |
|  MG per tablet       |  Pain.               |          |         |      |      |       |
+----------------------+----------------------+----------+---------+------+------+-------+
|   potassium chloride | Take 10 mEq by mouth |          |         |      |      | Activ |
|  (K-DUR) 10 MEQ      |  2 (two) times daily |          |         |      |      | e     |
| tablet               |  with meals.         |          |         |      |      |       |
+----------------------+----------------------+----------+---------+------+------+-------+
|   Albuterol Sulfate  | Inhale  into the     |          |         |      |      | Activ |
| 108 (90 BASE)        | lungs.               |          |         |      |      | e     |
| MCG/ACT AEPB         |                      |          |         |      |      |       |
+----------------------+----------------------+----------+---------+------+------+-------+
|   cholecalciferol    | Take 1,000 Units by  |          |         |      |      | Activ |
| (VITAMIN D-3) 1000   | mouth daily.         |          |         |      |      | e     |
| UNITS tablet         |                      |          |         |      |      |       |
+----------------------+----------------------+----------+---------+------+------+-------+
|   sertraline         | Take 100 mg by mouth |          |         |      |      | Activ |
| (ZOLOFT) 100 MG      |  daily.              |          |         |      |      | e     |
| tablet               |                      |          |         |      |      |       |
+----------------------+----------------------+----------+---------+------+------+-------+
|   guaiFENesin        | Take 600 mg by mouth |          |         |      |      | Activ |
| (MUCINEX) 600 MG 12  |  2 (two) times       |          |         |      |      | e     |
| hr tablet            | daily. PRN           |          |         |      |      |       |
+----------------------+----------------------+----------+---------+------+------+-------+
|                      | Take 1 tablet by     |          |         |      |      | Activ |
| HYDROcodone-acetamin | mouth as needed for  |          |         |      |      | e     |
| ophen (NORCO)        | Pain.                |          |         |      |      |       |
| 7.5-325 MG per       |                      |          |         |      |      |       |
| tablet               |                      |          |         |      |      |       |
+----------------------+----------------------+----------+---------+------+------+-------+
|   diclofenac         | Apply 2 g topically  |          |         |      |      | Activ |
 
| (VOLTAREN) 1 %       | 4 (four) times       |          |         |      |      | e     |
|                      | daily. PRN           |          |         |      |      |       |
+----------------------+----------------------+----------+---------+------+------+-------+
|   mupirocin          | Apply  topically 3   |   22 g   | 2       | 04/2 |      | Activ |
| (BACTROBAN) 2 %      | (three) times daily. |          |         | 6/20 |      | e     |
| ointment             |                      |          |         | 16   |      |       |
+----------------------+----------------------+----------+---------+------+------+-------+
|   losartan (COZAAR)  | Take 50 mg by mouth  |          |         |      |      | Activ |
| 50 MG tablet         | daily.               |          |         |      |      | e     |
+----------------------+----------------------+----------+---------+------+------+-------+
|   fluticasone        | one spray in each    |          |         | /1 |      | Activ |
| (FLONASE) 50 MCG/ACT | nostril daily as     |          |         | 4/20 |      | e     |
|  nasal               | needed               |          |         | 12   |      |       |
+----------------------+----------------------+----------+---------+------+------+-------+
|   ofloxacin          |                      |          |         | 04/0 |      | Activ |
| (OCUFLOX) 0.3 %      |                      |          |         | 20 |      | e     |
| ophthalmic solution  |                      |          |         | 18   |      |       |
+----------------------+----------------------+----------+---------+------+------+-------+
|   trolamine          | Apply  topically as  |          |         |      |      | Activ |
| salicylate           | needed.              |          |         |      |      | e     |
| (ASPERCREME) 10 %    |                      |          |         |      |      |       |
| cream                |                      |          |         |      |      |       |
+----------------------+----------------------+----------+---------+------+------+-------+
|   Krill Oil 500 MG   | Take 1 tablet by     |          |         |      |      | Activ |
| CAPS                 | mouth daily.         |          |         |      |      | e     |
+----------------------+----------------------+----------+---------+------+------+-------+
|   bismuth            | Take 262 mg by mouth |          |         |      |      | Activ |
| subsalicylate (PEPTO |  4 (four) times      |          |         |      |      | e     |
|  BISMOL) 262 MG      | daily before meals   |          |         |      |      |       |
| chewable tablet      | and nightly.         |          |         |      |      |       |
+----------------------+----------------------+----------+---------+------+------+-------+
|                      | Apply  to eye as     |          |         |      |      | Activ |
| bacitracin-polymyxin | needed.              |          |         |      |      | e     |
|  b (POLYSPORIN)      |                      |          |         |      |      |       |
| ophthalmic ointment  |                      |          |         |      |      |       |
+----------------------+----------------------+----------+---------+------+------+-------+
|                      | Take 1 tablet by     |          |         |      |      | Activ |
| CALCIUM-MAGNESIUM-ZI | mouth daily.         |          |         |      |      | e     |
| NC PO                |                      |          |         |      |      |       |
+----------------------+----------------------+----------+---------+------+------+-------+
|   alendronate        | Take 70 mg by mouth  |          |         |      |      | Activ |
| (FOSAMAX) 70 MG      | every 7 days. Take   |          |         |      |      | e     |
| tablet               | in the morning with  |          |         |      |      |       |
|                      | a full glass of      |          |         |      |      |       |
|                      | water, on an empty   |          |         |      |      |       |
|                      | stomach, and do not  |          |         |      |      |       |
|                      | take anything else   |          |         |      |      |       |
|                      | by mouth or lie down |          |         |      |      |       |
|                      |  for the next 30     |          |         |      |      |       |
|                      | min.                 |          |         |      |      |       |
+----------------------+----------------------+----------+---------+------+------+-------+
|   azaTHIOprine       | Take 150 mg by mouth |          |         |      |      | Activ |
| (IMURAN) 50 MG       |  daily.              |          |         |      |      | e     |
| tablet               |                      |          |         |      |      |       |
+----------------------+----------------------+----------+---------+------+------+-------+
|   famotidine         | Take 40 mg by mouth  |          |         |      |      | Activ |
| (PEPCID) 40 MG       | daily.               |          |         |      |      | e     |
| tablet               |                      |          |         |      |      |       |
+----------------------+----------------------+----------+---------+------+------+-------+
|   mupirocin          | 1 g by Nasal route   |          |         |      |      | Activ |
 
| (BACTROBAN) 2 %      | as needed. Use pea   |          |         |      |      | e     |
| nasal ointment       | sized amount in each |          |         |      |      |       |
|                      |  nostril twice daily |          |         |      |      |       |
|                      |  for 5 days. After   |          |         |      |      |       |
|                      | applications, press  |          |         |      |      |       |
|                      | sides of nose        |          |         |      |      |       |
|                      | together, gently     |          |         |      |      |       |
|                      | massage for 1 min.   |          |         |      |      |       |
+----------------------+----------------------+----------+---------+------+------+-------+
|   predniSONE         | Take 4 tablets by    |   30     |         | 08/1 |      | Activ |
| (DELTASONE) 2.5 MG   | mouth daily. Takes   | tablet   |         | 20 |      | e     |
| tabletIndications:   | 7.5 mg daily         |          |         | 18   |      |       |
| Patient takes 10mg   |                      |          |         |      |      |       |
| daily                |                      |          |         |      |      |       |
+----------------------+----------------------+----------+---------+------+------+-------+
|                      | Take 3 mLs by        |   360 mL | 0       | 08/1 |      | Activ |
| ipratropium-albutero | nebulization every 6 |          |         | 1/20 |      | e     |
| l (DUO-NEB) 0.5-2.5  |  (six) hours as      |          |         | 18   |      |       |
| mg/3mL               | needed.              |          |         |      |      |       |
+----------------------+----------------------+----------+---------+------+------+-------+
|   levocetirizine     | Take 5 mg by mouth   |          |         |      |      | Activ |
| (XYZAL) 5 MG tablet  | as needed.           |          |         |      |      | e     |
+----------------------+----------------------+----------+---------+------+------+-------+
|   rivaroxaban        | Take 1 tablet by     |   90     | 2       | 01/0 |      | Activ |
| (XARELTO) 10 mg      | mouth daily.         | tablet   |         | / |      | e     |
| tablet               |                      |          |         | 19   |      |       |
+----------------------+----------------------+----------+---------+------+------+-------+
|   atorvastatin       | TAKE 1 TABLET BY     |   90     | 3       | 08/0 | 08/0 | Activ |
| (LIPITOR) 40 MG      | MOUTH NIGHTLY        | tablet   |         | 8/20 | 7/20 | e     |
| tablet               |                      |          |         | 19   | 20   |       |
+----------------------+----------------------+----------+---------+------+------+-------+
 
 
 
 Active Problems
 
 
+---------------------------------------------------------------+------------+
| Problem                                                       | Noted Date |
+---------------------------------------------------------------+------------+
| Stable angina (HCC)                                           | 2018 |
+---------------------------------------------------------------+------------+
| COPD, moderate (HCC)                                          | 2018 |
+---------------------------------------------------------------+------------+
| Personal history of tobacco use, presenting hazards to health | 2018 |
+---------------------------------------------------------------+------------+
| Rheumatoid arthritis (HCC)                                    | 2018 |
+---------------------------------------------------------------+------------+
| Moderate protein-calorie malnutrition (HCC)                   | 2018 |
+---------------------------------------------------------------+------------+
| Severe protein-calorie malnutrition (HCC)                     | 2018 |
+---------------------------------------------------------------+------------+
| Dysphagia                                                     | 2018 |
+---------------------------------------------------------------+------------+
| Multifocal lung consolidation (HCC)                           | 2018 |
+---------------------------------------------------------------+------------+
| Chronic bronchitis (HCC)                                      | 2018 |
+---------------------------------------------------------------+------------+
| Chronic maxillary sinusitis                                   | 2018 |
+---------------------------------------------------------------+------------+
 
| Obstructive sleep apnea                                       | 2018 |
+---------------------------------------------------------------+------------+
| Peptic ulcer disease                                          | 2018 |
+---------------------------------------------------------------+------------+
| Aortic aneurysm (HCC)                                         | 2018 |
+---------------------------------------------------------------+------------+
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
| stable angina pectoris (HCC)                                  |            |
+---------------------------------------------------------------+------------+
| Chronic atrial fibrillation (HCC)                             | 2018 |
+---------------------------------------------------------------+------------+
| Cancer of vocal cord (HCC)                                    | 2018 |
+---------------------------------------------------------------+------------+
 
 
 
+------------------------------------+
|   Overview:   Overview:            |
| Treated with surgery and radiation |
+------------------------------------+
 
 
 
+------------------------+------------+
| Essential hypertension | 2018 |
+------------------------+------------+
 
 
 
 Resolved Problems
 
 
+------------------------------------------------------+----------+-----------+
| Problem                                              | Noted    | Resolved  |
|                                                      | Date     | Date      |
+------------------------------------------------------+----------+-----------+
| Aspiration pneumonia (HCC)                           | 20 |  |
|                                                      | 18       | 8         |
+------------------------------------------------------+----------+-----------+
| Precordial pain                                      | 20 |  |
|                                                      | 18       | 8         |
+------------------------------------------------------+----------+-----------+
| Hemoptysis                                           | 20 |  |
|                                                      | 18       | 8         |
+------------------------------------------------------+----------+-----------+
| NSTEMI (non-ST elevated myocardial infarction) (HCC) | 20 |  |
|                                                      | 18       | 8         |
+------------------------------------------------------+----------+-----------+
 
 
 
 Family History
 
 
+-----------------+----------+------+----------------+
| Medical History | Relation | Name | Comments       |
+-----------------+----------+------+----------------+
| Hypertension    | Father   |      |                |
+-----------------+----------+------+----------------+
| Cancer          | Mother   |      | breast cancer  |
 
+-----------------+----------+------+----------------+
 
 
 
+----------+------+--------+----------+
| Relation | Name | Status | Comments |
+----------+------+--------+----------+
| Father   |      |        |          |
+----------+------+--------+----------+
| Mother   |      |        |          |
+----------+------+--------+----------+
 
 
 
 Social History
 
 
+---------------+-------+-----------+--------+------------------+
| Tobacco Use   | Types | Packs/Day | Years  | Date             |
|               |       |           | Used   |                  |
+---------------+-------+-----------+--------+------------------+
| Former Smoker |       | 1         |        | Quit: 1996 |
+---------------+-------+-----------+--------+------------------+
 
 
 
+---------------------+---+---+---+
| Smokeless Tobacco:  |   |   |   |
| Never Used          |   |   |   |
+---------------------+---+---+---+
 
 
 
+-------------+-----------+---------+----------+
| Alcohol Use | Drinks/We | oz/Week | Comments |
|             | ek        |         |          |
+-------------+-----------+---------+----------+
| No          |           |         |          |
+-------------+-----------+---------+----------+
 
 
 
+------------------+---------------+
| Sex Assigned at  | Date Recorded |
| Birth            |               |
+------------------+---------------+
| Not on file      |               |
+------------------+---------------+
 
 
 
 Last Filed Vital Signs
 
 
+-------------------+----------------------+-------------------------+
| Vital Sign        | Reading              | Time Taken              |
+-------------------+----------------------+-------------------------+
| Blood Pressure    | 114/56               | 2019  2:43 PM PDT |
+-------------------+----------------------+-------------------------+
| Pulse             | 58                   | 2019  2:43 PM PDT |
 
+-------------------+----------------------+-------------------------+
| Temperature       | 36.7   C (98   F)    | 2019  1:57 PM PDT |
+-------------------+----------------------+-------------------------+
| Respiratory Rate  | 14                   | 11/15/2018 10:46 AM PST |
+-------------------+----------------------+-------------------------+
| Oxygen Saturation | 99%                  | 2019  2:43 PM PDT |
+-------------------+----------------------+-------------------------+
| Inhaled Oxygen    | -                    | -                       |
| Concentration     |                      |                         |
+-------------------+----------------------+-------------------------+
| Weight            | 72.9 kg (160 lb 12.8 | 2019  2:43 PM PDT |
|                   |  oz)                 |                         |
+-------------------+----------------------+-------------------------+
| Height            | 170.2 cm (5' 7")     | 2019  2:43 PM PDT |
+-------------------+----------------------+-------------------------+
| Body Mass Index   | 25.18                | 2019  2:43 PM PDT |
+-------------------+----------------------+-------------------------+
 
 
 
 Plan of Treatment
 
 
+----------------------+-----------+-----------+----------+
| Health Maintenance   | Due Date  | Last Done | Comments |
+----------------------+-----------+-----------+----------+
| Vaccine:             |  |           |          |
| Dtap/Tdap/Td (1 -    | 3         |           |          |
| Tdap)                |           |           |          |
+----------------------+-----------+-----------+----------+
| Colon Cancer         |  |           |          |
| Screening            | 4         |           |          |
| (Colonoscopy)        |           |           |          |
+----------------------+-----------+-----------+----------+
| Vaccine: Zoster (1   |  |           |          |
| of 2)                | 4         |           |          |
+----------------------+-----------+-----------+----------+
| Vaccine:             |  |           |          |
| Pneumococcal 65+     | 9         |           |          |
| High/Highest Risk (1 |           |           |          |
|  of 2 - PCV13)       |           |           |          |
+----------------------+-----------+-----------+----------+
| Vaccine: Influenza   |  |           |          |
| (#1)                 | 9         |           |          |
+----------------------+-----------+-----------+----------+
 
 
 
 Results
 Not on filefrom Last 3 Months
 
 Insurance
 
 
+-------------------+--------+-------------+------+-------+----------------------+
| Payer             | Benefi | Subscriber  | Type | Phone | Address              |
|                   | t Plan | ID          |      |       |                      |
|                   |  /     |             |      |       |                      |
|                   | Group  |             |      |       |                      |
+-------------------+--------+-------------+------+-------+----------------------+
 
| MEDICARE          | MEDICA | [id number] |      |       |   [address]        |
|                   | [place]     |             |      |       | [place] [id number] |
|                   | IP-OP  |             |      |       |                      |
+-------------------+--------+-------------+------+-------+----------------------+
| [facility] | [place] | [id number] |      |       |                      |
|                   |        |             |      |       |                      |
|                   | HEALTH |             |      |       |                      |
|                   | CARE - |             |      |       |                      |
|                   |  [employer]  |             |      |       |                      |
+-------------------+--------+-------------+------+-------+----------------------+
 
 
 
+-------------------+--------+-------------+--------+-------------+----------------------+
| Guarantor Name    | [employer] | Relation to | Date   | Phone       | Billing Address      |
|                   | t Type |  Patient    | of     |             |                      |
|                   |        |             | Birth  |             |                      |
+-------------------+--------+-------------+--------+-------------+----------------------+
| [place] | Person | Self        | / |   Home:     |   [address] |
|                   | [address] |             | 1945   | [phone] |   [place]      |
|                   | [place]    |             |        | [id number]        | [phone]           |
+-------------------+--------+-------------+--------+-------------+----------------------+

## 2019-12-06 NOTE — XMS
Encounter Summary
  Created on: 2019
 
 Wyatt Shane
 External Reference #: [id number]
 : 43
 Sex: Male
 
 Demographics
 
 
+-----------------------+------------------------+
| Address               | [address]     |
|                       | [place]  [address]   |
+-----------------------+------------------------+
| Home Phone            | [phone]        |
+-----------------------+------------------------+
| Preferred Language    | Unknown                |
+-----------------------+------------------------+
| Marital Status        |                 |
+-----------------------+------------------------+
| Sabianist Affiliation | LUT                    |
+-----------------------+------------------------+
| Race                  | White                  |
+-----------------------+------------------------+
| Ethnic Group          | Not  or  |
+-----------------------+------------------------+
 
 
 Author
 
 
+--------------+------------------------------+
| Author       | [facility] |
+--------------+------------------------------+
| Organization | [facility] |
+--------------+------------------------------+
| Address      | Unknown                      |
+--------------+------------------------------+
| Phone        | Unavailable                  |
+--------------+------------------------------+
 
 
 
 Support
 
 
+---------------+--------------+---------+-----------------+
| Name          | Relationship | Address | Phone           |
+---------------+--------------+---------+-----------------+
| Opal Shane | ECON         | Unknown | [phone] |
+---------------+--------------+---------+-----------------+
 
 
 
 Care Team Providers
 
 
 
+-----------------------+------+-----------------+
| Care Team Member Name | Role | Phone           |
+-----------------------+------+-----------------+
| Erwin Steel MD   | PCP  | [phone] |
+-----------------------+------+-----------------+
 
 
 
 Reason for Visit
 
 
+--------------+----------+
| Reason       | Comments |
+--------------+----------+
| New patient  |          |
| consultation |          |
+--------------+----------+
| Dysphagia    |          |
+--------------+----------+
| Hoarseness   |          |
+--------------+----------+
| Cancer       | larynx   |
+--------------+----------+
 Consultation (Routine)
 
+--------+--------------+---------------+--------------+--------------+---------------+
| Status | Reason       | Specialty     | Diagnoses /  | Referred By  | Referred To   |
|        |              |               | Procedures   | Contact      | Contact       |
+--------+--------------+---------------+--------------+--------------+---------------+
| Closed |   Specialty  | Otolaryngolog |   Diagnoses  |   Vaibhav,     |   Tay,  |
|        | Services     | y             |  Malignant   | MD Fred    | Jonathan SOLANO MD  |
|        | Required     |               | neoplasm of  | [address]  |  [address]  |
|        |              |               | glottis      | [facility] | [facility]  |
|        |              |               | (HCC)        |  Rd          | Rd  [place], |
|        |              |               | Procedures   | [place], [place] |  [place]           |
|        |              |               | NEW PATIENT  |  [id number]  | [id number]    |
|        |              |               | LEVEL 5  [facility]  |              | Phone:        |
|        |              |               | LARYNGOSCOPY |              | [phone]  |
|        |              |               | ,FLEX        |              |  Fax:         |
|        |              |               | FIBER,DIAGNO |              | [phone]  |
|        |              |               | STIC         |              |               |
+--------+--------------+---------------+--------------+--------------+---------------+
 
 
 
 
 Encounter Details
 
 
+--------+---------+----------------------+----------------------+----------------------+
| Date   | Type    | Department           | Care Team            | Description          |
+--------+---------+----------------------+----------------------+----------------------+
| 10/03/ | Office  |   Otolaryngology     |   Jonathan Cross  | Pharyngeal Dysphagia |
|    | Visit   | Laryngology Services | MD RUSS  [address]   |  (Primary Dx);       |
|        |         |  at [facility]  [address] | [facility] [address]      | Allergic Rhinitis,   |
|        |         |  [facility] [address]     | [place], [place]         | Cause Unspecified;   |
|        |         | Mailcode: PV01       | [id number]           | Laryngeal Cancer     |
|        |         | [facility] | [phone]         | ([facility]); Glottic       |
|        |         |   [place], [place]       | [phone] (Fax)   | Stenosis             |
|        |         | [address]           |                      |                      |
 
|        |         | [phone]         |                      |                      |
+--------+---------+----------------------+----------------------+----------------------+
 
 
 
 Social History
 
 
+---------------+------------+-----------+--------+------------------+
| Tobacco Use   | Types      | Packs/Day | Years  | Date             |
|               |            |           | Used   |                  |
+---------------+------------+-----------+--------+------------------+
| Former Smoker | Cigarettes | 1         | 30     | Quit: 1996 |
+---------------+------------+-----------+--------+------------------+
 
 
 
+-------------+-------------+---------+----------+
| Alcohol Use | Drinks/Week | oz/Week | Comments |
+-------------+-------------+---------+----------+
| No          |             |         |          |
+-------------+-------------+---------+----------+
 
 
 
+------------------+---------------+
| Sex Assigned at  | Date Recorded |
| Birth            |               |
+------------------+---------------+
| Not on file      |               |
+------------------+---------------+
 
 
 
+----------------+-------------+-------------+
| Job Start Date | Occupation  | Industry    |
+----------------+-------------+-------------+
| Not on file    | Not on file | Not on file |
+----------------+-------------+-------------+
 
 
 
+----------------+--------------+------------+
| Travel History | Travel Start | Travel End |
+----------------+--------------+------------+
 
 
 
+-------------------------------------+
| No recent travel history available. |
+-------------------------------------+
 documented as of this encounter
 
 Last Filed Vital Signs
 
 
+-------------------+------------------+----------------------+----------+
| Vital Sign        | Reading          | Time Taken           | Comments |
+-------------------+------------------+----------------------+----------+
| Blood Pressure    | -                | -                    |          |
 
+-------------------+------------------+----------------------+----------+
| Pulse             | -                | -                    |          |
+-------------------+------------------+----------------------+----------+
| Temperature       | -                | -                    |          |
+-------------------+------------------+----------------------+----------+
| Respiratory Rate  | -                | -                    |          |
+-------------------+------------------+----------------------+----------+
| Oxygen Saturation | -                | -                    |          |
+-------------------+------------------+----------------------+----------+
| Inhaled Oxygen    | -                | -                    |          |
| Concentration     |                  |                      |          |
+-------------------+------------------+----------------------+----------+
| Weight            | 92.5 kg (204 lb) | 10/03/2007  2:28 PM  |          |
|                   |                  | PDT                  |          |
+-------------------+------------------+----------------------+----------+
| Height            | -                | -                    |          |
+-------------------+------------------+----------------------+----------+
| Body Mass Index   | -                | -                    |          |
+-------------------+------------------+----------------------+----------+
 documented in this encounter
 
 Progress Notes
 Jonathan Cross S - 10/04/2007  3:11 PM PDTFormatting of this note might be different fro
m the original.
 PATIENT NAME: Wyatt Shane MR#: [id number]
 : 1945
 
 REFERRING PROVIDER:
 FRED ESPOSITO MD
 [address] [place] [address]
 
 PRIMARY CARE PROVIDER:
 ERWIN STEEL MD
 
 CLINIC: [facility]
 
 PROBLEM LIST:
 Otolaryngologic Problems
 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
 Laryngeal Cancer [161.9E]
 Pharyngeal Dysphagia [787.23A]
 Allergic Rhinitis, Cause Unspecified [477.9]
 
 REASON FOR FOLLOW-UP:Chief Complaint 
 Patient presents with 
   New patient consultation 
   Dysphagia 
   Hoarseness 
   Cancer 
   larynx 
 
 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 
 
 TNM/STAGE:  CIS--> T2N0M0/II
 
 IDENTIFICATION DATE:  1998
 
 SURGICAL EXCISION DATE:  1998
 
 
 TYPE OF SURGERY:  extended vertical hemilaryngectomy
 
 LAST SURGERY DATE:  laryngotracheal reconstruction2000
 
 LAST WEIGHTS:  Last 3 Encounter Wt Readings: 
 Date Wt 
 10/03/2007 92.534 kg (204 lbs) 
 
 LAST CXR:  CHEST, 2 VIEWS OR STEREO (no units)
  Date             Value        Low      High  Status
  2004 
 .
 EXAM:  PA and lateral chest.
 
 INDICATION: Preop, and head and neck malignancy
 
 COMPARISON: None.
 
 FINDINGS:  The cardiac and mediastinal contours are normal.  The
 patient has had a prior CABG procedure.  The lungs are clear
 without evidence for suspicious pulmonary nodules.  There is no
 pleural effusion. Osseous structures are unremarkable.
 
 IMPRESSION:
 
 Prior CABG procedure.  No evidence for acute pulmonary disease.
 
 ----------
 
 LAST LFTs:   No results found for this basename: tbili,ap,tp,dirbili,alb,ast,alt 
 
 LAST TSH: No results found for this basename: tsh 
 
 RADIATION THERAPY COMPLETED:  1998
 
 RADIATION THERAPIST:  Unknown
 
 RADIATION DOSE:  Unknown
 
 HPI:  Wyatt Shane is a 61 y.o. male who returns today for routine follow-up examination not
ing that he has had recent trouble with throat discomfort.  this came on acutely about 3 wee
ks ago and improved since then.  He has not noted any otalgia. He denies dyspnea or noisy br
eathing. He has not noted worsening of his voice.  He feels that his swallowing is back to b
aseline.  He notes no consistency restrictions or diet modifications.   He is not smoking.  
He has been followed by Dr. Esposito for many years, but is transferring his care to the [facility] because Dr. Esposito will be moving to the [facility]
.
 
 PMHx: Past Medical History 
 Diagnosis Date 
   Larynx Cancer  
   T2N0 right glottis 
   Radiation Fibrosis  
   larynx 
   Glottic Stenosis  
   MI (Myocardial Infarction)  
   PUD (Peptic Ulcer Disease)  
   Fibromyalgia  
   Depression  
 
   Nephrolithiasis  
 
 SURGHx:Past Surgical History 
 Procedure Date 
   Hx extended vertical hemilaryngectomy  
   right posterior arytenoid retained. 
   Hx stenosis dilation  
   Hx nissen fundoplication  
   Hx dml bx 1998 
   cCIS--pT2N0 SCCa right vocal fold 
   Hx laryngotracheoplasty  
 
  
 ALLERGIES: Allergies 
 Allergen Reactions 
   Penicillins  
 
 MEDICATIONS:Current outpatient prescriptions 
 Medication Sig 
   NEXIUM ORAL None Entered 
   BABY ASPIRIN ORAL None Entered 
   VICODIN ORAL None Entered 
   GABAPENTIN ORAL None Entered 
   Fexofenadine HCl (ALLEGRA) 180 mg Oral Tablet take 1 tablet (180 mg) by oral route once
 daily 
   Fluticasone Propionate (FLONASE) 50 mcg/Actuation Nasal Aerosol, Spray 2 sprays in each
 nostril by nasal route twice daily 
 
 EXAMINATION:  Wt 92.534 kg (204 lbs)
 Gen:  He is a 61 y.o. male.  He is awake, alert and comfortable with the examination.  His 
weight is appropriate.
 Ears:  The pinnae are normal.  External auditory canals show minimal cerumen bilaterally.  
The tympanic membranes are clear with normal anatomic landmarks and an aerated middle ear sp
ace.
 Nose: The nasal dorsum is straight and the nares are widely patent.  The mucosa is pink and
 there are no lesions or masses noted.  There is no significant nasal obstruction noted.
 Face: There are no worrisome lesions of the face or head noted.
 Salivary Glands:  The salivary glands are soft and show no lesions or masses within the par
otid or submandibular glands bilaterally.  There is no obvious obstruction of Stensen's or W
joel's ducts bilaterally.
 Oropharynx:  Normal lips and oral competence are noted.  The dentition is fair.  The mucosa
 is dry and pale, but shows no lesions or masses.  The tonsils are small or absent and the f
ossae show no lesions.  Bimanual palpation of the gigivolabial sulcus, lingual sulcus, tonsi
llar pillars, palate and base of tongue did not demonstrate any concerning lesions or masses
.
 Neck: The neck has well healed anterior transverse neck incisions and tracheotomy tube scar
.  There is no lymphadenopathy noted in the anterior, posterior, digastric or submental tria
ngles.  The thyroid is palpable, but not enlarged or tender.  I cannot appreciate any nodule
s.  The larynx is no longer anatomic and has cartilage loss along the right side.  
 Chest:  Chest rise is symmetric and there is no audible wheezing, stridor or wet vocal qual
ity.  There is very mild stridor with deep inspiration.
 Neuro:  Extraoccular movements are grossly intact.  There is symmetric sensation and moveme
nt noted of the upper and midface.  Marginal mandibular nerve function is normal.  Hearing i
s grossly intact.  Palatal elevation is symmetric and full.  Trapezius function is normal.  
Tongue protrusion is midline.
 
 VOICE EVALUATION:  Perceptual voice evaluation demonstrates moderate-severe dysphonia.  The
 pitch is low for a male and shows limited range. Vocal intensity is acceptable and shows li
mited upper range.  There is 2-3+ roughness,   1+ breathiness, and  2+ tightness appreciated
.  There is no tremor noted with sustained vowel phonation.  I am unable to detect voice jing
 
aks.  Glottal orellana is not detected and there is no diplophonia noted.  Articulation is normal
. 
 
 LARYNGOSCOPY:  Indirect laryngoscopy was performed using a 90 degree Jean-Baptiste sahil telescope.
  This demonstrates a clear vallecula and crisp epiglottis. The right .arytenoid is partiall
y present and mobile.  There is soft tissue where the right hemilarynx would be.  This is be
nign and mucosalized.  The true vocal folds are absent.  The left false vocal fold is at red
st partially present.  The left arytenoid is present, but fixed.  Both aryepiglottic folds a
re shortened.  The base of tongue is clear.  The subglottic airway is slightly narrowed, but
 adequate. 
 
 ASSESSMENT:  Mr. Shane appears to be doing well and should be cured of his previous squamous
 cell carcinoma.   He may have had a upper respiratory tract infection, but seems better now
.  He may be having some progressive changes from radiation, but these are slow at this time
.
 
 PLAN:  I have recommended that he return immediately should his symptoms return.  I have re
commended that I see him annually or if he should have any new problems. 
 
 Jonathan Cross M.D.
 
 Laryngology and Head & Neck Surgery
 Tel:[phone]
 Fax:[phone]
 Electronically signed by Jonathan Cross at 10/04/2007  3:11 PM PDTdocumented in this en
counter
 
 Plan of Treatment
 
 
+----------------------+------------+--------+----------------------+---------------------+
| Name                 | Type       | Priori | Associated Diagnoses | Order Schedule      |
|                      |            | ty     |                      |                     |
+----------------------+------------+--------+----------------------+---------------------+
| CA LARYNGOSCOPY,FLEX | Procedures | Routin |   Laryngeal Cancer   | Ordered: 10/04/2007 |
|  FIBER,DIAGNOSTIC    |            | e      | ([facility])  Glottic       |                     |
|                      |            |        | Stenosis  Pharyngeal |                     |
|                      |            |        |  Dysphagia           |                     |
+----------------------+------------+--------+----------------------+---------------------+
 documented as of this encounter
 
 Visit Diagnoses
 
 
+--------------------------------------------------------------------------+
| Diagnosis                                                                |
+--------------------------------------------------------------------------+
|   Pharyngeal dysphagia - Primary  Dysphagia, pharyngeal phase            |
+--------------------------------------------------------------------------+
|   Allergic rhinitis, cause unspecified                                   |
+--------------------------------------------------------------------------+
|   Laryngeal cancer (HCC)  Malignant neoplasm of larynx, unspecified site |
+--------------------------------------------------------------------------+
|   Glottic stenosis  Stenosis of larynx                                   |
+--------------------------------------------------------------------------+
 documented in this encounter

## 2019-12-06 NOTE — XMS
Encounter Summary
  Created on: 2019
 
 Ramirez Wyatttien BroussardJosue
 External Reference #: [id number]
 : 43
 Sex: Male
 
 Demographics
 
 
+-----------------------+---------------------------+
| Address               | [address]        |
|                       | [place]  [address] |
+-----------------------+---------------------------+
| Home Phone            | [phone]           |
+-----------------------+---------------------------+
| Preferred Language    | Unknown                   |
+-----------------------+---------------------------+
| Marital Status        |                    |
+-----------------------+---------------------------+
| Sikhism Affiliation | [id number]                      |
+-----------------------+---------------------------+
| Race                  | Unknown                   |
+-----------------------+---------------------------+
| Ethnic Group          | Unknown                   |
+-----------------------+---------------------------+
 
 
 Author
 
 
+--------------+--------------------------------------------+
| Author       | [facility]  |
|              | and [place]                                |
+--------------+--------------------------------------------+
| Organization | [facility]  |
|              | and [place]                                |
+--------------+--------------------------------------------+
| Address      | Unknown                                    |
+--------------+--------------------------------------------+
| Phone        | Unavailable                                |
+--------------+--------------------------------------------+
 
 
 
 Support
 
 
+---------------+--------------+--------------------+-----------------+
| Name          | Relationship | Address            | Phone           |
+---------------+--------------+--------------------+-----------------+
| Opal Ramirez | ECON         | [address]     | [phone] |
|               |              | [place]   |                 |
|               |              | [id number]              |                 |
+---------------+--------------+--------------------+-----------------+
 
 
 
 
 Care Team Providers
 
 
+-----------------------+------+-----------------+
| Care Team Member Name | Role | Phone           |
+-----------------------+------+-----------------+
| Erwin Iniguez MD | PCP  | [phone] |
+-----------------------+------+-----------------+
 
 
 
 Encounter Details
 
 
+--------+-----------+----------------------+----------------------+---------------------+
| Date   | Type      | Department           | Care Team            | Description         |
+--------+-----------+----------------------+----------------------+---------------------+
| / | Hospital  |   [facility]     |   Conversion         | Multifocal lung     |
| 2018   | Encounter | [facility]  [address]  | Transaction,         | consolidation (HCC) |
|        |           | [address]  | Provider Unknown     |                     |
|        |           |  [place], [place]        | [phone]         |                     |
|        |           | [address]           | [phone] (Fax)   |                     |
|        |           | [phone]         | Jessica Rogel  |                     |
|        |           |                      | MD [address]  [address]    |                     |
|        |           |                      | [address]   |                     |
|        |           |                      | [place] [address]   |                     |
|        |           |                      | [phone]         |                     |
|        |           |                      | [phone] (Fax)   |                     |
+--------+-----------+----------------------+----------------------+---------------------+
 
 
 
 Social History
 
 
+---------------+------------+-----------+--------+------------------+
| Tobacco Use   | Types      | Packs/Day | Years  | Date             |
|               |            |           | Used   |                  |
+---------------+------------+-----------+--------+------------------+
| Former Smoker | Cigarettes | 1.3       | 34     | Quit: 1996 |
+---------------+------------+-----------+--------+------------------+
 
 
 
+---------------------+---+---+---+
| Smokeless Tobacco:  |   |   |   |
| Never Used          |   |   |   |
+---------------------+---+---+---+
 
 
 
+-------------+-------------+---------+----------+
| Alcohol Use | Drinks/Week | oz/Week | Comments |
+-------------+-------------+---------+----------+
| No          |             |         |          |
+-------------+-------------+---------+----------+
 
 
 
 
+------------------+---------------+
| Sex Assigned at  | Date Recorded |
| Birth            |               |
+------------------+---------------+
| Not on file      |               |
+------------------+---------------+
 
 
 
+----------------+-------------+-------------+
| Job Start Date | Occupation  | Industry    |
+----------------+-------------+-------------+
| Not on file    | Not on file | Not on file |
+----------------+-------------+-------------+
 
 
 
+----------------+--------------+------------+
| Travel History | Travel Start | Travel End |
+----------------+--------------+------------+
 
 
 
+-------------------------------------+
| No recent travel history available. |
+-------------------------------------+
 documented as of this encounter
 
 Medications at Time of Discharge
 
 
+----------------------+----------------------+-----------+---------+----------+-----------+
| Medication           | Sig                  | Dispensed | Refills | Start    | End Date  |
|                      |                      |           |         | Date     |           |
+----------------------+----------------------+-----------+---------+----------+-----------+
|   acyclovir          | Apply  topically     |           | 0       |          |           |
| (ZOVIRAX) 5%         | every 3 hours.       |           |         |          |           |
| ointment             |                      |           |         |          |           |
+----------------------+----------------------+-----------+---------+----------+-----------+
|   albuterol (PROAIR  | Inhale 2 puffs into  |           | 0       |          |           |
| HFA) 90 mcg/puff     | the lungs every 6    |           |         |          |           |
| inhaler              | hours as needed for  |           |         |          |           |
|                      | Wheezing.            |           |         |          |           |
+----------------------+----------------------+-----------+---------+----------+-----------+
|                      | Take 3 mLs by        |           | 0       | 20 |           |
| albuterol-ipratropiu | nebulization every 6 |           |         | 18       |           |
| m 2.5-0.5 mg/3 mL    |  (six) hours as      |           |         |          |           |
| SOLN                 | needed.              |           |         |          |           |
+----------------------+----------------------+-----------+---------+----------+-----------+
|   digoxin (LANOXIN)  | Take 125 mcg by      |           | 0       |          |           |
| 250 mcg tablet       | mouth Daily.      |           |         |          |           |
|                      | capsule daily        |           |         |          |           |
+----------------------+----------------------+-----------+---------+----------+-----------+
|   ferrous sulfate    | Take 325 mg by mouth |           | 0       | 20 |           |
| 325 mg tablet        |  3 times daily.      |           |         | 12       |           |
+----------------------+----------------------+-----------+---------+----------+-----------+
|   fluticasone        | one spray in each    |           | 0       | 20 |           |
| (FLONASE) 50         | nostril daily as     |           |         | 12       |           |
| mcg/nasal spray      | needed               |           |         |          |           |
+----------------------+----------------------+-----------+---------+----------+-----------+
 
|                      | Inhale 1 puff into   |           | 0       |          |           |
| fluticasone-salmeter | the lungs Twice      |           |         |          |           |
| ol (ADVAIR) 500-50   | Daily.               |           |         |          |           |
| mcg/puff diskus      |                      |           |         |          |           |
| inhaler              |                      |           |         |          |           |
+----------------------+----------------------+-----------+---------+----------+-----------+
|   folic acid 1 mg    | Take 1 mg by mouth   |           | 0       |          |           |
| tablet               | Daily.               |           |         |          |           |
+----------------------+----------------------+-----------+---------+----------+-----------+
|   furosemide (LASIX) | Take 40 mg by mouth  |           | 0       |          |           |
|  40 mg tablet        | Daily.               |           |         |          |           |
+----------------------+----------------------+-----------+---------+----------+-----------+
|   guaiFENesin        | Take 1,200 mg by     |           | 0       |          |           |
| (MUCINEX) 600 mg 12  | mouth 2 times daily. |           |         |          |           |
| hr tablet            |                      |           |         |          |           |
+----------------------+----------------------+-----------+---------+----------+-----------+
|   levothyroxine      | Take 75 mcg by mouth |           | 0       | 20 |           |
| (LEVOTHROID) 75 MCG  |  Daily.              |           |         | 12       |           |
| tablet               |                      |           |         |          |           |
+----------------------+----------------------+-----------+---------+----------+-----------+
|   metoclopramide     | Take 10 mg by mouth  |           | 0       | 20 |           |
| (REGLAN) 10 mg       | 4 times daily as     |           |         | 12       |           |
| tablet               | needed.              |           |         |          |           |
+----------------------+----------------------+-----------+---------+----------+-----------+
|                      | Apply  topically 2   |           | 0       |          |           |
| nystatin-triamcinolo | times daily.         |           |         |          |           |
| ne (MYCOLOG II)      |                      |           |         |          |           |
| cream                |                      |           |         |          |           |
+----------------------+----------------------+-----------+---------+----------+-----------+
|   ofloxacin          |                      |           | 0       | 20 |           |
| (OCUFLOX) 0.3%       |                      |           |         | 18       |           |
| ophthalmic solution  |                      |           |         |          |           |
+----------------------+----------------------+-----------+---------+----------+-----------+
|   omeprazole         | Take 20 mg by mouth  |           | 0       | 20 |           |
| (PRILOSEC) 20 mg     | 2 times daily.       |           |         | 12       |           |
| capsule              |                      |           |         |          |           |
+----------------------+----------------------+-----------+---------+----------+-----------+
|   potassium citrate  | Take 10 mEq by mouth |           | 0       |          |           |
| (UROCIT-K) 10 mEq SR |  2 times daily.      |           |         |          |           |
|  tablet              |                      |           |         |          |           |
+----------------------+----------------------+-----------+---------+----------+-----------+
|   predniSONE         | Take 10 mg by mouth  |           | 0       |          |           |
| (DELTASONE) 5 mg     | Daily.               |           |         |          |           |
| tablet               |                      |           |         |          |           |
+----------------------+----------------------+-----------+---------+----------+-----------+
|   tamsulosin         | Take 0.4 mg by mouth |           | 0       | 20 |           |
| (FLOMAX) 0.4 mg CAPS |  2 times daily.      |           |         | 12       |           |
+----------------------+----------------------+-----------+---------+----------+-----------+
|   acyclovir          | Take 400 mg by mouth |           | 0       | 20 |  |
| (ZOVIRAX) 400 MG     |  3 times daily as    |           |         | 12       | 9         |
| tablet               | needed.              |           |         |          |           |
+----------------------+----------------------+-----------+---------+----------+-----------+
|   Cholecalciferol    | Take 2,000 Units by  |           | 0       | 20 |  |
| (VITAMIN D3) 2000    | mouth Daily.         |           |         | 12       | 9         |
| UNITS CAPS           |                      |           |         |          |           |
+----------------------+----------------------+-----------+---------+----------+-----------+
|   cyanocobalamin     | injected             |           | 0       | 20 |  |
| (VITAMIN B-12) 1,000 | intramuscularly once |           |         | 12       | 9         |
|  mcg/mL injection    |  a month             |           |         |          |           |
+----------------------+----------------------+-----------+---------+----------+-----------+
 
|   diltiazem          | Take 120 mg by mouth |           | 0       |          |  |
| (DILT-XR) 120 mg 24  |  Daily.              |           |         |          | 9         |
| hr capsule           |                      |           |         |          |           |
+----------------------+----------------------+-----------+---------+----------+-----------+
|   loratadine         | Take 10 mg by mouth  |           | 0       |          |  |
| (CLARITIN) 10 mg     | Daily.               |           |         |          | 9         |
| tablet               |                      |           |         |          |           |
+----------------------+----------------------+-----------+---------+----------+-----------+
|   losartan (COZAAR)  | Take 100 mg by mouth |           | 0       |          |  |
| 100 MG tablet        |  Daily. 1/2 daily    |           |         |          | 9         |
+----------------------+----------------------+-----------+---------+----------+-----------+
|   methotrexate 2.5   | Take 15 mg by mouth  |           | 0       |          |  |
| mg tablet            | Once a week.         |           |         |          | 9         |
+----------------------+----------------------+-----------+---------+----------+-----------+
|                      | Take 1 tablet by     |           | 0       |          |  |
| oxyCODONE-acetaminop | mouth every 4 hours  |           |         |          | 9         |
| hen (PERCOCET) 5-325 | as needed for Pain.  |           |         |          |           |
|  mg per tablet       |                      |           |         |          |           |
+----------------------+----------------------+-----------+---------+----------+-----------+
|   predniSONE         | Take 4 tablets by    |           | 0       | 20 |  |
| (DELTASONE) 2.5 MG   | mouth daily. Takes   |           |         | 18       | 9         |
| tablet               | 7.5 mg daily         |           |         |          |           |
+----------------------+----------------------+-----------+---------+----------+-----------+
|   pseudoePHEDrine    | Take 30 mg by mouth  |           | 0       |          |  |
| (SUDOGEST) 30 mg     | every 4 hours as     |           |         |          | 9         |
| tablet               | needed for           |           |         |          |           |
|                      | Congestion.          |           |         |          |           |
+----------------------+----------------------+-----------+---------+----------+-----------+
|   rivaroxaban        | Take 20 mg by mouth  |           | 0       |          |  |
| (XARELTO) 20 mg      | Daily (with dinner). |           |         |          | 9         |
| tablet               |                      |           |         |          |           |
+----------------------+----------------------+-----------+---------+----------+-----------+
|   sertraline         | 2 tablets daily      |           | 0       | 20 |  |
| (ZOLOFT) 100 mg      |                      |           |         | 12       | 9         |
| tablet               |                      |           |         |          |           |
+----------------------+----------------------+-----------+---------+----------+-----------+
 documented as of this encounter
 
 Plan of Treatment
 
 
+--------+---------+-------------+----------------------+-------------+
| Date   | Type    | Specialty   | Care Team            | Description |
+--------+---------+-------------+----------------------+-------------+
| / | Office  | Pulmonology |   Juan F,          |             |
| 2019   | Visit   |             | Jessica Cheung,   |             |
|        |         |             | MD [address] |             |
|        |         |             |   [place],   |             |
|        |         |             | [place] [address]             |             |
|        |         |             | [phone]         |             |
|        |         |             | [phone] (Fax)   |             |
+--------+---------+-------------+----------------------+-------------+
| / | Office  | Cardiology  |   Eric Akins, |             |
|    | Visit   |             |  MD  [address]   |             |
|        |         |             | [place] [facility] [place]  |             |
|        |         |             | [address]  [phone]  |             |
|        |         |             |  [phone] (Fax)  |             |
+--------+---------+-------------+----------------------+-------------+
 documented as of this encounter
 
 
 Procedures
 
 
+----------------------+--------+-------------+----------------------+----------------------
+
| Procedure Name       | Priori | Date/Time   | Associated Diagnosis | Comments             
|
|                      | ty     |             |                      |                      
|
+----------------------+--------+-------------+----------------------+----------------------
+
| CT CHEST WO CONTRAST | Routin | 2018  |                      |   Results for this   
|
|                      | e      | 10:56 AM    |                      | procedure are in the 
|
|                      |        | PDT         |                      |  results section.    
|
+----------------------+--------+-------------+----------------------+----------------------
+
 documented in this encounter
 
 Results
 CT Chest wo Contrast (2018 10:56 AM PDT)
 
+----------+
| Specimen |
+----------+
|          |
+----------+
 
 
 
+------------------------------------------------------------------------+--------------+
| Impressions                                                            | Performed At |
+------------------------------------------------------------------------+--------------+
|   1.   Scattered residual nodularity of the lungs. There is tree in    |              |
| bud opacity of the left lung base which may represent inflammatory     |              |
| process. The largest nodule of the right middle lobe measuring 0.6 x   |              |
| 0.4 cm. Follow-up CT scan could be obtained in 3-6   months.  2.       |              |
|   There is significant improvement of the consolidation from the prior |              |
|  exam dated 2018 particularly at the left upper lobe and     |              |
| right lung.     Electronically signed by Ananth Verduzco MD on 2018   |              |
| 12:34 PM                                                               |              |
+------------------------------------------------------------------------+--------------+
 
 
 
+------------------------------------------------------------------------+--------------+
| Narrative                                                              | Performed At |
+------------------------------------------------------------------------+--------------+
|   WYATT RAMIREZ  1945  72 years Male  CT CHEST WO CONTRAST         |              |
| 2018 10:56 AM     HISTORY: Multifocal consolidation and nodules.  |              |
| History of cancer.     COMPARISON: 2018     TECHNIQUE: CT    |              |
| scan of the chest without contrast.   5-mm thick helically acquired    |              |
| axial images were obtained in soft tissue and lung algorithm.          |              |
| Automated exposure control done to minimize dose.     FINDINGS:        |              |
| Bilateral centrilobular emphysema.     Residual innumerable small      |              |
| tree-in-bud opacity/nodules of the left lower lobe, may represent      |              |
| resolving inflammatory process.     The bilateral lung consolidation   |              |
| is significantly improved as compared to the prior examination         |              |
 
| particularly at the left upper lobe and right lung.     There is a     |              |
| persistent nodule of the right middle lobe (2/) measuring 0.6 x 0.4  |              |
| cm. Pulmonary nodule of the right middle lobe (2/73) measuring 0.5 cm. |              |
|  There are other scattered pulmonary lung nodules of the left upper    |              |
| lobe.     Mild atelectasis of the medial aspect of the right lower     |              |
| lobe.     Prior Nissen fundoplication.     No acute abnormality of the |              |
|  visualized portions of the upper abdomen.     No acute osseous        |              |
| abnormality.                                                           |              |
+------------------------------------------------------------------------+--------------+
 
 
 
+-------------------------------------------------------------------------------------------
--------------------------------------------------------------------------------------------
----------------------------------------------------------------------+
| Procedure Note                                                                            
                                                                                            
                                                                      |
+-------------------------------------------------------------------------------------------
--------------------------------------------------------------------------------------------
----------------------------------------------------------------------+
|   Maurizio, Rad Conversion - 2019  4:21 AM PDT  WYATT RAMIREZ89 years MaleCT     
                                                                                            
                                                                      |
| CHEST WO CONTRAST2018 10:56 AM HISTORY: Multifocal consolidation and nodules.        
                                                                                            
                                                                      |
| History of cancer. COMPARISON: 2018 TECHNIQUE: CT scan of the chest without     
                                                                                            
                                                                      |
| contrast.  5-mm thick helically acquired axial images were obtained in soft tissue and    
                                                                                            
                                                                      |
| lung algorithm. Automated exposure control done to minimize dose. FINDINGS:Bilateral      
                                                                                            
                                                                      |
| centrilobular emphysema. Residual innumerable small tree-in-bud opacity/nodules of the    
                                                                                            
                                                                      |
| left lower lobe, may represent resolving inflammatory process. The bilateral lung         
                                                                                            
                                                                      |
| consolidation is significantly improved as compared to the prior examination              
                                                                                            
                                                                      |
| particularly at the left upper lobe and right lung. There is a persistent nodule of the   
                                                                                            
                                                                      |
| right middle lobe (2/87) measuring 0.6 x 0.4 cm. Pulmonary nodule of the right middle     
                                                                                            
                                                                      |
| lobe (2/73) measuring 0.5 cm. There are other scattered pulmonary lung nodules of the     
                                                                                            
                                                                      |
| left upper lobe. Mild atelectasis of the medial aspect of the right lower lobe. Prior     
                                                                                            
                                                                      |
| Nissen fundoplication. No acute abnormality of the visualized portions of the upper       
                                                                                            
                                                                      |
 
| abdomen. No acute osseous abnormality. IMPRESSION: 1.  Scattered residual nodularity of   
                                                                                            
                                                                      |
| the lungs. There is tree in bud opacity of the left lung base which may represent         
                                                                                            
                                                                      |
| inflammatory process. The largest nodule of the right middle lobe measuring 0.6 x 0.4     
                                                                                            
                                                                      |
| cm. Follow-up CT scan could be obtained in 3-6 months.2.  There is significant            
                                                                                            
                                                                      |
| improvement of the consolidation from the prior exam dated 2018 particularly    
                                                                                            
                                                                      |
| at the left upper lobe and right lung. Electronically signed by Ananth Verduzco MD on         
                                                                                            
                                                                      |
| 2018 12:34 PM                                                                        
                                                                                            
                                                                      |
|                                                                                           
                                                                                            
                                                                      |
|Mild atelectasis of the medial aspect of the right lower lobe.                             
                                                                                            
                                                                      |
|                                                                                           
                                                                                            
                                                                      |
|Prior Nissen fundoplication.                                                               
                                                                                            
                                                                      |
|                                                                                           
                                                                                            
                                                                      |
|No acute abnormality of the visualized portions of the upper abdomen.                      
                                                                                            
                                                                      |
|                                                                                           
                                                                                            
                                                                      |
|No acute osseous abnormality.                                                              
                                                                                            
                                                                      |
|                                                                                           
                                                                                            
                                                                      |
|IMPRESSION:                                                                                
                                                                                            
                                                                      |
|1.  Scattered residual nodularity of the lungs. There is tree in bud opacity of the left jodie
ng base which may represent inflammatory process. The largest nodule of the right middle lob
e measuring 0.6 x 0.4 cm. Follow-up CT scan could be obtained in 3-6  |
|months.                                                                                    
                                                                                            
                                                                     |
|2.  There is significant improvement of the consolidation from the prior exam dated 2018 particularly at the left upper lobe and right lung.                                 
                                                                      |
 
|                                                                                           
                                                                                            
                                                                      |
|Electronically signed by Ananth Verduzco MD on 2018 12:34 PM                              
                                                                                            
                                                                      |
+-------------------------------------------------------------------------------------------
--------------------------------------------------------------------------------------------
----------------------------------------------------------------------+
 documented in this encounter
 
 Visit Diagnoses
 
 
+------------------------------------------------------------------------------------------+
| Diagnosis                                                                                |
+------------------------------------------------------------------------------------------+
|   Multifocal lung consolidation (HCC)  Pneumococcal pneumonia (streptococcus pneumoniae  |
| pneumonia)                                                                               |
+------------------------------------------------------------------------------------------+
 documented in this encounter

## 2019-12-06 NOTE — XMS
Encounter Summary
  Created on: 2019
 
 Shane Wyatttien BroussardJosue
 External Reference #: [id number]
 : 43
 Sex: Male
 
 Demographics
 
 
+-----------------------+---------------------------+
| Address               | [address]        |
|                       | [place]  [address] |
+-----------------------+---------------------------+
| Home Phone            | [phone]           |
+-----------------------+---------------------------+
| Preferred Language    | Unknown                   |
+-----------------------+---------------------------+
| Marital Status        |                    |
+-----------------------+---------------------------+
| Cheondoism Affiliation | [id number]                      |
+-----------------------+---------------------------+
| Race                  | Unknown                   |
+-----------------------+---------------------------+
| Ethnic Group          | Unknown                   |
+-----------------------+---------------------------+
 
 
 Author
 
 
+--------------+--------------------------------------------+
| Author       | [facility]  |
|              | [facility] [place]                                |
+--------------+--------------------------------------------+
| Organization | [facility]  |
|              | [facility] [place]                                |
+--------------+--------------------------------------------+
| Address      | Unknown                                    |
+--------------+--------------------------------------------+
| Phone        | Unavailable                                |
+--------------+--------------------------------------------+
 
 
 
 Support
 
 
+---------------+--------------+--------------------+-----------------+
| Name          | Relationship | Address            | Phone           |
+---------------+--------------+--------------------+-----------------+
| Opal Shane | ECON         | [address]     | [phone] |
|               |              | [place]   |                 |
|               |              | [address]              |                 |
+---------------+--------------+--------------------+-----------------+
 
 
 
 
 Care Team Providers
 
 
+------------------------+------+-----------------+
| Care Team Member Name  | Role | Phone           |
+------------------------+------+-----------------+
| Calvin Robertson MD | PCP  | [phone] |
+------------------------+------+-----------------+
 
 
 
 Encounter Details
 
 
+--------+-------------+---------------------+----------------------+-------------+
| Date   | Type        | Department          | Care Team            | Description |
+--------+-------------+---------------------+----------------------+-------------+
| 11/15/ | Orders Only |   [facility]     |   Neris Wilson, SHELLEY      |             |
|    |             | VASCULAR SURGERY    | [address]     |             |
|        |             | [address]  [address]    | [address]  [place], [place]  |             |
|        |             | [address]   | [address]  [phone]  |             |
|        |             | [place], [place]        |  [phone] (Fax)  |             |
|        |             | [address]          |                      |             |
|        |             | [phone]        |                      |             |
+--------+-------------+---------------------+----------------------+-------------+
 
 
 
 Social History
 
 
+---------------+------------+-----------+--------+------------------+
| Tobacco Use   | Types      | Packs/Day | Years  | Date             |
|               |            |           | Used   |                  |
+---------------+------------+-----------+--------+------------------+
| Former Smoker | Cigarettes | 1.3       | 34     | Quit: 1996 |
+---------------+------------+-----------+--------+------------------+
 
 
 
+---------------------+---+---+---+
| Smokeless Tobacco:  |   |   |   |
| Never Used          |   |   |   |
+---------------------+---+---+---+
 
 
 
+-------------+-------------+---------+----------+
| Alcohol Use | Drinks/Week | oz/Week | Comments |
+-------------+-------------+---------+----------+
| No          |             |         |          |
+-------------+-------------+---------+----------+
 
 
 
+------------------+---------------+
| Sex Assigned at  | Date Recorded |
| Birth            |               |
+------------------+---------------+
 
| Not on file      |               |
+------------------+---------------+
 
 
 
+----------------+-------------+-------------+
| Job Start Date | Occupation  | Industry    |
+----------------+-------------+-------------+
| Not on file    | Not on file | Not on file |
+----------------+-------------+-------------+
 
 
 
+----------------+--------------+------------+
| Travel History | Travel Start | Travel End |
+----------------+--------------+------------+
 
 
 
+-------------------------------------+
| No recent travel history available. |
+-------------------------------------+
 documented as of this encounter
 
 Plan of Treatment
 
 
+--------+---------+-------------+----------------------+-------------+
| Date   | Type    | Specialty   | Care Team            | Description |
+--------+---------+-------------+----------------------+-------------+
| / | Office  | Pulmonology |   Juan F,          |             |
| 2019   | Visit   |             | Jessica Cheung,   |             |
|        |         |             | MD [address] |             |
|        |         |             |   [place],   |             |
|        |         |             | [place] [address]             |             |
|        |         |             | [phone]         |             |
|        |         |             | [phone] (Fax)   |             |
+--------+---------+-------------+----------------------+-------------+
| / | Office  | Cardiology  |   Eric Akins, |             |
|    | Visit   |             |  MD [address]   |             |
|        |         |             | [address]  [place], [place]  |             |
|        |         |             | [address]  [phone]  |             |
|        |         |             |  [phone] (Fax)  |             |
+--------+---------+-------------+----------------------+-------------+
 documented as of this encounter
 
 Procedures
 
 
+------------------+--------+-------------+----------------------+----------------------+
| Procedure Name   | Priori | Date/Time   | Associated Diagnosis | Comments             |
|                  | ty     |             |                      |                      |
+------------------+--------+-------------+----------------------+----------------------+
| VAS AORTA ILIAC  | Routin | 11/15/2018  |                      |   Results for this   |
| DUPLEX LIMITED   | e      | 10:55 AM    |                      | procedure are in the |
|                  |        | PST         |                      |  results section.    |
+------------------+--------+-------------+----------------------+----------------------+
 documented in this encounter
 
 Results
 
 VAS Aorta Iliac Duplex Limited (11/15/2018 10:55 AM PST)
 
+----------+
| Specimen |
+----------+
|          |
+----------+
 
 
 
+------------------------------------------------------------------------+--------------+
| Narrative                                                              | Performed At |
+------------------------------------------------------------------------+--------------+
|   [facility]  US AORTA WITH DUPLEX DOPPLER  11/15/2018 10:55 AM       |              |
| HISTORY:   72 years.   Male.   Abdominal aortic aneurysm.              |              |
| COMPARISON:  None.     TECHNIQUE:  Sonographic evaluation of the       |              |
| abdominal aorta was performed.     FINDINGS:     Level   /   AP        |              |
| Diameter /   Transverse Diameter   (cm)     Proximal Abdominal Aorta:  |              |
|    2.8   /   2.6  Mid Abdominal Aorta:    2.9   /   3.2  Distal        |              |
| Abdominal Aorta:    4.9   /   4.7  Right Common Iliac Artery:    1.9   |              |
|   /   1.2  Left Common Iliac Artery:    1.7   /   1.2     Conclusion:  |              |
|  1.   Abdominal aortic aneurysm measuring 4.9 x 4.7 cm, unchanged from |              |
|  May 16, 2018.     Electronically signed by Ananth Verduzco MD on          |              |
| 11/15/2018 11:16 AM                                                    |              |
+------------------------------------------------------------------------+--------------+
 
 
 
+------------------------------------------------------------------------------------+
| Procedure Note                                                                     |
+------------------------------------------------------------------------------------+
|   Maurizio, Rad Conversion - 2019  3:16 PM PDT  [facility]                       |
| US AORTA WITH DUPLEX DOPPLER                                                       |
| 11/15/2018 10:55 AM                                                                |
|                                                                                    |
| HISTORY:  72 years.  Male.  Abdominal aortic aneurysm.                             |
|                                                                                    |
| COMPARISON:                                                                        |
| None.                                                                              |
|                                                                                    |
| TECHNIQUE:                                                                         |
| Sonographic evaluation of the abdominal aorta was performed.                       |
|                                                                                    |
| FINDINGS:                                                                          |
|                                                                                    |
| Level  /  AP Diameter /  Transverse Diameter  (cm)                                 |
|                                                                                    |
| Proximal Abdominal Aorta:   2.8  /  2.6                                            |
| Mid Abdominal Aorta:   2.9  /  3.2                                                 |
| Distal Abdominal Aorta:   4.9  /  4.7                                              |
| Right Common Iliac Artery:   1.9  /  1.2                                           |
| Left Common Iliac Artery:   1.7  /  1.2                                            |
|                                                                                    |
| Conclusion:                                                                        |
| 1.  Abdominal aortic aneurysm measuring 4.9 x 4.7 cm, unchanged from May 16, 2018. |
|                                                                                    |
| Electronically signed by Ananth Verduzco MD on 11/15/2018 11:16 AM                     |
+------------------------------------------------------------------------------------+
 documented in this encounter
 
 
 Visit Diagnoses
 Not on filedocumented in this encounter

## 2019-12-06 NOTE — XMS
Encounter Summary
  Created on: 2019
 
 Wyatt Shane
 External Reference #: [id number]
 : 43
 Sex: Male
 
 Demographics
 
 
+-----------------------+------------------------+
| Address               | [address]     |
|                       | [place]  [address]   |
+-----------------------+------------------------+
| Home Phone            | [phone]        |
+-----------------------+------------------------+
| Preferred Language    | Unknown                |
+-----------------------+------------------------+
| Marital Status        |                 |
+-----------------------+------------------------+
| Latter-day Affiliation | LUT                    |
+-----------------------+------------------------+
| Race                  | White                  |
+-----------------------+------------------------+
| Ethnic Group          | Not  or  |
+-----------------------+------------------------+
 
 
 Author
 
 
+--------------+-------------+
| Organization | Unknown     |
+--------------+-------------+
| Address      | Unknown     |
+--------------+-------------+
| Phone        | Unavailable |
+--------------+-------------+
 
 
 
 Support
 
 
+---------------+--------------+---------+-----------------+
| Name          | Relationship | Address | Phone           |
+---------------+--------------+---------+-----------------+
| Opal Shane | ECON         | Unknown | [phone] |
+---------------+--------------+---------+-----------------+
 
 
 
 Care Team Providers
 
 
+-----------------------+------+-----------------+
| Care Team Member Name | Role | Phone           |
 
+-----------------------+------+-----------------+
| Erwin Iniguez MD   | PCP  | [phone] |
+-----------------------+------+-----------------+
 
 
 
 Encounter Details
 
 
+--------+-------------+------------+---------------------+------------------+
| Date   | Type        | Department | Care Team           | Description      |
+--------+-------------+------------+---------------------+------------------+
| / | Procedure - |            |   Record, Operation | Operative Report |
| 2000   |             |            |                     |                  |
|        | Transcribed |            |                     |                  |
+--------+-------------+------------+---------------------+------------------+
 
 
 
 Social History
 
 
+----------------+-------+-----------+--------+------+
| Tobacco Use    | Types | Packs/Day | Years  | Date |
|                |       |           | Used   |      |
+----------------+-------+-----------+--------+------+
| Never Assessed |       |           |        |      |
+----------------+-------+-----------+--------+------+
 
 
 
+------------------+---------------+
| Sex Assigned at  | Date Recorded |
| Birth            |               |
+------------------+---------------+
| Not on file      |               |
+------------------+---------------+
 
 
 
+----------------+-------------+-------------+
| Job Start Date | Occupation  | Industry    |
+----------------+-------------+-------------+
| Not on file    | Not on file | Not on file |
+----------------+-------------+-------------+
 
 
 
+----------------+--------------+------------+
| Travel History | Travel Start | Travel End |
+----------------+--------------+------------+
 
 
 
+-------------------------------------+
| No recent travel history available. |
+-------------------------------------+
 documented as of this encounter
 
 Plan of Treatment
 
 Not on filedocumented as of this encounter
 
 Procedures
 
 
+------------------+--------+------------+----------------------+----------------------+
| Procedure Name   | Priori | Date/Time  | Associated Diagnosis | Comments             |
|                  | ty     |            |                      |                      |
+------------------+--------+------------+----------------------+----------------------+
| OPERATION RECORD |        | 2000 |                      |   Results for this   |
|                  |        |            |                      | procedure are in the |
|                  |        |            |                      |  results section.    |
+------------------+--------+------------+----------------------+----------------------+
 documented in this encounter
 
 Results
 OPERATION RECORD (2000)
 
+------------------------------------------------------------------------------------------+
| Transcriptions                                                                           |
+------------------------------------------------------------------------------------------+
|   Interface, Transcription In - 2006  1:10 AM PST                                  |
|    [place] [address]     |
| [place], [place] [address] [phone]  |
|                     [facility] RECORDMed Rec No.:         |
| [id number]            Date: 2000Name: Darien Shane SURGEON:                |
|                Jimmy Esposito M.D.ASSISTANTS:                                     Kadie |
|  JORGE Clark,                 |
| M.D.PREOPERATIVE DIAGNOSIS(ES):Laryngeal stenosis.POSTOPERATIVE DIAGNOSIS(ES):Laryngeal  |
| stenosis.OPERATIONS PERFORMED:1.    Laryngotracheoplasty with costal cartilage graft.2.  |
|    Tracheotomy.SPECIMEN(S) REMOVED:None dictated.INDICATIONS:The patient is a            |
| 54-year-old gentleman  who  has a history of squamous cellcarcinoma of the larynx.  He   |
| has had a  vertical hemilaryngectomy.  He alsohas bad reflux laryngitis.  He developed   |
| a  stenosis  of  his  larynx as aresult.  He has had multiple dilations  with no         |
| resolution of his symptoms.He is taken to the Operating Room for the above               |
| procedures.FINDINGS:Autologous rib graft was harvested.  He had a posterior as well as   |
| anteriorgraft placed.  Tracheostomy tube 14 size was used for the                        |
| tracheostomy.PROCEDURE:After the patient was correctly identified  he  was  taken to the |
|  OperatingRoom  and  placed  in  the  supine  position.    General   anesthesia          |
| wasadministered and the patient intubated.   The  table  was then turned.  Theprior neck |
|  incision was injected with  1  percent  lidocaine with 1:100,000epinephrine.  The       |
| patient was then prepped  and draped in the usual sterilefashion.The  skin  incision     |
| was  made  and the  subplatysmal  skin  flap  elevatedsuperiorly and inferiorly.  The    |
| strap muscles were identified and they weredivided in the midline.  The midline  thyroid |
|   nodule  was identified.  Thestrap muscles were fairly scarred.   These  were  taken    |
| off in the midlinelaterally on both sides.  The cricoid  cartilage  was identified and   |
| split.The Metzenbaum scissors were used to enter  the larynx.  The cartilage cutsand     |
| mucosa cuts were then made under  direct  vision.   This  was made in amidline fashion.  |
|  The thyroid cartilage  was  then  retracted  laterally onboth sides.  The posterior     |
| wall of the  larynx was then well-visualized andinjected  with  1  percent  lidocaine    |
| with  1:100,000  epinephrine.   Theposterior larynx was sectioned sharply  through  the  |
| mucosa in the midline.The cricoid cartilage was cut in the midline posteriorly.The       |
| tracheotomy  was then performed.    The  midline  strap  incision  wascontinued          |
| inferiorly.   The  anterior   face   of  the  trachea  was  wellidentified. This was     |
| retracted superiorly.   Tracheotomy was performed justunder approximately the third      |
| trachea ring.  The 2-0 silk stitch was placedthrough this inferior ring.  The            |
| endotracheal  tube  was then placed in thetrachea and sewed down.The rib cartilage was   |
| then harvested.   An incision was made over the thirdrib.  The electrocautery was used   |
| to  dissect  the  soft  tissue and muscleuntil the rib was identified.  The periosteal   |
| elevator was used to elevatein a supraperiosteal plane over the rib.   The rib           |
 
| guillotine was then usedto section the rib proximally and distally and the rib removed.  |
|  Hemostasiswas assured.  This wound was then closed  in  layers using a 3-0 Vicryl       |
| forthe deep tissue and 4-0 subcuticular stitch for the skin.The posterior graft was then |
|  carved  out  of  the  rib  cartilage.  A smallledge was used on both sides.  The        |
| perichondrium  was  placed  to  face  intoward the mucosal edge.  It was placed  between |
|  the posterior edges of thecricoid.  It fit well.  The anterior rib graft was then       |
| carved in a taperedfashion.  Again the mucosal edge was  faced  inward and ledges were   |
| made onboth sides.  It was seated into the larynx  and  three  2-0 Prolene on eachside   |
| were placed and the graft parachuted  down  a  tied.  Before the graftwas tied down, a   |
| tracheostomy tube was  inserted  through  the  tracheotomysite and the T-portion brought |
|  out through a separate skin incision.The strap muscles were then closed along  the      |
| midline.  The platysmal layerwas then closed.  The skin was closed  with  5-0 nylon.     |
| The wound was thendressed with a fluff and a Marlon wrap.   The  patient was taken to the |
|  PostAnesthesia Care Unit after he was awake and in good condition.COUNTS:The final      |
| count was correct.ATTENDING SURGEON'S ATTESTATION:Pursuant to Federal Medicare           |
| guidelines,  Dr. Jimmy Esposito was present forall key portions of the case.Kadie      |
| JORGE Clark M.D.CY:xt4D:  2000T:  [id number]:  |
|The Metzenbaum scissors were used to enter  the larynx.  The cartilage cuts               |
|and mucosa cuts were then made under  direct  vision.   This  was made in a               |
|midline fashion.  The thyroid cartilage  was  then  retracted  laterally on               |
|both sides.  The posterior wall of the  larynx was then well-visualized and               |
|injected  with  1  percent  lidocaine   with  1:100,000  epinephrine.   The               |
|posterior larynx was sectioned sharply  through  the mucosa in the midline.               |
|The cricoid cartilage was cut in the midline posteriorly.                                 |
|                                                                                          |
|The  tracheotomy  was then performed.    The  midline  strap  incision  was               |
|continued  inferiorly.   The  anterior   face   of  the  trachea  was  well               |
|identified. This was retracted superiorly.   Tracheotomy was performed just              |
|under approximately the third trachea ring.  The 2-0 silk stitch was placed               |
|through this inferior ring.  The endotracheal  tube  was then placed in the               |
|trachea and sewed down.                                                                   |
|                                                                                          |
|The rib cartilage was then harvested.   An incision was made over the third               |
|rib.  The electrocautery was used to  dissect  the  soft  tissue and muscle              |
|until the rib was identified.  The periosteal  elevator was used to elevate               |
|in a supraperiosteal plane over the rib.   The rib guillotine was then used               |
|to section the rib proximally and distally and the rib removed.  Hemostasis               |
|was assured.  This wound was then closed  in  layers using a 3-0 Vicryl for               |
|the deep tissue and 4-0 subcuticular stitch for the skin.                                 |
|                                                                                          |
|The posterior graft was then carved  out  of  the  rib  cartilage.  A small               |
|ledge was used on both sides.  The perichondrium  was  placed  to  face  in               |
|toward the mucosal edge.  It was placed  between the posterior edges of the               |
|cricoid.  It fit well.  The anterior rib graft was then carved in a tapered              |
|fashion.  Again the mucosal edge was  faced  inward and ledges were made on              |
|both sides.  It was seated into the larynx  and  three  2-0 Prolene on each               |
|side were placed and the graft parachuted  down  a  tied.  Before the graft               |
|was tied down, a tracheostomy tube was  inserted  through  the  tracheotomy               |
|site and the T-portion brought out through a separate skin incision.                      |
|                                                                                          |
|The strap muscles were then closed along  the midline.  The platysmal layer               |
|was then closed.  The skin was closed  with  5-0 nylon.  The wound was then               |
|dressed with a fluff and a Marlon wrap.   The  patient was taken to the Post               |
|Anesthesia Care Unit after he was awake and in good condition.                            |
|                                                                                          |
|COUNTS:                                                                                  |
|The final count was correct.                                                              |
|                                                                                          |
|ATTENDING SURGEON'S ATTESTATION:                                                          |
|Pursuant to Federal Medicare guidelines,  Dr. Jimmy Esposito was present for               |
 
|all key portions of the case.                                                             |
|                                                                                          |
|Kadie Clark M.D.                 Jimmy Esposito M.D.                                     |
|                                                                                          |
|CY:xt4                                                                                    |
|D:  2000                                                                            |
|T:  2000                                                                            |
|                                                                                          |
|[id number]                                                                                    |
|                                                                                          |
|CC:                                                                                      |
+------------------------------------------------------------------------------------------+
 documented in this encounter
 
 Visit Diagnoses
 Not on filedocumented in this encounter

## 2019-12-06 NOTE — XMS
Encounter Summary
  Created on: 2019
 
 Shane Wyatttien BroussardJosue
 External Reference #: [id number]
 : 43
 Sex: Male
 
 Demographics
 
 
+-----------------------+---------------------------+
| Address               | [address]        |
|                       | [place]  [address] |
+-----------------------+---------------------------+
| Home Phone            | [phone]           |
+-----------------------+---------------------------+
| Preferred Language    | Unknown                   |
+-----------------------+---------------------------+
| Marital Status        |                    |
+-----------------------+---------------------------+
| Scientologist Affiliation | [id number]                      |
+-----------------------+---------------------------+
| Race                  | Unknown                   |
+-----------------------+---------------------------+
| Ethnic Group          | Unknown                   |
+-----------------------+---------------------------+
 
 
 Author
 
 
+--------------+--------------------------------------------+
| Author       | [facility]  |
|              | and [place]                                |
+--------------+--------------------------------------------+
| Organization | [facility]  |
|              | [facility] [place]                                |
+--------------+--------------------------------------------+
| Address      | Unknown                                    |
+--------------+--------------------------------------------+
| Phone        | Unavailable                                |
+--------------+--------------------------------------------+
 
 
 
 Support
 
 
+---------------+--------------+--------------------+-----------------+
| Name          | Relationship | Address            | Phone           |
+---------------+--------------+--------------------+-----------------+
| Opal Shane | ECON         | [address]     | [phone] |
|               |              | [place]   |                 |
|               |              | [id number]              |                 |
+---------------+--------------+--------------------+-----------------+
 
 
 
 
 Care Team Providers
 
 
+-----------------------+------+-----------------+
| Care Team Member Name | Role | Phone           |
+-----------------------+------+-----------------+
| Erwin Iniguez MD | PCP  | [phone] |
+-----------------------+------+-----------------+
 
 
 
 Encounter Details
 
 
+--------+-----------+---------------------+--------------------+-------------+
| Date   | Type      | Department          | Care Team          | Description |
+--------+-----------+---------------------+--------------------+-------------+
| / | Hospital  |   [facility]    |   Conversion       |             |
| 2015   | Encounter | [facility]     | Transaction,       |             |
|        |           | CLINIC  [address]   | Provider Unknown   |             |
|        |           | [place]  [place], [place]  |        |             |
|        |           | [address]          |  (Fax) |             |
|        |           | [phone]        |                    |             |
+--------+-----------+---------------------+--------------------+-------------+
 
 
 
 Social History
 
 
+---------------+------------+-----------+--------+------------------+
| Tobacco Use   | Types      | Packs/Day | Years  | Date             |
|               |            |           | Used   |                  |
+---------------+------------+-----------+--------+------------------+
| Former Smoker | Cigarettes | 1.3       | 34     | Quit: 1996 |
+---------------+------------+-----------+--------+------------------+
 
 
 
+---------------------+---+---+---+
| Smokeless Tobacco:  |   |   |   |
| Never Used          |   |   |   |
+---------------------+---+---+---+
 
 
 
+-------------+-------------+---------+----------+
| Alcohol Use | Drinks/Week | oz/Week | Comments |
+-------------+-------------+---------+----------+
| No          |             |         |          |
+-------------+-------------+---------+----------+
 
 
 
+------------------+---------------+
| Sex Assigned at  | Date Recorded |
| Birth            |               |
+------------------+---------------+
| Not on file      |               |
 
+------------------+---------------+
 
 
 
+----------------+-------------+-------------+
| Job Start Date | Occupation  | Industry    |
+----------------+-------------+-------------+
| Not on file    | Not on file | Not on file |
+----------------+-------------+-------------+
 
 
 
+----------------+--------------+------------+
| Travel History | Travel Start | Travel End |
+----------------+--------------+------------+
 
 
 
+-------------------------------------+
| No recent travel history available. |
+-------------------------------------+
 documented as of this encounter
 
 Medications at Time of Discharge
 
 
+----------------------+----------------------+-----------+---------+----------+-----------+
| Medication           | Sig                  | Dispensed | Refills | Start    | End Date  |
|                      |                      |           |         | Date     |           |
+----------------------+----------------------+-----------+---------+----------+-----------+
|   acyclovir          | Apply  topically     |           | 0       |          |           |
| (ZOVIRAX) 5%         | every 3 hours.       |           |         |          |           |
| ointment             |                      |           |         |          |           |
+----------------------+----------------------+-----------+---------+----------+-----------+
|   albuterol (PROAIR  | Inhale 2 puffs into  |           | 0       |          |           |
| HFA) 90 mcg/puff     | the lungs every 6    |           |         |          |           |
| inhaler              | hours as needed for  |           |         |          |           |
|                      | Wheezing.            |           |         |          |           |
+----------------------+----------------------+-----------+---------+----------+-----------+
|   ferrous sulfate    | Take 325 mg by mouth |           | 0       | 20 |           |
| 325 mg tablet        |  3 times daily.      |           |         | 12       |           |
+----------------------+----------------------+-----------+---------+----------+-----------+
|   fluticasone        | one spray in each    |           | 0       | 20 |           |
| (FLONASE) 50         | nostril daily as     |           |         | 12       |           |
| mcg/nasal spray      | needed               |           |         |          |           |
+----------------------+----------------------+-----------+---------+----------+-----------+
|                      | Inhale 1 puff into   |           | 0       |          |           |
| fluticasone-salmeter | the lungs Twice      |           |         |          |           |
| ol (ADVAIR) 500-50   | Daily.               |           |         |          |           |
| mcg/puff diskus      |                      |           |         |          |           |
| inhaler              |                      |           |         |          |           |
+----------------------+----------------------+-----------+---------+----------+-----------+
|   folic acid 1 mg    | Take 1 mg by mouth   |           | 0       |          |           |
| tablet               | Daily.               |           |         |          |           |
+----------------------+----------------------+-----------+---------+----------+-----------+
|   furosemide (LASIX) | Take 40 mg by mouth  |           | 0       |          |           |
|  40 mg tablet        | Daily.               |           |         |          |           |
+----------------------+----------------------+-----------+---------+----------+-----------+
|   guaiFENesin        | Take 1,200 mg by     |           | 0       |          |           |
| (MUCINEX) 600 mg 12  | mouth 2 times daily. |           |         |          |           |
 
| hr tablet            |                      |           |         |          |           |
+----------------------+----------------------+-----------+---------+----------+-----------+
|   levothyroxine      | Take 75 mcg by mouth |           | 0       | 20 |           |
| (LEVOTHROID) 75 MCG  |  Daily.              |           |         | 12       |           |
| tablet               |                      |           |         |          |           |
+----------------------+----------------------+-----------+---------+----------+-----------+
|   metoclopramide     | Take 10 mg by mouth  |           | 0       | 20 |           |
| (REGLAN) 10 mg       | 4 times daily as     |           |         | 12       |           |
| tablet               | needed.              |           |         |          |           |
+----------------------+----------------------+-----------+---------+----------+-----------+
|                      | Apply  topically 2   |           | 0       |          |           |
| nystatin-triamcinolo | times daily.         |           |         |          |           |
| ne (MYCOLOG II)      |                      |           |         |          |           |
| cream                |                      |           |         |          |           |
+----------------------+----------------------+-----------+---------+----------+-----------+
|   omeprazole         | Take 20 mg by mouth  |           | 0       | 20 |           |
| (PRILOSEC) 20 mg     | 2 times daily.       |           |         | 12       |           |
| capsule              |                      |           |         |          |           |
+----------------------+----------------------+-----------+---------+----------+-----------+
|   potassium citrate  | Take 10 mEq by mouth |           | 0       |          |           |
| (UROCIT-K) 10 mEq SR |  2 times daily.      |           |         |          |           |
|  tablet              |                      |           |         |          |           |
+----------------------+----------------------+-----------+---------+----------+-----------+
|   predniSONE         | Take 10 mg by mouth  |           | 0       |          |           |
| (DELTASONE) 5 mg     | Daily.               |           |         |          |           |
| tablet               |                      |           |         |          |           |
+----------------------+----------------------+-----------+---------+----------+-----------+
|   tamsulosin         | Take 0.4 mg by mouth |           | 0       | 20 |           |
| (FLOMAX) 0.4 mg CAPS |  2 times daily.      |           |         | 12       |           |
+----------------------+----------------------+-----------+---------+----------+-----------+
|   acyclovir          | Take 400 mg by mouth |           | 0       | 20 |  |
| (ZOVIRAX) 400 MG     |  3 times daily as    |           |         | 12       | 9         |
| tablet               | needed.              |           |         |          |           |
+----------------------+----------------------+-----------+---------+----------+-----------+
|   Cholecalciferol    | Take 2,000 Units by  |           | 0       | 20 |  |
| (VITAMIN D3) 2000    | mouth Daily.         |           |         | 12       | 9         |
| UNITS CAPS           |                      |           |         |          |           |
+----------------------+----------------------+-----------+---------+----------+-----------+
|   cyanocobalamin     | injected             |           | 0       | 20 |  |
| (VITAMIN B-12) 1,000 | intramuscularly once |           |         | 12       | 9         |
|  mcg/mL injection    |  a month             |           |         |          |           |
+----------------------+----------------------+-----------+---------+----------+-----------+
|   digoxin (LANOXIN)  | Take 250 mcg by      |           | 0       |          |  |
| 250 mcg tablet       | mouth Daily.      |           |         |          | 5         |
|                      | tablet daily         |           |         |          |           |
+----------------------+----------------------+-----------+---------+----------+-----------+
|   diltiazem          | Take 120 mg by mouth |           | 0       |          |  |
| (DILT-XR) 120 mg 24  |  Daily.              |           |         |          | 9         |
| hr capsule           |                      |           |         |          |           |
+----------------------+----------------------+-----------+---------+----------+-----------+
|   loratadine         | Take 10 mg by mouth  |           | 0       |          |  |
| (CLARITIN) 10 mg     | Daily.               |           |         |          | 9         |
| tablet               |                      |           |         |          |           |
+----------------------+----------------------+-----------+---------+----------+-----------+
|   losartan (COZAAR)  | Take 100 mg by mouth |           | 0       |          |  |
| 100 MG tablet        |  Daily. 1/2 daily    |           |         |          | 9         |
+----------------------+----------------------+-----------+---------+----------+-----------+
|   methotrexate 2.5   | Take 15 mg by mouth  |           | 0       |          |  |
| mg tablet            | Once a week.         |           |         |          | 9         |
+----------------------+----------------------+-----------+---------+----------+-----------+
 
|                      | Take 1 tablet by     |           | 0       |          |  |
| oxyCODONE-acetaminop | mouth every 4 hours  |           |         |          | 9         |
| hen (PERCOCET) 5-325 | as needed for Pain.  |           |         |          |           |
|  mg per tablet       |                      |           |         |          |           |
+----------------------+----------------------+-----------+---------+----------+-----------+
|   pseudoePHEDrine    | Take 30 mg by mouth  |           | 0       |          |  |
| (SUDOGEST) 30 mg     | every 4 hours as     |           |         |          | 9         |
| tablet               | needed for           |           |         |          |           |
|                      | Congestion.          |           |         |          |           |
+----------------------+----------------------+-----------+---------+----------+-----------+
|   rivaroxaban        | Take 20 mg by mouth  |           | 0       |          |  |
| (XARELTO) 20 mg      | Daily (with dinner). |           |         |          | 9         |
| tablet               |                      |           |         |          |           |
+----------------------+----------------------+-----------+---------+----------+-----------+
|   sertraline         | 2 tablets daily      |           | 0       | 20 |  |
| (ZOLOFT) 100 mg      |                      |           |         | 12       | 9         |
| tablet               |                      |           |         |          |           |
+----------------------+----------------------+-----------+---------+----------+-----------+
 documented as of this encounter
 
 Plan of Treatment
 
 
+--------+---------+-------------+----------------------+-------------+
| Date   | Type    | Specialty   | Care Team            | Description |
+--------+---------+-------------+----------------------+-------------+
| / | Office  | Pulmonology |   Juan F,          |             |
| 2019   | Visit   |             | Jessica Cheung,   |             |
|        |         |             | MD [address] |             |
|        |         |             |   [place],   |             |
|        |         |             | [place] [address]             |             |
|        |         |             | [phone]         |             |
|        |         |             | [phone] (Fax)   |             |
+--------+---------+-------------+----------------------+-------------+
| / | Office  | Cardiology  |   Eric Akins, |             |
| 2020   | Visit   |             |  MD  [address]   |             |
|        |         |             | [place], [place]  |             |
|        |         |             | [address]  [phone]  |             |
|        |         |             |  [phone] (Fax)  |             |
+--------+---------+-------------+----------------------+-------------+
 documented as of this encounter
 
 Procedures
 
 
+-------------------+--------+-------------+----------------------+----------------------+
| Procedure Name    | Priori | Date/Time   | Associated Diagnosis | Comments             |
|                   | ty     |             |                      |                      |
+-------------------+--------+-------------+----------------------+----------------------+
| EXTERNAL LAB: [facility] | Routin | 2015  |                      |   Results for this   |
|                   | e      | 12:02 PM    |                      | procedure are in the |
|                   |        | PDT         |                      |  results section.    |
+-------------------+--------+-------------+----------------------+----------------------+
 documented in this encounter
 
 Results
 External Lab: [facility] (2015 12:02 PM PDT)
 
+-------------+--------------------------+-----------------+------------+--------------+
| Component   | Value                    | Ref Range       | Performed  | Pathologist  |
 
|             |                          |                 | At         | Signature    |
+-------------+--------------------------+-----------------+------------+--------------+
| WBC         | 9.22Comment: Testing     | 3.80 - 11.00    | EXTERNAL   |              |
|             | performed at [facility], [address] | K/uL            | LAB        |              |
|             |  [address],        |                 |            |              |
|             | [place]  [address]     |                 |            |              |
+-------------+--------------------------+-----------------+------------+--------------+
| RED CELL    | 4.02 (L)Comment: Testing | 4.20 - 5.70     | EXTERNAL   |              |
| COUNT       |  performed at [facility], [address]  | M/uL            | LAB        |              |
|             | [address],       |                 |            |              |
|             | [place]  [address]     |                 |            |              |
+-------------+--------------------------+-----------------+------------+--------------+
| Hgb         | 12.2 (L)Comment: Testing | 13.2 - 17.0     | EXTERNAL   |              |
|             |  performed at [facility], [address]  | g/dL            | LAB        |              |
|             | [address],       |                 |            |              |
|             | [place]  [address]     |                 |            |              |
+-------------+--------------------------+-----------------+------------+--------------+
| Hematocrit, | 36.8 (L)Comment: Testing | 39.0 - 50.0 %   | EXTERNAL   |              |
|  POC        |  performed at [facility], [address]  |                 | LAB        |              |
|             | [address],       |                 |            |              |
|             | [place]  [address]     |                 |            |              |
+-------------+--------------------------+-----------------+------------+--------------+
| MCV         | 91.7Comment: Testing     | 80.0 - 100.0 fl | EXTERNAL   |              |
|             | performed at [facility], [address] |                 | LAB        |              |
|             |  [address],        |                 |            |              |
|             | [place]  [address]     |                 |            |              |
+-------------+--------------------------+-----------------+------------+--------------+
| MCH         | 30.4Comment: Testing     | 27.0 - 34.0 pg  | EXTERNAL   |              |
|             | performed at [facility], [address] |                 | LAB        |              |
|             |  [address],        |                 |            |              |
|             | [place]  [address]     |                 |            |              |
+-------------+--------------------------+-----------------+------------+--------------+
| MCHC        | 33.2Comment: Testing     | 32.0 - 35.5     | EXTERNAL   |              |
|             | performed at [facility], [address] | g/dL            | LAB        |              |
|             |  [address],        |                 |            |              |
|             | [place]  [address]     |                 |            |              |
+-------------+--------------------------+-----------------+------------+--------------+
| RDW-CV      | 58.6 (H)Comment: Testing | 37 - 53 fl      | EXTERNAL   |              |
|             |  performed at [facility], [address]  |                 | LAB        |              |
|             | [address],       |                 |            |              |
|             | [place]  [address]     |                 |            |              |
+-------------+--------------------------+-----------------+------------+--------------+
| Platelet    | 166Comment: Testing      | 150 - 400 K/uL  | EXTERNAL   |              |
| Count       | performed at [facility], [address] |                 | LAB        |              |
| Plasma      |  [address],        |                 |            |              |
|             | [place]  [address]     |                 |            |              |
+-------------+--------------------------+-----------------+------------+--------------+
| MPV         | 8.7Comment: Testing      | fl              | EXTERNAL   |              |
|             | performed at [facility], [address] |                 | LAB        |              |
|             |  [address],        |                 |            |              |
|             | [place]  [address]     |                 |            |              |
+-------------+--------------------------+-----------------+------------+--------------+
| Differentia | MANUALComment: Testing   |                 | EXTERNAL   |              |
| l Type      | performed at [facility], [address] |                 | LAB        |              |
|             |  [address],        |                 |            |              |
|             | [place]  [address]     |                 |            |              |
+-------------+--------------------------+-----------------+------------+--------------+
| Segmented   | 81Comment: Testing       | %               | EXTERNAL   |              |
| Neutrophils | performed at [facility], [address] |                 | LAB        |              |
|  Manual     |  [place],        |                 |            |              |
 
|             | [place]  [address]     |                 |            |              |
+-------------+--------------------------+-----------------+------------+--------------+
| % Bands     | 4Comment: Testing        | %               | EXTERNAL   |              |
|             | performed at [facility], [address] |                 | LAB        |              |
|             |  [address],        |                 |            |              |
|             | [place]  [address]     |                 |            |              |
+-------------+--------------------------+-----------------+------------+--------------+
| Lymphocytes | 8Comment: Testing        | %               | EXTERNAL   |              |
|  Manual     | performed at [facility], [address] |                 | LAB        |              |
|             |  [address],        |                 |            |              |
|             | [place]  [id number]     |                 |            |              |
+-------------+--------------------------+-----------------+------------+--------------+
| Monocytes   | 6Comment: Testing        | %               | EXTERNAL   |              |
| Manual      | performed at [facility], [address] |                 | LAB        |              |
|             |  [address],        |                 |            |              |
|             | [place]  [id number]     |                 |            |              |
+-------------+--------------------------+-----------------+------------+--------------+
| Eosinophils | 1Comment: Testing        | %               | EXTERNAL   |              |
|  Manual     | performed at [facility], [address] |                 | LAB        |              |
|             |  [address],        |                 |            |              |
|             | [place]  [id number]     |                 |            |              |
+-------------+--------------------------+-----------------+------------+--------------+
| Absolute    | 7.47 (H)Comment: Testing | 1.90 - 7.40     | EXTERNAL   |              |
| Neutrophils |  performed at [facility], [address]  | K/uL            | LAB        |              |
|             | [address],       |                 |            |              |
|             | [place]  [id number]     |                 |            |              |
+-------------+--------------------------+-----------------+------------+--------------+
| Bands       | 0.37 (H)Comment: Testing | 0.00 - 0.20     | EXTERNAL   |              |
| Manual      |  performed at [facility], [address]  | K/uL            | LAB        |              |
|             | [address],       |                 |            |              |
|             | [place]  [address]     |                 |            |              |
+-------------+--------------------------+-----------------+------------+--------------+
| Absolute    | 0.74 (L)Comment: Testing | 1.00 - 3.90     | EXTERNAL   |              |
| Lymphocytes |  performed at [facility], [address]  | K/uL            | LAB        |              |
|             | [address],       |                 |            |              |
|             | [place]  [address]     |                 |            |              |
+-------------+--------------------------+-----------------+------------+--------------+
| Absolute    | 0.55Comment: Testing     | 0.00 - 0.80     | EXTERNAL   |              |
| Monocytes   | performed at [facility], [address] | K/uL            | LAB        |              |
|             |  [address],        |                 |            |              |
|             | [place]  [address]     |                 |            |              |
+-------------+--------------------------+-----------------+------------+--------------+
| Absolute    | 0.09Comment: Testing     | 0.00 - 0.50     | EXTERNAL   |              |
| Eosinophils | performed at [facility], [address] | K/uL            | LAB        |              |
|             |  [address],        |                 |            |              |
|             | [place]  [id number]     |                 |            |              |
+-------------+--------------------------+-----------------+------------+--------------+
| RBC         | 2+Comment: ANISOTesting  |                 | EXTERNAL   |              |
| Morphology  | performed at [facility], [address] |                 | LAB        |              |
|             |  [address],        |                 |            |              |
|             | [place]   [id number]    |                 |            |              |
|             |                          |                 |            |              |
+-------------+--------------------------+-----------------+------------+--------------+
 
 
 
+-----------------+
| Specimen        |
+-----------------+
| Blood specimen  |
 
| (specimen)      |
+-----------------+
 
 
 
+----------------+---------+--------------------+--------------+
| Performing     | Address | City/State/Zipcode | Phone Number |
| Organization   |         |                    |              |
+----------------+---------+--------------------+--------------+
|   EXTERNAL LAB |         |                    |              |
+----------------+---------+--------------------+--------------+
 documented in this encounter
 
 Visit Diagnoses
 Not on filedocumented in this encounter

## 2019-12-06 NOTE — XMS
Encounter Summary
  Created on: 2019
 
 Wyatt Shane
 External Reference #: [id number]
 : 43
 Sex: Male
 
 Demographics
 
 
+-----------------------+------------------------+
| Address               | [address]     |
|                       | [place]  [address]   |
+-----------------------+------------------------+
| Home Phone            | [phone]        |
+-----------------------+------------------------+
| Preferred Language    | Unknown                |
+-----------------------+------------------------+
| Marital Status        |                 |
+-----------------------+------------------------+
| Restoration Affiliation | LUT                    |
+-----------------------+------------------------+
| Race                  | White                  |
+-----------------------+------------------------+
| Ethnic Group          | Not  or  |
+-----------------------+------------------------+
 
 
 Author
 
 
+--------------+------------------------------+
| Author       | [facility] |
+--------------+------------------------------+
| Organization | [facility] |
+--------------+------------------------------+
| Address      | Unknown                      |
+--------------+------------------------------+
| Phone        | Unavailable                  |
+--------------+------------------------------+
 
 
 
 Support
 
 
+---------------+--------------+---------+-----------------+
| Name          | Relationship | Address | Phone           |
+---------------+--------------+---------+-----------------+
| Opal Shane | ECON         | Unknown | [phone] |
+---------------+--------------+---------+-----------------+
 
 
 
 Care Team Providers
 
 
 
+-----------------------+------+-----------------+
| Care Team Member Name | Role | Phone           |
+-----------------------+------+-----------------+
| Erwin Iniguez MD   | PCP  | [phone] |
+-----------------------+------+-----------------+
 
 
 
 Encounter Details
 
 
+--------+-------------+-----------------+---------------------+---------------+
| Date   | Type        | Department      | Care Team           | Description   |
+--------+-------------+-----------------+---------------------+---------------+
| / | Office      |   [facility]  |   Note, Outpatient  | Progress Note |
|    | Visit-Trans | MEDICINE        | Clinic              |               |
|        | cribed      |                 |                     |               |
+--------+-------------+-----------------+---------------------+---------------+
 
 
 
 Social History
 
 
+----------------+-------+-----------+--------+------+
| Tobacco Use    | Types | Packs/Day | Years  | Date |
|                |       |           | Used   |      |
+----------------+-------+-----------+--------+------+
| Never Assessed |       |           |        |      |
+----------------+-------+-----------+--------+------+
 
 
 
+------------------+---------------+
| Sex Assigned at  | Date Recorded |
| Birth            |               |
+------------------+---------------+
| Not on file      |               |
+------------------+---------------+
 
 
 
+----------------+-------------+-------------+
| Job Start Date | Occupation  | Industry    |
+----------------+-------------+-------------+
| Not on file    | Not on file | Not on file |
+----------------+-------------+-------------+
 
 
 
+----------------+--------------+------------+
| Travel History | Travel Start | Travel End |
+----------------+--------------+------------+
 
 
 
+-------------------------------------+
| No recent travel history available. |
+-------------------------------------+
 documented as of this encounter
 
 
 Progress Notes
 Interface, Transcription In - 2007  5:11 AM PSTCLINIC DATE:       1998
 
                 OTOLARYNGOLOGY/HEAD AND NECK SURGERY CLINIC
 
 Mr. Shane is seen back preoperatively with respect to the proposed
 panendoscopy and has a full MI regarding the proposed procedure and consent
 was signed.  I will see him back tomorrow for surgery.
 
 Jimmy Esposito M.D.
 , Otolaryngology
 Head and Neck Surgery
 
 MARIA ALEJANDRA:tabitha
 D: [birth year]
 T: 98Electronically signed by Interface, Transcription In at 2007  5:11 AM PSTd
ocumented in this encounter
 
 Plan of Treatment
 Not on filedocumented as of this encounter
 
 Visit Diagnoses
 Not on filedocumented in this encounter

## 2019-12-06 NOTE — XMS
Encounter Summary
  Created on: 2019
 
 Wyatt Shane
 External Reference #: [id number]
 : 43
 Sex: Male
 
 Demographics
 
 
+-----------------------+------------------------+
| Address               | [address]     |
|                       | [place]  [address]   |
+-----------------------+------------------------+
| Home Phone            | [phone]        |
+-----------------------+------------------------+
| Preferred Language    | Unknown                |
+-----------------------+------------------------+
| Marital Status        |                 |
+-----------------------+------------------------+
| Restoration Affiliation | LUT                    |
+-----------------------+------------------------+
| Race                  | White                  |
+-----------------------+------------------------+
| Ethnic Group          | Not  or  |
+-----------------------+------------------------+
 
 
 Author
 
 
+--------------+-------------+
| Organization | Unknown     |
+--------------+-------------+
| Address      | Unknown     |
+--------------+-------------+
| Phone        | Unavailable |
+--------------+-------------+
 
 
 
 Support
 
 
+---------------+--------------+---------+-----------------+
| Name          | Relationship | Address | Phone           |
+---------------+--------------+---------+-----------------+
| Opal Shane | ECON         | Unknown | [phone] |
+---------------+--------------+---------+-----------------+
 
 
 
 Care Team Providers
 
 
+-----------------------+------+-----------------+
| Care Team Member Name | Role | Phone           |
 
+-----------------------+------+-----------------+
| Erwin Iniguez MD   | PCP  | [phone] |
+-----------------------+------+-----------------+
 
 
 
 Encounter Details
 
 
+--------+-------------+------------+------------------+-------------+
| Date   | Type        | Department | Care Team        | Description |
+--------+-------------+------------+------------------+-------------+
| / | Letter[facility] |            |   Letters, Other | Letters     |
| 2003   | scribed     |            |                  |             |
+--------+-------------+------------+------------------+-------------+
 
 
 
 Social History
 
 
+----------------+-------+-----------+--------+------+
| Tobacco Use    | Types | Packs/Day | Years  | Date |
|                |       |           | Used   |      |
+----------------+-------+-----------+--------+------+
| Never Assessed |       |           |        |      |
+----------------+-------+-----------+--------+------+
 
 
 
+------------------+---------------+
| Sex Assigned at  | Date Recorded |
| Birth            |               |
+------------------+---------------+
| Not on file      |               |
+------------------+---------------+
 
 
 
+----------------+-------------+-------------+
| Job Start Date | Occupation  | Industry    |
+----------------+-------------+-------------+
| Not on file    | Not on file | Not on file |
+----------------+-------------+-------------+
 
 
 
+----------------+--------------+------------+
| Travel History | Travel Start | Travel End |
+----------------+--------------+------------+
 
 
 
+-------------------------------------+
| No recent travel history available. |
+-------------------------------------+
 documented as of this encounter
 
 Progress Notes
 Interface, Transcription In - 2006  1:04 AM DANIA                                    [place]
 
[facility] [place] [address] [place], [place] [phone] or [phone]
 
 2003
 
 Erwin Iniguez M.D.
 [address] [place] [address] [address]
 [place]  [address]
 
 RE:  SAMUEL SHANE
 MR #: [id number]
 
 Dear Dr. Iniguez:
 
 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
 carcinoma.  Please record that on full examination, as outlined in my
 handwritten notes, he has no evidence of recurrent disease.  He is
 beginning to have a few mild symptoms of gastroesophageal reflux again
 although these are very mild and at the present time I think they can be
 monitored alone.  He shows no evidence on his examination of a significant
 erythema or irritation from this.
 
 I plan to see him back here in 6 months with a chest x-ray which will be
 his 5-year followup visit at the point of which we would normally discharge
 him from followup in terms of cancer care.
 
 I appreciate the chance to care for him.  Please call me if you have any
 questions.
 
 Yours sincerely,
 
 Jimmy Esposito M.D.
 
 [facility] / 
 [id number] / [id number] / [id number] /
 D: 2003
 T: 2003
 
 cc:
 
 Eddy Boone M.D.
 [address] [facility] [address]
 [place], [place]  [id number] signed by Interface, Transcription In at 2006  1:0
4 AM PDTdocumented in this encounter
 
 Plan of Treatment
 Not on filedocumented as of this encounter
 
 Visit Diagnoses
 Not on filedocumented in this encounter

## 2019-12-06 NOTE — XMS
Encounter Summary
  Created on: 2019
 
 Wyatt Shane
 External Reference #: [id number]
 : 43
 Sex: Male
 
 Demographics
 
 
+-----------------------+------------------------+
| Address               | [address]     |
|                       | [place]  [address]   |
+-----------------------+------------------------+
| Home Phone            | [phone]        |
+-----------------------+------------------------+
| Preferred Language    | Unknown                |
+-----------------------+------------------------+
| Marital Status        |                 |
+-----------------------+------------------------+
| Anabaptist Affiliation | LUT                    |
+-----------------------+------------------------+
| Race                  | White                  |
+-----------------------+------------------------+
| Ethnic Group          | Not  or  |
+-----------------------+------------------------+
 
 
 Author
 
 
+--------------+------------------------------+
| Author       | [facility] |
+--------------+------------------------------+
| Organization | [facility] |
+--------------+------------------------------+
| Address      | Unknown                      |
+--------------+------------------------------+
| Phone        | Unavailable                  |
+--------------+------------------------------+
 
 
 
 Support
 
 
+---------------+--------------+---------+-----------------+
| Name          | Relationship | Address | Phone           |
+---------------+--------------+---------+-----------------+
| Opal Shane | ECON         | Unknown | [phone] |
+---------------+--------------+---------+-----------------+
 
 
 
 Care Team Providers
 
 
 
+-----------------------+------+-----------------+
| Care Team Member Name | Role | Phone           |
+-----------------------+------+-----------------+
| Erwin Iniguez MD   | PCP  | [phone] |
+-----------------------+------+-----------------+
 
 
 
 Encounter Details
 
 
+--------+-----------+----------------------+-----------+-------------+
| Date   | Type      | Department           | Care Team | Description |
+--------+-----------+----------------------+-----------+-------------+
| 09/15/ | Hospital  |   Diagnostic Imaging |           |             |
|    | Encounter |  Services at [facility]     |           |             |
|        |           | [address]  |           |             |
|        |           | [place]  Mailcode:   |           |             |
|        |           | [address]  [facility]  |           |             |
|        |           |  [place], [place]        |           |             |
|        |           | [address]           |           |             |
|        |           | [phone]         |           |             |
+--------+-----------+----------------------+-----------+-------------+
 
 
 
 Social History
 
 
+---------------+------------+-----------+--------+------------------+
| Tobacco Use   | Types      | Packs/Day | Years  | Date             |
|               |            |           | Used   |                  |
+---------------+------------+-----------+--------+------------------+
| Former Smoker | Cigarettes | 1         | 30     | Quit: 1996 |
+---------------+------------+-----------+--------+------------------+
 
 
 
+-------------+-------------+---------+----------+
| Alcohol Use | Drinks/Week | oz/Week | Comments |
+-------------+-------------+---------+----------+
| No          |             |         |          |
+-------------+-------------+---------+----------+
 
 
 
+------------------+---------------+
| Sex Assigned at  | Date Recorded |
| Birth            |               |
+------------------+---------------+
| Not on file      |               |
+------------------+---------------+
 
 
 
+----------------+-------------+-------------+
| Job Start Date | Occupation  | Industry    |
+----------------+-------------+-------------+
| Not on file    | Not on file | Not on file |
+----------------+-------------+-------------+
 
 
 
 
+----------------+--------------+------------+
| Travel History | Travel Start | Travel End |
+----------------+--------------+------------+
 
 
 
+-------------------------------------+
| No recent travel history available. |
+-------------------------------------+
 documented as of this encounter
 
 Medications at Time of Discharge
 
 
+----------------------+----------------------+-----------+---------+----------+----------+
| Medication           | Sig                  | Dispensed | Refills | Start    | End Date |
|                      |                      |           |         | Date     |          |
+----------------------+----------------------+-----------+---------+----------+----------+
|   ACYCLOVIR ORAL     | Take  by mouth as    |           | 0       | 20 |          |
|                      | needed.              |           |         | 10       |          |
+----------------------+----------------------+-----------+---------+----------+----------+
|                      | Take  by mouth.      |           | 0       |          |          |
| Amlodipine-Atorvasta |                      |           |         |          |          |
| tin (CADUET) 10-10   |                      |           |         |          |          |
| mg Oral Tablet       |                      |           |         |          |          |
+----------------------+----------------------+-----------+---------+----------+----------+
|   CYANOCOBALAMIN     | by Injection route   |           | 0       | 20 |          |
| (VITAMIN B-12 INJ)   | every thirty days.   |           |         | 10       |          |
+----------------------+----------------------+-----------+---------+----------+----------+
|   gabapentin 300 mg  | Take 300 mg by mouth |           | 0       |          |          |
| Oral Tablet          |  three times daily.  |           |         |          |          |
+----------------------+----------------------+-----------+---------+----------+----------+
|   LANSOPRAZOLE       | Take  by mouth.      |           | 0       |          |          |
| (PREVACID ORAL)      |                      |           |         |          |          |
+----------------------+----------------------+-----------+---------+----------+----------+
|   LORATADINE         | Take  by mouth.      |           | 0       |          |          |
| (CLARITIN ORAL)      |                      |           |         |          |          |
+----------------------+----------------------+-----------+---------+----------+----------+
|   metoclopramide     | Take 5 mg by mouth   |           | 0       |          |          |
| (REGLAN) 5 mg Oral   | every six hours as   |           |         |          |          |
| Tablet               | needed.              |           |         |          |          |
+----------------------+----------------------+-----------+---------+----------+----------+
|   NAPROXEN           | Take  by mouth.      |           | 0       |          |          |
| SODIUM/P-EPHED HCL   |                      |           |         |          |          |
| (SUDAFED 12 HR       |                      |           |         |          |          |
| SINUS-PAIN ORAL)     |                      |           |         |          |          |
+----------------------+----------------------+-----------+---------+----------+----------+
|   sertraline         | Take 50 mg by mouth  |           | 0       |          |          |
| (ZOLOFT) 50 mg Oral  | once daily.          |           |         |          |          |
| Tablet               |                      |           |         |          |          |
+----------------------+----------------------+-----------+---------+----------+----------+
|   tamsulosin         | Take 0.4 mg by mouth |           | 0       |          |          |
| (FLOMAX) 0.4 mg Oral |  once daily.         |           |         |          |          |
|  Capsule, Sust.      |                      |           |         |          |          |
| Release 24 hr        |                      |           |         |          |          |
+----------------------+----------------------+-----------+---------+----------+----------+
|   TESTOSTERONE IM    | Inject  into the     |           | 0       |          |          |
 
|                      | muscle (IM).         |           |         |          |          |
+----------------------+----------------------+-----------+---------+----------+----------+
|   VICODIN ORAL       | None Entered         |           | 0       |          |          |
+----------------------+----------------------+-----------+---------+----------+----------+
 documented as of this encounter
 
 Plan of Treatment
 Not on filedocumented as of this encounter
 
 Procedures
 
 
+------------------+--------+-------------+----------------------+----------------------+
| Procedure Name   | Priori | Date/Time   | Associated Diagnosis | Comments             |
|                  | ty     |             |                      |                      |
+------------------+--------+-------------+----------------------+----------------------+
| MODIFIED BARIUM  | Routin | 09/15/2010  |   Laryngeal cancer   |   Results for this   |
| SWALLOWING       | e      | 10:14 AM    | (HCC)  Pharyngeal    | procedure are in the |
|                  |        | PDT         | dysphagia            |  results section.    |
+------------------+--------+-------------+----------------------+----------------------+
 documented in this encounter
 
 Results
 MODIFIED BARIUM SWALLOWING (09/15/2010 10:14 AM PDT)
 
+-------------+--------------------------+-----------+------------+--------------+
| Component   | Value                    | Ref Range | Performed  | Pathologist  |
|             |                          |           | At         | Signature    |
+-------------+--------------------------+-----------+------------+--------------+
| ESOPHAGUS   | Examination: Modified    |           |            |              |
| W/BARIUM/FO | barium swallowing study. |           |            |              |
| OD/PHONAT   |    Technique             |           |            |              |
|             | andfindings: The study   |           |            |              |
|             | was performed in         |           |            |              |
|             | conjunction with the     |           |            |              |
|             | [facility] speechpathologist.  |           |            |              |
|             |   The patient drank a    |           |            |              |
|             | variety of barium        |           |            |              |
|             | impregnatedliquids and   |           |            |              |
|             | solids and swallowed a   |           |            |              |
|             | barium tablet using      |           |            |              |
|             | fluoroscopicobservation. |           |            |              |
|             |    There was no delay in |           |            |              |
|             |  initiating the primary  |           |            |              |
|             | propulsivewave on        |           |            |              |
|             | swallowing.   The        |           |            |              |
|             | patient swallowed thin   |           |            |              |
|             | barium withoutdifficulty |           |            |              |
|             |  with no obstruction of  |           |            |              |
|             | flow.   With thicker     |           |            |              |
|             | texture bariumliquids,   |           |            |              |
|             | there was mild partial   |           |            |              |
|             | delay and about the C6   |           |            |              |
|             | levelassociated with a   |           |            |              |
|             | mild short segment of    |           |            |              |
|             | circumferential          |           |            |              |
|             | narrowing ofthe proximal |           |            |              |
|             |  esophagus at this level |           |            |              |
|             |  but no sign of          |           |            |              |
|             | extravasation.There is   |           |            |              |
 
|             | no evidence for          |           |            |              |
|             | obstruction of flow of   |           |            |              |
|             | solid materials.         |           |            |              |
|             |   Theingested barium     |           |            |              |
|             | tablet passed through    |           |            |              |
|             | the oropharynx,          |           |            |              |
|             | hypopharynx andthoracic  |           |            |              |
|             | esophagus but there was  |           |            |              |
|             | delay in passage at      |           |            |              |
|             | thegastroesophageal      |           |            |              |
|             | junction.   See formal   |           |            |              |
|             | esophagram report.       |           |            |              |
|             | Impression: No sign of   |           |            |              |
|             | extravasation or         |           |            |              |
|             | aspiration.   Short      |           |            |              |
|             | segment ofnarrowing of   |           |            |              |
|             | the proximal esophagus   |           |            |              |
|             | at the C6 level          |           |            |              |
|             | perhapsaccounting for    |           |            |              |
|             | some retropulsion of     |           |            |              |
|             | thicker barium liquids   |           |            |              |
|             | throughthis segment.     |           |            |              |
|             |   Delay in passage of    |           |            |              |
|             | barium tablet at         |           |            |              |
|             | thegastroesophageal      |           |            |              |
|             | junction. I have         |           |            |              |
|             | personally viewed this   |           |            |              |
|             | procedure/exam and       |           |            |              |
|             | reviewed this report.    |           |            |              |
|             | Author: JUSTIN BAIRD       |           |            |              |
|             | JORGE ELIZALDEReviewer:   |           |            |              |
|             | JUSTIN ELIZALDE M.D. |           |            |              |
|             |   STATUS FINAL / Dr.     |           |            |              |
|             | JUSTIN ELIZALDE       |           |            |              |
+-------------+--------------------------+-----------+------------+--------------+
 
 
 
+----------+
| Specimen |
+----------+
|          |
+----------+
 
 
 
+----------------------+---------+--------------------+--------------+
| Performing           | Address | City/State/Zipcode | Phone Number |
| Organization         |         |                    |              |
+----------------------+---------+--------------------+--------------+
|   [facility] DEPARTMENT OF |         |                    |              |
|  RADIOLOGY           |         |                    |              |
+----------------------+---------+--------------------+--------------+
 documented in this encounter
 
 Visit Diagnoses
 
 
+--------------------------------------------------------------------------+
| Diagnosis                                                                |
 
+--------------------------------------------------------------------------+
|   Laryngeal cancer (HCC)  Malignant neoplasm of larynx, unspecified site |
+--------------------------------------------------------------------------+
|   Pharyngeal dysphagia  Dysphagia, pharyngeal phase                      |
+--------------------------------------------------------------------------+
 documented in this encounter

## 2019-12-06 NOTE — XMS
Encounter Summary
  Created on: 2019
 
 Shane Wyatttien BroussardJosue
 External Reference #: [id number]
 : 43
 Sex: Male
 
 Demographics
 
 
+-----------------------+---------------------------+
| Address               | [address]        |
|                       | [place]  [address] |
+-----------------------+---------------------------+
| Home Phone            | [phone]           |
+-----------------------+---------------------------+
| Preferred Language    | Unknown                   |
+-----------------------+---------------------------+
| Marital Status        |                    |
+-----------------------+---------------------------+
| Druze Affiliation | 1028                      |
+-----------------------+---------------------------+
| Race                  | Unknown                   |
+-----------------------+---------------------------+
| Ethnic Group          | Unknown                   |
+-----------------------+---------------------------+
 
 
 Author
 
 
+--------------+--------------------------------------------+
| Author       | [facility]  |
|              | [facility] [place]                                |
+--------------+--------------------------------------------+
| Organization | [facility]  |
|              | and [place]                                |
+--------------+--------------------------------------------+
| Address      | Unknown                                    |
+--------------+--------------------------------------------+
| Phone        | Unavailable                                |
+--------------+--------------------------------------------+
 
 
 
 Support
 
 
+---------------+--------------+--------------------+-----------------+
| Name          | Relationship | Address            | Phone           |
+---------------+--------------+--------------------+-----------------+
| Opal Shane | ECON         | [address]     | [phone] |
|               |              | [place]   |                 |
|               |              | [id number]              |                 |
+---------------+--------------+--------------------+-----------------+
 
 
 
 
 Care Team Providers
 
 
+-----------------------+------+-----------------+
| Care Team Member Name | Role | Phone           |
+-----------------------+------+-----------------+
| Erwin Iniguez MD | PCP  | [phone] |
+-----------------------+------+-----------------+
 
 
 
 Encounter Details
 
 
+--------+-----------+----------------------+--------------------+-------------+
| Date   | Type      | Department           | Care Team          | Description |
+--------+-----------+----------------------+--------------------+-------------+
| 04/03/ | Hospital  |   [facility] GENERIC IP     |   Conversion       | Pain        |
| 2018   | Encounter | CONVERSION [address]P  [address]  | Transaction,       |             |
|        |           | [address]           | Provider Unknown   |             |
|        |           | [place], [place]         | [phone]       |             |
|        |           | [address]           | [phone] (Fax) |             |
|        |           | [phone]         |                    |             |
+--------+-----------+----------------------+--------------------+-------------+
 
 
 
 Social History
 
 
+---------------+------------+-----------+--------+------------------+
| Tobacco Use   | Types      | Packs/Day | Years  | Date             |
|               |            |           | Used   |                  |
+---------------+------------+-----------+--------+------------------+
| Former Smoker | Cigarettes | 1.3       | 34     | Quit: 1996 |
+---------------+------------+-----------+--------+------------------+
 
 
 
+---------------------+---+---+---+
| Smokeless Tobacco:  |   |   |   |
| Never Used          |   |   |   |
+---------------------+---+---+---+
 
 
 
+-------------+-------------+---------+----------+
| Alcohol Use | Drinks/Week | oz/Week | Comments |
+-------------+-------------+---------+----------+
| No          |             |         |          |
+-------------+-------------+---------+----------+
 
 
 
+------------------+---------------+
| Sex Assigned at  | Date Recorded |
| Birth            |               |
+------------------+---------------+
| Not on file      |               |
 
+------------------+---------------+
 
 
 
+----------------+-------------+-------------+
| Job Start Date | Occupation  | Industry    |
+----------------+-------------+-------------+
| Not on file    | Not on file | Not on file |
+----------------+-------------+-------------+
 
 
 
+----------------+--------------+------------+
| Travel History | Travel Start | Travel End |
+----------------+--------------+------------+
 
 
 
+-------------------------------------+
| No recent travel history available. |
+-------------------------------------+
 documented as of this encounter
 
 Medications at Time of Discharge
 
 
+----------------------+----------------------+-----------+---------+----------+-----------+
| Medication           | Sig                  | Dispensed | Refills | Start    | End Date  |
|                      |                      |           |         | Date     |           |
+----------------------+----------------------+-----------+---------+----------+-----------+
|   acyclovir          | Apply  topically     |           | 0       |          |           |
| (ZOVIRAX) 5%         | every 3 hours.       |           |         |          |           |
| ointment             |                      |           |         |          |           |
+----------------------+----------------------+-----------+---------+----------+-----------+
|   albuterol (PROAIR  | Inhale 2 puffs into  |           | 0       |          |           |
| HFA) 90 mcg/puff     | the lungs every 6    |           |         |          |           |
| inhaler              | hours as needed for  |           |         |          |           |
|                      | Wheezing.            |           |         |          |           |
+----------------------+----------------------+-----------+---------+----------+-----------+
|   digoxin (LANOXIN)  | Take 125 mcg by      |           | 0       |          |           |
| 250 mcg tablet       | mouth Daily.      |           |         |          |           |
|                      | capsule daily        |           |         |          |           |
+----------------------+----------------------+-----------+---------+----------+-----------+
|   ferrous sulfate    | Take 325 mg by mouth |           | 0       | 20 |           |
| 325 mg tablet        |  3 times daily.      |           |         | 12       |           |
+----------------------+----------------------+-----------+---------+----------+-----------+
|   fluticasone        | one spray in each    |           | 0       | 20 |           |
| (FLONASE) 50         | nostril daily as     |           |         | 12       |           |
| mcg/nasal spray      | needed               |           |         |          |           |
+----------------------+----------------------+-----------+---------+----------+-----------+
|                      | Inhale 1 puff into   |           | 0       |          |           |
| fluticasone-salmeter | the lungs Twice      |           |         |          |           |
| ol (ADVAIR) 500-50   | Daily.               |           |         |          |           |
| mcg/puff diskus      |                      |           |         |          |           |
| inhaler              |                      |           |         |          |           |
+----------------------+----------------------+-----------+---------+----------+-----------+
|   folic acid 1 mg    | Take 1 mg by mouth   |           | 0       |          |           |
| tablet               | Daily.               |           |         |          |           |
+----------------------+----------------------+-----------+---------+----------+-----------+
|   furosemide (LASIX) | Take 40 mg by mouth  |           | 0       |          |           |
 
|  40 mg tablet        | Daily.               |           |         |          |           |
+----------------------+----------------------+-----------+---------+----------+-----------+
|   guaiFENesin        | Take 1,200 mg by     |           | 0       |          |           |
| (MUCINEX) 600 mg 12  | mouth 2 times daily. |           |         |          |           |
| hr tablet            |                      |           |         |          |           |
+----------------------+----------------------+-----------+---------+----------+-----------+
|   levothyroxine      | Take 75 mcg by mouth |           | 0       | 20 |           |
| (LEVOTHROID) 75 MCG  |  Daily.              |           |         | 12       |           |
| tablet               |                      |           |         |          |           |
+----------------------+----------------------+-----------+---------+----------+-----------+
|   metoclopramide     | Take 10 mg by mouth  |           | 0       | 20 |           |
| (REGLAN) 10 mg       | 4 times daily as     |           |         | 12       |           |
| tablet               | needed.              |           |         |          |           |
+----------------------+----------------------+-----------+---------+----------+-----------+
|                      | Apply  topically 2   |           | 0       |          |           |
| nystatin-triamcinolo | times daily.         |           |         |          |           |
| ne (MYCOLOG II)      |                      |           |         |          |           |
| cream                |                      |           |         |          |           |
+----------------------+----------------------+-----------+---------+----------+-----------+
|   omeprazole         | Take 20 mg by mouth  |           | 0       | 20 |           |
| (PRILOSEC) 20 mg     | 2 times daily.       |           |         | 12       |           |
| capsule              |                      |           |         |          |           |
+----------------------+----------------------+-----------+---------+----------+-----------+
|   potassium citrate  | Take 10 mEq by mouth |           | 0       |          |           |
| (UROCIT-K) 10 mEq SR |  2 times daily.      |           |         |          |           |
|  tablet              |                      |           |         |          |           |
+----------------------+----------------------+-----------+---------+----------+-----------+
|   predniSONE         | Take 10 mg by mouth  |           | 0       |          |           |
| (DELTASONE) 5 mg     | Daily.               |           |         |          |           |
| tablet               |                      |           |         |          |           |
+----------------------+----------------------+-----------+---------+----------+-----------+
|   tamsulosin         | Take 0.4 mg by mouth |           | 0       | 20 |           |
| (FLOMAX) 0.4 mg CAPS |  2 times daily.      |           |         | 12       |           |
+----------------------+----------------------+-----------+---------+----------+-----------+
|   acyclovir          | Take 400 mg by mouth |           | 0       | 20 |  |
| (ZOVIRAX) 400 MG     |  3 times daily as    |           |         | 12       | 9         |
| tablet               | needed.              |           |         |          |           |
+----------------------+----------------------+-----------+---------+----------+-----------+
|   Cholecalciferol    | Take 2,000 Units by  |           | 0       | 20 |  |
| (VITAMIN D3) 2000    | mouth Daily.         |           |         | 12       | 9         |
| UNITS CAPS           |                      |           |         |          |           |
+----------------------+----------------------+-----------+---------+----------+-----------+
|   cyanocobalamin     | injected             |           | 0       | 20 |  |
| (VITAMIN B-12) 1,000 | intramuscularly once |           |         | 12       | 9         |
|  mcg/mL injection    |  a month             |           |         |          |           |
+----------------------+----------------------+-----------+---------+----------+-----------+
|   diltiazem          | Take 120 mg by mouth |           | 0       |          |  |
| (DILT-XR) 120 mg 24  |  Daily.              |           |         |          | 9         |
| hr capsule           |                      |           |         |          |           |
+----------------------+----------------------+-----------+---------+----------+-----------+
|   loratadine         | Take 10 mg by mouth  |           | 0       |          |  |
| (CLARITIN) 10 mg     | Daily.               |           |         |          | 9         |
| tablet               |                      |           |         |          |           |
+----------------------+----------------------+-----------+---------+----------+-----------+
|   losartan (COZAAR)  | Take 100 mg by mouth |           | 0       |          |  |
| 100 MG tablet        |  Daily. 1/2 daily    |           |         |          | 9         |
+----------------------+----------------------+-----------+---------+----------+-----------+
|   methotrexate 2.5   | Take 15 mg by mouth  |           | 0       |          |  |
| mg tablet            | Once a week.         |           |         |          | 9         |
+----------------------+----------------------+-----------+---------+----------+-----------+
 
|                      | Take 1 tablet by     |           | 0       |          |  |
| oxyCODONE-acetaminop | mouth every 4 hours  |           |         |          | 9         |
| hen (PERCOCET) 5-325 | as needed for Pain.  |           |         |          |           |
|  mg per tablet       |                      |           |         |          |           |
+----------------------+----------------------+-----------+---------+----------+-----------+
|   pseudoePHEDrine    | Take 30 mg by mouth  |           | 0       |          |  |
| (SUDOGEST) 30 mg     | every 4 hours as     |           |         |          | 9         |
| tablet               | needed for           |           |         |          |           |
|                      | Congestion.          |           |         |          |           |
+----------------------+----------------------+-----------+---------+----------+-----------+
|   rivaroxaban        | Take 20 mg by mouth  |           | 0       |          |  |
| (XARELTO) 20 mg      | Daily (with dinner). |           |         |          | 9         |
| tablet               |                      |           |         |          |           |
+----------------------+----------------------+-----------+---------+----------+-----------+
|   sertraline         | 2 tablets daily      |           | 0       | 20 |  |
| (ZOLOFT) 100 mg      |                      |           |         | 12       | 9         |
| tablet               |                      |           |         |          |           |
+----------------------+----------------------+-----------+---------+----------+-----------+
 documented as of this encounter
 
 Plan of Treatment
 
 
+--------+---------+-------------+----------------------+-------------+
| Date   | Type    | Specialty   | Care Team            | Description |
+--------+---------+-------------+----------------------+-------------+
| / | Office  | Pulmonology |   [facility],          |             |
|    | Visit   |             | Jessica Cheung,   |             |
|        |         |             | MD [address] |             |
|        |         |             |   [place],   |             |
|        |         |             | [place] [address]             |             |
|        |         |             | [phone]         |             |
|        |         |             | [phone] (Fax)   |             |
+--------+---------+-------------+----------------------+-------------+
| / | Office  | Cardiology  |   [place], Mershed, |             |
| 2020   | Visit   |             |  MD  [address]   |             |
|        |         |             | [place]  [place]  |             |
|        |         |             | [address]  [phone]  |             |
|        |         |             |  [phone] (Fax)  |             |
+--------+---------+-------------+----------------------+-------------+
 documented as of this encounter
 
 Procedures
 
 
+----------------------+--------+-------------+----------------------+----------------------
+
| Procedure Name       | Priori | Date/Time   | Associated Diagnosis | Comments             
|
|                      | ty     |             |                      |                      
|
+----------------------+--------+-------------+----------------------+----------------------
+
| CT CERVICAL SPINE WO | Routin | 2017  |                      |   Results for this   
|
|  CONTRAST            | e      |  5:05 AM    |                      | procedure are in the 
|
|                      |        | PST         |                      |  results section.    
|
+----------------------+--------+-------------+----------------------+----------------------
 
+
 documented in this encounter
 
 Results
 CT Cervical Spine wo Contrast (2017  5:05 AM PST)
 
+----------+
| Specimen |
+----------+
|          |
+----------+
 
 
 
+------------------------------------------------------------------------+--------------+
| Narrative                                                              | Performed At |
+------------------------------------------------------------------------+--------------+
|   This is a non-reportable procedure without a radiologist report and  |              |
| is  used for image storage only                                        |              |
+------------------------------------------------------------------------+--------------+
 
 
 
+--------------------------------------------------------------------------------------+
| Procedure Note                                                                       |
+--------------------------------------------------------------------------------------+
|   Andrzej Steven Irvin - 2019  4:21 AM PDT  This is a non-reportable procedure  |
| without a radiologist report and isused for image storage only                       |
+--------------------------------------------------------------------------------------+
 documented in this encounter
 
 Visit Diagnoses
 
 
+--------------------------+
| Diagnosis                |
+--------------------------+
|   Pain  Generalized pain |
+--------------------------+
 documented in this encounter

## 2019-12-06 NOTE — XMS
Encounter Summary
  Created on: 2019
 
 Shane Wyatttien BroussardJosue
 External Reference #: [id number]
 : 43
 Sex: Male
 
 Demographics
 
 
+-----------------------+---------------------------+
| Address               | [address]        |
|                       | [place]  [address] |
+-----------------------+---------------------------+
| Home Phone            | [phone]           |
+-----------------------+---------------------------+
| Preferred Language    | Unknown                   |
+-----------------------+---------------------------+
| Marital Status        |                    |
+-----------------------+---------------------------+
| Religion Affiliation | 1028                      |
+-----------------------+---------------------------+
| Race                  | Unknown                   |
+-----------------------+---------------------------+
| Ethnic Group          | Unknown                   |
+-----------------------+---------------------------+
 
 
 Author
 
 
+--------------+--------------------------------------------+
| Author       | [facility]  |
|              | and [place]                                |
+--------------+--------------------------------------------+
| Organization | [facility]  |
|              | and [place]                                |
+--------------+--------------------------------------------+
| Address      | Unknown                                    |
+--------------+--------------------------------------------+
| Phone        | Unavailable                                |
+--------------+--------------------------------------------+
 
 
 
 Support
 
 
+---------------+--------------+--------------------+-----------------+
| Name          | Relationship | Address            | Phone           |
+---------------+--------------+--------------------+-----------------+
| Opal Shane | ECON         | [address]     | [phone] |
|               |              | [place]   |                 |
|               |              | [address]              |                 |
+---------------+--------------+--------------------+-----------------+
 
 
 
 
 Care Team Providers
 
 
+-----------------------+------+-----------------+
| Care Team Member Name | Role | Phone           |
+-----------------------+------+-----------------+
| Erwin Iniguez MD | PCP  | [phone] |
+-----------------------+------+-----------------+
 
 
 
 Reason for Visit
 
 
+--------+----------+
| Reason | Comments |
+--------+----------+
| Apnea  |          |
+--------+----------+
 
 
 
 Encounter Details
 
 
+--------+---------+----------------------+----------------------+----------------------+
| Date   | Type    | Department           | Care Team            | Description          |
+--------+---------+----------------------+----------------------+----------------------+
| / | Office  |   [facility]      |   Sohail Campbell PA  | Cheyne-Gregory        |
| 2014   | Visit   | SLEEP DISORDER  [address]  |  [address]     | respiration (Primary |
|        |         | [address]      | [place], [place]      |  Dx); Obstructive    |
|        |         | [place] [address] | [address]  [phone]  | sleep apnea (adult)  |
|        |         |   [phone]       |  [phone] (Fax)  | (pediatric)          |
+--------+---------+----------------------+----------------------+----------------------+
 
 
 
 Social History
 
 
+---------------+------------+-----------+--------+------------------+
| Tobacco Use   | Types      | Packs/Day | Years  | Date             |
|               |            |           | Used   |                  |
+---------------+------------+-----------+--------+------------------+
| Former Smoker | Cigarettes | 1.3       | 34     | Quit: 1996 |
+---------------+------------+-----------+--------+------------------+
 
 
 
+---------------------+---+---+---+
| Smokeless Tobacco:  |   |   |   |
| Never Used          |   |   |   |
+---------------------+---+---+---+
 
 
 
+-------------+-------------+---------+----------+
| Alcohol Use | Drinks/Week | oz/Week | Comments |
+-------------+-------------+---------+----------+
 
| No          |             |         |          |
+-------------+-------------+---------+----------+
 
 
 
+------------------+---------------+
| Sex Assigned at  | Date Recorded |
| Birth            |               |
+------------------+---------------+
| Not on file      |               |
+------------------+---------------+
 
 
 
+----------------+-------------+-------------+
| Job Start Date | Occupation  | Industry    |
+----------------+-------------+-------------+
| Not on file    | Not on file | Not on file |
+----------------+-------------+-------------+
 
 
 
+----------------+--------------+------------+
| Travel History | Travel Start | Travel End |
+----------------+--------------+------------+
 
 
 
+-------------------------------------+
| No recent travel history available. |
+-------------------------------------+
 documented as of this encounter
 
 Last Filed Vital Signs
 
 
+-------------------+----------------------+----------------------+----------+
| Vital Sign        | Reading              | Time Taken           | Comments |
+-------------------+----------------------+----------------------+----------+
| Blood Pressure    | 102/66               | 2014  2:02 PM  |          |
|                   |                      | PDT                  |          |
+-------------------+----------------------+----------------------+----------+
| Pulse             | 68                   | 2014  2:02 PM  |          |
|                   |                      | PDT                  |          |
+-------------------+----------------------+----------------------+----------+
| Temperature       | -                    | -                    |          |
+-------------------+----------------------+----------------------+----------+
| Respiratory Rate  | 16                   | 2014  2:02 PM  |          |
|                   |                      | PDT                  |          |
+-------------------+----------------------+----------------------+----------+
| Oxygen Saturation | 97%                  | 2014  2:02 PM  |          |
|                   |                      | PDT                  |          |
+-------------------+----------------------+----------------------+----------+
| Inhaled Oxygen    | -                    | -                    |          |
| Concentration     |                      |                      |          |
+-------------------+----------------------+----------------------+----------+
| Weight            | 82.2 kg (181 lb 3.2  | 2014  2:02 PM  |          |
|                   | oz)                  | PDT                  |          |
+-------------------+----------------------+----------------------+----------+
| Height            | -                    | -                    |          |
 
+-------------------+----------------------+----------------------+----------+
| Body Mass Index   | 29.25                | 2013  1:28 PM  |          |
|                   |                      | PDT                  |          |
+-------------------+----------------------+----------------------+----------+
 documented in this encounter
 
 Progress Notes
 Sohail Campbell PA - 2014  1:57 PM PDTFormatting of this note might be different from 
the original.
 Subjective: 
  
 Patient ID: Wyatt Shane is a 68 y.o. male.
 
 HPI
 
 last office visit was:  3/13/2014
 date of polysomnography: 10/07/2013 
 AHI: 77.4
 RDI: 77.4
 O2%: 86% with 15.8 minutes below 88% 
 Machine type: Respironics ASV BiPAP with nasal mask (Aditi)
 obtained from:  [facility]
 pressure: EPAP = 4cm;PSmin=0cm;PSmax=25cm;backup rate=auto
 Nights using BiPAP:  
 average usage (all nights):  0:27
 average usage (nights used):  2:34
 AHI: 1.5
 
 Wyatt comes in for BiPAP compliance.  He continues to have problems with congestion.  He has
 increased his humidity to the maximum of 5.0, but this has not been helpful.  [facility] he is only using a small amount of water, which often indicates that it is not working p
roperly.  He continues to have dried blood in his nose in the morning when using his BiPAP. 
 He has not been using his BiPAP on most nights.  He understands the severity of his apnea a
nd the importance of treating it.  He did very well during the first few months with his BiP
AP.  He and his wife noticed a significant improvement in his sleep quality and his daytime 
sleepiness and energy.  During this time, he was not having congestion problems and he was u
sing more water each night.
 
 I have discussed the download in detail.  This shows that his sleep apnea is controlled, wi
th an AHI of 1.5.  It also shows that his leaks are well controlled.  He has not met the com
pliance standard of wearing his BiPAP for >4 hours for 70% or more nights during at least a 
thirty day period.
 
 Review of Systems
 
 Objective: 
 Physical Exam
 
 Assessment: 
 
 Problem #1: OBSTRUCTIVE SLEEP APNEA (ICD 9-327.23)
 This is controlled with BiPAP.  He is wearing his BiPAP regularly, but continues to struggl
e with wearing it for the duration of the night because of problems with congestion.
 
 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 
 This is controlled with ASV BiPAP.
 
 Plan: 
 
 He is to continue with his BiPAP indefinitely.  I recommended that he take his BiPAP to [facility] [place] to have his humidifier checked and repaired or replaced, if necessary.  He
 would also benefit from seeing an ENT specialist.
 
 I will follow up again in 1 month, sooner prn.  Fifteen minutes were spent face-to-face, wi
th the majority of time spent in counseling.
 
 Sohail Campbell PA-C
 
 cc: 
 Dr. Erwin Foote
 
 Electronically signed by ROWENA Greene at 2014  2:48 PM PDTdocumented in this enco
unter
 
 Plan of Treatment
 
 
+--------+---------+-------------+----------------------+-------------+
| Date   | Type    | Specialty   | Care Team            | Description |
+--------+---------+-------------+----------------------+-------------+
| / | Office  | Pulmonology |   Juan F,          |             |
|    | Visit   |             | Jessica Cheung,   |             |
|        |         |             | MD [address] |             |
|        |         |             |   [place]   |             |
|        |         |             | [place] [address]             |             |
|        |         |             | [phone]         |             |
|        |         |             | [phone] (Fax)   |             |
+--------+---------+-------------+----------------------+-------------+
| / | Office  | Cardiology  |   Eric Akins, |             |
|    | Visit   |             |  MD [address]   |             |
|        |         |             | [place]  |             |
|        |         |             | [address]  [phone]  |             |
|        |         |             |  [phone] (Fax)  |             |
+--------+---------+-------------+----------------------+-------------+
 documented as of this encounter
 
 Visit Diagnoses
 
 
+-----------------------------------------------+
| Diagnosis                                     |
+-----------------------------------------------+
|   Cheyne-Gregory respiration - Primary         |
+-----------------------------------------------+
|   Obstructive sleep apnea (adult) (pediatric) |
+-----------------------------------------------+
 documented in this encounter

## 2019-12-06 NOTE — XMS
Encounter Summary
  Created on: 2019
 
 Shane Wyatttien BroussardJosue
 External Reference #: [id number]
 : 43
 Sex: Male
 
 Demographics
 
 
+-----------------------+---------------------------+
| Address               | [address]        |
|                       | [place]  [address] |
+-----------------------+---------------------------+
| Home Phone            | [phone]           |
+-----------------------+---------------------------+
| Preferred Language    | Unknown                   |
+-----------------------+---------------------------+
| Marital Status        |                    |
+-----------------------+---------------------------+
| Faith Affiliation | 1028                      |
+-----------------------+---------------------------+
| Race                  | Unknown                   |
+-----------------------+---------------------------+
| Ethnic Group          | Unknown                   |
+-----------------------+---------------------------+
 
 
 Author
 
 
+--------------+--------------------------------------------+
| Author       | [facility]  |
|              | and [place]                                |
+--------------+--------------------------------------------+
| Organization | [facility]  |
|              | and [place]                                |
+--------------+--------------------------------------------+
| Address      | Unknown                                    |
+--------------+--------------------------------------------+
| Phone        | Unavailable                                |
+--------------+--------------------------------------------+
 
 
 
 Support
 
 
+---------------+--------------+--------------------+-----------------+
| Name          | Relationship | Address            | Phone           |
+---------------+--------------+--------------------+-----------------+
| Opal Shane | ECON         | [address]     | [phone] |
|               |              | [place]   |                 |
|               |              | [address]              |                 |
+---------------+--------------+--------------------+-----------------+
 
 
 
 
 Care Team Providers
 
 
+-----------------------+------+-----------------+
| Care Team Member Name | Role | Phone           |
+-----------------------+------+-----------------+
| Erwin Iniguez MD | PCP  | [phone] |
+-----------------------+------+-----------------+
 
 
 
 Reason for Visit
 
 
+--------+----------+
| Reason | Comments |
+--------+----------+
| Apnea  |          |
+--------+----------+
 
 
 
 Encounter Details
 
 
+--------+---------+----------------------+---------------------+----------------------+
| Date   | Type    | Department           | Care Team           | Description          |
+--------+---------+----------------------+---------------------+----------------------+
| 10/07/ | Office  |   [facility]      |   Luis Carlos Perez  | Central sleep apnea  |
|    | Visit   | SLEEP DISORDER  [address]  | MD [address]ice  [address]   | due to Cheyne-Nichole |
|        |         | [address]      | [address]    |  respiration         |
|        |         | [place] [address] | [place] [address]     | (Primary Dx)         |
|        |         |   [phone]       | [phone]        |                      |
|        |         |                      | [phone] (Fax)  |                      |
+--------+---------+----------------------+---------------------+----------------------+
 
 
 
 Social History
 
 
+---------------+------------+-----------+--------+------------------+
| Tobacco Use   | Types      | Packs/Day | Years  | Date             |
|               |            |           | Used   |                  |
+---------------+------------+-----------+--------+------------------+
| Former Smoker | Cigarettes | 1.3       | 34     | Quit: 1996 |
+---------------+------------+-----------+--------+------------------+
 
 
 
+---------------------+---+---+---+
| Smokeless Tobacco:  |   |   |   |
| Never Used          |   |   |   |
+---------------------+---+---+---+
 
 
 
+-------------+-------------+---------+----------+
| Alcohol Use | Drinks/Week | oz/Week | Comments |
 
+-------------+-------------+---------+----------+
| No          |             |         |          |
+-------------+-------------+---------+----------+
 
 
 
+------------------+---------------+
| Sex Assigned at  | Date Recorded |
| Birth            |               |
+------------------+---------------+
| Not on file      |               |
+------------------+---------------+
 
 
 
+----------------+-------------+-------------+
| Job Start Date | Occupation  | Industry    |
+----------------+-------------+-------------+
| Not on file    | Not on file | Not on file |
+----------------+-------------+-------------+
 
 
 
+----------------+--------------+------------+
| Travel History | Travel Start | Travel End |
+----------------+--------------+------------+
 
 
 
+-------------------------------------+
| No recent travel history available. |
+-------------------------------------+
 documented as of this encounter
 
 Last Filed Vital Signs
 
 
+-------------------+----------------------+----------------------+----------+
| Vital Sign        | Reading              | Time Taken           | Comments |
+-------------------+----------------------+----------------------+----------+
| Blood Pressure    | 138/76               | 10/07/2014 10:12 AM  |          |
|                   |                      | PDT                  |          |
+-------------------+----------------------+----------------------+----------+
| Pulse             | 81                   | 10/07/2014 10:12 AM  |          |
|                   |                      | PDT                  |          |
+-------------------+----------------------+----------------------+----------+
| Temperature       | -                    | -                    |          |
+-------------------+----------------------+----------------------+----------+
| Respiratory Rate  | 16                   | 10/07/2014 10:12 AM  |          |
|                   |                      | PDT                  |          |
+-------------------+----------------------+----------------------+----------+
| Oxygen Saturation | 98%                  | 10/07/2014 10:12 AM  |          |
|                   |                      | PDT                  |          |
+-------------------+----------------------+----------------------+----------+
| Inhaled Oxygen    | -                    | -                    |          |
| Concentration     |                      |                      |          |
+-------------------+----------------------+----------------------+----------+
| Weight            | 81.1 kg (178 lb 14.4 | 10/07/2014 10:12 AM  |          |
|                   |  oz)                 | PDT                  |          |
+-------------------+----------------------+----------------------+----------+
 
| Height            | -                    | -                    |          |
+-------------------+----------------------+----------------------+----------+
| Body Mass Index   | 27.2                 | 2014 10:00 AM  |          |
|                   |                      | PDT                  |          |
+-------------------+----------------------+----------------------+----------+
 documented in this encounter
 
 Progress Notes
 Luis Carlos Perez Jr., MD - 10/07/2014 10:38 AM PDTThe patient comes in for f/u on his Centr
al Sleep Apnea secondary to Cheyne-Nichole Respiration diagnosed on PSG performed on 
3 (AHI 77.4, AI 56.2 - with the majority of apneas being CSA-). He recently, because of n
iam/sinus dryness, has received a new ASV unit and he is doing much better than when he las
t saw Sohail GUAMAN in our [facility] (2014). He is on a Resmed ASV BiPAP: E
PAP 4cm; PSmin 0cm; PS max 20cm; Backup auto. The mean Vt has been 400 ml. He has worn the A
SV-BiPAP for 34 out of the last 34 days with an AHI of 0!. He averages 5 hours 19 minutes of
 use per night (100% of the time he sleeps). He and bedpartner both note that he is sleeping
 well and is alert in the daytime. He does sometimes still not some mild nasal/sinus dryness
.
 
 /76 | Pulse 81 | Resp 16 | Wt 81.149 kg (178 lb 14.4 oz) | SpO2 98%
 
 A: CSA-: Doing well on ASV-BiPAP. I have increased the heat from 4.5 to 5 and the hose t
emperature from 75 to 80 degrees today.
 
 P: RTC ([facility]) in 6 months, sooner prn.
 
 Today, 15 minutes was spent face to face with the patient; the majority of time was spent c
joon regarding ASV BiPAP treatment of CSA-SCR..
 Electronically signed by Luis Carlos Perez Jr., MD at 10/07/2014 10:47 AM PDTdocumented in th
is encounter
 
 Plan of Treatment
 
 
+--------+---------+-------------+----------------------+-------------+
| Date   | Type    | Specialty   | Care Team            | Description |
+--------+---------+-------------+----------------------+-------------+
| / | Office  | Pulmonology |   Juan F,          |             |
|    | Visit   |             | Jessica Cheung,   |             |
|        |         |             | MD [address] |             |
|        |         |             |   [place],   |             |
|        |         |             | [place] [address]             |             |
|        |         |             | [phone]         |             |
|        |         |             | [phone] (Fax)   |             |
+--------+---------+-------------+----------------------+-------------+
| / | Office  | Cardiology  |   Eric Akins, |             |
|    | Visit   |             |  MD [address]   |             |
|        |         |             | [place]  [place]  |             |
|        |         |             | [address]  [phone]  |             |
|        |         |             |  [phone] (Fax)  |             |
+--------+---------+-------------+----------------------+-------------+
 documented as of this encounter
 
 Visit Diagnoses
 
 
+----------------------------------------------------------------------------------+
| Diagnosis                                                                        |
+----------------------------------------------------------------------------------+
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
 
| respiration                                                                      |
+----------------------------------------------------------------------------------+
 documented in this encounter

## 2019-12-06 NOTE — XMS
Encounter Summary
  Created on: 2019
 
 Wyatt Shane
 External Reference #: [id number]
 : 43
 Sex: Male
 
 Demographics
 
 
+-----------------------+------------------------+
| Address               | [address]     |
|                       | [place]  [address]   |
+-----------------------+------------------------+
| Home Phone            | [phone]        |
+-----------------------+------------------------+
| Preferred Language    | Unknown                |
+-----------------------+------------------------+
| Marital Status        |                 |
+-----------------------+------------------------+
| [employer] Affiliation | LUT                    |
+-----------------------+------------------------+
| Race                  | White                  |
+-----------------------+------------------------+
| Ethnic Group          | Not  or  |
+-----------------------+------------------------+
 
 
 Author
 
 
+--------------+------------------------------+
| Author       | [facility] |
+--------------+------------------------------+
| Organization | [facility] |
+--------------+------------------------------+
| Address      | Unknown                      |
+--------------+------------------------------+
| Phone        | Unavailable                  |
+--------------+------------------------------+
 
 
 
 Support
 
 
+---------------+--------------+---------+-----------------+
| Name          | Relationship | Address | Phone           |
+---------------+--------------+---------+-----------------+
| Opal Shane | ECON         | Unknown | [phone] |
+---------------+--------------+---------+-----------------+
 
 
 
 Care Team Providers
 
 
 
+-----------------------+------+-----------------+
| Care Team Member Name | Role | Phone           |
+-----------------------+------+-----------------+
| Erwin Iniguez MD   | PCP  | [phone] |
+-----------------------+------+-----------------+
 
 
 
 Encounter Details
 
 
+--------+----------+----------------------+--------------------+-------------+
| Date   | Type     | Department           | Care Team          | Description |
+--------+----------+----------------------+--------------------+-------------+
| 11/10/ | Results  |   [facility]  [address]  |   [facility]   |             |
|    | Only     | [address]  | [phone]       |             |
|        |          |  [place], [place]        |                    |             |
|        |          | [address]           |                    |             |
|        |          | [phone]         |                    |             |
+--------+----------+----------------------+--------------------+-------------+
 
 
 
 Social History
 
 
+----------------+-------+-----------+--------+------+
| Tobacco Use    | Types | Packs/Day | Years  | Date |
|                |       |           | Used   |      |
+----------------+-------+-----------+--------+------+
| Never Assessed |       |           |        |      |
+----------------+-------+-----------+--------+------+
 
 
 
+------------------+---------------+
| Sex Assigned at  | Date Recorded |
| Birth            |               |
+------------------+---------------+
| Not on file      |               |
+------------------+---------------+
 
 
 
+----------------+-------------+-------------+
| Job Start Date | Occupation  | Industry    |
+----------------+-------------+-------------+
| Not on file    | Not on file | Not on file |
+----------------+-------------+-------------+
 
 
 
+----------------+--------------+------------+
| Travel History | Travel Start | Travel End |
+----------------+--------------+------------+
 
 
 
+-------------------------------------+
| No recent travel history available. |
 
+-------------------------------------+
 documented as of this encounter
 
 Plan of Treatment
 Not on filedocumented as of this encounter
 
 Procedures
 
 
+--------------------+--------+------------+----------------------+----------------------+
| Procedure Name     | Priori | Date/Time  | Associated Diagnosis | Comments             |
|                    | ty     |            |                      |                      |
+--------------------+--------+------------+----------------------+----------------------+
| SURGICAL PATHOLOGY | Routin | 11/10/1998 |                      |   Results for this   |
|                    | e      |            |                      | procedure are in the |
|                    |        |            |                      |  results section.    |
+--------------------+--------+------------+----------------------+----------------------+
 documented in this encounter
 
 Results
 SURGICAL PATHOLOGY (11/10/1998)
 
+-----------+--------------------------+-----------+-------------+--------------+
| Component | Value                    | Ref Range | Performed   | Pathologist  |
|           |                          |           | At          | Signature    |
+-----------+--------------------------+-----------+-------------+--------------+
| SURGICAL  | SOURCE OF SPECIMEN: SEE  |           | [facility]        |              |
| PATHOLOGY | RESULTS                  |           | DEPARTMENT  |              |
|           | Preliminary              |           | OF          |              |
|           | History:GROSS            |           | PATHOLOGY   |              |
|           | DESCRIPTIONReceived from |           |             |              |
|           |  [facility]           |           |             |              |
|           | [facility]         |           |             |              |
|           | [place], [place], are   |           |             |              |
|           | fiveH&E-stained slides   |           |             |              |
|           | bearing accession number |           |             |              |
|           |  [id number], sublabeled    |           |             |              |
|           | FS,A-B, and one          |           |             |              |
|           | H&E-stained slide        |           |             |              |
|           | labeled [id number]. The   |           |             |              |
|           | correspondingpathology   |           |             |              |
|           | reports are dated        |           |             |              |
|           | 98, 10/19/98,       |           |             |              |
|           | respectively.            |           |             |              |
|           | FINAL DIAGNOSISSLIDES    |           |             |              |
|           | -SA:RIGHT TRUE     |           |             |              |
|           | VOCAL CORD LESION (A):   |           |             |              |
|           |   FRAGMENTS OF SQUAMOUS  |           |             |              |
|           | EPITHELIUM SHOWING       |           |             |              |
|           | MODERATE DYSPLASIA, NO   |           |             |              |
|           |      DEFINITE INVASION   |           |             |              |
|           | IDENTIFIEDRIGHT TRUE     |           |             |              |
|           | VOCAL CORD LESION (B):   |           |             |              |
|           |   FRAGMENTS OF SQUAMOUS  |           |             |              |
|           | EPITHELIUM SHOWING       |           |             |              |
|           | MODERATE TO SEVERE       |           |             |              |
|           | DYSPLASIA,      NO       |           |             |              |
|           | DEFINITIVE INVASION      |           |             |              |
|           | IDENTIFIEDRIGHT VOCAL    |           |             |              |
|           | CORD, BIOPSY             |           |             |              |
 
|           | (-SA):            |           |             |              |
|           |   FRAGMENTS OF SQUAMOUS  |           |             |              |
|           | MUCOSA WITH MODERATE TO  |           |             |              |
|           | SEVERE DYSPLASIA,        |           |             |              |
|           |   NO DEFINITIVE INVASION |           |             |              |
|           |  IDENTIFIED(outside      |           |             |              |
|           | slides)       Case       |           |             |              |
|           | reviewed by:   Bayron GOMEZ  |           |             |              |
|           | JORGE PavonT:         |           |             |              |
|           | 98/fSix slides are |           |             |              |
|           |  returned with the       |           |             |              |
|           | report.       My         |           |             |              |
|           | electronic signature     |           |             |              |
|           | indicates that I have    |           |             |              |
|           | personally reviewed      |           |             |              |
|           | alldiagnostic slides,    |           |             |              |
|           | the gross and/or         |           |             |              |
|           | microscopic portion of   |           |             |              |
|           | thisreport and           |           |             |              |
|           | formulated the final     |           |             |              |
|           | diagnosis.               |           |             |              |
+-----------+--------------------------+-----------+-------------+--------------+
 
 
 
+----------+
| Specimen |
+----------+
| Other    |
+----------+
 
 
 
+----------------------+------------------------+--------------------+--------------+
| Performing           | Address                | City/State/Zipcode | Phone Number |
| Organization         |                        |                    |              |
+----------------------+------------------------+--------------------+--------------+
|   [facility] |   [address]  | [place] [address] |              |
|  PATHOLOGY           | [address]                |                    |              |
+----------------------+------------------------+--------------------+--------------+
 documented in this encounter
 
 Visit Diagnoses
 Not on filedocumented in this encounter

## 2019-12-06 NOTE — XMS
Encounter Summary
  Created on: 2019
 
 Shane Wyatttien BroussardJosue
 External Reference #: [id number]
 : 43
 Sex: Male
 
 Demographics
 
 
+-----------------------+---------------------------+
| Address               | [address]        |
|                       | [place]  [address] |
+-----------------------+---------------------------+
| Home Phone            | [phone]           |
+-----------------------+---------------------------+
| Preferred Language    | Unknown                   |
+-----------------------+---------------------------+
| Marital Status        |                    |
+-----------------------+---------------------------+
| [employer] Affiliation | [id number]                      |
+-----------------------+---------------------------+
| Race                  | Unknown                   |
+-----------------------+---------------------------+
| Ethnic Group          | Unknown                   |
+-----------------------+---------------------------+
 
 
 Author
 
 
+--------------+--------------------------------------------+
| Author       | [facility]  |
|              | and [place]                                |
+--------------+--------------------------------------------+
| Organization | [facility]  |
|              | and [place]                                |
+--------------+--------------------------------------------+
| Address      | Unknown                                    |
+--------------+--------------------------------------------+
| Phone        | Unavailable                                |
+--------------+--------------------------------------------+
 
 
 
 Support
 
 
+---------------+--------------+--------------------+-----------------+
| Name          | Relationship | Address            | Phone           |
+---------------+--------------+--------------------+-----------------+
| Opal Shane | ECON         | [address]     | [phone] |
|               |              | [place]   |                 |
|               |              | [id number]              |                 |
+---------------+--------------+--------------------+-----------------+
 
 
 
 
 Care Team Providers
 
 
+-----------------------+------+-----------------+
| Care Team Member Name | Role | Phone           |
+-----------------------+------+-----------------+
| Erwin Iniguez MD | PCP  | [phone] |
+-----------------------+------+-----------------+
 
 
 
 Encounter Details
 
 
+--------+-----------+----------------------+---------------------+-------------+
| Date   | Type      | Department           | Care Team           | Description |
+--------+-----------+----------------------+---------------------+-------------+
| / | Hospital  |   [facility] |   Luis Carlos Perez  |             |
|    | Encounter |  MED CTR SLEEP       | MD Shanice  [address]   |             |
|        |           | Mathias  [address] | [place] [facility]    |             |
|        |           |   [place], [place]    | [place] [address]     |             |
|        |           | [address]           | [phone]        |             |
|        |           | [phone]         | [phone] (Fax)  |             |
+--------+-----------+----------------------+---------------------+-------------+
 
 
 
 Social History
 
 
+---------------+------------+-----------+--------+------------------+
| Tobacco Use   | Types      | Packs/Day | Years  | Date             |
|               |            |           | Used   |                  |
+---------------+------------+-----------+--------+------------------+
| Former Smoker | Cigarettes | 1.3       | 34     | Quit: 1996 |
+---------------+------------+-----------+--------+------------------+
 
 
 
+---------------------+---+---+---+
| Smokeless Tobacco:  |   |   |   |
| Never Used          |   |   |   |
+---------------------+---+---+---+
 
 
 
+-------------+-------------+---------+----------+
| Alcohol Use | Drinks/Week | oz/Week | Comments |
+-------------+-------------+---------+----------+
| No          |             |         |          |
+-------------+-------------+---------+----------+
 
 
 
+------------------+---------------+
| Sex Assigned at  | Date Recorded |
| Birth            |               |
+------------------+---------------+
| Not on file      |               |
 
+------------------+---------------+
 
 
 
+----------------+-------------+-------------+
| Job Start Date | Occupation  | Industry    |
+----------------+-------------+-------------+
| Not on file    | Not on file | Not on file |
+----------------+-------------+-------------+
 
 
 
+----------------+--------------+------------+
| Travel History | Travel Start | Travel End |
+----------------+--------------+------------+
 
 
 
+-------------------------------------+
| No recent travel history available. |
+-------------------------------------+
 documented as of this encounter
 
 Medications at Time of Discharge
 
 
+----------------------+----------------------+-----------+---------+----------+-----------+
| Medication           | Sig                  | Dispensed | Refills | Start    | End Date  |
|                      |                      |           |         | Date     |           |
+----------------------+----------------------+-----------+---------+----------+-----------+
|   ferrous sulfate    | Take 325 mg by mouth |           | 0       | 20 |           |
| 325 mg tablet        |  3 times daily.      |           |         | 12       |           |
+----------------------+----------------------+-----------+---------+----------+-----------+
|   fluticasone        | one spray in each    |           | 0       | 20 |           |
| (FLONASE) 50         | nostril daily as     |           |         | 12       |           |
| mcg/nasal spray      | needed               |           |         |          |           |
+----------------------+----------------------+-----------+---------+----------+-----------+
|   guaiFENesin        | Take 1,200 mg by     |           | 0       |          |           |
| (MUCINEX) 600 mg 12  | mouth 2 times daily. |           |         |          |           |
| hr tablet            |                      |           |         |          |           |
+----------------------+----------------------+-----------+---------+----------+-----------+
|   levothyroxine      | Take 75 mcg by mouth |           | 0       | 20 |           |
| (LEVOTHROID) 75 MCG  |  Daily.              |           |         | 12       |           |
| tablet               |                      |           |         |          |           |
+----------------------+----------------------+-----------+---------+----------+-----------+
|   metoclopramide     | Take 10 mg by mouth  |           | 0       | 20 |           |
| (REGLAN) 10 mg       | 4 times daily as     |           |         | 12       |           |
| tablet               | needed.              |           |         |          |           |
+----------------------+----------------------+-----------+---------+----------+-----------+
|   omeprazole         | Take 20 mg by mouth  |           | 0       | 20 |           |
| (PRILOSEC) 20 mg     | 2 times daily.       |           |         | 12       |           |
| capsule              |                      |           |         |          |           |
+----------------------+----------------------+-----------+---------+----------+-----------+
|   tamsulosin         | Take 0.4 mg by mouth |           | 0       | 20 |           |
| (FLOMAX) 0.4 mg CAPS |  2 times daily.      |           |         | 12       |           |
+----------------------+----------------------+-----------+---------+----------+-----------+
|   acyclovir          | Take 400 mg by mouth |           | 0       | 20 |  |
| (ZOVIRAX) 400 MG     |  3 times daily as    |           |         | 12       | 9         |
| tablet               | needed.              |           |         |          |           |
+----------------------+----------------------+-----------+---------+----------+-----------+
 
|   Cholecalciferol    | Take 2,000 Units by  |           | 0       | 20 |  |
| (VITAMIN D3) 2000    | mouth Daily.         |           |         | 12       | 9         |
| UNITS CAPS           |                      |           |         |          |           |
+----------------------+----------------------+-----------+---------+----------+-----------+
|   cyanocobalamin     | injected             |           | 0       | 20 |  |
| (VITAMIN B-12) 1,000 | intramuscularly once |           |         | 12       | 9         |
|  mcg/mL injection    |  a month             |           |         |          |           |
+----------------------+----------------------+-----------+---------+----------+-----------+
|   digoxin (LANOXIN)  | Take 250 mcg by      |           | 0       |          |  |
| 250 mcg tablet       | mouth Daily.      |           |         |          | 5         |
|                      | tablet daily         |           |         |          |           |
+----------------------+----------------------+-----------+---------+----------+-----------+
|   diltiazem          | Take 120 mg by mouth |           | 0       |          |  |
| (DILT-XR) 120 mg 24  |  Daily.              |           |         |          | 9         |
| hr capsule           |                      |           |         |          |           |
+----------------------+----------------------+-----------+---------+----------+-----------+
|                      | 2 to 1 tablet four |           | 0       | 20 | 10/07/201 |
| hydrocodone-acetamin |  times daily         |           |         | 12       | 4         |
| ophen (VICODIN ES)   |                      |           |         |          |           |
| 7.5-750 MG per       |                      |           |         |          |           |
| tablet               |                      |           |         |          |           |
+----------------------+----------------------+-----------+---------+----------+-----------+
|   losartan (COZAAR)  | Take 100 mg by mouth |           | 0       |          |  |
| 100 MG tablet        |  Daily.  daily    |           |         |          | 9         |
+----------------------+----------------------+-----------+---------+----------+-----------+
|   methotrexate 2.5   | Take 15 mg by mouth  |           | 0       |          |  |
| mg tablet            | Once a week.         |           |         |          | 9         |
+----------------------+----------------------+-----------+---------+----------+-----------+
|   potassium chloride |                      |           | 0       | 20 |  |
|  (MICRO-K) 10 mEq CR |                      |           |         | 13       | 3         |
|  capsule             |                      |           |         |          |           |
+----------------------+----------------------+-----------+---------+----------+-----------+
|   predniSONE         | Take 5 mg by mouth   |           | 0       |          |  |
| (DELTASONE) 5 mg     | Daily.               |           |         |          | 4         |
| tablet               |                      |           |         |          |           |
+----------------------+----------------------+-----------+---------+----------+-----------+
|   pregabalin         | Take 50 mg by mouth  |           | 0       |          |  |
| (LYRICA) 50 MG       | 3 times daily.       |           |         |          | 4         |
| capsule              |                      |           |         |          |           |
+----------------------+----------------------+-----------+---------+----------+-----------+
|   rivaroxaban        | Take 20 mg by mouth  |           | 0       |          |  |
| (XARELTO) 20 mg      | Daily (with dinner). |           |         |          | 9         |
| tablet               |                      |           |         |          |           |
+----------------------+----------------------+-----------+---------+----------+-----------+
|   sertraline         | 2 tablets daily      |           | 0       | 20 |  |
| (ZOLOFT) 100 mg      |                      |           |         | 12       | 9         |
| tablet               |                      |           |         |          |           |
+----------------------+----------------------+-----------+---------+----------+-----------+
|   tamsulosin         | Take 0.4 mg by mouth |           | 0       | 20 |  |
| (FLOMAX) 0.4 mg CAPS |  Daily.              |           |         | 12       | 3         |
+----------------------+----------------------+-----------+---------+----------+-----------+
|   testosterone       | 200 mg injected      |           | 0       | 20 | 10/07/201 |
| cypionate            | intramuscularly      |           |         | 12       | 4         |
| (DEPO-TESTOSTERONE)  | every 3 weeks        |           |         |          |           |
| 200 mg/mL injection  |                      |           |         |          |           |
+----------------------+----------------------+-----------+---------+----------+-----------+
 documented as of this encounter
 
 Plan of Treatment
 
 
 
+--------+---------+-------------+----------------------+-------------+
| Date   | Type    | Specialty   | Care Team            | Description |
+--------+---------+-------------+----------------------+-------------+
| / | Office  | Pulmonology |   Juan F,          |             |
|    | Visit   |             | Jessica Cheung,   |             |
|        |         |             | MD [address] |             |
|        |         |             |   [place]   |             |
|        |         |             | [place] [address]             |             |
|        |         |             | [phone]         |             |
|        |         |             | [phone] (Fax)   |             |
+--------+---------+-------------+----------------------+-------------+
| / | Office  | Cardiology  |   Eric Akins, |             |
|    | Visit   |             |  MD [address]   |             |
|        |         |             | [place]  |             |
|        |         |             | [id number] [phone]  |             |
|        |         |             |  [phone] (Fax)  |             |
+--------+---------+-------------+----------------------+-------------+
 documented as of this encounter
 
 Visit Diagnoses
 Not on filedocumented in this encounter

## 2019-12-06 NOTE — XMS
Encounter Summary
  Created on: 2019
 
 Shane Wyatttien BroussardJosue
 External Reference #: [id number]
 : 43
 Sex: Male
 
 Demographics
 
 
+-----------------------+---------------------------+
| Address               | [address]        |
|                       | [place]  [address] |
+-----------------------+---------------------------+
| Home Phone            | [phone]           |
+-----------------------+---------------------------+
| Preferred Language    | Unknown                   |
+-----------------------+---------------------------+
| Marital Status        |                    |
+-----------------------+---------------------------+
| Restorationist Affiliation | [id number]                      |
+-----------------------+---------------------------+
| Race                  | Unknown                   |
+-----------------------+---------------------------+
| Ethnic Group          | Unknown                   |
+-----------------------+---------------------------+
 
 
 Author
 
 
+--------------+--------------------------------------------+
| Author       | [facility]  |
|              | [facility] [place]                                |
+--------------+--------------------------------------------+
| Organization | [facility]  |
|              | and [place]                                |
+--------------+--------------------------------------------+
| Address      | Unknown                                    |
+--------------+--------------------------------------------+
| Phone        | Unavailable                                |
+--------------+--------------------------------------------+
 
 
 
 Support
 
 
+---------------+--------------+--------------------+-----------------+
| Name          | Relationship | Address            | Phone           |
+---------------+--------------+--------------------+-----------------+
| Opal Shane | ECON         | [address]     | [phone] |
|               |              | [place]   |                 |
|               |              | [address]              |                 |
+---------------+--------------+--------------------+-----------------+
 
 
 
 
 Care Team Providers
 
 
+-----------------------+------+-----------------+
| Care Team Member Name | Role | Phone           |
+-----------------------+------+-----------------+
| Erwin Iniguez MD | PCP  | [phone] |
+-----------------------+------+-----------------+
 
 
 
 Encounter Details
 
 
+--------+-----------+----------------------+--------------------+-------------+
| Date   | Type      | Department           | Care Team          | Description |
+--------+-----------+----------------------+--------------------+-------------+
| 04/03/ | Hospital  |   [facility] GENERIC IP     |   Conversion       | Pain        |
| 2018   | Encounter | CONVERSION [address]  | Transaction,       |             |
|        |           | [address]           | Provider Unknown   |             |
|        |           | [place], [place]         | [phone]       |             |
|        |           | [address]           | [phone] (Fax) |             |
|        |           | [phone]         |                    |             |
+--------+-----------+----------------------+--------------------+-------------+
 
 
 
 Social History
 
 
+---------------+------------+-----------+--------+------------------+
| Tobacco Use   | Types      | Packs/Day | Years  | Date             |
|               |            |           | Used   |                  |
+---------------+------------+-----------+--------+------------------+
| Former Smoker | Cigarettes | 1.3       | 34     | Quit: 1996 |
+---------------+------------+-----------+--------+------------------+
 
 
 
+---------------------+---+---+---+
| Smokeless Tobacco:  |   |   |   |
| Never Used          |   |   |   |
+---------------------+---+---+---+
 
 
 
+-------------+-------------+---------+----------+
| Alcohol Use | Drinks/Week | oz/Week | Comments |
+-------------+-------------+---------+----------+
| No          |             |         |          |
+-------------+-------------+---------+----------+
 
 
 
+------------------+---------------+
| Sex Assigned at  | Date Recorded |
| Birth            |               |
+------------------+---------------+
| Not on file      |               |
 
+------------------+---------------+
 
 
 
+----------------+-------------+-------------+
| Job Start Date | Occupation  | Industry    |
+----------------+-------------+-------------+
| Not on file    | Not on file | Not on file |
+----------------+-------------+-------------+
 
 
 
+----------------+--------------+------------+
| Travel History | Travel Start | Travel End |
+----------------+--------------+------------+
 
 
 
+-------------------------------------+
| No recent travel history available. |
+-------------------------------------+
 documented as of this encounter
 
 Medications at Time of Discharge
 
 
+----------------------+----------------------+-----------+---------+----------+-----------+
| Medication           | Sig                  | Dispensed | Refills | Start    | End Date  |
|                      |                      |           |         | Date     |           |
+----------------------+----------------------+-----------+---------+----------+-----------+
|   acyclovir          | Apply  topically     |           | 0       |          |           |
| (ZOVIRAX) 5%         | every 3 hours.       |           |         |          |           |
| ointment             |                      |           |         |          |           |
+----------------------+----------------------+-----------+---------+----------+-----------+
|   albuterol (PROAIR  | Inhale 2 puffs into  |           | 0       |          |           |
| HFA) 90 mcg/puff     | the lungs every 6    |           |         |          |           |
| inhaler              | hours as needed for  |           |         |          |           |
|                      | Wheezing.            |           |         |          |           |
+----------------------+----------------------+-----------+---------+----------+-----------+
|   digoxin (LANOXIN)  | Take 125 mcg by      |           | 0       |          |           |
| 250 mcg tablet       | mouth Daily.      |           |         |          |           |
|                      | capsule daily        |           |         |          |           |
+----------------------+----------------------+-----------+---------+----------+-----------+
|   ferrous sulfate    | Take 325 mg by mouth |           | 0       | 20 |           |
| 325 mg tablet        |  3 times daily.      |           |         | 12       |           |
+----------------------+----------------------+-----------+---------+----------+-----------+
|   fluticasone        | one spray in each    |           | 0       | 20 |           |
| (FLONASE) 50         | nostril daily as     |           |         | 12       |           |
| mcg/nasal spray      | needed               |           |         |          |           |
+----------------------+----------------------+-----------+---------+----------+-----------+
|                      | Inhale 1 puff into   |           | 0       |          |           |
| fluticasone-salmeter | the lungs Twice      |           |         |          |           |
| ol (ADVAIR) 500-50   | Daily.               |           |         |          |           |
| mcg/puff diskus      |                      |           |         |          |           |
| inhaler              |                      |           |         |          |           |
+----------------------+----------------------+-----------+---------+----------+-----------+
|   folic acid 1 mg    | Take 1 mg by mouth   |           | 0       |          |           |
| tablet               | Daily.               |           |         |          |           |
+----------------------+----------------------+-----------+---------+----------+-----------+
|   furosemide (LASIX) | Take 40 mg by mouth  |           | 0       |          |           |
 
|  40 mg tablet        | Daily.               |           |         |          |           |
+----------------------+----------------------+-----------+---------+----------+-----------+
|   guaiFENesin        | Take 1,200 mg by     |           | 0       |          |           |
| (MUCINEX) 600 mg 12  | mouth 2 times daily. |           |         |          |           |
| hr tablet            |                      |           |         |          |           |
+----------------------+----------------------+-----------+---------+----------+-----------+
|   levothyroxine      | Take 75 mcg by mouth |           | 0       | 20 |           |
| (LEVOTHROID) 75 MCG  |  Daily.              |           |         | 12       |           |
| tablet               |                      |           |         |          |           |
+----------------------+----------------------+-----------+---------+----------+-----------+
|   metoclopramide     | Take 10 mg by mouth  |           | 0       | 20 |           |
| (REGLAN) 10 mg       | 4 times daily as     |           |         | 12       |           |
| tablet               | needed.              |           |         |          |           |
+----------------------+----------------------+-----------+---------+----------+-----------+
|                      | Apply  topically 2   |           | 0       |          |           |
| nystatin-triamcinolo | times daily.         |           |         |          |           |
| ne (MYCOLOG II)      |                      |           |         |          |           |
| cream                |                      |           |         |          |           |
+----------------------+----------------------+-----------+---------+----------+-----------+
|   omeprazole         | Take 20 mg by mouth  |           | 0       | 20 |           |
| (PRILOSEC) 20 mg     | 2 times daily.       |           |         | 12       |           |
| capsule              |                      |           |         |          |           |
+----------------------+----------------------+-----------+---------+----------+-----------+
|   potassium citrate  | Take 10 mEq by mouth |           | 0       |          |           |
| (UROCIT-K) 10 mEq SR |  2 times daily.      |           |         |          |           |
|  tablet              |                      |           |         |          |           |
+----------------------+----------------------+-----------+---------+----------+-----------+
|   predniSONE         | Take 10 mg by mouth  |           | 0       |          |           |
| (DELTASONE) 5 mg     | Daily.               |           |         |          |           |
| tablet               |                      |           |         |          |           |
+----------------------+----------------------+-----------+---------+----------+-----------+
|   tamsulosin         | Take 0.4 mg by mouth |           | 0       | 20 |           |
| (FLOMAX) 0.4 mg CAPS |  2 times daily.      |           |         | 12       |           |
+----------------------+----------------------+-----------+---------+----------+-----------+
|   acyclovir          | Take 400 mg by mouth |           | 0       | 20 |  |
| (ZOVIRAX) 400 MG     |  3 times daily as    |           |         | 12       | 9         |
| tablet               | needed.              |           |         |          |           |
+----------------------+----------------------+-----------+---------+----------+-----------+
|   Cholecalciferol    | Take 2,000 Units by  |           | 0       | 20 |  |
| (VITAMIN D3) 2000    | mouth Daily.         |           |         | 12       | 9         |
| UNITS CAPS           |                      |           |         |          |           |
+----------------------+----------------------+-----------+---------+----------+-----------+
|   cyanocobalamin     | injected             |           | 0       | 20 |  |
| (VITAMIN B-12) 1,000 | intramuscularly once |           |         | 12       | 9         |
|  mcg/mL injection    |  a month             |           |         |          |           |
+----------------------+----------------------+-----------+---------+----------+-----------+
|   diltiazem          | Take 120 mg by mouth |           | 0       |          |  |
| (DILT-XR) 120 mg 24  |  Daily.              |           |         |          | 9         |
| hr capsule           |                      |           |         |          |           |
+----------------------+----------------------+-----------+---------+----------+-----------+
|   loratadine         | Take 10 mg by mouth  |           | 0       |          |  |
| (CLARITIN) 10 mg     | Daily.               |           |         |          | 9         |
| tablet               |                      |           |         |          |           |
+----------------------+----------------------+-----------+---------+----------+-----------+
|   losartan (COZAAR)  | Take 100 mg by mouth |           | 0       |          |  |
| 100 MG tablet        |  Daily. 1/2 daily    |           |         |          | 9         |
+----------------------+----------------------+-----------+---------+----------+-----------+
|   methotrexate 2.5   | Take 15 mg by mouth  |           | 0       |          |  |
| mg tablet            | Once a week.         |           |         |          | 9         |
+----------------------+----------------------+-----------+---------+----------+-----------+
 
|                      | Take 1 tablet by     |           | 0       |          |  |
| oxyCODONE-acetaminop | mouth every 4 hours  |           |         |          | 9         |
| hen (PERCOCET) 5-325 | as needed for Pain.  |           |         |          |           |
|  mg per tablet       |                      |           |         |          |           |
+----------------------+----------------------+-----------+---------+----------+-----------+
|   pseudoePHEDrine    | Take 30 mg by mouth  |           | 0       |          |  |
| (SUDOGEST) 30 mg     | every 4 hours as     |           |         |          | 9         |
| tablet               | needed for           |           |         |          |           |
|                      | Congestion.          |           |         |          |           |
+----------------------+----------------------+-----------+---------+----------+-----------+
|   rivaroxaban        | Take 20 mg by mouth  |           | 0       |          |  |
| (XARELTO) 20 mg      | Daily (with dinner). |           |         |          | 9         |
| tablet               |                      |           |         |          |           |
+----------------------+----------------------+-----------+---------+----------+-----------+
|   sertraline         | 2 tablets daily      |           | 0       | 20 |  |
| (ZOLOFT) 100 mg      |                      |           |         | 12       | 9         |
| tablet               |                      |           |         |          |           |
+----------------------+----------------------+-----------+---------+----------+-----------+
 documented as of this encounter
 
 Plan of Treatment
 
 
+--------+---------+-------------+----------------------+-------------+
| Date   | Type    | Specialty   | Care Team            | Description |
+--------+---------+-------------+----------------------+-------------+
| / | Office  | Pulmonology |   [facility],          |             |
|    | Visit   |             | Jessica Cheung,   |             |
|        |         |             | MD [address] |             |
|        |         |             |   [place],   |             |
|        |         |             | [place] [address]             |             |
|        |         |             | [phone]         |             |
|        |         |             | [phone] (Fax)   |             |
+--------+---------+-------------+----------------------+-------------+
| / | Office  | Cardiology  |   [place], [place], |             |
| 2020   | Visit   |             |  MD  [address]   |             |
|        |         |             | [place]  [place]  |             |
|        |         |             | [address]  [phone]  |             |
|        |         |             |  [phone] (Fax)  |             |
+--------+---------+-------------+----------------------+-------------+
 documented as of this encounter
 
 Procedures
 
 
+----------------------+--------+-------------+----------------------+----------------------
+
| Procedure Name       | Priori | Date/Time   | Associated Diagnosis | Comments             
|
|                      | ty     |             |                      |                      
|
+----------------------+--------+-------------+----------------------+----------------------
+
| CT CERVICAL SPINE WO | Routin | 2017  |                      |   Results for this   
|
|  CONTRAST            | e      |  4:46 AM    |                      | procedure are in the 
|
|                      |        | PST         |                      |  results section.    
|
+----------------------+--------+-------------+----------------------+----------------------
 
+
 documented in this encounter
 
 Results
 CT Cervical Spine wo Contrast (2017  4:46 AM PST)
 
+----------+
| Specimen |
+----------+
|          |
+----------+
 
 
 
+------------------------------------------------------------------------+--------------+
| Narrative                                                              | Performed At |
+------------------------------------------------------------------------+--------------+
|   This is a non-reportable procedure without a radiologist report and  |              |
| is  used for image storage only                                        |              |
+------------------------------------------------------------------------+--------------+
 
 
 
+--------------------------------------------------------------------------------------+
| Procedure Note                                                                       |
+--------------------------------------------------------------------------------------+
|   Andrzej Steven Irvin - 2019  4:21 AM PDT  This is a non-reportable procedure  |
| without a radiologist report and isused for image storage only                       |
+--------------------------------------------------------------------------------------+
 documented in this encounter
 
 Visit Diagnoses
 
 
+--------------------------+
| Diagnosis                |
+--------------------------+
|   Pain  Generalized pain |
+--------------------------+
 documented in this encounter

## 2019-12-06 NOTE — XMS
Encounter Summary
  Created on: 2019
 
 Shane Wyatttien BroussardJosue
 External Reference #: [id number]
 : 43
 Sex: Male
 
 Demographics
 
 
+-----------------------+---------------------------+
| Address               | [address]        |
|                       | [place]  [address] |
+-----------------------+---------------------------+
| Home Phone            | [phone]           |
+-----------------------+---------------------------+
| Preferred Language    | Unknown                   |
+-----------------------+---------------------------+
| Marital Status        |                    |
+-----------------------+---------------------------+
| Zoroastrianism Affiliation | [id number]                      |
+-----------------------+---------------------------+
| Race                  | Unknown                   |
+-----------------------+---------------------------+
| Ethnic Group          | Unknown                   |
+-----------------------+---------------------------+
 
 
 Author
 
 
+--------------+--------------------------------------------+
| Author       | [facility]  |
|              | [facility] [place]                                |
+--------------+--------------------------------------------+
| Organization | [facility]  |
|              | [facility] [place]                                |
+--------------+--------------------------------------------+
| Address      | Unknown                                    |
+--------------+--------------------------------------------+
| Phone        | Unavailable                                |
+--------------+--------------------------------------------+
 
 
 
 Support
 
 
+---------------+--------------+--------------------+-----------------+
| Name          | Relationship | Address            | Phone           |
+---------------+--------------+--------------------+-----------------+
| Opal Shane | ECON         | [address]     | [phone] |
|               |              | [place]   |                 |
|               |              | [id number]              |                 |
+---------------+--------------+--------------------+-----------------+
 
 
 
 
 Care Team Providers
 
 
+-----------------------+------+-----------------+
| Care Team Member Name | Role | Phone           |
+-----------------------+------+-----------------+
| Erwin Iniguez MD | PCP  | [phone] |
+-----------------------+------+-----------------+
 
 
 
 Encounter Details
 
 
+--------+----------+----------------------+----------------------+-------------+
| Date   | Type     | Department           | Care Team            | Description |
+--------+----------+----------------------+----------------------+-------------+
| / | Imaging  |   [facility] |   Provider,          |             |
| 2017   | Exam     |  [facility]    | MD Zack  [id number] |             |
|        |          | IMAGING              |  [address] [place]         |             |
|        |          | [phone]         | [place] [address]     |             |
+--------+----------+----------------------+----------------------+-------------+
 
 
 
 Social History
 
 
+---------------+------------+-----------+--------+------------------+
| Tobacco Use   | Types      | Packs/Day | Years  | Date             |
|               |            |           | Used   |                  |
+---------------+------------+-----------+--------+------------------+
| Former Smoker | Cigarettes | 1.3       | 34     | Quit: 1996 |
+---------------+------------+-----------+--------+------------------+
 
 
 
+---------------------+---+---+---+
| Smokeless Tobacco:  |   |   |   |
| Never Used          |   |   |   |
+---------------------+---+---+---+
 
 
 
+-------------+-------------+---------+----------+
| Alcohol Use | Drinks/Week | oz/Week | Comments |
+-------------+-------------+---------+----------+
| No          |             |         |          |
+-------------+-------------+---------+----------+
 
 
 
+------------------+---------------+
| Sex Assigned at  | Date Recorded |
| Birth            |               |
+------------------+---------------+
| Not on file      |               |
+------------------+---------------+
 
 
 
 
+----------------+-------------+-------------+
| Job Start Date | Occupation  | Industry    |
+----------------+-------------+-------------+
| Not on file    | Not on file | Not on file |
+----------------+-------------+-------------+
 
 
 
+----------------+--------------+------------+
| Travel History | Travel Start | Travel End |
+----------------+--------------+------------+
 
 
 
+-------------------------------------+
| No recent travel history available. |
+-------------------------------------+
 documented as of this encounter
 
 Plan of Treatment
 
 
+--------+---------+-------------+----------------------+-------------+
| Date   | Type    | Specialty   | Care Team            | Description |
+--------+---------+-------------+----------------------+-------------+
| / | Office  | Pulmonology |   Juan F,          |             |
|    | Visit   |             | Jessica Cheung,   |             |
|        |         |             | MD [address] |             |
|        |         |             |   [place]   |             |
|        |         |             | [place] [address]             |             |
|        |         |             | [phone]         |             |
|        |         |             | [phone] (Fax)   |             |
+--------+---------+-------------+----------------------+-------------+
| / | Office  | Cardiology  |   Eric Akins, |             |
|    | Visit   |             |  MD [address]   |             |
|        |         |             | [place]  |             |
|        |         |             | [address]  [phone]  |             |
|        |         |             |  [phone] (Fax)  |             |
+--------+---------+-------------+----------------------+-------------+
 documented as of this encounter
 
 Procedures
 
 
+----------------------+--------+-------------+----------------------+----------------------
+
| Procedure Name       | Priori | Date/Time   | Associated Diagnosis | Comments             
|
|                      | ty     |             |                      |                      
|
+----------------------+--------+-------------+----------------------+----------------------
+
| MRI LUMBAR SPINE WO  | Routin | 2014  |                      |   Results for this   
|
| CONTRAST             | e      | 10:25 AM    |                      | procedure are in the 
|
|                      |        | PDT         |                      |  results section.    
|
 
+----------------------+--------+-------------+----------------------+----------------------
+
 documented in this encounter
 
 Results
 MRI Lumbar Spine wo Contrast (2014 10:25 AM PDT)
 
+----------+
| Specimen |
+----------+
|          |
+----------+
 
 
 
+--------------------------------------------------------------------+---------------+
| Narrative                                                          | Performed At  |
+--------------------------------------------------------------------+---------------+
|   External films for comparison only - no result from [facility].  |   [facility] |
+--------------------------------------------------------------------+---------------+
 
 
 
+---------------+---------+--------------------+--------------+
| Performing    | Address | City/State/Zipcode | Phone Number |
| Organization  |         |                    |              |
+---------------+---------+--------------------+--------------+
|   [facility] |         |                    |              |
+---------------+---------+--------------------+--------------+
 documented in this encounter
 
 Visit Diagnoses
 Not on filedocumented in this encounter

## 2019-12-06 NOTE — XMS
Encounter Summary
  Created on: 2019
 
 Wyatt Shane
 External Reference #: [id number]
 : 43
 Sex: Male
 
 Demographics
 
 
+-----------------------+------------------------+
| Address               | [address]     |
|                       | [place]  [address]   |
+-----------------------+------------------------+
| Home Phone            | [phone]        |
+-----------------------+------------------------+
| Preferred Language    | Unknown                |
+-----------------------+------------------------+
| Marital Status        |                 |
+-----------------------+------------------------+
| Confucianism Affiliation | LUT                    |
+-----------------------+------------------------+
| Race                  | White                  |
+-----------------------+------------------------+
| Ethnic Group          | Not  or  |
+-----------------------+------------------------+
 
 
 Author
 
 
+--------------+------------------------------+
| Author       | [facility] |
+--------------+------------------------------+
| Organization | [facility] |
+--------------+------------------------------+
| Address      | Unknown                      |
+--------------+------------------------------+
| Phone        | Unavailable                  |
+--------------+------------------------------+
 
 
 
 Support
 
 
+---------------+--------------+---------+-----------------+
| Name          | Relationship | Address | Phone           |
+---------------+--------------+---------+-----------------+
| Opal Shane | ECON         | Unknown | [phone] |
+---------------+--------------+---------+-----------------+
 
 
 
 Care Team Providers
 
 
 
+-----------------------+------+-----------------+
| Care Team Member Name | Role | Phone           |
+-----------------------+------+-----------------+
| Erwin Iniguez MD   | PCP  | [phone] |
+-----------------------+------+-----------------+
 
 
 
 Reason for Visit
 
 
+-----------------+----------+
| Reason          | Comments |
+-----------------+----------+
| Follow-up visit |          |
+-----------------+----------+
 Office Visit - E/M Services (Routine)
 
+--------+--------+---------------+--------------+--------------+---------------+
| Status | Reason | Specialty     | Diagnoses /  | Referred By  | Referred To   |
|        |        |               | Procedures   | Contact      | Contact       |
+--------+--------+---------------+--------------+--------------+---------------+
| Closed |        | Otolaryngolog |              |   Vaibhav,     |   Ent         |
|        |        | y             |              | MD Fred    | Laryngology   |
|        |        |               |              | [address]  | [facility]  [address]  |
|        |        |               |              | [address]   |
|        |        |               |              |  [address]          | [place]       |
|        |        |               |              | [place], [place] | Mailcode:     |
|        |        |               |              |  [address]  | [address]          |
|        |        |               |              |              | Physician[facility]   |
|        |        |               |              |              | Pavilion      |
|        |        |               |              |              | [place], [place]  |
|        |        |               |              |              | [address]    |
|        |        |               |              |              | Phone:        |
|        |        |               |              |              | [phone]  |
|        |        |               |              |              |  Fax:         |
|        |        |               |              |              | [phone]  |
+--------+--------+---------------+--------------+--------------+---------------+
 
 
 
 
 Encounter Details
 
 
+--------+---------+----------------------+----------------------+----------------------+
| Date   | Type    | Department           | Care Team            | Description          |
+--------+---------+----------------------+----------------------+----------------------+
| / | Office  |   Otolaryngology     |   Jonathan Cross  | Pharyngeal dysphagia |
|    | Visit   | Laryngology Services | MD RUSS  [address]   |  (Primary Dx);       |
|        |         |  at [facility]  [address] | [address]      | Glottic stenosis;    |
|        |         |  [address]     | [place], [place]         | Dysphonia plicae     |
|        |         | Mailcode: [address]       | [address]           | ventricularis;       |
|        |         | [facility] [address] | [phone]         | History of laryngeal |
|        |         |   [place], [place]       | [phone] (Fax)   |  cancer              |
|        |         | [address]           |                      |                      |
|        |         | [phone]         |                      |                      |
+--------+---------+----------------------+----------------------+----------------------+
 
 
 
 
 Social History
 
 
+---------------+------------+-----------+--------+------------------+
| Tobacco Use   | Types      | Packs/Day | Years  | Date             |
|               |            |           | Used   |                  |
+---------------+------------+-----------+--------+------------------+
| Former Smoker | Cigarettes | 1         | 30     | Quit: 1996 |
+---------------+------------+-----------+--------+------------------+
 
 
 
+-------------+-------------+---------+----------+
| Alcohol Use | Drinks/Week | oz/Week | Comments |
+-------------+-------------+---------+----------+
| No          |             |         |          |
+-------------+-------------+---------+----------+
 
 
 
+------------------+---------------+
| Sex Assigned at  | Date Recorded |
| Birth            |               |
+------------------+---------------+
| Not on file      |               |
+------------------+---------------+
 
 
 
+----------------+-------------+-------------+
| Job Start Date | Occupation  | Industry    |
+----------------+-------------+-------------+
| Not on file    | Not on file | Not on file |
+----------------+-------------+-------------+
 
 
 
+----------------+--------------+------------+
| Travel History | Travel Start | Travel End |
+----------------+--------------+------------+
 
 
 
+-------------------------------------+
| No recent travel history available. |
+-------------------------------------+
 documented as of this encounter
 
 Last Filed Vital Signs
 
 
+-------------------+------------------+----------------------+----------+
| Vital Sign        | Reading          | Time Taken           | Comments |
+-------------------+------------------+----------------------+----------+
| Blood Pressure    | 135/77           | 2011 11:13 AM  |          |
|                   |                  | PDT                  |          |
+-------------------+------------------+----------------------+----------+
| Pulse             | 78               | 2011 11:13 AM  |          |
|                   |                  | PDT                  |          |
 
+-------------------+------------------+----------------------+----------+
| Temperature       | -                | -                    |          |
+-------------------+------------------+----------------------+----------+
| Respiratory Rate  | -                | -                    |          |
+-------------------+------------------+----------------------+----------+
| Oxygen Saturation | -                | -                    |          |
+-------------------+------------------+----------------------+----------+
| Inhaled Oxygen    | -                | -                    |          |
| Concentration     |                  |                      |          |
+-------------------+------------------+----------------------+----------+
| Weight            | 92.5 kg (204 lb) | 2011 11:13 AM  |          |
|                   |                  | PDT                  |          |
+-------------------+------------------+----------------------+----------+
| Height            | 172.7 cm (5' 8") | 2011 11:13 AM  |          |
|                   |                  | PDT                  |          |
+-------------------+------------------+----------------------+----------+
| Body Mass Index   | 31.02            | 2011 11:13 AM  |          |
|                   |                  | PDT                  |          |
+-------------------+------------------+----------------------+----------+
 documented in this encounter
 
 Progress Notes
 Jonathan Cross MD - 2011 11:55 AM PDTFormatting of this note might be different
 from the original.
 PATIENT NAME: Wyatt Shane MR#: [id number]
 : 1945
 
 REFERRING PROVIDER:
 FRED ESPOSITO
 Otolaryngology
 [facility] [place] [address]
 
 PRIMARY CARE PROVIDER:
 Erwin Iniguez MD
 
 CLINIC: [facility]
 
 REASON FOR FOLLOW-UP:Chief Complaint 
 Patient presents with 
   Follow-up visit 
 
 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 
 TNM/STAGE: CIS--> T2N0M0/II
 IDENTIFICATION DATE: 1998
 SURGICAL EXCISION DATE: 1998
 TYPE OF SURGERY: extended vertical hemilaryngectomy
 LAST SURGERY DATE: laryngotracheal reconstruction2000
 RADIATION THERAPY COMPLETED: 1998
 RADIATION THERAPIST: Unknown
 RADIATION DOSE: Unknown
 
 HPI:  Wyatt Shane is a 65 y.o. male who was last seen at the [facility] Vo[facility]
e and Swallowing for follow up of swallowing complaints.  He has a history of hemilaryngecto
my with neoglottic stenosis and laryngotracheal reconstruction with anterior and posterior c
ostal cartilage grafting by Dr. Esposito in .
  
 He returns today noting that he feels that his swallowing is similar or maybe a little wors
e.  He is still able to take all consistencies, but is careful with bulky items and particul
 
ates.  He will avoid bread at times, but will eat steak and even raw vegetables.  He does co
ugh on liquids and particulates occasionally .  He thinks that his voice is "more hoarse".  
Specific difficulties with voicing include: poor vocal quality, effortful voicing and diffic
ulty being heard in noise.   He has not noted any otalgia.  He denies odynophagia.  He has n
ot noted any hemoptysis.  He does have some dyspnea on exertion.  He has noted noisy breathi
ng on deep inspiration, but only with exertion.  This has not changed and is only noted with
 vigorous activity.   He denies any recent episodes of pneumonia or bronchitis.  He does not
e problems with nasal allergy symptoms, including itchy eyes, rhinorrhea and sneezing.  He u
ses Claritin and Sudafed for this.  He is not smoking.  
 
 He is maintained on a proton-pump inhibitor.  He notes no symptoms of reflux, including hea
rtburn, indigestion, acidic taste or belching while on proton pump inhibitor therapy.  He ha
s had a Nissen fundoplication.
 
 VOCAL DEMANDS:  Unchanged from previous visit.
 
 PMHx: Past Medical History 
 Diagnosis Date 
   Larynx cancer  
   T2N0 right glottis 
   Radiation fibrosis  
   larynx 
   Glottic stenosis  
   MI (myocardial infarction)  
   PUD (peptic ulcer disease)  
   Fibromyalgia  
   Depression  
   Nephrolithiasis  
 
 SURGHx:Past Surgical History 
 Procedure Date 
   Hx extended vertical hemilaryngectomy  
   right posterior arytenoid retained. 
   Hx stenosis dilation  
   Nissen fundoplication  
   Hx dml bx 1998 
   cCIS--pT2N0 SCCa right vocal fold 
   Hx laryngotracheoplasty  
 
  
 
 ALLERGIES: No Known Allergies
 
 MEDICATIONS:Current outpatient prescriptions 
 Medication Sig 
   ACYCLOVIR ORAL Take  by mouth as needed. 
   Amlodipine-Atorvastatin (CADUET) 10-10 mg Oral Tablet Take  by mouth. 
   atorvastatin (LIPITOR) 10 mg Oral Tablet Take 10 mg by mouth once daily. 
   CYANOCOBALAMIN (VITAMIN B-12 INJ) by Injection route every thirty days. 
   gabapentin 300 mg Oral Tablet Take 300 mg by mouth three times daily. 
   LANSOPRAZOLE (PREVACID ORAL) Take  by mouth. 
   LORATADINE (CLARITIN ORAL) Take  by mouth. 
   metoclopramide (REGLAN) 5 mg Oral Tablet Take 5 mg by mouth every six hours as needed. 
   NAPROXEN SODIUM/P-EPHED HCL (SUDAFED 12 HR SINUS-PAIN ORAL) Take  by mouth. 
   omeprazole (PRILOSEC) 40 mg Oral Capsule, Delayed Release(E.C.) Take 40 mg by mouth onc
e daily. 
   sertraline (ZOLOFT) 50 mg Oral Tablet Take 50 mg by mouth once daily. 
   tamsulosin (FLOMAX) 0.4 mg Oral Capsule, Sust. Release 24 hr Take 0.4 mg by mouth once 
daily. 
   TESTOSTERONE IM Inject  into the muscle (IM). 
 
   VICODIN ORAL None Entered 
 
 LAST WEIGHTS:  Wt Readings from Last 3 Encounters: 
 2011 92.534 kg (204 lb) 
 09/15/2010 97.523 kg (215 lb) 
 2010 85.503 kg (188 lb 8 oz) 
 
 EXAMINATION:  /77 | Pulse 78 | Ht 1.727 m (5' 8") | Wt 92.534 kg (204 lb) | BMI 31.02
 kg/(m^2)
 Gen: He is a 65 y.o. male. He is awake, alert and comfortable with the examination. H
is weight is elevated.
 Ears: The pinnae are normal. External auditory canals show minimal cerumen bilaterally.
 The tympanic membranes are clear with normal anatomic landmarks and an aerated middle ear
 space.
 Nose: The nasal dorsum is straight and the nares are widely patent. The mucosa is pink an
d there are no lesions or masses noted. There is no significant nasal obstruction noted.
 Face: There are no worrisome lesions of the face or head noted.
 Oropharynx: Normal lips and oral competence are noted. The dentition is fair. The muc
archana is dry and pale, but shows no lesions or masses. The tonsils are small or absent and t
he fossae show no lesions.
 Neck: The neck has well healed anterior transverse neck incision and tracheotomy tube scar.
 There is no lymphadenopathy noted in the anterior, posterior, digastric or submental tria
ngles. The thyroid is palpable, but not enlarged or tender. I cannot appreciate any nodu
les. The larynx is no longer anatomic and has cartilage loss along the right side. He 
has tenderness along the right anterior strap muscles in the region of the omohyoid muscles 
bilaterally.
 Chest: Chest rise is symmetric and there is no audible wheezing, stridor or wet vocal romina
lity. There is very mild stridor with deep inspiration.
 Neuro: Extraoccular movements are grossly intact. There is symmetric sensation and move
ment noted of the upper and midface. Marginal mandibular nerve function is normal. Heari
ng is grossly intact. Palatal elevation is symmetric and full. Trapezius function is nor
mal. Tongue protrusion is midline.
 
 VOICE EVALUATION: Perceptual voice evaluation demonstrates severe dysphonia. The pitch 
is low for a male and shows limited range. Vocal intensity is acceptable and shows limited u
pper range. There is 3+ roughness, 1+ breathiness, and 3+ tightness appreciated. T
here is no tremor noted with sustained vowel phonation. I am unable to detect voice breaks.
 Glottal orellana is not detected and there is no diplophonia noted. Articulation is normal.  
 
 LARYNGOSCOPY: Indirect laryngoscopy was performed using a P3 Olympus flexible fiberoptic 
laryngoscope following the topical nasal application of oxymetazoline and pontocaine.  This 
was performed because the patient's gag reflex precluded adequate transoral indirect laryngo
scopy. This demonstrates a clear vallecula and crisp epiglottis. The right arytenoid is pa
rtially present and mobile with mucosal redundancy. There is soft tissue where the right h
emilarynx would be. This is benign and mucosalized. The true vocal folds are absent. The
 left false vocal fold is at least partially present. The left arytenoid is present, but f
ixed. Both aryepiglottic folds are shortened. The base of tongue is clear. The subglot
tic airway is slightly narrowed, but adequate.  It is scarred anteriorly without mass.
 -------
 
 ASSESSMENT:  Mr. Shane appears to suffer from dysphonia, dysphagia and dyspnea associated wi
th prior vertical hemilaryngectomy with reconstruction and radiation therapy which has not c
hanged significantly since I last saw.  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  I don't think that his swallowing has changed substa
ntially and I don't think that he would benefit from dilation at this time.
 
 PLAN:   Continued periodic observation.  I will see him back in approximately 12 months, or
 sooner if symptoms worsen.  If his swallowing worsens or he notes more specific difficultie
s with bulky solids, I would be happy to consider esophageal dilation.  He understands and a
grees.
 
 
 Jonathan Cross M.D.
 
 Laryngology and Head & Neck Surgery
 Tel:[phone]
 Fax:[phone] Electronically signed by Jonathan Cross MD at 2011 11:55 AM Harish Villasenor - 2011 11:17 AM PDTAdditional staff support provided to the patient d
brendan this encounter included:
 
 Assisted patient in exam room getting on exam chair.
 Electronically signed by Harish Tse at 2011 11:17 AM PDTdocumented in this encou
nter
 
 Plan of Treatment
 Not on filedocumented as of this encounter
 
 Procedures
 
 
+----------------------+--------+-------------+----------------------+----------+
| Procedure Name       | Priori | Date/Time   | Associated Diagnosis | Comments |
|                      | ty     |             |                      |          |
+----------------------+--------+-------------+----------------------+----------+
| [place]                   | Routin | 2011  |   Glottic stenosis   |          |
| LARYNGOSCOPY,FLEXIBL | e      | 11:56 AM    | Pharyngeal dysphagia |          |
| E, DIAGNOSTIC        |        | PDT         |   Dysphonia plicae   |          |
|                      |        |             | ventricularis        |          |
|                      |        |             | History of laryngeal |          |
|                      |        |             |  cancer              |          |
+----------------------+--------+-------------+----------------------+----------+
 documented in this encounter
 
 Visit Diagnoses
 
 
+---------------------------------------------------------------------------------+
| Diagnosis                                                                       |
+---------------------------------------------------------------------------------+
|   Pharyngeal dysphagia - Primary  Dysphagia, pharyngeal phase                   |
+---------------------------------------------------------------------------------+
|   Glottic stenosis  Stenosis of larynx                                          |
+---------------------------------------------------------------------------------+
|   Dysphonia plicae ventricularis  Dysphonia                                     |
+---------------------------------------------------------------------------------+
|   History of laryngeal cancer  Personal history of malignant neoplasm of larynx |
+---------------------------------------------------------------------------------+
 documented in this encounter

## 2019-12-06 NOTE — XMS
Encounter Summary
  Created on: 2019
 
 Wyatt Shane
 External Reference #: [id number]
 : 43
 Sex: Male
 
 Demographics
 
 
+-----------------------+------------------------+
| Address               | [address]     |
|                       | [place]  [address]   |
+-----------------------+------------------------+
| Home Phone            | [phone]        |
+-----------------------+------------------------+
| Preferred Language    | Unknown                |
+-----------------------+------------------------+
| Marital Status        |                 |
+-----------------------+------------------------+
| Pentec[employer]alism Affiliation | LUT                    |
+-----------------------+------------------------+
| Race                  | White                  |
+-----------------------+------------------------+
| Ethnic Group          | Not  or  |
+-----------------------+------------------------+
 
 
 Author
 
 
+--------------+------------------------------+
| Author       | [facility] |
+--------------+------------------------------+
| Organization | [facility] |
+--------------+------------------------------+
| Address      | Unknown                      |
+--------------+------------------------------+
| Phone        | Unavailable                  |
+--------------+------------------------------+
 
 
 
 Support
 
 
+---------------+--------------+---------+-----------------+
| Name          | Relationship | Address | Phone           |
+---------------+--------------+---------+-----------------+
| Opal Shane | ECON         | Unknown | [phone] |
+---------------+--------------+---------+-----------------+
 
 
 
 Care Team Providers
 
 
 
+-----------------------+------+-----------------+
| Care Team Member Name | Role | Phone           |
+-----------------------+------+-----------------+
| Erwin Iniguez MD   | PCP  | [phone] |
+-----------------------+------+-----------------+
 
 
 
 Encounter Details
 
 
+--------+-------------+-----------------+---------------------+---------------+
| Date   | Type        | Department      | Care Team           | Description   |
+--------+-------------+-----------------+---------------------+---------------+
| / | Office      |   [facility]  |   Note, Outpatient  | Progress Note |
| 1998   | Visit-Trans | MEDICINE        | Clinic              |               |
|        | cribed      |                 |                     |               |
+--------+-------------+-----------------+---------------------+---------------+
 
 
 
 Social History
 
 
+----------------+-------+-----------+--------+------+
| Tobacco Use    | Types | Packs/Day | Years  | Date |
|                |       |           | Used   |      |
+----------------+-------+-----------+--------+------+
| Never Assessed |       |           |        |      |
+----------------+-------+-----------+--------+------+
 
 
 
+------------------+---------------+
| Sex Assigned at  | Date Recorded |
| Birth            |               |
+------------------+---------------+
| Not on file      |               |
+------------------+---------------+
 
 
 
+----------------+-------------+-------------+
| Job Start Date | Occupation  | Industry    |
+----------------+-------------+-------------+
| Not on file    | Not on file | Not on file |
+----------------+-------------+-------------+
 
 
 
+----------------+--------------+------------+
| Travel History | Travel Start | Travel End |
+----------------+--------------+------------+
 
 
 
+-------------------------------------+
| No recent travel history available. |
+-------------------------------------+
 documented as of this encounter
 
 
 Progress Notes
 Interface, Transcription In - 2007  3:15 AM PSTCLINIC DATE:       1998
 
 OTOLARYNGOLOGY CLINIC
 
 SUMMARY:  Mr. Shane's slides were reviewed with Dr. Pavon.  There was not
 sufficient tissue to establish a diagnosis of malignancy.  This is in
 contradistinction of the CT scan which suggests a rather deep mass.  While
 it is possible this is inflammatory postbiopsy, I am highly suspicious that
 there may be further tissue underneath the surface and we will need to
 re-biopsy unless all the changes resolve clinically.  I will discuss this
 with Mr. Shane.
 
 Jimmy Esposito M.D.
 , Otolaryngology
 Head and Neck Surgery
 
 [facility]/[place]
 D:  98
 T:   98Electronically signed by Interface, Transcription In at 2007  3:15 AM MIKE Paez, Transcription In - 2006  3:04 AM PSTCLINIC DATE:       1998
 
                            OTOLARYNGOLOGY CLINIC
 
 I have reviewed Mr. Shane's films with Dr. Figueroa, and there does appear to
 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
 could be due to edema, I am very worried about the possibility of
 carcinoma.  A final review of his pathology is still pending here, and if
 there is any question of invasion, then I think he will need to come back
 for re-biopsy after he has had the chance to heal from the edema of this
 one.
 
 Jimmy Esposito M.D.
 , Otolaryngology
 Head and Neck Surgery
 
 [facility] /[place]
 D:98
 T:98Electronically signed by Interface, Transcription In at 2006  3:04 AM PSTdo
cumented in this encounter
 
 Plan of Treatment
 Not on filedocumented as of this encounter
 
 Visit Diagnoses
 Not on filedocumented in this encounter

## 2019-12-06 NOTE — XMS
Encounter Summary
  Created on: 2019
 
 Shane Wyatttien BroussardJosue
 External Reference #: [id number]
 : 43
 Sex: Male
 
 Demographics
 
 
+-----------------------+---------------------------+
| Address               | [address]        |
|                       | [place]  [address] |
+-----------------------+---------------------------+
| Home Phone            | [phone]           |
+-----------------------+---------------------------+
| Preferred Language    | Unknown                   |
+-----------------------+---------------------------+
| Marital Status        |                    |
+-----------------------+---------------------------+
| Druze Affiliation | 1028                      |
+-----------------------+---------------------------+
| Race                  | Unknown                   |
+-----------------------+---------------------------+
| Ethnic Group          | Unknown                   |
+-----------------------+---------------------------+
 
 
 Author
 
 
+--------------+--------------------------------------------+
| Author       | [facility]  |
|              | [facility] [place]                                |
+--------------+--------------------------------------------+
| Organization | [facility]  |
|              | [facility] [place]                                |
+--------------+--------------------------------------------+
| Address      | Unknown                                    |
+--------------+--------------------------------------------+
| Phone        | Unavailable                                |
+--------------+--------------------------------------------+
 
 
 
 Support
 
 
+---------------+--------------+--------------------+-----------------+
| Name          | Relationship | Address            | Phone           |
+---------------+--------------+--------------------+-----------------+
| Opal Shane | ECON         | [address]     | [phone] |
|               |              | [place]   |                 |
|               |              | [address]              |                 |
+---------------+--------------+--------------------+-----------------+
 
 
 
 
 Care Team Providers
 
 
+------------------------+------+-----------------+
| Care Team Member Name  | Role | Phone           |
+------------------------+------+-----------------+
| Salvador Murguia MD | PCP  | [phone] |
+------------------------+------+-----------------+
 
 
 
 Reason for Referral
 Diagnostic/Screening (Routine)
 
+--------+--------+-----------+--------------+--------------+--------------+
| Status | Reason | Specialty | Diagnoses /  | Referred By  | Referred To  |
|        |        |           | Procedures   | Contact      | Contact      |
+--------+--------+-----------+--------------+--------------+--------------+
| Closed |        | Radiology |   Diagnoses  |   Steve, Si,  |              |
|        |        |           |  Abdominal   | DNP  [address]    |              |
|        |        |           | aortic       | [address]  |              |
|        |        |           | aneurysm     |  [place]       |              |
|        |        |           | (AAA)        | [facility] [place] |              |
|        |        |           | without      |  [address]       |              |
|        |        |           | rupture      | Phone:       |              |
|        |        |           | (HCC)        | [phone] |              |
|        |        |           | Carotid      |   Fax:       |              |
|        |        |           | stenosis,    | [phone] |              |
|        |        |           | bilateral    |              |              |
|        |        |           | Procedures   |              |              |
|        |        |           | VAS Aorta    |              |              |
|        |        |           | Iliac Duplex |              |              |
|        |        |           |  Complete    |              |              |
+--------+--------+-----------+--------------+--------------+--------------+
 
 
 Diagnostic/Screening (Routine)
 
+--------+--------+-----------+--------------+--------------+--------------+
| Status | Reason | Specialty | Diagnoses /  | Referred By  | Referred To  |
|        |        |           | Procedures   | Contact      | Contact      |
+--------+--------+-----------+--------------+--------------+--------------+
| Closed |        | Radiology |   Diagnoses  |   Steve, Si,  |              |
|        |        |           |  Abdominal   | DNP  [address]    |              |
|        |        |           | aortic       | [address]  |              |
|        |        |           | aneurysm     |  [address]       |              |
|        |        |           | (AAA)        | [place], [place] |              |
|        |        |           | without      |  [address]       |              |
|        |        |           | rupture      | Phone:       |              |
|        |        |           | ([facility])        | [phone] |              |
|        |        |           | Carotid      |   Fax:       |              |
|        |        |           | stenosis,    | [phone] |              |
|        |        |           | bilateral    |              |              |
|        |        |           | Procedures   |              |              |
|        |        |           | VAS Carotid  |              |              |
|        |        |           | Duplex       |              |              |
|        |        |           | Bilateral    |              |              |
+--------+--------+-----------+--------------+--------------+--------------+
 
 
 
 
 
 Reason for Visit
 
 
+-----------+-----------------------------+
| Reason    | Comments                    |
+-----------+-----------------------------+
| Follow-up | 1 yr f/u carotid and aorta  |
+-----------+-----------------------------+
 
 
 
 Encounter Details
 
 
+--------+---------+----------------------+----------------------+----------------------+
| Date   | Type    | Department           | Care Team            | Description          |
+--------+---------+----------------------+----------------------+----------------------+
| / | Office  |   [facility]      |   Neris Wilson DNP      | Abdominal aortic     |
| 2019   | Visit   | VASCULAR SURGERY     | [address]     | aneurysm (AAA)       |
|        |         | [address] | [address], [place]  | without rupture      |
|        |         |  [place]  [place], [place]     | [address]  [phone]  | (HCC) (Primary Dx);  |
|        |         | [address]           |  [phone] (Fax)  | Carotid stenosis,    |
|        |         | [phone]         |                      | bilateral            |
+--------+---------+----------------------+----------------------+----------------------+
 
 
 
 Social History
 
 
+---------------+------------+-----------+--------+------------------+
| Tobacco Use   | Types      | Packs/Day | Years  | Date             |
|               |            |           | Used   |                  |
+---------------+------------+-----------+--------+------------------+
| Former Smoker | Cigarettes | 1         | 35     | Quit: 1996 |
+---------------+------------+-----------+--------+------------------+
 
 
 
+---------------------+---+---+---+
| Smokeless Tobacco:  |   |   |   |
| Never Used          |   |   |   |
+---------------------+---+---+---+
 
 
 
+-------------+-------------+---------+----------+
| Alcohol Use | Drinks/Week | oz/Week | Comments |
+-------------+-------------+---------+----------+
| No          |             |         |          |
+-------------+-------------+---------+----------+
 
 
 
+------------------+---------------+
| Sex Assigned at  | Date Recorded |
| Birth            |               |
 
+------------------+---------------+
| Not on file      |               |
+------------------+---------------+
 
 
 
+----------------+-------------+-------------+
| Job Start Date | Occupation  | Industry    |
+----------------+-------------+-------------+
| Not on file    | Not on file | Not on file |
+----------------+-------------+-------------+
 
 
 
+----------------+--------------+------------+
| Travel History | Travel Start | Travel End |
+----------------+--------------+------------+
 
 
 
+-------------------------------------+
| No recent travel history available. |
+-------------------------------------+
 documented as of this encounter
 
 Last Filed Vital Signs
 
 
+-------------------+---------+----------------------+----------+
| Vital Sign        | Reading | Time Taken           | Comments |
+-------------------+---------+----------------------+----------+
| Blood Pressure    | 171/73  | 2019 11:03 AM  |          |
|                   |         | PST                  |          |
+-------------------+---------+----------------------+----------+
| Pulse             | 56      | 2019 11:03 AM  |          |
|                   |         | PST                  |          |
+-------------------+---------+----------------------+----------+
| Temperature       | -       | -                    |          |
+-------------------+---------+----------------------+----------+
| Respiratory Rate  | -       | -                    |          |
+-------------------+---------+----------------------+----------+
| Oxygen Saturation | 98%     | 2019 11:03 AM  |          |
|                   |         | PST                  |          |
+-------------------+---------+----------------------+----------+
| Inhaled Oxygen    | -       | -                    |          |
| Concentration     |         |                      |          |
+-------------------+---------+----------------------+----------+
| Weight            | -       | -                    |          |
+-------------------+---------+----------------------+----------+
| Height            | -       | -                    |          |
+-------------------+---------+----------------------+----------+
| Body Mass Index   | -       | -                    |          |
+-------------------+---------+----------------------+----------+
 documented in this encounter
 
 Progress Notes
 Neris Wilson DNP - 2019 12:00 PM [facility]
 [address] [place] [address]
 Office: [phone] Fax: [phone]
 
 
 DATE OF VISIT: 2019 
 PATIENT NAME: Wyatt Shane
 : 1945; AGE: 73 y.o.; Sex:M 
 PHONE  NUMBER:  [phone]  (Home);    [phone] (Cell)
 MRN: [id number]; 
 PROVIDER: Neris Wilson DNP
 PRIMARY  CARE  /  REFERRING  PHYSICIAN:  Salvador Murguia MD  /  [facility]UR[facility]  / [address] [facility],  / [place] [address] 
 
 REASON FOR EVALUATION / CHIEF COMPLAINT: EVALUATION AND TREATMENT OF ABDOMINAL AORTIC ANEUR
YSM AND CAROTID STENOSIS
 
 HISTORY OF PRESENT ILLNESS:  Wyatt Shane is a 73 y.o. male patient who presents for evaluat
ion and treatment of an abdominal aortic aneurysm. The patient reported that he has history 
of AAA and has been followed up yearly. His risk factors for an aneurysm include: arterioscl
erotic heart disease, hypercholesterolemia, hypertension, peripheral vascular disease and hi
story of smoking.He denied nausea, vomiting or abdominal distension as a result of this puls
atile mass. The patient denied any lower leg arterial ischemic or claudication-like symptoms
. The patient currently reports no numbness in the upper or lower extremities, and no motor 
or sensory deficits. The patient reports no difficulty in cognitive ability, slurred speech,
 or impaired verbal communication capability. He can not tolerate aspirin or plavix due to e
pistaxis. He is taking Xarelto daily. 
 
 VITAL SIGNS: 2019 
 
 PHYSICAL EXAM: 
 Constitutional: Well nourished, no signs of distress 
 HENT: Normocephalic and atraumatic. Cranial nerves IIXI are grossly intact.
 Cardiovascular: bradycardic, irregular rhythm.
 Pulmonary/Chest: No respiratory distress.
 Abdominal: Soft. No abdominal distension or tenderness. 
 Musculoskeletal: Normal range of motion. 
 Extremities: No cyanosis or clubbing.
 Neurological: He is alert and oriented. 
 VASCULAR:  Palpable femoral pulses were present bilaterally. The patient has a large pulsat
ile abdominal mass consistent with an AAA. 
 
 IMAGINGS:
 Us Carotid Doppler, Bilateral
 2019 
 1.  No hemodynamically significant stenosis of the internal carotid arteries. 2.  There is 
greater than 50% narrowing of the left mid common carotid artery. There is ulcerated plaque 
at this region with possible intimal flap. 3.  Antegrade flow of the bilateral vertebral art
eries.
 
 Us aorta
 2019 
 4.9 x 4.7 cm, unchanged 
 
 ASSESSMENT /  PLAN:  
 Abdominal aortic aneurysm - Given the relative stable and <5.5 cm abdominal aortic aneurysm
, I've informed the patient treatment strategies of conservative watchful observation versus
 endovascular treatment, who wishes to undergo the surveillance treatment strategy to monito
r his aortic aneurysm growth. Mr. Shane is aware that in the event his aortic aneurysm shows 
evidence of aneurysm enlargement based on surveillance imaging studies, we'll consider endov
ascular repair of his aortic aneurysm. We'll see him for follow-up in 1 year with repeat abd
ominal ultrasound. 
 
 Carotid Artery Stenosis - The patient has asymptomatic mild bilateral carotid artery diseas
e. Given the patient  s asymptomatic bilateral carotid artery disease, no surgical or inter
 
ventional treatment is necessary at this time. We recommend intensive medical therapy using 
all available risk reduction strategies for patients with asymptomatic carotid atheroscleros
is. Currently viable strategies include statin therapy, antiplatelet therapy, blood pressure
 control, and lifestyle modification consisting of smoking cessation, limited alcohol consum
ption, weight control, regular aerobic physical activity, and a Mediterranean diet.We recomm
end carotid endarterectomy plus intensive medical therapy, rather than intensive medical katerina
atment alone, for medically stable patients who have a life expectancy of at least five year
s and a high grade (?80 percent) asymptomatic carotid atherosclerotic stenosis at baseline o
r progression to ?80 percent stenosis despite intensive medical therapy while under observat
ion. We have discussed signs and symptoms of TIA or CVA that would warrant urgent follow up 
with vascular clinic or nearest emergency facility such as difficulty speaking, swallowing, 
unilateral facial droop, unilateral arm or leg weakness, or visual changes.  Patient express
es understanding of these symptoms and to seek urgent follow up should any occur.We plan to 
follow the patient with surveillance duplex ultrasound. If the repeated carotid duplex scan 
showed disease progress with worsening of carotid artery lesion, or if the patient  s carot
id disease becomes symptomatic, further therapeutic treatment including carotid endarterecto
my will be considered. The patient will undergo a repeat carotid duplex scan in 1 year.
 
 Neris Wilson DNP
 Electronically signed by Neris Wilson DNP at 2019 11:24 AM PSTdocumented in this encounte
r
 
 Plan of Treatment
 
 
+--------+---------+-------------+----------------------+-------------+
| Date   | Type    | Specialty   | Care Team            | Description |
+--------+---------+-------------+----------------------+-------------+
| / | Office  | Pulmonology |   Juan F,          |             |
|    | Visit   |             | Jessica Cheung,   |             |
|        |         |             | MD [address] |             |
|        |         |             |   [place]   |             |
|        |         |             | [place] [address]             |             |
|        |         |             | [phone]         |             |
|        |         |             | [phone] (Fax)   |             |
+--------+---------+-------------+----------------------+-------------+
| / | Office  | Cardiology  |   Eric Akins, |             |
|    | Visit   |             |  MD [address]   |             |
|        |         |             | [place]  |             |
|        |         |             | [address]  [phone]  |             |
|        |         |             |  [phone] (Fax)  |             |
+--------+---------+-------------+----------------------+-------------+
 
 
 
+---------------------+---------+--------+----------------------+----------------------+
| Name                | Type    | Priori | Associated Diagnoses | Order Schedule       |
|                     |         | ty     |                      |                      |
+---------------------+---------+--------+----------------------+----------------------+
| VAS Carotid Duplex  | Imaging | Routin |   Abdominal aortic   | Expected: 2020 |
| Bilateral           |         | e      | aneurysm (AAA)       |  (Approximate),      |
|                     |         |        | without rupture      | Expires: 2021  |
|                     |         |        | (HCC)  Carotid       |                      |
|                     |         |        | stenosis, bilateral  |                      |
+---------------------+---------+--------+----------------------+----------------------+
| VAS Aorta Iliac     | Imaging | Routin |   Abdominal aortic   | Expected: 2020 |
| Duplex Complete     |         | e      | aneurysm (AAA)       |  (Approximate),      |
|                     |         |        | without rupture      | Expires: 2021  |
|                     |         |        | (HCC)  Carotid       |                      |
|                     |         |        | stenosis, bilateral  |                      |
 
+---------------------+---------+--------+----------------------+----------------------+
 documented as of this encounter
 
 Visit Diagnoses
 
 
+----------------------------------------------------------------------------------+
| Diagnosis                                                                        |
+----------------------------------------------------------------------------------+
|   Abdominal aortic aneurysm (AAA) without rupture (HCC) - Primary                |
+----------------------------------------------------------------------------------+
|   Carotid stenosis, bilateral  Occlusion and stenosis of multiple and bilateral  |
| precerebral arteries without mention of cerebral infarction                      |
+----------------------------------------------------------------------------------+
 documented in this encounter

## 2019-12-06 NOTE — XMS
Encounter Summary
  Created on: 2019
 
 Shane Wyatttien BroussardJosue
 External Reference #: [id number]
 : 43
 Sex: Male
 
 Demographics
 
 
+-----------------------+---------------------------+
| Address               | [address]        |
|                       | [place]  [address] |
+-----------------------+---------------------------+
| Home Phone            | [phone]           |
+-----------------------+---------------------------+
| Preferred Language    | Unknown                   |
+-----------------------+---------------------------+
| Marital Status        |                    |
+-----------------------+---------------------------+
| Sabianism Affiliation | [id number]                      |
+-----------------------+---------------------------+
| Race                  | Unknown                   |
+-----------------------+---------------------------+
| Ethnic Group          | Unknown                   |
+-----------------------+---------------------------+
 
 
 Author
 
 
+--------------+--------------------------------------------+
| Author       | [facility]  |
|              | and [place]                                |
+--------------+--------------------------------------------+
| Organization | [facility]  |
|              | and [place]                                |
+--------------+--------------------------------------------+
| Address      | Unknown                                    |
+--------------+--------------------------------------------+
| Phone        | Unavailable                                |
+--------------+--------------------------------------------+
 
 
 
 Support
 
 
+---------------+--------------+--------------------+-----------------+
| Name          | Relationship | Address            | Phone           |
+---------------+--------------+--------------------+-----------------+
| Opal Shane | ECON         | [address]     | [phone] |
|               |              | [place]   |                 |
|               |              | [id number]              |                 |
+---------------+--------------+--------------------+-----------------+
 
 
 
 
 Care Team Providers
 
 
+-----------------------+------+-----------------+
| Care Team Member Name | Role | Phone           |
+-----------------------+------+-----------------+
| Erwin Iniguez MD | PCP  | [phone] |
+-----------------------+------+-----------------+
 
 
 
 Encounter Details
 
 
+--------+-----------+----------------------+--------------------+-------------+
| Date   | Type      | Department           | Care Team          | Description |
+--------+-----------+----------------------+--------------------+-------------+
| 08/01/ | Hospital  |   [facility] GENERIC IP     |   Conversion       | Pain        |
| 2018   | Encounter | CONVERSION DEP  [address]  | Transaction,       |             |
|        |           | [address]           | Provider Unknown   |             |
|        |           | [place], [place]         | [phone]       |             |
|        |           | [address]           | [phone] (Fax) |             |
|        |           | [phone]         |                    |             |
+--------+-----------+----------------------+--------------------+-------------+
 
 
 
 Social History
 
 
+---------------+------------+-----------+--------+------------------+
| Tobacco Use   | Types      | Packs/Day | Years  | Date             |
|               |            |           | Used   |                  |
+---------------+------------+-----------+--------+------------------+
| Former Smoker | Cigarettes | 1.3       | 34     | Quit: 1996 |
+---------------+------------+-----------+--------+------------------+
 
 
 
+---------------------+---+---+---+
| Smokeless Tobacco:  |   |   |   |
| Never Used          |   |   |   |
+---------------------+---+---+---+
 
 
 
+-------------+-------------+---------+----------+
| Alcohol Use | Drinks/Week | oz/Week | Comments |
+-------------+-------------+---------+----------+
| No          |             |         |          |
+-------------+-------------+---------+----------+
 
 
 
+------------------+---------------+
| Sex Assigned at  | Date Recorded |
| Birth            |               |
+------------------+---------------+
| Not on file      |               |
 
+------------------+---------------+
 
 
 
+----------------+-------------+-------------+
| Job Start Date | Occupation  | Industry    |
+----------------+-------------+-------------+
| Not on file    | Not on file | Not on file |
+----------------+-------------+-------------+
 
 
 
+----------------+--------------+------------+
| Travel History | Travel Start | Travel End |
+----------------+--------------+------------+
 
 
 
+-------------------------------------+
| No recent travel history available. |
+-------------------------------------+
 documented as of this encounter
 
 Medications at Time of Discharge
 
 
+----------------------+----------------------+-----------+---------+----------+-----------+
| Medication           | Sig                  | Dispensed | Refills | Start    | End Date  |
|                      |                      |           |         | Date     |           |
+----------------------+----------------------+-----------+---------+----------+-----------+
|   acyclovir          | Apply  topically     |           | 0       |          |           |
| (ZOVIRAX) 5%         | every 3 hours.       |           |         |          |           |
| ointment             |                      |           |         |          |           |
+----------------------+----------------------+-----------+---------+----------+-----------+
|   albuterol (PROAIR  | Inhale 2 puffs into  |           | 0       |          |           |
| HFA) 90 mcg/puff     | the lungs every 6    |           |         |          |           |
| inhaler              | hours as needed for  |           |         |          |           |
|                      | Wheezing.            |           |         |          |           |
+----------------------+----------------------+-----------+---------+----------+-----------+
|   digoxin (LANOXIN)  | Take 125 mcg by      |           | 0       |          |           |
| 250 mcg tablet       | mouth Daily.      |           |         |          |           |
|                      | capsule daily        |           |         |          |           |
+----------------------+----------------------+-----------+---------+----------+-----------+
|   ferrous sulfate    | Take 325 mg by mouth |           | 0       | 20 |           |
| 325 mg tablet        |  3 times daily.      |           |         | 12       |           |
+----------------------+----------------------+-----------+---------+----------+-----------+
|   fluticasone        | one spray in each    |           | 0       | 20 |           |
| (FLONASE) 50         | nostril daily as     |           |         | 12       |           |
| mcg/nasal spray      | needed               |           |         |          |           |
+----------------------+----------------------+-----------+---------+----------+-----------+
|                      | Inhale 1 puff into   |           | 0       |          |           |
| fluticasone-salmeter | the lungs Twice      |           |         |          |           |
| ol (ADVAIR) 500-50   | Daily.               |           |         |          |           |
| mcg/puff diskus      |                      |           |         |          |           |
| inhaler              |                      |           |         |          |           |
+----------------------+----------------------+-----------+---------+----------+-----------+
|   folic acid 1 mg    | Take 1 mg by mouth   |           | 0       |          |           |
| tablet               | Daily.               |           |         |          |           |
+----------------------+----------------------+-----------+---------+----------+-----------+
|   furosemide (LASIX) | Take 40 mg by mouth  |           | 0       |          |           |
 
|  40 mg tablet        | Daily.               |           |         |          |           |
+----------------------+----------------------+-----------+---------+----------+-----------+
|   guaiFENesin        | Take 1,200 mg by     |           | 0       |          |           |
| (MUCINEX) 600 mg 12  | mouth 2 times daily. |           |         |          |           |
| hr tablet            |                      |           |         |          |           |
+----------------------+----------------------+-----------+---------+----------+-----------+
|   levothyroxine      | Take 75 mcg by mouth |           | 0       | 20 |           |
| (LEVOTHROID) 75 MCG  |  Daily.              |           |         | 12       |           |
| tablet               |                      |           |         |          |           |
+----------------------+----------------------+-----------+---------+----------+-----------+
|   metoclopramide     | Take 10 mg by mouth  |           | 0       | 20 |           |
| (REGLAN) 10 mg       | 4 times daily as     |           |         | 12       |           |
| tablet               | needed.              |           |         |          |           |
+----------------------+----------------------+-----------+---------+----------+-----------+
|                      | Apply  topically 2   |           | 0       |          |           |
| nystatin-triamcinolo | times daily.         |           |         |          |           |
| ne (MYCOLOG II)      |                      |           |         |          |           |
| cream                |                      |           |         |          |           |
+----------------------+----------------------+-----------+---------+----------+-----------+
|   ofloxacin          |                      |           | 0       | 20 |           |
| (OCUFLOX) 0.3%       |                      |           |         | 18       |           |
| ophthalmic solution  |                      |           |         |          |           |
+----------------------+----------------------+-----------+---------+----------+-----------+
|   omeprazole         | Take 20 mg by mouth  |           | 0       | 20 |           |
| (PRILOSEC) 20 mg     | 2 times daily.       |           |         | 12       |           |
| capsule              |                      |           |         |          |           |
+----------------------+----------------------+-----------+---------+----------+-----------+
|   potassium citrate  | Take 10 mEq by mouth |           | 0       |          |           |
| (UROCIT-K) 10 mEq SR |  2 times daily.      |           |         |          |           |
|  tablet              |                      |           |         |          |           |
+----------------------+----------------------+-----------+---------+----------+-----------+
|   predniSONE         | Take 10 mg by mouth  |           | 0       |          |           |
| (DELTASONE) 5 mg     | Daily.               |           |         |          |           |
| tablet               |                      |           |         |          |           |
+----------------------+----------------------+-----------+---------+----------+-----------+
|   tamsulosin         | Take 0.4 mg by mouth |           | 0       | 20 |           |
| (FLOMAX) 0.4 mg CAPS |  2 times daily.      |           |         | 12       |           |
+----------------------+----------------------+-----------+---------+----------+-----------+
|   acyclovir          | Take 400 mg by mouth |           | 0       | 20 |  |
| (ZOVIRAX) 400 MG     |  3 times daily as    |           |         | 12       | 9         |
| tablet               | needed.              |           |         |          |           |
+----------------------+----------------------+-----------+---------+----------+-----------+
|   Cholecalciferol    | Take 2,000 Units by  |           | 0       | 20 |  |
| (VITAMIN D3) 2000    | mouth Daily.         |           |         | 12       | 9         |
| UNITS CAPS           |                      |           |         |          |           |
+----------------------+----------------------+-----------+---------+----------+-----------+
|   cyanocobalamin     | injected             |           | 0       | 20 |  |
| (VITAMIN B-12) 1,000 | intramuscularly once |           |         | 12       | 9         |
|  mcg/mL injection    |  a month             |           |         |          |           |
+----------------------+----------------------+-----------+---------+----------+-----------+
|   diltiazem          | Take 120 mg by mouth |           | 0       |          |  |
| (DILT-XR) 120 mg 24  |  Daily.              |           |         |          | 9         |
| hr capsule           |                      |           |         |          |           |
+----------------------+----------------------+-----------+---------+----------+-----------+
|   loratadine         | Take 10 mg by mouth  |           | 0       |          |  |
| (CLARITIN) 10 mg     | Daily.               |           |         |          | 9         |
| tablet               |                      |           |         |          |           |
+----------------------+----------------------+-----------+---------+----------+-----------+
|   losartan (COZAAR)  | Take 100 mg by mouth |           | 0       |          |  |
| 100 MG tablet        |  Daily. 1/2 daily    |           |         |          | 9         |
 
+----------------------+----------------------+-----------+---------+----------+-----------+
|   methotrexate 2.5   | Take 15 mg by mouth  |           | 0       |          |  |
| mg tablet            | Once a week.         |           |         |          | 9         |
+----------------------+----------------------+-----------+---------+----------+-----------+
|                      | Take 1 tablet by     |           | 0       |          |  |
| oxyCODONE-acetaminop | mouth every 4 hours  |           |         |          | 9         |
| hen (PERCOCET) 5-325 | as needed for Pain.  |           |         |          |           |
|  mg per tablet       |                      |           |         |          |           |
+----------------------+----------------------+-----------+---------+----------+-----------+
|   pseudoePHEDrine    | Take 30 mg by mouth  |           | 0       |          |  |
| (SUDOGEST) 30 mg     | every 4 hours as     |           |         |          | 9         |
| tablet               | needed for           |           |         |          |           |
|                      | Congestion.          |           |         |          |           |
+----------------------+----------------------+-----------+---------+----------+-----------+
|   rivaroxaban        | Take 20 mg by mouth  |           | 0       |          |  |
| (XARELTO) 20 mg      | Daily (with dinner). |           |         |          | 9         |
| tablet               |                      |           |         |          |           |
+----------------------+----------------------+-----------+---------+----------+-----------+
|   sertraline         | 2 tablets daily      |           | 0       | 20 |  |
| (ZOLOFT) 100 mg      |                      |           |         | 12       | 9         |
| tablet               |                      |           |         |          |           |
+----------------------+----------------------+-----------+---------+----------+-----------+
 documented as of this encounter
 
 Plan of Treatment
 
 
+--------+---------+-------------+----------------------+-------------+
| Date   | Type    | Specialty   | Care Team            | Description |
+--------+---------+-------------+----------------------+-------------+
| / | Office  | Pulmonology |   Juan F,          |             |
| 2019   | Visit   |             | Jessica Cheung,   |             |
|        |         |             | MD [address] |             |
|        |         |             |   [place],   |             |
|        |         |             | [place] [address]             |             |
|        |         |             | [phone]         |             |
|        |         |             | [phone] (Fax)   |             |
+--------+---------+-------------+----------------------+-------------+
| / | Office  | Cardiology  |   Mable Jose Miguelargenis, |             |
|    | Visit   |             |  MD  [address]   |             |
|        |         |             | [place]  |             |
|        |         |             | [address]  [phone]  |             |
|        |         |             |  [phone] (Fax)  |             |
+--------+---------+-------------+----------------------+-------------+
 documented as of this encounter
 
 Procedures
 
 
+------------------+--------+-------------+----------------------+----------------------+
| Procedure Name   | Priori | Date/Time   | Associated Diagnosis | Comments             |
|                  | ty     |             |                      |                      |
+------------------+--------+-------------+----------------------+----------------------+
| NM MYOCARDIAL    | Routin | 2018  |                      |   Results for this   |
| PERFUSION SPECT  | e      |  1:12 AM    |                      | procedure are in the |
| STRESS           |        | PDT         |                      |  results section.    |
+------------------+--------+-------------+----------------------+----------------------+
 documented in this encounter
 
 Results
 
 NM Myocardial Perfusion SPECT Stress (2018  1:12 AM PDT)
 
+----------+
| Specimen |
+----------+
|          |
+----------+
 
 
 
+------------------------------------------------------------------------+--------------+
| Narrative                                                              | Performed At |
+------------------------------------------------------------------------+--------------+
|   This is a non-reportable procedure without a radiologist report and  |              |
| is  used for image storage only                                        |              |
+------------------------------------------------------------------------+--------------+
 
 
 
+--------------------------------------------------------------------------------------+
| Procedure Note                                                                       |
+--------------------------------------------------------------------------------------+
|   Andrzej Steven Irvin - 2019  4:21 AM PDT  This is a non-reportable procedure  |
| without a radiologist report and isused for image storage only                       |
+--------------------------------------------------------------------------------------+
 documented in this encounter
 
 Visit Diagnoses
 
 
+--------------------------+
| Diagnosis                |
+--------------------------+
|   Pain  Generalized pain |
+--------------------------+
 documented in this encounter

## 2019-12-06 NOTE — XMS
Encounter Summary
  Created on: 2019
 
 Wyatt Shane
 External Reference #: [id number]
 : 43
 Sex: Male
 
 Demographics
 
 
+-----------------------+------------------------+
| Address               | [address]     |
|                       | [place]  [address]   |
+-----------------------+------------------------+
| Home Phone            | [phone]        |
+-----------------------+------------------------+
| Preferred Language    | Unknown                |
+-----------------------+------------------------+
| Marital Status        |                 |
+-----------------------+------------------------+
| Denominational Affiliation | LUT                    |
+-----------------------+------------------------+
| Race                  | White                  |
+-----------------------+------------------------+
| Ethnic Group          | Not  or  |
+-----------------------+------------------------+
 
 
 Author
 
 
+--------------+-------------+
| Organization | Unknown     |
+--------------+-------------+
| Address      | Unknown     |
+--------------+-------------+
| Phone        | Unavailable |
+--------------+-------------+
 
 
 
 Support
 
 
+---------------+--------------+---------+-----------------+
| Name          | Relationship | Address | Phone           |
+---------------+--------------+---------+-----------------+
| Opal Shane | ECON         | Unknown | [phone] |
+---------------+--------------+---------+-----------------+
 
 
 
 Care Team Providers
 
 
+-----------------------+------+-----------------+
| Care Team Member Name | Role | Phone           |
 
+-----------------------+------+-----------------+
| Erwin Iniguez MD   | PCP  | [phone] |
+-----------------------+------+-----------------+
 
 
 
 Encounter Details
 
 
+--------+-------------+------------+------------------+-------------+
| Date   | Type        | Department | Care Team        | Description |
+--------+-------------+------------+------------------+-------------+
| / | Letter-[facility]org[facility] |            |   Letters, Other | Letters     |
| 2003   | scribed     |            |                  |             |
+--------+-------------+------------+------------------+-------------+
 
 
 
 Social History
 
 
+----------------+-------+-----------+--------+------+
| Tobacco Use    | Types | Packs/Day | Years  | Date |
|                |       |           | Used   |      |
+----------------+-------+-----------+--------+------+
| Never Assessed |       |           |        |      |
+----------------+-------+-----------+--------+------+
 
 
 
+------------------+---------------+
| Sex Assigned at  | Date Recorded |
| Birth            |               |
+------------------+---------------+
| Not on file      |               |
+------------------+---------------+
 
 
 
+----------------+-------------+-------------+
| Job Start Date | Occupation  | Industry    |
+----------------+-------------+-------------+
| Not on file    | Not on file | Not on file |
+----------------+-------------+-------------+
 
 
 
+----------------+--------------+------------+
| Travel History | Travel Start | Travel End |
+----------------+--------------+------------+
 
 
 
+-------------------------------------+
| No recent travel history available. |
+-------------------------------------+
 documented as of this encounter
 
 Progress Notes
 Interface, Transcription In - 2006  1:04 AM DANIA                                    [place]
 
[facility] [place] [address] [place], [place] [phone] or [phone]
 
 2003
 
 Erwin Iniguez M.D.
 [address] [place] [address] [address]
 [place]  [address]
 
 RE:  SAMUEL SHANE
 MR #: [id number]
 
 Dear Dr. Iniguez:
 
 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
 carcinoma.  Please record that on full examination, as outlined in my
 handwritten notes, he has no evidence of recurrent disease.  He is
 beginning to have a few mild symptoms of gastroesophageal reflux again
 although these are very mild and at the present time I think they can be
 monitored alone.  He shows no evidence on his examination of a significant
 erythema or irritation from this.
 
 I plan to see him back here in 6 months with a chest x-ray which will be
 his 5-year followup visit at the point of which we would normally discharge
 him from followup in terms of cancer care.
 
 I appreciate the chance to care for him.  Please call me if you have any
 questions.
 
 Yours sincerely,
 
 Jimmy Esposito M.D.
 
 [facility] / 
 [id number] / [id number] / [id number] /
 D: 2003
 T: 2003
 
 cc:
 
 Eddy Boone M.D.
 [address] [facility] [address]
 [place], [place]  [id number] signed by Interface, Transcription In at 2006  1:0
4 AM PDTdocumented in this encounter
 
 Plan of Treatment
 Not on filedocumented as of this encounter
 
 Visit Diagnoses
 Not on filedocumented in this encounter

## 2019-12-06 NOTE — XMS
Encounter Summary
  Created on: 2019
 
 Wyatt Shane
 External Reference #: [id number]
 : 43
 Sex: Male
 
 Demographics
 
 
+-----------------------+------------------------+
| Address               | [address]     |
|                       | [place]  [address]   |
+-----------------------+------------------------+
| Home Phone            | [phone]        |
+-----------------------+------------------------+
| Preferred Language    | Unknown                |
+-----------------------+------------------------+
| Marital Status        |                 |
+-----------------------+------------------------+
| Nondenominational Affiliation | LUT                    |
+-----------------------+------------------------+
| Race                  | White                  |
+-----------------------+------------------------+
| Ethnic Group          | Not  or  |
+-----------------------+------------------------+
 
 
 Author
 
 
+--------------+------------------------------+
| Author       | [facility] |
+--------------+------------------------------+
| Organization | [facility] |
+--------------+------------------------------+
| Address      | Unknown                      |
+--------------+------------------------------+
| Phone        | Unavailable                  |
+--------------+------------------------------+
 
 
 
 Support
 
 
+---------------+--------------+---------+-----------------+
| Name          | Relationship | Address | Phone           |
+---------------+--------------+---------+-----------------+
| Opal Shane | ECON         | Unknown | [phone] |
+---------------+--------------+---------+-----------------+
 
 
 
 Care Team Providers
 
 
 
+-----------------------+------+-----------------+
| Care Team Member Name | Role | Phone           |
+-----------------------+------+-----------------+
| Erwin Iniguez MD   | PCP  | [phone] |
+-----------------------+------+-----------------+
 
 
 
 Encounter Details
 
 
+--------+-------------+-----------------+---------------------+---------------+
| Date   | Type        | Department      | Care Team           | Description   |
+--------+-------------+-----------------+---------------------+---------------+
| / | Office      |   [facility]  |   Note, Outpatient  | Progress Note |
| 2000   | Visit-Trans | MEDICINE        | Clinic              |               |
|        | cribed      |                 |                     |               |
+--------+-------------+-----------------+---------------------+---------------+
 
 
 
 Social History
 
 
+----------------+-------+-----------+--------+------+
| Tobacco Use    | Types | Packs/Day | Years  | Date |
|                |       |           | Used   |      |
+----------------+-------+-----------+--------+------+
| Never Assessed |       |           |        |      |
+----------------+-------+-----------+--------+------+
 
 
 
+------------------+---------------+
| Sex Assigned at  | Date Recorded |
| Birth            |               |
+------------------+---------------+
| Not on file      |               |
+------------------+---------------+
 
 
 
+----------------+-------------+-------------+
| Job Start Date | Occupation  | Industry    |
+----------------+-------------+-------------+
| Not on file    | Not on file | Not on file |
+----------------+-------------+-------------+
 
 
 
+----------------+--------------+------------+
| Travel History | Travel Start | Travel End |
+----------------+--------------+------------+
 
 
 
+-------------------------------------+
| No recent travel history available. |
+-------------------------------------+
 documented as of this encounter
 
 
 Progress Notes
 Interface, Transcription In - 2006  5:08 AM PSTCLINIC DATE:       2000
 
 OTOLARYNGOLOGY CLINIC
 
 SUBJECTIVE:   Mr. Shane  is seen back  in  followup  with  respect  to  his
 laryngotracheoplasty.  He is feeling  pretty  well.   He  continues to have
 intermittent mucous plugs in his T-tube  but is working well with that.  He
 breathes pretty well with his T-tube plug but not yet at the point where he
 can plug in on a continuous basis.
 
 OBJECTIVE:  Flexible fiberoptic laryngoscopy  is  done,  both antegrade and
 retrograde through the tube.  I can  still  see  some  eschar overlying the
 anterior cartilaginous graft, and therefore,  do not feel we are at a point
 that we can remove the stent; however,  the  rest of the airway looks good.
 His voice is also strong.
 
 ASSESSMENT:  Status post laryngotracheoplasty, doing reasonably well.
 
 PLAN:  I will see him back here in one  month, at which time we will change
 him to a Stoll canula.  He was given  a refill of the prescription for
 Tylenol with codeine as he has intermittent  pain  in  his  throat, which I
 think is reasonable, and also he will  continue  on  antibiotics as well as
 Prevacid, which were refilled.
 
 Jimmy Esposito M.D.
 
 [facility] / 
 [id number] / [id number] / [id number] / [id number]
 D: 2000
 T: 2000
 
 [id number] signed by Interface, Transcription In at 2006  5:08 AM PSTdocume
nted in this encounter
 
 Plan of Treatment
 Not on filedocumented as of this encounter
 
 Visit Diagnoses
 Not on filedocumented in this encounter

## 2019-12-06 NOTE — XMS
Encounter Summary
  Created on: 2019
 
 Wyatt Shane
 External Reference #: [id number]
 : 43
 Sex: Male
 
 Demographics
 
 
+-----------------------+------------------------+
| Address               | [address]     |
|                       | [place]  [address]   |
+-----------------------+------------------------+
| Home Phone            | [phone]        |
+-----------------------+------------------------+
| Preferred Language    | Unknown                |
+-----------------------+------------------------+
| Marital Status        |                 |
+-----------------------+------------------------+
| Rastafari Affiliation | LUT                    |
+-----------------------+------------------------+
| Race                  | White                  |
+-----------------------+------------------------+
| Ethnic Group          | Not  or  |
+-----------------------+------------------------+
 
 
 Author
 
 
+--------------+------------------------------+
| Author       | [facility] |
+--------------+------------------------------+
| Organization | [facility] |
+--------------+------------------------------+
| Address      | Unknown                      |
+--------------+------------------------------+
| Phone        | Unavailable                  |
+--------------+------------------------------+
 
 
 
 Support
 
 
+---------------+--------------+---------+-----------------+
| Name          | Relationship | Address | Phone           |
+---------------+--------------+---------+-----------------+
| Opal Shane | ECON         | Unknown | [phone] |
+---------------+--------------+---------+-----------------+
 
 
 
 Care Team Providers
 
 
 
+-----------------------+------+-------------+
| Care Team Member Name | Role | Phone       |
+-----------------------+------+-------------+
 PCP  | Unavailable |
+-----------------------+------+-------------+
 
 
 
 Encounter Details
 
 
+--------+----------+----------------------+--------------------+-------------+
| Date   | Type     | Department           | Care Team          | Description |
+--------+----------+----------------------+--------------------+-------------+
| / | Results  |   Otolaryngology     |   Jimmy Esposito MD |             |
|    | Only     | Head and Neck        |                    |             |
|        |          | Surgery Services at  |                    |             |
|        |          | PPV  [address] [facility]     |                    |             |
|        |          | [address] [place]       |                    |             |
|        |          | Mailcode: [address]       |                    |             |
|        |          | [address] |                    |             |
|        |          |   [place], [place]       |                    |             |
|        |          | [address]           |                    |             |
|        |          | [phone]         |                    |             |
+--------+----------+----------------------+--------------------+-------------+
 
 
 
 Social History
 
 
+----------------+-------+-----------+--------+------+
| Tobacco Use    | Types | Packs/Day | Years  | Date |
|                |       |           | Used   |      |
+----------------+-------+-----------+--------+------+
| Never Assessed |       |           |        |      |
+----------------+-------+-----------+--------+------+
 
 
 
+------------------+---------------+
| Sex Assigned at  | Date Recorded |
| Birth            |               |
+------------------+---------------+
| Not on file      |               |
+------------------+---------------+
 
 
 
+----------------+-------------+-------------+
| Job Start Date | Occupation  | Industry    |
+----------------+-------------+-------------+
| Not on file    | Not on file | Not on file |
+----------------+-------------+-------------+
 
 
 
+----------------+--------------+------------+
| Travel History | Travel Start | Travel End |
+----------------+--------------+------------+
 
 
 
 
+-------------------------------------+
| No recent travel history available. |
+-------------------------------------+
 documented as of this encounter
 
 Plan of Treatment
 Not on filedocumented as of this encounter
 
 Procedures
 
 
+--------------------+--------+-------------+----------------------+----------------------+
| Procedure Name     | Priori | Date/Time   | Associated Diagnosis | Comments             |
|                    | ty     |             |                      |                      |
+--------------------+--------+-------------+----------------------+----------------------+
| CHEST, 1 VIEW,     | Urgent | 1998  |                      |   Results for this   |
| PORTABLE           |        | 10:20 AM    |                      | procedure are in the |
|                    |        | PST         |                      |  results section.    |
+--------------------+--------+-------------+----------------------+----------------------+
| X-RAY CHEST 2 VIEW | Priori | 1998  |                      |   Results for this   |
|                    | ty     |  4:35 PM    |                      | procedure are in the |
|                    |        | PST         |                      |  results section.    |
+--------------------+--------+-------------+----------------------+----------------------+
 documented in this encounter
 
 Results
 CHEST, 1 VIEW, PORTABLE (1998 10:20 AM PST)
 
+-----------+--------------------------+-----------+------------+--------------+
| Component | Value                    | Ref Range | Performed  | Pathologist  |
|           |                          |           | At         | Signature    |
+-----------+--------------------------+-----------+------------+--------------+
| CHEST, 1  | Radiologist 1: SERGO,   |           |            |              |
| LOYD,     | JOSE LONG M.D.PORTABLE    |           |            |              |
| PORTABLE  | CHEST:   98 at     |           |            |              |
|           | 1020 hours.              |           |            |              |
|           |   Dictated:   98   |           |            |              |
|           | FINDINGS: Upright AP     |           |            |              |
|           | examination at bedside,  |           |            |              |
|           | to include the left      |           |            |              |
|           | upperabdomen, is         |           |            |              |
|           | compared with previous   |           |            |              |
|           | examination of 12/02/98. |           |            |              |
|           |    ADobbhoff tube        |           |            |              |
|           | extends into the gastric |           |            |              |
|           |  fundus.   Wire sutures  |           |            |              |
|           | of thesternum are        |           |            |              |
|           | redemonstrated.   There  |           |            |              |
|           | is no gross evidence of  |           |            |              |
|           | activechest disease.     |           |            |              |
|           |   The intestinal gas     |           |            |              |
|           | pattern is unremarkable. |           |            |              |
|           |  IMPRESSION: 1.          |           |            |              |
|           |   Dobbhoff tube in       |           |            |              |
|           | gastric fundus. 2.   No  |           |            |              |
|           | gross evidence of acute  |           |            |              |
|           | chest disease.     END   |           |            |              |
 
|           | OF IMPRESSION:           |           |            |              |
+-----------+--------------------------+-----------+------------+--------------+
 
 
 
+----------+
| Specimen |
+----------+
|          |
+----------+
 
 
 
+----------------------+---------+--------------------+--------------+
| Performing           | Address | City/State/Zipcode | Phone Number |
| Organization         |         |                    |              |
+----------------------+---------+--------------------+--------------+
|   [facility] DEPARTMENT OF |         |                    |              |
|  RADIOLOGY           |         |                    |              |
+----------------------+---------+--------------------+--------------+
 CHEST 2 VIEW (1998  4:35 PM PST)
 
+-----------+--------------------------+-----------+------------+--------------+
| Component | Value                    | Ref Range | Performed  | Pathologist  |
|           |                          |           | At         | Signature    |
+-----------+--------------------------+-----------+------------+--------------+
| CHEST, 2  | Radiologist 1: [facility]  |           |            |              |
| [place]  | [facility]:       |           |            |              |
| STEREO    | 98 at 1635 hours.  |           |            |              |
|           |    Dictated 98     |           |            |              |
|           | COMPARISON: There are no |           |            |              |
|           |  prior studies available |           |            |              |
|           |  for comparison.         |           |            |              |
|           | FINDINGS: There are      |           |            |              |
|           | sternotomy wires in      |           |            |              |
|           | place.   Heart size is   |           |            |              |
|           | normal.Mediastinal       |           |            |              |
|           | contours are within      |           |            |              |
|           | normal limits. There is  |           |            |              |
|           | some                     |           |            |              |
|           | aorticcalcification. The |           |            |              |
|           |  lungs are clear.        |           |            |              |
|           |   There is no pleural    |           |            |              |
|           | effusion. IMPRESSION:    |           |            |              |
|           | Previous sternotomy,     |           |            |              |
|           | otherwise within normal  |           |            |              |
|           | limits.  END OF          |           |            |              |
|           | IMPRESSION:              |           |            |              |
+-----------+--------------------------+-----------+------------+--------------+
 
 
 
+----------+
| Specimen |
+----------+
|          |
+----------+
 
 
 
 
+----------------------+---------+--------------------+--------------+
| Performing           | Address | City/State/Zipcode | Phone Number |
| Organization         |         |                    |              |
+----------------------+---------+--------------------+--------------+
|   [facility] DEPARTMENT OF |         |                    |              |
|  RADIOLOGY           |         |                    |              |
+----------------------+---------+--------------------+--------------+
 documented in this encounter
 
 Visit Diagnoses
 Not on filedocumented in this encounter

## 2019-12-06 NOTE — XMS
Encounter Summary
  Created on: 2019
 
 Shane Wyatttien BroussardJosue
 External Reference #: [id number]
 : 43
 Sex: Male
 
 Demographics
 
 
+-----------------------+---------------------------+
| Address               | [address]        |
|                       | [place]  [address] |
+-----------------------+---------------------------+
| Home Phone            | [phone]           |
+-----------------------+---------------------------+
| Preferred Language    | Unknown                   |
+-----------------------+---------------------------+
| Marital Status        |                    |
+-----------------------+---------------------------+
| Yarsani Affiliation | [id number]                      |
+-----------------------+---------------------------+
| Race                  | Unknown                   |
+-----------------------+---------------------------+
| Ethnic Group          | Unknown                   |
+-----------------------+---------------------------+
 
 
 Author
 
 
+--------------+--------------------------------------------+
| Author       | [facility]  |
|              | [facility] [place]                                |
+--------------+--------------------------------------------+
| Organization | [facility]  |
|              | [facility] [place]                                |
+--------------+--------------------------------------------+
| Address      | Unknown                                    |
+--------------+--------------------------------------------+
| Phone        | Unavailable                                |
+--------------+--------------------------------------------+
 
 
 
 Support
 
 
+---------------+--------------+--------------------+-----------------+
| Name          | Relationship | Address            | Phone           |
+---------------+--------------+--------------------+-----------------+
| Opal Shane | ECON         | [address]     | [phone] |
|               |              | [place]   |                 |
|               |              | [id number]              |                 |
+---------------+--------------+--------------------+-----------------+
 
 
 
 
 Care Team Providers
 
 
+-----------------------+------+-----------------+
| Care Team Member Name | Role | Phone           |
+-----------------------+------+-----------------+
| Erwin Iniguez MD | PCP  | [phone] |
+-----------------------+------+-----------------+
 
 
 
 Encounter Details
 
 
+--------+-------------+---------------------+----------------------+-------------+
| Date   | Type        | Department          | Care Team            | Description |
+--------+-------------+---------------------+----------------------+-------------+
| / | Orders Only |   [facility]    |   Provider,          |             |
| 2019   |             | [facility]   | MD Zack   |             |
|        |             | CONVERSION  [address]  |  [address]        |             |
|        |             | [address]  [place], [place]  | [place] [address]     |             |
|        |             | [address]          |                      |             |
|        |             | [phone]        |                      |             |
+--------+-------------+---------------------+----------------------+-------------+
 
 
 
 Social History
 
 
+---------------+------------+-----------+--------+------------------+
| Tobacco Use   | Types      | Packs/Day | Years  | Date             |
|               |            |           | Used   |                  |
+---------------+------------+-----------+--------+------------------+
| Former Smoker | Cigarettes | 1         | 34     | Quit: 1996 |
+---------------+------------+-----------+--------+------------------+
 
 
 
+---------------------+---+---+---+
| Smokeless Tobacco:  |   |   |   |
| Never Used          |   |   |   |
+---------------------+---+---+---+
 
 
 
+-------------+-------------+---------+----------+
| Alcohol Use | Drinks/Week | oz/Week | Comments |
+-------------+-------------+---------+----------+
| No          |             |         |          |
+-------------+-------------+---------+----------+
 
 
 
+------------------+---------------+
| Sex Assigned at  | Date Recorded |
| Birth            |               |
+------------------+---------------+
| Not on file      |               |
 
+------------------+---------------+
 
 
 
+----------------+-------------+-------------+
| Job Start Date | Occupation  | Industry    |
+----------------+-------------+-------------+
| Not on file    | Not on file | Not on file |
+----------------+-------------+-------------+
 
 
 
+----------------+--------------+------------+
| Travel History | Travel Start | Travel End |
+----------------+--------------+------------+
 
 
 
+-------------------------------------+
| No recent travel history available. |
+-------------------------------------+
 documented as of this encounter
 
 Plan of Treatment
 
 
+--------+---------+-------------+----------------------+-------------+
| Date   | Type    | Specialty   | Care Team            | Description |
+--------+---------+-------------+----------------------+-------------+
| / | Office  | Pulmonology |   Juan F,          |             |
| 2019   | Visit   |             | Jessica Cheung,   |             |
|        |         |             | MD [address] |             |
|        |         |             |   [place],   |             |
|        |         |             | [place] [address]             |             |
|        |         |             | [phone]         |             |
|        |         |             | [phone] (Fax)   |             |
+--------+---------+-------------+----------------------+-------------+
| / | Office  | Cardiology  |   Eric Akins, |             |
| 2020   | Visit   |             |  MD [address]   |             |
|        |         |             | [place]  |             |
|        |         |             | [address]  [phone]  |             |
|        |         |             |  [phone] (Fax)  |             |
+--------+---------+-------------+----------------------+-------------+
 documented as of this encounter
 
 Visit Diagnoses
 Not on filedocumented in this encounter

## 2019-12-06 NOTE — XMS
Encounter Summary
  Created on: 2019
 
 Shane Wyatttien BroussardJosue
 External Reference #: [id number]
 : 43
 Sex: Male
 
 Demographics
 
 
+-----------------------+---------------------------+
| Address               | [address]        |
|                       | [place]  [address] |
+-----------------------+---------------------------+
| Home Phone            | [phone]           |
+-----------------------+---------------------------+
| Preferred Language    | Unknown                   |
+-----------------------+---------------------------+
| Marital Status        |                    |
+-----------------------+---------------------------+
| [employer] Affiliation | [id number]                      |
+-----------------------+---------------------------+
| Race                  | Unknown                   |
+-----------------------+---------------------------+
| Ethnic Group          | Unknown                   |
+-----------------------+---------------------------+
 
 
 Author
 
 
+--------------+--------------------------------------------+
| Author       | [facility]  |
|              | [facility] [place]                                |
+--------------+--------------------------------------------+
| Organization | [facility]  |
|              | [facility] [place]                                |
+--------------+--------------------------------------------+
| Address      | Unknown                                    |
+--------------+--------------------------------------------+
| Phone        | Unavailable                                |
+--------------+--------------------------------------------+
 
 
 
 Support
 
 
+---------------+--------------+--------------------+-----------------+
| Name          | Relationship | Address            | Phone           |
+---------------+--------------+--------------------+-----------------+
| Opal Shane | ECON         | [address]     | [phone] |
|               |              | [place]   |                 |
|               |              | [id number]              |                 |
+---------------+--------------+--------------------+-----------------+
 
 
 
 
 Care Team Providers
 
 
+-----------------------+------+-----------------+
| Care Team Member Name | Role | Phone           |
+-----------------------+------+-----------------+
| Erwin Iniguez MD | PCP  | [phone] |
+-----------------------+------+-----------------+
 
 
 
 Encounter Details
 
 
+--------+-----------+----------------------+--------------------+-------------+
| Date   | Type      | Department           | Care Team          | Description |
+--------+-----------+----------------------+--------------------+-------------+
| 08/08/ | Hospital  |   [facility]     |   Conversion       | Pain        |
| 2018   | Encounter | CONVERSION DEP  [address]  | Transaction,       |             |
|        |           | [address]           | Provider Unknown   |             |
|        |           | [place], [place]         | [phone]       |             |
|        |           | [address]           | [phone] (Fax) |             |
|        |           | [phone]         |                    |             |
+--------+-----------+----------------------+--------------------+-------------+
 
 
 
 Social History
 
 
+---------------+------------+-----------+--------+------------------+
| Tobacco Use   | Types      | Packs/Day | Years  | Date             |
|               |            |           | Used   |                  |
+---------------+------------+-----------+--------+------------------+
| Former Smoker | Cigarettes | 1.3       | 34     | Quit: 1996 |
+---------------+------------+-----------+--------+------------------+
 
 
 
+---------------------+---+---+---+
| Smokeless Tobacco:  |   |   |   |
| Never Used          |   |   |   |
+---------------------+---+---+---+
 
 
 
+-------------+-------------+---------+----------+
| Alcohol Use | Drinks/Week | oz/Week | Comments |
+-------------+-------------+---------+----------+
| No          |             |         |          |
+-------------+-------------+---------+----------+
 
 
 
+------------------+---------------+
| Sex Assigned at  | Date Recorded |
| Birth            |               |
+------------------+---------------+
| Not on file      |               |
 
+------------------+---------------+
 
 
 
+----------------+-------------+-------------+
| Job Start Date | Occupation  | Industry    |
+----------------+-------------+-------------+
| Not on file    | Not on file | Not on file |
+----------------+-------------+-------------+
 
 
 
+----------------+--------------+------------+
| Travel History | Travel Start | Travel End |
+----------------+--------------+------------+
 
 
 
+-------------------------------------+
| No recent travel history available. |
+-------------------------------------+
 documented as of this encounter
 
 Medications at Time of Discharge
 
 
+----------------------+----------------------+-----------+---------+----------+-----------+
| Medication           | Sig                  | Dispensed | Refills | Start    | End Date  |
|                      |                      |           |         | Date     |           |
+----------------------+----------------------+-----------+---------+----------+-----------+
|   acyclovir          | Apply  topically     |           | 0       |          |           |
| (ZOVIRAX) 5%         | every 3 hours.       |           |         |          |           |
| ointment             |                      |           |         |          |           |
+----------------------+----------------------+-----------+---------+----------+-----------+
|   albuterol (PROAIR  | Inhale 2 puffs into  |           | 0       |          |           |
| HFA) 90 mcg/puff     | the lungs every 6    |           |         |          |           |
| inhaler              | hours as needed for  |           |         |          |           |
|                      | Wheezing.            |           |         |          |           |
+----------------------+----------------------+-----------+---------+----------+-----------+
|   digoxin (LANOXIN)  | Take 125 mcg by      |           | 0       |          |           |
| 250 mcg tablet       | mouth Daily.      |           |         |          |           |
|                      | capsule daily        |           |         |          |           |
+----------------------+----------------------+-----------+---------+----------+-----------+
|   ferrous sulfate    | Take 325 mg by mouth |           | 0       | 20 |           |
| 325 mg tablet        |  3 times daily.      |           |         | 12       |           |
+----------------------+----------------------+-----------+---------+----------+-----------+
|   fluticasone        | one spray in each    |           | 0       | 20 |           |
| (FLONASE) 50         | nostril daily as     |           |         | 12       |           |
| mcg/nasal spray      | needed               |           |         |          |           |
+----------------------+----------------------+-----------+---------+----------+-----------+
|                      | Inhale 1 puff into   |           | 0       |          |           |
| fluticasone-salmeter | the lungs Twice      |           |         |          |           |
| ol (ADVAIR) 500-50   | Daily.               |           |         |          |           |
| mcg/puff diskus      |                      |           |         |          |           |
| inhaler              |                      |           |         |          |           |
+----------------------+----------------------+-----------+---------+----------+-----------+
|   folic acid 1 mg    | Take 1 mg by mouth   |           | 0       |          |           |
| tablet               | Daily.               |           |         |          |           |
+----------------------+----------------------+-----------+---------+----------+-----------+
|   furosemide (LASIX) | Take 40 mg by mouth  |           | 0       |          |           |
 
|  40 mg tablet        | Daily.               |           |         |          |           |
+----------------------+----------------------+-----------+---------+----------+-----------+
|   guaiFENesin        | Take 1,200 mg by     |           | 0       |          |           |
| (MUCINEX) 600 mg 12  | mouth 2 times daily. |           |         |          |           |
| hr tablet            |                      |           |         |          |           |
+----------------------+----------------------+-----------+---------+----------+-----------+
|   levothyroxine      | Take 75 mcg by mouth |           | 0       | 20 |           |
| (LEVOTHROID) 75 MCG  |  Daily.              |           |         | 12       |           |
| tablet               |                      |           |         |          |           |
+----------------------+----------------------+-----------+---------+----------+-----------+
|   metoclopramide     | Take 10 mg by mouth  |           | 0       | 20 |           |
| (REGLAN) 10 mg       | 4 times daily as     |           |         | 12       |           |
| tablet               | needed.              |           |         |          |           |
+----------------------+----------------------+-----------+---------+----------+-----------+
|                      | Apply  topically 2   |           | 0       |          |           |
| nystatin-triamcinolo | times daily.         |           |         |          |           |
| ne (MYCOLOG II)      |                      |           |         |          |           |
| cream                |                      |           |         |          |           |
+----------------------+----------------------+-----------+---------+----------+-----------+
|   ofloxacin          |                      |           | 0       | 20 |           |
| (OCUFLOX) 0.3%       |                      |           |         | 18       |           |
| ophthalmic solution  |                      |           |         |          |           |
+----------------------+----------------------+-----------+---------+----------+-----------+
|   omeprazole         | Take 20 mg by mouth  |           | 0       | 20 |           |
| (PRILOSEC) 20 mg     | 2 times daily.       |           |         | 12       |           |
| capsule              |                      |           |         |          |           |
+----------------------+----------------------+-----------+---------+----------+-----------+
|   potassium citrate  | Take 10 mEq by mouth |           | 0       |          |           |
| (UROCIT-K) 10 mEq SR |  2 times daily.      |           |         |          |           |
|  tablet              |                      |           |         |          |           |
+----------------------+----------------------+-----------+---------+----------+-----------+
|   predniSONE         | Take 10 mg by mouth  |           | 0       |          |           |
| (DELTASONE) 5 mg     | Daily.               |           |         |          |           |
| tablet               |                      |           |         |          |           |
+----------------------+----------------------+-----------+---------+----------+-----------+
|   tamsulosin         | Take 0.4 mg by mouth |           | 0       | 20 |           |
| (FLOMAX) 0.4 mg CAPS |  2 times daily.      |           |         | 12       |           |
+----------------------+----------------------+-----------+---------+----------+-----------+
|   acyclovir          | Take 400 mg by mouth |           | 0       | 20 |  |
| (ZOVIRAX) 400 MG     |  3 times daily as    |           |         | 12       | 9         |
| tablet               | needed.              |           |         |          |           |
+----------------------+----------------------+-----------+---------+----------+-----------+
|   Cholecalciferol    | Take 2,000 Units by  |           | 0       | 20 |  |
| (VITAMIN D3) 2000    | mouth Daily.         |           |         | 12       | 9         |
| UNITS CAPS           |                      |           |         |          |           |
+----------------------+----------------------+-----------+---------+----------+-----------+
|   cyanocobalamin     | injected             |           | 0       | 20 |  |
| (VITAMIN B-12) 1,000 | intramuscularly once |           |         | 12       | 9         |
|  mcg/mL injection    |  a month             |           |         |          |           |
+----------------------+----------------------+-----------+---------+----------+-----------+
|   diltiazem          | Take 120 mg by mouth |           | 0       |          |  |
| (DILT-XR) 120 mg 24  |  Daily.              |           |         |          | 9         |
| hr capsule           |                      |           |         |          |           |
+----------------------+----------------------+-----------+---------+----------+-----------+
|   loratadine         | Take 10 mg by mouth  |           | 0       |          |  |
| (CLARITIN) 10 mg     | Daily.               |           |         |          | 9         |
| tablet               |                      |           |         |          |           |
+----------------------+----------------------+-----------+---------+----------+-----------+
|   losartan (COZAAR)  | Take 100 mg by mouth |           | 0       |          |  |
| 100 MG tablet        |  Daily. 1/2 daily    |           |         |          | 9         |
 
+----------------------+----------------------+-----------+---------+----------+-----------+
|   methotrexate 2.5   | Take 15 mg by mouth  |           | 0       |          |  |
| mg tablet            | Once a week.         |           |         |          | 9         |
+----------------------+----------------------+-----------+---------+----------+-----------+
|                      | Take 1 tablet by     |           | 0       |          |  |
| oxyCODONE-acetaminop | mouth every 4 hours  |           |         |          | 9         |
| hen (PERCOCET) 5-325 | as needed for Pain.  |           |         |          |           |
|  mg per tablet       |                      |           |         |          |           |
+----------------------+----------------------+-----------+---------+----------+-----------+
|   pseudoePHEDrine    | Take 30 mg by mouth  |           | 0       |          |  |
| (SUDOGEST) 30 mg     | every 4 hours as     |           |         |          | 9         |
| tablet               | needed for           |           |         |          |           |
|                      | Congestion.          |           |         |          |           |
+----------------------+----------------------+-----------+---------+----------+-----------+
|   rivaroxaban        | Take 20 mg by mouth  |           | 0       |          |  |
| (XARELTO) 20 mg      | Daily (with dinner). |           |         |          | 9         |
| tablet               |                      |           |         |          |           |
+----------------------+----------------------+-----------+---------+----------+-----------+
|   sertraline         | 2 tablets daily      |           | 0       | 20 |  |
| (ZOLOFT) 100 mg      |                      |           |         | 12       | 9         |
| tablet               |                      |           |         |          |           |
+----------------------+----------------------+-----------+---------+----------+-----------+
 documented as of this encounter
 
 Plan of Treatment
 
 
+--------+---------+-------------+----------------------+-------------+
| Date   | Type    | Specialty   | Care Team            | Description |
+--------+---------+-------------+----------------------+-------------+
| / | Office  | Pulmonology |   Juan F,          |             |
| 2019   | Visit   |             | Jessica Cheung,   |             |
|        |         |             | MD [address] |             |
|        |         |             |   [place],   |             |
|        |         |             | [place] [address]             |             |
|        |         |             | [phone]         |             |
|        |         |             | [phone] (Fax)   |             |
+--------+---------+-------------+----------------------+-------------+
| / | Office  | Cardiology  |   Mable Jose Miguelargenis, |             |
|    | Visit   |             |  MD  [address]   |             |
|        |         |             | [place]  |             |
|        |         |             | [address]  [phone]  |             |
|        |         |             |  [phone] (Fax)  |             |
+--------+---------+-------------+----------------------+-------------+
 documented as of this encounter
 
 Procedures
 
 
+---------------------+--------+-------------+----------------------+----------------------+
| Procedure Name      | Priori | Date/Time   | Associated Diagnosis | Comments             |
|                     | ty     |             |                      |                      |
+---------------------+--------+-------------+----------------------+----------------------+
| CT HEAD WO CONTRAST | Routin | 2018  |                      |   Results for this   |
|                     | e      |  5:05 PM    |                      | procedure are in the |
|                     |        | PDT         |                      |  results section.    |
+---------------------+--------+-------------+----------------------+----------------------+
 documented in this encounter
 
 Results
 
 CT Head wo Contrast (2018  5:05 PM PDT)
 
+----------+
| Specimen |
+----------+
|          |
+----------+
 
 
 
+------------------------------------------------------------------------+--------------+
| Narrative                                                              | Performed At |
+------------------------------------------------------------------------+--------------+
|   This is a non-reportable procedure without a radiologist report and  |              |
| is  used for image storage only                                        |              |
+------------------------------------------------------------------------+--------------+
 
 
 
+--------------------------------------------------------------------------------------+
| Procedure Note                                                                       |
+--------------------------------------------------------------------------------------+
|   Andrzej Steven Irvin - 2019  4:21 AM PDT  This is a non-reportable procedure  |
| without a radiologist report and isused for image storage only                       |
+--------------------------------------------------------------------------------------+
 documented in this encounter
 
 Visit Diagnoses
 
 
+--------------------------+
| Diagnosis                |
+--------------------------+
|   Pain  Generalized pain |
+--------------------------+
 documented in this encounter

## 2019-12-06 NOTE — XMS
Encounter Summary
  Created on: 2019
 
 Wyatt Shane
 External Reference #: [id number]
 : 43
 Sex: Male
 
 Demographics
 
 
+-----------------------+------------------------+
| Address               | [address]     |
|                       | [place]  [address]   |
+-----------------------+------------------------+
| Home Phone            | [phone]        |
+-----------------------+------------------------+
| Preferred Language    | Unknown                |
+-----------------------+------------------------+
| Marital Status        |                 |
+-----------------------+------------------------+
| Druze Affiliation | LUT                    |
+-----------------------+------------------------+
| Race                  | White                  |
+-----------------------+------------------------+
| Ethnic Group          | Not  or  |
+-----------------------+------------------------+
 
 
 Author
 
 
+--------------+------------------------------+
| Author       | [facility] |
+--------------+------------------------------+
| Organization | [facility] |
+--------------+------------------------------+
| Address      | Unknown                      |
+--------------+------------------------------+
| Phone        | Unavailable                  |
+--------------+------------------------------+
 
 
 
 Support
 
 
+---------------+--------------+---------+-----------------+
| Name          | Relationship | Address | Phone           |
+---------------+--------------+---------+-----------------+
| Opal Shane | ECON         | Unknown | [phone] |
+---------------+--------------+---------+-----------------+
 
 
 
 Care Team Providers
 
 
 
+-----------------------+------+-----------------+
| Care Team Member Name | Role | Phone           |
+-----------------------+------+-----------------+
| Erwin Iniguez MD   | PCP  | [phone] |
+-----------------------+------+-----------------+
 
 
 
 Encounter Details
 
 
+--------+-------------+-----------------+---------------------+---------------+
| Date   | Type        | Department      | Care Team           | Description   |
+--------+-------------+-----------------+---------------------+---------------+
| 07/15/ | Office      |   [facility]  |   Note, Outpatient  | Progress Note |
|    | Visit-Trans | MEDICINE        | Clinic              |               |
|        | cribed      |                 |                     |               |
+--------+-------------+-----------------+---------------------+---------------+
 
 
 
 Social History
 
 
+----------------+-------+-----------+--------+------+
| Tobacco Use    | Types | Packs/Day | Years  | Date |
|                |       |           | Used   |      |
+----------------+-------+-----------+--------+------+
| Never Assessed |       |           |        |      |
+----------------+-------+-----------+--------+------+
 
 
 
+------------------+---------------+
| Sex Assigned at  | Date Recorded |
| Birth            |               |
+------------------+---------------+
| Not on file      |               |
+------------------+---------------+
 
 
 
+----------------+-------------+-------------+
| Job Start Date | Occupation  | Industry    |
+----------------+-------------+-------------+
| Not on file    | Not on file | Not on file |
+----------------+-------------+-------------+
 
 
 
+----------------+--------------+------------+
| Travel History | Travel Start | Travel End |
+----------------+--------------+------------+
 
 
 
+-------------------------------------+
| No recent travel history available. |
+-------------------------------------+
 documented as of this encounter
 
 
 Progress Notes
 Interface, Transcription In - 2006  3:11 AM PSTCLINIC DATE:       07/15/1999
 
 OT[facility]OLOGY CLINIC
 
 Mr. Shane is seen back today for a preoperative discussion and repeat
 dilation.  He has a full understanding of the procedure from his previous
 experience, and we will proceed.  Consent was signed.
 
 Jimmy Esposito M.D.
 , Otolaryngology
 Head and Neck Surgery
 
 MARIA ALEJANDRA/odette
 D: 07/15/1999
 T: 1999Electronically signed by Interface, Transcription In at 2006  3:11 AM PS
Tdocumented in this encounter
 
 Plan of Treatment
 Not on filedocumented as of this encounter
 
 Visit Diagnoses
 Not on filedocumented in this encounter

## 2019-12-06 NOTE — XMS
Encounter Summary
  Created on: 2019
 
 Shane Wyatttien BroussardJosue
 External Reference #: [id number]
 : 43
 Sex: Male
 
 Demographics
 
 
+-----------------------+---------------------------+
| Address               | [address]        |
|                       | [place]  [address] |
+-----------------------+---------------------------+
| Home Phone            | [phone]           |
+-----------------------+---------------------------+
| Preferred Language    | Unknown                   |
+-----------------------+---------------------------+
| Marital Status        |                    |
+-----------------------+---------------------------+
| Lutheran Affiliation | 1028                      |
+-----------------------+---------------------------+
| Race                  | Unknown                   |
+-----------------------+---------------------------+
| Ethnic Group          | Unknown                   |
+-----------------------+---------------------------+
 
 
 Author
 
 
+--------------+--------------------------------------------+
| Author       | [facility]  |
|              | and [place]                                |
+--------------+--------------------------------------------+
| Organization | [facility]  |
|              | and [place]                                |
+--------------+--------------------------------------------+
| Address      | Unknown                                    |
+--------------+--------------------------------------------+
| Phone        | Unavailable                                |
+--------------+--------------------------------------------+
 
 
 
 Support
 
 
+---------------+--------------+--------------------+-----------------+
| Name          | Relationship | Address            | Phone           |
+---------------+--------------+--------------------+-----------------+
| Opal Shane | ECON         | [address]     | [phone] |
|               |              | [place]   |                 |
|               |              | [address]              |                 |
+---------------+--------------+--------------------+-----------------+
 
 
 
 
 Care Team Providers
 
 
+-----------------------+------+-----------------+
| Care Team Member Name | Role | Phone           |
+-----------------------+------+-----------------+
| Erwin Iniguez MD | PCP  | [phone] |
+-----------------------+------+-----------------+
 
 
 
 Encounter Details
 
 
+--------+-----------+----------------------+--------------------+-------------+
| Date   | Type      | Department           | Care Team          | Description |
+--------+-----------+----------------------+--------------------+-------------+
| 04/03/ | Hospital  |   [facility] GENERIC IP     |   Conversion       | Pain        |
| 2018   | Encounter | CONVERSION DEP  [address]  | Transaction,       |             |
|        |           | [address]           | Provider Unknown   |             |
|        |           | [place], [place]         | [phone]       |             |
|        |           | [address]           | [phone] (Fax) |             |
|        |           | [phone]         |                    |             |
+--------+-----------+----------------------+--------------------+-------------+
 
 
 
 Social History
 
 
+---------------+------------+-----------+--------+------------------+
| Tobacco Use   | Types      | Packs/Day | Years  | Date             |
|               |            |           | Used   |                  |
+---------------+------------+-----------+--------+------------------+
| Former Smoker | Cigarettes | 1.3       | 34     | Quit: 1996 |
+---------------+------------+-----------+--------+------------------+
 
 
 
+---------------------+---+---+---+
| Smokeless Tobacco:  |   |   |   |
| Never Used          |   |   |   |
+---------------------+---+---+---+
 
 
 
+-------------+-------------+---------+----------+
| Alcohol Use | Drinks/Week | oz/Week | Comments |
+-------------+-------------+---------+----------+
| No          |             |         |          |
+-------------+-------------+---------+----------+
 
 
 
+------------------+---------------+
| Sex Assigned at  | Date Recorded |
| Birth            |               |
+------------------+---------------+
| Not on file      |               |
 
+------------------+---------------+
 
 
 
+----------------+-------------+-------------+
| Job Start Date | Occupation  | Industry    |
+----------------+-------------+-------------+
| Not on file    | Not on file | Not on file |
+----------------+-------------+-------------+
 
 
 
+----------------+--------------+------------+
| Travel History | Travel Start | Travel End |
+----------------+--------------+------------+
 
 
 
+-------------------------------------+
| No recent travel history available. |
+-------------------------------------+
 documented as of this encounter
 
 Medications at Time of Discharge
 
 
+----------------------+----------------------+-----------+---------+----------+-----------+
| Medication           | Sig                  | Dispensed | Refills | Start    | End Date  |
|                      |                      |           |         | Date     |           |
+----------------------+----------------------+-----------+---------+----------+-----------+
|   acyclovir          | Apply  topically     |           | 0       |          |           |
| (ZOVIRAX) 5%         | every 3 hours.       |           |         |          |           |
| ointment             |                      |           |         |          |           |
+----------------------+----------------------+-----------+---------+----------+-----------+
|   albuterol (PROAIR  | Inhale 2 puffs into  |           | 0       |          |           |
| HFA) 90 mcg/puff     | the lungs every 6    |           |         |          |           |
| inhaler              | hours as needed for  |           |         |          |           |
|                      | Wheezing.            |           |         |          |           |
+----------------------+----------------------+-----------+---------+----------+-----------+
|   digoxin (LANOXIN)  | Take 125 mcg by      |           | 0       |          |           |
| 250 mcg tablet       | mouth Daily.      |           |         |          |           |
|                      | capsule daily        |           |         |          |           |
+----------------------+----------------------+-----------+---------+----------+-----------+
|   ferrous sulfate    | Take 325 mg by mouth |           | 0       | 20 |           |
| 325 mg tablet        |  3 times daily.      |           |         | 12       |           |
+----------------------+----------------------+-----------+---------+----------+-----------+
|   fluticasone        | one spray in each    |           | 0       | 20 |           |
| (FLONASE) 50         | nostril daily as     |           |         | 12       |           |
| mcg/nasal spray      | needed               |           |         |          |           |
+----------------------+----------------------+-----------+---------+----------+-----------+
|                      | Inhale 1 puff into   |           | 0       |          |           |
| fluticasone-salmeter | the lungs Twice      |           |         |          |           |
| ol (ADVAIR) 500-50   | Daily.               |           |         |          |           |
| mcg/puff diskus      |                      |           |         |          |           |
| inhaler              |                      |           |         |          |           |
+----------------------+----------------------+-----------+---------+----------+-----------+
|   folic acid 1 mg    | Take 1 mg by mouth   |           | 0       |          |           |
| tablet               | Daily.               |           |         |          |           |
+----------------------+----------------------+-----------+---------+----------+-----------+
|   furosemide (LASIX) | Take 40 mg by mouth  |           | 0       |          |           |
 
|  40 mg tablet        | Daily.               |           |         |          |           |
+----------------------+----------------------+-----------+---------+----------+-----------+
|   guaiFENesin        | Take 1,200 mg by     |           | 0       |          |           |
| (MUCINEX) 600 mg 12  | mouth 2 times daily. |           |         |          |           |
| hr tablet            |                      |           |         |          |           |
+----------------------+----------------------+-----------+---------+----------+-----------+
|   levothyroxine      | Take 75 mcg by mouth |           | 0       | 20 |           |
| (LEVOTHROID) 75 MCG  |  Daily.              |           |         | 12       |           |
| tablet               |                      |           |         |          |           |
+----------------------+----------------------+-----------+---------+----------+-----------+
|   metoclopramide     | Take 10 mg by mouth  |           | 0       | 20 |           |
| (REGLAN) 10 mg       | 4 times daily as     |           |         | 12       |           |
| tablet               | needed.              |           |         |          |           |
+----------------------+----------------------+-----------+---------+----------+-----------+
|                      | Apply  topically 2   |           | 0       |          |           |
| nystatin-triamcinolo | times daily.         |           |         |          |           |
| ne (MYCOLOG II)      |                      |           |         |          |           |
| cream                |                      |           |         |          |           |
+----------------------+----------------------+-----------+---------+----------+-----------+
|   omeprazole         | Take 20 mg by mouth  |           | 0       | 20 |           |
| (PRILOSEC) 20 mg     | 2 times daily.       |           |         | 12       |           |
| capsule              |                      |           |         |          |           |
+----------------------+----------------------+-----------+---------+----------+-----------+
|   potassium citrate  | Take 10 mEq by mouth |           | 0       |          |           |
| (UROCIT-K) 10 mEq SR |  2 times daily.      |           |         |          |           |
|  tablet              |                      |           |         |          |           |
+----------------------+----------------------+-----------+---------+----------+-----------+
|   predniSONE         | Take 10 mg by mouth  |           | 0       |          |           |
| (DELTASONE) 5 mg     | Daily.               |           |         |          |           |
| tablet               |                      |           |         |          |           |
+----------------------+----------------------+-----------+---------+----------+-----------+
|   tamsulosin         | Take 0.4 mg by mouth |           | 0       | 20 |           |
| (FLOMAX) 0.4 mg CAPS |  2 times daily.      |           |         | 12       |           |
+----------------------+----------------------+-----------+---------+----------+-----------+
|   acyclovir          | Take 400 mg by mouth |           | 0       | 20 |  |
| (ZOVIRAX) 400 MG     |  3 times daily as    |           |         | 12       | 9         |
| tablet               | needed.              |           |         |          |           |
+----------------------+----------------------+-----------+---------+----------+-----------+
|   Cholecalciferol    | Take 2,000 Units by  |           | 0       | 20 |  |
| (VITAMIN D3) 2000    | mouth Daily.         |           |         | 12       | 9         |
| UNITS CAPS           |                      |           |         |          |           |
+----------------------+----------------------+-----------+---------+----------+-----------+
|   cyanocobalamin     | injected             |           | 0       | 20 |  |
| (VITAMIN B-12) 1,000 | intramuscularly once |           |         | 12       | 9         |
|  mcg/mL injection    |  a month             |           |         |          |           |
+----------------------+----------------------+-----------+---------+----------+-----------+
|   diltiazem          | Take 120 mg by mouth |           | 0       |          |  |
| (DILT-XR) 120 mg 24  |  Daily.              |           |         |          | 9         |
| hr capsule           |                      |           |         |          |           |
+----------------------+----------------------+-----------+---------+----------+-----------+
|   loratadine         | Take 10 mg by mouth  |           | 0       |          |  |
| (CLARITIN) 10 mg     | Daily.               |           |         |          | 9         |
| tablet               |                      |           |         |          |           |
+----------------------+----------------------+-----------+---------+----------+-----------+
|   losartan (COZAAR)  | Take 100 mg by mouth |           | 0       |          |  |
| 100 MG tablet        |  Daily. 1/2 daily    |           |         |          | 9         |
+----------------------+----------------------+-----------+---------+----------+-----------+
|   methotrexate 2.5   | Take 15 mg by mouth  |           | 0       |          |  |
| mg tablet            | Once a week.         |           |         |          | 9         |
+----------------------+----------------------+-----------+---------+----------+-----------+
 
|                      | Take 1 tablet by     |           | 0       |          |  |
| oxyCODONE-acetaminop | mouth every 4 hours  |           |         |          | 9         |
| hen (PERCOCET) 5-325 | as needed for Pain.  |           |         |          |           |
|  mg per tablet       |                      |           |         |          |           |
+----------------------+----------------------+-----------+---------+----------+-----------+
|   pseudoePHEDrine    | Take 30 mg by mouth  |           | 0       |          |  |
| (SUDOGEST) 30 mg     | every 4 hours as     |           |         |          | 9         |
| tablet               | needed for           |           |         |          |           |
|                      | Congestion.          |           |         |          |           |
+----------------------+----------------------+-----------+---------+----------+-----------+
|   rivaroxaban        | Take 20 mg by mouth  |           | 0       |          |  |
| (XARELTO) 20 mg      | Daily (with dinner). |           |         |          | 9         |
| tablet               |                      |           |         |          |           |
+----------------------+----------------------+-----------+---------+----------+-----------+
|   sertraline         | 2 tablets daily      |           | 0       | 20 |  |
| (ZOLOFT) 100 mg      |                      |           |         | 12       | 9         |
| tablet               |                      |           |         |          |           |
+----------------------+----------------------+-----------+---------+----------+-----------+
 documented as of this encounter
 
 Plan of Treatment
 
 
+--------+---------+-------------+----------------------+-------------+
| Date   | Type    | Specialty   | Care Team            | Description |
+--------+---------+-------------+----------------------+-------------+
| / | Office  | Pulmonology |   [facility],          |             |
|    | Visit   |             | Jessica Cheung,   |             |
|        |         |             | MD [address] |             |
|        |         |             |   [place],   |             |
|        |         |             | [place] [address]             |             |
|        |         |             | [phone]         |             |
|        |         |             | [phone] (Fax)   |             |
+--------+---------+-------------+----------------------+-------------+
| / | Office  | Cardiology  |   [place], M[place], |             |
| 2020   | Visit   |             |  MD  [address]   |             |
|        |         |             | [place]  [place], [place]  |             |
|        |         |             | [address]  [phone]  |             |
|        |         |             |  [phone] (Fax)  |             |
+--------+---------+-------------+----------------------+-------------+
 documented as of this encounter
 
 Procedures
 
 
+---------------------+--------+-------------+----------------------+----------------------+
| Procedure Name      | Priori | Date/Time   | Associated Diagnosis | Comments             |
|                     | ty     |             |                      |                      |
+---------------------+--------+-------------+----------------------+----------------------+
| MRI CERVICAL SPINE  | Routin | 2018  |                      |   Results for this   |
| WO CONTRAST         | e      |  4:45 AM    |                      | procedure are in the |
|                     |        | PDT         |                      |  results section.    |
+---------------------+--------+-------------+----------------------+----------------------+
 documented in this encounter
 
 Results
 MRI Cervical Spine wo Contrast (2018  4:45 AM PDT)
 
+----------+
| Specimen |
 
+----------+
|          |
+----------+
 
 
 
+------------------------------------------------------------------------+--------------+
| Narrative                                                              | Performed At |
+------------------------------------------------------------------------+--------------+
|   This is a non-reportable procedure without a radiologist report and  |              |
| is  used for image storage only                                        |              |
+------------------------------------------------------------------------+--------------+
 
 
 
+--------------------------------------------------------------------------------------+
| Procedure Note                                                                       |
+--------------------------------------------------------------------------------------+
|   Andrzej Steven Irvin - 2019  4:21 AM PDT  This is a non-reportable procedure  |
| without a radiologist report and isused for image storage only                       |
+--------------------------------------------------------------------------------------+
 documented in this encounter
 
 Visit Diagnoses
 
 
+--------------------------+
| Diagnosis                |
+--------------------------+
|   Pain  Generalized pain |
+--------------------------+
 documented in this encounter

## 2019-12-06 NOTE — XMS
Encounter Summary
  Created on: 2019
 
 Wyatt Shane
 External Reference #: [id number]
 : 43
 Sex: Male
 
 Demographics
 
 
+-----------------------+------------------------+
| Address               | [address]     |
|                       | [place]  [address]   |
+-----------------------+------------------------+
| Home Phone            | [phone]        |
+-----------------------+------------------------+
| Preferred Language    | Unknown                |
+-----------------------+------------------------+
| Marital Status        |                 |
+-----------------------+------------------------+
| Yazdanism Affiliation | LUT                    |
+-----------------------+------------------------+
| Race                  | White                  |
+-----------------------+------------------------+
| Ethnic Group          | Not  or  |
+-----------------------+------------------------+
 
 
 Author
 
 
+--------------+------------------------------+
| Author       | [facility] |
+--------------+------------------------------+
| Organization | [facility] |
+--------------+------------------------------+
| Address      | Unknown                      |
+--------------+------------------------------+
| Phone        | Unavailable                  |
+--------------+------------------------------+
 
 
 
 Support
 
 
+---------------+--------------+---------+-----------------+
| Name          | Relationship | Address | Phone           |
+---------------+--------------+---------+-----------------+
| Opal Shane | ECON         | Unknown | [phone] |
+---------------+--------------+---------+-----------------+
 
 
 
 Care Team Providers
 
 
 
+-----------------------+------+-----------------+
| Care Team Member Name | Role | Phone           |
+-----------------------+------+-----------------+
| Erwin Iniguez MD   | PCP  | [phone] |
+-----------------------+------+-----------------+
 
 
 
 Encounter Details
 
 
+--------+-------------+-----------------+---------------------+---------------+
| Date   | Type        | Department      | Care Team           | Description   |
+--------+-------------+-----------------+---------------------+---------------+
| / | Office      |   [facility]  |   Note, Outpatient  | Progress Note |
|    | Visit-Trans | MEDICINE        | Clinic              |               |
|        | cribed      |                 |                     |               |
+--------+-------------+-----------------+---------------------+---------------+
 
 
 
 Social History
 
 
+----------------+-------+-----------+--------+------+
| Tobacco Use    | Types | Packs/Day | Years  | Date |
|                |       |           | Used   |      |
+----------------+-------+-----------+--------+------+
| Never Assessed |       |           |        |      |
+----------------+-------+-----------+--------+------+
 
 
 
+------------------+---------------+
| Sex Assigned at  | Date Recorded |
| Birth            |               |
+------------------+---------------+
| Not on file      |               |
+------------------+---------------+
 
 
 
+----------------+-------------+-------------+
| Job Start Date | Occupation  | Industry    |
+----------------+-------------+-------------+
| Not on file    | Not on file | Not on file |
+----------------+-------------+-------------+
 
 
 
+----------------+--------------+------------+
| Travel History | Travel Start | Travel End |
+----------------+--------------+------------+
 
 
 
+-------------------------------------+
| No recent travel history available. |
+-------------------------------------+
 documented as of this encounter
 
 
 Progress Notes
 Interface, Transcription In - 2006  3:03 AM PSTCLINIC DATE:       1999
 
 OTOLARYNGOLOGY CLINIC
 
 SUBJECTIVE:  Mr. Shane is seen back  in  followup  from  his  dilation.   He
 really feels no subjective change in  his  symptoms in terms of his glottic
 and subglottic stenosis.
 
 PHYSICAL EXAMINATION:  A flexible fiberoptic  laryngoscopy  is  done.   The
 erythema  of  his larynx remains absent.   There  is  no  evidence  of  any
 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
 of his airway.
 
 ASSESSMENT:  Subglottic stenosis.
 
 PLAN:  He and I now agree that he needs  an altered reconstruction of this.
 We will arrange to do this in four to six weeks.
 
 Jimmy Esposito M.D.
 
 RINA / 
 [id number] / [id number] / [id number] / [id number]
 D: 1999
 T: 1999Electronically signed by Interface, Transcription In at 2006  3:03 AM PS
Tdocumented in this encounter
 
 Plan of Treatment
 Not on filedocumented as of this encounter
 
 Visit Diagnoses
 Not on filedocumented in this encounter

## 2019-12-06 NOTE — XMS
Encounter Summary
  Created on: 2019
 
 Shane Wyatttien BroussardJosue
 External Reference #: [id number]
 : 43
 Sex: Male
 
 Demographics
 
 
+-----------------------+---------------------------+
| Address               | [address]        |
|                       | [place]  [address] |
+-----------------------+---------------------------+
| Home Phone            | [phone]           |
+-----------------------+---------------------------+
| Preferred Language    | Unknown                   |
+-----------------------+---------------------------+
| Marital Status        |                    |
+-----------------------+---------------------------+
| Faith Affiliation | 1028                      |
+-----------------------+---------------------------+
| Race                  | Unknown                   |
+-----------------------+---------------------------+
| Ethnic Group          | Unknown                   |
+-----------------------+---------------------------+
 
 
 Author
 
 
+--------------+--------------------------------------------+
| Author       | [facility]  |
|              | [facility] [place]                                |
+--------------+--------------------------------------------+
| Organization | [facility]  |
|              | [facility] [place]                                |
+--------------+--------------------------------------------+
| Address      | Unknown                                    |
+--------------+--------------------------------------------+
| Phone        | Unavailable                                |
+--------------+--------------------------------------------+
 
 
 
 Support
 
 
+---------------+--------------+--------------------+-----------------+
| Name          | Relationship | Address            | Phone           |
+---------------+--------------+--------------------+-----------------+
| Opal Shane | ECON         | [address]     | [phone] |
|               |              | [place]   |                 |
|               |              | [address]              |                 |
+---------------+--------------+--------------------+-----------------+
 
 
 
 
 Care Team Providers
 
 
+------------------------+------+-----------------+
| Care Team Member Name  | Role | Phone           |
+------------------------+------+-----------------+
| Salvador Murguia MD | PCP  | [phone] |
+------------------------+------+-----------------+
 
 
 
 Reason for Referral
 Diagnostic/Screening (Routine)
 
+--------+--------+-----------+--------------+--------------+--------------+
| Status | Reason | Specialty | Diagnoses /  | Referred By  | Referred To  |
|        |        |           | Procedures   | Contact      | Contact      |
+--------+--------+-----------+--------------+--------------+--------------+
| Closed |        | Radiology |   Diagnoses  |   Steve, Si,  |              |
|        |        |           |  Abdominal   | DNP  [address]    |              |
|        |        |           | aortic       | [address]  |              |
|        |        |           | aneurysm     |  [address]       |              |
|        |        |           | (AAA)        | [facility] [place] |              |
|        |        |           | without      |  [address]       |              |
|        |        |           | rupture      | Phone:       |              |
|        |        |           | (HCC)        | [phone] |              |
|        |        |           | Carotid      |   Fax:       |              |
|        |        |           | stenosis,    | [phone] |              |
|        |        |           | bilateral    |              |              |
|        |        |           | Procedures   |              |              |
|        |        |           | VAS Aorta    |              |              |
|        |        |           | Iliac Duplex |              |              |
|        |        |           |  Complete    |              |              |
+--------+--------+-----------+--------------+--------------+--------------+
 
 
 Diagnostic/Screening (Routine)
 
+--------+--------+-----------+--------------+--------------+--------------+
| Status | Reason | Specialty | Diagnoses /  | Referred By  | Referred To  |
|        |        |           | Procedures   | Contact      | Contact      |
+--------+--------+-----------+--------------+--------------+--------------+
| Closed |        | Radiology |   Diagnoses  |   Steve, Si,  |              |
|        |        |           |  Abdominal   | DNP  [address]    |              |
|        |        |           | aortic       | [address]  |              |
|        |        |           | aneurysm     |  [address]       |              |
|        |        |           | (AAA)        | [place], [place] |              |
|        |        |           | without      |  [address]       |              |
|        |        |           | rupture      | Phone:       |              |
|        |        |           | ([facility])        | [phone] |              |
|        |        |           | Carotid      |   Fax:       |              |
|        |        |           | stenosis,    | [phone] |              |
|        |        |           | bilateral    |              |              |
|        |        |           | Procedures   |              |              |
|        |        |           | VAS Carotid  |              |              |
|        |        |           | Duplex       |              |              |
|        |        |           | Bilateral    |              |              |
+--------+--------+-----------+--------------+--------------+--------------+
 
 
 
 
 
 Reason for Visit
 
 
+-----------+-----------------------------+
| Reason    | Comments                    |
+-----------+-----------------------------+
| Follow-up | 1 yr f/u carotid and aorta  |
+-----------+-----------------------------+
 
 
 
 Encounter Details
 
 
+--------+---------+----------------------+----------------------+----------------------+
| Date   | Type    | Department           | Care Team            | Description          |
+--------+---------+----------------------+----------------------+----------------------+
| / | Office  |   [facility]      |   Neris Wilson DNP      | Abdominal aortic     |
| 2019   | Visit   | VASCULAR SURGERY     | [address]     | aneurysm (AAA)       |
|        |         | [address] | [address]  [place], [place]  | without rupture      |
|        |         |  [address]  [place], [place]     | [address]  [phone]  | (HCC) (Primary Dx);  |
|        |         | [address]           |  [phone] (Fax)  | Carotid stenosis,    |
|        |         | [phone]         |                      | bilateral            |
+--------+---------+----------------------+----------------------+----------------------+
 
 
 
 Social History
 
 
+---------------+------------+-----------+--------+------------------+
| Tobacco Use   | Types      | Packs/Day | Years  | Date             |
|               |            |           | Used   |                  |
+---------------+------------+-----------+--------+------------------+
| Former Smoker | Cigarettes | 1         | 35     | Quit: 1996 |
+---------------+------------+-----------+--------+------------------+
 
 
 
+---------------------+---+---+---+
| Smokeless Tobacco:  |   |   |   |
| Never Used          |   |   |   |
+---------------------+---+---+---+
 
 
 
+-------------+-------------+---------+----------+
| Alcohol Use | Drinks/Week | oz/Week | Comments |
+-------------+-------------+---------+----------+
| No          |             |         |          |
+-------------+-------------+---------+----------+
 
 
 
+------------------+---------------+
| Sex Assigned at  | Date Recorded |
| Birth            |               |
 
+------------------+---------------+
| Not on file      |               |
+------------------+---------------+
 
 
 
+----------------+-------------+-------------+
| Job Start Date | Occupation  | Industry    |
+----------------+-------------+-------------+
| Not on file    | Not on file | Not on file |
+----------------+-------------+-------------+
 
 
 
+----------------+--------------+------------+
| Travel History | Travel Start | Travel End |
+----------------+--------------+------------+
 
 
 
+-------------------------------------+
| No recent travel history available. |
+-------------------------------------+
 documented as of this encounter
 
 Last Filed Vital Signs
 
 
+-------------------+---------+----------------------+----------+
| Vital Sign        | Reading | Time Taken           | Comments |
+-------------------+---------+----------------------+----------+
| Blood Pressure    | 171/73  | 2019 11:03 AM  |          |
|                   |         | PST                  |          |
+-------------------+---------+----------------------+----------+
| Pulse             | 56      | 2019 11:03 AM  |          |
|                   |         | PST                  |          |
+-------------------+---------+----------------------+----------+
| Temperature       | -       | -                    |          |
+-------------------+---------+----------------------+----------+
| Respiratory Rate  | -       | -                    |          |
+-------------------+---------+----------------------+----------+
| Oxygen Saturation | 98%     | 2019 11:03 AM  |          |
|                   |         | PST                  |          |
+-------------------+---------+----------------------+----------+
| Inhaled Oxygen    | -       | -                    |          |
| Concentration     |         |                      |          |
+-------------------+---------+----------------------+----------+
| Weight            | -       | -                    |          |
+-------------------+---------+----------------------+----------+
| Height            | -       | -                    |          |
+-------------------+---------+----------------------+----------+
| Body Mass Index   | -       | -                    |          |
+-------------------+---------+----------------------+----------+
 documented in this encounter
 
 Progress Notes
 Neris Wilson DNP - 2019 12:00 PM [facility]
 [address] [place] [address]
 Office: [phone] Fax: [phone]
 
 
 DATE OF VISIT: 2019 
 PATIENT NAME: Wyatt Shane
 : 1945; AGE: 73 y.o.; Sex:M 
 PHONE  NUMBER:  [phone]  (Home);    [phone] (Cell)
 MRN: [id number]; 
 PROVIDER: Neris Wilson DNP
 PRIMARY  CARE  /  REFERRING  PHYSICIAN:  Salvador Murguia MD  /  S[facility]  / [address] [facility],  / [place] [address] 
 
 REASON FOR EVALUATION / CHIEF COMPLAINT: EVALUATION AND TREATMENT OF ABDOMINAL AORTIC ANEUR
YSM AND CAROTID STENOSIS
 
 HISTORY OF PRESENT ILLNESS:  Wyatt Shane is a 73 y.o. male patient who presents for evaluat
ion and treatment of an abdominal aortic aneurysm. The patient reported that he has history 
of AAA and has been followed up yearly. His risk factors for an aneurysm include: arterioscl
erotic heart disease, hypercholesterolemia, hypertension, peripheral vascular disease and hi
story of smoking.He denied nausea, vomiting or abdominal distension as a result of this puls
atile mass. The patient denied any lower leg arterial ischemic or claudication-like symptoms
. The patient currently reports no numbness in the upper or lower extremities, and no motor 
or sensory deficits. The patient reports no difficulty in cognitive ability, slurred speech,
 or impaired verbal communication capability. He can not tolerate aspirin or plavix due to e
pistaxis. He is taking Xarelto daily. 
 
 VITAL SIGNS: 2019 
 
 PHYSICAL EXAM: 
 Constitutional: Well nourished, no signs of distress 
 HENT: Normocephalic and atraumatic. Cranial nerves IIXI are grossly intact.
 Cardiovascular: bradycardic, irregular rhythm.
 Pulmonary/Chest: No respiratory distress.
 Abdominal: Soft. No abdominal distension or tenderness. 
 Musculoskeletal: Normal range of motion. 
 Extremities: No cyanosis or clubbing.
 Neurological: He is alert and oriented. 
 VASCULAR:  Palpable femoral pulses were present bilaterally. The patient has a large pulsat
ile abdominal mass consistent with an AAA. 
 
 IMAGINGS:
 Us Carotid Doppler, Bilateral
 2019 
 1.  No hemodynamically significant stenosis of the internal carotid arteries. 2.  There is 
greater than 50% narrowing of the left mid common carotid artery. There is ulcerated plaque 
at this region with possible intimal flap. 3.  Antegrade flow of the bilateral vertebral art
eries.
 
 Us aorta
 2019 
 4.9 x 4.7 cm, unchanged 
 
 ASSESSMENT /  PLAN:  
 Abdominal aortic aneurysm - Given the relative stable and <5.5 cm abdominal aortic aneurysm
, I've informed the patient treatment strategies of conservative watchful observation versus
 endovascular treatment, who wishes to undergo the surveillance treatment strategy to monito
r his aortic aneurysm growth. Mr. Shane is aware that in the event his aortic aneurysm shows 
evidence of aneurysm enlargement based on surveillance imaging studies, we'll consider endov
ascular repair of his aortic aneurysm. We'll see him for follow-up in 1 year with repeat abd
ominal ultrasound. 
 
 Carotid Artery Stenosis - The patient has asymptomatic mild bilateral carotid artery diseas
e. Given the patient  s asymptomatic bilateral carotid artery disease, no surgical or inter
 
ventional treatment is necessary at this time. We recommend intensive medical therapy using 
all available risk reduction strategies for patients with asymptomatic carotid atheroscleros
is. Currently viable strategies include statin therapy, antiplatelet therapy, blood pressure
 control, and lifestyle modification consisting of smoking cessation, limited alcohol consum
ption, weight control, regular aerobic physical activity, and a Mediterranean diet.We recomm
end carotid endarterectomy plus intensive medical therapy, rather than intensive medical katerina
atment alone, for medically stable patients who have a life expectancy of at least five year
s and a high grade (?80 percent) asymptomatic carotid atherosclerotic stenosis at baseline o
r progression to ?80 percent stenosis despite intensive medical therapy while under observat
ion. We have discussed signs and symptoms of TIA or CVA that would warrant urgent follow up 
with vascular clinic or nearest emergency facility such as difficulty speaking, swallowing, 
unilateral facial droop, unilateral arm or leg weakness, or visual changes.  Patient express
es understanding of these symptoms and to seek urgent follow up should any occur.We plan to 
follow the patient with surveillance duplex ultrasound. If the repeated carotid duplex scan 
showed disease progress with worsening of carotid artery lesion, or if the patient  s carot
id disease becomes symptomatic, further therapeutic treatment including carotid endarterecto
my will be considered. The patient will undergo a repeat carotid duplex scan in 1 year.
 
 Neris Wilson DNP
 Electronically signed by Neris Wilson DNP at 2019 11:24 AM PSTdocumented in this encounte
r
 
 Plan of Treatment
 
 
+--------+---------+-------------+----------------------+-------------+
| Date   | Type    | Specialty   | Care Team            | Description |
+--------+---------+-------------+----------------------+-------------+
| / | Office  | Pulmonology |   Juan F,          |             |
|    | Visit   |             | Jessica Cheung,   |             |
|        |         |             | MD [address] |             |
|        |         |             |   [place]   |             |
|        |         |             | [place] [address]             |             |
|        |         |             | [phone]         |             |
|        |         |             | [phone] (Fax)   |             |
+--------+---------+-------------+----------------------+-------------+
| / | Office  | Cardiology  |   Eric Akins, |             |
|    | Visit   |             |  MD [address]   |             |
|        |         |             | [place]  |             |
|        |         |             | [address]  [phone]  |             |
|        |         |             |  [phone] (Fax)  |             |
+--------+---------+-------------+----------------------+-------------+
 
 
 
+---------------------+---------+--------+----------------------+----------------------+
| Name                | Type    | Priori | Associated Diagnoses | Order Schedule       |
|                     |         | ty     |                      |                      |
+---------------------+---------+--------+----------------------+----------------------+
| VAS Carotid Duplex  | Imaging | Routin |   Abdominal aortic   | Expected: 2020 |
| Bilateral           |         | e      | aneurysm (AAA)       |  (Approximate),      |
|                     |         |        | without rupture      | Expires: 2021  |
|                     |         |        | (HCC)  Carotid       |                      |
|                     |         |        | stenosis, bilateral  |                      |
+---------------------+---------+--------+----------------------+----------------------+
| VAS Aorta Iliac     | Imaging | Routin |   Abdominal aortic   | Expected: 2020 |
| Duplex Complete     |         | e      | aneurysm (AAA)       |  (Approximate),      |
|                     |         |        | without rupture      | Expires: 2021  |
|                     |         |        | (HCC)  Carotid       |                      |
|                     |         |        | stenosis, bilateral  |                      |
 
+---------------------+---------+--------+----------------------+----------------------+
 documented as of this encounter
 
 Visit Diagnoses
 
 
+----------------------------------------------------------------------------------+
| Diagnosis                                                                        |
+----------------------------------------------------------------------------------+
|   Abdominal aortic aneurysm (AAA) without rupture (HCC) - Primary                |
+----------------------------------------------------------------------------------+
|   Carotid stenosis, bilateral  Occlusion and stenosis of multiple and bilateral  |
| precerebral arteries without mention of cerebral infarction                      |
+----------------------------------------------------------------------------------+
 documented in this encounter

## 2019-12-06 NOTE — XMS
Encounter Summary
  Created on: 2019
 
 Shane Wyatttien BroussardJosue
 External Reference #: [id number]
 : 43
 Sex: Male
 
 Demographics
 
 
+-----------------------+---------------------------+
| Address               | [address]        |
|                       | [place]  [address] |
+-----------------------+---------------------------+
| Home Phone            | [phone]           |
+-----------------------+---------------------------+
| Preferred Language    | Unknown                   |
+-----------------------+---------------------------+
| Marital Status        |                    |
+-----------------------+---------------------------+
| Advent Affiliation | 1028                      |
+-----------------------+---------------------------+
| Race                  | Unknown                   |
+-----------------------+---------------------------+
| Ethnic Group          | Unknown                   |
+-----------------------+---------------------------+
 
 
 Author
 
 
+--------------+--------------------------------------------+
| Author       | [facility]  |
|              | [facility] [place]                                |
+--------------+--------------------------------------------+
| Organization | [facility]  |
|              | [facility] [place]                                |
+--------------+--------------------------------------------+
| Address      | Unknown                                    |
+--------------+--------------------------------------------+
| Phone        | Unavailable                                |
+--------------+--------------------------------------------+
 
 
 
 Support
 
 
+---------------+--------------+--------------------+-----------------+
| Name          | Relationship | Address            | Phone           |
+---------------+--------------+--------------------+-----------------+
| Opal Shane | ECON         | [address]     | [phone] |
|               |              | [place]   |                 |
|               |              | [id number]              |                 |
+---------------+--------------+--------------------+-----------------+
 
 
 
 
 Care Team Providers
 
 
+-----------------------+------+-----------------+
| Care Team Member Name | Role | Phone           |
+-----------------------+------+-----------------+
| Erwin Iniguez MD | PCP  | [phone] |
+-----------------------+------+-----------------+
 
 
 
 Encounter Details
 
 
+--------+-----------+----------------------+--------------------+-------------+
| Date   | Type      | Department           | Care Team          | Description |
+--------+-----------+----------------------+--------------------+-------------+
| 04/03/ | Hospital  |   [facility]     |   Conversion       | Pain        |
| 2018   | Encounter | CONVERSION DEP  [address]  | Transaction,       |             |
|        |           | [address]           | Provider Unknown   |             |
|        |           | [place], [place]         | [phone]       |             |
|        |           | [address]           | [phone] (Fax) |             |
|        |           | [phone]         |                    |             |
+--------+-----------+----------------------+--------------------+-------------+
 
 
 
 Social History
 
 
+---------------+------------+-----------+--------+------------------+
| Tobacco Use   | Types      | Packs/Day | Years  | Date             |
|               |            |           | Used   |                  |
+---------------+------------+-----------+--------+------------------+
| Former Smoker | Cigarettes | 1.3       | 34     | Quit: 1996 |
+---------------+------------+-----------+--------+------------------+
 
 
 
+---------------------+---+---+---+
| Smokeless Tobacco:  |   |   |   |
| Never Used          |   |   |   |
+---------------------+---+---+---+
 
 
 
+-------------+-------------+---------+----------+
| Alcohol Use | Drinks/Week | oz/Week | Comments |
+-------------+-------------+---------+----------+
| No          |             |         |          |
+-------------+-------------+---------+----------+
 
 
 
+------------------+---------------+
| Sex Assigned at  | Date Recorded |
| Birth            |               |
+------------------+---------------+
| Not on file      |               |
 
+------------------+---------------+
 
 
 
+----------------+-------------+-------------+
| Job Start Date | Occupation  | Industry    |
+----------------+-------------+-------------+
| Not on file    | Not on file | Not on file |
+----------------+-------------+-------------+
 
 
 
+----------------+--------------+------------+
| Travel History | Travel Start | Travel End |
+----------------+--------------+------------+
 
 
 
+-------------------------------------+
| No recent travel history available. |
+-------------------------------------+
 documented as of this encounter
 
 Medications at Time of Discharge
 
 
+----------------------+----------------------+-----------+---------+----------+-----------+
| Medication           | Sig                  | Dispensed | Refills | Start    | End Date  |
|                      |                      |           |         | Date     |           |
+----------------------+----------------------+-----------+---------+----------+-----------+
|   acyclovir          | Apply  topically     |           | 0       |          |           |
| (ZOVIRAX) 5%         | every 3 hours.       |           |         |          |           |
| ointment             |                      |           |         |          |           |
+----------------------+----------------------+-----------+---------+----------+-----------+
|   albuterol (PROAIR  | Inhale 2 puffs into  |           | 0       |          |           |
| HFA) 90 mcg/puff     | the lungs every 6    |           |         |          |           |
| inhaler              | hours as needed for  |           |         |          |           |
|                      | Wheezing.            |           |         |          |           |
+----------------------+----------------------+-----------+---------+----------+-----------+
|   digoxin (LANOXIN)  | Take 125 mcg by      |           | 0       |          |           |
| 250 mcg tablet       | mouth Daily.      |           |         |          |           |
|                      | capsule daily        |           |         |          |           |
+----------------------+----------------------+-----------+---------+----------+-----------+
|   ferrous sulfate    | Take 325 mg by mouth |           | 0       | 20 |           |
| 325 mg tablet        |  3 times daily.      |           |         | 12       |           |
+----------------------+----------------------+-----------+---------+----------+-----------+
|   fluticasone        | one spray in each    |           | 0       | 20 |           |
| (FLONASE) 50         | nostril daily as     |           |         | 12       |           |
| mcg/nasal spray      | needed               |           |         |          |           |
+----------------------+----------------------+-----------+---------+----------+-----------+
|                      | Inhale 1 puff into   |           | 0       |          |           |
| fluticasone-salmeter | the lungs Twice      |           |         |          |           |
| ol (ADVAIR) 500-50   | Daily.               |           |         |          |           |
| mcg/puff diskus      |                      |           |         |          |           |
| inhaler              |                      |           |         |          |           |
+----------------------+----------------------+-----------+---------+----------+-----------+
|   folic acid 1 mg    | Take 1 mg by mouth   |           | 0       |          |           |
| tablet               | Daily.               |           |         |          |           |
+----------------------+----------------------+-----------+---------+----------+-----------+
|   furosemide (LASIX) | Take 40 mg by mouth  |           | 0       |          |           |
 
|  40 mg tablet        | Daily.               |           |         |          |           |
+----------------------+----------------------+-----------+---------+----------+-----------+
|   guaiFENesin        | Take 1,200 mg by     |           | 0       |          |           |
| (MUCINEX) 600 mg 12  | mouth 2 times daily. |           |         |          |           |
| hr tablet            |                      |           |         |          |           |
+----------------------+----------------------+-----------+---------+----------+-----------+
|   levothyroxine      | Take 75 mcg by mouth |           | 0       | 20 |           |
| (LEVOTHROID) 75 MCG  |  Daily.              |           |         | 12       |           |
| tablet               |                      |           |         |          |           |
+----------------------+----------------------+-----------+---------+----------+-----------+
|   metoclopramide     | Take 10 mg by mouth  |           | 0       | 20 |           |
| (REGLAN) 10 mg       | 4 times daily as     |           |         | 12       |           |
| tablet               | needed.              |           |         |          |           |
+----------------------+----------------------+-----------+---------+----------+-----------+
|                      | Apply  topically 2   |           | 0       |          |           |
| nystatin-triamcinolo | times daily.         |           |         |          |           |
| ne (MYCOLOG II)      |                      |           |         |          |           |
| cream                |                      |           |         |          |           |
+----------------------+----------------------+-----------+---------+----------+-----------+
|   omeprazole         | Take 20 mg by mouth  |           | 0       | 20 |           |
| (PRILOSEC) 20 mg     | 2 times daily.       |           |         | 12       |           |
| capsule              |                      |           |         |          |           |
+----------------------+----------------------+-----------+---------+----------+-----------+
|   potassium citrate  | Take 10 mEq by mouth |           | 0       |          |           |
| (UROCIT-K) 10 mEq SR |  2 times daily.      |           |         |          |           |
|  tablet              |                      |           |         |          |           |
+----------------------+----------------------+-----------+---------+----------+-----------+
|   predniSONE         | Take 10 mg by mouth  |           | 0       |          |           |
| (DELTASONE) 5 mg     | Daily.               |           |         |          |           |
| tablet               |                      |           |         |          |           |
+----------------------+----------------------+-----------+---------+----------+-----------+
|   tamsulosin         | Take 0.4 mg by mouth |           | 0       | 20 |           |
| (FLOMAX) 0.4 mg CAPS |  2 times daily.      |           |         | 12       |           |
+----------------------+----------------------+-----------+---------+----------+-----------+
|   acyclovir          | Take 400 mg by mouth |           | 0       | 20 |  |
| (ZOVIRAX) 400 MG     |  3 times daily as    |           |         | 12       | 9         |
| tablet               | needed.              |           |         |          |           |
+----------------------+----------------------+-----------+---------+----------+-----------+
|   Cholecalciferol    | Take 2,000 Units by  |           | 0       | 20 |  |
| (VITAMIN D3) 2000    | mouth Daily.         |           |         | 12       | 9         |
| UNITS CAPS           |                      |           |         |          |           |
+----------------------+----------------------+-----------+---------+----------+-----------+
|   cyanocobalamin     | injected             |           | 0       | 20 |  |
| (VITAMIN B-12) 1,000 | intramuscularly once |           |         | 12       | 9         |
|  mcg/mL injection    |  a month             |           |         |          |           |
+----------------------+----------------------+-----------+---------+----------+-----------+
|   diltiazem          | Take 120 mg by mouth |           | 0       |          |  |
| (DILT-XR) 120 mg 24  |  Daily.              |           |         |          | 9         |
| hr capsule           |                      |           |         |          |           |
+----------------------+----------------------+-----------+---------+----------+-----------+
|   loratadine         | Take 10 mg by mouth  |           | 0       |          |  |
| (CLARITIN) 10 mg     | Daily.               |           |         |          | 9         |
| tablet               |                      |           |         |          |           |
+----------------------+----------------------+-----------+---------+----------+-----------+
|   losartan (COZAAR)  | Take 100 mg by mouth |           | 0       |          |  |
| 100 MG tablet        |  Daily. 1/2 daily    |           |         |          | 9         |
+----------------------+----------------------+-----------+---------+----------+-----------+
|   methotrexate 2.5   | Take 15 mg by mouth  |           | 0       |          |  |
| mg tablet            | Once a week.         |           |         |          | 9         |
+----------------------+----------------------+-----------+---------+----------+-----------+
 
|                      | Take 1 tablet by     |           | 0       |          |  |
| oxyCODONE-acetaminop | mouth every 4 hours  |           |         |          | 9         |
| hen (PERCOCET) 5-325 | as needed for Pain.  |           |         |          |           |
|  mg per tablet       |                      |           |         |          |           |
+----------------------+----------------------+-----------+---------+----------+-----------+
|   pseudoePHEDrine    | Take 30 mg by mouth  |           | 0       |          |  |
| (SUDOGEST) 30 mg     | every 4 hours as     |           |         |          | 9         |
| tablet               | needed for           |           |         |          |           |
|                      | Congestion.          |           |         |          |           |
+----------------------+----------------------+-----------+---------+----------+-----------+
|   rivaroxaban        | Take 20 mg by mouth  |           | 0       |          |  |
| (XARELTO) 20 mg      | Daily (with dinner). |           |         |          | 9         |
| tablet               |                      |           |         |          |           |
+----------------------+----------------------+-----------+---------+----------+-----------+
|   sertraline         | 2 tablets daily      |           | 0       | 20 |  |
| (ZOLOFT) 100 mg      |                      |           |         | 12       | 9         |
| tablet               |                      |           |         |          |           |
+----------------------+----------------------+-----------+---------+----------+-----------+
 documented as of this encounter
 
 Plan of Treatment
 
 
+--------+---------+-------------+----------------------+-------------+
| Date   | Type    | Specialty   | Care Team            | Description |
+--------+---------+-------------+----------------------+-------------+
| / | Office  | Pulmonology |   [facility],          |             |
|    | Visit   |             | Jessica Cheung,   |             |
|        |         |             | MD [address] |             |
|        |         |             |   [place],   |             |
|        |         |             | [place] [address]             |             |
|        |         |             | [phone]         |             |
|        |         |             | [phone] (Fax)   |             |
+--------+---------+-------------+----------------------+-------------+
| / | Office  | Cardiology  |   [place], Mershed, |             |
| 2020   | Visit   |             |  MD  [address]   |             |
|        |         |             | [place]  [place]  |             |
|        |         |             | [address]  [phone]  |             |
|        |         |             |  [phone] (Fax)  |             |
+--------+---------+-------------+----------------------+-------------+
 documented as of this encounter
 
 Procedures
 
 
+----------------------+--------+-------------+----------------------+----------------------
+
| Procedure Name       | Priori | Date/Time   | Associated Diagnosis | Comments             
|
|                      | ty     |             |                      |                      
|
+----------------------+--------+-------------+----------------------+----------------------
+
| CT CERVICAL SPINE WO | Routin | 2017  |                      |   Results for this   
|
|  CONTRAST            | e      |  5:05 AM    |                      | procedure are in the 
|
|                      |        | PST         |                      |  results section.    
|
+----------------------+--------+-------------+----------------------+----------------------
 
+
 documented in this encounter
 
 Results
 CT Cervical Spine wo Contrast (2017  5:05 AM PST)
 
+----------+
| Specimen |
+----------+
|          |
+----------+
 
 
 
+------------------------------------------------------------------------+--------------+
| Narrative                                                              | Performed At |
+------------------------------------------------------------------------+--------------+
|   This is a non-reportable procedure without a radiologist report and  |              |
| is  used for image storage only                                        |              |
+------------------------------------------------------------------------+--------------+
 
 
 
+--------------------------------------------------------------------------------------+
| Procedure Note                                                                       |
+--------------------------------------------------------------------------------------+
|   Andrzej Steven Irvin - 2019  4:21 AM PDT  This is a non-reportable procedure  |
| without a radiologist report and isused for image storage only                       |
+--------------------------------------------------------------------------------------+
 documented in this encounter
 
 Visit Diagnoses
 
 
+--------------------------+
| Diagnosis                |
+--------------------------+
|   Pain  Generalized pain |
+--------------------------+
 documented in this encounter

## 2019-12-06 NOTE — XMS
Encounter Summary
  Created on: 2019
 
 Shane Wyatttien BroussardJosue
 External Reference #: [id number]
 : 43
 Sex: Male
 
 Demographics
 
 
+-----------------------+---------------------------+
| Address               | [address]        |
|                       | [place]  [address] |
+-----------------------+---------------------------+
| Home Phone            | [phone]           |
+-----------------------+---------------------------+
| Preferred Language    | Unknown                   |
+-----------------------+---------------------------+
| Marital Status        |                    |
+-----------------------+---------------------------+
| Taoist Affiliation | [id number]                      |
+-----------------------+---------------------------+
| Race                  | Unknown                   |
+-----------------------+---------------------------+
| Ethnic Group          | Unknown                   |
+-----------------------+---------------------------+
 
 
 Author
 
 
+--------------+--------------------------------------------+
| Author       | [facility]  |
|              | and [place]                                |
+--------------+--------------------------------------------+
| Organization | [facility]  |
|              | and [place]                                |
+--------------+--------------------------------------------+
| Address      | Unknown                                    |
+--------------+--------------------------------------------+
| Phone        | Unavailable                                |
+--------------+--------------------------------------------+
 
 
 
 Support
 
 
+---------------+--------------+--------------------+-----------------+
| Name          | Relationship | Address            | Phone           |
+---------------+--------------+--------------------+-----------------+
| Opal Shane | ECON         | [address]     | [phone] |
|               |              | [place]   |                 |
|               |              | [id number]              |                 |
+---------------+--------------+--------------------+-----------------+
 
 
 
 
 Care Team Providers
 
 
+-----------------------+------+-----------------+
| Care Team Member Name | Role | Phone           |
+-----------------------+------+-----------------+
| Erwin Iniguez MD | PCP  | [phone] |
+-----------------------+------+-----------------+
 
 
 
 Reason for Visit
 
 
+--------+-----------------------+
| Reason | Comments              |
+--------+-----------------------+
| Other  | sleep study follow up |
+--------+-----------------------+
 
 
 
 Encounter Details
 
 
+--------+---------+----------------------+---------------------+----------------------+
| Date   | Type    | Department           | Care Team           | Description          |
+--------+---------+----------------------+---------------------+----------------------+
| / | Office  |   [facility] [place]      |   Luis Carlos Perez  | JOANN (obstructive     |
| 2015   | Visit   | SLEEP DISORDER  [address]  | MD [address]   | sleep apnea)         |
|        |         | [address]  [place]      | [address]  [place]    | (Primary Dx);        |
|        |         | [place] [address] | [place] [address]     | Rheumatoid arthritis |
|        |         |   [phone]       | [phone]        |  involving both      |
|        |         |                      | [phone] (Fax)  | hands with positive  |
|        |         |                      |                     | rheumatoid factor    |
|        |         |                      |                     | (HCC)                |
+--------+---------+----------------------+---------------------+----------------------+
 
 
 
 Social History
 
 
+---------------+------------+-----------+--------+------------------+
| Tobacco Use   | Types      | Packs/Day | Years  | Date             |
|               |            |           | Used   |                  |
+---------------+------------+-----------+--------+------------------+
| Former Smoker | Cigarettes | 1.3       | 34     | Quit: 1996 |
+---------------+------------+-----------+--------+------------------+
 
 
 
+---------------------+---+---+---+
| Smokeless Tobacco:  |   |   |   |
| Never Used          |   |   |   |
+---------------------+---+---+---+
 
 
 
 
+-------------+-------------+---------+----------+
| Alcohol Use | Drinks/Week | oz/Week | Comments |
+-------------+-------------+---------+----------+
| No          |             |         |          |
+-------------+-------------+---------+----------+
 
 
 
+------------------+---------------+
| Sex Assigned at  | Date Recorded |
| Birth            |               |
+------------------+---------------+
| Not on file      |               |
+------------------+---------------+
 
 
 
+----------------+-------------+-------------+
| Job Start Date | Occupation  | Industry    |
+----------------+-------------+-------------+
| Not on file    | Not on file | Not on file |
+----------------+-------------+-------------+
 
 
 
+----------------+--------------+------------+
| Travel History | Travel Start | Travel End |
+----------------+--------------+------------+
 
 
 
+-------------------------------------+
| No recent travel history available. |
+-------------------------------------+
 documented as of this encounter
 
 Last Filed Vital Signs
 
 
+-------------------+----------------------+----------------------+----------+
| Vital Sign        | Reading              | Time Taken           | Comments |
+-------------------+----------------------+----------------------+----------+
| Blood Pressure    | 130/78               | 2015  8:51 AM  |          |
|                   |                      | PST                  |          |
+-------------------+----------------------+----------------------+----------+
| Pulse             | 88                   | 2015  8:51 AM  |          |
|                   |                      | PST                  |          |
+-------------------+----------------------+----------------------+----------+
| Temperature       | -                    | -                    |          |
+-------------------+----------------------+----------------------+----------+
| Respiratory Rate  | 16                   | 2015  8:51 AM  |          |
|                   |                      | PST                  |          |
+-------------------+----------------------+----------------------+----------+
| Oxygen Saturation | 95%                  | 2015  8:51 AM  |          |
|                   |                      | PST                  |          |
+-------------------+----------------------+----------------------+----------+
| Inhaled Oxygen    | -                    | -                    |          |
| Concentration     |                      |                      |          |
+-------------------+----------------------+----------------------+----------+
| Weight            | 81.6 kg (179 lb 14.4 | 2015  8:51 AM  |          |
 
|                   |  oz)                 | PST                  |          |
+-------------------+----------------------+----------------------+----------+
| Height            | -                    | -                    |          |
+-------------------+----------------------+----------------------+----------+
| Body Mass Index   | 27.35                | 2014 10:00 AM  |          |
|                   |                      | PDT                  |          |
+-------------------+----------------------+----------------------+----------+
 documented in this encounter
 
 Progress Notes
 Luis Carlos Perez Jr., MD - 2015  9:01 AM PSTThe patient comes in for follow-up after 
undergoing diagnostic polysomnography. My interpretation of the patient's sleep study, which
 I have reviewed with the patient, is as follows:
 
 Polysomnogram Report on Wyatt Shane performed on 10/19/2015
 
 Clinical Information: Wyatt Shane is a 69 y.o. male who underwent diagnostic nocturna
l polysomnography on 10/19/2015 on referral from Dr. Iniguez because of JOANN and CSA. PSG perf
ormed on 10/7/2013 demonstrated an AHI of 77.4 (combination of Central Sleep Apnea secondary
 to Cheyne-Nichole Respiration and JOANN). This was treated with ASV-BPAP. The patient has a hi
story of heart failure and atrial fibrillation. Because of this, PSG off of ASV-BPAP was rep
eated on 6/15/2015 which demonstrated an AHI of 50.3 (combination once again of JOANN and CSA-
). Since then he has undergone nasal surgery. The current study is thus done.
 
 Technical Information: Please see technical data stored as Polysomnography under "Media" se
ction in the EPIC EMR.
 
 Definitions (The AASM Manual for the Scoring of Sleep and Associated Events, Version 2.0; 2
012):
 Apnea: There is a drop in the peak signal excursion by 90% or greater of pre-event baseline
 using an oronasal thermal sensor (diagnostic study), PAP device flow (titration study), or 
an alternative apnea sensor (diagnostic study); the duration of the 90% or greater drop in s
ensor signal is 10 seconds or longer.
 Obstructive Apnea: Event associated with continued or increased inspiratory effort througho
ut the entire period of absent airflow.
 Central Apnea: Event associated with absent inspiratory effort throughout the entire period
 of absent airflow.
 Mixed Apnea: Event associated with absent inspiratory effort in the initial portion of the 
event followed by resumption of inspiratory effort during the second portion of the event.
 Hypopnea: Nasal pressure excursion drop by 30% or more from baseline, lasting at lease 10 s
econds and 90% of the event's duration meets this amplitude criteria. This is associated wit
h a 4% or greater desaturation from pre-baseline.
 Respiratory Event Related Arousal: A sequence of breaths lasting 10 seconds or longer kendall
cterized by increasing respiratory effort or by flattening of the inspiratory portion of the
 nasal pressure (diagnostic study) or PAP device flow (titration study) waveform leading to 
arousal from sleep when the sequence of breaths does not meet criteria for an apnea or hypop
octaviano.
 
 Sleep Architecture:  Lights out was recorded at 2204 hundred hours on 10/19/2015 and lights
 on was recorded at 0728 hundred hours on 10/20/2015. The latency to sleep onset was normal 
at 6 minutes. The patient slept for 484.5 minutes out of 562 minutes of study time resulting
 an a sleep efficiency that was near normal at 86.2 %. The amount of N1 sleep was normal occ
upying 3.5% of the Total Sleep Time; the amount of N2 sleep was high normal for age occupyin
g 80.3% of the Total Sleep Time; the amount of N3 sleep was low occupying 0% of the Total Sl
eep Time; the amount of REM sleep was normal occupying 16.2% of the Total Sleep Time and the
 latency to REM sleep was mildly prolonged at 194.5 minutes.
 
 Sleep in the following positions was recorded: left lateral decubitus 0%, right lateral dec
ubitus 99.5%, supine 0.2%, prone 0%. He was unable to sleep supine.
 
 
 Sleep was minimally fragmented; the Arousal Index was 14.2.
 
 The patient reported this to be a usual night's sleep.
 
 Cardiopulmonary Monitoring: The heart rate averaged high-70's beats per minute. Moderate ra
te variability was noted. The rhythm was atrial fibrillation.
 
 In the course of the evening there were 1 obstructive apneas, 0 mixed apneas, 1 central apn
eas, 80 hypopneas, and 0 Respiratory Effort Related Arousals (RERA's). The Respiratory Distu
rbance Index (RDI) was mildly elevated at 10.2; the Apnea-Hypopnea Index was mildly elevated
 at 10.2; the Apnea Index (AI) was normal at 0.2. The respiratory events were not sleep stag
e dependent. The respiratory events were positional. They were much more frequently seen in 
the supine position, but only 1 minute was spent in supine sleep. 
 
 The respiratory events occasioned mild sleep fragmentation; the Respiratory Arousal Index w
as 5.7.
 
 The miguel oxygen saturation was 86% and the patient spent 3.5 minutes with an oxygen satura
tion of less than 88%.
 
 ETCO2 was normal.
 
 Limb Movement Monitoring: There were 162 Periodic Limb Movements (PLMS Index of 20.1) of wh
ich 18 were associated with arousals; the PLMS Arousal Index was normal at 2.2.
 
 Interpretation: This polysomnogram is abnormal secondary to:
 
 Very mild obstructive sleep apnea is diagnosed and is associated with mild sleep fragmentat
ion and very mild oxygen desaturation.
 Very little supine sleep was recorded.
 Periodic Limb Movements of Sleep are present but they do not significantly fragment sleep.
 There was only one central apnea seen in this study.
 
 Suggestions:
 1. The principles of sleep hygiene should be reviewed with the patient.
 2. Consideration should be given to treatment of mild obstructive sleep apnea.
 
 /78 mmHg | Pulse 88 | Resp 16 | Wt 81.602 kg (179 lb 14.4 oz) | SpO2 95%
 
 A: Complex Sleep Apnea: This seems to have nearly completely resolved. There are several po
ssibilities accounting for the improvement: nasal/sinus surgery has helped significantly, pe
rhaps his cardiac status has improved, and he states he is taking fewer opioids for pain. I 
suspect the improvement is a combination of surgery and fewer opioids. His apnea currently i
s mild and probably doesn't need specific treatment. In view of the issues we've had with ROWENA ALVES, I'm going to suggest no PAP.
      RA: He is contemplating a new therapy for RA (biologic infusion). I've told him that I
 no longer keep up in that area but that if he is concerned, he might want to ask Dr. Iniguez
 for a referral to [facility] for a second opinion.
 
 P: No PAP for now - should he or his wife become concerned that his sleep is deteriorating 
or that he is snoring loudly or that he is having more apneas, then they will call and I'll 
see him to consider restarting PAP.
      He will discuss with Dr. Iniguez not using opioids for pain at night and possibly reall
y limiting or eliminating the use of opioids altogether.
      He will discuss with Dr. Iniguez the possibility of second opinion on his RA.
      F/u thus will be prn.
 
 Today, 15 minutes was spent face to face with the patient; the majority of time was spent c
joon regarding JOANN and RA.
 
 
 Today, 15 minutes was spent face to face with the patient; the majority of time was spent c
joon regarding sleep issues.
 Electronically signed by Luis Carlos Perez Jr., MD at 2015 10:13 AM PSTdocumented in th
is encounter
 
 Plan of Treatment
 
 
+--------+---------+-------------+----------------------+-------------+
| Date   | Type    | Specialty   | Care Team            | Description |
+--------+---------+-------------+----------------------+-------------+
| / | Office  | Pulmonology |   Juan F,          |             |
|    | Visit   |             | Jessica Cheung,   |             |
|        |         |             | MD [address] |             |
|        |         |             |   [place]   |             |
|        |         |             | [place] [address]             |             |
|        |         |             | [phone]         |             |
|        |         |             | [phone] (Fax)   |             |
+--------+---------+-------------+----------------------+-------------+
| / | Office  | Cardiology  |   Eric Akins, |             |
|    | Visit   |             |  MD [address]   |             |
|        |         |             | [place]  |             |
|        |         |             | [address]  [phone]  |             |
|        |         |             |  [phone] (Fax)  |             |
+--------+---------+-------------+----------------------+-------------+
 documented as of this encounter
 
 Visit Diagnoses
 
 
+----------------------------------------------------------------------------------------+
| Diagnosis                                                                              |
+----------------------------------------------------------------------------------------+
|   JOANN (obstructive sleep apnea) - Primary  Obstructive sleep apnea (adult) (pediatric) |
+----------------------------------------------------------------------------------------+
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
+----------------------------------------------------------------------------------------+
 documented in this encounter

## 2019-12-06 NOTE — XMS
Encounter Summary
  Created on: 2019
 
 Shane Wyatttien BroussardJosue
 External Reference #: [id number]
 : 43
 Sex: Male
 
 Demographics
 
 
+-----------------------+---------------------------+
| Address               | [address]        |
|                       | [place]  [address] |
+-----------------------+---------------------------+
| Home Phone            | [phone]           |
+-----------------------+---------------------------+
| Preferred Language    | Unknown                   |
+-----------------------+---------------------------+
| Marital Status        |                    |
+-----------------------+---------------------------+
| Mandaen Affiliation | 1028                      |
+-----------------------+---------------------------+
| Race                  | Unknown                   |
+-----------------------+---------------------------+
| Ethnic Group          | Unknown                   |
+-----------------------+---------------------------+
 
 
 Author
 
 
+--------------+--------------------------------------------+
| Author       | [facility]  |
|              | [facility] [place]                                |
+--------------+--------------------------------------------+
| Organization | [facility]  |
|              | [facility] [place]                                |
+--------------+--------------------------------------------+
| Address      | Unknown                                    |
+--------------+--------------------------------------------+
| Phone        | Unavailable                                |
+--------------+--------------------------------------------+
 
 
 
 Support
 
 
+---------------+--------------+--------------------+-----------------+
| Name          | Relationship | Address            | Phone           |
+---------------+--------------+--------------------+-----------------+
| Opal Shane | ECON         | [address]     | [phone] |
|               |              | [place]   |                 |
|               |              | [address]              |                 |
+---------------+--------------+--------------------+-----------------+
 
 
 
 
 Care Team Providers
 
 
+-----------------------+------+-----------------+
| Care Team Member Name | Role | Phone           |
+-----------------------+------+-----------------+
| Erwin Iniguez MD | PCP  | [phone] |
+-----------------------+------+-----------------+
 
 
 
 Encounter Details
 
 
+--------+-----------+----------------------+----------------------+----------------------+
| Date   | Type      | Department           | Care Team            | Description          |
+--------+-----------+----------------------+----------------------+----------------------+
| / | Hospital  |   [facility]     |   Conversion         | Multifocal lung      |
| 2019   | Encounter | [facility] [address]  | Transaction,         | consolidation (HCC); |
|        |           | [address]  | Provider Unknown     |  Incidental          |
|        |           |  [place], [place]        | [phone]         | pulmonary nodule, >  |
|        |           | [address]           | [phone] (Fax)   | 3mm and < 8mm        |
|        |           | [phone]         | Jessica Rogel  |                      |
|        |           |                      | MD [place]  [address]    |                      |
|        |           |                      | [address]   |                      |
|        |           |                      | [place] [address]   |                      |
|        |           |                      | [phone]         |                      |
|        |           |                      | [phone] (Fax)   |                      |
+--------+-----------+----------------------+----------------------+----------------------+
 
 
 
 Social History
 
 
+---------------+------------+-----------+--------+------------------+
| Tobacco Use   | Types      | Packs/Day | Years  | Date             |
|               |            |           | Used   |                  |
+---------------+------------+-----------+--------+------------------+
| Former Smoker | Cigarettes | 1         | 35     | Quit: 1996 |
+---------------+------------+-----------+--------+------------------+
 
 
 
+---------------------+---+---+---+
| Smokeless Tobacco:  |   |   |   |
| Never Used          |   |   |   |
+---------------------+---+---+---+
 
 
 
+-------------+-------------+---------+----------+
| Alcohol Use | Drinks/Week | oz/Week | Comments |
+-------------+-------------+---------+----------+
| No          |             |         |          |
+-------------+-------------+---------+----------+
 
 
 
 
+------------------+---------------+
| Sex Assigned at  | Date Recorded |
| Birth            |               |
+------------------+---------------+
| Not on file      |               |
+------------------+---------------+
 
 
 
+----------------+-------------+-------------+
| Job Start Date | Occupation  | Industry    |
+----------------+-------------+-------------+
| Not on file    | Not on file | Not on file |
+----------------+-------------+-------------+
 
 
 
+----------------+--------------+------------+
| Travel History | Travel Start | Travel End |
+----------------+--------------+------------+
 
 
 
+-------------------------------------+
| No recent travel history available. |
+-------------------------------------+
 documented as of this encounter
 
 Medications at Time of Discharge
 
 
+----------------------+----------------------+-----------+---------+----------+-----------+
| Medication           | Sig                  | Dispensed | Refills | Start    | End Date  |
|                      |                      |           |         | Date     |           |
+----------------------+----------------------+-----------+---------+----------+-----------+
|   acyclovir          | Apply  topically     |           | 0       |          |           |
| (ZOVIRAX) 5%         | every 3 hours.       |           |         |          |           |
| ointment             |                      |           |         |          |           |
+----------------------+----------------------+-----------+---------+----------+-----------+
|   albuterol (PROAIR  | Inhale 2 puffs into  |           | 0       |          |           |
| HFA) 90 mcg/puff     | the lungs every 6    |           |         |          |           |
| inhaler              | hours as needed for  |           |         |          |           |
|                      | Wheezing.            |           |         |          |           |
+----------------------+----------------------+-----------+---------+----------+-----------+
|                      | Take 3 mLs by        |           | 0       | 20 |           |
| albuterol-ipratropiu | nebulization every 6 |           |         | 18       |           |
| m 2.5-0.5 mg/3 mL    |  (six) hours as      |           |         |          |           |
| SOLN                 | needed.              |           |         |          |           |
+----------------------+----------------------+-----------+---------+----------+-----------+
|   digoxin (LANOXIN)  | Take 125 mcg by      |           | 0       |          |           |
| 250 mcg tablet       | mouth Daily. 1/2     |           |         |          |           |
|                      | capsule daily        |           |         |          |           |
+----------------------+----------------------+-----------+---------+----------+-----------+
|   ferrous sulfate    | Take 325 mg by mouth |           | 0       | 20 |           |
| 325 mg tablet        |  3 times daily.      |           |         | 12       |           |
+----------------------+----------------------+-----------+---------+----------+-----------+
|   fluticasone        | one spray in each    |           | 0       | 20 |           |
| (FLONASE) 50         | nostril daily as     |           |         | 12       |           |
| mcg/nasal spray      | needed               |           |         |          |           |
+----------------------+----------------------+-----------+---------+----------+-----------+
 
|                      | Inhale 1 puff into   |           | 0       |          |           |
| fluticasone-salmeter | the lungs Twice      |           |         |          |           |
| ol (ADVAIR) 500-50   | Daily.               |           |         |          |           |
| mcg/puff diskus      |                      |           |         |          |           |
| inhaler              |                      |           |         |          |           |
+----------------------+----------------------+-----------+---------+----------+-----------+
|   folic acid 1 mg    | Take 1 mg by mouth   |           | 0       |          |           |
| tablet               | Daily.               |           |         |          |           |
+----------------------+----------------------+-----------+---------+----------+-----------+
|   furosemide (LASIX) | Take 40 mg by mouth  |           | 0       |          |           |
|  40 mg tablet        | Daily.               |           |         |          |           |
+----------------------+----------------------+-----------+---------+----------+-----------+
|   guaiFENesin        | Take 1,200 mg by     |           | 0       |          |           |
| (MUCINEX) 600 mg 12  | mouth 2 times daily. |           |         |          |           |
| hr tablet            |                      |           |         |          |           |
+----------------------+----------------------+-----------+---------+----------+-----------+
|   levothyroxine      | Take 75 mcg by mouth |           | 0       | 20 |           |
| (LEVOTHROID) 75 MCG  |  Daily.              |           |         | 12       |           |
| tablet               |                      |           |         |          |           |
+----------------------+----------------------+-----------+---------+----------+-----------+
|   metoclopramide     | Take 10 mg by mouth  |           | 0       | 20 |           |
| (REGLAN) 10 mg       | 4 times daily as     |           |         | 12       |           |
| tablet               | needed.              |           |         |          |           |
+----------------------+----------------------+-----------+---------+----------+-----------+
|                      | Apply  topically 2   |           | 0       |          |           |
| nystatin-triamcinolo | times daily.         |           |         |          |           |
| ne (MYCOLOG II)      |                      |           |         |          |           |
| cream                |                      |           |         |          |           |
+----------------------+----------------------+-----------+---------+----------+-----------+
|   ofloxacin          |                      |           | 0       | 20 |           |
| (OCUFLOX) 0.3%       |                      |           |         | 18       |           |
| ophthalmic solution  |                      |           |         |          |           |
+----------------------+----------------------+-----------+---------+----------+-----------+
|   omeprazole         | Take 20 mg by mouth  |           | 0       | 20 |           |
| (PRILOSEC) 20 mg     | 2 times daily.       |           |         | 12       |           |
| capsule              |                      |           |         |          |           |
+----------------------+----------------------+-----------+---------+----------+-----------+
|   potassium citrate  | Take 10 mEq by mouth |           | 0       |          |           |
| (UROCIT-K) 10 mEq SR |  2 times daily.      |           |         |          |           |
|  tablet              |                      |           |         |          |           |
+----------------------+----------------------+-----------+---------+----------+-----------+
|   predniSONE         | Take 10 mg by mouth  |           | 0       |          |           |
| (DELTASONE) 5 mg     | Daily.               |           |         |          |           |
| tablet               |                      |           |         |          |           |
+----------------------+----------------------+-----------+---------+----------+-----------+
|   tamsulosin         | Take 0.4 mg by mouth |           | 0       | 20 |           |
| (FLOMAX) 0.4 mg CAPS |  2 times daily.      |           |         | 12       |           |
+----------------------+----------------------+-----------+---------+----------+-----------+
|   acyclovir          | Take 400 mg by mouth |           | 0       | 20 |  |
| (ZOVIRAX) 400 MG     |  3 times daily as    |           |         | 12       | 9         |
| tablet               | needed.              |           |         |          |           |
+----------------------+----------------------+-----------+---------+----------+-----------+
|   Cholecalciferol    | Take 2,000 Units by  |           | 0       | 20 |  |
| (VITAMIN D3) 2000    | mouth Daily.         |           |         | 12       | 9         |
| UNITS CAPS           |                      |           |         |          |           |
+----------------------+----------------------+-----------+---------+----------+-----------+
|   cyanocobalamin     | injected             |           | 0       | 20 |  |
| (VITAMIN B-12) 1,000 | intramuscularly once |           |         | 12       | 9         |
|  mcg/mL injection    |  a month             |           |         |          |           |
+----------------------+----------------------+-----------+---------+----------+-----------+
 
|   diltiazem          | Take 120 mg by mouth |           | 0       |          |  |
| (DILT-XR) 120 mg 24  |  Daily.              |           |         |          | 9         |
| hr capsule           |                      |           |         |          |           |
+----------------------+----------------------+-----------+---------+----------+-----------+
|   loratadine         | Take 10 mg by mouth  |           | 0       |          |  |
| (CLARITIN) 10 mg     | Daily.               |           |         |          | 9         |
| tablet               |                      |           |         |          |           |
+----------------------+----------------------+-----------+---------+----------+-----------+
|   losartan (COZAAR)  | Take 100 mg by mouth |           | 0       |          |  |
| 100 MG tablet        |  Daily. 1/2 daily    |           |         |          | 9         |
+----------------------+----------------------+-----------+---------+----------+-----------+
|   methotrexate 2.5   | Take 15 mg by mouth  |           | 0       |          |  |
| mg tablet            | Once a week.         |           |         |          | 9         |
+----------------------+----------------------+-----------+---------+----------+-----------+
|                      | Take 1 tablet by     |           | 0       |          |  |
| oxyCODONE-acetaminop | mouth every 4 hours  |           |         |          | 9         |
| hen (PERCOCET) 5-325 | as needed for Pain.  |           |         |          |           |
|  mg per tablet       |                      |           |         |          |           |
+----------------------+----------------------+-----------+---------+----------+-----------+
|   predniSONE         | Take 4 tablets by    |           | 0       | 20 |  |
| (DELTASONE) 2.5 MG   | mouth daily. Takes   |           |         | 18       | 9         |
| tablet               | 7.5 mg daily         |           |         |          |           |
+----------------------+----------------------+-----------+---------+----------+-----------+
|   pseudoePHEDrine    | Take 30 mg by mouth  |           | 0       |          |  |
| (SUDOGEST) 30 mg     | every 4 hours as     |           |         |          | 9         |
| tablet               | needed for           |           |         |          |           |
|                      | Congestion.          |           |         |          |           |
+----------------------+----------------------+-----------+---------+----------+-----------+
|   rivaroxaban        | Take 1 tablet by     |           | 0       | 20 | 10/16/201 |
| (XARELTO) 10 mg      | mouth daily.         |           |         | 19       | 9         |
| tablet               |                      |           |         |          |           |
+----------------------+----------------------+-----------+---------+----------+-----------+
|   rivaroxaban        | Take 20 mg by mouth  |           | 0       |          |  |
| (XARELTO) 20 mg      | Daily (with dinner). |           |         |          | 9         |
| tablet               |                      |           |         |          |           |
+----------------------+----------------------+-----------+---------+----------+-----------+
|   sertraline         | 2 tablets daily      |           | 0       | 20 |  |
| (ZOLOFT) 100 mg      |                      |           |         | 12       | 9         |
| tablet               |                      |           |         |          |           |
+----------------------+----------------------+-----------+---------+----------+-----------+
 documented as of this encounter
 
 Plan of Treatment
 
 
+--------+---------+-------------+----------------------+-------------+
| Date   | Type    | Specialty   | Care Team            | Description |
+--------+---------+-------------+----------------------+-------------+
| / | Office  | Pulmonology |   Juan F,          |             |
|    | Visit   |             | Jessica Rodrigezse,   |             |
|        |         |             | MD  [address]  |             |
|        |         |             |   [place],   |             |
|        |         |             | [place] [address]             |             |
|        |         |             | [phone]         |             |
|        |         |             | [phone] (Fax)   |             |
+--------+---------+-------------+----------------------+-------------+
| / | Office  | Cardiology  |   Eric Akins, |             |
|    | Visit   |             |  MD  [address]   |             |
|        |         |             | [place]  |             |
|        |         |             | [address]  [phone]  |             |
 
|        |         |             |  [phone] (Fax)  |             |
+--------+---------+-------------+----------------------+-------------+
 documented as of this encounter
 
 Procedures
 
 
+----------------------+--------+-------------+----------------------+----------------------
+
| Procedure Name       | Priori | Date/Time   | Associated Diagnosis | Comments             
|
|                      | ty     |             |                      |                      
|
+----------------------+--------+-------------+----------------------+----------------------
+
| CT CHEST WO CONTRAST | Routin | 2019  |                      |   Results for this   
|
|                      | e      | 10:27 AM    |                      | procedure are in the 
|
|                      |        | PDT         |                      |  results section.    
|
+----------------------+--------+-------------+----------------------+----------------------
+
 documented in this encounter
 
 Results
 CT Chest wo Contrast (2019 10:27 AM PDT)
 
+----------+
| Specimen |
+----------+
|          |
+----------+
 
 
 
+------------------------------------------------------------------------+--------------+
| Impressions                                                            | Performed At |
+------------------------------------------------------------------------+--------------+
|   1. Stable right middle lobe nodule, as above.   This document        |              |
| stability  for 6 months.   Six-month follow-up chest CT recommended.   |              |
| 2. Other chronic findings, as above.  Signed by: Lee Esparza  Sign  |              |
| Date/Time: 2019 2:39 PM                                          |              |
+------------------------------------------------------------------------+--------------+
 
 
 
+------------------------------------------------------------------------+--------------+
| Narrative                                                              | Performed At |
+------------------------------------------------------------------------+--------------+
|   CT CHEST WITHOUT CONTRAST  CLINICAL INFORMATION:  Pulmonary nodule   |              |
| in the right middle lobe L in a patient who has high  risk for lung    |              |
| cancer.   Please are no comparison  COMPARISON:  CT CHEST WO CONTRAST  |              |
| (2018); CT CHEST WO CONTRAST (2018);  PROCEDURE:  Axial       |              |
| images through the chest. Multiplanar reconstructions.  At least one   |              |
| of the following CT dose optimization techniques were  used: Automated |              |
|  exposure control; Adjustment of mA and/or kV according  to patient    |              |
| size; Use of iterative reconstruction technique.  FINDINGS:  Lungs,    |              |
| Pleura and Airways: A right middle lobe parenchymal lung nodule  is    |              |
| seen measuring 13 x 6 mm, on image 90, series 3, previously and        |              |
 
| retrospectively measured at 13 x 5 mm, relatively unchanged.           |              |
| Centrilobular emphysematous disease is seen, somewhat diffuse greatest |              |
|   in the upper lobes, moderate in degree.  Mediastinum: Moderate       |              |
| cardiac enlargement.   Moderate to severe coronary  artery             |              |
| calcifications, with median sternotomy wires.   No thoracic  aortic    |              |
| aneurysm, with moderate thoracic aortic calcification and  proximal    |              |
| great branch vessel calcification.   Mild pulmonary arterial  trunk    |              |
| dilatation to 33 mm, suggesting underlying pulmonary arterial          |              |
| hypertension.  Lymph Nodes: No adenopathy.  Upper Abdomen: A small     |              |
| hiatal hernia is seen.  BODY WALL  Soft Tissues: The soft tissues of   |              |
| the chest wall are unremarkable.  Bones: No acute fracture or          |              |
| vertebral end plate destruction. No lytic  or blastic lesion.          |              |
+------------------------------------------------------------------------+--------------+
 
 
 
+----------------------------------------------------------------------------+
| Procedure Note                                                             |
+----------------------------------------------------------------------------+
|   Maurizio, Rad Conversion - 2019  4:22 PM PDT  CT CHEST WITHOUT CONTRAST |
| CLINICAL INFORMATION:                                                      |
| Pulmonary nodule in the right middle lobe L in a patient who has high      |
| risk for lung cancer.  Please are no comparison                            |
| COMPARISON:                                                                |
| CT CHEST WO CONTRAST (2018); CT CHEST WO CONTRAST (2018);         |
| PROCEDURE:                                                                 |
| Axial images through the chest. Multiplanar reconstructions.               |
| At least one of the following CT dose optimization techniques were         |
| used: Automated exposure control; Adjustment of mA and/or kV according     |
| to patient size; Use of iterative reconstruction technique.                |
| FINDINGS:                                                                  |
| Lungs, Pleura and Airways: A right middle lobe parenchymal lung nodule     |
| is seen measuring 13 x 6 mm, on image 89, series 3, previously and         |
| retrospectively measured at 13 x 5 mm, relatively unchanged.               |
| Centrilobular emphysematous disease is seen, somewhat diffuse greatest     |
| in the upper lobes, moderate in degree.                                    |
| Mediastinum: Moderate cardiac enlargement.  Moderate to severe coronary    |
| artery calcifications, with median sternotomy wires.  No thoracic          |
| aortic aneurysm, with moderate thoracic aortic calcification and           |
| proximal great branch vessel calcification.  Mild pulmonary arterial       |
| trunk dilatation to 33 mm, suggesting underlying pulmonary arterial        |
| hypertension.                                                              |
| Lymph Nodes: No adenopathy.                                                |
| Upper Abdomen: A small hiatal hernia is seen.                              |
| BODY WALL                                                                  |
| Soft Tissues: The soft tissues of the chest wall are unremarkable.         |
| Bones: No acute fracture or vertebral end plate destruction. No lytic      |
| or blastic lesion.                                                         |
| IMPRESSION:                                                                |
| 1. Stable right middle lobe nodule, as above.  This document stability     |
| for 6 months.  Six-month follow-up chest CT recommended.                   |
| 2. Other chronic findings, as above.                                       |
| Signed by: Lee Esparza                                                  |
| Sign Date/Time: 2019 2:39 PM                                         |
+----------------------------------------------------------------------------+
 documented in this encounter
 
 Visit Diagnoses
 
 
 
+------------------------------------------------------------------------------------------+
| Diagnosis                                                                                |
+------------------------------------------------------------------------------------------+
|   Multifocal lung consolidation (HCC)  Pneumococcal pneumonia (streptococcus pneumoniae  |
| pneumonia)                                                                               |
+------------------------------------------------------------------------------------------+
|   Incidental pulmonary nodule, > 3mm and < 8mm  Solitary pulmonary nodule                |
+------------------------------------------------------------------------------------------+
 documented in this encounter

## 2019-12-06 NOTE — XMS
Encounter Summary
  Created on: 2019
 
 Shane Wyatttien BroussardJosue
 External Reference #: [id number]
 : 43
 Sex: Male
 
 Demographics
 
 
+-----------------------+---------------------------+
| Address               | [address]        |
|                       | [place]  [address] |
+-----------------------+---------------------------+
| Home Phone            | [phone]           |
+-----------------------+---------------------------+
| Preferred Language    | Unknown                   |
+-----------------------+---------------------------+
| Marital Status        |                    |
+-----------------------+---------------------------+
| Yazidism Affiliation | [id number]                      |
+-----------------------+---------------------------+
| Race                  | Unknown                   |
+-----------------------+---------------------------+
| Ethnic Group          | Unknown                   |
+-----------------------+---------------------------+
 
 
 Author
 
 
+--------------+--------------------------------------------+
| Author       | [facility]  |
|              | [facility] [place]                                |
+--------------+--------------------------------------------+
| Organization | [facility]  |
|              | [facility] [place]                                |
+--------------+--------------------------------------------+
| Address      | Unknown                                    |
+--------------+--------------------------------------------+
| Phone        | Unavailable                                |
+--------------+--------------------------------------------+
 
 
 
 Support
 
 
+---------------+--------------+--------------------+-----------------+
| Name          | Relationship | Address            | Phone           |
+---------------+--------------+--------------------+-----------------+
| Opal Shane | ECON         | [address]     | [phone] |
|               |              | [place]   |                 |
|               |              | [address]              |                 |
+---------------+--------------+--------------------+-----------------+
 
 
 
 
 Care Team Providers
 
 
+------------------------+------+-----------------+
| Care Team Member Name  | Role | Phone           |
+------------------------+------+-----------------+
| Calvin Robertson MD | PCP  | [phone] |
+------------------------+------+-----------------+
 
 
 
 Reason for Referral
 Diagnostic/Screening (Routine)
 
+--------+--------+-----------+--------------+--------------+--------------+
| Status | Reason | Specialty | Diagnoses /  | Referred By  | Referred To  |
|        |        |           | Procedures   | Contact      | Contact      |
+--------+--------+-----------+--------------+--------------+--------------+
| Closed |        | Radiology |   Diagnoses  |   Steve Si,  |              |
|        |        |           |  Carotid     | DNP  [address]    |              |
|        |        |           | stenosis,    | [address]  |              |
|        |        |           | bilateral    |  [place] [address]       |              |
|        |        |           | Procedures   | [place] |              |
|        |        |           | VAS Carotid  |  [id number]       |              |
|        |        |           | Duplex       | Phone:       |              |
|        |        |           | Bilateral    | [phone] |              |
|        |        |           |              |   Fax:       |              |
|        |        |           |              | [phone] |              |
+--------+--------+-----------+--------------+--------------+--------------+
 
 
 Diagnostic/Screening (Routine)
 
+--------+--------+-----------+--------------+--------------+--------------+
| Status | Reason | Specialty | Diagnoses /  | Referred By  | Referred To  |
|        |        |           | Procedures   | Contact      | Contact      |
+--------+--------+-----------+--------------+--------------+--------------+
| Closed |        | Radiology |   Diagnoses  |   Neris Wilson,  |              |
|        |        |           |  Abdominal   | DNP  [address]    |              |
|        |        |           | aortic       | [address]  |              |
|        |        |           | aneurysm     |  [address]       |              |
|        |        |           | (AAA)        | [place] |              |
|        |        |           | without      |  [id number]       |              |
|        |        |           | rupture      | Phone:       |              |
|        |        |           | (HCC)        | [phone] |              |
|        |        |           | Procedures   |   Fax:       |              |
|        |        |           | VAS Aorta    | [phone] |              |
|        |        |           | Iliac Duplex |              |              |
|        |        |           |  Complete    |              |              |
+--------+--------+-----------+--------------+--------------+--------------+
 
 
 
 
 Reason for Visit
 
 
+-----------+----------+
| Reason    | Comments |
 
+-----------+----------+
| Follow-up |          |
+-----------+----------+
 
 
 
 Encounter Details
 
 
+--------+-----------+----------------------+----------------------+-------------+
| Date   | Type      | Department           | Care Team            | Description |
+--------+-----------+----------------------+----------------------+-------------+
| / | Telephone |   [facility]      |   Neris Wilosn DNP      | Follow-up   |
| 2019   |           | VASCULAR SURGERY     | [address]     |             |
|        |           | [address] | [place] [facility] [place]  |             |
|        |           |  E  [place]     | [address]  [phone]  |             |
|        |           | [address]           |  [phone] (Fax)  |             |
|        |           | [phone]         |                      |             |
+--------+-----------+----------------------+----------------------+-------------+
 
 
 
 Social History
 
 
+---------------+------------+-----------+--------+------------------+
| Tobacco Use   | Types      | Packs/Day | Years  | Date             |
|               |            |           | Used   |                  |
+---------------+------------+-----------+--------+------------------+
| Former Smoker | Cigarettes | 1         | 35     | Quit: 1996 |
+---------------+------------+-----------+--------+------------------+
 
 
 
+---------------------+---+---+---+
| Smokeless Tobacco:  |   |   |   |
| Never Used          |   |   |   |
+---------------------+---+---+---+
 
 
 
+-------------+-------------+---------+----------+
| Alcohol Use | Drinks/Week | oz/Week | Comments |
+-------------+-------------+---------+----------+
| No          |             |         |          |
+-------------+-------------+---------+----------+
 
 
 
+------------------+---------------+
| Sex Assigned at  | Date Recorded |
| Birth            |               |
+------------------+---------------+
| Not on file      |               |
+------------------+---------------+
 
 
 
+----------------+-------------+-------------+
| Job Start Date | Occupation  | Industry    |
 
+----------------+-------------+-------------+
| Not on file    | Not on file | Not on file |
+----------------+-------------+-------------+
 
 
 
+----------------+--------------+------------+
| Travel History | Travel Start | Travel End |
+----------------+--------------+------------+
 
 
 
+-------------------------------------+
| No recent travel history available. |
+-------------------------------------+
 documented as of this encounter
 
 Plan of Treatment
 
 
+--------+---------+-------------+----------------------+-------------+
| Date   | Type    | Specialty   | Care Team            | Description |
+--------+---------+-------------+----------------------+-------------+
| / | Office  | Pulmonology |   Juan F,          |             |
|    | Visit   |             | Jessica Cheung,   |             |
|        |         |             | MD [address] |             |
|        |         |             |   [place]   |             |
|        |         |             | [place] [address]             |             |
|        |         |             | [phone]         |             |
|        |         |             | [phone] (Fax)   |             |
+--------+---------+-------------+----------------------+-------------+
| / | Office  | Cardiology  |   Eric Akins, |             |
|    | Visit   |             |  MD [address]   |             |
|        |         |             | [place]  |             |
|        |         |             | [address]  [phone]  |             |
|        |         |             |  [phone] (Fax)  |             |
+--------+---------+-------------+----------------------+-------------+
 documented as of this encounter
 
 Results
 VAS Carotid Duplex Bilateral (2019 11:15 AM PST)
 
+----------+
| Specimen |
+----------+
|          |
+----------+
 
 
 
+------------------------------------------------------------------------+---------------+
| Impressions                                                            | Performed At  |
+------------------------------------------------------------------------+---------------+
|   Extensive calcific plaque bilaterally without high-grade stenosis,   |   [facility] |
| similar to prior examination     In the left common carotid artery     |               |
| there is dense calcific plaque versus  an intimal flap which is not    |               |
| flow limiting.     The left vertebral artery is not visualized on      |               |
| today's examination.     Internal Carotid Artery Diagnostic Grades     |               |
|  Grade 1: Stenosis = 01-50% / PSV <125 cm/sec / EDV (cm/s)<40 cm/sec   |               |
| (mild plaque)  Grade 2: Stenosis = 01-50% / PSV <125 cm/sec / EDV      |               |
 
| (cm/s)<40 cm/sec  (moderate plaque)  Grade 3: Stenosis = 50-69% / PSV  |               |
| >125 cm/sec / EDV (cm/s)>40 cm/sec  Grade 4: Stenosis = 70-95% / PSV   |               |
| >230 cm/sec / EDV (cm/s)>100 cm/sec  Grade 5: Stenosis = 90-95% / PSV  |               |
| <125 cm/sec / EDV (cm/s)<40 cm/sec  Grade 6: Stenosis Occluded         |               |
| Society of Radiologists in Ultrasound (SRU) criteria applied for       |               |
| internal carotid stenosis determination.           Signed by: Sly  |               |
| Johnson PATEL  Sign Date/Time: 2019 1:36 PM                      |               |
+------------------------------------------------------------------------+---------------+
 
 
 
+------------------------------------------------------------------------+---------------+
| Narrative                                                              | Performed At  |
+------------------------------------------------------------------------+---------------+
|      CAROTID DUPLEX ULTRASOUND     CLINICAL INFORMATION:  Carotid      |   [facility] |
| artery stenosis.     COMPARISON:  US CAROTID DOPPLER BILATERAL         |               |
| (2018);     PROCEDURE:  Evaluation of the extracranial carotid    |               |
| and vertebral arteries with  production of real-time images            |               |
| integrating B-mode two-dimensional  vascular structure, Doppler        |               |
| spectral analysis and color-flow Doppler  imaging.     FINDINGS:  Peak |               |
|  systolic and diastolic velocities measured in the common and          |               |
| internal carotid arteries. All velocities recorded in cm/sec:          |               |
| Anterior Circulation:     Right  CCA: 65/10  ICA: 65/9  ECA: 78/9      |               |
| ICA/CCA: 1.0     Left  CCA: 86/15  ICA: 90 /7  ECA: 133/0  ICA/CCA:    |               |
| 1.0     Posterior Circulation:     Right:  Vertebral: 121/21 Antegrade |               |
|      Left:  Vertebral: Not visualized     Gray scale images: There is  |               |
| extensive calcified plaque with a possible  intimal flap of the left   |               |
| common carotid artery.                                                 |               |
+------------------------------------------------------------------------+---------------+
 
 
 
+-------------------------------------------------------------------------+
| Procedure Note                                                          |
+-------------------------------------------------------------------------+
|   Maurizio, Rad Results In - 2019  1:40 PM PST                         |
| CAROTID DUPLEX ULTRASOUND                                               |
|                                                                         |
| CLINICAL INFORMATION:                                                   |
| Carotid artery stenosis.                                                |
|                                                                         |
| COMPARISON:                                                             |
| US CAROTID DOPPLER BILATERAL (2018);                               |
|                                                                         |
| PROCEDURE:                                                              |
| Evaluation of the extracranial carotid and vertebral arteries with      |
| production of real-time images integrating B-mode two-dimensional       |
| vascular structure, Doppler spectral analysis and color-flow Doppler    |
| imaging.                                                                |
|                                                                         |
| FINDINGS:                                                               |
| Peak systolic and diastolic velocities measured in the common and       |
| internal carotid arteries. All velocities recorded in cm/sec:           |
|                                                                         |
| Anterior Circulation:                                                   |
|                                                                         |
| Right                                                                   |
| CCA: 65/10                                                              |
| ICA: 65/9                                                               |
| ECA: 78/9                                                               |
 
| ICA/CCA: 1.0                                                            |
|                                                                         |
| Left                                                                    |
| CCA: 86/15                                                              |
| ICA: 90 /7                                                              |
| ECA: 133/0                                                              |
| ICA/CCA: 1.0                                                            |
|                                                                         |
| Posterior Circulation:                                                  |
|                                                                         |
| Right:                                                                  |
| Vertebral: 121/21 Antegrade                                             |
|                                                                         |
| Left:                                                                   |
| Vertebral: Not visualized                                               |
|                                                                         |
| Gray scale images: There is extensive calcified plaque with a possible  |
| intimal flap of the left common carotid artery.                         |
|                                                                         |
| IMPRESSION:                                                             |
| Extensive calcific plaque bilaterally without high-grade stenosis,      |
| similar to prior examination                                            |
|                                                                         |
| In the left common carotid artery there is dense calcific plaque versus |
| an intimal flap which is not flow limiting.                             |
|                                                                         |
| The left vertebral artery is not visualized on today's examination.     |
|                                                                         |
| Internal Carotid Artery Diagnostic Grades                               |
|                                                                         |
| Grade 1: Stenosis = 01-50% / PSV <125 cm/sec / EDV (cm/s)<40 cm/sec     |
| (mild plaque)                                                           |
| Grade 2: Stenosis = 01-50% / PSV <125 cm/sec / EDV (cm/s)<40 cm/sec     |
| (moderate plaque)                                                       |
| Grade 3: Stenosis = 50-69% / PSV >125 cm/sec / EDV (cm/s)>40 cm/sec     |
| Grade 4: Stenosis = 70-95% / PSV >230 cm/sec / EDV (cm/s)>100 cm/sec    |
| Grade 5: Stenosis = 90-95% / PSV <125 cm/sec / EDV (cm/s)<40 cm/sec     |
| Grade 6: Stenosis Occluded                                              |
|                                                                         |
| Society of Radiologists in Ultrasound (SRU) criteria applied for        |
| internal carotid stenosis determination.                                |
|                                                                         |
| Signed by: JORGE Heart Matthew                                        |
| Sign Date/Time: 2019 1:36 PM                                      |
+-------------------------------------------------------------------------+
 
 
 
+---------------+---------+--------------------+--------------+
| Performing    | Address | City/State/Zipcode | Phone Number |
| Organization  |         |                    |              |
+---------------+---------+--------------------+--------------+
|   [facility] |         |                    |              |
+---------------+---------+--------------------+--------------+
 VAS Aorta Iliac Duplex Complete (2019 11:11 AM PST)
 
+----------+
| Specimen |
 
+----------+
|          |
+----------+
 
 
 
+------------------------------------------------------------------------+---------------+
| Impressions                                                            | Performed At  |
+------------------------------------------------------------------------+---------------+
|   4.9 cm infrarenal abdominal aortic aneurysm unchanged when compared  |   [facility] |
| to  the CT angiogram of 2018.           Signed by: JORGE Heart,  |               |
| Johnson  Sign Date/Time: 2019 1:07 PM                            |               |
+------------------------------------------------------------------------+---------------+
 
 
 
+------------------------------------------------------------------------+---------------+
| Narrative                                                              | Performed At  |
+------------------------------------------------------------------------+---------------+
|      ABDOMINAL AORTA DUPLEX SCAN     CLINICAL INFORMATION:  Abdominal  |   [facility] |
| aorta aneurysm surveillance.     COMPARISON:  CL US GUIDANCE VASCULAR  |               |
| ACCESS (2018); CL CORONARY ANGIO WITH  GRAFTS (2018); CTA      |               |
| ABDOMEN AND PELVIS W WO CONTRAST (2018);  ECHO CARDIAC ADULT      |               |
| COMPLETE (2018); XR SWALLOWING FUNCTION VIDEO  (2018); CT      |               |
| CHEST WO CONTRAST (2018); XR CHEST 2 VIEW  (2018); XR CHEST 2  |               |
| VIEW (2018); CT HEAD WO CONTRAST (2018);  NM MYOCARDIAL        |               |
| PERFUSION SPECT - STRESS ONLY (2018); US CAROTID  DOPPLER         |               |
| BILATERAL (2018); MRI CERVICAL SPINE WO CONTRAST  (2018);    |               |
|   PROCEDURE:  Evaluation of the aorta and iliac vessels.     FINDINGS: |               |
|   The study is technically difficult due to overlying bowel gas.       |               |
|   There  is an infrarenal abdominal aortic aneurysm  Proximal aorta:   |               |
| 2.8 cm AP x 2.7 cm transverse  Mid aorta: 3.4 cm AP x 3.3 cm           |               |
| transverse  Distal aorta: 4.9 cm AP x 4.7 cm transverse  Right common  |               |
| iliac artery: 1.3 cm AP x 1.4 cm transverse  Left common iliac artery: |               |
|  1.4 cm AP x 1.3 cm transverse                                         |               |
+------------------------------------------------------------------------+---------------+
 
 
 
+-------------------------------------------------------------------------+
| Procedure Note                                                          |
+-------------------------------------------------------------------------+
|   Maurizio, Rad Results In - 2019  1:10 PM PST                         |
| ABDOMINAL AORTA DUPLEX SCAN                                             |
|                                                                         |
| CLINICAL INFORMATION:                                                   |
| Abdominal aorta aneurysm surveillance.                                  |
|                                                                         |
| COMPARISON:                                                             |
| CL US GUIDANCE VASCULAR ACCESS (2018); CL CORONARY ANGIO WITH       |
| GRAFTS (2018); CTA ABDOMEN AND PELVIS W WO CONTRAST (2018);    |
| ECHO CARDIAC ADULT COMPLETE (2018); XR SWALLOWING FUNCTION VIDEO    |
| (2018); CT CHEST WO CONTRAST (2018); XR CHEST 2 VIEW            |
| (2018); XR CHEST 2 VIEW (2018); CT HEAD WO CONTRAST (2018); |
| NM MYOCARDIAL PERFUSION SPECT - STRESS ONLY (2018); US CAROTID     |
| DOPPLER BILATERAL (2018); MRI CERVICAL SPINE WO CONTRAST           |
| (2018);                                                            |
|                                                                         |
| PROCEDURE:                                                              |
| Evaluation of the aorta and iliac vessels.                              |
 
|                                                                         |
| FINDINGS:                                                               |
| The study is technically difficult due to overlying bowel gas.  There   |
| is an infrarenal abdominal aortic aneurysm                              |
| Proximal aorta: 2.8 cm AP x 2.7 cm transverse                           |
| Mid aorta: 3.4 cm AP x 3.3 cm transverse                                |
| Distal aorta: 4.9 cm AP x 4.7 cm transverse                             |
| Right common iliac artery: 1.3 cm AP x 1.4 cm transverse                |
| Left common iliac artery: 1.4 cm AP x 1.3 cm transverse                 |
|                                                                         |
|                                                                         |
| IMPRESSION:                                                             |
| 4.9 cm infrarenal abdominal aortic aneurysm unchanged when compared to  |
| the CT angiogram of 2018.                                          |
|                                                                         |
| Signed by: JORGE Heart Matthew                                        |
| Sign Date/Time: 2019 1:07 PM                                      |
+-------------------------------------------------------------------------+
 
 
 
+---------------+---------+--------------------+--------------+
| Performing    | Address | City/State[facility] | Phone Number |
| Organization  |         |                    |              |
+---------------+---------+--------------------+--------------+
|   [facility] |         |                    |              |
+---------------+---------+--------------------+--------------+
 documented in this encounter
 
 Visit Diagnoses
 
 
+----------------------------------------------------------------------------------+
| Diagnosis                                                                        |
+----------------------------------------------------------------------------------+
|   Abdominal aortic aneurysm (AAA) without rupture (HCC) - Primary                |
+----------------------------------------------------------------------------------+
|   Carotid stenosis, bilateral  Occlusion and stenosis of multiple and bilateral  |
| precerebral arteries without mention of cerebral infarction                      |
+----------------------------------------------------------------------------------+
 documented in this encounter

## 2019-12-06 NOTE — XMS
Encounter Summary
  Created on: 2019
 
 Wyatt Shane
 External Reference #: [id number]
 : 43
 Sex: Male
 
 Demographics
 
 
+-----------------------+------------------------+
| Address               | [address]     |
|                       | [place]  [address]   |
+-----------------------+------------------------+
| Home Phone            | [phone]        |
+-----------------------+------------------------+
| Preferred Language    | Unknown                |
+-----------------------+------------------------+
| Marital Status        |                 |
+-----------------------+------------------------+
| Worship Affiliation | LUT                    |
+-----------------------+------------------------+
| Race                  | White                  |
+-----------------------+------------------------+
| Ethnic Group          | Not  or  |
+-----------------------+------------------------+
 
 
 Author
 
 
+--------------+------------------------------+
| Author       | [facility] |
+--------------+------------------------------+
| Organization | [facility] |
+--------------+------------------------------+
| Address      | Unknown                      |
+--------------+------------------------------+
| Phone        | Unavailable                  |
+--------------+------------------------------+
 
 
 
 Support
 
 
+---------------+--------------+---------+-----------------+
| Name          | Relationship | Address | Phone           |
+---------------+--------------+---------+-----------------+
| Opal Shane | ECON         | Unknown | [phone] |
+---------------+--------------+---------+-----------------+
 
 
 
 Care Team Providers
 
 
 
+-----------------------+------+-----------------+
| Care Team Member Name | Role | Phone           |
+-----------------------+------+-----------------+
| Erwin Iniguez MD   | PCP  | [phone] |
+-----------------------+------+-----------------+
 
 
 
 Encounter Details
 
 
+--------+-------------+-----------------+---------------------+---------------+
| Date   | Type        | Department      | Care Team           | Description   |
+--------+-------------+-----------------+---------------------+---------------+
| / | Office      |   [facility]  |   Note, Outpatient  | Progress Note |
|    | Visit-Trans | MEDICINE        | Clinic              |               |
|        | cribed      |                 |                     |               |
+--------+-------------+-----------------+---------------------+---------------+
 
 
 
 Social History
 
 
+----------------+-------+-----------+--------+------+
| Tobacco Use    | Types | Packs/Day | Years  | Date |
|                |       |           | Used   |      |
+----------------+-------+-----------+--------+------+
| Never Assessed |       |           |        |      |
+----------------+-------+-----------+--------+------+
 
 
 
+------------------+---------------+
| Sex Assigned at  | Date Recorded |
| Birth            |               |
+------------------+---------------+
| Not on file      |               |
+------------------+---------------+
 
 
 
+----------------+-------------+-------------+
| Job Start Date | Occupation  | Industry    |
+----------------+-------------+-------------+
| Not on file    | Not on file | Not on file |
+----------------+-------------+-------------+
 
 
 
+----------------+--------------+------------+
| Travel History | Travel Start | Travel End |
+----------------+--------------+------------+
 
 
 
+-------------------------------------+
| No recent travel history available. |
+-------------------------------------+
 documented as of this encounter
 
 
 Progress Notes
 Interface, Transcription In - 2007  5:06 AM PSTCLINIC DATE:       1999
 
 OTOLARYNGOLOGY CLINIC
 
 SUBJECTIVE:  I have reviewed Mr. Shane's pathology slides including going
 over the cut-in sections to determine the orientation for the margin on the
 anterior-inferior surface, which is being called positive.  Indeed,
 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
 of tumor beyond this that are certainly very close to the margin.
 
 PLAN:  I think, given this behavior, we need to irradiate this gentleman
 postoperatively and I called to discuss this with him.  We'll probably plan
 to do this in February, once things have healed further.
 
 Jimmy Esposito M.D.
 , Otolaryngology
 Head and Neck Surgery
 
 MARIA ALEJANDRA/lizett
 D: 99
 T: 99Electronically signed by Interface, Transcription In at 2007  5:06 AM PSTd
ocumented in this encounter
 
 Plan of Treatment
 Not on filedocumented as of this encounter
 
 Visit Diagnoses
 Not on filedocumented in this encounter

## 2019-12-06 NOTE — XMS
Encounter Summary
  Created on: 2019
 
 Wyatt Shane
 External Reference #: [id number]
 : 43
 Sex: Male
 
 Demographics
 
 
+-----------------------+------------------------+
| Address               | [address]     |
|                       | [place]  [address]   |
+-----------------------+------------------------+
| Home Phone            | [phone]        |
+-----------------------+------------------------+
| Preferred Language    | Unknown                |
+-----------------------+------------------------+
| Marital Status        |                 |
+-----------------------+------------------------+
| Catholic Affiliation | LUT                    |
+-----------------------+------------------------+
| Race                  | White                  |
+-----------------------+------------------------+
| Ethnic Group          | Not  or  |
+-----------------------+------------------------+
 
 
 Author
 
 
+--------------+-------------+
| Organization | Unknown     |
+--------------+-------------+
| Address      | Unknown     |
+--------------+-------------+
| Phone        | Unavailable |
+--------------+-------------+
 
 
 
 Support
 
 
+---------------+--------------+---------+-----------------+
| Name          | Relationship | Address | Phone           |
+---------------+--------------+---------+-----------------+
| Opal Shane | ECON         | Unknown | [phone] |
+---------------+--------------+---------+-----------------+
 
 
 
 Care Team Providers
 
 
+-----------------------+------+-----------------+
| Care Team Member Name | Role | Phone           |
 
+-----------------------+------+-----------------+
| Erwin Iniguez MD   | PCP  | [phone] |
+-----------------------+------+-----------------+
 
 
 
 Encounter Details
 
 
+--------+-------------+------------+--------------------+--------------------+
| Date   | Type        | Department | Care Team          | Description        |
+--------+-------------+------------+--------------------+--------------------+
| / | Procedure - |            |   Documentation,   | OP REPORT-TEACHING |
| 2000   |             |            | Teaching Physician |                    |
|        | Transcribed |            |                    |                    |
+--------+-------------+------------+--------------------+--------------------+
 
 
 
 Social History
 
 
+----------------+-------+-----------+--------+------+
| Tobacco Use    | Types | Packs/Day | Years  | Date |
|                |       |           | Used   |      |
+----------------+-------+-----------+--------+------+
| Never Assessed |       |           |        |      |
+----------------+-------+-----------+--------+------+
 
 
 
+------------------+---------------+
| Sex Assigned at  | Date Recorded |
| Birth            |               |
+------------------+---------------+
| Not on file      |               |
+------------------+---------------+
 
 
 
+----------------+-------------+-------------+
| Job Start Date | Occupation  | Industry    |
+----------------+-------------+-------------+
| Not on file    | Not on file | Not on file |
+----------------+-------------+-------------+
 
 
 
+----------------+--------------+------------+
| Travel History | Travel Start | Travel End |
+----------------+--------------+------------+
 
 
 
+-------------------------------------+
| No recent travel history available. |
+-------------------------------------+
 documented as of this encounter
 
 Plan of Treatment
 
 Not on filedocumented as of this encounter
 
 Procedures
 
 
+--------------------+--------+------------+----------------------+----------+
| Procedure Name     | Priori | Date/Time  | Associated Diagnosis | Comments |
|                    | ty     |            |                      |          |
+--------------------+--------+------------+----------------------+----------+
| TEACHING PHYSICIAN |        | 2000 |                      |          |
+--------------------+--------+------------+----------------------+----------+
 documented in this encounter
 
 Visit Diagnoses
 Not on filedocumented in this encounter

## 2019-12-06 NOTE — XMS
Encounter Summary
  Created on: 2019
 
 Wyatt Shane
 External Reference #: [id number]
 : 43
 Sex: Male
 
 Demographics
 
 
+-----------------------+------------------------+
| Address               | [address]     |
|                       | [place]  [address]   |
+-----------------------+------------------------+
| Home Phone            | [phone]        |
+-----------------------+------------------------+
| Preferred Language    | Unknown                |
+-----------------------+------------------------+
| Marital Status        |                 |
+-----------------------+------------------------+
| Cheondoism Affiliation | LUT                    |
+-----------------------+------------------------+
| Race                  | White                  |
+-----------------------+------------------------+
| Ethnic Group          | Not  or  |
+-----------------------+------------------------+
 
 
 Author
 
 
+--------------+------------------------------+
| Author       | [facility] |
+--------------+------------------------------+
| Organization | [facility] |
+--------------+------------------------------+
| Address      | Unknown                      |
+--------------+------------------------------+
| Phone        | Unavailable                  |
+--------------+------------------------------+
 
 
 
 Support
 
 
+---------------+--------------+---------+-----------------+
| Name          | Relationship | Address | Phone           |
+---------------+--------------+---------+-----------------+
| Opal Shane | ECON         | Unknown | [phone] |
+---------------+--------------+---------+-----------------+
 
 
 
 Care Team Providers
 
 
 
+-----------------------+------+-----------------+
| Care Team Member Name | Role | Phone           |
+-----------------------+------+-----------------+
| Erwin Iniguez MD   | PCP  | [phone] |
+-----------------------+------+-----------------+
 
 
 
 Encounter Details
 
 
+--------+-------------+-----------------+---------------------+---------------+
| Date   | Type        | Department      | Care Team           | Description   |
+--------+-------------+-----------------+---------------------+---------------+
| 10/26/ | Office      |   [facility]  |   Note, Outpatient  | Progress Note |
|    | Visit-Trans | MEDICINE        | Clinic              |               |
|        | cribed      |                 |                     |               |
+--------+-------------+-----------------+---------------------+---------------+
 
 
 
 Social History
 
 
+----------------+-------+-----------+--------+------+
| Tobacco Use    | Types | Packs/Day | Years  | Date |
|                |       |           | Used   |      |
+----------------+-------+-----------+--------+------+
| Never Assessed |       |           |        |      |
+----------------+-------+-----------+--------+------+
 
 
 
+------------------+---------------+
| Sex Assigned at  | Date Recorded |
| Birth            |               |
+------------------+---------------+
| Not on file      |               |
+------------------+---------------+
 
 
 
+----------------+-------------+-------------+
| Job Start Date | Occupation  | Industry    |
+----------------+-------------+-------------+
| Not on file    | Not on file | Not on file |
+----------------+-------------+-------------+
 
 
 
+----------------+--------------+------------+
| Travel History | Travel Start | Travel End |
+----------------+--------------+------------+
 
 
 
+-------------------------------------+
| No recent travel history available. |
+-------------------------------------+
 documented as of this encounter
 
 
 Progress Notes
 Interface, Transcription In - 2006  5:11 AM PSTCLINIC DATE:       10/26/1999
 
 OTOLARYNGOLOGY HEAD NECK SURGERY
 
 SUBJECTIVE:  Mr. Shane is seen back today  in  follow  up for a preoperative
 discussion  regarding  a proposed dilation/excisional  subglottic  stenosis
 endoscopically.   He seems to understand  very  well  the  issues  involved
 nasoendoscopically, when sees now a complete  absence  of  the inflammation
 and erythema that was previously present  since  his Nissen fundoplication,
 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
 airway.  I had a full thorough discussion.   He  seems  to  understand very
 well the strategy of the operation, and the fact that if it is unsuccessful
 we would proceed with a more definitive  procedure  on  an  open basis.  He
 will spend the night there if there are  issues  related  to his surgery in
 terms of the potential for swelling.
 
 Jimmy Esposito M.D.
 
 MARIA ALEJANDRA / TIESHA
 [id number] / [id number] / [id number] /
 D: 10/26/1999
 T: 10/30/1999Electronically signed by Interface, Transcription In at 2006  5:11 AM PS
Tdocumented in this encounter
 
 Plan of Treatment
 Not on filedocumented as of this encounter
 
 Visit Diagnoses
 Not on filedocumented in this encounter

## 2019-12-06 NOTE — XMS
Encounter Summary
  Created on: 2019
 
 Wyatt Shane
 External Reference #: [id number]
 : 43
 Sex: Male
 
 Demographics
 
 
+-----------------------+------------------------+
| Address               | [address]     |
|                       | [place]  [address]   |
+-----------------------+------------------------+
| Home Phone            | [phone]        |
+-----------------------+------------------------+
| Preferred Language    | Unknown                |
+-----------------------+------------------------+
| Marital Status        |                 |
+-----------------------+------------------------+
| Mandaen Affiliation | LUT                    |
+-----------------------+------------------------+
| Race                  | White                  |
+-----------------------+------------------------+
| Ethnic Group          | Not  or  |
+-----------------------+------------------------+
 
 
 Author
 
 
+--------------+------------------------------+
| Author       | [facility] |
+--------------+------------------------------+
| Organization | [facility] |
+--------------+------------------------------+
| Address      | Unknown                      |
+--------------+------------------------------+
| Phone        | Unavailable                  |
+--------------+------------------------------+
 
 
 
 Support
 
 
+---------------+--------------+---------+-----------------+
| Name          | Relationship | Address | Phone           |
+---------------+--------------+---------+-----------------+
| Opal Shane | ECON         | Unknown | [phone] |
+---------------+--------------+---------+-----------------+
 
 
 
 Care Team Providers
 
 
 
+-----------------------+------+-----------------+
| Care Team Member Name | Role | Phone           |
+-----------------------+------+-----------------+
| Erwin Iniguez MD   | PCP  | [phone] |
+-----------------------+------+-----------------+
 
 
 
 Reason for Visit
 
 
+----------------+-------------------------------------------------------------------+
| Reason         | Comments                                                          |
+----------------+-------------------------------------------------------------------+
| Throat problem | Airway constricting, periodic choking on food, and tightening     |
|                | sensation. PCP suggested Wyatt return to see Dr. Esposito.  Please    |
|                | advise for scheduling - Dr. Cross or Dr. Esposito.  Call Gloria,  |
|                | wife, back to schedule.                                           |
+----------------+-------------------------------------------------------------------+
 
 
 
 Encounter Details
 
 
+--------+-----------+----------------------+--------------------+----------------------+
| Date   | Type      | Department           | Care Team          | Description          |
+--------+-----------+----------------------+--------------------+----------------------+
| / | Telephone |   Otolaryngology     |   iJmmy Esposito MD | Throat problem       |
|    |           | Head and Neck        |                    | (Airway              |
|        |           | Surgery Services at  |                    | constricting,        |
|        |           | [facility]  [address]     |                    | periodic choking on  |
|        |           | [address]      |                    | food, and tightening |
|        |           | Mailcode: PV01       |                    |  sensation. PCP      |
|        |           | [facility] |                    | suggested Wyatt       |
|        |           |   [place], [place]       |                    | return to see     |
|        |           | [id number]           |                    | Vaibhav.  Please       |
|        |           | [phone]         |                    | advise for           |
|        |           |                      |                    | scheduling -      |
|        |           |                      |                    | Tay or      |
|        |           |                      |                    | Vaibhav.  Call Gloria,  |
|        |           |                      |                    | wife, back to        |
|        |           |                      |                    | schedule.)           |
+--------+-----------+----------------------+--------------------+----------------------+
 
 
 
 Social History
 
 
+----------------+-------+-----------+--------+------+
| Tobacco Use    | Types | Packs/Day | Years  | Date |
|                |       |           | Used   |      |
+----------------+-------+-----------+--------+------+
| Never Assessed |       |           |        |      |
+----------------+-------+-----------+--------+------+
 
 
 
+------------------+---------------+
 
| Sex Assigned at  | Date Recorded |
| Birth            |               |
+------------------+---------------+
| Not on file      |               |
+------------------+---------------+
 
 
 
+----------------+-------------+-------------+
| Job Start Date | Occupation  | Industry    |
+----------------+-------------+-------------+
| Not on file    | Not on file | Not on file |
+----------------+-------------+-------------+
 
 
 
+----------------+--------------+------------+
| Travel History | Travel Start | Travel End |
+----------------+--------------+------------+
 
 
 
+-------------------------------------+
| No recent travel history available. |
+-------------------------------------+
 documented as of this encounter
 
 Plan of Treatment
 Not on filedocumented as of this encounter
 
 Visit Diagnoses
 Not on filedocumented in this encounter

## 2019-12-06 NOTE — XMS
Encounter Summary
  Created on: 2019
 
 Shane Wyatttien BroussardJosue
 External Reference #: [id number]
 : 43
 Sex: Male
 
 Demographics
 
 
+-----------------------+---------------------------+
| Address               | [address]        |
|                       | [place]  [address] |
+-----------------------+---------------------------+
| Home Phone            | [phone]           |
+-----------------------+---------------------------+
| Preferred Language    | Unknown                   |
+-----------------------+---------------------------+
| Marital Status        |                    |
+-----------------------+---------------------------+
| Restoration Affiliation | [id number]                      |
+-----------------------+---------------------------+
| Race                  | Unknown                   |
+-----------------------+---------------------------+
| Ethnic Group          | Unknown                   |
+-----------------------+---------------------------+
 
 
 Author
 
 
+--------------+--------------------------------------------+
| Author       | [facility]  |
|              | [facility] [place]                                |
+--------------+--------------------------------------------+
| Organization | [facility]  |
|              | [facility] [place]                                |
+--------------+--------------------------------------------+
| Address      | Unknown                                    |
+--------------+--------------------------------------------+
| Phone        | Unavailable                                |
+--------------+--------------------------------------------+
 
 
 
 Support
 
 
+---------------+--------------+--------------------+-----------------+
| Name          | Relationship | Address            | Phone           |
+---------------+--------------+--------------------+-----------------+
| Opal Shane | ECON         | [address]     | [phone] |
|               |              | [place]   |                 |
|               |              | [address]              |                 |
+---------------+--------------+--------------------+-----------------+
 
 
 
 
 Care Team Providers
 
 
+-----------------------+------+-------------+
| Care Team Member Name | Role | Phone       |
+-----------------------+------+-------------+
 PCP  | Unavailable |
+-----------------------+------+-------------+
 
 
 
 Encounter Details
 
 
+--------+-----------+----------------------+----------------------+----------------------+
| Date   | Type      | Department           | Care Team            | Description          |
+--------+-----------+----------------------+----------------------+----------------------+
| / | Hospital  |   [facility]    |   Yang, Hoyeol, MD   | GASTROINTEST HEMORR  |
|  - | Encounter | [facility]       | [address] | NOS                  |
|        |           | CLINICAL DECISION    |   [place] [address] |                      |
| 07/15/ |           | [address] |   [phone]       |                      |
|    |           |   [place], [place]       | [phone] (Fax)   |                      |
|        |           | [address]           |                      |                      |
|        |           | [phone]         |                      |                      |
+--------+-----------+----------------------+----------------------+----------------------+
 
 
 
 Social History
 
 
+----------------+-------+-----------+--------+------+
| Tobacco Use    | Types | Packs/Day | Years  | Date |
|                |       |           | Used   |      |
+----------------+-------+-----------+--------+------+
| Never Assessed |       |           |        |      |
+----------------+-------+-----------+--------+------+
 
 
 
+------------------+---------------+
| Sex Assigned at  | Date Recorded |
| Birth            |               |
+------------------+---------------+
| Not on file      |               |
+------------------+---------------+
 
 
 
+----------------+-------------+-------------+
| Job Start Date | Occupation  | Industry    |
+----------------+-------------+-------------+
| Not on file    | Not on file | Not on file |
+----------------+-------------+-------------+
 
 
 
+----------------+--------------+------------+
| Travel History | Travel Start | Travel End |
 
+----------------+--------------+------------+
 
 
 
+-------------------------------------+
| No recent travel history available. |
+-------------------------------------+
 documented as of this encounter
 
 Plan of Treatment
 
 
+--------+---------+-------------+----------------------+-------------+
| Date   | Type    | Specialty   | Care Team            | Description |
+--------+---------+-------------+----------------------+-------------+
| / | Office  | Pulmonology |   Juan F,          |             |
|    | Visit   |             | Jessicaefrain Cheung,   |             |
|        |         |             | MD  [address]  |             |
|        |         |             |   [place],   |             |
|        |         |             | [place] [address]             |             |
|        |         |             | [phone]         |             |
|        |         |             | [phone] (Fax)   |             |
+--------+---------+-------------+----------------------+-------------+
| / | Office  | Cardiology  |   Eric Akins, |             |
|    | Visit   |             |  MD  [address]   |             |
|        |         |             | [place]  |             |
|        |         |             | [address]  [phone]  |             |
|        |         |             |  [phone] (Fax)  |             |
+--------+---------+-------------+----------------------+-------------+
 documented as of this encounter
 
 Visit Diagnoses
 
 
+-----------------------------------------------------+
| Diagnosis                                           |
+-----------------------------------------------------+
|   Hemorrhage of gastrointestinal tract, unspecified |
+-----------------------------------------------------+
 documented in this encounter

## 2019-12-06 NOTE — XMS
Encounter Summary
  Created on: 2019
 
 Wyatt Shane
 External Reference #: [id number]
 : 43
 Sex: Male
 
 Demographics
 
 
+-----------------------+------------------------+
| Address               | [address]     |
|                       | [place]  [address]   |
+-----------------------+------------------------+
| Home Phone            | [phone]        |
+-----------------------+------------------------+
| Preferred Language    | Unknown                |
+-----------------------+------------------------+
| Marital Status        |                 |
+-----------------------+------------------------+
| Sabianism Affiliation | LUT                    |
+-----------------------+------------------------+
| Race                  | White                  |
+-----------------------+------------------------+
| Ethnic Group          | Not  or  |
+-----------------------+------------------------+
 
 
 Author
 
 
+--------------+-------------+
| Organization | Unknown     |
+--------------+-------------+
| Address      | Unknown     |
+--------------+-------------+
| Phone        | Unavailable |
+--------------+-------------+
 
 
 
 Support
 
 
+---------------+--------------+---------+-----------------+
| Name          | Relationship | Address | Phone           |
+---------------+--------------+---------+-----------------+
| Opal Shane | ECON         | Unknown | [phone] |
+---------------+--------------+---------+-----------------+
 
 
 
 Care Team Providers
 
 
+-----------------------+------+-----------------+
| Care Team Member Name | Role | Phone           |
 
+-----------------------+------+-----------------+
| Erwin Iniguez MD   | PCP  | [phone] |
+-----------------------+------+-----------------+
 
 
 
 Encounter Details
 
 
+--------+-------------+------------+---------------------+------------------+
| Date   | Type        | Department | Care Team           | Description      |
+--------+-------------+------------+---------------------+------------------+
| / | Procedure - |            |   Record, Operation | Operative Report |
|    |             |            |                     |                  |
|        | Transcribed |            |                     |                  |
+--------+-------------+------------+---------------------+------------------+
 
 
 
 Social History
 
 
+----------------+-------+-----------+--------+------+
| Tobacco Use    | Types | Packs/Day | Years  | Date |
|                |       |           | Used   |      |
+----------------+-------+-----------+--------+------+
| Never Assessed |       |           |        |      |
+----------------+-------+-----------+--------+------+
 
 
 
+------------------+---------------+
| Sex Assigned at  | Date Recorded |
| Birth            |               |
+------------------+---------------+
| Not on file      |               |
+------------------+---------------+
 
 
 
+----------------+-------------+-------------+
| Job Start Date | Occupation  | Industry    |
+----------------+-------------+-------------+
| Not on file    | Not on file | Not on file |
+----------------+-------------+-------------+
 
 
 
+----------------+--------------+------------+
| Travel History | Travel Start | Travel End |
+----------------+--------------+------------+
 
 
 
+-------------------------------------+
| No recent travel history available. |
+-------------------------------------+
 documented as of this encounter
 
 Plan of Treatment
 
 Not on filedocumented as of this encounter
 
 Procedures
 
 
+------------------+--------+------------+----------------------+----------------------+
| Procedure Name   | Priori | Date/Time  | Associated Diagnosis | Comments             |
|                  | ty     |            |                      |                      |
+------------------+--------+------------+----------------------+----------------------+
| OPERATION RECORD |        | 1998 |                      |   Results for this   |
|                  |        |            |                      | procedure are in the |
|                  |        |            |                      |  results section.    |
+------------------+--------+------------+----------------------+----------------------+
 documented in this encounter
 
 Results
 OPERATION RECORD (1998)
 
+------------------------------------------------------------------------------------------+
| Transcriptions                                                                           |
+------------------------------------------------------------------------------------------+
|   Interface, Transcription In - 2007  5:11 AM PST                                  |
|    [facility] [facility]    |
| [place], [place] [address] [phone]                     [place]          |
| [facility]OPER[facility] RECORDMed Rec No.:  [id number]           Date:           |
| 1998Name:   Darien Shane SURGEON:                 Jimmy Esposito M.D.      |
|                               , Otolaryngology                        |
|             Head and Neck SurgeryASSISTANTS:                        Kadie Clark,    |
| M.D.                                   Resident, Otolaryngology                          |
|           Head and Neck Surgery                                   Alphonse Chen M.D.      |
|                               Resident, Otolaryngology,                                  |
|   Head and Neck SurgeryPOSTOPERATIVE DIAGNOSIS(ES):Squamous cell carcinoma of the        |
| larynx.OPERATIONS PERFORMED:1.  Right vertical hemilaryngectomy.2.                       |
| Tracheostomy.SPECIMEN(S) REMOVED:1.  Right hemilaryngectomy specimen for permanent       |
| sectioning.2.  Frozen section of left anterior portion of vocal cord.3.  Full section    |
| thickness of left cord.ESTIMATED BLOOD LOSS:Less than 50 cc.INDICATIONS:The patient is a |
|  52-year-old gentleman  who has had panendoscopy and workupfor hoarseness.  He was noted |
|  to have  a  right-sided  T2N0M0 squamous cellcarcinoma of the larynx.  He was taken  to |
|  the Operating Room for the aboveprocedure.FINDINGS:The patient had a right-sided        |
| laryngeal tumor which seemed to center in thefalse vocal cord.  It extended slightly     |
| onto the left cord anteriorly.  Thefrozen section on the left cord was positive  for     |
| squamous cell carcinoma,and deep resection was taken and sent  for permanent sectioning. |
|   The rightperichondrium was brought into the larynx as a neocord.PROCEDURE:The patient  |
| was identified in the preoperative  area  and  brought  to  theOperating  Room and       |
| placed in the supine  position.   General  endotrachealanesthesia was administered.  The |
|  table  was  turned.  The neck was preppedby marking the incision and injecting  it      |
| with 1% lidocaine with 1:100,000epinephrine.  The patient was then prepped  and draped   |
| in the usual sterilefashion.An  incision  was made in the skin and  subplatysmal  skin   |
| flaps  elevatedsuperiorly and inferiorly.  An incision  was made midline between the     |
| strapmuscles,  the sternohyoid and  sternothyroid  from  midline.  Thethyroid  |
| isthmus was also split down  the  midline.   The  perichondrium wasincised superiorly    |
| along the border of  the  thyroid  cartilage, as well asinferiorly along the cartilage   |
| border.   The  planned  cartilage  cuts werealso  incised  after  injecting              |
| perichondrially  with  1%  lidocaine  with1:100,000 epinephrine.  The perichondrium  was |
|   then peeled posteriorly offthe right side.  The cuts in the cartilage  were  then      |
| planned, keeping inmind the location of the tumor.A tracheostomy was performed by        |
| incising  onto the second tracheal ring andinserting the Kanaranzi tube.  The cartilage     |
| cuts were then made just left ofthe midline and right in front of the  oblique line on   |
| the right side.  TheStryker oscillating saw was used to create  these  cuts, and care    |
| was takennot to penetrate the perichondrium.   The  inferior margin of resection wasjust |
 
|  above the cricoid cartilage.  The  mucosal  cut  on  the left side wasthen created, the |
|  larynx opened, and  the  tumor  visualized.  The superiorcuts  of  the mucosa were made |
|  along  the  superior  edge  of  the  thyroidcartilage.  Posteriorly, dissection         |
| continued  using  Metzenbaum  scissorsacross the mucosa just through the arytenoid       |
| cartilage.   The specimen wasremoved and sent to Pathology for sectioning.   The         |
| perichondrium that hadbeen previously elevated was then tacked  down  to  the posterior  |
| laryngealmucosa  using  interrupted 4-0 chromic.   The  wound  was  irrigated.           |
| Thetracheotomy tube was changed over to a #8 cuffed Shiley tube.  The Dobbhofffeeding    |
| tube was then placed.The wound was closed in layers using  3-0  Vicryl  for  the         |
| platysma,  4-0Vicryl for the subcutaneous layers,  and  5-0  nylon  running for the      |
| skin.The wound was then dressed with fluffs  and  a  Marlon.  Bacitracin had              |
| beenapplied.The patient was awakened and taken to  the  Post  Anesthesia  Care  Unit     |
| instable condition.NOTE: Dr. Jimmy Esposito was present for all key portions of the         |
| case.Kadie Clark M.D.             Jimmy Esposito M.D.Resident, Otolaryngology     |
|        , OtolaryngologyHead and Neck Surgery              Head and    |
| Neck SurgeryCYY/bwD: 1998T:  1998 10:50 Acc:                                 |
|epinephrine.  The patient was then prepped  and draped in the usual sterile              |
|fashion.                                                                                 |
|                                                                                          |
|An  incision  was made in the skin and  subplatysmal  skin  flaps  elevated               |
|superiorly and inferiorly.  An incision  was made midline between the strap               |
|muscles,  the sternohyoid and  sternothyroid  from  midline.  The               |
|thyroid isthmus was also split down  the  midline.   The  perichondrium was               |
|incised superiorly along the border of  the  thyroid  cartilage, as well as               |
|inferiorly along the cartilage border.   The  planned  cartilage  cuts were               |
|also  incised  after  injecting  perichondrially  with  1%  lidocaine  with               |
|1:100,000 epinephrine.  The perichondrium  was  then peeled posteriorly off               |
|the right side.  The cuts in the cartilage  were  then  planned, keeping in               |
|mind the location of the tumor.                                                           |
|                                                                                          |
|A tracheostomy was performed by incising  onto the second tracheal ring and               |
|inserting the Kanaranzi tube.  The cartilage  cuts were then made just left of               |
|the midline and right in front of the  oblique line on the right side.  The               |
|[employer] oscillating saw was used to create  these  cuts, and care was taken               |
|not to penetrate the perichondrium.   The  inferior margin of resection was               |
|just above the cricoid cartilage.  The  mucosal  cut  on  the left side was               |
|then created, the larynx opened, and  the  tumor  visualized.  The superior               |
|cuts  of  the mucosa were made along  the  superior  edge  of  the  thyroid               |
|cartilage.  Posteriorly, dissection  continued  using  Metzenbaum  scissors              |
|across the mucosa just through the arytenoid  cartilage.   The specimen was               |
|removed and sent to Pathology for sectioning.   The  perichondrium that had               |
|been previously elevated was then tacked  down  to  the posterior laryngeal               |
|mucosa  using  interrupted 4-0 chromic.   The  wound  was  irrigated.   The               |
|tracheotomy tube was changed over to a #8 cuffed Shiley tube.  The Dobbhoff               |
|feeding tube was then placed.                                                             |
|                                                                                          |
|The wound was closed in layers using  3-0  Vicryl  for  the  platysma,  4-0               |
|Vicryl for the subcutaneous layers,  and  5-0  nylon  running for the skin.               |
|The wound was then dressed with fluffs  and  a  Marlon.  Bacitracin had been               |
|applied.                                                                                 |
|                                                                                          |
|The patient was awakened and taken to  the  Post  Anesthesia  Care  Unit in               |
|stable condition.                                                                         |
|                                                                                          |
|NOTE: Dr. Jimmy Esposito was present for all key portions of the case.                       |
|                                                                                          |
|Kadie Clark M.D.             Jimmy Esposito M.D.                                   |
|Resident, Otolaryngology           , Otolaryngology                    |
 
|Head and Neck Surgery              Head and Neck Surgery                                  |
|                                                                                          |
|CHAYA/santiago                                                                                    |
|D: 1998                                                                             |
|T:  1998 10:50 A                                                                    |
|                                                                                          |
|cc:                                                                                      |
+------------------------------------------------------------------------------------------+
 documented in this encounter
 
 Visit Diagnoses
 Not on filedocumented in this encounter

## 2021-05-20 ENCOUNTER — HOSPITAL ENCOUNTER (EMERGENCY)
Dept: HOSPITAL 46 - ED | Age: 76
Discharge: HOME | End: 2021-05-20
Payer: MEDICARE

## 2021-05-20 VITALS — BODY MASS INDEX: 25.76 KG/M2 | HEIGHT: 68 IN | WEIGHT: 170 LBS

## 2021-05-20 DIAGNOSIS — Z88.8: ICD-10-CM

## 2021-05-20 DIAGNOSIS — G47.33: ICD-10-CM

## 2021-05-20 DIAGNOSIS — Z79.899: ICD-10-CM

## 2021-05-20 DIAGNOSIS — E03.9: ICD-10-CM

## 2021-05-20 DIAGNOSIS — K21.9: ICD-10-CM

## 2021-05-20 DIAGNOSIS — J44.9: ICD-10-CM

## 2021-05-20 DIAGNOSIS — N18.9: ICD-10-CM

## 2021-05-20 DIAGNOSIS — Z79.52: ICD-10-CM

## 2021-05-20 DIAGNOSIS — I50.9: Primary | ICD-10-CM

## 2021-05-20 DIAGNOSIS — M06.9: ICD-10-CM

## 2021-05-20 DIAGNOSIS — Z87.891: ICD-10-CM

## 2021-05-20 NOTE — EKG
[facility]
                                    [address] [place]  [address]
_________________________________________________________________________________________
                                                                 Signed   
 
 
Atrial fibrillation
Left axis deviation
Right bundle branch block
Minimal voltage criteria for LVH, may be normal variant
Inferior infarct , age undetermined
Abnormal ECG
When compared with ECG of 08-AUG-2018 15:21,
Left anterior fascicular block is no longer present
Inferior infarct is now present
Confirmed by BROOKS HOUSTON MD ([id number]) on 5/20/2021 10:22:52 PM
 
 
 
 
 
 
 
 
 
 
 
 
 
 
 
 
 
 
 
 
 
 
 
 
 
 
 
 
 
 
 
 
 
 
 
    Electronically Signed By: BROOKS HOUSTON MD  05/20/21 2223
_________________________________________________________________________________________
PATIENT NAME:     DARYL RAMIREZ                     
MEDICAL RECORD #: [id number]                     Electrocardiogram             
          ACCT #: [id number]  
DATE OF BIRTH:   [birth year]                                       
PHYSICIAN:   BROOKS HOUSTON MD                   REPORT #: [id number]
REPORT IS CONFIDENTIAL AND NOT TO BE RELEASED WITHOUT AUTHORIZATION

## 2021-05-20 NOTE — XMS
PreManage Notification: DARYL RAMIREZ MRN:[id number]
 
Security Information
 
Security Events
No recent Security Events currently on file
 
 
 
CRITERIA MET
------------
- Group Notification
- [facility] - Has Care Guidelines
 
 
CARE PROVIDERS
-------------------------------------------------------------------------------------
SALVADOR MURGUIA     Internal Medicine     07/03/2018-Current
 
PHONE: Unknown
-------------------------------------------------------------------------------------
 
Micheal has no Care Guidelines for this patient.
Care History
Medical/Surgical
12/09/2019    [facility]
Patient has follow up appt. on 01/23/2020 with Dr. Murguia
07/03/2018    [facility]
 
      - Patient is currently established with [facility]. If patient is seen 
      in the ED during business hours. Please contact CHWs at [facility] at
      [phone]. Care Recommendation: This patient has had 5 or more Emergency
      Department visits in the last 12 months.\T\nbsp; Patient requires education on
      the scope and purpose of the ED as an acute care provider not a Primary Care
      Provider and should not be utilized for chronic conditions.\T\nbsp; If patient
      returns to ED please contact Community Health WorkerAna Luisa at [phone].
      These are guidelines and the provider should exercise clinical judgment when
      providing care.
E.D. VISIT COUNT (12 MO.)
-------------------------------------------------------------------------------------
1 [facility]
-------------------------------------------------------------------------------------
TOTAL 1
-------------------------------------------------------------------------------------
NOTE: Visits indicate total known visits.
 
ED/UCC VISIT TRACKING (12 MO.)
-------------------------------------------------------------------------------------
05/20/2021 13:50
[facility] [place]
 
TYPE: Emergency
 
COMPLAINT:
- LEGS SWELLING
-------------------------------------------------------------------------------------
 
 
INPATIENT VISIT TRACKING (12 MO.)
 
No inpatient visits to display in this time frame
 
[email or web address]

## 2022-08-06 ENCOUNTER — HOSPITAL ENCOUNTER (EMERGENCY)
Dept: HOSPITAL 46 - ED | Age: 77
Discharge: ANOTHER HOSPITAL | End: 2022-08-06
Payer: MEDICARE

## 2022-08-06 VITALS — BODY MASS INDEX: 23.54 KG/M2 | HEIGHT: 67 IN | WEIGHT: 150 LBS

## 2022-08-06 DIAGNOSIS — R00.1: ICD-10-CM

## 2022-08-06 DIAGNOSIS — R55: ICD-10-CM

## 2022-08-06 DIAGNOSIS — S22.41XA: Primary | ICD-10-CM

## 2022-08-06 DIAGNOSIS — Z79.899: ICD-10-CM

## 2022-08-06 DIAGNOSIS — Z87.891: ICD-10-CM

## 2022-08-06 DIAGNOSIS — Z20.822: ICD-10-CM

## 2022-08-06 DIAGNOSIS — Z88.8: ICD-10-CM
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
## 2022-08-06 PROCEDURE — C9803 HOPD COVID-19 SPEC COLLECT: HCPCS

## 2022-08-06 PROCEDURE — U0003 INFECTIOUS AGENT DETECTION BY NUCLEIC ACID (DNA OR RNA); SEVERE ACUTE RESPIRATORY SYNDROME CORONAVIRUS 2 (SARS-COV-2) (CORONAVIRUS DISEASE [COVID-19]), AMPLIFIED PROBE TECHNIQUE, MAKING USE OF HIGH THROUGHPUT TECHNOLOGIES AS DESCRIBED BY CMS-2020-01-R: HCPCS

## 2022-08-06 NOTE — XMS
PreManage Notification: DARYL RAMIREZ MRN:[id number]
 
Security Information
 
Security Events
No recent Security Events currently on file
 
 
 
CRITERIA MET
------------
- [facility] - Has Care Guidelines
- Group Notification
 
 
CARE PROVIDERS
-------------------------------------------------------------------------------------
FORTINO MURGUIALM     Internal Medicine     05/24/2021-Current
 
PHONE: Unknown
-------------------------------------------------------------------------------------
 
Micheal has no Care Guidelines for this patient.
Care History
Medical/Surgical
11/30/2021    [facility]
Dr. Murguia is waiting for ED report before scheduling patient for follow up.
11/29/2021    [facility]
 
      - Patient is currently established with [facility]. If patient is seen 
      in the ED during business hours. Please contact CHWs at [facility] at
      Ext [phone]. Care Recommendation: This patient has had 5 or more Emergency
      Department visits in the last 12 months. Patient requires education on the
      scope and purpose of the ED as an acute care provider not a Primary Care
      Provider and should not be utilized for chronic conditions. If patient
      returns to ED please contact Community Health WorkerAna Luisa at [phone].
      These are guidelines and the provider should exercise clinical judgment when
      providing care.
12/09/2019    [facility]
Patient has follow up appt. on 01/23/2020 with Dr. Marcelo KELLY VISIT COUNT (12 MO.)
-------------------------------------------------------------------------------------
3 [facility]
-------------------------------------------------------------------------------------
TOTAL 3
-------------------------------------------------------------------------------------
NOTE: Visits indicate total known visits.
 
ED/UCC VISIT TRACKING (12 MO.)
-------------------------------------------------------------------------------------
08/06/2022 12:42
[facility] [place]
 
TYPE: Emergency
 
COMPLAINT:
- FALL, LACERATION
-------------------------------------------------------------------------------------
02/23/2022 12:36
 
[facility] [place]
 
TYPE: Emergency
 
COMPLAINT:
- CHEST PAIN, COUGHING DARK/BLOODY MUCUS, FEVER
-------------------------------------------------------------------------------------
11/28/2021 08:45
[facility] [place]
 
TYPE: Emergency
 
COMPLAINT:
- SOB,DIZZY,CHEST PAIN
 
DIAGNOSES:
- Obstructive sleep apnea (adult) (pediatric)
- Heart failure, unspecified
- Unspecified atrial fibrillation
- Hypothyroidism, unspecified
- Shortness of breath
- Personal history of nicotine dependence
- Contact with and (suspected) exposure to COVID-19
- Chronic obstructive pulmonary disease with (acute) exacerbation
- Chronic kidney disease, unspecified
- Gastro-esophageal reflux disease without esophagitis
- Rheumatoid arthritis, unspecified
- Allergy status to other drugs, medicaments and biological substances
- Anemia in chronic kidney disease
- Old myocardial infarction
- Other long term (current) drug therapy
-------------------------------------------------------------------------------------
 
 
INPATIENT VISIT TRACKING (12 MO.)
-------------------------------------------------------------------------------------
02/23/2022 12:37
[facility] [place]
 
TYPE: Observation
 
COMPLAINT:
- PNA
 
DIAGNOSES:
- Heart failure, unspecified
- Presence of aortocoronary bypass graft
- Allergy status to other drugs, medicaments and biological substances
- Contact with and (suspected) exposure to COVID-19
- Chronic obstructive pulmonary disease, unspecified
- Ventricular tachycardia
- Myocardial infarction type 2
- Old myocardial infarction
- Rheumatoid arthritis, unspecified
- Personal history of irradiation
- Chronic atrial fibrillation, unspecified
- Personal history of malignant neoplasm of larynx
- Long term (current) use of anticoagulants
- Long term (current) use of systemic steroids
- Personal history of nicotine dependence
 
- Atherosclerotic heart disease of native coronary artery without angina pectoris
- Pneumonia, unspecified organism
- Other disorders of bilirubin metabolism
-------------------------------------------------------------------------------------
 
[email or web address]

## 2022-08-06 NOTE — EKG
[facility]
                                    [address]
                                  [place], [place]  [address]
_________________________________________________________________________________________
                                                                 Draft    
 
 
EK completed, results pending confirmation
 
 
 
 
 
 
 
 
 
 
 
 
 
 
 
 
 
 
 
 
 
 
 
 
 
 
 
 
 
 
 
 
 
 
 
 
 
 
 
 
 
 
 
 
                                                                                    
_________________________________________________________________________________________
PATIENT NAME:     DARYL RAMIREZ RUSTY                     
MEDICAL RECORD #: [id number]                     Electrocardiogram             
          ACCT #: [id number]  
DATE OF BIRTH:   [birth year]                                       
PHYSICIAN:   PRELIMINARY                        REPORT #: [id number]
REPORT IS CONFIDENTIAL AND NOT TO BE RELEASED WITHOUT AUTHORIZATION

## 2022-12-24 ENCOUNTER — HOSPITAL ENCOUNTER (EMERGENCY)
Dept: HOSPITAL 46 - ED | Age: 77
Discharge: HOME | End: 2022-12-24
Payer: MEDICARE

## 2022-12-24 VITALS — HEIGHT: 67 IN | BODY MASS INDEX: 23.54 KG/M2 | WEIGHT: 150 LBS

## 2022-12-24 DIAGNOSIS — K21.9: ICD-10-CM

## 2022-12-24 DIAGNOSIS — E78.2: ICD-10-CM

## 2022-12-24 DIAGNOSIS — Z88.8: ICD-10-CM

## 2022-12-24 DIAGNOSIS — I48.91: ICD-10-CM

## 2022-12-24 DIAGNOSIS — Z87.891: ICD-10-CM

## 2022-12-24 DIAGNOSIS — Z79.01: ICD-10-CM

## 2022-12-24 DIAGNOSIS — G47.33: ICD-10-CM

## 2022-12-24 DIAGNOSIS — N18.9: ICD-10-CM

## 2022-12-24 DIAGNOSIS — I25.2: ICD-10-CM

## 2022-12-24 DIAGNOSIS — J44.9: ICD-10-CM

## 2022-12-24 DIAGNOSIS — Z20.822: ICD-10-CM

## 2022-12-24 DIAGNOSIS — E03.9: ICD-10-CM

## 2022-12-24 DIAGNOSIS — U07.1: Primary | ICD-10-CM

## 2022-12-24 DIAGNOSIS — Z79.899: ICD-10-CM

## 2022-12-24 DIAGNOSIS — J12.82: ICD-10-CM

## 2022-12-24 NOTE — XMS
PreManage Notification: DARYL RAMIREZ MRN:[id number]
 
Security Information
 
Security Events
No recent Security Events currently on file
 
 
 
CRITERIA MET
------------
- Group Notification
- [facility] - Has Care Guidelines
 
 
CARE PROVIDERS
-------------------------------------------------------------------------------------
FORTINO MURGUIALM     Internal Medicine     05/24/2021-Current
 
PHONE: Unknown
-------------------------------------------------------------------------------------
 
Micheal has no Care Guidelines for this patient.
Care History
Medical/Surgical
11/30/2021    [facility]
Dr. Murguia is waiting for ED report before scheduling patient for follow up.
11/29/2021    [facility]
 
      - Patient is currently established with [facility]. If patient is seen 
      in the ED during business hours. Please contact CHWs at [facility] at
      Ext [phone]. Care Recommendation: This patient has had 5 or more Emergency
      Department visits in the last 12 months. Patient requires education on the
      scope and purpose of the ED as an acute care provider not a Primary Care
      Provider and should not be utilized for chronic conditions. If patient
      returns to ED please contact Community Health WorkerAna Luisa at [phone].
      These are guidelines and the provider should exercise clinical judgment when
      providing care.
12/09/2019    [facility]
Patient has follow up appt. on 01/23/2020 with Dr. Marcelo KELLY VISIT COUNT (12 MO.)
-------------------------------------------------------------------------------------
1 [facility]
 
3 [facility]
-------------------------------------------------------------------------------------
TOTAL 4
-------------------------------------------------------------------------------------
NOTE: Visits indicate total known visits.
 
ED/UCC VISIT TRACKING (12 MO.)
-------------------------------------------------------------------------------------
12/24/2022 11:29
[facility] [place]
 
TYPE: Emergency
 
COMPLAINT:
- SOB
 
-------------------------------------------------------------------------------------
08/07/2022 13:28
[facility]
 
TYPE: Emergency
 
DIAGNOSES:
- Bradycardia, unspecified
- Atrioventricular block, complete
- Permanent atrial fibrillation
- Hypothyroidism, unspecified
- Dizziness and giddiness
- Atherosclerosis of coronary artery bypass graft(s), unspecified, with other forms
of angina pectoris
- Syncope and collapse
- Ventricular tachycardia
- Hypotension
- Chronic obstructive pulmonary disease, unspecified
- Ischemic cardiomyopathy
- Rib Pain
- Essential (primary) hypertension
- Obstructive sleep apnea (adult) (pediatric)
-------------------------------------------------------------------------------------
08/06/2022 12:42
[facility] [place]
 
TYPE: Emergency
 
COMPLAINT:
- SYNCOPE
 
DIAGNOSES:
- Bradycardia, unspecified
- Syncope and collapse
- Allergy status to other drugs, medicaments and biological substances
- Personal history of nicotine dependence
- Multiple fractures of ribs, right side, initial encounter for closed fracture
- Exposure to other specified factors, initial encounter
- Contact with and (suspected) exposure to COVID-19
- Other long term (current) drug therapy
-------------------------------------------------------------------------------------
02/23/2022 12:36
[facility] [place]
 
TYPE: Emergency
 
COMPLAINT:
- CHEST PAIN, COUGHING DARK/BLOODY MUCUS, FEVER
-------------------------------------------------------------------------------------
 
 
INPATIENT VISIT TRACKING (12 MO.)
-------------------------------------------------------------------------------------
02/23/2022 12:37
[facility] [place]
 
TYPE: Observation
 
COMPLAINT:
- PNA
 
 
DIAGNOSES:
- Personal history of malignant neoplasm of larynx
- Chronic obstructive pulmonary disease, unspecified
- Long term (current) use of systemic steroids
- Myocardial infarction type 2
- Atherosclerotic heart disease of native coronary artery without angina pectoris
- Rheumatoid arthritis, unspecified
- Other disorders of bilirubin metabolism
- Presence of aortocoronary bypass graft
- Chronic atrial fibrillation, unspecified
- Contact with and (suspected) exposure to COVID-19
- Long term (current) use of anticoagulants
- Ventricular tachycardia
- Personal history of nicotine dependence
- Old myocardial infarction
- Pneumonia, unspecified organism
- Heart failure, unspecified
- Personal history of irradiation
- Allergy status to other drugs, medicaments and biological substances
-------------------------------------------------------------------------------------
 
[email or web address]